# Patient Record
Sex: FEMALE | Race: WHITE | NOT HISPANIC OR LATINO | Employment: OTHER | URBAN - METROPOLITAN AREA
[De-identification: names, ages, dates, MRNs, and addresses within clinical notes are randomized per-mention and may not be internally consistent; named-entity substitution may affect disease eponyms.]

---

## 2017-01-05 ENCOUNTER — TELEPHONE (OUTPATIENT)
Dept: MEDICAL GROUP | Facility: CLINIC | Age: 72
End: 2017-01-05

## 2017-01-05 NOTE — TELEPHONE ENCOUNTER
1. Caller Name: christo (pt friend)                      Call Back Number:  [phone]     2. Message: patients friend christo called and states that pt accidentally took  paroxetine 40 mg pills about 3 hours ago and they would liek to know if they should head to the ER?     3. Patient approves office to leave a detailed voicemail/MyChart message: yes

## 2017-01-05 NOTE — TELEPHONE ENCOUNTER
This will make her shaky and may cause headache and stomach upset and possibly diarrhea as well.  That should be gone by 12 hours.  I do not anticipate any danger.

## 2017-02-22 DIAGNOSIS — J44.89 ASTHMA WITH COPD: ICD-10-CM

## 2017-02-23 RX ORDER — MONTELUKAST SODIUM 10 MG/1
10 TABLET ORAL DAILY
Qty: 90 TAB | Refills: 3 | Status: SHIPPED | OUTPATIENT
Start: 2017-02-23 | End: 2018-02-05 | Stop reason: SDUPTHER

## 2017-03-13 DIAGNOSIS — E87.6 HYPOKALEMIA: ICD-10-CM

## 2017-03-13 RX ORDER — POTASSIUM CHLORIDE 750 MG/1
CAPSULE, EXTENDED RELEASE ORAL
Qty: 180 CAP | Refills: 2 | Status: SHIPPED | OUTPATIENT
Start: 2017-03-13 | End: 2017-12-24 | Stop reason: SDUPTHER

## 2017-04-03 ENCOUNTER — TELEPHONE (OUTPATIENT)
Dept: PULMONOLOGY | Facility: HOSPICE | Age: 72
End: 2017-04-03

## 2017-04-03 DIAGNOSIS — J44.9 CHRONIC OBSTRUCTIVE PULMONARY DISEASE, UNSPECIFIED COPD TYPE (HCC): ICD-10-CM

## 2017-04-07 DIAGNOSIS — J44.89 ASTHMA WITH COPD: ICD-10-CM

## 2017-04-07 RX ORDER — TIOTROPIUM BROMIDE 18 UG/1
18 CAPSULE ORAL; RESPIRATORY (INHALATION) DAILY
Qty: 90 CAP | Refills: 3 | Status: SHIPPED | OUTPATIENT
Start: 2017-04-07 | End: 2018-03-28 | Stop reason: SDUPTHER

## 2017-04-26 NOTE — TELEPHONE ENCOUNTER
Pt is aware that since she is [facility] that she will need to go over to [facility].  Will assist in getting her set up before the official switchout happens

## 2017-06-06 DIAGNOSIS — N39.46 MIXED STRESS AND URGE URINARY INCONTINENCE: ICD-10-CM

## 2017-06-06 RX ORDER — OXYBUTYNIN CHLORIDE 5 MG/1
TABLET, EXTENDED RELEASE ORAL
Qty: 30 TAB | Refills: 0 | Status: SHIPPED | OUTPATIENT
Start: 2017-06-06 | End: 2017-07-02 | Stop reason: SDUPTHER

## 2017-06-12 ENCOUNTER — APPOINTMENT (OUTPATIENT)
Dept: PULMONOLOGY | Facility: HOSPICE | Age: 72
End: 2017-06-12
Payer: MEDICARE

## 2017-06-12 RX ORDER — AZITHROMYCIN 250 MG/1
TABLET, FILM COATED ORAL
Qty: 30 TAB | Refills: 0 | Status: SHIPPED | OUTPATIENT
Start: 2017-06-12 | End: 2017-07-26 | Stop reason: SDUPTHER

## 2017-06-12 NOTE — TELEPHONE ENCOUNTER
Was the patient seen in the last year in this department? Yes 12/2016    Does patient have an active prescription for medications requested? No     Received Request Via: Pharmacy

## 2017-06-30 ENCOUNTER — PATIENT OUTREACH (OUTPATIENT)
Dept: HEALTH INFORMATION MANAGEMENT | Facility: OTHER | Age: 72
End: 2017-06-30

## 2017-06-30 NOTE — PROGRESS NOTES
Attempt #:1    WebIZ Checked & Epic Updated: no  HealthConnect Verified: yes  Verify PCP: yes    Communication Preference Obtained: yes     Review Care Team: yes    Annual Wellness Visit Scheduling  1. Scheduling Status:Scheduled     Care Coordination Enrollment (Attempt to Enroll in Care Coordination)  2. Is patient appropriate: YES  3. Is patient interested: NO - doesn't see value     Care Gap Scheduling (Attempt to Schedule EACH Overdue Care Gap!)     Health Maintenance Due   Topic Date Due   • URINE DRUG SCREEN  Did not address   • Annual Wellness Visit  scheduled   • PFT SCREENING-FEV1 AND FEV/FVC RATIO / SPIROMETRY SHOULD BE PERFORMED ANNUALLY     • MAMMOGRAM  Informed- will call back to schedule          MyChart Activation: already active  ZarthCode Doris: no  Virtual Visits: no  Opt In to Text Messages: no

## 2017-07-02 RX ORDER — OXYBUTYNIN CHLORIDE 5 MG/1
TABLET, EXTENDED RELEASE ORAL
Qty: 30 TAB | Refills: 3 | Status: SHIPPED | OUTPATIENT
Start: 2017-07-02 | End: 2017-10-29 | Stop reason: SDUPTHER

## 2017-07-12 ENCOUNTER — TELEPHONE (OUTPATIENT)
Dept: MEDICAL GROUP | Facility: CLINIC | Age: 72
End: 2017-07-12

## 2017-07-12 NOTE — TELEPHONE ENCOUNTER
ANNUAL WELLNESS VISIT PRE-VISIT PLANNING     1.  Reviewed last PCP office visit assessment and plan notes: Yes    2.  If any orders were placed last visit do we have Results/Consult Notes?        •  Labs? No        •  Imaging? No        •  Referrals? No     3.  Patient Care Coordination Note was updated with diagnosis information:  Yes, In your medical judgement are the following conditions valid-present for 2017, if so can you please assess and document:       J44.9-COPD   J96.11-chronic respiratory failure w/hypoxia (since member is on home O2)    4.  Patient is due for these Health Maintenance Topics:   Health Maintenance Due   Topic Date Due   • URINE DRUG SCREEN  1945   • Annual Wellness Visit  1945   • PFT SCREENING-FEV1 AND FEV/FVC RATIO / SPIROMETRY SHOULD BE PERFORMED ANNUALLY  07/29/1963   • MAMMOGRAM  12/11/2016          •  LAYLA letter was faxed to:  [employer]    for Retinal eye exam  records    5.  Immunizations were updated in [place] using WebIZ?: Yes       •  Web Iz Recommendations:  Hep A, adult Influenza w/preserv.          •  Is patient due for Tdap/Shingles? Yes.  If yes, was patient alerted of copay? Yes    6.  Patient has:       •   COPD          7.  Updated Care Team with [employer] and all specialists?        •   Gait devices, O2, CPAP, etc: yes        •   Eye professional: yes       •   Other specialists (GYN, cardiology, endo, etc): yes    8.  Is patient in need of any refills prior to office visit? No       •    Separate refill encounter created?: N\A    9.  Patient was informed to arrive 15 min prior to their scheduled appointment and bring in their medication bottles? yes    10.  Patient was advised: “This is a free wellness visit. The provider will screen for medical conditions to help you stay healthy. If you have other concerns to address you may be asked to discuss these at a separate visit or there may be an additional fee.”  Yes    7/12/17 N/A L/M 11:40am

## 2017-07-12 NOTE — Clinical Note
Nagi  Dory Oden M.D.  [address]  [place] [address]  Fax: [phone]   Authorization for Release/Disclosure of   Protected Health Information   Name: BERNY RENEE : 1945 SSN: [id number]   Address: [facility] [address]  [place] [address] Phone:    [phone] (home)    I authorize the entity listed below to release/disclose the PHI below to:   Nagi/Dory Oden M.D. and Dory Oden M.D.   Provider or Entity Name:  [facility]        Address   City, [facility], [facility]   Phone:      Fax: [phone]     Reason for request: continuity of care   Information to be released:    [  ] LAST COLONOSCOPY,  including any PATH REPORT and follow-up  [  ] LAST FIT/COLOGUARD RESULT [  ] LAST DEXA  [  ] LAST MAMMOGRAM  [  ] LAST PAP  [  ] LAST LABS [ X ] RETINA EXAM REPORT  [  ] IMMUNIZATION RECORDS  [  ] Release all info      [  ] Check here and initial the line next to each item to release ALL health information INCLUDING  _____ Care and treatment for drug and / or alcohol abuse  _____ HIV testing, infection status, or AIDS  _____ Genetic Testing    DATES OF SERVICE OR TIME PERIOD TO BE DISCLOSED:   Recent eye exam   I understand and acknowledge that:  * This Authorization may be revoked at any time by you in writing, except if your health information has already been used or disclosed.  * Your health information that will be used or disclosed as a result of you signing this authorization could be re-disclosed by the recipient. If this occurs, your re-disclosed health information may no longer be protected by State or Federal laws.  * You may refuse to sign this Authorization. Your refusal will not affect your ability to obtain treatment.  * This Authorization becomes effective upon signing and will  on (date) __________.      If no date is indicated, this Authorization will  one (1) year from the signature date.    Name: Berny Renee    Signature:   Date:     2017       PLEASE FAX REQUESTED RECORDS BACK TO: [phone]

## 2017-07-13 ENCOUNTER — OFFICE VISIT (OUTPATIENT)
Dept: MEDICAL GROUP | Facility: CLINIC | Age: 72
End: 2017-07-13
Payer: MEDICARE

## 2017-07-13 ENCOUNTER — HOSPITAL ENCOUNTER (OUTPATIENT)
Dept: LAB | Facility: MEDICAL CENTER | Age: 72
End: 2017-07-13
Attending: FAMILY MEDICINE
Payer: MEDICARE

## 2017-07-13 VITALS
HEIGHT: 64 IN | BODY MASS INDEX: 38.24 KG/M2 | HEART RATE: 90 BPM | RESPIRATION RATE: 18 BRPM | DIASTOLIC BLOOD PRESSURE: 60 MMHG | TEMPERATURE: 99.3 F | SYSTOLIC BLOOD PRESSURE: 130 MMHG | WEIGHT: 224 LBS | OXYGEN SATURATION: 93 %

## 2017-07-13 DIAGNOSIS — R73.09 ELEVATED GLYCOHEMOGLOBIN: ICD-10-CM

## 2017-07-13 DIAGNOSIS — E55.9 VITAMIN D DEFICIENCY DISEASE: ICD-10-CM

## 2017-07-13 DIAGNOSIS — M54.16 LUMBAR RADICULAR PAIN: ICD-10-CM

## 2017-07-13 DIAGNOSIS — J96.11 CHRONIC RESPIRATORY FAILURE WITH HYPOXIA (HCC): ICD-10-CM

## 2017-07-13 DIAGNOSIS — M47.816 FACET ARTHROPATHY, LUMBAR: ICD-10-CM

## 2017-07-13 DIAGNOSIS — R52 PAIN MANAGEMENT: ICD-10-CM

## 2017-07-13 DIAGNOSIS — K59.09 CHRONIC CONSTIPATION: ICD-10-CM

## 2017-07-13 DIAGNOSIS — E78.5 DYSLIPIDEMIA: ICD-10-CM

## 2017-07-13 DIAGNOSIS — E66.9 OBESITY (BMI 30-39.9): ICD-10-CM

## 2017-07-13 DIAGNOSIS — N39.46 MIXED STRESS AND URGE URINARY INCONTINENCE: ICD-10-CM

## 2017-07-13 DIAGNOSIS — Z91.81 AT RISK FOR FALLS: ICD-10-CM

## 2017-07-13 DIAGNOSIS — R73.01 IMPAIRED FASTING GLUCOSE: ICD-10-CM

## 2017-07-13 DIAGNOSIS — Z12.31 ENCOUNTER FOR SCREENING MAMMOGRAM FOR BREAST CANCER: ICD-10-CM

## 2017-07-13 DIAGNOSIS — G89.4 CHRONIC PAIN SYNDROME: ICD-10-CM

## 2017-07-13 DIAGNOSIS — Z00.00 MEDICARE ANNUAL WELLNESS VISIT, SUBSEQUENT: ICD-10-CM

## 2017-07-13 DIAGNOSIS — J44.89 ASTHMA WITH COPD: ICD-10-CM

## 2017-07-13 DIAGNOSIS — I10 ESSENTIAL HYPERTENSION: ICD-10-CM

## 2017-07-13 DIAGNOSIS — Z99.81 SUPPLEMENTAL OXYGEN DEPENDENT: ICD-10-CM

## 2017-07-13 DIAGNOSIS — R60.9 PERIPHERAL EDEMA: ICD-10-CM

## 2017-07-13 LAB
25(OH)D3 SERPL-MCNC: 28 NG/ML (ref 30–100)
ANION GAP SERPL CALC-SCNC: 5 MMOL/L (ref 0–11.9)
BUN SERPL-MCNC: 8 MG/DL (ref 8–22)
CALCIUM SERPL-MCNC: 9.9 MG/DL (ref 8.5–10.5)
CHLORIDE SERPL-SCNC: 96 MMOL/L (ref 96–112)
CO2 SERPL-SCNC: 35 MMOL/L (ref 20–33)
CREAT SERPL-MCNC: 0.52 MG/DL (ref 0.5–1.4)
ERYTHROCYTE [DISTWIDTH] IN BLOOD BY AUTOMATED COUNT: 40.1 FL (ref 35.9–50)
EST. AVERAGE GLUCOSE BLD GHB EST-MCNC: 128 MG/DL
GFR SERPL CREATININE-BSD FRML MDRD: >60 ML/MIN/1.73 M 2
GLUCOSE SERPL-MCNC: 123 MG/DL (ref 65–99)
HBA1C MFR BLD: 6.1 % (ref 0–5.6)
HCT VFR BLD AUTO: 43.9 % (ref 37–47)
HGB BLD-MCNC: 14.8 G/DL (ref 12–16)
MCH RBC QN AUTO: 29.8 PG (ref 27–33)
MCHC RBC AUTO-ENTMCNC: 33.7 G/DL (ref 33.6–35)
MCV RBC AUTO: 88.3 FL (ref 81.4–97.8)
PLATELET # BLD AUTO: 348 K/UL (ref 164–446)
PMV BLD AUTO: 9.7 FL (ref 9–12.9)
POTASSIUM SERPL-SCNC: 3.7 MMOL/L (ref 3.6–5.5)
RBC # BLD AUTO: 4.97 M/UL (ref 4.2–5.4)
SODIUM SERPL-SCNC: 136 MMOL/L (ref 135–145)
TSH SERPL DL<=0.005 MIU/L-ACNC: 1.36 UIU/ML (ref 0.3–3.7)
WBC # BLD AUTO: 12 K/UL (ref 4.8–10.8)

## 2017-07-13 PROCEDURE — 85027 COMPLETE CBC AUTOMATED: CPT

## 2017-07-13 PROCEDURE — 84443 ASSAY THYROID STIM HORMONE: CPT

## 2017-07-13 PROCEDURE — 36415 COLL VENOUS BLD VENIPUNCTURE: CPT

## 2017-07-13 PROCEDURE — 80048 BASIC METABOLIC PNL TOTAL CA: CPT

## 2017-07-13 PROCEDURE — G0439 PPPS, SUBSEQ VISIT: HCPCS | Performed by: FAMILY MEDICINE

## 2017-07-13 PROCEDURE — 82306 VITAMIN D 25 HYDROXY: CPT

## 2017-07-13 PROCEDURE — 83036 HEMOGLOBIN GLYCOSYLATED A1C: CPT

## 2017-07-13 RX ORDER — OMEPRAZOLE 20 MG/1
CAPSULE, DELAYED RELEASE ORAL
COMMUNITY
Start: 2017-06-11 | End: 2017-12-24 | Stop reason: SDUPTHER

## 2017-07-13 ASSESSMENT — PATIENT HEALTH QUESTIONNAIRE - PHQ9
5. POOR APPETITE OR OVEREATING: 0 - NOT AT ALL
CLINICAL INTERPRETATION OF PHQ2 SCORE: 3
SUM OF ALL RESPONSES TO PHQ QUESTIONS 1-9: 9

## 2017-07-13 ASSESSMENT — PAIN SCALES - GENERAL: PAINLEVEL: 5=MODERATE PAIN

## 2017-07-13 NOTE — MR AVS SNAPSHOT
"Berny Renee   2017 1:20 PM   Office Visit   MRN: [id number]    Department:  [facility]   Dept Phone:  [phone]    Description:  Female : 1945   Provider:  Dory Oden M.D.; [facility]            Reason for Visit     Annual Wellness Visit           Allergies as of 2017     Allergen Noted Reactions    Iodine 2008       convulsion    Tape 2010   Rash, Itching      You were diagnosed with     Medicare annual wellness visit, subsequent   [762037]       Asthma with COPD (CMS-HCC)   [161271]       Supplemental oxygen dependent   [578117]       Chronic respiratory failure with hypoxia (CMS-HCC)   [283496]       Obesity (BMI 30-39.9)   [170470]       Pain management   [875899]       Chronic pain syndrome   [338.4.ICD-9-CM]       At risk for falls   [757736]       Impaired fasting glucose   [790.21.ICD-9-CM]       Facet arthropathy, lumbar ([facility])   [496464]       Lumbar radicular pain   [298777]       Dyslipidemia   [227667]       Essential hypertension   [6195420]       Elevated glycohemoglobin   [414030]       Vitamin D deficiency disease   [801415]       Mixed stress and urge urinary incontinence   [103075]       Peripheral edema   [469418]       Chronic constipation   [113225]       Encounter for screening mammogram for breast cancer   [9871683]         Vital Signs     Blood Pressure Pulse Temperature Respirations Height Weight    130/60 mmHg 90 37.4 °C (99.3 °F) 18 1.626 m (5' 4.02\") 101.606 kg (224 lb)    Body Mass Index Oxygen Saturation Last Menstrual Period Smoking Status          38.43 kg/m2 93% 2001 Former Smoker        Basic Information     Date Of Birth Sex Race Ethnicity Preferred Language    1945 Female White Non- English      Your appointments     2017  3:00 PM   Established Patient Pul with Michelle Alfonso M.D.   [facility] (--)    [address]  [address]  [place] [id number] [phone]           "      Problem List              ICD-10-CM Priority Class Noted - Resolved    Asthma with COPD (CMS-HCC) J44.9, J45.909   Unknown - Present    Essential hypertension I10   Unknown - Present    Mixed stress and urge urinary incontinence N39.46   Unknown - Present    Peripheral edema R60.9   6/4/2010 - Present    Lumbar radicular pain M54.16   8/16/2010 - Present    Vitamin D deficiency disease E55.9   Unknown - Present    Dyslipidemia E78.5   Unknown - Present    Facet arthropathy, lumbar ([facility]) M12.88   2/27/2012 - Present    Chronic constipation K59.09   11/5/2012 - Present    Chronic pain syndrome G89.4   9/12/2014 - Present    Supplemental oxygen dependent Z99.81   8/13/2015 - Present    Impaired fasting glucose R73.01   12/11/2015 - Present    Pain management R52   5/16/2016 - Present    Obesity (BMI 30-39.9) E66.9   10/24/2016 - Present    At risk for falls Z91.81   10/24/2016 - Present    Chronic respiratory failure with hypoxia (CMS-HCC) J96.11   7/13/2017 - Present      Health Maintenance        Date Due Completion Dates    MAMMOGRAM 12/11/2016 12/11/2015 (Declined), 1/10/2012, 11/23/2009, 11/3/2009, 11/3/2009, 9/30/2008, 9/30/2008, 7/17/2007, 6/25/2007, 6/25/2007, 6/19/2006, 6/5/2006, 4/5/2005, 2/23/2004    Override on 12/11/2015: Patient Declined    BONE DENSITY 9/12/2017 (Originally 7/14/2013) 7/14/2008, 12/27/2005    IMM DTaP/Tdap/Td Vaccine (1 - Tdap) 1/1/2018 (Originally 7/29/1964) ---    IMM INFLUENZA (1) 9/1/2017 10/12/2016, 9/9/2015, 9/26/2014, 10/10/2013, 10/5/2012    COLONOSCOPY 1/23/2018 1/23/2013, 1/16/2012 (Prv Comp), 6/9/2005 (Prv Comp)    Override on 1/16/2012: Previously completed (colon polyps)    Override on 6/9/2005: Previously completed (colon polyps)            Current Immunizations     13-VALENT PCV PREVNAR 6/17/2015    Influenza TIV (IM) 9/26/2014, 10/10/2013, 10/5/2012    Influenza Vaccine Adult HD 10/12/2016, 9/9/2015    Pneumococcal Vaccine (UF)Historical Data 7/8/2013    Pneumococcal  polysaccharide vaccine (PPSV-23) 10/24/2016    SHINGLES VACCINE 10/12/2016      Below and/or attached are the medications your provider expects you to take. Review all of your home medications and newly ordered medications with your provider and/or pharmacist. Follow medication instructions as directed by your provider and/or pharmacist. Please keep your medication list with you and share with your provider. Update the information when medications are discontinued, doses are changed, or new medications (including over-the-counter products) are added; and carry medication information at all times in the event of emergency situations     Allergies:  IODINE - (reactions not documented)     TAPE - Rash,Itching               Medications  Valid as of: July 13, 2017 -  2:58 PM    Generic Name Brand Name Tablet Size Instructions for use    Acyclovir (Cap) ZOVIRAX 200 MG Take 2 Caps by mouth 3 times a day.        Albuterol Sulfate (Nebu Soln) PROVENTIL 2.5mg/3ml 2.5 mg by Nebulization route every four hours as needed for Shortness of Breath.        Azithromycin (Tab) ZITHROMAX 250 MG TAKE ONE TABLET BY MOUTH ONE TIME DAILY        Cholecalciferol (Tab) cholecalciferol 1000 UNIT Take 2 Tabs by mouth every day.        Diclofenac Sodium (Gel) Diclofenac Sodium 1 %         Fluticasone-Salmeterol (AEROSOL POWDER, BREATH ACTIVATED) ADVAIR 500-50 MCG/DOSE Inhale 1 Puff by mouth 2 times a day.        Influenza Vac Split High-Dose (Suspension Prefilled Syringe) FLUZONE HIGH-DOSE 0.5 ML         Ipratropium-Albuterol (Solution) DUONEB 0.5-2.5 (3) MG/3ML USE ONE VIAL IN NEBULIZER EVERY 4 HOURS AS NEEDED FOR SHORTNESS OF BREATH OR  WHEEZING        Ketoconazole (Cream) NIZORAL 2 % Apply to affected area daily        Lidocaine (Ointment) XYLOCAINE 5 %         Lisinopril (Tab) PRINIVIL 5 MG Take 1 Tab by mouth every day.        Misc. Devices (Misc) Misc. Devices  This is an order for  2 batteries and an oxygen older for the my pride go-go  keesha.  Patient has COPD and is oxygen dependent. The batteries are not lasting long enough. Her mobility is impaired.        ODETTE 99 months   NPI [id number]        Misc. Devices (Misc) Misc. Devices  This is an order for repairs to her go go scooter           ODETTE 99 months   NPI [id number]        Montelukast Sodium (Tab) SINGULAIR 10 MG Take 1 Tab by mouth every day.        Omeprazole (CAPSULE DELAYED RELEASE) PRILOSEC 20 MG         Omeprazole Magnesium (Tablet Delayed Response) PRILOSEC OTC 20 MG Take 1 Tab by mouth every day.        Oxybutynin Chloride (TABLET SR 24 HR) DITROPAN-XL 5 MG TAKE ONE TABLET BY MOUTH ONE TIME DAILY        OxyCODONE HCl (Tab) ROXICODONE 10 MG         OxyCODONE HCl (Tablet Extended Release 12 hour Abuse-Deterrent) OXYCONTIN 20 MG         Potassium Chloride (Cap CR) MICRO-K 10 MEQ TAKE ONE CAPSULE BY MOUTH TWICE DAILY        Spironolactone (Tab) ALDACTONE 25 MG TAKE ONE TABLET BY MOUTH ONE TIME DAILY        Tiotropium Bromide Monohydrate (Cap) SPIRIVA 18 MCG Inhale 1 Cap by mouth every day.        TiZANidine HCl (Tab) ZANAFLEX 4 MG Take 1 Tab by mouth 3 times a day.        Zoster Vaccine Live (Recon Soln) ZOSTAVAX 70946 UNT/0.65ML         .                 Medicines prescribed today were sent to:     [id number] - [place], [place] - [id number] [facility] [address]    Phone: [phone] Fax: [phone]    Open 24 Hours?: No      Medication refill instructions:       If your prescription bottle indicates you have medication refills left, it is not necessary to call your provider’s office. Please contact your pharmacy and they will refill your medication.    If your prescription bottle indicates you do not have any refills left, you may request refills at any time through one of the following ways: The online [facility] system (except Urgent Care), by calling your provider’s office, or by asking your pharmacy to contact your provider’s office with a refill request.  Medication refills are processed only during regular business hours and may not be available until the next business day. Your provider may request additional information or to have a follow-up visit with you prior to refilling your medication.   *Please Note: Medication refills are assigned a new Rx number when refilled electronically. Your pharmacy may indicate that no refills were authorized even though a new prescription for the same medication is available at the pharmacy. Please request the medicine by name with the pharmacy before contacting your provider for a refill.        Your To Do List     Future Labs/Procedures Complete By Expires    BASIC METABOLIC PANEL  As directed 7/14/2018    CBC WITHOUT DIFFERENTIAL  As directed 1/13/2018    HEMOGLOBIN A1C  As directed 7/14/2018    MA-MAMMO SCREENING BILAT W/MONALISA W/CAD  As directed 7/13/2018    TSH  As directed 7/14/2018    VITAMIN D,25 HYDROXY  As directed 7/14/2018         [employer] [facility] Access Code: Activation code not generated  Current [facility] Social [employer] Status: Active

## 2017-07-13 NOTE — PROGRESS NOTES
Chief Complaint   Patient presents with   • Annual Wellness Visit         HPI:  Berny Renee is a 71 y.o. female here for Medicare Annual Wellness Visit    She lives with her  of many years. He has multiple medical problems as well. He is having some memory problems which are very frustrating and frightening to her.    Patient Active Problem List    Diagnosis Date Noted   • Obesity (BMI 30-39.9) 10/24/2016   • At risk for falls 10/24/2016   • Chronic use of opiate for therapeutic purpose 05/16/2016   • Impaired fasting glucose 12/11/2015   • Supplemental oxygen dependent 08/13/2015   • Chronic pain syndrome 09/12/2014   • Hypokalemia 06/12/2014   • Chronic constipation 11/05/2012   • Facet arthropathy, lumbar (HCC) 02/27/2012   • Dyslipidemia    • Vitamin D deficiency disease    • Lumbar radicular pain 08/16/2010   • Peripheral edema 06/04/2010   • Mixed stress and urge urinary incontinence    • Asthma with COPD (CMS-[facility])    • Essential hypertension        Current Outpatient Prescriptions   Medication Sig Dispense Refill   • oxybutynin SR (DITROPAN-XL) 5 MG TABLET SR 24 HR TAKE ONE TABLET BY MOUTH ONE TIME DAILY 30 Tab 3   • azithromycin (ZITHROMAX) 250 MG Tab TAKE ONE TABLET BY MOUTH ONE TIME DAILY 30 Tab 0   • tiotropium (SPIRIVA HANDIHALER) 18 MCG Cap Inhale 1 Cap by mouth every day. 90 Cap 3   • potassium chloride (MICRO-K) 10 MEQ capsule TAKE ONE CAPSULE BY MOUTH TWICE DAILY 180 Cap 2   • montelukast (SINGULAIR) 10 MG Tab Take 1 Tab by mouth every day. 90 Tab 3   • spironolactone (ALDACTONE) 25 MG Tab TAKE ONE TABLET BY MOUTH ONE TIME DAILY 30 Tab 11   • lisinopril (PRINIVIL) 5 MG Tab Take 1 Tab by mouth every day. 90 Tab 3   • omeprazole (PRILOSEC OTC) 20 MG tablet Take 1 Tab by mouth every day. 30 Tab 11   • oxycodone immediate release (ROXICODONE) 10 MG immediate release tablet      • ketoconazole (NIZORAL) 2 % Cream Apply to affected area daily 30 g 2   • ipratropium-albuterol (DUONEB) 0.5-2.5 (3)  MG/3ML nebulizer solution USE ONE VIAL IN NEBULIZER EVERY 4 HOURS AS NEEDED FOR SHORTNESS OF BREATH OR  WHEEZING 360 Vial 6   • OXYCONTIN 30 MG Tablet Extended Release 12 hour Abuse-Deterrent      • Diclofenac Sodium 1 % Gel      • lidocaine (XYLOCAINE) 5 % Ointment      • vitamin D (CHOLECALCIFEROL) 1000 UNIT Tab Take 2 Tabs by mouth every day. 60 Tab 6   • gabapentin (NEURONTIN) 300 MG Cap TAKE  ONE CAPSULE BY MOUTH EVERY MORNING, ONE CAPSULE AT NOON, AND THREE CAPSULES NIGHTLY AT BEDTIME 150 Cap 3   • fluticasone-salmeterol (ADVAIR DISKUS) 500-50 MCG/DOSE AEROSOL POWDER, BREATH ACTIVATED Inhale 1 Puff by mouth 2 times a day. 1 Inhaler 11   • Misc. Devices Misc This is an order for  2 batteries and an oxygen older for the franko gilman go-go scooter.  Patient has COPD and is oxygen dependent. The batteries are not lasting long enough. Her mobility is impaired.        ODETTE 99 months   NPI [id number] 1 Each 0   • Misc. Devices Misc This is an order for repairs to her go go scooter           ODETTE 99 months   NPI [id number] 1 Each 0   • FLUZONE HIGH-DOSE 0.5 ML Suspension Prefilled Syringe injection      • ZOSTAVAX 63320 UNT/0.65ML injection      • acyclovir (ZOVIRAX) 200 MG Cap Take 2 Caps by mouth 3 times a day. 120 Cap 2   • predniSONE (DELTASONE) 10 MG Tab One po qid prn asthma exacerbation 40 Tab 2   • hydrocodone-acetaminophen (NORCO) 7.5-325 MG per tablet Take 1 Tab by mouth every 6 hours as needed. 180 Tab 0   • albuterol (PROVENTIL) 2.5mg/3ml Nebu Soln solution for nebulization 2.5 mg by Nebulization route every four hours as needed for Shortness of Breath.     • tizanidine (ZANAFLEX) 4 MG Tab Take 1 Tab by mouth 3 times a day. 90 Tab 5     No current facility-administered medications for this visit.        Patient is taking medications as noted in medication list.  Current supplements as per medication list.   Chronic narcotic pain medicines: yes    Allergies: Iodine and Tape    Current social contact/activities:  Berny plays canasta with someone on line, interacts with a friend that visits, also with family     Is patient current with immunizations?  No, due for TDAP. Patient is interested in receiving NONE today.     She  reports that she quit smoking about 18 years ago. Her smoking use included Cigarettes. She has a 60 pack-year smoking history. She has never used smokeless tobacco. She reports that she uses illicit drugs (Marijuana). She reports that she does not drink alcohol.  Counseling given: Not Answered        DPA/Advanced Directive:  Patient has Advanced Directive on file.     ROS:    Gait: Uses a scooter   Ostomy: no   Other tubes: no   Amputations: no   Chronic oxygen use: yes   Last eye exam: 2015   Wears hearing aids: no   : Denies incontinence.   Denies chest pain, chest pressure.  Her exertional SOB is stable.  Denies abdominal pain, pressure or visible blood or mucus in the stool.    Screening:    COPD    1.  Is patient under the care of a pulmonologist? Yes, CareTeam was updated.  2.  Has patient ever completed a PFT or spirometry? Yes, on 12/2/13.  3.  Is patient on oxygen or CPAP? Yes, CareTeam was updated.  4.  Has patient ever had instruction on inhaler technique by health care professional? Yes  5.  Is patient interested in a referral to respiratory therapy for more information on COPD, inhaler technique, and/or information on establishing an action plan?  No, patient is NOT interested.        Depression Screening    Little interest or pleasure in doing things?  3 - nearly every day  Feeling down, depressed , or hopeless? 0 - not at all  Trouble falling or staying asleep, or sleeping too much?  3 - nearly every day  Feeling tired or having little energy?  3 - nearly every day  Poor appetite or overeating?  0 - not at all  Feeling bad about yourself - or that you are a failure or have let yourself or your family down? 0 - not at all  Trouble concentrating on things, such as reading the newspaper or  watching television? 0 - not at all  Moving or speaking so slowly that other people could have noticed.  Or the opposite - being so fidgety or restless that you have been moving around a lot more than usual?  0 - not at all  Thoughts that you would be better off dead, or of hurting yourself?  0 - not at all  Patient Health Questionnaire Score: 9  If depressive symptoms identified deferred to follow up visit unless specifically addressed in assessment and plan.    Interpretation of PHQ-9 Total Score   Score Severity   1-4 Minimal Depression   5-9 Mild Depression   10-14 Moderate Depression   15-19 Moderately Severe Depression   20-27 Severe Depression    Screening for Cognitive Impairment    Three Minute Recall (banana, sunrise, fence)  3/3    Draw clock face with all 12 numbers set to the hand to show 10 minutes past 11 o'clock  1 5/5  If cognitive concerns identified deferred to follow up visit unless specifically addressed in assessment and plan.    Fall Risk Assessment    Has the patient had two or more falls in the last year or any fall with injury in the last year?  No  If Fall Risk identified deferred to follow up visit unless specifically addressed in assessment and plan.    Safety Assessment    Throw rugs on floor.  Yes  Handrails on all stairs.  Yes  Good lighting in all hallways.  Yes  Difficulty hearing.  No  Patient counseled about all safety risks that were identified.    Functional Assessment ADLs    Are there any barriers preventing you from cooking for yourself or meeting nutritional needs?  No.    Are there any barriers preventing you from driving safely or obtaining transportation?  No.    Are there any barriers preventing you from using a telephone or calling for help?  No.    Are there any barriers preventing you from shopping?  Yes.  or shopping assistant does the shopping  Are there any barriers preventing you from taking care of your own finances?  No.    Are there any barriers  preventing you from managing your medications?  No.    Are currently engaging any exercise or physical activity?  No.       Health Maintenance Summary                URINE DRUG SCREEN Overdue 1945     Annual Wellness Visit Overdue 1945     PFT SCREENING-FEV1 AND FEV/FVC RATIO / SPIROMETRY SHOULD BE PERFORMED ANNUALLY Overdue 7/29/1963     MAMMOGRAM Overdue 12/11/2016      Patient Declined 12/11/2015      Patient has more history with this topic...    BONE DENSITY Postponed 9/12/2017 Originally 7/14/2013. Other     Done 7/14/2008 DS-BONE DENSITY STUDY (DEXA)     Patient has more history with this topic...    IMM DTaP/Tdap/Td Vaccine Postponed 1/1/2018 Originally 7/29/1964. Insurance/Financial    IMM INFLUENZA Next Due 9/1/2017      Done 10/12/2016 Imm Admin: Influenza Vaccine Adult HD     Patient has more history with this topic...    COLONOSCOPY Next Due 1/23/2018      Done 1/23/2013 REFERRAL TO GI FOR COLONOSCOPY     Patient has more history with this topic...          Patient Care Team:  Dory Oden M.D. as PCP - General  Michelle Alfonso M.D. as Consulting Physician (Pulmonary Medicine)  Charlie Pierre D.O. as Consulting Physician (Phys Med and Rehab)  [facility]  (Optometry)      Social History   Substance Use Topics   • Smoking status: Former Smoker -- 2.00 packs/day for 30 years     Types: Cigarettes     Quit date: 03/01/1999   • Smokeless tobacco: Never Used      Comment: 3 pks a day for 35 yrs, quit 1999   • Alcohol Use: No     History reviewed. No pertinent family history.  She  has a past medical history of Arthritis; DDD (degenerative disc disease), thoracic; DDD (degenerative disc disease), cervical; OSTEOPOROSIS; Vitamin d deficiency; URINARY INCONTINENCE; Colon polyps; Diverticulitis of colon; COPD (chronic obstructive pulmonary disease) (CMS-HCC) (2002); Hypertension; EMPHYSEMA; Dyslipidemia; Cataract immature; Spinal stenosis, lumbar region, with neurogenic claudication;  "Chronic pain; and REGINE (obstructive sleep apnea). She also has no past medical history of Breast cancer (CMS-HCC).   Past Surgical History   Procedure Laterality Date   • Tonsillectomy     • Other orthopedic surgery  2004     left ankle fx   • Other       leg fracture   • Other       t spine disc surg   • Lumbar laminectomy diskectomy  6/22/2010     Performed by GRACIE CANO at SURGERY [facility]           Exam:     Blood pressure 130/60, pulse 90, temperature 37.4 °C (99.3 °F), resp. rate 18, height 1.626 m (5' 4.02\"), weight 101.606 kg (224 lb), last menstrual period 06/07/2001, SpO2 93 %.on 6 liters NP at rest Body mass index is 38.43 kg/(m^2).    Hearing fair.    Dentition fair  Alert, oriented in no acute distress. She is wearing her oxygen cannula with the oxygen conserving device.  Eye contact is good, speech goal directed, affect calm  EOMI, PERRLA.   Oropharynx is well hydrated with normal soft palate motion. No exudate or ulcerations noted.   Neck exhibits Full range of motion except extension and some limitation to rotation, moderate posterior cervical spasm. No JVD or carotid bruits appreciated. No cervical adenopathy appreciated. No thyromegaly or neck masses appreciated.  No retractions appreciated.  Lungs:    Clear to auscultation A&P with good air movement.   Surprisingly good.  Heart:      Regular rate and rhythm normal S1 and S2 without murmur appreciated.  Strong and symmetric radial and DP pulses.  Abd:        Soft, bowel sounds positive, nontender. No bloating noted. No hepatosplenomegaly or mass appreciated. No pulsatile mass appreciated.  Ext:         Extremities show symmetric and full range of motion with normal strength. No cyanosis or clubbing appreciated. No peripheral edema appreciated.  Neuro:    Gait is normal. Patient is lucid, fluent and appropriate. No significant tremor appreciated.  Skin:       Exhibit no rashes, pigmented lesions or ulcerations.      Assessment and Plan. The " following treatment and monitoring plan is recommended:      1. Medicare annual wellness visit, subsequent  Her multiple medical problems are generally stable    2. Asthma with COPD (CMS-HCC)  She has long-standing COPD with asthma component. She stopped smoking several years ago. She is fully oxygen dependent. She has a nebulizer with solution available for use at home. She typically uses it at least twice daily. She continues azithromycin. She continues the DuoNeb solution. Her Advair continues also to be very helpful for her. She will continue to see pulmonology regularly. She is generally stable.  - CBC WITHOUT DIFFERENTIAL; Future    3. Supplemental oxygen dependent/chronic respiratory failure with hypoxia (CMS-HCC).  She is completely oxygen dependent with chronic respiratory failure with hypoxia both daytime and nighttime.  Stable.    4. Obesity (BMI 30-39.9)  She has achieved significant weight loss and is congratulated. She states she is working hard on this. She is trying to take some of the pressure off her back by losing weight and hopefully get off the narcotics which she feels are not really helping the pain very much and doing nothing but interfere with her breathing. I am in agreement. Stable to improving.    5. Pain management/chronic pain syndrome  She continues to see pain management regularly. Injections have not been helpful. She is weaning down on the narcotics with the goal of getting off. The pain is multifactorial but primarily from degenerative changes of the lumbar spine. Stable.    6. At risk for falls  Her legs are weak and unsteady, she commonly uses a scooter as she has today. She is at high risk for falls. At home she uses a walker.    7. Impaired fasting glucose/elevated glycohemoglobin  She is due for recheck on this.  She has been avoiding starchy and sugar in an effort at weight loss and to avoid progressing to diabetes. She is currently prediabetic. Stable to perhaps  improving.  - COMP METABOLIC PANEL; Future    8. Facet arthropathy, lumbar (HCC)/lumbar radicular pain  As referred to above she has multiple level degenerative change in the spine with prominent facet arthropathy, disc disease and radicular pain. She is following with pain management and has had epidurals which have been not been particularly helpful. She has been tried on medication for radicular pain by the pain specialist with no success. Unfortunately stable.    9. Dyslipidemia  Patient denies chest pain, chest pressure, palpitations or exertional shortness of breath. She is on no medication as what has been seen in the past has only been mild hypertriglyceridemia.. Patient is a former smoker. Patient takes no aspirin daily. Patient has no history of myocardial infarction, stroke or PVD.  The problem is clinically stable.  - COMP METABOLIC PANEL; Future  - LIPID PROFILE; Future    10. Essential hypertension  HTN - Chronic condition stable. Currently taking all meds as directed.   She is not taking baby aspirin daily.   She is not monitoring BP at home.   Denies symptoms low BP: light-headed, tunnel-vision, unusual fatigue.   Denies symptoms high BP:pounding headache, visual changes, palpitations, flushed face.   Denies medicine side effects: unusual fatigue, slow heartbeat, foot/leg swelling, cough.  - COMP METABOLIC PANEL; Future  - TSH; Future    11. Vitamin D deficiency disease  She remains vitamin D deficient. She is due for lab recheck. She is taking 1000 units daily. She has had no pathologic fractures though there is a little bit of vertebral, pressure and which appears to be old. Stable clinically.  - VITAMIN D,25 HYDROXY; Future    12. Mixed stress and urge urinary incontinence  This is a persistent problem for her. The oxybutynin continues to be quite helpful. She is also using disposable undergarments. She does complain of dry mouth from the oxybutynin but otherwise this is controlled. Stable.    13.  Peripheral edema  One of the things that contributes the urinary incontinence is that she is on spironolactone for her persistent peripheral edema. This is a little unclear but may be related primarily to her overweight. She states the spironolactone has been very helpful. Stable.  - COMP METABOLIC PANEL; Future  - TSH; Future    14. Chronic constipation  This is slightly better on the reduced dose of the narcotics. She is hoping this will improve greatly off the narcotics. She is using a fiber supplement as well as increase fluid. Stable.  - TSH; Future    15. Encounter for screening mammogram for breast cancer  She is due for mammography order discussed and placed  - MA-MAMMO SCREENING BILAT W/MONALISA W/CAD; Future        Services suggested: No services needed at this time  Health Care Screening recommendations as per orders if indicated.  Referrals offered: PT/OT/Nutrition counseling/Behavioral Health/Smoking cessation as per orders if indicated.    Discussion today about general wellness and lifestyle habits:  Discussed  · Prevent falls and reduce trip hazards; Cautioned about securing or removing rugs.  · Have a working fire alarm and carbon monoxide detector; yes   · Engage in regular physical activity and social activities       Follow-up: 6 months

## 2017-07-18 DIAGNOSIS — E55.9 VITAMIN D DEFICIENCY DISEASE: ICD-10-CM

## 2017-07-18 RX ORDER — CHOLECALCIFEROL (VITAMIN D3) 125 MCG
2000 CAPSULE ORAL DAILY
Qty: 30 TAB | Refills: 0 | Status: SHIPPED | OUTPATIENT
Start: 2017-07-18 | End: 2018-01-22

## 2017-07-18 NOTE — TELEPHONE ENCOUNTER
Was the patient seen in the last year in this department? Yes     Does patient have an active prescription for medications requested? No     Received Request Via: Pharmacy requesting to change to Vit D3 2000 units daily not the 1000 2 tabs daily.

## 2017-07-26 DIAGNOSIS — J44.89 ASTHMA WITH COPD: ICD-10-CM

## 2017-07-26 RX ORDER — AZITHROMYCIN 250 MG/1
250 TABLET, FILM COATED ORAL DAILY
Qty: 30 TAB | Refills: 11 | Status: SHIPPED | OUTPATIENT
Start: 2017-07-26 | End: 2018-05-24 | Stop reason: SDUPTHER

## 2017-07-26 NOTE — TELEPHONE ENCOUNTER
Fax received from pharmacy [facility] for medication Azithromycin (they wrote 2nd request!). Scanned into Media. Spoke to patient who says that yes, she takes this every day and has for 1+ year now for COPD.     Was the patient seen in the last year in this department? Yes     Does patient have an active prescription for medications requested? No     Received Request Via: Pharmacy

## 2017-08-11 ENCOUNTER — TELEPHONE (OUTPATIENT)
Dept: MEDICAL GROUP | Facility: CLINIC | Age: 72
End: 2017-08-11

## 2017-08-11 DIAGNOSIS — J44.89 ASTHMA WITH COPD: ICD-10-CM

## 2017-08-11 RX ORDER — ALBUTEROL SULFATE 90 UG/1
2 AEROSOL, METERED RESPIRATORY (INHALATION) EVERY 6 HOURS PRN
Qty: 1 INHALER | Refills: 0 | Status: SHIPPED
Start: 2017-08-11 | End: 2017-09-19 | Stop reason: SDUPTHER

## 2017-08-11 NOTE — TELEPHONE ENCOUNTER
1. Caller Name: kati                      Call Back Number: [phone]     2. Message: pharmacy called and states they gave pt an inhaler on 7/22 due the smoke and pt was having sob. Pharmacy states they attempted to contact us many times, but no documentation seen in pts chart. Pharmacy is now requesting a script for Ventolin HFA : sig. Inhale 2 puffs by mouth prn with date 7/22 on for insurance coverage. [facility] is requesting this to be faxed and not sent through e-script. Please advise, thank you.     3. Patient approves office to leave a detailed voicemail/MyChart message: yes

## 2017-08-21 RX ORDER — ACETAMINOPHEN 160 MG
TABLET,DISINTEGRATING ORAL
Qty: 30 CAP | Refills: 4 | Status: SHIPPED | OUTPATIENT
Start: 2017-08-21 | End: 2018-01-21 | Stop reason: SDUPTHER

## 2017-09-11 DIAGNOSIS — J44.9 CHRONIC OBSTRUCTIVE PULMONARY DISEASE, UNSPECIFIED COPD TYPE (HCC): ICD-10-CM

## 2017-09-18 ENCOUNTER — NON-PROVIDER VISIT (OUTPATIENT)
Dept: MEDICAL GROUP | Facility: CLINIC | Age: 72
End: 2017-09-18
Payer: MEDICARE

## 2017-09-18 DIAGNOSIS — Z23 NEED FOR VACCINATION: ICD-10-CM

## 2017-09-18 PROCEDURE — G0008 ADMIN INFLUENZA VIRUS VAC: HCPCS | Performed by: FAMILY MEDICINE

## 2017-09-18 PROCEDURE — 90662 IIV NO PRSV INCREASED AG IM: CPT | Performed by: FAMILY MEDICINE

## 2017-09-18 NOTE — PROGRESS NOTES
"Berny Renee is a 72 y.o. female here for a non-provider visit for:   FLU    Reason for immunization: Annual Flu Vaccine  Immunization records indicate need for vaccine: Yes, confirmed with Epic  Minimum interval has been met for this vaccine: Yes  ABN completed: Yes    Order and dose verified by: an  VIS Dated  8/7/15 was given to patient: Yes  All IAC Questionnaire questions were answered \"No.\"    Patient tolerated injection and no adverse effects were observed or reported: Yes    Pt scheduled for next dose in series: Not Indicated    "

## 2017-09-19 DIAGNOSIS — J44.89 ASTHMA WITH COPD: ICD-10-CM

## 2017-09-19 RX ORDER — ALBUTEROL SULFATE 90 UG/1
2 AEROSOL, METERED RESPIRATORY (INHALATION) EVERY 6 HOURS PRN
Qty: 1 INHALER | Refills: 0 | Status: SHIPPED | OUTPATIENT
Start: 2017-09-19 | End: 2018-05-24 | Stop reason: SDUPTHER

## 2017-10-06 ENCOUNTER — OFFICE VISIT (OUTPATIENT)
Dept: MEDICAL GROUP | Facility: CLINIC | Age: 72
End: 2017-10-06
Payer: MEDICARE

## 2017-10-06 VITALS
DIASTOLIC BLOOD PRESSURE: 80 MMHG | WEIGHT: 217 LBS | BODY MASS INDEX: 37.23 KG/M2 | HEART RATE: 80 BPM | SYSTOLIC BLOOD PRESSURE: 140 MMHG | RESPIRATION RATE: 16 BRPM | TEMPERATURE: 100.4 F | OXYGEN SATURATION: 92 %

## 2017-10-06 DIAGNOSIS — M47.816 FACET ARTHROPATHY, LUMBAR: ICD-10-CM

## 2017-10-06 DIAGNOSIS — J44.89 ASTHMA WITH COPD: ICD-10-CM

## 2017-10-06 DIAGNOSIS — K64.1 GRADE II HEMORRHOIDS: ICD-10-CM

## 2017-10-06 DIAGNOSIS — J96.11 CHRONIC RESPIRATORY FAILURE WITH HYPOXIA (HCC): ICD-10-CM

## 2017-10-06 PROCEDURE — 99213 OFFICE O/P EST LOW 20 MIN: CPT | Performed by: FAMILY MEDICINE

## 2017-10-06 RX ORDER — CEFUROXIME AXETIL 500 MG/1
500 TABLET ORAL 2 TIMES DAILY
Qty: 20 TAB | Refills: 0 | Status: SHIPPED | OUTPATIENT
Start: 2017-10-06 | End: 2017-10-16

## 2017-10-06 RX ORDER — OXYCODONE HYDROCHLORIDE 10 MG/1
TABLET, FILM COATED, EXTENDED RELEASE ORAL
COMMUNITY
Start: 2017-09-14 | End: 2019-07-29

## 2017-10-06 NOTE — PROGRESS NOTES
Chief Complaint   Patient presents with   • Other     paperwork   • GI Problem   • Pharyngitis       Subjective:     HPI:   Berny Renee presents today with the followin. Grade II hemorrhoids  She has one or 2 large hemorrhoids that are no longer able to be tacked back into the anal area. They are bleeding considerably when she passes stool. She has been using over-the-counter treatments such as Preparation H creams and suppositories and Tucks pads as well as sitz baths. These treatments have not been adequate. Discussed referral to gastroenterology for further evaluation and treatment.    2. Asthma with COPD (CMS-HCC)  She has long-standing asthma and COPD and is oxygen dependent. She has been doing fairly well she feels. She is emphasizing weight loss in an effort to control her oxygen better. She has also been weaning down on narcotics with her pain management specialist which she thinks has also been helpful. However, she is getting increased shortness of breath and some sore throat which often means she is getting another exacerbation. She is aware of what she needs to do. She has prednisone at home. She is not sure if she still has the antibiotics at home. Order discussed and sent to pharmacy.     3. Chronic respiratory failure with hypoxia (CMS-HCC)  She is oxygen dependent 6 L 24 7. She is compliant and follows regularly with pulmonology. She stopped smoking several years ago.    4. Facet arthropathy, lumbar  She does have chronic pain from multi level facet arthropathy. There is also associated weakness from neuropathy. She is unable to ambulate and must use a handicap facilities. Needs a renewal on her handicap.    Paperwork for DMV is completed.  She has lung and severe arthritis problems and needs to use a handicap placard.    Patient Active Problem List    Diagnosis Date Noted   • Chronic respiratory failure with hypoxia (CMS-HCC) 2017   • Obesity (BMI 30-39.9) 10/24/2016   • At risk for  falls 10/24/2016   • Pain management 05/16/2016   • Impaired fasting glucose 12/11/2015   • Supplemental oxygen dependent 08/13/2015   • Chronic pain syndrome 09/12/2014   • Chronic constipation 11/05/2012   • Facet arthropathy, lumbar 02/27/2012   • Dyslipidemia    • Vitamin D deficiency disease    • Lumbar radicular pain 08/16/2010   • Peripheral edema 06/04/2010   • Mixed stress and urge urinary incontinence    • Asthma with COPD (CMS-[facility])    • Essential hypertension        Current medicines (including changes today)  Current Outpatient Prescriptions   Medication Sig Dispense Refill   • cefUROXime (CEFTIN) 500 MG Tab Take 1 Tab by mouth 2 times a day for 10 days. 20 Tab 0   • OXYCONTIN 10 MG Tablet Extended Release 12 hour Abuse-Deterrent      • VENTOLIN  (90 Base) MCG/ACT Aero Soln inhalation aerosol Inhale 2 Puffs by mouth every 6 hours as needed for Shortness of Breath. 1 Inhaler 0   • ADVAIR DISKUS 500-50 MCG/DOSE AEROSOL POWDER, BREATH ACTIVATED INHALE 1 PUFF BY MOUTH 2 TIMES A DAY. 1 Inhaler 10   • Cholecalciferol (VITAMIN D3) 2000 UNIT Cap TAKE 1 CAPSULE BY MOUTH ONCE DAILY 30 Cap 4   • azithromycin (ZITHROMAX) 250 MG Tab Take 1 Tab by mouth every day. 30 Tab 11   • Cholecalciferol (VITAMIN D3) 2000 UNITS Tab Take 2,000 Units by mouth every day. 30 Tab 0   • omeprazole (PRILOSEC) 20 MG delayed-release capsule      • oxybutynin SR (DITROPAN-XL) 5 MG TABLET SR 24 HR TAKE ONE TABLET BY MOUTH ONE TIME DAILY 30 Tab 3   • tiotropium (SPIRIVA HANDIHALER) 18 MCG Cap Inhale 1 Cap by mouth every day. 90 Cap 3   • potassium chloride (MICRO-K) 10 MEQ capsule TAKE ONE CAPSULE BY MOUTH TWICE DAILY 180 Cap 2   • montelukast (SINGULAIR) 10 MG Tab Take 1 Tab by mouth every day. 90 Tab 3   • spironolactone (ALDACTONE) 25 MG Tab TAKE ONE TABLET BY MOUTH ONE TIME DAILY 30 Tab 11   • lisinopril (PRINIVIL) 5 MG Tab Take 1 Tab by mouth every day. 90 Tab 3   • omeprazole (PRILOSEC OTC) 20 MG tablet Take 1 Tab by mouth  every day. 30 Tab 11   • oxycodone immediate release (ROXICODONE) 10 MG immediate release tablet      • ketoconazole (NIZORAL) 2 % Cream Apply to affected area daily 30 g 2   • ipratropium-albuterol (DUONEB) 0.5-2.5 (3) MG/3ML nebulizer solution USE ONE VIAL IN NEBULIZER EVERY 4 HOURS AS NEEDED FOR SHORTNESS OF BREATH OR  WHEEZING 360 Vial 6   • Diclofenac Sodium 1 % Gel      • FLUZONE HIGH-DOSE 0.5 ML Suspension Prefilled Syringe injection      • ZOSTAVAX 39281 UNT/0.65ML injection      • lidocaine (XYLOCAINE) 5 % Ointment      • acyclovir (ZOVIRAX) 200 MG Cap Take 2 Caps by mouth 3 times a day. 120 Cap 2   • albuterol (PROVENTIL) 2.5mg/3ml Nebu Soln solution for nebulization 2.5 mg by Nebulization route every four hours as needed for Shortness of Breath.     • tizanidine (ZANAFLEX) 4 MG Tab Take 1 Tab by mouth 3 times a day. 90 Tab 5   • Misc. Devices Misc This is an order for  2 batteries and an oxygen older for the [employer] go-go scooter.  Patient has COPD and is oxygen dependent. The batteries are not lasting long enough. Her mobility is impaired.        ODETTE 99 months   NPI [id number] 1 Each 0   • Misc. Devices Misc This is an order for repairs to her go go scooter           ODETTE 99 months   NPI [id number] 1 Each 0     No current facility-administered medications for this visit.        Allergies   Allergen Reactions   • Iodine      convulsion   • Tape Rash and Itching       ROS: As per HPI       Objective:     Blood pressure 140/80, pulse 80, temperature 38 °C (100.4 °F), resp. rate 16, weight 98.4 kg (217 lb), last menstrual period 06/07/2001, SpO2 92 %. Body mass index is 37.23 kg/m².    Physical Exam:  Constitutional: Well-developed and well-nourished. Not diaphoretic. No distress. Lucid and fluent.  Skin: Skin is warm and dry. No rash noted.  Head: Atraumatic without lesions.  Eyes: Conjunctivae and extraocular motions are normal. Pupils are equal, round, and reactive to light. No scleral icterus.   Nose:  Nares patent. Mucosa without edema or erythema. No discharge. No facial tenderness.  Mouth/Throat: Tongue normal. Oropharynx is clear and moist. Posterior pharynx without erythema or exudates.  Neck: Supple, trachea midline. No thyromegaly present. No cervical or supraclavicular lymphadenopathy. No JVD or carotid bruits appreciated  Cardiovascular: Regular rate and rhythm.  Normal S1, S2 without murmur appreciated.  Chest: Effort normal. Clear to auscultation throughout. No adventitious sounds.   Extremities: No cyanosis, clubbing, erythema, nor edema.   Neurological: Alert and oriented x 3. She is unstable in her gait. No cranial nerve deficit.   Psychiatric:  Behavior, mood, and affect are appropriate.       Assessment and Plan:     72 y.o. female with the following issues:    1. Grade II hemorrhoids  REFERRAL TO GASTROENTEROLOGY   2. Asthma with COPD (CMS-[facility])  cefUROXime (CEFTIN) 500 MG Tab   3. Chronic respiratory failure with hypoxia (CMS-HCC)     4. Facet arthropathy, lumbar           Followup: Return in about 6 months (around 4/6/2018), or if symptoms worsen or fail to improve.

## 2017-10-30 ENCOUNTER — PATIENT OUTREACH (OUTPATIENT)
Dept: HEALTH INFORMATION MANAGEMENT | Facility: OTHER | Age: 72
End: 2017-10-30

## 2017-10-30 RX ORDER — OXYBUTYNIN CHLORIDE 5 MG/1
TABLET, EXTENDED RELEASE ORAL
Qty: 30 TAB | Refills: 4 | Status: SHIPPED | OUTPATIENT
Start: 2017-10-30 | End: 2020-11-12

## 2017-10-30 NOTE — PROGRESS NOTES
Outcome: Left Message    Please transfer to Patient Outreach Team at [phone] when patient returns call.    WebIZ Checked & Epic Updated:  yes    HealthConnect Verified: yes    Attempt # 1

## 2017-11-01 ENCOUNTER — PATIENT MESSAGE (OUTPATIENT)
Dept: MEDICAL GROUP | Facility: CLINIC | Age: 72
End: 2017-11-01

## 2017-11-01 DIAGNOSIS — K64.4 EXTERNAL HEMORRHOID, BLEEDING: ICD-10-CM

## 2017-11-20 ENCOUNTER — OFFICE VISIT (OUTPATIENT)
Dept: PULMONOLOGY | Facility: HOSPICE | Age: 72
End: 2017-11-20
Payer: MEDICARE

## 2017-11-20 VITALS
HEART RATE: 91 BPM | OXYGEN SATURATION: 93 % | TEMPERATURE: 97.2 F | SYSTOLIC BLOOD PRESSURE: 124 MMHG | HEIGHT: 64 IN | DIASTOLIC BLOOD PRESSURE: 82 MMHG | RESPIRATION RATE: 16 BRPM | WEIGHT: 214.6 LBS | BODY MASS INDEX: 36.64 KG/M2

## 2017-11-20 DIAGNOSIS — J44.9 CHRONIC OBSTRUCTIVE PULMONARY DISEASE, UNSPECIFIED COPD TYPE (HCC): ICD-10-CM

## 2017-11-20 DIAGNOSIS — J96.11 CHRONIC RESPIRATORY FAILURE WITH HYPOXIA (HCC): ICD-10-CM

## 2017-11-20 PROCEDURE — 99214 OFFICE O/P EST MOD 30 MIN: CPT | Performed by: INTERNAL MEDICINE

## 2017-11-21 NOTE — PROGRESS NOTES
Chief Complaint   Patient presents with   • COPD     Follow up       HPI: This patient is a 72 y.o. Female returns for routine follow-up of severe COPD. She is on oxygen at 3-6 L/m using an Oxymizer pendant with stable oxygen saturations. She is compliant with Advair 500/50, Spiriva and suppressive azithromycin. She denies exacerbations of her COPD over the past year. Her exertional dyspnea is stable and she denies sputum purulence. Denies wheezing, chest tightness or edema. She is up-to-date on influenza and pneumococcal vaccines. She has had intentional weight loss with improvement in dyspnea.  She requires pulmonary clearance for hemorrhoidectomy to be performed under local anesthesia.    Past Medical History:   Diagnosis Date   • Arthritis     spine   • Cataract immature    • Chronic pain    • Colon polyps    • COPD (chronic obstructive pulmonary disease) (CMS-[facility]) 2002    moderate to severe   • DDD (degenerative disc disease), cervical    • DDD (degenerative disc disease), thoracic    • Diverticulitis of colon    • Dyslipidemia    • EMPHYSEMA    • Hypertension    • REGINE (obstructive sleep apnea)    • OSTEOPOROSIS    • Spinal stenosis, lumbar region, with neurogenic claudication    • URINARY INCONTINENCE    • Vitamin d deficiency        Social History     Social History   • Marital status:      Spouse name: N/A   • Number of children: N/A   • Years of education: N/A     Occupational History   • Not on file.     Social History Main Topics   • Smoking status: Former Smoker     Packs/day: 2.00     Years: 30.00     Types: Cigarettes     Quit date: 3/1/1999   • Smokeless tobacco: Never Used      Comment: 3 pks a day for 35 yrs, quit 1999   • Alcohol use 4.2 oz/week     7 Glasses of wine per week   • Drug use:      Types: Marijuana      Comment: Medical Marijuana    • Sexual activity: No     Other Topics Concern   • Not on file     Social History Narrative   • No narrative on file       History reviewed. No  pertinent family history.    Current Outpatient Prescriptions on File Prior to Visit   Medication Sig Dispense Refill   • oxybutynin SR (DITROPAN-XL) 5 MG TABLET SR 24 HR TAKE ONE TABLET BY MOUTH ONE TIME DAILY 30 Tab 4   • OXYCONTIN 10 MG Tablet Extended Release 12 hour Abuse-Deterrent      • VENTOLIN  (90 Base) MCG/ACT Aero Soln inhalation aerosol Inhale 2 Puffs by mouth every 6 hours as needed for Shortness of Breath. 1 Inhaler 0   • ADVAIR DISKUS 500-50 MCG/DOSE AEROSOL POWDER, BREATH ACTIVATED INHALE 1 PUFF BY MOUTH 2 TIMES A DAY. 1 Inhaler 10   • Cholecalciferol (VITAMIN D3) 2000 UNIT Cap TAKE 1 CAPSULE BY MOUTH ONCE DAILY 30 Cap 4   • azithromycin (ZITHROMAX) 250 MG Tab Take 1 Tab by mouth every day. 30 Tab 11   • omeprazole (PRILOSEC) 20 MG delayed-release capsule      • tiotropium (SPIRIVA HANDIHALER) 18 MCG Cap Inhale 1 Cap by mouth every day. 90 Cap 3   • potassium chloride (MICRO-K) 10 MEQ capsule TAKE ONE CAPSULE BY MOUTH TWICE DAILY 180 Cap 2   • montelukast (SINGULAIR) 10 MG Tab Take 1 Tab by mouth every day. 90 Tab 3   • spironolactone (ALDACTONE) 25 MG Tab TAKE ONE TABLET BY MOUTH ONE TIME DAILY 30 Tab 11   • lisinopril (PRINIVIL) 5 MG Tab Take 1 Tab by mouth every day. 90 Tab 3   • omeprazole (PRILOSEC OTC) 20 MG tablet Take 1 Tab by mouth every day. 30 Tab 11   • oxycodone immediate release (ROXICODONE) 10 MG immediate release tablet      • ketoconazole (NIZORAL) 2 % Cream Apply to affected area daily 30 g 2   • ipratropium-albuterol (DUONEB) 0.5-2.5 (3) MG/3ML nebulizer solution USE ONE VIAL IN NEBULIZER EVERY 4 HOURS AS NEEDED FOR SHORTNESS OF BREATH OR  WHEEZING 360 Vial 6   • Diclofenac Sodium 1 % Gel      • FLUZONE HIGH-DOSE 0.5 ML Suspension Prefilled Syringe injection      • ZOSTAVAX 30532 UNT/0.65ML injection      • lidocaine (XYLOCAINE) 5 % Ointment      • tizanidine (ZANAFLEX) 4 MG Tab Take 1 Tab by mouth 3 times a day. 90 Tab 5   • Cholecalciferol (VITAMIN D3) 2000 UNITS Tab Take  "2,000 Units by mouth every day. 30 Tab 0   • acyclovir (ZOVIRAX) 200 MG Cap Take 2 Caps by mouth 3 times a day. 120 Cap 2   • albuterol (PROVENTIL) 2.5mg/3ml Nebu Soln solution for nebulization 2.5 mg by Nebulization route every four hours as needed for Shortness of Breath.     • Misc. Devices Misc This is an order for  2 batteries and an oxygen older for the [employer] go-go scooter.  Patient has COPD and is oxygen dependent. The batteries are not lasting long enough. Her mobility is impaired.        ODETTE 99 months   NPI [id number] 1 Each 0   • Misc. Devices Misc This is an order for repairs to her go go scooter           ODETTE 99 months   NPI [id number] 1 Each 0     No current facility-administered medications on file prior to visit.        Allergies: Iodine and Tape    ROS:   Constitutional: Denies fevers, chills, night sweats, fatigue or weight loss  Eyes: Denies vision loss, pain, drainage, double vision  Ears, Nose, Throat: Denies earache, difficulty hearing, tinnitus, nasal congestion, hoarseness  Cardiovascular: Denies chest pain, tightness, palpitations, orthopnea or edema  Respiratory:As in history of present illness  Sleep: Denies daytime sleepiness, snoring, apneas, insomnia, morning headaches  GI: Denies heartburn, dysphagia, nausea, abdominal pain, diarrhea or constipation  : Denies frequent urination, hematuria, discharge or painful urination  Musculoskeletal: Denies back pain, +painful joints, sore muscles  Neurological: Denies weakness or headaches  Skin: No rashes    Blood pressure 124/82, pulse 91, temperature 36.2 °C (97.2 °F), resp. rate 16, height 1.626 m (5' 4\"), weight 97.3 kg (214 lb 9.6 oz), last menstrual period 06/07/2001, SpO2 93 %.    Physical Exam:  Appearance: Well-nourished, well-developed, in no acute distress  HEENT: Normocephalic, atraumatic, white sclera, PERRLA, oropharynx clear  Neck: No adenopathy or masses  Respiratory: no intercostal retractions or accessory muscle use  Lungs " auscultation: Clear to auscultation bilaterally, diminished breath sounds  Cardiovascular: Regular rate rhythm. No murmurs, rubs or gallops.  No LE edema  Abdomen: soft, nondistended  Gait: on scooter  Digits: No clubbing, cyanosis  Motor: No focal deficits  Orientation: Oriented to time, person and place    Diagnosis:  1. Chronic obstructive pulmonary disease, unspecified COPD type (CMS-HCC)     2. Chronic respiratory failure with hypoxia (CMS-[facility])         Plan:  The patient has severe COPD, with clinical stability over the past year.   Continue oxygen at 3-6 L/m.   Continue Advair 500/50 Spiriva and suppressive azithromycin.  She is considered stable for local anesthesia.  Return in about 6 months (around 5/20/2018).

## 2017-12-24 DIAGNOSIS — E87.6 HYPOKALEMIA: ICD-10-CM

## 2017-12-25 RX ORDER — POTASSIUM CHLORIDE 750 MG/1
CAPSULE, EXTENDED RELEASE ORAL
Qty: 60 CAP | Refills: 6 | Status: ON HOLD | OUTPATIENT
Start: 2017-12-25 | End: 2024-02-07

## 2017-12-25 RX ORDER — OMEPRAZOLE 20 MG/1
CAPSULE, DELAYED RELEASE ORAL
Qty: 30 CAP | Refills: 10 | Status: SHIPPED | OUTPATIENT
Start: 2017-12-25 | End: 2019-07-29

## 2018-01-03 DIAGNOSIS — I10 ESSENTIAL HYPERTENSION: ICD-10-CM

## 2018-01-04 RX ORDER — LISINOPRIL 5 MG/1
5 TABLET ORAL DAILY
Qty: 90 TAB | Refills: 3 | Status: SHIPPED | OUTPATIENT
Start: 2018-01-04 | End: 2019-07-29

## 2018-01-05 ENCOUNTER — PATIENT MESSAGE (OUTPATIENT)
Dept: MEDICAL GROUP | Facility: CLINIC | Age: 73
End: 2018-01-05

## 2018-01-21 DIAGNOSIS — B02.9 HERPES ZOSTER WITHOUT COMPLICATION: ICD-10-CM

## 2018-01-22 RX ORDER — ACYCLOVIR 200 MG/1
CAPSULE ORAL
Qty: 120 CAP | Refills: 0 | Status: SHIPPED | OUTPATIENT
Start: 2018-01-22 | End: 2020-11-12

## 2018-01-22 RX ORDER — ACETAMINOPHEN 160 MG
TABLET,DISINTEGRATING ORAL
Qty: 30 CAP | Refills: 3 | Status: SHIPPED | OUTPATIENT
Start: 2018-01-22

## 2018-02-05 DIAGNOSIS — J44.89 ASTHMA WITH COPD: ICD-10-CM

## 2018-02-05 RX ORDER — MONTELUKAST SODIUM 10 MG/1
10 TABLET ORAL DAILY
Qty: 90 TAB | Refills: 3 | Status: SHIPPED | OUTPATIENT
Start: 2018-02-05 | End: 2022-02-24 | Stop reason: SDUPTHER

## 2018-02-12 RX ORDER — SPIRONOLACTONE 25 MG/1
TABLET ORAL
Qty: 30 TAB | Refills: 11 | Status: SHIPPED | OUTPATIENT
Start: 2018-02-12

## 2018-02-15 ENCOUNTER — TELEPHONE (OUTPATIENT)
Dept: MEDICAL GROUP | Facility: CLINIC | Age: 73
End: 2018-02-15

## 2018-02-15 DIAGNOSIS — J44.89 ASTHMA WITH COPD: ICD-10-CM

## 2018-02-15 DIAGNOSIS — J96.11 CHRONIC RESPIRATORY FAILURE WITH HYPOXIA (HCC): ICD-10-CM

## 2018-02-15 DIAGNOSIS — J44.9 STAGE 4 VERY SEVERE COPD BY GOLD CLASSIFICATION (HCC): ICD-10-CM

## 2018-02-16 NOTE — TELEPHONE ENCOUNTER
1. Caller Name: Berny Reed ([facility])                                         Call Back Number: [phone]      Patient approves a detailed voicemail message: yes  Kaia is requesting a referral to [facility] hospice care. Referral will have to say please admit to hospice.   She would love to speak with  about the situation.

## 2018-02-16 NOTE — TELEPHONE ENCOUNTER
Please let her know that isreal got in touch with me and has clarified the situation. The referral for [facility] hospice has been placed.

## 2018-03-02 ENCOUNTER — PATIENT MESSAGE (OUTPATIENT)
Dept: MEDICAL GROUP | Facility: CLINIC | Age: 73
End: 2018-03-02

## 2018-03-02 DIAGNOSIS — J44.89 ASTHMA WITH COPD: ICD-10-CM

## 2018-03-02 DIAGNOSIS — Z99.81 SUPPLEMENTAL OXYGEN DEPENDENT: ICD-10-CM

## 2018-03-02 DIAGNOSIS — J96.11 CHRONIC RESPIRATORY FAILURE WITH HYPOXIA (HCC): ICD-10-CM

## 2018-03-05 ENCOUNTER — TELEPHONE (OUTPATIENT)
Dept: MEDICAL GROUP | Facility: CLINIC | Age: 73
End: 2018-03-05

## 2018-03-05 NOTE — TELEPHONE ENCOUNTER
1. Caller Name: huyen chester                                         Call Back Number: [phone]      Patient approves a detailed voicemail message: no    I found the fax number [phone].    [email or web address]

## 2018-03-28 ENCOUNTER — PATIENT MESSAGE (OUTPATIENT)
Dept: MEDICAL GROUP | Facility: CLINIC | Age: 73
End: 2018-03-28

## 2018-03-28 DIAGNOSIS — J44.89 ASTHMA WITH COPD: ICD-10-CM

## 2018-03-28 RX ORDER — TIOTROPIUM BROMIDE 18 UG/1
18 CAPSULE ORAL; RESPIRATORY (INHALATION) DAILY
Qty: 90 CAP | Refills: 3 | Status: SHIPPED | OUTPATIENT
Start: 2018-03-28 | End: 2018-05-24 | Stop reason: SDUPTHER

## 2018-04-03 NOTE — TELEPHONE ENCOUNTER
Was the patient seen in the last year in this department? Yes     Does patient have an active prescription for medications requested? No     Received Request Via: Pharmacy  
Detail Level: Simple

## 2018-04-04 ENCOUNTER — PATIENT MESSAGE (OUTPATIENT)
Dept: MEDICAL GROUP | Facility: CLINIC | Age: 73
End: 2018-04-04

## 2018-04-04 DIAGNOSIS — K59.09 CHRONIC CONSTIPATION: ICD-10-CM

## 2018-04-19 ENCOUNTER — TELEPHONE (OUTPATIENT)
Dept: MEDICAL GROUP | Facility: CLINIC | Age: 73
End: 2018-04-19

## 2018-04-19 ENCOUNTER — PATIENT OUTREACH (OUTPATIENT)
Dept: HEALTH INFORMATION MANAGEMENT | Facility: OTHER | Age: 73
End: 2018-04-19

## 2018-04-19 DIAGNOSIS — Z12.11 SCREENING FOR COLON CANCER: ICD-10-CM

## 2018-04-19 NOTE — PROGRESS NOTES
Attempt #Final    WebIZ Checked & Epic Updated: no  HealthConnect Verified: no  Verify PCP: yes    Communication Preference Obtained: no     Review Care Team: no    Annual Wellness Visit Scheduling  1. Scheduling Status:Not Scheduled. Patient states they are not interested        Care Gap Scheduling (Attempt to Schedule EACH Overdue Care Gap!)  Health Maintenance Due   Topic Date Due   • IMM DTaP/Tdap/Td Vaccine (1 - Tdap) 07[birth year]/1964   • COLON CANCER SCREENING ANNUAL FIT  07/29/1995   • BONE DENSITY  07/14/2013   • PFT SCREENING-FEV1 AND FEV/FVC RATIO / SPIROMETRY SHOULD BE PERFORMED ANNUALLY  07/01/2016   • COLONOSCOPY  01/23/2018       - Patient declines Colonoscopy/FIT and Dexa Scan. Declined colonoscopy, does want FIT test    [email or web address] Activation: already active  [email or web address] Doris: no  Virtual Visits: no  Opt In to Text Messages: no

## 2018-04-30 ENCOUNTER — HOSPITAL ENCOUNTER (OUTPATIENT)
Facility: MEDICAL CENTER | Age: 73
End: 2018-04-30
Attending: FAMILY MEDICINE
Payer: MEDICARE

## 2018-04-30 PROCEDURE — 82274 ASSAY TEST FOR BLOOD FECAL: CPT

## 2018-05-11 DIAGNOSIS — Z12.11 SCREENING FOR COLON CANCER: ICD-10-CM

## 2018-05-11 LAB
AMBIGUOUS DTTM AMBI4: NORMAL
HEMOCCULT STL QL IA: NEGATIVE

## 2018-05-17 ENCOUNTER — TELEPHONE (OUTPATIENT)
Dept: PULMONOLOGY | Facility: HOSPICE | Age: 73
End: 2018-05-17

## 2018-05-17 NOTE — TELEPHONE ENCOUNTER
Patient came off hospice yesterday and will need to be set up again for o2 through [facility].  I have emailed them to see if she needs new testing - patient does have an appt next week if she does

## 2018-05-21 ENCOUNTER — HOSPITAL ENCOUNTER (OUTPATIENT)
Dept: CARDIOLOGY | Facility: MEDICAL CENTER | Age: 73
End: 2018-05-21
Attending: FAMILY MEDICINE
Payer: MEDICARE

## 2018-05-21 ENCOUNTER — HOSPITAL ENCOUNTER (OUTPATIENT)
Dept: LAB | Facility: MEDICAL CENTER | Age: 73
End: 2018-05-21
Attending: FAMILY MEDICINE
Payer: MEDICARE

## 2018-05-21 LAB
ALBUMIN SERPL BCP-MCNC: 4.3 G/DL (ref 3.2–4.9)
ALBUMIN/GLOB SERPL: 1.3 G/DL
ALP SERPL-CCNC: 39 U/L (ref 30–99)
ALT SERPL-CCNC: 13 U/L (ref 2–50)
ANION GAP SERPL CALC-SCNC: 9 MMOL/L (ref 0–11.9)
AST SERPL-CCNC: 18 U/L (ref 12–45)
BASOPHILS # BLD AUTO: 0.7 % (ref 0–1.8)
BASOPHILS # BLD: 0.08 K/UL (ref 0–0.12)
BILIRUB SERPL-MCNC: 0.4 MG/DL (ref 0.1–1.5)
BUN SERPL-MCNC: 13 MG/DL (ref 8–22)
CALCIUM SERPL-MCNC: 9.5 MG/DL (ref 8.5–10.5)
CHLORIDE SERPL-SCNC: 97 MMOL/L (ref 96–112)
CO2 SERPL-SCNC: 30 MMOL/L (ref 20–33)
CREAT SERPL-MCNC: 0.42 MG/DL (ref 0.5–1.4)
EKG IMPRESSION: NORMAL
EOSINOPHIL # BLD AUTO: 0.37 K/UL (ref 0–0.51)
EOSINOPHIL NFR BLD: 3.3 % (ref 0–6.9)
ERYTHROCYTE [DISTWIDTH] IN BLOOD BY AUTOMATED COUNT: 40.8 FL (ref 35.9–50)
GLOBULIN SER CALC-MCNC: 3.3 G/DL (ref 1.9–3.5)
GLUCOSE SERPL-MCNC: 93 MG/DL (ref 65–99)
HCT VFR BLD AUTO: 43.5 % (ref 37–47)
HGB BLD-MCNC: 14.3 G/DL (ref 12–16)
IMM GRANULOCYTES # BLD AUTO: 0.03 K/UL (ref 0–0.11)
IMM GRANULOCYTES NFR BLD AUTO: 0.3 % (ref 0–0.9)
LYMPHOCYTES # BLD AUTO: 1.56 K/UL (ref 1–4.8)
LYMPHOCYTES NFR BLD: 14 % (ref 22–41)
MCH RBC QN AUTO: 29.5 PG (ref 27–33)
MCHC RBC AUTO-ENTMCNC: 32.9 G/DL (ref 33.6–35)
MCV RBC AUTO: 89.9 FL (ref 81.4–97.8)
MONOCYTES # BLD AUTO: 0.94 K/UL (ref 0–0.85)
MONOCYTES NFR BLD AUTO: 8.5 % (ref 0–13.4)
NEUTROPHILS # BLD AUTO: 8.14 K/UL (ref 2–7.15)
NEUTROPHILS NFR BLD: 73.2 % (ref 44–72)
NRBC # BLD AUTO: 0 K/UL
NRBC BLD-RTO: 0 /100 WBC
PLATELET # BLD AUTO: 300 K/UL (ref 164–446)
PMV BLD AUTO: 10.1 FL (ref 9–12.9)
POTASSIUM SERPL-SCNC: 3.9 MMOL/L (ref 3.6–5.5)
PROT SERPL-MCNC: 7.6 G/DL (ref 6–8.2)
RBC # BLD AUTO: 4.84 M/UL (ref 4.2–5.4)
SODIUM SERPL-SCNC: 136 MMOL/L (ref 135–145)
WBC # BLD AUTO: 11.1 K/UL (ref 4.8–10.8)

## 2018-05-21 PROCEDURE — 93005 ELECTROCARDIOGRAM TRACING: CPT | Performed by: NURSE PRACTITIONER

## 2018-05-21 PROCEDURE — 85025 COMPLETE CBC W/AUTO DIFF WBC: CPT

## 2018-05-21 PROCEDURE — 80053 COMPREHEN METABOLIC PANEL: CPT

## 2018-05-21 PROCEDURE — 93010 ELECTROCARDIOGRAM REPORT: CPT | Performed by: INTERNAL MEDICINE

## 2018-05-21 PROCEDURE — 36415 COLL VENOUS BLD VENIPUNCTURE: CPT

## 2018-05-22 NOTE — TELEPHONE ENCOUNTER
I have been unable to get a straight answer regarding this patient and the need for new testing.  I spoke to a rep at [facility] that reached out to me on behalf of the patient and she thought new testing would be beneficial since this is a new start after coming off hospice.  She stated she would explain to the patient that since she already has an appt this week that we would all wait for that.  If hospice gives her a bad time she will have them call me directly.    OV notes for 5/24 updated to request new o2 testing and notes and order to [facility]

## 2018-05-22 NOTE — PROGRESS NOTES
1. Attempt #: 1    2. HealthConnect Verified: yes    3. Verify PCP: yes    5.  Reviewed/Updated the following with patient:       •   Communication Preference Obtained? YES    6. [employer] Activation: already active    7. [employer] Doris: no    8. Annual Wellness Visit Scheduling  Scheduling Status:Not Scheduled. Patient states they are no longer with PCP      9. Care Gap Scheduling (Attempt to Schedule EACH Overdue Care Gap!)     Health Maintenance Due   Topic Date Due   • IMM DTaP/Tdap/Td Vaccine (1 - Tdap) 07/29/1964   • BONE DENSITY  07/14/2013   • PFT SCREENING-FEV1 AND FEV/FVC RATIO / SPIROMETRY SHOULD BE PERFORMED ANNUALLY  07/01/2016   • COLONOSCOPY  01/23/2018        Patient declined [facility] - pt stated she is no longer a pt of [facility]     Patient prefers to discuss Colonoscopy/FIT and Immunizations: PNEUMOVAX ([id number]) and TDAP with PCP.    10. Patient was advised: “This is a free wellness visit. The provider will screen for medical conditions to help you stay healthy. If you have other concerns to address you may be asked to discuss these at a separate visit or there may be an additional fee.”     11. Patient was NOT informed to arrive 15 min prior to their scheduled appointment and bring in their medication bottles.

## 2018-05-24 ENCOUNTER — OFFICE VISIT (OUTPATIENT)
Dept: PULMONOLOGY | Facility: HOSPICE | Age: 73
End: 2018-05-24
Payer: MEDICARE

## 2018-05-24 VITALS
SYSTOLIC BLOOD PRESSURE: 122 MMHG | DIASTOLIC BLOOD PRESSURE: 80 MMHG | HEART RATE: 89 BPM | OXYGEN SATURATION: 95 % | RESPIRATION RATE: 20 BRPM | BODY MASS INDEX: 33.49 KG/M2 | TEMPERATURE: 98.1 F | WEIGHT: 196.2 LBS | HEIGHT: 64 IN

## 2018-05-24 DIAGNOSIS — Z63.4 BEREAVEMENT: ICD-10-CM

## 2018-05-24 DIAGNOSIS — J44.89 ASTHMA WITH COPD: ICD-10-CM

## 2018-05-24 DIAGNOSIS — E66.9 OBESITY (BMI 30-39.9): ICD-10-CM

## 2018-05-24 DIAGNOSIS — J96.11 CHRONIC RESPIRATORY FAILURE WITH HYPOXIA (HCC): ICD-10-CM

## 2018-05-24 DIAGNOSIS — J44.9 CHRONIC OBSTRUCTIVE PULMONARY DISEASE, UNSPECIFIED COPD TYPE (HCC): ICD-10-CM

## 2018-05-24 PROCEDURE — 99214 OFFICE O/P EST MOD 30 MIN: CPT | Mod: GV | Performed by: INTERNAL MEDICINE

## 2018-05-24 RX ORDER — TIOTROPIUM BROMIDE 18 UG/1
18 CAPSULE ORAL; RESPIRATORY (INHALATION) DAILY
Qty: 90 CAP | Refills: 3 | Status: SHIPPED | OUTPATIENT
Start: 2018-05-24 | End: 2019-06-18 | Stop reason: SDUPTHER

## 2018-05-24 RX ORDER — AZITHROMYCIN 250 MG/1
250 TABLET, FILM COATED ORAL DAILY
Qty: 30 TAB | Refills: 11 | Status: SHIPPED | OUTPATIENT
Start: 2018-05-24 | End: 2019-07-30 | Stop reason: SDUPTHER

## 2018-05-24 RX ORDER — ALBUTEROL SULFATE 90 UG/1
2 AEROSOL, METERED RESPIRATORY (INHALATION) EVERY 6 HOURS PRN
Qty: 1 INHALER | Refills: 2 | Status: SHIPPED | OUTPATIENT
Start: 2018-05-24 | End: 2020-01-06 | Stop reason: SDUPTHER

## 2018-05-24 SDOH — SOCIAL STABILITY - SOCIAL INSECURITY: DISSAPEARANCE AND DEATH OF FAMILY MEMBER: Z63.4

## 2018-05-24 NOTE — PROGRESS NOTES
Chief Complaint   Patient presents with   • Follow-Up     6 month follow up. Pt needs new O2 testing.       HPI: This patient is a 72 y.o. Female who returns for follow-up of severe COPD.  Since her last visit her  unexpectedly passed away.  This hss understandably been a huge adjustment for her.  She enrolled in hospice with her  however has since discontinued hospice after he passed away. She uses oxygen at 3-6 L/m using an Oxymizer pendant.  She requires re-qualification for oxygen.  She is compliant with Advair 500/50, Spiriva and suppressive azithromycin. She denies exacerbations of her COPD over the past year. Her exertional dyspnea is stable and she denies sputum purulence, wheezing, chest tightness or edema. She is up-to-date on influenza and pneumococcal vaccines. She has had intentional weight loss.      Past Medical History:   Diagnosis Date   • Arthritis     spine   • Cataract immature    • Chronic pain    • Colon polyps    • COPD (chronic obstructive pulmonary disease) ([facility]) 2002    moderate to severe   • DDD (degenerative disc disease), cervical    • DDD (degenerative disc disease), thoracic    • Diverticulitis of colon    • Dyslipidemia    • EMPHYSEMA    • Hypertension    • REGINE (obstructive sleep apnea)    • OSTEOPOROSIS    • Spinal stenosis, lumbar region, with neurogenic claudication    • URINARY INCONTINENCE    • Vitamin d deficiency        Social History     Social History   • Marital status:      Spouse name: N/A   • Number of children: N/A   • Years of education: N/A     Occupational History   • Not on file.     Social History Main Topics   • Smoking status: Former Smoker     Packs/day: 2.00     Years: 30.00     Types: Cigarettes     Quit date: 3/1/1999   • Smokeless tobacco: Never Used      Comment: 3 pks a day for 35 yrs, quit 1999   • Alcohol use 4.2 oz/week     7 Glasses of wine per week   • Drug use: Yes     Types: Marijuana      Comment: Medical Marijuana    • Sexual  activity: No     Other Topics Concern   • Not on file     Social History Narrative   • No narrative on file       History reviewed. No pertinent family history.    Current Outpatient Prescriptions on File Prior to Visit   Medication Sig Dispense Refill   • Naloxegol Oxalate 25 MG Tab Take 25 mg by mouth every day. 30 Tab 6   • tiotropium (SPIRIVA HANDIHALER) 18 MCG Cap Inhale 1 Cap by mouth every day. 90 Cap 3   • Misc. Devices Misc This is an order for  7 foot high flow oxygen tubing  Dx; asthma with COPD J 44.9, supplemental oxygen-dependent Z 99.81, chronic respiratory failure with hypoxia and J 96        ODETTE 99 months   NPI [id number] 1 Each 0   • spironolactone (ALDACTONE) 25 MG Tab TAKE ONE TABLET BY MOUTH ONE TIME DAILY 30 Tab 11   • montelukast (SINGULAIR) 10 MG Tab Take 1 Tab by mouth every day. 90 Tab 3   • acyclovir (ZOVIRAX) 200 MG Cap TAKE TWO CAPSULES BY MOUTH THREE TIMES DAILY 120 Cap 0   • Cholecalciferol (VITAMIN D3) 2000 UNIT Cap TAKE ONE CAPSULE BY MOUTH ONE TIME DAILY 30 Cap 3   • lisinopril (PRINIVIL) 5 MG Tab Take 1 Tab by mouth every day. 90 Tab 3   • omeprazole (PRILOSEC) 20 MG delayed-release capsule TAKE ONE CAPSULE BY MOUTH ONE TIME DAILY 30 Cap 10   • potassium chloride (MICRO-K) 10 MEQ capsule TAKE ONE CAPSULE BY MOUTH TWICE DAILY 60 Cap 6   • oxybutynin SR (DITROPAN-XL) 5 MG TABLET SR 24 HR TAKE ONE TABLET BY MOUTH ONE TIME DAILY 30 Tab 4   • OXYCONTIN 10 MG Tablet Extended Release 12 hour Abuse-Deterrent      • VENTOLIN  (90 Base) MCG/ACT Aero Soln inhalation aerosol Inhale 2 Puffs by mouth every 6 hours as needed for Shortness of Breath. 1 Inhaler 0   • ADVAIR DISKUS 500-50 MCG/DOSE AEROSOL POWDER, BREATH ACTIVATED INHALE 1 PUFF BY MOUTH 2 TIMES A DAY. 1 Inhaler 10   • azithromycin (ZITHROMAX) 250 MG Tab Take 1 Tab by mouth every day. 30 Tab 11   • omeprazole (PRILOSEC OTC) 20 MG tablet Take 1 Tab by mouth every day. 30 Tab 11   • oxycodone immediate release (ROXICODONE) 10 MG  immediate release tablet      • ketoconazole (NIZORAL) 2 % Cream Apply to affected area daily 30 g 2   • ipratropium-albuterol (DUONEB) 0.5-2.5 (3) MG/3ML nebulizer solution USE ONE VIAL IN NEBULIZER EVERY 4 HOURS AS NEEDED FOR SHORTNESS OF BREATH OR  WHEEZING 360 Vial 6   • Diclofenac Sodium 1 % Gel      • FLUZONE HIGH-DOSE 0.5 ML Suspension Prefilled Syringe injection      • ZOSTAVAX 43731 UNT/0.65ML injection      • lidocaine (XYLOCAINE) 5 % Ointment      • albuterol (PROVENTIL) 2.5mg/3ml Nebu Soln solution for nebulization 2.5 mg by Nebulization route every four hours as needed for Shortness of Breath.     • tizanidine (ZANAFLEX) 4 MG Tab Take 1 Tab by mouth 3 times a day. 90 Tab 5   • Misc. Devices Misc This is an order for  2 batteries and an oxygen older for the [employer] go-go scooter.  Patient has COPD and is oxygen dependent. The batteries are not lasting long enough. Her mobility is impaired.        ODETTE 99 months   NPI [id number] 1 Each 0   • Misc. Devices Misc This is an order for repairs to her go go scooter           ODETTE 99 months   NPI [id number] 1 Each 0     No current facility-administered medications on file prior to visit.        Allergies: Iodine and Tape    ROS:   Constitutional: Denies fevers, chills, night sweats, fatigue,+ weight loss  Eyes: Denies vision loss, pain, drainage, double vision  Ears, Nose, Throat: Denies earache, difficulty hearing, tinnitus, nasal congestion, hoarseness  Cardiovascular: Denies chest pain, tightness, palpitations, orthopnea or edema  Respiratory: As in HPI  Sleep: Denies daytime sleepiness, snoring, apneas, insomnia, morning headaches  GI: Denies heartburn, dysphagia, nausea, abdominal pain, diarrhea or constipation  : Denies frequent urination, hematuria, discharge or painful urination  Musculoskeletal: Denies back pain, painful joints, sore muscles  Neurological: Denies weakness or headaches  Skin: No rashes    Blood pressure 122/80, pulse 89, temperature  "36.7 °C (98.1 °F), resp. rate 20, height 1.626 m (5' 4\"), weight 89 kg (196 lb 3.2 oz), last menstrual period 06/07/2001, SpO2 95 %.    Physical Exam:  Appearance: Well-nourished, well-developed, in no acute distress  HEENT: Normocephalic, atraumatic, white sclera, PERRLA, oropharynx clear  Neck: No adenopathy or masses  Respiratory: no intercostal retractions or accessory muscle use  Lungs auscultation: Clear to auscultation bilaterally, diminished breath sounds  Cardiovascular: Regular rate rhythm. No murmurs, rubs or gallops.  No LE edema  Abdomen: soft, nondistended  Gait: Normal  Digits: No clubbing, cyanosis  Motor: No focal deficits  Orientation: Oriented to time, person and place    Diagnosis:  1. Chronic obstructive pulmonary disease, unspecified COPD type (HCC)     2. Chronic respiratory failure with hypoxia (HCC)     3. Obesity (BMI 30-39.9)     4. Bereavement         Plan:  The patient COPD is clinically stable.  She requires oxygen up to 6 L/min.  Continue Advair 500/50 and Spiriva inhalers.  Prescription refills provided.    Continue albuterol HFA as needed.  Continue daily suppressive azithromycin per  Return in about 6 months (around 11/24/2018).      "

## 2019-01-07 ENCOUNTER — HOSPITAL ENCOUNTER (OUTPATIENT)
Dept: LAB | Facility: MEDICAL CENTER | Age: 74
End: 2019-01-07
Attending: FAMILY MEDICINE
Payer: MEDICARE

## 2019-01-07 LAB
ALBUMIN SERPL BCP-MCNC: 4.2 G/DL (ref 3.2–4.9)
ALBUMIN/GLOB SERPL: 1.4 G/DL
ALP SERPL-CCNC: 43 U/L (ref 30–99)
ALT SERPL-CCNC: 15 U/L (ref 2–50)
ANION GAP SERPL CALC-SCNC: 10 MMOL/L (ref 0–11.9)
AST SERPL-CCNC: 19 U/L (ref 12–45)
BILIRUB SERPL-MCNC: 0.5 MG/DL (ref 0.1–1.5)
BUN SERPL-MCNC: 10 MG/DL (ref 8–22)
CALCIUM SERPL-MCNC: 9.5 MG/DL (ref 8.5–10.5)
CHLORIDE SERPL-SCNC: 97 MMOL/L (ref 96–112)
CO2 SERPL-SCNC: 31 MMOL/L (ref 20–33)
CREAT SERPL-MCNC: 0.51 MG/DL (ref 0.5–1.4)
GLOBULIN SER CALC-MCNC: 3 G/DL (ref 1.9–3.5)
GLUCOSE SERPL-MCNC: 99 MG/DL (ref 65–99)
POTASSIUM SERPL-SCNC: 3.5 MMOL/L (ref 3.6–5.5)
PROT SERPL-MCNC: 7.2 G/DL (ref 6–8.2)
SODIUM SERPL-SCNC: 138 MMOL/L (ref 135–145)

## 2019-01-07 PROCEDURE — 36415 COLL VENOUS BLD VENIPUNCTURE: CPT

## 2019-01-07 PROCEDURE — 80053 COMPREHEN METABOLIC PANEL: CPT

## 2019-02-20 ENCOUNTER — HOSPITAL ENCOUNTER (OUTPATIENT)
Dept: RADIOLOGY | Facility: MEDICAL CENTER | Age: 74
End: 2019-02-20
Attending: NURSE PRACTITIONER
Payer: MEDICARE

## 2019-02-20 VITALS — WEIGHT: 186 LBS | HEIGHT: 64 IN | BODY MASS INDEX: 31.76 KG/M2

## 2019-02-20 DIAGNOSIS — M54.12 CERVICAL RADICULITIS: ICD-10-CM

## 2019-02-20 DIAGNOSIS — M54.16 LUMBAR NEURITIS: ICD-10-CM

## 2019-02-20 DIAGNOSIS — M54.12 BRACHIAL NEURITIS: ICD-10-CM

## 2019-02-20 PROCEDURE — 72110 X-RAY EXAM L-2 SPINE 4/>VWS: CPT

## 2019-02-20 PROCEDURE — A9270 NON-COVERED ITEM OR SERVICE: HCPCS | Performed by: RADIOLOGY

## 2019-02-20 PROCEDURE — 700102 HCHG RX REV CODE 250 W/ 637 OVERRIDE(OP): Performed by: RADIOLOGY

## 2019-02-20 PROCEDURE — 72050 X-RAY EXAM NECK SPINE 4/5VWS: CPT

## 2019-02-20 PROCEDURE — 72141 MRI NECK SPINE W/O DYE: CPT

## 2019-02-20 RX ORDER — DIAZEPAM 5 MG/1
5 TABLET ORAL
Status: COMPLETED | OUTPATIENT
Start: 2019-02-20 | End: 2019-02-20

## 2019-02-20 RX ADMIN — DIAZEPAM 5 MG: 5 TABLET ORAL at 12:03

## 2019-02-20 NOTE — DISCHARGE INSTRUCTIONS
MRI ADULT DISCHARGE INSTRUCTIONS    You have been medicated today for your scan. Please follow the instructions below to ensure your safe recovery. If you have any questions or problems, feel free to call us at [phone] or [phone].     1.   Have someone stay with you to assist you as needed.    2.   Do not drive or operate any mechanical devices.    3.   Do not perform any activity that requires concentration. Make no major decisions over the next 24 hours.     4.   Be careful changing positions from laying down to sitting or standing, as you may become dizzy.     5.   Do not drink alcohol for 48 hours.    6.   There are no restrictions for eating your normal meals. Drink fluids.    7.   You may continue your usual medications for pain, tranquilizers, muscle relaxants or sedatives when awake.     8.   Tomorrow, you may continue your normal daily activities.     9.   Pressure dressing on 10 - 15 minutes. If swelling or bleeding occurs when removed, continue placing direct pressure on injection site for another 5 minutes, or until bleeding stops.   Diazepam (VALIUM) Oral solution  What is this medicine?  You were prescribed DIAZEPAM (dye AZ e reshma) for the procedure you had today. This medication is a benzodiazepine. It is used to treat anxiety and nervousness. It also can help treat alcohol withdrawal, relax muscles, and treat certain types of seizures.  This medicine may be used for other purposes; ask your health care provider or pharmacist if you have questions.  What side effects may I notice from receiving this medicine?  Side effects that you should report to your doctor or health care professional as soon as possible:  • allergic reactions like skin rash, itching or hives, swelling of the face, lips, or tongue  • angry, confused, depressed, other mood changes  • breathing problems  • feeling faint or lightheaded, falls  • muscle cramps  • problems with balance, talking,  walking  • restlessness  • tremors  • trouble passing urine or change in the amount of urine  • unusually weak or tired  Side effects that usually do not require medical attention (report to your doctor or health care professional if they continue or are bothersome):  • difficulty sleeping, nightmares  • dizziness, drowsiness, clumsiness, or unsteadiness, a hangover effect  • headache  • nausea, vomiting  This list may not describe all possible side effects. Call your doctor for medical advice about side effects. You may report side effects to FDA at 7-618-BTZ-4622.    I have been informed of and understand the above discharge instructions.

## 2019-03-15 ENCOUNTER — TELEPHONE (OUTPATIENT)
Dept: PULMONOLOGY | Facility: HOSPICE | Age: 74
End: 2019-03-15

## 2019-03-15 DIAGNOSIS — J44.9 CHRONIC OBSTRUCTIVE PULMONARY DISEASE, UNSPECIFIED COPD TYPE (HCC): ICD-10-CM

## 2019-03-15 NOTE — TELEPHONE ENCOUNTER
[facility] sent a fax over requesting that Dr. Alfonso sign paperwork for sedation.    Left paperwork on Dr. Levy desk.    Scanned form in to media.

## 2019-03-15 NOTE — TELEPHONE ENCOUNTER
1. Caller Name: Berny Renee                 Call Back Number: [phone] (home)       Patient approves a detailed voicemail message: N\A    2. Last OV: 5/24/18 Dr. Alfonso   Plan:  The patient COPD is clinically stable.  She requires oxygen up to 6 L/min.  Continue Advair 500/50 and Spiriva inhalers.  Prescription refills provided.    Continue albuterol HFA as needed.  Continue daily suppressive azithromycin per  Return in about 6 months (around 11/24/2018).  3. Next OV: 6/17/19 Dr. Alfonso     4. Last CXR: 10/8/13    5. Last PFT: 12/2/13    6. Last CT 5/28/13    Please advise. If pft and cxr needed please sign orders

## 2019-03-19 DIAGNOSIS — J44.9 CHRONIC OBSTRUCTIVE PULMONARY DISEASE, UNSPECIFIED COPD TYPE (HCC): ICD-10-CM

## 2019-03-19 NOTE — PROGRESS NOTES
Please sign review and sign order. Pt scheduled to complete colette 3/26/19 Fv with KESHA Bardales PA-C

## 2019-03-26 ENCOUNTER — OFFICE VISIT (OUTPATIENT)
Dept: PULMONOLOGY | Facility: HOSPICE | Age: 74
End: 2019-03-26
Payer: MEDICARE

## 2019-03-26 ENCOUNTER — APPOINTMENT (OUTPATIENT)
Dept: RADIOLOGY | Facility: IMAGING CENTER | Age: 74
End: 2019-03-26
Attending: PHYSICIAN ASSISTANT
Payer: MEDICARE

## 2019-03-26 ENCOUNTER — NON-PROVIDER VISIT (OUTPATIENT)
Dept: PULMONOLOGY | Facility: HOSPICE | Age: 74
End: 2019-03-26
Payer: MEDICARE

## 2019-03-26 VITALS
HEART RATE: 77 BPM | BODY MASS INDEX: 32.44 KG/M2 | RESPIRATION RATE: 16 BRPM | DIASTOLIC BLOOD PRESSURE: 64 MMHG | WEIGHT: 190 LBS | SYSTOLIC BLOOD PRESSURE: 128 MMHG | OXYGEN SATURATION: 95 % | HEIGHT: 64 IN

## 2019-03-26 DIAGNOSIS — J44.9 CHRONIC OBSTRUCTIVE PULMONARY DISEASE, UNSPECIFIED COPD TYPE (HCC): ICD-10-CM

## 2019-03-26 DIAGNOSIS — J44.9 STAGE 4 VERY SEVERE COPD BY GOLD CLASSIFICATION (HCC): ICD-10-CM

## 2019-03-26 DIAGNOSIS — J96.11 CHRONIC RESPIRATORY FAILURE WITH HYPOXIA (HCC): ICD-10-CM

## 2019-03-26 DIAGNOSIS — J44.9 COPD, SEVERE (HCC): ICD-10-CM

## 2019-03-26 PROCEDURE — 94060 EVALUATION OF WHEEZING: CPT | Performed by: INTERNAL MEDICINE

## 2019-03-26 PROCEDURE — 94761 N-INVAS EAR/PLS OXIMETRY MLT: CPT | Performed by: PHYSICIAN ASSISTANT

## 2019-03-26 PROCEDURE — 71046 X-RAY EXAM CHEST 2 VIEWS: CPT | Mod: TC,FY | Performed by: PHYSICIAN ASSISTANT

## 2019-03-26 PROCEDURE — 99214 OFFICE O/P EST MOD 30 MIN: CPT | Performed by: PHYSICIAN ASSISTANT

## 2019-03-26 ASSESSMENT — PULMONARY FUNCTION TESTS
FEV1: 1.03
FEV1: .96
FEV1_PERCENT_CHANGE: 9
FEV1/FVC: 48
FVC_PERCENT_PREDICTED: 70
FEV1/FVC_PERCENT_CHANGE: 78
FEV1/FVC_PREDICTED: 75.17
FEV1/FVC_PERCENT_PREDICTED: 63
FEV1_PREDICTED: 2.15
FEV1_PREDICTED: 44
FEV1/FVC: 47.03
FEV1_PERCENT_CHANGE: 7
FVC: 2
FVC_PREDICTED: 2.86
FVC: 2.19

## 2019-03-26 NOTE — PATIENT INSTRUCTIONS
1-reviewed spirometry results  2-reviewed cxr results  3-ambulated to assess oxygenation  4-request surgical clearance for epidural placement  5-letter given  6-Follow up in 6 months

## 2019-03-26 NOTE — LETTER
PROCEDURE/SURGERY CLEARANCE FORM      Encounter Date: 3/26/2019    Patient: Berny Renee  YOB: 1945      Patient seen in clinic 3/26/2018.  She has a history of severe oxygen dependent COPD.  CXR was obtained in clinic which demonstrated bibasilar scarring and COPD.     Spirometry was performed in clinic, with results comparable to test completed 3 years ago. She demonstrated moderate-severe obstructive lung disease. She ambulates well on level ground with mild to moderate dyspnea. She did drop her oxygen level to 88% on 6L.  However, finds it very difficult due to severe dyspnea to walk up any elevation such as wheel chair ramp.    Patient presents an increased risk for surgery but does not have absolute contraindications for any medically necessary procedure.    Questions or concerns, please let our office know.                     MD Signature   Pat Bardales P.A.-C.

## 2019-03-26 NOTE — PROCEDURES
Good patient effort & cooperation. The results of this test meet the ATS standards for acceptability and repeatability. Test was performed on the [employer]/D spirometry system. Predicted values were N-Zac. A bronchodilator of Ventolin HFA - 2 puffs with a spacer was administered to the patient.    1. Baseline spirometry demonstrates a severe reduction in FEV1 and FVC. FEV1/FVC ratio is reduced at 48%.  FEV1 0.96 L which is 44% of predicted.    2. After administration of an inhaled bronchodilator there is 7% improvement in FEV1.          Impression:    This study demonstrates the presence of severe obstructive lung disease.  Partial reversibility is noted on the study.

## 2019-03-26 NOTE — PROGRESS NOTES
CC: Back pain, surgical clearance    HPI:  Berny Renee is a 73 y.o. year old female here today for surgical clearance. Patient reports her planned procedure is conscious sedation for epidural placement and neck injection. History of severe COPD.  Last seen in clinic in May 24, 2018 by Dr. Alfonso.  Patient is a former smoker with 60+-pack-year history who quit in 1999.  Continued abstinence was advised.    Reviewed in clinic vitals: Blood pressure 128/64, heart rate 77, O2 sat of 95% on 6 L of O2 at rest.  Patient uses 3 L with reservoir pendant, 6 L otherwise. Patient's body mass index is 32.61 kg/m². Exercise and nutrition counseling were performed at this visit.  Patient reports a weight loss of 65 pounds but has regained a few.  She was  early last year and shared some of those details.  Appropriate support given.    Patient home regimen includes suppressive antibiotics which was started at some point for recurrent exacerbations.  Denies exacerbations or hospitalization in the last year.  Reluctant to make any changes to her regimen.  Home respiratory regimen includes Advair, Ventolin, Spiriva, montelukast, PRN DuoNeb.  She is also on reflux prophylaxis.  Patient reports she has not required her Ventolin inhaler or nebulizer for months.    Patient did perform spirometry today.  Compared to previous PFT from 12/2/2013.  Her FVC today was 2.0 L or 70% predicted compared to 1.63 L or 53% previously, FEV1 0.96 L or 44% predicted compared to 0.96 L or 29% predicted previously.  FEV1/FVC ratio was 64% predicted today as compared to 55% previously, FEF 25-75% was 22% predicted as compared to 13% previously.  On previous PFT residual volume was 144% predicted, TLC 96% predicted and DLCO 36% predicted with borderline bronchodilator response.  Pulmonology interpretation from today pending.    Chest x-ray performed in clinic today demonstrated COPD with bibasilar scarring right greater than left, and no acute  concerns.      ROS:   Constitutional: Very active, increased fatigue in the afternoon, denies fevers, chills, night sweats or unintentional weight loss  Skin: No rashes, hair or nail changes, lumps or sores   Eyes: Wears glasses, denies new or sudden onset vision changes  Ears, Nose, Throat: history of allergies and sinus infections, persistent hoarseness without sore throat, no earaches, seasonal nasal congestion, runny nose   GI: Denies heartburn/reflux symptoms on omeprazole, no difficulty swallowing, occasional choking sensation triggering cough   respiratory: AM cough with white to yellow production, denies wheezing, denies shortness of breath at rest, mild shortness of breath with activity, history of pneumonia and bronchitis but not in the last 2 years, no history of TB, reports secondhand smoke occupational exposure  CV: Denies chest pain, tightness, palpitations, heart murmur, orthopnea or edema      Past Medical History:   Diagnosis Date   • Arthritis     spine   • Cataract immature    • Chronic pain    • Colon polyps    • COPD (chronic obstructive pulmonary disease) ([facility]) 2002    moderate to severe   • DDD (degenerative disc disease), cervical    • DDD (degenerative disc disease), thoracic    • Diverticulitis of colon    • Dyslipidemia    • EMPHYSEMA    • Hypertension    • REGINE (obstructive sleep apnea)    • OSTEOPOROSIS    • Spinal stenosis, lumbar region, with neurogenic claudication    • URINARY INCONTINENCE    • Vitamin d deficiency        Past Surgical History:   Procedure Laterality Date   • LUMBAR LAMINECTOMY DISKECTOMY  6/22/2010    Performed by GRACIE [facility]O at [facility]   • OTHER ORTHOPEDIC SURGERY  2004    left ankle fx   • OTHER      leg fracture   • OTHER      t spine disc surg   • TONSILLECTOMY         History reviewed. No pertinent family history.    Social History     Social History   • Marital status:      Spouse name: N/A   • Number of children: N/A   • Years of  "education: N/A     Occupational History   • Not on file.     Social History Main Topics   • Smoking status: Former Smoker     Packs/day: 2.00     Years: 30.00     Types: Cigarettes     Quit date: 3/1/1999   • Smokeless tobacco: Never Used      Comment: 3 pks a day for 35 yrs, quit 1999   • Alcohol use 4.2 oz/week     7 Glasses of wine per week   • Drug use: Yes     Types: Marijuana      Comment: Medical Marijuana    • Sexual activity: No     Other Topics Concern   • Not on file     Social History Narrative   • No narrative on file       Allergies as of 03/26/2019 - Reviewed 03/26/2019   Allergen Reaction Noted   • Iodine  08/09/2008   • Tape Rash and Itching 06/22/2010        @Vital signs for this encounter:  Vitals:    03/26/19 1323 03/26/19 1327   Height: 1.626 m (5' 4\")    Weight: 86.2 kg (190 lb)    Weight % change since last entry.: 0 %    BP: 128/64    Pulse: 77    BMI (Calculated): 32.61    Resp: 16    O2 sat % on O2:  95 %       Current medications as of today   Current Outpatient Prescriptions   Medication Sig Dispense Refill   • fluticasone-salmeterol (ADVAIR DISKUS) 500-50 MCG/DOSE AEROSOL POWDER, BREATH ACTIVATED Inhale 1 Puff by mouth 2 times a day. 3 Inhaler 3   • azithromycin (ZITHROMAX) 250 MG Tab Take 1 Tab by mouth every day. 30 Tab 11   • VENTOLIN  (90 Base) MCG/ACT Aero Soln inhalation aerosol Inhale 2 Puffs by mouth every 6 hours as needed for Shortness of Breath. 1 Inhaler 2   • tiotropium (SPIRIVA HANDIHALER) 18 MCG Cap Inhale 1 Cap by mouth every day. 90 Cap 3   • Naloxegol Oxalate 25 MG Tab Take 25 mg by mouth every day. 30 Tab 6   • spironolactone (ALDACTONE) 25 MG Tab TAKE ONE TABLET BY MOUTH ONE TIME DAILY 30 Tab 11   • montelukast (SINGULAIR) 10 MG Tab Take 1 Tab by mouth every day. 90 Tab 3   • acyclovir (ZOVIRAX) 200 MG Cap TAKE TWO CAPSULES BY MOUTH THREE TIMES DAILY 120 Cap 0   • Cholecalciferol (VITAMIN D3) 2000 UNIT Cap TAKE ONE CAPSULE BY MOUTH ONE TIME DAILY 30 Cap 3   • " lisinopril (PRINIVIL) 5 MG Tab Take 1 Tab by mouth every day. 90 Tab 3   • omeprazole (PRILOSEC) 20 MG delayed-release capsule TAKE ONE CAPSULE BY MOUTH ONE TIME DAILY 30 Cap 10   • potassium chloride (MICRO-K) 10 MEQ capsule TAKE ONE CAPSULE BY MOUTH TWICE DAILY 60 Cap 6   • oxybutynin SR (DITROPAN-XL) 5 MG TABLET SR 24 HR TAKE ONE TABLET BY MOUTH ONE TIME DAILY 30 Tab 4   • OXYCONTIN 10 MG Tablet Extended Release 12 hour Abuse-Deterrent      • omeprazole (PRILOSEC OTC) 20 MG tablet Take 1 Tab by mouth every day. 30 Tab 11   • oxycodone immediate release (ROXICODONE) 10 MG immediate release tablet      • ketoconazole (NIZORAL) 2 % Cream Apply to affected area daily 30 g 2   • ipratropium-albuterol (DUONEB) 0.5-2.5 (3) MG/3ML nebulizer solution USE ONE VIAL IN NEBULIZER EVERY 4 HOURS AS NEEDED FOR SHORTNESS OF BREATH OR  WHEEZING 360 Vial 6   • Diclofenac Sodium 1 % Gel      • FLUZONE HIGH-DOSE 0.5 ML Suspension Prefilled Syringe injection      • ZOSTAVAX [id number] UNT/0.65ML injection      • lidocaine (XYLOCAINE) 5 % Ointment      • albuterol (PROVENTIL) 2.5mg/3ml Nebu Soln solution for nebulization 2.5 mg by Nebulization route every four hours as needed for Shortness of Breath.     • tizanidine (ZANAFLEX) 4 MG Tab Take 1 Tab by mouth 3 times a day. 90 Tab 5   • Misc. Devices Misc This is an order for  7 foot high flow oxygen tubing  Dx; asthma with COPD J 44.9, supplemental oxygen-dependent Z 99.81, chronic respiratory failure with hypoxia and J 96        ODETTE 99 months   NPI [id number] 1 Each 0   • Misc. Devices Misc This is an order for  2 batteries and an oxygen older for the [employer] go-go scooter.  Patient has COPD and is oxygen dependent. The batteries are not lasting long enough. Her mobility is impaired.        ODETTE 99 months   NPI [id number] 1 Each 0   • Misc. Devices Misc This is an order for repairs to her go go scooter           ODETTE 99 months   NPI [id number] 1 Each 0     No current facility-administered  medications for this visit.          Physical Exam:   Gen:           Alert and oriented, No apparent distress. Mood and affect appropriate, normal interaction with provider.  Eyes:          sclere white, conjunctive moist.  Hearing:     Grossly intact.  Dentition:    Good dentition.  Oropharynx:   Tongue normal, posterior pharynx without erythema or exudate.  Neck:        Supple, trachea midline, no masses.  Respiratory Effort: No intercostal retractions or use of accessory muscles.   Lung Auscultation:      Few crackles bases, decreased mid right lung otherwise clear  CV:            Regular rate and rhythm. No murmurs, rubs or gallops.  No edema  Digits, Nails, Ext: No clubbing, cyanosis, petechiae, or nodes.   Skin:        No rashes, lesions or ulcers noted on back or exposed skin surfaces                  Assessment:  1. COPD, severe (HCC)  DX-CHEST, spirometry performed today   2. Chronic respiratory failure with hypoxia (HCC)  Multiple Oximetry, continue 6 L   3. BMI 32.0-32.9,adult  OBESITY COUNSELING (No Charge): Patient identified as having weight management issue.  Appropriate orders and counseling given.       Immunizations:    Flu: 9/18/2018  Pneumovax 23: 10/24/2016  Prevnar 13: 6/17/2015    Plan:  1-reviewed spirometry results  2-reviewed cxr results  3-ambulated to assess oxygenation  4-request surgical clearance for epidural placement  5-letter given, will [facility], Dr. Pierre  6-Follow up in 3 months, has appointment with Dr. Alfonso    This dictation was created using voice recognition software. The accuracy of the dictation is limited to the abilities of the software. I expect there may be some errors of grammar and possibly content.

## 2019-03-26 NOTE — PROCEDURES
Multi-Ox Readings  Multi Ox #1 6 LPM   O2 sat % at rest 96   O2 sat % on exertion 88   O2 sat average on exertion 92   Multi Ox #2     O2 sat % at rest     O2 sat % on exertion     O2 sat average on exertion       Oxygen Use 6   Oxygen Frequency 24/7   Duration of need     Is the patient mobile within the home?     CPAP Use?     BIPAP Use?     Servo Titration

## 2019-05-16 ENCOUNTER — TELEPHONE (OUTPATIENT)
Dept: PULMONOLOGY | Facility: HOSPICE | Age: 74
End: 2019-05-16

## 2019-05-16 DIAGNOSIS — J44.0 CHRONIC OBSTRUCTIVE PULMONARY DISEASE WITH ACUTE LOWER RESPIRATORY INFECTION (HCC): ICD-10-CM

## 2019-05-16 RX ORDER — AZITHROMYCIN 250 MG/1
TABLET, FILM COATED ORAL
Qty: 6 TAB | Refills: 0 | Status: SHIPPED | OUTPATIENT
Start: 2019-05-16 | End: 2019-07-01 | Stop reason: SDUPTHER

## 2019-05-16 NOTE — TELEPHONE ENCOUNTER
Pt was contacted informed Z bharat was sent to pharmacy encouraged to stop Augmentin. Encourage to complete course of antibiotic and prednisone if symptoms persist and or worsen encouraged to go to urgent care or make sooner appt. Pt states Dr. Diego requested pt complete a chest CXR, pt states will not complete and will review at next office visi

## 2019-05-16 NOTE — TELEPHONE ENCOUNTER
"Pt called states was seen by his PCP Dr. Diego. And was given prednisone 20 mg tab 2 tabs for 5 days and 1 tab daily, Augmentin [phone] has been on medication for 3 days. States not helping and usually uses Z-bharat and help. States this antibiotic is \"turning my lungs green.\" would like new antibiotic sent to [facility].   "

## 2019-06-18 ENCOUNTER — OFFICE VISIT (OUTPATIENT)
Dept: PULMONOLOGY | Facility: HOSPICE | Age: 74
End: 2019-06-18
Payer: MEDICARE

## 2019-06-18 VITALS
WEIGHT: 192 LBS | HEART RATE: 71 BPM | OXYGEN SATURATION: 95 % | SYSTOLIC BLOOD PRESSURE: 158 MMHG | HEIGHT: 64 IN | RESPIRATION RATE: 16 BRPM | BODY MASS INDEX: 32.78 KG/M2 | DIASTOLIC BLOOD PRESSURE: 82 MMHG

## 2019-06-18 DIAGNOSIS — J44.9 CHRONIC OBSTRUCTIVE PULMONARY DISEASE, UNSPECIFIED COPD TYPE (HCC): ICD-10-CM

## 2019-06-18 DIAGNOSIS — J96.11 CHRONIC RESPIRATORY FAILURE WITH HYPOXIA (HCC): ICD-10-CM

## 2019-06-18 DIAGNOSIS — J44.89 ASTHMA WITH COPD: ICD-10-CM

## 2019-06-18 PROCEDURE — 99214 OFFICE O/P EST MOD 30 MIN: CPT | Performed by: INTERNAL MEDICINE

## 2019-06-18 RX ORDER — ZOSTER VACCINE RECOMBINANT, ADJUVANTED 50 MCG/0.5
KIT INTRAMUSCULAR
Refills: 0 | COMMUNITY
Start: 2019-05-29 | End: 2020-11-07

## 2019-06-18 RX ORDER — TIOTROPIUM BROMIDE 18 UG/1
18 CAPSULE ORAL; RESPIRATORY (INHALATION) DAILY
Qty: 90 CAP | Refills: 3 | Status: SHIPPED | OUTPATIENT
Start: 2019-06-18 | End: 2020-08-31 | Stop reason: SDUPTHER

## 2019-06-18 NOTE — PROGRESS NOTES
Chief Complaint   Patient presents with   • Follow-Up     Chronic obstructive pulmonary disease, unspecified COPD type ([facility])       HPI: This patient is a 73 y.o. Female who returns for follow-up of severe COPD.  Her FEV1 is 0.96 L or 44% predicted.  She uses oxygen at 3-6 L/m using an Oxymizer pendant.  She is compliant with Advair 500/50, Spiriva and suppressive azithromycin. Her exertional dyspnea is stable and she denies sputum purulence, wheezing, chest tightness or edema. She has 1 AECOPD over the past month treated with antibiotics and steroids.  She feels back to her baseline.  Since her last visit she had an epidural under general anesthesia, without any complications. She is up-to-date on influenza and pneumococcal vaccines.  Her  passed away over the past year however she is coping well.  She has dropped significant weight and she is now having to do everything for her self, and with weight loss has been much more ambulatory.  Her blood pressure has been elevated and she recently had adjustment of her antihypertensives, however continues to run high.    Past Medical History:   Diagnosis Date   • Arthritis     spine   • Cataract immature    • Chronic pain    • Colon polyps    • COPD (chronic obstructive pulmonary disease) ([facility]) 2002    moderate to severe   • DDD (degenerative disc disease), cervical    • DDD (degenerative disc disease), thoracic    • Diverticulitis of colon    • Dyslipidemia    • EMPHYSEMA    • Hypertension    • REGINE (obstructive sleep apnea)    • OSTEOPOROSIS    • Spinal stenosis, lumbar region, with neurogenic claudication    • URINARY INCONTINENCE    • Vitamin d deficiency        Social History     Social History   • Marital status:      Spouse name: N/A   • Number of children: N/A   • Years of education: N/A     Occupational History   • Not on file.     Social History Main Topics   • Smoking status: Former Smoker     Packs/day: 2.00     Years: 30.00     Types: Cigarettes      Quit date: 3/1/1999   • Smokeless tobacco: Never Used      Comment: 3 pks a day for 35 yrs, continued abstinence   • Alcohol use 4.2 oz/week     7 Glasses of wine per week   • Drug use: Yes     Types: Marijuana      Comment: Medical Marijuana    • Sexual activity: No     Other Topics Concern   • Not on file     Social History Narrative   • No narrative on file       History reviewed. No pertinent family history.    Current Outpatient Prescriptions on File Prior to Visit   Medication Sig Dispense Refill   • fluticasone-salmeterol (ADVAIR DISKUS) 500-50 MCG/DOSE AEROSOL POWDER, BREATH ACTIVATED Inhale 1 Puff by mouth 2 times a day. 3 Inhaler 3   • tiotropium (SPIRIVA HANDIHALER) 18 MCG Cap Inhale 1 Cap by mouth every day. 90 Cap 3   • Naloxegol Oxalate 25 MG Tab Take 25 mg by mouth every day. 30 Tab 6   • spironolactone (ALDACTONE) 25 MG Tab TAKE ONE TABLET BY MOUTH ONE TIME DAILY 30 Tab 11   • montelukast (SINGULAIR) 10 MG Tab Take 1 Tab by mouth every day. 90 Tab 3   • acyclovir (ZOVIRAX) 200 MG Cap TAKE TWO CAPSULES BY MOUTH THREE TIMES DAILY 120 Cap 0   • Cholecalciferol (VITAMIN D3) 2000 UNIT Cap TAKE ONE CAPSULE BY MOUTH ONE TIME DAILY 30 Cap 3   • lisinopril (PRINIVIL) 5 MG Tab Take 1 Tab by mouth every day. 90 Tab 3   • potassium chloride (MICRO-K) 10 MEQ capsule TAKE ONE CAPSULE BY MOUTH TWICE DAILY 60 Cap 6   • oxybutynin SR (DITROPAN-XL) 5 MG TABLET SR 24 HR TAKE ONE TABLET BY MOUTH ONE TIME DAILY 30 Tab 4   • omeprazole (PRILOSEC OTC) 20 MG tablet Take 1 Tab by mouth every day. 30 Tab 11   • tizanidine (ZANAFLEX) 4 MG Tab Take 1 Tab by mouth 3 times a day. 90 Tab 5   • azithromycin (ZITHROMAX Z-VU) 250 MG Tab Take 2 tablets on day 1. Then take 1 tablet a day for 4 days. 6 Tab 0   • azithromycin (ZITHROMAX) 250 MG Tab Take 1 Tab by mouth every day. 30 Tab 11   • VENTOLIN  (90 Base) MCG/ACT Aero Soln inhalation aerosol Inhale 2 Puffs by mouth every 6 hours as needed for Shortness of Breath. 1  Inhaler 2   • Misc. Devices Misc This is an order for  7 foot high flow oxygen tubing  Dx; asthma with COPD J 44.9, supplemental oxygen-dependent Z 99.81, chronic respiratory failure with hypoxia and J 96        ODETTE 99 months   NPI [id number] 1 Each 0   • omeprazole (PRILOSEC) 20 MG delayed-release capsule TAKE ONE CAPSULE BY MOUTH ONE TIME DAILY 30 Cap 10   • OXYCONTIN 10 MG Tablet Extended Release 12 hour Abuse-Deterrent      • oxycodone immediate release (ROXICODONE) 10 MG immediate release tablet      • ketoconazole (NIZORAL) 2 % Cream Apply to affected area daily 30 g 2   • ipratropium-albuterol (DUONEB) 0.5-2.5 (3) MG/3ML nebulizer solution USE ONE VIAL IN NEBULIZER EVERY 4 HOURS AS NEEDED FOR SHORTNESS OF BREATH OR  WHEEZING 360 Vial 6   • Diclofenac Sodium 1 % Gel      • FLUZONE HIGH-DOSE 0.5 ML Suspension Prefilled Syringe injection      • ZOSTAVAX 27099 UNT/0.65ML injection      • lidocaine (XYLOCAINE) 5 % Ointment      • albuterol (PROVENTIL) 2.5mg/3ml Nebu Soln solution for nebulization 2.5 mg by Nebulization route every four hours as needed for Shortness of Breath.     • Misc. Devices Misc This is an order for  2 batteries and an oxygen older for the franko gilman go-go scooter.  Patient has COPD and is oxygen dependent. The batteries are not lasting long enough. Her mobility is impaired.        ODETTE 99 months   NPI [id number] 1 Each 0   • Misc. Devices Misc This is an order for repairs to her go go scooter           ODETTE 99 months   NPI [id number] 1 Each 0     No current facility-administered medications on file prior to visit.        Allergies: Iodine and Tape    ROS:   Constitutional: Denies fevers, chills, night sweats, fatigue or weight loss  Eyes: Denies vision loss, pain, drainage, double vision  Ears, Nose, Throat: Denies earache, difficulty hearing, tinnitus, nasal congestion, hoarseness  Cardiovascular: Denies chest pain, tightness, palpitations, orthopnea or edema  Respiratory: as in HPI  Sleep:  "Denies daytime sleepiness, snoring, apneas, insomnia, morning headaches  GI: Denies heartburn, dysphagia, nausea, abdominal pain, diarrhea or constipation  : Denies frequent urination, hematuria, discharge or painful urination  Musculoskeletal: Denies back pain, +painful joints, sore muscles  Neurological: Denies weakness or headaches  Skin: No rashes    /82 (BP Location: Right arm, Patient Position: Sitting, BP Cuff Size: Adult)   Pulse 71   Resp 16   Ht 1.626 m (5' 4\")   Wt 87.1 kg (192 lb)   SpO2 95%     Physical Exam:  Appearance: Well-nourished, well-developed, in no acute distress  HEENT: Normocephalic, atraumatic, white sclera, PERRLA, oropharynx clear  Neck: No adenopathy or masses  Respiratory: no intercostal retractions or accessory muscle use  Lungs auscultation: Clear to auscultation bilaterally, diminished breath sounds  Cardiovascular: Regular rate rhythm. No murmurs, rubs or gallops.  No LE edema  Abdomen: soft, nondistended  Gait: Normal  Digits: No clubbing, cyanosis  Motor: No focal deficits  Orientation: Oriented to time, person and place    Diagnosis:  1. Chronic obstructive pulmonary disease, unspecified COPD type (HCC)     2. Chronic respiratory failure with hypoxia (HCC)     3.      Hypertension      Plan:  Berny's COPD is clinically stable.  She declines hospice at this time however had a very positive experience with [facility] with her  and states she would be amenable in the future.  Continue Advair 500/50, Spiriva and suppressive azithromycin.  Continue oxygen using Oxymizer pendant up to 6 L/min.  Follow-up with cardiology regarding hypertension.  Return in about 6 months (around 12/18/2019).      "

## 2019-07-01 DIAGNOSIS — J44.0 CHRONIC OBSTRUCTIVE PULMONARY DISEASE WITH ACUTE LOWER RESPIRATORY INFECTION (HCC): ICD-10-CM

## 2019-07-01 RX ORDER — AZITHROMYCIN 250 MG/1
TABLET, FILM COATED ORAL
Qty: 30 TAB | Refills: 0 | Status: SHIPPED | OUTPATIENT
Start: 2019-07-01 | End: 2019-07-29

## 2019-07-01 NOTE — TELEPHONE ENCOUNTER
Called and spoke with pt. Asked pt if she had any sxs. Pt said no, she uses this medication as a maintenance and doubles it if she is sick.  Pt is requesting if she is able to get a 3 month supply.

## 2019-07-01 NOTE — TELEPHONE ENCOUNTER
Have we ever prescribed this med? Yes.  If yes, what date? 05/16/19    Last OV: 06/18/19 with Dr Alfonso  Plan:  Berny's COPD is clinically stable.  She declines hospice at this time however had a very positive experience with [facility] with her  and states she would be amenable in the future.  Continue Advair 500/50, Spiriva and suppressive azithromycin.  Continue oxygen using Oxymizer pendant up to 6 L/min.  Follow-up with cardiology regarding hypertension.  Return in about 6 months (around 12/18/2019).    Next OV: 12/31/19 with Dr Alfonso    DX: Chronic obstructive pulmonary disease with acute lower respiratory infection (HCC) (J44.0)    Medications:   Requested Prescriptions     Pending Prescriptions Disp Refills   • azithromycin (ZITHROMAX) 250 MG Tab [Pharmacy Med Name: AZITHROMYCIN 250 MG TAB TEVA]  10     Sig: TAKE ONE TABLET BY MOUTH ONE TIME DAILY

## 2019-07-29 ENCOUNTER — NON-PROVIDER VISIT (OUTPATIENT)
Dept: CARDIOLOGY | Facility: MEDICAL CENTER | Age: 74
End: 2019-07-29
Payer: MEDICARE

## 2019-07-29 ENCOUNTER — OFFICE VISIT (OUTPATIENT)
Dept: CARDIOLOGY | Facility: MEDICAL CENTER | Age: 74
End: 2019-07-29
Payer: MEDICARE

## 2019-07-29 VITALS
HEIGHT: 64 IN | SYSTOLIC BLOOD PRESSURE: 146 MMHG | OXYGEN SATURATION: 95 % | DIASTOLIC BLOOD PRESSURE: 60 MMHG | WEIGHT: 196 LBS | HEART RATE: 84 BPM | BODY MASS INDEX: 33.46 KG/M2

## 2019-07-29 DIAGNOSIS — R00.2 PALPITATIONS: ICD-10-CM

## 2019-07-29 DIAGNOSIS — R00.1 SINUS BRADYCARDIA: ICD-10-CM

## 2019-07-29 DIAGNOSIS — I10 ESSENTIAL HYPERTENSION: ICD-10-CM

## 2019-07-29 LAB — EKG IMPRESSION: NORMAL

## 2019-07-29 PROCEDURE — 99203 OFFICE O/P NEW LOW 30 MIN: CPT | Mod: 25 | Performed by: INTERNAL MEDICINE

## 2019-07-29 PROCEDURE — 93000 ELECTROCARDIOGRAM COMPLETE: CPT | Mod: 59 | Performed by: INTERNAL MEDICINE

## 2019-07-29 PROCEDURE — 93224 XTRNL ECG REC UP TO 48 HRS: CPT | Performed by: INTERNAL MEDICINE

## 2019-07-29 RX ORDER — LISINOPRIL 10 MG/1
10 TABLET ORAL DAILY
COMMUNITY
End: 2019-08-30 | Stop reason: SDUPTHER

## 2019-07-29 RX ORDER — DILTIAZEM HYDROCHLORIDE 180 MG/1
180 CAPSULE, COATED, EXTENDED RELEASE ORAL DAILY
Qty: 30 CAP | Refills: 11 | Status: SHIPPED | OUTPATIENT
Start: 2019-07-29 | End: 2019-08-30 | Stop reason: SINTOL

## 2019-07-29 ASSESSMENT — ENCOUNTER SYMPTOMS
PALPITATIONS: 1
FEVER: 0
CHILLS: 0
PND: 0
NAUSEA: 0
BRUISES/BLEEDS EASILY: 0
SHORTNESS OF BREATH: 1
DEPRESSION: 0
BLURRED VISION: 0
DIZZINESS: 0
HEADACHES: 0
HEARTBURN: 0
NERVOUS/ANXIOUS: 0
MYALGIAS: 0
EYE DISCHARGE: 0
COUGH: 0

## 2019-07-29 NOTE — LETTER
[facility]   [address], [address]  [place] [address]  Phone: [phone]  Fax: [phone]              Berny Renee  1945    Encounter Date: 7/29/2019    Evgeny Martínez M.D.          PROGRESS NOTE:  Chief Complaint   Patient presents with   • HTN (Controlled)       Subjective:   Berny Renee is a 74 y.o. female who presents today self-referred with a chief complaint of nocturnal palpitations for several months and hypertension in spite of current medication.  She is also concerned about retrosternal pressure.    She reports the retrosternal pressure is constant throughout each day.  There is no variation in the feeling.  It does not matter if she is walking or at rest.    She is seen in our group in the past.  The last time was 2007.  There is a remote coronary arteriogram that was near normal.  Most of her visits with our group have been for hypertension.    She reports nighttime palpitations that will be for seconds for up to 10 minutes.  They are working her from sleep.  Palpitations seem fast and occur almost nightly.  They are not associated with chest discomfort or shortness of breath or diaphoresis    Blood pressures have remained greater than 130 systolic with current medications.  She reminds me that she has a tendency for hypokalemia which is prevented with Spironolactone 25 mg/day and potassium chloride 10 mEq twice daily.    COPD, now oxygen requiring appears to be stable.    Office EKG today demonstrates sinus rhythm and mild right axis deviation    Past Medical History:   Diagnosis Date   • Arthritis     spine   • Cataract immature    • Chronic pain    • Colon polyps    • COPD (chronic obstructive pulmonary disease) ([facility]) 2002    moderate to severe   • DDD (degenerative disc disease), cervical    • DDD (degenerative disc disease), thoracic    • Diverticulitis of colon    • Dyslipidemia    • EMPHYSEMA    • Hypertension    • REGINE  (obstructive sleep apnea)    • OSTEOPOROSIS    • Spinal stenosis, lumbar region, with neurogenic claudication    • URINARY INCONTINENCE    • Vitamin d deficiency      Past Surgical History:   Procedure Laterality Date   • LUMBAR LAMINECTOMY DISKECTOMY  6/22/2010    Performed by GRACIE CANO at SUR[facility]ERY [facility]   • OTHER ORTHOPEDIC SURGERY  2004    left ankle fx   • OTHER      leg fracture   • OTHER      t spine disc surg   • TONSILLECTOMY       No family history on file.  Social History     Social History   • Marital status:      Spouse name: N/A   • Number of children: N/A   • Years of education: N/A     Occupational History   • Not on file.     Social History Main Topics   • Smoking status: Former Smoker     Packs/day: 2.00     Years: 30.00     Types: Cigarettes     Quit date: 3/1/1999   • Smokeless tobacco: Never Used      Comment: 3 pks a day for 35 yrs, continued abstinence   • Alcohol use 4.2 oz/week     7 Glasses of wine per week   • Drug use: Yes     Types: Marijuana      Comment: Medical Marijuana    • Sexual activity: No     Other Topics Concern   • Not on file     Social History Narrative   • No narrative on file     Allergies   Allergen Reactions   • Iodine      convulsion   • Tape Rash and Itching     Outpatient Encounter Prescriptions as of 7/29/2019   Medication Sig Dispense Refill   • lisinopril (PRINIVIL) 10 MG Tab Take 10 mg by mouth every day.     • DILTIAZem CD (DILTIAZEM CD) 180 MG CAPSULE SR 24 HR Take 1 Cap by mouth every day. 30 Cap 11   • tiotropium (SPIRIVA HANDIHALER) 18 MCG Cap Inhale 1 Cap by mouth every day. 90 Cap 3   • fluticasone-salmeterol (ADVAIR DISKUS) 500-50 MCG/DOSE AEROSOL POWDER, BREATH ACTIVATED Inhale 1 Puff by mouth 2 times a day. 3 Inhaler 3   • azithromycin (ZITHROMAX) 250 MG Tab Take 1 Tab by mouth every day. 30 Tab 11   • VENTOLIN  (90 Base) MCG/ACT Aero Soln inhalation aerosol Inhale 2 Puffs by mouth every 6 hours as needed for Shortness of  Breath. 1 Inhaler 2   • Misc. Devices Misc This is an order for  7 foot high flow oxygen tubing  Dx; asthma with COPD J 44.9, supplemental oxygen-dependent Z 99.81, chronic respiratory failure with hypoxia and J 96        ODETTE 99 months   NPI [id number] 1 Each 0   • spironolactone (ALDACTONE) 25 MG Tab TAKE ONE TABLET BY MOUTH ONE TIME DAILY 30 Tab 11   • montelukast (SINGULAIR) 10 MG Tab Take 1 Tab by mouth every day. 90 Tab 3   • acyclovir (ZOVIRAX) 200 MG Cap TAKE TWO CAPSULES BY MOUTH THREE TIMES DAILY 120 Cap 0   • Cholecalciferol (VITAMIN D3) 2000 UNIT Cap TAKE ONE CAPSULE BY MOUTH ONE TIME DAILY 30 Cap 3   • potassium chloride (MICRO-K) 10 MEQ capsule TAKE ONE CAPSULE BY MOUTH TWICE DAILY 60 Cap 6   • oxybutynin SR (DITROPAN-XL) 5 MG TABLET SR 24 HR TAKE ONE TABLET BY MOUTH ONE TIME DAILY 30 Tab 4   • omeprazole (PRILOSEC OTC) 20 MG tablet Take 1 Tab by mouth every day. 30 Tab 11   • oxycodone immediate release (ROXICODONE) 10 MG immediate release tablet      • Diclofenac Sodium 1 % Gel      • lidocaine (XYLOCAINE) 5 % Ointment      • tizanidine (ZANAFLEX) 4 MG Tab Take 1 Tab by mouth 3 times a day. 90 Tab 5   • Misc. Devices Misc This is an order for  2 batteries and an oxygen older for the [employer]ilman go-go scooter.  Patient has COPD and is oxygen dependent. The batteries are not lasting long enough. Her mobility is impaired.        ODETTE 99 months   NPI [id number] 1 Each 0   • Misc. Devices Misc This is an order for repairs to her go go scooter           ODETTE 99 months   NPI [id number] 1 Each 0   • [DISCONTINUED] azithromycin (ZITHROMAX) 250 MG Tab TAKE ONE TABLET BY MOUTH ONE TIME DAILY (Patient not taking: Reported on 7/29/2019) 30 Tab 0   • SHINGRIX 50 MCG/0.5ML Recon Susp LOT: [id number]/ EXP: 08/14/21 / 6/28/21 INJECT IM VIS: 02/12/18 ADMINISTERED: 05/29/18  0   • Naloxegol Oxalate 25 MG Tab Take 25 mg by mouth every day. 30 Tab 6   • [DISCONTINUED] lisinopril (PRINIVIL) 5 MG Tab Take 1 Tab by mouth every  "day. (Patient not taking: Reported on 7/29/2019) 90 Tab 3   • [DISCONTINUED] omeprazole (PRILOSEC) 20 MG delayed-release capsule TAKE ONE CAPSULE BY MOUTH ONE TIME DAILY (Patient not taking: Reported on 7/29/2019) 30 Cap 10   • [DISCONTINUED] OXYCONTIN 10 MG Tablet Extended Release 12 hour Abuse-Deterrent      • ketoconazole (NIZORAL) 2 % Cream Apply to affected area daily 30 g 2   • ipratropium-albuterol (DUONEB) 0.5-2.5 (3) MG/3ML nebulizer solution USE ONE VIAL IN NEBULIZER EVERY 4 HOURS AS NEEDED FOR SHORTNESS OF BREATH OR  WHEEZING 360 Vial 6   • FLUZONE HIGH-DOSE 0.5 ML Suspension Prefilled Syringe injection      • ZOSTAVAX 66235 UNT/0.65ML injection      • [DISCONTINUED] albuterol (PROVENTIL) 2.5mg/3ml Nebu Soln solution for nebulization 2.5 mg by Nebulization route every four hours as needed for Shortness of Breath.       No facility-administered encounter medications on file as of 7/29/2019.      Review of Systems   Constitutional: Negative for chills, fever and malaise/fatigue.   Eyes: Negative for blurred vision and discharge.   Respiratory: Positive for shortness of breath. Negative for cough.    Cardiovascular: Positive for palpitations. Negative for chest pain, leg swelling and PND.   Gastrointestinal: Negative for heartburn and nausea.   Genitourinary: Negative for dysuria and urgency.   Musculoskeletal: Negative for myalgias.   Skin: Negative for itching and rash.   Neurological: Negative for dizziness and headaches.   Endo/Heme/Allergies: Negative for environmental allergies. Does not bruise/bleed easily.   Psychiatric/Behavioral: Negative for depression. The patient is not nervous/anxious.         Objective:   /60 (BP Location: Left arm, Patient Position: Sitting, BP Cuff Size: Adult)   Pulse 84   Ht 1.626 m (5' 4\")   Wt 88.9 kg (196 lb)   LMP 06/07/2001   SpO2 95%   BMI 33.64 kg/m²      Physical Exam   Constitutional: She is oriented to person, place, and time.   A pleasant obese lady " wearing oxygen.  Good historian   Neck: No JVD present.   Cardiovascular: Normal rate, regular rhythm and intact distal pulses.  Exam reveals no gallop and no friction rub.    No murmur heard.  No carotid bruits   Pulmonary/Chest: Effort normal and breath sounds normal.   Abdominal: Soft. There is no tenderness.   Musculoskeletal: She exhibits no edema.   Neurological: She is alert and oriented to person, place, and time.   Skin: Skin is warm and dry.       Assessment:     1. Essential hypertension  EKG    Holter Monitor Study   2. Palpitations  Holter Monitor Study       Medical Decision Making:  Today's Assessment / Status / Plan:   With respect to nocturnal palpitations, 48-hour Holter monitor applied.    With respect to hypertension, diltiazem 180 mg prescribed in addition to current medications to be continued unchanged.  Choice of diltiazem was made in case she is having atrial arrhythmias at night.    Strong reassurance that her chest pressure is pulmonary in origin.    She was also worried about enlarged heart based on the absence of findings of peripheral edema, plus unremarkable heart exam and EKG, I do not think echocardiography is indicated.    Follow-up after Holter monitor in 1 month.      Everett Diego M.D.  [address] [place] [address]  VIA Facsimile: [phone]

## 2019-07-29 NOTE — PROGRESS NOTES
Chief Complaint   Patient presents with   • HTN (Controlled)       Subjective:   Berny Renee is a 74 y.o. female who presents today self-referred with a chief complaint of nocturnal palpitations for several months and hypertension in spite of current medication.  She is also concerned about retrosternal pressure.    She reports the retrosternal pressure is constant throughout each day.  There is no variation in the feeling.  It does not matter if she is walking or at rest.    She is seen in our group in the past.  The last time was 2007.  There is a remote coronary arteriogram that was near normal.  Most of her visits with our group have been for hypertension.    She reports nighttime palpitations that will be for seconds for up to 10 minutes.  They are working her from sleep.  Palpitations seem fast and occur almost nightly.  They are not associated with chest discomfort or shortness of breath or diaphoresis    Blood pressures have remained greater than 130 systolic with current medications.  She reminds me that she has a tendency for hypokalemia which is prevented with Spironolactone 25 mg/day and potassium chloride 10 mEq twice daily.    COPD, now oxygen requiring appears to be stable.    Office EKG today demonstrates sinus rhythm and mild right axis deviation    Past Medical History:   Diagnosis Date   • Arthritis     spine   • Cataract immature    • Chronic pain    • Colon polyps    • COPD (chronic obstructive pulmonary disease) ([facility]) 2002    moderate to severe   • DDD (degenerative disc disease), cervical    • DDD (degenerative disc disease), thoracic    • Diverticulitis of colon    • Dyslipidemia    • EMPHYSEMA    • Hypertension    • REGINE (obstructive sleep apnea)    • OSTEOPOROSIS    • Spinal stenosis, lumbar region, with neurogenic claudication    • URINARY INCONTINENCE    • Vitamin d deficiency      Past Surgical History:   Procedure Laterality Date   • LUMBAR LAMINECTOMY DISKECTOMY  6/22/2010     Performed by GRACIE CANO at SURGERY [facility] ORS   • OTHER ORTHOPEDIC SURGERY  2004    left ankle fx   • OTHER      leg fracture   • OTHER      t spine disc surg   • TONSILLECTOMY       No family history on file.  Social History     Social History   • Marital status:      Spouse name: N/A   • Number of children: N/A   • Years of education: N/A     Occupational History   • Not on file.     Social History Main Topics   • Smoking status: Former Smoker     Packs/day: 2.00     Years: 30.00     Types: Cigarettes     Quit date: 3/1/1999   • Smokeless tobacco: Never Used      Comment: 3 pks a day for 35 yrs, continued abstinence   • Alcohol use 4.2 oz/week     7 Glasses of wine per week   • Drug use: Yes     Types: Marijuana      Comment: Medical Marijuana    • Sexual activity: No     Other Topics Concern   • Not on file     Social History Narrative   • No narrative on file     Allergies   Allergen Reactions   • Iodine      convulsion   • Tape Rash and Itching     Outpatient Encounter Prescriptions as of 7/29/2019   Medication Sig Dispense Refill   • lisinopril (PRINIVIL) 10 MG Tab Take 10 mg by mouth every day.     • DILTIAZem CD (DILTIAZEM CD) 180 MG CAPSULE SR 24 HR Take 1 Cap by mouth every day. 30 Cap 11   • tiotropium (SPIRIVA HANDIHALER) 18 MCG Cap Inhale 1 Cap by mouth every day. 90 Cap 3   • fluticasone-salmeterol (ADVAIR DISKUS) 500-50 MCG/DOSE AEROSOL POWDER, BREATH ACTIVATED Inhale 1 Puff by mouth 2 times a day. 3 Inhaler 3   • azithromycin (ZITHROMAX) 250 MG Tab Take 1 Tab by mouth every day. 30 Tab 11   • VENTOLIN  (90 Base) MCG/ACT Aero Soln inhalation aerosol Inhale 2 Puffs by mouth every 6 hours as needed for Shortness of Breath. 1 Inhaler 2   • Misc. Devices Misc This is an order for  7 foot high flow oxygen tubing  Dx; asthma with COPD J 44.9, supplemental oxygen-dependent Z 99.81, chronic respiratory failure with hypoxia and J 96        ODETTE 99 months   NPI [id number] 1 Each 0   •  spironolactone (ALDACTONE) 25 MG Tab TAKE ONE TABLET BY MOUTH ONE TIME DAILY 30 Tab 11   • montelukast (SINGULAIR) 10 MG Tab Take 1 Tab by mouth every day. 90 Tab 3   • acyclovir (ZOVIRAX) 200 MG Cap TAKE TWO CAPSULES BY MOUTH THREE TIMES DAILY 120 Cap 0   • Cholecalciferol (VITAMIN D3) 2000 UNIT Cap TAKE ONE CAPSULE BY MOUTH ONE TIME DAILY 30 Cap 3   • potassium chloride (MICRO-K) 10 MEQ capsule TAKE ONE CAPSULE BY MOUTH TWICE DAILY 60 Cap 6   • oxybutynin SR (DITROPAN-XL) 5 MG TABLET SR 24 HR TAKE ONE TABLET BY MOUTH ONE TIME DAILY 30 Tab 4   • omeprazole (PRILOSEC OTC) 20 MG tablet Take 1 Tab by mouth every day. 30 Tab 11   • oxycodone immediate release (ROXICODONE) 10 MG immediate release tablet      • Diclofenac Sodium 1 % Gel      • lidocaine (XYLOCAINE) 5 % Ointment      • tizanidine (ZANAFLEX) 4 MG Tab Take 1 Tab by mouth 3 times a day. 90 Tab 5   • Misc. Devices Misc This is an order for  2 batteries and an oxygen older for the [employer] scooter.  Patient has COPD and is oxygen dependent. The batteries are not lasting long enough. Her mobility is impaired.        ODETTE 99 months   NPI [id number] 1 Each 0   • Misc. Devices Misc This is an order for repairs to her go go scooter           ODETTE 99 months   NPI [id number] 1 Each 0   • [DISCONTINUED] azithromycin (ZITHROMAX) 250 MG Tab TAKE ONE TABLET BY MOUTH ONE TIME DAILY (Patient not taking: Reported on 7/29/2019) 30 Tab 0   • SHINGRIX 50 MCG/0.5ML Recon Susp LOT: [id number]/ EXP: 08/14/21 / 6/28/21 INJECT IM VIS: 02/12/18 ADMINISTERED: 05/29/18  0   • Naloxegol Oxalate 25 MG Tab Take 25 mg by mouth every day. 30 Tab 6   • [DISCONTINUED] lisinopril (PRINIVIL) 5 MG Tab Take 1 Tab by mouth every day. (Patient not taking: Reported on 7/29/2019) 90 Tab 3   • [DISCONTINUED] omeprazole (PRILOSEC) 20 MG delayed-release capsule TAKE ONE CAPSULE BY MOUTH ONE TIME DAILY (Patient not taking: Reported on 7/29/2019) 30 Cap 10   • [DISCONTINUED] OXYCONTIN 10 MG Tablet  "Extended Release 12 hour Abuse-Deterrent      • ketoconazole (NIZORAL) 2 % Cream Apply to affected area daily 30 g 2   • ipratropium-albuterol (DUONEB) 0.5-2.5 (3) MG/3ML nebulizer solution USE ONE VIAL IN NEBULIZER EVERY 4 HOURS AS NEEDED FOR SHORTNESS OF BREATH OR  WHEEZING 360 Vial 6   • FLUZONE HIGH-DOSE 0.5 ML Suspension Prefilled Syringe injection      • ZOSTAVAX 00105 UNT/0.65ML injection      • [DISCONTINUED] albuterol (PROVENTIL) 2.5mg/3ml Nebu Soln solution for nebulization 2.5 mg by Nebulization route every four hours as needed for Shortness of Breath.       No facility-administered encounter medications on file as of 7/29/2019.      Review of Systems   Constitutional: Negative for chills, fever and malaise/fatigue.   Eyes: Negative for blurred vision and discharge.   Respiratory: Positive for shortness of breath. Negative for cough.    Cardiovascular: Positive for palpitations. Negative for chest pain, leg swelling and PND.   Gastrointestinal: Negative for heartburn and nausea.   Genitourinary: Negative for dysuria and urgency.   Musculoskeletal: Negative for myalgias.   Skin: Negative for itching and rash.   Neurological: Negative for dizziness and headaches.   Endo/Heme/Allergies: Negative for environmental allergies. Does not bruise/bleed easily.   Psychiatric/Behavioral: Negative for depression. The patient is not nervous/anxious.         Objective:   /60 (BP Location: Left arm, Patient Position: Sitting, BP Cuff Size: Adult)   Pulse 84   Ht 1.626 m (5' 4\")   Wt 88.9 kg (196 lb)   LMP 06/07/2001   SpO2 95%   BMI 33.64 kg/m²     Physical Exam   Constitutional: She is oriented to person, place, and time.   A pleasant obese lady wearing oxygen.  Good historian   Neck: No JVD present.   Cardiovascular: Normal rate, regular rhythm and intact distal pulses.  Exam reveals no gallop and no friction rub.    No murmur heard.  No carotid bruits   Pulmonary/Chest: Effort normal and breath sounds " normal.   Abdominal: Soft. There is no tenderness.   Musculoskeletal: She exhibits no edema.   Neurological: She is alert and oriented to person, place, and time.   Skin: Skin is warm and dry.       Assessment:     1. Essential hypertension  EKG    Holter Monitor Study   2. Palpitations  Holter Monitor Study       Medical Decision Making:  Today's Assessment / Status / Plan:   With respect to nocturnal palpitations, 48-hour Holter monitor applied.    With respect to hypertension, diltiazem 180 mg prescribed in addition to current medications to be continued unchanged.  Choice of diltiazem was made in case she is having atrial arrhythmias at night.    Strong reassurance that her chest pressure is pulmonary in origin.    She was also worried about enlarged heart based on the absence of findings of peripheral edema, plus unremarkable heart exam and EKG, I do not think echocardiography is indicated.    Follow-up after Holter monitor in 1 month.

## 2019-07-30 DIAGNOSIS — J44.89 ASTHMA WITH COPD: ICD-10-CM

## 2019-07-30 RX ORDER — AZITHROMYCIN 250 MG/1
250 TABLET, FILM COATED ORAL DAILY
Qty: 30 TAB | Refills: 2 | Status: SHIPPED | OUTPATIENT
Start: 2019-07-30 | End: 2019-11-04 | Stop reason: SDUPTHER

## 2019-07-30 NOTE — TELEPHONE ENCOUNTER
Have we ever prescribed this med? Yes.  If yes, what date? 07/01/19    Last OV: 06/18/19 with Dr Alfonso  Continue Advair 500/50, Spiriva and suppressive azithromycin.  Continue oxygen using Oxymizer pendant up to 6 L/min.  Follow-up with cardiology regarding hypertension.  Return in about 6 months (around 12/18/2019).    Next OV: 12/31/19 with Dr aflonso    DX: : Chronic obstructive pulmonary disease with acute lower respiratory infection (HCC) (J44.0)    Medications:   Requested Prescriptions     Pending Prescriptions Disp Refills   • azithromycin (ZITHROMAX) 250 MG Tab 30 Tab 2     Sig: Take 1 Tab by mouth every day.

## 2019-08-01 LAB — EKG IMPRESSION: NORMAL

## 2019-08-09 ENCOUNTER — DOCUMENTATION (OUTPATIENT)
Dept: MEDICAL GROUP | Facility: MEDICAL CENTER | Age: 74
End: 2019-08-09

## 2019-08-15 DIAGNOSIS — J44.9 CHRONIC OBSTRUCTIVE PULMONARY DISEASE, UNSPECIFIED COPD TYPE (HCC): ICD-10-CM

## 2019-08-15 NOTE — TELEPHONE ENCOUNTER
Have we ever prescribed this med? Yes.  If yes, what date? 05/24/18    Last OV: 06/18/19 with Dr Alfonso    Next OV: 12/31/19 with Dr Alfonso    DX: Chronic obstructive pulmonary disease, unspecified COPD type (HCC) (J44.9)    Medications:   Requested Prescriptions     Pending Prescriptions Disp Refills   • fluticasone-salmeterol (ADVAIR DISKUS) 500-50 MCG/DOSE AEROSOL POWDER, BREATH ACTIVATED 3 Inhaler 3     Sig: Inhale 1 Puff by mouth 2 times a day.   \

## 2019-08-16 ENCOUNTER — TELEPHONE (OUTPATIENT)
Dept: CARDIOLOGY | Facility: MEDICAL CENTER | Age: 74
End: 2019-08-16

## 2019-08-16 NOTE — TELEPHONE ENCOUNTER
RG pt states diltiazem meds don't make him feel good & is always tired. He says he is going to stop taking it today. He can be reached at  [phone]

## 2019-08-30 ENCOUNTER — OFFICE VISIT (OUTPATIENT)
Dept: CARDIOLOGY | Facility: MEDICAL CENTER | Age: 74
End: 2019-08-30
Payer: MEDICARE

## 2019-08-30 VITALS
SYSTOLIC BLOOD PRESSURE: 158 MMHG | HEIGHT: 64 IN | DIASTOLIC BLOOD PRESSURE: 66 MMHG | OXYGEN SATURATION: 91 % | WEIGHT: 208.8 LBS | BODY MASS INDEX: 35.65 KG/M2 | HEART RATE: 90 BPM

## 2019-08-30 DIAGNOSIS — I47.10 SVT (SUPRAVENTRICULAR TACHYCARDIA): ICD-10-CM

## 2019-08-30 DIAGNOSIS — R00.2 PALPITATIONS: ICD-10-CM

## 2019-08-30 DIAGNOSIS — I49.1 ATRIAL PREMATURE DEPOLARIZATION: ICD-10-CM

## 2019-08-30 DIAGNOSIS — I50.812 CHRONIC RIGHT-SIDED HEART FAILURE (HCC): ICD-10-CM

## 2019-08-30 DIAGNOSIS — I49.3 PVC'S (PREMATURE VENTRICULAR CONTRACTIONS): ICD-10-CM

## 2019-08-30 PROCEDURE — 99214 OFFICE O/P EST MOD 30 MIN: CPT | Performed by: NURSE PRACTITIONER

## 2019-08-30 RX ORDER — LISINOPRIL 20 MG/1
20 TABLET ORAL DAILY
Qty: 30 TAB | Refills: 4 | Status: SHIPPED | OUTPATIENT
Start: 2019-08-30 | End: 2019-12-24 | Stop reason: SDUPTHER

## 2019-08-30 ASSESSMENT — ENCOUNTER SYMPTOMS
WEAKNESS: 0
MEMORY LOSS: 0
NERVOUS/ANXIOUS: 0
FOCAL WEAKNESS: 0
COUGH: 0
BACK PAIN: 1
PALPITATIONS: 1
LOSS OF CONSCIOUSNESS: 0
DIZZINESS: 0
HEADACHES: 0
SHORTNESS OF BREATH: 1
BLURRED VISION: 0
ORTHOPNEA: 0
WHEEZING: 0
FALLS: 0
DOUBLE VISION: 0

## 2019-08-30 NOTE — PROGRESS NOTES
Chief Complaint   Patient presents with   • Hypertension       Subjective:   Berny Renee is a 74 y.o. female who presents today follow up palpitations and hyeprtension.    She was seen by Dr. mcfarlane for initial consultation, she was previously seen by Dr. Ross in our clinic.  She was having palpitation and chest discomfort in which she got a Holter monitor placed in which it showed episodes of SVT and PVCs and she was prescribed diltiazem.  She was not able to tolerate diltiazem due to severe fatigue.    She continues to have palpitation and elevated blood pressure at home.  Systolic running in the 150s.    She denies any chest discomfort or dizziness.    She has a baseline of shortness of breath due to her severe COPD    History of hypertension, sleep apnea, COPD on chronic oxygen therapy, and dyslipidemia.  Family history of stroke and heart disease.    Past Medical History:   Diagnosis Date   • Arthritis     spine   • Cataract immature    • Chronic pain    • Colon polyps    • COPD (chronic obstructive pulmonary disease) ([facility]) 2002    moderate to severe   • DDD (degenerative disc disease), cervical    • DDD (degenerative disc disease), thoracic    • Diverticulitis of colon    • Dyslipidemia    • EMPHYSEMA    • Hypertension    • REGINE (obstructive sleep apnea)    • OSTEOPOROSIS    • Spinal stenosis, lumbar region, with neurogenic claudication    • URINARY INCONTINENCE    • Vitamin d deficiency      Past Surgical History:   Procedure Laterality Date   • LUMBAR LAMINECTOMY DISKECTOMY  6/22/2010    Performed by GRACIE CANO at SURGERY [facility]   • OTHER ORTHOPEDIC SURGERY  2004    left ankle fx   • OTHER      leg fracture   • OTHER      t spine disc surg   • TONSILLECTOMY       Family History   Problem Relation Age of Onset   • Other Sister         lung and leukemia cancer     Social History     Socioeconomic History   • Marital status:      Spouse name: Not on file   • Number of children: Not  on file   • Years of education: Not on file   • Highest education level: Not on file   Occupational History   • Not on file   Social Needs   • Financial resource strain: Not on file   • Food insecurity:     Worry: Not on file     Inability: Not on file   • Transportation needs:     Medical: Not on file     Non-medical: Not on file   Tobacco Use   • Smoking status: Former Smoker     Packs/day: 2.00     Years: 30.00     Pack years: 60.00     Types: Cigarettes     Last attempt to quit: 3/1/1999     Years since quittin.5   • Smokeless tobacco: Never Used   • Tobacco comment: 3 pks a day for 35 yrs, continued abstinence   Substance and Sexual Activity   • Alcohol use: Yes     Alcohol/week: 4.2 oz     Types: 7 Glasses of wine per week   • Drug use: Yes     Types: Marijuana     Comment: Medical Marijuana    • Sexual activity: Never   Lifestyle   • Physical activity:     Days per week: Not on file     Minutes per session: Not on file   • Stress: Not on file   Relationships   • Social connections:     Talks on phone: Not on file     Gets together: Not on file     Attends [employer] service: Not on file     Active member of club or organization: Not on file     Attends meetings of clubs or organizations: Not on file     Relationship status: Not on file   • Intimate partner violence:     Fear of current or ex partner: Not on file     Emotionally abused: Not on file     Physically abused: Not on file     Forced sexual activity: Not on file   Other Topics Concern   • Not on file   Social History Narrative   • Not on file     Allergies   Allergen Reactions   • Iodine      convulsion   • Tape Rash and Itching     Outpatient Encounter Medications as of 2019   Medication Sig Dispense Refill   • lisinopril (PRINIVIL) 20 MG Tab Take 1 Tab by mouth every day. 30 Tab 4   • fluticasone-salmeterol (ADVAIR DISKUS) 500-50 MCG/DOSE AEROSOL POWDER, BREATH ACTIVATED Inhale 1 Puff by mouth 2 times a day. 3 Inhaler 3   • azithromycin  (ZITHROMAX) 250 MG Tab Take 1 Tab by mouth every day. 30 Tab 2   • SHINGRIX 50 MCG/0.5ML Recon Susp LOT: [id number]/ EXP: 08/14/21 / 6/28/21 INJECT IM VIS: 02/12/18 ADMINISTERED: 05/29/18  0   • tiotropium (SPIRIVA HANDIHALER) 18 MCG Cap Inhale 1 Cap by mouth every day. 90 Cap 3   • VENTOLIN  (90 Base) MCG/ACT Aero Soln inhalation aerosol Inhale 2 Puffs by mouth every 6 hours as needed for Shortness of Breath. 1 Inhaler 2   • Naloxegol Oxalate 25 MG Tab Take 25 mg by mouth every day. 30 Tab 6   • Misc. Devices Misc This is an order for  7 foot high flow oxygen tubing  Dx; asthma with COPD J 44.9, supplemental oxygen-dependent Z 99.81, chronic respiratory failure with hypoxia and J 96        ODETTE 99 months   NPI [id number] 1 Each 0   • spironolactone (ALDACTONE) 25 MG Tab TAKE ONE TABLET BY MOUTH ONE TIME DAILY 30 Tab 11   • montelukast (SINGULAIR) 10 MG Tab Take 1 Tab by mouth every day. 90 Tab 3   • acyclovir (ZOVIRAX) 200 MG Cap TAKE TWO CAPSULES BY MOUTH THREE TIMES DAILY 120 Cap 0   • Cholecalciferol (VITAMIN D3) 2000 UNIT Cap TAKE ONE CAPSULE BY MOUTH ONE TIME DAILY 30 Cap 3   • potassium chloride (MICRO-K) 10 MEQ capsule TAKE ONE CAPSULE BY MOUTH TWICE DAILY 60 Cap 6   • oxybutynin SR (DITROPAN-XL) 5 MG TABLET SR 24 HR TAKE ONE TABLET BY MOUTH ONE TIME DAILY 30 Tab 4   • omeprazole (PRILOSEC OTC) 20 MG tablet Take 1 Tab by mouth every day. 30 Tab 11   • oxycodone immediate release (ROXICODONE) 10 MG immediate release tablet      • ketoconazole (NIZORAL) 2 % Cream Apply to affected area daily 30 g 2   • ipratropium-albuterol (DUONEB) 0.5-2.5 (3) MG/3ML nebulizer solution USE ONE VIAL IN NEBULIZER EVERY 4 HOURS AS NEEDED FOR SHORTNESS OF BREATH OR  WHEEZING 360 Vial 6   • Diclofenac Sodium 1 % Gel      • FLUZONE HIGH-DOSE 0.5 ML Suspension Prefilled Syringe injection      • ZOSTAVAX 58176 UNT/0.65ML injection      • lidocaine (XYLOCAINE) 5 % Ointment      • tizanidine (ZANAFLEX) 4 MG Tab Take 1 Tab by  "mouth 3 times a day. 90 Tab 5   • Misc. Devices Misc This is an order for  2 batteries and an oxygen older for the [employer] go-go scooter.  Patient has COPD and is oxygen dependent. The batteries are not lasting long enough. Her mobility is impaired.        ODETTE 99 months   NPI [id number] 1 Each 0   • Misc. Devices Misc This is an order for repairs to her go go scooter           ODETTE 99 months   NPI [id number] 1 Each 0   • [DISCONTINUED] lisinopril (PRINIVIL) 10 MG Tab Take 10 mg by mouth every day.     • [DISCONTINUED] DILTIAZem CD (DILTIAZEM CD) 180 MG CAPSULE SR 24 HR Take 1 Cap by mouth every day. 30 Cap 11     No facility-administered encounter medications on file as of 8/30/2019.      Review of Systems   Constitutional: Negative for malaise/fatigue.   Eyes: Negative for blurred vision and double vision.   Respiratory: Positive for shortness of breath. Negative for cough and wheezing.    Cardiovascular: Positive for palpitations. Negative for chest pain, orthopnea and leg swelling.   Musculoskeletal: Positive for back pain and joint pain. Negative for falls.   Neurological: Negative for dizziness, focal weakness, loss of consciousness, weakness and headaches.   Psychiatric/Behavioral: Negative for memory loss. The patient is not nervous/anxious.    All other systems reviewed and are negative.       Objective:   /66 (BP Location: Left arm, Patient Position: Sitting, BP Cuff Size: Adult)   Pulse 90   Ht 1.626 m (5' 4\")   Wt 94.7 kg (208 lb 12.8 oz)   LMP 06/07/2001   SpO2 91%   BMI 35.84 kg/m²     Physical Exam   Constitutional: She is oriented to person, place, and time. She appears well-developed and well-nourished. No distress.   On oxygen    HENT:   Head: Normocephalic and atraumatic.   Eyes: Pupils are equal, round, and reactive to light.   Neck: No JVD present.   Cardiovascular: Normal rate, regular rhythm and normal heart sounds.   No murmur heard.  Pulmonary/Chest: Effort normal. No respiratory " distress. She has decreased breath sounds.   Abdominal: Soft. Bowel sounds are normal. She exhibits no distension.   Musculoskeletal: She exhibits no edema.   Neurological: She is alert and oriented to person, place, and time.   Skin: Skin is warm and dry. She is not diaphoretic.   Psychiatric: She has a normal mood and affect. Her behavior is normal. Judgment and thought content normal.       Holter monitor   7/29/19  *   Monitoring started at 3:00 PM and continued for 48 hours. Overall rhythm   was Sinus. The average heart rate was 72 BPM.   The minimum heart rate was 50 BPM, occurring at 6:25:09 AM D1. The maximum   heart rate was 115 BPM, occurring at 4:32:11   PM D1. The longest R-R interval was 1.4 seconds occurring at 4:50:56 AM D1.   *   Ventricular ectopic activity consisted of one run, 6 couplets, 89 single   multifocal PVCs, 19 single endiastolic beats, 10 in   bigeminy, 6 in trigeminy. The longest and fastest ventricular run occurred   at 4:50:34 AM D1, consisting of 5 beats, with   maximum heart rate of 55 BPM. This appears to be a brief run of an   accelerated idioventricular rhythm.   *   The patient's rhythm included 38 hours 8 minutes 55 seconds of sinus   bradycardia. The slowest single episode of   bradycardia occurred at 5:59:38 AM D1, lasting 39 min 38 sec, with minimum   heart rate of 50 BPM.   *   Supraventricular ectopic activity consisted of 6 runs, 51 atrial   couplets, 84 late beats, 1314 single PACs, 88 in bigeminy. The   longest supraventricular run occurred at 5:59:34 AM D1 consisting of 5   beats, with maximum heart rate of 130 BPM. The fastest   supraventricular run occurred at 8:21:21 PM D2, consisting of 3 beats, with   maximum heart rate of 158 BPM.   *   Diary Entries- No symptoms listed in diary.     ECHO  3/17/2013  Technically difficult study.   Probably normal left ventricular size, thickness and systolic function.   Borderline diastolic dysfunction.  Possible small  pericardial effusion, without evidence of hemodynamic   compromise, and possible epicardial fat pad.  Mild mitral regurgitation.      Cardiac stress test  4/30/12  The calculated left ventricular ejection fraction is 58%.  There are no focal wall motion abnormalities.  SPECT images demonstrate no fixed or reversible defects.  There is probable artifact along the apex inferiorly.    Assessment:     1. Atrial premature depolarization  NM-CARDIAC PET   2. Palpitations  NM-CARDIAC PET   3. SVT (supraventricular tachycardia) (HCC)  NM-CARDIAC PET   4. PVC's (premature ventricular contractions)  NM-CARDIAC PET   5. Chronic right-sided heart failure (HCC)   NM-CARDIAC PET       Medical Decision Making:  Today's Assessment / Status / Plan:     1. SVT:  - unable to tolerate diltiazem.  In which beta-blocker is contradiction due to her severe pulmonary disease.  -At this time we will recommend reducing caffeine intake. Potential verapamil if symptoms get worse with reduction of caffeine intake.   -We will obtain cardiac PET to rule out any ischemia    2. Hypertension:  - elevated today   - 6 increase lisinopril to 20 mg daily, continue Aldactone 25 mg daily    Follow-up after the cardiac PET, sooner as needed.    Collaborating Provider: Dr. Wong

## 2019-08-30 NOTE — LETTER
[facility]   [address] [place] [address]  [place] [address]  Phone: [phone]  Fax: [phone]              Berny Renee  1945    Encounter Date: 8/30/2019    TOREY Martínez          PROGRESS NOTE:  Chief Complaint   Patient presents with   • Hypertension       Subjective:   Berny Renee is a 74 y.o. female who presents today follow up palpitations and hyeprtension.        Past Medical History:   Diagnosis Date   • Arthritis     spine   • Cataract immature    • Chronic pain    • Colon polyps    • COPD (chronic obstructive pulmonary disease) ([facility]) 2002    moderate to severe   • DDD (degenerative disc disease), cervical    • DDD (degenerative disc disease), thoracic    • Diverticulitis of colon    • Dyslipidemia    • EMPHYSEMA    • Hypertension    • REGINE (obstructive sleep apnea)    • OSTEOPOROSIS    • Spinal stenosis, lumbar region, with neurogenic claudication    • URINARY INCONTINENCE    • Vitamin d deficiency      Past Surgical History:   Procedure Laterality Date   • LUMBAR LAMINECTOMY DISKECTOMY  6/22/2010    Performed by GRACIE CANO at SURGERY [facility] ORS   • OTHER ORTHOPEDIC SURGERY  2004    left ankle fx   • OTHER      leg fracture   • OTHER      t spine disc surg   • TONSILLECTOMY       History reviewed. No pertinent family history.  Social History     Socioeconomic History   • Marital status:      Spouse name: Not on file   • Number of children: Not on file   • Years of education: Not on file   • Highest education level: Not on file   Occupational History   • Not on file   Social Needs   • Financial resource strain: Not on file   • Food insecurity:     Worry: Not on file     Inability: Not on file   • Transportation needs:     Medical: Not on file     Non-medical: Not on file   Tobacco Use   • Smoking status: Former Smoker     Packs/day: 2.00     Years: 30.00     Pack years: 60.00     Types: Cigarettes     Last attempt to  quit: 3/1/1999     Years since quittin.5   • Smokeless tobacco: Never Used   • Tobacco comment: 3 pks a day for 35 yrs, continued abstinence   Substance and Sexual Activity   • Alcohol use: Yes     Alcohol/week: 4.2 oz     Types: 7 Glasses of wine per week   • Drug use: Yes     Types: Marijuana     Comment: Medical Marijuana    • Sexual activity: Never   Lifestyle   • Physical activity:     Days per week: Not on file     Minutes per session: Not on file   • Stress: Not on file   Relationships   • Social connections:     Talks on phone: Not on file     Gets together: Not on file     Attends Jewish service: Not on file     Active member of club or organization: Not on file     Attends meetings of clubs or organizations: Not on file     Relationship status: Not on file   • Intimate partner violence:     Fear of current or ex partner: Not on file     Emotionally abused: Not on file     Physically abused: Not on file     Forced sexual activity: Not on file   Other Topics Concern   • Not on file   Social History Narrative   • Not on file     Allergies   Allergen Reactions   • Iodine      convulsion   • Tape Rash and Itching     Outpatient Encounter Medications as of 2019   Medication Sig Dispense Refill   • fluticasone-salmeterol (ADVAIR DISKUS) 500-50 MCG/DOSE AEROSOL POWDER, BREATH ACTIVATED Inhale 1 Puff by mouth 2 times a day. 3 Inhaler 3   • azithromycin (ZITHROMAX) 250 MG Tab Take 1 Tab by mouth every day. 30 Tab 2   • lisinopril (PRINIVIL) 10 MG Tab Take 10 mg by mouth every day.     • DILTIAZem CD (DILTIAZEM CD) 180 MG CAPSULE SR 24 HR Take 1 Cap by mouth every day. 30 Cap 11   • SHINGRIX 50 MCG/0.5ML Recon Susp LOT: [id number]/ EXP: 21 / 21 INJECT IM VIS: 18 ADMINISTERED: 18  0   • tiotropium (SPIRIVA HANDIHALER) 18 MCG Cap Inhale 1 Cap by mouth every day. 90 Cap 3   • VENTOLIN  (90 Base) MCG/ACT Aero Soln inhalation aerosol Inhale 2 Puffs by mouth every 6 hours as  needed for Shortness of Breath. 1 Inhaler 2   • Naloxegol Oxalate 25 MG Tab Take 25 mg by mouth every day. 30 Tab 6   • Misc. Devices Misc This is an order for  7 foot high flow oxygen tubing  Dx; asthma with COPD J 44.9, supplemental oxygen-dependent Z 99.81, chronic respiratory failure with hypoxia and J 96        ODETTE 99 months   NPI [id number] 1 Each 0   • spironolactone (ALDACTONE) 25 MG Tab TAKE ONE TABLET BY MOUTH ONE TIME DAILY 30 Tab 11   • montelukast (SINGULAIR) 10 MG Tab Take 1 Tab by mouth every day. 90 Tab 3   • acyclovir (ZOVIRAX) 200 MG Cap TAKE TWO CAPSULES BY MOUTH THREE TIMES DAILY 120 Cap 0   • Cholecalciferol (VITAMIN D3) 2000 UNIT Cap TAKE ONE CAPSULE BY MOUTH ONE TIME DAILY 30 Cap 3   • potassium chloride (MICRO-K) 10 MEQ capsule TAKE ONE CAPSULE BY MOUTH TWICE DAILY 60 Cap 6   • oxybutynin SR (DITROPAN-XL) 5 MG TABLET SR 24 HR TAKE ONE TABLET BY MOUTH ONE TIME DAILY 30 Tab 4   • omeprazole (PRILOSEC OTC) 20 MG tablet Take 1 Tab by mouth every day. 30 Tab 11   • oxycodone immediate release (ROXICODONE) 10 MG immediate release tablet      • ketoconazole (NIZORAL) 2 % Cream Apply to affected area daily 30 g 2   • ipratropium-albuterol (DUONEB) 0.5-2.5 (3) MG/3ML nebulizer solution USE ONE VIAL IN NEBULIZER EVERY 4 HOURS AS NEEDED FOR SHORTNESS OF BREATH OR  WHEEZING 360 Vial 6   • Diclofenac Sodium 1 % Gel      • FLUZONE HIGH-DOSE 0.5 ML Suspension Prefilled Syringe injection      • ZOSTAVAX 95310 UNT/0.65ML injection      • lidocaine (XYLOCAINE) 5 % Ointment      • tizanidine (ZANAFLEX) 4 MG Tab Take 1 Tab by mouth 3 times a day. 90 Tab 5   • Misc. Devices Misc This is an order for  2 batteries and an oxygen older for the franko gilman go-go scooter.  Patient has COPD and is oxygen dependent. The batteries are not lasting long enough. Her mobility is impaired.        ODETTE 99 months   NPI [id number] 1 Each 0   • Misc. Devices Misc This is an order for repairs to her go go scooter           ODETTE 99  months   NPI [id number] 1 Each 0     No facility-administered encounter medications on file as of 8/30/2019.      ROS     Objective:   BP (!) 0/0 (BP Location: Left arm, Patient Position: Sitting, BP Cuff Size: Adult)   LMP 06/07/2001     Physical Exam    Holter monitor   7/29/19  *   Monitoring started at 3:00 PM and continued for 48 hours. Overall rhythm   was Sinus. The average heart rate was 72 BPM.   The minimum heart rate was 50 BPM, occurring at 6:25:09 AM D1. The maximum   heart rate was 115 BPM, occurring at 4:32:11   PM D1. The longest R-R interval was 1.4 seconds occurring at 4:50:56 AM D1.   *   Ventricular ectopic activity consisted of one run, 6 couplets, 89 single   multifocal PVCs, 19 single endiastolic beats, 10 in   bigeminy, 6 in trigeminy. The longest and fastest ventricular run occurred   at 4:50:34 AM D1, consisting of 5 beats, with   maximum heart rate of 55 BPM. This appears to be a brief run of an   accelerated idioventricular rhythm.   *   The patient's rhythm included 38 hours 8 minutes 55 seconds of sinus   bradycardia. The slowest single episode of   bradycardia occurred at 5:59:38 AM D1, lasting 39 min 38 sec, with minimum   heart rate of 50 BPM.   *   Supraventricular ectopic activity consisted of 6 runs, 51 atrial   couplets, 84 late beats, 1314 single PACs, 88 in bigeminy. The   longest supraventricular run occurred at 5:59:34 AM D1 consisting of 5   beats, with maximum heart rate of 130 BPM. The fastest   supraventricular run occurred at 8:21:21 PM D2, consisting of 3 beats, with   maximum heart rate of 158 BPM.   *   Diary Entries- No symptoms listed in diary.     ECHO  3/17/2013  Technically difficult study.   Probably normal left ventricular size, thickness and systolic function.   Borderline diastolic dysfunction.  Possible small pericardial effusion, without evidence of hemodynamic   compromise, and possible epicardial fat pad.  Mild mitral regurgitation.      Cardiac stress  test  4/30/12  The calculated left ventricular ejection fraction is 58%.  There are no focal wall motion abnormalities.  SPECT images demonstrate no fixed or reversible defects.  There is probable artifact along the apex inferiorly.    Assessment:   No diagnosis found.    Medical Decision Making:  Today's Assessment / Status / Plan:         Everett Diego M.D.  [address]  [place] [address]  VIA Facsimile: [phone]     Evgeny Martínez M.D.  [address]  [address]  [place] [address]  VIA In Basket

## 2019-09-06 ENCOUNTER — HOSPITAL ENCOUNTER (OUTPATIENT)
Dept: RADIOLOGY | Facility: MEDICAL CENTER | Age: 74
End: 2019-09-06
Attending: NURSE PRACTITIONER
Payer: MEDICARE

## 2019-09-06 DIAGNOSIS — I47.10 SVT (SUPRAVENTRICULAR TACHYCARDIA): ICD-10-CM

## 2019-09-06 DIAGNOSIS — R00.2 PALPITATIONS: ICD-10-CM

## 2019-09-06 DIAGNOSIS — I49.3 PVC'S (PREMATURE VENTRICULAR CONTRACTIONS): ICD-10-CM

## 2019-09-06 DIAGNOSIS — I50.812 CHRONIC RIGHT-SIDED HEART FAILURE (HCC): ICD-10-CM

## 2019-09-06 DIAGNOSIS — I49.1 ATRIAL PREMATURE DEPOLARIZATION: ICD-10-CM

## 2019-09-06 PROCEDURE — 700111 HCHG RX REV CODE 636 W/ 250 OVERRIDE (IP)

## 2019-09-06 PROCEDURE — 78492 MYOCRD IMG PET MLT RST&STRS: CPT | Mod: 26 | Performed by: INTERNAL MEDICINE

## 2019-09-06 PROCEDURE — 93018 CV STRESS TEST I&R ONLY: CPT | Performed by: INTERNAL MEDICINE

## 2019-09-06 PROCEDURE — A9555 RB82 RUBIDIUM: HCPCS

## 2019-09-09 ENCOUNTER — TELEPHONE (OUTPATIENT)
Dept: CARDIOLOGY | Facility: MEDICAL CENTER | Age: 74
End: 2019-09-09

## 2019-09-09 NOTE — TELEPHONE ENCOUNTER
----- Message from TOREY Martínez sent at 9/9/2019  2:50 PM PDT -----  PET myocardial scan showed   SCINTOGRAPHIC FINDINGS:    Normal left ventricular perfusion with stress and rest images.  There is no evidence of ischemia or scar.    GATED WALL MOTION FINDINGS:    The left ventricle wall motion is normal with stress and rest  imagings.  Measured resting ejection fraction is 68 %.       Normal, SVT recommend verapamil 120    Thanks,  RT   - - -

## 2019-10-01 ENCOUNTER — TELEPHONE (OUTPATIENT)
Dept: CARDIOLOGY | Facility: MEDICAL CENTER | Age: 74
End: 2019-10-01

## 2019-10-07 ENCOUNTER — OFFICE VISIT (OUTPATIENT)
Dept: CARDIOLOGY | Facility: MEDICAL CENTER | Age: 74
End: 2019-10-07
Payer: MEDICARE

## 2019-10-07 VITALS
WEIGHT: 212 LBS | HEART RATE: 66 BPM | DIASTOLIC BLOOD PRESSURE: 74 MMHG | HEIGHT: 64 IN | OXYGEN SATURATION: 93 % | SYSTOLIC BLOOD PRESSURE: 122 MMHG | BODY MASS INDEX: 36.19 KG/M2

## 2019-10-07 DIAGNOSIS — R00.2 PALPITATIONS: ICD-10-CM

## 2019-10-07 DIAGNOSIS — I47.10 SVT (SUPRAVENTRICULAR TACHYCARDIA): ICD-10-CM

## 2019-10-07 DIAGNOSIS — I49.3 PVCS (PREMATURE VENTRICULAR CONTRACTIONS): ICD-10-CM

## 2019-10-07 DIAGNOSIS — I10 ESSENTIAL HYPERTENSION: ICD-10-CM

## 2019-10-07 PROCEDURE — 99214 OFFICE O/P EST MOD 30 MIN: CPT | Performed by: INTERNAL MEDICINE

## 2019-10-07 ASSESSMENT — ENCOUNTER SYMPTOMS
COUGH: 0
MYALGIAS: 0
PALPITATIONS: 0
DIZZINESS: 0
SHORTNESS OF BREATH: 0
LOSS OF CONSCIOUSNESS: 0

## 2019-10-07 NOTE — PROGRESS NOTES
Chief Complaint   Patient presents with   • HTN (Controlled)       Subjective:   Berny Renee is a 74 y.o. female who presents today for follow-up evaluation of palpitations, SVT, PVCs and hypertension.    Recently seen 2019 by [facility].  Switch from diltiazem to verapamil which she is tolerating.  Cardiac PET scan normal.    Past Medical History:   Diagnosis Date   • Arthritis     spine   • Cataract immature    • Chronic pain    • Colon polyps    • COPD (chronic obstructive pulmonary disease) ([facility])     moderate to severe   • DDD (degenerative disc disease), cervical    • DDD (degenerative disc disease), thoracic    • Diverticulitis of colon    • Dyslipidemia    • EMPHYSEMA    • Hypertension    • REGINE (obstructive sleep apnea)    • OSTEOPOROSIS    • Spinal stenosis, lumbar region, with neurogenic claudication    • URINARY INCONTINENCE    • Vitamin d deficiency      Past Surgical History:   Procedure Laterality Date   • LUMBAR LAMINECTOMY DISKECTOMY  2010    Performed by GRACIE CANO at [facility]ERY [facility]   • OTHER ORTHOPEDIC SURGERY      left ankle fx   • OTHER      leg fracture   • OTHER      t spine disc surg   • TONSILLECTOMY       Family History   Problem Relation Age of Onset   • Other Sister         lung and leukemia cancer     Social History     Socioeconomic History   • Marital status:      Spouse name: Not on file   • Number of children: Not on file   • Years of education: Not on file   • Highest education level: Not on file   Occupational History   • Not on file   Social Needs   • Financial resource strain: Not on file   • Food insecurity:     Worry: Not on file     Inability: Not on file   • Transportation needs:     Medical: Not on file     Non-medical: Not on file   Tobacco Use   • Smoking status: Former Smoker     Packs/day: 2.00     Years: 30.00     Pack years: 60.00     Types: Cigarettes     Last attempt to quit: 3/1/1999     Years since quittin.6   • Smokeless tobacco:  Never Used   • Tobacco comment: 3 pks a day for 35 yrs, continued abstinence   Substance and Sexual Activity   • Alcohol use: Yes     Alcohol/week: 4.2 oz     Types: 7 Glasses of wine per week   • Drug use: Yes     Types: Marijuana     Comment: Medical Marijuana    • Sexual activity: Never   Lifestyle   • Physical activity:     Days per week: Not on file     Minutes per session: Not on file   • Stress: Not on file   Relationships   • Social connections:     Talks on phone: Not on file     Gets together: Not on file     Attends Muslim service: Not on file     Active member of club or organization: Not on file     Attends meetings of clubs or organizations: Not on file     Relationship status: Not on file   • Intimate partner violence:     Fear of current or ex partner: Not on file     Emotionally abused: Not on file     Physically abused: Not on file     Forced sexual activity: Not on file   Other Topics Concern   • Not on file   Social History Narrative   • Not on file     Allergies   Allergen Reactions   • Iodine      convulsion   • Tape Rash and Itching     Outpatient Encounter Medications as of 10/7/2019   Medication Sig Dispense Refill   • verapamil SR (CALAN-SR) 120 MG CR tablet Take 1 Tab by mouth every day. 90 Tab 3   • lisinopril (PRINIVIL) 20 MG Tab Take 1 Tab by mouth every day. 30 Tab 4   • fluticasone-salmeterol (ADVAIR DISKUS) 500-50 MCG/DOSE AEROSOL POWDER, BREATH ACTIVATED Inhale 1 Puff by mouth 2 times a day. 3 Inhaler 3   • azithromycin (ZITHROMAX) 250 MG Tab Take 1 Tab by mouth every day. 30 Tab 2   • tiotropium (SPIRIVA HANDIHALER) 18 MCG Cap Inhale 1 Cap by mouth every day. 90 Cap 3   • VENTOLIN  (90 Base) MCG/ACT Aero Soln inhalation aerosol Inhale 2 Puffs by mouth every 6 hours as needed for Shortness of Breath. 1 Inhaler 2   • Naloxegol Oxalate 25 MG Tab Take 25 mg by mouth every day. 30 Tab 6   • Misc. Devices Misc This is an order for  7 foot high flow oxygen tubing  Dx; asthma  with COPD J 44.9, supplemental oxygen-dependent Z 99.81, chronic respiratory failure with hypoxia and J 96        ODETTE 99 months   NPI [id number] 1 Each 0   • spironolactone (ALDACTONE) 25 MG Tab TAKE ONE TABLET BY MOUTH ONE TIME DAILY 30 Tab 11   • montelukast (SINGULAIR) 10 MG Tab Take 1 Tab by mouth every day. 90 Tab 3   • acyclovir (ZOVIRAX) 200 MG Cap TAKE TWO CAPSULES BY MOUTH THREE TIMES DAILY 120 Cap 0   • Cholecalciferol (VITAMIN D3) 2000 UNIT Cap TAKE ONE CAPSULE BY MOUTH ONE TIME DAILY 30 Cap 3   • potassium chloride (MICRO-K) 10 MEQ capsule TAKE ONE CAPSULE BY MOUTH TWICE DAILY 60 Cap 6   • oxybutynin SR (DITROPAN-XL) 5 MG TABLET SR 24 HR TAKE ONE TABLET BY MOUTH ONE TIME DAILY 30 Tab 4   • omeprazole (PRILOSEC OTC) 20 MG tablet Take 1 Tab by mouth every day. 30 Tab 11   • oxycodone immediate release (ROXICODONE) 10 MG immediate release tablet      • ketoconazole (NIZORAL) 2 % Cream Apply to affected area daily 30 g 2   • ipratropium-albuterol (DUONEB) 0.5-2.5 (3) MG/3ML nebulizer solution USE ONE VIAL IN NEBULIZER EVERY 4 HOURS AS NEEDED FOR SHORTNESS OF BREATH OR  WHEEZING 360 Vial 6   • Diclofenac Sodium 1 % Gel      • lidocaine (XYLOCAINE) 5 % Ointment      • tizanidine (ZANAFLEX) 4 MG Tab Take 1 Tab by mouth 3 times a day. 90 Tab 5   • Misc. Devices Misc This is an order for  2 batteries and an oxygen older for the franko gilman go-go scooter.  Patient has COPD and is oxygen dependent. The batteries are not lasting long enough. Her mobility is impaired.        ODETTE 99 months   NPI [id number] 1 Each 0   • Misc. Devices Misc This is an order for repairs to her go go scooter           ODETTE 99 months   NPI [id number] 1 Each 0   • SHINGRIX 50 MCG/0.5ML Recon Susp LOT: [id number]/ EXP: 08/14/21 / 6/28/21 INJECT IM VIS: 02/12/18 ADMINISTERED: 05/29/18  0   • FLUZONE HIGH-DOSE 0.5 ML Suspension Prefilled Syringe injection      • ZOSTAVAX 13103 UNT/0.65ML injection        No facility-administered encounter  "medications on file as of 10/7/2019.      Review of Systems   Respiratory: Negative for cough and shortness of breath.    Cardiovascular: Negative for chest pain and palpitations.   Musculoskeletal: Negative for myalgias.   Neurological: Negative for dizziness and loss of consciousness.        Objective:   /74 (BP Location: Left arm, Patient Position: Sitting, BP Cuff Size: Adult)   Pulse 66   Ht 1.626 m (5' 4\")   Wt 96.2 kg (212 lb)   LMP 06/07/2001   SpO2 93%   BMI 36.39 kg/m²     Physical Exam   Constitutional: She is oriented to person, place, and time. She appears well-developed and well-nourished.   Continuous oxygen.   Eyes: Pupils are equal, round, and reactive to light. Conjunctivae are normal.   Neck: Normal range of motion. Neck supple. No JVD present.   Cardiovascular: Normal rate, regular rhythm, normal heart sounds and intact distal pulses.   Pulmonary/Chest: Effort normal and breath sounds normal. No accessory muscle usage. No respiratory distress. She has no wheezes. She has no rales.   Increased AP diameter.   Abdominal:   Markedly protuberant.   Musculoskeletal: She exhibits no edema.   Neurological: She is alert and oriented to person, place, and time.   Skin: Skin is warm, dry and intact. No rash noted. No cyanosis. Nails show no clubbing.   Psychiatric: She has a normal mood and affect. Her behavior is normal.     CARDIAC PET SCAN 09/06/2019  Ventricular ejection fraction 68%.  Normal perfusion scan.    ECHOCARDIOGRAM 03/17/2013  Technically difficult study.   Probably normal left ventricular size, thickness and systolic function.   Borderline diastolic dysfunction.  Possible small pericardial effusion, without evidence of hemodynamic   compromise, and possible epicardial fat pad.  Mild mitral regurgitation.    Assessment:     1. SVT (supraventricular tachycardia) (HCC)     2. PVCs (premature ventricular contractions)     3. Palpitations     4. Essential hypertension         Medical " Decision Making:  Today's Assessment / Status / Plan:     Assessment  1.  SVT.  2.  PVCs.  3.  Palpitations.  4.  Hypertension.  5.  COPD, O2 dependent, severe.    Recommendation Discussion  1.  I reviewed all of her medications.  2.  Her current cardiac status is stable.  3.  Continue current cardiac therapy.  4.  Follow-up 6 months.

## 2019-11-04 DIAGNOSIS — J44.89 ASTHMA WITH COPD: ICD-10-CM

## 2019-11-04 RX ORDER — AZITHROMYCIN 250 MG/1
250 TABLET, FILM COATED ORAL DAILY
Qty: 30 TAB | Refills: 2 | Status: SHIPPED | OUTPATIENT
Start: 2019-11-04 | End: 2020-01-06 | Stop reason: SDUPTHER

## 2019-11-04 NOTE — TELEPHONE ENCOUNTER
Have we ever prescribed this med? Yes.  If yes, what date? 07/30/2019    Last OV: 06/18/2019    Next OV: 12/31/2019    DX: Asthma with COPD (HCC) (J44.9)    Medications: azithromycin (ZITHROMAX) 250 MG Tab [[id number]]

## 2019-11-18 ENCOUNTER — HOSPITAL ENCOUNTER (OUTPATIENT)
Dept: RADIOLOGY | Facility: MEDICAL CENTER | Age: 74
End: 2019-11-18
Attending: PHYSICAL MEDICINE & REHABILITATION
Payer: MEDICARE

## 2019-11-18 DIAGNOSIS — M17.0 OSTEOARTHRITIS OF BOTH KNEES, UNSPECIFIED OSTEOARTHRITIS TYPE: ICD-10-CM

## 2019-11-18 DIAGNOSIS — M17.0 PRIMARY OSTEOARTHRITIS OF BOTH KNEES: ICD-10-CM

## 2019-11-18 PROCEDURE — 73565 X-RAY EXAM OF KNEES: CPT

## 2019-12-24 DIAGNOSIS — I10 ESSENTIAL HYPERTENSION: Primary | ICD-10-CM

## 2019-12-26 RX ORDER — LISINOPRIL 20 MG/1
20 TABLET ORAL DAILY
Qty: 100 TAB | Refills: 3 | Status: SHIPPED | OUTPATIENT
Start: 2019-12-26 | End: 2021-03-22 | Stop reason: SDUPTHER

## 2020-01-06 ENCOUNTER — OFFICE VISIT (OUTPATIENT)
Dept: PULMONOLOGY | Facility: HOSPICE | Age: 75
End: 2020-01-06
Payer: MEDICARE

## 2020-01-06 VITALS
RESPIRATION RATE: 16 BRPM | HEIGHT: 64 IN | SYSTOLIC BLOOD PRESSURE: 148 MMHG | BODY MASS INDEX: 34.83 KG/M2 | HEART RATE: 62 BPM | WEIGHT: 204 LBS | DIASTOLIC BLOOD PRESSURE: 90 MMHG | TEMPERATURE: 98.3 F | OXYGEN SATURATION: 96 %

## 2020-01-06 DIAGNOSIS — J96.11 CHRONIC RESPIRATORY FAILURE WITH HYPOXIA (HCC): ICD-10-CM

## 2020-01-06 DIAGNOSIS — J44.89 ASTHMA WITH COPD (HCC): ICD-10-CM

## 2020-01-06 DIAGNOSIS — J44.9 CHRONIC OBSTRUCTIVE PULMONARY DISEASE, UNSPECIFIED COPD TYPE (HCC): ICD-10-CM

## 2020-01-06 PROCEDURE — 99214 OFFICE O/P EST MOD 30 MIN: CPT | Performed by: INTERNAL MEDICINE

## 2020-01-06 RX ORDER — AZITHROMYCIN 250 MG/1
250 TABLET, FILM COATED ORAL DAILY
Qty: 90 TAB | Refills: 2 | Status: ON HOLD
Start: 2020-01-06 | End: 2020-11-09

## 2020-01-06 RX ORDER — ALBUTEROL SULFATE 90 UG/1
2 AEROSOL, METERED RESPIRATORY (INHALATION) EVERY 6 HOURS PRN
Qty: 1 INHALER | Refills: 2 | Status: SHIPPED | OUTPATIENT
Start: 2020-01-06 | End: 2022-02-24 | Stop reason: SDUPTHER

## 2020-01-06 RX ORDER — OMEPRAZOLE 20 MG/1
CAPSULE, DELAYED RELEASE ORAL
COMMUNITY
Start: 2019-12-23 | End: 2020-11-07

## 2020-01-06 RX ORDER — POTASSIUM CHLORIDE 750 MG/1
TABLET, EXTENDED RELEASE ORAL
COMMUNITY
Start: 2019-11-02 | End: 2020-11-07

## 2020-01-06 RX ORDER — DULOXETIN HYDROCHLORIDE 30 MG/1
CAPSULE, DELAYED RELEASE ORAL
COMMUNITY
Start: 2019-12-12 | End: 2020-11-07

## 2020-01-06 ASSESSMENT — PAIN SCALES - GENERAL: PAINLEVEL: 4=SLIGHT-MODERATE PAIN

## 2020-01-06 NOTE — PROGRESS NOTES
Chief Complaint   Patient presents with   • Follow-Up     COPD     HPI: This patient is a 74 y.o. Female who returns for follow-up of severe COPD.  Her FEV1 is 0.96 L or 44% predicted.  She uses oxygen at 3 L/m using an Oxymizer pendant.  She is compliant with Advair 500/50, Spiriva and suppressive azithromycin. Her exertional dyspnea is stable and she denies sputum purulence, wheezing, chest tightness or edema. She is up-to-date on influenza and pneumococcal vaccines.      Past Medical History:   Diagnosis Date   • Arthritis     spine   • Cataract immature    • Chronic pain    • Colon polyps    • COPD (chronic obstructive pulmonary disease) ([facility])     moderate to severe   • DDD (degenerative disc disease), cervical    • DDD (degenerative disc disease), thoracic    • Diverticulitis of colon    • Dyslipidemia    • EMPHYSEMA    • Hypertension    • REGINE (obstructive sleep apnea)    • OSTEOPOROSIS    • Spinal stenosis, lumbar region, with neurogenic claudication    • URINARY INCONTINENCE    • Vitamin d deficiency        Social History     Socioeconomic History   • Marital status:      Spouse name: Not on file   • Number of children: Not on file   • Years of education: Not on file   • Highest education level: Not on file   Occupational History   • Not on file   Social Needs   • Financial resource strain: Not on file   • Food insecurity:     Worry: Not on file     Inability: Not on file   • Transportation needs:     Medical: Not on file     Non-medical: Not on file   Tobacco Use   • Smoking status: Former Smoker     Packs/day: 2.00     Years: 30.00     Pack years: 60.00     Types: Cigarettes     Last attempt to quit: 3/1/1999     Years since quittin.8   • Smokeless tobacco: Never Used   • Tobacco comment: 3 pks a day for 35 yrs, continued abstinence   Substance and Sexual Activity   • Alcohol use: Yes     Alcohol/week: 4.2 oz     Types: 7 Glasses of wine per week   • Drug use: Yes     Types: Marijuana      Comment: Medical Marijuana    • Sexual activity: Never   Lifestyle   • Physical activity:     Days per week: Not on file     Minutes per session: Not on file   • Stress: Not on file   Relationships   • Social connections:     Talks on phone: Not on file     Gets together: Not on file     Attends Judaism service: Not on file     Active member of club or organization: Not on file     Attends meetings of clubs or organizations: Not on file     Relationship status: Not on file   • Intimate partner violence:     Fear of current or ex partner: Not on file     Emotionally abused: Not on file     Physically abused: Not on file     Forced sexual activity: Not on file   Other Topics Concern   • Not on file   Social History Narrative   • Not on file       Family History   Problem Relation Age of Onset   • Other Sister         lung and leukemia cancer       Current Outpatient Medications on File Prior to Visit   Medication Sig Dispense Refill   • DULoxetine (CYMBALTA) 30 MG Cap DR Particles      • omeprazole (PRILOSEC) 20 MG delayed-release capsule      • lisinopril (PRINIVIL) 20 MG Tab Take 1 Tab by mouth every day. 100 Tab 3   • azithromycin (ZITHROMAX) 250 MG Tab Take 1 Tab by mouth every day. 30 Tab 2   • verapamil SR (CALAN-SR) 120 MG CR tablet Take 1 Tab by mouth every day. 90 Tab 3   • fluticasone-salmeterol (ADVAIR DISKUS) 500-50 MCG/DOSE AEROSOL POWDER, BREATH ACTIVATED Inhale 1 Puff by mouth 2 times a day. 3 Inhaler 3   • tiotropium (SPIRIVA HANDIHALER) 18 MCG Cap Inhale 1 Cap by mouth every day. 90 Cap 3   • spironolactone (ALDACTONE) 25 MG Tab TAKE ONE TABLET BY MOUTH ONE TIME DAILY 30 Tab 11   • montelukast (SINGULAIR) 10 MG Tab Take 1 Tab by mouth every day. 90 Tab 3   • acyclovir (ZOVIRAX) 200 MG Cap TAKE TWO CAPSULES BY MOUTH THREE TIMES DAILY 120 Cap 0   • Cholecalciferol (VITAMIN D3) 2000 UNIT Cap TAKE ONE CAPSULE BY MOUTH ONE TIME DAILY 30 Cap 3   • potassium chloride (MICRO-K) 10 MEQ capsule TAKE ONE  CAPSULE BY MOUTH TWICE DAILY 60 Cap 6   • oxybutynin SR (DITROPAN-XL) 5 MG TABLET SR 24 HR TAKE ONE TABLET BY MOUTH ONE TIME DAILY 30 Tab 4   • omeprazole (PRILOSEC OTC) 20 MG tablet Take 1 Tab by mouth every day. 30 Tab 11   • oxycodone immediate release (ROXICODONE) 10 MG immediate release tablet      • ketoconazole (NIZORAL) 2 % Cream Apply to affected area daily 30 g 2   • ipratropium-albuterol (DUONEB) 0.5-2.5 (3) MG/3ML nebulizer solution USE ONE VIAL IN NEBULIZER EVERY 4 HOURS AS NEEDED FOR SHORTNESS OF BREATH OR  WHEEZING 360 Vial 6   • lidocaine (XYLOCAINE) 5 % Ointment      • tizanidine (ZANAFLEX) 4 MG Tab Take 1 Tab by mouth 3 times a day. 90 Tab 5   • potassium chloride SA (K-DUR) 10 MEQ Tab CR      • SHINGRIX 50 MCG/0.5ML Recon Susp LOT: [id number]/ EXP: 08/14/21 / 6/28/21 INJECT IM VIS: 02/12/18 ADMINISTERED: 05/29/18  0   • Naloxegol Oxalate 25 MG Tab Take 25 mg by mouth every day. (Patient not taking: Reported on 1/6/2020) 30 Tab 6   • Misc. Devices Misc This is an order for  7 foot high flow oxygen tubing  Dx; asthma with COPD J 44.9, supplemental oxygen-dependent Z 99.81, chronic respiratory failure with hypoxia and J 96        ODETTE 99 months   NPI [id number] 1 Each 0   • Diclofenac Sodium 1 % Gel      • FLUZONE HIGH-DOSE 0.5 ML Suspension Prefilled Syringe injection      • ZOSTAVAX 17215 UNT/0.65ML injection      • Misc. Devices Misc This is an order for  2 batteries and an oxygen older for the my pride go-go scooter.  Patient has COPD and is oxygen dependent. The batteries are not lasting long enough. Her mobility is impaired.        ODETTE 99 months   NPI [id number] 1 Each 0   • Misc. Devices Misc This is an order for repairs to her go go scooter           ODETTE 99 months   NPI [id number] 1 Each 0     No current facility-administered medications on file prior to visit.        Allergies: Iodine and Tape    ROS:   Constitutional: Denies fevers, chills, night sweats, fatigue or weight loss  Eyes: Denies  "vision loss, pain, drainage, double vision  Ears, Nose, Throat: Denies earache, difficulty hearing, tinnitus, nasal congestion, hoarseness  Cardiovascular: Denies chest pain, tightness, palpitations, orthopnea or edema  Respiratory:As in HPI  Sleep: Denies daytime sleepiness, snoring, apneas, insomnia, morning headaches  GI: Denies heartburn, dysphagia, nausea, abdominal pain, diarrhea or constipation  : Denies frequent urination, hematuria, discharge or painful urination  Musculoskeletal: Denies back pain, painful joints, sore muscles  Neurological: Denies weakness or headaches  Skin: No rashes    /90 (BP Location: Right arm, Patient Position: Sitting, BP Cuff Size: Large adult)   Pulse 62   Temp 36.8 °C (98.3 °F) (Oral)   Resp 16   Ht 1.632 m (5' 4.25\")   Wt 92.5 kg (204 lb)   SpO2 96%     Physical Exam:  Appearance: Well-nourished, well-developed, in no acute distress  HEENT: Normocephalic, atraumatic, white sclera, PERRLA, oropharynx clear  Neck: No adenopathy or masses  Respiratory: no intercostal retractions or accessory muscle use  Lungs auscultation: Clear to auscultation bilaterally, diminished breath sounds  Cardiovascular: Regular rate rhythm. No murmurs, rubs or gallops.  No LE edema  Abdomen: soft, nondistended  Gait: Normal  Digits: No clubbing, cyanosis  Motor: No focal deficits  Orientation: Oriented to time, person and place    Diagnosis:  1. Chronic respiratory failure with hypoxia (HCC)     2. Chronic obstructive pulmonary disease, unspecified COPD type (HCC)  VENTOLIN  (90 Base) MCG/ACT Aero Soln inhalation aerosol   3. Asthma with COPD ([facility])         Plan:  Berny's COPD is stable.    Continue Advair 500/50 and Spiriva.  Continue suppressive azithromycin.  Continue oxygen at 3 L/min.  Pneumococcal vaccine will be due in 6 months.  Return in about 6 months (around 7/6/2020).      "

## 2020-02-06 ENCOUNTER — HOSPITAL ENCOUNTER (OUTPATIENT)
Dept: LAB | Facility: MEDICAL CENTER | Age: 75
End: 2020-02-06
Attending: FAMILY MEDICINE
Payer: MEDICARE

## 2020-02-06 LAB
ALBUMIN SERPL BCP-MCNC: 4.1 G/DL (ref 3.2–4.9)
ALBUMIN/GLOB SERPL: 1.5 G/DL
ALP SERPL-CCNC: 35 U/L (ref 30–99)
ALT SERPL-CCNC: 12 U/L (ref 2–50)
ANION GAP SERPL CALC-SCNC: 5 MMOL/L (ref 0–11.9)
AST SERPL-CCNC: 18 U/L (ref 12–45)
BASOPHILS # BLD AUTO: 0.6 % (ref 0–1.8)
BASOPHILS # BLD: 0.05 K/UL (ref 0–0.12)
BILIRUB SERPL-MCNC: 0.5 MG/DL (ref 0.1–1.5)
BUN SERPL-MCNC: 14 MG/DL (ref 8–22)
CALCIUM SERPL-MCNC: 9.7 MG/DL (ref 8.5–10.5)
CHLORIDE SERPL-SCNC: 97 MMOL/L (ref 96–112)
CHOLEST SERPL-MCNC: 160 MG/DL (ref 100–199)
CO2 SERPL-SCNC: 34 MMOL/L (ref 20–33)
CREAT SERPL-MCNC: 0.68 MG/DL (ref 0.5–1.4)
EOSINOPHIL # BLD AUTO: 0.4 K/UL (ref 0–0.51)
EOSINOPHIL NFR BLD: 4.5 % (ref 0–6.9)
ERYTHROCYTE [DISTWIDTH] IN BLOOD BY AUTOMATED COUNT: 42.3 FL (ref 35.9–50)
GLOBULIN SER CALC-MCNC: 2.8 G/DL (ref 1.9–3.5)
GLUCOSE SERPL-MCNC: 95 MG/DL (ref 65–99)
HCT VFR BLD AUTO: 41 % (ref 37–47)
HDLC SERPL-MCNC: 57 MG/DL
HGB BLD-MCNC: 13.8 G/DL (ref 12–16)
IMM GRANULOCYTES # BLD AUTO: 0.03 K/UL (ref 0–0.11)
IMM GRANULOCYTES NFR BLD AUTO: 0.3 % (ref 0–0.9)
LDLC SERPL CALC-MCNC: 90 MG/DL
LYMPHOCYTES # BLD AUTO: 1.8 K/UL (ref 1–4.8)
LYMPHOCYTES NFR BLD: 20.3 % (ref 22–41)
MCH RBC QN AUTO: 30.9 PG (ref 27–33)
MCHC RBC AUTO-ENTMCNC: 33.7 G/DL (ref 33.6–35)
MCV RBC AUTO: 91.7 FL (ref 81.4–97.8)
MONOCYTES # BLD AUTO: 0.86 K/UL (ref 0–0.85)
MONOCYTES NFR BLD AUTO: 9.7 % (ref 0–13.4)
NEUTROPHILS # BLD AUTO: 5.74 K/UL (ref 2–7.15)
NEUTROPHILS NFR BLD: 64.6 % (ref 44–72)
NRBC # BLD AUTO: 0 K/UL
NRBC BLD-RTO: 0 /100 WBC
PLATELET # BLD AUTO: 328 K/UL (ref 164–446)
PMV BLD AUTO: 10.1 FL (ref 9–12.9)
POTASSIUM SERPL-SCNC: 4 MMOL/L (ref 3.6–5.5)
PROT SERPL-MCNC: 6.9 G/DL (ref 6–8.2)
RBC # BLD AUTO: 4.47 M/UL (ref 4.2–5.4)
SODIUM SERPL-SCNC: 136 MMOL/L (ref 135–145)
TRIGL SERPL-MCNC: 66 MG/DL (ref 0–149)
WBC # BLD AUTO: 8.9 K/UL (ref 4.8–10.8)

## 2020-02-06 PROCEDURE — 80061 LIPID PANEL: CPT

## 2020-02-06 PROCEDURE — 36415 COLL VENOUS BLD VENIPUNCTURE: CPT

## 2020-02-06 PROCEDURE — 85025 COMPLETE CBC W/AUTO DIFF WBC: CPT

## 2020-02-06 PROCEDURE — 80053 COMPREHEN METABOLIC PANEL: CPT

## 2020-03-23 ENCOUNTER — TELEPHONE (OUTPATIENT)
Dept: HEALTH INFORMATION MANAGEMENT | Facility: OTHER | Age: 75
End: 2020-03-23

## 2020-03-23 NOTE — TELEPHONE ENCOUNTER
1. Caller Name: Berny Renee                        Call Back Number: [phone]  [facility] PCP or Specialty Provider: Yes         2.  Does patient have any active symptoms of respiratory illness (fever OR cough OR shortness of breath OR sore throat)? No. Cough due to COPD, no sore throat, no fever.  No SOB other than for the COPD    3.  Does patient have any comoribidities? COPD    4.  Has the patient traveled in the last 14 days OR had any known contact with someone who is suspected or confirmed to have COVID-19?  No.    5. Disposition: Cleared by RN Triage; OK to keep/schedule appointment at this date.     Note routed to [facility] Provider: SABINE only. Cleared today for appointment on April 6th, 2020.

## 2020-03-25 ENCOUNTER — TELEPHONE (OUTPATIENT)
Dept: CARDIOLOGY | Facility: MEDICAL CENTER | Age: 75
End: 2020-03-25

## 2020-03-25 DIAGNOSIS — I47.10 SVT (SUPRAVENTRICULAR TACHYCARDIA) (HCC): ICD-10-CM

## 2020-03-25 DIAGNOSIS — I49.3 PVC'S (PREMATURE VENTRICULAR CONTRACTIONS): ICD-10-CM

## 2020-03-25 DIAGNOSIS — I49.1 ATRIAL PREMATURE DEPOLARIZATION: ICD-10-CM

## 2020-03-25 NOTE — TELEPHONE ENCOUNTER
CAROLANN/rosa    Pt calling to discuss verapamil causing extreme fatigue, pt sleeps more than normal, sometimes in bed for 24 hours.     Please call pt at .

## 2020-03-25 NOTE — TELEPHONE ENCOUNTER
She is ready to stop the Verapamil.  Has had the side effect since she started them in October.  Does not want to go back on Diltiazem because she could not tolerate those for other reasons.  Please advise what she can switch to.  Appt is 4/7 but she wants an answer before then.

## 2020-03-27 RX ORDER — DIGOXIN 250 MCG
250 TABLET ORAL DAILY
Qty: 31 TAB | Refills: 3 | Status: SHIPPED | OUTPATIENT
Start: 2020-03-27 | End: 2020-07-06

## 2020-03-27 NOTE — TELEPHONE ENCOUNTER
Discussed with SW.  Alternative Rx order received for Digoxin 0.25 mg.  Take loading dose of two tabs first day, then one tab thereafter.    Contacted patient.  Discuss SW recommendation.  Patient agreeable.  Education provided.      Rx submitted  MAR updated

## 2020-04-06 ENCOUNTER — OFFICE VISIT (OUTPATIENT)
Dept: CARDIOLOGY | Facility: MEDICAL CENTER | Age: 75
End: 2020-04-06
Payer: MEDICARE

## 2020-04-06 VITALS
OXYGEN SATURATION: 93 % | DIASTOLIC BLOOD PRESSURE: 62 MMHG | SYSTOLIC BLOOD PRESSURE: 128 MMHG | HEIGHT: 64 IN | BODY MASS INDEX: 35.17 KG/M2 | HEART RATE: 80 BPM | WEIGHT: 206 LBS | RESPIRATION RATE: 16 BRPM

## 2020-04-06 DIAGNOSIS — R00.2 PALPITATIONS: ICD-10-CM

## 2020-04-06 DIAGNOSIS — I49.3 PVCS (PREMATURE VENTRICULAR CONTRACTIONS): ICD-10-CM

## 2020-04-06 DIAGNOSIS — I10 ESSENTIAL HYPERTENSION: ICD-10-CM

## 2020-04-06 DIAGNOSIS — I47.10 SVT (SUPRAVENTRICULAR TACHYCARDIA) (HCC): ICD-10-CM

## 2020-04-06 PROCEDURE — 99214 OFFICE O/P EST MOD 30 MIN: CPT | Performed by: INTERNAL MEDICINE

## 2020-04-06 ASSESSMENT — ENCOUNTER SYMPTOMS
SHORTNESS OF BREATH: 0
COUGH: 0
LOSS OF CONSCIOUSNESS: 0
DIZZINESS: 0
PALPITATIONS: 0
MYALGIAS: 0

## 2020-04-06 ASSESSMENT — FIBROSIS 4 INDEX: FIB4 SCORE: 1.17

## 2020-04-06 NOTE — PROGRESS NOTES
Chief Complaint   Patient presents with   • Supraventricular Tachycardia (SVT)       Subjective:   Berny Renee is a 74 y.o. female who presents today for follow-up evaluation of palpitations, SVT, PVCs and hypertension.    Last seen 10/7/2019.    Since 10/7/2019 the patient is feeling better.  She did not tolerate previous antiarrhythmic therapy sleeping too much finally feels much better on digoxin.  No palpitations.  Still has problems with COPD.    Recently seen 9/2019 by APN.  Switch from diltiazem to verapamil which she is tolerating.  Cardiac PET scan normal.    Past Medical History:   Diagnosis Date   • Arthritis     spine   • Cataract immature    • Chronic pain    • Colon polyps    • COPD (chronic obstructive pulmonary disease) ([facility]) 2002    moderate to severe   • DDD (degenerative disc disease), cervical    • DDD (degenerative disc disease), thoracic    • Diverticulitis of colon    • Dyslipidemia    • EMPHYSEMA    • Hypertension    • REGINE (obstructive sleep apnea)    • OSTEOPOROSIS    • Spinal stenosis, lumbar region, with neurogenic claudication    • URINARY INCONTINENCE    • Vitamin d deficiency      Past Surgical History:   Procedure Laterality Date   • LUMBAR LAMINECTOMY DISKECTOMY  6/22/2010    Performed by GRACIE CANO at SURGERY [facility] ORS   • OTHER ORTHOPEDIC SURGERY  2004    left ankle fx   • OTHER      leg fracture   • OTHER      t spine disc surg   • TONSILLECTOMY       Family History   Problem Relation Age of Onset   • Other Sister         lung and leukemia cancer     Social History     Socioeconomic History   • Marital status:      Spouse name: Not on file   • Number of children: Not on file   • Years of education: Not on file   • Highest education level: Not on file   Occupational History   • Not on file   Social Needs   • Financial resource strain: Not on file   • Food insecurity     Worry: Not on file     Inability: Not on file   • Transportation needs     Medical: Not on  file     Non-medical: Not on file   Tobacco Use   • Smoking status: Former Smoker     Packs/day: 2.00     Years: 30.00     Pack years: 60.00     Types: Cigarettes     Last attempt to quit: 3/1/1999     Years since quittin.1   • Smokeless tobacco: Never Used   • Tobacco comment: 3 pks a day for 35 yrs, continued abstinence   Substance and Sexual Activity   • Alcohol use: Yes     Alcohol/week: 4.2 oz     Types: 7 Glasses of wine per week   • Drug use: Yes     Types: Marijuana     Comment: Medical Marijuana    • Sexual activity: Never   Lifestyle   • Physical activity     Days per week: Not on file     Minutes per session: Not on file   • Stress: Not on file   Relationships   • Social connections     Talks on phone: Not on file     Gets together: Not on file     Attends Cheondoism service: Not on file     Active member of club or organization: Not on file     Attends meetings of clubs or organizations: Not on file     Relationship status: Not on file   • Intimate partner violence     Fear of current or ex partner: Not on file     Emotionally abused: Not on file     Physically abused: Not on file     Forced sexual activity: Not on file   Other Topics Concern   • Not on file   Social History Narrative   • Not on file     Allergies   Allergen Reactions   • Iodine      convulsion   • Tape Rash and Itching     Outpatient Encounter Medications as of 2020   Medication Sig Dispense Refill   • digoxin (LANOXIN) 250 MCG Tab Take 1 Tab by mouth every day. For the first day take loading dose of 2 tablets, then take 1 tablet daily thereafter 31 Tab 3   • DULoxetine (CYMBALTA) 30 MG Cap DR Particles      • VENTOLIN  (90 Base) MCG/ACT Aero Soln inhalation aerosol Inhale 2 Puffs by mouth every 6 hours as needed for Shortness of Breath. 1 Inhaler 2   • azithromycin (ZITHROMAX) 250 MG Tab Take 1 Tab by mouth every day. 90 Tab 2   • lisinopril (PRINIVIL) 20 MG Tab Take 1 Tab by mouth every day. 100 Tab 3   •  fluticasone-salmeterol (ADVAIR DISKUS) 500-50 MCG/DOSE AEROSOL POWDER, BREATH ACTIVATED Inhale 1 Puff by mouth 2 times a day. 3 Inhaler 3   • tiotropium (SPIRIVA HANDIHALER) 18 MCG Cap Inhale 1 Cap by mouth every day. 90 Cap 3   • Naloxegol Oxalate 25 MG Tab Take 25 mg by mouth every day. 30 Tab 6   • Misc. Devices Misc This is an order for  7 foot high flow oxygen tubing  Dx; asthma with COPD J 44.9, supplemental oxygen-dependent Z 99.81, chronic respiratory failure with hypoxia and J 96        ODETTE 99 months   NPI [id number] 1 Each 0   • spironolactone (ALDACTONE) 25 MG Tab TAKE ONE TABLET BY MOUTH ONE TIME DAILY 30 Tab 11   • montelukast (SINGULAIR) 10 MG Tab Take 1 Tab by mouth every day. 90 Tab 3   • acyclovir (ZOVIRAX) 200 MG Cap TAKE TWO CAPSULES BY MOUTH THREE TIMES DAILY 120 Cap 0   • Cholecalciferol (VITAMIN D3) 2000 UNIT Cap TAKE ONE CAPSULE BY MOUTH ONE TIME DAILY 30 Cap 3   • potassium chloride (MICRO-K) 10 MEQ capsule TAKE ONE CAPSULE BY MOUTH TWICE DAILY 60 Cap 6   • oxybutynin SR (DITROPAN-XL) 5 MG TABLET SR 24 HR TAKE ONE TABLET BY MOUTH ONE TIME DAILY 30 Tab 4   • omeprazole (PRILOSEC OTC) 20 MG tablet Take 1 Tab by mouth every day. 30 Tab 11   • oxycodone immediate release (ROXICODONE) 10 MG immediate release tablet      • ketoconazole (NIZORAL) 2 % Cream Apply to affected area daily 30 g 2   • ipratropium-albuterol (DUONEB) 0.5-2.5 (3) MG/3ML nebulizer solution USE ONE VIAL IN NEBULIZER EVERY 4 HOURS AS NEEDED FOR SHORTNESS OF BREATH OR  WHEEZING 360 Vial 6   • Diclofenac Sodium 1 % Gel      • ZOSTAVAX 98679 UNT/0.65ML injection      • lidocaine (XYLOCAINE) 5 % Ointment      • tizanidine (ZANAFLEX) 4 MG Tab Take 1 Tab by mouth 3 times a day. 90 Tab 5   • Misc. Devices Misc This is an order for  2 batteries and an oxygen older for the my pride go-go scooter.  Patient has COPD and is oxygen dependent. The batteries are not lasting long enough. Her mobility is impaired.        ODETTE 99 months   NPI  "[id number] 1 Each 0   • Misc. Devices Misc This is an order for repairs to her go go scooter           ODETTE 99 months   NPI [id number] 1 Each 0   • omeprazole (PRILOSEC) 20 MG delayed-release capsule      • potassium chloride SA (K-DUR) 10 MEQ Tab CR      • SHINGRIX 50 MCG/0.5ML Recon Susp LOT: [id number]/ EXP: 08/14/21 / 6/28/21 INJECT IM VIS: 02/12/18 ADMINISTERED: 05/29/18  0   • FLUZONE HIGH-DOSE 0.5 ML Suspension Prefilled Syringe injection        No facility-administered encounter medications on file as of 4/6/2020.      Review of Systems   Respiratory: Negative for cough and shortness of breath.    Cardiovascular: Negative for chest pain and palpitations.   Musculoskeletal: Negative for myalgias.   Neurological: Negative for dizziness and loss of consciousness.        Objective:   /62 (BP Location: Left arm, Patient Position: Sitting, BP Cuff Size: Adult)   Pulse 80   Resp 16   Ht 1.626 m (5' 4\")   Wt 93.4 kg (206 lb)   LMP 06/07/2001   SpO2 93%   BMI 35.36 kg/m²     Physical Exam   Constitutional: She is oriented to person, place, and time. She appears well-developed and well-nourished.   Continuous oxygen.   Eyes: Pupils are equal, round, and reactive to light. Conjunctivae are normal.   Neck: Normal range of motion. Neck supple. No JVD present.   Cardiovascular: Normal rate, regular rhythm, normal heart sounds and intact distal pulses.   Pulmonary/Chest: Effort normal and breath sounds normal. No accessory muscle usage. No respiratory distress. She has no wheezes. She has no rales.   Increased AP diameter.   Abdominal:   Markedly protuberant.   Musculoskeletal:         General: No edema.   Neurological: She is alert and oriented to person, place, and time.   Skin: Skin is warm, dry and intact. No rash noted. No cyanosis. Nails show no clubbing.   Psychiatric: She has a normal mood and affect. Her behavior is normal.     CARDIAC PET SCAN 09/06/2019  Ventricular ejection fraction 68%.  Normal " perfusion scan.    ECHOCARDIOGRAM 03/17/2013  Technically difficult study.   Probably normal left ventricular size, thickness and systolic function.   Borderline diastolic dysfunction.  Possible small pericardial effusion, without evidence of hemodynamic   compromise, and possible epicardial fat pad.  Mild mitral regurgitation.    Assessment:     1. SVT (supraventricular tachycardia) (HCC)     2. PVCs (premature ventricular contractions)     3. Palpitations     4. Essential hypertension         Medical Decision Making:  Today's Assessment / Status / Plan:     Assessment  1.  SVT.  2.  PVCs.  3.  Palpitations.  4.  Hypertension.  5.  COPD, O2 dependent, severe.    Recommendation Discussion  1.  Patient stable from a cardiac standpoint concerning her SVT and palpitations in addition her blood pressure is controlled.  2.  Continue current cardiac therapy.  3.  BMP and digoxin level next appointment.  4.  Reviewed recent blood work and lipid panel on 2/6/2020 is normal.  5.  Follow-up 6 months.

## 2020-06-20 ENCOUNTER — OFFICE VISIT (OUTPATIENT)
Dept: URGENT CARE | Facility: CLINIC | Age: 75
End: 2020-06-20
Payer: MEDICARE

## 2020-06-20 VITALS
RESPIRATION RATE: 14 BRPM | OXYGEN SATURATION: 93 % | WEIGHT: 205.91 LBS | SYSTOLIC BLOOD PRESSURE: 112 MMHG | BODY MASS INDEX: 35.34 KG/M2 | HEART RATE: 76 BPM | DIASTOLIC BLOOD PRESSURE: 92 MMHG | TEMPERATURE: 99.7 F

## 2020-06-20 DIAGNOSIS — R21 RASH: ICD-10-CM

## 2020-06-20 DIAGNOSIS — B02.9 HERPES ZOSTER WITHOUT COMPLICATION: ICD-10-CM

## 2020-06-20 PROCEDURE — 99214 OFFICE O/P EST MOD 30 MIN: CPT | Performed by: NURSE PRACTITIONER

## 2020-06-20 RX ORDER — TRIAMCINOLONE ACETONIDE 1 MG/G
1 OINTMENT TOPICAL 2 TIMES DAILY
Qty: 1 TUBE | Refills: 0 | Status: SHIPPED | OUTPATIENT
Start: 2020-06-20 | End: 2021-09-15

## 2020-06-20 ASSESSMENT — FIBROSIS 4 INDEX: FIB4 SCORE: 1.17

## 2020-06-21 ASSESSMENT — ENCOUNTER SYMPTOMS
DIZZINESS: 0
NAIL CHANGES: 0
CHILLS: 0
EYE PAIN: 0
MYALGIAS: 0
SORE THROAT: 0
FATIGUE: 0
COUGH: 0
VOMITING: 0
SHORTNESS OF BREATH: 0
FEVER: 0
NAUSEA: 0

## 2020-06-21 NOTE — PROGRESS NOTES
Subjective:   Berny Renee  is a 74 y.o. female who presents for Herpes Zoster (shingles)        Rash   This is a new problem. The current episode started yesterday (Patient has a history of recurrent shingles in which she takes acyclovir.  Patient is currently taking acyclovir however followed up today to ensure rash was consistent with shingles.). The problem has been gradually worsening since onset. The rash is diffuse. The rash is characterized by redness, itchiness and blistering. She was exposed to nothing. Pertinent negatives include no congestion, cough, eye pain, fatigue, fever, nail changes, shortness of breath, sore throat or vomiting. Past treatments include nothing. The treatment provided no relief. Her past medical history is significant for varicella. There is no history of allergies or eczema.     Review of Systems   Constitutional: Negative for chills, fatigue and fever.   HENT: Negative for congestion and sore throat.    Eyes: Negative for pain.   Respiratory: Negative for cough and shortness of breath.    Cardiovascular: Negative for chest pain.   Gastrointestinal: Negative for nausea and vomiting.   Genitourinary: Negative for hematuria.   Musculoskeletal: Negative for myalgias.   Skin: Positive for itching and rash. Negative for nail changes.   Neurological: Negative for dizziness.     Allergies   Allergen Reactions   • Iodine      convulsion   • Tape Rash and Itching      Objective:   /92   Pulse 76   Temp 37.6 °C (99.7 °F) (Temporal)   Resp 14   Wt 93.4 kg (205 lb 14.6 oz)   LMP 06/07/2001   SpO2 93%   BMI 35.34 kg/m²   Physical Exam  Vitals signs and nursing note reviewed.   Constitutional:       General: She is not in acute distress.     Appearance: She is well-developed.   HENT:      Head: Normocephalic and atraumatic.      Right Ear: External ear normal.      Left Ear: External ear normal.      Nose: Nose normal.      Mouth/Throat:      Mouth: Mucous membranes are moist.    Eyes:      Conjunctiva/sclera: Conjunctivae normal.   Cardiovascular:      Rate and Rhythm: Normal rate.   Pulmonary:      Effort: Pulmonary effort is normal. No respiratory distress.      Breath sounds: Normal breath sounds.   Abdominal:      General: There is no distension.   Musculoskeletal: Normal range of motion.   Skin:     General: Skin is warm and dry.      Findings: Rash present. Rash is vesicular. Rash is not crusting, macular, nodular, papular, pustular, scaling or urticarial.          Neurological:      General: No focal deficit present.      Mental Status: She is alert and oriented to person, place, and time. Mental status is at baseline.      Gait: Gait (gait at baseline) normal.   Psychiatric:         Judgment: Judgment normal.           Assessment/Plan:     1. Rash  triamcinolone acetonide (KENALOG) 0.1 % Ointment   2. Herpes zoster without complication         Patient is a 74-year-old female present with the stated above, patient with atypical appearing vesicular lesions in linear pattern.  Patient with a history of recurrent herpes zoster encourage patient to continue taking acyclovir as directed.  Will have patient apply topical Kenalog cream due to severe itching.  May take antihistamine as well    Patient and/or guardian given precautionary s/sx that mandate immediate follow up and evaluation in the ED. Advised of risks of not doing so.  Side effects of the above medications discussed.   DDX, Supportive care, and indications for immediate follow-up discussed with patient.    Instructed to return to clinic or nearest emergency department if we are not available for any change in condition, further concerns, or worsening of symptoms.    The patient and/or guardian demonstrated a good understanding and agreed with the treatment plan.    Please note that this dictation was created using voice recognition software. I have made every reasonable attempt to correct obvious errors, but I expect that  there are errors of grammar and possibly content that I did not discover before finalizing the note.

## 2020-07-02 ENCOUNTER — OFFICE VISIT (OUTPATIENT)
Dept: URGENT CARE | Facility: CLINIC | Age: 75
End: 2020-07-02
Payer: MEDICARE

## 2020-07-02 VITALS
DIASTOLIC BLOOD PRESSURE: 62 MMHG | HEART RATE: 79 BPM | WEIGHT: 191 LBS | TEMPERATURE: 97.6 F | BODY MASS INDEX: 32.61 KG/M2 | OXYGEN SATURATION: 96 % | SYSTOLIC BLOOD PRESSURE: 130 MMHG | HEIGHT: 64 IN | RESPIRATION RATE: 14 BRPM

## 2020-07-02 DIAGNOSIS — R21 RASH AND NONSPECIFIC SKIN ERUPTION: ICD-10-CM

## 2020-07-02 PROCEDURE — 99214 OFFICE O/P EST MOD 30 MIN: CPT | Performed by: NURSE PRACTITIONER

## 2020-07-02 RX ORDER — CEPHALEXIN 500 MG/1
500 CAPSULE ORAL 2 TIMES DAILY
Qty: 14 CAP | Refills: 0 | Status: SHIPPED | OUTPATIENT
Start: 2020-07-02 | End: 2020-07-09

## 2020-07-02 RX ORDER — PREDNISONE 20 MG/1
TABLET ORAL
Qty: 14 TAB | Refills: 0 | Status: ON HOLD
Start: 2020-07-02 | End: 2020-11-09

## 2020-07-02 ASSESSMENT — ENCOUNTER SYMPTOMS
NAUSEA: 1
WEIGHT LOSS: 1
MYALGIAS: 0
WEAKNESS: 1
CHILLS: 0
FEVER: 0

## 2020-07-02 ASSESSMENT — FIBROSIS 4 INDEX: FIB4 SCORE: 1.17

## 2020-07-02 NOTE — PROGRESS NOTES
Subjective:      Berny Renee is a 74 y.o. female who presents with Herpes Zoster (taking medication not working anymoe x 1 month gets worse each day )            HPI Recurrent. 74 year old female with rash over body for one month. She thinks this is shingles. The rash is itchy and spreading as she is scratching. Denies fever, chills, myalgia but she is tired and feels weak. She is oxygen dependent with severe COPD. She has been taking antiviral for this with no improvement. She has had shingles vaccine (2016).  Iodine and Tape  Current Outpatient Medications on File Prior to Visit   Medication Sig Dispense Refill   • triamcinolone acetonide (KENALOG) 0.1 % Ointment Apply 1 Application to affected area(s) 2 times a day. 1 Tube 0   • digoxin (LANOXIN) 250 MCG Tab Take 1 Tab by mouth every day. For the first day take loading dose of 2 tablets, then take 1 tablet daily thereafter 31 Tab 3   • DULoxetine (CYMBALTA) 30 MG Cap DR Particles      • omeprazole (PRILOSEC) 20 MG delayed-release capsule      • potassium chloride SA (K-DUR) 10 MEQ Tab CR      • VENTOLIN  (90 Base) MCG/ACT Aero Soln inhalation aerosol Inhale 2 Puffs by mouth every 6 hours as needed for Shortness of Breath. 1 Inhaler 2   • azithromycin (ZITHROMAX) 250 MG Tab Take 1 Tab by mouth every day. 90 Tab 2   • lisinopril (PRINIVIL) 20 MG Tab Take 1 Tab by mouth every day. 100 Tab 3   • fluticasone-salmeterol (ADVAIR DISKUS) 500-50 MCG/DOSE AEROSOL POWDER, BREATH ACTIVATED Inhale 1 Puff by mouth 2 times a day. 3 Inhaler 3   • SHINGRIX 50 MCG/0.5ML Recon Susp LOT: [id number]/ EXP: 08/14/21 / 6/28/21 INJECT IM VIS: 02/12/18 ADMINISTERED: 05/29/18  0   • tiotropium (SPIRIVA HANDIHALER) 18 MCG Cap Inhale 1 Cap by mouth every day. 90 Cap 3   • Naloxegol Oxalate 25 MG Tab Take 25 mg by mouth every day. 30 Tab 6   • Misc. Devices Misc This is an order for  7 foot high flow oxygen tubing  Dx; asthma with COPD J 44.9, supplemental oxygen-dependent Z  99.81, chronic respiratory failure with hypoxia and J 96        ODETTE 99 months   NPI [id number] 1 Each 0   • spironolactone (ALDACTONE) 25 MG Tab TAKE ONE TABLET BY MOUTH ONE TIME DAILY (Patient not taking: Reported on 6/20/2020) 30 Tab 11   • montelukast (SINGULAIR) 10 MG Tab Take 1 Tab by mouth every day. 90 Tab 3   • acyclovir (ZOVIRAX) 200 MG Cap TAKE TWO CAPSULES BY MOUTH THREE TIMES DAILY 120 Cap 0   • Cholecalciferol (VITAMIN D3) 2000 UNIT Cap TAKE ONE CAPSULE BY MOUTH ONE TIME DAILY 30 Cap 3   • potassium chloride (MICRO-K) 10 MEQ capsule TAKE ONE CAPSULE BY MOUTH TWICE DAILY 60 Cap 6   • oxybutynin SR (DITROPAN-XL) 5 MG TABLET SR 24 HR TAKE ONE TABLET BY MOUTH ONE TIME DAILY 30 Tab 4   • omeprazole (PRILOSEC OTC) 20 MG tablet Take 1 Tab by mouth every day. 30 Tab 11   • oxycodone immediate release (ROXICODONE) 10 MG immediate release tablet      • ketoconazole (NIZORAL) 2 % Cream Apply to affected area daily 30 g 2   • ipratropium-albuterol (DUONEB) 0.5-2.5 (3) MG/3ML nebulizer solution USE ONE VIAL IN NEBULIZER EVERY 4 HOURS AS NEEDED FOR SHORTNESS OF BREATH OR  WHEEZING 360 Vial 6   • Diclofenac Sodium 1 % Gel      • FLUZONE HIGH-DOSE 0.5 ML Suspension Prefilled Syringe injection      • ZOSTAVAX 81394 UNT/0.65ML injection      • lidocaine (XYLOCAINE) 5 % Ointment      • tizanidine (ZANAFLEX) 4 MG Tab Take 1 Tab by mouth 3 times a day. 90 Tab 5   • Misc. Devices Misc This is an order for  2 batteries and an oxygen older for the my pride go-go scooter.  Patient has COPD and is oxygen dependent. The batteries are not lasting long enough. Her mobility is impaired.        ODETTE 99 months   NPI [id number] 1 Each 0   • Misc. Devices Misc This is an order for repairs to her go go scooter           ODETTE 99 months   NPI [id number] 1 Each 0     No current facility-administered medications on file prior to visit.      Social History     Socioeconomic History   • Marital status:      Spouse name: Not on file   •  Number of children: Not on file   • Years of education: Not on file   • Highest education level: Not on file   Occupational History   • Not on file   Social Needs   • Financial resource strain: Not on file   • Food insecurity     Worry: Not on file     Inability: Not on file   • Transportation needs     Medical: Not on file     Non-medical: Not on file   Tobacco Use   • Smoking status: Former Smoker     Packs/day: 2.00     Years: 30.00     Pack years: 60.00     Types: Cigarettes     Last attempt to quit: 3/1/1999     Years since quittin.3   • Smokeless tobacco: Never Used   • Tobacco comment: 3 pks a day for 35 yrs, continued abstinence   Substance and Sexual Activity   • Alcohol use: Yes     Alcohol/week: 4.2 oz     Types: 7 Glasses of wine per week   • Drug use: Yes     Types: Marijuana     Comment: Medical Marijuana    • Sexual activity: Never   Lifestyle   • Physical activity     Days per week: Not on file     Minutes per session: Not on file   • Stress: Not on file   Relationships   • Social connections     Talks on phone: Not on file     Gets together: Not on file     Attends [employer] service: Not on file     Active member of club or organization: Not on file     Attends meetings of clubs or organizations: Not on file     Relationship status: Not on file   • Intimate partner violence     Fear of current or ex partner: Not on file     Emotionally abused: Not on file     Physically abused: Not on file     Forced sexual activity: Not on file   Other Topics Concern   • Not on file   Social History Narrative   • Not on file     Breast Cancer-related family history is not on file.      Review of Systems   Constitutional: Positive for malaise/fatigue and weight loss. Negative for chills and fever.   Gastrointestinal: Positive for nausea.   Musculoskeletal: Negative for joint pain and myalgias.   Skin: Positive for itching and rash.   Neurological: Positive for weakness.          Objective:     /62    "Pulse 79   Temp 36.4 °C (97.6 °F) (Temporal)   Resp 14   Ht 1.626 m (5' 4\")   Wt 86.6 kg (191 lb)   LMP 06/07/2001   SpO2 96%   BMI 32.79 kg/m²      Physical Exam  Vitals signs and nursing note reviewed.   Constitutional:       Appearance: Normal appearance. She is obese. She is not ill-appearing.      Comments: VSS in clinic today   Cardiovascular:      Rate and Rhythm: Normal rate and regular rhythm.      Heart sounds: No murmur.   Pulmonary:      Effort: Pulmonary effort is normal.      Breath sounds: Normal breath sounds.   Musculoskeletal: Normal range of motion.   Skin:     General: Skin is warm and dry.      Comments: Diffuse rash over arms with some scabbing/excoriation noted.   Neurological:      General: No focal deficit present.      Mental Status: She is alert and oriented to person, place, and time.   Psychiatric:         Mood and Affect: Mood normal.                 Assessment/Plan:       1. Rash and nonspecific skin eruption  predniSONE (DELTASONE) 20 MG Tab    cephALEXin (KEFLEX) 500 MG Cap     Not suspecting shingles as rash is diffuse and bilateral as well as itchy, no pain.  Trial of prednisone and keflex  Differential diagnosis, natural history, supportive care, and indications for immediate follow-up discussed at length.     "

## 2020-07-04 DIAGNOSIS — I47.10 SVT (SUPRAVENTRICULAR TACHYCARDIA) (HCC): ICD-10-CM

## 2020-07-04 DIAGNOSIS — I49.1 ATRIAL PREMATURE DEPOLARIZATION: ICD-10-CM

## 2020-07-04 DIAGNOSIS — I49.3 PVC'S (PREMATURE VENTRICULAR CONTRACTIONS): ICD-10-CM

## 2020-07-07 RX ORDER — DIGOXIN 250 MCG
250 TABLET ORAL DAILY
Qty: 30 TAB | Refills: 6 | Status: ON HOLD
Start: 2020-07-07 | End: 2020-11-21

## 2020-07-09 ENCOUNTER — OFFICE VISIT (OUTPATIENT)
Dept: PULMONOLOGY | Facility: HOSPICE | Age: 75
End: 2020-07-09
Payer: MEDICARE

## 2020-07-09 VITALS
WEIGHT: 196 LBS | BODY MASS INDEX: 33.46 KG/M2 | HEART RATE: 77 BPM | SYSTOLIC BLOOD PRESSURE: 126 MMHG | OXYGEN SATURATION: 93 % | DIASTOLIC BLOOD PRESSURE: 70 MMHG | HEIGHT: 64 IN

## 2020-07-09 DIAGNOSIS — J44.9 CHRONIC OBSTRUCTIVE PULMONARY DISEASE, UNSPECIFIED COPD TYPE (HCC): ICD-10-CM

## 2020-07-09 DIAGNOSIS — J96.11 CHRONIC RESPIRATORY FAILURE WITH HYPOXIA (HCC): ICD-10-CM

## 2020-07-09 PROCEDURE — 99214 OFFICE O/P EST MOD 30 MIN: CPT | Mod: 25 | Performed by: INTERNAL MEDICINE

## 2020-07-09 PROCEDURE — 96372 THER/PROPH/DIAG INJ SC/IM: CPT | Performed by: INTERNAL MEDICINE

## 2020-07-09 RX ORDER — TRIAMCINOLONE ACETONIDE 40 MG/ML
40 INJECTION, SUSPENSION INTRA-ARTICULAR; INTRAMUSCULAR ONCE
Status: COMPLETED | OUTPATIENT
Start: 2020-07-09 | End: 2020-07-09

## 2020-07-09 RX ORDER — PREDNISONE 10 MG/1
TABLET ORAL
Qty: 18 TAB | Refills: 0 | Status: ON HOLD
Start: 2020-07-09 | End: 2020-11-09

## 2020-07-09 RX ADMIN — TRIAMCINOLONE ACETONIDE 40 MG: 40 INJECTION, SUSPENSION INTRA-ARTICULAR; INTRAMUSCULAR at 14:24

## 2020-07-09 ASSESSMENT — FIBROSIS 4 INDEX: FIB4 SCORE: 1.17

## 2020-07-09 NOTE — PROGRESS NOTES
Chief Complaint   Patient presents with   • Follow-Up     COPD- Last seen 2020       HPI: This patient is a 74 y.o. Female who returns for follow-up of severe COPD.  Her FEV1 is 0.96 L or 44% predicted.  She uses oxygen at 3 L/m using an Oxymizer pendant.  She is compliant with Advair 500/50, Spiriva and suppressive azithromycin. Her exertional dyspnea is stable and she denies sputum purulence, wheezing, chest tightness or edema. She has been more fatigued lately. Denies fever/chills.   She has been struggling with diffuse, itchy rash.  She was placed on prednisone with subjective benefit, although the steroids caused some palpitations.    Past Medical History:   Diagnosis Date   • Arthritis     spine   • Cataract immature    • Chronic pain    • Colon polyps    • COPD (chronic obstructive pulmonary disease) ([facility])     moderate to severe   • DDD (degenerative disc disease), cervical    • DDD (degenerative disc disease), thoracic    • Diverticulitis of colon    • Dyslipidemia    • EMPHYSEMA    • Hypertension    • REGINE (obstructive sleep apnea)    • OSTEOPOROSIS    • Spinal stenosis, lumbar region, with neurogenic claudication    • URINARY INCONTINENCE    • Vitamin d deficiency        Social History     Socioeconomic History   • Marital status:      Spouse name: Not on file   • Number of children: Not on file   • Years of education: Not on file   • Highest education level: Not on file   Occupational History   • Not on file   Social Needs   • Financial resource strain: Not on file   • Food insecurity     Worry: Not on file     Inability: Not on file   • Transportation needs     Medical: Not on file     Non-medical: Not on file   Tobacco Use   • Smoking status: Former Smoker     Packs/day: 2.00     Years: 30.00     Pack years: 60.00     Types: Cigarettes     Last attempt to quit: 3/1/1999     Years since quittin.3   • Smokeless tobacco: Never Used   • Tobacco comment: 3 pks a day for 35 yrs, continued  abstinence   Substance and Sexual Activity   • Alcohol use: Yes     Alcohol/week: 4.2 oz     Types: 7 Glasses of wine per week   • Drug use: Yes     Types: Marijuana     Comment: Medical Marijuana    • Sexual activity: Never   Lifestyle   • Physical activity     Days per week: Not on file     Minutes per session: Not on file   • Stress: Not on file   Relationships   • Social connections     Talks on phone: Not on file     Gets together: Not on file     Attends [employer] service: Not on file     Active member of club or organization: Not on file     Attends meetings of clubs or organizations: Not on file     Relationship status: Not on file   • Intimate partner violence     Fear of current or ex partner: Not on file     Emotionally abused: Not on file     Physically abused: Not on file     Forced sexual activity: Not on file   Other Topics Concern   • Not on file   Social History Narrative   • Not on file       Family History   Problem Relation Age of Onset   • Other Sister         lung and leukemia cancer       Current Outpatient Medications on File Prior to Visit   Medication Sig Dispense Refill   • predniSONE (DELTASONE) 20 MG Tab Take 2 tabs daily for 5 days. 14 Tab 0   • cephALEXin (KEFLEX) 500 MG Cap Take 1 Cap by mouth 2 times a day for 7 days. 14 Cap 0   • triamcinolone acetonide (KENALOG) 0.1 % Ointment Apply 1 Application to affected area(s) 2 times a day. 1 Tube 0   • DULoxetine (CYMBALTA) 30 MG Cap DR Particles      • VENTOLIN  (90 Base) MCG/ACT Aero Soln inhalation aerosol Inhale 2 Puffs by mouth every 6 hours as needed for Shortness of Breath. 1 Inhaler 2   • azithromycin (ZITHROMAX) 250 MG Tab Take 1 Tab by mouth every day. 90 Tab 2   • lisinopril (PRINIVIL) 20 MG Tab Take 1 Tab by mouth every day. 100 Tab 3   • fluticasone-salmeterol (ADVAIR DISKUS) 500-50 MCG/DOSE AEROSOL POWDER, BREATH ACTIVATED Inhale 1 Puff by mouth 2 times a day. 3 Inhaler 3   • tiotropium (SPIRIVA HANDIHALER) 18 MCG Cap  Inhale 1 Cap by mouth every day. 90 Cap 3   • spironolactone (ALDACTONE) 25 MG Tab TAKE ONE TABLET BY MOUTH ONE TIME DAILY 30 Tab 11   • montelukast (SINGULAIR) 10 MG Tab Take 1 Tab by mouth every day. 90 Tab 3   • acyclovir (ZOVIRAX) 200 MG Cap TAKE TWO CAPSULES BY MOUTH THREE TIMES DAILY 120 Cap 0   • Cholecalciferol (VITAMIN D3) 2000 UNIT Cap TAKE ONE CAPSULE BY MOUTH ONE TIME DAILY 30 Cap 3   • potassium chloride (MICRO-K) 10 MEQ capsule TAKE ONE CAPSULE BY MOUTH TWICE DAILY 60 Cap 6   • oxybutynin SR (DITROPAN-XL) 5 MG TABLET SR 24 HR TAKE ONE TABLET BY MOUTH ONE TIME DAILY 30 Tab 4   • omeprazole (PRILOSEC OTC) 20 MG tablet Take 1 Tab by mouth every day. 30 Tab 11   • oxycodone immediate release (ROXICODONE) 10 MG immediate release tablet      • ipratropium-albuterol (DUONEB) 0.5-2.5 (3) MG/3ML nebulizer solution USE ONE VIAL IN NEBULIZER EVERY 4 HOURS AS NEEDED FOR SHORTNESS OF BREATH OR  WHEEZING 360 Vial 6   • Diclofenac Sodium 1 % Gel      • tizanidine (ZANAFLEX) 4 MG Tab Take 1 Tab by mouth 3 times a day. 90 Tab 5   • digoxin (LANOXIN) 250 MCG Tab Take 1 Tab by mouth every day. 30 Tab 6   • omeprazole (PRILOSEC) 20 MG delayed-release capsule      • potassium chloride SA (K-DUR) 10 MEQ Tab CR      • SHINGRIX 50 MCG/0.5ML Recon Susp LOT: [id number]/ EXP: 08/14/21 / 6/28/21 INJECT IM VIS: 02/12/18 ADMINISTERED: 05/29/18  0   • Naloxegol Oxalate 25 MG Tab Take 25 mg by mouth every day. 30 Tab 6   • Misc. Devices Misc This is an order for  7 foot high flow oxygen tubing  Dx; asthma with COPD J 44.9, supplemental oxygen-dependent Z 99.81, chronic respiratory failure with hypoxia and J 96        ODETTE 99 months   NPI [id number] 1 Each 0   • ketoconazole (NIZORAL) 2 % Cream Apply to affected area daily 30 g 2   • FLUZONE HIGH-DOSE 0.5 ML Suspension Prefilled Syringe injection      • ZOSTAVAX 89730 UNT/0.65ML injection      • lidocaine (XYLOCAINE) 5 % Ointment      • Misc. Devices Misc This is an order for  2  "batteries and an oxygen older for the my pride go-go scooter.  Patient has COPD and is oxygen dependent. The batteries are not lasting long enough. Her mobility is impaired.        ODETTE 99 months   NPI [id number] 1 Each 0   • Misc. Devices Misc This is an order for repairs to her go go scooter           ODETTE 99 months   NPI [id number] 1 Each 0     No current facility-administered medications on file prior to visit.        Allergies: Iodine and Tape    ROS:   Constitutional: Denies fevers, chills, night sweats, fatigue or weight loss  Eyes: Denies vision loss, pain, drainage, double vision  Ears, Nose, Throat: Denies earache, difficulty hearing, tinnitus, nasal congestion, hoarseness  Cardiovascular: Denies chest pain, tightness, +palpitations, denies orthopnea or edema  Respiratory:As in HPI  Sleep: Denies daytime sleepiness, snoring, apneas, insomnia, morning headaches  GI: Denies heartburn, dysphagia, nausea, abdominal pain, diarrhea or constipation  : Denies frequent urination, hematuria, discharge or painful urination  Musculoskeletal: Denies back pain, painful joints, sore muscles  Neurological: Denies weakness or headaches  Skin:+ rashes    /70 (BP Location: Left arm, Patient Position: Sitting, BP Cuff Size: Adult)   Pulse 77   Ht 1.626 m (5' 4\")   Wt 88.9 kg (196 lb)   SpO2 93%     Physical Exam:  Appearance: Well-nourished, well-developed, in no acute distress  HEENT: Normocephalic, atraumatic, white sclera, PERRLA, oropharynx clear  Neck: No adenopathy or masses  Respiratory: no intercostal retractions or accessory muscle use  Lungs auscultation: Clear to auscultation bilaterally, diminished BS  Cardiovascular: Regular rate rhythm. No murmurs, rubs or gallops.  No LE edema  Abdomen: soft, nondistended  Gait: Normal  Digits: No clubbing, cyanosis  Motor: No focal deficits  Orientation: Oriented to time, person and place  Skin:scattered erythematous lesions on arms, back    Diagnosis:  1. Chronic " obstructive pulmonary disease, unspecified COPD type (HCC)     2. Chronic respiratory failure with hypoxia (HCC)     3.      Rash      Plan:  Overall, Berny's COPD has been stable.  She has not had exacerbations over the past 6 months.  Continue Advair 500 mcg and Spiriva.  Continue oxygen at 3 L/min.  Kenalog 40mg IM for rash/dermatitis. She is also tapering her oral prednisone.  Return in about 6 months (around 1/9/2021).

## 2020-07-21 ENCOUNTER — HOSPITAL ENCOUNTER (OUTPATIENT)
Dept: LAB | Facility: MEDICAL CENTER | Age: 75
End: 2020-07-21
Attending: FAMILY MEDICINE
Payer: MEDICARE

## 2020-07-21 LAB
ALBUMIN SERPL BCP-MCNC: 4 G/DL (ref 3.2–4.9)
ALBUMIN/GLOB SERPL: 1.3 G/DL
ALP SERPL-CCNC: 49 U/L (ref 30–99)
ALT SERPL-CCNC: 13 U/L (ref 2–50)
ANION GAP SERPL CALC-SCNC: 10 MMOL/L (ref 7–16)
AST SERPL-CCNC: 10 U/L (ref 12–45)
BILIRUB SERPL-MCNC: 0.4 MG/DL (ref 0.1–1.5)
BUN SERPL-MCNC: 8 MG/DL (ref 8–22)
CALCIUM SERPL-MCNC: 9.6 MG/DL (ref 8.5–10.5)
CHLORIDE SERPL-SCNC: 97 MMOL/L (ref 96–112)
CO2 SERPL-SCNC: 31 MMOL/L (ref 20–33)
CREAT SERPL-MCNC: 0.49 MG/DL (ref 0.5–1.4)
FASTING STATUS PATIENT QL REPORTED: NORMAL
GLOBULIN SER CALC-MCNC: 3.2 G/DL (ref 1.9–3.5)
GLUCOSE SERPL-MCNC: 121 MG/DL (ref 65–99)
POTASSIUM SERPL-SCNC: 4.2 MMOL/L (ref 3.6–5.5)
PROT SERPL-MCNC: 7.2 G/DL (ref 6–8.2)
RHEUMATOID FACT SER IA-ACNC: <10 IU/ML (ref 0–14)
SODIUM SERPL-SCNC: 138 MMOL/L (ref 135–145)
TSH SERPL DL<=0.005 MIU/L-ACNC: 2.51 UIU/ML (ref 0.38–5.33)

## 2020-07-21 PROCEDURE — 84443 ASSAY THYROID STIM HORMONE: CPT

## 2020-07-21 PROCEDURE — 86431 RHEUMATOID FACTOR QUANT: CPT

## 2020-07-21 PROCEDURE — 80053 COMPREHEN METABOLIC PANEL: CPT

## 2020-07-21 PROCEDURE — 36415 COLL VENOUS BLD VENIPUNCTURE: CPT

## 2020-08-10 ENCOUNTER — HOSPITAL ENCOUNTER (OUTPATIENT)
Facility: MEDICAL CENTER | Age: 75
End: 2020-08-10
Attending: UROLOGY
Payer: MEDICARE

## 2020-08-10 PROCEDURE — 87086 URINE CULTURE/COLONY COUNT: CPT

## 2020-08-13 LAB
BACTERIA UR CULT: NORMAL
SIGNIFICANT IND 70042: NORMAL
SITE SITE: NORMAL
SOURCE SOURCE: NORMAL

## 2020-08-31 DIAGNOSIS — J44.9 CHRONIC OBSTRUCTIVE PULMONARY DISEASE, UNSPECIFIED COPD TYPE (HCC): ICD-10-CM

## 2020-08-31 RX ORDER — TIOTROPIUM BROMIDE 18 UG/1
18 CAPSULE ORAL; RESPIRATORY (INHALATION) DAILY
Qty: 90 CAP | Refills: 3 | Status: SHIPPED | OUTPATIENT
Start: 2020-08-31 | End: 2021-12-14

## 2020-08-31 NOTE — TELEPHONE ENCOUNTER
Have we ever prescribed this med? Yes.  If yes, what date? 06/18/19    Last OV: 07/09/2020 with Dr Alfonso  Return in about 6 months (around 1/9/2021).    Next OV: No Pending appt.     DX: Chronic obstructive pulmonary disease, unspecified COPD type (HCC) (J44.9)    Medications:   Requested Prescriptions     Pending Prescriptions Disp Refills   • tiotropium (SPIRIVA HANDIHALER) 18 MCG Cap 90 Cap 3     Sig: Inhale 1 Cap by mouth every day.

## 2020-09-08 DIAGNOSIS — J44.9 CHRONIC OBSTRUCTIVE PULMONARY DISEASE, UNSPECIFIED COPD TYPE (HCC): ICD-10-CM

## 2020-09-08 NOTE — TELEPHONE ENCOUNTER
Received fax prescription request from [facility] for fluticasone-salmeterol (ADVAIR DISKUS) 500-50 MCG/DOSE AEROSOL POWDER, BREATH ACTIVATED     Have we ever prescribed this med? Yes.  If yes, what date? 8/15/19    Last OV: 7/9/2020 Dr. Alfonso   Return in about 6 months (around 1/9/2021).  Next OV: none     DX: copd     Medications: advair diskus 500/50

## 2020-11-05 ENCOUNTER — PATIENT MESSAGE (OUTPATIENT)
Dept: SLEEP MEDICINE | Facility: MEDICAL CENTER | Age: 75
End: 2020-11-05

## 2020-11-05 DIAGNOSIS — R05.9 COUGH: ICD-10-CM

## 2020-11-05 RX ORDER — AZITHROMYCIN 250 MG/1
TABLET, FILM COATED ORAL
Qty: 6 TAB | Refills: 0 | Status: ON HOLD
Start: 2020-11-05 | End: 2020-11-09

## 2020-11-05 RX ORDER — CODEINE PHOSPHATE AND GUAIFENESIN 10; 100 MG/5ML; MG/5ML
5 SOLUTION ORAL EVERY 6 HOURS PRN
Qty: 118 ML | Refills: 0 | Status: SHIPPED | OUTPATIENT
Start: 2020-11-05 | End: 2020-11-12

## 2020-11-06 NOTE — TELEPHONE ENCOUNTER
From: Berny Renee  To: Michelle Alfonso M.D.  Sent: 11/5/2020 2:53 PM PST  Subject: Non-Urgent Medical Question    Daniel getting sicker as the day goes go by, my fever is 101, I am cougfing and I hurt all over, I 'mm here by myseft, 2 dofs and a cat. there some that can come test me for the virus? I am too sore to dive. I do not want tgo to hostpital as there is no one to take careof dogs

## 2020-11-07 ENCOUNTER — HOSPITAL ENCOUNTER (INPATIENT)
Facility: MEDICAL CENTER | Age: 75
LOS: 2 days | DRG: 177 | End: 2020-11-09
Attending: EMERGENCY MEDICINE | Admitting: HOSPITALIST
Payer: MEDICARE

## 2020-11-07 ENCOUNTER — APPOINTMENT (OUTPATIENT)
Dept: RADIOLOGY | Facility: MEDICAL CENTER | Age: 75
DRG: 177 | End: 2020-11-07
Attending: EMERGENCY MEDICINE
Payer: MEDICARE

## 2020-11-07 DIAGNOSIS — R09.02 HYPOXEMIA: ICD-10-CM

## 2020-11-07 DIAGNOSIS — U07.1 COVID-19: ICD-10-CM

## 2020-11-07 DIAGNOSIS — J44.9 CHRONIC OBSTRUCTIVE PULMONARY DISEASE, UNSPECIFIED COPD TYPE (HCC): ICD-10-CM

## 2020-11-07 LAB
ALBUMIN SERPL BCP-MCNC: 3.6 G/DL (ref 3.2–4.9)
ALBUMIN/GLOB SERPL: 1.1 G/DL
ALP SERPL-CCNC: 40 U/L (ref 30–99)
ALT SERPL-CCNC: 16 U/L (ref 2–50)
AMORPH CRY #/AREA URNS HPF: PRESENT /HPF
ANION GAP SERPL CALC-SCNC: 10 MMOL/L (ref 7–16)
APPEARANCE UR: ABNORMAL
AST SERPL-CCNC: 23 U/L (ref 12–45)
BASOPHILS # BLD AUTO: 0.2 % (ref 0–1.8)
BASOPHILS # BLD: 0.02 K/UL (ref 0–0.12)
BILIRUB SERPL-MCNC: 0.3 MG/DL (ref 0.1–1.5)
BILIRUB UR QL STRIP.AUTO: NEGATIVE
BUN SERPL-MCNC: 11 MG/DL (ref 8–22)
CALCIUM SERPL-MCNC: 9.1 MG/DL (ref 8.5–10.5)
CHLORIDE SERPL-SCNC: 90 MMOL/L (ref 96–112)
CO2 SERPL-SCNC: 31 MMOL/L (ref 20–33)
COLOR UR: YELLOW
COVID ORDER STATUS COVID19: NORMAL
CREAT SERPL-MCNC: 0.45 MG/DL (ref 0.5–1.4)
DIGOXIN SERPL-MCNC: 1.1 NG/ML (ref 0.8–2)
EKG IMPRESSION: NORMAL
EOSINOPHIL # BLD AUTO: 0.02 K/UL (ref 0–0.51)
EOSINOPHIL NFR BLD: 0.2 % (ref 0–6.9)
EPI CELLS #/AREA URNS HPF: NORMAL /HPF
ERYTHROCYTE [DISTWIDTH] IN BLOOD BY AUTOMATED COUNT: 44.3 FL (ref 35.9–50)
FLUAV RNA SPEC QL NAA+PROBE: NEGATIVE
FLUBV RNA SPEC QL NAA+PROBE: NEGATIVE
GLOBULIN SER CALC-MCNC: 3.4 G/DL (ref 1.9–3.5)
GLUCOSE SERPL-MCNC: 114 MG/DL (ref 65–99)
GLUCOSE UR STRIP.AUTO-MCNC: NEGATIVE MG/DL
HCT VFR BLD AUTO: 41.4 % (ref 37–47)
HGB BLD-MCNC: 13.7 G/DL (ref 12–16)
IMM GRANULOCYTES # BLD AUTO: 0.04 K/UL (ref 0–0.11)
IMM GRANULOCYTES NFR BLD AUTO: 0.4 % (ref 0–0.9)
KETONES UR STRIP.AUTO-MCNC: ABNORMAL MG/DL
LACTATE BLD-SCNC: 0.9 MMOL/L (ref 0.5–2)
LACTATE BLD-SCNC: 0.9 MMOL/L (ref 0.5–2)
LEUKOCYTE ESTERASE UR QL STRIP.AUTO: NEGATIVE
LYMPHOCYTES # BLD AUTO: 0.28 K/UL (ref 1–4.8)
LYMPHOCYTES NFR BLD: 3.1 % (ref 22–41)
MCH RBC QN AUTO: 30.2 PG (ref 27–33)
MCHC RBC AUTO-ENTMCNC: 33.1 G/DL (ref 33.6–35)
MCV RBC AUTO: 91.4 FL (ref 81.4–97.8)
MICRO URNS: ABNORMAL
MONOCYTES # BLD AUTO: 0.93 K/UL (ref 0–0.85)
MONOCYTES NFR BLD AUTO: 10.1 % (ref 0–13.4)
NEUTROPHILS # BLD AUTO: 7.89 K/UL (ref 2–7.15)
NEUTROPHILS NFR BLD: 86 % (ref 44–72)
NITRITE UR QL STRIP.AUTO: NEGATIVE
NRBC # BLD AUTO: 0 K/UL
NRBC BLD-RTO: 0 /100 WBC
PH UR STRIP.AUTO: 5.5 [PH] (ref 5–8)
PLATELET # BLD AUTO: 250 K/UL (ref 164–446)
PMV BLD AUTO: 9.9 FL (ref 9–12.9)
POTASSIUM SERPL-SCNC: 3.7 MMOL/L (ref 3.6–5.5)
PROT SERPL-MCNC: 7 G/DL (ref 6–8.2)
PROT UR QL STRIP: NEGATIVE MG/DL
RBC # BLD AUTO: 4.53 M/UL (ref 4.2–5.4)
RBC # URNS HPF: NORMAL /HPF
RBC UR QL AUTO: ABNORMAL
RSV RNA SPEC QL NAA+PROBE: NEGATIVE
SARS-COV-2 RNA RESP QL NAA+PROBE: DETECTED
SODIUM SERPL-SCNC: 131 MMOL/L (ref 135–145)
SP GR UR REFRACTOMETRY: 1.01
SPECIMEN SOURCE: ABNORMAL
TROPONIN T SERPL-MCNC: 10 NG/L (ref 6–19)
UROBILINOGEN UR STRIP.AUTO-MCNC: 0.2 MG/DL
WBC # BLD AUTO: 9.2 K/UL (ref 4.8–10.8)
WBC #/AREA URNS HPF: NORMAL /HPF

## 2020-11-07 PROCEDURE — 80162 ASSAY OF DIGOXIN TOTAL: CPT

## 2020-11-07 PROCEDURE — A9270 NON-COVERED ITEM OR SERVICE: HCPCS | Performed by: HOSPITALIST

## 2020-11-07 PROCEDURE — 0241U HCHG SARS-COV-2 COVID-19 NFCT DS RESP RNA 4 TRGT MIC: CPT

## 2020-11-07 PROCEDURE — 96367 TX/PROPH/DG ADDL SEQ IV INF: CPT

## 2020-11-07 PROCEDURE — 80053 COMPREHEN METABOLIC PANEL: CPT

## 2020-11-07 PROCEDURE — 93005 ELECTROCARDIOGRAM TRACING: CPT | Performed by: EMERGENCY MEDICINE

## 2020-11-07 PROCEDURE — 84484 ASSAY OF TROPONIN QUANT: CPT

## 2020-11-07 PROCEDURE — 700102 HCHG RX REV CODE 250 W/ 637 OVERRIDE(OP): Performed by: HOSPITALIST

## 2020-11-07 PROCEDURE — 71045 X-RAY EXAM CHEST 1 VIEW: CPT

## 2020-11-07 PROCEDURE — 85025 COMPLETE CBC W/AUTO DIFF WBC: CPT

## 2020-11-07 PROCEDURE — 96365 THER/PROPH/DIAG IV INF INIT: CPT

## 2020-11-07 PROCEDURE — 87040 BLOOD CULTURE FOR BACTERIA: CPT

## 2020-11-07 PROCEDURE — 700111 HCHG RX REV CODE 636 W/ 250 OVERRIDE (IP): Performed by: HOSPITALIST

## 2020-11-07 PROCEDURE — C9803 HOPD COVID-19 SPEC COLLECT: HCPCS | Performed by: EMERGENCY MEDICINE

## 2020-11-07 PROCEDURE — 99285 EMERGENCY DEPT VISIT HI MDM: CPT

## 2020-11-07 PROCEDURE — 83605 ASSAY OF LACTIC ACID: CPT

## 2020-11-07 PROCEDURE — 770021 HCHG ROOM/CARE - ISO PRIVATE

## 2020-11-07 PROCEDURE — 700111 HCHG RX REV CODE 636 W/ 250 OVERRIDE (IP): Performed by: EMERGENCY MEDICINE

## 2020-11-07 PROCEDURE — 81001 URINALYSIS AUTO W/SCOPE: CPT

## 2020-11-07 PROCEDURE — 36415 COLL VENOUS BLD VENIPUNCTURE: CPT

## 2020-11-07 PROCEDURE — 99223 1ST HOSP IP/OBS HIGH 75: CPT | Mod: AI | Performed by: HOSPITALIST

## 2020-11-07 PROCEDURE — 700105 HCHG RX REV CODE 258: Performed by: EMERGENCY MEDICINE

## 2020-11-07 PROCEDURE — 87086 URINE CULTURE/COLONY COUNT: CPT

## 2020-11-07 PROCEDURE — 0240U HCHG SARS-COV-2 COVID-19 NFCT DS RESP RNA 3 TRGT MIC: CPT

## 2020-11-07 RX ORDER — AZITHROMYCIN 500 MG/1
500 INJECTION, POWDER, LYOPHILIZED, FOR SOLUTION INTRAVENOUS ONCE
Status: COMPLETED | OUTPATIENT
Start: 2020-11-07 | End: 2020-11-07

## 2020-11-07 RX ORDER — ACETAMINOPHEN 325 MG/1
650 TABLET ORAL EVERY 6 HOURS PRN
Status: DISCONTINUED | OUTPATIENT
Start: 2020-11-07 | End: 2020-11-09 | Stop reason: HOSPADM

## 2020-11-07 RX ORDER — BUDESONIDE AND FORMOTEROL FUMARATE DIHYDRATE 160; 4.5 UG/1; UG/1
2 AEROSOL RESPIRATORY (INHALATION)
Status: DISCONTINUED | OUTPATIENT
Start: 2020-11-07 | End: 2020-11-09 | Stop reason: HOSPADM

## 2020-11-07 RX ORDER — AMOXICILLIN 250 MG
2 CAPSULE ORAL 2 TIMES DAILY
Status: DISCONTINUED | OUTPATIENT
Start: 2020-11-08 | End: 2020-11-09 | Stop reason: HOSPADM

## 2020-11-07 RX ORDER — OMEPRAZOLE 20 MG/1
20 CAPSULE, DELAYED RELEASE ORAL DAILY
Status: DISCONTINUED | OUTPATIENT
Start: 2020-11-07 | End: 2020-11-09 | Stop reason: HOSPADM

## 2020-11-07 RX ORDER — OXYCODONE HYDROCHLORIDE 10 MG/1
10 TABLET ORAL EVERY 6 HOURS PRN
Status: DISCONTINUED | OUTPATIENT
Start: 2020-11-07 | End: 2020-11-09 | Stop reason: HOSPADM

## 2020-11-07 RX ORDER — DIGOXIN 250 MCG
250 TABLET ORAL DAILY
Status: DISCONTINUED | OUTPATIENT
Start: 2020-11-07 | End: 2020-11-09 | Stop reason: HOSPADM

## 2020-11-07 RX ORDER — POLYETHYLENE GLYCOL 3350 17 G/17G
1 POWDER, FOR SOLUTION ORAL
Status: DISCONTINUED | OUTPATIENT
Start: 2020-11-07 | End: 2020-11-09 | Stop reason: HOSPADM

## 2020-11-07 RX ORDER — AZITHROMYCIN 250 MG/1
500 TABLET, FILM COATED ORAL DAILY
Status: DISCONTINUED | OUTPATIENT
Start: 2020-11-08 | End: 2020-11-08

## 2020-11-07 RX ORDER — LISINOPRIL 20 MG/1
20 TABLET ORAL DAILY
Status: DISCONTINUED | OUTPATIENT
Start: 2020-11-07 | End: 2020-11-09 | Stop reason: HOSPADM

## 2020-11-07 RX ORDER — ALBUTEROL SULFATE 90 UG/1
2 AEROSOL, METERED RESPIRATORY (INHALATION) EVERY 6 HOURS PRN
Status: DISCONTINUED | OUTPATIENT
Start: 2020-11-07 | End: 2020-11-09 | Stop reason: HOSPADM

## 2020-11-07 RX ORDER — MONTELUKAST SODIUM 10 MG/1
10 TABLET ORAL DAILY
Status: DISCONTINUED | OUTPATIENT
Start: 2020-11-07 | End: 2020-11-09 | Stop reason: HOSPADM

## 2020-11-07 RX ORDER — OMEPRAZOLE 20 MG/1
20 TABLET, DELAYED RELEASE ORAL DAILY
Status: DISCONTINUED | OUTPATIENT
Start: 2020-11-07 | End: 2020-11-07

## 2020-11-07 RX ORDER — BISACODYL 10 MG
10 SUPPOSITORY, RECTAL RECTAL
Status: DISCONTINUED | OUTPATIENT
Start: 2020-11-07 | End: 2020-11-09 | Stop reason: HOSPADM

## 2020-11-07 RX ADMIN — OMEPRAZOLE 20 MG: 20 CAPSULE, DELAYED RELEASE ORAL at 15:00

## 2020-11-07 RX ADMIN — MONTELUKAST 10 MG: 10 TABLET, FILM COATED ORAL at 15:01

## 2020-11-07 RX ADMIN — AZITHROMYCIN MONOHYDRATE 500 MG: 500 INJECTION, POWDER, LYOPHILIZED, FOR SOLUTION INTRAVENOUS at 10:43

## 2020-11-07 RX ADMIN — DIGOXIN 250 MCG: 250 TABLET ORAL at 15:01

## 2020-11-07 RX ADMIN — OXYCODONE HYDROCHLORIDE 10 MG: 10 TABLET ORAL at 21:39

## 2020-11-07 RX ADMIN — OXYCODONE HYDROCHLORIDE 10 MG: 10 TABLET ORAL at 15:12

## 2020-11-07 RX ADMIN — SODIUM CHLORIDE 3 G: 900 INJECTION INTRAVENOUS at 08:56

## 2020-11-07 RX ADMIN — LISINOPRIL 20 MG: 20 TABLET ORAL at 15:02

## 2020-11-07 RX ADMIN — ENOXAPARIN SODIUM 40 MG: 40 INJECTION SUBCUTANEOUS at 15:00

## 2020-11-07 ASSESSMENT — COGNITIVE AND FUNCTIONAL STATUS - GENERAL
WALKING IN HOSPITAL ROOM: TOTAL
MOVING TO AND FROM BED TO CHAIR: UNABLE
DRESSING REGULAR UPPER BODY CLOTHING: A LOT
SUGGESTED CMS G CODE MODIFIER DAILY ACTIVITY: CK
HELP NEEDED FOR BATHING: A LOT
PERSONAL GROOMING: A LITTLE
MOBILITY SCORE: 8
TURNING FROM BACK TO SIDE WHILE IN FLAT BAD: A LOT
MOVING FROM LYING ON BACK TO SITTING ON SIDE OF FLAT BED: A LOT
CLIMB 3 TO 5 STEPS WITH RAILING: TOTAL
SUGGESTED CMS G CODE MODIFIER MOBILITY: CM
STANDING UP FROM CHAIR USING ARMS: TOTAL
DRESSING REGULAR LOWER BODY CLOTHING: A LOT
TOILETING: A LOT
DAILY ACTIVITIY SCORE: 15

## 2020-11-07 ASSESSMENT — PATIENT HEALTH QUESTIONNAIRE - PHQ9
1. LITTLE INTEREST OR PLEASURE IN DOING THINGS: NOT AT ALL
SUM OF ALL RESPONSES TO PHQ9 QUESTIONS 1 AND 2: 0
2. FEELING DOWN, DEPRESSED, IRRITABLE, OR HOPELESS: NOT AT ALL

## 2020-11-07 ASSESSMENT — LIFESTYLE VARIABLES
HAVE PEOPLE ANNOYED YOU BY CRITICIZING YOUR DRINKING: NO
HOW MANY TIMES IN THE PAST YEAR HAVE YOU HAD 5 OR MORE DRINKS IN A DAY: 0
ON A TYPICAL DAY WHEN YOU DRINK ALCOHOL HOW MANY DRINKS DO YOU HAVE: 0
AVERAGE NUMBER OF DAYS PER WEEK YOU HAVE A DRINK CONTAINING ALCOHOL: 0
EVER HAD A DRINK FIRST THING IN THE MORNING TO STEADY YOUR NERVES TO GET RID OF A HANGOVER: NO
EVER FELT BAD OR GUILTY ABOUT YOUR DRINKING: NO
HAVE YOU EVER FELT YOU SHOULD CUT DOWN ON YOUR DRINKING: NO
ALCOHOL_USE: NO
TOTAL SCORE: 0
DOES PATIENT WANT TO STOP DRINKING: NO
TOTAL SCORE: 0
CONSUMPTION TOTAL: NEGATIVE
TOTAL SCORE: 0

## 2020-11-07 ASSESSMENT — PAIN SCALES - WONG BAKER: WONGBAKER_NUMERICALRESPONSE: HURTS JUST A LITTLE BIT

## 2020-11-07 ASSESSMENT — FIBROSIS 4 INDEX
FIB4 SCORE: 0.63
FIB4 SCORE: 1.725

## 2020-11-07 NOTE — ASSESSMENT & PLAN NOTE
Patient appears to be at her baseline  She stated to me that she is on 6 liters of O2 at home   dexamethasone at this time  Will check d dimer on 11/9/20  Will dc rocephin and zitromax since procalcitonin is negative

## 2020-11-07 NOTE — ED NOTES
Med rec updated and complete. Allergies reviewed. Unable to reach pt/family VIA phone. Unable to interview pt at bedside.   Placed call to pts home pharmacy to verify current medications .      Home [facility]

## 2020-11-07 NOTE — H&P
Hospital Medicine History & Physical Note    Date of Service  11/7/2020    Primary Care Physician  Everett Diego M.D.    Consultants  None    Code Status  DNAR, I OK    Chief Complaint  Chief Complaint   Patient presents with   • Flu Like Symptoms     Fever, body aches, nausea, SOB       History of Presenting Illness  75 y.o. female who presented 11/7/2020 with dyspnea, this is been associated with fevers, body aches, nausea without emesis.  Symptoms have been present for perhaps a week but significantly worsened over the past 4 days to the point that she basically lay in her bed, able to ambulate to the commode and back, but reports little to no oral food intake and no energy with which to prepare meals.  Presents as she is no longer able to take care of herself.  She was tested for COVID-19 here in our emergency department and unfortunately returned positive.  She has a history of fairly severe COPD, is on home O2 at baseline.  Location is pulmonary, severity is severe, acuity is acute, timing is variable, onset is is gradual, course is worsening, no particular alleviating or aggravating factors.    Review of Systems  All systems reviewed and negative except as noted per above.    Past Medical History   has a past medical history of Arthritis, Cataract immature, Chronic pain, Colon polyps, COPD (chronic obstructive pulmonary disease) (HCC) (2002), DDD (degenerative disc disease), cervical, DDD (degenerative disc disease), thoracic, Diverticulitis of colon, Dyslipidemia, EMPHYSEMA, Hypertension, REGINE (obstructive sleep apnea), OSTEOPOROSIS, Spinal stenosis, lumbar region, with neurogenic claudication, URINARY INCONTINENCE, and Vitamin d deficiency.    Surgical History   has a past surgical history that includes tonsillectomy; other orthopedic surgery (2004); other; other; and lumbar laminectomy diskectomy (6/22/2010).     Family History  family history includes Other in her sister.     Social History   reports  that she quit smoking about 21 years ago. Her smoking use included cigarettes. She has a 60.00 pack-year smoking history. She has never used smokeless tobacco. She reports current alcohol use of about 4.2 oz of alcohol per week. She reports current drug use. Drug: Marijuana.    Allergies  Allergies   Allergen Reactions   • Iodine      convulsion   • Tape Rash and Itching       Medications  Prior to Admission Medications   Prescriptions Last Dose Informant Patient Reported? Taking?   Cholecalciferol (VITAMIN D3) 2000 UNIT Cap unknown at unknown Patient's Home Pharmacy No No   Sig: TAKE ONE CAPSULE BY MOUTH ONE TIME DAILY   Misc. Devices Misc Not Taking at Unknown time Patient's Home Pharmacy No No   Sig: This is an order for  2 batteries and an oxygen older for the [employer] go-go scooter.  Patient has COPD and is oxygen dependent. The batteries are not lasting long enough. Her mobility is impaired.        ODETTE 99 months   NPI [id number]   Patient not taking: Reported on 11/7/2020   Misc. Devices Misc Not Taking at Unknown time Patient's Home Pharmacy No No   Sig: This is an order for repairs to her go go scooter           ODETTE 99 months   NPI [id number]   Patient not taking: Reported on 11/7/2020   Misc. Devices Misc Not Taking at Unknown time Patient's Home Pharmacy No No   Sig: This is an order for  7 foot high flow oxygen tubing  Dx; asthma with COPD J 44.9, supplemental oxygen-dependent Z 99.81, chronic respiratory failure with hypoxia and J 96        ODETTE 99 months   NPI [id number]   Patient not taking: Reported on 11/7/2020   Naloxegol Oxalate 25 MG Tab Not Taking at Unknown time Patient's Home Pharmacy No No   Sig: Take 25 mg by mouth every day.   Patient not taking: Reported on 11/7/2020   VENTOLIN  (90 Base) MCG/ACT Aero Soln inhalation aerosol unknown at unknown Patient's Home Pharmacy No No   Sig: Inhale 2 Puffs by mouth every 6 hours as needed for Shortness of Breath.   acyclovir (ZOVIRAX) 200 MG Cap  Not Taking at Unknown time Patient's Home Pharmacy No No   Sig: TAKE TWO CAPSULES BY MOUTH THREE TIMES DAILY   Patient not taking: Reported on 11/7/2020   azithromycin (ZITHROMAX Z-VU) 250 MG Tab Not Taking at Unknown time Patient's Home Pharmacy No No   Sig: Take 2 tablets on day 1. Then take 1 tablet a day for 4 days.   Patient not taking: Reported on 11/7/2020   azithromycin (ZITHROMAX) 250 MG Tab Not Taking at Unknown time Patient's Home Pharmacy No No   Sig: Take 1 Tab by mouth every day.   Patient not taking: Reported on 11/7/2020   digoxin (LANOXIN) 250 MCG Tab unknown at unknown Patient's Home Pharmacy No No   Sig: Take 1 Tab by mouth every day.   fluticasone-salmeterol (ADVAIR DISKUS) 500-50 MCG/DOSE AEROSOL POWDER, BREATH ACTIVATED unknown at unknown Patient's Home Pharmacy No No   Sig: Inhale 1 Puff by mouth 2 times a day.   guaifenesin-codeine (CHERATUSSIN AC) Solution oral solution Not Taking at Unknown time Patient's Home Pharmacy No No   Sig: Take 5 mL by mouth every 6 hours as needed for Cough for up to 30 days.   Patient not taking: Reported on 11/7/2020   ipratropium-albuterol (DUONEB) 0.5-2.5 (3) MG/3ML nebulizer solution Not Taking at Unknown time Patient's Home Pharmacy No No   Sig: USE ONE VIAL IN NEBULIZER EVERY 4 HOURS AS NEEDED FOR SHORTNESS OF BREATH OR  WHEEZING   Patient not taking: Reported on 11/7/2020   ketoconazole (NIZORAL) 2 % Cream Not Taking at Unknown time Patient's Home Pharmacy No No   Sig: Apply to affected area daily   Patient not taking: Reported on 11/7/2020   lisinopril (PRINIVIL) 20 MG Tab unknown at unknown Patient's Home Pharmacy No No   Sig: Take 1 Tab by mouth every day.   montelukast (SINGULAIR) 10 MG Tab unknown at unknown Patient's Home Pharmacy No No   Sig: Take 1 Tab by mouth every day.   omeprazole (PRILOSEC OTC) 20 MG tablet unknown at unknown Patient's Home Pharmacy No No   Sig: Take 1 Tab by mouth every day.   oxybutynin SR (DITROPAN-XL) 5 MG TABLET SR 24 HR  Not Taking at Unknown time Patient's Home Pharmacy No No   Sig: TAKE ONE TABLET BY MOUTH ONE TIME DAILY   Patient not taking: Reported on 11/7/2020   oxycodone immediate release (ROXICODONE) 10 MG immediate release tablet unknown at unknown Patient's Home Pharmacy Yes No   Sig: Take 10 mg by mouth 4 times a day.   potassium chloride (MICRO-K) 10 MEQ capsule unknown at unknown Patient's Home Pharmacy No No   Sig: TAKE ONE CAPSULE BY MOUTH TWICE DAILY   predniSONE (DELTASONE) 10 MG Tab Not Taking at Unknown time Patient's Home Pharmacy No No   Sig: Take 30mg x 3 days, then take 20mg x 3 days, then take 10mg x 3 days, with food, then discontinue.   Patient not taking: Reported on 11/7/2020   predniSONE (DELTASONE) 20 MG Tab Not Taking at Unknown time Patient's Home Pharmacy No No   Sig: Take 2 tabs daily for 5 days.   Patient not taking: Reported on 11/7/2020   spironolactone (ALDACTONE) 25 MG Tab unknown at unknown Patient's Home Pharmacy No No   Sig: TAKE ONE TABLET BY MOUTH ONE TIME DAILY   tiotropium (SPIRIVA HANDIHALER) 18 MCG Cap unknown at unknown Patient's Home Pharmacy No No   Sig: Inhale 1 Cap by mouth every day.   tizanidine (ZANAFLEX) 4 MG Tab Not Taking at Unknown time Patient's Home Pharmacy No No   Sig: Take 1 Tab by mouth 3 times a day.   Patient not taking: Reported on 11/7/2020   triamcinolone acetonide (KENALOG) 0.1 % Ointment Not Taking at Unknown time Patient's Home Pharmacy No No   Sig: Apply 1 Application to affected area(s) 2 times a day.   Patient not taking: Reported on 11/7/2020      Facility-Administered Medications: None       Physical Exam  Temp:  [36.9 °C (98.5 °F)-38.4 °C (101.1 °F)] 36.9 °C (98.5 °F)  Pulse:  [63-75] 63  Resp:  [20] 20  BP: (129-156)/(59-68) 129/60  SpO2:  [94 %-98 %] 95 %    General: No acute distress  HEENT atraumatic, normocephalic, pupils equal round reactive to light  Neck: No JVD  Chest: Respirations are unlabored on supplemental O2  Cardiac: Physiologic S1 and  S2  Abdomen: Soft, nontender, nondistended, obese  Extremities: Without clubbing, cyanosis or edema  Neuro: Cranial nerves II through XII are grossly intact.  Psych: No anxiety, judgement intact.        Laboratory:  Recent Labs     11/07/20  0825   WBC 9.2   RBC 4.53   HEMOGLOBIN 13.7   HEMATOCRIT 41.4   MCV 91.4   MCH 30.2   MCHC 33.1*   RDW 44.3   PLATELETCT 250   MPV 9.9     Recent Labs     11/07/20  0825   SODIUM 131*   POTASSIUM 3.7   CHLORIDE 90*   CO2 31   GLUCOSE 114*   BUN 11   CREATININE 0.45*   CALCIUM 9.1     Recent Labs     11/07/20  0825   ALTSGPT 16   ASTSGOT 23   ALKPHOSPHAT 40   TBILIRUBIN 0.3   GLUCOSE 114*         No results for input(s): NTPROBNP in the last 72 hours.      Recent Labs     11/07/20  0825   TROPONINT 10       Imaging:  DX-CHEST-PORTABLE (1 VIEW)   Final Result      Increased bibasilar opacities may represent atelectasis or consolidation.      Underlying COPD with bibasilar parenchymal scarring      Mild cardiomegaly.      Atherosclerotic plaque.           Cardiology  1.  Twelve-lead EKG discloses a normal sinus rhythm at 64 ventricular beats per minute.  Axis is normal.  No ST segment elevations or deviations concerning for ischemia are seen.  T waves are diminutive but upright in leads V4 through V6.  QTc 388 ms.  Of note, read as per my interpretation of images pending the benefit of cardiology at the time of this dictation    Assessment/Plan:  I anticipate this patient will require at least two midnights for appropriate medical management, necessitating inpatient admission.    COVID-19  Assessment & Plan  Patient will be admitted for supportive care to include supplemental O2, she is instructed to prone as tolerated, does not appear to meet criteria for remdesivir at this juncture.  Given that she is saturating well on her home dose of supplemental oxygen, I am unsure if she meets criteria for dexamethasone therapy or not.  Given that she appears to be at her baseline, I will  hold at this juncture, however this can be reassessed on an ongoing basis.  If her respiratory status worsen she may require Solu-Medrol for her underlying COPD with emphysema.  I will continue the empiric antibiotics initiated in the emergency department to cover possible bacterial superinfection.    Chronic respiratory failure with hypoxia (HCC)- (present on admission)  Assessment & Plan  Appears stable on her 6 L home O2.    Chronic pain syndrome- (present on admission)  Assessment & Plan  We will continue patient's home dose oxycodone.    Asthma with COPD (HCC)- (present on admission)  Assessment & Plan  Appears to be at her baseline at this juncture, continue to monitor.

## 2020-11-07 NOTE — ED TRIAGE NOTES
.  Chief Complaint   Patient presents with   • Flu Like Symptoms     Fever, body aches, nausea, SOB      Pt BIB EMS from home. Per report Pt called EMS after GLF and needed help to get up, Pt denies LOC or hitting head.    Pt reports fever at home x 2 days, per .4. EMS gave Tylenol 1 g PTA. Pt notes SOB , generalized body aches. Pt presents on 6 l oxygen via nasal canula.

## 2020-11-07 NOTE — ED TRIAGE NOTES
[facility] TRIAGE OFFICER ER REPORT  Consult/Admission requested by: Dr Bazan  Chief complaint: fever and malaise  Pertinent history/ER Course: Sx's x 2 days.  GLF this am.  84% on RA at home.  Hx COPD on 3 litres normally  Code Status: full per ERP, I personally verified with the ERP patient's code status and the ERP has confirmed this with the patient.   Patient meets admission criterion: Yes..  Recommendations given or work up & consultations requested per triage officer:   Consultants involved and pertinent input from consultants:   Admission status: Inpatient.   Admission order placed: Yes.   Floor requested: med  Assigned hospitalist: Seema

## 2020-11-07 NOTE — ED PROVIDER NOTES
ED Provider Note    Scribed for Reinier Mcintosh M.D. by Tremayne Collado. 11/7/2020, 6:53 AM.    Primary care provider: Everett Diego M.D.  Means of arrival: Ambulance   History obtained from: Patient  History limited by: None    CHIEF COMPLAINT  Chief Complaint   Patient presents with   • Flu Like Symptoms     Fever, body aches, nausea, SOB       HPI  Berny Renee is a 75 y.o. female who presents to the Emergency Department for evaluation of chronic waxing and waning weakness and acute fever onset two days ago. She called EMS this morning for a ground level fall. While EMS was assisting her to the restroom, they noticied she was short of breath and reported that her oxygen was 84% on room air. She additionally had a 103 °F fever before ED arrival. She uses 6 L of oxygen via a nasal canula at baseline for COPD. She notes sick contact with a person at [facility] who had tested positive for Covid-19. The patient reports associated body aches, chills, bilateral feet tenderness, and nausea. Negative for vomiting or loss of consciousness. She was given 1 g Tylenol en route with EMS which brought her fever down to 101.1 °F. The patient has a history of COPD, hypertension, emphysema, and dyslipidemia.     REVIEW OF SYSTEMS  As above otherwise all other systems are negative    PAST MEDICAL HISTORY   has a past medical history of Arthritis, Cataract immature, Chronic pain, Colon polyps, COPD (chronic obstructive pulmonary disease) (HCC) (2002), DDD (degenerative disc disease), cervical, DDD (degenerative disc disease), thoracic, Diverticulitis of colon, Dyslipidemia, EMPHYSEMA, Hypertension, REGINE (obstructive sleep apnea), OSTEOPOROSIS, Spinal stenosis, lumbar region, with neurogenic claudication, URINARY INCONTINENCE, and Vitamin d deficiency.    SURGICAL HISTORY   has a past surgical history that includes tonsillectomy; other orthopedic surgery (2004); other; other; and lumbar laminectomy diskectomy  (2010).    SOCIAL HISTORY  Social History     Tobacco Use   • Smoking status: Former Smoker     Packs/day: 2.00     Years: 30.00     Pack years: 60.00     Types: Cigarettes     Quit date: 3/1/1999     Years since quittin.7   • Smokeless tobacco: Never Used   • Tobacco comment: 3 pks a day for 35 yrs, continued abstinence   Substance Use Topics   • Alcohol use: Yes     Alcohol/week: 4.2 oz     Types: 7 Glasses of wine per week   • Drug use: Yes     Types: Marijuana     Comment: Medical Marijuana       Social History     Substance and Sexual Activity   Drug Use Yes   • Types: Marijuana    Comment: Medical Marijuana        FAMILY HISTORY  Family History   Problem Relation Age of Onset   • Other Sister         lung and leukemia cancer       CURRENT MEDICATIONS  Home Medications     Reviewed by Priti Little R.N. (Registered Nurse) on 20 at 0651  Med List Status: Not Addressed   Medication Last Dose Status   acyclovir (ZOVIRAX) 200 MG Cap  Active   azithromycin (ZITHROMAX Z-VU) 250 MG Tab  Active   azithromycin (ZITHROMAX) 250 MG Tab  Active   Cholecalciferol (VITAMIN D3) 2000 UNIT Cap  Active   Diclofenac Sodium 1 % Gel  Active   digoxin (LANOXIN) 250 MCG Tab  Active   DULoxetine (CYMBALTA) 30 MG Cap DR Particles  Active   fluticasone-salmeterol (ADVAIR DISKUS) 500-50 MCG/DOSE AEROSOL POWDER, BREATH ACTIVATED  Active   FLUZONE HIGH-DOSE 0.5 ML Suspension Prefilled Syringe injection  Active   guaifenesin-codeine (CHERATUSSIN AC) Solution oral solution  Active   ipratropium-albuterol (DUONEB) 0.5-2.5 (3) MG/3ML nebulizer solution  Active   ketoconazole (NIZORAL) 2 % Cream  Active   lidocaine (XYLOCAINE) 5 % Ointment  Active   lisinopril (PRINIVIL) 20 MG Tab  Active   Misc. Devices Misc  Active   Misc. Devices Misc  Active   Misc. Devices Misc  Active   montelukast (SINGULAIR) 10 MG Tab  Active   Naloxegol Oxalate 25 MG Tab  Active   omeprazole (PRILOSEC OTC) 20 MG tablet  Active   omeprazole  "(PRILOSEC) 20 MG delayed-release capsule  Active   oxybutynin SR (DITROPAN-XL) 5 MG TABLET SR 24 HR  Active   oxycodone immediate release (ROXICODONE) 10 MG immediate release tablet  Active   potassium chloride (MICRO-K) 10 MEQ capsule  Active   potassium chloride SA (K-DUR) 10 MEQ Tab CR  Active   predniSONE (DELTASONE) 10 MG Tab  Active   predniSONE (DELTASONE) 20 MG Tab  Active   SHINGRIX 50 MCG/0.5ML Recon Susp  Active   spironolactone (ALDACTONE) 25 MG Tab  Active   tiotropium (SPIRIVA HANDIHALER) 18 MCG Cap  Active   tizanidine (ZANAFLEX) 4 MG Tab  Active   triamcinolone acetonide (KENALOG) 0.1 % Ointment  Active   VENTOLIN  (90 Base) MCG/ACT Aero Soln inhalation aerosol  Active   ZOSTAVAX 41004 UNT/0.65ML injection  Active                ALLERGIES  Allergies   Allergen Reactions   • Iodine      convulsion   • Tape Rash and Itching       PHYSICAL EXAM  VITAL SIGNS: /68   Pulse 74   Temp (!) 38.4 °C (101.1 °F) (Oral)   Resp 20   Ht 1.626 m (5' 4\")   Wt 77.1 kg (170 lb)   LMP 06/07/2001   SpO2 97%   BMI 29.18 kg/m²     Constitutional: Elderly and weak appearing. Non-toxic. Well developed, Well nourished, No acute distress, Non-toxic appearance.   HENT: Normocephalic, Atraumatic, Bilateral external ears normal, dry mucous membrane, No oral exudates, Nose normal.   Eyes:conjunctiva is normal, there are no signs of exudate.   Neck: Supple, no meningeal signs.  Lymphatic: No lymphadenopathy noted.   Cardiovascular: Regular rate and rhythm without murmurs gallops or rubs.   Thorax & Lungs: No respiratory distress. Breathing comfortably. Diminished lung sounds but lungs are clear to auscultation bilaterally, there are no wheezes no rales. Chest wall is nontender.  Abdomen: Soft, nontender, nondistended. Bowel sounds are present.   Skin: Warm, Dry, No erythema,   Back: No tenderness, No CVA tenderness.  Musculoskeletal: Tenderness to bilateral feet. Negative Homans sign. Good range of motion in all " major joints. No major deformities noted. Intact distal pulses, no clubbing, no cyanosis, no edema,   Neurologic: Alert & oriented x 3, Moving all extremities. No gross abnormalities.    Psychiatric: Affect normal, Judgment normal, Mood normal.     LABS  Results for orders placed or performed during the hospital encounter of 11/07/20   TROPONIN   Result Value Ref Range    Troponin T 10 6 - 19 ng/L   COVID/SARS CoV-2 PCR    Specimen: Nasopharyngeal; Respirate   Result Value Ref Range    COVID Order Status Received    LACTIC ACID   Result Value Ref Range    Lactic Acid 0.9 0.5 - 2.0 mmol/L   LACTIC ACID   Result Value Ref Range    Lactic Acid 0.9 0.5 - 2.0 mmol/L   CBC WITH DIFFERENTIAL   Result Value Ref Range    WBC 9.2 4.8 - 10.8 K/uL    RBC 4.53 4.20 - 5.40 M/uL    Hemoglobin 13.7 12.0 - 16.0 g/dL    Hematocrit 41.4 37.0 - 47.0 %    MCV 91.4 81.4 - 97.8 fL    MCH 30.2 27.0 - 33.0 pg    MCHC 33.1 (L) 33.6 - 35.0 g/dL    RDW 44.3 35.9 - 50.0 fL    Platelet Count 250 164 - 446 K/uL    MPV 9.9 9.0 - 12.9 fL    Neutrophils-Polys 86.00 (H) 44.00 - 72.00 %    Lymphocytes 3.10 (L) 22.00 - 41.00 %    Monocytes 10.10 0.00 - 13.40 %    Eosinophils 0.20 0.00 - 6.90 %    Basophils 0.20 0.00 - 1.80 %    Immature Granulocytes 0.40 0.00 - 0.90 %    Nucleated RBC 0.00 /100 WBC    Neutrophils (Absolute) 7.89 (H) 2.00 - 7.15 K/uL    Lymphs (Absolute) 0.28 (L) 1.00 - 4.80 K/uL    Monos (Absolute) 0.93 (H) 0.00 - 0.85 K/uL    Eos (Absolute) 0.02 0.00 - 0.51 K/uL    Baso (Absolute) 0.02 0.00 - 0.12 K/uL    Immature Granulocytes (abs) 0.04 0.00 - 0.11 K/uL    NRBC (Absolute) 0.00 K/uL   COMP METABOLIC PANEL   Result Value Ref Range    Sodium 131 (L) 135 - 145 mmol/L    Potassium 3.7 3.6 - 5.5 mmol/L    Chloride 90 (L) 96 - 112 mmol/L    Co2 31 20 - 33 mmol/L    Anion Gap 10.0 7.0 - 16.0    Glucose 114 (H) 65 - 99 mg/dL    Bun 11 8 - 22 mg/dL    Creatinine 0.45 (L) 0.50 - 1.40 mg/dL    Calcium 9.1 8.5 - 10.5 mg/dL    AST(SGOT) 23 12 - 45  U/L    ALT(SGPT) 16 2 - 50 U/L    Alkaline Phosphatase 40 30 - 99 U/L    Total Bilirubin 0.3 0.1 - 1.5 mg/dL    Albumin 3.6 3.2 - 4.9 g/dL    Total Protein 7.0 6.0 - 8.2 g/dL    Globulin 3.4 1.9 - 3.5 g/dL    A-G Ratio 1.1 g/dL   URINALYSIS    Specimen: Urine   Result Value Ref Range    Color Yellow     Character Cloudy (A)     Ph 5.5 5.0 - 8.0    Glucose Negative Negative mg/dL    Ketones Trace (A) Negative mg/dL    Protein Negative Negative mg/dL    Bilirubin Negative Negative    Urobilinogen, Urine 0.2 Negative    Nitrite Negative Negative    Leukocyte Esterase Negative Negative    Occult Blood Trace (A) Negative    Micro Urine Req Microscopic    CoV-2, Flu A/B, And RSV by PCR   Result Value Ref Range    Influenza virus A RNA Negative Negative    Influenza virus B, PCR Negative Negative    RSV, PCR Negative Negative    SARS-CoV-2 by PCR DETECTED (AA)     SARS-CoV-2 Source NP Swab    DIGOXIN   Result Value Ref Range    Digoxin 1.1 0.8 - 2.0 ng/mL   ESTIMATED GFR   Result Value Ref Range    GFR If African American >60 >60 mL/min/1.73 m 2    GFR If Non African American >60 >60 mL/min/1.73 m 2   REFRACTOMETER SG   Result Value Ref Range    Specific Gravity 1.015    URINE MICROSCOPIC (W/UA)   Result Value Ref Range    WBC Rare /hpf    RBC Rare /hpf    Epithelial Cells Few /hpf    Amorphous Crystal Present /hpf   EKG   Result Value Ref Range    Report       [facility] Emergency [facility].    Test Date:  2020  Pt Name:    FAZAL STUBBS                    Department: ER  MRN:        [id number]                      Room:       [facility]  Gender:     Female                       Technician: [id number]  :        [id number]                   Requested By:CECILIA CHAKRABORTY  Order #:    [id number]                    Reading MD: CECILIA CHAKRABORTY MD    Measurements  Intervals                                Axis  Rate:       64                           P:          69  VT:         164                          QRS:         31  QRSD:       98                           T:          43  QT:         376  QTc:        388    Interpretive Statements  SINUS RHYTHM  Compared to ECG 07/29/2019 13:59:30  Right-axis deviation no longer present  T-wave abnormality no longer present  Poor R-wave progression no longer present  Otherwise no acute abnormalities  Electronically Signed On 11-7-2020 12:00:34 PST by CECILIA CHAKRABORTY MD       All labs reviewed by me.    EKG  Interpreted by me as above    RADIOLOGY  DX-CHEST-PORTABLE (1 VIEW)   Final Result      Increased bibasilar opacities may represent atelectasis or consolidation.      Underlying COPD with bibasilar parenchymal scarring      Mild cardiomegaly.      Atherosclerotic plaque.           The radiologist's interpretation of all radiological studies have been reviewed by me.    COURSE & MEDICAL DECISION MAKING  Pertinent Labs & Imaging studies reviewed. (See chart for details)    6:53 AM - Patient seen and examined at bedside. Ordered DX Chest, EKG, Covid/Sars PCR, Troponin Blood Culture, Urine Culture, Urinalysis, CBC with Differential, CMP, Lactic Acid, and CoV-2 Flu A/B, and RSV by PCR to evaluate her symptoms. The differential diagnoses include but are not limited to: sepsis, Covid, pneumonia, UTI, or viral process. I will await lab and radiology results before determining whether interventions are necessary. The patient is agreeable and understanding of my plan of care.     8:17 AM - Review of DX Chest shows increased bibasilar opacities may represent atelectasis or consolidation, mild cardiomegaly, and arthrosclerotic plaque.    8:33 AM - Ordered for Digoxin for further evaluation.     8:44 AM - Treated with Zithromax 500 mg and Unasyn 3 g.     11:55 AM - Paged for hospitalist.       Decision Making:  Patient presents emergency department for evaluation.  The patient was febrile and potentially septic so I started with initial empiric antibiotics of Unasyn Zithromax for the potential of  pulmonary source.  Lungs were diminished bilaterally.  Chest x-ray has the above findings.  Laboratory studies are as above.  Urinalysis otherwise nonconcerning.  Covid test did come back positive and at this point I do feel is most likely Covid pneumonia.  I do feel the patient because of the increasing work of breathing sense of shortness of breath as well as Covid positive status the patient should be admitted to the hospital for further treatment and care.  I have spoken to the hospitalist for this admission.    DISPOSITION:  Patient will be hospitalized     FINAL IMPRESSION  1. COVID-19    2. Chronic obstructive pulmonary disease, unspecified COPD type (HCC)    3. Hypoxemia          ITremayne (Scribe), am scribing for, and in the presence of, Reinier Mcintosh M.D..    Electronically signed by: Tremayne Collado (Jovan), 11/7/2020    Reinier LI M.D. personally performed the services described in this documentation, as scribed by Tremayne Collado in my presence, and it is both accurate and complete.    C.     The note accurately reflects work and decisions made by me.  Reinier Mcintosh M.D.  11/7/2020  1:00 PM

## 2020-11-08 LAB
LACTATE BLD-SCNC: 0.7 MMOL/L (ref 0.5–2)
PROCALCITONIN SERPL-MCNC: <0.05 NG/ML

## 2020-11-08 PROCEDURE — 770021 HCHG ROOM/CARE - ISO PRIVATE

## 2020-11-08 PROCEDURE — 84145 PROCALCITONIN (PCT): CPT

## 2020-11-08 PROCEDURE — 700102 HCHG RX REV CODE 250 W/ 637 OVERRIDE(OP): Performed by: INTERNAL MEDICINE

## 2020-11-08 PROCEDURE — 94640 AIRWAY INHALATION TREATMENT: CPT

## 2020-11-08 PROCEDURE — 700111 HCHG RX REV CODE 636 W/ 250 OVERRIDE (IP): Performed by: HOSPITALIST

## 2020-11-08 PROCEDURE — 36415 COLL VENOUS BLD VENIPUNCTURE: CPT

## 2020-11-08 PROCEDURE — 700102 HCHG RX REV CODE 250 W/ 637 OVERRIDE(OP): Performed by: HOSPITALIST

## 2020-11-08 PROCEDURE — A9270 NON-COVERED ITEM OR SERVICE: HCPCS | Performed by: INTERNAL MEDICINE

## 2020-11-08 PROCEDURE — 83605 ASSAY OF LACTIC ACID: CPT

## 2020-11-08 PROCEDURE — 99232 SBSQ HOSP IP/OBS MODERATE 35: CPT | Performed by: FAMILY MEDICINE

## 2020-11-08 PROCEDURE — 700105 HCHG RX REV CODE 258: Performed by: HOSPITALIST

## 2020-11-08 PROCEDURE — A9270 NON-COVERED ITEM OR SERVICE: HCPCS | Performed by: HOSPITALIST

## 2020-11-08 RX ORDER — DEXAMETHASONE 4 MG/1
6 TABLET ORAL DAILY
Status: DISCONTINUED | OUTPATIENT
Start: 2020-11-08 | End: 2020-11-09 | Stop reason: HOSPADM

## 2020-11-08 RX ADMIN — GLYCOPYRROLATE 1 CAPSULE: 15.6 CAPSULE RESPIRATORY (INHALATION) at 08:56

## 2020-11-08 RX ADMIN — OXYCODONE HYDROCHLORIDE 10 MG: 10 TABLET ORAL at 04:34

## 2020-11-08 RX ADMIN — CEFTRIAXONE SODIUM 1 G: 1 INJECTION, POWDER, FOR SOLUTION INTRAMUSCULAR; INTRAVENOUS at 06:00

## 2020-11-08 RX ADMIN — ENOXAPARIN SODIUM 40 MG: 40 INJECTION SUBCUTANEOUS at 04:31

## 2020-11-08 RX ADMIN — MONTELUKAST 10 MG: 10 TABLET, FILM COATED ORAL at 04:33

## 2020-11-08 RX ADMIN — OMEPRAZOLE 20 MG: 20 CAPSULE, DELAYED RELEASE ORAL at 04:31

## 2020-11-08 RX ADMIN — DOCUSATE SODIUM 50 MG AND SENNOSIDES 8.6 MG 2 TABLET: 8.6; 5 TABLET, FILM COATED ORAL at 04:33

## 2020-11-08 RX ADMIN — BUDESONIDE AND FORMOTEROL FUMARATE DIHYDRATE 2 PUFF: 160; 4.5 AEROSOL RESPIRATORY (INHALATION) at 08:56

## 2020-11-08 RX ADMIN — DEXAMETHASONE 6 MG: 4 TABLET ORAL at 09:53

## 2020-11-08 RX ADMIN — LISINOPRIL 20 MG: 20 TABLET ORAL at 04:32

## 2020-11-08 RX ADMIN — DIGOXIN 250 MCG: 250 TABLET ORAL at 04:32

## 2020-11-08 RX ADMIN — AZITHROMYCIN MONOHYDRATE 500 MG: 250 TABLET ORAL at 04:31

## 2020-11-08 RX ADMIN — OXYCODONE HYDROCHLORIDE 10 MG: 10 TABLET ORAL at 11:14

## 2020-11-08 RX ADMIN — OXYCODONE HYDROCHLORIDE 10 MG: 10 TABLET ORAL at 17:20

## 2020-11-08 ASSESSMENT — ENCOUNTER SYMPTOMS
DOUBLE VISION: 0
SPUTUM PRODUCTION: 0
BACK PAIN: 0
TINGLING: 0
PHOTOPHOBIA: 0
NECK PAIN: 0
DIZZINESS: 0
NAUSEA: 0
MYALGIAS: 0
VOMITING: 0
CHILLS: 0
HEADACHES: 0
ORTHOPNEA: 0
FEVER: 0
BLURRED VISION: 0
HEARTBURN: 0
PALPITATIONS: 0
HEMOPTYSIS: 0

## 2020-11-08 ASSESSMENT — PAIN DESCRIPTION - PAIN TYPE
TYPE: CHRONIC PAIN
TYPE: CHRONIC PAIN

## 2020-11-08 NOTE — PROGRESS NOTES
Hospital Medicine Daily Progress Note    Date of Service  11/8/2020    Chief Complaint  75 y.o. female admitted 11/7/2020 with  dyspnea,    Hospital Course  75 y.o. female who presented 11/7/2020 with dyspnea, this is been associated with fevers, body aches, nausea without emesis.  Symptoms have been present for perhaps a week but significantly worsened over the past 4 days to the point that she basically lay in her bed, able to ambulate to the commode and back, but reports little to no oral food intake and no energy with which to prepare meals.  Presents as she is no longer able to take care of herself.  She was tested for COVID-19 here in our emergency department and unfortunately returned positive.  She has a history of fairly severe COPD, is on home O2 at baseline.  Location is pulmonary, severity is severe, acuity is acute, timing is variable, onset is is gradual, course is worsening, no particular alleviating or aggravating factors.    Interval Problem Update  none    Consultants/Specialty  none    Code Status  DNAR, I OK    Disposition  pending    Review of Systems  Review of Systems   Constitutional: Positive for malaise/fatigue. Negative for chills and fever.   HENT: Negative for ear discharge, ear pain and tinnitus.    Eyes: Negative for blurred vision, double vision and photophobia.   Respiratory: Negative for hemoptysis and sputum production.    Cardiovascular: Negative for chest pain, palpitations and orthopnea.   Gastrointestinal: Negative for heartburn, nausea and vomiting.   Genitourinary: Negative for dysuria, frequency and urgency.   Musculoskeletal: Negative for back pain, myalgias and neck pain.   Skin: Negative for itching and rash.   Neurological: Negative for dizziness, tingling and headaches.        Physical Exam  Temp:  [36.9 °C (98.5 °F)-37.9 °C (100.2 °F)] 37.1 °C (98.7 °F)  Pulse:  [63-72] 66  Resp:  [19-20] 20  BP: (115-153)/(41-60) 124/41  SpO2:  [89 %-98 %] 89 %    Physical  Exam  Constitutional:       Appearance: Normal appearance.   HENT:      Head: Normocephalic and atraumatic.      Nose: Nose normal.      Mouth/Throat:      Mouth: Mucous membranes are dry.   Eyes:      Extraocular Movements: Extraocular movements intact.      Pupils: Pupils are equal, round, and reactive to light.   Neck:      Musculoskeletal: Normal range of motion and neck supple.   Cardiovascular:      Rate and Rhythm: Normal rate and regular rhythm.      Pulses: Normal pulses.      Heart sounds: Normal heart sounds.   Pulmonary:      Breath sounds: No stridor. Rhonchi present.   Abdominal:      Palpations: Abdomen is soft.   Musculoskeletal: Normal range of motion.   Skin:     General: Skin is warm and dry.   Neurological:      General: No focal deficit present.      Mental Status: She is alert and oriented to person, place, and time.         Fluids    Intake/Output Summary (Last 24 hours) at 11/8/2020 1200  Last data filed at 11/8/2020 0900  Gross per 24 hour   Intake 120 ml   Output --   Net 120 ml       Laboratory  Recent Labs     11/07/20  0825   WBC 9.2   RBC 4.53   HEMOGLOBIN 13.7   HEMATOCRIT 41.4   MCV 91.4   MCH 30.2   MCHC 33.1*   RDW 44.3   PLATELETCT 250   MPV 9.9     Recent Labs     11/07/20  0825   SODIUM 131*   POTASSIUM 3.7   CHLORIDE 90*   CO2 31   GLUCOSE 114*   BUN 11   CREATININE 0.45*   CALCIUM 9.1                   Imaging       Assessment/Plan  COVID-19  Assessment & Plan  Patient appears to be at her baseline  She stated to me that she is on 6 liters of O2 at home  No need for dexamethasone at this time  Will check d dimer on 11/9/20  Will dc rocephin and zitromax since procalcitonin is negative    Chronic respiratory failure with hypoxia (HCC)- (present on admission)  Assessment & Plan  Appears stable on her 6 L home O2.  Continue with home meds    Chronic pain syndrome- (present on admission)  Assessment & Plan  We will continue patient's home dose oxycodone.    Asthma with COPD (HCC)-  (present on admission)  Assessment & Plan  Appears to be at her baseline at this juncture, continue to monitor.       VTE prophylaxis: lovenox

## 2020-11-08 NOTE — CARE PLAN
Problem: Pain Management  Goal: Pain level will decrease to patient's comfort goal  Outcome: PROGRESSING AS EXPECTED     Problem: Respiratory:  Goal: Respiratory status will improve  Outcome: PROGRESSING AS EXPECTED  Note: On 6L O2. Sat 94%. Endorses feeling short of breath.

## 2020-11-08 NOTE — PROGRESS NOTES
Received report from ED nurse Chen. Patient just arrived to floor. Non tele patient. Belongings of purse, phone  and clothing/slippers with patient. Oriented to room and routine. Set up on 6L O2 and pure wick set up as patient unable to ambulate at this time. Reg diet, eating pudding and apple juice. A&Ox4. Family aware of dc-pt states no need to call tonight for updates. Will be daughter Fatmata for updates.

## 2020-11-09 ENCOUNTER — HOME HEALTH ADMISSION (OUTPATIENT)
Dept: HOME HEALTH SERVICES | Facility: HOME HEALTHCARE | Age: 75
End: 2020-11-09
Payer: MEDICARE

## 2020-11-09 VITALS
DIASTOLIC BLOOD PRESSURE: 46 MMHG | HEART RATE: 52 BPM | BODY MASS INDEX: 30.9 KG/M2 | SYSTOLIC BLOOD PRESSURE: 121 MMHG | WEIGHT: 181 LBS | OXYGEN SATURATION: 95 % | RESPIRATION RATE: 18 BRPM | HEIGHT: 64 IN | TEMPERATURE: 97.1 F

## 2020-11-09 LAB
ALBUMIN SERPL BCP-MCNC: 3.5 G/DL (ref 3.2–4.9)
ALBUMIN/GLOB SERPL: 1.1 G/DL
ALP SERPL-CCNC: 36 U/L (ref 30–99)
ALT SERPL-CCNC: 14 U/L (ref 2–50)
ANION GAP SERPL CALC-SCNC: 8 MMOL/L (ref 7–16)
AST SERPL-CCNC: 23 U/L (ref 12–45)
BACTERIA UR CULT: NORMAL
BASOPHILS # BLD AUTO: 0 % (ref 0–1.8)
BASOPHILS # BLD: 0 K/UL (ref 0–0.12)
BILIRUB SERPL-MCNC: 0.2 MG/DL (ref 0.1–1.5)
BUN SERPL-MCNC: 15 MG/DL (ref 8–22)
CALCIUM SERPL-MCNC: 9 MG/DL (ref 8.5–10.5)
CHLORIDE SERPL-SCNC: 89 MMOL/L (ref 96–112)
CO2 SERPL-SCNC: 34 MMOL/L (ref 20–33)
CREAT SERPL-MCNC: 0.48 MG/DL (ref 0.5–1.4)
D DIMER PPP IA.FEU-MCNC: 2.38 UG/ML (FEU) (ref 0–0.5)
EOSINOPHIL # BLD AUTO: 0 K/UL (ref 0–0.51)
EOSINOPHIL NFR BLD: 0 % (ref 0–6.9)
ERYTHROCYTE [DISTWIDTH] IN BLOOD BY AUTOMATED COUNT: 40.5 FL (ref 35.9–50)
GLOBULIN SER CALC-MCNC: 3.2 G/DL (ref 1.9–3.5)
GLUCOSE SERPL-MCNC: 119 MG/DL (ref 65–99)
HCT VFR BLD AUTO: 39.5 % (ref 37–47)
HGB BLD-MCNC: 13.5 G/DL (ref 12–16)
IMM GRANULOCYTES # BLD AUTO: 0.01 K/UL (ref 0–0.11)
IMM GRANULOCYTES NFR BLD AUTO: 0.4 % (ref 0–0.9)
LYMPHOCYTES # BLD AUTO: 0.5 K/UL (ref 1–4.8)
LYMPHOCYTES NFR BLD: 22.4 % (ref 22–41)
MCH RBC QN AUTO: 30.4 PG (ref 27–33)
MCHC RBC AUTO-ENTMCNC: 34.2 G/DL (ref 33.6–35)
MCV RBC AUTO: 89 FL (ref 81.4–97.8)
MONOCYTES # BLD AUTO: 0.44 K/UL (ref 0–0.85)
MONOCYTES NFR BLD AUTO: 19.7 % (ref 0–13.4)
NEUTROPHILS # BLD AUTO: 1.28 K/UL (ref 2–7.15)
NEUTROPHILS NFR BLD: 57.5 % (ref 44–72)
NRBC # BLD AUTO: 0 K/UL
NRBC BLD-RTO: 0 /100 WBC
PLATELET # BLD AUTO: 185 K/UL (ref 164–446)
PMV BLD AUTO: 9.7 FL (ref 9–12.9)
POTASSIUM SERPL-SCNC: 3.5 MMOL/L (ref 3.6–5.5)
PROT SERPL-MCNC: 6.7 G/DL (ref 6–8.2)
RBC # BLD AUTO: 4.44 M/UL (ref 4.2–5.4)
SIGNIFICANT IND 70042: NORMAL
SITE SITE: NORMAL
SODIUM SERPL-SCNC: 131 MMOL/L (ref 135–145)
SOURCE SOURCE: NORMAL
WBC # BLD AUTO: 2.3 K/UL (ref 4.8–10.8)

## 2020-11-09 PROCEDURE — 36415 COLL VENOUS BLD VENIPUNCTURE: CPT

## 2020-11-09 PROCEDURE — 94640 AIRWAY INHALATION TREATMENT: CPT

## 2020-11-09 PROCEDURE — 97165 OT EVAL LOW COMPLEX 30 MIN: CPT

## 2020-11-09 PROCEDURE — 85379 FIBRIN DEGRADATION QUANT: CPT

## 2020-11-09 PROCEDURE — 80053 COMPREHEN METABOLIC PANEL: CPT

## 2020-11-09 PROCEDURE — A9270 NON-COVERED ITEM OR SERVICE: HCPCS | Performed by: HOSPITALIST

## 2020-11-09 PROCEDURE — 700102 HCHG RX REV CODE 250 W/ 637 OVERRIDE(OP): Performed by: INTERNAL MEDICINE

## 2020-11-09 PROCEDURE — 99239 HOSP IP/OBS DSCHRG MGMT >30: CPT | Performed by: FAMILY MEDICINE

## 2020-11-09 PROCEDURE — 97161 PT EVAL LOW COMPLEX 20 MIN: CPT

## 2020-11-09 PROCEDURE — A9270 NON-COVERED ITEM OR SERVICE: HCPCS | Performed by: INTERNAL MEDICINE

## 2020-11-09 PROCEDURE — 85025 COMPLETE CBC W/AUTO DIFF WBC: CPT

## 2020-11-09 PROCEDURE — 700111 HCHG RX REV CODE 636 W/ 250 OVERRIDE (IP): Performed by: HOSPITALIST

## 2020-11-09 PROCEDURE — 700102 HCHG RX REV CODE 250 W/ 637 OVERRIDE(OP): Performed by: HOSPITALIST

## 2020-11-09 RX ORDER — DEXAMETHASONE 6 MG/1
6 TABLET ORAL DAILY
Qty: 8 TAB | Refills: 0 | Status: ON HOLD | OUTPATIENT
Start: 2020-11-10 | End: 2020-11-21 | Stop reason: SDUPTHER

## 2020-11-09 RX ADMIN — LISINOPRIL 20 MG: 20 TABLET ORAL at 04:49

## 2020-11-09 RX ADMIN — DOCUSATE SODIUM 50 MG AND SENNOSIDES 8.6 MG 2 TABLET: 8.6; 5 TABLET, FILM COATED ORAL at 04:48

## 2020-11-09 RX ADMIN — OXYCODONE HYDROCHLORIDE 10 MG: 10 TABLET ORAL at 00:10

## 2020-11-09 RX ADMIN — OMEPRAZOLE 20 MG: 20 CAPSULE, DELAYED RELEASE ORAL at 04:49

## 2020-11-09 RX ADMIN — GLYCOPYRROLATE 1 CAPSULE: 15.6 CAPSULE RESPIRATORY (INHALATION) at 08:11

## 2020-11-09 RX ADMIN — DIGOXIN 250 MCG: 250 TABLET ORAL at 04:49

## 2020-11-09 RX ADMIN — OXYCODONE HYDROCHLORIDE 10 MG: 10 TABLET ORAL at 12:42

## 2020-11-09 RX ADMIN — BUDESONIDE AND FORMOTEROL FUMARATE DIHYDRATE 2 PUFF: 160; 4.5 AEROSOL RESPIRATORY (INHALATION) at 08:12

## 2020-11-09 RX ADMIN — ENOXAPARIN SODIUM 40 MG: 40 INJECTION SUBCUTANEOUS at 04:48

## 2020-11-09 RX ADMIN — MONTELUKAST 10 MG: 10 TABLET, FILM COATED ORAL at 04:49

## 2020-11-09 RX ADMIN — DEXAMETHASONE 6 MG: 4 TABLET ORAL at 04:49

## 2020-11-09 ASSESSMENT — GAIT ASSESSMENTS
DISTANCE (FEET): 15
GAIT LEVEL OF ASSIST: SUPERVISED
ASSISTIVE DEVICE: FRONT WHEEL WALKER

## 2020-11-09 ASSESSMENT — COGNITIVE AND FUNCTIONAL STATUS - GENERAL
DAILY ACTIVITIY SCORE: 24
MOVING FROM LYING ON BACK TO SITTING ON SIDE OF FLAT BED: A LITTLE
WALKING IN HOSPITAL ROOM: A LITTLE
CLIMB 3 TO 5 STEPS WITH RAILING: A LOT
MOVING TO AND FROM BED TO CHAIR: A LITTLE
SUGGESTED CMS G CODE MODIFIER MOBILITY: CK
SUGGESTED CMS G CODE MODIFIER DAILY ACTIVITY: CH
STANDING UP FROM CHAIR USING ARMS: A LITTLE
TURNING FROM BACK TO SIDE WHILE IN FLAT BAD: A LITTLE
MOBILITY SCORE: 17

## 2020-11-09 ASSESSMENT — ACTIVITIES OF DAILY LIVING (ADL): TOILETING: INDEPENDENT

## 2020-11-09 NOTE — DISCHARGE SUMMARY
Discharge Summary    CHIEF COMPLAINT ON ADMISSION  Chief Complaint   Patient presents with   • Flu Like Symptoms     Fever, body aches, nausea, SOB       Reason for Admission  EMS     Admission Date  11/7/2020    CODE STATUS  DNAR, I OK    HPI & HOSPITAL COURSE  This is a 75 y.o. female here with  dyspnea, this is been associated with fevers, body aches, nausea without emesis.  Symptoms have been present for perhaps a week but significantly worsened over the past 4 days to the point that she basically lay in her bed, able to ambulate to the commode and back, but reports little to no oral food intake and no energy with which to prepare meals.  Presents as she is no longer able to take care of herself.  She was tested for COVID-19 here in our emergency department and unfortunately returned positive.  She has a history of fairly severe COPD, is on home O2 at baseline.  Location is pulmonary, severity is severe, acuity is acute, timing is variable, onset is is gradual, course is worsening, no particular alleviating or aggravating factors.  She was admitted and her COVID 19 test came back positive.she is usually on 6 liters of O2 AT HOME AT ALL TIMES.she was started on dexamethasoneorally.she feels better and her o2 is at 5 liters now.she also has advised me that she will not agree to go to any other facility no matter what.we will get pt to evaluate and if she is at her baseline then will discharge her home.      Constitutional:       Appearance: Normal appearance.   HENT:      Head: Normocephalic and atraumatic.      Nose: Nose normal.      Mouth/Throat:      Mouth: Mucous membranes are dry.   Eyes:      Extraocular Movements: Extraocular movements intact.      Pupils: Pupils are equal, round, and reactive to light.   Neck:      Musculoskeletal: Normal range of motion and neck supple.   Cardiovascular:      Rate and Rhythm: Normal rate and regular rhythm.      Pulses: Normal pulses.      Heart sounds: Normal heart  sounds.   Pulmonary:      Breath sounds: No stridor. and decreased breath sounds bilaterally  Abdominal:      Palpations: Abdomen is soft.   Musculoskeletal: Normal range of motion.   Skin:     General: Skin is warm and dry.   Neurological:      General: No focal deficit present.      Mental Status: She is alert and oriented to person, place, and time      COVID-19  Assessment & Plan  dexamethason 6 mg po daily for 6 days  Her d dimer is elevated   Will place her on eliquis 2.5mg bid for 28 days   dced  rocephin and zitromax since procalcitonin is negative     Chronic respiratory failure with hypoxia (HCC)- (present on admission)  Assessment & Plan  Appears stable on her 6 L home O2.  Continue with home meds     Chronic pain syndrome- (present on admission)  Assessment & Plan  We will continue patient's home dose oxycodone.     Asthma with COPD (HCC)- (present on admission)  Assessment & Plan  Appears to be at her baseline at this juncture, continue to monitor.             Therefore, she is discharged in fair and stable condition to home with close outpatient follow-up.    The patient met 2-midnight criteria for an inpatient stay at the time of discharge.    Discharge Date  11/9/20    FOLLOW UP ITEMS POST DISCHARGE  pcp next week    DISCHARGE DIAGNOSES  Active Problems:    COVID-19 POA: Unknown    Asthma with COPD (HCC) POA: Yes    Chronic pain syndrome POA: Yes    Chronic respiratory failure with hypoxia (HCC) POA: Yes  Resolved Problems:    * No resolved hospital problems. *      FOLLOW UP  Future Appointments   Date Time Provider Department Center   11/25/2020  3:00 PM Bubba Ross M.D. [facility] None     No follow-up provider specified.    MEDICATIONS ON DISCHARGE     Medication List      START taking these medications      Instructions   apixaban 5mg Tabs  Commonly known as: ELIQUIS   Take 0.5 Tabs by mouth 2 Times a Day.  Dose: 2.5 mg     dexamethasone 6 MG Tabs  Start taking on: November 10, 2020  Commonly  known as: DECADRON   Take 1 Tab by mouth every day.  Dose: 6 mg        CONTINUE taking these medications      Instructions   acyclovir 200 MG Caps  Commonly known as: ZOVIRAX   TAKE TWO CAPSULES BY MOUTH THREE TIMES DAILY     digoxin 250 MCG Tabs  Commonly known as: LANOXIN   Take 1 Tab by mouth every day.  Dose: 250 mcg     fluticasone-salmeterol 500-50 MCG/DOSE Aepb  Commonly known as: Advair Diskus   Inhale 1 Puff by mouth 2 times a day.  Dose: 1 Puff     guaifenesin-codeine Soln oral solution  Commonly known as: Cheratussin AC   Take 5 mL by mouth every 6 hours as needed for Cough for up to 30 days.  Dose: 5 mL     ipratropium-albuterol 0.5-2.5 (3) MG/3ML nebulizer solution  Commonly known as: DUONEB   USE ONE VIAL IN NEBULIZER EVERY 4 HOURS AS NEEDED FOR SHORTNESS OF BREATH OR  WHEEZING     ketoconazole 2 % Crea  Commonly known as: NIZORAL   Apply to affected area daily     lisinopril 20 MG Tabs  Commonly known as: PRINIVIL   Doctor's comments: Patient's plan is requesting a 100 day supply. Thank you  Take 1 Tab by mouth every day.  Dose: 20 mg     * Misc. Devices Misc   This is an order for  2 batteries and an oxygen older for the my pride [employer]-go scooter.  Patient has COPD and is oxygen dependent. The batteries are not lasting long enough. Her mobility is impaired.        ODETTE 99 months   NPI [id number]     * Misc. Devices Misc   This is an order for repairs to her go go scooter           ODETTE 99 months   NPI [id number]     * Misc. Devices Misc   This is an order for  7 foot high flow oxygen tubing  Dx; asthma with COPD J 44.9, supplemental oxygen-dependent Z 99.81, chronic respiratory failure with hypoxia and J 96        ODETTE 99 months   NPI [id number]     montelukast 10 MG Tabs  Commonly known as: SINGULAIR   Take 1 Tab by mouth every day.  Dose: 10 mg     Naloxegol Oxalate 25 MG Tabs   Take 25 mg by mouth every day.  Dose: 25 mg     omeprazole 20 MG tablet  Commonly known as: PRILOSEC OTC   Take 1 Tab by mouth  every day.  Dose: 20 mg     oxybutynin SR 5 MG Tb24  Commonly known as: DITROPAN-XL   TAKE ONE TABLET BY MOUTH ONE TIME DAILY     oxyCODONE immediate release 10 MG immediate release tablet  Commonly known as: ROXICODONE   Take 10 mg by mouth 4 times a day.  Dose: 10 mg     potassium chloride 10 MEQ capsule  Commonly known as: MICRO-K   TAKE ONE CAPSULE BY MOUTH TWICE DAILY     Spiriva HandiHaler 18 MCG Caps  Generic drug: tiotropium   Doctor's comments: Do not fill until patient requests  Inhale 1 Cap by mouth every day.  Dose: 18 mcg     spironolactone 25 MG Tabs  Commonly known as: ALDACTONE   TAKE ONE TABLET BY MOUTH ONE TIME DAILY     tizanidine 4 MG Tabs  Commonly known as: ZANAFLEX   Take 1 Tab by mouth 3 times a day.  Dose: 4 mg     triamcinolone acetonide 0.1 % Oint  Commonly known as: KENALOG   Apply 1 Application to affected area(s) 2 times a day.  Dose: 1 Application     Ventolin  (90 Base) MCG/ACT Aers inhalation aerosol  Generic drug: albuterol   Inhale 2 Puffs by mouth every 6 hours as needed for Shortness of Breath.  Dose: 2 Puff     Vitamin D3 2000 UNIT Caps   TAKE ONE CAPSULE BY MOUTH ONE TIME DAILY         * This list has 3 medication(s) that are the same as other medications prescribed for you. Read the directions carefully, and ask your doctor or other care provider to review them with you.            STOP taking these medications    azithromycin 250 MG Tabs  Commonly known as: Zithromax Z-Darwin     predniSONE 10 MG Tabs  Commonly known as: DELTASONE     predniSONE 20 MG Tabs  Commonly known as: DELTASONE            Allergies  Allergies   Allergen Reactions   • Iodine      convulsion   • Tape Rash and Itching       DIET  Orders Placed This Encounter   Procedures   • Diet Order Diet: Regular     Standing Status:   Standing     Number of Occurrences:   1     Order Specific Question:   Diet:     Answer:   Regular [1]       ACTIVITY  As tolerated.  Weight bearing as  tolerated    CONSULTATIONS  none    PROCEDURES  none    LABORATORY  Lab Results   Component Value Date    SODIUM 131 (L) 11/09/2020    POTASSIUM 3.5 (L) 11/09/2020    CHLORIDE 89 (L) 11/09/2020    CO2 34 (H) 11/09/2020    GLUCOSE 119 (H) 11/09/2020    BUN 15 11/09/2020    CREATININE 0.48 (L) 11/09/2020    CREATININE 0.62 06/09/2011        Lab Results   Component Value Date    WBC 2.3 (LL) 11/09/2020    WBC 10.9 (H) 06/09/2011    HEMOGLOBIN 13.5 11/09/2020    HEMATOCRIT 39.5 11/09/2020    PLATELETCT 185 11/09/2020        Total time of the discharge process exceeds 35 minutes.

## 2020-11-09 NOTE — DISCHARGE PLANNING
Anticipated Discharge Disposition: Home with Home health, home oxygen    Action: Home Health order and DME walker in place. This RN CM spoke to pt. Pt under service with [facility] for home oxygen. Received DME choice: [facility], and home health choice: [facility]. Choice form faxed to [facility].    Barriers to Discharge:   FWW delivery  Home health acceptance    Plan:   Follow up with [facility].   Will continue to follow and assist with discharge planning needs.

## 2020-11-09 NOTE — DISCHARGE PLANNING
Anticipated Discharge Disposition: Home with home oxygen     Action: This RN CM spoke to pt via phone. Pt reported she is under service with [facility] oxygen, 6LPM 24/7. Per pt, she has a concentrator at home that goes up to 10 L. This RN CM called [facility], confirmed pt is under service, no need for new order.  PT/OT ordered, pending.    Barriers to Discharge:   PT/OT eval and recommendations    Plan: Will continue to follow and assist with discharge planning needs.

## 2020-11-09 NOTE — FACE TO FACE
Face to Face Supporting Documentation - Home Health    The encounter with this patient was in whole or in part the primary reason for home health admission.    Date of encounter:   Patient:                    MRN:                       YOB: 2020  Berny Renee  [id number]  1945     Home health to see patient for:  Skilled Nursing care for assessment, interventions & education    Skilled need for:  Exacerbation of Chronic Disease State resp failure with covid 19    Skilled nursing interventions to include:  Comment: resp failure with covid 19    Homebound status evidenced by:  Needs the assistance of another person in order to leave the home. Leaving home requires a considerable and taxing effort. There is a normal inability to leave the home.    Community Physician to provide follow up care: Everett Diego M.D.     Optional Interventions? No      I certify the face to face encounter for this home health care referral meets the CMS requirements and the encounter/clinical assessment with the patient was, in whole, or in part, for the medical condition(s) listed above, which is the primary reason for home health care. Based on my clinical findings: the service(s) are medically necessary, support the need for home health care, and the homebound criteria are met.  I certify that this patient has had a face to face encounter by myself.  KALINA Nogueira M.D. - NPI: [id number]

## 2020-11-09 NOTE — THERAPY
"Physical Therapy   Initial Evaluation     Patient Name: Berny Renee  Age:  75 y.o., Sex:  female  Medical Record #: [id number]  Today's Date: 11/9/2020     Precautions: Fall Risk    Assessment  Patient is 75 y.o. female that presented to acute 11/7 with complaints of dyspnea, fever, body aches, nausea. PMHx significant for COPD on 6L O2 at baseline. She presented to PT at or near baseline functional mobility. She ambulated approximately 15ft x2 with FWW and supervision. She reported she did not need FWW prior to getting sick but requested use during evaluation, FWW appears to assist with patient's tolerance to activity however not reliant for balance. She performed bed mobility and transfers at supervision level and reported friend will be staying with her following DC and able to assist. Patient will not be actively followed for physical therapy services at this time, however may be seen if requested by physician for 1 more visit within 30 days to address any discharge or equipment needs.    Plan    Recommend Physical Therapy for Evaluation only    DC Equipment Recommendations: Front-Wheel Walker  Discharge Recommendations: Recommend home health for continued physical therapy services       Subjective    \"My hands have shaken like that for years, no one knows why.\"     Objective       11/09/20 1156   Total Time Spent   Total Time Spent (Mins) 26   Charge Group   PT Evaluation PT Evaluation Low   Initial Contact Note    Initial Contact Note Order Received and Verified, Evaluation Only - Patient Does Not Require Further Acute Physical Therapy at this Time.  However, May Benefit from Post Acute Therapy for Higher Level Functional Deficits.   Precautions   Precautions Fall Risk   Comments 6L O2 at baseline   Vitals   O2 (LPM) 6   O2 Delivery Device Silicone Nasal Cannula   Vitals Comments SpO2 to 86% following activity but quickly recovered to > 90%   Pain 0 - 10 Group   Location Neck   Therapist Pain Assessment " During Activity;Post Activity Pain Same as Prior to Activity;Nurse Notified   Prior Living Situation   Housing / Facility Mobile Home   Steps Into Home   (ramp)   Steps In Home 0   Equipment Owned Single Point Cane   Lives with - Patient's Self Care Capacity Alone and Able to Care For Self   Comments Patient reported she lives alone but stated she has a girlfriend that will be staying with her following DC   Prior Level of Functional Mobility   Bed Mobility Independent   Transfer Status Independent   Ambulation Independent   Distance Ambulation (Feet)   (community)   Assistive Devices Used None   Stairs Other (Comments)  (avoids)   History of Falls   History of Falls No   Cognition    Cognition / Consciousness WDL   Comments pleasant, cooperative. Reported friend that will be helping her just finished radiation; unclear insight into COVID and risks of transmission   Passive ROM Lower Body   Passive ROM Lower Body WDL   Comments not formally tested, WFL for mobility   Active ROM Lower Body    Active ROM Lower Body  WDL   Comments as above   Strength Lower Body   Lower Body Strength  WDL   Comments as above; patient reported weakness as compared to baseline   Sensation Lower Body   Lower Extremity Sensation   Not Tested   Lower Body Muscle Tone   Lower Body Muscle Tone  WDL   Neurological Concerns   Neurological Concerns Yes   Comments given BUE tremor; patient reported this is chronic and unknown etiology   Coordination Lower Body    Coordination Lower Body  WDL   Vision   Vision Comments NT   Balance Assessment   Sitting Balance (Static) Fair +   Sitting Balance (Dynamic) Fair +   Standing Balance (Static) Fair   Standing Balance (Dynamic) Fair   Weight Shift Sitting Fair   Weight Shift Standing Fair   Comments with FWW, no LOB   Gait Analysis   Gait Level Of Assist Supervised   Assistive Device Front Wheel Walker   Distance (Feet) 15   # of Times Distance was Traveled 2   Deviation   (decreased jamarcus)   # of  Stairs Climbed 0   Weight Bearing Status no restrictions   Vision Deficits Impacting Mobility NT   Comments not reliant on FWW, leaves it behind   Bed Mobility    Supine to Sit Supervised  (with bed features, appears and reported capable at home)   Sit to Supine   (NT, left in chair)   Scooting Supervised  (seated)   Functional Mobility   Sit to Stand Supervised   Bed, Chair, Wheelchair Transfer Supervised   Toilet Transfers Supervised   Transfer Method Stand Step   How much difficulty does the patient currently have...   Turning over in bed (including adjusting bedclothes, sheets and blankets)? 3   Sitting down on and standing up from a chair with arms (e.g., wheelchair, bedside commode, etc.) 3   Moving from lying on back to sitting on the side of the bed? 3   How much help from another person does the patient currently need...   Moving to and from a bed to a chair (including a wheelchair)? 3   Need to walk in a hospital room? 3   Climbing 3-5 steps with a railing? 2   6 clicks Mobility Score 17   Activity Tolerance   Sitting in Chair left in chair   Sitting Edge of Bed 3 min   Standing 15 min   Comments No overt pain, fatigue, SOB, dizziness   Edema / Skin Assessment   Edema / Skin  Not Assessed   Education Group   Education Provided Role of Physical Therapist   Role of Physical Therapist Patient Response Patient;Acceptance;Explanation;Verbal Demonstration   Anticipated Discharge Equipment and Recommendations   DC Equipment Recommendations Front-Wheel Walker   Discharge Recommendations Recommend home health for continued physical therapy services   Interdisciplinary Plan of Care Collaboration   IDT Collaboration with  Nursing;Occupational Therapist   Patient Position at End of Therapy Seated;Call Light within Reach;Tray Table within Reach;Phone within Reach   Collaboration Comments RN aware of visit, response, DC recs   Session Information   Date / Session Number  11/9 - 1x only   Priority 0

## 2020-11-09 NOTE — DISCHARGE PLANNING
Care Transition Team Assessment      This RN CM spoke to pt via phone, verified facesheet and obtained the following information. Pt lives in a mobile home. Pt uses home oxygen, 6LPM 24/7, under service with [facility]. Pt was independent with ADLs and IADLs prior to hospitalization. Pt was able to drive to appointments. Pt has insurance coverage. Pt has a PCP at [facility]. Pt's preferred pharmacy is [facility] on [address].   Pt's plan it go back home. Per pt, her friend will pick her up at discharge.          Information Source  Information Given By: Patient  Informant's Name: Berny Renee    Readmission Evaluation  Is this a readmission?: No    Elopement Risk  Legal Hold: No    Interdisciplinary Discharge Planning  Primary Care Physician: Everett Diego MD  Lives with - Patient's Self Care Capacity: Alone and Able to Care For Self  Patient or legal guardian wants to designate a caregiver: No  Support Systems: Children, Friends / Neighbors  Housing / Facility: Mobile Home  Durable Medical Equipment: Home Oxygen  DME Provider / Phone: [facility](6LPM ,24/7)    Discharge Preparedness  What is your plan after discharge?: Uncertain - pending medical team collaboration  What are your discharge supports?: Child, Other (comment)(friend)         Finances  Financial Barriers to Discharge: No  Prescription Coverage: Yes    Vision / Hearing Impairment  Vision Impairment : Yes  Right Eye Vision: Wears Glasses  Left Eye Vision: Wears Glasses  Hearing Impairment : No         Advance Directive  Advance Directive?: None  Advance Directive offered?: AD Booklet refused    Domestic Abuse  Have you ever been the victim of abuse or violence?: No  Physical Abuse or Sexual Abuse: No  Verbal Abuse or Emotional Abuse: No  Possible Abuse/Neglect Reported to:: Not Applicable    Psychological Assessment  History of Substance Abuse: None  History of Psychiatric Problems: No  Non-compliant with Treatment: No    Discharge Risks or  Barriers  Discharge risks or barriers?: Complex medical needs  Patient risk factors: Complex medical needs    Anticipated Discharge Information  Discharge Disposition: Still a Patient (30)

## 2020-11-09 NOTE — DISCHARGE INSTRUCTIONS
Discharge Instructions    Discharged to home by car with relative. Discharged via wheelchair, hospital escort: Yes.  Special equipment needed: Oxygen, patient states on oxygen at home, only need a tank for transportation.     Be sure to schedule a follow-up appointment with your primary care doctor or any specialists as instructed.     Discharge Plan:   Influenza Vaccine Indication: Not indicated: Previously immunized this influenza season and > 8 years of age    I understand that a diet low in cholesterol, fat, and sodium is recommended for good health. Unless I have been given specific instructions below for another diet, I accept this instruction as my diet prescription.   Other diet: regular    Special Instructions: None    · Is patient discharged on Warfarin / Coumadin?   No     Depression / Suicide Risk    As you are discharged from this [facility] facility, it is important to learn how to keep safe from harming yourself.    Recognize the warning signs:  · Abrupt changes in personality, positive or negative- including increase in energy   · Giving away possessions  · Change in eating patterns- significant weight changes-  positive or negative  · Change in sleeping patterns- unable to sleep or sleeping all the time   · Unwillingness or inability to communicate  · Depression  · Unusual sadness, discouragement and loneliness  · Talk of wanting to die  · Neglect of personal appearance   · Rebelliousness- reckless behavior  · Withdrawal from people/activities they love  · Confusion- inability to concentrate     If you or a loved one observes any of these behaviors or has concerns about self-harm, here's what you can do:  · Talk about it- your feelings and reasons for harming yourself  · Remove any means that you might use to hurt yourself (examples: pills, rope, extension cords, firearm)  · Get professional help from the community (Mental Health, Substance Abuse, psychological counseling)  · Do not be alone:Call  your Safe Contact- someone whom you trust who will be there for you.  · Call your local CRISIS HOTLINE 022-1744 or 200-967-6435  · Call your local Children's Mobile Crisis Response Team [place] (299) 601-8785 or www.reQall  · Call the toll free National Suicide Prevention Hotlines   · National Suicide Prevention Lifeline 987-155-DKMQ (5522)  · National Hope Line Network 800-SUICIDE (970-4871)    COVID-19  COVID-19 is a respiratory infection that is caused by a virus called severe acute respiratory syndrome coronavirus 2 (SARS-CoV-2). The disease is also known as coronavirus disease or novel coronavirus. In some people, the virus may not cause any symptoms. In others, it may cause a serious infection. The infection can get worse quickly and can lead to complications, such as:  · Pneumonia, or infection of the lungs.  · Acute respiratory distress syndrome or ARDS. This is fluid build-up in the lungs.  · Acute respiratory failure. This is a condition in which there is not enough oxygen passing from the lungs to the body.  · Sepsis or septic shock. This is a serious bodily reaction to an infection.  · Blood clotting problems.  · Secondary infections due to bacteria or fungus.  The virus that causes COVID-19 is contagious. This means that it can spread from person to person through droplets from coughs and sneezes (respiratory secretions).  What are the causes?  This illness is caused by a virus. You may catch the virus by:  · Breathing in droplets from an infected person's cough or sneeze.  · Touching something, like a table or a doorknob, that was exposed to the virus (contaminated) and then touching your mouth, nose, or eyes.  What increases the risk?  Risk for infection  You are more likely to be infected with this virus if you:  · Live in or travel to an area with a COVID-19 outbreak.  · Come in contact with a sick person who recently traveled to an area with a COVID-19 outbreak.  · Provide care for  or live with a person who is infected with COVID-19.  Risk for serious illness  You are more likely to become seriously ill from the virus if you:  · Are 65 years of age or older.  · Have a long-term disease that lowers your body's ability to fight infection (immunocompromised).  · Live in a nursing home or long-term care facility.  · Have a long-term (chronic) disease such as:  ? Chronic lung disease, including chronic obstructive pulmonary disease or asthma  ? Heart disease.  ? Diabetes.  ? Chronic kidney disease.  ? Liver disease.  · Are obese.  What are the signs or symptoms?  Symptoms of this condition can range from mild to severe. Symptoms may appear any time from 2 to 14 days after being exposed to the virus. They include:  · A fever.  · A cough.  · Difficulty breathing.  · Chills.  · Muscle pains.  · A sore throat.  · Loss of taste or smell.  Some people may also have stomach problems, such as nausea, vomiting, or diarrhea.  Other people may not have any symptoms of COVID-19.  How is this diagnosed?  This condition may be diagnosed based on:  · Your signs and symptoms, especially if:  ? You live in an area with a COVID-19 outbreak.  ? You recently traveled to or from an area where the virus is common.  ? You provide care for or live with a person who was diagnosed with COVID-19.  · A physical exam.  · Lab tests, which may include:  ? A nasal swab to take a sample of fluid from your nose.  ? A throat swab to take a sample of fluid from your throat.  ? A sample of mucus from your lungs (sputum).  ? Blood tests.  · Imaging tests, which may include, X-rays, CT scan, or ultrasound.  How is this treated?  At present, there is no medicine to treat COVID-19. Medicines that treat other diseases are being used on a trial basis to see if they are effective against COVID-19.  Your health care provider will talk with you about ways to treat your symptoms. For most people, the infection is mild and can be managed at home  with rest, fluids, and over-the-counter medicines.  Treatment for a serious infection usually takes places in a hospital intensive care unit (ICU). It may include one or more of the following treatments. These treatments are given until your symptoms improve.  · Receiving fluids and medicines through an IV.  · Supplemental oxygen. Extra oxygen is given through a tube in the nose, a face mask, or a disla.  · Positioning you to lie on your stomach (prone position). This makes it easier for oxygen to get into the lungs.  · Continuous positive airway pressure (CPAP) or bi-level positive airway pressure (BPAP) machine. This treatment uses mild air pressure to keep the airways open. A tube that is connected to a motor delivers oxygen to the body.  · Ventilator. This treatment moves air into and out of the lungs by using a tube that is placed in your windpipe.  · Tracheostomy. This is a procedure to create a hole in the neck so that a breathing tube can be inserted.  · Extracorporeal membrane oxygenation (ECMO). This procedure gives the lungs a chance to recover by taking over the functions of the heart and lungs. It supplies oxygen to the body and removes carbon dioxide.  Follow these instructions at home:  Lifestyle  · If you are sick, stay home except to get medical care. Your health care provider will tell you how long to stay home. Call your health care provider before you go for medical care.  · Rest at home as told by your health care provider.  · Do not use any products that contain nicotine or tobacco, such as cigarettes, e-cigarettes, and chewing tobacco. If you need help quitting, ask your health care provider.  · Return to your normal activities as told by your health care provider. Ask your health care provider what activities are safe for you.  General instructions  · Take over-the-counter and prescription medicines only as told by your health care provider.  · Drink enough fluid to keep your urine pale  yellow.  · Keep all follow-up visits as told by your health care provider. This is important.  How is this prevented?    There is no vaccine to help prevent COVID-19 infection. However, there are steps you can take to protect yourself and others from this virus.  To protect yourself:   · Do not travel to areas where COVID-19 is a risk. The areas where COVID-19 is reported change often. To identify high-risk areas and travel restrictions, check the CDC travel website: wwwnc.cdc.gov/travel/notices  · If you live in, or must travel to, an area where COVID-19 is a risk, take precautions to avoid infection.  ? Stay away from people who are sick.  ? Wash your hands often with soap and water for 20 seconds. If soap and water are not available, use an alcohol-based hand .  ? Avoid touching your mouth, face, eyes, or nose.  ? Avoid going out in public, follow guidance from your state and local health authorities.  ? If you must go out in public, wear a cloth face covering or face mask.  ? Disinfect objects and surfaces that are frequently touched every day. This may include:  § Counters and tables.  § Doorknobs and light switches.  § Sinks and faucets.  § Electronics, such as phones, remote controls, keyboards, computers, and tablets.  To protect others:  If you have symptoms of COVID-19, take steps to prevent the virus from spreading to others.  · If you think you have a COVID-19 infection, contact your health care provider right away. Tell your health care team that you think you may have a COVID-19 infection.  · Stay home. Leave your house only to seek medical care. Do not use public transport.  · Do not travel while you are sick.  · Wash your hands often with soap and water for 20 seconds. If soap and water are not available, use alcohol-based hand .  · Stay away from other members of your household. Let healthy household members care for children and pets, if possible. If you have to care for children  or pets, wash your hands often and wear a mask. If possible, stay in your own room, separate from others. Use a different bathroom.  · Make sure that all people in your household wash their hands well and often.  · Cough or sneeze into a tissue or your sleeve or elbow. Do not cough or sneeze into your hand or into the air.  · Wear a cloth face covering or face mask.  Where to find more information  · Centers for Disease Control and Prevention: www.cdc.gov/coronavirus/2019-ncov/index.html  · World Health Organization: www.who.int/health-topics/coronavirus  Contact a health care provider if:  · You live in or have traveled to an area where COVID-19 is a risk and you have symptoms of the infection.  · You have had contact with someone who has COVID-19 and you have symptoms of the infection.  Get help right away if:  · You have trouble breathing.  · You have pain or pressure in your chest.  · You have confusion.  · You have bluish lips and fingernails.  · You have difficulty waking from sleep.  · You have symptoms that get worse.  These symptoms may represent a serious problem that is an emergency. Do not wait to see if the symptoms will go away. Get medical help right away. Call your local emergency services (911 in the [place] Do not drive yourself to the hospital. Let the emergency medical personnel know if you think you have COVID-19.  Summary  · COVID-19 is a respiratory infection that is caused by a virus. It is also known as coronavirus disease or novel coronavirus. It can cause serious infections, such as pneumonia, acute respiratory distress syndrome, acute respiratory failure, or sepsis.  · The virus that causes COVID-19 is contagious. This means that it can spread from person to person through droplets from coughs and sneezes.  · You are more likely to develop a serious illness if you are 65 years of age or older, have a weak immunity, live in a nursing home, or have chronic disease.  · There is no medicine to  treat COVID-19. Your health care provider will talk with you about ways to treat your symptoms.  · Take steps to protect yourself and others from infection. Wash your hands often and disinfect objects and surfaces that are frequently touched every day. Stay away from people who are sick and wear a mask if you are sick.  This information is not intended to replace advice given to you by your health care provider. Make sure you discuss any questions you have with your health care provider.  Document Released: 01/23/2020 Document Revised: 05/14/2020 Document Reviewed: 01/23/2020  Elsevier Patient Education © 2020 [employer]    INSTRUCTIONS FOR COVID-19 OR ANY OTHER INFECTIOUS RESPIRATORY ILLNESSES    The Centers for Disease Control and Prevention (CDC) states that early indications for COVID-19 include cough, shortness of breath, difficulty breathing, or at least two of the following symptoms: chills, shaking with chills, muscle pain, headache, sore throat, and loss of taste or smell. Symptoms can range from mild to severe and may appear up to two weeks after exposure to the virus.    The practice of self-isolation and quarantine helps protect the public and your family by  preventing exposure to people who have or may have a contagious disease. Please follow the prevention steps below as based on CDC guidelines:    WHEN TO STOP ISOLATION: Persons with COVID-19 or any other infectious respiratory illness who have symptoms and were advised to care for themselves at home may discontinue home isolation under the following conditions:  · At least 24 hours have passed since recovery defined as resolution of fever without the use of fever-reducing medications; AND,  · Improvement in respiratory symptoms (e.g., cough, shortness of breath); AND,  · At least 10 days have passed since symptoms first appeared and have had no subsequent illness.    MONITOR YOUR SYMPTOMS: If your illness is worsening, seek prompt medical  attention. If you have a medical emergency and need to call 911, notify the dispatch personnel that you have, or are being evaluated for confirmed or suspected COVID-19 or another infectious respiratory illness. Wear a facemask if possible.    ACTIVITY RESTRICTION: restrict activities outside your home, except for getting medical care. Do not go to work, school, or public areas. Avoid using public transportation, ride-sharing, or taxis.    SCHEDULED MEDICAL APPOINTMENTS: Notify your provider that you have, or are being evaluated for, confirmed or suspected COVID-19 or another infectious respiratory. This will help the healthcare provider’s office safely take care of you and keep other people from getting exposed or infected.    FACEMASKS, when to wear: Anytime you are away from your home or around other people or pets. If you are unable to wear one, maintain a minimum of 6 feet distancing from others.    LIVING ENVIRONMENT: Stay in a separate room from other people and pets. If possible, use a separate bathroom, have someone else care for your pets and avoid sharing household items. Any items used should be washed thoroughly with soap and water. Clean all “high-touch” surfaces every day. Use a household cleaning spray or wipe, according to the label instructions. High touch surfaces include (but are not limited to) counters, tabletops, doorknobs, bathroom fixtures, toilets, phones, keyboards, tablets, and bedside tables.     HAND WASHING: Frequently wash hands with soap and water for at least 20 seconds,  especially after blowing your nose, coughing, or sneezing; going to the bathroom; before and after interacting with pets; and before and after eating or preparing food. If hands are visibly dirty use soap and water. If soap and water are not available, use an alcohol-based hand  with at least 60% alcohol. Avoid touching your eyes, nose, and mouth with unwashed hands. Cover your coughs and sneezes with a  tissue. Throw used tissues in a lined trash can. Immediately wash your hands.    ACTIVE/FACILITATED SELF-MONITORING: Follow instructions provided by your local health department or health professionals, as appropriate. When working with your local health department check their available hours.    [facility]   Phone Number   [place] [phone]   Lukasz Longo [place], [place] [phone]   [place] Call 211   [place] [phone]     IF YOU HAVE CONFIRMED POSITIVE COVID-19:    Those who have completely recovered from COVID-19 may have immune-boosting antibodies in their plasma--called “convalescent plasma”--that could be used to treat critically ill COVID19 patients.    [facility] is excited to begin working with [facility] on collecting convalescent plasma from  people who have recovered from COVID-19 as part of a program to treat patients infected with the virus. This FDA-approved “emergency investigational new drug” is a special blood product containing antibodies that may give patients an extra boost to fight the virus.    To be eligible to donate convalescent plasma, you must have a prior COVID-19 diagnosis documented by a laboratory test (or a positive test result for SARS-CoV-2 antibodies) and meet additional eligibility requirements.    If you are interested in donating convalescent plasma or have any additional questions, please contact the [facility] Convalescent Plasma  at [phone] or via e-mail at [email or web address].

## 2020-11-09 NOTE — DISCHARGE PLANNING
ATTN: Case Management  RE: Referral for Home Health    As of 11/9/2020, we have accepted the Home Health referral for the patient listed above.    A [facility] clinician will be out to see the patient within 48 hours. If you have any questions or concerns regarding the patient’s transition to Home Health, please do not hesitate to contact us at [phone].      We look forward to collaborating with you,  [facility] Home Health Team

## 2020-11-09 NOTE — DISCHARGE PLANNING
Received Choice form at 1259  Agency/Facility Name: [facility]  Referral sent per Choice form @ 1316     Received Choice form at 1300  Agency/Facility Name: [facility]  Referral sent per Choice form @ 1329

## 2020-11-09 NOTE — THERAPY
"Occupational Therapy   Initial Evaluation     Patient Name: Berny Renee  Age:  75 y.o., Sex:  female  Medical Record #: [id number]  Today's Date: 11/9/2020     Precautions  Precautions: Fall Risk    Assessment  Patient is 75 y.o. female admitted with dyspnea, fever, body aches, and nausea found to have COVID-19. Pt with PMHx of arthritis, COPD, cervical and thoracic DDD, dyslipidemia, HTN, REGINE, osteoporosis, and lumbar spinal stenosis. Pt able to complete ADLs, ADL transfers, and functional mobility with FWW with spv, demonstrated no LOB. Although pt lives alone, states her friend will be staying with her to help out upon d/c home. Pt has needed DME for safety with showering/ADL transfers. Recommend home health for continued occupational therapy services. Patient will not be actively followed for occupational therapy services at this time, however may be seen if requested by physician for 1 more visit within 30 days to address any discharge or equipment needs.     Plan    Recommend Occupational Therapy for Evaluation only.    DC Equipment Recommendations: Front-Wheel Walker  Discharge Recommendations: Recommend home health for continued occupational therapy services     Subjective    Pt alert, pleasant, and cooperative with OT kana. Pt states, \"I'll starve if I have to stay here any longer.\"     Objective       11/09/20 1155   Prior Living Situation   Prior Services Housekeeping / Homemaker Services   Housing / Facility Mobile Home   Steps Into Home   (ramp at entry)   Steps In Home 0   Bathroom Set up Walk In Shower;Shower Chair;Grab Bars   Equipment Owned Scooter;Single Point Cane;Tub / Shower Seat;Grab Bar(s) In Tub / Shower;Oxygen   Lives with - Patient's Self Care Capacity Alone and Able to Care For Self   Comments Pt lives alone but states her friend is planning to come stay with her upon d/c home.   Prior Level of ADL Function   Self Feeding Independent   Grooming / Hygiene Independent   Bathing Independent "   Dressing Independent   Toileting Independent   Prior Level of IADL Function   Medication Management Independent   Laundry Independent   Kitchen Mobility Independent   Finances Independent   Home Management Requires Assist  ()   Shopping Requires Assist   Prior Level Of Mobility Independent With Device in Community;Independent Without Device in Home  (uses scooter for community)   Driving / Transportation Relatives / Others Provide Transportation  (patient can drive but states friend drives her for groceries)   Occupation (Pre-Hospital Vocational) Retired Due To Age   Leisure Interests Pets  (2 chihuahuas and one cat)   History of Falls   History of Falls No   Date of Last Fall   (reports she lowered herself to ground and called ambulance)   Vitals   Vitals Comments 6L O2 at baseline   Cognition    Comments pleasant, cooperative   Upper Body Muscle Tone   Upper Body Muscle Tone  X   Rt Upper Extremity Muscle Tone Intention Tremor   Lt Upper Extremity Muscle Tone Intention Tremor   Comments Pt demonstrated significant tremoring of bilateral hands during activities. Pt states she has had tremoring for many years with unknown cause.   Coordination Upper Body   Coordination WDL   Comments at times had difficulty replacing caps due to tremoring, still WFL   Balance Assessment   Comments standing with FWW, no overt LOB   Bed Mobility    Supine to Sit Supervised   Comments heavy use of bed rails   ADL Assessment   Grooming Supervision;Standing  (oral care)   Lower Body Dressing Supervision  (socks)   Toileting Supervision   Functional Mobility   Sit to Stand Supervised   Toilet Transfers Supervised   Mobility in room/bathroom with FWW, close spv   Activity Tolerance   Comments no overt signs/symptoms of fatigue   Patient / Family Goals   Patient / Family Goal #1 To go home today

## 2020-11-09 NOTE — RESPIRATORY CARE
"COPD EDUCATION by COPD CLINICAL EDUCATOR  11/9/2020 at 3:37 PM by Clarita Curtis, RRT     COPD Education provided   Patient unable to participate in full program. We reviewed together our comprehensive packet of information about COPD, treatments, and Oxygen safety. He states he quit smoking years ago (1999). He has  Oxygen he utilizes 24/7 via a high flow (up to 10) Oxygen Concentrator -set at \"6\". He has been a patient of [facility] and has PFT's available in EMR.  We reviewed has respiratory plan and medications as noted below. He is here with Covid + sequela.    Action Plan Completed/Updated? Completed and left at bedside    Pulmonary Function Testing (PFT)? 4/2019: FEV1 =44; FEV1/ FVC Ratio - 48    Does patient currently use inhalers/nebulizer at home? Advair 500, Spiriva Respimat, Albuterol MDI, Duoneb    Additional medication/equipment recommendations he declined a spacer stating he has one.    Is all Respiratory DME in place? Oxygen (high flow-10) nebulizer                 If yes, has patient been educated on use of DME?   Oxygen Safety and leaning with daily use of nebulizer    Have all necessary follow up appointments been scheduled?   PCP or D/C clinic [facility] he declined assistance with PCP appt.   Specialty Cardiology in EMR    Has the patient been referred to the [facility] COPD Program? n/a    Home Health referral placed?yes per MD   Recommended? planned          "

## 2020-11-10 ENCOUNTER — HOME CARE VISIT (OUTPATIENT)
Dept: HOME HEALTH SERVICES | Facility: HOME HEALTHCARE | Age: 75
End: 2020-11-10
Payer: MEDICARE

## 2020-11-10 VITALS
HEART RATE: 88 BPM | TEMPERATURE: 97.8 F | HEIGHT: 64 IN | OXYGEN SATURATION: 94 % | WEIGHT: 181 LBS | BODY MASS INDEX: 30.9 KG/M2 | DIASTOLIC BLOOD PRESSURE: 78 MMHG | RESPIRATION RATE: 18 BRPM | SYSTOLIC BLOOD PRESSURE: 140 MMHG

## 2020-11-10 PROCEDURE — G0493 RN CARE EA 15 MIN HH/HOSPICE: HCPCS

## 2020-11-10 PROCEDURE — 665001 SOC-HOME HEALTH

## 2020-11-10 SDOH — ECONOMIC STABILITY: HOUSING INSECURITY: EVIDENCE OF SMOKING MATERIAL: 0

## 2020-11-10 SDOH — ECONOMIC STABILITY: HOUSING INSECURITY
HOME SAFETY: VE A FIRE ESCAPE PLAN DEVELOPED. PATIENT DOES NOT HAVE FLAMMABLE MATERIALS PRESENT IN THE HOME PRESENTING A FIRE HAZARD. NO EVIDENCE FOUND OF SMOKING MATERIALS PRESENT IN THE HOME.

## 2020-11-10 SDOH — ECONOMIC STABILITY: HOUSING INSECURITY
HOME SAFETY: OXYGEN SAFETY RISK ASSESSMENT PERFORMED. PATIENT DOES NOT HAVE A NO SMOKING SIGN POSTED IN THE HOME. PATIENT DOES HAVE A WORKING FIRE EXTINGUISHER PRESENT IN THE HOME. SMOKE ALARMS ARE PRESENT AND FUNCTIONAL ON EACH LEVEL OF THE HOME. PATIENT DOES HA

## 2020-11-10 ASSESSMENT — PATIENT HEALTH QUESTIONNAIRE - PHQ9
2. FEELING DOWN, DEPRESSED, IRRITABLE, OR HOPELESS: 00
1. LITTLE INTEREST OR PLEASURE IN DOING THINGS: 00
CLINICAL INTERPRETATION OF PHQ2 SCORE: 0

## 2020-11-10 ASSESSMENT — ACTIVITIES OF DAILY LIVING (ADL): TRANSPORTATION COMMENTS: O2 DEPENDENT

## 2020-11-10 ASSESSMENT — ENCOUNTER SYMPTOMS
SHORTNESS OF BREATH: T
ADDITIONAL INFORMATION: GENERALIZED PAIN
NAUSEA: DENIES
VOMITING: DENIES
SEVERE DYSPNEA: 1

## 2020-11-10 ASSESSMENT — FIBROSIS 4 INDEX: FIB4 SCORE: 2.49

## 2020-11-10 NOTE — DISCHARGE PLANNING
Anticipated Discharge Disposition: Home with home koby    Action: Per chart review, [facility] accepted pt, aware of pt discharge today. Per [facility], [facility] at [facility] to deliver pt's FWW.   Voalte message sent to bedside RN.   This RN CM spoke to pt's pharmacy, Eliquis and dexamethasone both went through insurance, no copay. Per pharmacy, medications will be delivered to pt today or tomorrow.     Barriers to Discharge:   None.    Plan:   Will continue to follow and assist with discharge planning needs.

## 2020-11-11 NOTE — PROGRESS NOTES
Primary dx/Skilled need:COVID-19 (symptoms started 11/02) tested positive 11/7 Skilled nursing for cardio/pulmonary assessment and response to medication (eliquis, dexamethasone) with disease process management instruction/intervention  SN frequency.2wk1, 3wk1, 1wk6  Zip code.[id number]  Disciplines ordered.SN  Insurance and authorization.[facility]  Certification period.11/10/2020 TO 01/08/2021  Special considerations.Full PPE.

## 2020-11-12 ENCOUNTER — TELEPHONE (OUTPATIENT)
Dept: SLEEP MEDICINE | Facility: MEDICAL CENTER | Age: 75
End: 2020-11-12

## 2020-11-12 ENCOUNTER — DOCUMENTATION (OUTPATIENT)
Dept: VASCULAR LAB | Facility: MEDICAL CENTER | Age: 75
End: 2020-11-12

## 2020-11-12 DIAGNOSIS — J44.9 CHRONIC OBSTRUCTIVE PULMONARY DISEASE, UNSPECIFIED COPD TYPE (HCC): ICD-10-CM

## 2020-11-12 LAB
BACTERIA BLD CULT: NORMAL
BACTERIA BLD CULT: NORMAL
SIGNIFICANT IND 70042: NORMAL
SIGNIFICANT IND 70042: NORMAL
SITE SITE: NORMAL
SITE SITE: NORMAL
SOURCE SOURCE: NORMAL
SOURCE SOURCE: NORMAL

## 2020-11-12 RX ORDER — AZITHROMYCIN 250 MG/1
TABLET, FILM COATED ORAL
Qty: 30 TAB | Refills: 2 | Status: ON HOLD
Start: 2020-11-12 | End: 2020-11-21

## 2020-11-12 NOTE — PROGRESS NOTES
[facility]    Received referral from [facility] to review patient's medication list.    Med list reviewed and reconciled.  No clinically significant DDI identified.  Allergies reviewed.    Aparna Nguyen, JacD

## 2020-11-12 NOTE — TELEPHONE ENCOUNTER
Patient calling she in at home with COVID and the home health nurse is doing routine follow ups.   PATIENT WANTING TO SPEAK TO KEI  Patient is upset because the hospital upon discharge took her off her daily maintenance of Azithromycin and she states he oxygen levels keep dropping into the 60's but they increase her oxygen and get her back up to normal levels.     Message routed to provider.

## 2020-11-13 ENCOUNTER — HOME CARE VISIT (OUTPATIENT)
Dept: HOME HEALTH SERVICES | Facility: HOME HEALTHCARE | Age: 75
End: 2020-11-13
Payer: MEDICARE

## 2020-11-13 PROCEDURE — G0299 HHS/HOSPICE OF RN EA 15 MIN: HCPCS

## 2020-11-14 ENCOUNTER — APPOINTMENT (OUTPATIENT)
Dept: RADIOLOGY | Facility: MEDICAL CENTER | Age: 75
DRG: 177 | End: 2020-11-14
Attending: EMERGENCY MEDICINE
Payer: MEDICARE

## 2020-11-14 ENCOUNTER — HOME CARE VISIT (OUTPATIENT)
Dept: HOME HEALTH SERVICES | Facility: HOME HEALTHCARE | Age: 75
End: 2020-11-14
Payer: MEDICARE

## 2020-11-14 ENCOUNTER — HOSPITAL ENCOUNTER (INPATIENT)
Facility: MEDICAL CENTER | Age: 75
LOS: 7 days | DRG: 177 | End: 2020-11-21
Attending: EMERGENCY MEDICINE | Admitting: HOSPITALIST
Payer: MEDICARE

## 2020-11-14 DIAGNOSIS — G89.4 CHRONIC PAIN SYNDROME: ICD-10-CM

## 2020-11-14 DIAGNOSIS — J96.21 ACUTE ON CHRONIC RESPIRATORY FAILURE WITH HYPOXIA (HCC): ICD-10-CM

## 2020-11-14 DIAGNOSIS — U07.1 COVID-19: ICD-10-CM

## 2020-11-14 DIAGNOSIS — J44.9 STAGE 4 VERY SEVERE COPD BY GOLD CLASSIFICATION (HCC): ICD-10-CM

## 2020-11-14 DIAGNOSIS — M54.16 LUMBAR RADICULAR PAIN: ICD-10-CM

## 2020-11-14 LAB
ALBUMIN SERPL BCP-MCNC: 3.6 G/DL (ref 3.2–4.9)
ALBUMIN/GLOB SERPL: 1.2 G/DL
ALP SERPL-CCNC: 40 U/L (ref 30–99)
ALT SERPL-CCNC: 32 U/L (ref 2–50)
ANION GAP SERPL CALC-SCNC: 10 MMOL/L (ref 7–16)
AST SERPL-CCNC: 39 U/L (ref 12–45)
BASOPHILS # BLD AUTO: 0 % (ref 0–1.8)
BASOPHILS # BLD: 0 K/UL (ref 0–0.12)
BILIRUB SERPL-MCNC: 0.3 MG/DL (ref 0.1–1.5)
BLOOD CULTURE HOLD CXBCH: NORMAL
BUN SERPL-MCNC: 13 MG/DL (ref 8–22)
CALCIUM SERPL-MCNC: 9 MG/DL (ref 8.5–10.5)
CHLORIDE SERPL-SCNC: 89 MMOL/L (ref 96–112)
CO2 SERPL-SCNC: 31 MMOL/L (ref 20–33)
COVID ORDER STATUS COVID19: NORMAL
CREAT SERPL-MCNC: 0.39 MG/DL (ref 0.5–1.4)
CRP SERPL HS-MCNC: 1.04 MG/DL (ref 0–0.75)
D DIMER PPP IA.FEU-MCNC: 0.93 UG/ML (FEU) (ref 0–0.5)
EKG IMPRESSION: NORMAL
EOSINOPHIL # BLD AUTO: 0.06 K/UL (ref 0–0.51)
EOSINOPHIL NFR BLD: 0.8 % (ref 0–6.9)
ERYTHROCYTE [DISTWIDTH] IN BLOOD BY AUTOMATED COUNT: 42.3 FL (ref 35.9–50)
FLUAV RNA SPEC QL NAA+PROBE: NEGATIVE
FLUBV RNA SPEC QL NAA+PROBE: NEGATIVE
GLOBULIN SER CALC-MCNC: 3.1 G/DL (ref 1.9–3.5)
GLUCOSE SERPL-MCNC: 112 MG/DL (ref 65–99)
HCT VFR BLD AUTO: 39.3 % (ref 37–47)
HGB BLD-MCNC: 13.3 G/DL (ref 12–16)
LYMPHOCYTES # BLD AUTO: 0.89 K/UL (ref 1–4.8)
LYMPHOCYTES NFR BLD: 11.8 % (ref 22–41)
MAGNESIUM SERPL-MCNC: 2 MG/DL (ref 1.5–2.5)
MANUAL DIFF BLD: NORMAL
MCH RBC QN AUTO: 30.2 PG (ref 27–33)
MCHC RBC AUTO-ENTMCNC: 33.8 G/DL (ref 33.6–35)
MCV RBC AUTO: 89.1 FL (ref 81.4–97.8)
MONOCYTES # BLD AUTO: 0.89 K/UL (ref 0–0.85)
MONOCYTES NFR BLD AUTO: 11.8 % (ref 0–13.4)
MORPHOLOGY BLD-IMP: NORMAL
NEUTROPHILS # BLD AUTO: 5.67 K/UL (ref 2–7.15)
NEUTROPHILS NFR BLD: 75.6 % (ref 44–72)
NRBC # BLD AUTO: 0 K/UL
NRBC BLD-RTO: 0 /100 WBC
PLATELET # BLD AUTO: 289 K/UL (ref 164–446)
PLATELET BLD QL SMEAR: NORMAL
PMV BLD AUTO: 10 FL (ref 9–12.9)
POTASSIUM SERPL-SCNC: 4.4 MMOL/L (ref 3.6–5.5)
PROCALCITONIN SERPL-MCNC: <0.05 NG/ML
PROT SERPL-MCNC: 6.7 G/DL (ref 6–8.2)
RBC # BLD AUTO: 4.41 M/UL (ref 4.2–5.4)
RBC BLD AUTO: NORMAL
RSV RNA SPEC QL NAA+PROBE: NEGATIVE
SARS-COV-2 RNA RESP QL NAA+PROBE: DETECTED
SODIUM SERPL-SCNC: 130 MMOL/L (ref 135–145)
SPECIMEN SOURCE: ABNORMAL
WBC # BLD AUTO: 7.5 K/UL (ref 4.8–10.8)

## 2020-11-14 PROCEDURE — A9270 NON-COVERED ITEM OR SERVICE: HCPCS | Performed by: HOSPITALIST

## 2020-11-14 PROCEDURE — 700101 HCHG RX REV CODE 250: Performed by: INTERNAL MEDICINE

## 2020-11-14 PROCEDURE — 700111 HCHG RX REV CODE 636 W/ 250 OVERRIDE (IP): Performed by: STUDENT IN AN ORGANIZED HEALTH CARE EDUCATION/TRAINING PROGRAM

## 2020-11-14 PROCEDURE — 0241U HCHG SARS-COV-2 COVID-19 NFCT DS RESP RNA 4 TRGT MIC: CPT

## 2020-11-14 PROCEDURE — 84145 PROCALCITONIN (PCT): CPT

## 2020-11-14 PROCEDURE — 86140 C-REACTIVE PROTEIN: CPT

## 2020-11-14 PROCEDURE — 700105 HCHG RX REV CODE 258: Performed by: INTERNAL MEDICINE

## 2020-11-14 PROCEDURE — XW033E5 INTRODUCTION OF REMDESIVIR ANTI-INFECTIVE INTO PERIPHERAL VEIN, PERCUTANEOUS APPROACH, NEW TECHNOLOGY GROUP 5: ICD-10-PCS | Performed by: INTERNAL MEDICINE

## 2020-11-14 PROCEDURE — 85027 COMPLETE CBC AUTOMATED: CPT

## 2020-11-14 PROCEDURE — 94760 N-INVAS EAR/PLS OXIMETRY 1: CPT

## 2020-11-14 PROCEDURE — 83735 ASSAY OF MAGNESIUM: CPT

## 2020-11-14 PROCEDURE — A9270 NON-COVERED ITEM OR SERVICE: HCPCS | Performed by: STUDENT IN AN ORGANIZED HEALTH CARE EDUCATION/TRAINING PROGRAM

## 2020-11-14 PROCEDURE — 700102 HCHG RX REV CODE 250 W/ 637 OVERRIDE(OP): Performed by: HOSPITALIST

## 2020-11-14 PROCEDURE — 85007 BL SMEAR W/DIFF WBC COUNT: CPT

## 2020-11-14 PROCEDURE — 99223 1ST HOSP IP/OBS HIGH 75: CPT | Performed by: INTERNAL MEDICINE

## 2020-11-14 PROCEDURE — 93005 ELECTROCARDIOGRAM TRACING: CPT | Performed by: EMERGENCY MEDICINE

## 2020-11-14 PROCEDURE — 99223 1ST HOSP IP/OBS HIGH 75: CPT | Mod: AI | Performed by: HOSPITALIST

## 2020-11-14 PROCEDURE — 99285 EMERGENCY DEPT VISIT HI MDM: CPT

## 2020-11-14 PROCEDURE — 71045 X-RAY EXAM CHEST 1 VIEW: CPT

## 2020-11-14 PROCEDURE — 85379 FIBRIN DEGRADATION QUANT: CPT

## 2020-11-14 PROCEDURE — 83520 IMMUNOASSAY QUANT NOS NONAB: CPT

## 2020-11-14 PROCEDURE — 700102 HCHG RX REV CODE 250 W/ 637 OVERRIDE(OP): Performed by: STUDENT IN AN ORGANIZED HEALTH CARE EDUCATION/TRAINING PROGRAM

## 2020-11-14 PROCEDURE — 94640 AIRWAY INHALATION TREATMENT: CPT

## 2020-11-14 PROCEDURE — 80053 COMPREHEN METABOLIC PANEL: CPT

## 2020-11-14 PROCEDURE — 770021 HCHG ROOM/CARE - ISO PRIVATE

## 2020-11-14 RX ORDER — DEXAMETHASONE 2 MG/1
6 TABLET ORAL DAILY
Status: ON HOLD | COMMUNITY
Start: 2020-11-09 | End: 2020-11-21

## 2020-11-14 RX ORDER — OXYCODONE HYDROCHLORIDE 10 MG/1
10 TABLET ORAL EVERY 8 HOURS PRN
Status: DISCONTINUED | OUTPATIENT
Start: 2020-11-14 | End: 2020-11-17

## 2020-11-14 RX ORDER — BISACODYL 10 MG
10 SUPPOSITORY, RECTAL RECTAL
Status: DISCONTINUED | OUTPATIENT
Start: 2020-11-14 | End: 2020-11-21 | Stop reason: HOSPADM

## 2020-11-14 RX ORDER — AZITHROMYCIN 250 MG/1
250 TABLET, FILM COATED ORAL DAILY
Status: DISCONTINUED | OUTPATIENT
Start: 2020-11-14 | End: 2020-11-14

## 2020-11-14 RX ORDER — AMOXICILLIN 250 MG
2 CAPSULE ORAL 2 TIMES DAILY
Status: DISCONTINUED | OUTPATIENT
Start: 2020-11-14 | End: 2020-11-19

## 2020-11-14 RX ORDER — AZITHROMYCIN 250 MG/1
250 TABLET, FILM COATED ORAL DAILY
Status: COMPLETED | OUTPATIENT
Start: 2020-11-14 | End: 2020-11-18

## 2020-11-14 RX ORDER — ACYCLOVIR 200 MG/1
200 CAPSULE ORAL DAILY
COMMUNITY

## 2020-11-14 RX ORDER — FUROSEMIDE 10 MG/ML
40 INJECTION INTRAMUSCULAR; INTRAVENOUS ONCE
Status: COMPLETED | OUTPATIENT
Start: 2020-11-14 | End: 2020-11-14

## 2020-11-14 RX ORDER — POLYETHYLENE GLYCOL 3350 17 G/17G
1 POWDER, FOR SOLUTION ORAL
Status: DISCONTINUED | OUTPATIENT
Start: 2020-11-14 | End: 2020-11-21 | Stop reason: HOSPADM

## 2020-11-14 RX ORDER — SPIRONOLACTONE 25 MG/1
25 TABLET ORAL
Status: DISCONTINUED | OUTPATIENT
Start: 2020-11-14 | End: 2020-11-21 | Stop reason: HOSPADM

## 2020-11-14 RX ORDER — VITAMIN B COMPLEX
2000 TABLET ORAL DAILY
Status: DISCONTINUED | OUTPATIENT
Start: 2020-11-14 | End: 2020-11-21 | Stop reason: HOSPADM

## 2020-11-14 RX ORDER — ALBUTEROL SULFATE 90 UG/1
2 AEROSOL, METERED RESPIRATORY (INHALATION) EVERY 6 HOURS PRN
Status: DISCONTINUED | OUTPATIENT
Start: 2020-11-14 | End: 2020-11-19

## 2020-11-14 RX ORDER — DULOXETIN HYDROCHLORIDE 30 MG/1
30 CAPSULE, DELAYED RELEASE ORAL DAILY
COMMUNITY
Start: 2020-11-11 | End: 2020-11-23

## 2020-11-14 RX ORDER — BUDESONIDE AND FORMOTEROL FUMARATE DIHYDRATE 160; 4.5 UG/1; UG/1
2 AEROSOL RESPIRATORY (INHALATION)
Status: DISCONTINUED | OUTPATIENT
Start: 2020-11-14 | End: 2020-11-21 | Stop reason: HOSPADM

## 2020-11-14 RX ORDER — FUROSEMIDE 10 MG/ML
20 INJECTION INTRAMUSCULAR; INTRAVENOUS
Status: DISCONTINUED | OUTPATIENT
Start: 2020-11-14 | End: 2020-11-16

## 2020-11-14 RX ORDER — OXYCODONE HYDROCHLORIDE 5 MG/1
10 TABLET ORAL 4 TIMES DAILY
Status: DISCONTINUED | OUTPATIENT
Start: 2020-11-14 | End: 2020-11-14

## 2020-11-14 RX ORDER — OXYCODONE HYDROCHLORIDE 10 MG/1
10 TABLET ORAL 3 TIMES DAILY PRN
Status: ON HOLD | COMMUNITY
End: 2020-11-21 | Stop reason: SDUPTHER

## 2020-11-14 RX ORDER — TIZANIDINE 4 MG/1
4 TABLET ORAL
Status: DISCONTINUED | OUTPATIENT
Start: 2020-11-14 | End: 2020-11-14

## 2020-11-14 RX ORDER — ONDANSETRON 2 MG/ML
4 INJECTION INTRAMUSCULAR; INTRAVENOUS EVERY 4 HOURS PRN
Status: DISCONTINUED | OUTPATIENT
Start: 2020-11-14 | End: 2020-11-14

## 2020-11-14 RX ORDER — GUAIFENESIN/DEXTROMETHORPHAN 100-10MG/5
10 SYRUP ORAL EVERY 6 HOURS PRN
Status: DISCONTINUED | OUTPATIENT
Start: 2020-11-14 | End: 2020-11-21 | Stop reason: HOSPADM

## 2020-11-14 RX ORDER — ACETAMINOPHEN 325 MG/1
650 TABLET ORAL EVERY 6 HOURS PRN
Status: DISCONTINUED | OUTPATIENT
Start: 2020-11-14 | End: 2020-11-15

## 2020-11-14 RX ORDER — AZITHROMYCIN 250 MG/1
250 TABLET, FILM COATED ORAL DAILY
Status: ON HOLD | COMMUNITY
Start: 2020-11-12 | End: 2020-11-21

## 2020-11-14 RX ORDER — MONTELUKAST SODIUM 10 MG/1
10 TABLET ORAL DAILY
Status: DISCONTINUED | OUTPATIENT
Start: 2020-11-14 | End: 2020-11-21 | Stop reason: HOSPADM

## 2020-11-14 RX ORDER — ONDANSETRON 4 MG/1
4 TABLET, ORALLY DISINTEGRATING ORAL EVERY 4 HOURS PRN
Status: DISCONTINUED | OUTPATIENT
Start: 2020-11-14 | End: 2020-11-14

## 2020-11-14 RX ORDER — LISINOPRIL 20 MG/1
20 TABLET ORAL DAILY
Status: DISCONTINUED | OUTPATIENT
Start: 2020-11-14 | End: 2020-11-21 | Stop reason: HOSPADM

## 2020-11-14 RX ORDER — DEXAMETHASONE 4 MG/1
6 TABLET ORAL DAILY
Status: COMPLETED | OUTPATIENT
Start: 2020-11-14 | End: 2020-11-17

## 2020-11-14 RX ADMIN — MONTELUKAST 10 MG: 10 TABLET, FILM COATED ORAL at 14:18

## 2020-11-14 RX ADMIN — REMDESIVIR 200 MG: 5 INJECTION INTRAVENOUS at 11:42

## 2020-11-14 RX ADMIN — OXYCODONE HYDROCHLORIDE 10 MG: 10 TABLET ORAL at 09:49

## 2020-11-14 RX ADMIN — Medication 2000 UNITS: at 11:43

## 2020-11-14 RX ADMIN — SPIRONOLACTONE 25 MG: 25 TABLET ORAL at 11:44

## 2020-11-14 RX ADMIN — FUROSEMIDE 40 MG: 10 INJECTION, SOLUTION INTRAMUSCULAR; INTRAVENOUS at 09:48

## 2020-11-14 RX ADMIN — DEXAMETHASONE 6 MG: 4 TABLET ORAL at 14:18

## 2020-11-14 RX ADMIN — ENOXAPARIN SODIUM 40 MG: 40 INJECTION SUBCUTANEOUS at 14:19

## 2020-11-14 RX ADMIN — LISINOPRIL 20 MG: 20 TABLET ORAL at 11:44

## 2020-11-14 RX ADMIN — GLYCOPYRROLATE 1 CAPSULE: 15.6 CAPSULE RESPIRATORY (INHALATION) at 14:20

## 2020-11-14 RX ADMIN — AZITHROMYCIN MONOHYDRATE 250 MG: 250 TABLET ORAL at 14:18

## 2020-11-14 RX ADMIN — GLYCOPYRROLATE 1 CAPSULE: 15.6 CAPSULE RESPIRATORY (INHALATION) at 18:56

## 2020-11-14 RX ADMIN — BUDESONIDE AND FORMOTEROL FUMARATE DIHYDRATE 2 PUFF: 160; 4.5 AEROSOL RESPIRATORY (INHALATION) at 11:58

## 2020-11-14 RX ADMIN — OXYCODONE HYDROCHLORIDE 10 MG: 10 TABLET ORAL at 18:01

## 2020-11-14 RX ADMIN — FUROSEMIDE 20 MG: 20 INJECTION, SOLUTION INTRAMUSCULAR; INTRAVENOUS at 18:01

## 2020-11-14 RX ADMIN — BUDESONIDE AND FORMOTEROL FUMARATE DIHYDRATE 2 PUFF: 160; 4.5 AEROSOL RESPIRATORY (INHALATION) at 18:56

## 2020-11-14 ASSESSMENT — ENCOUNTER SYMPTOMS
SPUTUM PRODUCTION: 0
HEADACHES: 0
PND: 0
BACK PAIN: 0
ORTHOPNEA: 0
DIARRHEA: 0
WEAKNESS: 0
SEVERE DYSPNEA: 1
ABDOMINAL PAIN: 0
STRIDOR: 0
SINUS PAIN: 0
DEBILITATING PAIN: 1
NECK PAIN: 0
EYE PAIN: 0
CONSTIPATION: 0
BLURRED VISION: 0
PHOTOPHOBIA: 0
BRUISES/BLEEDS EASILY: 0
BLOOD IN STOOL: 0
DIAPHORESIS: 1
FEVER: 0
TINGLING: 0
HEMOPTYSIS: 0
HALLUCINATIONS: 0
TREMORS: 0
CLAUDICATION: 0
SHORTNESS OF BREATH: 1
FLANK PAIN: 0
PALPITATIONS: 0
DEPRESSION: 0
WHEEZING: 0
NAUSEA: 0
VOMITING: 0
COUGH: 1
MYALGIAS: 0
SORE THROAT: 0
DIZZINESS: 0
CHILLS: 0
POLYDIPSIA: 0
HEARTBURN: 0
FALLS: 0
DOUBLE VISION: 0

## 2020-11-14 ASSESSMENT — PAIN DESCRIPTION - PAIN TYPE
TYPE: CHRONIC PAIN
TYPE: ACUTE PAIN
TYPE: ACUTE PAIN

## 2020-11-14 ASSESSMENT — FIBROSIS 4 INDEX
FIB4 SCORE: 1.79
FIB4 SCORE: 2.49

## 2020-11-14 ASSESSMENT — LIFESTYLE VARIABLES: SUBSTANCE_ABUSE: 0

## 2020-11-14 NOTE — DISCHARGE PLANNING
Care Transition Team Assessment    Information Source  Informant's Name: (chart review during COVID Pandemic)  Who is responsible for making decisions for patient? : Patient    Readmission Evaluation  Is this a readmission?: Yes - unplanned readmission    Elopement Risk  Legal Hold: No    Interdisciplinary Discharge Planning  Does Admitting Nurse Feel This Could be a Complex Discharge?: No  Primary Care Physician: Everett Diego  Lives with - Patient's Self Care Capacity: Alone and Able to Care For Self  Support Systems: Children, Friends / Neighbors  Housing / Facility: Mobile Home  Able to Return to Previous ADL's: Other(unknown at this time)  Mobility Issues: Yes  Prior Services: Skilled Home Health Services([facility])  Assistance Needed: Unknown at this Time  Durable Medical Equipment: Home Oxygen, Walker, Unknown(nebullizer, O2 supplied by [facility])  DME Provider / Phone: ([facility])    Discharge Preparedness  What are your discharge supports?: Child         Finances  Financial Barriers to Discharge: No  Prescription Coverage: Yes    Vision / Hearing Impairment  Vision Impairment : Yes  Right Eye Vision: Wears Glasses  Left Eye Vision: Wears Glasses  Hearing Impairment : No         Advance Directive  Advance Directive?: None  Advance Directive offered?: (Refused on 11/10/2020)              Discharge Risks or Barriers  Discharge risks or barriers?: Complex medical needs  Patient risk factors: Other (comment)(lives alone)    Anticipated Discharge Information  Discharge Address: [address]  Discharge Contact Phone Number: [phone]

## 2020-11-14 NOTE — ASSESSMENT & PLAN NOTE
COVID 19 pneumonia with associated acute on chronic respiratory failure.  Continue Decadron 6 mg daily for 10 days  Continue Remdesivir 11/18/2020  Isolation precautions

## 2020-11-14 NOTE — PROGRESS NOTES
Report received. Assumed care. Pt arrived to the floor via gurney with transport.  A/O x4. VSS. Responds appropriately.C/O pain, medicated per MAR.  Assessment complete. Discussed POC, monitor VS/labs, wean off oxygen, IS use, mobility, safety, DC planning , pt verbalizes understanding. Explained importance of calling before getting OOB. Call light and belongings within reach. Bed alarm on. Bed in the lowest position. Treaded socks in place. Hourly rounding in progress. Will continue to monitor .

## 2020-11-14 NOTE — ASSESSMENT & PLAN NOTE
Empirically treating for exacerbation from COVID  Dexamethasone 10d course for COVID  Flutter valve, IS  Duonebs q4h  S/p 5d azithromycin.

## 2020-11-14 NOTE — ASSESSMENT & PLAN NOTE
She is on 6 liters oxygen baseline and is now hypoxic in 80% on 6 liters.   Today she is ranging from 6 L while prone to 15 L with ambulation.  She has refused high-flow and an oxymask unless unable to improve with duonebs, flutter valve, steroids.  CT chest with emphysema but no consolidations  Self pronation and incentive spirometry  Treatment for COPD exacerbation as below  Up to chair for all meals

## 2020-11-14 NOTE — CONSULTS
INFECTIOUS DISEASES INPATIENT CONSULT NOTE     Date of Service: 11/14/2020    Consult Requested By: Myles Covarrubias M.D.    Reason for Consultation: COVID-19 pneumonia    History of Present Illness:   Berny Renee is a 75 y.o. woman with a history of dyslipidemia, hypertension, COPD and chronic hypoxic respiratory failure on 6 L nasal cannula at baseline admitted 11/14/2020 for worsening shortness of breath.  Patient was admitted 1 week ago with shortness of breath, fevers and malaise.  At that time she was diagnosed with COVID-19 pneumonia.  She was hospitalized for 3 days and did not require additional oxygen above her baseline of 6 L.  She had been treated with Decadron.  Patient was subsequently discharged home however over the past week she has been having increasing shortness of breath with hypoxemia and worsening fatigue.  She has been requiring more than 6 L nasal cannula at home without any clinical improvement.  Her oxygen saturations were persistently in the low 80s at home.  She denies any nausea, vomiting, abdominal pain or diarrhea.  No cough.  Given her persistent worsening symptoms, she presented to the ED for further evaluation and management.  On presentation, she was afebrile with a normal white count.  Procalcitonin was normal.  She was noted to be hypoxic and is now on 10 L of oxygen.  Chest x-ray shows increasing bilateral interstitial opacities.  She has been started on dexamethasone.  Renal function and liver enzymes are within normal limits.  Infectious disease service consulted for IV remdesivir.      All other review of systems reviewed and negative except those documented above in the HPI.     PMH:   Past Medical History:   Diagnosis Date   • Arthritis     spine   • Cataract immature    • Chronic pain    • Colon polyps    • COPD (chronic obstructive pulmonary disease) ([facility]) 2002    moderate to severe   • DDD (degenerative disc disease), cervical    • DDD (degenerative disc  disease), thoracic    • Diverticulitis of colon    • Dyslipidemia    • EMPHYSEMA    • Hypertension    • REGINE (obstructive sleep apnea)    • OSTEOPOROSIS    • Spinal stenosis, lumbar region, with neurogenic claudication    • URINARY INCONTINENCE    • Vitamin d deficiency        PSH:  Past Surgical History:   Procedure Laterality Date   • LUMBAR LAMINECTOMY DISKECTOMY  2010    Performed by GRACIE CANO at SURGERY [facility]   • OTHER ORTHOPEDIC SURGERY  2004    left ankle fx   • OTHER      leg fracture   • OTHER      t spine disc surg   • TONSILLECTOMY         FAMILY HX:  Family History   Problem Relation Age of Onset   • Other Sister         lung and leukemia cancer       SOCIAL HX:  Social History     Socioeconomic History   • Marital status:      Spouse name: Not on file   • Number of children: Not on file   • Years of education: Not on file   • Highest education level: Not on file   Occupational History   • Not on file   Social Needs   • Financial resource strain: Not on file   • Food insecurity     Worry: Not on file     Inability: Not on file   • Transportation needs     Medical: Not on file     Non-medical: Not on file   Tobacco Use   • Smoking status: Former Smoker     Packs/day: 2.00     Years: 30.00     Pack years: 60.00     Types: Cigarettes     Quit date: 3/1/1999     Years since quittin.7   • Smokeless tobacco: Never Used   • Tobacco comment: 3 pks a day for 35 yrs, continued abstinence   Substance and Sexual Activity   • Alcohol use: Yes     Alcohol/week: 4.2 oz     Types: 7 Glasses of wine per week   • Drug use: Yes     Types: Marijuana     Comment: Medical Marijuana    • Sexual activity: Never   Lifestyle   • Physical activity     Days per week: Not on file     Minutes per session: Not on file   • Stress: Not on file   Relationships   • Social connections     Talks on phone: Not on file     Gets together: Not on file     Attends Taoist service: Not on file     Active member of  club or organization: Not on file     Attends meetings of clubs or organizations: Not on file     Relationship status: Not on file   • Intimate partner violence     Fear of current or ex partner: Not on file     Emotionally abused: Not on file     Physically abused: Not on file     Forced sexual activity: Not on file   Other Topics Concern   • Not on file   Social History Narrative   • Not on file     Social History     Tobacco Use   Smoking Status Former Smoker   • Packs/day: 2.00   • Years: 30.00   • Pack years: 60.00   • Types: Cigarettes   • Quit date: 3/1/1999   • Years since quittin.7   Smokeless Tobacco Never Used   Tobacco Comment    3 pks a day for 35 yrs, continued abstinence     Social History     Substance and Sexual Activity   Alcohol Use Yes   • Alcohol/week: 4.2 oz   • Types: 7 Glasses of wine per week       Allergies/Intolerances:  Allergies   Allergen Reactions   • Iodine      convulsion   • Tape Rash and Itching       History reviewed with the patient     Other Current Medications:    Current Facility-Administered Medications:   •  Maalox Plus - Diphenhydramine - Lidocaine (MBX Oral Suspension), 15 mL, Swish & Swallow, Once, Ren Azevedo M.D., Stopped at 20 0415  •  nitroglycerin (NITRO-BID) 2 % ointment 0.5 Inch, 0.5 Inch, Topical, Q6HRS, Ryan Pino M.D.  •  senna-docusate (PERICOLACE or SENOKOT S) 8.6-50 MG per tablet 2 Tab, 2 Tab, Oral, BID **AND** polyethylene glycol/lytes (MIRALAX) PACKET 1 Packet, 1 Packet, Oral, QDAY PRN **AND** magnesium hydroxide (MILK OF MAGNESIA) suspension 30 mL, 30 mL, Oral, QDAY PRN **AND** bisacodyl (DULCOLAX) suppository 10 mg, 10 mg, Rectal, QDAY PRN, Ryan Pino M.D.  •  Respiratory Therapy Consult, , Nebulization, Continuous RT, Ryan Pino M.D.  •  acetaminophen (Tylenol) tablet 650 mg, 650 mg, Oral, Q6HRS PRN, Ryan Pino M.D.  •  guaiFENesin dextromethorphan (ROBITUSSIN DM) 100-10 MG/5ML syrup 10 mL, 10 mL, Oral, Q6HRS PRN, Hamad  "TARA Pino  •  apixaban (ELIQUIS) tablet 2.5 mg, 2.5 mg, Oral, BID, Ryan Pino M.D.  •  azithromycin (ZITHROMAX) tablet 250 mg, 250 mg, Oral, DAILY, Ryan Pino M.D.  •  Vitamin D3 CAPS 2,000 Units, 1 Cap, Oral, DAILY, Ryan Pino M.D.  •  dexamethasone (DECADRON) tablet 6 mg, 6 mg, Oral, DAILY, Ryan Pino M.D.  •  fluticasone-salmeterol (ADVAIR) 500-50 MCG/DOSE inhaler 1 Puff, 1 Puff, Inhalation, BID, Ryan Pino M.D.  •  lisinopril (PRINIVIL) tablet 20 mg, 20 mg, Oral, DAILY, Ryna Pino M.D.  •  montelukast (SINGULAIR) tablet 10 mg, 10 mg, Oral, DAILY, Ryan Pino M.D.  •  spironolactone (ALDACTONE) tablet 25 mg, 25 mg, Oral, Q DAY, Ryan Pino M.D.  •  glycopyrrolate (Seebri) 15.6 mcg inhalation capsule 1 Cap, 1 Cap, Inhalation, BID (RT), Ryan Pino M.D.  •  albuterol inhaler 2 Puff, 2 Puff, Inhalation, Q6HRS PRN, Ryan Pino M.D.  •  tizanidine (ZANAFLEX) tablet 4 mg, 4 mg, Oral, QDAY PRN, Ryan Pino M.D.  •  oxyCODONE immediate release (ROXICODONE) tablet 10 mg, 10 mg, Oral, Q8HRS PRN, Myles Covarrubias M.D.  •  furosemide (LASIX) injection 40 mg, 40 mg, Intravenous, Once, Myles Covarrubias M.D.  •  remdesivir 200 mg in  mL IVPB, 200 mg, Intravenous, Once, Claudette Ornelas M.D.  •  [START ON 11/15/2020] remdesivir 100 mg in  mL IVPB, 100 mg, Intravenous, DAILY AT 1800, Claudette Ornelas M.D.  [unfilled]    Most Recent Vital Signs:  BP (!) 173/74   Pulse (!) 54   Temp 36.2 °C (97.1 °F) (Temporal)   Resp (!) 22   Ht 1.626 m (5' 4\")   Wt 78.5 kg (173 lb 1 oz)   LMP 2001   SpO2 90%   BMI 29.71 kg/m²   Temp  Av °C (98.6 °F)  Min: 36.2 °C (97.1 °F)  Max: 38.4 °C (101.1 °F)    Physical Exam:  General: Elderly, well nourished, no diaphoresis, ill-appearing, mild respiratory distress   HEENT: sclera anicteric, PERRL, extraocular muscles intact, mucous membranes moist, oropharynx clear and moist, no oral lesions or exudate  Neck: " "supple, no lymphadenopathy  Chest: Decreased breath sounds bilaterally, no wheezes, tachypneic  Cardiac: regular rate and rhythm, normal S1 S2, no murmurs, rubs or gallops  Abdomen: + bowel sounds, soft, non-tender, non-distended, no hepatosplenomegaly  Extremities: WWP, no edema, 2+ pedal pulses  Skin: warm and dry, no rashes or worrisome lesions  Neuro: Alert and oriented times 3, non-focal exam, speech fluent, full range of motion to bilateral upper and lower extremities  Psych: normal mood and behavior, pleasant; memory intact, normal judgement    Pertinent Lab Results:  Recent Labs     11/14/20 0300   WBC 7.5      Recent Labs     11/14/20 0300   HEMOGLOBIN 13.3   HEMATOCRIT 39.3   MCV 89.1   MCH 30.2   PLATELETCT 289         Recent Labs     11/14/20 0300   SODIUM 130*   POTASSIUM 4.4   CHLORIDE 89*   CO2 31   CREATININE 0.39*        Recent Labs     11/14/20 0300   ALBUMIN 3.6        Pertinent Micro:  Results     Procedure Component Value Units Date/Time    CoV-2, Flu A/B, And RSV by PCR [[id number]]  (Abnormal) Collected: 11/14/20 0334    Order Status: Completed Updated: 11/14/20 0451     Influenza virus A RNA Negative     Influenza virus B, PCR Negative     RSV, PCR Negative     SARS-CoV-2 by PCR DETECTED     Comment: PATIENTS: Important information regarding your results and instructions can  be found at [email or web address]   \"After your  Covid-19 Test\"  **The [employer] GeneXpert Xpress SARS-CoV-2 Test has been made available for  use under the Emergency Use Authorization (EUA) only.          SARS-CoV-2 Source NP Swab    Narrative:      Droplet, Contact, and Eye Protection  Rule-out COVID-19 panel, includes droplet/contact/eye  isolation order.  Check influenza to order this test only if  specifically indicated.  Is patient being admitted?->Yes  Does this patient meet criteria for Rush/Cepheid per [facility]  Inpatient Workflow? (See workflow link below)->Yes  Expected turn around " time?->Rush (Cepheid 2-4 hours)  Is this the patients First SARS CoV-2 test?->No  Is this patient employed in healthcare?->No  Is the patient symptomatic as defined by the CDC?->Yes  Date of symptom onset?->11/2/20  Is the patient hospitalized?->Yes  Is the patient in the ICU?->No  Is the patient a resident in a congregate care setting?->No  Is the patient pregnant?->No    COVID/SARS CoV-2 PCR [[id number]] Collected: 11/14/20 0334    Order Status: Completed Specimen: Respirate from Nasopharyngeal Updated: 11/14/20 0353     COVID Order Status Received     Comment: The order for SARS CoV-2 testing has been received by the  Laboratory. This result is neither positive nor negative.  Final results of testing will report in 24-48 hours, separately.         Narrative:      Droplet, Contact, and Eye Protection  Rule-out COVID-19 panel, includes droplet/contact/eye  isolation order.  Check influenza to order this test only if  specifically indicated.  Is patient being admitted?->Yes  Does this patient meet criteria for Rush/Cepheid per [facility]  Inpatient Workflow? (See workflow link below)->Yes  Expected turn around time?->Rush (Cepheid 2-4 hours)  Is this the patients First SARS CoV-2 test?->No  Is this patient employed in healthcare?->No  Is the patient symptomatic as defined by the CDC?->Yes  Date of symptom onset?->11/2/20  Is the patient hospitalized?->Yes  Is the patient in the ICU?->No  Is the patient a resident in a congregate care setting?->No  Is the patient pregnant?->No    Blood Culture,Hold [[id number]] Collected: 11/14/20 0300    Order Status: Completed Updated: 11/14/20 0338     Blood Culture Hold Collected    COVID/SARS CoV-2 PCR [[id number]] Collected: 11/14/20 0000    Order Status: Canceled Specimen: Respirate from Nasopharyngeal         Blood Culture   Date Value Ref Range Status   03/15/2013 No growth after 5 days of incubation.  Final     Blood Culture Hold   Date Value Ref Range Status   11/14/2020  Collected  Final        Studies:  Dx-chest-portable (1 View)    Result Date: 11/14/2020 11/14/2020 3:22 AM HISTORY/REASON FOR EXAM:  Shortness of Breath TECHNIQUE/EXAM DESCRIPTION AND NUMBER OF VIEWS: Single portable view of the chest. COMPARISON: 11/7/2020 FINDINGS: There is borderline cardiac enlargement. The sally and mediastinum are unremarkable. Interstitial opacities are present in the mid and lower lung fields bilaterally, somewhat worse than before. No pleural effusion.     Increasing bilateral interstitial opacities, suggesting worsening pulmonary edema. The appearance is not classic for Covid 19 pneumonia, but this cannot be excluded.    Dx-chest-portable (1 View)    Result Date: 11/7/2020 11/7/2020 7:12 AM HISTORY/REASON FOR EXAM:  Sepsis; sepsis. Shortness of breath TECHNIQUE/EXAM DESCRIPTION AND NUMBER OF VIEWS: Single portable view of the chest. COMPARISON: 3/26/2019 FINDINGS: The cardiomediastinal silhouette is enlarged. Atherosclerotic calcification is seen.  There are increased bibasilar airspace opacities. No pleural effusion or pneumothorax is seen. There are underlying emphysematous changes.     Increased bibasilar opacities may represent atelectasis or consolidation. Underlying COPD with bibasilar parenchymal scarring Mild cardiomegaly. Atherosclerotic plaque.       IMPRESSION:   1.  COVID-19 pneumonia   2.  Acute on chronic hypoxemic respiratory failure  3.  COPD  4.  Dyslipidemia  5.  Hypertension      PLAN:   Berny Renee is a 75 y.o. woman with a history of dyslipidemia, hypertension, COPD, and chronic oxygen dependence on 6 L nasal cannula at baseline recently hospitalized for COVID-19 pneumonia readmitted for worsening shortness of breath, hypoxemia and fatigue.  Patient found to have acute on chronic respiratory failure and is now requiring 10 L nasal cannula.  Her chest x-ray shows worsening interstitial opacities.  Renal function and LFTs within normal limits.  She has been started  on dexamethasone.    -Continue supportive care.  Self proning, oxygen supplementation, judicious use of IV fluids  -Start IV remdesivir 200mg x1, followed by 100 mg daily for a 5-day course  -Monitor CMP daily while on IV remdesivir.  Renal function and LFTs within normal limits today.  -Agree with 10-day course of dexamethasone 6 mg daily       Plan of care discussed with hospitalist, Myles Covarrubias M.D.. Will continue to follow    Claudette Ornelas M.D.      Please note that this dictation was created using voice recognition software. I have worked with technical experts from [facility] to optimize the interface.  I have made every reasonable attempt to correct obvious errors, but there may be errors of grammar and possibly content that I did not discover before finalizing the note.

## 2020-11-14 NOTE — PROGRESS NOTES
75-year-old female with a past medical history of severe COPD was admitted for Covid pneumonia complicated by acute hypoxic respiratory failure.  Patient started on steroids and remdesivir.  Patient received 1 dose of IV Lasix and is pronating frequently.  Currently saturating well on 5 L nasal cannula.

## 2020-11-14 NOTE — DISCHARGE PLANNING
*ATTN Discharge Planning team: This patient is currently on service with [facility]. Please submit a referral order and face-to-face prior to discharging the patient home. If you have any questions, contact our Transitional Care Specialist team at x [phone].

## 2020-11-14 NOTE — ED PROVIDER NOTES
ED Provider Note        Primary care provider: Everett Diego M.D.    I verified that the patient was wearing a mask and I was wearing appropriate PPE every time I entered the room. The patient's mask was on the patient at all times during my encounter except for a brief view of the oropharynx.      CHIEF COMPLAINT  Chief Complaint   Patient presents with   • Shortness of Breath       HPI  Berny Renee is a 75 y.o. female who presents to the Emergency Department with chief complaint of shortness of breath and hypoxia.  Patient was recently discharged from [facility] for COVID-19 pneumonia.  She is still taking Eliquis she is still taking Decadron she has home oxygen however she reports that with any exertion her oxygen saturation is dipping into the mid 60s and takes extremely long time to show any improvement despite turning her home oxygen up as high as it will go.  She is still has moderate persistent cough it has been slightly productive she has had chills without measured fever minor frontal headache minor nausea no emesis no constipation no diarrhea no other acute symptoms or concerns.  Patient was taken off a azithromycin prior to discharge she reports that she felt as though her worsening symptoms were due to that she had contacted her primary care physician who would reinitiated a azithromycin.    REVIEW OF SYSTEMS  10 systems reviewed and otherwise negative, pertinent positives and negatives listed in the history of present illness.    PAST MEDICAL HISTORY   has a past medical history of Arthritis, Cataract immature, Chronic pain, Colon polyps, COPD (chronic obstructive pulmonary disease) (HCC) (2002), DDD (degenerative disc disease), cervical, DDD (degenerative disc disease), thoracic, Diverticulitis of colon, Dyslipidemia, EMPHYSEMA, Hypertension, REGINE (obstructive sleep apnea), OSTEOPOROSIS, Spinal stenosis, lumbar region, with neurogenic claudication, URINARY INCONTINENCE, and Vitamin d  "deficiency.    SURGICAL HISTORY   has a past surgical history that includes tonsillectomy; other orthopedic surgery (); other; other; and lumbar laminectomy diskectomy (2010).    SOCIAL HISTORY  Social History     Tobacco Use   • Smoking status: Former Smoker     Packs/day: 2.00     Years: 30.00     Pack years: 60.00     Types: Cigarettes     Quit date: 3/1/1999     Years since quittin.7   • Smokeless tobacco: Never Used   • Tobacco comment: 3 pks a day for 35 yrs, continued abstinence   Substance Use Topics   • Alcohol use: Yes     Alcohol/week: 4.2 oz     Types: 7 Glasses of wine per week   • Drug use: Yes     Types: Marijuana     Comment: Medical Marijuana       Social History     Substance and Sexual Activity   Drug Use Yes   • Types: Marijuana    Comment: Medical Marijuana        FAMILY HISTORY  Non-Contributory    CURRENT MEDICATIONS  Home Medications    **Home medications have not yet been reviewed for this encounter**         ALLERGIES  Allergies   Allergen Reactions   • Iodine      convulsion   • Tape Rash and Itching       PHYSICAL EXAM  VITAL SIGNS: /78   Pulse 81   Temp 36.2 °C (97.1 °F) (Oral)   Resp 18   Ht 1.626 m (5' 4\")   Wt 82.1 kg (181 lb)   LMP 2001   SpO2 92%   BMI 31.07 kg/m²   Pulse ox interpretation: I interpret this pulse ox as normal.  Constitutional: Alert and oriented x 3, minimal distress  HEENT: Atraumatic normocephalic, pupils are equal round reactive to light extraocular movements are intact. The nares is clear, external ears are normal, mouth shows moist mucous membranes  Neck: Supple, no JVD no tracheal deviation  Cardiovascular: Regular rate and rhythm no murmur rub or gallop 2+ pulses peripherally x4  Thorax & Lungs: Tachypneic, minimal crackles bilaterally.   GI: Soft nontender nondistended positive bowel sounds, no peritoneal signs  Skin: Warm dry no acute rash or lesion  Musculoskeletal: Moving all extremities with full range and 5 of 5 " strength, no acute  deformity  Neurologic: Cranial nerves III through XII are grossly intact, no sensory deficit, no cerebellar dysfunction   Psychiatric: Appropriate affect for situation at this time      DIAGNOSTIC STUDIES / PROCEDURES  LABS      Results for orders placed or performed during the hospital encounter of 11/14/20   CBC WITH DIFFERENTIAL   Result Value Ref Range    WBC 7.5 4.8 - 10.8 K/uL    RBC 4.41 4.20 - 5.40 M/uL    Hemoglobin 13.3 12.0 - 16.0 g/dL    Hematocrit 39.3 37.0 - 47.0 %    MCV 89.1 81.4 - 97.8 fL    MCH 30.2 27.0 - 33.0 pg    MCHC 33.8 33.6 - 35.0 g/dL    RDW 42.3 35.9 - 50.0 fL    Platelet Count 289 164 - 446 K/uL    MPV 10.0 9.0 - 12.9 fL    Neutrophils-Polys 75.60 (H) 44.00 - 72.00 %    Lymphocytes 11.80 (L) 22.00 - 41.00 %    Monocytes 11.80 0.00 - 13.40 %    Eosinophils 0.80 0.00 - 6.90 %    Basophils 0.00 0.00 - 1.80 %    Nucleated RBC 0.00 /100 WBC    Neutrophils (Absolute) 5.67 2.00 - 7.15 K/uL    Lymphs (Absolute) 0.89 (L) 1.00 - 4.80 K/uL    Monos (Absolute) 0.89 (H) 0.00 - 0.85 K/uL    Eos (Absolute) 0.06 0.00 - 0.51 K/uL    Baso (Absolute) 0.00 0.00 - 0.12 K/uL    NRBC (Absolute) 0.00 K/uL   COMP METABOLIC PANEL   Result Value Ref Range    Sodium 130 (L) 135 - 145 mmol/L    Potassium 4.4 3.6 - 5.5 mmol/L    Chloride 89 (L) 96 - 112 mmol/L    Co2 31 20 - 33 mmol/L    Anion Gap 10.0 7.0 - 16.0    Glucose 112 (H) 65 - 99 mg/dL    Bun 13 8 - 22 mg/dL    Creatinine 0.39 (L) 0.50 - 1.40 mg/dL    Calcium 9.0 8.5 - 10.5 mg/dL    AST(SGOT) 39 12 - 45 U/L    ALT(SGPT) 32 2 - 50 U/L    Alkaline Phosphatase 40 30 - 99 U/L    Total Bilirubin 0.3 0.1 - 1.5 mg/dL    Albumin 3.6 3.2 - 4.9 g/dL    Total Protein 6.7 6.0 - 8.2 g/dL    Globulin 3.1 1.9 - 3.5 g/dL    A-G Ratio 1.2 g/dL   COVID/SARS CoV-2 PCR    Specimen: Nasopharyngeal; Respirate   Result Value Ref Range    COVID Order Status Received    CRP Quantitative (Non-Cardiac)   Result Value Ref Range    Stat C-Reactive Protein 1.04 (H)  0.00 - 0.75 mg/dL   Blood Culture,Hold   Result Value Ref Range    Blood Culture Hold Collected    ESTIMATED GFR   Result Value Ref Range    GFR If African American >60 >60 mL/min/1.73 m 2    GFR If Non African American >60 >60 mL/min/1.73 m 2   CoV-2, Flu A/B, And RSV by PCR   Result Value Ref Range    Influenza virus A RNA Negative Negative    Influenza virus B, PCR Negative Negative    RSV, PCR Negative Negative    SARS-CoV-2 by PCR DETECTED (AA)     SARS-CoV-2 Source NP Swab    DIFFERENTIAL MANUAL   Result Value Ref Range    Manual Diff Status PERFORMED    PERIPHERAL SMEAR REVIEW   Result Value Ref Range    Peripheral Smear Review see below    PLATELET ESTIMATE   Result Value Ref Range    Plt Estimation Normal    MORPHOLOGY   Result Value Ref Range    RBC Morphology Normal    Magnesium   Result Value Ref Range    Magnesium 2.0 1.5 - 2.5 mg/dL   EKG   Result Value Ref Range    Report       [facility] Emergency Dept.    Test Date:  2020  Pt Name:    FAZAL STUBBS                    Department: ER  MRN:        [id number]                      Room:       [facility]  Gender:     Female                       Technician: [id number]  :        [id number]                   Requested By:VICKI JONES  Order #:    [id number]                    Reading MD: VICKI JONES MD    Measurements  Intervals                                Axis  Rate:       48                           P:          37  ND:         176                          QRS:        17  QRSD:       94                           T:          22  QT:         412  QTc:        369    Interpretive Statements  SINUS BRADYCARDIA  BORDERLINE LOW VOLTAGE IN FRONTAL LEADS  Compared to ECG 2020 09:32:24  Sinus rhythm no longer present  Electronically Signed On 2020 3:50:08 PST by VICKI JONES MD         All labs reviewed by me.      RADIOLOGY  No orders to display     The radiologist's interpretation of all radiological studies have  "been reviewed by me.    COURSE & MEDICAL DECISION MAKING  Pertinent Labs & Imaging studies reviewed. (See chart for details)    3:03 AM - Patient seen and examined at bedside.         Patient noted to have slightly elevated blood pressure likely circumstantial secondary to presenting complaint. Referred to primary care physician for further evaluation.    Medical Decision Makin-year-old female saturating in the high 80s on 15 L by Venturi mask.  Patient transitioned to high flow oxygen is tolerating this much better with oxygen saturations in the low 90s.  X-ray shows worsening infiltrates labs otherwise as above.  I discussed this with hospitalist Dr. Pino and the patient is admitted to the hospital service for ongoing management.    /78   Pulse 81   Temp 36.2 °C (97.1 °F) (Oral)   Resp 18   Ht 1.626 m (5' 4\")   Wt 82.1 kg (181 lb)   LMP 2001   SpO2 92%   BMI 31.07 kg/m²             FINAL IMPRESSION  1.  Shortness of breath  2.  COVID-19 pneumonia  3.  Hypoxia      This dictation has been created using voice recognition software and/or scribes. The accuracy of the dictation is limited by the abilities of the software and the expertise of the scribes. I expect there may be some errors of grammar and possibly content. I made every attempt to manually correct the errors within my dictation. However, errors related to voice recognition software and/or scribes may still exist and should be interpreted within the appropriate context.            "

## 2020-11-14 NOTE — H&P
Hospital Medicine History & Physical Note    Date of Service  11/14/2020    Primary Care Physician  Everett Diego M.D.    Consultants  None    Code Status  DNAR/DNI    Chief Complaint  Chief Complaint   Patient presents with   • Shortness of Breath       History of Presenting Illness  75 y.o. female who presented 11/14/2020 with past medical history of COPD, chronic hypoxic respiratory failure on 6 L of oxygen, hypertension, history of SVT who comes in emergency room with complaints of shortness of breath that is worsening over the past week.  1 week ago patient was diagnosed with COVID-19.  At the time she had fevers, chills, nausea, malaise, myalgias, vomiting.  She spent 3 days in the hospital and did not require additional oxygen needs above her 6 L.  She was started on Decadron and Eliquis.  Subsequently patient was discharged and now developed worsening symptoms.  She noticed that her O2 saturations were constantly in the low 80s that prompted her to come to the emergency room.  Currently she has no other symptoms other than hypoxia and shortness of breath.  Chest x-ray found bilateral interstitial infiltrates mostly in the lower lobes worsening since previous x-ray on seventh  EKG found sinus bradycardia    Review of Systems  Review of Systems   Constitutional: Positive for diaphoresis and malaise/fatigue. Negative for chills and fever.   HENT: Negative for congestion, ear discharge, ear pain, hearing loss, nosebleeds, sinus pain, sore throat and tinnitus.    Eyes: Negative for blurred vision, double vision, photophobia and pain.   Respiratory: Positive for cough and shortness of breath. Negative for hemoptysis, sputum production, wheezing and stridor.    Cardiovascular: Negative for chest pain, palpitations, orthopnea, claudication, leg swelling and PND.   Gastrointestinal: Negative for abdominal pain, blood in stool, constipation, diarrhea, heartburn, melena, nausea and vomiting.   Genitourinary:  Negative for dysuria, flank pain, frequency, hematuria and urgency.   Musculoskeletal: Negative for back pain, falls, joint pain, myalgias and neck pain.   Skin: Negative for itching and rash.   Neurological: Negative for dizziness, tingling, tremors, weakness and headaches.   Endo/Heme/Allergies: Negative for environmental allergies and polydipsia. Does not bruise/bleed easily.   Psychiatric/Behavioral: Negative for depression, hallucinations, substance abuse and suicidal ideas.       Past Medical History   has a past medical history of Arthritis, Cataract immature, Chronic pain, Colon polyps, COPD (chronic obstructive pulmonary disease) ([facility]) (2002), DDD (degenerative disc disease), cervical, DDD (degenerative disc disease), thoracic, Diverticulitis of colon, Dyslipidemia, EMPHYSEMA, Hypertension, REGINE (obstructive sleep apnea), OSTEOPOROSIS, Spinal stenosis, lumbar region, with neurogenic claudication, URINARY INCONTINENCE, and Vitamin d deficiency.    Surgical History   has a past surgical history that includes tonsillectomy; other orthopedic surgery (2004); other; other; and lumbar laminectomy diskectomy (6/22/2010).     Family History  family history includes Other in her sister.     Social History   reports that she quit smoking about 21 years ago. Her smoking use included cigarettes. She has a 60.00 pack-year smoking history. She has never used smokeless tobacco. She reports current alcohol use of about 4.2 oz of alcohol per week. She reports current drug use. Drug: Marijuana.    Allergies  Allergies   Allergen Reactions   • Iodine      convulsion   • Tape Rash and Itching       Medications  Prior to Admission Medications   Prescriptions Last Dose Informant Patient Reported? Taking?   Cholecalciferol (VITAMIN D3) 2000 UNIT Cap  Patient's Home Pharmacy No No   Sig: TAKE ONE CAPSULE BY MOUTH ONE TIME DAILY   Home Care Oxygen   Yes No   Sig: Inhale 6 L/min by mouth Continuous.   Misc. Devices Misc  Patient's  Home Pharmacy No No   Sig: This is an order for  7 foot high flow oxygen tubing  Dx; asthma with COPD J 44.9, supplemental oxygen-dependent Z 99.81, chronic respiratory failure with hypoxia and J 96        ODETTE 99 months   NPI [id number]   Patient not taking: Reported on 11/7/2020   VENTOLIN  (90 Base) MCG/ACT Aero Soln inhalation aerosol  Patient's Home Pharmacy No No   Sig: Inhale 2 Puffs by mouth every 6 hours as needed for Shortness of Breath.   acetaminophen (TYLENOL) 325 MG Tab   Yes No   Sig: Take 325 mg by mouth every 6 hours as needed for Moderate Pain.   apixaban (ELIQUIS) 5mg Tab   No No   Sig: Take 0.5 Tabs by mouth 2 Times a Day.   azithromycin (ZITHROMAX Z-VU) 250 MG Tab   No No   Sig: Take 1 tablet daily.   dexamethasone (DECADRON) 6 MG Tab   No No   Sig: Take 1 Tab by mouth every day.   digoxin (LANOXIN) 250 MCG Tab  Patient's Home Pharmacy No No   Sig: Take 1 Tab by mouth every day.   fluticasone-salmeterol (ADVAIR DISKUS) 500-50 MCG/DOSE AEROSOL POWDER, BREATH ACTIVATED  Patient's Home Pharmacy No No   Sig: Inhale 1 Puff by mouth 2 times a day.   ipratropium-albuterol (DUONEB) 0.5-2.5 (3) MG/3ML nebulizer solution  Patient's Home Pharmacy No No   Sig: USE ONE VIAL IN NEBULIZER EVERY 4 HOURS AS NEEDED FOR SHORTNESS OF BREATH OR  WHEEZING   lisinopril (PRINIVIL) 20 MG Tab  Patient's Home Pharmacy No No   Sig: Take 1 Tab by mouth every day.   montelukast (SINGULAIR) 10 MG Tab  Patient's Home Pharmacy No No   Sig: Take 1 Tab by mouth every day.   omeprazole (PRILOSEC OTC) 20 MG tablet  Patient's Home Pharmacy No No   Sig: Take 1 Tab by mouth every day.   oxycodone immediate release (ROXICODONE) 10 MG immediate release tablet  Patient's Home Pharmacy Yes No   Sig: Take 10 mg by mouth 4 times a day.   potassium chloride (MICRO-K) 10 MEQ capsule  Patient's Home Pharmacy No No   Sig: TAKE ONE CAPSULE BY MOUTH TWICE DAILY   spironolactone (ALDACTONE) 25 MG Tab  Patient's Home Pharmacy No No   Sig:  TAKE ONE TABLET BY MOUTH ONE TIME DAILY   tiotropium (SPIRIVA HANDIHALER) 18 MCG Cap  Patient's Home Pharmacy No No   Sig: Inhale 1 Cap by mouth every day.   tizanidine (ZANAFLEX) 4 MG Tab  Patient's Home Pharmacy No No   Sig: Take 1 Tab by mouth 3 times a day.   Patient taking differently: Take 1 Tab by mouth 1 time daily as needed (sleep).   triamcinolone acetonide (KENALOG) 0.1 % Ointment  Patient's Home Pharmacy No No   Sig: Apply 1 Application to affected area(s) 2 times a day.   Patient taking differently: Apply 1 Application to affected area(s) 2 times a day as needed (knee pain).      Facility-Administered Medications: None       Physical Exam  Temp:  [36.2 °C (97.1 °F)] 36.2 °C (97.1 °F)  Pulse:  [47-81] 47  Resp:  [18-23] 18  BP: (154-182)/(73-97) 165/97  SpO2:  [90 %-92 %] 91 %    Physical Exam  Vitals signs and nursing note reviewed.   Constitutional:       General: She is not in acute distress.     Appearance: Normal appearance. She is not ill-appearing, toxic-appearing or diaphoretic.   HENT:      Head: Normocephalic and atraumatic.      Nose: No congestion or rhinorrhea.      Mouth/Throat:      Pharynx: No oropharyngeal exudate or posterior oropharyngeal erythema.   Eyes:      General: No scleral icterus.  Neck:      Musculoskeletal: No neck rigidity or muscular tenderness.      Vascular: No carotid bruit.   Cardiovascular:      Rate and Rhythm: Normal rate and regular rhythm.      Pulses: Normal pulses.      Heart sounds: Normal heart sounds. No murmur. No friction rub. No gallop.    Pulmonary:      Effort: Pulmonary effort is normal. No respiratory distress.      Breath sounds: Normal breath sounds. No stridor. No wheezing or rhonchi.   Abdominal:      General: Abdomen is flat. There is no distension.      Palpations: There is no mass.      Tenderness: There is no abdominal tenderness. There is no left CVA tenderness, guarding or rebound.      Hernia: No hernia is present.   Musculoskeletal: Normal  range of motion.         General: No swelling.      Right lower leg: No edema.      Left lower leg: No edema.   Lymphadenopathy:      Cervical: No cervical adenopathy.   Skin:     General: Skin is warm and dry.      Capillary Refill: Capillary refill takes more than 3 seconds.      Coloration: Skin is not jaundiced or pale.      Findings: No bruising or erythema.   Neurological:      Mental Status: She is alert.         Laboratory:  Recent Labs     11/14/20  0300   WBC 7.5   RBC 4.41   HEMOGLOBIN 13.3   HEMATOCRIT 39.3   MCV 89.1   MCH 30.2   MCHC 33.8   RDW 42.3   PLATELETCT 289   MPV 10.0     Recent Labs     11/14/20  0300   SODIUM 130*   POTASSIUM 4.4   CHLORIDE 89*   CO2 31   GLUCOSE 112*   BUN 13   CREATININE 0.39*   CALCIUM 9.0     Recent Labs     11/14/20  0300   ALTSGPT 32   ASTSGOT 39   ALKPHOSPHAT 40   TBILIRUBIN 0.3   GLUCOSE 112*         No results for input(s): NTPROBNP in the last 72 hours.      No results for input(s): TROPONINT in the last 72 hours.    Imaging:  DX-CHEST-PORTABLE (1 VIEW)   Final Result      Increasing bilateral interstitial opacities, suggesting worsening pulmonary edema. The appearance is not classic for Covid 19 pneumonia, but this cannot be excluded.            Assessment/Plan:  I anticipate this patient will require at least two midnights for appropriate medical management, necessitating inpatient admission.    * COVID-19- (present on admission)  Assessment & Plan  Monitor O2 status closely  Prone positioning and frequent change position  ISS and ambulation aggressively  Inflammatory markers every 48 hours  Eliquis  Decadron 6 mg for 4 additional days  Consult ID for Remdisivir       Acute on chronic respiratory failure with hypoxia (HCC)  Assessment & Plan  Patient is now requiring 15 L of oxygen above her baseline  Transition to high flow oxygen  Maintain sats  around 88-92  Frequent prone positioning, ambulation, ISS    SVT (supraventricular tachycardia) (HCC)- (present on  admission)  Assessment & Plan  Takes digoxin as a home which I am holding since she is bradycardic    Stage 4 very severe COPD by GOLD classification (HCC)- (present on admission)  Assessment & Plan  Not in exacerbation  Continue home inhalers  On chronic azithromycin therapy    Lumbar radicular pain- (present on admission)  Assessment & Plan  On chronic opiate therapy    Essential hypertension- (present on admission)  Assessment & Plan  Uncontrolled  Continue current home medications  Patient is bradycardic and I brought her Nitropaste to control blood pressure

## 2020-11-14 NOTE — ED NOTES
Pharmacy Medication Reconciliation      Medication reconciliation updated and complete per pt home pharmacy  [facility] pharmacy[facility]      Per pt home pharmacy pt received a 8 day course of Dexamethasone on 11/09/2020    Per pt home pharmacy pt received a 30 day course Zithromax on 11/12/2020

## 2020-11-14 NOTE — ED NOTES
"Pt refusing to wear mask on arrival, pt instructed she must wear a mask for out safety, she then called nursing staff \"bitches\". Pt instructed vulgar language toward nursing staff will not be tolerated   "

## 2020-11-15 VITALS
RESPIRATION RATE: 16 BRPM | TEMPERATURE: 97.8 F | SYSTOLIC BLOOD PRESSURE: 110 MMHG | OXYGEN SATURATION: 90 % | HEART RATE: 75 BPM | DIASTOLIC BLOOD PRESSURE: 60 MMHG

## 2020-11-15 LAB
ALBUMIN SERPL BCP-MCNC: 3.7 G/DL (ref 3.2–4.9)
ALBUMIN/GLOB SERPL: 1.1 G/DL
ALP SERPL-CCNC: 47 U/L (ref 30–99)
ALT SERPL-CCNC: 52 U/L (ref 2–50)
ANION GAP SERPL CALC-SCNC: 10 MMOL/L (ref 7–16)
AST SERPL-CCNC: 42 U/L (ref 12–45)
BASOPHILS # BLD AUTO: 0.3 % (ref 0–1.8)
BASOPHILS # BLD: 0.02 K/UL (ref 0–0.12)
BILIRUB SERPL-MCNC: 0.5 MG/DL (ref 0.1–1.5)
BUN SERPL-MCNC: 17 MG/DL (ref 8–22)
CALCIUM SERPL-MCNC: 9.5 MG/DL (ref 8.5–10.5)
CHLORIDE SERPL-SCNC: 89 MMOL/L (ref 96–112)
CHOLEST SERPL-MCNC: 154 MG/DL (ref 100–199)
CO2 SERPL-SCNC: 33 MMOL/L (ref 20–33)
CREAT SERPL-MCNC: 0.42 MG/DL (ref 0.5–1.4)
EOSINOPHIL # BLD AUTO: 0 K/UL (ref 0–0.51)
EOSINOPHIL NFR BLD: 0 % (ref 0–6.9)
ERYTHROCYTE [DISTWIDTH] IN BLOOD BY AUTOMATED COUNT: 43.1 FL (ref 35.9–50)
EST. AVERAGE GLUCOSE BLD GHB EST-MCNC: 131 MG/DL
GLOBULIN SER CALC-MCNC: 3.4 G/DL (ref 1.9–3.5)
GLUCOSE SERPL-MCNC: 136 MG/DL (ref 65–99)
HBA1C MFR BLD: 6.2 % (ref 0–5.6)
HCT VFR BLD AUTO: 43.4 % (ref 37–47)
HDLC SERPL-MCNC: 75 MG/DL
HGB BLD-MCNC: 14.6 G/DL (ref 12–16)
IMM GRANULOCYTES # BLD AUTO: 0.15 K/UL (ref 0–0.11)
IMM GRANULOCYTES NFR BLD AUTO: 2.3 % (ref 0–0.9)
LDLC SERPL CALC-MCNC: 61 MG/DL
LYMPHOCYTES # BLD AUTO: 0.59 K/UL (ref 1–4.8)
LYMPHOCYTES NFR BLD: 9 % (ref 22–41)
MAGNESIUM SERPL-MCNC: 1.9 MG/DL (ref 1.5–2.5)
MCH RBC QN AUTO: 30.2 PG (ref 27–33)
MCHC RBC AUTO-ENTMCNC: 33.6 G/DL (ref 33.6–35)
MCV RBC AUTO: 89.7 FL (ref 81.4–97.8)
MONOCYTES # BLD AUTO: 0.76 K/UL (ref 0–0.85)
MONOCYTES NFR BLD AUTO: 11.5 % (ref 0–13.4)
NEUTROPHILS # BLD AUTO: 5.07 K/UL (ref 2–7.15)
NEUTROPHILS NFR BLD: 76.9 % (ref 44–72)
NRBC # BLD AUTO: 0 K/UL
NRBC BLD-RTO: 0 /100 WBC
PLATELET # BLD AUTO: 344 K/UL (ref 164–446)
PMV BLD AUTO: 9.7 FL (ref 9–12.9)
POTASSIUM SERPL-SCNC: 3.9 MMOL/L (ref 3.6–5.5)
PROT SERPL-MCNC: 7.1 G/DL (ref 6–8.2)
RBC # BLD AUTO: 4.84 M/UL (ref 4.2–5.4)
SODIUM SERPL-SCNC: 132 MMOL/L (ref 135–145)
TRIGL SERPL-MCNC: 91 MG/DL (ref 0–149)
WBC # BLD AUTO: 6.6 K/UL (ref 4.8–10.8)

## 2020-11-15 PROCEDURE — 700101 HCHG RX REV CODE 250: Performed by: STUDENT IN AN ORGANIZED HEALTH CARE EDUCATION/TRAINING PROGRAM

## 2020-11-15 PROCEDURE — 99233 SBSQ HOSP IP/OBS HIGH 50: CPT | Performed by: STUDENT IN AN ORGANIZED HEALTH CARE EDUCATION/TRAINING PROGRAM

## 2020-11-15 PROCEDURE — 85025 COMPLETE CBC W/AUTO DIFF WBC: CPT

## 2020-11-15 PROCEDURE — 700111 HCHG RX REV CODE 636 W/ 250 OVERRIDE (IP): Performed by: STUDENT IN AN ORGANIZED HEALTH CARE EDUCATION/TRAINING PROGRAM

## 2020-11-15 PROCEDURE — 83036 HEMOGLOBIN GLYCOSYLATED A1C: CPT

## 2020-11-15 PROCEDURE — A9270 NON-COVERED ITEM OR SERVICE: HCPCS | Performed by: STUDENT IN AN ORGANIZED HEALTH CARE EDUCATION/TRAINING PROGRAM

## 2020-11-15 PROCEDURE — 770021 HCHG ROOM/CARE - ISO PRIVATE

## 2020-11-15 PROCEDURE — 700102 HCHG RX REV CODE 250 W/ 637 OVERRIDE(OP): Performed by: STUDENT IN AN ORGANIZED HEALTH CARE EDUCATION/TRAINING PROGRAM

## 2020-11-15 PROCEDURE — 700102 HCHG RX REV CODE 250 W/ 637 OVERRIDE(OP): Performed by: HOSPITALIST

## 2020-11-15 PROCEDURE — A9270 NON-COVERED ITEM OR SERVICE: HCPCS | Performed by: HOSPITALIST

## 2020-11-15 PROCEDURE — 83735 ASSAY OF MAGNESIUM: CPT

## 2020-11-15 PROCEDURE — 80053 COMPREHEN METABOLIC PANEL: CPT

## 2020-11-15 PROCEDURE — 80061 LIPID PANEL: CPT

## 2020-11-15 PROCEDURE — 94640 AIRWAY INHALATION TREATMENT: CPT

## 2020-11-15 PROCEDURE — 700101 HCHG RX REV CODE 250: Performed by: INTERNAL MEDICINE

## 2020-11-15 PROCEDURE — 700105 HCHG RX REV CODE 258: Performed by: INTERNAL MEDICINE

## 2020-11-15 RX ORDER — LIDOCAINE 50 MG/G
1 PATCH TOPICAL EVERY 24 HOURS
Status: DISCONTINUED | OUTPATIENT
Start: 2020-11-15 | End: 2020-11-21 | Stop reason: HOSPADM

## 2020-11-15 RX ORDER — ACETAMINOPHEN 500 MG
1000 TABLET ORAL 3 TIMES DAILY
Status: DISCONTINUED | OUTPATIENT
Start: 2020-11-15 | End: 2020-11-21

## 2020-11-15 RX ADMIN — Medication 2000 UNITS: at 04:46

## 2020-11-15 RX ADMIN — LIDOCAINE 1 PATCH: 50 PATCH CUTANEOUS at 10:21

## 2020-11-15 RX ADMIN — ACETAMINOPHEN 1000 MG: 500 TABLET ORAL at 12:20

## 2020-11-15 RX ADMIN — OXYCODONE HYDROCHLORIDE 10 MG: 10 TABLET ORAL at 12:20

## 2020-11-15 RX ADMIN — DOCUSATE SODIUM 50 MG AND SENNOSIDES 8.6 MG 2 TABLET: 8.6; 5 TABLET, FILM COATED ORAL at 17:12

## 2020-11-15 RX ADMIN — OXYCODONE HYDROCHLORIDE 10 MG: 10 TABLET ORAL at 04:47

## 2020-11-15 RX ADMIN — LISINOPRIL 20 MG: 20 TABLET ORAL at 05:14

## 2020-11-15 RX ADMIN — BUDESONIDE AND FORMOTEROL FUMARATE DIHYDRATE 2 PUFF: 160; 4.5 AEROSOL RESPIRATORY (INHALATION) at 08:25

## 2020-11-15 RX ADMIN — MONTELUKAST 10 MG: 10 TABLET, FILM COATED ORAL at 04:46

## 2020-11-15 RX ADMIN — ACETAMINOPHEN 1000 MG: 500 TABLET ORAL at 08:25

## 2020-11-15 RX ADMIN — ACETAMINOPHEN 1000 MG: 500 TABLET ORAL at 17:12

## 2020-11-15 RX ADMIN — AZITHROMYCIN MONOHYDRATE 250 MG: 250 TABLET ORAL at 04:47

## 2020-11-15 RX ADMIN — BUDESONIDE AND FORMOTEROL FUMARATE DIHYDRATE 2 PUFF: 160; 4.5 AEROSOL RESPIRATORY (INHALATION) at 19:16

## 2020-11-15 RX ADMIN — SPIRONOLACTONE 25 MG: 25 TABLET ORAL at 04:47

## 2020-11-15 RX ADMIN — FUROSEMIDE 20 MG: 20 INJECTION, SOLUTION INTRAMUSCULAR; INTRAVENOUS at 05:14

## 2020-11-15 RX ADMIN — ENOXAPARIN SODIUM 40 MG: 40 INJECTION SUBCUTANEOUS at 04:46

## 2020-11-15 RX ADMIN — REMDESIVIR 100 MG: 5 INJECTION INTRAVENOUS at 17:12

## 2020-11-15 RX ADMIN — DEXAMETHASONE 6 MG: 4 TABLET ORAL at 04:47

## 2020-11-15 RX ADMIN — OXYCODONE HYDROCHLORIDE 10 MG: 10 TABLET ORAL at 20:07

## 2020-11-15 RX ADMIN — DOCUSATE SODIUM 50 MG AND SENNOSIDES 8.6 MG 2 TABLET: 8.6; 5 TABLET, FILM COATED ORAL at 05:14

## 2020-11-15 RX ADMIN — GLYCOPYRROLATE 1 CAPSULE: 15.6 CAPSULE RESPIRATORY (INHALATION) at 19:16

## 2020-11-15 RX ADMIN — FUROSEMIDE 20 MG: 20 INJECTION, SOLUTION INTRAMUSCULAR; INTRAVENOUS at 15:25

## 2020-11-15 ASSESSMENT — PAIN DESCRIPTION - PAIN TYPE
TYPE: CHRONIC PAIN
TYPE: ACUTE PAIN

## 2020-11-15 ASSESSMENT — ENCOUNTER SYMPTOMS
NECK PAIN: 1
HEARTBURN: 0
MUSCLE WEAKNESS: 1
BACK PAIN: 1
SHORTNESS OF BREATH: 1
PSYCHIATRIC NEGATIVE: 1
CHILLS: 0
NAUSEA: 0
NAUSEA: DENIES
DOUBLE VISION: 0
NEUROLOGICAL NEGATIVE: 1
SHORTNESS OF BREATH: T
FEVER: 0
SPUTUM PRODUCTION: 1
VOMITING: DENIES
COUGH: 1

## 2020-11-15 NOTE — CARE PLAN
Problem: Safety  Goal: Will remain free from injury  Outcome: PROGRESSING AS EXPECTED  Note: Treaded socks in place, bed in the lowest position, bed alarm on, call light and belongings within reach, pt call for assistance appropriately      Problem: Pain Management  Goal: Pain level will decrease to patient's comfort goal  Outcome: PROGRESSING AS EXPECTED  Note: Medicated with oxycodone 10 mg per MAR with adequate pain control, hourly rounding in progress     Problem: Skin Integrity  Goal: Risk for impaired skin integrity will decrease  Outcome: PROGRESSING AS EXPECTED  Note: rosi risk assessment, pt turns self from side to side     Problem: Knowledge Deficit  Goal: Knowledge of disease process/condition, treatment plan, diagnostic tests, and medications will improve  Outcome: PROGRESSING AS EXPECTED  Note: Educated on POC, all questions answered, hourly rounding in progress

## 2020-11-15 NOTE — PROGRESS NOTES
Brief ID note:    Patient admitted with COVID-19 pneumonia and acute on chronic respiratory failure.    Chart reviewed.  Patient afebrile.  White count normal today.  On 10 L oxygen mask, decreased from yesterday.  Creatinine clearance within normal limits today.  ALT mildly elevated at 52.    Plan:   -Continue supportive care.  Self proning, oxygen supplementation, judicious use of IV fluids  -Continue IV remdesivir for a 5-day course.  Stop date 11/19/2020  -Monitor CMP daily while on IV remdesivir.  Renal function within normal limits today.  ALT mildly elevated at 52.  If creatinine clearance less than 30 and or ALT greater than 250, discontinue remdesivir  -Agree with 10-day course of dexamethasone 6 mg daily  -On prophylactic Lovenox    Plan of care discussed with hospitalist, Dr. Graves.  Signing off.  Please reconsult if needed

## 2020-11-15 NOTE — PROGRESS NOTES
[facility] Medicine Daily Progress Note    Date of Service  11/15/2020    Chief Complaint  75 y.o. female admitted 11/14/2020 with dyspnea    Hospital Course  75-year-old female with a past medical history of severe COPD was admitted for Covid pneumonia complicated by acute hypoxic respiratory failure.  Patient started on steroids and remdesivir.  Patient received 1 dose of IV Lasix and is pronating frequently.  Currently saturating well on 5 L nasal cannula.    Interval Problem Update  AAOX4 and afebrile  Down to 10L NC  Cr and LFTs stable   Continue Decadron  Continue Remdesivir  Self pronation and incentive spirometry  Wean oxygen as tolerated  Labs in a.m.    Consultants/Specialty  Infectious disease    Code Status  DNAR/DNI    Disposition  TBD    Review of Systems  Review of Systems   Constitutional: Negative for chills and fever.   HENT: Negative for hearing loss and tinnitus.    Eyes: Negative for double vision.   Respiratory: Positive for cough, sputum production and shortness of breath.    Cardiovascular: Negative for chest pain.   Gastrointestinal: Negative for heartburn and nausea.   Genitourinary: Negative for dysuria and urgency.   Musculoskeletal: Positive for back pain and neck pain.   Skin: Negative for itching and rash.   Neurological: Negative.    Endo/Heme/Allergies: Negative.    Psychiatric/Behavioral: Negative.         Physical Exam  Temp:  [36.1 °C (97 °F)-36.8 °C (98.3 °F)] 36.1 °C (97 °F)  Pulse:  [47-71] 68  Resp:  [18-24] 18  BP: (151-214)/(68-86) 168/71  SpO2:  [83 %-93 %] 84 %    Physical Exam  Constitutional:       General: She is not in acute distress.     Appearance: Normal appearance. She is ill-appearing.   HENT:      Head: Normocephalic and atraumatic.      Mouth/Throat:      Mouth: Mucous membranes are moist.   Eyes:      Extraocular Movements: Extraocular movements intact.      Pupils: Pupils are equal, round, and reactive to light.   Neck:      Musculoskeletal: Normal range of  motion and neck supple.   Cardiovascular:      Rate and Rhythm: Normal rate and regular rhythm.      Pulses: Normal pulses.      Heart sounds: Normal heart sounds.   Pulmonary:      Effort: Respiratory distress present.      Breath sounds: Rales present.   Abdominal:      General: Abdomen is flat. Bowel sounds are normal. There is distension.      Palpations: Abdomen is soft.      Tenderness: There is no abdominal tenderness.   Musculoskeletal: Normal range of motion.         General: No swelling.   Skin:     General: Skin is warm.      Coloration: Skin is not jaundiced.   Neurological:      General: No focal deficit present.      Mental Status: She is alert and oriented to person, place, and time. Mental status is at baseline.      Cranial Nerves: No cranial nerve deficit.   Psychiatric:         Mood and Affect: Mood normal.         Behavior: Behavior normal.         Thought Content: Thought content normal.         Judgment: Judgment normal.         Fluids  No intake or output data in the 24 hours ending 11/15/20 0733    Laboratory  Recent Labs     11/14/20  0300 11/15/20  0451   WBC 7.5 6.6   RBC 4.41 4.84   HEMOGLOBIN 13.3 14.6   HEMATOCRIT 39.3 43.4   MCV 89.1 89.7   MCH 30.2 30.2   MCHC 33.8 33.6   RDW 42.3 43.1   PLATELETCT 289 344   MPV 10.0 9.7     Recent Labs     11/14/20  0300 11/15/20  0451   SODIUM 130* 132*   POTASSIUM 4.4 3.9   CHLORIDE 89* 89*   CO2 31 33   GLUCOSE 112* 136*   BUN 13 17   CREATININE 0.39* 0.42*   CALCIUM 9.0 9.5             Recent Labs     11/15/20  0451   TRIGLYCERIDE 91   HDL 75   LDL 61       Imaging  DX-CHEST-PORTABLE (1 VIEW)   Final Result      Increasing bilateral interstitial opacities, suggesting worsening pulmonary edema. The appearance is not classic for Covid 19 pneumonia, but this cannot be excluded.           Assessment/Plan  * COVID-19- (present on admission)  Assessment & Plan  Continue Decadron  Continue Remdesivir  Labs in a.m.    Acute on chronic respiratory failure  with hypoxia ([facility])  Assessment & Plan  Down to 10L NC  Self pronation and incentive spirometry  Wean oxygen as tolerated  Labs in a.m.    SVT (supraventricular tachycardia) ([facility])- (present on admission)  Assessment & Plan  Takes digoxin as a home which I am holding since she is bradycardic    Stage 4 very severe COPD by GOLD classification ([facility])- (present on admission)  Assessment & Plan  Not in exacerbation  Continue home inhalers  On chronic azithromycin therapy    Lumbar radicular pain- (present on admission)  Assessment & Plan  On chronic opiate therapy    Essential hypertension- (present on admission)  Assessment & Plan  Continue current home medications  As needed antihypertensives       VTE prophylaxis: Lovenox

## 2020-11-15 NOTE — PROGRESS NOTES
"Zenobia, please send CC to Everett Diego MD.    Skilled Nursing Visit Note 11/13/2020  Pt was axox4, resting in bed and doing her nebulizer treatment upon SN arrival. Resting SpO2 90% with 8.5 L of supplement oxygen via cannula. Pt reports that her SpO2 dropped to the 40s one night after she was discharged from the hospital so she called 911. Paramedics found that her oxygen tubing detached from the concentrator and got her SpO2 back to 90% after increasing flow rate from 6L to 8.5L. She has remained on 8.5L since then.   Pt tells this RN that she didn't eat breakfast or take morning medications since her SpO2 dropped to the 80s and started to short of breath when she got up and  the kitchen earlier. Per pt, her s/sx were relieved immediately when she lay in bed. Pt's SpO2 decreased to 79% ambulating 30 ft during this visit and had to sit down for resting. Reviewed use of all current medications. Med reconciled. Updated pt's copy. Assisted pt bring bring her breakfast and water to her, filling her weekly med planner. Pt took her AM meds with assistance from the RN.   Pt's spouse passed away recently and pt lives alone with no caregivers. [facility] delivers her Rx. She says her friend can help her buy and drop off groceries. Also reports that she used to have a lady who helped her with housekeeping once a week but that person is waiting for Covid testing too and doesn't assist with ADLs or IADLs. Discussed with pt regarding her need of higher level of care. However, pt becomes upset and states that, \"My  passed away in a nursing home. I'm not going to one. I'd rather die at home.\" Pt denies any current suicidal thought or plan. She has sufficient food and drinking water at home and is calling her friend Silvia to come help her this weekend. She says that \"I have COPD for 15 years. I know when to call 911 if I'm not feeling well.\"   Pt's resting SpO2 at 91% by the end of this visit. This RN " placed some food, water and her med planner within reach. Report given to SN supervisor over the phone. Called and LVM with Dr. Diego regarding the above concerns. Spoke with Joan, intake secularist with EPS, to reports pt's unsafe situation at home. Added PT, OT, MSW, and HHA visit for pt.

## 2020-11-16 ENCOUNTER — HOME CARE VISIT (OUTPATIENT)
Dept: HOME HEALTH SERVICES | Facility: HOME HEALTHCARE | Age: 75
End: 2020-11-16
Payer: MEDICARE

## 2020-11-16 LAB
ALBUMIN SERPL BCP-MCNC: 3.6 G/DL (ref 3.2–4.9)
ALBUMIN/GLOB SERPL: 1.1 G/DL
ALP SERPL-CCNC: 47 U/L (ref 30–99)
ALT SERPL-CCNC: 61 U/L (ref 2–50)
ANION GAP SERPL CALC-SCNC: 11 MMOL/L (ref 7–16)
AST SERPL-CCNC: 37 U/L (ref 12–45)
BASOPHILS # BLD AUTO: 0.4 % (ref 0–1.8)
BASOPHILS # BLD: 0.03 K/UL (ref 0–0.12)
BILIRUB SERPL-MCNC: 0.4 MG/DL (ref 0.1–1.5)
BUN SERPL-MCNC: 24 MG/DL (ref 8–22)
CALCIUM SERPL-MCNC: 9.3 MG/DL (ref 8.5–10.5)
CHLORIDE SERPL-SCNC: 88 MMOL/L (ref 96–112)
CO2 SERPL-SCNC: 31 MMOL/L (ref 20–33)
CREAT SERPL-MCNC: 0.54 MG/DL (ref 0.5–1.4)
CRP SERPL HS-MCNC: 2.35 MG/DL (ref 0–0.75)
EOSINOPHIL # BLD AUTO: 0.02 K/UL (ref 0–0.51)
EOSINOPHIL NFR BLD: 0.3 % (ref 0–6.9)
ERYTHROCYTE [DISTWIDTH] IN BLOOD BY AUTOMATED COUNT: 42.1 FL (ref 35.9–50)
GLOBULIN SER CALC-MCNC: 3.2 G/DL (ref 1.9–3.5)
GLUCOSE SERPL-MCNC: 210 MG/DL (ref 65–99)
HCT VFR BLD AUTO: 44.1 % (ref 37–47)
HGB BLD-MCNC: 14.8 G/DL (ref 12–16)
IMM GRANULOCYTES # BLD AUTO: 0.34 K/UL (ref 0–0.11)
IMM GRANULOCYTES NFR BLD AUTO: 4.5 % (ref 0–0.9)
LYMPHOCYTES # BLD AUTO: 0.84 K/UL (ref 1–4.8)
LYMPHOCYTES NFR BLD: 11.2 % (ref 22–41)
MAGNESIUM SERPL-MCNC: 1.9 MG/DL (ref 1.5–2.5)
MCH RBC QN AUTO: 29.6 PG (ref 27–33)
MCHC RBC AUTO-ENTMCNC: 33.6 G/DL (ref 33.6–35)
MCV RBC AUTO: 88.2 FL (ref 81.4–97.8)
MONOCYTES # BLD AUTO: 1.49 K/UL (ref 0–0.85)
MONOCYTES NFR BLD AUTO: 19.9 % (ref 0–13.4)
NEUTROPHILS # BLD AUTO: 4.76 K/UL (ref 2–7.15)
NEUTROPHILS NFR BLD: 63.7 % (ref 44–72)
NRBC # BLD AUTO: 0 K/UL
NRBC BLD-RTO: 0 /100 WBC
PLATELET # BLD AUTO: 396 K/UL (ref 164–446)
PMV BLD AUTO: 9.9 FL (ref 9–12.9)
POTASSIUM SERPL-SCNC: 3.6 MMOL/L (ref 3.6–5.5)
PROCALCITONIN SERPL-MCNC: <0.05 NG/ML
PROT SERPL-MCNC: 6.8 G/DL (ref 6–8.2)
RBC # BLD AUTO: 5 M/UL (ref 4.2–5.4)
SODIUM SERPL-SCNC: 130 MMOL/L (ref 135–145)
WBC # BLD AUTO: 7.5 K/UL (ref 4.8–10.8)

## 2020-11-16 PROCEDURE — 84145 PROCALCITONIN (PCT): CPT

## 2020-11-16 PROCEDURE — A9270 NON-COVERED ITEM OR SERVICE: HCPCS | Performed by: HOSPITALIST

## 2020-11-16 PROCEDURE — 700102 HCHG RX REV CODE 250 W/ 637 OVERRIDE(OP): Performed by: STUDENT IN AN ORGANIZED HEALTH CARE EDUCATION/TRAINING PROGRAM

## 2020-11-16 PROCEDURE — 700105 HCHG RX REV CODE 258: Performed by: INTERNAL MEDICINE

## 2020-11-16 PROCEDURE — 770021 HCHG ROOM/CARE - ISO PRIVATE

## 2020-11-16 PROCEDURE — 80053 COMPREHEN METABOLIC PANEL: CPT

## 2020-11-16 PROCEDURE — 700101 HCHG RX REV CODE 250: Performed by: INTERNAL MEDICINE

## 2020-11-16 PROCEDURE — 700102 HCHG RX REV CODE 250 W/ 637 OVERRIDE(OP): Performed by: HOSPITALIST

## 2020-11-16 PROCEDURE — 700101 HCHG RX REV CODE 250: Performed by: STUDENT IN AN ORGANIZED HEALTH CARE EDUCATION/TRAINING PROGRAM

## 2020-11-16 PROCEDURE — 86140 C-REACTIVE PROTEIN: CPT

## 2020-11-16 PROCEDURE — A9270 NON-COVERED ITEM OR SERVICE: HCPCS | Performed by: STUDENT IN AN ORGANIZED HEALTH CARE EDUCATION/TRAINING PROGRAM

## 2020-11-16 PROCEDURE — 99233 SBSQ HOSP IP/OBS HIGH 50: CPT | Performed by: STUDENT IN AN ORGANIZED HEALTH CARE EDUCATION/TRAINING PROGRAM

## 2020-11-16 PROCEDURE — 700111 HCHG RX REV CODE 636 W/ 250 OVERRIDE (IP): Performed by: STUDENT IN AN ORGANIZED HEALTH CARE EDUCATION/TRAINING PROGRAM

## 2020-11-16 PROCEDURE — 83735 ASSAY OF MAGNESIUM: CPT

## 2020-11-16 PROCEDURE — 94640 AIRWAY INHALATION TREATMENT: CPT

## 2020-11-16 PROCEDURE — 85025 COMPLETE CBC W/AUTO DIFF WBC: CPT

## 2020-11-16 RX ORDER — FUROSEMIDE 20 MG/1
20 TABLET ORAL
Status: DISCONTINUED | OUTPATIENT
Start: 2020-11-16 | End: 2020-11-17

## 2020-11-16 RX ORDER — ECHINACEA PURPUREA EXTRACT 125 MG
2 TABLET ORAL
Status: DISCONTINUED | OUTPATIENT
Start: 2020-11-16 | End: 2020-11-21 | Stop reason: HOSPADM

## 2020-11-16 RX ORDER — POTASSIUM CHLORIDE 20 MEQ/1
40 TABLET, EXTENDED RELEASE ORAL ONCE
Status: COMPLETED | OUTPATIENT
Start: 2020-11-16 | End: 2020-11-16

## 2020-11-16 RX ADMIN — ACETAMINOPHEN 1000 MG: 500 TABLET ORAL at 06:00

## 2020-11-16 RX ADMIN — OXYCODONE HYDROCHLORIDE 10 MG: 10 TABLET ORAL at 18:15

## 2020-11-16 RX ADMIN — OXYCODONE HYDROCHLORIDE 10 MG: 10 TABLET ORAL at 04:57

## 2020-11-16 RX ADMIN — SPIRONOLACTONE 25 MG: 25 TABLET ORAL at 04:57

## 2020-11-16 RX ADMIN — GLYCOPYRROLATE 1 CAPSULE: 15.6 CAPSULE RESPIRATORY (INHALATION) at 19:59

## 2020-11-16 RX ADMIN — ACETAMINOPHEN 1000 MG: 500 TABLET ORAL at 17:21

## 2020-11-16 RX ADMIN — ACETAMINOPHEN 1000 MG: 500 TABLET ORAL at 13:15

## 2020-11-16 RX ADMIN — GLYCOPYRROLATE 1 CAPSULE: 15.6 CAPSULE RESPIRATORY (INHALATION) at 07:40

## 2020-11-16 RX ADMIN — REMDESIVIR 100 MG: 5 INJECTION INTRAVENOUS at 17:21

## 2020-11-16 RX ADMIN — FUROSEMIDE 20 MG: 20 INJECTION, SOLUTION INTRAMUSCULAR; INTRAVENOUS at 06:00

## 2020-11-16 RX ADMIN — DEXAMETHASONE 6 MG: 4 TABLET ORAL at 04:58

## 2020-11-16 RX ADMIN — FUROSEMIDE 20 MG: 20 TABLET ORAL at 14:21

## 2020-11-16 RX ADMIN — Medication 2000 UNITS: at 04:57

## 2020-11-16 RX ADMIN — POTASSIUM CHLORIDE 40 MEQ: 1500 TABLET, EXTENDED RELEASE ORAL at 14:21

## 2020-11-16 RX ADMIN — BUDESONIDE AND FORMOTEROL FUMARATE DIHYDRATE 2 PUFF: 160; 4.5 AEROSOL RESPIRATORY (INHALATION) at 07:39

## 2020-11-16 RX ADMIN — DOCUSATE SODIUM 50 MG AND SENNOSIDES 8.6 MG 2 TABLET: 8.6; 5 TABLET, FILM COATED ORAL at 04:58

## 2020-11-16 RX ADMIN — LIDOCAINE 1 PATCH: 50 PATCH CUTANEOUS at 11:04

## 2020-11-16 RX ADMIN — BUDESONIDE AND FORMOTEROL FUMARATE DIHYDRATE 2 PUFF: 160; 4.5 AEROSOL RESPIRATORY (INHALATION) at 19:59

## 2020-11-16 RX ADMIN — ENOXAPARIN SODIUM 40 MG: 40 INJECTION SUBCUTANEOUS at 04:57

## 2020-11-16 RX ADMIN — SALINE NASAL SPRAY 2 SPRAY: 1.5 SOLUTION NASAL at 17:25

## 2020-11-16 RX ADMIN — MONTELUKAST 10 MG: 10 TABLET, FILM COATED ORAL at 04:58

## 2020-11-16 RX ADMIN — AZITHROMYCIN MONOHYDRATE 250 MG: 250 TABLET ORAL at 04:57

## 2020-11-16 RX ADMIN — LISINOPRIL 20 MG: 20 TABLET ORAL at 04:57

## 2020-11-16 ASSESSMENT — PAIN DESCRIPTION - PAIN TYPE: TYPE: CHRONIC PAIN

## 2020-11-16 ASSESSMENT — ENCOUNTER SYMPTOMS
DOUBLE VISION: 0
CHILLS: 0
SPUTUM PRODUCTION: 1
COUGH: 1
PSYCHIATRIC NEGATIVE: 1
BACK PAIN: 1
FEVER: 0
SHORTNESS OF BREATH: 1
HEARTBURN: 0
NEUROLOGICAL NEGATIVE: 1
NECK PAIN: 1
NAUSEA: 0

## 2020-11-16 NOTE — PROGRESS NOTES
Assumed day shift care at start of shift  Patient a+o x 4  Denies pain this am  ls = dim at bases, 84-88% on 6lpm via nc, patient educated that nasal cannula cannot go above 6lpm, educated on oxymask - patient declining oxymask 2/2 it makes her hot, using nonrebreather after visit to bathroom  Denies sob, no s&s of respiratory distress  Up to chair for meals - patient agreeable  No needs at this time, wctm    Fall precautions/hourly rounding maintained, call light within reach and functioning, all items within reach.  Patient encouraged to call for assistance, poc reviewed with patient, ?'s/concerns answered.

## 2020-11-16 NOTE — PROGRESS NOTES
Hospital Medicine Daily Progress Note    Date of Service  11/16/2020    Chief Complaint  75 y.o. female admitted 11/14/2020 with dyspnea    Hospital Course  75-year-old female with a past medical history of severe COPD was admitted for Covid pneumonia complicated by acute hypoxic respiratory failure.  Patient started on steroids and remdesivir.  Patient received 1 dose of IV Lasix and is pronating frequently.  Currently saturating well on 8L nasal cannula.    Interval Problem Update  AAOX4 and afebrile  Down to 7L NC  Cr and LFTs stable   Continue Decadron  Continue Remdesivir 11/18/2020    Self pronation and incentive spirometry  Wean oxygen as tolerated  Labs in AM    Consultants/Specialty  Infectious disease    Code Status  DNAR/DNI    Disposition  TBD    Review of Systems  Review of Systems   Constitutional: Negative for chills and fever.   HENT: Negative for hearing loss and tinnitus.    Eyes: Negative for double vision.   Respiratory: Positive for cough, sputum production and shortness of breath.    Cardiovascular: Negative for chest pain.   Gastrointestinal: Negative for heartburn and nausea.   Genitourinary: Negative for dysuria and urgency.   Musculoskeletal: Positive for back pain and neck pain.   Skin: Negative for itching and rash.   Neurological: Negative.    Endo/Heme/Allergies: Negative.    Psychiatric/Behavioral: Negative.         Physical Exam  Temp:  [36.4 °C (97.5 °F)-37.2 °C (99 °F)] 36.4 °C (97.5 °F)  Pulse:  [52-76] 52  Resp:  [16-20] 16  BP: (151-173)/(62-79) 151/62  SpO2:  [79 %-90 %] 90 %    Physical Exam  Constitutional:       General: She is not in acute distress.     Appearance: Normal appearance. She is ill-appearing.   HENT:      Head: Normocephalic and atraumatic.      Mouth/Throat:      Mouth: Mucous membranes are moist.   Eyes:      Extraocular Movements: Extraocular movements intact.      Pupils: Pupils are equal, round, and reactive to light.   Neck:      Musculoskeletal: Normal  range of motion and neck supple.   Cardiovascular:      Rate and Rhythm: Normal rate and regular rhythm.      Pulses: Normal pulses.      Heart sounds: Normal heart sounds.   Pulmonary:      Effort: Respiratory distress present.      Breath sounds: Rales present.   Abdominal:      General: Abdomen is flat. Bowel sounds are normal. There is distension.      Palpations: Abdomen is soft.      Tenderness: There is no abdominal tenderness.   Musculoskeletal: Normal range of motion.         General: No swelling.   Skin:     General: Skin is warm.      Coloration: Skin is not jaundiced.   Neurological:      General: No focal deficit present.      Mental Status: She is alert and oriented to person, place, and time. Mental status is at baseline.      Cranial Nerves: No cranial nerve deficit.   Psychiatric:         Mood and Affect: Mood normal.         Behavior: Behavior normal.         Thought Content: Thought content normal.         Judgment: Judgment normal.         Fluids    Intake/Output Summary (Last 24 hours) at 11/16/2020 0729  Last data filed at 11/16/2020 0400  Gross per 24 hour   Intake 1760 ml   Output 750 ml   Net 1010 ml       Laboratory  Recent Labs     11/14/20  0300 11/15/20  0451 11/16/20  0045   WBC 7.5 6.6 7.5   RBC 4.41 4.84 5.00   HEMOGLOBIN 13.3 14.6 14.8   HEMATOCRIT 39.3 43.4 44.1   MCV 89.1 89.7 88.2   MCH 30.2 30.2 29.6   MCHC 33.8 33.6 33.6   RDW 42.3 43.1 42.1   PLATELETCT 289 344 396   MPV 10.0 9.7 9.9     Recent Labs     11/14/20  0300 11/15/20  0451 11/16/20  0045   SODIUM 130* 132* 130*   POTASSIUM 4.4 3.9 3.6   CHLORIDE 89* 89* 88*   CO2 31 33 31   GLUCOSE 112* 136* 210*   BUN 13 17 24*   CREATININE 0.39* 0.42* 0.54   CALCIUM 9.0 9.5 9.3             Recent Labs     11/15/20  0451   TRIGLYCERIDE 91   HDL 75   LDL 61       Imaging  DX-CHEST-PORTABLE (1 VIEW)   Final Result      Increasing bilateral interstitial opacities, suggesting worsening pulmonary edema. The appearance is not classic for  Covid 19 pneumonia, but this cannot be excluded.           Assessment/Plan  * COVID-19- (present on admission)  Assessment & Plan  Continue Decadron  Continue Remdesivir 11/18/2020  Labs in a.m.    Acute on chronic respiratory failure with hypoxia ([facility])  Assessment & Plan  Down to 8L NC  BID Lasix  Self pronation and incentive spirometry  Up to chair for all meals  Wean oxygen as tolerated  Labs in AM    SVT (supraventricular tachycardia) ([facility])- (present on admission)  Assessment & Plan  Takes digoxin as a home which I am holding since she is bradycardic    Stage 4 very severe COPD by GOLD classification ([facility])- (present on admission)  Assessment & Plan  Not in exacerbation  Continue home inhalers  On chronic azithromycin therapy    Lumbar radicular pain- (present on admission)  Assessment & Plan  On chronic opiate therapy    Essential hypertension- (present on admission)  Assessment & Plan  Continue current home medications  As needed antihypertensives       VTE prophylaxis: Lovenox

## 2020-11-16 NOTE — PROGRESS NOTES
Pt is admitted to [facility] due to SOB. Please hold all [facility] services.

## 2020-11-16 NOTE — CARE PLAN
Pt ambulates to bathroom with stand by assist. Gait steady. Pt has had no falls or injuries while hospitalized.

## 2020-11-16 NOTE — CARE PLAN
Problem: Communication  Goal: The ability to communicate needs accurately and effectively will improve  Outcome: PROGRESSING AS EXPECTED     Problem: Safety  Goal: Will remain free from injury  Outcome: PROGRESSING AS EXPECTED  Goal: Will remain free from falls  Outcome: PROGRESSING AS EXPECTED     Problem: Pain Management  Goal: Pain level will decrease to patient's comfort goal  Outcome: PROGRESSING AS EXPECTED     Problem: Infection  Goal: Will remain free from infection  Outcome: PROGRESSING AS EXPECTED     Problem: Venous Thromboembolism (VTW)/Deep Vein Thrombosis (DVT) Prevention:  Goal: Patient will participate in Venous Thrombosis (VTE)/Deep Vein Thrombosis (DVT)Prevention Measures  Outcome: PROGRESSING AS EXPECTED     Problem: Psychosocial Needs:  Goal: Level of anxiety will decrease  Outcome: PROGRESSING AS EXPECTED     Problem: Fluid Volume:  Goal: Will maintain balanced intake and output  Outcome: PROGRESSING AS EXPECTED

## 2020-11-16 NOTE — PROGRESS NOTES
Hand held assist to bathroom on baseline oxygen (6lpm via nc), patients pulse ox dropped to 68% when she returned from bathroom. Patient encouraged to use nonreabreather before during and after visit to bathroom - patient agreeable to plan.

## 2020-11-17 ENCOUNTER — HOME CARE VISIT (OUTPATIENT)
Dept: HOME HEALTH SERVICES | Facility: HOME HEALTHCARE | Age: 75
End: 2020-11-17
Payer: MEDICARE

## 2020-11-17 PROBLEM — Z66 DNR (DO NOT RESUSCITATE): Status: ACTIVE | Noted: 2020-11-17

## 2020-11-17 LAB
ALBUMIN SERPL BCP-MCNC: 3.5 G/DL (ref 3.2–4.9)
ALBUMIN/GLOB SERPL: 1 G/DL
ALP SERPL-CCNC: 49 U/L (ref 30–99)
ALT SERPL-CCNC: 53 U/L (ref 2–50)
ANION GAP SERPL CALC-SCNC: 10 MMOL/L (ref 7–16)
AST SERPL-CCNC: 28 U/L (ref 12–45)
BILIRUB SERPL-MCNC: 0.6 MG/DL (ref 0.1–1.5)
BUN SERPL-MCNC: 21 MG/DL (ref 8–22)
CALCIUM SERPL-MCNC: 9.6 MG/DL (ref 8.5–10.5)
CHLORIDE SERPL-SCNC: 90 MMOL/L (ref 96–112)
CO2 SERPL-SCNC: 32 MMOL/L (ref 20–33)
CREAT SERPL-MCNC: 0.57 MG/DL (ref 0.5–1.4)
GLOBULIN SER CALC-MCNC: 3.4 G/DL (ref 1.9–3.5)
GLUCOSE SERPL-MCNC: 127 MG/DL (ref 65–99)
POTASSIUM SERPL-SCNC: 4.1 MMOL/L (ref 3.6–5.5)
PROT SERPL-MCNC: 6.9 G/DL (ref 6–8.2)
SODIUM SERPL-SCNC: 132 MMOL/L (ref 135–145)

## 2020-11-17 PROCEDURE — 770021 HCHG ROOM/CARE - ISO PRIVATE

## 2020-11-17 PROCEDURE — 770006 HCHG ROOM/CARE - MED/SURG/GYN SEMI*

## 2020-11-17 PROCEDURE — 700111 HCHG RX REV CODE 636 W/ 250 OVERRIDE (IP): Performed by: STUDENT IN AN ORGANIZED HEALTH CARE EDUCATION/TRAINING PROGRAM

## 2020-11-17 PROCEDURE — 700102 HCHG RX REV CODE 250 W/ 637 OVERRIDE(OP): Performed by: HOSPITALIST

## 2020-11-17 PROCEDURE — 770020 HCHG ROOM/CARE - TELE (206)

## 2020-11-17 PROCEDURE — 700102 HCHG RX REV CODE 250 W/ 637 OVERRIDE(OP): Performed by: STUDENT IN AN ORGANIZED HEALTH CARE EDUCATION/TRAINING PROGRAM

## 2020-11-17 PROCEDURE — A9270 NON-COVERED ITEM OR SERVICE: HCPCS | Performed by: STUDENT IN AN ORGANIZED HEALTH CARE EDUCATION/TRAINING PROGRAM

## 2020-11-17 PROCEDURE — 94640 AIRWAY INHALATION TREATMENT: CPT

## 2020-11-17 PROCEDURE — 700105 HCHG RX REV CODE 258: Performed by: INTERNAL MEDICINE

## 2020-11-17 PROCEDURE — A9270 NON-COVERED ITEM OR SERVICE: HCPCS | Performed by: HOSPITALIST

## 2020-11-17 PROCEDURE — 80053 COMPREHEN METABOLIC PANEL: CPT

## 2020-11-17 PROCEDURE — 700101 HCHG RX REV CODE 250: Performed by: INTERNAL MEDICINE

## 2020-11-17 PROCEDURE — 700101 HCHG RX REV CODE 250: Performed by: STUDENT IN AN ORGANIZED HEALTH CARE EDUCATION/TRAINING PROGRAM

## 2020-11-17 PROCEDURE — 99232 SBSQ HOSP IP/OBS MODERATE 35: CPT | Performed by: HOSPITALIST

## 2020-11-17 RX ORDER — OXYCODONE HYDROCHLORIDE 10 MG/1
10 TABLET ORAL EVERY 6 HOURS PRN
Status: DISCONTINUED | OUTPATIENT
Start: 2020-11-17 | End: 2020-11-21 | Stop reason: HOSPADM

## 2020-11-17 RX ADMIN — ENOXAPARIN SODIUM 40 MG: 40 INJECTION SUBCUTANEOUS at 05:08

## 2020-11-17 RX ADMIN — REMDESIVIR 100 MG: 5 INJECTION INTRAVENOUS at 17:16

## 2020-11-17 RX ADMIN — OXYCODONE HYDROCHLORIDE 10 MG: 10 TABLET ORAL at 23:38

## 2020-11-17 RX ADMIN — DEXAMETHASONE 6 MG: 4 TABLET ORAL at 06:00

## 2020-11-17 RX ADMIN — LIDOCAINE 1 PATCH: 50 PATCH CUTANEOUS at 08:58

## 2020-11-17 RX ADMIN — OXYCODONE HYDROCHLORIDE 10 MG: 10 TABLET ORAL at 11:53

## 2020-11-17 RX ADMIN — LISINOPRIL 20 MG: 20 TABLET ORAL at 05:04

## 2020-11-17 RX ADMIN — Medication 2000 UNITS: at 05:04

## 2020-11-17 RX ADMIN — OXYCODONE HYDROCHLORIDE 10 MG: 10 TABLET ORAL at 17:09

## 2020-11-17 RX ADMIN — ACETAMINOPHEN 1000 MG: 500 TABLET ORAL at 11:53

## 2020-11-17 RX ADMIN — SPIRONOLACTONE 25 MG: 25 TABLET ORAL at 05:04

## 2020-11-17 RX ADMIN — ACETAMINOPHEN 1000 MG: 500 TABLET ORAL at 17:09

## 2020-11-17 RX ADMIN — FUROSEMIDE 20 MG: 20 TABLET ORAL at 05:05

## 2020-11-17 RX ADMIN — OXYCODONE HYDROCHLORIDE 10 MG: 10 TABLET ORAL at 03:47

## 2020-11-17 RX ADMIN — MONTELUKAST 10 MG: 10 TABLET, FILM COATED ORAL at 05:04

## 2020-11-17 RX ADMIN — GLYCOPYRROLATE 1 CAPSULE: 15.6 CAPSULE RESPIRATORY (INHALATION) at 07:01

## 2020-11-17 RX ADMIN — AZITHROMYCIN MONOHYDRATE 250 MG: 250 TABLET ORAL at 05:04

## 2020-11-17 RX ADMIN — BUDESONIDE AND FORMOTEROL FUMARATE DIHYDRATE 2 PUFF: 160; 4.5 AEROSOL RESPIRATORY (INHALATION) at 07:02

## 2020-11-17 RX ADMIN — BUDESONIDE AND FORMOTEROL FUMARATE DIHYDRATE 2 PUFF: 160; 4.5 AEROSOL RESPIRATORY (INHALATION) at 19:48

## 2020-11-17 RX ADMIN — ACETAMINOPHEN 1000 MG: 500 TABLET ORAL at 05:04

## 2020-11-17 ASSESSMENT — ENCOUNTER SYMPTOMS
VOMITING: 0
DIARRHEA: 0
SHORTNESS OF BREATH: 1
FEVER: 0
NAUSEA: 0
COUGH: 1

## 2020-11-17 ASSESSMENT — FIBROSIS 4 INDEX: FIB4 SCORE: 0.73

## 2020-11-17 ASSESSMENT — PAIN DESCRIPTION - PAIN TYPE: TYPE: CHRONIC PAIN

## 2020-11-17 NOTE — PROGRESS NOTES
Assessment completed.  Pt A&Ox4. Moderate work of breathing, SpO2 83% on 6L NC. Placed non-rebreather mask on pt, SpO2 now 87%. Pt reports 10/10 back pain, patch applied.  POC discussed. Belongings within reach.  Needs met, will continue to monitor.

## 2020-11-17 NOTE — PROGRESS NOTES
Pt currently satting mid to low 80% on 6LNC. Pt refusing OxyMask and NRB. Educated pt on benefits and pt still refused. Pt also refusing to prone and to lay on side. Educated pt on benefits of increased perfusion, breathing better, etc. Pt still refuses. Will reattempt at a later time.

## 2020-11-17 NOTE — PROGRESS NOTES
Received phone call from Ebony with EPS this AM to follow up on referral. Advised Ebony that pt is currently hospitalized at [facility]. Ebony stated she will contact hospital.

## 2020-11-17 NOTE — PROGRESS NOTES
Pt to transfer to S173-2.  Attempted to give telephone report to receiving RN; receiving RN currently unavailable, will attempt at a later time.

## 2020-11-18 LAB
ALBUMIN SERPL BCP-MCNC: 3.3 G/DL (ref 3.2–4.9)
ALBUMIN/GLOB SERPL: 1.1 G/DL
ALP SERPL-CCNC: 45 U/L (ref 30–99)
ALT SERPL-CCNC: 37 U/L (ref 2–50)
ANION GAP SERPL CALC-SCNC: 6 MMOL/L (ref 7–16)
AST SERPL-CCNC: 15 U/L (ref 12–45)
BILIRUB SERPL-MCNC: 0.6 MG/DL (ref 0.1–1.5)
BUN SERPL-MCNC: 18 MG/DL (ref 8–22)
CALCIUM SERPL-MCNC: 9.1 MG/DL (ref 8.5–10.5)
CHLORIDE SERPL-SCNC: 93 MMOL/L (ref 96–112)
CO2 SERPL-SCNC: 35 MMOL/L (ref 20–33)
CREAT SERPL-MCNC: 0.45 MG/DL (ref 0.5–1.4)
GLOBULIN SER CALC-MCNC: 3 G/DL (ref 1.9–3.5)
GLUCOSE SERPL-MCNC: 107 MG/DL (ref 65–99)
IL6 SERPL-MCNC: 2.8 PG/ML
POTASSIUM SERPL-SCNC: 3.6 MMOL/L (ref 3.6–5.5)
PROT SERPL-MCNC: 6.3 G/DL (ref 6–8.2)
SODIUM SERPL-SCNC: 134 MMOL/L (ref 135–145)

## 2020-11-18 PROCEDURE — 94760 N-INVAS EAR/PLS OXIMETRY 1: CPT

## 2020-11-18 PROCEDURE — 700102 HCHG RX REV CODE 250 W/ 637 OVERRIDE(OP): Performed by: HOSPITALIST

## 2020-11-18 PROCEDURE — 700101 HCHG RX REV CODE 250: Performed by: STUDENT IN AN ORGANIZED HEALTH CARE EDUCATION/TRAINING PROGRAM

## 2020-11-18 PROCEDURE — A9270 NON-COVERED ITEM OR SERVICE: HCPCS | Performed by: STUDENT IN AN ORGANIZED HEALTH CARE EDUCATION/TRAINING PROGRAM

## 2020-11-18 PROCEDURE — 36415 COLL VENOUS BLD VENIPUNCTURE: CPT

## 2020-11-18 PROCEDURE — 700111 HCHG RX REV CODE 636 W/ 250 OVERRIDE (IP): Performed by: STUDENT IN AN ORGANIZED HEALTH CARE EDUCATION/TRAINING PROGRAM

## 2020-11-18 PROCEDURE — 770021 HCHG ROOM/CARE - ISO PRIVATE

## 2020-11-18 PROCEDURE — 99233 SBSQ HOSP IP/OBS HIGH 50: CPT | Performed by: STUDENT IN AN ORGANIZED HEALTH CARE EDUCATION/TRAINING PROGRAM

## 2020-11-18 PROCEDURE — A9270 NON-COVERED ITEM OR SERVICE: HCPCS | Performed by: HOSPITALIST

## 2020-11-18 PROCEDURE — 94640 AIRWAY INHALATION TREATMENT: CPT

## 2020-11-18 PROCEDURE — 700102 HCHG RX REV CODE 250 W/ 637 OVERRIDE(OP): Performed by: STUDENT IN AN ORGANIZED HEALTH CARE EDUCATION/TRAINING PROGRAM

## 2020-11-18 PROCEDURE — 700105 HCHG RX REV CODE 258: Performed by: INTERNAL MEDICINE

## 2020-11-18 PROCEDURE — 80053 COMPREHEN METABOLIC PANEL: CPT

## 2020-11-18 PROCEDURE — 700101 HCHG RX REV CODE 250: Performed by: INTERNAL MEDICINE

## 2020-11-18 RX ADMIN — OXYCODONE HYDROCHLORIDE 10 MG: 10 TABLET ORAL at 06:19

## 2020-11-18 RX ADMIN — BUDESONIDE AND FORMOTEROL FUMARATE DIHYDRATE 2 PUFF: 160; 4.5 AEROSOL RESPIRATORY (INHALATION) at 09:03

## 2020-11-18 RX ADMIN — Medication 2000 UNITS: at 06:20

## 2020-11-18 RX ADMIN — SPIRONOLACTONE 25 MG: 25 TABLET ORAL at 06:20

## 2020-11-18 RX ADMIN — BUDESONIDE AND FORMOTEROL FUMARATE DIHYDRATE 2 PUFF: 160; 4.5 AEROSOL RESPIRATORY (INHALATION) at 20:00

## 2020-11-18 RX ADMIN — ENOXAPARIN SODIUM 40 MG: 40 INJECTION SUBCUTANEOUS at 06:19

## 2020-11-18 RX ADMIN — OXYCODONE HYDROCHLORIDE 10 MG: 10 TABLET ORAL at 22:24

## 2020-11-18 RX ADMIN — ACETAMINOPHEN 1000 MG: 500 TABLET ORAL at 17:14

## 2020-11-18 RX ADMIN — ACETAMINOPHEN 1000 MG: 500 TABLET ORAL at 06:20

## 2020-11-18 RX ADMIN — REMDESIVIR 100 MG: 5 INJECTION INTRAVENOUS at 17:14

## 2020-11-18 RX ADMIN — OXYCODONE HYDROCHLORIDE 10 MG: 10 TABLET ORAL at 12:06

## 2020-11-18 RX ADMIN — LISINOPRIL 20 MG: 20 TABLET ORAL at 06:20

## 2020-11-18 RX ADMIN — ACETAMINOPHEN 1000 MG: 500 TABLET ORAL at 12:06

## 2020-11-18 RX ADMIN — MONTELUKAST 10 MG: 10 TABLET, FILM COATED ORAL at 06:20

## 2020-11-18 RX ADMIN — AZITHROMYCIN MONOHYDRATE 250 MG: 250 TABLET ORAL at 06:20

## 2020-11-18 RX ADMIN — LIDOCAINE 1 PATCH: 50 PATCH CUTANEOUS at 09:45

## 2020-11-18 ASSESSMENT — PAIN DESCRIPTION - PAIN TYPE
TYPE: CHRONIC PAIN

## 2020-11-18 ASSESSMENT — ACTIVITIES OF DAILY LIVING (ADL): OASIS_M1830: 05

## 2020-11-18 ASSESSMENT — ENCOUNTER SYMPTOMS
POOR JUDGMENT: 1
DIFFICULTY THINKING: 1

## 2020-11-18 NOTE — PROGRESS NOTES
Patient came from restroom, patient sating 77% placed on non rebreather 15L sats to 86% patient wanted to be placed on nasal cannula despite low oxygen level. Patient states that's what she always is.

## 2020-11-18 NOTE — PROGRESS NOTES
Pt medicated for pain.   Pt off unit via hospital bed with tech. Pt states personal belongings are in possession.  Meds and chart given to tech.

## 2020-11-18 NOTE — PROGRESS NOTES
"Report received. Patient oriented x4. Mild sob with ambulation. On continuous pulse ox. Patient sating 84% on 10L   NC patient refusing to wear any mask and refusing high flow patient states 'I cannot stand that this is ok.\"   "

## 2020-11-19 PROBLEM — E87.1 HYPONATREMIA: Status: ACTIVE | Noted: 2020-11-19

## 2020-11-19 LAB
ALBUMIN SERPL BCP-MCNC: 3.2 G/DL (ref 3.2–4.9)
ALBUMIN/GLOB SERPL: 1.1 G/DL
ALP SERPL-CCNC: 48 U/L (ref 30–99)
ALT SERPL-CCNC: 32 U/L (ref 2–50)
ANION GAP SERPL CALC-SCNC: 8 MMOL/L (ref 7–16)
AST SERPL-CCNC: 17 U/L (ref 12–45)
BILIRUB SERPL-MCNC: 0.7 MG/DL (ref 0.1–1.5)
BUN SERPL-MCNC: 16 MG/DL (ref 8–22)
CALCIUM SERPL-MCNC: 8.5 MG/DL (ref 8.5–10.5)
CHLORIDE SERPL-SCNC: 91 MMOL/L (ref 96–112)
CO2 SERPL-SCNC: 30 MMOL/L (ref 20–33)
CREAT SERPL-MCNC: 0.45 MG/DL (ref 0.5–1.4)
GLOBULIN SER CALC-MCNC: 2.9 G/DL (ref 1.9–3.5)
GLUCOSE SERPL-MCNC: 118 MG/DL (ref 65–99)
POTASSIUM SERPL-SCNC: 3.4 MMOL/L (ref 3.6–5.5)
PROT SERPL-MCNC: 6.1 G/DL (ref 6–8.2)
SODIUM SERPL-SCNC: 129 MMOL/L (ref 135–145)

## 2020-11-19 PROCEDURE — A9270 NON-COVERED ITEM OR SERVICE: HCPCS | Performed by: HOSPITALIST

## 2020-11-19 PROCEDURE — 94640 AIRWAY INHALATION TREATMENT: CPT

## 2020-11-19 PROCEDURE — 700102 HCHG RX REV CODE 250 W/ 637 OVERRIDE(OP): Performed by: HOSPITALIST

## 2020-11-19 PROCEDURE — 94760 N-INVAS EAR/PLS OXIMETRY 1: CPT

## 2020-11-19 PROCEDURE — 700101 HCHG RX REV CODE 250: Performed by: STUDENT IN AN ORGANIZED HEALTH CARE EDUCATION/TRAINING PROGRAM

## 2020-11-19 PROCEDURE — 700105 HCHG RX REV CODE 258: Performed by: STUDENT IN AN ORGANIZED HEALTH CARE EDUCATION/TRAINING PROGRAM

## 2020-11-19 PROCEDURE — A9270 NON-COVERED ITEM OR SERVICE: HCPCS | Performed by: STUDENT IN AN ORGANIZED HEALTH CARE EDUCATION/TRAINING PROGRAM

## 2020-11-19 PROCEDURE — 80053 COMPREHEN METABOLIC PANEL: CPT

## 2020-11-19 PROCEDURE — 770021 HCHG ROOM/CARE - ISO PRIVATE

## 2020-11-19 PROCEDURE — 99233 SBSQ HOSP IP/OBS HIGH 50: CPT | Performed by: STUDENT IN AN ORGANIZED HEALTH CARE EDUCATION/TRAINING PROGRAM

## 2020-11-19 PROCEDURE — 700102 HCHG RX REV CODE 250 W/ 637 OVERRIDE(OP): Performed by: STUDENT IN AN ORGANIZED HEALTH CARE EDUCATION/TRAINING PROGRAM

## 2020-11-19 PROCEDURE — 700111 HCHG RX REV CODE 636 W/ 250 OVERRIDE (IP): Performed by: STUDENT IN AN ORGANIZED HEALTH CARE EDUCATION/TRAINING PROGRAM

## 2020-11-19 PROCEDURE — 36415 COLL VENOUS BLD VENIPUNCTURE: CPT

## 2020-11-19 RX ORDER — SODIUM CHLORIDE 9 MG/ML
1000 INJECTION, SOLUTION INTRAVENOUS ONCE
Status: COMPLETED | OUTPATIENT
Start: 2020-11-19 | End: 2020-11-19

## 2020-11-19 RX ORDER — POTASSIUM CHLORIDE 20 MEQ/1
20 TABLET, EXTENDED RELEASE ORAL DAILY
Status: DISCONTINUED | OUTPATIENT
Start: 2020-11-19 | End: 2020-11-21 | Stop reason: HOSPADM

## 2020-11-19 RX ORDER — DEXAMETHASONE 4 MG/1
6 TABLET ORAL DAILY
Status: DISCONTINUED | OUTPATIENT
Start: 2020-11-19 | End: 2020-11-21 | Stop reason: HOSPADM

## 2020-11-19 RX ORDER — IPRATROPIUM BROMIDE AND ALBUTEROL SULFATE 2.5; .5 MG/3ML; MG/3ML
3 SOLUTION RESPIRATORY (INHALATION)
Status: DISCONTINUED | OUTPATIENT
Start: 2020-11-19 | End: 2020-11-21 | Stop reason: HOSPADM

## 2020-11-19 RX ADMIN — ACETAMINOPHEN 1000 MG: 500 TABLET ORAL at 06:40

## 2020-11-19 RX ADMIN — MONTELUKAST 10 MG: 10 TABLET, FILM COATED ORAL at 06:40

## 2020-11-19 RX ADMIN — DOCUSATE SODIUM 50 MG AND SENNOSIDES 8.6 MG 2 TABLET: 8.6; 5 TABLET, FILM COATED ORAL at 06:38

## 2020-11-19 RX ADMIN — SODIUM CHLORIDE 1000 ML: 9 INJECTION, SOLUTION INTRAVENOUS at 11:30

## 2020-11-19 RX ADMIN — DEXAMETHASONE 6 MG: 4 TABLET ORAL at 11:29

## 2020-11-19 RX ADMIN — SPIRONOLACTONE 25 MG: 25 TABLET ORAL at 06:42

## 2020-11-19 RX ADMIN — ENOXAPARIN SODIUM 40 MG: 40 INJECTION SUBCUTANEOUS at 06:38

## 2020-11-19 RX ADMIN — IPRATROPIUM BROMIDE AND ALBUTEROL SULFATE 3 ML: .5; 3 SOLUTION RESPIRATORY (INHALATION) at 10:36

## 2020-11-19 RX ADMIN — POTASSIUM CHLORIDE 20 MEQ: 1500 TABLET, EXTENDED RELEASE ORAL at 11:29

## 2020-11-19 RX ADMIN — Medication 2000 UNITS: at 06:39

## 2020-11-19 RX ADMIN — BUDESONIDE AND FORMOTEROL FUMARATE DIHYDRATE 2 PUFF: 160; 4.5 AEROSOL RESPIRATORY (INHALATION) at 07:49

## 2020-11-19 RX ADMIN — LISINOPRIL 20 MG: 20 TABLET ORAL at 06:41

## 2020-11-19 RX ADMIN — IPRATROPIUM BROMIDE AND ALBUTEROL SULFATE 3 ML: .5; 3 SOLUTION RESPIRATORY (INHALATION) at 15:49

## 2020-11-19 RX ADMIN — OXYCODONE HYDROCHLORIDE 10 MG: 10 TABLET ORAL at 14:06

## 2020-11-19 RX ADMIN — OXYCODONE HYDROCHLORIDE 10 MG: 10 TABLET ORAL at 07:59

## 2020-11-19 RX ADMIN — IPRATROPIUM BROMIDE AND ALBUTEROL SULFATE 3 ML: .5; 3 SOLUTION RESPIRATORY (INHALATION) at 22:22

## 2020-11-19 RX ADMIN — OXYCODONE HYDROCHLORIDE 10 MG: 10 TABLET ORAL at 23:32

## 2020-11-19 RX ADMIN — IPRATROPIUM BROMIDE AND ALBUTEROL SULFATE 3 ML: .5; 3 SOLUTION RESPIRATORY (INHALATION) at 19:22

## 2020-11-19 ASSESSMENT — ENCOUNTER SYMPTOMS
NAUSEA: 0
NERVOUS/ANXIOUS: 0
SORE THROAT: 0
MYALGIAS: 0
NECK PAIN: 0
SPUTUM PRODUCTION: 1
DIZZINESS: 0
EYE PAIN: 0
ABDOMINAL PAIN: 0
CHILLS: 0
WHEEZING: 0
COUGH: 1
FEVER: 0
DEPRESSION: 0
SHORTNESS OF BREATH: 0
CONSTIPATION: 0
WEAKNESS: 0
FLANK PAIN: 0
DIARRHEA: 0
BACK PAIN: 0
HEADACHES: 0
VOMITING: 0

## 2020-11-19 NOTE — ASSESSMENT & PLAN NOTE
Mild, Na 133 today  Worsening after NS raises concern for SIADH  Sosm 288 not suggestive of SIADH  Discontinuing Ciro and Uosm

## 2020-11-19 NOTE — PROGRESS NOTES
[facility] Medicine Daily Progress Note    Date of Service  11/19/2020    Chief Complaint  75 y.o. female admitted 11/14/2020 with shortness of breath.    Hospital Course  Ms. Renee is a 75-year-old female with a past medical history of severe COPD was admitted for Covid pneumonia complicated by acute hypoxic respiratory failure. She was  started on steroids and remdesivir and  received 1 dose of IV Lasix on admit.    Interval Problem Update  She recognizes that she has COVID and has been through COPD exacerbations before. She would like a flutter valve for her cough and sputum.  She wears 6 L of oxygen at home  She uses oxycodone 10 mg every 6 hours at home, and as long as she is proactive about taking oxy it is fine. Her pain is generalized and currently she is fine.  She does not take blood thinner at home. She is not sure why she was prescribed it.  She denies F/C, dyspnea, abdominal pain, diarrhea, constipation.      Consultants/Specialty  Infectious disease    Code Status  DNAR/DNI    Disposition  COVID unit    Review of Systems  Review of Systems   Constitutional: Negative for chills and fever.   HENT: Negative for congestion, ear pain and sore throat.    Eyes: Negative for pain.   Respiratory: Positive for cough and sputum production. Negative for shortness of breath.    Cardiovascular: Negative for chest pain.   Gastrointestinal: Negative for diarrhea, nausea and vomiting.   Genitourinary: Negative for dysuria, flank pain and hematuria.   Musculoskeletal: Negative for back pain, joint pain, myalgias and neck pain.   Neurological: Negative for dizziness, weakness and headaches.   Psychiatric/Behavioral: Negative for depression. The patient is not nervous/anxious.    All other systems reviewed and are negative.       Physical Exam  Temp:  [35.8 °C (96.4 °F)-36.5 °C (97.7 °F)] 36.1 °C (97 °F)  Pulse:  [58-85] 85  Resp:  [16-20] 18  BP: (120-148)/(59-96) 148/63  SpO2:  [78 %-89 %] 81 %    Physical Exam  Vitals  signs and nursing note reviewed.   Constitutional:       General: She is not in acute distress.     Appearance: Normal appearance. She is not ill-appearing, toxic-appearing or diaphoretic.   HENT:      Head: Normocephalic.      Mouth/Throat:      Mouth: Mucous membranes are moist.      Pharynx: Oropharynx is clear.   Eyes:      General: No scleral icterus.     Conjunctiva/sclera: Conjunctivae normal.   Neck:      Musculoskeletal: Neck supple.   Cardiovascular:      Rate and Rhythm: Normal rate and regular rhythm.      Pulses: Normal pulses.      Heart sounds: Normal heart sounds. No murmur. No friction rub. No gallop.       Comments: Distant heart sounds  Pulmonary:      Effort: No respiratory distress.      Breath sounds: Decreased air movement present. Decreased breath sounds and rhonchi (Expiratory) present. No wheezing or rales.   Abdominal:      General: Abdomen is flat. Bowel sounds are normal. There is no distension.      Palpations: Abdomen is soft.      Tenderness: There is no abdominal tenderness. There is no guarding or rebound.   Genitourinary:     Comments: No lawler  Musculoskeletal:      Right lower leg: No edema.      Left lower leg: No edema.   Skin:     General: Skin is warm and dry.   Neurological:      Mental Status: She is alert.      Comments: Appropriately conversant   Psychiatric:         Mood and Affect: Mood normal.         Behavior: Behavior normal.         Thought Content: Thought content normal.         Judgment: Judgment normal.         Fluids  No intake or output data in the 24 hours ending 11/19/20 0732    Laboratory      Recent Labs     11/17/20  0400 11/18/20  0658 11/19/20  0513   SODIUM 132* 134* 129*   POTASSIUM 4.1 3.6 3.4*   CHLORIDE 90* 93* 91*   CO2 32 35* 30   GLUCOSE 127* 107* 118*   BUN 21 18 16   CREATININE 0.57 0.45* 0.45*   CALCIUM 9.6 9.1 8.5                   Imaging  DX-CHEST-PORTABLE (1 VIEW)   Final Result      Increasing bilateral interstitial opacities, suggesting  worsening pulmonary edema. The appearance is not classic for Covid 19 pneumonia, but this cannot be excluded.           Assessment/Plan  * COVID-19- (present on admission)  Assessment & Plan  COVID 19 pneumonia with associated acute on chronic respiratory failure.  Continue Decadron 6 mg daily for 10 days  Continue Remdesivir 11/18/2020  Isolation precautions      Acute on chronic respiratory failure with hypoxia ([facility])- (present on admission)  Assessment & Plan  She is on 6 liters oxygen baseline and is now hypoxic at 84% on 6 liters.   She has refused high-flow and an oxymask.  CT chest with emphysema but no consolidations  Self pronation and incentive spirometry  Treatment for COPD exacerbation as below  Up to chair for all meals      Hyponatremia- (present on admission)  Assessment & Plan  Mild, Na 132  Trial 1 L NS for hypochloremic hyponatremia    Stage 4 very severe COPD by GOLD classification ([facility])- (present on admission)  Assessment & Plan  Not in exacerbation  Continue home inhalers  On chronic azithromycin therapy    DNR (do not resuscitate)- (present on admission)  Assessment & Plan  Per patient's request    SVT (supraventricular tachycardia) ([facility])- (present on admission)  Assessment & Plan  Takes digoxin as a home which has been held due to bradycardia  Telemetry    Lumbar radicular pain- (present on admission)  Assessment & Plan  Continue PTA regimen of duloxetine and oxycodone 10 mg q6h PRN    Essential hypertension- (present on admission)  Assessment & Plan  Continue lisinopril and spironolactone       VTE prophylaxis: Lovenox

## 2020-11-19 NOTE — PROGRESS NOTES
"Assumed patient care @ 1930. Patient A&O x4, on 6L NC saturating low 80's to 84%. This RN updated during shift change via day shift RN patient refusing to increase oxygen or utilize other masks for oxygenation. Per day shift RN, patient only willing to utilize non-re breather mask during ambulation to restroom.    This RN educated patient on importance of adequate oxygenation and importance for patient safety. Patient refusing further oxygenation methods and states \"I want to be on 6L, I need to be on 6L nothing more, I am fine\".     MD Carmona notified of patient's status.     Continued to monitor.           "

## 2020-11-20 LAB
ANION GAP SERPL CALC-SCNC: 8 MMOL/L (ref 7–16)
BUN SERPL-MCNC: 17 MG/DL (ref 8–22)
CALCIUM SERPL-MCNC: 9.5 MG/DL (ref 8.5–10.5)
CHLORIDE SERPL-SCNC: 96 MMOL/L (ref 96–112)
CO2 SERPL-SCNC: 29 MMOL/L (ref 20–33)
CREAT SERPL-MCNC: 0.44 MG/DL (ref 0.5–1.4)
GLUCOSE SERPL-MCNC: 168 MG/DL (ref 65–99)
OSMOLALITY SERPL: 288 MOSM/KG H2O (ref 278–298)
POTASSIUM SERPL-SCNC: 4 MMOL/L (ref 3.6–5.5)
SODIUM SERPL-SCNC: 133 MMOL/L (ref 135–145)

## 2020-11-20 PROCEDURE — 700102 HCHG RX REV CODE 250 W/ 637 OVERRIDE(OP): Performed by: HOSPITALIST

## 2020-11-20 PROCEDURE — 83930 ASSAY OF BLOOD OSMOLALITY: CPT

## 2020-11-20 PROCEDURE — 94640 AIRWAY INHALATION TREATMENT: CPT

## 2020-11-20 PROCEDURE — 700102 HCHG RX REV CODE 250 W/ 637 OVERRIDE(OP): Performed by: STUDENT IN AN ORGANIZED HEALTH CARE EDUCATION/TRAINING PROGRAM

## 2020-11-20 PROCEDURE — A9270 NON-COVERED ITEM OR SERVICE: HCPCS | Performed by: HOSPITALIST

## 2020-11-20 PROCEDURE — 94760 N-INVAS EAR/PLS OXIMETRY 1: CPT

## 2020-11-20 PROCEDURE — 99233 SBSQ HOSP IP/OBS HIGH 50: CPT | Performed by: STUDENT IN AN ORGANIZED HEALTH CARE EDUCATION/TRAINING PROGRAM

## 2020-11-20 PROCEDURE — 770021 HCHG ROOM/CARE - ISO PRIVATE

## 2020-11-20 PROCEDURE — 700111 HCHG RX REV CODE 636 W/ 250 OVERRIDE (IP): Performed by: STUDENT IN AN ORGANIZED HEALTH CARE EDUCATION/TRAINING PROGRAM

## 2020-11-20 PROCEDURE — A9270 NON-COVERED ITEM OR SERVICE: HCPCS | Performed by: STUDENT IN AN ORGANIZED HEALTH CARE EDUCATION/TRAINING PROGRAM

## 2020-11-20 PROCEDURE — 36415 COLL VENOUS BLD VENIPUNCTURE: CPT

## 2020-11-20 PROCEDURE — 80048 BASIC METABOLIC PNL TOTAL CA: CPT

## 2020-11-20 PROCEDURE — 700101 HCHG RX REV CODE 250: Performed by: STUDENT IN AN ORGANIZED HEALTH CARE EDUCATION/TRAINING PROGRAM

## 2020-11-20 RX ORDER — FLUTICASONE PROPIONATE 50 MCG
2 SPRAY, SUSPENSION (ML) NASAL 2 TIMES DAILY
Status: DISCONTINUED | OUTPATIENT
Start: 2020-11-20 | End: 2020-11-21 | Stop reason: HOSPADM

## 2020-11-20 RX ORDER — FLUTICASONE PROPIONATE 50 MCG
2 SPRAY, SUSPENSION (ML) NASAL 2 TIMES DAILY
Status: DISCONTINUED | OUTPATIENT
Start: 2020-11-20 | End: 2020-11-20

## 2020-11-20 RX ADMIN — FLUTICASONE PROPIONATE 100 MCG: 50 SPRAY, METERED NASAL at 13:46

## 2020-11-20 RX ADMIN — OXYCODONE HYDROCHLORIDE 10 MG: 10 TABLET ORAL at 05:17

## 2020-11-20 RX ADMIN — IPRATROPIUM BROMIDE AND ALBUTEROL SULFATE 3 ML: .5; 3 SOLUTION RESPIRATORY (INHALATION) at 11:24

## 2020-11-20 RX ADMIN — SPIRONOLACTONE 25 MG: 25 TABLET ORAL at 05:18

## 2020-11-20 RX ADMIN — ACETAMINOPHEN 1000 MG: 500 TABLET ORAL at 05:16

## 2020-11-20 RX ADMIN — LISINOPRIL 20 MG: 20 TABLET ORAL at 05:17

## 2020-11-20 RX ADMIN — DEXAMETHASONE 6 MG: 4 TABLET ORAL at 05:17

## 2020-11-20 RX ADMIN — OXYCODONE HYDROCHLORIDE 10 MG: 10 TABLET ORAL at 12:11

## 2020-11-20 RX ADMIN — OXYCODONE HYDROCHLORIDE 10 MG: 10 TABLET ORAL at 18:25

## 2020-11-20 RX ADMIN — Medication 2000 UNITS: at 05:16

## 2020-11-20 RX ADMIN — MONTELUKAST 10 MG: 10 TABLET, FILM COATED ORAL at 05:18

## 2020-11-20 RX ADMIN — IPRATROPIUM BROMIDE AND ALBUTEROL SULFATE 3 ML: .5; 3 SOLUTION RESPIRATORY (INHALATION) at 20:53

## 2020-11-20 RX ADMIN — POTASSIUM CHLORIDE 20 MEQ: 1500 TABLET, EXTENDED RELEASE ORAL at 05:17

## 2020-11-20 RX ADMIN — IPRATROPIUM BROMIDE AND ALBUTEROL SULFATE 3 ML: .5; 3 SOLUTION RESPIRATORY (INHALATION) at 15:20

## 2020-11-20 RX ADMIN — ENOXAPARIN SODIUM 40 MG: 40 INJECTION SUBCUTANEOUS at 05:19

## 2020-11-20 RX ADMIN — IPRATROPIUM BROMIDE AND ALBUTEROL SULFATE 3 ML: .5; 3 SOLUTION RESPIRATORY (INHALATION) at 07:10

## 2020-11-20 ASSESSMENT — ENCOUNTER SYMPTOMS
DIARRHEA: 0
SPUTUM PRODUCTION: 1
VOMITING: 0
FEVER: 0
NAUSEA: 0
ABDOMINAL PAIN: 0
SHORTNESS OF BREATH: 1
FLANK PAIN: 0
PALPITATIONS: 1
NECK PAIN: 0
DEPRESSION: 0
WHEEZING: 0
BACK PAIN: 0
COUGH: 1
WEAKNESS: 0
CONSTIPATION: 0
CHILLS: 0
EYE PAIN: 0
MYALGIAS: 0
SORE THROAT: 0
HEADACHES: 0
NERVOUS/ANXIOUS: 0

## 2020-11-20 ASSESSMENT — FIBROSIS 4 INDEX: FIB4 SCORE: 0.57

## 2020-11-20 NOTE — PROGRESS NOTES
[facility] Medicine Daily Progress Note    Date of Service  11/20/2020    Chief Complaint  75 y.o. female admitted 11/14/2020 with shortness of breath.    Hospital Course  Ms. Renee is a 75-year-old female with a past medical history of severe COPD was admitted for Covid pneumonia complicated by acute hypoxic respiratory failure. She was  started on steroids and remdesivir and  received 1 dose of IV Lasix on admit.    Interval Problem Update  She's having lots of sinus congestion and would appreciate flonase.    She wants to make a deal about only using the NC when ambulating to/from the bathroom to see if she can go home. She required 12-15 liters NC while walking and 6-10 to recover when laying back down.    IS, duonebs, and flutter valve are helping. Her breathing feels better.  She gets palpitations when hypoxic.    She denies pain currently.    Consultants/Specialty  Infectious disease    Code Status  DNAR/DNI    Disposition  COVID unit    Review of Systems  Review of Systems   Constitutional: Negative for chills, fever and malaise/fatigue.   HENT: Negative for congestion, ear pain and sore throat.    Eyes: Negative for pain.   Respiratory: Positive for cough, sputum production and shortness of breath. Negative for wheezing.    Cardiovascular: Positive for palpitations. Negative for chest pain.   Gastrointestinal: Negative for abdominal pain, constipation, diarrhea, nausea and vomiting.   Genitourinary: Negative for dysuria and flank pain.   Musculoskeletal: Negative for back pain, joint pain, myalgias and neck pain.   Neurological: Negative for weakness and headaches.   Psychiatric/Behavioral: Negative for depression. The patient is not nervous/anxious.    All other systems reviewed and are negative.       Physical Exam  Temp:  [36.2 °C (97.2 °F)-36.7 °C (98.1 °F)] 36.2 °C (97.2 °F)  Pulse:  [60-92] 82  Resp:  [16-18] 16  BP: (127-174)/(59-74) 140/64  SpO2:  [88 %-100 %] 95 %    Physical Exam  Vitals signs and  nursing note reviewed.   Constitutional:       General: She is not in acute distress.     Appearance: Normal appearance. She is not ill-appearing, toxic-appearing or diaphoretic.      Interventions: Nasal cannula in place.   HENT:      Head: Normocephalic.      Mouth/Throat:      Mouth: Mucous membranes are moist.      Pharynx: Oropharynx is clear.   Eyes:      General: No scleral icterus.     Conjunctiva/sclera: Conjunctivae normal.   Neck:      Musculoskeletal: Neck supple.   Cardiovascular:      Rate and Rhythm: Normal rate and regular rhythm.      Pulses: Normal pulses.      Heart sounds: Normal heart sounds. No murmur. No friction rub. No gallop.       Comments: Distant heart sounds  Pulmonary:      Effort: No respiratory distress.      Breath sounds: Decreased air movement present. Decreased breath sounds and rales (Bibasilar) present. No wheezing or rhonchi.   Abdominal:      General: Abdomen is flat. Bowel sounds are normal. There is no distension.      Palpations: Abdomen is soft.      Tenderness: There is no abdominal tenderness. There is no guarding or rebound.   Genitourinary:     Comments: No lawler. Urine yellow / clear.  Musculoskeletal:      Right lower leg: No edema.      Left lower leg: No edema.   Skin:     General: Skin is warm and dry.   Neurological:      Mental Status: She is alert.      Comments: Appropriately conversant   Psychiatric:         Mood and Affect: Mood normal.         Behavior: Behavior normal.         Thought Content: Thought content normal.         Judgment: Judgment normal.         Fluids  No intake or output data in the 24 hours ending 11/20/20 1329    Laboratory      Recent Labs     11/18/20  0658 11/19/20  0513 11/20/20  0742   SODIUM 134* 129* 133*   POTASSIUM 3.6 3.4* 4.0   CHLORIDE 93* 91* 96   CO2 35* 30 29   GLUCOSE 107* 118* 168*   BUN 18 16 17   CREATININE 0.45* 0.45* 0.44*   CALCIUM 9.1 8.5 9.5                   Imaging  DX-CHEST-PORTABLE (1 VIEW)   Final Result       Increasing bilateral interstitial opacities, suggesting worsening pulmonary edema. The appearance is not classic for Covid 19 pneumonia, but this cannot be excluded.           Assessment/Plan  * COVID-19- (present on admission)  Assessment & Plan  COVID 19 pneumonia with associated acute on chronic respiratory failure.  Continue Decadron 6 mg daily for 10 days  Continue Remdesivir 11/18/2020  Isolation precautions      Acute on chronic respiratory failure with hypoxia (HCC)- (present on admission)  Assessment & Plan  She is on 6 liters oxygen baseline and is now hypoxic in 80% on 6 liters.   Today she is ranging from 6 L while prone to 15 L with ambulation.  She has refused high-flow and an oxymask unless unable to improve with duonebs, flutter valve, steroids.  CT chest with emphysema but no consolidations  Self pronation and incentive spirometry  Treatment for COPD exacerbation as below  Up to chair for all meals      Hyponatremia- (present on admission)  Assessment & Plan  Mild, Na 133 today  Worsening after NS raises concern for SIADH  Sosm 288 not suggestive of SIADH  Discontinuing Ciro and Uosm    Stage 4 very severe COPD by GOLD classification (HCC)- (present on admission)  Assessment & Plan  Empirically treating for exacerbation from COVID  Dexamethasone 10d course for COVID  Flutter valve, IS  Duonebs q4h  S/p 5d azithromycin.    DNR (do not resuscitate)- (present on admission)  Assessment & Plan  Per patient's request    SVT (supraventricular tachycardia) ([facility])- (present on admission)  Assessment & Plan  Takes digoxin as a home which has been held due to bradycardia  Telemetry    Lumbar radicular pain- (present on admission)  Assessment & Plan  Continue PTA regimen of duloxetine and oxycodone 10 mg q6h PRN    Essential hypertension- (present on admission)  Assessment & Plan  Continue lisinopril and spironolactone       VTE prophylaxis: Lovenox    I saw and examined Berny Renee (11/20) and have updated the  HPI, ROS, Physical Exam, and Assessment & Plan where appropriate.

## 2020-11-20 NOTE — PROGRESS NOTES
[facility] Medicine Daily Progress Note    Date of Service  11/19/2020    Chief Complaint  75 y.o. female admitted 11/14/2020 with shortness of breath.    Hospital Course  Ms. Renee is a 75-year-old female with a past medical history of severe COPD was admitted for Covid pneumonia complicated by acute hypoxic respiratory failure. She was  started on steroids and remdesivir and  received 1 dose of IV Lasix on admit.    Interval Problem Update  She is irritated that she did not receive her oxycodone q6h last night and got behind on pain control. She feels better now.    SpO2 in the 80s% with 6 L. Refused to wear mask overnight.    She would like a flutter valve, duonebs, and steroids for her COPD. We discussed that she did not need a pulmonology consult. She is agreeable to wearing more oxygen if these measures do not improve her oxygenation.    She denies pain currently, dyspnea, wheezing, N/V, bowel/bladder dysfunction.      Consultants/Specialty  Infectious disease    Code Status  DNAR/DNI    Disposition  COVID unit    Review of Systems  Review of Systems   Constitutional: Negative for chills and fever.   HENT: Negative for congestion, ear pain and sore throat.    Eyes: Negative for pain.   Respiratory: Positive for cough and sputum production. Negative for shortness of breath and wheezing.    Cardiovascular: Negative for chest pain.   Gastrointestinal: Negative for abdominal pain, constipation, diarrhea, nausea and vomiting.   Genitourinary: Negative for dysuria, flank pain and hematuria.   Musculoskeletal: Negative for back pain, joint pain, myalgias and neck pain.   Neurological: Negative for dizziness, weakness and headaches.   Psychiatric/Behavioral: Negative for depression. The patient is not nervous/anxious.    All other systems reviewed and are negative.       Physical Exam  Temp:  [36.1 °C (97 °F)-36.7 °C (98 °F)] 36.4 °C (97.6 °F)  Pulse:  [79-98] 92  Resp:  [17-20] 18  BP: (128-148)/(59-96) 128/74  SpO2:   [78 %-95 %] 88 %    Physical Exam  Vitals signs and nursing note reviewed.   Constitutional:       General: She is not in acute distress.     Appearance: Normal appearance. She is not ill-appearing, toxic-appearing or diaphoretic.   HENT:      Head: Normocephalic.      Mouth/Throat:      Mouth: Mucous membranes are moist.      Pharynx: Oropharynx is clear.   Eyes:      General: No scleral icterus.     Conjunctiva/sclera: Conjunctivae normal.   Neck:      Musculoskeletal: Neck supple.   Cardiovascular:      Rate and Rhythm: Normal rate and regular rhythm.      Pulses: Normal pulses.      Heart sounds: Normal heart sounds. No murmur. No friction rub. No gallop.       Comments: Distant heart sounds  Pulmonary:      Effort: No respiratory distress.      Breath sounds: Decreased air movement present. Decreased breath sounds and rhonchi (Expiratory) present. No wheezing or rales.   Abdominal:      General: Abdomen is flat. Bowel sounds are normal. There is no distension.      Palpations: Abdomen is soft.      Tenderness: There is no abdominal tenderness. There is no guarding or rebound.   Genitourinary:     Comments: No lawler  Musculoskeletal:      Right lower leg: No edema.      Left lower leg: No edema.   Skin:     General: Skin is warm and dry.   Neurological:      Mental Status: She is alert.      Comments: Appropriately conversant   Psychiatric:         Mood and Affect: Mood normal.         Behavior: Behavior normal.         Thought Content: Thought content normal.         Judgment: Judgment normal.         Fluids  No intake or output data in the 24 hours ending 11/19/20 1701    Laboratory      Recent Labs     11/17/20  0400 11/18/20  0658 11/19/20  0513   SODIUM 132* 134* 129*   POTASSIUM 4.1 3.6 3.4*   CHLORIDE 90* 93* 91*   CO2 32 35* 30   GLUCOSE 127* 107* 118*   BUN 21 18 16   CREATININE 0.57 0.45* 0.45*   CALCIUM 9.6 9.1 8.5                   Imaging  DX-CHEST-PORTABLE (1 VIEW)   Final Result      Increasing  bilateral interstitial opacities, suggesting worsening pulmonary edema. The appearance is not classic for Covid 19 pneumonia, but this cannot be excluded.           Assessment/Plan  * COVID-19- (present on admission)  Assessment & Plan  COVID 19 pneumonia with associated acute on chronic respiratory failure.  Continue Decadron 6 mg daily for 10 days  Continue Remdesivir 11/18/2020  Isolation precautions      Acute on chronic respiratory failure with hypoxia (HCC)- (present on admission)  Assessment & Plan  She is on 6 liters oxygen baseline and is now hypoxic in 80% on 6 liters.   She has refused high-flow and an oxymask unless unable to improve with duonebs, flutter valve, steroids.  CT chest with emphysema but no consolidations  Self pronation and incentive spirometry  Treatment for COPD exacerbation as below  Up to chair for all meals      Hyponatremia- (present on admission)  Assessment & Plan  Moderate, Na 129  Worsening after NS raises concern for SIADH  Sosm, Uosm, and Ciro for SIADH evaluation    Stage 4 very severe COPD by GOLD classification (HCC)- (present on admission)  Assessment & Plan  Empirically treating for exacerbation from COVID  Dexamethasone 10d course for COVID  Requested flutter valve from RT, unable to personally locate on the RT cart for her  Home inhalers substituted with duonebs, ok per RT  S/p 5d azithromycin.    DNR (do not resuscitate)- (present on admission)  Assessment & Plan  Per patient's request    SVT (supraventricular tachycardia) ([facility])- (present on admission)  Assessment & Plan  Takes digoxin as a home which has been held due to bradycardia  Telemetry    Lumbar radicular pain- (present on admission)  Assessment & Plan  Continue PTA regimen of duloxetine and oxycodone 10 mg q6h PRN    Essential hypertension- (present on admission)  Assessment & Plan  Continue lisinopril and spironolactone       VTE prophylaxis: Lovenox    I saw and examined Berny Renee (11/19) and have updated the  HPI, ROS, Physical Exam, and Assessment & Plan where appropriate.

## 2020-11-20 NOTE — PROGRESS NOTES
Report received from day shift RN, assumed patient care. Patient is A&O x 4, on 5L O2. Pt is medical per order. Patient updated on plan of care and verbalizes no questions. Patient has call light within reach, fall precautions in place. Patient educated to call for assistance as needed. Will continue to monitor.

## 2020-11-20 NOTE — DISCHARGE PLANNING
Anticipated Discharge Disposition: Home w/ [facility] and O2 through [facility]    Action: Patient discussed in IDT rounds.  She is more willing to wear increased amounts of O2 today than she has been.  She I requiring anywhere from 6-10 lpm at rest and 12-15 lpm w/ ambulation.  MD anticipates possible discharge early next week if her COPD exacerbation continues to improve.  PCF for [facility] has been completed and faxed to [facility].  Voalte message sent to Dr. Valdez requesting HH order and face to face for resumption.    Barriers to Discharge: medical clearance; HH resumption orders    Plan: RN CM to follow up with MD for HH resumption orders.

## 2020-11-21 VITALS
SYSTOLIC BLOOD PRESSURE: 150 MMHG | DIASTOLIC BLOOD PRESSURE: 69 MMHG | TEMPERATURE: 97.8 F | OXYGEN SATURATION: 92 % | HEART RATE: 91 BPM | WEIGHT: 169.31 LBS | BODY MASS INDEX: 28.91 KG/M2 | HEIGHT: 64 IN | RESPIRATION RATE: 18 BRPM

## 2020-11-21 LAB
ANION GAP SERPL CALC-SCNC: 8 MMOL/L (ref 7–16)
BUN SERPL-MCNC: 18 MG/DL (ref 8–22)
CALCIUM SERPL-MCNC: 9.5 MG/DL (ref 8.5–10.5)
CHLORIDE SERPL-SCNC: 96 MMOL/L (ref 96–112)
CO2 SERPL-SCNC: 28 MMOL/L (ref 20–33)
CREAT SERPL-MCNC: 0.47 MG/DL (ref 0.5–1.4)
GLUCOSE SERPL-MCNC: 168 MG/DL (ref 65–99)
POTASSIUM SERPL-SCNC: 4.2 MMOL/L (ref 3.6–5.5)
SODIUM SERPL-SCNC: 132 MMOL/L (ref 135–145)

## 2020-11-21 PROCEDURE — 94760 N-INVAS EAR/PLS OXIMETRY 1: CPT

## 2020-11-21 PROCEDURE — A9270 NON-COVERED ITEM OR SERVICE: HCPCS | Performed by: STUDENT IN AN ORGANIZED HEALTH CARE EDUCATION/TRAINING PROGRAM

## 2020-11-21 PROCEDURE — 99239 HOSP IP/OBS DSCHRG MGMT >30: CPT | Performed by: STUDENT IN AN ORGANIZED HEALTH CARE EDUCATION/TRAINING PROGRAM

## 2020-11-21 PROCEDURE — 700102 HCHG RX REV CODE 250 W/ 637 OVERRIDE(OP): Performed by: STUDENT IN AN ORGANIZED HEALTH CARE EDUCATION/TRAINING PROGRAM

## 2020-11-21 PROCEDURE — 700111 HCHG RX REV CODE 636 W/ 250 OVERRIDE (IP): Performed by: STUDENT IN AN ORGANIZED HEALTH CARE EDUCATION/TRAINING PROGRAM

## 2020-11-21 PROCEDURE — 700102 HCHG RX REV CODE 250 W/ 637 OVERRIDE(OP): Performed by: HOSPITALIST

## 2020-11-21 PROCEDURE — 700101 HCHG RX REV CODE 250: Performed by: STUDENT IN AN ORGANIZED HEALTH CARE EDUCATION/TRAINING PROGRAM

## 2020-11-21 PROCEDURE — 80048 BASIC METABOLIC PNL TOTAL CA: CPT

## 2020-11-21 PROCEDURE — 94640 AIRWAY INHALATION TREATMENT: CPT

## 2020-11-21 PROCEDURE — A9270 NON-COVERED ITEM OR SERVICE: HCPCS | Performed by: HOSPITALIST

## 2020-11-21 PROCEDURE — 36415 COLL VENOUS BLD VENIPUNCTURE: CPT

## 2020-11-21 RX ORDER — ACETAMINOPHEN 500 MG
1000 TABLET ORAL 3 TIMES DAILY PRN
Qty: 30 TAB | Refills: 0 | COMMUNITY
Start: 2020-11-21 | End: 2020-11-23

## 2020-11-21 RX ORDER — ACETAMINOPHEN 500 MG
1000 TABLET ORAL 3 TIMES DAILY PRN
Status: DISCONTINUED | OUTPATIENT
Start: 2020-11-21 | End: 2020-11-21 | Stop reason: HOSPADM

## 2020-11-21 RX ORDER — OXYCODONE HYDROCHLORIDE 10 MG/1
10 TABLET ORAL EVERY 6 HOURS PRN
Qty: 28 TAB | Refills: 0
Start: 2020-11-21 | End: 2020-11-28

## 2020-11-21 RX ORDER — ECHINACEA PURPUREA EXTRACT 125 MG
2 TABLET ORAL
Refills: 3 | Status: ON HOLD | COMMUNITY
Start: 2020-11-21 | End: 2024-02-05

## 2020-11-21 RX ORDER — DEXAMETHASONE 6 MG/1
6 TABLET ORAL DAILY
Qty: 3 TAB | Refills: 0 | Status: SHIPPED | OUTPATIENT
Start: 2020-11-21 | End: 2021-09-15

## 2020-11-21 RX ORDER — GUAIFENESIN/DEXTROMETHORPHAN 100-10MG/5
10 SYRUP ORAL EVERY 6 HOURS PRN
Qty: 840 ML | COMMUNITY
Start: 2020-11-21 | End: 2021-07-08

## 2020-11-21 RX ORDER — LIDOCAINE 50 MG/G
1 PATCH TOPICAL
COMMUNITY
Start: 2020-11-21 | End: 2020-11-23

## 2020-11-21 RX ORDER — FLUTICASONE PROPIONATE 50 MCG
2 SPRAY, SUSPENSION (ML) NASAL 2 TIMES DAILY
Qty: 16 G | COMMUNITY
Start: 2020-11-21 | End: 2021-07-08

## 2020-11-21 RX ADMIN — DEXAMETHASONE 6 MG: 4 TABLET ORAL at 05:29

## 2020-11-21 RX ADMIN — FLUTICASONE PROPIONATE 100 MCG: 50 SPRAY, METERED NASAL at 08:19

## 2020-11-21 RX ADMIN — LISINOPRIL 20 MG: 20 TABLET ORAL at 05:28

## 2020-11-21 RX ADMIN — OXYCODONE HYDROCHLORIDE 10 MG: 10 TABLET ORAL at 06:41

## 2020-11-21 RX ADMIN — OXYCODONE HYDROCHLORIDE 10 MG: 10 TABLET ORAL at 13:31

## 2020-11-21 RX ADMIN — IPRATROPIUM BROMIDE AND ALBUTEROL SULFATE 3 ML: .5; 3 SOLUTION RESPIRATORY (INHALATION) at 04:05

## 2020-11-21 RX ADMIN — IPRATROPIUM BROMIDE AND ALBUTEROL SULFATE 3 ML: .5; 3 SOLUTION RESPIRATORY (INHALATION) at 15:10

## 2020-11-21 RX ADMIN — SPIRONOLACTONE 25 MG: 25 TABLET ORAL at 05:28

## 2020-11-21 RX ADMIN — POTASSIUM CHLORIDE 20 MEQ: 1500 TABLET, EXTENDED RELEASE ORAL at 05:28

## 2020-11-21 RX ADMIN — Medication 2000 UNITS: at 05:28

## 2020-11-21 RX ADMIN — MONTELUKAST 10 MG: 10 TABLET, FILM COATED ORAL at 05:27

## 2020-11-21 RX ADMIN — ACETAMINOPHEN 1000 MG: 500 TABLET ORAL at 05:30

## 2020-11-21 RX ADMIN — IPRATROPIUM BROMIDE AND ALBUTEROL SULFATE 3 ML: .5; 3 SOLUTION RESPIRATORY (INHALATION) at 10:50

## 2020-11-21 RX ADMIN — IPRATROPIUM BROMIDE AND ALBUTEROL SULFATE 3 ML: .5; 3 SOLUTION RESPIRATORY (INHALATION) at 07:02

## 2020-11-21 RX ADMIN — ENOXAPARIN SODIUM 40 MG: 40 INJECTION SUBCUTANEOUS at 06:42

## 2020-11-21 RX ADMIN — OXYCODONE HYDROCHLORIDE 10 MG: 10 TABLET ORAL at 00:38

## 2020-11-21 NOTE — PROGRESS NOTES
Pt left in the care of her friend. Pt has home oxygen in the car at discharge. Pt verbalized feeling safe to leave. Pt ambulated in the room independently with no difficulties. Pt left with all belongings.

## 2020-11-21 NOTE — DISCHARGE INSTRUCTIONS
Discharge Instructions    Discharged to home by car with friend. Discharged via wheelchair, hospital escort: Yes.  Special equipment needed:  Pt's friend is bringing home oxygen    Be sure to schedule a follow-up appointment with your primary care doctor or any specialists as instructed.     Discharge Plan:   Influenza Vaccine Indication: Not indicated: Previously immunized this influenza season and > 8 years of age    I understand that a diet low in cholesterol, fat, and sodium is recommended for good health. Unless I have been given specific instructions below for another diet, I accept this instruction as my diet prescription.   Other diet: Cardiac diet    Special Instructions: Chronic Obstructive Pulmonary Disease (COPD) is a long-term, progressive lung disease that makes it harder to breathe. It includes chronic bronchitis, emphysema, and refractory (non-reversible) asthma. With COPD, the airways in your lungs become inflamed and thicken, and the tissue where oxygen is exchanged is destroyed. The flow of air in and out of your lungs decreases. When that happens, less oxygen gets into your body tissues, and it becomes harder to get rid of the waste gas carbon dioxide. As the disease gets worse, shortness of breath makes it harder to remain active. There is no cure for COPD, but it is preventable and treatable.    COPD Patient Discharge Instructions    • Diet  o Follow a low fat, low cholesterol, low salt diet unless instructed otherwise by your Doctor. Read the labels on the back of food products and track your intake of fat, cholesterol and salt.  • No smoking  o Discontinuing smoking will have the biggest impact on preventing progression of disease.  o To participate in [facility]’s Quit Tobacco Program, call [phone] or visit [email or web address]  • Oxygen  o If your doctor has order that you wear oxygen at home, it is important to wear it as ordered.  o Do not smoke, vape, or use e-cigarettes with  oxygen on.  o Store in an appropriate location: upright in its seaman or laid flat down, away from open flames and stoves.   o Do not use oil-based creams and moisturizers (ie: petroleum products, oil-based lip moisturizers) or aerosol sprays (ie: hair sprays or deodorants) when using your oxygen equipment.  o Be careful with tubing placement to prevent yourself and others from tripping.  • Medications  o Refer to your personalized Action Plan to manage your symptoms.  • Warning signs of an exacerbation  o Breathing fast and shallow, worsening shortness of breath (like you just finished exercising)  o Chest tightness  o Increases in sputum production  o Changes in sputum color  o Lower oxygen levels than baseline  • When to call your doctor  o If the warning signs of an exacerbation do not improve  o Refer to your personalized Action Plan   • Pulmonary Rehab  o Your doctor has ordered you a referral to Pulmonary Rehab. Call [phone] to schedule an appointment  • Attend your follow-up appointment with your PCP and/or Pulmonologist  See the educational handout provided by your COPD Navigator for more information. This also explains more about COPD, symptoms of an exacerbation, and some of the tests that you have undergone.    · Is patient discharged on Warfarin / Coumadin?   No     Depression / Suicide Risk    As you are discharged from this [facility] facility, it is important to learn how to keep safe from harming yourself.    Recognize the warning signs:  · Abrupt changes in personality, positive or negative- including increase in energy   · Giving away possessions  · Change in eating patterns- significant weight changes-  positive or negative  · Change in sleeping patterns- unable to sleep or sleeping all the time   · Unwillingness or inability to communicate  · Depression  · Unusual sadness, discouragement and loneliness  · Talk of wanting to die  · Neglect of personal appearance   · Rebelliousness- reckless  "behavior  · Withdrawal from people/activities they love  · Confusion- inability to concentrate     If you or a loved one observes any of these behaviors or has concerns about self-harm, here's what you can do:  · Talk about it- your feelings and reasons for harming yourself  · Remove any means that you might use to hurt yourself (examples: pills, rope, extension cords, firearm)  · Get professional help from the community (Mental Health, Substance Abuse, psychological counseling)  · Do not be alone:Call your Safe Contact- someone whom you trust who will be there for you.  · Call your local CRISIS HOTLINE 828-8238 or 464-819-5892  · Call your local Children's Mobile Crisis Response Team [place] (029) 952-6787 or www.[employer]  · Call the toll free National Suicide Prevention Hotlines   · National Suicide Prevention Lifeline 470-659-ODGR (5182)  · [employer] Line Network 800-SUICIDE (328-1638)    MY COPD ACTION PLAN     It is recommended that patients and physicians /healthcare providers complete this action plan together. This plan should be discussed at each physician visit and updated as needed.    The green, yellow and red zones show groups of symptoms of COPD. This list of symptoms is not comprehensive, and you may experience other symptoms. In the \"Actions\" column, your healthcare provider has recommended actions for you to take based on your symptoms.    Patient Name: Berny Renee   YOB: 1945   Last Updated on:     Green Zone:  I am doing well today Actions   •  Usual activitiy and exercise level •  Take daily medications   •  Usual amounts of cough and phlegm/mucus •  Use oxygen as prescribed   •  Sleep well at night •  Continue regular exercise/diet plan   •  Appetite is good •  At all times avoid cigarette smoke, inhaled irritants     Daily Medications (these medications are taken every day):   Fluticosone/Salmeterol (Advair)  Tiotropium Bromide Monohydrate (Spiriva) 1 " "Puff  2 Puffs Twice daily  Once daily     Additional Information:  Please rinse mouth after using Advair    Yellow Zone:  I am having a bad day or a COPD flare Actions   •  More breathless than usual •  Continue daily medications   •  I have less energy for my daily activities •  Use quick relief inhaler as ordered   •  Increased or thicker phlegm/mucus •  Use oxygen as prescribed   •  Using quick relief inhaler/nebulizer more often •  Get plenty of rest   •  Swelling of ankles more than usual •  Use pursed lip breathing   •  More coughing than usual •  At all times avoid cigarette smoke, inhaled irritants   •  I feel like I have a \"chest cold\"   •  Poor sleep and my symptoms woke me up   •  My appetite is not good   •  My medicine is not helping    •  Call provider immediately if symptoms don’t improve     Continue daily medications, add rescue medications:   Albuterol/Ipratropium (Combivent, Duoneb)  Albuterol 3mL via nebulizer  1 Puff Every 4 hours PRN  Every 4 hours PRN       Medications to be used during a flare up, (as Discussed with Provider):           Additional Information:  Use your nebulizer when short of breath    Red Zone:  I need urgent medical care Actions   •  Severe shortness of breath even at rest •  Call 911 or seek medical care immediately   •  Not able to do any activity because of breathing    •  Fever or shaking chills    •  Feeling confused or very drowsy     •  Chest pains    •  Coughing up blood    COVID-19  COVID-19 is a respiratory infection that is caused by a virus called severe acute respiratory syndrome coronavirus 2 (SARS-CoV-2). The disease is also known as coronavirus disease or novel coronavirus. In some people, the virus may not cause any symptoms. In others, it may cause a serious infection. The infection can get worse quickly and can lead to complications, such as:  · Pneumonia, or infection of the lungs.  · Acute respiratory distress syndrome or ARDS. This is fluid build-up " in the lungs.  · Acute respiratory failure. This is a condition in which there is not enough oxygen passing from the lungs to the body.  · Sepsis or septic shock. This is a serious bodily reaction to an infection.  · Blood clotting problems.  · Secondary infections due to bacteria or fungus.  The virus that causes COVID-19 is contagious. This means that it can spread from person to person through droplets from coughs and sneezes (respiratory secretions).  What are the causes?  This illness is caused by a virus. You may catch the virus by:  · Breathing in droplets from an infected person's cough or sneeze.  · Touching something, like a table or a doorknob, that was exposed to the virus (contaminated) and then touching your mouth, nose, or eyes.  What increases the risk?  Risk for infection  You are more likely to be infected with this virus if you:  · Live in or travel to an area with a COVID-19 outbreak.  · Come in contact with a sick person who recently traveled to an area with a COVID-19 outbreak.  · Provide care for or live with a person who is infected with COVID-19.  Risk for serious illness  You are more likely to become seriously ill from the virus if you:  · Are 65 years of age or older.  · Have a long-term disease that lowers your body's ability to fight infection (immunocompromised).  · Live in a nursing home or long-term care facility.  · Have a long-term (chronic) disease such as:  ? Chronic lung disease, including chronic obstructive pulmonary disease or asthma  ? Heart disease.  ? Diabetes.  ? Chronic kidney disease.  ? Liver disease.  · Are obese.  What are the signs or symptoms?  Symptoms of this condition can range from mild to severe. Symptoms may appear any time from 2 to 14 days after being exposed to the virus. They include:  · A fever.  · A cough.  · Difficulty breathing.  · Chills.  · Muscle pains.  · A sore throat.  · Loss of taste or smell.  Some people may also have stomach problems, such  as nausea, vomiting, or diarrhea.  Other people may not have any symptoms of COVID-19.  How is this diagnosed?  This condition may be diagnosed based on:  · Your signs and symptoms, especially if:  ? You live in an area with a COVID-19 outbreak.  ? You recently traveled to or from an area where the virus is common.  ? You provide care for or live with a person who was diagnosed with COVID-19.  · A physical exam.  · Lab tests, which may include:  ? A nasal swab to take a sample of fluid from your nose.  ? A throat swab to take a sample of fluid from your throat.  ? A sample of mucus from your lungs (sputum).  ? Blood tests.  · Imaging tests, which may include, X-rays, CT scan, or ultrasound.  How is this treated?  At present, there is no medicine to treat COVID-19. Medicines that treat other diseases are being used on a trial basis to see if they are effective against COVID-19.  Your health care provider will talk with you about ways to treat your symptoms. For most people, the infection is mild and can be managed at home with rest, fluids, and over-the-counter medicines.  Treatment for a serious infection usually takes places in a hospital intensive care unit (ICU). It may include one or more of the following treatments. These treatments are given until your symptoms improve.  · Receiving fluids and medicines through an IV.  · Supplemental oxygen. Extra oxygen is given through a tube in the nose, a face mask, or a disla.  · Positioning you to lie on your stomach (prone position). This makes it easier for oxygen to get into the lungs.  · Continuous positive airway pressure (CPAP) or bi-level positive airway pressure (BPAP) machine. This treatment uses mild air pressure to keep the airways open. A tube that is connected to a motor delivers oxygen to the body.  · Ventilator. This treatment moves air into and out of the lungs by using a tube that is placed in your windpipe.  · Tracheostomy. This is a procedure to create  a hole in the neck so that a breathing tube can be inserted.  · Extracorporeal membrane oxygenation (ECMO). This procedure gives the lungs a chance to recover by taking over the functions of the heart and lungs. It supplies oxygen to the body and removes carbon dioxide.  Follow these instructions at home:  Lifestyle  · If you are sick, stay home except to get medical care. Your health care provider will tell you how long to stay home. Call your health care provider before you go for medical care.  · Rest at home as told by your health care provider.  · Do not use any products that contain nicotine or tobacco, such as cigarettes, e-cigarettes, and chewing tobacco. If you need help quitting, ask your health care provider.  · Return to your normal activities as told by your health care provider. Ask your health care provider what activities are safe for you.  General instructions  · Take over-the-counter and prescription medicines only as told by your health care provider.  · Drink enough fluid to keep your urine pale yellow.  · Keep all follow-up visits as told by your health care provider. This is important.  How is this prevented?    There is no vaccine to help prevent COVID-19 infection. However, there are steps you can take to protect yourself and others from this virus.  To protect yourself:   · Do not travel to areas where COVID-19 is a risk. The areas where COVID-19 is reported change often. To identify high-risk areas and travel restrictions, check the CDC travel website: wwwnc.cdc.gov/travel/notices  · If you live in, or must travel to, an area where COVID-19 is a risk, take precautions to avoid infection.  ? Stay away from people who are sick.  ? Wash your hands often with soap and water for 20 seconds. If soap and water are not available, use an alcohol-based hand .  ? Avoid touching your mouth, face, eyes, or nose.  ? Avoid going out in public, follow guidance from your state and local health  authorities.  ? If you must go out in public, wear a cloth face covering or face mask.  ? Disinfect objects and surfaces that are frequently touched every day. This may include:  § Counters and tables.  § Doorknobs and light switches.  § Sinks and faucets.  § Electronics, such as phones, remote controls, keyboards, computers, and tablets.  To protect others:  If you have symptoms of COVID-19, take steps to prevent the virus from spreading to others.  · If you think you have a COVID-19 infection, contact your health care provider right away. Tell your health care team that you think you may have a COVID-19 infection.  · Stay home. Leave your house only to seek medical care. Do not use public transport.  · Do not travel while you are sick.  · Wash your hands often with soap and water for 20 seconds. If soap and water are not available, use alcohol-based hand .  · Stay away from other members of your household. Let healthy household members care for children and pets, if possible. If you have to care for children or pets, wash your hands often and wear a mask. If possible, stay in your own room, separate from others. Use a different bathroom.  · Make sure that all people in your household wash their hands well and often.  · Cough or sneeze into a tissue or your sleeve or elbow. Do not cough or sneeze into your hand or into the air.  · Wear a cloth face covering or face mask.  Where to find more information  · Centers for Disease Control and Prevention: www.cdc.gov/coronavirus/2019-ncov/index.html  · World Health Organization: www.who.int/health-topics/coronavirus  Contact a health care provider if:  · You live in or have traveled to an area where COVID-19 is a risk and you have symptoms of the infection.  · You have had contact with someone who has COVID-19 and you have symptoms of the infection.  Get help right away if:  · You have trouble breathing.  · You have pain or pressure in your chest.  · You have  confusion.  · You have bluish lips and fingernails.  · You have difficulty waking from sleep.  · You have symptoms that get worse.  These symptoms may represent a serious problem that is an emergency. Do not wait to see if the symptoms will go away. Get medical help right away. Call your local emergency services (911 in the [place] Do not drive yourself to the hospital. Let the emergency medical personnel know if you think you have COVID-19.  Summary  · COVID-19 is a respiratory infection that is caused by a virus. It is also known as coronavirus disease or novel coronavirus. It can cause serious infections, such as pneumonia, acute respiratory distress syndrome, acute respiratory failure, or sepsis.  · The virus that causes COVID-19 is contagious. This means that it can spread from person to person through droplets from coughs and sneezes.  · You are more likely to develop a serious illness if you are 65 years of age or older, have a weak immunity, live in a nursing home, or have chronic disease.  · There is no medicine to treat COVID-19. Your health care provider will talk with you about ways to treat your symptoms.  · Take steps to protect yourself and others from infection. Wash your hands often and disinfect objects and surfaces that are frequently touched every day. Stay away from people who are sick and wear a mask if you are sick.  This information is not intended to replace advice given to you by your health care provider. Make sure you discuss any questions you have with your health care provider.  Document Released: 01/23/2020 Document Revised: 05/14/2020 Document Reviewed: 01/23/2020  Elsevier Patient Education © 2020 [employer]    Discharge Instructions per Hayden Valdez M.D.    You were hospitalized for pneumonia and COPD exacerbation caused by COVID19. You required more oxygen than could be administered by home oxygen, but have improved enough to return home to complete your recovery.    Please  wear a mask around others and self-quarantine to reduce the spread of COVID19.    Please  and take your dexamethasone as instructed.    Please note that your medication list has been updated by Dr. Valdez prior to discharge.   He has not provided a new oxycodone prescription. Please contact your prior prescriber for refills.    DIET: Regular    ACTIVITY: Gradually resume regular activity    DIAGNOSIS: Pneumonia and Chronic Obstructive Pulmonary Disease Exacerbation due to COVID19    Return to ER if you are unable to breathe, faint for any reason, or develop chest pain that does not subside with rest.

## 2020-11-21 NOTE — DISCHARGE SUMMARY
Discharge Summary    CHIEF COMPLAINT ON ADMISSION  Chief Complaint   Patient presents with   • Shortness of Breath       Reason for Admission  EMS     Admission Date  11/14/2020    CODE STATUS  DNAR/DNI    HPI & HOSPITAL COURSE  This is a 75 y.o. Woman with Gold stage 4 COPD on 6 L baseline, HTN, h/o SVT on digoxin who was discharged from [facility] 11/9/2020 for COVID PNA and returned with worsening dyspnea. She required 8-15 Liters of oxygen with activity but was adamant against wearing a mask as she would like to be able to manage her oxygenation with a NC and portable tank. She was empirically treated for COPD exacerbation with dexamethasone, scheduled duonebs, and azithromycin. She improved to her baseline 6 L oxygen requirement prior to discharge. She was maintained on PTA oxycodone regimen of 10 mg q6h. Digoxin was discontinued due to bradycardia. She had been prescribed low-dose apixaban but discontinued at her preference in absence of strong clinical indication. Home health orders were renewed on discharge.    Therefore, she is discharged in fair and stable condition to home with organized home healthcare and close outpatient follow-up.    The patient met 2-midnight criteria for an inpatient stay at the time of discharge.    Discharge Date  11/21/20    FOLLOW UP ITEMS POST DISCHARGE  1. COVID19 PNA - completing 10d dexamethasone. Counseled on self-quarantine.  2. COPD - Pulmonology follow up  3. Chronic pain - per pain specialist    DISCHARGE DIAGNOSES  Principal Problem:    COVID-19 POA: Yes  Active Problems:    Acute on chronic respiratory failure with hypoxia (HCC) POA: Yes    Stage 4 very severe COPD by GOLD classification ([facility]) POA: Yes    Hyponatremia POA: Yes    Essential hypertension POA: Yes    Lumbar radicular pain POA: Yes    SVT (supraventricular tachycardia) (HCC) POA: Yes    DNR (do not resuscitate) POA: Yes  Resolved Problems:    * No resolved hospital problems. *      FOLLOW UP  Future  Appointments   Date Time Provider Department Center   11/25/2020  3:00 PM Bubba Ross M.D. CB None     Everett Diego M.D.  [address] [facility] [address]  [facility] [id number] [phone]    On 12/7/2020  This is a telephone appointment. Dr. Diego will call you between 130 and 230 pm. Thank you!       MEDICATIONS ON DISCHARGE     Medication List      START taking these medications      Instructions   acetaminophen 500 MG Tabs  Commonly known as: TYLENOL   Take 2 Tabs by mouth 3 times a day as needed.  Dose: 1,000 mg     fluticasone 50 MCG/ACT nasal spray  Commonly known as: FLONASE   Administer 2 Sprays into affected nostril(S) 2 Times a Day.  Dose: 2 Spray     guaiFENesin dextromethorphan 100-10 MG/5ML Syrp syrup  Commonly known as: ROBITUSSIN DM   Take 10 mL by mouth every 6 hours as needed for Cough.  Dose: 10 mL     lidocaine 5 % Ptch  Commonly known as: LIDODERM   Place 1 Patch on the skin every 24 hours as needed (Muscular pain).  Dose: 1 Patch     sodium chloride 0.65 % Soln  Commonly known as: OCEAN   Administer 2 Sprays into affected nostril(S) every 2 hours as needed.  Dose: 2 Spray        CHANGE how you take these medications      Instructions   dexamethasone 6 MG Tabs  What changed: Another medication with the same name was removed. Continue taking this medication, and follow the directions you see here.  Commonly known as: DECADRON   Take 1 Tab by mouth every day.  Dose: 6 mg     oxyCODONE immediate release 10 MG immediate release tablet  What changed: when to take this  Commonly known as: ROXICODONE   Take 1 Tab by mouth every 6 hours as needed for Moderate Pain for up to 7 days.  Dose: 10 mg     triamcinolone acetonide 0.1 % Oint  What changed:   · when to take this  · reasons to take this  Commonly known as: KENALOG   Apply 1 Application to affected area(s) 2 times a day.  Dose: 1 Application        CONTINUE taking these medications      Instructions   acyclovir 200 MG Caps  Commonly known  as: ZOVIRAX   Take 200 mg by mouth every day.  Dose: 200 mg     DULoxetine 30 MG Cpep  Commonly known as: CYMBALTA   Take 30 mg by mouth every day.  Dose: 30 mg     fluticasone-salmeterol 500-50 MCG/DOSE Aepb  Commonly known as: Advair Diskus   Inhale 1 Puff by mouth 2 times a day.  Dose: 1 Puff     Home Care Oxygen   Inhale 6 L/min Continuous. Oxygen dose range: 6 to 6 L/min  Respiratory route via: Nasal Cannula   Oxygen supplier: Preferred  Dose: 6 L/min     ipratropium-albuterol 0.5-2.5 (3) MG/3ML nebulizer solution  Commonly known as: DUONEB   USE ONE VIAL IN NEBULIZER EVERY 4 HOURS AS NEEDED FOR SHORTNESS OF BREATH OR  WHEEZING     lisinopril 20 MG Tabs  Commonly known as: PRINIVIL   Doctor's comments: Patient's plan is requesting a 100 day supply. Thank you  Take 1 Tab by mouth every day.  Dose: 20 mg     Misc. Devices Misc   This is an order for  7 foot high flow oxygen tubing  Dx; asthma with COPD J 44.9, supplemental oxygen-dependent Z 99.81, chronic respiratory failure with hypoxia and J 96        ODETTE 99 months   NPI [id number]     montelukast 10 MG Tabs  Commonly known as: SINGULAIR   Take 1 Tab by mouth every day.  Dose: 10 mg     omeprazole 20 MG tablet  Commonly known as: PRILOSEC OTC   Take 1 Tab by mouth every day.  Dose: 20 mg     potassium chloride 10 MEQ capsule  Commonly known as: MICRO-K   TAKE ONE CAPSULE BY MOUTH TWICE DAILY     Spiriva HandiHaler 18 MCG Caps  Generic drug: tiotropium   Doctor's comments: Do not fill until patient requests  Inhale 1 Cap by mouth every day.  Dose: 18 mcg     spironolactone 25 MG Tabs  Commonly known as: ALDACTONE   TAKE ONE TABLET BY MOUTH ONE TIME DAILY     Ventolin  (90 Base) MCG/ACT Aers inhalation aerosol  Generic drug: albuterol   Inhale 2 Puffs by mouth every 6 hours as needed for Shortness of Breath.  Dose: 2 Puff     Vitamin D3 2000 UNIT Caps   TAKE ONE CAPSULE BY MOUTH ONE TIME DAILY        STOP taking these medications    apixaban 5mg  Tabs  Commonly known as: ELIQUIS     azithromycin 250 MG Tabs  Commonly known as: ZITHROMAX     digoxin 250 MCG Tabs  Commonly known as: LANOXIN     Eliquis 2.5mg Tabs  Generic drug: apixaban     tizanidine 4 MG Tabs  Commonly known as: ZANAFLEX            Allergies  Allergies   Allergen Reactions   • Iodine      convulsion   • Tape Rash and Itching       DIET  Orders Placed This Encounter   Procedures   • Diet Order Diet: Regular; Fluid modifications: (optional): 1500 ml Fluid Restriction     Standing Status:   Standing     Number of Occurrences:   1     Order Specific Question:   Diet:     Answer:   Regular [1]     Order Specific Question:   Fluid modifications: (optional)     Answer:   1500 ml Fluid Restriction [9]       ACTIVITY  As tolerated.  Weight bearing as tolerated    CONSULTATIONS  None    PROCEDURES  None    LABORATORY  Lab Results   Component Value Date    SODIUM 132 (L) 11/21/2020    POTASSIUM 4.2 11/21/2020    CHLORIDE 96 11/21/2020    CO2 28 11/21/2020    GLUCOSE 168 (H) 11/21/2020    BUN 18 11/21/2020    CREATININE 0.47 (L) 11/21/2020    CREATININE 0.62 06/09/2011        Lab Results   Component Value Date    WBC 7.5 11/16/2020    WBC 10.9 (H) 06/09/2011    HEMOGLOBIN 14.8 11/16/2020    HEMATOCRIT 44.1 11/16/2020    PLATELETCT 396 11/16/2020        Total time of the discharge process exceeds 35 minutes.

## 2020-11-21 NOTE — PROGRESS NOTES
Pt received from nightshift RN. Pt resting in bed, call light within reach. Pt educated to call for assist. Will continue plan of care.

## 2020-11-21 NOTE — FACE TO FACE
Face to Face Supporting Documentation - Home Health    The encounter with this patient was in whole or in part the primary reason for home health admission.    Date of encounter:   Patient:                    MRN:                       YOB: 2020  Berny Renee  [id number]  1945     Home health to see patient for:  Skilled Nursing care for assessment, interventions & education, Physical Therapy evaluation and treatment and Occupational therapy evaluation and treatment    Skilled need for:  Exacerbation of Chronic Disease State COPD and Recent Deterioration of Health Status COVID19 Pneumonia    Skilled nursing interventions to include:  Comment: Vital sign monitoring, respiratory medication monitoring    Homebound status evidenced by:  Need the aid of supportive devices such as crutches, canes, wheelchairs or walkers, Needs the assistance of another person in order to leave the home or Have a condition such that leaving his or her home is medically contraindicated. Leaving home requires a considerable and taxing effort. There is a normal inability to leave the home.    Community Physician to provide follow up care: Everett Diego M.D.     Optional Interventions? No      I certify the face to face encounter for this home health care referral meets the CMS requirements and the encounter/clinical assessment with the patient was, in whole, or in part, for the medical condition(s) listed above, which is the primary reason for home health care. Based on my clinical findings: the service(s) are medically necessary, support the need for home health care, and the homebound criteria are met.  I certify that this patient has had a face to face encounter by myself.  Hayden Valdez M.D. - NPI: [id number]

## 2020-11-23 ENCOUNTER — HOME CARE VISIT (OUTPATIENT)
Dept: HOME HEALTH SERVICES | Facility: HOME HEALTHCARE | Age: 75
End: 2020-11-23
Payer: MEDICARE

## 2020-11-23 ENCOUNTER — ANTICOAGULATION MONITORING (OUTPATIENT)
Dept: MEDICAL GROUP | Facility: PHYSICIAN GROUP | Age: 75
End: 2020-11-23

## 2020-11-23 VITALS
SYSTOLIC BLOOD PRESSURE: 108 MMHG | TEMPERATURE: 98.1 F | DIASTOLIC BLOOD PRESSURE: 54 MMHG | BODY MASS INDEX: 28.85 KG/M2 | OXYGEN SATURATION: 90 % | WEIGHT: 169 LBS | HEIGHT: 64 IN | HEART RATE: 98 BPM | RESPIRATION RATE: 20 BRPM

## 2020-11-23 PROCEDURE — G0493 RN CARE EA 15 MIN HH/HOSPICE: HCPCS

## 2020-11-23 SDOH — ECONOMIC STABILITY: HOUSING INSECURITY: EVIDENCE OF SMOKING MATERIAL: 0

## 2020-11-23 SDOH — ECONOMIC STABILITY: HOUSING INSECURITY
HOME SAFETY: ER TO GET ONE AS SOON AS POSSIBLE. SMOKE ALARMS ARE PRESENT AND FUNCTIONAL ON EACH LEVEL OF THE HOME. PATIENT DOES HAVE A FIRE ESCAPE PLAN DEVELOPED. PATIENT DOES NOT HAVE FLAMMABLE MATERIALS PRESENT IN THE HOME PRESENTING A FIRE HAZARD. NO EVIDENCE

## 2020-11-23 SDOH — ECONOMIC STABILITY: HOUSING INSECURITY: HOME SAFETY: FOUND OF SMOKING MATERIALS PRESENT IN THE HOME.

## 2020-11-23 SDOH — ECONOMIC STABILITY: HOUSING INSECURITY
HOME SAFETY: DISCUSSED IMPORTANCE OF GRAB BARS, PATIENT VERBALIZED UNDERSTANDING.     OXYGEN SAFETY RISK ASSESSMENT PERFORMED. PATIENT PT WAS GIVEN A NO SMOKING SIGN AND PROVIDED EDUCATION ABOUT WHY IT IS IMPORTANT TO PLACE ONE. PATIENT INSTRUCTED PATIENT/CAREGIV

## 2020-11-23 ASSESSMENT — ENCOUNTER SYMPTOMS
NAUSEA: DENIES
SHORTNESS OF BREATH: T
SEVERE DYSPNEA: 1
VOMITING: DENIES
MENTAL STATUS CHANGE: 0

## 2020-11-23 ASSESSMENT — FIBROSIS 4 INDEX: FIB4 SCORE: 0.57

## 2020-11-24 NOTE — TELEPHONE ENCOUNTER
Patient calling again regarding below message. She is wanting to know if she can go back on the Azithromycin daily.

## 2020-11-24 NOTE — PROGRESS NOTES
Received referral from [facility]. Medications reviewed. No clinically significant interactions noted.     He is currently not taking the medications listed below.  These are on his discharge summary.    CHANGE how you take these medications      Instructions   dexamethasone 6 MG Tabs  What changed: Another medication with the same name was removed. Continue taking this medication, and follow the directions you see here.  Commonly known as: DECADRON    Take 1 Tab by mouth every day.  Dose: 6 mg                                    DULoxetine 30 MG Cpep  Commonly known as: CYMBALTA    Take 30 mg by mouth every day.  Dose: 30 mg                                              Ventolin  (90 Base) MCG/ACT Aers inhalation aerosol  Generic drug: albuterol    Inhale 2 Puffs by mouth every 6 hours as needed for Shortness of Breath.  Dose: 2 Puff      Vitamin D3 2000 UNIT Caps    TAKE ONE CAPSULE BY MOUTH ONE TIME DAILY           It was recommended that he stop the antibiotic listed below however, it is still on his med list   STOP taking these medications        azithromycin 250 MG Tabs  Commonly known as: ZITHROMAX        He is taking Zofran, which was not on his discharge summary.    Eitan Burch, PharmD, MS, BCACP, [facility]  Phone [phone] fax [phone]    This note was created using voice recognition software (Dragon). The accuracy of the dictation is limited by the abilities of the software. I have reviewed the note prior to signing, however some errors in grammar and context are still possible. If you have any questions related to this note please do not hesitate to contact our office.

## 2020-11-24 NOTE — TELEPHONE ENCOUNTER
Michlele Alfonso M.D.  You 1 hour ago (12:27 PM)     Yes- script was sent for daily use.    Message text

## 2020-11-25 ENCOUNTER — HOME CARE VISIT (OUTPATIENT)
Dept: HOME HEALTH SERVICES | Facility: HOME HEALTHCARE | Age: 75
End: 2020-11-25
Payer: MEDICARE

## 2020-11-25 ENCOUNTER — TELEMEDICINE (OUTPATIENT)
Dept: CARDIOLOGY | Facility: MEDICAL CENTER | Age: 75
End: 2020-11-25
Payer: MEDICARE

## 2020-11-25 VITALS — BODY MASS INDEX: 29.01 KG/M2 | HEIGHT: 64 IN

## 2020-11-25 DIAGNOSIS — I49.3 PVCS (PREMATURE VENTRICULAR CONTRACTIONS): ICD-10-CM

## 2020-11-25 DIAGNOSIS — I10 ESSENTIAL HYPERTENSION: ICD-10-CM

## 2020-11-25 DIAGNOSIS — R00.2 PALPITATIONS: ICD-10-CM

## 2020-11-25 DIAGNOSIS — I47.10 SVT (SUPRAVENTRICULAR TACHYCARDIA) (HCC): ICD-10-CM

## 2020-11-25 PROCEDURE — G0299 HHS/HOSPICE OF RN EA 15 MIN: HCPCS

## 2020-11-25 PROCEDURE — G0156 HHCP-SVS OF AIDE,EA 15 MIN: HCPCS

## 2020-11-25 PROCEDURE — 99214 OFFICE O/P EST MOD 30 MIN: CPT | Mod: 95,CR | Performed by: INTERNAL MEDICINE

## 2020-11-25 RX ORDER — DIGOXIN 125 MCG
125 TABLET ORAL DAILY
Qty: 90 TAB | Refills: 3 | Status: SHIPPED | OUTPATIENT
Start: 2020-11-25 | End: 2021-12-13

## 2020-11-25 ASSESSMENT — ENCOUNTER SYMPTOMS
LOSS OF CONSCIOUSNESS: 0
COUGH: 0
PALPITATIONS: 0
SHORTNESS OF BREATH: 0
DIZZINESS: 0
MYALGIAS: 0

## 2020-11-25 NOTE — Clinical Note
"She refused OT at time of SOC.  ----- Message -----  From: Reggie Smith R.N.  Sent: 11/27/2020   7:55 AM PST  To: Christal Pollard OT      Zenobia, please send CC to Everett Diego MD.   Skilled Nursing Visit Note 11/25/20  Pt was having breakfast in bed upon SN arrival. SpO2 90 % at rest, 90-88% with min exertion, 87-83% with walking 30 ft also noticed increased labor of breathing. Pt wears 6L of oxygen via NC continuously. Per pt, her friend Silvia comes over daily to assist with light housekeeping and meal preparation. Pt fills her own med box and tells RN that she would like to \"handle my medications by myself.\" Noticed that pt doesn't have all scheduled medication in med planner. Pt reports that she doesn't put all medications in med planner, and she takes some direction from med bottles. Pt insists that she doesn't need help from RN because \"I have my way of doing things and I never missed a pill.\" Assessed oxygen safety, no fire hazard found at this time. Reviewed oxygen safety and equipment care with pt. Pt says,\"I have COPD for over 10 years. I know what to do.\" Due to dyspnea and safety concern, updated HHA care plan from shower to bed bath. Reviewed HHA care plan with pt. Pt was reluctant to have bed bath but agreed after nursing education. Educated pt on fall prevention, s/sx of sepsis, and reviewed  24/7 contact information. Pt is partially receptive to nursing education.   "

## 2020-11-25 NOTE — PROGRESS NOTES
"Chief Complaint   Patient presents with   • Dyslipidemia   • Palpitations   • Supraventricular Tachycardia (SVT)       Subjective:   Berny Renee is a 74 y.o. female who presents today for follow-up evaluation of palpitations, SVT, PVCs and hypertension.     This evaluation was conducted via Zoom using secure and encrypted videoconferencing technology. The patient was in a private location in the [facility].    The patient's identity was confirmed and verbal consent was obtained for this virtual visit.  Session began 3:16 PM, ended 3:31 PM, total time 15 minutes.    Last seen 4/6/2020.    Since 4/6/2020 the patient was recently hospitalized from 11/7-11/23/2020 for severe COVID-19 infection.  Returned home 2 days ago.  Feeling stronger.  Digoxin DC'd due to \"bradycardia\" during her hospitalization.  No palpitations.  BP normal today.    Since 10/7/2019 the patient is feeling better.  She did not tolerate previous antiarrhythmic therapy sleeping too much finally feels much better on digoxin.  No palpitations.  Still has problems with COPD.    Recently seen 9/2019 by APN.  Switch from diltiazem to verapamil which she is tolerating.  Cardiac PET scan normal.    Past Medical History:   Diagnosis Date   • Arthritis     spine   • Cataract immature    • Chronic pain    • Colon polyps    • COPD (chronic obstructive pulmonary disease) (HCC) 2002    moderate to severe   • DDD (degenerative disc disease), cervical    • DDD (degenerative disc disease), thoracic    • Diverticulitis of colon    • Dyslipidemia    • EMPHYSEMA    • Hypertension    • REGINE (obstructive sleep apnea)    • OSTEOPOROSIS    • Spinal stenosis, lumbar region, with neurogenic claudication    • URINARY INCONTINENCE    • Vitamin d deficiency      Past Surgical History:   Procedure Laterality Date   • LUMBAR LAMINECTOMY DISKECTOMY  6/22/2010    Performed by GRACIE CANO at SURGERY [facility] ORS   • OTHER ORTHOPEDIC SURGERY  2004    left ankle fx   • " OTHER      leg fracture   • OTHER      t spine disc surg   • TONSILLECTOMY       Family History   Problem Relation Age of Onset   • Other Sister         lung and leukemia cancer     Social History     Socioeconomic History   • Marital status:      Spouse name: Not on file   • Number of children: Not on file   • Years of education: Not on file   • Highest education level: Not on file   Occupational History   • Not on file   Social Needs   • Financial resource strain: Not on file   • Food insecurity     Worry: Not on file     Inability: Not on file   • Transportation needs     Medical: Not on file     Non-medical: Not on file   Tobacco Use   • Smoking status: Former Smoker     Packs/day: 2.00     Years: 30.00     Pack years: 60.00     Types: Cigarettes     Quit date: 3/1/1999     Years since quittin.7   • Smokeless tobacco: Never Used   • Tobacco comment: 3 pks a day for 35 yrs, continued abstinence   Substance and Sexual Activity   • Alcohol use: Yes     Alcohol/week: 4.2 oz     Types: 7 Glasses of wine per week   • Drug use: Yes     Types: Marijuana     Comment: Medical Marijuana    • Sexual activity: Never   Lifestyle   • Physical activity     Days per week: Not on file     Minutes per session: Not on file   • Stress: Not on file   Relationships   • Social connections     Talks on phone: Not on file     Gets together: Not on file     Attends Orthodoxy service: Not on file     Active member of club or organization: Not on file     Attends meetings of clubs or organizations: Not on file     Relationship status: Not on file   • Intimate partner violence     Fear of current or ex partner: Not on file     Emotionally abused: Not on file     Physically abused: Not on file     Forced sexual activity: Not on file   Other Topics Concern   • Not on file   Social History Narrative   • Not on file     Allergies   Allergen Reactions   • Iodine      convulsion   • Tape Rash and Itching     Outpatient Encounter  Medications as of 11/25/2020   Medication Sig Dispense Refill   • digoxin (LANOXIN) 125 MCG Tab Take 1 Tab by mouth every day. 90 Tab 3   • azithromycin (ZITHROMAX) 250 MG Tab Take 250 mg by mouth every day.     • oxyCODONE immediate release (ROXICODONE) 10 MG immediate release tablet Take 1 Tab by mouth every 6 hours as needed for Moderate Pain for up to 7 days. (Patient taking differently: Take 10 mg by mouth every 6 hours as needed for Moderate Pain. 4 times day) 28 Tab 0   • fluticasone (FLONASE) 50 MCG/ACT nasal spray Administer 2 Sprays into affected nostril(S) 2 Times a Day. 16 g    • sodium chloride (OCEAN) 0.65 % Solution Administer 2 Sprays into affected nostril(S) every 2 hours as needed.  3   • Home Care Oxygen Inhale 6 L/min Continuous. Oxygen dose range: 6 to 6 L/min  Respiratory route via: Nasal Cannula   Oxygen supplier: Preferred         • fluticasone-salmeterol (ADVAIR DISKUS) 500-50 MCG/DOSE AEROSOL POWDER, BREATH ACTIVATED Inhale 1 Puff by mouth 2 times a day. 3 Each 3   • tiotropium (SPIRIVA HANDIHALER) 18 MCG Cap Inhale 1 Cap by mouth every day. 90 Cap 3   • VENTOLIN  (90 Base) MCG/ACT Aero Soln inhalation aerosol Inhale 2 Puffs by mouth every 6 hours as needed for Shortness of Breath. 1 Inhaler 2   • lisinopril (PRINIVIL) 20 MG Tab Take 1 Tab by mouth every day. 100 Tab 3   • Misc. Devices Misc This is an order for  7 foot high flow oxygen tubing  Dx; asthma with COPD J 44.9, supplemental oxygen-dependent Z 99.81, chronic respiratory failure with hypoxia and J 96        ODETTE 99 months   NPI [id number] 1 Each 0   • spironolactone (ALDACTONE) 25 MG Tab TAKE ONE TABLET BY MOUTH ONE TIME DAILY 30 Tab 11   • montelukast (SINGULAIR) 10 MG Tab Take 1 Tab by mouth every day. 90 Tab 3   • Cholecalciferol (VITAMIN D3) 2000 UNIT Cap TAKE ONE CAPSULE BY MOUTH ONE TIME DAILY 30 Cap 3   • potassium chloride (MICRO-K) 10 MEQ capsule TAKE ONE CAPSULE BY MOUTH TWICE DAILY 60 Cap 6   • omeprazole  "(PRILOSEC OTC) 20 MG tablet Take 1 Tab by mouth every day. 30 Tab 11   • ipratropium-albuterol (DUONEB) 0.5-2.5 (3) MG/3ML nebulizer solution USE ONE VIAL IN NEBULIZER EVERY 4 HOURS AS NEEDED FOR SHORTNESS OF BREATH OR  WHEEZING 360 Vial 6   • acetaminophen (TYLENOL) 650 MG CR tablet Take 650 mg by mouth every 6 hours as needed.     • ondansetron (ZOFRAN) 4 MG Tab tablet Take 4 mg by mouth 3 times a day.     • dexamethasone (DECADRON) 6 MG Tab Take 1 Tab by mouth every day. (Patient not taking: Reported on 11/23/2020) 3 Tab 0   • guaiFENesin dextromethorphan (ROBITUSSIN DM) 100-10 MG/5ML Syrup syrup Take 10 mL by mouth every 6 hours as needed for Cough. 840 mL    • acyclovir (ZOVIRAX) 200 MG Cap Take 200 mg by mouth every day.     • triamcinolone acetonide (KENALOG) 0.1 % Ointment Apply 1 Application to affected area(s) 2 times a day. (Patient not taking: Reported on 11/23/2020) 1 Tube 0     No facility-administered encounter medications on file as of 11/25/2020.      Review of Systems   Respiratory: Negative for cough and shortness of breath.    Cardiovascular: Negative for chest pain and palpitations.   Musculoskeletal: Negative for myalgias.   Neurological: Negative for dizziness and loss of consciousness.        Objective:   Ht 1.626 m (5' 4\")   LMP 06/07/2001   BMI 29.01 kg/m²   Vital signs because of telemedicine/virtual visit.    Physical Exam   Constitutional: She is oriented to person, place, and time. No distress.   Pulmonary/Chest: Effort normal.   Neurological: She is alert and oriented to person, place, and time.   Psychiatric: She has a normal mood and affect. Her behavior is normal.     CARDIAC PET SCAN 09/06/2019  Ventricular ejection fraction 68%.  Normal perfusion scan.    ECHOCARDIOGRAM 03/17/2013  Technically difficult study.   Probably normal left ventricular size, thickness and systolic function.   Borderline diastolic dysfunction.  Possible small pericardial effusion, without evidence of " hemodynamic   compromise, and possible epicardial fat pad.  Mild mitral regurgitation.    Assessment:     1. Palpitations     2. SVT (supraventricular tachycardia) (HCC)     3. PVCs (premature ventricular contractions)     4. Essential hypertension         Medical Decision Making:  Today's Assessment / Status / Plan:     Assessment  1.  SVT.  2.  PVCs.  3.  Palpitations.  4.  Hypertension.  5.  COPD, O2 dependent, severe.  6.  COVID-19 infection 11/2020.    Recommendation Discussion  1.  The patient is having no symptoms related to her arrhythmias at this time.  2.  BP normal per home blood pressure log.  3.  Restart digoxin 0.125 mg daily.  4.  RTC 8 months.  .

## 2020-11-27 ENCOUNTER — HOME CARE VISIT (OUTPATIENT)
Dept: HOME HEALTH SERVICES | Facility: HOME HEALTHCARE | Age: 75
End: 2020-11-27
Payer: MEDICARE

## 2020-11-27 VITALS
TEMPERATURE: 98 F | OXYGEN SATURATION: 90 % | HEART RATE: 89 BPM | RESPIRATION RATE: 20 BRPM | DIASTOLIC BLOOD PRESSURE: 68 MMHG | SYSTOLIC BLOOD PRESSURE: 142 MMHG

## 2020-11-27 PROCEDURE — G0299 HHS/HOSPICE OF RN EA 15 MIN: HCPCS

## 2020-11-27 ASSESSMENT — ENCOUNTER SYMPTOMS
NAUSEA: DENIES
LIMITED RANGE OF MOTION: 1
VOMITING: DENIES
MUSCLE WEAKNESS: 1
SEVERE DYSPNEA: 1

## 2020-11-27 NOTE — PROGRESS NOTES
"Zenobia, please send CC to Everett Diego MD.   Skilled Nursing Visit Note 11/25/20  Pt was having breakfast in bed upon SN arrival. SpO2 90 % at rest, 90-88% with min exertion, 87-83% with walking 30 ft also noticed increased labor of breathing. Pt wears 6L of oxygen via NC continuously. Per pt, her friend Silvia comes over daily to assist with light housekeeping and meal preparation. Pt fills her own med box and tells RN that she would like to \"handle my medications by myself.\" Noticed that pt doesn't have all scheduled medication in med planner. Pt reports that she doesn't put all medications in med planner, and she takes some direction from med bottles. Pt insists that she doesn't need help from RN because \"I have my way of doing things and I never missed a pill.\" Assessed oxygen safety, no fire hazard found at this time. Reviewed oxygen safety and equipment care with pt. Pt says,\"I have COPD for over 10 years. I know what to do.\" Due to dyspnea and safety concern, updated HHA care plan from shower to bed bath. Reviewed HHA care plan with pt. Pt was reluctant to have bed bath but agreed after nursing education. Educated pt on fall prevention, s/sx of sepsis, and reviewed  24/7 contact information. Pt is partially receptive to nursing education.   "

## 2020-11-28 VITALS
TEMPERATURE: 98.1 F | HEART RATE: 20 BPM | DIASTOLIC BLOOD PRESSURE: 62 MMHG | SYSTOLIC BLOOD PRESSURE: 124 MMHG | OXYGEN SATURATION: 90 % | RESPIRATION RATE: 20 BRPM

## 2020-11-28 ASSESSMENT — ENCOUNTER SYMPTOMS
VOMITING: DENIES
NAUSEA: DENIES
SEVERE DYSPNEA: 1
MUSCLE WEAKNESS: 1

## 2020-11-28 NOTE — PROGRESS NOTES
Dr. Alfonso,    [facility] RN saw pt on 11/27/20. Pt is currently taking dexamethasone 6 MG Tabs as directed when she was discharged from [facility]. She will finish the first bottle this week and receive her last refill next Monday on 11/30. Pt would like to ask you if she should continue taking dexamethasone or go back to prednisone after she completes the refill bottle. Please advise.   Thank you,  HUNTER Mijares ([phone])

## 2020-11-29 VITALS
HEART RATE: 90 BPM | OXYGEN SATURATION: 98 % | SYSTOLIC BLOOD PRESSURE: 108 MMHG | DIASTOLIC BLOOD PRESSURE: 54 MMHG | RESPIRATION RATE: 20 BRPM | TEMPERATURE: 98.1 F

## 2020-11-30 ENCOUNTER — HOME CARE VISIT (OUTPATIENT)
Dept: HOME HEALTH SERVICES | Facility: HOME HEALTHCARE | Age: 75
End: 2020-11-30
Payer: MEDICARE

## 2020-11-30 VITALS
SYSTOLIC BLOOD PRESSURE: 138 MMHG | RESPIRATION RATE: 20 BRPM | TEMPERATURE: 97.8 F | HEART RATE: 111 BPM | DIASTOLIC BLOOD PRESSURE: 80 MMHG | OXYGEN SATURATION: 92 %

## 2020-11-30 PROCEDURE — G0151 HHCP-SERV OF PT,EA 15 MIN: HCPCS

## 2020-11-30 ASSESSMENT — ENCOUNTER SYMPTOMS: SEVERE DYSPNEA: 1

## 2020-11-30 NOTE — Clinical Note
Please forward to: Everett Diego MD; Fax: [phone]  Pt presents with a resting heart rate of 111bpm. Her seated blood pressure 138/80, RR 20, temp 97.8. No c/o pain.

## 2020-12-01 ENCOUNTER — TELEPHONE (OUTPATIENT)
Dept: CARDIOLOGY | Facility: MEDICAL CENTER | Age: 75
End: 2020-12-01

## 2020-12-01 ENCOUNTER — HOME CARE VISIT (OUTPATIENT)
Dept: HOME HEALTH SERVICES | Facility: HOME HEALTHCARE | Age: 75
End: 2020-12-01
Payer: MEDICARE

## 2020-12-01 PROCEDURE — G0299 HHS/HOSPICE OF RN EA 15 MIN: HCPCS

## 2020-12-01 ASSESSMENT — GAIT ASSESSMENTS
WALKING STANCE: 0 - HEELS APART
TRUNK: 0 - MARKED SWAY OR USES WALKING AID
TRUNK SCORE: 0
STEP SYMMETRY: 1 - RIGHT AND LEFT STEP LENGTH APPEAR EQUAL
PATH SCORE: 1
STEP CONTINUITY: 1 - STEPS APPEAR CONTINUOUS
INITIATION OF GAIT IMMEDIATELY AFTER GO: 1 - NO HESITANCY
BALANCE AND GAIT SCORE: 15
GAIT SCORE: 8
PATH: 1 - MILD/MODERATE DEVIATION OR USES WALKING AID

## 2020-12-01 ASSESSMENT — BALANCE ASSESSMENTS
TURNING 360 DEGREES STEPS: 0 - DISCONTINUOUS STEPS
STANDING BALANCE: 1 - STEADY BUT WIDE STANCE AND USES CANE OR OTHER SUPPORT
SITTING BALANCE: 1 - STEADY, SAFE
NUDGED SCORE: 0
ARISES: 1 - ABLE, USES ARMS TO HELP
BALANCE SCORE: 7
ARISING SCORE: 1
EYES CLOSED AT MAXIMUM POSITION NUDGED: 0 - UNSTEADY
SITTING DOWN: 1 - USES ARMS OR NOT SMOOTH MOTION
IMMEDIATE STANDING BALANCE FIRST 5 SECONDS: 1 - STEADY BUT USES WALKER OR OTHER SUPPORT
ATTEMPTS TO ARISE: 1 - ABLE, REQUIRES MORE THAN ONE ATTEMPT
NUDGED: 0 - BEGINS TO FALL

## 2020-12-01 ASSESSMENT — ACTIVITIES OF DAILY LIVING (ADL)
AMBULATION ASSISTANCE ON FLAT SURFACES: 1
AMBULATION_DISTANCE/DURATION_TOLERATED: 50

## 2020-12-01 ASSESSMENT — ENCOUNTER SYMPTOMS
MUSCLE WEAKNESS: 1
ARTHRALGIAS: 1
MENTAL STATUS CHANGE: 0

## 2020-12-01 NOTE — PROGRESS NOTES
Pt resting in bed upon SN arrival. She is alert and oriented x 4. VS taken: /58, Apical HR 88/min irregular, RR 20, SpO2 96% with 6L of oxygen at pt's baseline, afebrile. Pt reports chest pain earlier today but subsided prior to RN arrival. She took digoxin about an hour ago before visit starts. Pt told RN that she did a lot this afternoon, including some household chores and feeding her cat. RN educated pt not to overdo physical activities and supervision for safety is needed. Pt agreed and said her friend Silvia will be here tomorrow. Educated pt to call 911 immediately for develop increasing SOB and/or unbelievable chest pain. Called and spoke with Nakia with [facility] to report irregular heart rate and chest pain.

## 2020-12-02 ENCOUNTER — HOME CARE VISIT (OUTPATIENT)
Dept: HOME HEALTH SERVICES | Facility: HOME HEALTHCARE | Age: 75
End: 2020-12-02
Payer: MEDICARE

## 2020-12-02 PROCEDURE — G0156 HHCP-SVS OF AIDE,EA 15 MIN: HCPCS

## 2020-12-02 ASSESSMENT — ACTIVITIES OF DAILY LIVING (ADL): OASIS_M1830: 05

## 2020-12-03 ENCOUNTER — HOME CARE VISIT (OUTPATIENT)
Dept: HOME HEALTH SERVICES | Facility: HOME HEALTHCARE | Age: 75
End: 2020-12-03
Payer: MEDICARE

## 2020-12-03 VITALS
HEART RATE: 88 BPM | DIASTOLIC BLOOD PRESSURE: 58 MMHG | TEMPERATURE: 99.4 F | RESPIRATION RATE: 20 BRPM | OXYGEN SATURATION: 96 % | SYSTOLIC BLOOD PRESSURE: 122 MMHG

## 2020-12-03 PROCEDURE — G0299 HHS/HOSPICE OF RN EA 15 MIN: HCPCS

## 2020-12-03 ASSESSMENT — ENCOUNTER SYMPTOMS
MUSCLE WEAKNESS: 1
SEVERE DYSPNEA: 1
NAUSEA: DENIES
VOMITING: DENIES
SHORTNESS OF BREATH: T
DEPRESSED MOOD: 1

## 2020-12-03 NOTE — PROGRESS NOTES
Kenneth Burton,    Is patient a candidate for a remote EKG monitor? Home Health may be able to assist pt with applying the monitor at home.     Thank you,  Maryana RN ([phone])

## 2020-12-04 ENCOUNTER — HOME CARE VISIT (OUTPATIENT)
Dept: HOME HEALTH SERVICES | Facility: HOME HEALTHCARE | Age: 75
End: 2020-12-04
Payer: MEDICARE

## 2020-12-04 VITALS
HEART RATE: 87 BPM | TEMPERATURE: 97.7 F | OXYGEN SATURATION: 97 % | SYSTOLIC BLOOD PRESSURE: 136 MMHG | RESPIRATION RATE: 18 BRPM | DIASTOLIC BLOOD PRESSURE: 66 MMHG

## 2020-12-04 PROCEDURE — G0151 HHCP-SERV OF PT,EA 15 MIN: HCPCS

## 2020-12-04 ASSESSMENT — ENCOUNTER SYMPTOMS: SEVERE DYSPNEA: 1

## 2020-12-04 NOTE — DOCUMENTATION QUERY
[facility]                                                                       Query Response Note      PATIENT:               FAZAL STUBBS  ACCT #:                  [id number]  MRN:                     [id number]  :                      1945  ADMIT DATE:       2020 6:41 AM  DISCH DATE:        2020 4:50 PM  RESPONDING  PROVIDER #:        [id number]           QUERY TEXT:    Potential sepsis is only documented in the ED Report. Please clarify whether sepsis is ruled in or ruled out.     NOTE:  If an appropriate response is not listed below, please respond with a new note.      The patient's Clinical Indicators include:  Patient presents with COVID 19, emphysema, and chronic respiratory failure with hypoxia.    ED Report:  Plan - The patient was febrile and potentially septic so I started with initial empiric antibiotics of Unasyn Zithromax for the potential of pulmonary source.  Fever of 101.1 F  Pulse 74    Resp 20  WBC 9.2     PN  Will dc rocephin and zithromax since procalcitonin is negative  Options provided:   -- Sepsis is ruled in   -- Sepsis is ruled out   -- Unable to determine      Query created by: Malorie Hunt on 2020 8:01 AM    RESPONSE TEXT:    Sepsis is ruled out          Electronically signed by:  MADHU REIS MD 2020 1:32 PM

## 2020-12-06 VITALS
TEMPERATURE: 99.4 F | RESPIRATION RATE: 20 BRPM | OXYGEN SATURATION: 98 % | HEART RATE: 104 BPM | DIASTOLIC BLOOD PRESSURE: 68 MMHG | SYSTOLIC BLOOD PRESSURE: 110 MMHG

## 2020-12-07 ENCOUNTER — HOME CARE VISIT (OUTPATIENT)
Dept: HOME HEALTH SERVICES | Facility: HOME HEALTHCARE | Age: 75
End: 2020-12-07
Payer: MEDICARE

## 2020-12-07 VITALS
DIASTOLIC BLOOD PRESSURE: 60 MMHG | TEMPERATURE: 98.2 F | RESPIRATION RATE: 20 BRPM | HEART RATE: 88 BPM | SYSTOLIC BLOOD PRESSURE: 144 MMHG | OXYGEN SATURATION: 93 %

## 2020-12-07 VITALS
HEART RATE: 82 BPM | SYSTOLIC BLOOD PRESSURE: 140 MMHG | TEMPERATURE: 97.5 F | OXYGEN SATURATION: 92 % | RESPIRATION RATE: 20 BRPM | DIASTOLIC BLOOD PRESSURE: 60 MMHG

## 2020-12-07 PROCEDURE — G0299 HHS/HOSPICE OF RN EA 15 MIN: HCPCS

## 2020-12-07 ASSESSMENT — ENCOUNTER SYMPTOMS
NAUSEA: DENIES
MUSCLE WEAKNESS: 1
LIMITED RANGE OF MOTION: 1
MUSCLE WEAKNESS: 1
VOMITING: DENIES
SHORTNESS OF BREATH: T
SEVERE DYSPNEA: 1
VOMITING: DENIES
NAUSEA: DENIES

## 2020-12-08 ENCOUNTER — TELEPHONE (OUTPATIENT)
Dept: SLEEP MEDICINE | Facility: MEDICAL CENTER | Age: 75
End: 2020-12-08

## 2020-12-08 ENCOUNTER — HOME CARE VISIT (OUTPATIENT)
Dept: HOME HEALTH SERVICES | Facility: HOME HEALTHCARE | Age: 75
End: 2020-12-08
Payer: MEDICARE

## 2020-12-08 VITALS
OXYGEN SATURATION: 90 % | RESPIRATION RATE: 20 BRPM | SYSTOLIC BLOOD PRESSURE: 122 MMHG | HEART RATE: 89 BPM | TEMPERATURE: 97.9 F | DIASTOLIC BLOOD PRESSURE: 62 MMHG

## 2020-12-08 PROCEDURE — G0156 HHCP-SVS OF AIDE,EA 15 MIN: HCPCS

## 2020-12-09 ENCOUNTER — HOME CARE VISIT (OUTPATIENT)
Dept: HOME HEALTH SERVICES | Facility: HOME HEALTHCARE | Age: 75
End: 2020-12-09
Payer: MEDICARE

## 2020-12-09 VITALS
OXYGEN SATURATION: 97 % | HEART RATE: 89 BPM | SYSTOLIC BLOOD PRESSURE: 118 MMHG | RESPIRATION RATE: 20 BRPM | DIASTOLIC BLOOD PRESSURE: 60 MMHG | TEMPERATURE: 98.1 F

## 2020-12-09 PROCEDURE — G0151 HHCP-SERV OF PT,EA 15 MIN: HCPCS

## 2020-12-09 ASSESSMENT — ENCOUNTER SYMPTOMS: SEVERE DYSPNEA: 1

## 2020-12-09 NOTE — TELEPHONE ENCOUNTER
[facility] to speak to Annia regarding this patient.     Patient diagnosis with COVID and the treating physician took patient off her prednisone and started DECADRON for her COPD.     Caller wanted Dr Alfonso to know this.    Message routed.

## 2020-12-10 ENCOUNTER — TELEPHONE (OUTPATIENT)
Dept: CARDIOLOGY | Facility: MEDICAL CENTER | Age: 75
End: 2020-12-10

## 2020-12-10 ENCOUNTER — HOME CARE VISIT (OUTPATIENT)
Dept: HOME HEALTH SERVICES | Facility: HOME HEALTHCARE | Age: 75
End: 2020-12-10
Payer: MEDICARE

## 2020-12-10 DIAGNOSIS — R00.2 PALPITATIONS: ICD-10-CM

## 2020-12-10 DIAGNOSIS — I49.3 PVC'S (PREMATURE VENTRICULAR CONTRACTIONS): ICD-10-CM

## 2020-12-10 DIAGNOSIS — I47.10 SVT (SUPRAVENTRICULAR TACHYCARDIA) (HCC): ICD-10-CM

## 2020-12-10 DIAGNOSIS — I49.3 PVCS (PREMATURE VENTRICULAR CONTRACTIONS): ICD-10-CM

## 2020-12-10 PROCEDURE — 665001 SOC-HOME HEALTH

## 2020-12-10 PROCEDURE — G0299 HHS/HOSPICE OF RN EA 15 MIN: HCPCS

## 2020-12-10 NOTE — PROGRESS NOTES
"Zenobia, please forward CC to Everett Diego MD (PCP)    Pt alert and oriented x 4. /62, apical HR 80/min and some irregularity noted, all other VSS, afebrile. Pt's heart rate was 82/min and regular on 12/7/20 during the previous  RN visit. Pt denies any chest pain or increasing SOB this week. She reports feeling tired and weaker in the evening, and her pulse \"goes over 100 (per minute) when I feel tired at night.\" Her oximeter shows pulse is 81-85/min during this visit, consistent with manual pulse count. Pt tells RN that she can't go to cardiology office in person for EKG check which was mentioned to her by Josephine Tipton R.N. last week per pt. Pt asked if she can have EKG performed at home to ensure \"there's no damage to my heart because of Covid.\" Called and spoke with [facility] department regarding pt's request of Holter monitor.     "

## 2020-12-10 NOTE — TELEPHONE ENCOUNTER
SW     Patient is calling wanting to see if SW can put an order in for a holter and have it sent to her. Please call her back regarding order.

## 2020-12-11 ENCOUNTER — HOME CARE VISIT (OUTPATIENT)
Dept: HOME HEALTH SERVICES | Facility: HOME HEALTHCARE | Age: 75
End: 2020-12-11
Payer: MEDICARE

## 2020-12-11 VITALS
DIASTOLIC BLOOD PRESSURE: 60 MMHG | RESPIRATION RATE: 20 BRPM | SYSTOLIC BLOOD PRESSURE: 122 MMHG | TEMPERATURE: 97.8 F | OXYGEN SATURATION: 97 % | HEART RATE: 79 BPM

## 2020-12-11 PROCEDURE — G0151 HHCP-SERV OF PT,EA 15 MIN: HCPCS

## 2020-12-11 PROCEDURE — 665001 SOC-HOME HEALTH

## 2020-12-11 ASSESSMENT — ENCOUNTER SYMPTOMS: SEVERE DYSPNEA: 1

## 2020-12-11 NOTE — TELEPHONE ENCOUNTER
Discussed with SW, reviewed last pt call on 12/01/20. Per SW, 24-hr Holter however advised this type of monitor can't be mailed out pt. Recommends instead 4-day ZioPatch or BioTel. No medication recommendations for now until results received.    Called pt to discuss. She confirms she cannot come in to clinic for monitor placement. Advised ZioPatch order to be mailed out to her. Pt currently scheduled for soonest available mail-out for Zio which is 01/21/21, pt verbalized understanding.    Order placed.

## 2020-12-11 NOTE — DOCUMENTATION QUERY
[facility]                                                                       Query Response Note      PATIENT:               FAZAL STUBBS  ACCT #:                  [id number]  MRN:                     [id number]  :                      1945  ADMIT DATE:       2020 6:41 AM  DISCH DATE:        2020 4:50 PM  RESPONDING  PROVIDER #:        [id number]           QUERY TEXT:    Likely COVID pneumonia is only documented in the ED report. Please clarify whether pneumonia is ruled in or ruled out.    NOTE:  If an appropriate response is not listed below, please respond with a new note.       The patient's Clinical Indicators include:  ED Report:  Covid test did come back positive and at this point I do feel is most likely Covid pneumonia.     Fever of 101.1 F  Pulse 74    Resp 20  WBC 9.2    Treatments: Oxygen, dexamethasone, eliquis, chest x-ray    Risk Factors: Covid positive, hypoxemia, Emphysema and chronic respiratory failure on home O2, asthma  Options provided:   -- COVID pneumonia is ruled in   -- COVID pneumonia is ruled out   -- Unable to determine      Query created by: Malorie Hunt on 12/10/2020 3:06 PM    RESPONSE TEXT:    COVID pneumonia is ruled in          Electronically signed by:  MADHU REIS MD 2020 11:12 AM

## 2020-12-12 VITALS
DIASTOLIC BLOOD PRESSURE: 58 MMHG | HEART RATE: 80 BPM | RESPIRATION RATE: 20 BRPM | SYSTOLIC BLOOD PRESSURE: 124 MMHG | TEMPERATURE: 98.2 F | OXYGEN SATURATION: 92 %

## 2020-12-12 ASSESSMENT — ENCOUNTER SYMPTOMS
NAUSEA: NO
LIMITED RANGE OF MOTION: 1
VOMITING: NO
SHORTNESS OF BREATH: T
MUSCLE WEAKNESS: 1

## 2020-12-13 ENCOUNTER — TELEPHONE (OUTPATIENT)
Dept: CARDIOLOGY | Facility: MEDICAL CENTER | Age: 75
End: 2020-12-13

## 2020-12-13 NOTE — TELEPHONE ENCOUNTER
"Called patient regarding heart rate.  States her heart is \"doing better and I don't need the monitor\"  Still recovering from the Covid infection but able to do more  Will keep us informed.  W[facility] CARDI[facility] MONITOR  "

## 2020-12-15 ENCOUNTER — HOME CARE VISIT (OUTPATIENT)
Dept: HOME HEALTH SERVICES | Facility: HOME HEALTHCARE | Age: 75
End: 2020-12-15
Payer: MEDICARE

## 2020-12-15 PROCEDURE — G0299 HHS/HOSPICE OF RN EA 15 MIN: HCPCS

## 2020-12-16 ENCOUNTER — HOME CARE VISIT (OUTPATIENT)
Dept: HOME HEALTH SERVICES | Facility: HOME HEALTHCARE | Age: 75
End: 2020-12-16
Payer: MEDICARE

## 2020-12-16 VITALS
TEMPERATURE: 97.7 F | HEART RATE: 85 BPM | RESPIRATION RATE: 20 BRPM | OXYGEN SATURATION: 91 % | DIASTOLIC BLOOD PRESSURE: 58 MMHG | SYSTOLIC BLOOD PRESSURE: 114 MMHG

## 2020-12-16 VITALS
SYSTOLIC BLOOD PRESSURE: 114 MMHG | TEMPERATURE: 97.8 F | RESPIRATION RATE: 20 BRPM | HEART RATE: 89 BPM | OXYGEN SATURATION: 90 % | DIASTOLIC BLOOD PRESSURE: 68 MMHG

## 2020-12-16 PROCEDURE — G0151 HHCP-SERV OF PT,EA 15 MIN: HCPCS

## 2020-12-16 ASSESSMENT — ENCOUNTER SYMPTOMS
VOMITING: DENIES
MUSCLE WEAKNESS: 1
NAUSEA: DENIES
SEVERE DYSPNEA: 1
SHORTNESS OF BREATH: T
LIMITED RANGE OF MOTION: 1

## 2020-12-17 ENCOUNTER — HOME CARE VISIT (OUTPATIENT)
Dept: HOME HEALTH SERVICES | Facility: HOME HEALTHCARE | Age: 75
End: 2020-12-17
Payer: MEDICARE

## 2020-12-17 PROCEDURE — G0156 HHCP-SVS OF AIDE,EA 15 MIN: HCPCS

## 2020-12-18 ENCOUNTER — HOME CARE VISIT (OUTPATIENT)
Dept: HOME HEALTH SERVICES | Facility: HOME HEALTHCARE | Age: 75
End: 2020-12-18
Payer: MEDICARE

## 2020-12-18 VITALS
DIASTOLIC BLOOD PRESSURE: 60 MMHG | RESPIRATION RATE: 20 BRPM | HEART RATE: 88 BPM | SYSTOLIC BLOOD PRESSURE: 124 MMHG | TEMPERATURE: 99 F | OXYGEN SATURATION: 91 %

## 2020-12-18 PROCEDURE — G0151 HHCP-SERV OF PT,EA 15 MIN: HCPCS

## 2020-12-18 SDOH — ECONOMIC STABILITY: HOUSING INSECURITY: EVIDENCE OF SMOKING MATERIAL: 0

## 2020-12-18 ASSESSMENT — GAIT ASSESSMENTS
INITIATION OF GAIT IMMEDIATELY AFTER GO: 1 - NO HESITANCY
PATH SCORE: 1
WALKING STANCE: 0 - HEELS APART
BALANCE AND GAIT SCORE: 22
TRUNK SCORE: 0
STEP SYMMETRY: 1 - RIGHT AND LEFT STEP LENGTH APPEAR EQUAL
GAIT SCORE: 8
STEP CONTINUITY: 1 - STEPS APPEAR CONTINUOUS
TRUNK: 0 - MARKED SWAY OR USES WALKING AID
PATH: 1 - MILD/MODERATE DEVIATION OR USES WALKING AID

## 2020-12-18 ASSESSMENT — BALANCE ASSESSMENTS
BALANCE SCORE: 14
TURNING 360 DEGREES STEPS: 1 - CONTINUOUS STEPS
ATTEMPTS TO ARISE: 2 - ABLE TO RISE, ONE ATTEMPT
ARISES: 2 - ABLE WITHOUT USING ARMS
STANDING BALANCE: 1 - STEADY BUT WIDE STANCE AND USES CANE OR OTHER SUPPORT
NUDGED: 1 - STAGGERS, GRABS, CATCHES SELF
IMMEDIATE STANDING BALANCE FIRST 5 SECONDS: 2 - STEADY WITHOUT WALKER OR OTHER SUPPORT
NUDGED SCORE: 1
EYES CLOSED AT MAXIMUM POSITION NUDGED: 1 - STEADY
SITTING DOWN: 2 - SAFE, SMOOTH MOTION
ARISING SCORE: 2
SITTING BALANCE: 1 - STEADY, SAFE

## 2020-12-18 ASSESSMENT — ENCOUNTER SYMPTOMS: SEVERE DYSPNEA: 1

## 2020-12-22 ENCOUNTER — HOME CARE VISIT (OUTPATIENT)
Dept: HOME HEALTH SERVICES | Facility: HOME HEALTHCARE | Age: 75
End: 2020-12-22
Payer: MEDICARE

## 2020-12-22 VITALS
OXYGEN SATURATION: 93 % | RESPIRATION RATE: 20 BRPM | DIASTOLIC BLOOD PRESSURE: 60 MMHG | TEMPERATURE: 98.2 F | HEART RATE: 88 BPM | SYSTOLIC BLOOD PRESSURE: 128 MMHG

## 2020-12-22 PROCEDURE — G0493 RN CARE EA 15 MIN HH/HOSPICE: HCPCS

## 2020-12-22 ASSESSMENT — PATIENT HEALTH QUESTIONNAIRE - PHQ9: CLINICAL INTERPRETATION OF PHQ2 SCORE: 0

## 2020-12-22 ASSESSMENT — ACTIVITIES OF DAILY LIVING (ADL): OASIS_M1830: 03

## 2020-12-23 ENCOUNTER — DOCUMENTATION (OUTPATIENT)
Dept: HOME HEALTH SERVICES | Facility: HOME HEALTHCARE | Age: 75
End: 2020-12-23

## 2020-12-23 ENCOUNTER — HOME CARE VISIT (OUTPATIENT)
Dept: HOME HEALTH SERVICES | Facility: HOME HEALTHCARE | Age: 75
End: 2020-12-23
Payer: MEDICARE

## 2020-12-23 ASSESSMENT — ACTIVITIES OF DAILY LIVING (ADL): HOME_HEALTH_OASIS: 01

## 2020-12-23 NOTE — PROGRESS NOTES
FYI: Berny has been referred to the [facility] program for continued care and follow up for COPD after discharge from [facility]. Thank you for allowing [facility] to serve your patients health care needs.

## 2021-01-01 ENCOUNTER — OFFICE VISIT (OUTPATIENT)
Dept: URGENT CARE | Facility: CLINIC | Age: 76
End: 2021-01-01
Payer: MEDICARE

## 2021-01-01 VITALS
SYSTOLIC BLOOD PRESSURE: 112 MMHG | HEART RATE: 105 BPM | WEIGHT: 176 LBS | TEMPERATURE: 98.5 F | HEIGHT: 64 IN | RESPIRATION RATE: 18 BRPM | BODY MASS INDEX: 30.05 KG/M2 | OXYGEN SATURATION: 90 % | DIASTOLIC BLOOD PRESSURE: 62 MMHG

## 2021-01-01 DIAGNOSIS — L98.9 SKIN LESION: ICD-10-CM

## 2021-01-01 DIAGNOSIS — T14.8XXD DELAYED WOUND HEALING: ICD-10-CM

## 2021-01-01 PROCEDURE — 99214 OFFICE O/P EST MOD 30 MIN: CPT | Performed by: NURSE PRACTITIONER

## 2021-01-01 ASSESSMENT — FIBROSIS 4 INDEX: FIB4 SCORE: 0.57

## 2021-01-02 ASSESSMENT — ENCOUNTER SYMPTOMS
DIZZINESS: 0
MYALGIAS: 0
SORE THROAT: 0
SHORTNESS OF BREATH: 0
VOMITING: 0
NAUSEA: 0
CHILLS: 0
FEVER: 0
EYE REDNESS: 0

## 2021-01-03 NOTE — PROGRESS NOTES
Subjective:   Berny Renee is a 75 y.o. female who presents for Cat Scratch (cat scratch [id number], not healing, looks worse today, on right arm)      HPI  Patient is a 75-year-old female who presents to the urgent care for evaluation of a wound of her right forearm that has been present for the past month that is not healing.  Patient states that she was initially scratched by her cat.  She is been applying a bandage since having no improvement.  Patient states that it looked worse today which prompted her follow-up.  Patient states that it is continuously bleeding.  She is had no fever or chills, it is nontender.  Patient did recently get discharged from the hospital from [place] [id number].  She has had a home health nurse following up with her who has checked and changed her bandage a couple times.          Review of Systems   Constitutional: Negative for chills and fever.   HENT: Negative for sore throat.    Eyes: Negative for redness.   Respiratory: Negative for shortness of breath.    Cardiovascular: Negative for chest pain.   Gastrointestinal: Negative for nausea and vomiting.   Genitourinary: Negative for dysuria.   Musculoskeletal: Negative for myalgias.   Skin: Negative for rash.        Wound to the right forearm   Neurological: Negative for dizziness.       Medications:    • acetaminophen  • acyclovir Caps  • Albuterol Sulfate Aepb  • azithromycin Tabs  • bisacodyl  • Calcium Carbonate Antacid Chew  • dexamethasone Tabs  • digoxin Tabs  • diphenhydrAMINE  • Docusate Sodium Tabs  • DULoxetine Cpep  • fluticasone  • fluticasone-salmeterol Aepb  • guaiFENesin dextromethorphan Syrp  • guaiFENesin ER Tb12  • Home Care Oxygen  • hydrocortisone Crea  • ipratropium-albuterol  • lisinopril Tabs  • Misc. Devices Misc  • montelukast Tabs  • Movantik Tabs  • omeprazole  • ondansetron Tabs  • potassium chloride  • Simethicone Caps  • sodium chloride Soln  • Spiriva HandiHaler Caps  • spironolactone Tabs  • tizanidine  "Tabs  • triamcinolone acetonide Oint  • Ventolin HFA Aers  • Vitamin D3 Caps    Allergies: Iodine and Tape    Problem List: Berny Renee has Asthma with COPD ([facility]); Essential hypertension; Mixed stress and urge urinary incontinence; Peripheral edema; Lumbar radicular pain; Vitamin D deficiency disease; Dyslipidemia; Facet arthropathy, lumbar; Chronic constipation; Chronic pain syndrome; Supplemental oxygen dependent; Impaired fasting glucose; Pain management; Obesity (BMI 30-39.9); At risk for falls; Chronic respiratory failure with hypoxia ([facility]); External hemorrhoid, bleeding; Stage 4 very severe COPD by GOLD classification ([facility]); SVT (supraventricular tachycardia) ([facility]); PVCs (premature ventricular contractions); Palpitations; COVID-19; Acute on chronic respiratory failure with hypoxia ([facility]); DNR (do not resuscitate); and Hyponatremia on their problem list.    Surgical History:  Past Surgical History:   Procedure Laterality Date   • LUMBAR LAMINECTOMY DISKECTOMY  6/22/2010    Performed by GRACIE CANO at SURGERY [facility] ORS   • OTHER ORTHOPEDIC SURGERY  2004    left ankle fx   • OTHER      leg fracture   • OTHER      t spine disc surg   • TONSILLECTOMY         Past Social Hx: Berny Renee  reports that she quit smoking about 21 years ago. Her smoking use included cigarettes. She has a 60.00 pack-year smoking history. She has never used smokeless tobacco. She reports current alcohol use of about 4.2 oz of alcohol per week. She reports current drug use. Drug: Marijuana.     Past Family Hx:  Berny Renee family history includes Other in her sister.     Problem list, medications, and allergies reviewed by myself today in Epic.     Objective:     /62   Pulse (!) 105   Temp 36.9 °C (98.5 °F) (Temporal)   Resp 18   Ht 1.626 m (5' 4\")   Wt 79.8 kg (176 lb)   LMP 06/07/2001   SpO2 90%   BMI 30.21 kg/m²     Physical Exam  Constitutional:       Appearance: Normal appearance. She is not " ill-appearing or toxic-appearing.   HENT:      Head: Normocephalic.      Right Ear: External ear normal.      Left Ear: External ear normal.      Nose: Nose normal.      Mouth/Throat:      Lips: Pink.      Mouth: Mucous membranes are moist.   Eyes:      General: Lids are normal.         Right eye: No discharge.         Left eye: No discharge.   Neck:      Musculoskeletal: Full passive range of motion without pain.   Pulmonary:      Effort: Pulmonary effort is normal. No accessory muscle usage or respiratory distress.   Musculoskeletal: Normal range of motion.   Skin:     Coloration: Skin is not pale.      Findings: Erythema, lesion and wound present.          Neurological:      Mental Status: She is alert and oriented to person, place, and time.   Psychiatric:         Mood and Affect: Mood normal.         Thought Content: Thought content normal.         Assessment/Plan:     Diagnosis and associated orders:     1. Delayed wound healing  REFERRAL TO WOUND CLINIC    REFERRAL TO DERMATOLOGY   2. Skin lesion  REFERRAL TO WOUND CLINIC    REFERRAL TO DERMATOLOGY      Comments/MDM:     • Patient is a 75-year-old female present with the stated above.  Patient does appear to and open wound to the right forearm however lesions do appear to be irregular in nature, bleeding.  I do not see any signs of an infectious process however will have patient follow-up with dermatology and wound care for further evaluation and management a as differentials include but not limited to neoplasm.  Surrounding tissue is mildly erythematous however patient likely has a sensitivity to the bandage.  Did dress wound with Xeroform, nonadherent gauze and Coban.  Patient will continue with dressing changes until she is seen by wound care dermatology.  • Differential diagnosis, natural history, supportive care, and indications for immediate follow-up discussed.  •          Please note that this dictation was created using voice recognition software. I  have made a reasonable attempt to correct obvious errors, but I expect that there are errors of grammar and possibly content that I did not discover before finalizing the note.    This note was electronically signed by Ahsan DAVIS.

## 2021-01-08 DIAGNOSIS — Z23 NEED FOR VACCINATION: ICD-10-CM

## 2021-01-27 ENCOUNTER — PATIENT MESSAGE (OUTPATIENT)
Dept: SLEEP MEDICINE | Facility: MEDICAL CENTER | Age: 76
End: 2021-01-27

## 2021-01-27 NOTE — TELEPHONE ENCOUNTER
From: Berny Renee  To: Michelle Alfonso M.D.  Sent: 1/27/2021 10:40 AM PST  Subject: Non-Urgent Medical Question    When I tried to make an appointment today, I got a hang up by mistake. I called back right away and this lady told me you were booked up, at least other lady tried to get me in tomarrow but I can not get get out because of the snow,, so she was going to put me on a waiting least. This new girl just told me NO NO. and said good luck. I have been with you for over 18 years and do not want to change lung doctors. I am your client and at least try something, no I do not want to see any one else. I have had the Virus and have been in hospital for three week. I did not think I was going to make it home again. I need you to check out my lungs and see if it did any more damage. I can be put on a waiting list at least. Thank you , Berny

## 2021-01-27 NOTE — PATIENT COMMUNICATION
Dr. Alfonso, would you mind taking a look at the schedule? I see that Mr. Renee was last seen by you on 7/9/20, but a follow up appt was not scheduled at that time. I don't see any openings soon and i've looked out through April. Is there a time that I've overlooked or do you have a recommendation?    Routed to Dr. Alfonso for reivew.

## 2021-02-01 NOTE — PATIENT COMMUNICATION
I spoke with patient and she is is ok to see another provider for an appointment. I have scheduled her with Dr. Bhavana Damon. She will continue to follow with Dr. Alfonso in the future.

## 2021-02-12 ENCOUNTER — OFFICE VISIT (OUTPATIENT)
Dept: CARDIOLOGY | Facility: MEDICAL CENTER | Age: 76
End: 2021-02-12
Payer: MEDICARE

## 2021-02-12 VITALS
HEIGHT: 64 IN | BODY MASS INDEX: 31 KG/M2 | DIASTOLIC BLOOD PRESSURE: 60 MMHG | SYSTOLIC BLOOD PRESSURE: 130 MMHG | RESPIRATION RATE: 20 BRPM | HEART RATE: 95 BPM | OXYGEN SATURATION: 92 % | WEIGHT: 181.6 LBS

## 2021-02-12 DIAGNOSIS — R06.09 DOE (DYSPNEA ON EXERTION): ICD-10-CM

## 2021-02-12 DIAGNOSIS — I20.0 UNSTABLE ANGINA PECTORIS (HCC): ICD-10-CM

## 2021-02-12 DIAGNOSIS — I47.10 SVT (SUPRAVENTRICULAR TACHYCARDIA) (HCC): ICD-10-CM

## 2021-02-12 DIAGNOSIS — I49.3 PVCS (PREMATURE VENTRICULAR CONTRACTIONS): ICD-10-CM

## 2021-02-12 DIAGNOSIS — R00.2 PALPITATIONS: ICD-10-CM

## 2021-02-12 PROCEDURE — 99214 OFFICE O/P EST MOD 30 MIN: CPT | Performed by: INTERNAL MEDICINE

## 2021-02-12 RX ORDER — NYSTATIN 500000 [USP'U]/1
TABLET, COATED ORAL
COMMUNITY
Start: 2020-11-30 | End: 2021-07-08

## 2021-02-12 RX ORDER — OXYCODONE HYDROCHLORIDE 10 MG/1
TABLET ORAL 4 TIMES DAILY
COMMUNITY
Start: 2021-01-14 | End: 2021-07-08

## 2021-02-12 RX ORDER — DEXAMETHASONE 2 MG/1
TABLET ORAL
COMMUNITY
Start: 2020-11-30 | End: 2021-09-15

## 2021-02-12 RX ORDER — OMEPRAZOLE 20 MG/1
CAPSULE, DELAYED RELEASE ORAL
COMMUNITY
Start: 2020-12-30 | End: 2021-07-08

## 2021-02-12 ASSESSMENT — ENCOUNTER SYMPTOMS
MYALGIAS: 0
SHORTNESS OF BREATH: 1
COUGH: 0
DIZZINESS: 0
PALPITATIONS: 0
LOSS OF CONSCIOUSNESS: 0

## 2021-02-12 ASSESSMENT — FIBROSIS 4 INDEX: FIB4 SCORE: 0.57

## 2021-02-12 NOTE — PROGRESS NOTES
"Chief Complaint   Patient presents with   • Premature Ventricular Contractions (PVCs)   • Palpitations   • Supraventricular Tachycardia (SVT)       Subjective:   Berny Renee is a 75 y.o. female who presents today for follow-up evaluation of palpitations, SVT, PVCs and hypertension.     Last seen on 11/25/2020    Since 11/25/2020 the patient is still not doing well.  Is yet to recover from COVID-19 infection.  Has significant S OB/HARRIS with diffuse chest heaviness when she gets up to do any minimal activity, relieved with rest.  Previous coronary evaluation includes a normal coronary angiogram 2004, normal MPI 2012 and normal cardiac PET scan 2019.    Since 4/6/2020 the patient was recently hospitalized from 11/7-11/23/2020 for severe COVID-19 infection.  Returned home 2 days ago.  Feeling stronger.  Digoxin DC'd due to \"bradycardia\" during her hospitalization.  No palpitations.  BP normal today.    Since 10/7/2019 the patient is feeling better.  She did not tolerate previous antiarrhythmic therapy sleeping too much finally feels much better on digoxin.  No palpitations.  Still has problems with COPD.    Recently seen 9/2019 by APN.  Switch from diltiazem to verapamil which she is tolerating.  Cardiac PET scan normal.    Past Medical History:   Diagnosis Date   • Arthritis     spine   • Cataract immature    • Chronic pain    • Colon polyps    • COPD (chronic obstructive pulmonary disease) ([facility]) 2002    moderate to severe   • DDD (degenerative disc disease), cervical    • DDD (degenerative disc disease), thoracic    • Diverticulitis of colon    • Dyslipidemia    • EMPHYSEMA    • Hypertension    • REGINE (obstructive sleep apnea)    • OSTEOPOROSIS    • Spinal stenosis, lumbar region, with neurogenic claudication    • URINARY INCONTINENCE    • Vitamin d deficiency      Past Surgical History:   Procedure Laterality Date   • LUMBAR LAMINECTOMY DISKECTOMY  6/22/2010    Performed by GRACIE CANO at SURGERY [facility] "   • OTHER ORTHOPEDIC SURGERY  2004    left ankle fx   • OTHER      leg fracture   • OTHER      t spine disc surg   • TONSILLECTOMY       Family History   Problem Relation Age of Onset   • Other Sister         lung and leukemia cancer     Social History     Socioeconomic History   • Marital status:      Spouse name: Not on file   • Number of children: Not on file   • Years of education: Not on file   • Highest education level: Not on file   Occupational History   • Not on file   Tobacco Use   • Smoking status: Former Smoker     Packs/day: 2.00     Years: 30.00     Pack years: 60.00     Types: Cigarettes     Quit date: 3/1/1999     Years since quittin.9   • Smokeless tobacco: Never Used   • Tobacco comment: 3 pks a day for 35 yrs, continued abstinence   Substance and Sexual Activity   • Alcohol use: Not Currently     Alcohol/week: 4.2 oz     Types: 7 Glasses of wine per week   • Drug use: Not Currently     Types: Marijuana, Oral     Comment: Medical Marijuana    • Sexual activity: Never   Other Topics Concern   • Not on file   Social History Narrative   • Not on file     Social Determinants of Health     Financial Resource Strain:    • Difficulty of Paying Living Expenses:    Food Insecurity:    • Worried About Running Out of Food in the Last Year:    • Ran Out of Food in the Last Year:    Transportation Needs:    • Lack of Transportation (Medical):    • Lack of Transportation (Non-Medical):    Physical Activity:    • Days of Exercise per Week:    • Minutes of Exercise per Session:    Stress:    • Feeling of Stress :    Social Connections:    • Frequency of Communication with Friends and Family:    • Frequency of Social Gatherings with Friends and Family:    • Attends [employer] Services:    • Active Member of Clubs or Organizations:    • Attends Club or Organization Meetings:    • Marital Status:    Intimate Partner Violence:    • Fear of Current or Ex-Partner:    • Emotionally Abused:    • Physically  Abused:    • Sexually Abused:      Allergies   Allergen Reactions   • Iodine      Convulsion  I.V.  iodine   • Tape Rash and Itching     Outpatient Encounter Medications as of 2/12/2021   Medication Sig Dispense Refill   • oxyCODONE immediate release (ROXICODONE) 10 MG immediate release tablet 4 times a day. Every 6 hours     • Naloxegol Oxalate (MOVANTIK) 25 MG Tab Take 1 Tab by mouth 1 time daily as needed. Indications: Opioid-Induced Constipation     • tizanidine (ZANAFLEX) 4 MG Tab Take 4 mg by mouth every 8 hours as needed. not to exceed 3 doses in 24 hours  Indications: Lower Backache, Muscle Spasticity     • DULoxetine (CYMBALTA) 30 MG Cap DR Particles Take 30 mg by mouth every day. Indications: Generalized Anxiety Disorder, Major Depressive Disorder, Musculoskeletal Pain, Disease of the Peripheral Nerves     • guaiFENesin ER (MUCINEX) 600 MG TABLET SR 12 HR Take 600-1,200 mg by mouth 2 times a day as needed. Indications: Cough     • Albuterol Sulfate 108 (90 Base) MCG/ACT AEROSOL POWDER, BREATH ACTIVATED Inhale 2 Puffs 4 times a day as needed. Indications: SOB     • hydrocortisone 1 % Cream Apply 1 Each topically 3 times a day as needed. Indications: Inflammation, Itching     • digoxin (LANOXIN) 125 MCG Tab Take 1 Tab by mouth every day. 90 Tab 3   • acetaminophen (TYLENOL) 650 MG CR tablet Take 650 mg by mouth every 6 hours as needed.     • ondansetron (ZOFRAN) 4 MG Tab tablet Take 4 mg by mouth every four hours as needed. Indications: Nausea and Vomiting     • azithromycin (ZITHROMAX) 250 MG Tab Take 250 mg by mouth every day.     • dexamethasone (DECADRON) 6 MG Tab Take 1 Tab by mouth every day. 3 Tab 0   • fluticasone (FLONASE) 50 MCG/ACT nasal spray Administer 2 Sprays into affected nostril(S) 2 Times a Day. 16 g    • guaiFENesin dextromethorphan (ROBITUSSIN DM) 100-10 MG/5ML Syrup syrup Take 10 mL by mouth every 6 hours as needed for Cough. 840 mL    • sodium chloride (OCEAN) 0.65 % Solution Administer  2 Sprays into affected nostril(S) every 2 hours as needed.  3   • acyclovir (ZOVIRAX) 200 MG Cap Take 200 mg by mouth every day.     • Home Care Oxygen Inhale 6 L/min Continuous. Oxygen dose range: 6 to 6 L/min  Respiratory route via: Nasal Cannula   Oxygen supplier: Preferred         • fluticasone-salmeterol (ADVAIR DISKUS) 500-50 MCG/DOSE AEROSOL POWDER, BREATH ACTIVATED Inhale 1 Puff by mouth 2 times a day. 3 Each 3   • tiotropium (SPIRIVA HANDIHALER) 18 MCG Cap Inhale 1 Cap by mouth every day. 90 Cap 3   • triamcinolone acetonide (KENALOG) 0.1 % Ointment Apply 1 Application to affected area(s) 2 times a day. 1 Tube 0   • lisinopril (PRINIVIL) 20 MG Tab Take 1 Tab by mouth every day. 100 Tab 3   • Misc. Devices Misc This is an order for  7 foot high flow oxygen tubing  Dx; asthma with COPD J 44.9, supplemental oxygen-dependent Z 99.81, chronic respiratory failure with hypoxia and J 96        ODETTE 99 months   NPI [id number] 1 Each 0   • spironolactone (ALDACTONE) 25 MG Tab TAKE ONE TABLET BY MOUTH ONE TIME DAILY 30 Tab 11   • montelukast (SINGULAIR) 10 MG Tab Take 1 Tab by mouth every day. 90 Tab 3   • Cholecalciferol (VITAMIN D3) 2000 UNIT Cap TAKE ONE CAPSULE BY MOUTH ONE TIME DAILY 30 Cap 3   • potassium chloride (MICRO-K) 10 MEQ capsule TAKE ONE CAPSULE BY MOUTH TWICE DAILY 60 Cap 6   • omeprazole (PRILOSEC OTC) 20 MG tablet Take 1 Tab by mouth every day. 30 Tab 11   • ipratropium-albuterol (DUONEB) 0.5-2.5 (3) MG/3ML nebulizer solution USE ONE VIAL IN NEBULIZER EVERY 4 HOURS AS NEEDED FOR SHORTNESS OF BREATH OR  WHEEZING 360 Vial 6   • omeprazole (PRILOSEC) 20 MG delayed-release capsule      • nystatin (MYCOSTATIN) 502282 UNIT Tab      • dexamethasone (DECADRON) 2 MG tablet      • diphenhydrAMINE (BENADRYL) 25 MG capsule Take 25 mg by mouth every four hours as needed. Indications: Itching, seasonal allergy     • bisacodyl (DULCOLAX) 5 MG EC tablet Take 5 mg by mouth 1 time daily as needed. 1 to 3 tabs in a  "single daily dose  Indications: Constipation     • Simethicone 125 MG Cap Take 1-2 Caps by mouth 2 times a day as needed. Dont exceed 4 caps in 24 hours   Indications: Gas     • Calcium Carbonate Antacid 1000 MG Chew Tab Chew 2-3 Tabs 3 times a day as needed. Do not exceed 7 tabs in 24 hours  Indications: Heartburn     • Docusate Sodium 100 MG Tab Take 1-3 Tabs by mouth 1 time daily as needed. Indications: Constipation     • VENTOLIN  (90 Base) MCG/ACT Aero Soln inhalation aerosol Inhale 2 Puffs by mouth every 6 hours as needed for Shortness of Breath. 1 Inhaler 2     No facility-administered encounter medications on file as of 2/12/2021.     Review of Systems   Respiratory: Positive for shortness of breath. Negative for cough.    Cardiovascular: Positive for chest pain. Negative for palpitations.   Musculoskeletal: Negative for myalgias.   Neurological: Negative for dizziness and loss of consciousness.        Objective:   /60 (BP Location: Left arm, Patient Position: Sitting, BP Cuff Size: Adult)   Pulse 95   Resp 20   Ht 1.626 m (5' 4\")   Wt 82.4 kg (181 lb 9.6 oz)   LMP 06/07/2001   SpO2 92%   BMI 31.17 kg/m²   Vital signs because of telemedicine/virtual visit.    Physical Exam   Constitutional: She is oriented to person, place, and time. She appears well-developed and well-nourished.   On O2 nasal cannula.   Eyes: Pupils are equal, round, and reactive to light. Conjunctivae are normal.   Neck: No JVD present.   Cardiovascular: Normal rate, regular rhythm, normal heart sounds and intact distal pulses.   Pulmonary/Chest: Effort normal. No accessory muscle usage. No respiratory distress. She has decreased breath sounds. She has no wheezes. She has no rales.   Musculoskeletal:         General: No edema.      Cervical back: Normal range of motion and neck supple.   Neurological: She is alert and oriented to person, place, and time.   Skin: Skin is warm, dry and intact. No rash noted. No cyanosis. " Nails show no clubbing.   Psychiatric: She has a normal mood and affect. Her behavior is normal.     CARDIAC PET SCAN 09/06/2019  Ventricular ejection fraction 68%.  Normal perfusion scan.    ECHOCARDIOGRAM 03/17/2013  Technically difficult study.   Probably normal left ventricular size, thickness and systolic function.   Borderline diastolic dysfunction.  Possible small pericardial effusion, without evidence of hemodynamic   compromise, and possible epicardial fat pad.  Mild mitral regurgitation.    Assessment:     1. Unstable angina pectoris (HCC)  EC-ECHOCARDIOGRAM COMPLETE W/O CONT    NM-CARDIAC STRESS TEST   2. HARRIS (dyspnea on exertion)  EC-ECHOCARDIOGRAM COMPLETE W/O CONT    TSH+FREE T4   3. Palpitations     4. PVCs (premature ventricular contractions)     5. SVT (supraventricular tachycardia) ([facility])         Medical Decision Making:  Today's Assessment / Status / Plan:     Assessment  0.  Unstable angina./HARRIS.  1.  SVT.  2.  PVCs.  3.  Palpitations.  4.  Hypertension.  5.  COPD, O2 dependent, severe.  6.  COVID-19 infection 11/2020.    Recommendation Discussion  1.  The patient has significant symptoms of exertional angina pectoris with minimal activity and associated S OB.  2.  Echocardiogram and MPI.  3.  Continue current cardiac therapy.  4.  Has upcoming pulmonary clinic appointment near future.  5.  RTC 6 months.  .

## 2021-02-18 ENCOUNTER — OFFICE VISIT (OUTPATIENT)
Dept: SLEEP MEDICINE | Facility: MEDICAL CENTER | Age: 76
End: 2021-02-18
Payer: MEDICARE

## 2021-02-18 ENCOUNTER — HOSPITAL ENCOUNTER (OUTPATIENT)
Dept: RADIOLOGY | Facility: MEDICAL CENTER | Age: 76
End: 2021-02-18
Attending: INTERNAL MEDICINE
Payer: MEDICARE

## 2021-02-18 ENCOUNTER — HOSPITAL ENCOUNTER (OUTPATIENT)
Dept: LAB | Facility: MEDICAL CENTER | Age: 76
End: 2021-02-18
Attending: INTERNAL MEDICINE
Payer: MEDICARE

## 2021-02-18 VITALS
RESPIRATION RATE: 18 BRPM | HEART RATE: 89 BPM | BODY MASS INDEX: 30.9 KG/M2 | WEIGHT: 181 LBS | TEMPERATURE: 98 F | SYSTOLIC BLOOD PRESSURE: 122 MMHG | OXYGEN SATURATION: 96 % | DIASTOLIC BLOOD PRESSURE: 64 MMHG | HEIGHT: 64 IN

## 2021-02-18 DIAGNOSIS — J96.21 ACUTE ON CHRONIC RESPIRATORY FAILURE WITH HYPOXIA (HCC): ICD-10-CM

## 2021-02-18 DIAGNOSIS — U07.1 COVID-19: ICD-10-CM

## 2021-02-18 DIAGNOSIS — E66.9 OBESITY (BMI 30-39.9): ICD-10-CM

## 2021-02-18 DIAGNOSIS — J44.89 ASTHMA WITH COPD (HCC): ICD-10-CM

## 2021-02-18 DIAGNOSIS — J44.9 STAGE 4 VERY SEVERE COPD BY GOLD CLASSIFICATION (HCC): ICD-10-CM

## 2021-02-18 DIAGNOSIS — Z99.81 DEPENDENCE ON SUPPLEMENTAL OXYGEN: ICD-10-CM

## 2021-02-18 LAB
T4 FREE SERPL-MCNC: 1.23 NG/DL (ref 0.93–1.7)
TSH SERPL DL<=0.005 MIU/L-ACNC: 1.97 UIU/ML (ref 0.38–5.33)

## 2021-02-18 PROCEDURE — 84439 ASSAY OF FREE THYROXINE: CPT

## 2021-02-18 PROCEDURE — 71046 X-RAY EXAM CHEST 2 VIEWS: CPT

## 2021-02-18 PROCEDURE — 99214 OFFICE O/P EST MOD 30 MIN: CPT | Performed by: INTERNAL MEDICINE

## 2021-02-18 PROCEDURE — 84443 ASSAY THYROID STIM HORMONE: CPT

## 2021-02-18 PROCEDURE — 36415 COLL VENOUS BLD VENIPUNCTURE: CPT

## 2021-02-18 ASSESSMENT — PAIN SCALES - GENERAL: PAINLEVEL: NO PAIN

## 2021-02-18 ASSESSMENT — FIBROSIS 4 INDEX: FIB4 SCORE: 0.57

## 2021-02-18 NOTE — PROGRESS NOTES
Berny Renee is a 75 y.o. female here for COPD, Covid pneumonia and hypoxemia. Patient was referred    History of Present Illness: As follows  This lady comes in today for follow-up of her Covid pneumonia, she was seen in November and hospitalized.  She took Decadron, and has required supplemental oxygen and has not recovered her stamina or strength.  She is very concerned that she has lost ground, comes in today in a motorized wheelchair, on oxygen.  She has been hypoxic and on oxygen since March 2020.  Prior lung function testing showed severe COPD.  She does have albuterol.    She saw Dr. Campbell, echocardiogram is pending at this time.    The immediate concern would be whether or not her pneumonia is cleared, I am doing a chest x-ray to make certain that what was seen in November has resolved.    Have her several months to heal up, and then reassess her oxygen needs as well as lung function testing, hopefully COPD is the predominant pattern but it is possible that she had some impact with pneumonitis and may have lost lung volumes, that remains to be determined.  Constitutional ROS: No unexpected change in weight, No unexplained fevers  Eyes: No change in vision or blurring or double vision  Mouth/Throat ROS: No sore throat, No recent change in voice or hoarseness  Pulmonary ROS: See present history for pertinent positives  Cardiovascular ROS: No chest pain to suggest acute coronary syndrome  Gastrointestinal ROS: No abdominal pain to suggest peptic disease  Musculoskeletal/Extremities ROS: no acute artritis or unusual swelling  Hematologic/Lymphatic ROS: No easy bleeding or unusual lymph node swelling  Neurologic ROS: No new or unusual weakness  Psychiatric ROS: No hallucinations  Allergic/Immunologic: No  urticaria or allergic rash      Current Outpatient Medications   Medication Sig Dispense Refill   • nystatin (MYCOSTATIN) 591396 UNIT Tab      • Naloxegol Oxalate (MOVANTIK) 25 MG Tab Take 1 Tab by  mouth 1 time daily as needed. Indications: Opioid-Induced Constipation     • tizanidine (ZANAFLEX) 4 MG Tab Take 4 mg by mouth every 8 hours as needed. not to exceed 3 doses in 24 hours  Indications: Lower Backache, Muscle Spasticity     • DULoxetine (CYMBALTA) 30 MG Cap DR Particles Take 30 mg by mouth every day. Indications: Generalized Anxiety Disorder, Major Depressive Disorder, Musculoskeletal Pain, Disease of the Peripheral Nerves     • diphenhydrAMINE (BENADRYL) 25 MG capsule Take 25 mg by mouth every four hours as needed. Indications: Itching, seasonal allergy     • bisacodyl (DULCOLAX) 5 MG EC tablet Take 5 mg by mouth 1 time daily as needed. 1 to 3 tabs in a single daily dose  Indications: Constipation     • guaiFENesin ER (MUCINEX) 600 MG TABLET SR 12 HR Take 600-1,200 mg by mouth 2 times a day as needed. Indications: Cough     • Calcium Carbonate Antacid 1000 MG Chew Tab Chew 2-3 Tabs 3 times a day as needed. Do not exceed 7 tabs in 24 hours  Indications: Heartburn     • Albuterol Sulfate 108 (90 Base) MCG/ACT AEROSOL POWDER, BREATH ACTIVATED Inhale 2 Puffs 4 times a day as needed. Indications: SOB     • digoxin (LANOXIN) 125 MCG Tab Take 1 Tab by mouth every day. 90 Tab 3   • acetaminophen (TYLENOL) 650 MG CR tablet Take 650 mg by mouth every 6 hours as needed.     • fluticasone (FLONASE) 50 MCG/ACT nasal spray Administer 2 Sprays into affected nostril(S) 2 Times a Day. 16 g    • guaiFENesin dextromethorphan (ROBITUSSIN DM) 100-10 MG/5ML Syrup syrup Take 10 mL by mouth every 6 hours as needed for Cough. 840 mL    • acyclovir (ZOVIRAX) 200 MG Cap Take 200 mg by mouth every day.     • fluticasone-salmeterol (ADVAIR DISKUS) 500-50 MCG/DOSE AEROSOL POWDER, BREATH ACTIVATED Inhale 1 Puff by mouth 2 times a day. 3 Each 3   • tiotropium (SPIRIVA HANDIHALER) 18 MCG Cap Inhale 1 Cap by mouth every day. 90 Cap 3   • VENTOLIN  (90 Base) MCG/ACT Aero Soln inhalation aerosol Inhale 2 Puffs by mouth every 6  hours as needed for Shortness of Breath. 1 Inhaler 2   • lisinopril (PRINIVIL) 20 MG Tab Take 1 Tab by mouth every day. 100 Tab 3   • montelukast (SINGULAIR) 10 MG Tab Take 1 Tab by mouth every day. 90 Tab 3   • Cholecalciferol (VITAMIN D3) 2000 UNIT Cap TAKE ONE CAPSULE BY MOUTH ONE TIME DAILY 30 Cap 3   • omeprazole (PRILOSEC OTC) 20 MG tablet Take 1 Tab by mouth every day. 30 Tab 11   • ipratropium-albuterol (DUONEB) 0.5-2.5 (3) MG/3ML nebulizer solution USE ONE VIAL IN NEBULIZER EVERY 4 HOURS AS NEEDED FOR SHORTNESS OF BREATH OR  WHEEZING 360 Vial 6   • oxyCODONE immediate release (ROXICODONE) 10 MG immediate release tablet 4 times a day. Every 6 hours     • omeprazole (PRILOSEC) 20 MG delayed-release capsule      • dexamethasone (DECADRON) 2 MG tablet      • Simethicone 125 MG Cap Take 1-2 Caps by mouth 2 times a day as needed. Dont exceed 4 caps in 24 hours   Indications: Gas     • Docusate Sodium 100 MG Tab Take 1-3 Tabs by mouth 1 time daily as needed. Indications: Constipation     • hydrocortisone 1 % Cream Apply 1 Each topically 3 times a day as needed. Indications: Inflammation, Itching     • ondansetron (ZOFRAN) 4 MG Tab tablet Take 4 mg by mouth every four hours as needed. Indications: Nausea and Vomiting     • azithromycin (ZITHROMAX) 250 MG Tab Take 250 mg by mouth every day.     • dexamethasone (DECADRON) 6 MG Tab Take 1 Tab by mouth every day. 3 Tab 0   • sodium chloride (OCEAN) 0.65 % Solution Administer 2 Sprays into affected nostril(S) every 2 hours as needed.  3   • Home Care Oxygen Inhale 6 L/min Continuous. Oxygen dose range: 6 to 6 L/min  Respiratory route via: Nasal Cannula   Oxygen supplier: Preferred         • triamcinolone acetonide (KENALOG) 0.1 % Ointment Apply 1 Application to affected area(s) 2 times a day. 1 Tube 0   • Misc. Devices Misc This is an order for  7 foot high flow oxygen tubing  Dx; asthma with COPD J 44.9, supplemental oxygen-dependent Z 99.81, chronic respiratory  "failure with hypoxia and J 96        ODETTE 99 months   NPI [id number] 1 Each 0   • spironolactone (ALDACTONE) 25 MG Tab TAKE ONE TABLET BY MOUTH ONE TIME DAILY 30 Tab 11   • potassium chloride (MICRO-K) 10 MEQ capsule TAKE ONE CAPSULE BY MOUTH TWICE DAILY 60 Cap 6     No current facility-administered medications for this visit.       Social History     Tobacco Use   • Smoking status: Former Smoker     Packs/day: 2.00     Years: 30.00     Pack years: 60.00     Types: Cigarettes     Quit date: 3/1/1999     Years since quittin.9   • Smokeless tobacco: Never Used   • Tobacco comment: 3 pks a day for 35 yrs, continued abstinence   Substance Use Topics   • Alcohol use: Not Currently     Alcohol/week: 4.2 oz     Types: 7 Glasses of wine per week   • Drug use: Not Currently     Types: Marijuana, Oral     Comment: Medical Marijuana         Past Medical History:   Diagnosis Date   • Arthritis     spine   • Cataract immature    • Chronic pain    • Colon polyps    • COPD (chronic obstructive pulmonary disease) ([facility])     moderate to severe   • DDD (degenerative disc disease), cervical    • DDD (degenerative disc disease), thoracic    • Diverticulitis of colon    • Dyslipidemia    • EMPHYSEMA    • Hypertension    • REGINE (obstructive sleep apnea)    • OSTEOPOROSIS    • Spinal stenosis, lumbar region, with neurogenic claudication    • URINARY INCONTINENCE    • Vitamin d deficiency        Past Surgical History:   Procedure Laterality Date   • LUMBAR LAMINECTOMY DISKECTOMY  2010    Performed by GRACIE CANO at SURGERY [facility]   • OTHER ORTHOPEDIC SURGERY      left ankle fx   • OTHER      leg fracture   • OTHER      t spine disc surg   • TONSILLECTOMY         Allergies: Iodine and Tape    Family History   Problem Relation Age of Onset   • Other Sister         lung and leukemia cancer       Physical Examination    Vitals:    21 1435   Height: 1.626 m (5' 4\")   Weight: 82.1 kg (181 lb)   Weight % change " since last entry.: 0 %   BP: 122/64   Pulse: 89   BMI (Calculated): 31.07   Resp: 18   Temp: 36.7 °C (98 °F)   TempSrc: Temporal       General Appearance: alert, no distress  Skin: Skin color, texture, turgor normal. No rashes or lesions.  Eyes: negative  Oropharynx: Lips, mucosa, and tongue normal. Teeth and gums normal. Oropharynx moist and without lesion  Lungs: positive findings: Rhonchi and wheezes with diminished breath sounds but no prolonged phases  Heart: negative. RRR without murmur, gallop, or rubs.  No ectopy.  Abdomen: Abdomen soft, non-tender. . No masses,  No organomegaly  Extremities:  No deformities, edema, or skin discoloration  Joints: No acute arthritis  Peripheral Pulses:perfused  Neurologic: intact grossly  No clubbingII (soft palate, uvula, fauces visible)    II (soft palate, uvula, fauces visible)    Imaging: Previously reviewed, follow-up ordered    PFTS: Ordered and pending      Assessment and Plan  1. Acute on chronic respiratory failure with hypoxia (HCC)  Continue oxygen 24/7  - DX-CHEST-2 VIEWS; Future  - PULMONARY FUNCTION TESTS -Test requested: Complete Pulmonary Function Test; Future  - Exercise Test for Bronchospasm / 6-Minute Walk; Future    2. COVID-19  Slow resolution of symptoms  - DX-CHEST-2 VIEWS; Future  - PULMONARY FUNCTION TESTS -Test requested: Complete Pulmonary Function Test; Future  - Exercise Test for Bronchospasm / 6-Minute Walk; Future    3. Stage 4 very severe COPD by GOLD classification ([facility])  Underlying noted    4. Supplemental oxygen dependent  24/7    5. Obesity (BMI 30-39.9)  Reduced expiratory reserve volume    6. Asthma with COPD (HCC)  Continue inhalers      Followup Return in about 3 months (around 5/18/2021) for follow up visit with Dr. Bhavana Damon.

## 2021-02-22 DIAGNOSIS — J44.9 CHRONIC OBSTRUCTIVE PULMONARY DISEASE, UNSPECIFIED COPD TYPE (HCC): ICD-10-CM

## 2021-02-22 RX ORDER — AZITHROMYCIN 250 MG/1
TABLET, FILM COATED ORAL
Qty: 30 TABLET | Refills: 3 | Status: SHIPPED | OUTPATIENT
Start: 2021-02-22 | End: 2021-09-15

## 2021-02-22 NOTE — TELEPHONE ENCOUNTER
Have we ever prescribed this med? Yes.  If yes, what date?     Last OV: 02/18/2021 - Dr. Damon    Next OV: 5/20/2021    DX: COPD    Medications: Azithromycin

## 2021-03-22 DIAGNOSIS — I10 ESSENTIAL HYPERTENSION: ICD-10-CM

## 2021-03-22 RX ORDER — LISINOPRIL 20 MG/1
20 TABLET ORAL DAILY
Qty: 100 TABLET | Refills: 3 | Status: SHIPPED
Start: 2021-03-22 | End: 2021-08-10 | Stop reason: SINTOL

## 2021-03-23 ENCOUNTER — HOSPITAL ENCOUNTER (OUTPATIENT)
Dept: RADIOLOGY | Facility: MEDICAL CENTER | Age: 76
End: 2021-03-23
Attending: INTERNAL MEDICINE
Payer: MEDICARE

## 2021-03-23 ENCOUNTER — HOSPITAL ENCOUNTER (OUTPATIENT)
Dept: CARDIOLOGY | Facility: MEDICAL CENTER | Age: 76
End: 2021-03-23
Attending: INTERNAL MEDICINE
Payer: MEDICARE

## 2021-03-23 DIAGNOSIS — R06.09 DOE (DYSPNEA ON EXERTION): ICD-10-CM

## 2021-03-23 DIAGNOSIS — I20.0 UNSTABLE ANGINA PECTORIS (HCC): ICD-10-CM

## 2021-03-23 PROCEDURE — 93306 TTE W/DOPPLER COMPLETE: CPT

## 2021-03-24 LAB
LV EJECT FRACT  99904: 65
LV EJECT FRACT MOD 2C 99903: 65.72
LV EJECT FRACT MOD 4C 99902: 67.42
LV EJECT FRACT MOD BP 99901: 65.58

## 2021-03-24 PROCEDURE — 93306 TTE W/DOPPLER COMPLETE: CPT | Mod: 26 | Performed by: INTERNAL MEDICINE

## 2021-03-26 ENCOUNTER — TELEPHONE (OUTPATIENT)
Dept: CARDIOLOGY | Facility: MEDICAL CENTER | Age: 76
End: 2021-03-26

## 2021-03-26 NOTE — TELEPHONE ENCOUNTER
Please notify the patient that his echocardiogram shows very good heart function and no other abnormalities  MPI pending

## 2021-03-29 NOTE — TELEPHONE ENCOUNTER
Called and relayed all results and recommendations and confirmed stress test date as 03/30.  No further questions or concerns and appreciative of call.

## 2021-03-30 ENCOUNTER — HOSPITAL ENCOUNTER (OUTPATIENT)
Dept: RADIOLOGY | Facility: MEDICAL CENTER | Age: 76
End: 2021-03-30
Attending: INTERNAL MEDICINE
Payer: MEDICARE

## 2021-03-30 PROCEDURE — 700111 HCHG RX REV CODE 636 W/ 250 OVERRIDE (IP)

## 2021-03-30 PROCEDURE — 78452 HT MUSCLE IMAGE SPECT MULT: CPT | Mod: 26 | Performed by: INTERNAL MEDICINE

## 2021-03-30 PROCEDURE — A9502 TC99M TETROFOSMIN: HCPCS

## 2021-03-30 PROCEDURE — 93018 CV STRESS TEST I&R ONLY: CPT | Performed by: INTERNAL MEDICINE

## 2021-03-30 RX ORDER — REGADENOSON 0.08 MG/ML
INJECTION, SOLUTION INTRAVENOUS
Status: COMPLETED
Start: 2021-03-30 | End: 2021-03-30

## 2021-03-30 RX ORDER — REGADENOSON 0.08 MG/ML
0.4 INJECTION, SOLUTION INTRAVENOUS ONCE
Status: COMPLETED | OUTPATIENT
Start: 2021-03-30 | End: 2021-03-30

## 2021-03-30 RX ADMIN — REGADENOSON 0.4 MG: 0.08 INJECTION, SOLUTION INTRAVENOUS at 16:20

## 2021-04-02 ENCOUNTER — PATIENT MESSAGE (OUTPATIENT)
Dept: CARDIOLOGY | Facility: MEDICAL CENTER | Age: 76
End: 2021-04-02

## 2021-04-02 ENCOUNTER — TELEPHONE (OUTPATIENT)
Dept: CARDIOLOGY | Facility: MEDICAL CENTER | Age: 76
End: 2021-04-02

## 2021-04-02 NOTE — TELEPHONE ENCOUNTER
These notify the patient that his nuclear stress test is normal  No additional cardiac recommendations at this time do not feel that his shortness of breath is cardiac related

## 2021-04-26 ENCOUNTER — PATIENT MESSAGE (OUTPATIENT)
Dept: HEALTH INFORMATION MANAGEMENT | Facility: OTHER | Age: 76
End: 2021-04-26

## 2021-05-12 ENCOUNTER — HOSPITAL ENCOUNTER (OUTPATIENT)
Dept: LAB | Facility: MEDICAL CENTER | Age: 76
End: 2021-05-12
Attending: NURSE PRACTITIONER
Payer: MEDICARE

## 2021-05-12 LAB
CRP SERPL HS-MCNC: 0.47 MG/DL (ref 0–0.75)
RHEUMATOID FACT SER IA-ACNC: <10 IU/ML (ref 0–14)
URATE SERPL-MCNC: 5.9 MG/DL (ref 1.9–8.2)

## 2021-05-12 PROCEDURE — 85652 RBC SED RATE AUTOMATED: CPT

## 2021-05-12 PROCEDURE — 84550 ASSAY OF BLOOD/URIC ACID: CPT

## 2021-05-12 PROCEDURE — 36415 COLL VENOUS BLD VENIPUNCTURE: CPT

## 2021-05-12 PROCEDURE — 86140 C-REACTIVE PROTEIN: CPT

## 2021-05-12 PROCEDURE — 86431 RHEUMATOID FACTOR QUANT: CPT

## 2021-05-12 PROCEDURE — 86200 CCP ANTIBODY: CPT

## 2021-05-12 PROCEDURE — 86038 ANTINUCLEAR ANTIBODIES: CPT

## 2021-05-13 LAB — ERYTHROCYTE [SEDIMENTATION RATE] IN BLOOD BY WESTERGREN METHOD: 9 MM/HOUR (ref 0–25)

## 2021-05-14 LAB
CCP IGG SERPL-ACNC: 4 UNITS (ref 0–19)
NUCLEAR IGG SER QL IA: NORMAL

## 2021-06-04 ENCOUNTER — PATIENT OUTREACH (OUTPATIENT)
Dept: HEALTH INFORMATION MANAGEMENT | Facility: OTHER | Age: 76
End: 2021-06-04

## 2021-06-04 NOTE — PROGRESS NOTES
Outreach-Comprehensive Health Assessment. Scheduled In-Home Assessment. Scheduled on 6/28/21 1-3 pm.  Scheduled with Riaz at [facility]. Verified HIPAA, no questions or concerns. Member did receive post card and is fully Covid vaccinated.    Attempt #2

## 2021-07-08 ENCOUNTER — OFFICE VISIT (OUTPATIENT)
Dept: SLEEP MEDICINE | Facility: MEDICAL CENTER | Age: 76
End: 2021-07-08
Payer: MEDICARE

## 2021-07-08 ENCOUNTER — NON-PROVIDER VISIT (OUTPATIENT)
Dept: SLEEP MEDICINE | Facility: MEDICAL CENTER | Age: 76
End: 2021-07-08
Attending: INTERNAL MEDICINE
Payer: MEDICARE

## 2021-07-08 VITALS — BODY MASS INDEX: 31.41 KG/M2 | WEIGHT: 184 LBS | HEIGHT: 64 IN

## 2021-07-08 VITALS — BODY MASS INDEX: 31.58 KG/M2 | WEIGHT: 184 LBS

## 2021-07-08 VITALS
OXYGEN SATURATION: 92 % | BODY MASS INDEX: 31.41 KG/M2 | DIASTOLIC BLOOD PRESSURE: 70 MMHG | WEIGHT: 184 LBS | HEART RATE: 76 BPM | HEIGHT: 64 IN | SYSTOLIC BLOOD PRESSURE: 140 MMHG

## 2021-07-08 DIAGNOSIS — U07.1 COVID-19: ICD-10-CM

## 2021-07-08 DIAGNOSIS — J96.21 ACUTE ON CHRONIC RESPIRATORY FAILURE WITH HYPOXIA (HCC): ICD-10-CM

## 2021-07-08 DIAGNOSIS — J96.11 CHRONIC RESPIRATORY FAILURE WITH HYPOXIA (HCC): ICD-10-CM

## 2021-07-08 DIAGNOSIS — J44.9 CHRONIC OBSTRUCTIVE PULMONARY DISEASE, UNSPECIFIED COPD TYPE (HCC): ICD-10-CM

## 2021-07-08 DIAGNOSIS — T78.40XS ALLERGY, SEQUELA: ICD-10-CM

## 2021-07-08 DIAGNOSIS — R05.9 COUGH: ICD-10-CM

## 2021-07-08 DIAGNOSIS — E66.9 CLASS 1 OBESITY WITH BODY MASS INDEX (BMI) OF 31.0 TO 31.9 IN ADULT, UNSPECIFIED OBESITY TYPE, UNSPECIFIED WHETHER SERIOUS COMORBIDITY PRESENT: ICD-10-CM

## 2021-07-08 PROCEDURE — 94618 PULMONARY STRESS TESTING: CPT | Performed by: INTERNAL MEDICINE

## 2021-07-08 PROCEDURE — 94729 DIFFUSING CAPACITY: CPT | Performed by: INTERNAL MEDICINE

## 2021-07-08 PROCEDURE — 94726 PLETHYSMOGRAPHY LUNG VOLUMES: CPT | Performed by: INTERNAL MEDICINE

## 2021-07-08 PROCEDURE — 99214 OFFICE O/P EST MOD 30 MIN: CPT | Mod: 25 | Performed by: INTERNAL MEDICINE

## 2021-07-08 PROCEDURE — 94010 BREATHING CAPACITY TEST: CPT | Performed by: INTERNAL MEDICINE

## 2021-07-08 RX ORDER — MELOXICAM 7.5 MG/1
TABLET ORAL
COMMUNITY
Start: 2021-06-22 | End: 2021-10-01

## 2021-07-08 RX ORDER — TRIAMCINOLONE ACETONIDE 40 MG/ML
40 INJECTION, SUSPENSION INTRA-ARTICULAR; INTRAMUSCULAR ONCE
Qty: 1 ML | Refills: 0 | Status: SHIPPED
Start: 2021-07-08 | End: 2021-07-08

## 2021-07-08 RX ORDER — PREDNISONE 10 MG/1
TABLET ORAL
Qty: 18 TABLET | Refills: 1 | Status: SHIPPED
Start: 2021-07-08 | End: 2022-02-24

## 2021-07-08 RX ORDER — AZELASTINE 1 MG/ML
1-2 SPRAY, METERED NASAL 2 TIMES DAILY
Qty: 30 ML | Refills: 6 | Status: SHIPPED | OUTPATIENT
Start: 2021-07-08 | End: 2024-02-05

## 2021-07-08 RX ORDER — AZITHROMYCIN 250 MG/1
TABLET, FILM COATED ORAL
Qty: 6 TABLET | Refills: 0 | Status: SHIPPED | OUTPATIENT
Start: 2021-07-08 | End: 2021-07-21 | Stop reason: SDUPTHER

## 2021-07-08 ASSESSMENT — 6 MINUTE WALK TEST (6MWT)
PERCEIVED BREATHLESSNESS AT 6 MIN: 3
PERCEIVED FATIGUE AT 1 MIN: 3
COMMENTS: ON 3 LPM
HEART RATE AT 1 MIN: 90
SITTING BLOOD PRESSURE: 130/60
PERCEIVED BREATHLESSNESS AT 2 MIN: 1
HEART RATE AT 5 MIN: 93
HEART RATE AT 2 MIN: 77
PERCEIVED BREATHLESSNESS AT 3 MIN: 2
SAO2 AT 3 MIN: 87
O2 FLOW RATE - LITERS PER MIN: 3
PERCENT OF NORMAL WALKED: 38
SAO2 AT 2 MIN: 83
COMMENTS: ON 3 LPM
SAO2 AT 5 MIN: 90
PERCEIVED FATIGUE AT 5 MIN: 5
HEART RATE AT 4 MIN: 90
PERCEIVED FATIGUE AT 2 MIN: 4
PERCEIVED BREATHLESSNESS AT 5 MIN: 2
HEART RATE AT 6 MIN: 95
PERCEIVED FATIGUE AT 2 MIN: 2
SAO2 AT 1 MIN: 90
PERCEIVED BREATHLESSNESS AT 4 MIN: 2
SAO2 AT 1 MIN: 87
COMMENTS: ON 4LPM
O2 SAT PERCENT ROOM AIR: 81
HEART RATE AT 3 MIN: 90
SAO2 AT 2 MIN: 93
HEART RATE: 72
PERCEIVED FATIGUE AT 1 MIN: 3
NUMBER OF RESTS: 0
O2 SAT PERCENT ON O2: 93
HEART RATE AT 1 MIN: 85
BLOOD PRESSURE: LEFT ARM
PERCEIVED FATIGUE AT 6 MIN: 5
PERCEIVED FATIGUE AT 3 MIN: 5
PERCEIVED BREATHLESSNESS AT 2 MIN: 2
O2 SATURATION: CONTINUOUS
PERCEIVED FATIGUE AT 4 MIN: 5
SAO2 AT 6 MIN: 87
PERCEIVED BREATHLESSNESS AT 1 MIN: 2
COMMENTS: ON 6 LPM
PERCEIVED BREATHLESSNESS AT 1 MIN: 1
COMMENTS: ON 6 LPM
HEART RATE AT 2 MIN: 89
SAO2 AT 4 MIN: 93
BLOOD PRESSURE AT 1 MIN: 140/60

## 2021-07-08 ASSESSMENT — ENCOUNTER SYMPTOMS
FALLS: 0
DIZZINESS: 0
SINUS PAIN: 0
STRIDOR: 0
WHEEZING: 0
PHOTOPHOBIA: 0
SORE THROAT: 0
HEMOPTYSIS: 0
DIARRHEA: 0
FEVER: 0
COUGH: 1
WEAKNESS: 0
WEIGHT LOSS: 0
PND: 0
NECK PAIN: 0
FOCAL WEAKNESS: 0
NAUSEA: 0
CHILLS: 0
SHORTNESS OF BREATH: 0
TREMORS: 0
PALPITATIONS: 0
HEADACHES: 0
SPUTUM PRODUCTION: 0
VOMITING: 0
HEARTBURN: 0
EYE DISCHARGE: 0
SPEECH CHANGE: 0
EYE PAIN: 0
BLURRED VISION: 0
MYALGIAS: 0
EYE REDNESS: 0
DIAPHORESIS: 0
ABDOMINAL PAIN: 0
CLAUDICATION: 0
DOUBLE VISION: 0
ORTHOPNEA: 0
CONSTIPATION: 0
DEPRESSION: 0
BACK PAIN: 0

## 2021-07-08 ASSESSMENT — PULMONARY FUNCTION TESTS
FVC_PERCENT_PREDICTED: 73
FVC_LLN: 2.25
FEV1/FVC: 45
FVC_PREDICTED: 2.69
FVC: 1.97
FEV1_PERCENT_PREDICTED: 43
FEV1/FVC_PERCENT_PREDICTED: 58
FEV1_LLN: 1.73
FEV1/FVC_PERCENT_PREDICTED: 59
FEV1/FVC_PREDICTED: 78
FEV1/FVC_PERCENT_PREDICTED: 77
FEV1/FVC_PERCENT_LLN: 65
FEV1: .89
FEV1_PREDICTED: 2.07
FEV1/FVC: 45

## 2021-07-08 ASSESSMENT — FIBROSIS 4 INDEX
FIB4 SCORE: 0.57

## 2021-07-08 NOTE — PROGRESS NOTES
Chief Complaint   Patient presents with   • Follow-Up     last seen 2/18/21 Dr. Damon    • Results     CXR 2/19/21, echo 3/23/21, stress test 3/23/21, PFT , 6MW 7/8/21   • Cough     x3-6 months         HPI: This patient is a 75 y.o. female whom is followed in our clinic for COPD complicated by chronic hypoxic respiratory failure last seen by Dr. Damon on 2/18/2021.  Patient is a former tobacco smoker with roughly 60-pack-year history but quit in 1999.  She has severe airflow obstruction based on PFTs updated today which demonstrated FEV1 of 0.89 L or 43% predicted and a ratio 45.  Total lung capacity is within normal limits at 5 L or 97% predicted however she does have mild air trapping and severely reduced DLCO at 37% predicted.  FEV1 is grossly unchanged when compared to spirometry from 2019.  Her current COPD regimen includes Advair 500, Spiriva and short acting bronchodilators.  She does use montelukast for environmental allergies.  She is on supplemental oxygen which she uses at 6 L/min at home.  When out she uses 3 L/min although based on 6-minute walk test done today the patient requires up to 6 L/min with activity.  She was diagnosed with coronavirus in November of last year and did require hospitalization.  She was treated with dexamethasone but does not appear she received remdesivir.  She did have an increase in her oxygen requirement but overall this has improved.  A follow-up chest x-ray from February after her visit with Dr. Damon showed persistent bibasilar patchy interstitial infiltrates which had improved.  She is 92% on 3 L in clinic today.  She recently had an echocardiogram on March 23 that showed normal LV and RV function with normal RV filling pressures.  Stress test showed no reversible ischemia.  Patient's main complaint today is itching and cough.  She reports cough for the last 6 months that is productive.  It is worse throughout the day and she does feel that it is associated with  worsening postnasal drip due to allergies.  She tells me that she has used Flonase in the past and this does not work well for her.  She has never tried nasal antihistamine.  She does not take an over-the-counter antihistamine stating these do not work well.  She is requesting a Kenalog injection.  No fevers, chills, night sweats, weight loss.  No chest pain.  No edema.    Past Medical History:   Diagnosis Date   • Arthritis     spine   • Cataract immature    • Chronic pain    • Colon polyps    • COPD (chronic obstructive pulmonary disease) ([facility]) 2002    moderate to severe   • DDD (degenerative disc disease), cervical    • DDD (degenerative disc disease), thoracic    • Diverticulitis of colon    • Dyslipidemia    • EMPHYSEMA    • Hypertension    • REGINE (obstructive sleep apnea)    • OSTEOPOROSIS    • Spinal stenosis, lumbar region, with neurogenic claudication    • URINARY INCONTINENCE    • Vitamin d deficiency        Social History     Socioeconomic History   • Marital status:      Spouse name: Not on file   • Number of children: Not on file   • Years of education: Not on file   • Highest education level: Not on file   Occupational History   • Not on file   Tobacco Use   • Smoking status: Former Smoker     Packs/day: 2.00     Years: 30.00     Pack years: 60.00     Types: Cigarettes     Quit date: 3/1/1999     Years since quittin.3   • Smokeless tobacco: Never Used   • Tobacco comment: 3 pks a day for 35 yrs, continued abstinence   Vaping Use   • Vaping Use: Never used   Substance and Sexual Activity   • Alcohol use: Not Currently     Alcohol/week: 4.2 oz     Types: 7 Glasses of wine per week   • Drug use: Not Currently     Types: Marijuana, Oral     Comment: Medical Marijuana    • Sexual activity: Never   Other Topics Concern   • Not on file   Social History Narrative   • Not on file     Social Determinants of Health     Financial Resource Strain:    • Difficulty of Paying Living Expenses:    Food  Insecurity:    • Worried About Running Out of Food in the Last Year:    • Ran Out of Food in the Last Year:    Transportation Needs:    • Lack of Transportation (Medical):    • Lack of Transportation (Non-Medical):    Physical Activity:    • Days of Exercise per Week:    • Minutes of Exercise per Session:    Stress:    • Feeling of Stress :    Social Connections:    • Frequency of Communication with Friends and Family:    • Frequency of Social Gatherings with Friends and Family:    • Attends Anabaptist Services:    • Active Member of Clubs or Organizations:    • Attends Club or Organization Meetings:    • Marital Status:    Intimate Partner Violence:    • Fear of Current or Ex-Partner:    • Emotionally Abused:    • Physically Abused:    • Sexually Abused:        Family History   Problem Relation Age of Onset   • Other Sister         lung and leukemia cancer       Current Outpatient Medications on File Prior to Visit   Medication Sig Dispense Refill   • meloxicam (MOBIC) 7.5 MG Tab      • lisinopril (PRINIVIL) 20 MG Tab Take 1 tablet by mouth every day. 100 tablet 3   • azithromycin (ZITHROMAX) 250 MG Tab TAKE ONE TABLET BY MOUTH ONCE A DAY  30 tablet 3   • tizanidine (ZANAFLEX) 4 MG Tab Take 4 mg by mouth every 8 hours as needed. not to exceed 3 doses in 24 hours  Indications: Lower Backache, Muscle Spasticity     • DULoxetine (CYMBALTA) 30 MG Cap DR Particles Take 30 mg by mouth every day. Indications: Generalized Anxiety Disorder, Major Depressive Disorder, Musculoskeletal Pain, Disease of the Peripheral Nerves     • diphenhydrAMINE (BENADRYL) 25 MG capsule Take 25 mg by mouth every four hours as needed. Indications: Itching, seasonal allergy     • bisacodyl (DULCOLAX) 5 MG EC tablet Take 5 mg by mouth 1 time daily as needed. 1 to 3 tabs in a single daily dose  Indications: Constipation     • Simethicone 125 MG Cap Take 1-2 Caps by mouth 2 times a day as needed. Dont exceed 4 caps in 24 hours   Indications: Gas      • guaiFENesin ER (MUCINEX) 600 MG TABLET SR 12 HR Take 600-1,200 mg by mouth 2 times a day as needed. Indications: Cough     • Calcium Carbonate Antacid 1000 MG Chew Tab Chew 2-3 Tabs 3 times a day as needed. Do not exceed 7 tabs in 24 hours  Indications: Heartburn     • Albuterol Sulfate 108 (90 Base) MCG/ACT AEROSOL POWDER, BREATH ACTIVATED Inhale 2 Puffs 4 times a day as needed. Indications: SOB     • hydrocortisone 1 % Cream Apply 1 Each topically 3 times a day as needed. Indications: Inflammation, Itching     • digoxin (LANOXIN) 125 MCG Tab Take 1 Tab by mouth every day. 90 Tab 3   • acetaminophen (TYLENOL) 650 MG CR tablet Take 650 mg by mouth every 6 hours as needed.     • ondansetron (ZOFRAN) 4 MG Tab tablet Take 4 mg by mouth every four hours as needed. Indications: Nausea and Vomiting     • sodium chloride (OCEAN) 0.65 % Solution Administer 2 Sprays into affected nostril(S) every 2 hours as needed.  3   • acyclovir (ZOVIRAX) 200 MG Cap Take 200 mg by mouth every day.     • Home Care Oxygen Inhale 6 L/min Continuous. Oxygen dose range: 6 to 6 L/min  Respiratory route via: Nasal Cannula   Oxygen supplier: Preferred         • fluticasone-salmeterol (ADVAIR DISKUS) 500-50 MCG/DOSE AEROSOL POWDER, BREATH ACTIVATED Inhale 1 Puff by mouth 2 times a day. 3 Each 3   • tiotropium (SPIRIVA HANDIHALER) 18 MCG Cap Inhale 1 Cap by mouth every day. 90 Cap 3   • triamcinolone acetonide (KENALOG) 0.1 % Ointment Apply 1 Application to affected area(s) 2 times a day. 1 Tube 0   • VENTOLIN  (90 Base) MCG/ACT Aero Soln inhalation aerosol Inhale 2 Puffs by mouth every 6 hours as needed for Shortness of Breath. 1 Inhaler 2   • Misc. Devices Misc This is an order for  7 foot high flow oxygen tubing  Dx; asthma with COPD J 44.9, supplemental oxygen-dependent Z 99.81, chronic respiratory failure with hypoxia and J 96        ODETTE 99 months   NPI [id number] 1 Each 0   • spironolactone (ALDACTONE) 25 MG Tab TAKE ONE TABLET BY  MOUTH ONE TIME DAILY 30 Tab 11   • montelukast (SINGULAIR) 10 MG Tab Take 1 Tab by mouth every day. 90 Tab 3   • Cholecalciferol (VITAMIN D3) 2000 UNIT Cap TAKE ONE CAPSULE BY MOUTH ONE TIME DAILY 30 Cap 3   • potassium chloride (MICRO-K) 10 MEQ capsule TAKE ONE CAPSULE BY MOUTH TWICE DAILY 60 Cap 6   • omeprazole (PRILOSEC OTC) 20 MG tablet Take 1 Tab by mouth every day. 30 Tab 11   • ipratropium-albuterol (DUONEB) 0.5-2.5 (3) MG/3ML nebulizer solution USE ONE VIAL IN NEBULIZER EVERY 4 HOURS AS NEEDED FOR SHORTNESS OF BREATH OR  WHEEZING 360 Vial 6   • dexamethasone (DECADRON) 2 MG tablet      • dexamethasone (DECADRON) 6 MG Tab Take 1 Tab by mouth every day. 3 Tab 0     No current facility-administered medications on file prior to visit.       Iodine and Tape      ROS:   Review of Systems   Constitutional: Negative for chills, diaphoresis, fever, malaise/fatigue and weight loss.   HENT: Positive for congestion. Negative for ear discharge, ear pain, hearing loss, nosebleeds, sinus pain, sore throat and tinnitus.    Eyes: Negative for blurred vision, double vision, photophobia, pain, discharge and redness.   Respiratory: Positive for cough. Negative for hemoptysis, sputum production, shortness of breath, wheezing and stridor.    Cardiovascular: Negative for chest pain, palpitations, orthopnea, claudication, leg swelling and PND.   Gastrointestinal: Negative for abdominal pain, constipation, diarrhea, heartburn, nausea and vomiting.   Genitourinary: Negative for dysuria and urgency.   Musculoskeletal: Negative for back pain, falls, joint pain, myalgias and neck pain.   Skin: Positive for itching. Negative for rash.   Neurological: Negative for dizziness, tremors, speech change, focal weakness, weakness and headaches.   Endo/Heme/Allergies: Negative for environmental allergies.   Psychiatric/Behavioral: Negative for depression.       /70 (BP Location: Right arm, Patient Position: Sitting, BP Cuff Size: Adult)    "Pulse 76   Ht 1.626 m (5' 4\")   Wt 83.5 kg (184 lb)   SpO2 92%   Physical Exam  Vitals reviewed.   Constitutional:       General: She is not in acute distress.     Appearance: Normal appearance. She is well-developed. She is obese.   HENT:      Head: Normocephalic and atraumatic.      Right Ear: External ear normal.      Left Ear: External ear normal.      Nose: Nose normal. No congestion.      Mouth/Throat:      Mouth: Mucous membranes are moist.      Pharynx: Oropharynx is clear. No oropharyngeal exudate.   Eyes:      General: No scleral icterus.     Extraocular Movements: Extraocular movements intact.      Conjunctiva/sclera: Conjunctivae normal.      Pupils: Pupils are equal, round, and reactive to light.   Neck:      Vascular: No JVD.      Trachea: No tracheal deviation.   Cardiovascular:      Rate and Rhythm: Normal rate and regular rhythm.      Heart sounds: Normal heart sounds. No murmur heard.   No friction rub. No gallop.    Pulmonary:      Effort: Pulmonary effort is normal. No accessory muscle usage or respiratory distress.      Breath sounds: Normal breath sounds. No wheezing or rales.   Abdominal:      General: There is no distension.      Palpations: Abdomen is soft.      Tenderness: There is no abdominal tenderness.   Musculoskeletal:         General: No tenderness or deformity. Normal range of motion.      Cervical back: Normal range of motion and neck supple.      Right lower leg: No edema.      Left lower leg: No edema.   Lymphadenopathy:      Cervical: No cervical adenopathy.   Skin:     General: Skin is warm and dry.      Findings: No rash.      Nails: There is no clubbing.   Neurological:      Mental Status: She is alert and oriented to person, place, and time.      Cranial Nerves: No cranial nerve deficit.      Gait: Gait normal.   Psychiatric:         Mood and Affect: Mood normal.         Behavior: Behavior normal.         PFTs as reviewed by me personally: as per hPI    Imaging as " reviewed by me personally:  As per hPI    Assessment:  1. Cough     2. Chronic obstructive pulmonary disease, unspecified COPD type (HCC)     3. Chronic respiratory failure with hypoxia (HCC)     4. Class 1 obesity with body mass index (BMI) of 31.0 to 31.9 in adult, unspecified obesity type, unspecified whether serious comorbidity present     5. COVID-19     6. Allergy, sequela         Plan:  1.  This is now chronic and could be consistent with allergies although in high risk patient if she does not improve with therapy is recommended for this, I would recommend CT chest.  We do not have Kenalog in clinic and I do not feel this is the best way to manage her symptoms but I did give her a steroid taper as well as recommended starting nasal antihistamine with Astelin.  If cough does not improve she will contact me and I will order CT chest.  2.  This is chronic, severe and appears stable based on stable pulmonary function testing.  Oxygen needs are quite high but also stable.  We will continue Advair 500, Spiriva and short acting bronchodilators.  Continue montelukast.  Patient is tobacco free.  She is up-to-date on vaccines.  3.  Chronic and secondary to COPD.  Appears that oxygen needs are stable.  I did not discuss pulmonary rehab at this clinic visit but we will can discuss at follow-up.  4.  This does put patient increased risk for obesity related pulmonary complications.  Encourage healthy lifestyle habits.  5.  Patient had severe enough infection to require hospitalization and worsening of chronic respiratory failure.  She is back to baseline oxygen needs.  No crackles on exam and PFTs are stable.  See discussion above and that if cough persists despite treatment for allergies and steroid taper, I will order CT chest.  6.  Clearly has atopy.  I recommended a referral to allergy which she declined.  We did not have any Kenalog to give her in clinic today and as per above I do not feel this the best way to manage  her symptoms.  I did give her a steroid taper for her cough.  For itching I recommend she follow-up with PCP.  Return in about 6 months (around 1/8/2022) for copd.

## 2021-07-08 NOTE — PROCEDURES
Technician: KIMBERLY Bledsoe    Technician Comment:  Good patient effort & cooperation.  The results of this test meet the ATS/ERS standards for acceptability & reproducibility.  Test was performed on the [employer] Plethysmograph-Elite DX system.  Predicted values were GLI-2012 for spirometry, GLI-2017 for DLCO, ITS for Lung Volumes.  The DLCO was uncorrected for Hgb.      Interpretation:  Baseline spirometry shows airflow obstruction with FEV1/FVC ratio 45 and an FEV1 of 0.89 L or 43% predicted.  Bronchodilator testing was not performed.  Total lung capacity within normal limits at 95% predicted.  There is mild air trapping.  Diffusion capacity severely reduced at 37% predicted.  Pulmonary function testing shows severe airflow obstruction with mild air trapping and reduced DLCO consistent with advanced COPD.

## 2021-07-08 NOTE — PROCEDURES
Pt was at SaO2 81% on RA so test was done on O2 starting at 3 LPM continuous with an Oxymizer cannula.  O2 was increased to 6LPM by minute 6. Pt walked 480'.    Interpretation;  There is significant desaturation with ambulation.  Patient requires up to 6 L/min to maintain SPO2 at 87% or above.  Distance walked was 480 feet or 38% predicted.  I would recommend a minimum of 6 L/min with activity.  Exercise tolerance is decreased.

## 2021-07-15 PROBLEM — I73.9 PERIPHERAL VASCULAR DISEASE (HCC): Status: ACTIVE | Noted: 2021-07-15

## 2021-07-15 PROBLEM — F11.11 H/O OPIOID ABUSE (HCC): Status: ACTIVE | Noted: 2021-07-15

## 2021-07-15 PROBLEM — F32.0 MILD MAJOR DEPRESSION (HCC): Status: ACTIVE | Noted: 2021-07-15

## 2021-07-21 ENCOUNTER — PATIENT MESSAGE (OUTPATIENT)
Dept: SLEEP MEDICINE | Facility: MEDICAL CENTER | Age: 76
End: 2021-07-21

## 2021-07-21 DIAGNOSIS — J44.9 CHRONIC OBSTRUCTIVE PULMONARY DISEASE, UNSPECIFIED COPD TYPE (HCC): ICD-10-CM

## 2021-07-21 RX ORDER — AZITHROMYCIN 250 MG/1
TABLET, FILM COATED ORAL
Qty: 30 TABLET | Refills: 6 | Status: SHIPPED | OUTPATIENT
Start: 2021-07-21 | End: 2022-02-24 | Stop reason: SDUPTHER

## 2021-07-21 RX ORDER — FLUTICASONE PROPIONATE 50 MCG
1-2 SPRAY, SUSPENSION (ML) NASAL DAILY
Qty: 15.8 ML | Refills: 3 | Status: SHIPPED | OUTPATIENT
Start: 2021-07-21 | End: 2023-02-07

## 2021-07-26 ENCOUNTER — PATIENT MESSAGE (OUTPATIENT)
Dept: SLEEP MEDICINE | Facility: MEDICAL CENTER | Age: 76
End: 2021-07-26

## 2021-08-03 ENCOUNTER — PATIENT MESSAGE (OUTPATIENT)
Dept: SLEEP MEDICINE | Facility: MEDICAL CENTER | Age: 76
End: 2021-08-03

## 2021-08-03 DIAGNOSIS — R05.9 COUGH: ICD-10-CM

## 2021-08-03 RX ORDER — BENZONATATE 100 MG/1
100 CAPSULE ORAL 3 TIMES DAILY PRN
Qty: 60 CAPSULE | Refills: 1 | Status: SHIPPED | OUTPATIENT
Start: 2021-08-03 | End: 2021-09-27 | Stop reason: SDUPTHER

## 2021-08-05 ENCOUNTER — TELEPHONE (OUTPATIENT)
Dept: CARDIOLOGY | Facility: MEDICAL CENTER | Age: 76
End: 2021-08-05

## 2021-08-05 DIAGNOSIS — I10 ESSENTIAL HYPERTENSION: ICD-10-CM

## 2021-08-05 NOTE — TELEPHONE ENCOUNTER
----- Message from Anisha Ray sent at 8/5/2021 12:45 PM PDT -----  Regarding: Pt Question  I was rescheduling this PT and she wanted to give the doctor a message and I told her, well I can pass it along to your nurse if that is okay. She has been taking lisinopril and got a cough that would never go away and her primary doctor said it might be from that, but the PT doesn't know if she believes that. Either way she is interested in a substitute.   Thank you!

## 2021-08-09 ENCOUNTER — HOSPITAL ENCOUNTER (OUTPATIENT)
Dept: RADIOLOGY | Facility: MEDICAL CENTER | Age: 76
End: 2021-08-09
Attending: NURSE PRACTITIONER
Payer: MEDICARE

## 2021-08-09 DIAGNOSIS — R05.9 COUGH: ICD-10-CM

## 2021-08-09 PROCEDURE — 71250 CT THORAX DX C-: CPT | Mod: MG

## 2021-08-10 RX ORDER — LOSARTAN POTASSIUM 50 MG/1
50 TABLET ORAL DAILY
Qty: 90 TABLET | Refills: 3 | Status: ON HOLD | OUTPATIENT
Start: 2021-08-10 | End: 2023-10-04

## 2021-08-10 NOTE — TELEPHONE ENCOUNTER
Called and LM on patient mobile number.   Called home number and spoke to the patient, relayed all recommendations from SW and she agreed.  Confirmed pharmacy with the patient. Advised to monitor her BP/HR and cough and let us know in 2 weeks if BP is increasing or cough is not resolved. Answered all questions and concerns, appreciative of call.     RX ordered.

## 2021-08-10 NOTE — TELEPHONE ENCOUNTER
Lisinopril can cause a persistent cough  She has been on lisinopril long term by her previous cardiologist and the dose was increased to 20 mg on 8/30/2019 Zeinab DAVIS  Recommend switching to losartan 50 mg daily and stop lisinopril  Continue to monitor BP to see if this is enough   If her cough is from lisinopril it should resolve within a week or two

## 2021-08-12 ENCOUNTER — TELEPHONE (OUTPATIENT)
Dept: SLEEP MEDICINE | Facility: MEDICAL CENTER | Age: 76
End: 2021-08-12

## 2021-08-12 DIAGNOSIS — R91.1 LUNG NODULE: ICD-10-CM

## 2021-08-12 NOTE — TELEPHONE ENCOUNTER
Caller: Berny    Phone number: [phone]    Message: Pt called and lm.  He was told to call us.       08/12/21:  Called and spoke with pt. Pt said she was returning Dr Meza's call re: CT scan.  She said she will be home until 10:30am.  Okay to leave a detail message on VM per pt.

## 2021-08-13 ENCOUNTER — PATIENT MESSAGE (OUTPATIENT)
Dept: SLEEP MEDICINE | Facility: MEDICAL CENTER | Age: 76
End: 2021-08-13

## 2021-08-13 NOTE — TELEPHONE ENCOUNTER
Reviewed PET scan and patient with nodular scarring versus oblong nodule in the left upper lobe.  Due to risk for lung cancer I would like to follow-up with PET.  I have left a detailed voice message with patient and have ordered PET scan.

## 2021-08-19 ENCOUNTER — HOSPITAL ENCOUNTER (OUTPATIENT)
Dept: RADIOLOGY | Facility: MEDICAL CENTER | Age: 76
End: 2021-08-19
Attending: INTERNAL MEDICINE
Payer: MEDICARE

## 2021-08-19 ENCOUNTER — HOSPITAL ENCOUNTER (OUTPATIENT)
Dept: LAB | Facility: MEDICAL CENTER | Age: 76
End: 2021-08-19
Attending: FAMILY MEDICINE
Payer: MEDICARE

## 2021-08-19 DIAGNOSIS — R91.1 LUNG NODULE: ICD-10-CM

## 2021-08-19 LAB
ALBUMIN SERPL BCP-MCNC: 4.3 G/DL (ref 3.2–4.9)
ALBUMIN/GLOB SERPL: 1.4 G/DL
ALP SERPL-CCNC: 46 U/L (ref 30–99)
ALT SERPL-CCNC: 14 U/L (ref 2–50)
ANION GAP SERPL CALC-SCNC: 11 MMOL/L (ref 7–16)
AST SERPL-CCNC: 20 U/L (ref 12–45)
BILIRUB SERPL-MCNC: 0.3 MG/DL (ref 0.1–1.5)
BUN SERPL-MCNC: 23 MG/DL (ref 8–22)
CALCIUM SERPL-MCNC: 9.8 MG/DL (ref 8.5–10.5)
CHLORIDE SERPL-SCNC: 99 MMOL/L (ref 96–112)
CO2 SERPL-SCNC: 30 MMOL/L (ref 20–33)
CREAT SERPL-MCNC: 0.38 MG/DL (ref 0.5–1.4)
GLOBULIN SER CALC-MCNC: 3.1 G/DL (ref 1.9–3.5)
GLUCOSE SERPL-MCNC: 105 MG/DL (ref 65–99)
POTASSIUM SERPL-SCNC: 4 MMOL/L (ref 3.6–5.5)
PROT SERPL-MCNC: 7.4 G/DL (ref 6–8.2)
SODIUM SERPL-SCNC: 140 MMOL/L (ref 135–145)

## 2021-08-19 PROCEDURE — 80053 COMPREHEN METABOLIC PANEL: CPT

## 2021-08-19 PROCEDURE — 36415 COLL VENOUS BLD VENIPUNCTURE: CPT

## 2021-08-19 PROCEDURE — A9552 F18 FDG: HCPCS

## 2021-08-20 DIAGNOSIS — R91.1 LUNG NODULE: ICD-10-CM

## 2021-08-20 NOTE — PROGRESS NOTES
Contacted patient regarding results of PET.  She does have low-level FDG uptake in the left upper lobe nodule.  This is concerning for slow-growing malignancy.  We discussed expectant management with repeat imaging versus attempted biopsy with bronchoscopy.  Given location and amount of emphysema in that area of the lung she is not likely a surgical candidate suspect CT-guided biopsy would be too high risk.  Patient is agreeable to proceed with attempted bronchoscopic biopsy.  Order has been placed.

## 2021-08-23 NOTE — PROGRESS NOTES
Clotilde Meza M.D.  You 3 days ago     Bronch ordered; not sure if they will need Varen    Routing comment

## 2021-08-27 ENCOUNTER — TELEPHONE (OUTPATIENT)
Dept: SLEEP MEDICINE | Facility: MEDICAL CENTER | Age: 76
End: 2021-08-27

## 2021-08-27 DIAGNOSIS — R91.1 SOLITARY PULMONARY NODULE ON LUNG CT: ICD-10-CM

## 2021-08-27 NOTE — TELEPHONE ENCOUNTER
Clotilde Meza M.D.  Ihsan Kumar M.D. 2 days ago     Hm, tough. Might help to get onc thoughts at tumor board. My thought is the veracyte/percepta would give us more of a reason to treat vs. Observation     Message text    Ihsan Kumar M.D.  You; Clotilde Meza M.D.; Shagufta Hardy M.D. 2 days ago     Kenneth King and Shagufta,     I took a look at the CT and I do not think this is going to be accessible with Veran.  No clear airway and a lot of emphysema.       We could try, and do the bronchial brushing for veracyte/percepta, that way if no clear airway or nondiagnostic biopsy, if high risk genetic profile proceed with radiation.       Alternatively, could discuss her case at lung tumor board.       Let me know which you guys think.    Message text

## 2021-08-27 NOTE — TELEPHONE ENCOUNTER
Patient called in to schedule bronchoscopy as I had not called her as of yet. Per discussions yesterday and Wednesday I am not sure what we did come up with as far as a plan for Ms. Renee. I did explain to the best of my abilities on why we are holding off at this time and she understands. She would like a call to explain further and what the next step is.      Phone number on file is correct, she states if she doesn't answer the first time then to call back as it takes her a second to get to the phone.

## 2021-08-30 NOTE — TELEPHONE ENCOUNTER
Clotilde Meza M.D.  You; Ihsan Kumar M.D. 3 hours ago (12:12 PM)     I apologize. I thought we were moving forward with shady Montalvo, if you think tumor board presentation first I am fine with that. I just thought the valcyte would give more clinical info that might push us to treat rather than just observe.     Message text

## 2021-09-01 NOTE — TELEPHONE ENCOUNTER
Ihsan Kumar M.D.  You; Shagufta Hardy M.D.; Lyndon Mcdaniel M.D. 2 minutes ago (3:40 PM)     Agree with you Christine. If we're going to go through with the bronch though for the veracyte, I think its worth attempting the navigational as well.     Reshma- lets move forward with bronchoscopy with navigational then    Message text

## 2021-09-08 NOTE — TELEPHONE ENCOUNTER
There was a threat that I did not see that we are not going to go ahead with the Bronch at this time.   Dr. Meza can you call the patient and explain as she is already confused and anxious.

## 2021-09-13 ENCOUNTER — TELEPHONE (OUTPATIENT)
Dept: CARDIOLOGY | Facility: MEDICAL CENTER | Age: 76
End: 2021-09-13

## 2021-09-13 NOTE — TELEPHONE ENCOUNTER
Spoke with pt who confirmed last time seeing cardiology was in 02/12/21 with Dr. Ross. Per pt has not seen any other cardiology outside of [facility]. Confirmed with pt that recent lab work and cardiac testing is in pts chart.   Pt is charles to see Isabela March on 09/15/21 at 1415.   Appointment time and date confirmed with pt.

## 2021-09-15 ENCOUNTER — OFFICE VISIT (OUTPATIENT)
Dept: CARDIOLOGY | Facility: MEDICAL CENTER | Age: 76
End: 2021-09-15
Payer: MEDICARE

## 2021-09-15 ENCOUNTER — HOSPITAL ENCOUNTER (OUTPATIENT)
Dept: LAB | Facility: MEDICAL CENTER | Age: 76
End: 2021-09-15
Attending: FAMILY MEDICINE
Payer: MEDICARE

## 2021-09-15 VITALS
HEART RATE: 60 BPM | DIASTOLIC BLOOD PRESSURE: 58 MMHG | BODY MASS INDEX: 32.44 KG/M2 | HEIGHT: 64 IN | RESPIRATION RATE: 16 BRPM | SYSTOLIC BLOOD PRESSURE: 138 MMHG | WEIGHT: 190 LBS | OXYGEN SATURATION: 96 %

## 2021-09-15 DIAGNOSIS — R06.09 DOE (DYSPNEA ON EXERTION): ICD-10-CM

## 2021-09-15 DIAGNOSIS — I20.0 UNSTABLE ANGINA PECTORIS (HCC): ICD-10-CM

## 2021-09-15 DIAGNOSIS — I10 ESSENTIAL HYPERTENSION: ICD-10-CM

## 2021-09-15 DIAGNOSIS — R00.2 PALPITATIONS: ICD-10-CM

## 2021-09-15 DIAGNOSIS — I49.3 PVCS (PREMATURE VENTRICULAR CONTRACTIONS): ICD-10-CM

## 2021-09-15 DIAGNOSIS — I49.1 ATRIAL PREMATURE DEPOLARIZATION: ICD-10-CM

## 2021-09-15 DIAGNOSIS — I47.10 SVT (SUPRAVENTRICULAR TACHYCARDIA) (HCC): ICD-10-CM

## 2021-09-15 PROBLEM — R41.3 AMNESIA: Status: ACTIVE | Noted: 2021-08-12

## 2021-09-15 LAB
ALBUMIN SERPL BCP-MCNC: 4.1 G/DL (ref 3.2–4.9)
ALBUMIN/GLOB SERPL: 1.5 G/DL
ALP SERPL-CCNC: 48 U/L (ref 30–99)
ALT SERPL-CCNC: 12 U/L (ref 2–50)
ANION GAP SERPL CALC-SCNC: 10 MMOL/L (ref 7–16)
AST SERPL-CCNC: 15 U/L (ref 12–45)
BILIRUB SERPL-MCNC: 0.2 MG/DL (ref 0.1–1.5)
BUN SERPL-MCNC: 10 MG/DL (ref 8–22)
CALCIUM SERPL-MCNC: 9.4 MG/DL (ref 8.5–10.5)
CHLORIDE SERPL-SCNC: 100 MMOL/L (ref 96–112)
CO2 SERPL-SCNC: 30 MMOL/L (ref 20–33)
CREAT SERPL-MCNC: 0.51 MG/DL (ref 0.5–1.4)
GLOBULIN SER CALC-MCNC: 2.8 G/DL (ref 1.9–3.5)
GLUCOSE SERPL-MCNC: 102 MG/DL (ref 65–99)
POTASSIUM SERPL-SCNC: 3.5 MMOL/L (ref 3.6–5.5)
PROT SERPL-MCNC: 6.9 G/DL (ref 6–8.2)
SODIUM SERPL-SCNC: 140 MMOL/L (ref 135–145)
TSH SERPL DL<=0.005 MIU/L-ACNC: 1.67 UIU/ML (ref 0.38–5.33)

## 2021-09-15 PROCEDURE — 84443 ASSAY THYROID STIM HORMONE: CPT

## 2021-09-15 PROCEDURE — 99213 OFFICE O/P EST LOW 20 MIN: CPT | Performed by: NURSE PRACTITIONER

## 2021-09-15 PROCEDURE — 80053 COMPREHEN METABOLIC PANEL: CPT

## 2021-09-15 PROCEDURE — 36415 COLL VENOUS BLD VENIPUNCTURE: CPT

## 2021-09-15 ASSESSMENT — ENCOUNTER SYMPTOMS
CLAUDICATION: 0
SPUTUM PRODUCTION: 0
NAUSEA: 0
COUGH: 0
HEADACHES: 0
WHEEZING: 0
PND: 0
HEMOPTYSIS: 0
FEVER: 0
VOMITING: 0
SHORTNESS OF BREATH: 0
CHILLS: 0
DIZZINESS: 0
PALPITATIONS: 0
ORTHOPNEA: 0

## 2021-09-15 ASSESSMENT — FIBROSIS 4 INDEX: FIB4 SCORE: 1.03

## 2021-09-15 NOTE — PROGRESS NOTES
"Chief Complaint   Patient presents with   • Supraventricular Tachycardia (SVT)   • Palpitations   • Angina (Unstable)     F/V Dx: Unstable angina pectoris (HCC)       Subjective     Berny Renee is a 76 y.o. female who presents today for palpitations, SVT, PVCs and hypertension.  Patient was last seen 2/12/2021 by Dr. Ross.     Since 2/12/2021, from a cardiovascular standpoint the patient has been doing well.  Her most recent echo from March 2021 shows EF 65-70% with no significant valvular abnormalities.  She is recently had a CT scan and then PET scan which shows BRANDON nodular opacity with SUV of 3.1, focal activity of the anus with SUV of 7.3  without discrete mass.  Ultimately the patient has no cardiovascular complaints.  She denies chest pain, palpitations, orthopnea, and lower extremity edema.  She is compliant with losartan and and digoxin.    Since 11/25/2020 the patient is still not doing well.  Is yet to recover from COVID-19 infection.  Has significant S OB/HARRIS with diffuse chest heaviness when she gets up to do any minimal activity, relieved with rest.  Previous coronary evaluation includes a normal coronary angiogram 2004, normal MPI 2012 and normal cardiac PET scan 2019.     Since 4/6/2020 the patient was recently hospitalized from 11/7-11/23/2020 for severe COVID-19 infection.  Returned home 2 days ago.  Feeling stronger.  Digoxin DC'd due to \"bradycardia\" during her hospitalization.  No palpitations.  BP normal today.     Since 10/7/2019 the patient is feeling better.  She did not tolerate previous antiarrhythmic therapy sleeping too much finally feels much better on digoxin.  No palpitations.  Still has problems with COPD.     Recently seen 9/2019 by [facility].  Switch from diltiazem to verapamil which she is tolerating.  Cardiac PET scan normal.    Past Medical History:   Diagnosis Date   • Arthritis     spine   • Cataract immature    • Chronic pain    • Colon polyps    • COPD (chronic " obstructive pulmonary disease) (HCC) 2002    moderate to severe   • DDD (degenerative disc disease), cervical    • DDD (degenerative disc disease), thoracic    • Diverticulitis of colon    • Dyslipidemia    • EMPHYSEMA    • Hypertension    • REGINE (obstructive sleep apnea)    • OSTEOPOROSIS    • Spinal stenosis, lumbar region, with neurogenic claudication    • URINARY INCONTINENCE    • Vitamin d deficiency      Past Surgical History:   Procedure Laterality Date   • LUMBAR LAMINECTOMY DISKECTOMY  2010    Performed by GRACIE CANO at SURGERY [facility]   • OTHER ORTHOPEDIC SURGERY      left ankle fx   • OTHER      leg fracture   • OTHER      t spine disc surg   • TONSILLECTOMY       Family History   Problem Relation Age of Onset   • Other Sister         lung and leukemia cancer     Social History     Socioeconomic History   • Marital status:      Spouse name: Not on file   • Number of children: Not on file   • Years of education: Not on file   • Highest education level: Not on file   Occupational History   • Not on file   Tobacco Use   • Smoking status: Former Smoker     Packs/day: 2.00     Years: 30.00     Pack years: 60.00     Types: Cigarettes     Quit date: 3/1/1999     Years since quittin.5   • Smokeless tobacco: Never Used   • Tobacco comment: 3 pks a day for 35 yrs, continued abstinence   Vaping Use   • Vaping Use: Never used   Substance and Sexual Activity   • Alcohol use: Not Currently     Alcohol/week: 4.2 oz     Types: 7 Glasses of wine per week   • Drug use: Not Currently     Types: Marijuana, Oral     Comment: Medical Marijuana    • Sexual activity: Never   Other Topics Concern   • Not on file   Social History Narrative    ** Merged History Encounter **          Social Determinants of Health     Financial Resource Strain:    • Difficulty of Paying Living Expenses:    Food Insecurity:    • Worried About Running Out of Food in the Last Year:    • Ran Out of Food in the Last Year:     Transportation Needs:    • Lack of Transportation (Medical):    • Lack of Transportation (Non-Medical):    Physical Activity:    • Days of Exercise per Week:    • Minutes of Exercise per Session:    Stress:    • Feeling of Stress :    Social Connections:    • Frequency of Communication with Friends and Family:    • Frequency of Social Gatherings with Friends and Family:    • Attends [employer] Services:    • Active Member of Clubs or Organizations:    • Attends Club or Organization Meetings:    • Marital Status:    Intimate Partner Violence:    • Fear of Current or Ex-Partner:    • Emotionally Abused:    • Physically Abused:    • Sexually Abused:      Allergies   Allergen Reactions   • Iodine      Convulsion  I.V.  iodine   • Tape Rash and Itching   • Meloxicam Rash and Itching     Broke out in itchy rash     Outpatient Encounter Medications as of 9/15/2021   Medication Sig Dispense Refill   • losartan (COZAAR) 50 MG Tab Take 1 tablet by mouth every day. 90 tablet 3   • benzonatate (TESSALON) 100 MG Cap Take 1 capsule by mouth 3 times a day as needed for Cough. Do not chew. Swallow whole. 60 capsule 1   • fluticasone (FLONASE) 50 MCG/ACT nasal spray Administer 1-2 Sprays into affected nostril(S) every day. Each nostril. 15.8 mL 3   • azelastine (ASTELIN) 137 MCG/SPRAY nasal spray Administer 1-2 Sprays into affected nostril(S) 2 times a day. 30 mL 6   • predniSONE (DELTASONE) 10 MG Tab Take 30mg x 3 days, then take 20mg x 3 days, then take 10mg x 3 days, with food, then discontinue. 18 tablet 1   • tizanidine (ZANAFLEX) 4 MG Tab Take 4 mg by mouth every 8 hours as needed. not to exceed 3 doses in 24 hours  Indications: Lower Backache, Muscle Spasticity     • DULoxetine (CYMBALTA) 30 MG Cap DR Particles Take 30 mg by mouth every day. Indications: Generalized Anxiety Disorder, Major Depressive Disorder, Musculoskeletal Pain, Disease of the Peripheral Nerves     • bisacodyl (DULCOLAX) 5 MG EC tablet Take 5 mg by mouth  1 time daily as needed. 1 to 3 tabs in a single daily dose  Indications: Constipation     • guaiFENesin ER (MUCINEX) 600 MG TABLET SR 12 HR Take 600-1,200 mg by mouth 2 times a day as needed. Indications: Cough     • Albuterol Sulfate 108 (90 Base) MCG/ACT AEROSOL POWDER, BREATH ACTIVATED Inhale 2 Puffs 4 times a day as needed. Indications: SOB     • hydrocortisone 1 % Cream Apply 1 Each topically 3 times a day as needed. Indications: Inflammation, Itching     • digoxin (LANOXIN) 125 MCG Tab Take 1 Tab by mouth every day. 90 Tab 3   • acetaminophen (TYLENOL) 650 MG CR tablet Take 650 mg by mouth every 6 hours as needed.     • sodium chloride (OCEAN) 0.65 % Solution Administer 2 Sprays into affected nostril(S) every 2 hours as needed.  3   • Home Care Oxygen Inhale 6 L/min Continuous. Oxygen dose range: 6 to 6 L/min  Respiratory route via: Nasal Cannula   Oxygen supplier: Preferred         • fluticasone-salmeterol (ADVAIR DISKUS) 500-50 MCG/DOSE AEROSOL POWDER, BREATH ACTIVATED Inhale 1 Puff by mouth 2 times a day. 3 Each 3   • tiotropium (SPIRIVA HANDIHALER) 18 MCG Cap Inhale 1 Cap by mouth every day. 90 Cap 3   • Misc. Devices Misc This is an order for  7 foot high flow oxygen tubing  Dx; asthma with COPD J 44.9, supplemental oxygen-dependent Z 99.81, chronic respiratory failure with hypoxia and J 96        ODETTE 99 months   NPI [id number] 1 Each 0   • spironolactone (ALDACTONE) 25 MG Tab TAKE ONE TABLET BY MOUTH ONE TIME DAILY 30 Tab 11   • montelukast (SINGULAIR) 10 MG Tab Take 1 Tab by mouth every day. 90 Tab 3   • Cholecalciferol (VITAMIN D3) 2000 UNIT Cap TAKE ONE CAPSULE BY MOUTH ONE TIME DAILY 30 Cap 3   • potassium chloride (MICRO-K) 10 MEQ capsule TAKE ONE CAPSULE BY MOUTH TWICE DAILY 60 Cap 6   • omeprazole (PRILOSEC OTC) 20 MG tablet Take 1 Tab by mouth every day. 30 Tab 11   • ipratropium-albuterol (DUONEB) 0.5-2.5 (3) MG/3ML nebulizer solution USE ONE VIAL IN NEBULIZER EVERY 4 HOURS AS NEEDED FOR  SHORTNESS OF BREATH OR  WHEEZING 360 Vial 6   • azithromycin (ZITHROMAX) 250 MG Tab One tablet daily for COPD (Patient not taking: Reported on 9/15/2021) 30 tablet 6   • meloxicam (MOBIC) 7.5 MG Tab  (Patient not taking: Reported on 9/15/2021)     • [DISCONTINUED] azithromycin (ZITHROMAX) 250 MG Tab TAKE ONE TABLET BY MOUTH ONCE A DAY  30 tablet 3   • [DISCONTINUED] dexamethasone (DECADRON) 2 MG tablet  (Patient not taking: Reported on 9/15/2021)     • diphenhydrAMINE (BENADRYL) 25 MG capsule Take 25 mg by mouth every four hours as needed. Indications: Itching, seasonal allergy (Patient not taking: Reported on 9/15/2021)     • Simethicone 125 MG Cap Take 1-2 Caps by mouth 2 times a day as needed. Dont exceed 4 caps in 24 hours   Indications: Gas (Patient not taking: Reported on 9/15/2021)     • Calcium Carbonate Antacid 1000 MG Chew Tab Chew 2-3 Tabs 3 times a day as needed. Do not exceed 7 tabs in 24 hours  Indications: Heartburn (Patient not taking: Reported on 9/15/2021)     • [DISCONTINUED] ondansetron (ZOFRAN) 4 MG Tab tablet Take 4 mg by mouth every four hours as needed. Indications: Nausea and Vomiting (Patient not taking: Reported on 9/15/2021)     • [DISCONTINUED] dexamethasone (DECADRON) 6 MG Tab Take 1 Tab by mouth every day. (Patient not taking: Reported on 9/15/2021) 3 Tab 0   • acyclovir (ZOVIRAX) 200 MG Cap Take 200 mg by mouth every day. (Patient not taking: Reported on 9/15/2021)     • [DISCONTINUED] triamcinolone acetonide (KENALOG) 0.1 % Ointment Apply 1 Application to affected area(s) 2 times a day. (Patient not taking: Reported on 9/15/2021) 1 Tube 0   • VENTOLIN  (90 Base) MCG/ACT Aero Soln inhalation aerosol Inhale 2 Puffs by mouth every 6 hours as needed for Shortness of Breath. (Patient not taking: Reported on 9/15/2021) 1 Inhaler 2     No facility-administered encounter medications on file as of 9/15/2021.     Review of Systems   Constitutional: Negative for chills and fever.  "  Respiratory: Negative for cough, hemoptysis, sputum production, shortness of breath and wheezing.    Cardiovascular: Negative for chest pain, palpitations, orthopnea, claudication, leg swelling and PND.   Gastrointestinal: Negative for nausea and vomiting.   Neurological: Negative for dizziness and headaches.   All other systems reviewed and are negative.        Objective     /58 (BP Location: Left arm, Patient Position: Sitting, BP Cuff Size: Adult)   Pulse 60   Resp 16   Ht 1.626 m (5' 4\")   Wt 86.2 kg (190 lb)   LMP 06/07/2001   SpO2 96%   BMI 32.61 kg/m²     Physical Exam  Vitals and nursing note reviewed.   Constitutional:       Appearance: She is well-developed.   Neck:      Vascular: No JVD.   Cardiovascular:      Rate and Rhythm: Normal rate and regular rhythm.      Heart sounds: Normal heart sounds. No murmur heard.     Pulmonary:      Effort: Pulmonary effort is normal.      Breath sounds: Normal breath sounds.   Abdominal:      Palpations: Abdomen is soft.   Musculoskeletal:      Cervical back: Neck supple.   Skin:     General: Skin is warm and dry.   Neurological:      Comments: Cranial nerves II-XII WNL   Psychiatric:         Behavior: Behavior normal.         Thought Content: Thought content normal.         Judgment: Judgment normal.       CARDIAC PET SCAN 09/06/2019  Ventricular ejection fraction 68%.  Normal perfusion scan.     ECHOCARDIOGRAM 03/17/2013  Technically difficult study.   Probably normal left ventricular size, thickness and systolic function.   Borderline diastolic dysfunction.  Possible small pericardial effusion, without evidence of hemodynamic   compromise, and possible epicardial fat pad.  Mild mitral regurgitation.     Echocardiogram 3/23/2021  CONCLUSIONS  Normal left ventricular systolic function.  Left ventricular ejection fraction is visually estimated to be 65-70%.  Normal right ventricular systolic function.  No valve abnormalities.     Nuc med stress test " 3/23/2021   NUCLEAR IMAGING INTERPRETATION    No evidence of significant jeopardized viable myocardium or prior myocardial    infarction.    Normal left ventricular size, ejection fraction, and wall motion.    ECG INTERPRETATION    Non-diagnostic EKG     Assessment & Plan     1. Essential hypertension     2. Palpitations     3. PVCs (premature ventricular contractions)     4. SVT (supraventricular tachycardia) (HCC)     5. Atrial premature depolarization     6. HARRIS (dyspnea on exertion)     7. Unstable angina pectoris (HCC)         Medical Decision Making: Today's Assessment/Status/Plan:   Patient will continue with losartan 50 mg daily and digoxin 0.125 mg daily.  Patient will follow-up in approximately 6 months with Dr. Ross

## 2021-09-16 ENCOUNTER — PATIENT MESSAGE (OUTPATIENT)
Dept: SLEEP MEDICINE | Facility: MEDICAL CENTER | Age: 76
End: 2021-09-16

## 2021-09-16 DIAGNOSIS — R91.8 MASS OF UPPER LOBE OF LEFT LUNG: ICD-10-CM

## 2021-09-16 DIAGNOSIS — R05.9 COUGH: ICD-10-CM

## 2021-09-16 NOTE — PROGRESS NOTES
Case discussed at Lung Cancer Tumor Board    Board consensus was to pursue SBRT given high risk for biopsy and poor procedural candidate given emphysema, oxygen dependence and FEV1 < 1L    Cc: Derrick Pierce Lovely and Reshma Morgan, pulmonary procedure     Orders Placed This Encounter   • REFERRAL TO RADIATION ONCOLOGY     __________  Ihsan Kumar MD  Pulmonary and Critical Care Medicine  [facility]

## 2021-09-16 NOTE — PROGRESS NOTES
Left Voice Mail with Berny at 12:58 pm and mychart regarding multidisciplinary decision to forgo attempt at biopsy and recommend proceeding with radiation therapy.

## 2021-09-16 NOTE — TELEPHONE ENCOUNTER
Clotilde Meza M.D.  You; Ihsan Kumar M.D.; Israel Grimm M.D.; Shagufta Hardy M.D. 35 minutes ago (8:22 AM)     Happy to call her. She already told me that is what she would prefer so thank you all.     Message text     Ihsan Kumar M.D.  You; Israel Grimm M.D.; Clotilde Meza M.D.; Shagufta Hardy M.D. 1 hour ago (7:55 AM)     Case discussed at tumor board and decision was to pursue radiation to the lesion without biopsy, as biopsy too high risk. I have placed the referral to Dr Grimm in Radiation Oncology. Clotilde, would you be able to contact the patient and let her know of the decision? I have not personally spoken to the pt yet. Reshma- we can go ahead and cancel the bronch/EBUS that was tenatively planned     Thank you all   -Selvin    Message text

## 2021-09-24 RX ORDER — CODEINE PHOSPHATE AND GUAIFENESIN 10; 100 MG/5ML; MG/5ML
5 SOLUTION ORAL NIGHTLY PRN
Qty: 118 ML | Refills: 0 | Status: SHIPPED | OUTPATIENT
Start: 2021-09-24 | End: 2021-10-18

## 2021-09-27 ENCOUNTER — PATIENT MESSAGE (OUTPATIENT)
Dept: SLEEP MEDICINE | Facility: MEDICAL CENTER | Age: 76
End: 2021-09-27

## 2021-09-27 DIAGNOSIS — R05.9 COUGH: ICD-10-CM

## 2021-09-27 RX ORDER — BENZONATATE 100 MG/1
100 CAPSULE ORAL 3 TIMES DAILY PRN
Qty: 60 CAPSULE | Refills: 1 | Status: SHIPPED
Start: 2021-09-27 | End: 2022-02-24

## 2021-10-01 ENCOUNTER — HOSPITAL ENCOUNTER (OUTPATIENT)
Dept: RADIATION ONCOLOGY | Facility: MEDICAL CENTER | Age: 76
End: 2021-10-31
Attending: RADIOLOGY
Payer: MEDICARE

## 2021-10-01 VITALS
OXYGEN SATURATION: 91 % | DIASTOLIC BLOOD PRESSURE: 60 MMHG | BODY MASS INDEX: 32.96 KG/M2 | HEART RATE: 84 BPM | WEIGHT: 186 LBS | SYSTOLIC BLOOD PRESSURE: 162 MMHG | HEIGHT: 63 IN | TEMPERATURE: 98.2 F

## 2021-10-01 DIAGNOSIS — C34.12 PRIMARY CANCER OF LEFT UPPER LOBE OF LUNG (HCC): ICD-10-CM

## 2021-10-01 PROCEDURE — 99214 OFFICE O/P EST MOD 30 MIN: CPT | Performed by: RADIOLOGY

## 2021-10-01 PROCEDURE — 99205 OFFICE O/P NEW HI 60 MIN: CPT | Performed by: RADIOLOGY

## 2021-10-01 RX ORDER — TERBINAFINE HYDROCHLORIDE 250 MG/1
TABLET ORAL
COMMUNITY
Start: 2021-08-17 | End: 2022-01-05

## 2021-10-01 ASSESSMENT — PAIN SCALES - GENERAL: PAINLEVEL: NO PAIN

## 2021-10-01 ASSESSMENT — FIBROSIS 4 INDEX: FIB4 SCORE: 0.83

## 2021-10-01 NOTE — CONSULTS
RADIATION ONCOLOGY CONSULT    DATE OF SERVICE: 10/1/2021    IDENTIFICATION: A 76 y.o. female with   Primary cancer of left upper lobe of lung (HCC)  Staging form: Lung, AJCC 8th Edition  - Clinical stage from 10/1/2021: Stage IA2 (cT1b, cN0, cM0) - Signed by Israel Grimm M.D. on 10/1/2021    She is here at the kind request of Dr. Meza    HISTORY OF PRESENT ILLNESS:   76-year-old female with history of COPD with emphysema FEV1 less than 1 L with PET avid left upper lobe lung lesion too high risk for biopsy or pulmonary intervention.  Patient had PET/CT August 19, 2021 which showed nodular left upper lobe opacity FDG avid concerning for malignancy.  Patient has chronic cough and is taking Tessalon Perls as well as Robitussin with codeine.  Patient denies any hemoptysis or weight loss.    PROBLEM LIST:  Patient Active Problem List   Diagnosis   • Asthma with COPD (HCC)   • Benign hypertension   • Mixed stress and urge urinary incontinence   • Peripheral edema   • Lumbar radicular pain   • Vitamin D deficiency disease   • Dyslipidemia   • Facet arthropathy, lumbar   • Chronic constipation   • Chronic pain syndrome   • Dependence on continuous supplemental oxygen   • Impaired fasting glucose   • Pain management   • Obesity (BMI 30-39.9)   • At risk for falls   • Chronic respiratory failure with hypoxia (HCC)   • External hemorrhoid, bleeding   • Stage 4 very severe COPD by GOLD classification (HCC)   • SVT (supraventricular tachycardia) (HCC)   • PVCs (premature ventricular contractions)   • Palpitations   • COVID-19   • Acute on chronic respiratory failure with hypoxia (HCC)   • DNR (do not resuscitate)   • Hyponatremia   • Peripheral vascular disease (HCC)   • Mild major depression (HCC)   • H/O opioid abuse (HCC)   • Amnesia   • Primary cancer of left upper lobe of lung (HCC)        PAST SURGICAL HISTORY:  Past Surgical History:   Procedure Laterality Date   • LUMBAR LAMINECTOMY DISKECTOMY  6/22/2010     Performed by GRACIE CANO at SURGERY [facility] ORS   • OTHER ORTHOPEDIC SURGERY  2004    left ankle fx   • OTHER      leg fracture   • OTHER      t spine disc surg   • TONSILLECTOMY         CURRENT MEDICATIONS:  Current Outpatient Medications   Medication Sig Dispense Refill   • diclofenac DR (VOLTAREN) 50 MG Tablet Delayed Response      • terbinafine (LAMISIL) 250 MG Tab      • benzonatate (TESSALON) 100 MG Cap Take 1 Capsule by mouth 3 times a day as needed for Cough. Do not chew. Swallow whole. 60 Capsule 1   • guaifenesin-codeine (ROBITUSSIN AC) Solution oral solution Take 5 mL by mouth at bedtime as needed for Cough for up to 24 days. 118 mL 0   • losartan (COZAAR) 50 MG Tab Take 1 tablet by mouth every day. 90 tablet 3   • azithromycin (ZITHROMAX) 250 MG Tab One tablet daily for COPD (Patient not taking: Reported on 9/15/2021) 30 tablet 6   • fluticasone (FLONASE) 50 MCG/ACT nasal spray Administer 1-2 Sprays into affected nostril(S) every day. Each nostril. 15.8 mL 3   • azelastine (ASTELIN) 137 MCG/SPRAY nasal spray Administer 1-2 Sprays into affected nostril(S) 2 times a day. 30 mL 6   • predniSONE (DELTASONE) 10 MG Tab Take 30mg x 3 days, then take 20mg x 3 days, then take 10mg x 3 days, with food, then discontinue. 18 tablet 1   • tizanidine (ZANAFLEX) 4 MG Tab Take 4 mg by mouth every 8 hours as needed. not to exceed 3 doses in 24 hours  Indications: Lower Backache, Muscle Spasticity     • DULoxetine (CYMBALTA) 30 MG Cap DR Particles Take 30 mg by mouth every day. Indications: Generalized Anxiety Disorder, Major Depressive Disorder, Musculoskeletal Pain, Disease of the Peripheral Nerves     • diphenhydrAMINE (BENADRYL) 25 MG capsule Take 25 mg by mouth every four hours as needed. Indications: Itching, seasonal allergy (Patient not taking: Reported on 9/15/2021)     • bisacodyl (DULCOLAX) 5 MG EC tablet Take 5 mg by mouth 1 time daily as needed. 1 to 3 tabs in a single daily dose  Indications:  Constipation     • guaiFENesin ER (MUCINEX) 600 MG TABLET SR 12 HR Take 600-1,200 mg by mouth 2 times a day as needed. Indications: Cough     • Albuterol Sulfate 108 (90 Base) MCG/ACT AEROSOL POWDER, BREATH ACTIVATED Inhale 2 Puffs 4 times a day as needed. Indications: SOB     • hydrocortisone 1 % Cream Apply 1 Each topically 3 times a day as needed. Indications: Inflammation, Itching     • digoxin (LANOXIN) 125 MCG Tab Take 1 Tab by mouth every day. 90 Tab 3   • acetaminophen (TYLENOL) 650 MG CR tablet Take 650 mg by mouth every 6 hours as needed.     • sodium chloride (OCEAN) 0.65 % Solution Administer 2 Sprays into affected nostril(S) every 2 hours as needed.  3   • acyclovir (ZOVIRAX) 200 MG Cap Take 200 mg by mouth every day. (Patient not taking: Reported on 9/15/2021)     • Home Care Oxygen Inhale 6 L/min Continuous. Oxygen dose range: 6 to 6 L/min  Respiratory route via: Nasal Cannula   Oxygen supplier: Preferred         • fluticasone-salmeterol (ADVAIR DISKUS) 500-50 MCG/DOSE AEROSOL POWDER, BREATH ACTIVATED Inhale 1 Puff by mouth 2 times a day. 3 Each 3   • tiotropium (SPIRIVA HANDIHALER) 18 MCG Cap Inhale 1 Cap by mouth every day. 90 Cap 3   • VENTOLIN  (90 Base) MCG/ACT Aero Soln inhalation aerosol Inhale 2 Puffs by mouth every 6 hours as needed for Shortness of Breath. (Patient not taking: Reported on 9/15/2021) 1 Inhaler 2   • Misc. Devices Misc This is an order for  7 foot high flow oxygen tubing  Dx; asthma with COPD J 44.9, supplemental oxygen-dependent Z 99.81, chronic respiratory failure with hypoxia and J 96        ODETTE 99 months   NPI [id number] 1 Each 0   • spironolactone (ALDACTONE) 25 MG Tab TAKE ONE TABLET BY MOUTH ONE TIME DAILY 30 Tab 11   • montelukast (SINGULAIR) 10 MG Tab Take 1 Tab by mouth every day. 90 Tab 3   • Cholecalciferol (VITAMIN D3) 2000 UNIT Cap TAKE ONE CAPSULE BY MOUTH ONE TIME DAILY 30 Cap 3   • potassium chloride (MICRO-K) 10 MEQ capsule TAKE ONE CAPSULE BY MOUTH  "TWICE DAILY 60 Cap 6   • omeprazole (PRILOSEC OTC) 20 MG tablet Take 1 Tab by mouth every day. 30 Tab 11   • ipratropium-albuterol (DUONEB) 0.5-2.5 (3) MG/3ML nebulizer solution USE ONE VIAL IN NEBULIZER EVERY 4 HOURS AS NEEDED FOR SHORTNESS OF BREATH OR  WHEEZING 360 Vial 6     No current facility-administered medications for this encounter.       ALLERGIES:    Iodine, Tape, and Meloxicam    FAMILY HISTORY:    family history includes Cancer in her father and sister; Other in her sister.    SOCIAL HISTORY:     reports that she quit smoking about 22 years ago. Her smoking use included cigarettes. She has a 60.00 pack-year smoking history. She has never used smokeless tobacco. She reports previous alcohol use of about 4.2 oz of alcohol per week. She reports previous drug use. Drugs: Marijuana and Oral.   Patient is 76 years old and retired currently resides in [place].    REVIEW OF SYSTEMS:    A complete review of systems taken. Pertinent items in HPI.     PHYSICAL EXAM:    PERFORMANCE STATUS:  ECOG Performance Review 10/1/2021   ECOG Performance Status Restricted in physically strenuous activity but ambulatory and able to carry out work of a light or sedentary nature, e.g., light house work, office work   Some recent data might be hidden     Karnofsky Score 10/1/2021   Karnofsky Score 80   Some recent data might be hidden     BP (!) 162/60 (BP Location: Right arm, Patient Position: Sitting, BP Cuff Size: Adult)   Pulse 84   Temp 36.8 °C (98.2 °F) (Temporal)   Ht 1.6 m (5' 3\")   Wt 84.4 kg (186 lb)   LMP 06/07/2001   SpO2 91% Comment: 6L  BMI 32.95 kg/m²   Physical Exam     LABORATORY DATA:   Lab Results   Component Value Date/Time    WBC 7.5 11/16/2020 12:45 AM    WBC 10.9 (H) 06/09/2011 12:00 AM    RBC 5.00 11/16/2020 12:45 AM    RBC 4.69 06/09/2011 12:00 AM    HEMOGLOBIN 14.8 11/16/2020 12:45 AM    HEMATOCRIT 44.1 11/16/2020 12:45 AM    MCV 88.2 11/16/2020 12:45 AM    MCV 92 06/09/2011 12:00 AM    MCH 29.6 " 11/16/2020 12:45 AM    MCH 30.7 06/09/2011 12:00 AM    MCHC 33.6 11/16/2020 12:45 AM    RDW 42.1 11/16/2020 12:45 AM    RDW 13.3 06/09/2011 12:00 AM    PLATELETCT 396 11/16/2020 12:45 AM    MPV 9.9 11/16/2020 12:45 AM    NEUTSPOLYS 63.70 11/16/2020 12:45 AM    LYMPHOCYTES 11.20 (L) 11/16/2020 12:45 AM    MONOCYTES 19.90 (H) 11/16/2020 12:45 AM    EOSINOPHILS 0.30 11/16/2020 12:45 AM    BASOPHILS 0.40 11/16/2020 12:45 AM      Lab Results   Component Value Date/Time    SODIUM 140 09/15/2021 03:51 PM    POTASSIUM 3.5 (L) 09/15/2021 03:51 PM    CHLORIDE 100 09/15/2021 03:51 PM    CO2 30 09/15/2021 03:51 PM    GLUCOSE 102 (H) 09/15/2021 03:51 PM    BUN 10 09/15/2021 03:51 PM    CREATININE 0.51 09/15/2021 03:51 PM    CREATININE 0.62 06/09/2011 12:00 AM    BUNCREATRAT 20 07/07/2014 02:50 PM    BUNCREATRAT 23 06/09/2011 12:00 AM           RADIOLOGY DATA:  CT-CHEST (THORAX) W/O    Result Date: 8/9/2021  1.  Severe emphysema. 2.  Fairly linear nodularity in the left upper lobe likely representing scarring with nodular component measuring 17 x 11 mm. 3.  3 mm nodule in the lingula. 4.  Cardiomegaly with coronary artery calcifications Fleischner Society pulmonary nodule recommendations: Low Risk: CT at 3-6 months, then consider CT at 18-24 months High Risk: CT at 3-6 months, then at 18-24 months Comments: Use most suspicious nodule as guide to management. Follow-up intervals may vary according to size and risk. Low Risk - Minimal or absent history of smoking and of other known risk factors. High Risk - History of smoking or of other known risk factors. Note: These recommendations do not apply to lung cancer screening, patients with immunosuppression, or patients with known primary cancer. Fleischner Society 2017 Guidelines for Management of Incidentally Detected Pulmonary Nodules in Adults     [id number] BASE TO MID-THIGH    Result Date: 8/19/2021  1.  Nodular left upper lobe opacity is FDG avid and concerning for  malignancy. Consider tissue diagnosis or short-term follow-up. 2.  Nonspecific focal activity at the anus without a discrete mass identified. Please correlate with physical exam. 3.  Cholelithiasis.      IMPRESSION:    A 76 y.o. with  Primary cancer of left upper lobe of lung (HCC)  Staging form: Lung, AJCC 8th Edition  - Clinical stage from 10/1/2021: Stage IA2 (cT1b, cN0, cM0) - Signed by Israel Grimm M.D. on 10/1/2021    RECOMMENDATIONS:   Patient was discussed in our multidisciplinary tumor board and consensus was upfront treatment with radiosurgery given high risk for biopsy as well as PET avidity and smoking history.  We discussed risks and benefits and patient is amenable to treatment and patient will follow up on October 13, 2021 for CT mapping scan.  We will plan for 50 Gray in 5 fractions with integrated boost to 60 Gray to the internal target volume.  Patient understands risk benefits and is amenable to treatment.    Thank you for the opportunity to participate in her care.  If any questions or comments, please do not hesitate in calling.    Orders Placed This Encounter   • REFERRAL TO ONCOLOGY NURSE NAVIGATOR   • diclofenac DR (VOLTAREN) 50 MG Tablet Delayed Response   • terbinafine (LAMISIL) 250 MG Tab

## 2021-10-01 NOTE — NON-PROVIDER
"Patient was seen today in clinic with Dr. Grimm for consultation. Vitals signs and weight were obtained and pain assessment was completed.  Allergies and medications were reviewed with the patient.      Vitals/Pain:  Vitals:    10/01/21 1420   BP: (!) 162/60   BP Location: Right arm   Patient Position: Sitting   BP Cuff Size: Adult   Pulse: 84   Temp: 36.8 °C (98.2 °F)   TempSrc: Temporal   SpO2: 91%   Weight: 84.4 kg (186 lb)   Height: 1.6 m (5' 3\")   Pain Score: No pain    Allergies:   Iodine, Tape, and Meloxicam    Current Medications:  Current Outpatient Medications   Medication Sig Dispense Refill   • diclofenac DR (VOLTAREN) 50 MG Tablet Delayed Response      • terbinafine (LAMISIL) 250 MG Tab      • benzonatate (TESSALON) 100 MG Cap Take 1 Capsule by mouth 3 times a day as needed for Cough. Do not chew. Swallow whole. 60 Capsule 1   • guaifenesin-codeine (ROBITUSSIN AC) Solution oral solution Take 5 mL by mouth at bedtime as needed for Cough for up to 24 days. 118 mL 0   • losartan (COZAAR) 50 MG Tab Take 1 tablet by mouth every day. 90 tablet 3   • azithromycin (ZITHROMAX) 250 MG Tab One tablet daily for COPD (Patient not taking: Reported on 9/15/2021) 30 tablet 6   • fluticasone (FLONASE) 50 MCG/ACT nasal spray Administer 1-2 Sprays into affected nostril(S) every day. Each nostril. 15.8 mL 3   • azelastine (ASTELIN) 137 MCG/SPRAY nasal spray Administer 1-2 Sprays into affected nostril(S) 2 times a day. 30 mL 6   • predniSONE (DELTASONE) 10 MG Tab Take 30mg x 3 days, then take 20mg x 3 days, then take 10mg x 3 days, with food, then discontinue. 18 tablet 1   • tizanidine (ZANAFLEX) 4 MG Tab Take 4 mg by mouth every 8 hours as needed. not to exceed 3 doses in 24 hours  Indications: Lower Backache, Muscle Spasticity     • DULoxetine (CYMBALTA) 30 MG Cap DR Particles Take 30 mg by mouth every day. Indications: Generalized Anxiety Disorder, Major Depressive Disorder, Musculoskeletal Pain, Disease of the " Peripheral Nerves     • diphenhydrAMINE (BENADRYL) 25 MG capsule Take 25 mg by mouth every four hours as needed. Indications: Itching, seasonal allergy (Patient not taking: Reported on 9/15/2021)     • bisacodyl (DULCOLAX) 5 MG EC tablet Take 5 mg by mouth 1 time daily as needed. 1 to 3 tabs in a single daily dose  Indications: Constipation     • guaiFENesin ER (MUCINEX) 600 MG TABLET SR 12 HR Take 600-1,200 mg by mouth 2 times a day as needed. Indications: Cough     • Albuterol Sulfate 108 (90 Base) MCG/ACT AEROSOL POWDER, BREATH ACTIVATED Inhale 2 Puffs 4 times a day as needed. Indications: SOB     • hydrocortisone 1 % Cream Apply 1 Each topically 3 times a day as needed. Indications: Inflammation, Itching     • digoxin (LANOXIN) 125 MCG Tab Take 1 Tab by mouth every day. 90 Tab 3   • acetaminophen (TYLENOL) 650 MG CR tablet Take 650 mg by mouth every 6 hours as needed.     • sodium chloride (OCEAN) 0.65 % Solution Administer 2 Sprays into affected nostril(S) every 2 hours as needed.  3   • acyclovir (ZOVIRAX) 200 MG Cap Take 200 mg by mouth every day. (Patient not taking: Reported on 9/15/2021)     • Home Care Oxygen Inhale 6 L/min Continuous. Oxygen dose range: 6 to 6 L/min  Respiratory route via: Nasal Cannula   Oxygen supplier: Preferred         • fluticasone-salmeterol (ADVAIR DISKUS) 500-50 MCG/DOSE AEROSOL POWDER, BREATH ACTIVATED Inhale 1 Puff by mouth 2 times a day. 3 Each 3   • tiotropium (SPIRIVA HANDIHALER) 18 MCG Cap Inhale 1 Cap by mouth every day. 90 Cap 3   • VENTOLIN  (90 Base) MCG/ACT Aero Soln inhalation aerosol Inhale 2 Puffs by mouth every 6 hours as needed for Shortness of Breath. (Patient not taking: Reported on 9/15/2021) 1 Inhaler 2   • Misc. Devices Misc This is an order for  7 foot high flow oxygen tubing  Dx; asthma with COPD J 44.9, supplemental oxygen-dependent Z 99.81, chronic respiratory failure with hypoxia and J 96        ODETTE 99 months   NPI [id number] 1 Each 0   •  spironolactone (ALDACTONE) 25 MG Tab TAKE ONE TABLET BY MOUTH ONE TIME DAILY 30 Tab 11   • montelukast (SINGULAIR) 10 MG Tab Take 1 Tab by mouth every day. 90 Tab 3   • Cholecalciferol (VITAMIN D3) 2000 UNIT Cap TAKE ONE CAPSULE BY MOUTH ONE TIME DAILY 30 Cap 3   • potassium chloride (MICRO-K) 10 MEQ capsule TAKE ONE CAPSULE BY MOUTH TWICE DAILY 60 Cap 6   • omeprazole (PRILOSEC OTC) 20 MG tablet Take 1 Tab by mouth every day. 30 Tab 11   • ipratropium-albuterol (DUONEB) 0.5-2.5 (3) MG/3ML nebulizer solution USE ONE VIAL IN NEBULIZER EVERY 4 HOURS AS NEEDED FOR SHORTNESS OF BREATH OR  WHEEZING 360 Vial 6     No current facility-administered medications for this encounter.         PCP:  Brandie Cisse R.N.

## 2021-10-07 ENCOUNTER — PATIENT OUTREACH (OUTPATIENT)
Dept: OTHER | Facility: MEDICAL CENTER | Age: 76
End: 2021-10-07

## 2021-10-13 ENCOUNTER — HOSPITAL ENCOUNTER (OUTPATIENT)
Dept: RADIATION ONCOLOGY | Facility: MEDICAL CENTER | Age: 76
End: 2021-10-13
Payer: MEDICARE

## 2021-10-13 DIAGNOSIS — C34.12 PRIMARY CANCER OF LEFT UPPER LOBE OF LUNG (HCC): ICD-10-CM

## 2021-10-13 PROCEDURE — 77470 SPECIAL RADIATION TREATMENT: CPT | Performed by: RADIOLOGY

## 2021-10-13 PROCEDURE — 77334 RADIATION TREATMENT AID(S): CPT | Performed by: RADIOLOGY

## 2021-10-13 PROCEDURE — 77263 THER RADIOLOGY TX PLNG CPLX: CPT | Performed by: RADIOLOGY

## 2021-10-13 PROCEDURE — 77290 THER RAD SIMULAJ FIELD CPLX: CPT | Performed by: RADIOLOGY

## 2021-10-13 PROCEDURE — 77470 SPECIAL RADIATION TREATMENT: CPT | Mod: 26 | Performed by: RADIOLOGY

## 2021-10-13 PROCEDURE — 77334 RADIATION TREATMENT AID(S): CPT | Mod: 26 | Performed by: RADIOLOGY

## 2021-10-13 PROCEDURE — 77290 THER RAD SIMULAJ FIELD CPLX: CPT | Mod: 26 | Performed by: RADIOLOGY

## 2021-10-13 NOTE — CT SIMULATION
PATIENT NAME Berny Renee   PRIMARY PHYSICIAN [place] [id number]   REFERRING PHYSICIAN No ref. provider found 1945     Primary cancer of left upper lobe of lung (HCC)  Staging form: Lung, AJCC 8th Edition  - Clinical stage from 10/1/2021: Stage IA2 (cT1b, cN0, cM0) - Signed by Israel Grimm M.D. on 10/1/2021         Treatment Planning CT Simulation      Order Questions     Question Answer Comment    Is this for a new course of treatment? Yes     Is this an Addendum? No     Implanted Device/Pacemaker No     Simulation Status Initial     Planned Start Date 10/20/2021     Treatment Site Lung     Laterality Left     Treatment Technique SBRT     Treatment Pattern/Frequency Q OD     Concurrent Chemotherapy No     CT Technique 3D      4D     Slice Thickness 2mm     Scan Extent Chest     Treatment Device(s) Other omniboard W comp    Patient Attire Gown     Patient Position Supine     Patient Orientation Head First     Arm Position Up     Treatment Machine TB1 - STx     Image Guidance Match PTV - Soft Tissue      Bone     Fiducial Tracking Fluoro     Treatment Planning Image Fusion CT/CT     Special Physics Consult Stereotactic     Other Orders Special Tx Procedure      Weekly Physics Check

## 2021-10-18 PROCEDURE — 77293 RESPIRATOR MOTION MGMT SIMUL: CPT | Performed by: RADIOLOGY

## 2021-10-18 PROCEDURE — 77300 RADIATION THERAPY DOSE PLAN: CPT | Performed by: RADIOLOGY

## 2021-10-18 PROCEDURE — 77334 RADIATION TREATMENT AID(S): CPT | Performed by: RADIOLOGY

## 2021-10-18 PROCEDURE — 77293 RESPIRATOR MOTION MGMT SIMUL: CPT | Mod: 26 | Performed by: RADIOLOGY

## 2021-10-18 PROCEDURE — 77334 RADIATION TREATMENT AID(S): CPT | Mod: 26 | Performed by: RADIOLOGY

## 2021-10-18 PROCEDURE — 77295 3-D RADIOTHERAPY PLAN: CPT | Mod: 26 | Performed by: RADIOLOGY

## 2021-10-18 PROCEDURE — 77300 RADIATION THERAPY DOSE PLAN: CPT | Mod: 26 | Performed by: RADIOLOGY

## 2021-10-18 PROCEDURE — 77295 3-D RADIOTHERAPY PLAN: CPT | Performed by: RADIOLOGY

## 2021-10-20 ENCOUNTER — HOSPITAL ENCOUNTER (OUTPATIENT)
Dept: RADIATION ONCOLOGY | Facility: MEDICAL CENTER | Age: 76
End: 2021-10-20
Payer: MEDICARE

## 2021-10-20 LAB
CHEMOTHERAPY INFUSION START DATE: NORMAL
CHEMOTHERAPY RECORDS: 12
CHEMOTHERAPY RECORDS: 6000
CHEMOTHERAPY RECORDS: NORMAL
CHEMOTHERAPY RX CANCER: NORMAL
DATE 1ST CHEMO CANCER: NORMAL
RAD ONC ARIA COURSE LAST TREATMENT DATE: NORMAL
RAD ONC ARIA COURSE TREATMENT ELAPSED DAYS: NORMAL
RAD ONC ARIA REFERENCE POINT DOSAGE GIVEN TO DATE: 12
RAD ONC ARIA REFERENCE POINT DOSAGE GIVEN TO DATE: 12.84
RAD ONC ARIA REFERENCE POINT ID: NORMAL
RAD ONC ARIA REFERENCE POINT ID: NORMAL
RAD ONC ARIA REFERENCE POINT SESSION DOSAGE GIVEN: 12
RAD ONC ARIA REFERENCE POINT SESSION DOSAGE GIVEN: 12.84

## 2021-10-20 PROCEDURE — 77280 THER RAD SIMULAJ FIELD SMPL: CPT | Performed by: RADIOLOGY

## 2021-10-20 PROCEDURE — 77373 STRTCTC BDY RAD THER TX DLVR: CPT | Performed by: RADIOLOGY

## 2021-10-20 PROCEDURE — 77435 SBRT MANAGEMENT: CPT | Performed by: RADIOLOGY

## 2021-10-20 PROCEDURE — 77280 THER RAD SIMULAJ FIELD SMPL: CPT | Mod: 26 | Performed by: RADIOLOGY

## 2021-10-22 ENCOUNTER — HOSPITAL ENCOUNTER (OUTPATIENT)
Dept: RADIATION ONCOLOGY | Facility: MEDICAL CENTER | Age: 76
End: 2021-10-22
Payer: MEDICARE

## 2021-10-22 LAB
CHEMOTHERAPY INFUSION START DATE: NORMAL
CHEMOTHERAPY RECORDS: 12
CHEMOTHERAPY RECORDS: 6000
CHEMOTHERAPY RECORDS: NORMAL
CHEMOTHERAPY RX CANCER: NORMAL
DATE 1ST CHEMO CANCER: NORMAL
RAD ONC ARIA COURSE LAST TREATMENT DATE: NORMAL
RAD ONC ARIA COURSE TREATMENT ELAPSED DAYS: NORMAL
RAD ONC ARIA REFERENCE POINT DOSAGE GIVEN TO DATE: 24
RAD ONC ARIA REFERENCE POINT DOSAGE GIVEN TO DATE: 25.68
RAD ONC ARIA REFERENCE POINT ID: NORMAL
RAD ONC ARIA REFERENCE POINT ID: NORMAL
RAD ONC ARIA REFERENCE POINT SESSION DOSAGE GIVEN: 12
RAD ONC ARIA REFERENCE POINT SESSION DOSAGE GIVEN: 12.84

## 2021-10-22 PROCEDURE — 77280 THER RAD SIMULAJ FIELD SMPL: CPT | Performed by: RADIOLOGY

## 2021-10-22 PROCEDURE — 77280 THER RAD SIMULAJ FIELD SMPL: CPT | Mod: 26 | Performed by: RADIOLOGY

## 2021-10-22 PROCEDURE — 77373 STRTCTC BDY RAD THER TX DLVR: CPT | Performed by: RADIOLOGY

## 2021-10-26 ENCOUNTER — HOSPITAL ENCOUNTER (OUTPATIENT)
Dept: RADIATION ONCOLOGY | Facility: MEDICAL CENTER | Age: 76
End: 2021-10-26
Payer: MEDICARE

## 2021-10-26 LAB
CHEMOTHERAPY INFUSION START DATE: NORMAL
CHEMOTHERAPY RECORDS: 12
CHEMOTHERAPY RECORDS: 6000
CHEMOTHERAPY RECORDS: NORMAL
CHEMOTHERAPY RX CANCER: NORMAL
DATE 1ST CHEMO CANCER: NORMAL
RAD ONC ARIA COURSE LAST TREATMENT DATE: NORMAL
RAD ONC ARIA COURSE TREATMENT ELAPSED DAYS: NORMAL
RAD ONC ARIA REFERENCE POINT DOSAGE GIVEN TO DATE: 36
RAD ONC ARIA REFERENCE POINT DOSAGE GIVEN TO DATE: 38.52
RAD ONC ARIA REFERENCE POINT ID: NORMAL
RAD ONC ARIA REFERENCE POINT ID: NORMAL
RAD ONC ARIA REFERENCE POINT SESSION DOSAGE GIVEN: 12
RAD ONC ARIA REFERENCE POINT SESSION DOSAGE GIVEN: 12.84

## 2021-10-26 PROCEDURE — 77336 RADIATION PHYSICS CONSULT: CPT | Mod: XU | Performed by: RADIOLOGY

## 2021-10-26 PROCEDURE — 77280 THER RAD SIMULAJ FIELD SMPL: CPT | Performed by: RADIOLOGY

## 2021-10-26 PROCEDURE — 77373 STRTCTC BDY RAD THER TX DLVR: CPT | Performed by: RADIOLOGY

## 2021-10-27 ENCOUNTER — TELEPHONE (OUTPATIENT)
Dept: HEALTH INFORMATION MANAGEMENT | Facility: OTHER | Age: 76
End: 2021-10-27

## 2021-10-27 NOTE — TELEPHONE ENCOUNTER
Member walkin at [facility] would like to switch plan for 2022. member currently with Medicaid. Medicaid # [id number]. Successfully made change to the extensive dual plan. Form ID [id number] confirmation [id number]. Also discussed pharmacy and plan benefits. No further questions.

## 2021-10-28 ENCOUNTER — HOSPITAL ENCOUNTER (OUTPATIENT)
Dept: RADIATION ONCOLOGY | Facility: MEDICAL CENTER | Age: 76
End: 2021-10-28
Payer: MEDICARE

## 2021-10-28 DIAGNOSIS — C34.12 PRIMARY CANCER OF LEFT UPPER LOBE OF LUNG (HCC): ICD-10-CM

## 2021-10-28 LAB
CHEMOTHERAPY INFUSION START DATE: NORMAL
CHEMOTHERAPY RECORDS: 12
CHEMOTHERAPY RECORDS: 6000
CHEMOTHERAPY RECORDS: NORMAL
CHEMOTHERAPY RX CANCER: NORMAL
DATE 1ST CHEMO CANCER: NORMAL
RAD ONC ARIA COURSE LAST TREATMENT DATE: NORMAL
RAD ONC ARIA COURSE TREATMENT ELAPSED DAYS: NORMAL
RAD ONC ARIA REFERENCE POINT DOSAGE GIVEN TO DATE: 48
RAD ONC ARIA REFERENCE POINT DOSAGE GIVEN TO DATE: 51.36
RAD ONC ARIA REFERENCE POINT ID: NORMAL
RAD ONC ARIA REFERENCE POINT ID: NORMAL
RAD ONC ARIA REFERENCE POINT SESSION DOSAGE GIVEN: 12
RAD ONC ARIA REFERENCE POINT SESSION DOSAGE GIVEN: 12.84

## 2021-10-28 PROCEDURE — 77280 THER RAD SIMULAJ FIELD SMPL: CPT | Performed by: RADIOLOGY

## 2021-10-28 PROCEDURE — 77373 STRTCTC BDY RAD THER TX DLVR: CPT | Performed by: RADIOLOGY

## 2021-10-28 PROCEDURE — 77280 THER RAD SIMULAJ FIELD SMPL: CPT | Mod: 26 | Performed by: RADIOLOGY

## 2021-10-28 RX ORDER — OMEPRAZOLE 20 MG/1
CAPSULE, DELAYED RELEASE ORAL
COMMUNITY
Start: 2021-10-18 | End: 2022-01-05

## 2021-10-28 RX ORDER — CHOLECALCIFEROL (VITAMIN D3) 125 MCG
CAPSULE ORAL
COMMUNITY
Start: 2021-10-18 | End: 2022-01-05

## 2021-11-01 ENCOUNTER — HOSPITAL ENCOUNTER (OUTPATIENT)
Dept: RADIATION ONCOLOGY | Facility: MEDICAL CENTER | Age: 76
End: 2021-11-30
Attending: RADIOLOGY
Payer: MEDICARE

## 2021-11-01 ENCOUNTER — HOSPITAL ENCOUNTER (OUTPATIENT)
Dept: RADIATION ONCOLOGY | Facility: MEDICAL CENTER | Age: 76
End: 2021-11-01

## 2021-11-01 LAB
CHEMOTHERAPY INFUSION START DATE: NORMAL
CHEMOTHERAPY INFUSION START DATE: NORMAL
CHEMOTHERAPY INFUSION STOP DATE: NORMAL
CHEMOTHERAPY RECORDS: 12
CHEMOTHERAPY RECORDS: 12
CHEMOTHERAPY RECORDS: 6000
CHEMOTHERAPY RECORDS: 6000
CHEMOTHERAPY RECORDS: NORMAL
CHEMOTHERAPY RX CANCER: NORMAL
CHEMOTHERAPY RX CANCER: NORMAL
DATE 1ST CHEMO CANCER: NORMAL
DATE 1ST CHEMO CANCER: NORMAL
RAD ONC ARIA COURSE LAST TREATMENT DATE: NORMAL
RAD ONC ARIA COURSE LAST TREATMENT DATE: NORMAL
RAD ONC ARIA COURSE TREATMENT ELAPSED DAYS: NORMAL
RAD ONC ARIA COURSE TREATMENT ELAPSED DAYS: NORMAL
RAD ONC ARIA REFERENCE POINT DOSAGE GIVEN TO DATE: 60
RAD ONC ARIA REFERENCE POINT DOSAGE GIVEN TO DATE: 60
RAD ONC ARIA REFERENCE POINT DOSAGE GIVEN TO DATE: 64.2
RAD ONC ARIA REFERENCE POINT DOSAGE GIVEN TO DATE: 64.2
RAD ONC ARIA REFERENCE POINT ID: NORMAL
RAD ONC ARIA REFERENCE POINT SESSION DOSAGE GIVEN: 12
RAD ONC ARIA REFERENCE POINT SESSION DOSAGE GIVEN: 12.84

## 2021-11-01 PROCEDURE — 77280 THER RAD SIMULAJ FIELD SMPL: CPT | Mod: 26 | Performed by: RADIOLOGY

## 2021-11-01 PROCEDURE — 77373 STRTCTC BDY RAD THER TX DLVR: CPT | Performed by: RADIOLOGY

## 2021-11-01 PROCEDURE — 77280 THER RAD SIMULAJ FIELD SMPL: CPT | Performed by: RADIOLOGY

## 2021-11-19 NOTE — PROCEDURES
DATE OF SERVICE: 10/26/2021    DIAGNOSIS:  Primary cancer of left upper lobe of lung (HCC)  Staging form: Lung, AJCC 8th Edition  - Clinical stage from 10/1/2021: Stage IA2 (cT1b, cN0, cM0) - Signed by Israel Grimm M.D. on 10/1/2021       TREATMENT:  Radiation Therapy Episodes     Active Episodes     Radiation Therapy: SBRT (10/20/2021)               Radiation Treatments       Plan Last Treated On Elapsed Days Fractions Treated Prescribed Fraction Dose (cGy) Prescribed Total Dose (cGy)    BRANDON Lung SBRT 10/26/2021 0 @ [id number] 1 of 5 1,200 6,000                                  STEREOTACTIC PROCEDURE NOTE:    Called by [employer] machine to verify treatment parameters including:  treatment site, treatment dose, and treatment setup prior to stereotactic treatment..    Patient was placed in the treatment position with use of immobilization device and  laser guidance. CBCT images were acquired for target localization.  Images were reviewed in the axial, coronal, and saggital views and shifts were made as necessary to ensure that patient position matched simulation position.      Treatment delivered per  prescription.  The medical physicist was present throughout the set-up, verification and treatment delivery to oversee the procedure and ensure all parameters agreed with the computerized plan.    I have personally reviewed the relevant data, performed the target localization, and determined all relevant factors for this patient’s simulation.

## 2021-11-28 ENCOUNTER — HOSPITAL ENCOUNTER (OUTPATIENT)
Dept: RADIOLOGY | Facility: MEDICAL CENTER | Age: 76
End: 2021-11-28
Attending: NURSE PRACTITIONER
Payer: MEDICARE

## 2021-11-28 DIAGNOSIS — M47.817 SPONDYLOSIS OF LUMBOSACRAL REGION, UNSPECIFIED SPINAL OSTEOARTHRITIS COMPLICATION STATUS: ICD-10-CM

## 2021-11-28 DIAGNOSIS — M47.817 LUMBAR AND SACRAL SPONDYLOARTHRITIS: ICD-10-CM

## 2021-11-28 PROCEDURE — 72148 MRI LUMBAR SPINE W/O DYE: CPT

## 2021-12-07 ENCOUNTER — HOSPITAL ENCOUNTER (OUTPATIENT)
Dept: RADIOLOGY | Facility: MEDICAL CENTER | Age: 76
End: 2021-12-07
Attending: FAMILY MEDICINE
Payer: MEDICARE

## 2021-12-07 DIAGNOSIS — Z13.820 SCREENING FOR OSTEOPOROSIS: ICD-10-CM

## 2021-12-07 PROCEDURE — 77080 DXA BONE DENSITY AXIAL: CPT

## 2021-12-08 ENCOUNTER — HOSPITAL ENCOUNTER (OUTPATIENT)
Dept: RADIOLOGY | Facility: MEDICAL CENTER | Age: 76
End: 2021-12-08
Attending: RADIOLOGY
Payer: MEDICARE

## 2021-12-08 DIAGNOSIS — C34.12 PRIMARY CANCER OF LEFT UPPER LOBE OF LUNG (HCC): ICD-10-CM

## 2021-12-08 PROCEDURE — 71250 CT THORAX DX C-: CPT | Mod: ME

## 2021-12-08 NOTE — TELEPHONE ENCOUNTER
"Pt called back. Pt states she was in the hospital for over a month w/ virus, been home for two weeks now, has seen SW since then. She has a HH RN who says pt's HR is irregular, pt reports HR >100 even when laying down, up to 120 sometimes. Sometimes accompanied by dull chest discomfort only w/ SOB (pt reports she also has end stage COPD) and fatigue. -110 currently while on the phone.    Pt reports drinking about 4 bottles of water a day, 1 cup of coffee and iced tea. Offered pt EKG to ensure she is not experiencing A-fib, but the pt declined stating she \"can't come in\".  Advised pt to continue hydrating, approx 8 -8 ounce glasses of water a day, limit caffeine/alcohol and reviewed ED precautions. Pt is wondering if there is something better to control heart rate, she asks that we call her back at [phone].          To SW, any medication changes?         " Eye Protection Verbiage: Before proceeding with the stage, a plastic scleral shield was inserted. The globe was anesthetized with a few drops of 1% lidocaine with 1:100,000 epinephrine. Then, an appropriate sized scleral shield was chosen and coated with lacrilube ointment. The shield was gently inserted and left in place for the duration of each stage. After the stage was completed, the shield was gently removed.

## 2021-12-13 ENCOUNTER — HOSPITAL ENCOUNTER (OUTPATIENT)
Dept: RADIATION ONCOLOGY | Facility: MEDICAL CENTER | Age: 76
End: 2021-12-31
Attending: RADIOLOGY
Payer: MEDICARE

## 2021-12-13 VITALS
TEMPERATURE: 98.3 F | RESPIRATION RATE: 26 BRPM | BODY MASS INDEX: 31.89 KG/M2 | HEART RATE: 60 BPM | OXYGEN SATURATION: 97 % | WEIGHT: 180 LBS | DIASTOLIC BLOOD PRESSURE: 70 MMHG | SYSTOLIC BLOOD PRESSURE: 138 MMHG

## 2021-12-13 DIAGNOSIS — C34.90 MALIGNANT NEOPLASM OF UNSPECIFIED PART OF UNSPECIFIED BRONCHUS OR LUNG (HCC): ICD-10-CM

## 2021-12-13 DIAGNOSIS — I47.10 SVT (SUPRAVENTRICULAR TACHYCARDIA) (HCC): ICD-10-CM

## 2021-12-13 DIAGNOSIS — J44.9 CHRONIC OBSTRUCTIVE PULMONARY DISEASE, UNSPECIFIED COPD TYPE (HCC): ICD-10-CM

## 2021-12-13 PROCEDURE — 99212 OFFICE O/P EST SF 10 MIN: CPT | Performed by: RADIOLOGY

## 2021-12-13 RX ORDER — DIGOXIN 125 MCG
TABLET ORAL
Qty: 100 TABLET | Refills: 1 | Status: SHIPPED | OUTPATIENT
Start: 2021-12-13 | End: 2022-10-12 | Stop reason: SDUPTHER

## 2021-12-13 RX ORDER — IBUPROFEN 200 MG
600 CAPSULE ORAL EVERY EVENING
COMMUNITY

## 2021-12-13 RX ORDER — HYDROCODONE BITARTRATE AND ACETAMINOPHEN 5; 325 MG/1; MG/1
TABLET ORAL
COMMUNITY
Start: 2021-12-11 | End: 2022-02-04

## 2021-12-13 RX ORDER — TRAMADOL HYDROCHLORIDE 50 MG/1
50 TABLET ORAL EVERY 6 HOURS PRN
COMMUNITY
End: 2022-01-05

## 2021-12-13 ASSESSMENT — FIBROSIS 4 INDEX: FIB4 SCORE: 0.83

## 2021-12-13 ASSESSMENT — PAIN SCALES - GENERAL: PAINLEVEL: NO PAIN

## 2021-12-13 NOTE — NON-PROVIDER
Patient was seen today in clinic with Dr. Grimm for follow up.  Vitals signs and weight were obtained and pain assessment was completed.  Allergies and medications were reviewed with the patient.     Vitals/Pain:  Vitals:    12/13/21 1341   BP: 138/70   Pulse: 60   Resp: (!) 26   Temp: 36.8 °C (98.3 °F)   SpO2: 97%   Weight: 81.6 kg (180 lb)   PF: (!) 6 L/min   Pain Score: No pain        Allergies:   Iodine, Tape, and Meloxicam    Current Medications:  Current Outpatient Medications   Medication Sig Dispense Refill   • traMADol (ULTRAM) 50 MG Tab Take 50 mg by mouth every 6 hours as needed.     • calcium carbonate (OS-MICHELLE 500) 1250 (500 Ca) MG Tab Take 1,000 mg by mouth every day.     • Cholecalciferol (VITAMIN D3) 50 MCG (2000 UT) Tab      • omeprazole (PRILOSEC) 20 MG delayed-release capsule      • diclofenac DR (VOLTAREN) 50 MG Tablet Delayed Response      • benzonatate (TESSALON) 100 MG Cap Take 1 Capsule by mouth 3 times a day as needed for Cough. Do not chew. Swallow whole. 60 Capsule 1   • losartan (COZAAR) 50 MG Tab Take 1 tablet by mouth every day. 90 tablet 3   • azithromycin (ZITHROMAX) 250 MG Tab One tablet daily for COPD 30 tablet 6   • fluticasone (FLONASE) 50 MCG/ACT nasal spray Administer 1-2 Sprays into affected nostril(S) every day. Each nostril. 15.8 mL 3   • tizanidine (ZANAFLEX) 4 MG Tab Take 4 mg by mouth every 8 hours as needed. not to exceed 3 doses in 24 hours  Indications: Lower Backache, Muscle Spasticity     • DULoxetine (CYMBALTA) 30 MG Cap DR Particles Take 30 mg by mouth every day. Indications: Generalized Anxiety Disorder, Major Depressive Disorder, Musculoskeletal Pain, Disease of the Peripheral Nerves     • guaiFENesin ER (MUCINEX) 600 MG TABLET SR 12 HR Take 600-1,200 mg by mouth 2 times a day as needed. Indications: Cough     • Albuterol Sulfate 108 (90 Base) MCG/ACT AEROSOL POWDER, BREATH ACTIVATED Inhale 2 Puffs 4 times a day as needed. Indications: SOB     • hydrocortisone 1 %  Cream Apply 1 Each topically 3 times a day as needed. Indications: Inflammation, Itching     • digoxin (LANOXIN) 125 MCG Tab Take 1 Tab by mouth every day. 90 Tab 3   • acetaminophen (TYLENOL) 650 MG CR tablet Take 650 mg by mouth every 6 hours as needed.     • sodium chloride (OCEAN) 0.65 % Solution Administer 2 Sprays into affected nostril(S) every 2 hours as needed.  3   • acyclovir (ZOVIRAX) 200 MG Cap Take 200 mg by mouth every day.     • Home Care Oxygen Inhale 6 L/min Continuous. Oxygen dose range: 6 to 6 L/min  Respiratory route via: Nasal Cannula   Oxygen supplier: Preferred         • fluticasone-salmeterol (ADVAIR DISKUS) 500-50 MCG/DOSE AEROSOL POWDER, BREATH ACTIVATED Inhale 1 Puff by mouth 2 times a day. 3 Each 3   • tiotropium (SPIRIVA HANDIHALER) 18 MCG Cap Inhale 1 Cap by mouth every day. 90 Cap 3   • VENTOLIN  (90 Base) MCG/ACT Aero Soln inhalation aerosol Inhale 2 Puffs by mouth every 6 hours as needed for Shortness of Breath. 1 Inhaler 2   • spironolactone (ALDACTONE) 25 MG Tab TAKE ONE TABLET BY MOUTH ONE TIME DAILY 30 Tab 11   • montelukast (SINGULAIR) 10 MG Tab Take 1 Tab by mouth every day. 90 Tab 3   • Cholecalciferol (VITAMIN D3) 2000 UNIT Cap TAKE ONE CAPSULE BY MOUTH ONE TIME DAILY 30 Cap 3   • potassium chloride (MICRO-K) 10 MEQ capsule TAKE ONE CAPSULE BY MOUTH TWICE DAILY 60 Cap 6   • omeprazole (PRILOSEC OTC) 20 MG tablet Take 1 Tab by mouth every day. 30 Tab 11   • ipratropium-albuterol (DUONEB) 0.5-2.5 (3) MG/3ML nebulizer solution USE ONE VIAL IN NEBULIZER EVERY 4 HOURS AS NEEDED FOR SHORTNESS OF BREATH OR  WHEEZING 360 Vial 6   • HYDROcodone-acetaminophen (NORCO) 5-325 MG Tab per tablet      • terbinafine (LAMISIL) 250 MG Tab  (Patient not taking: Reported on 12/13/2021)     • azelastine (ASTELIN) 137 MCG/SPRAY nasal spray Administer 1-2 Sprays into affected nostril(S) 2 times a day. (Patient not taking: Reported on 12/13/2021) 30 mL 6   • predniSONE (DELTASONE) 10 MG Tab  Take 30mg x 3 days, then take 20mg x 3 days, then take 10mg x 3 days, with food, then discontinue. 18 tablet 1   • diphenhydrAMINE (BENADRYL) 25 MG capsule Take 25 mg by mouth every four hours as needed. Indications: Itching, seasonal allergy (Patient not taking: Reported on 9/15/2021)     • bisacodyl (DULCOLAX) 5 MG EC tablet Take 5 mg by mouth 1 time daily as needed. 1 to 3 tabs in a single daily dose  Indications: Constipation (Patient not taking: Reported on 12/13/2021)     • Misc. Devices Misc This is an order for  7 foot high flow oxygen tubing  Dx; asthma with COPD J 44.9, supplemental oxygen-dependent Z 99.81, chronic respiratory failure with hypoxia and J 96        ODETTE 99 months   NPI [id number] 1 Each 0     No current facility-administered medications for this encounter.         PCP:  Brandie Mccall R.N.

## 2021-12-13 NOTE — TELEPHONE ENCOUNTER
Have we ever prescribed this med? Yes.  If yes, what date? 08/31/20    Last OV: 07/08/21 with Dr Meza    Next OV: 02/24/22 with Dr Ding     DX: COPD    Medications:   Requested Prescriptions     Pending Prescriptions Disp Refills   • SPIRIVA HANDIHALER 18 MCG Cap [Pharmacy Med Name: SPIRIVA 18 MCG      CAP [facility]] 90 Capsule 3     Sig: INHALE 1 CAPSULE CONTENT BY MOUTH ONE TIME DAILY.

## 2021-12-13 NOTE — PROGRESS NOTES
RADIATION ONCOLOGY FOLLOW-UP    DATE OF SERVICE: 12/13/2021    IDENTIFICATION:   A 76 y.o. female with Stage IA2 BRANDON s/p SBRT completed 11/1/21  Visit Diagnoses     ICD-10-CM   1. Malignant neoplasm of unspecified part of unspecified bronchus or lung (HCC)  C34.90     Primary cancer of left upper lobe of lung (HCC)  Staging form: Lung, AJCC 8th Edition  - Clinical stage from 10/1/2021: Stage IA2 (cT1b, cN0, cM0) - Signed by Israel Grimm M.D. on 10/1/2021      RADIATION SUMMARY:  [facility] Treatment Information        Some values may be hidden. Unless noted otherwise, only the newest values recorded on each date are displayed.         [facility] 11/1/21   Course First Treatment Date 10/20/2021    Course Last Treatment Date 11/01/2021    BRANDON Lung SBRT Plan from Course C1_LUL   Fraction 5 of 5    Elapsed Course Days 12 @ [id number]    Prescribed Fraction Dose 1,200 cGy    Prescribed Total Dose 6,000 cGy    ITVp Reference Point from Course C1_LUL   Elapsed Course Days 12 @ [id number]    Session Dose --    Total Dose 6,000 cGy    BRANDON cp Reference Point from Course C1_LUL   Elapsed Course Days 12 @ [id number]    Session Dose --    Total Dose 6,420 cGy     Some values recorded on this date have been omitted.              HISTORY OF PRESENT ILLNESS:   76-year-old female with history of COPD with emphysema FEV1 less than 1 L with PET avid left upper lobe lung lesion too high risk for biopsy or pulmonary intervention.  Patient had PET/CT August 19, 2021 which showed nodular left upper lobe opacity FDG avid concerning for malignancy.  Patient has chronic cough and is taking Tessalon Perls as well as Robitussin with codeine.  Patient denies any hemoptysis or weight loss.    Interval History:  She completed her radiation therapy November 1, 2021 with 50 Gray in 5 fractions and integrated boost to 60 Gray internal target volume.  Patient did well after radiation denies any worsening shortness of breath or  hemoptysis.  Does still have chronic cough and has tried Tessalon Perles and Robitussin with codeine with minimal resolution.  Patient had repeat CT chest December 8, 2021 which shows excellent shrinkage of tumor.  There is a new parenchymal opacity in the anterior right upper lobe most consistent with an area of pneumonitis and severe emphysematous changes noted.    PROBLEM LIST:  Patient Active Problem List   Diagnosis   • Asthma with COPD (HCC)   • Benign hypertension   • Mixed stress and urge urinary incontinence   • Peripheral edema   • Lumbar radicular pain   • Vitamin D deficiency disease   • Dyslipidemia   • Facet arthropathy, lumbar   • Chronic constipation   • Chronic pain syndrome   • Dependence on continuous supplemental oxygen   • Impaired fasting glucose   • Pain management   • Obesity (BMI 30-39.9)   • At risk for falls   • Chronic respiratory failure with hypoxia (HCC)   • External hemorrhoid, bleeding   • Stage 4 very severe COPD by GOLD classification ([facility])   • SVT (supraventricular tachycardia) ([facility])   • PVCs (premature ventricular contractions)   • Palpitations   • COVID-19   • Acute on chronic respiratory failure with hypoxia ([facility])   • DNR (do not resuscitate)   • Hyponatremia   • Peripheral vascular disease (HCC)   • Mild major depression (HCC)   • H/O opioid abuse (HCC)   • Amnesia   • Primary cancer of left upper lobe of lung (HCC)       CURRENT MEDICATIONS:  Current Outpatient Medications   Medication Sig Dispense Refill   • traMADol (ULTRAM) 50 MG Tab Take 50 mg by mouth every 6 hours as needed.     • calcium carbonate (OS-MICHELLE 500) 1250 (500 Ca) MG Tab Take 1,000 mg by mouth every day.     • Cholecalciferol (VITAMIN D3) 50 MCG (2000 UT) Tab      • omeprazole (PRILOSEC) 20 MG delayed-release capsule      • diclofenac DR (VOLTAREN) 50 MG Tablet Delayed Response      • benzonatate (TESSALON) 100 MG Cap Take 1 Capsule by mouth 3 times a day as needed for Cough. Do not chew. Swallow whole. 60  Capsule 1   • losartan (COZAAR) 50 MG Tab Take 1 tablet by mouth every day. 90 tablet 3   • azithromycin (ZITHROMAX) 250 MG Tab One tablet daily for COPD 30 tablet 6   • fluticasone (FLONASE) 50 MCG/ACT nasal spray Administer 1-2 Sprays into affected nostril(S) every day. Each nostril. 15.8 mL 3   • tizanidine (ZANAFLEX) 4 MG Tab Take 4 mg by mouth every 8 hours as needed. not to exceed 3 doses in 24 hours  Indications: Lower Backache, Muscle Spasticity     • DULoxetine (CYMBALTA) 30 MG Cap DR Particles Take 30 mg by mouth every day. Indications: Generalized Anxiety Disorder, Major Depressive Disorder, Musculoskeletal Pain, Disease of the Peripheral Nerves     • guaiFENesin ER (MUCINEX) 600 MG TABLET SR 12 HR Take 600-1,200 mg by mouth 2 times a day as needed. Indications: Cough     • Albuterol Sulfate 108 (90 Base) MCG/ACT AEROSOL POWDER, BREATH ACTIVATED Inhale 2 Puffs 4 times a day as needed. Indications: SOB     • hydrocortisone 1 % Cream Apply 1 Each topically 3 times a day as needed. Indications: Inflammation, Itching     • digoxin (LANOXIN) 125 MCG Tab Take 1 Tab by mouth every day. 90 Tab 3   • acetaminophen (TYLENOL) 650 MG CR tablet Take 650 mg by mouth every 6 hours as needed.     • sodium chloride (OCEAN) 0.65 % Solution Administer 2 Sprays into affected nostril(S) every 2 hours as needed.  3   • acyclovir (ZOVIRAX) 200 MG Cap Take 200 mg by mouth every day.     • Home Care Oxygen Inhale 6 L/min Continuous. Oxygen dose range: 6 to 6 L/min  Respiratory route via: Nasal Cannula   Oxygen supplier: Preferred         • fluticasone-salmeterol (ADVAIR DISKUS) 500-50 MCG/DOSE AEROSOL POWDER, BREATH ACTIVATED Inhale 1 Puff by mouth 2 times a day. 3 Each 3   • tiotropium (SPIRIVA HANDIHALER) 18 MCG Cap Inhale 1 Cap by mouth every day. 90 Cap 3   • VENTOLIN  (90 Base) MCG/ACT Aero Soln inhalation aerosol Inhale 2 Puffs by mouth every 6 hours as needed for Shortness of Breath. 1 Inhaler 2   • spironolactone  (ALDACTONE) 25 MG Tab TAKE ONE TABLET BY MOUTH ONE TIME DAILY 30 Tab 11   • montelukast (SINGULAIR) 10 MG Tab Take 1 Tab by mouth every day. 90 Tab 3   • Cholecalciferol (VITAMIN D3) 2000 UNIT Cap TAKE ONE CAPSULE BY MOUTH ONE TIME DAILY 30 Cap 3   • potassium chloride (MICRO-K) 10 MEQ capsule TAKE ONE CAPSULE BY MOUTH TWICE DAILY 60 Cap 6   • omeprazole (PRILOSEC OTC) 20 MG tablet Take 1 Tab by mouth every day. 30 Tab 11   • ipratropium-albuterol (DUONEB) 0.5-2.5 (3) MG/3ML nebulizer solution USE ONE VIAL IN NEBULIZER EVERY 4 HOURS AS NEEDED FOR SHORTNESS OF BREATH OR  WHEEZING 360 Vial 6   • HYDROcodone-acetaminophen (NORCO) 5-325 MG Tab per tablet      • terbinafine (LAMISIL) 250 MG Tab  (Patient not taking: Reported on 12/13/2021)     • azelastine (ASTELIN) 137 MCG/SPRAY nasal spray Administer 1-2 Sprays into affected nostril(S) 2 times a day. (Patient not taking: Reported on 12/13/2021) 30 mL 6   • predniSONE (DELTASONE) 10 MG Tab Take 30mg x 3 days, then take 20mg x 3 days, then take 10mg x 3 days, with food, then discontinue. 18 tablet 1   • diphenhydrAMINE (BENADRYL) 25 MG capsule Take 25 mg by mouth every four hours as needed. Indications: Itching, seasonal allergy (Patient not taking: Reported on 9/15/2021)     • bisacodyl (DULCOLAX) 5 MG EC tablet Take 5 mg by mouth 1 time daily as needed. 1 to 3 tabs in a single daily dose  Indications: Constipation (Patient not taking: Reported on 12/13/2021)     • Misc. Devices Misc This is an order for  7 foot high flow oxygen tubing  Dx; asthma with COPD J 44.9, supplemental oxygen-dependent Z 99.81, chronic respiratory failure with hypoxia and J 96        ODETTE 99 months   NPI [id number] 1 Each 0     No current facility-administered medications for this encounter.       ALLERGIES:  Iodine, Tape, and Meloxicam    REVIEW OF SYSTEMS:  A review of systems for today's date of service was reviewed and uploaded into the electronic medical record.    PHYSICAL  EXAM:  PERFORMANCE STATUS:  ECOG Performance Review 10/1/2021   ECOG Performance Status Restricted in physically strenuous activity but ambulatory and able to carry out work of a light or sedentary nature, e.g., light house work, office work   Some recent data might be hidden     Karnofsky Score 10/1/2021   Karnofsky Score 80   Some recent data might be hidden     /70   Pulse 60   Temp 36.8 °C (98.3 °F)   Resp (!) 26   Wt 81.6 kg (180 lb)   LMP 06/07/2001   SpO2 97%   PF (!) 6 L/min   BMI 31.89 kg/m²   Physical Exam  Constitutional:       General: She is not in acute distress.  Pulmonary:      Breath sounds: Normal air entry.   Skin:     Findings: No erythema.   Neurological:      Mental Status: She is alert.         LABORATORY DATA:   Lab Results   Component Value Date/Time    WBC 7.5 11/16/2020 0045    WBC 6.6 11/15/2020 0451    WBC 7.5 11/14/2020 0300    HEMOGLOBIN 14.8 11/16/2020 0045    HEMOGLOBIN 14.6 11/15/2020 0451    HEMOGLOBIN 13.3 11/14/2020 0300    HEMATOCRIT 44.1 11/16/2020 0045    HEMATOCRIT 43.4 11/15/2020 0451    HEMATOCRIT 39.3 11/14/2020 0300    MCV 88.2 11/16/2020 0045    MCV 89.7 11/15/2020 0451    MCV 89.1 11/14/2020 0300    PLATELETCT 396 11/16/2020 0045    PLATELETCT 344 11/15/2020 0451    PLATELETCT 289 11/14/2020 0300    NEUTS 4.76 11/16/2020 0045    NEUTS 5.07 11/15/2020 0451    NEUTS 5.67 11/14/2020 0300      Lab Results   Component Value Date/Time    SODIUM 140 09/15/2021 1551    SODIUM 140 08/19/2021 1217    SODIUM 132 (L) 11/21/2020 0648    POTASSIUM 3.5 (L) 09/15/2021 1551    POTASSIUM 4.0 08/19/2021 1217    POTASSIUM 4.2 11/21/2020 0648    BUN 10 09/15/2021 1551    BUN 23 (H) 08/19/2021 1217    BUN 18 11/21/2020 0648    CREATININE 0.51 09/15/2021 1551    CREATININE 0.38 (L) 08/19/2021 1217    CREATININE 0.47 (L) 11/21/2020 0648    CALCIUM 9.4 09/15/2021 1551    CALCIUM 9.8 08/19/2021 1217    CALCIUM 9.5 11/21/2020 0648    ALBUMIN 4.1 09/15/2021 1551    ALBUMIN 4.3  08/19/2021 1217    ALBUMIN 3.2 11/19/2020 0513    ASTSGOT 15 09/15/2021 1551    ASTSGOT 20 08/19/2021 1217    ASTSGOT 17 11/19/2020 0513    ALKPHOSPHAT 48 09/15/2021 1551    ALKPHOSPHAT 46 08/19/2021 1217    ALKPHOSPHAT 48 11/19/2020 0513    IFNOTAFR >60 09/15/2021 1551    IFNOTAFR >60 08/19/2021 1217    IFNOTAFR >60 11/21/2020 0648       RADIOLOGY DATA:  CT-CHEST (THORAX) W/O    Result Date: 12/8/2021 12/8/2021 12:52 PM HISTORY/REASON FOR EXAM:  NSCLC s/p SBRT. Clinical stage IA 2 left upper lobe lung cancer. TECHNIQUE/EXAM DESCRIPTION: CT scan of the chest without contrast. Noncontrast helical scanning of the chest was obtained from the lung apices through the adrenal glands. Low dose optimization technique was utilized for this CT exam including automated exposure control and adjustment of the mA and/or kV according to patient size. COMPARISON: 8/9/2021 FINDINGS: Lungs: The spiculated masslike opacity in the left upper lobe has decreased in size and is irregular in shape making measurement difficult. Estimated measurement is 12 x 9 mm (previously 17 x 11 mm). At the superior aspect there is a more ovoid nodular component which was not seen previously measuring 9 x 6 mm on image 15. The 3 mm nodule in the lingula is unchanged on image 54. There is a new parenchymal opacity in the anterior right upper lobe which is probably an area of pneumonitis. Again there is severe emphysematous change with bilateral areas of linear and nodular parenchymal scarring as well as subpleural hazy interstitial opacities in the lower lung zones without gross interval change. Mediastinum/Barbara: No significant adenopathy. Pleura: No pleural effusion. Cardiac: Heart upper limits of normal in size without pericardial effusion. Vascular: There is aortic atherosclerosis with coronary artery calcification. Soft tissues: Hypodensity in the right thyroid gland lobe is unchanged. Bones: No acute or destructive process. Upper abdomen: Gallstone  is again seen. There are no adrenal nodules and there is no upper abdominal adenopathy     1.  There has been a partial response to therapy with interval decrease in size of the irregularly shaped masslike opacity in the left upper lobe. 2.  There is a more nodular component at the superior aspect of the treated area which was not seen previously could be posttreatment change versus less likely malignancy. Close attention at follow-up is recommended. 3.  No change in a 3 mm lingular nodule. 4.  There is a new parenchymal opacity in the anterior right upper lobe most consistent with an area of pneumonitis. 5.  Severe emphysematous change with bilateral areas of scarring again seen. 6.  Atherosclerosis with coronary artery calcification. Fleischner Society pulmonary nodule recommendations: Not Applicable     DS-BONE DENSITY STUDY (DEXA)    Result Date: 12/7/2021 12/7/2021 9:11 AM HISTORY/REASON FOR EXAM:  Postmenopausal, osteopenia of the lumbar spine, history of fracture, low back surgery TECHNIQUE/EXAM DESCRIPTION AND NUMBER OF VIEWS: Dual x-ray bone densitometry was performed from the L2 through L4 levels and from the proximal left femur utilizing the GE Prodigy unit. COMPARISON: Report 7/14/2008 FINDINGS: The lumbar spine has a mean bone mineral density of 1.040 g/cm2, with a T score of -1.3 and a Z score of -0.2. Prior report lumbar bone mineral density was 0.881 g/centimeters squared. The proximal left femur has a mean bone mineral density of 0.885 g/cm2, with a T score of -1.0 and a Z score of 0.4. Prior report left femur bone mineral density was 0.841 g/centimeters squared.     According to the World Health Organization classification, bone mineral density of this patient is osteopenia with increased risk of fracture in the lumbar spine and osteopenia with increased risk of fracture in the left femur. 10-year Probability of Fracture: Major Osteoporotic     18.0% Hip     3.8% Population      [place] ()  Based on left femur neck BMD INTERPRETING LOCATION: [address] [place], [place] [address], [address]    MR-LUMBAR SPINE-W/O    Result Date: 11/28/2021 11/28/2021 2:54 PM HISTORY/REASON FOR EXAM:  Lumbar radiculopathy, no red flags, no prior management; none TECHNIQUE/EXAM DESCRIPTION: MRI of the lumbar spine without contrast. Using 1.5 T scanner an MRI examination of the lumbar spine was performed, sequences as follows without contrast: Sagittal T1, Sagittal T2, Sagittal STIR, Axial T2 images performed. CONTRAST:  None. COMPARISON:  MRI lumbar spine dated 4/6/2010 FINDINGS: The lowest intervertebral disc space will be described as L5-S1 for the purposes of this report. CONUS MEDULLARIS: The conus terminates at L1-L2 level and appears unremarkable. VERTEBRAL BODY AND DISCS: Multilevel disc height loss and desiccation with relative preservation of L5-S1. Vertebral body height and alignment are unremarkable. L1-2 body hemangioma. Marrow signal appears otherwise unchanged from 2010 exam. LEVEL BY LEVEL DISC IMAGING: T12-L1: Circumferential bulge without significant stenosis or neural foraminal narrowing. L1-2: Subarticular disc protrusion with mild stenosis of the lateral recess. No significant spinal canal stenosis. Mild left neural foraminal narrowing. No significant right neural foraminal narrowing L2-3: Diffuse disc bulge and ligamentum flavum thickening. Mild central stenosis. Moderate bilateral neural foraminal narrowing. L3-4: Diffuse disc bulge, bilateral facet joint hypertrophy, and ligament flavum thickening. Moderate spinal canal narrowing. Moderate bilateral neural foraminal narrowing. L4-5: Diffuse disc bulge and facet joint hypertrophy. No significant spinal canal stenosis. Moderate bilateral neural foraminal narrowing. L5-S1: No significant central stenosis or neural foraminal narrowing. Simple right renal cyst. Large gallstone partially imaged.     1.  Multilevel degenerative changes as detailed above, progressed from  April 2010. 2.  Moderate central canal narrowing at L3-L4. 3.  moderate bilateral neural foraminal narrowing at L2-L3 to L4-L5. 4.  Partially imaged cholelithiasis.      IMPRESSION:    A 76 y.o. with   Visit Diagnoses     ICD-10-CM   1. Malignant neoplasm of unspecified part of unspecified bronchus or lung (HCC)  C34.90     Primary cancer of left upper lobe of lung (HCC)  Staging form: Lung, AJCC 8th Edition  - Clinical stage from 10/1/2021: Stage IA2 (cT1b, cN0, cM0) - Signed by Israel Grimm M.D. on 10/1/2021      CANCER STATUS:  Disease Partial Response    RECOMMENDATIONS:   Patient has no evidence of disease recurrence and good radiologic response on post radiation CT chest scan.  I have ordered repeat CT chest in 6 months we will follow up with her at that time.    Thank you for the opportunity to participate in her care.  If any questions or comments, please do not hesitate in calling.    Orders Placed This Encounter   • CT-CHEST (THORAX) W/O   • traMADol (ULTRAM) 50 MG Tab   • calcium carbonate (OS-MICHELLE 500) 1250 (500 Ca) MG Tab   • HYDROcodone-acetaminophen (NORCO) 5-325 MG Tab per tablet

## 2021-12-13 NOTE — PROGRESS NOTES
RADIATION ONCOLOGY FOLLOW-UP    DATE OF SERVICE: 12/13/2021    IDENTIFICATION:   76 y.o. female with Stage IA2 BRANDON s/p SBRT completed 11/1/21    Visit Diagnoses     ICD-10-CM   1. Malignant neoplasm of unspecified part of unspecified bronchus or lung (HCC)  C34.90     Primary cancer of left upper lobe of lung (HCC)  Staging form: Lung, AJCC 8th Edition  - Clinical stage from 10/1/2021: Stage IA2 (cT1b, cN0, cM0) - Signed by Israel Grimm M.D. on 10/1/2021      RADIATION SUMMARY:  [facility] Treatment Information        Some values may be hidden. Unless noted otherwise, only the newest values recorded on each date are displayed.         [facility] Treatment Summary 11/1/21   Course First Treatment Date 10/20/2021    Course Last Treatment Date 11/01/2021    BRANDON Lung SBRT Plan from Course C1_LUL   Fraction 5 of 5    Elapsed Course Days 12 @ [id number]    Prescribed Fraction Dose 1,200 cGy    Prescribed Total Dose 6,000 cGy    ITVp Reference Point from Course C1_LUL   Elapsed Course Days 12 @ [id number]    Session Dose --    Total Dose 6,000 cGy    BRANDON cp Reference Point from Course C1_LUL   Elapsed Course Days 12 @ [id number]    Session Dose --    Total Dose 6,420 cGy     Some values recorded on this date have been omitted.              HISTORY OF PRESENT ILLNESS:   76-year-old female with history of COPD with emphysema FEV1 less than 1 L with PET avid left upper lobe lung lesion too high risk for biopsy or pulmonary intervention.  Patient had PET/CT August 19, 2021 which showed nodular left upper lobe opacity FDG avid concerning for malignancy.  Patient has chronic cough and is taking Tessalon Perls as well as Robitussin with codeine.  Patient denies any hemoptysis or weight loss.    Interval History:  She completed her radiation therapy November 1, 2021 with 50 Gray in 5 fractions and integrated boost to 60 Gray internal target volume.  Patient did well after radiation denies any worsening shortness of breath or  hemoptysis.  Does still have chronic cough and has tried Tessalon Perles and Robitussin with codeine with minimal resolution.  Patient had repeat CT chest December 8, 2021 which shows excellent shrinkage of tumor.  There is a new parenchymal opacity in the anterior right upper lobe most consistent with an area of pneumonitis and severe emphysematous changes noted.    PROBLEM LIST:  Patient Active Problem List   Diagnosis   • Asthma with COPD (HCC)   • Benign hypertension   • Mixed stress and urge urinary incontinence   • Peripheral edema   • Lumbar radicular pain   • Vitamin D deficiency disease   • Dyslipidemia   • Facet arthropathy, lumbar   • Chronic constipation   • Chronic pain syndrome   • Dependence on continuous supplemental oxygen   • Impaired fasting glucose   • Pain management   • Obesity (BMI 30-39.9)   • At risk for falls   • Chronic respiratory failure with hypoxia (HCC)   • External hemorrhoid, bleeding   • Stage 4 very severe COPD by GOLD classification ([facility])   • SVT (supraventricular tachycardia) ([facility])   • PVCs (premature ventricular contractions)   • Palpitations   • COVID-19   • Acute on chronic respiratory failure with hypoxia ([facility])   • DNR (do not resuscitate)   • Hyponatremia   • Peripheral vascular disease (HCC)   • Mild major depression (HCC)   • H/O opioid abuse (HCC)   • Amnesia   • Primary cancer of left upper lobe of lung (HCC)       CURRENT MEDICATIONS:  Current Outpatient Medications   Medication Sig Dispense Refill   • traMADol (ULTRAM) 50 MG Tab Take 50 mg by mouth every 6 hours as needed.     • calcium carbonate (OS-MICHELLE 500) 1250 (500 Ca) MG Tab Take 1,000 mg by mouth every day.     • Cholecalciferol (VITAMIN D3) 50 MCG (2000 UT) Tab      • omeprazole (PRILOSEC) 20 MG delayed-release capsule      • diclofenac DR (VOLTAREN) 50 MG Tablet Delayed Response      • benzonatate (TESSALON) 100 MG Cap Take 1 Capsule by mouth 3 times a day as needed for Cough. Do not chew. Swallow whole. 60  Capsule 1   • losartan (COZAAR) 50 MG Tab Take 1 tablet by mouth every day. 90 tablet 3   • azithromycin (ZITHROMAX) 250 MG Tab One tablet daily for COPD 30 tablet 6   • fluticasone (FLONASE) 50 MCG/ACT nasal spray Administer 1-2 Sprays into affected nostril(S) every day. Each nostril. 15.8 mL 3   • tizanidine (ZANAFLEX) 4 MG Tab Take 4 mg by mouth every 8 hours as needed. not to exceed 3 doses in 24 hours  Indications: Lower Backache, Muscle Spasticity     • DULoxetine (CYMBALTA) 30 MG Cap DR Particles Take 30 mg by mouth every day. Indications: Generalized Anxiety Disorder, Major Depressive Disorder, Musculoskeletal Pain, Disease of the Peripheral Nerves     • guaiFENesin ER (MUCINEX) 600 MG TABLET SR 12 HR Take 600-1,200 mg by mouth 2 times a day as needed. Indications: Cough     • Albuterol Sulfate 108 (90 Base) MCG/ACT AEROSOL POWDER, BREATH ACTIVATED Inhale 2 Puffs 4 times a day as needed. Indications: SOB     • hydrocortisone 1 % Cream Apply 1 Each topically 3 times a day as needed. Indications: Inflammation, Itching     • digoxin (LANOXIN) 125 MCG Tab Take 1 Tab by mouth every day. 90 Tab 3   • acetaminophen (TYLENOL) 650 MG CR tablet Take 650 mg by mouth every 6 hours as needed.     • sodium chloride (OCEAN) 0.65 % Solution Administer 2 Sprays into affected nostril(S) every 2 hours as needed.  3   • acyclovir (ZOVIRAX) 200 MG Cap Take 200 mg by mouth every day.     • Home Care Oxygen Inhale 6 L/min Continuous. Oxygen dose range: 6 to 6 L/min  Respiratory route via: Nasal Cannula   Oxygen supplier: Preferred         • fluticasone-salmeterol (ADVAIR DISKUS) 500-50 MCG/DOSE AEROSOL POWDER, BREATH ACTIVATED Inhale 1 Puff by mouth 2 times a day. 3 Each 3   • tiotropium (SPIRIVA HANDIHALER) 18 MCG Cap Inhale 1 Cap by mouth every day. 90 Cap 3   • VENTOLIN  (90 Base) MCG/ACT Aero Soln inhalation aerosol Inhale 2 Puffs by mouth every 6 hours as needed for Shortness of Breath. 1 Inhaler 2   • spironolactone  (ALDACTONE) 25 MG Tab TAKE ONE TABLET BY MOUTH ONE TIME DAILY 30 Tab 11   • montelukast (SINGULAIR) 10 MG Tab Take 1 Tab by mouth every day. 90 Tab 3   • Cholecalciferol (VITAMIN D3) 2000 UNIT Cap TAKE ONE CAPSULE BY MOUTH ONE TIME DAILY 30 Cap 3   • potassium chloride (MICRO-K) 10 MEQ capsule TAKE ONE CAPSULE BY MOUTH TWICE DAILY 60 Cap 6   • omeprazole (PRILOSEC OTC) 20 MG tablet Take 1 Tab by mouth every day. 30 Tab 11   • ipratropium-albuterol (DUONEB) 0.5-2.5 (3) MG/3ML nebulizer solution USE ONE VIAL IN NEBULIZER EVERY 4 HOURS AS NEEDED FOR SHORTNESS OF BREATH OR  WHEEZING 360 Vial 6   • HYDROcodone-acetaminophen (NORCO) 5-325 MG Tab per tablet      • terbinafine (LAMISIL) 250 MG Tab  (Patient not taking: Reported on 12/13/2021)     • azelastine (ASTELIN) 137 MCG/SPRAY nasal spray Administer 1-2 Sprays into affected nostril(S) 2 times a day. (Patient not taking: Reported on 12/13/2021) 30 mL 6   • predniSONE (DELTASONE) 10 MG Tab Take 30mg x 3 days, then take 20mg x 3 days, then take 10mg x 3 days, with food, then discontinue. 18 tablet 1   • diphenhydrAMINE (BENADRYL) 25 MG capsule Take 25 mg by mouth every four hours as needed. Indications: Itching, seasonal allergy (Patient not taking: Reported on 9/15/2021)     • bisacodyl (DULCOLAX) 5 MG EC tablet Take 5 mg by mouth 1 time daily as needed. 1 to 3 tabs in a single daily dose  Indications: Constipation (Patient not taking: Reported on 12/13/2021)     • Misc. Devices Misc This is an order for  7 foot high flow oxygen tubing  Dx; asthma with COPD J 44.9, supplemental oxygen-dependent Z 99.81, chronic respiratory failure with hypoxia and J 96        ODETTE 99 months   NPI [id number] 1 Each 0     No current facility-administered medications for this encounter.       ALLERGIES:  Iodine, Tape, and Meloxicam    REVIEW OF SYSTEMS:  A review of systems for today's date of service was reviewed and uploaded into the electronic medical record.    PHYSICAL  EXAM:  PERFORMANCE STATUS:  ECOG Performance Review 10/1/2021   ECOG Performance Status Restricted in physically strenuous activity but ambulatory and able to carry out work of a light or sedentary nature, e.g., light house work, office work   Some recent data might be hidden     Karnofsky Score 10/1/2021   Karnofsky Score 80   Some recent data might be hidden     /70   Pulse 60   Temp 36.8 °C (98.3 °F)   Resp (!) 26   Wt 81.6 kg (180 lb)   LMP 06/07/2001   SpO2 97%   PF (!) 6 L/min   BMI 31.89 kg/m²   Physical Exam  Constitutional:       General: She is not in acute distress.  Pulmonary:      Breath sounds: Normal air entry.   Skin:     Findings: No erythema.   Neurological:      Mental Status: She is alert.         LABORATORY DATA:   Lab Results   Component Value Date/Time    WBC 7.5 11/16/2020 0045    WBC 6.6 11/15/2020 0451    WBC 7.5 11/14/2020 0300    HEMOGLOBIN 14.8 11/16/2020 0045    HEMOGLOBIN 14.6 11/15/2020 0451    HEMOGLOBIN 13.3 11/14/2020 0300    HEMATOCRIT 44.1 11/16/2020 0045    HEMATOCRIT 43.4 11/15/2020 0451    HEMATOCRIT 39.3 11/14/2020 0300    MCV 88.2 11/16/2020 0045    MCV 89.7 11/15/2020 0451    MCV 89.1 11/14/2020 0300    PLATELETCT 396 11/16/2020 0045    PLATELETCT 344 11/15/2020 0451    PLATELETCT 289 11/14/2020 0300    NEUTS 4.76 11/16/2020 0045    NEUTS 5.07 11/15/2020 0451    NEUTS 5.67 11/14/2020 0300      Lab Results   Component Value Date/Time    SODIUM 140 09/15/2021 1551    SODIUM 140 08/19/2021 1217    SODIUM 132 (L) 11/21/2020 0648    POTASSIUM 3.5 (L) 09/15/2021 1551    POTASSIUM 4.0 08/19/2021 1217    POTASSIUM 4.2 11/21/2020 0648    BUN 10 09/15/2021 1551    BUN 23 (H) 08/19/2021 1217    BUN 18 11/21/2020 0648    CREATININE 0.51 09/15/2021 1551    CREATININE 0.38 (L) 08/19/2021 1217    CREATININE 0.47 (L) 11/21/2020 0648    CALCIUM 9.4 09/15/2021 1551    CALCIUM 9.8 08/19/2021 1217    CALCIUM 9.5 11/21/2020 0648    ALBUMIN 4.1 09/15/2021 1551    ALBUMIN 4.3  08/19/2021 1217    ALBUMIN 3.2 11/19/2020 0513    ASTSGOT 15 09/15/2021 1551    ASTSGOT 20 08/19/2021 1217    ASTSGOT 17 11/19/2020 0513    ALKPHOSPHAT 48 09/15/2021 1551    ALKPHOSPHAT 46 08/19/2021 1217    ALKPHOSPHAT 48 11/19/2020 0513    IFNOTAFR >60 09/15/2021 1551    IFNOTAFR >60 08/19/2021 1217    IFNOTAFR >60 11/21/2020 0648       RADIOLOGY DATA:  CT-CHEST (THORAX) W/O    Result Date: 12/8/2021 12/8/2021 12:52 PM HISTORY/REASON FOR EXAM:  NSCLC s/p SBRT. Clinical stage IA 2 left upper lobe lung cancer. TECHNIQUE/EXAM DESCRIPTION: CT scan of the chest without contrast. Noncontrast helical scanning of the chest was obtained from the lung apices through the adrenal glands. Low dose optimization technique was utilized for this CT exam including automated exposure control and adjustment of the mA and/or kV according to patient size. COMPARISON: 8/9/2021 FINDINGS: Lungs: The spiculated masslike opacity in the left upper lobe has decreased in size and is irregular in shape making measurement difficult. Estimated measurement is 12 x 9 mm (previously 17 x 11 mm). At the superior aspect there is a more ovoid nodular component which was not seen previously measuring 9 x 6 mm on image 15. The 3 mm nodule in the lingula is unchanged on image 54. There is a new parenchymal opacity in the anterior right upper lobe which is probably an area of pneumonitis. Again there is severe emphysematous change with bilateral areas of linear and nodular parenchymal scarring as well as subpleural hazy interstitial opacities in the lower lung zones without gross interval change. Mediastinum/Barbara: No significant adenopathy. Pleura: No pleural effusion. Cardiac: Heart upper limits of normal in size without pericardial effusion. Vascular: There is aortic atherosclerosis with coronary artery calcification. Soft tissues: Hypodensity in the right thyroid gland lobe is unchanged. Bones: No acute or destructive process. Upper abdomen: Gallstone  is again seen. There are no adrenal nodules and there is no upper abdominal adenopathy     1.  There has been a partial response to therapy with interval decrease in size of the irregularly shaped masslike opacity in the left upper lobe. 2.  There is a more nodular component at the superior aspect of the treated area which was not seen previously could be posttreatment change versus less likely malignancy. Close attention at follow-up is recommended. 3.  No change in a 3 mm lingular nodule. 4.  There is a new parenchymal opacity in the anterior right upper lobe most consistent with an area of pneumonitis. 5.  Severe emphysematous change with bilateral areas of scarring again seen. 6.  Atherosclerosis with coronary artery calcification. Fleischner Society pulmonary nodule recommendations: Not Applicable     DS-BONE DENSITY STUDY (DEXA)    Result Date: 12/7/2021 12/7/2021 9:11 AM HISTORY/REASON FOR EXAM:  Postmenopausal, osteopenia of the lumbar spine, history of fracture, low back surgery TECHNIQUE/EXAM DESCRIPTION AND NUMBER OF VIEWS: Dual x-ray bone densitometry was performed from the L2 through L4 levels and from the proximal left femur utilizing the GE Prodigy unit. COMPARISON: Report 7/14/2008 FINDINGS: The lumbar spine has a mean bone mineral density of 1.040 g/cm2, with a T score of -1.3 and a Z score of -0.2. Prior report lumbar bone mineral density was 0.881 g/centimeters squared. The proximal left femur has a mean bone mineral density of 0.885 g/cm2, with a T score of -1.0 and a Z score of 0.4. Prior report left femur bone mineral density was 0.841 g/centimeters squared.     According to the World Health Organization classification, bone mineral density of this patient is osteopenia with increased risk of fracture in the lumbar spine and osteopenia with increased risk of fracture in the left femur. 10-year Probability of Fracture: Major Osteoporotic     18.0% Hip     3.8% Population      [place] ()  Based on left femur neck BMD INTERPRETING LOCATION: [address] [place], [place] [address], [address]    MR-LUMBAR SPINE-W/O    Result Date: 11/28/2021 11/28/2021 2:54 PM HISTORY/REASON FOR EXAM:  Lumbar radiculopathy, no red flags, no prior management; none TECHNIQUE/EXAM DESCRIPTION: MRI of the lumbar spine without contrast. Using 1.5 T scanner an MRI examination of the lumbar spine was performed, sequences as follows without contrast: Sagittal T1, Sagittal T2, Sagittal STIR, Axial T2 images performed. CONTRAST:  None. COMPARISON:  MRI lumbar spine dated 4/6/2010 FINDINGS: The lowest intervertebral disc space will be described as L5-S1 for the purposes of this report. CONUS MEDULLARIS: The conus terminates at L1-L2 level and appears unremarkable. VERTEBRAL BODY AND DISCS: Multilevel disc height loss and desiccation with relative preservation of L5-S1. Vertebral body height and alignment are unremarkable. L1-2 body hemangioma. Marrow signal appears otherwise unchanged from 2010 exam. LEVEL BY LEVEL DISC IMAGING: T12-L1: Circumferential bulge without significant stenosis or neural foraminal narrowing. L1-2: Subarticular disc protrusion with mild stenosis of the lateral recess. No significant spinal canal stenosis. Mild left neural foraminal narrowing. No significant right neural foraminal narrowing L2-3: Diffuse disc bulge and ligamentum flavum thickening. Mild central stenosis. Moderate bilateral neural foraminal narrowing. L3-4: Diffuse disc bulge, bilateral facet joint hypertrophy, and ligament flavum thickening. Moderate spinal canal narrowing. Moderate bilateral neural foraminal narrowing. L4-5: Diffuse disc bulge and facet joint hypertrophy. No significant spinal canal stenosis. Moderate bilateral neural foraminal narrowing. L5-S1: No significant central stenosis or neural foraminal narrowing. Simple right renal cyst. Large gallstone partially imaged.     1.  Multilevel degenerative changes as detailed above, progressed from  April 2010. 2.  Moderate central canal narrowing at L3-L4. 3.  moderate bilateral neural foraminal narrowing at L2-L3 to L4-L5. 4.  Partially imaged cholelithiasis.      IMPRESSION:    A 76 y.o. with   Visit Diagnoses     ICD-10-CM   1. Malignant neoplasm of unspecified part of unspecified bronchus or lung (HCC)  C34.90     Primary cancer of left upper lobe of lung (HCC)  Staging form: Lung, AJCC 8th Edition  - Clinical stage from 10/1/2021: Stage IA2 (cT1b, cN0, cM0) - Signed by Israel Grimm M.D. on 10/1/2021      CANCER STATUS:  Disease Partial Response     RECOMMENDATIONS:   Patient has no evidence of disease recurrence and good radiologic response on post radiation CT chest scan.  I have ordered repeat CT chest in 6 months we will follow up with her at that time.     Thank you for the opportunity to participate in her care.  If any questions or comments, please do not hesitate in calling.      Orders Placed This Encounter   • CT-CHEST (THORAX) W/O   • traMADol (ULTRAM) 50 MG Tab   • calcium carbonate (OS-MICHELLE 500) 1250 (500 Ca) MG Tab   • HYDROcodone-acetaminophen (NORCO) 5-325 MG Tab per tablet

## 2021-12-14 RX ORDER — TIOTROPIUM BROMIDE 18 UG/1
CAPSULE ORAL; RESPIRATORY (INHALATION)
Qty: 90 CAPSULE | Refills: 3 | Status: SHIPPED | OUTPATIENT
Start: 2021-12-14 | End: 2023-08-24 | Stop reason: SDUPTHER

## 2021-12-15 DIAGNOSIS — J44.9 CHRONIC OBSTRUCTIVE PULMONARY DISEASE, UNSPECIFIED COPD TYPE (HCC): ICD-10-CM

## 2021-12-15 NOTE — TELEPHONE ENCOUNTER
Caller Name: Berny Renee                 Call Back Number: [phone] (home)         Patient approves a detailed voicemail message: N\A    Have we ever prescribed this med? Yes.  If yes, what date? 9/8/2020     Last OV: 7/8/21 Dr. Meza     Next OV: 2/24/22 Dr. Kumar     DX: Chronic obstructive pulmonary disease, unspecified COPD type (HCC) (J44.9)    Medications:  Current Outpatient Medications   Medication Sig Dispense Refill   • SPIRIVA HANDIHALER 18 MCG Cap INHALE 1 CAPSULE CONTENT BY MOUTH ONE TIME DAILY. 90 Capsule 3   • digoxin (LANOXIN) 125 MCG Tab TAKE ONE TABLET BY MOUTH ONCE A  Tablet 1   • traMADol (ULTRAM) 50 MG Tab Take 50 mg by mouth every 6 hours as needed.     • calcium carbonate (OS-MICHELLE 500) 1250 (500 Ca) MG Tab Take 1,000 mg by mouth every day.     • HYDROcodone-acetaminophen (NORCO) 5-325 MG Tab per tablet      • Cholecalciferol (VITAMIN D3) 50 MCG (2000 UT) Tab      • omeprazole (PRILOSEC) 20 MG delayed-release capsule      • diclofenac DR (VOLTAREN) 50 MG Tablet Delayed Response      • terbinafine (LAMISIL) 250 MG Tab  (Patient not taking: Reported on 12/13/2021)     • benzonatate (TESSALON) 100 MG Cap Take 1 Capsule by mouth 3 times a day as needed for Cough. Do not chew. Swallow whole. 60 Capsule 1   • losartan (COZAAR) 50 MG Tab Take 1 tablet by mouth every day. 90 tablet 3   • azithromycin (ZITHROMAX) 250 MG Tab One tablet daily for COPD 30 tablet 6   • fluticasone (FLONASE) 50 MCG/ACT nasal spray Administer 1-2 Sprays into affected nostril(S) every day. Each nostril. 15.8 mL 3   • azelastine (ASTELIN) 137 MCG/SPRAY nasal spray Administer 1-2 Sprays into affected nostril(S) 2 times a day. (Patient not taking: Reported on 12/13/2021) 30 mL 6   • predniSONE (DELTASONE) 10 MG Tab Take 30mg x 3 days, then take 20mg x 3 days, then take 10mg x 3 days, with food, then discontinue. 18 tablet 1   • tizanidine (ZANAFLEX) 4 MG Tab Take 4 mg by mouth every 8 hours as needed. not to  exceed 3 doses in 24 hours  Indications: Lower Backache, Muscle Spasticity     • DULoxetine (CYMBALTA) 30 MG Cap DR Particles Take 30 mg by mouth every day. Indications: Generalized Anxiety Disorder, Major Depressive Disorder, Musculoskeletal Pain, Disease of the Peripheral Nerves     • diphenhydrAMINE (BENADRYL) 25 MG capsule Take 25 mg by mouth every four hours as needed. Indications: Itching, seasonal allergy (Patient not taking: Reported on 9/15/2021)     • bisacodyl (DULCOLAX) 5 MG EC tablet Take 5 mg by mouth 1 time daily as needed. 1 to 3 tabs in a single daily dose  Indications: Constipation (Patient not taking: Reported on 12/13/2021)     • guaiFENesin ER (MUCINEX) 600 MG TABLET SR 12 HR Take 600-1,200 mg by mouth 2 times a day as needed. Indications: Cough     • Albuterol Sulfate 108 (90 Base) MCG/ACT AEROSOL POWDER, BREATH ACTIVATED Inhale 2 Puffs 4 times a day as needed. Indications: SOB     • hydrocortisone 1 % Cream Apply 1 Each topically 3 times a day as needed. Indications: Inflammation, Itching     • acetaminophen (TYLENOL) 650 MG CR tablet Take 650 mg by mouth every 6 hours as needed.     • sodium chloride (OCEAN) 0.65 % Solution Administer 2 Sprays into affected nostril(S) every 2 hours as needed.  3   • acyclovir (ZOVIRAX) 200 MG Cap Take 200 mg by mouth every day.     • Home Care Oxygen Inhale 6 L/min Continuous. Oxygen dose range: 6 to 6 L/min  Respiratory route via: Nasal Cannula   Oxygen supplier: Preferred         • fluticasone-salmeterol (ADVAIR DISKUS) 500-50 MCG/DOSE AEROSOL POWDER, BREATH ACTIVATED Inhale 1 Puff by mouth 2 times a day. 3 Each 3   • VENTOLIN  (90 Base) MCG/ACT Aero Soln inhalation aerosol Inhale 2 Puffs by mouth every 6 hours as needed for Shortness of Breath. 1 Inhaler 2   • Misc. Devices Misc This is an order for  7 foot high flow oxygen tubing  Dx; asthma with COPD J 44.9, supplemental oxygen-dependent Z 99.81, chronic respiratory failure with hypoxia and J 96         ODETTE 99 months   NPI [id number] 1 Each 0   • spironolactone (ALDACTONE) 25 MG Tab TAKE ONE TABLET BY MOUTH ONE TIME DAILY 30 Tab 11   • montelukast (SINGULAIR) 10 MG Tab Take 1 Tab by mouth every day. 90 Tab 3   • Cholecalciferol (VITAMIN D3) 2000 UNIT Cap TAKE ONE CAPSULE BY MOUTH ONE TIME DAILY 30 Cap 3   • potassium chloride (MICRO-K) 10 MEQ capsule TAKE ONE CAPSULE BY MOUTH TWICE DAILY 60 Cap 6   • omeprazole (PRILOSEC OTC) 20 MG tablet Take 1 Tab by mouth every day. 30 Tab 11   • ipratropium-albuterol (DUONEB) 0.5-2.5 (3) MG/3ML nebulizer solution USE ONE VIAL IN NEBULIZER EVERY 4 HOURS AS NEEDED FOR SHORTNESS OF BREATH OR  WHEEZING 360 Vial 6     No current facility-administered medications for this visit.

## 2022-01-05 ENCOUNTER — OFFICE VISIT (OUTPATIENT)
Dept: CARDIOLOGY | Facility: MEDICAL CENTER | Age: 77
End: 2022-01-05
Payer: MEDICARE

## 2022-01-05 VITALS
WEIGHT: 185 LBS | HEART RATE: 81 BPM | BODY MASS INDEX: 31.58 KG/M2 | DIASTOLIC BLOOD PRESSURE: 70 MMHG | HEIGHT: 64 IN | OXYGEN SATURATION: 98 % | RESPIRATION RATE: 16 BRPM | SYSTOLIC BLOOD PRESSURE: 130 MMHG

## 2022-01-05 DIAGNOSIS — R00.2 PALPITATIONS: ICD-10-CM

## 2022-01-05 DIAGNOSIS — I49.3 PVCS (PREMATURE VENTRICULAR CONTRACTIONS): ICD-10-CM

## 2022-01-05 DIAGNOSIS — I47.10 SVT (SUPRAVENTRICULAR TACHYCARDIA) (HCC): ICD-10-CM

## 2022-01-05 PROCEDURE — 99214 OFFICE O/P EST MOD 30 MIN: CPT | Performed by: INTERNAL MEDICINE

## 2022-01-05 ASSESSMENT — ENCOUNTER SYMPTOMS
PALPITATIONS: 0
SHORTNESS OF BREATH: 0
LOSS OF CONSCIOUSNESS: 0
MYALGIAS: 0
DIZZINESS: 0
COUGH: 1

## 2022-01-05 ASSESSMENT — FIBROSIS 4 INDEX: FIB4 SCORE: 0.83

## 2022-01-05 NOTE — PROGRESS NOTES
Chief Complaint   Patient presents with   • Supraventricular Tachycardia (SVT)   • HTN (Controlled)   • Premature Ventricular Contractions (PVCs)       Subjective     isreal Renee is a 76 y.o. female who presents today for follow-up evaluation of palpitations, SVT, PVCs and hypertension.      Since 2/12/2021 appointment with me the patient was diagnosed with BRANDON lung cancer based on chest CT and PET scan and has completed radiation therapy.  Had a normal echocardiogram and MPI on 3/30/2021 for evaluation of CP and SOB.  Has occasional heart pounding but no heart racing.  Has a persistent nonproductive cough.  Followed by Ihsan Kumar MD pulmonary clinic and had [facility] for chronic pain syndrome.      Previous coronary evaluation includes a normal coronary angiogram 2004, normal MPI 2012 and normal cardiac PET scan 2019.      Past Medical History:   Diagnosis Date   • Arthritis     spine   • Cataract immature    • Chronic pain    • Colon polyps    • COPD (chronic obstructive pulmonary disease) ([facility]) 2002    moderate to severe   • DDD (degenerative disc disease), cervical    • DDD (degenerative disc disease), thoracic    • Diverticulitis of colon    • Dyslipidemia    • EMPHYSEMA    • Hypertension    • REGINE (obstructive sleep apnea)    • OSTEOPOROSIS    • Spinal stenosis, lumbar region, with neurogenic claudication    • URINARY INCONTINENCE    • Vitamin d deficiency      Past Surgical History:   Procedure Laterality Date   • LUMBAR LAMINECTOMY DISKECTOMY  6/22/2010    Performed by GRACIE CANO at SURGERY [facility] ORS   • OTHER ORTHOPEDIC SURGERY  2004    left ankle fx   • OTHER      leg fracture   • OTHER      t spine disc surg   • TONSILLECTOMY       Family History   Problem Relation Age of Onset   • Cancer Father         Lung   • Other Sister         lung and leukemia cancer   • Cancer Sister         Leukemia, Lung     Social History     Socioeconomic History   • Marital status:       Spouse name: Not on file   • Number of children: Not on file   • Years of education: Not on file   • Highest education level: Not on file   Occupational History   • Not on file   Tobacco Use   • Smoking status: Former Smoker     Packs/day: 2.00     Years: 30.00     Pack years: 60.00     Types: Cigarettes     Quit date: 3/1/1999     Years since quittin.8   • Smokeless tobacco: Never Used   • Tobacco comment: 3 pks a day for 35 yrs, continued abstinence   Vaping Use   • Vaping Use: Never used   Substance and Sexual Activity   • Alcohol use: Not Currently     Alcohol/week: 4.2 oz     Types: 7 Glasses of wine per week   • Drug use: Not Currently     Types: Marijuana, Oral     Comment: Medical Marijuana    • Sexual activity: Never   Other Topics Concern   • Not on file   Social History Narrative    ** Merged History Encounter **          Social Determinants of Health     Financial Resource Strain:    • Difficulty of Paying Living Expenses: Not on file   Food Insecurity:    • Worried About Running Out of Food in the Last Year: Not on file   • Ran Out of Food in the Last Year: Not on file   Transportation Needs:    • Lack of Transportation (Medical): Not on file   • Lack of Transportation (Non-Medical): Not on file   Physical Activity:    • Days of Exercise per Week: Not on file   • Minutes of Exercise per Session: Not on file   Stress:    • Feeling of Stress : Not on file   Social Connections:    • Frequency of Communication with Friends and Family: Not on file   • Frequency of Social Gatherings with Friends and Family: Not on file   • Attends Orthodox Services: Not on file   • Active Member of Clubs or Organizations: Not on file   • Attends Club or Organization Meetings: Not on file   • Marital Status: Not on file   Intimate Partner Violence:    • Fear of Current or Ex-Partner: Not on file   • Emotionally Abused: Not on file   • Physically Abused: Not on file   • Sexually Abused: Not on file   Housing Stability:     • Unable to Pay for Housing in the Last Year: Not on file   • Number of Places Lived in the Last Year: Not on file   • Unstable Housing in the Last Year: Not on file     Allergies   Allergen Reactions   • Iodine      Convulsion  I.V.  iodine   • Tape Rash and Itching   • Meloxicam Rash and Itching     Broke out in itchy rash     Outpatient Encounter Medications as of 1/5/2022   Medication Sig Dispense Refill   • ADVAIR DISKUS 500-50 MCG/DOSE AEROSOL POWDER, BREATH ACTIVATED INHALE ONE PUFF BY MOUTH TWICE DAILY 3 Each 3   • SPIRIVA HANDIHALER 18 MCG Cap INHALE 1 CAPSULE CONTENT BY MOUTH ONE TIME DAILY. 90 Capsule 3   • digoxin (LANOXIN) 125 MCG Tab TAKE ONE TABLET BY MOUTH ONCE A  Tablet 1   • calcium carbonate (OS-MICHELLE 500) 1250 (500 Ca) MG Tab Take 1,000 mg by mouth every day.     • HYDROcodone-acetaminophen (NORCO) 5-325 MG Tab per tablet      • benzonatate (TESSALON) 100 MG Cap Take 1 Capsule by mouth 3 times a day as needed for Cough. Do not chew. Swallow whole. 60 Capsule 1   • losartan (COZAAR) 50 MG Tab Take 1 tablet by mouth every day. 90 tablet 3   • azithromycin (ZITHROMAX) 250 MG Tab One tablet daily for COPD 30 tablet 6   • predniSONE (DELTASONE) 10 MG Tab Take 30mg x 3 days, then take 20mg x 3 days, then take 10mg x 3 days, with food, then discontinue. 18 tablet 1   • tizanidine (ZANAFLEX) 4 MG Tab Take 4 mg by mouth every 8 hours as needed. not to exceed 3 doses in 24 hours  Indications: Lower Backache, Muscle Spasticity     • DULoxetine (CYMBALTA) 30 MG Cap DR Particles Take 30 mg by mouth every day. Indications: Generalized Anxiety Disorder, Major Depressive Disorder, Musculoskeletal Pain, Disease of the Peripheral Nerves     • diphenhydrAMINE (BENADRYL) 25 MG capsule Take 25 mg by mouth every four hours as needed. Indications: Itching, seasonal allergy     • bisacodyl (DULCOLAX) 5 MG EC tablet Take 5 mg by mouth 1 time a day as needed. 1 to 3 tabs in a single daily dose  Indications:  Constipation     • guaiFENesin ER (MUCINEX) 600 MG TABLET SR 12 HR Take 600-1,200 mg by mouth 2 times a day as needed. Indications: Cough     • Albuterol Sulfate 108 (90 Base) MCG/ACT AEROSOL POWDER, BREATH ACTIVATED Inhale 2 Puffs 4 times a day as needed. Indications: SOB     • hydrocortisone 1 % Cream Apply 1 Each topically 3 times a day as needed. Indications: Inflammation, Itching     • acyclovir (ZOVIRAX) 200 MG Cap Take 200 mg by mouth every day.     • Home Care Oxygen Inhale 6 L/min Continuous. Oxygen dose range: 6 to 6 L/min  Respiratory route via: Nasal Cannula   Oxygen supplier: Preferred         • VENTOLIN  (90 Base) MCG/ACT Aero Soln inhalation aerosol Inhale 2 Puffs by mouth every 6 hours as needed for Shortness of Breath. 1 Inhaler 2   • Misc. Devices Misc This is an order for  7 foot high flow oxygen tubing  Dx; asthma with COPD J 44.9, supplemental oxygen-dependent Z 99.81, chronic respiratory failure with hypoxia and J 96        ODETTE 99 months   NPI [id number] 1 Each 0   • spironolactone (ALDACTONE) 25 MG Tab TAKE ONE TABLET BY MOUTH ONE TIME DAILY 30 Tab 11   • montelukast (SINGULAIR) 10 MG Tab Take 1 Tab by mouth every day. 90 Tab 3   • Cholecalciferol (VITAMIN D3) 2000 UNIT Cap TAKE ONE CAPSULE BY MOUTH ONE TIME DAILY 30 Cap 3   • potassium chloride (MICRO-K) 10 MEQ capsule TAKE ONE CAPSULE BY MOUTH TWICE DAILY 60 Cap 6   • omeprazole (PRILOSEC OTC) 20 MG tablet Take 1 Tab by mouth every day. 30 Tab 11   • ipratropium-albuterol (DUONEB) 0.5-2.5 (3) MG/3ML nebulizer solution USE ONE VIAL IN NEBULIZER EVERY 4 HOURS AS NEEDED FOR SHORTNESS OF BREATH OR  WHEEZING 360 Vial 6   • [DISCONTINUED] Calcium Carbonate (SUPER CALCIUM) 1500 (600 Ca) MG Tab 1 tablet with meals (Patient not taking: Reported on 1/5/2022)     • traMADol (ULTRAM) 50 MG Tab Take 50 mg by mouth every 6 hours as needed. (Patient not taking: Reported on 1/5/2022)     • [DISCONTINUED] Cholecalciferol (VITAMIN D3) 50 MCG (2000 UT)  "Tab  (Patient not taking: Reported on 1/5/2022)     • [DISCONTINUED] omeprazole (PRILOSEC) 20 MG delayed-release capsule  (Patient not taking: Reported on 1/5/2022)     • terbinafine (LAMISIL) 250 MG Tab  (Patient not taking: Reported on 1/5/2022)     • [DISCONTINUED] diclofenac DR (VOLTAREN) 50 MG Tablet Delayed Response  (Patient not taking: Reported on 1/5/2022)     • fluticasone (FLONASE) 50 MCG/ACT nasal spray Administer 1-2 Sprays into affected nostril(S) every day. Each nostril. (Patient not taking: Reported on 1/5/2022) 15.8 mL 3   • azelastine (ASTELIN) 137 MCG/SPRAY nasal spray Administer 1-2 Sprays into affected nostril(S) 2 times a day. (Patient not taking: Reported on 1/5/2022) 30 mL 6   • acetaminophen (TYLENOL) 650 MG CR tablet Take 650 mg by mouth every 6 hours as needed. (Patient not taking: Reported on 1/5/2022)     • sodium chloride (OCEAN) 0.65 % Solution Administer 2 Sprays into affected nostril(S) every 2 hours as needed. (Patient not taking: Reported on 1/5/2022)  3     No facility-administered encounter medications on file as of 1/5/2022.     Review of Systems   Respiratory: Positive for cough. Negative for shortness of breath.    Cardiovascular: Negative for chest pain and palpitations.   Musculoskeletal: Negative for myalgias.   Neurological: Negative for dizziness and loss of consciousness.       CARDIAC PET SCAN 09/06/2019  Ventricular ejection fraction 68%.  Normal perfusion scan.     ECHOCARDIOGRAM 03/17/2013  Technically difficult study.   Probably normal left ventricular size, thickness and systolic function.   Borderline diastolic dysfunction.  Possible small pericardial effusion, without evidence of hemodynamic   compromise, and possible epicardial fat pad.  Mild mitral regurgitation.        Objective     /70 (BP Location: Left arm, Patient Position: Sitting, BP Cuff Size: Adult)   Pulse 81   Resp 16   Ht 1.626 m (5' 4\")   Wt 83.9 kg (185 lb)   LMP 06/07/2001   SpO2 98%  "  BMI 31.76 kg/m²     Physical Exam  Constitutional:       Appearance: She is well-developed.      Comments: On O2 nasal cannula.   Eyes:      Conjunctiva/sclera: Conjunctivae normal.      Pupils: Pupils are equal, round, and reactive to light.   Neck:      Vascular: No JVD.   Cardiovascular:      Rate and Rhythm: Normal rate and regular rhythm.      Heart sounds: Normal heart sounds.   Pulmonary:      Effort: Pulmonary effort is normal. No accessory muscle usage or respiratory distress.      Breath sounds: Decreased breath sounds present. No wheezing or rales.   Musculoskeletal:      Cervical back: Normal range of motion and neck supple.   Skin:     General: Skin is warm and dry.      Findings: No rash.      Nails: There is no clubbing.   Neurological:      Mental Status: She is alert and oriented to person, place, and time.   Psychiatric:         Behavior: Behavior normal.               Assessment & Plan     1. SVT (supraventricular tachycardia) (HCC)  Basic Metabolic Panel    DIGOXIN   2. Palpitations     3. PVCs (premature ventricular contractions)         Medical Decision Making: Today's Assessment/Status/Plan:   Assessment  1.  SVT.  2.  PVCs.  3.  Palpitations.  4.  Hypertension.  5.  Chronic respiratory failure.  6.  COPD, O2 dependent, severe.  7. COVID-19 infection 11/2020.  8.  Lung cancer 8/2021 S/P radiation therapy.  9.  Persistent cough.  10.  Chronic pain syndrome.    Recommendation Discussion  1.  The patient is stable from a cardiac standpoint concerning her SVT, PVCs and hypertension.  2.  Check digoxin level and BMP.  3.  RTC 6 months.

## 2022-01-22 NOTE — PATIENT INSTRUCTIONS
This lady comes in today for follow-up of her Covid pneumonia, she was seen in November and hospitalized.  She took Decadron, and has required supplemental oxygen and has not recovered her stamina or strength.  She is very concerned that she has lost ground, comes in today in a motorized wheelchair, on oxygen.  She has been hypoxic and on oxygen since March 2020.  Prior lung function testing showed severe COPD.  She does have albuterol.    She saw Dr. Campbell, echocardiogram is pending at this time.    The immediate concern would be whether or not her pneumonia is cleared, I am doing a chest x-ray to make certain that what was seen in November has resolved.    Have her several months to heal up, and then reassess her oxygen needs as well as lung function testing, hopefully COPD is the predominant pattern but it is possible that she had some impact with pneumonitis and may have lost lung volumes, that remains to be determined.  
Resident

## 2022-02-04 PROBLEM — J43.1 PANLOBULAR EMPHYSEMA (HCC): Status: ACTIVE | Noted: 2022-02-04

## 2022-02-04 PROBLEM — F33.41 RECURRENT MAJOR DEPRESSIVE DISORDER, IN PARTIAL REMISSION (HCC): Status: ACTIVE | Noted: 2021-07-15

## 2022-02-04 PROBLEM — I50.812 CHRONIC RIGHT-SIDED HEART FAILURE (HCC): Status: ACTIVE | Noted: 2022-02-04

## 2022-02-04 PROBLEM — J41.8 MIXED SIMPLE AND MUCOPURULENT CHRONIC BRONCHITIS (HCC): Status: ACTIVE | Noted: 2022-02-04

## 2022-02-04 PROBLEM — M85.88 OSTEOPENIA OF LUMBAR SPINE: Status: ACTIVE | Noted: 2022-02-04

## 2022-02-04 PROBLEM — F11.20 OPIOID TYPE DEPENDENCE, CONTINUOUS (HCC): Status: ACTIVE | Noted: 2022-02-04

## 2022-02-04 PROBLEM — J96.11 CHRONIC RESPIRATORY FAILURE WITH HYPOXIA (HCC): Status: RESOLVED | Noted: 2017-07-13 | Resolved: 2022-02-04

## 2022-02-04 PROBLEM — R73.03 PREDIABETES: Status: ACTIVE | Noted: 2022-02-04

## 2022-02-04 PROBLEM — F11.11 H/O OPIOID ABUSE (HCC): Status: RESOLVED | Noted: 2021-07-15 | Resolved: 2022-02-04

## 2022-02-24 ENCOUNTER — OFFICE VISIT (OUTPATIENT)
Dept: SLEEP MEDICINE | Facility: MEDICAL CENTER | Age: 77
End: 2022-02-24
Payer: MEDICARE

## 2022-02-24 VITALS
HEIGHT: 63 IN | SYSTOLIC BLOOD PRESSURE: 134 MMHG | OXYGEN SATURATION: 93 % | BODY MASS INDEX: 32.17 KG/M2 | DIASTOLIC BLOOD PRESSURE: 60 MMHG | HEART RATE: 71 BPM | WEIGHT: 181.56 LBS

## 2022-02-24 DIAGNOSIS — C34.12 PRIMARY CANCER OF LEFT UPPER LOBE OF LUNG (HCC): ICD-10-CM

## 2022-02-24 DIAGNOSIS — J44.9 CHRONIC OBSTRUCTIVE PULMONARY DISEASE, UNSPECIFIED COPD TYPE (HCC): ICD-10-CM

## 2022-02-24 DIAGNOSIS — J96.11 CHRONIC RESPIRATORY FAILURE WITH HYPOXIA (HCC): ICD-10-CM

## 2022-02-24 DIAGNOSIS — J44.89 ASTHMA WITH COPD (HCC): ICD-10-CM

## 2022-02-24 DIAGNOSIS — C34.90 MALIGNANT NEOPLASM OF UNSPECIFIED PART OF UNSPECIFIED BRONCHUS OR LUNG (HCC): ICD-10-CM

## 2022-02-24 DIAGNOSIS — J44.9 STAGE 4 VERY SEVERE COPD BY GOLD CLASSIFICATION (HCC): ICD-10-CM

## 2022-02-24 PROCEDURE — 99214 OFFICE O/P EST MOD 30 MIN: CPT | Performed by: INTERNAL MEDICINE

## 2022-02-24 RX ORDER — MONTELUKAST SODIUM 10 MG/1
10 TABLET ORAL DAILY
Qty: 90 TABLET | Refills: 3 | Status: SHIPPED | OUTPATIENT
Start: 2022-02-24

## 2022-02-24 RX ORDER — ALBUTEROL SULFATE 2.5 MG/3ML
2.5 SOLUTION RESPIRATORY (INHALATION) EVERY 4 HOURS PRN
Qty: 75 ML | Refills: 3 | Status: SHIPPED | OUTPATIENT
Start: 2022-02-24 | End: 2022-10-12

## 2022-02-24 RX ORDER — AZITHROMYCIN 250 MG/1
TABLET, FILM COATED ORAL
Qty: 90 TABLET | Refills: 6 | Status: SHIPPED | OUTPATIENT
Start: 2022-02-24

## 2022-02-24 RX ORDER — IPRATROPIUM BROMIDE AND ALBUTEROL SULFATE 2.5; .5 MG/3ML; MG/3ML
SOLUTION RESPIRATORY (INHALATION)
Qty: 360 EACH | Refills: 6 | Status: SHIPPED | OUTPATIENT
Start: 2022-02-24 | End: 2024-02-05

## 2022-02-24 ASSESSMENT — ENCOUNTER SYMPTOMS
SORE THROAT: 0
PSYCHIATRIC NEGATIVE: 1
EYE PAIN: 0
ORTHOPNEA: 0
FOCAL WEAKNESS: 0
COUGH: 1
WHEEZING: 0
SHORTNESS OF BREATH: 1
HEADACHES: 0
FEVER: 0
WEIGHT LOSS: 0
ABDOMINAL PAIN: 0
VOMITING: 0
EYE DISCHARGE: 0
NAUSEA: 0
SENSORY CHANGE: 0
STRIDOR: 0
CHILLS: 0
DIZZINESS: 0
DIARRHEA: 0
SINUS PAIN: 0
SPUTUM PRODUCTION: 1
LOSS OF CONSCIOUSNESS: 0
MYALGIAS: 0

## 2022-02-24 ASSESSMENT — FIBROSIS 4 INDEX: FIB4 SCORE: 0.83

## 2022-02-24 NOTE — ASSESSMENT & PLAN NOTE
FEV1 0.89L (45%)  Advair 500/Spiriva/Singulair/daily azithromycin  Inquiring about BLVR  -- referral to pulm rehab  -- Repeat CT chest 6/2022 changed to valve protocol, should not affect surveillance for lung cancer

## 2022-02-24 NOTE — PROGRESS NOTES
Pulmonary Clinic Note    Chief Complaint:  Chief Complaint   Patient presents with   • COPD     Last seen 07/08/21   • Other     Ct-Chest 12/08/21     HPI:   Berny Renee is a very pleasant 76 y.o. female former tobacco smoker 60 pack years, quit 1999 who is followed in our clinic for COPD  FEV1 0.89L (45%), chronic hypoxemic respiratory failure on 3-6LPM last seen by Dr Meza in 7/2021. She is managed on Advair 500/Spiriva/Singulair. UTD with vaccinations. Contracted COVID in late 2020.    At her last visit with Dr Meza, a CT was ordered due to cough. This revealed a linear nodule in the left upper lobe, new since prior imaging in 2013. A subsequent PET scan showed nodule to be FDG avid. Case presented to Lung tumor board in 9/2021, consensus was to pursue SBRT given high risk for biopsy and poor procedural candidate given emphysema, oxygen dependence and FEV1 < 1L    She completed radiation in 11/2021 with subsequent CT chest in 12/2021 showing excellent response. There is a small area of suspected pneumonitis in the right upper lobe. Planned for repeat CT in 6 months    Presents today for routine followup  Symptomatically, she states she is recovering from a long bout of bronchitis.  It is finally improving.  She uses her rescue inhaler about 1 time per month.  She is inquiring about bronchoscopic lung volume reduction.    MMRC Grade: 2: Walks slower than friends due to breathlessness, has to stop at own pace    Past Medical History:   Diagnosis Date   • Arthritis     spine   • Asthma with COPD ([facility])    • Cataract immature    • Chronic pain    • Chronic respiratory failure with hypoxia ([facility]) 7/13/2017   • Colon polyps    • COPD (chronic obstructive pulmonary disease) ([facility]) 2002    moderate to severe   • DDD (degenerative disc disease), cervical    • DDD (degenerative disc disease), thoracic    • Diverticulitis of colon    • Dyslipidemia    • EMPHYSEMA    • H/O opioid abuse ([facility]) 7/15/2021   • Hypertension    •  REGINE (obstructive sleep apnea)    • OSTEOPOROSIS    • Spinal stenosis, lumbar region, with neurogenic claudication    • URINARY INCONTINENCE    • Vitamin d deficiency        Past Surgical History:   Procedure Laterality Date   • LUMBAR LAMINECTOMY DISKECTOMY  2010    Performed by GRACIE CANO at SURGERY [facility]   • OTHER ORTHOPEDIC SURGERY      left ankle fx   • OTHER      leg fracture   • OTHER      t spine disc surg   • TONSILLECTOMY         Social History     Socioeconomic History   • Marital status:      Spouse name: Not on file   • Number of children: Not on file   • Years of education: Not on file   • Highest education level: Not on file   Occupational History   • Not on file   Tobacco Use   • Smoking status: Former Smoker     Packs/day: 2.00     Years: 30.00     Pack years: 60.00     Types: Cigarettes     Quit date: 3/1/1999     Years since quittin.0   • Smokeless tobacco: Never Used   • Tobacco comment: 3 pks a day for 35 yrs, continued abstinence   Vaping Use   • Vaping Use: Never used   Substance and Sexual Activity   • Alcohol use: Not Currently     Alcohol/week: 4.2 oz     Types: 7 Glasses of wine per week   • Drug use: Not Currently     Types: Marijuana, Oral     Comment: Medical Marijuana    • Sexual activity: Never   Other Topics Concern   • Not on file   Social History Narrative    ** Merged History Encounter **          Social Determinants of Health     Financial Resource Strain: Not on file   Food Insecurity: Not on file   Transportation Needs: Not on file   Physical Activity: Not on file   Stress: Not on file   Social Connections: Not on file   Intimate Partner Violence: Not on file   Housing Stability: Not on file          Family History   Problem Relation Age of Onset   • Cancer Father         Lung   • Other Sister         lung and leukemia cancer   • Cancer Sister         Leukemia, Lung       Current Outpatient Medications on File Prior to Visit   Medication Sig  Dispense Refill   • SPIRIVA HANDIHALER 18 MCG Cap INHALE 1 CAPSULE CONTENT BY MOUTH ONE TIME DAILY. 90 Capsule 3   • digoxin (LANOXIN) 125 MCG Tab TAKE ONE TABLET BY MOUTH ONCE A  Tablet 1   • calcium carbonate (OS-MICHELLE 500) 1250 (500 Ca) MG Tab Take 1,000 mg by mouth every day.     • losartan (COZAAR) 50 MG Tab Take 1 tablet by mouth every day. 90 tablet 3   • fluticasone (FLONASE) 50 MCG/ACT nasal spray Administer 1-2 Sprays into affected nostril(S) every day. Each nostril. 15.8 mL 3   • tizanidine (ZANAFLEX) 4 MG Tab Take 4 mg by mouth every 8 hours as needed. not to exceed 3 doses in 24 hours  Indications: Lower Backache, Muscle Spasticity     • DULoxetine (CYMBALTA) 30 MG Cap DR Particles Take 30 mg by mouth every day. Indications: Generalized Anxiety Disorder, Major Depressive Disorder, Musculoskeletal Pain, Disease of the Peripheral Nerves     • diphenhydrAMINE (BENADRYL) 25 MG capsule Take 25 mg by mouth every four hours as needed. Indications: Itching, seasonal allergy     • Albuterol Sulfate 108 (90 Base) MCG/ACT AEROSOL POWDER, BREATH ACTIVATED Inhale 2 Puffs 4 times a day as needed. Indications: SOB     • hydrocortisone 1 % Cream Apply 1 Each topically 3 times a day as needed. Indications: Inflammation, Itching     • acetaminophen (TYLENOL) 650 MG CR tablet Take 650 mg by mouth every 6 hours as needed.     • sodium chloride (OCEAN) 0.65 % Solution Administer 2 Sprays into affected nostril(S) every 2 hours as needed.  3   • acyclovir (ZOVIRAX) 200 MG Cap Take 200 mg by mouth every day.     • Home Care Oxygen Inhale 6 L/min Continuous. Oxygen dose range: 6 to 6 L/min  Respiratory route via: Nasal Cannula   Oxygen supplier: Preferred         • Misc. Devices Misc This is an order for  7 foot high flow oxygen tubing  Dx; asthma with COPD J 44.9, supplemental oxygen-dependent Z 99.81, chronic respiratory failure with hypoxia and J 96        ODETTE 99 months   NPI [id number] 1 Each 0   • spironolactone  (ALDACTONE) 25 MG Tab TAKE ONE TABLET BY MOUTH ONE TIME DAILY 30 Tab 11   • Cholecalciferol (VITAMIN D3) 2000 UNIT Cap TAKE ONE CAPSULE BY MOUTH ONE TIME DAILY 30 Cap 3   • potassium chloride (MICRO-K) 10 MEQ capsule TAKE ONE CAPSULE BY MOUTH TWICE DAILY 60 Cap 6   • omeprazole (PRILOSEC OTC) 20 MG tablet Take 1 Tab by mouth every day. 30 Tab 11   • HYDROcodone-acetaminophen (NORCO) 5-325 MG Tab per tablet Take 1 Tablet by mouth every four hours as needed. (Patient not taking: Reported on 2/24/2022)     • azelastine (ASTELIN) 137 MCG/SPRAY nasal spray Administer 1-2 Sprays into affected nostril(S) 2 times a day. (Patient not taking: Reported on 2/24/2022) 30 mL 6   • bisacodyl (DULCOLAX) 5 MG EC tablet Take 5 mg by mouth 1 time a day as needed. 1 to 3 tabs in a single daily dose  Indications: Constipation (Patient not taking: Reported on 2/24/2022)     • guaiFENesin ER (MUCINEX) 600 MG TABLET SR 12 HR Take 600-1,200 mg by mouth 2 times a day as needed. Indications: Cough (Patient not taking: Reported on 2/24/2022)       No current facility-administered medications on file prior to visit.     Allergies: Iodine, Tape, and Meloxicam    ROS:   Review of Systems   Constitutional: Negative for chills, fever and weight loss.   HENT: Negative for congestion, sinus pain and sore throat.    Eyes: Negative for pain and discharge.   Respiratory: Positive for cough, sputum production and shortness of breath. Negative for wheezing and stridor.    Cardiovascular: Negative for chest pain, orthopnea and leg swelling.   Gastrointestinal: Negative for abdominal pain, diarrhea, nausea and vomiting.   Genitourinary: Negative for dysuria, frequency and urgency.   Musculoskeletal: Negative for myalgias.   Skin: Negative for rash.   Neurological: Negative for dizziness, sensory change, focal weakness, loss of consciousness and headaches.   Psychiatric/Behavioral: Negative.    All other systems reviewed and are negative.    Vitals:  BP  "134/60 (BP Location: Right arm, Patient Position: Sitting, BP Cuff Size: Adult)   Pulse 71   Ht 1.6 m (5' 3\")   Wt 82.4 kg (181 lb 9 oz)   SpO2 93%     Physical Exam:  Physical Exam  Vitals and nursing note reviewed.   Constitutional:       General: She is not in acute distress.     Appearance: Normal appearance. She is well-developed. She is not diaphoretic.   HENT:      Nose:      Comments: oxymizer pendant  Eyes:      General: No scleral icterus.        Right eye: No discharge.         Left eye: No discharge.      Conjunctiva/sclera: Conjunctivae normal.      Pupils: Pupils are equal, round, and reactive to light.   Neck:      Thyroid: No thyromegaly.      Vascular: No JVD.   Cardiovascular:      Rate and Rhythm: Normal rate and regular rhythm.      Heart sounds: Normal heart sounds. No murmur heard.    No gallop.   Pulmonary:      Effort: Pulmonary effort is normal. No respiratory distress.      Breath sounds: Normal breath sounds. No wheezing or rales.   Abdominal:      General: There is no distension.      Palpations: Abdomen is soft.      Tenderness: There is no abdominal tenderness. There is no guarding.   Musculoskeletal:         General: No tenderness.   Lymphadenopathy:      Cervical: No cervical adenopathy.   Skin:     General: Skin is warm.      Capillary Refill: Capillary refill takes less than 2 seconds.      Findings: No erythema or rash.   Neurological:      Mental Status: She is alert and oriented to person, place, and time.      Cranial Nerves: No cranial nerve deficit.      Sensory: No sensory deficit.      Motor: No abnormal muscle tone.   Psychiatric:         Behavior: Behavior normal.       Laboratory Data:    PFTs as reviewed by me personally show:  FEV1 0.89L (45%),    Imaging as reviewed by me personally show:    CT 12/2021 personally reviewed, showing RUL ground glass opacity and shrinkage of BRANDON opacity    Assessment/Plan:    Problem List Items Addressed This Visit     Chronic " respiratory failure with hypoxia (HCC)     Due to COPD  Uses oxymizer pendant 3-6LPM  --instructed to continue to titrate supplemental oxygen to maintain saturations 88 to 92%         Stage 4 very severe COPD by GOLD classification (HCC)     FEV1 0.89L (45%)  Advair 500/Spiriva/Singulair/daily azithromycin  Inquiring about BLVR  -- referral to pulm rehab  -- Repeat CT chest 6/2022 changed to valve protocol, should not affect surveillance for lung cancer         Relevant Medications    fluticasone-salmeterol (ADVAIR DISKUS) 500-50 MCG/DOSE AEROSOL POWDER, BREATH ACTIVATED    ipratropium-albuterol (DUONEB) 0.5-2.5 (3) MG/3ML nebulizer solution    montelukast (SINGULAIR) 10 MG Tab    albuterol (PROVENTIL) 2.5mg/3ml Nebu Soln solution for nebulization    Primary cancer of left upper lobe of lung (HCC)     S/p SBRT completed 11/2021  -- repeat CT chest scheduled for 6/2022  -- followed by Dr Grimm           Other Visit Diagnoses     Chronic obstructive pulmonary disease, unspecified COPD type (HCC)        Relevant Medications    fluticasone-salmeterol (ADVAIR DISKUS) 500-50 MCG/DOSE AEROSOL POWDER, BREATH ACTIVATED    ipratropium-albuterol (DUONEB) 0.5-2.5 (3) MG/3ML nebulizer solution    azithromycin (ZITHROMAX) 250 MG Tab    montelukast (SINGULAIR) 10 MG Tab    albuterol (PROVENTIL) 2.5mg/3ml Nebu Soln solution for nebulization    Other Relevant Orders    Referral to Pulmonary Rehab    CT-CHEST (THORAX) W/O    Asthma with COPD (HCC)        Relevant Medications    fluticasone-salmeterol (ADVAIR DISKUS) 500-50 MCG/DOSE AEROSOL POWDER, BREATH ACTIVATED    ipratropium-albuterol (DUONEB) 0.5-2.5 (3) MG/3ML nebulizer solution    montelukast (SINGULAIR) 10 MG Tab    albuterol (PROVENTIL) 2.5mg/3ml Nebu Soln solution for nebulization    Malignant neoplasm of unspecified part of unspecified bronchus or lung (HCC)        Relevant Orders    CT-CHEST (THORAX) W/O        Return in about 4 months (around 6/24/2022).     This note  was generated using voice recognition software which has a chance of producing errors of grammar and possibly content.  I have made every reasonable attempt to find and correct any obvious errors, but it should be expected that some may not be found prior to finalization of this note.    Time spent in record review prior to patient arrival, reviewing results, and in face-to-face encounter totaled 39 min, excluding any procedures if performed.  __________  Ihsan Kumar MD  Pulmonary and Critical Care Medicine  Formerly [facility]

## 2022-02-24 NOTE — ASSESSMENT & PLAN NOTE
Due to COPD  Uses oxymizer pendant 3-6LPM  --instructed to continue to titrate supplemental oxygen to maintain saturations 88 to 92%

## 2022-04-15 ENCOUNTER — HOSPITAL ENCOUNTER (OUTPATIENT)
Dept: RADIOLOGY | Facility: MEDICAL CENTER | Age: 77
End: 2022-04-15
Attending: FAMILY MEDICINE
Payer: MEDICARE

## 2022-04-15 DIAGNOSIS — R05.9 COUGH: ICD-10-CM

## 2022-04-15 PROCEDURE — 70486 CT MAXILLOFACIAL W/O DYE: CPT

## 2022-04-18 ENCOUNTER — HOSPITAL ENCOUNTER (OUTPATIENT)
Dept: PULMONOLOGY | Facility: MEDICAL CENTER | Age: 77
End: 2022-04-18
Attending: INTERNAL MEDICINE
Payer: MEDICARE

## 2022-04-18 PROCEDURE — 94625 PHY/QHP OP PULM RHB W/O MNTR: CPT

## 2022-04-25 ENCOUNTER — APPOINTMENT (OUTPATIENT)
Dept: PULMONOLOGY | Facility: MEDICAL CENTER | Age: 77
End: 2022-04-25
Payer: MEDICARE

## 2022-04-27 ENCOUNTER — APPOINTMENT (OUTPATIENT)
Dept: PULMONOLOGY | Facility: MEDICAL CENTER | Age: 77
End: 2022-04-27
Payer: MEDICARE

## 2022-05-02 ENCOUNTER — APPOINTMENT (OUTPATIENT)
Dept: PULMONOLOGY | Facility: MEDICAL CENTER | Age: 77
End: 2022-05-02
Payer: MEDICARE

## 2022-05-04 ENCOUNTER — APPOINTMENT (OUTPATIENT)
Dept: PULMONOLOGY | Facility: MEDICAL CENTER | Age: 77
End: 2022-05-04
Payer: MEDICARE

## 2022-05-09 ENCOUNTER — HOSPITAL ENCOUNTER (OUTPATIENT)
Dept: PULMONOLOGY | Facility: MEDICAL CENTER | Age: 77
End: 2022-05-09
Attending: INTERNAL MEDICINE
Payer: MEDICARE

## 2022-05-09 PROCEDURE — 94626 PHY/QHP OP PULM RHB W/MNTR: CPT

## 2022-05-09 PROCEDURE — 94625 PHY/QHP OP PULM RHB W/O MNTR: CPT | Mod: XU

## 2022-05-11 ENCOUNTER — HOSPITAL ENCOUNTER (OUTPATIENT)
Dept: PULMONOLOGY | Facility: MEDICAL CENTER | Age: 77
End: 2022-05-11
Attending: INTERNAL MEDICINE
Payer: MEDICARE

## 2022-05-11 PROCEDURE — 94626 PHY/QHP OP PULM RHB W/MNTR: CPT

## 2022-05-11 PROCEDURE — 94625 PHY/QHP OP PULM RHB W/O MNTR: CPT | Mod: XU

## 2022-05-16 ENCOUNTER — HOSPITAL ENCOUNTER (OUTPATIENT)
Dept: PULMONOLOGY | Facility: MEDICAL CENTER | Age: 77
End: 2022-05-16
Attending: INTERNAL MEDICINE
Payer: MEDICARE

## 2022-05-16 ENCOUNTER — HOSPITAL ENCOUNTER (OUTPATIENT)
Dept: LAB | Facility: MEDICAL CENTER | Age: 77
End: 2022-05-16
Attending: INTERNAL MEDICINE
Payer: MEDICARE

## 2022-05-16 DIAGNOSIS — I47.10 SVT (SUPRAVENTRICULAR TACHYCARDIA) (HCC): ICD-10-CM

## 2022-05-16 LAB
ANION GAP SERPL CALC-SCNC: 10 MMOL/L (ref 7–16)
BUN SERPL-MCNC: 17 MG/DL (ref 8–22)
CALCIUM SERPL-MCNC: 9.2 MG/DL (ref 8.4–10.2)
CHLORIDE SERPL-SCNC: 96 MMOL/L (ref 96–112)
CO2 SERPL-SCNC: 29 MMOL/L (ref 20–33)
CREAT SERPL-MCNC: 0.42 MG/DL (ref 0.5–1.4)
DIGOXIN SERPL-MCNC: 0.4 NG/ML (ref 0.8–2)
GFR SERPLBLD CREATININE-BSD FMLA CKD-EPI: 101 ML/MIN/1.73 M 2
GLUCOSE SERPL-MCNC: 98 MG/DL (ref 65–99)
POTASSIUM SERPL-SCNC: 3.8 MMOL/L (ref 3.6–5.5)
SODIUM SERPL-SCNC: 135 MMOL/L (ref 135–145)

## 2022-05-16 PROCEDURE — 94626 PHY/QHP OP PULM RHB W/MNTR: CPT

## 2022-05-16 PROCEDURE — 94625 PHY/QHP OP PULM RHB W/O MNTR: CPT | Mod: XU

## 2022-05-16 PROCEDURE — 80162 ASSAY OF DIGOXIN TOTAL: CPT

## 2022-05-16 PROCEDURE — 36415 COLL VENOUS BLD VENIPUNCTURE: CPT

## 2022-05-16 PROCEDURE — 80048 BASIC METABOLIC PNL TOTAL CA: CPT

## 2022-05-18 ENCOUNTER — HOSPITAL ENCOUNTER (OUTPATIENT)
Dept: PULMONOLOGY | Facility: MEDICAL CENTER | Age: 77
End: 2022-05-18
Attending: INTERNAL MEDICINE
Payer: MEDICARE

## 2022-05-18 PROCEDURE — 94625 PHY/QHP OP PULM RHB W/O MNTR: CPT | Mod: XU

## 2022-05-18 PROCEDURE — 94626 PHY/QHP OP PULM RHB W/MNTR: CPT

## 2022-05-23 ENCOUNTER — HOSPITAL ENCOUNTER (OUTPATIENT)
Dept: PULMONOLOGY | Facility: MEDICAL CENTER | Age: 77
End: 2022-05-23
Attending: INTERNAL MEDICINE
Payer: MEDICARE

## 2022-05-23 PROCEDURE — 94625 PHY/QHP OP PULM RHB W/O MNTR: CPT

## 2022-05-23 PROCEDURE — 94626 PHY/QHP OP PULM RHB W/MNTR: CPT

## 2022-05-25 ENCOUNTER — HOSPITAL ENCOUNTER (OUTPATIENT)
Dept: PULMONOLOGY | Facility: MEDICAL CENTER | Age: 77
End: 2022-05-25
Attending: INTERNAL MEDICINE
Payer: MEDICARE

## 2022-05-25 PROCEDURE — 94625 PHY/QHP OP PULM RHB W/O MNTR: CPT | Mod: XU

## 2022-05-25 PROCEDURE — 94626 PHY/QHP OP PULM RHB W/MNTR: CPT

## 2022-06-01 ENCOUNTER — HOSPITAL ENCOUNTER (OUTPATIENT)
Dept: PULMONOLOGY | Facility: MEDICAL CENTER | Age: 77
End: 2022-06-01
Attending: INTERNAL MEDICINE
Payer: MEDICARE

## 2022-06-01 PROCEDURE — 94625 PHY/QHP OP PULM RHB W/O MNTR: CPT | Mod: XU

## 2022-06-01 PROCEDURE — 94626 PHY/QHP OP PULM RHB W/MNTR: CPT

## 2022-06-06 ENCOUNTER — HOSPITAL ENCOUNTER (OUTPATIENT)
Dept: PULMONOLOGY | Facility: MEDICAL CENTER | Age: 77
End: 2022-06-06
Attending: INTERNAL MEDICINE
Payer: MEDICARE

## 2022-06-06 PROCEDURE — 94625 PHY/QHP OP PULM RHB W/O MNTR: CPT | Mod: XU

## 2022-06-06 PROCEDURE — 94626 PHY/QHP OP PULM RHB W/MNTR: CPT

## 2022-06-08 ENCOUNTER — HOSPITAL ENCOUNTER (OUTPATIENT)
Dept: PULMONOLOGY | Facility: MEDICAL CENTER | Age: 77
End: 2022-06-08
Attending: INTERNAL MEDICINE
Payer: MEDICARE

## 2022-06-08 PROCEDURE — 94626 PHY/QHP OP PULM RHB W/MNTR: CPT

## 2022-06-08 PROCEDURE — 94625 PHY/QHP OP PULM RHB W/O MNTR: CPT | Mod: XU

## 2022-06-13 ENCOUNTER — HOSPITAL ENCOUNTER (OUTPATIENT)
Dept: PULMONOLOGY | Facility: MEDICAL CENTER | Age: 77
End: 2022-06-13
Attending: INTERNAL MEDICINE
Payer: MEDICARE

## 2022-06-13 PROCEDURE — 94625 PHY/QHP OP PULM RHB W/O MNTR: CPT | Mod: XU

## 2022-06-13 PROCEDURE — 94626 PHY/QHP OP PULM RHB W/MNTR: CPT

## 2022-06-15 ENCOUNTER — HOSPITAL ENCOUNTER (OUTPATIENT)
Dept: PULMONOLOGY | Facility: MEDICAL CENTER | Age: 77
End: 2022-06-15
Attending: INTERNAL MEDICINE
Payer: MEDICARE

## 2022-06-15 PROCEDURE — 94625 PHY/QHP OP PULM RHB W/O MNTR: CPT | Mod: XU

## 2022-06-15 PROCEDURE — 94626 PHY/QHP OP PULM RHB W/MNTR: CPT

## 2022-06-17 ENCOUNTER — HOSPITAL ENCOUNTER (OUTPATIENT)
Dept: RADIOLOGY | Facility: MEDICAL CENTER | Age: 77
End: 2022-06-17
Attending: INTERNAL MEDICINE
Payer: MEDICARE

## 2022-06-17 DIAGNOSIS — C34.90 MALIGNANT NEOPLASM OF UNSPECIFIED PART OF UNSPECIFIED BRONCHUS OR LUNG (HCC): ICD-10-CM

## 2022-06-17 DIAGNOSIS — J44.9 CHRONIC OBSTRUCTIVE PULMONARY DISEASE, UNSPECIFIED COPD TYPE (HCC): ICD-10-CM

## 2022-06-17 PROCEDURE — 71250 CT THORAX DX C-: CPT | Mod: ME

## 2022-06-20 ENCOUNTER — HOSPITAL ENCOUNTER (OUTPATIENT)
Dept: PULMONOLOGY | Facility: MEDICAL CENTER | Age: 77
End: 2022-06-20
Attending: INTERNAL MEDICINE
Payer: MEDICARE

## 2022-06-20 ENCOUNTER — HOSPITAL ENCOUNTER (OUTPATIENT)
Dept: RADIATION ONCOLOGY | Facility: MEDICAL CENTER | Age: 77
End: 2022-06-30
Attending: RADIOLOGY
Payer: MEDICARE

## 2022-06-20 VITALS
HEART RATE: 55 BPM | OXYGEN SATURATION: 93 % | WEIGHT: 182.32 LBS | SYSTOLIC BLOOD PRESSURE: 139 MMHG | DIASTOLIC BLOOD PRESSURE: 55 MMHG | BODY MASS INDEX: 32.3 KG/M2

## 2022-06-20 DIAGNOSIS — C34.12 PRIMARY CANCER OF LEFT UPPER LOBE OF LUNG (HCC): ICD-10-CM

## 2022-06-20 PROCEDURE — 94626 PHY/QHP OP PULM RHB W/MNTR: CPT

## 2022-06-20 PROCEDURE — 99214 OFFICE O/P EST MOD 30 MIN: CPT | Performed by: RADIOLOGY

## 2022-06-20 PROCEDURE — 94625 PHY/QHP OP PULM RHB W/O MNTR: CPT | Mod: XU

## 2022-06-20 PROCEDURE — 99212 OFFICE O/P EST SF 10 MIN: CPT | Performed by: RADIOLOGY

## 2022-06-20 RX ORDER — MELOXICAM 7.5 MG/1
7.5 TABLET ORAL 2 TIMES DAILY
COMMUNITY
Start: 2022-05-13

## 2022-06-20 ASSESSMENT — PAIN SCALES - GENERAL: PAINLEVEL: NO PAIN

## 2022-06-20 ASSESSMENT — FIBROSIS 4 INDEX: FIB4 SCORE: 0.83

## 2022-06-20 NOTE — PROGRESS NOTES
Patient was seen today in clinic with Dr. Grimm for Follow Up.  Vitals signs and weight were obtained and pain assessment was completed.  Allergies and medications were reviewed with the patient.  Toxicities of treatment assessed.     Vitals/Pain:  Vitals:    06/20/22 1306   BP: 139/55   Pulse: (!) 55   SpO2: 93%   Weight: 82.7 kg (182 lb 5.1 oz)   Pain Score: No pain        Allergies:   Iodine and Tape    Current Medications:  Current Outpatient Medications   Medication Sig Dispense Refill   • meloxicam (MOBIC) 7.5 MG Tab Take 7.5 mg by mouth 2 times a day.     • fluticasone-salmeterol (ADVAIR DISKUS) 500-50 MCG/DOSE AEROSOL POWDER, BREATH ACTIVATED Inhale 1 Puff 2 times a day. 3 Each 3   • ipratropium-albuterol (DUONEB) 0.5-2.5 (3) MG/3ML nebulizer solution USE ONE VIAL IN NEBULIZER EVERY 4 HOURS AS NEEDED FOR SHORTNESS OF BREATH OR  WHEEZING 360 Each 6   • azithromycin (ZITHROMAX) 250 MG Tab One tablet daily for COPD 90 Tablet 6   • montelukast (SINGULAIR) 10 MG Tab Take 1 Tablet by mouth every day. 90 Tablet 3   • albuterol (PROVENTIL) 2.5mg/3ml Nebu Soln solution for nebulization Inhale 3 mL by nebulization every four hours as needed. 75 mL 3   • SPIRIVA HANDIHALER 18 MCG Cap INHALE 1 CAPSULE CONTENT BY MOUTH ONE TIME DAILY. 90 Capsule 3   • digoxin (LANOXIN) 125 MCG Tab TAKE ONE TABLET BY MOUTH ONCE A  Tablet 1   • calcium carbonate (OS-MICHELLE 500) 1250 (500 Ca) MG Tab Take 1,000 mg by mouth every day.     • losartan (COZAAR) 50 MG Tab Take 1 tablet by mouth every day. 90 tablet 3   • fluticasone (FLONASE) 50 MCG/ACT nasal spray Administer 1-2 Sprays into affected nostril(S) every day. Each nostril. 15.8 mL 3   • azelastine (ASTELIN) 137 MCG/SPRAY nasal spray Administer 1-2 Sprays into affected nostril(S) 2 times a day. 30 mL 6   • tizanidine (ZANAFLEX) 4 MG Tab Take 4 mg by mouth every 8 hours as needed. not to exceed 3 doses in 24 hours  Indications: Lower Backache, Muscle Spasticity     • DULoxetine  (CYMBALTA) 30 MG Cap DR Particles Take 30 mg by mouth every day. Indications: Generalized Anxiety Disorder, Major Depressive Disorder, Musculoskeletal Pain, Disease of the Peripheral Nerves     • bisacodyl (DULCOLAX) 5 MG EC tablet Take 5 mg by mouth 1 time a day as needed. 1 to 3 tabs in a single daily dose  Indications: Constipation     • guaiFENesin ER (MUCINEX) 600 MG TABLET SR 12 HR Take 600-1,200 mg by mouth 2 times a day as needed. Indications: Cough     • Albuterol Sulfate 108 (90 Base) MCG/ACT AEROSOL POWDER, BREATH ACTIVATED Inhale 2 Puffs 4 times a day as needed. Indications: SOB     • sodium chloride (OCEAN) 0.65 % Solution Administer 2 Sprays into affected nostril(S) every 2 hours as needed.  3   • acyclovir (ZOVIRAX) 200 MG Cap Take 200 mg by mouth every day.     • Home Care Oxygen Inhale 6 L/min Continuous. Oxygen dose range: 6 to 6 L/min  Respiratory route via: Nasal Cannula   Oxygen supplier: Preferred         • Misc. Devices Misc This is an order for  7 foot high flow oxygen tubing  Dx; asthma with COPD J 44.9, supplemental oxygen-dependent Z 99.81, chronic respiratory failure with hypoxia and J 96        ODETTE 99 months   NPI [id number] 1 Each 0   • spironolactone (ALDACTONE) 25 MG Tab TAKE ONE TABLET BY MOUTH ONE TIME DAILY 30 Tab 11   • Cholecalciferol (VITAMIN D3) 2000 UNIT Cap TAKE ONE CAPSULE BY MOUTH ONE TIME DAILY 30 Cap 3   • potassium chloride (MICRO-K) 10 MEQ capsule TAKE ONE CAPSULE BY MOUTH TWICE DAILY 60 Cap 6   • omeprazole (PRILOSEC OTC) 20 MG tablet Take 1 Tab by mouth every day. 30 Tab 11   • HYDROcodone-acetaminophen (NORCO) 5-325 MG Tab per tablet Take 1 Tablet by mouth every four hours as needed. (Patient not taking: No sig reported)     • diphenhydrAMINE (BENADRYL) 25 MG capsule Take 25 mg by mouth every four hours as needed. Indications: Itching, seasonal allergy (Patient not taking: Reported on 6/20/2022)     • hydrocortisone 1 % Cream Apply 1 Each topically 3 times a day as  needed. Indications: Inflammation, Itching (Patient not taking: Reported on 6/20/2022)     • acetaminophen (TYLENOL) 650 MG CR tablet Take 650 mg by mouth every 6 hours as needed. (Patient not taking: Reported on 6/20/2022)       No current facility-administered medications for this encounter.         PCP:  Brandie Faria, Med Ass't

## 2022-06-20 NOTE — PROGRESS NOTES
RADIATION ONCOLOGY FOLLOW-UP    DATE OF SERVICE: 6/20/2022    IDENTIFICATION:   A 76 y.o. female with Stage IA2 BRANDON s/p SBRT completed 11/1/21  Visit Diagnoses     ICD-10-CM   1. Primary cancer of left upper lobe of lung (HCC)  C34.12     Primary cancer of left upper lobe of lung (HCC)  Staging form: Lung, AJCC 8th Edition  - Clinical stage from 10/1/2021: Stage IA2 (cT1b, cN0, cM0) - Signed by Israel Grimm M.D. on 10/1/2021      RADIATION SUMMARY:  [facility] Treatment Information        Some values may be hidden. Unless noted otherwise, only the newest values recorded on each date are displayed.         [facility] Treatment Summary 11/1/21   Course First Treatment Date 10/20/2021    Course Last Treatment Date 11/01/2021    BRANDON Lung SBRT Plan from Course C1_LUL   Fraction 5 of 5    Elapsed Course Days 12 @ [id number]    Prescribed Fraction Dose 1,200 cGy    Prescribed Total Dose 6,000 cGy    ITVp Reference Point from Course C1_LUL   Elapsed Course Days 12 @ [id number]    Session Dose --    Total Dose 6,000 cGy    BRANDON cp Reference Point from Course C1_LUL   Elapsed Course Days 12 @ [id number]    Session Dose --    Total Dose 6,420 cGy     Some values recorded on this date have been omitted.                HISTORY OF PRESENT ILLNESS:   76-year-old female with history of COPD with emphysema FEV1 less than 1 L with PET avid left upper lobe lung lesion too high risk for biopsy or pulmonary intervention.  Patient had PET/CT August 19, 2021 which showed nodular left upper lobe opacity FDG avid concerning for malignancy.  Patient has chronic cough and is taking Tessalon Perls as well as Robitussin with codeine.  Patient denies any hemoptysis or weight loss.     12/13/21  She completed her radiation therapy November 1, 2021 with 50 Gray in 5 fractions and integrated boost to 60 Gray internal target volume.  Patient did well after radiation denies any worsening shortness of breath or hemoptysis.  Does still have chronic cough  and has tried Tessalon Perles and Robitussin with codeine with minimal resolution.  Patient had repeat CT chest December 8, 2021 which shows excellent shrinkage of tumor.  There is a new parenchymal opacity in the anterior right upper lobe most consistent with an area of pneumonitis and severe emphysematous changes noted.    Interval History:  Patient doing well after SBRT and had interval CT chest June 17, 2022 which showed very response to therapy with left upper lobe nodule now measuring 11 x 6 mm previously 14 x 9 mm.  No new pulmonary nodules and multifocal pulmonary parenchymal scarring and emphysema seen.  She is doing pulmonary rehab monitored by Dr. Kumar.  Denies any weight loss or hemoptysis.      PROBLEM LIST:  Patient Active Problem List   Diagnosis   • Benign hypertension   • Mixed stress and urge urinary incontinence   • Peripheral edema   • Lumbar radicular pain   • Vitamin D deficiency disease   • Dyslipidemia   • Facet arthropathy, lumbar   • Chronic constipation   • Chronic pain syndrome   • Dependence on continuous supplemental oxygen   • Pre-diabetes   • Pain management   • Obesity (BMI 30-39.9)   • At risk for falls   • Chronic respiratory failure with hypoxia ([facility])   • External hemorrhoid, bleeding   • Stage 4 very severe COPD by GOLD classification ([facility])   • SVT (supraventricular tachycardia) ([facility])   • PVCs (premature ventricular contractions)   • Palpitations   • COVID-19   • Acute on chronic respiratory failure with hypoxia ([facility])   • DNR (do not resuscitate)   • Hyponatremia   • Peripheral vascular disease ([facility])   • Recurrent major depressive disorder, in partial remission ([facility])   • Amnesia   • Primary cancer of left upper lobe of lung ([facility])   • Prediabetes   • BMI 31.0-31.9,adult   • Opioid type dependence, continuous ([facility])   • Mixed simple and mucopurulent chronic bronchitis ([facility])   • Panlobular emphysema ([facility])   • Chronic right-sided heart failure ([facility])   • Osteopenia of lumbar spine        CURRENT MEDICATIONS:  Current Outpatient Medications   Medication Sig Dispense Refill   • meloxicam (MOBIC) 7.5 MG Tab Take 7.5 mg by mouth 2 times a day.     • fluticasone-salmeterol (ADVAIR DISKUS) 500-50 MCG/DOSE AEROSOL POWDER, BREATH ACTIVATED Inhale 1 Puff 2 times a day. 3 Each 3   • ipratropium-albuterol (DUONEB) 0.5-2.5 (3) MG/3ML nebulizer solution USE ONE VIAL IN NEBULIZER EVERY 4 HOURS AS NEEDED FOR SHORTNESS OF BREATH OR  WHEEZING 360 Each 6   • azithromycin (ZITHROMAX) 250 MG Tab One tablet daily for COPD 90 Tablet 6   • montelukast (SINGULAIR) 10 MG Tab Take 1 Tablet by mouth every day. 90 Tablet 3   • albuterol (PROVENTIL) 2.5mg/3ml Nebu Soln solution for nebulization Inhale 3 mL by nebulization every four hours as needed. 75 mL 3   • SPIRIVA HANDIHALER 18 MCG Cap INHALE 1 CAPSULE CONTENT BY MOUTH ONE TIME DAILY. 90 Capsule 3   • digoxin (LANOXIN) 125 MCG Tab TAKE ONE TABLET BY MOUTH ONCE A  Tablet 1   • calcium carbonate (OS-MICHELLE 500) 1250 (500 Ca) MG Tab Take 1,000 mg by mouth every day.     • losartan (COZAAR) 50 MG Tab Take 1 tablet by mouth every day. 90 tablet 3   • fluticasone (FLONASE) 50 MCG/ACT nasal spray Administer 1-2 Sprays into affected nostril(S) every day. Each nostril. 15.8 mL 3   • azelastine (ASTELIN) 137 MCG/SPRAY nasal spray Administer 1-2 Sprays into affected nostril(S) 2 times a day. 30 mL 6   • tizanidine (ZANAFLEX) 4 MG Tab Take 4 mg by mouth every 8 hours as needed. not to exceed 3 doses in 24 hours  Indications: Lower Backache, Muscle Spasticity     • DULoxetine (CYMBALTA) 30 MG Cap DR Particles Take 30 mg by mouth every day. Indications: Generalized Anxiety Disorder, Major Depressive Disorder, Musculoskeletal Pain, Disease of the Peripheral Nerves     • bisacodyl (DULCOLAX) 5 MG EC tablet Take 5 mg by mouth 1 time a day as needed. 1 to 3 tabs in a single daily dose  Indications: Constipation     • guaiFENesin ER (MUCINEX) 600 MG TABLET SR 12 HR Take 600-1,200  mg by mouth 2 times a day as needed. Indications: Cough     • Albuterol Sulfate 108 (90 Base) MCG/ACT AEROSOL POWDER, BREATH ACTIVATED Inhale 2 Puffs 4 times a day as needed. Indications: SOB     • sodium chloride (OCEAN) 0.65 % Solution Administer 2 Sprays into affected nostril(S) every 2 hours as needed.  3   • acyclovir (ZOVIRAX) 200 MG Cap Take 200 mg by mouth every day.     • Home Care Oxygen Inhale 6 L/min Continuous. Oxygen dose range: 6 to 6 L/min  Respiratory route via: Nasal Cannula   Oxygen supplier: Preferred         • Misc. Devices Misc This is an order for  7 foot high flow oxygen tubing  Dx; asthma with COPD J 44.9, supplemental oxygen-dependent Z 99.81, chronic respiratory failure with hypoxia and J 96        ODETTE 99 months   NPI [id number] 1 Each 0   • spironolactone (ALDACTONE) 25 MG Tab TAKE ONE TABLET BY MOUTH ONE TIME DAILY 30 Tab 11   • Cholecalciferol (VITAMIN D3) 2000 UNIT Cap TAKE ONE CAPSULE BY MOUTH ONE TIME DAILY 30 Cap 3   • potassium chloride (MICRO-K) 10 MEQ capsule TAKE ONE CAPSULE BY MOUTH TWICE DAILY 60 Cap 6   • omeprazole (PRILOSEC OTC) 20 MG tablet Take 1 Tab by mouth every day. 30 Tab 11   • HYDROcodone-acetaminophen (NORCO) 5-325 MG Tab per tablet Take 1 Tablet by mouth every four hours as needed. (Patient not taking: No sig reported)     • diphenhydrAMINE (BENADRYL) 25 MG capsule Take 25 mg by mouth every four hours as needed. Indications: Itching, seasonal allergy (Patient not taking: Reported on 6/20/2022)     • hydrocortisone 1 % Cream Apply 1 Each topically 3 times a day as needed. Indications: Inflammation, Itching (Patient not taking: Reported on 6/20/2022)     • acetaminophen (TYLENOL) 650 MG CR tablet Take 650 mg by mouth every 6 hours as needed. (Patient not taking: Reported on 6/20/2022)       No current facility-administered medications for this encounter.       ALLERGIES:  Iodine and Tape        PHYSICAL EXAM:  PERFORMANCE STATUS: 1  /55   Pulse (!) 55   Wt  82.7 kg (182 lb 5.1 oz)   LMP 06/07/2001   SpO2 93%   BMI 32.30 kg/m²   Physical Exam  Vitals reviewed.   HENT:      Head: Normocephalic.      Mouth/Throat:      Mouth: Mucous membranes are moist.   Eyes:      Pupils: Pupils are equal, round, and reactive to light.   Cardiovascular:      Rate and Rhythm: Normal rate.   Pulmonary:      Effort: Pulmonary effort is normal.   Abdominal:      General: Abdomen is flat.   Musculoskeletal:         General: Normal range of motion.      Cervical back: Normal range of motion.   Neurological:      General: No focal deficit present.      Mental Status: She is alert.   Psychiatric:         Mood and Affect: Mood normal.         LABORATORY DATA:   Lab Results   Component Value Date/Time    WBC 7.5 11/16/2020 0045    WBC 6.6 11/15/2020 0451    WBC 7.5 11/14/2020 0300    HEMOGLOBIN 14.8 11/16/2020 0045    HEMOGLOBIN 14.6 11/15/2020 0451    HEMOGLOBIN 13.3 11/14/2020 0300    HEMATOCRIT 44.1 11/16/2020 0045    HEMATOCRIT 43.4 11/15/2020 0451    HEMATOCRIT 39.3 11/14/2020 0300    MCV 88.2 11/16/2020 0045    MCV 89.7 11/15/2020 0451    MCV 89.1 11/14/2020 0300    PLATELETCT 396 11/16/2020 0045    PLATELETCT 344 11/15/2020 0451    PLATELETCT 289 11/14/2020 0300    NEUTS 4.76 11/16/2020 0045    NEUTS 5.07 11/15/2020 0451    NEUTS 5.67 11/14/2020 0300      Lab Results   Component Value Date/Time    SODIUM 135 05/16/2022 1237    SODIUM 140 09/15/2021 1551    SODIUM 140 08/19/2021 1217    POTASSIUM 3.8 05/16/2022 1237    POTASSIUM 3.5 (L) 09/15/2021 1551    POTASSIUM 4.0 08/19/2021 1217    BUN 17 05/16/2022 1237    BUN 10 09/15/2021 1551    BUN 23 (H) 08/19/2021 1217    CREATININE 0.42 (L) 05/16/2022 1237    CREATININE 0.51 09/15/2021 1551    CREATININE 0.38 (L) 08/19/2021 1217    CALCIUM 9.2 05/16/2022 1237    CALCIUM 9.4 09/15/2021 1551    CALCIUM 9.8 08/19/2021 1217    ALBUMIN 4.1 09/15/2021 1551    ALBUMIN 4.3 08/19/2021 1217    ALBUMIN 3.2 11/19/2020 0513    ASTSGOT 15 09/15/2021 1551     ASTSGOT 20 08/19/2021 1217    ASTSGOT 17 11/19/2020 0513    ALKPHOSPHAT 48 09/15/2021 1551    ALKPHOSPHAT 46 08/19/2021 1217    ALKPHOSPHAT 48 11/19/2020 0513    IFNOTAFR >60 09/15/2021 1551    IFNOTAFR >60 08/19/2021 1217    IFNOTAFR >60 11/21/2020 0648       RADIOLOGY DATA:  CT-CHEST (THORAX) W/O    Result Date: 6/20/2022 6/17/2022 5:01 PM HISTORY/REASON FOR EXAM:  Non-small cell lung cancer, staging; v. TECHNIQUE/EXAM DESCRIPTION: CT scan of the chest without contrast. Noncontrast helical scanning of the chest was obtained from the lung apices through the adrenal glands. Low dose optimization technique was utilized for this CT exam including automated exposure control and adjustment of the mA and/or kV according to patient size. COMPARISON: CT chest 12/8/2021 and 8/9/2021. PET/CT scan 8/19/2021 FINDINGS: Lungs: Left upper lobe irregular nodule that was positive on PET scan measures 11 x 6 mm and previously measured 14 x 9 mm. Therefore, there has been favorable response to therapy. There is architectural distortion within both upper lobe consistent with parenchymal scarring. There is a stable left lower lobe granuloma. Also there is bilateral lower lobe architecture distortion consistent with scarring. Right middle lobe airspace process has resolved. This likely represented pneumonia. In the right lower lobe there are peripheral airspace process which could be atelectasis or chronic pneumonia. There is a right lower lobe oval stable 7 x 4 mm subpleural pulmonary nodule. Slightly more caudal there is a focus of consolidation measuring 9 x 6 mm Mediastinum/Barbara: No significant adenopathy. Pleura: No pleural effusion. Cardiac: Heart normal in size without pericardial effusion. Vascular: Coronary artery mild aortic atherosclerotic plaque. Soft tissues: Unremarkable. Bones: No acute or destructive process.     1.  Favorable response to therapy with left upper lobe nodule now measuring 11 x 6 mm previously 14 x 9  mm 2.  No new pulmonary nodule 3.   Emphysema 4.  Multifocal pulmonary parenchymal scarring 5.  Aortic and branch vessel atherosclerotic plaque Fleischner Society pulmonary nodule recommendations: Not Applicable       IMPRESSION:    A 76 y.o. with   Visit Diagnoses     ICD-10-CM   1. Primary cancer of left upper lobe of lung (HCC)  C34.12     Primary cancer of left upper lobe of lung (HCC)  Staging form: Lung, AJCC 8th Edition  - Clinical stage from 10/1/2021: Stage IA2 (cT1b, cN0, cM0) - Signed by Israel Grimm M.D. on 10/1/2021      CANCER STATUS:  No Evidence of Disease    RECOMMENDATIONS:   I recommended 6-month surveillance CT chest without contrast which has been ordered and I will follow-up with her afterwards.    Thank you for the opportunity to participate in her care.  If any questions or comments, please do not hesitate in calling.    Orders Placed This Encounter   • CT-CHEST (THORAX) W/O   • meloxicam (MOBIC) 7.5 MG Tab

## 2022-06-22 ENCOUNTER — HOSPITAL ENCOUNTER (OUTPATIENT)
Dept: PULMONOLOGY | Facility: MEDICAL CENTER | Age: 77
End: 2022-06-22
Attending: INTERNAL MEDICINE
Payer: MEDICARE

## 2022-06-22 PROCEDURE — 94625 PHY/QHP OP PULM RHB W/O MNTR: CPT | Mod: XU

## 2022-06-22 PROCEDURE — 94626 PHY/QHP OP PULM RHB W/MNTR: CPT

## 2022-06-27 ENCOUNTER — HOSPITAL ENCOUNTER (OUTPATIENT)
Dept: PULMONOLOGY | Facility: MEDICAL CENTER | Age: 77
End: 2022-06-27
Attending: INTERNAL MEDICINE
Payer: MEDICARE

## 2022-06-27 PROCEDURE — 94626 PHY/QHP OP PULM RHB W/MNTR: CPT

## 2022-06-27 PROCEDURE — 94625 PHY/QHP OP PULM RHB W/O MNTR: CPT | Mod: XU

## 2022-06-29 ENCOUNTER — OFFICE VISIT (OUTPATIENT)
Dept: SLEEP MEDICINE | Facility: MEDICAL CENTER | Age: 77
End: 2022-06-29
Payer: MEDICARE

## 2022-06-29 ENCOUNTER — HOSPITAL ENCOUNTER (OUTPATIENT)
Dept: PULMONOLOGY | Facility: MEDICAL CENTER | Age: 77
End: 2022-06-29
Attending: INTERNAL MEDICINE
Payer: MEDICARE

## 2022-06-29 VITALS
SYSTOLIC BLOOD PRESSURE: 124 MMHG | HEIGHT: 63 IN | DIASTOLIC BLOOD PRESSURE: 82 MMHG | WEIGHT: 184.6 LBS | OXYGEN SATURATION: 93 % | HEART RATE: 83 BPM | RESPIRATION RATE: 16 BRPM | BODY MASS INDEX: 32.71 KG/M2

## 2022-06-29 DIAGNOSIS — C34.12 PRIMARY CANCER OF LEFT UPPER LOBE OF LUNG (HCC): ICD-10-CM

## 2022-06-29 DIAGNOSIS — J96.11 CHRONIC RESPIRATORY FAILURE WITH HYPOXIA (HCC): ICD-10-CM

## 2022-06-29 DIAGNOSIS — J44.9 STAGE 4 VERY SEVERE COPD BY GOLD CLASSIFICATION (HCC): ICD-10-CM

## 2022-06-29 PROCEDURE — 99214 OFFICE O/P EST MOD 30 MIN: CPT | Performed by: INTERNAL MEDICINE

## 2022-06-29 PROCEDURE — 94625 PHY/QHP OP PULM RHB W/O MNTR: CPT | Mod: XU

## 2022-06-29 PROCEDURE — 94626 PHY/QHP OP PULM RHB W/MNTR: CPT

## 2022-06-29 ASSESSMENT — ENCOUNTER SYMPTOMS
EYE PAIN: 0
COUGH: 1
SINUS PAIN: 0
HEADACHES: 0
FEVER: 0
CHILLS: 0
SORE THROAT: 0
WEIGHT LOSS: 0
DIARRHEA: 0
FOCAL WEAKNESS: 0
SPUTUM PRODUCTION: 1
STRIDOR: 0
WHEEZING: 0
DIZZINESS: 0
ABDOMINAL PAIN: 0
EYE DISCHARGE: 0
ORTHOPNEA: 0
SENSORY CHANGE: 0
MYALGIAS: 0
NAUSEA: 0
VOMITING: 0
SHORTNESS OF BREATH: 1
LOSS OF CONSCIOUSNESS: 0
PSYCHIATRIC NEGATIVE: 1

## 2022-06-29 ASSESSMENT — FIBROSIS 4 INDEX: FIB4 SCORE: 0.83

## 2022-06-29 NOTE — PROGRESS NOTES
Pulmonary Clinic Note    Chief Complaint:  Chief Complaint   Patient presents with   • Follow-Up     4 MONTH FV  COPD    LAST SEEN ON 2/24/2022 - DR. ASHTON    • Results     CT CHEST 6/20/2022     HPI:   Berny Renee is a very pleasant 76 y.o. female former tobacco smoker 60 pack years, quit 1999 who is followed in our clinic for COPD  FEV1 0.89L (45%), chronic hypoxemic respiratory failure on 3-6LPM. She is managed on Advair 500/Spiriva/Singulair. UTD with vaccinations. Contracted COVID in late 2020.    At her last visit with Dr Meza, a CT was ordered due to cough. This revealed a linear nodule in the left upper lobe, new since prior imaging in 2013. A subsequent PET scan showed nodule to be FDG avid. Case presented to Lung tumor board in 9/2021, consensus was to pursue SBRT given high risk for biopsy and poor procedural candidate given emphysema, oxygen dependence and FEV1 < 1L    She completed radiation in 11/2021 with subsequent imaging showing radiographic stability.    MMRC Grade: 2: Walks slower than friends due to breathlessness, has to stop at own pace    Interval events since last visit in 2/2022:  Presents today for routine followup   She uses her rescue inhaler about 1 time per month.  She has reconsidered and is no longer interested in bronchoscopic lung volume reduction right now.  She is fearful of undergoing anesthesia. Has nearly completed pulmonary rehab and it is going well.  Plans to join a gym afterwards.    Recently followed up with radiation oncology, no evidence of disease on recent imaging.  Plan for repeat CT chest in 6 months.    Past Medical History:   Diagnosis Date   • Arthritis     spine   • Asthma with COPD ([facility])    • Cataract immature    • Chronic pain    • Chronic respiratory failure with hypoxia ([facility]) 7/13/2017   • Colon polyps    • COPD (chronic obstructive pulmonary disease) ([facility]) 2002    moderate to severe   • DDD (degenerative disc disease), cervical    • DDD (degenerative  disc disease), thoracic    • Diverticulitis of colon    • Dyslipidemia    • EMPHYSEMA    • H/O opioid abuse (HCC) 7/15/2021   • Hypertension    • REGINE (obstructive sleep apnea)    • OSTEOPOROSIS    • Spinal stenosis, lumbar region, with neurogenic claudication    • URINARY INCONTINENCE    • Vitamin d deficiency        Past Surgical History:   Procedure Laterality Date   • LUMBAR LAMINECTOMY DISKECTOMY  2010    Performed by GRACIE CANO at SURGERY [facility]   • OTHER ORTHOPEDIC SURGERY  2004    left ankle fx   • OTHER      leg fracture   • OTHER      t spine disc surg   • TONSILLECTOMY         Social History     Socioeconomic History   • Marital status:      Spouse name: Not on file   • Number of children: Not on file   • Years of education: Not on file   • Highest education level: Not on file   Occupational History   • Not on file   Tobacco Use   • Smoking status: Former Smoker     Packs/day: 2.00     Years: 30.00     Pack years: 60.00     Types: Cigarettes     Quit date: 3/1/1999     Years since quittin.3   • Smokeless tobacco: Never Used   • Tobacco comment: 3 pks a day for 35 yrs, continued abstinence   Vaping Use   • Vaping Use: Never used   Substance and Sexual Activity   • Alcohol use: Not Currently     Alcohol/week: 4.2 oz     Types: 7 Glasses of wine per week   • Drug use: Not Currently     Types: Marijuana, Oral     Comment: Medical Marijuana    • Sexual activity: Never   Other Topics Concern   • Not on file   Social History Narrative    ** Merged History Encounter **          Social Determinants of Health     Financial Resource Strain: Low Risk    • Difficulty of Paying Living Expenses: Not hard at all   Food Insecurity: No Food Insecurity   • Worried About Running Out of Food in the Last Year: Never true   • Ran Out of Food in the Last Year: Never true   Transportation Needs: No Transportation Needs   • Lack of Transportation (Medical): No   • Lack of Transportation (Non-Medical):  No   Physical Activity: Inactive   • Days of Exercise per Week: 0 days   • Minutes of Exercise per Session: 0 min   Stress: No Stress Concern Present   • Feeling of Stress : Not at all   Social Connections: Moderately Isolated   • Frequency of Communication with Friends and Family: Once a week   • Frequency of Social Gatherings with Friends and Family: Once a week   • Attends Buddhist Services: More than 4 times per year   • Active Member of Clubs or Organizations: Yes   • Attends Club or Organization Meetings: More than 4 times per year   • Marital Status:    Intimate Partner Violence: Not on file   Housing Stability: Low Risk    • Unable to Pay for Housing in the Last Year: No   • Number of Places Lived in the Last Year: 1   • Unstable Housing in the Last Year: No          Family History   Problem Relation Age of Onset   • Cancer Father         Lung   • Other Sister         lung and leukemia cancer   • Cancer Sister         Leukemia, Lung       Current Outpatient Medications on File Prior to Visit   Medication Sig Dispense Refill   • meloxicam (MOBIC) 7.5 MG Tab Take 7.5 mg by mouth 2 times a day.     • fluticasone-salmeterol (ADVAIR DISKUS) 500-50 MCG/DOSE AEROSOL POWDER, BREATH ACTIVATED Inhale 1 Puff 2 times a day. 3 Each 3   • ipratropium-albuterol (DUONEB) 0.5-2.5 (3) MG/3ML nebulizer solution USE ONE VIAL IN NEBULIZER EVERY 4 HOURS AS NEEDED FOR SHORTNESS OF BREATH OR  WHEEZING 360 Each 6   • azithromycin (ZITHROMAX) 250 MG Tab One tablet daily for COPD 90 Tablet 6   • montelukast (SINGULAIR) 10 MG Tab Take 1 Tablet by mouth every day. 90 Tablet 3   • albuterol (PROVENTIL) 2.5mg/3ml Nebu Soln solution for nebulization Inhale 3 mL by nebulization every four hours as needed. 75 mL 3   • SPIRIVA HANDIHALER 18 MCG Cap INHALE 1 CAPSULE CONTENT BY MOUTH ONE TIME DAILY. 90 Capsule 3   • digoxin (LANOXIN) 125 MCG Tab TAKE ONE TABLET BY MOUTH ONCE A  Tablet 1   • calcium carbonate (OS-MICHELLE 500) 1250  (500 Ca) MG Tab Take 1,000 mg by mouth every day.     • losartan (COZAAR) 50 MG Tab Take 1 tablet by mouth every day. 90 tablet 3   • fluticasone (FLONASE) 50 MCG/ACT nasal spray Administer 1-2 Sprays into affected nostril(S) every day. Each nostril. 15.8 mL 3   • azelastine (ASTELIN) 137 MCG/SPRAY nasal spray Administer 1-2 Sprays into affected nostril(S) 2 times a day. 30 mL 6   • tizanidine (ZANAFLEX) 4 MG Tab Take 4 mg by mouth every 8 hours as needed. not to exceed 3 doses in 24 hours  Indications: Lower Backache, Muscle Spasticity     • DULoxetine (CYMBALTA) 30 MG Cap DR Particles Take 30 mg by mouth every day. Indications: Generalized Anxiety Disorder, Major Depressive Disorder, Musculoskeletal Pain, Disease of the Peripheral Nerves     • guaiFENesin ER (MUCINEX) 600 MG TABLET SR 12 HR Take 600-1,200 mg by mouth 2 times a day as needed. Indications: Cough     • Albuterol Sulfate 108 (90 Base) MCG/ACT AEROSOL POWDER, BREATH ACTIVATED Inhale 2 Puffs 4 times a day as needed. Indications: SOB     • sodium chloride (OCEAN) 0.65 % Solution Administer 2 Sprays into affected nostril(S) every 2 hours as needed.  3   • acyclovir (ZOVIRAX) 200 MG Cap Take 200 mg by mouth every day.     • spironolactone (ALDACTONE) 25 MG Tab TAKE ONE TABLET BY MOUTH ONE TIME DAILY 30 Tab 11   • Cholecalciferol (VITAMIN D3) 2000 UNIT Cap TAKE ONE CAPSULE BY MOUTH ONE TIME DAILY 30 Cap 3   • potassium chloride (MICRO-K) 10 MEQ capsule TAKE ONE CAPSULE BY MOUTH TWICE DAILY 60 Cap 6   • omeprazole (PRILOSEC OTC) 20 MG tablet Take 1 Tab by mouth every day. 30 Tab 11   • HYDROcodone-acetaminophen (NORCO) 5-325 MG Tab per tablet Take 1 Tablet by mouth every four hours as needed. (Patient not taking: No sig reported)     • diphenhydrAMINE (BENADRYL) 25 MG capsule Take 25 mg by mouth every four hours as needed. Indications: Itching, seasonal allergy (Patient not taking: No sig reported)     • bisacodyl (DULCOLAX) 5 MG EC tablet Take 5 mg by mouth  "1 time a day as needed. 1 to 3 tabs in a single daily dose  Indications: Constipation     • hydrocortisone 1 % Cream Apply 1 Each topically 3 times a day as needed. Indications: Inflammation, Itching (Patient not taking: Reported on 6/20/2022)     • acetaminophen (TYLENOL) 650 MG CR tablet Take 650 mg by mouth every 6 hours as needed. (Patient not taking: No sig reported)     • Home Care Oxygen Inhale 6 L/min Continuous. Oxygen dose range: 6 to 6 L/min  Respiratory route via: Nasal Cannula   Oxygen supplier: Preferred         • Misc. Devices Misc This is an order for  7 foot high flow oxygen tubing  Dx; asthma with COPD J 44.9, supplemental oxygen-dependent Z 99.81, chronic respiratory failure with hypoxia and J 96        ODETTE 99 months   NPI [id number] 1 Each 0     No current facility-administered medications on file prior to visit.     Allergies: Iodine, Tape, and Meloxicam    ROS:   Review of Systems   Constitutional: Negative for chills, fever and weight loss.   HENT: Negative for congestion, sinus pain and sore throat.    Eyes: Negative for pain and discharge.   Respiratory: Positive for cough, sputum production and shortness of breath. Negative for wheezing and stridor.    Cardiovascular: Negative for chest pain, orthopnea and leg swelling.   Gastrointestinal: Negative for abdominal pain, diarrhea, nausea and vomiting.   Genitourinary: Negative for dysuria, frequency and urgency.   Musculoskeletal: Negative for myalgias.   Skin: Negative for rash.   Neurological: Negative for dizziness, sensory change, focal weakness, loss of consciousness and headaches.   Psychiatric/Behavioral: Negative.    All other systems reviewed and are negative.    Vitals:  /82 (BP Location: Left arm, Patient Position: Sitting, BP Cuff Size: Adult)   Pulse 83   Resp 16   Ht 1.6 m (5' 3\")   Wt 83.7 kg (184 lb 9.6 oz)   SpO2 93%     Physical Exam:  Physical Exam  Vitals and nursing note reviewed.   Constitutional:       " General: She is not in acute distress.     Appearance: Normal appearance. She is well-developed. She is not diaphoretic.   HENT:      Nose:      Comments: oxymizer pendant  Eyes:      General: No scleral icterus.        Right eye: No discharge.         Left eye: No discharge.      Conjunctiva/sclera: Conjunctivae normal.      Pupils: Pupils are equal, round, and reactive to light.   Neck:      Thyroid: No thyromegaly.      Vascular: No JVD.   Cardiovascular:      Rate and Rhythm: Normal rate and regular rhythm.      Heart sounds: Normal heart sounds. No murmur heard.    No gallop.   Pulmonary:      Effort: Pulmonary effort is normal. No respiratory distress.      Breath sounds: Normal breath sounds. No wheezing or rales.   Abdominal:      General: There is no distension.      Palpations: Abdomen is soft.      Tenderness: There is no abdominal tenderness. There is no guarding.   Musculoskeletal:         General: No tenderness.   Lymphadenopathy:      Cervical: No cervical adenopathy.   Skin:     General: Skin is warm.      Capillary Refill: Capillary refill takes less than 2 seconds.      Findings: No erythema or rash.   Neurological:      Mental Status: She is alert and oriented to person, place, and time.      Cranial Nerves: No cranial nerve deficit.      Sensory: No sensory deficit.      Motor: No abnormal muscle tone.   Psychiatric:         Behavior: Behavior normal.       Laboratory Data:    PFTs as reviewed by me personally show:  FEV1 0.89L (45%),    Imaging as reviewed by me personally show:    CT 12/2021 personally reviewed, showing RUL ground glass opacity and shrinkage of BRANDON opacity    Assessment/Plan:    Problem List Items Addressed This Visit     Chronic respiratory failure with hypoxia (HCC)     Due to COPD  Uses oxymizer pendant 3-6LPM  --continue to titrate supplemental oxygen to maintain saturations 88 to 92%           Stage 4 very severe COPD by GOLD classification (HCC)     FEV1 0.89L  (45%)  Advair 500/Spiriva/Singulair/daily azithromycin  No recent exacarbations  -- wants to wait on [facility] at this time  -- cont pulm rehab           Primary cancer of left upper lobe of lung (HCC)     S/p SBRT completed 11/2021  -- repeat CT chest scheduled for 12/2022  -- followed by Dr Alfa Vasques in about 6 months (around 12/29/2022).     This note was generated using voice recognition software which has a chance of producing errors of grammar and possibly content.  I have made every reasonable attempt to find and correct any obvious errors, but it should be expected that some may not be found prior to finalization of this note.    Time spent in record review prior to patient arrival, reviewing results, and in face-to-face encounter totaled 31 min, excluding any procedures if performed.  __________  Ihsan Kumar MD  Pulmonary and Critical Care Medicine  [facility]

## 2022-06-30 NOTE — ASSESSMENT & PLAN NOTE
FEV1 0.89L (45%)  Advair 500/Spiriva/Singulair/daily azithromycin  No recent exacarbations  -- wants to wait on [facility] at this time  -- cont pulm rehab

## 2022-06-30 NOTE — ASSESSMENT & PLAN NOTE
Due to COPD  Uses oxymizer pendant 3-6LPM  --continue to titrate supplemental oxygen to maintain saturations 88 to 92%

## 2022-07-06 ENCOUNTER — HOSPITAL ENCOUNTER (OUTPATIENT)
Dept: PULMONOLOGY | Facility: MEDICAL CENTER | Age: 77
End: 2022-07-06
Attending: INTERNAL MEDICINE
Payer: MEDICARE

## 2022-07-06 PROCEDURE — 94625 PHY/QHP OP PULM RHB W/O MNTR: CPT | Mod: XU

## 2022-07-06 PROCEDURE — 94626 PHY/QHP OP PULM RHB W/MNTR: CPT

## 2022-07-11 ENCOUNTER — HOSPITAL ENCOUNTER (OUTPATIENT)
Dept: PULMONOLOGY | Facility: MEDICAL CENTER | Age: 77
End: 2022-07-11
Attending: INTERNAL MEDICINE
Payer: MEDICARE

## 2022-07-11 PROCEDURE — 94625 PHY/QHP OP PULM RHB W/O MNTR: CPT | Mod: XU

## 2022-07-11 PROCEDURE — 94626 PHY/QHP OP PULM RHB W/MNTR: CPT

## 2022-09-17 ENCOUNTER — DOCUMENTATION (OUTPATIENT)
Dept: HEALTH INFORMATION MANAGEMENT | Facility: OTHER | Age: 77
End: 2022-09-17
Payer: MEDICARE

## 2022-10-12 ENCOUNTER — OFFICE VISIT (OUTPATIENT)
Dept: CARDIOLOGY | Facility: MEDICAL CENTER | Age: 77
End: 2022-10-12
Payer: MEDICARE

## 2022-10-12 VITALS
BODY MASS INDEX: 31.89 KG/M2 | SYSTOLIC BLOOD PRESSURE: 150 MMHG | DIASTOLIC BLOOD PRESSURE: 52 MMHG | RESPIRATION RATE: 18 BRPM | OXYGEN SATURATION: 98 % | HEIGHT: 63 IN | HEART RATE: 61 BPM | WEIGHT: 180 LBS

## 2022-10-12 DIAGNOSIS — I47.10 SVT (SUPRAVENTRICULAR TACHYCARDIA) (HCC): ICD-10-CM

## 2022-10-12 DIAGNOSIS — I49.3 PVCS (PREMATURE VENTRICULAR CONTRACTIONS): ICD-10-CM

## 2022-10-12 DIAGNOSIS — R00.2 PALPITATIONS: ICD-10-CM

## 2022-10-12 PROCEDURE — 99214 OFFICE O/P EST MOD 30 MIN: CPT | Performed by: INTERNAL MEDICINE

## 2022-10-12 RX ORDER — CHOLECALCIFEROL (VITAMIN D3) 125 MCG
1 CAPSULE ORAL
COMMUNITY
Start: 2022-09-07 | End: 2022-10-12

## 2022-10-12 RX ORDER — DIGOXIN 125 MCG
125 TABLET ORAL DAILY
Qty: 90 TABLET | Refills: 3 | Status: SHIPPED | OUTPATIENT
Start: 2022-10-12

## 2022-10-12 ASSESSMENT — ENCOUNTER SYMPTOMS
SHORTNESS OF BREATH: 1
LOSS OF CONSCIOUSNESS: 0
COUGH: 0
DIZZINESS: 0
PALPITATIONS: 0
MYALGIAS: 0

## 2022-10-12 ASSESSMENT — FIBROSIS 4 INDEX: FIB4 SCORE: 0.84

## 2022-10-12 NOTE — PROGRESS NOTES
Chief Complaint   Patient presents with    Premature Ventricular Contractions (PVCs)       Subjective     isreal Renee is a 77 y.o. female who presents today for follow-up evaluation of palpitations, SVT, PVCs and hypertension.     Since 1/5/2022 appointment the patient has had no cardiac symptoms including chest pain or palpitations.  No worsening SOB.  BP elevated the past 2 days.     Since 2/12/2021 appointment with me the patient was diagnosed with BRANDON lung cancer based on chest CT and PET scan and has completed radiation therapy.  Had a normal echocardiogram and MPI on 3/30/2021 for evaluation of CP and SOB.  Has occasional heart pounding but no heart racing.  Has a persistent nonproductive cough.  Followed by Ihsan Kumar MD pulmonary clinic and [facility] for chronic pain      Previous coronary evaluation includes a normal coronary angiogram 2004, normal MPI 2012 and normal cardiac PET scan 2019.      Past Medical History:   Diagnosis Date    Arthritis     spine    Asthma with COPD ([facility])     Cataract immature     Chronic pain     Chronic respiratory failure with hypoxia ([facility]) 7/13/2017    Colon polyps     COPD (chronic obstructive pulmonary disease) ([facility]) 2002    moderate to severe    DDD (degenerative disc disease), cervical     DDD (degenerative disc disease), thoracic     Diverticulitis of colon     Dyslipidemia     EMPHYSEMA     H/O opioid abuse ([facility]) 7/15/2021    Hypertension     REGINE (obstructive sleep apnea)     OSTEOPOROSIS     Spinal stenosis, lumbar region, with neurogenic claudication     URINARY INCONTINENCE     Vitamin d deficiency      Past Surgical History:   Procedure Laterality Date    LUMBAR LAMINECTOMY DISKECTOMY  6/22/2010    Performed by GRACIE [facility]O [facility]ERY [facility]    OTHER ORTHOPEDIC SURGERY  2004    left ankle fx    OTHER      leg fracture    OTHER      t spine disc surg    TONSILLECTOMY       Family History   Problem Relation Age of Onset    Cancer Father          Lung    Other Sister         lung and leukemia cancer    Cancer Sister         Leukemia, Lung     Social History     Socioeconomic History    Marital status:      Spouse name: Not on file    Number of children: Not on file    Years of education: Not on file    Highest education level: Not on file   Occupational History    Not on file   Tobacco Use    Smoking status: Former     Packs/day: 2.00     Years: 30.00     Pack years: 60.00     Types: Cigarettes     Quit date: 3/1/1999     Years since quittin.6    Smokeless tobacco: Never    Tobacco comments:     3 pks a day for 35 yrs, continued abstinence   Vaping Use    Vaping Use: Never used   Substance and Sexual Activity    Alcohol use: Not Currently     Alcohol/week: 4.2 oz     Types: 7 Glasses of wine per week    Drug use: Not Currently     Types: Marijuana, Oral     Comment: Medical Marijuana     Sexual activity: Never   Other Topics Concern    Not on file   Social History Narrative    ** Merged History Encounter **          Social Determinants of Health     Financial Resource Strain: Low Risk     Difficulty of Paying Living Expenses: Not hard at all   Food Insecurity: No Food Insecurity    Worried About Running Out of Food in the Last Year: Never true    Ran Out of Food in the Last Year: Never true   Transportation Needs: No Transportation Needs    Lack of Transportation (Medical): No    Lack of Transportation (Non-Medical): No   Physical Activity: Inactive    Days of Exercise per Week: 0 days    Minutes of Exercise per Session: 0 min   Stress: No Stress Concern Present    Feeling of Stress : Not at all   Social Connections: Moderately Isolated    Frequency of Communication with Friends and Family: Once a week    Frequency of Social Gatherings with Friends and Family: Once a week    Attends [employer] Services: More than 4 times per year    Active Member of Clubs or Organizations: Yes    Attends Club or Organization Meetings: More than 4 times per  year    Marital Status:    Intimate Partner Violence: Not on file   Housing Stability: Low Risk     Unable to Pay for Housing in the Last Year: No    Number of Places Lived in the Last Year: 1    Unstable Housing in the Last Year: No     Allergies   Allergen Reactions    Iodine      Convulsion  I.V.  iodine    Tape Rash and Itching     Outpatient Encounter Medications as of 10/12/2022   Medication Sig Dispense Refill    digoxin (LANOXIN) 125 MCG Tab Take 1 Tablet by mouth every day. 90 Tablet 3    meloxicam (MOBIC) 7.5 MG Tab Take 7.5 mg by mouth 2 times a day.      fluticasone-salmeterol (ADVAIR DISKUS) 500-50 MCG/DOSE AEROSOL POWDER, BREATH ACTIVATED Inhale 1 Puff 2 times a day. 3 Each 3    ipratropium-albuterol (DUONEB) 0.5-2.5 (3) MG/3ML nebulizer solution USE ONE VIAL IN NEBULIZER EVERY 4 HOURS AS NEEDED FOR SHORTNESS OF BREATH OR  WHEEZING 360 Each 6    azithromycin (ZITHROMAX) 250 MG Tab One tablet daily for COPD 90 Tablet 6    montelukast (SINGULAIR) 10 MG Tab Take 1 Tablet by mouth every day. 90 Tablet 3    SPIRIVA HANDIHALER 18 MCG Cap INHALE 1 CAPSULE CONTENT BY MOUTH ONE TIME DAILY. 90 Capsule 3    calcium carbonate (OS-MICHELLE 500) 1250 (500 Ca) MG Tab Take 1,000 mg by mouth every day.      losartan (COZAAR) 50 MG Tab Take 1 tablet by mouth every day. 90 tablet 3    fluticasone (FLONASE) 50 MCG/ACT nasal spray Administer 1-2 Sprays into affected nostril(S) every day. Each nostril. (Patient taking differently: Administer 1-2 Sprays into affected nostril(S) as needed. Each nostril.) 15.8 mL 3    azelastine (ASTELIN) 137 MCG/SPRAY nasal spray Administer 1-2 Sprays into affected nostril(S) 2 times a day. (Patient taking differently: Administer 1-2 Sprays into affected nostril(S) as needed.) 30 mL 6    tizanidine (ZANAFLEX) 4 MG Tab Take 4 mg by mouth every 8 hours as needed. not to exceed 3 doses in 24 hours  Indications: Lower Backache, Muscle Spasticity      DULoxetine (CYMBALTA) 30 MG Cap DR Particles  Take 30 mg by mouth every day. Indications: Generalized Anxiety Disorder, Major Depressive Disorder, Musculoskeletal Pain, Disease of the Peripheral Nerves      guaiFENesin ER (MUCINEX) 600 MG TABLET SR 12 HR Take 600-1,200 mg by mouth 2 times a day as needed. Indications: Cough      Albuterol Sulfate 108 (90 Base) MCG/ACT AEROSOL POWDER, BREATH ACTIVATED Inhale 2 Puffs 4 times a day as needed. Indications: SOB      hydrocortisone 1 % Cream Apply 1 Each topically 3 times a day as needed. Indications: Inflammation, Itching      acetaminophen (TYLENOL) 650 MG CR tablet Take 650 mg by mouth every 6 hours as needed.      sodium chloride (OCEAN) 0.65 % Solution Administer 2 Sprays into affected nostril(S) every 2 hours as needed.  3    acyclovir (ZOVIRAX) 200 MG Cap Take 200 mg by mouth every day.      Home Care Oxygen Inhale 6 L/min Continuous. Oxygen dose range: 6 to 6 L/min  Respiratory route via: Nasal Cannula   Oxygen supplier: Preferred          Misc. Devices Misc This is an order for  7 foot high flow oxygen tubing  Dx; asthma with COPD J 44.9, supplemental oxygen-dependent Z 99.81, chronic respiratory failure with hypoxia and J 96        ODETTE 99 months   NPI [id number] 1 Each 0    spironolactone (ALDACTONE) 25 MG Tab TAKE ONE TABLET BY MOUTH ONE TIME DAILY 30 Tab 11    Cholecalciferol (VITAMIN D3) 2000 UNIT Cap TAKE ONE CAPSULE BY MOUTH ONE TIME DAILY 30 Cap 3    potassium chloride (MICRO-K) 10 MEQ capsule TAKE ONE CAPSULE BY MOUTH TWICE DAILY 60 Cap 6    omeprazole (PRILOSEC OTC) 20 MG tablet Take 1 Tab by mouth every day. 30 Tab 11    [DISCONTINUED] Cholecalciferol (VITAMIN D3) 50 MCG (2000 UT) Tab Take 1 Tablet by mouth every day.      [DISCONTINUED] albuterol (PROVENTIL) 2.5mg/3ml Nebu Soln solution for nebulization Inhale 3 mL by nebulization every four hours as needed. (Patient not taking: Reported on 10/12/2022) 75 mL 3    [DISCONTINUED] HYDROcodone-acetaminophen (NORCO) 5-325 MG Tab per tablet Take 1  "Tablet by mouth every four hours as needed. (Patient not taking: No sig reported)      [DISCONTINUED] digoxin (LANOXIN) 125 MCG Tab TAKE ONE TABLET BY MOUTH ONCE A  Tablet 1    [DISCONTINUED] diphenhydrAMINE (BENADRYL) 25 MG capsule Take 25 mg by mouth every four hours as needed. Indications: Itching, seasonal allergy (Patient not taking: No sig reported)      [DISCONTINUED] bisacodyl (DULCOLAX) 5 MG EC tablet Take 5 mg by mouth 1 time a day as needed. 1 to 3 tabs in a single daily dose  Indications: Constipation (Patient not taking: Reported on 10/12/2022)       No facility-administered encounter medications on file as of 10/12/2022.     Review of Systems   Respiratory:  Positive for shortness of breath. Negative for cough.    Cardiovascular:  Negative for chest pain and palpitations.   Musculoskeletal:  Negative for myalgias.   Neurological:  Negative for dizziness and loss of consciousness.     CARDIAC PET SCAN 09/06/2019  Ventricular ejection fraction 68%.  Normal perfusion scan.     ECHOCARDIOGRAM 03/17/2013  Technically difficult study.   Probably normal left ventricular size, thickness and systolic function.   Borderline diastolic dysfunction.  Possible small pericardial effusion, without evidence of hemodynamic   compromise, and possible epicardial fat pad.  Mild mitral regurgitation.        Objective     BP (!) 150/52 (BP Location: Left arm, Patient Position: Sitting, BP Cuff Size: Adult)   Pulse 61   Resp 18   Ht 1.6 m (5' 3\")   Wt 81.6 kg (180 lb)   LMP 06/07/2001   SpO2 98%   BMI 31.89 kg/m²   BP Readings from Last 5 Encounters:   10/12/22 (!) 150/52   06/20/22 139/55   06/29/22 124/82   02/24/22 134/60   02/04/22 124/80      Pulse Readings from Last 5 Encounters:   10/12/22 61   06/20/22 (!) 55   06/29/22 83   02/24/22 71   02/04/22 74      Wt Readings from Last 5 Encounters:   10/12/22 81.6 kg (180 lb)   06/20/22 82.7 kg (182 lb 5.1 oz)   06/29/22 83.7 kg (184 lb 9.6 oz)   02/24/22 82.4 " kg (181 lb 9 oz)   02/04/22 81.9 kg (180 lb 8 oz)       Physical Exam  Constitutional:       Appearance: She is well-developed.      Comments: On O2 nasal cannula.   Eyes:      Conjunctiva/sclera: Conjunctivae normal.      Pupils: Pupils are equal, round, and reactive to light.   Neck:      Vascular: No JVD.   Cardiovascular:      Rate and Rhythm: Normal rate and regular rhythm.      Heart sounds: Normal heart sounds.   Pulmonary:      Effort: Pulmonary effort is normal. No accessory muscle usage or respiratory distress.      Breath sounds: Decreased breath sounds present. No wheezing or rales.   Musculoskeletal:      Cervical back: Normal range of motion and neck supple.      Right lower leg: No edema.      Left lower leg: No edema.   Skin:     General: Skin is warm and dry.      Findings: No rash.      Nails: There is no clubbing.   Neurological:      Mental Status: She is alert and oriented to person, place, and time.   Psychiatric:         Behavior: Behavior normal.       CARDIAC PET SCAN 9/6/2019  SCINTOGRAPHIC FINDINGS:    Normal left ventricular perfusion with stress and rest images.  There is no evidence of ischemia or scar.   GATED WALL MOTION FINDINGS:    The left ventricle wall motion is normal with stress and rest  imagings.  Measured resting ejection fraction is 68 %.     ECHOCARDIOGRAM 3/23/2021  Normal left ventricular systolic function.  Left ventricular ejection fraction is visually estimated to be 65-70%.  Normal right ventricular systolic function.  No valve abnormalities.       Assessment & Plan     1. SVT (supraventricular tachycardia) (HCC)  digoxin (LANOXIN) 125 MCG Tab      2. PVCs (premature ventricular contractions)        3. Palpitations            Medical Decision Making: Today's Assessment/Status/Plan:   Assessment  1.  SVT.  2.  PVCs.  3.  Palpitations.  4.  Hypertension.  5.  Chronic respiratory failure.  6.  COPD, O2 dependent, severe.  7.  COVID-19 infection 11/2020.  8.  Lung cancer  8/2021 S/P radiation therapy.  9.  Chronic pain syndrome with back pain.    Recommendation Discussion  1.  SVT stable, no substantial clinical recurrence, continue digoxin previous digoxin level 0.4.  2.  PVCs, asymptomatic, monitor for worsening symptoms  3.  Hypertension, BP elevated today, continue spironolactone, losartan, notify clinic if BP greater than 129/79 for additional management.  4.  RTC 6 months.

## 2022-11-09 NOTE — PROGRESS NOTES
[facility] Medicine Daily Progress Note    Date of Service  11/17/2020    Chief Complaint  75 y.o. female admitted 11/14/2020 with shortness of breath.    Hospital Course  Ms. Renee is a 75-year-old female with a past medical history of severe COPD was admitted for Covid pneumonia complicated by acute hypoxic respiratory failure. She was  started on steroids and remdesivir and  received 1 dose of IV Lasix on admit.    Interval Problem Update  11/17: patient seen and evaluated on the COVID unit. She is 84% on 6 liters but has refused high flow and an oxymask. Ms. Renee has been getting up to the chair with assistance though develops increased SOB with any exertion.  She requests restarting her home dose of oxycodone which she has been on for the past 15 years.    Consultants/Specialty  Infectious disease    Code Status  DNAR/DNI    Disposition  COVID unit    Review of Systems  Review of Systems   Constitutional: Negative for fever.   Respiratory: Positive for cough and shortness of breath.    Cardiovascular: Negative for chest pain.   Gastrointestinal: Negative for diarrhea, nausea and vomiting.   All other systems reviewed and are negative.       Physical Exam  Temp:  [36.6 °C (97.8 °F)-37.1 °C (98.8 °F)] 36.6 °C (97.8 °F)  Pulse:  [59-79] 62  Resp:  [18-29] 23  BP: (147-168)/(66-72) 148/66  SpO2:  [81 %-94 %] 84 %    Physical Exam  Vitals signs and nursing note reviewed.   Constitutional:       Appearance: Normal appearance. She is not toxic-appearing.   HENT:      Head: Normocephalic and atraumatic.      Mouth/Throat:      Mouth: Mucous membranes are dry.      Pharynx: Oropharynx is clear.   Eyes:      General: No scleral icterus.     Conjunctiva/sclera: Conjunctivae normal.   Neck:      Musculoskeletal: Normal range of motion and neck supple.   Cardiovascular:      Rate and Rhythm: Normal rate and regular rhythm.      Comments: Distant heart sounds  Pulmonary:      Comments: Decreased air movement  No wheezing  Few  Occupational Therapy  Evaluation    Katarzyna Warner   MRN: [id number]   Admitting Diagnosis: Debility    OT Date of Treatment: 22   OT Start Time: 1630  OT Stop Time: 1730  OT Total Time (min): 60 min    Billable Minutes:  Evaluation 60  Total Minutes: 60    Diagnosis: Acute cystitis without hematuria      Past Medical History:   Diagnosis Date    Acute cystitis without hematuria 2021    Altered mental status 2021    Anemia 2021    Anxiety     Arthritis     Arthritis, rheumatoid     Bell's palsy     Bursitis of left shoulder     Carpal tunnel syndrome, bilateral     Debility 2021    Elevated troponin 2021    Erosive osteoarthritis of both hands     Esophagus disorder     needs to have her esophagus stretched again    Fall 2021    Function kidney decreased     GERD (gastroesophageal reflux disease)     High cholesterol     Hypertension     Hypokalemia 2021    Hypothyroidism     Kyphosis of cervical region, unspecified kyphosis type     Lumbar pain     Migraine headache     Postmenopausal disorder     Shingles     Thrombocytopenia     Thyroid disease     Unspecified cataract     Weakness 2021      Past Surgical History:   Procedure Laterality Date    BACK SURGERY      BLADDER SURGERY  2006    Dr Robert in [place] stem cell implant to bladder    CATARACT EXTRACTION Left     Dr Barroso 2016     SECTION      COLONOSCOPY  10/2012    Dr Corbett    ESOPHAGOGASTRODUODENOSCOPY  2021    schatzkis ring - dilated, gastroduodenitis, bile reflux - pickard    HYSTERECTOMY      NOSE SURGERY  2021    Dr De Souza in [place] removal of skin cancer    OOPHORECTOMY      TONSILLECTOMY         Referring physician: Dr. Juliet Barclay  Date referred to OT: 22    General Precautions: Standard, fall  Orthopedic Precautions: N/A  Braces: N/A    Spiritual, Cultural Beliefs, Yazidi Practices, Values that Affect Care: no     Patient History:  Living Environment  Lives  With: alone  Living Arrangements: house  Home Accessibility: stairs to enter home (2 steps to enter / exit)  Home Layout: Able to live on 1st floor  Stair Railings at Home: outside, present at both sides  Transportation Anticipated: car, drives self, family or friend will provide  Equipment Currently Used at Home: cane, straight, walker, rolling, rollator, shower chair    Prior level of function:    Bed Mobility/Transfers: independent  Grooming: independent  Bathing: independent  Upper Body Dressing: independent  Lower Body Dressing: independent  Toileting: independent  Homemaking Responsibilities: Yes  Meal Prep Responsibility: Primary  Laundry Responsibility: Primary  Cleaning Responsibility: Primary  Bill Paying/Finance Responsibility: Primary  Shopping Responsibility: Primary   Responsibility: No  Driving License: Yes  Mode of Transportation: Car  Education: 12th grade  Occupation: Retired  Type of Occupation: [place] at [employer]  Leisure and Hobbies: Sewing     Dominant hand: right    Subjective:  Communicated with nurse prior to session.    Chief Complaint: weakness and atrophy  Patient/Family stated goals: Pt would like to regain independence and increase strength in order to return home alone while improving overall quality of life.     Pain/Comfort  Pain Rating 1: 5/10  Location - Orientation 1: lower  Location 1: back  Pain Addressed 1: Reposition, Cessation of Activity, Nurse notified  Pain Rating Post-Intervention 1: 5/10    Objective:  Patient found with: peripheral IV    Cognitive Exam:  Oriented to: Person, Place, Time, and Situation  Follows Commands/attention: Follows multistep  commands  Communication: clear/fluent  Memory:  No Deficits noted  Safety awareness/insight to disability: impaired  Coping skills/emotional control: Appropriate to situation    Visual/perceptual:  Impaired  acuity    Physical Exam:  Postural examination/scapula alignment: -       Rounded shoulders  -       Forward  crackles  Normal work of breathing  Abdominal:      General: There is no distension.      Tenderness: There is no abdominal tenderness.   Musculoskeletal:      Right lower leg: No edema.      Left lower leg: No edema.   Skin:     General: Skin is warm and dry.   Neurological:      General: No focal deficit present.      Mental Status: She is alert and oriented to person, place, and time.   Psychiatric:         Mood and Affect: Mood normal.         Behavior: Behavior normal.         Fluids    Intake/Output Summary (Last 24 hours) at 11/17/2020 0827  Last data filed at 11/16/2020 1800  Gross per 24 hour   Intake 720 ml   Output --   Net 720 ml       Laboratory  Recent Labs     11/15/20  0451 11/16/20  0045   WBC 6.6 7.5   RBC 4.84 5.00   HEMOGLOBIN 14.6 14.8   HEMATOCRIT 43.4 44.1   MCV 89.7 88.2   MCH 30.2 29.6   MCHC 33.6 33.6   RDW 43.1 42.1   PLATELETCT 344 396   MPV 9.7 9.9     Recent Labs     11/15/20  0451 11/16/20  0045 11/17/20  0400   SODIUM 132* 130* 132*   POTASSIUM 3.9 3.6 4.1   CHLORIDE 89* 88* 90*   CO2 33 31 32   GLUCOSE 136* 210* 127*   BUN 17 24* 21   CREATININE 0.42* 0.54 0.57   CALCIUM 9.5 9.3 9.6             Recent Labs     11/15/20  0451   TRIGLYCERIDE 91   HDL 75   LDL 61       Imaging  DX-CHEST-PORTABLE (1 VIEW)   Final Result      Increasing bilateral interstitial opacities, suggesting worsening pulmonary edema. The appearance is not classic for Covid 19 pneumonia, but this cannot be excluded.           Assessment/Plan  * COVID-19- (present on admission)  Assessment & Plan  COVID 19 pneumonia with associated acute on chronic respiratory failure.  Continue Decadron 6 mg daily for 10 days  Continue Remdesivir 11/18/2020  Isolation precautions      DNR (do not resuscitate)- (present on admission)  Assessment & Plan  Per patient's request    Acute on chronic respiratory failure with hypoxia (HCC)  Assessment & Plan  She is on 6 liters oxygen baseline and is now hypoxic at 84% on 6 liters. She has  head  -       Kyphosis  Skin integrity: Visible skin intact  Edema: None noted      Sensation:   -       Intact  light/touch   and proprioception bilateral UE's    Upper Extremity Range of Motion:  Right Upper Extremity: WFL except 105 degrees shoulder flexion in plane of scaption  Left Upper Extremity: WFL except 100 degrees shoulder flexion in plane of scaption    Upper Extremity Strength:  Right Upper Extremity: Deficits: 3- to 3+/5  Left Upper Extremity: Deficits: 3- to 3+/5   Strength: (R) 25 lbs; (L) 23 lbs    Fine motor coordination:   -       Intact  Left hand, manipulation of objects and Right hand, manipulation of objects    Gross motor coordination:  Mild impairment bilateral UE hand-eye coordination    Functional Mobility:  Bed Mobility:   Supine to sit: Minimal Assistance   Sit to supine: Minimal Assistance   Rolling: Standby Assistance   Scooting: Standby Assistance    Transfers:   Sit to stand:Minimal Assistance with Rolling Walker .  Bed <> Chair:  Step Transfer with Minimal Assistance with Rolling Walker .  Toilet Transfer:  Pt Step Transfer with Minimal Assistance with Grab bars .  Patient performed shower transfer Step Transfer with Minimal Assistance with Grab bars and shower chair.    Feeding:  Patient performed feeding with Setup Assistance.    Grooming:  Patient peformed hand washing with Setup Assistance at sitting at sink.  Patient performed face washing with Setup Assistance at sitting at sink.  Patient performed oral hygeine with Setup Assistance at sitting at sink.  Patient performe hair grooming with Setup Assistance at sitting at sink.    Bathing:  Patient performed bathing with Minimal Assistance with grab bar, Handheld shower head, and shower chair at Shower.    UE Dressing:  Patient performed UE Dressing with Minimal Assistance with extra time at Edge of bed.    LE Dressing:  Patient don/doffed socks with Minimal Assistance, Patient don/doffed shoes with Moderate Assistance,  refused high-flow and an oxymask.  Self pronation and incentive spirometry  Up to chair for all meals      SVT (supraventricular tachycardia) ([facility])- (present on admission)  Assessment & Plan  Takes digoxin as a home which has been held due to bradycardia    Stage 4 very severe COPD by GOLD classification ([facility])- (present on admission)  Assessment & Plan  Not in exacerbation  Continue home inhalers  On chronic azithromycin therapy    Lumbar radicular pain- (present on admission)  Assessment & Plan  On chronic opiate therapy  She takes oxycodone 10 mg 4 times a day for the past 15 years and this will be continued     Essential hypertension- (present on admission)  Assessment & Plan  Continue current home medications  As needed antihypertensives       VTE prophylaxis: Lovenox       Patient performed don/doffed adult brief with Minimal Assistance, and Patient performed don/doffed pants with Minimal Assistance    Toileting:  Pt performed toileting with Minimal Assistance with Grab  bar at Commode.    Balance:   Static Sit: GOOD-: Takes MODERATE challenges from all directions but inconsistently  Dynamic Sit:  FAIR+: Maintains balance through MINIMAL excursions of active trunk motion  Static Stand: FAIR: Maintains without assist but unable to take challenges  Dynamic stand: FAIR: Needs CONTACT GUARD during gait    Patient left HOB elevated with all lines intact, call button in reach, and nurse notified    Assessment:  Katarzyna Warner is a 85 y.o. female with a medical diagnosis of Debility and presents with weakness, impaired endurance, impaired self care skills, impaired functional mobility, impaired balance, visual deficits, decreased coordination, pain, and decreased ROM.    Rehab potential is good    Activity tolerance: Fair    Discharge recommendations: other (see comments) (To be further determined based on progress prior to discharge.)     Equipment recommendations: none     GOALS:   Short Term Goals to be met by: 11/16/22     Patient will increase functional independence with ADLs by performing:    UE Dressing with Set-up Assistance.  LE Dressing with Supervision.  Toileting from toilet with Supervision for hygiene and clothing management.   Bathing from  shower chair/bench with Supervision.  Step transfer with Supervision  Toilet transfer to toilet with Supervision.  Increased functional strength to 4- to 4/5 for bilateral UE's.      Long Term Goals to be met by: 11/23/22     Patient will increase functional independence with ADLs by performing:    Feeding with [place].  UE Dressing with Modified [place].  LE Dressing with Modified [place].  Grooming while standing at sink with Modified [place].  Toileting from toilet with Modified [place] for hygiene and  clothing management.   Bathing from  shower chair/bench with Modified [place].  Step transfer with Modified [place]  Toilet transfer to toilet with Modified [place].  Increased functional strength to 4+/5 for bilateral UE's.      PLAN: Patient to be seen 5 x/week (90 min per day, for 14 days) to address the above listed problems via self-care/home management, community/work re-entry, therapeutic activities, therapeutic exercises  Plan of Care expires: 11/23/22  Plan of Care reviewed with: patient         11/09/2022

## 2022-11-29 ENCOUNTER — HOSPITAL ENCOUNTER (OUTPATIENT)
Dept: RADIOLOGY | Facility: MEDICAL CENTER | Age: 77
End: 2022-11-29
Attending: NURSE PRACTITIONER
Payer: MEDICARE

## 2022-11-29 DIAGNOSIS — M54.12 RADICULOPATHY OF CERVICAL SPINE: ICD-10-CM

## 2022-11-29 PROCEDURE — 72141 MRI NECK SPINE W/O DYE: CPT

## 2022-12-05 ENCOUNTER — HOSPITAL ENCOUNTER (OUTPATIENT)
Dept: RADIOLOGY | Facility: MEDICAL CENTER | Age: 77
End: 2022-12-05
Attending: NURSE PRACTITIONER
Payer: MEDICARE

## 2022-12-05 DIAGNOSIS — M54.12 RADICULOPATHY, CERVICAL REGION: ICD-10-CM

## 2022-12-05 PROCEDURE — 72050 X-RAY EXAM NECK SPINE 4/5VWS: CPT

## 2022-12-20 ENCOUNTER — HOSPITAL ENCOUNTER (OUTPATIENT)
Dept: RADIOLOGY | Facility: MEDICAL CENTER | Age: 77
End: 2022-12-20
Attending: RADIOLOGY
Payer: MEDICARE

## 2022-12-20 DIAGNOSIS — C34.12 PRIMARY CANCER OF LEFT UPPER LOBE OF LUNG (HCC): ICD-10-CM

## 2022-12-20 PROCEDURE — 71250 CT THORAX DX C-: CPT

## 2023-01-05 ENCOUNTER — HOSPITAL ENCOUNTER (OUTPATIENT)
Dept: RADIATION ONCOLOGY | Facility: MEDICAL CENTER | Age: 78
End: 2023-01-31
Attending: RADIOLOGY
Payer: MEDICARE

## 2023-01-05 VITALS
DIASTOLIC BLOOD PRESSURE: 59 MMHG | SYSTOLIC BLOOD PRESSURE: 169 MMHG | BODY MASS INDEX: 32.57 KG/M2 | HEART RATE: 69 BPM | OXYGEN SATURATION: 92 % | WEIGHT: 183.86 LBS | TEMPERATURE: 97.6 F

## 2023-01-05 DIAGNOSIS — C34.90 MALIGNANT NEOPLASM OF UNSPECIFIED PART OF UNSPECIFIED BRONCHUS OR LUNG (HCC): ICD-10-CM

## 2023-01-05 PROCEDURE — 99212 OFFICE O/P EST SF 10 MIN: CPT | Performed by: RADIOLOGY

## 2023-01-05 PROCEDURE — 99214 OFFICE O/P EST MOD 30 MIN: CPT | Performed by: RADIOLOGY

## 2023-01-05 ASSESSMENT — PAIN SCALES - GENERAL: PAINLEVEL: NO PAIN

## 2023-01-05 NOTE — PROGRESS NOTES
Patient was seen today in clinic with Dr. Berrios for follow up.  Vitals signs and weight were obtained and pain assessment was completed.  Allergies and medications were reviewed with the patient.  Toxicities of treatment assessed.     Vitals/Pain:  Vitals:    01/05/23 1555   BP: (!) 169/59   BP Location: Right arm   Patient Position: Sitting   BP Cuff Size: Adult   Pulse: 69   Temp: 36.4 °C (97.6 °F)   TempSrc: Temporal   SpO2: 92%   Weight: 83.4 kg (183 lb 13.8 oz)   Pain Score: No pain        Allergies:   Iodine and Tape    Current Medications:  Current Outpatient Medications   Medication Sig Dispense Refill    digoxin (LANOXIN) 125 MCG Tab Take 1 Tablet by mouth every day. 90 Tablet 3    meloxicam (MOBIC) 7.5 MG Tab Take 7.5 mg by mouth 2 times a day.      fluticasone-salmeterol (ADVAIR DISKUS) 500-50 MCG/DOSE AEROSOL POWDER, BREATH ACTIVATED Inhale 1 Puff 2 times a day. 3 Each 3    ipratropium-albuterol (DUONEB) 0.5-2.5 (3) MG/3ML nebulizer solution USE ONE VIAL IN NEBULIZER EVERY 4 HOURS AS NEEDED FOR SHORTNESS OF BREATH OR  WHEEZING 360 Each 6    azithromycin (ZITHROMAX) 250 MG Tab One tablet daily for COPD 90 Tablet 6    montelukast (SINGULAIR) 10 MG Tab Take 1 Tablet by mouth every day. 90 Tablet 3    SPIRIVA HANDIHALER 18 MCG Cap INHALE 1 CAPSULE CONTENT BY MOUTH ONE TIME DAILY. 90 Capsule 3    calcium carbonate (OS-MICHELLE 500) 1250 (500 Ca) MG Tab Take 1,000 mg by mouth every day.      losartan (COZAAR) 50 MG Tab Take 1 tablet by mouth every day. 90 tablet 3    fluticasone (FLONASE) 50 MCG/ACT nasal spray Administer 1-2 Sprays into affected nostril(S) every day. Each nostril. (Patient taking differently: Administer 1-2 Sprays into affected nostril(S) as needed. Each nostril.) 15.8 mL 3    azelastine (ASTELIN) 137 MCG/SPRAY nasal spray Administer 1-2 Sprays into affected nostril(S) 2 times a day. (Patient taking differently: Administer 1-2 Sprays into affected nostril(S) as needed.) 30 mL 6    tizanidine  (ZANAFLEX) 4 MG Tab Take 4 mg by mouth every 8 hours as needed. not to exceed 3 doses in 24 hours  Indications: Lower Backache, Muscle Spasticity      DULoxetine (CYMBALTA) 30 MG Cap DR Particles Take 30 mg by mouth every day. Indications: Generalized Anxiety Disorder, Major Depressive Disorder, Musculoskeletal Pain, Disease of the Peripheral Nerves      guaiFENesin ER (MUCINEX) 600 MG TABLET SR 12 HR Take 600-1,200 mg by mouth 2 times a day as needed. Indications: Cough      Albuterol Sulfate 108 (90 Base) MCG/ACT AEROSOL POWDER, BREATH ACTIVATED Inhale 2 Puffs 4 times a day as needed. Indications: SOB      hydrocortisone 1 % Cream Apply 1 Each topically 3 times a day as needed. Indications: Inflammation, Itching      acetaminophen (TYLENOL) 650 MG CR tablet Take 650 mg by mouth every 6 hours as needed.      sodium chloride (OCEAN) 0.65 % Solution Administer 2 Sprays into affected nostril(S) every 2 hours as needed.  3    acyclovir (ZOVIRAX) 200 MG Cap Take 200 mg by mouth every day.      Home Care Oxygen Inhale 6 L/min Continuous. Oxygen dose range: 6 to 6 L/min  Respiratory route via: Nasal Cannula   Oxygen supplier: Preferred          Misc. 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 30 Tab 11     No current facility-administered medications for this encounter.           Makayla Hathaway, Med Ass't

## 2023-01-05 NOTE — PROGRESS NOTES
RADIATION ONCOLOGY FOLLOW-UP    Patient name:  Berny Renee    Primary Physician:  Everett Diego M.D. MRN: [id number]  CSN: [id number]   Referring physician:  No ref. provider found  : 1945, 77 y.o.     DATE OF SERVICE: 2023    IDENTIFICATION:   A 77 y.o. female with    Primary cancer of left upper lobe of lung (HCC)  Staging form: Lung, AJCC 8th Edition  - Clinical stage from 10/1/2021: Stage IA2 (cT1b, cN0, cM0) - Signed by Israel Grimm M.D. on 10/1/2021      RADIATION SUMMARY:  Radiation Oncology          10/28/2021 2021   [facility] Course Treatment Dates   Course First Treatment Date 10/20/2021    10/20/2021     Course Last Treatment Date 10/28/2021    2021     [facility] Treatment Summary   [facility] Lung SBRT  Plan from Course C1_LUL   Fraction 4 of 5 5 of 5    5 of 5   Elapsed Course Days  @  12 @     12 @ [id number]   Prescribed Fraction Dose 1,200 cGy 1,200 cGy    1,200 cGy   Prescribed Total Dose 6,000 cGy 6,000 cGy    6,000 cGy   ITVp  Reference Point from Course C1_LUL   Elapsed Course Days 8 @ [id number] 12 @ [id number]    12 @ [id number]   Session Dose 1,200 cGy 1,200 cGy    --   Total Dose 4,800 cGy 6,000 cGy    6,000 cGy   [facility]ON cp  Reference Point from Course C1_LUL   Elapsed Course Days  @ [id number] 12 @ [id number]    12 @ [id number]   Session Dose 1,284 cGy 1,284 cGy    --   Total Dose 5,136 cGy 6,420 cGy    6,420 cGy       More values are hidden. Newest values shown. Go to activity for more data.        HISTORY OF PRESENT ILLNESS:  77-year-old female with history of COPD with emphysema FEV1 less than 1 L with PET avid left upper lobe lung lesion too high risk for biopsy or pulmonary intervention.  Patient had PET/CT 2021 which showed nodular left upper lobe opacity FDG avid concerning for malignancy.  Patient has chronic cough and is taking Tessalon Perls as well as Robitussin with codeine.  Patient denies any hemoptysis or weight  loss.     12/13/21  She completed her radiation therapy November 1, 2021 with 50 Gray in 5 fractions and integrated boost to 60 Gray internal target volume.  Patient did well after radiation denies any worsening shortness of breath or hemoptysis.  Does still have chronic cough and has tried Tessalon Perles and Robitussin with codeine with minimal resolution.  Patient had repeat CT chest December 8, 2021 which shows excellent shrinkage of tumor.  There is a new parenchymal opacity in the anterior right upper lobe most consistent with an area of pneumonitis and severe emphysematous changes noted.    6/20/22 Patient doing well after SBRT and had interval CT chest June 17, 2022 which showed very response to therapy with left upper lobe nodule now measuring 11 x 6 mm previously 14 x 9 mm.  No new pulmonary nodules and multifocal pulmonary parenchymal scarring and emphysema seen.  She is doing pulmonary rehab monitored by Dr. Kumar.  Denies any weight loss or hemoptysis.    INTERVAL HISTORY:  Patient has been doing well had interval CT scan 1/5/2022 which shows no evidence of disease recurrence.  Overall feeling well sees Dr. Meyers for allergy and Dr. Kumar for COPD mgmt.     PROBLEM LIST:  Patient Active Problem List   Diagnosis    Benign hypertension    Mixed stress and urge urinary incontinence    Peripheral edema    Lumbar radicular pain    Vitamin D deficiency disease    Dyslipidemia    Facet arthropathy, lumbar    Chronic constipation    Chronic pain syndrome    Dependence on continuous supplemental oxygen    Pre-diabetes    Pain management    Obesity (BMI 30-39.9)    At risk for falls    Chronic respiratory failure with hypoxia (HCC)    External hemorrhoid, bleeding    Stage 4 very severe COPD by GOLD classification (HCC)    SVT (supraventricular tachycardia) ([facility])    PVCs (premature ventricular contractions)    Palpitations    COVID-19    Acute on chronic respiratory failure with hypoxia (HCC)    DNR (do not  resuscitate)    Hyponatremia    Peripheral vascular disease (HCC)    Recurrent major depressive disorder, in partial remission (HCC)    Amnesia    Primary cancer of left upper lobe of lung (HCC)    Prediabetes    BMI 31.0-31.9,adult    Opioid type dependence, continuous (HCC)    Mixed simple and mucopurulent chronic bronchitis (HCC)    Panlobular emphysema (HCC)    Osteopenia of lumbar spine       CURRENT MEDICATIONS:  Current Outpatient Medications   Medication Sig Dispense Refill    digoxin (LANOXIN) 125 MCG Tab Take 1 Tablet by mouth every day. 90 Tablet 3    meloxicam (MOBIC) 7.5 MG Tab Take 7.5 mg by mouth 2 times a day.      fluticasone-salmeterol (ADVAIR DISKUS) 500-50 MCG/DOSE AEROSOL POWDER, BREATH ACTIVATED Inhale 1 Puff 2 times a day. 3 Each 3    ipratropium-albuterol (DUONEB) 0.5-2.5 (3) MG/3ML nebulizer solution USE ONE VIAL IN NEBULIZER EVERY 4 HOURS AS NEEDED FOR SHORTNESS OF BREATH OR  WHEEZING 360 Each 6    azithromycin (ZITHROMAX) 250 MG Tab One tablet daily for COPD 90 Tablet 6    montelukast (SINGULAIR) 10 MG Tab Take 1 Tablet by mouth every day. 90 Tablet 3    SPIRIVA HANDIHALER 18 MCG Cap INHALE 1 CAPSULE CONTENT BY MOUTH ONE TIME DAILY. 90 Capsule 3    calcium carbonate (OS-MICHELLE 500) 1250 (500 Ca) MG Tab Take 1,000 mg by mouth every day.      losartan (COZAAR) 50 MG Tab Take 1 tablet by mouth every day. 90 tablet 3    fluticasone (FLONASE) 50 MCG/ACT nasal spray Administer 1-2 Sprays into affected nostril(S) every day. Each nostril. (Patient taking differently: Administer 1-2 Sprays into affected nostril(S) as needed. Each nostril.) 15.8 mL 3    azelastine (ASTELIN) 137 MCG/SPRAY nasal spray Administer 1-2 Sprays into affected nostril(S) 2 times a day. (Patient taking differently: Administer 1-2 Sprays into affected nostril(S) as needed.) 30 mL 6    tizanidine (ZANAFLEX) 4 MG Tab Take 4 mg by mouth every 8 hours as needed. not to exceed 3 doses in 24 hours  Indications: Lower Backache, Muscle  Spasticity      DULoxetine (CYMBALTA) 30 MG Cap DR Particles Take 30 mg by mouth every day. Indications: Generalized Anxiety Disorder, Major Depressive Disorder, Musculoskeletal Pain, Disease of the Peripheral Nerves      guaiFENesin ER (MUCINEX) 600 MG TABLET SR 12 HR Take 600-1,200 mg by mouth 2 times a day as needed. Indications: Cough      Albuterol Sulfate 108 (90 Base) MCG/ACT AEROSOL POWDER, BREATH ACTIVATED Inhale 2 Puffs 4 times a day as needed. Indications: SOB      hydrocortisone 1 % Cream Apply 1 Each topically 3 times a day as needed. Indications: Inflammation, Itching      acetaminophen (TYLENOL) 650 MG CR tablet Take 650 mg by mouth every 6 hours as needed.      sodium chloride (OCEAN) 0.65 % Solution Administer 2 Sprays into affected nostril(S) every 2 hours as needed.  3    acyclovir (ZOVIRAX) 200 MG Cap Take 200 mg by mouth every day.      Home Care Oxygen Inhale 6 L/min Continuous. Oxygen dose range: 6 to 6 L/min  Respiratory route via: Nasal Cannula   Oxygen supplier: Preferred          Misc. Devices Misc This is an order for  7 foot high flow oxygen tubing  Dx; asthma with COPD J 44.9, supplemental oxygen-dependent Z 99.81, chronic respiratory failure with hypoxia and J 96        ODETTE 99 months   NPI [id number] 1 Each 0    spironolactone (ALDACTONE) 25 MG Tab TAKE ONE TABLET BY MOUTH ONE TIME DAILY 30 Tab 11    Cholecalciferol (VITAMIN D3) 2000 UNIT Cap TAKE ONE CAPSULE BY MOUTH ONE TIME DAILY 30 Cap 3    potassium chloride (MICRO-K) 10 MEQ capsule TAKE ONE CAPSULE BY MOUTH TWICE DAILY 60 Cap 6    omeprazole (PRILOSEC OTC) 20 MG tablet Take 1 Tab by mouth every day. 30 Tab 11     No current facility-administered medications for this encounter.       ALLERGIES:  Iodine and Tape    REVIEW OF SYSTEMS:    A complete review of system taken. Pertinent items in HPI.  All others negative.    PHYSICAL EXAM:  PERFORMANCE STATUS:       View : No data to display.                   View : No data to display.               BP (!) 169/59 (BP Location: Right arm, Patient Position: Sitting, BP Cuff Size: Adult)   Pulse 69   Temp 36.4 °C (97.6 °F) (Temporal)   Wt 83.4 kg (183 lb 13.8 oz)   LMP 06/07/2001   SpO2 92%   BMI 32.57 kg/m²   Physical Exam  Vitals reviewed.   HENT:      Mouth/Throat:      Mouth: Mucous membranes are moist.   Eyes:      Pupils: Pupils are equal, round, and reactive to light.   Cardiovascular:      Rate and Rhythm: Normal rate.   Abdominal:      General: Abdomen is flat.   Musculoskeletal:         General: Normal range of motion.   Neurological:      Mental Status: She is alert.   Psychiatric:         Mood and Affect: Mood normal.       LABORATORY DATA:   Lab Results   Component Value Date/Time    WBC 7.5 11/16/2020 12:45 AM    WBC 10.9 (H) 06/09/2011 12:00 AM    RBC 5.00 11/16/2020 12:45 AM    RBC 4.69 06/09/2011 12:00 AM    HEMOGLOBIN 14.8 11/16/2020 12:45 AM    HEMATOCRIT 44.1 11/16/2020 12:45 AM    MCV 88.2 11/16/2020 12:45 AM    MCV 92 06/09/2011 12:00 AM    MCH 29.6 11/16/2020 12:45 AM    MCH 30.7 06/09/2011 12:00 AM    MCHC 33.6 11/16/2020 12:45 AM    RDW 42.1 11/16/2020 12:45 AM    RDW 13.3 06/09/2011 12:00 AM    PLATELETCT 396 11/16/2020 12:45 AM    MPV 9.9 11/16/2020 12:45 AM    NEUTSPOLYS 63.70 11/16/2020 12:45 AM    LYMPHOCYTES 11.20 (L) 11/16/2020 12:45 AM    MONOCYTES 19.90 (H) 11/16/2020 12:45 AM    EOSINOPHILS 0.30 11/16/2020 12:45 AM    BASOPHILS 0.40 11/16/2020 12:45 AM      Lab Results   Component Value Date/Time    SODIUM 135 05/16/2022 12:37 PM    POTASSIUM 3.8 05/16/2022 12:37 PM    CHLORIDE 96 05/16/2022 12:37 PM    CO2 29 05/16/2022 12:37 PM    GLUCOSE 98 05/16/2022 12:37 PM    BUN 17 05/16/2022 12:37 PM    CREATININE 0.42 (L) 05/16/2022 12:37 PM    CREATININE 0.62 06/09/2011 12:00 AM    BUNCREATRAT 20 07/07/2014 02:50 PM    BUNCREATRAT 23 06/09/2011 12:00 AM         RADIOLOGY DATA:  CT-CHEST (THORAX) W/O    Result Date: 12/20/2022  1.  Redemonstration of posttreatment  scarring in the left upper lobe with associated small pulmonary nodules. The dominant nodule is no longer measurable. The smaller nodule is stable since prior. 2.  No new suspicious pulmonary nodules or masses. Several additional pulmonary nodules are stable or less conspicuous since prior study. 3.  Emphysema. 4.  Cholelithiasis. 5.  Colonic diverticulosis.    DX-CERVICAL SPINE-4+ VIEWS    Result Date: 12/5/2022  1.  No compression deformity or acute fracture. 2.  No significant change in degenerative disc disease and facet arthropathy. 3.  No focal instability on flexion extension views.    MR-CERVICAL SPINE-W/O    Result Date: 11/29/2022  1.  Multifocal degenerative disease in the cervical spine as described above. 2.  Severe central canal stenosis at C5-6. 3.  Moderate central canal stenosis at C6-7. 4.  There has been no significant interval change in the extent of degenerative changes.. 5.  There is 12 mm sized right thyroid nodule. There is increased in size when compared with the previous study.      IMPRESSION:    A 77 y.o. with   Primary cancer of left upper lobe of lung (HCC)  Staging form: Lung, AJCC 8th Edition  - Clinical stage from 10/1/2021: Stage IA2 (cT1b, cN0, cM0) - Signed by Israel Grimm M.D. on 10/1/2021      CANCER STATUS:  No Evidence of Disease    RECOMMENDATIONS:   Patient doing well with no evidence of disease recurrence have recommended repeat CT scan in 6 months.    Thank you for the opportunity to participate in her care.  If any questions or comments, please do not hesitate in calling.    Orders Placed This Encounter    CT-CHEST (THORAX) W/O

## 2023-01-26 ENCOUNTER — OFFICE VISIT (OUTPATIENT)
Dept: SLEEP MEDICINE | Facility: MEDICAL CENTER | Age: 78
End: 2023-01-26
Payer: MEDICARE

## 2023-01-26 VITALS
DIASTOLIC BLOOD PRESSURE: 60 MMHG | BODY MASS INDEX: 31.89 KG/M2 | OXYGEN SATURATION: 92 % | HEART RATE: 75 BPM | SYSTOLIC BLOOD PRESSURE: 116 MMHG | HEIGHT: 63 IN | WEIGHT: 180 LBS

## 2023-01-26 DIAGNOSIS — E66.9 OBESITY (BMI 30-39.9): ICD-10-CM

## 2023-01-26 DIAGNOSIS — J43.1 PANLOBULAR EMPHYSEMA (HCC): ICD-10-CM

## 2023-01-26 DIAGNOSIS — J44.9 STAGE 4 VERY SEVERE COPD BY GOLD CLASSIFICATION (HCC): ICD-10-CM

## 2023-01-26 DIAGNOSIS — C34.12 PRIMARY CANCER OF LEFT UPPER LOBE OF LUNG (HCC): ICD-10-CM

## 2023-01-26 DIAGNOSIS — J96.11 CHRONIC RESPIRATORY FAILURE WITH HYPOXIA (HCC): ICD-10-CM

## 2023-01-26 PROCEDURE — 99215 OFFICE O/P EST HI 40 MIN: CPT | Performed by: INTERNAL MEDICINE

## 2023-01-26 ASSESSMENT — ENCOUNTER SYMPTOMS
DIARRHEA: 0
HEADACHES: 0
PSYCHIATRIC NEGATIVE: 1
NAUSEA: 0
VOMITING: 0
STRIDOR: 0
SINUS PAIN: 0
SPUTUM PRODUCTION: 1
WEIGHT LOSS: 0
SENSORY CHANGE: 0
CHILLS: 0
EYE PAIN: 0
DIZZINESS: 0
MYALGIAS: 0
EYE DISCHARGE: 0
FOCAL WEAKNESS: 0
LOSS OF CONSCIOUSNESS: 0
SHORTNESS OF BREATH: 1
ABDOMINAL PAIN: 0
FEVER: 0
ORTHOPNEA: 0
COUGH: 1
WHEEZING: 0
SORE THROAT: 0

## 2023-01-26 NOTE — ASSESSMENT & PLAN NOTE
FEV1 0.89L (45%)  Advair 500/Spiriva/Singulair/daily azithromycin  No recent exacarbations  -- s/p pulm rehab  -- UTD with vaccinations  -- declined [facility] referral previously  -- Chief concern today is if insufficient oxygen is causing her fatigue.  SPO2 adequate currently in clinic.  Have a moderate suspicion for sleep apnea.  We will check overnight oximetry.  Advised her to follow-up with PCP regarding thyroid function testing, CBC.

## 2023-01-26 NOTE — ASSESSMENT & PLAN NOTE
S/p SBRT completed 11/2021  -- repeat CT chest showing no disease recurrence  -- followed by Dr Grimm

## 2023-01-26 NOTE — ASSESSMENT & PLAN NOTE
Due to COPD  Uses oxymizer pendant 3-6LPM  --continue to titrate supplemental oxygen to maintain saturations 88 to 92%  -- check OPO on 6LPM

## 2023-01-26 NOTE — PROGRESS NOTES
Pulmonary Clinic Note    Chief Complaint:  Chief Complaint   Patient presents with    COPD     Last seen 06/29/22     HPI:   Berny Renee is a very pleasant 77 y.o. female former tobacco smoker 60 pack years, quit 1999 who is followed in our clinic for COPD  FEV1 0.89L (45%), chronic hypoxemic respiratory failure on 3-6LPM. She is managed on Advair 500/Spiriva/Singulair. UTD with vaccinations. Contracted COVID in late 2020.    At her last visit with Dr Meza, a CT was ordered due to cough. This revealed a linear nodule in the left upper lobe, new since prior imaging in 2013. A subsequent PET scan showed nodule to be FDG avid. Case presented to Lung tumor board in 9/2021, consensus was to pursue SBRT given high risk for biopsy and poor procedural candidate given emphysema, oxygen dependence and FEV1 < 1L    She completed radiation in 11/2021 with subsequent imaging showing radiographic stability.    MMRC Grade: 2: Walks slower than friends due to breathlessness, has to stop at own pace    Interval events since last visit in 6/2022:  Presents today for routine followup   Seen by Dr. Grimm earlier this month, CT scan in 12/2022 reassuring, no evidence of disease recurrence    Chief complaint today is persistent fatigue, she is concerned this is due to low oxygen levels  Stable on 4 L with exertion, 6 L nightly  Last CBC over 2 years ago, no evidence of occult bleeding    Compliant with Advair 500/Spiriva/Singulair/daily azithromycin. She uses her rescue inhaler about 1 time per month.  Completed pulm rehab  Last exacerbation 2020 2/2 covid    Past Medical History:   Diagnosis Date    Arthritis     spine    Asthma with COPD ([facility])     Cataract immature     Chronic pain     Chronic respiratory failure with hypoxia ([facility]) 7/13/2017    Colon polyps     COPD (chronic obstructive pulmonary disease) ([facility]) 2002    moderate to severe    DDD (degenerative disc disease), cervical     DDD (degenerative disc disease), thoracic      Diverticulitis of colon     Dyslipidemia     EMPHYSEMA     H/O opioid abuse (HCC) 7/15/2021    Hypertension     REGINE (obstructive sleep apnea)     OSTEOPOROSIS     Spinal stenosis, lumbar region, with neurogenic claudication     URINARY INCONTINENCE     Vitamin d deficiency        Past Surgical History:   Procedure Laterality Date    LUMBAR LAMINECTOMY DISKECTOMY  2010    Performed by GRACIE CANO at [facility]    OTHER ORTHOPEDIC SURGERY  2004    left ankle fx    OTHER      leg fracture    OTHER      t spine disc surg    TONSILLECTOMY         Social History     Socioeconomic History    Marital status:      Spouse name: Not on file    Number of children: Not on file    Years of education: Not on file    Highest education level: Not on file   Occupational History    Not on file   Tobacco Use    Smoking status: Former     Packs/day: 2.00     Years: 30.00     Pack years: 60.00     Types: Cigarettes     Quit date: 3/1/1999     Years since quittin.9    Smokeless tobacco: Never    Tobacco comments:     3 pks a day for 35 yrs, continued abstinence   Vaping Use    Vaping Use: Never used   Substance and Sexual Activity    Alcohol use: Not Currently     Alcohol/week: 4.2 oz     Types: 7 Glasses of wine per week    Drug use: Not Currently     Types: Marijuana, Oral     Comment: Medical Marijuana     Sexual activity: Never   Other Topics Concern    Not on file   Social History Narrative    ** Merged History Encounter **          Social Determinants of Health     Financial Resource Strain: Low Risk     Difficulty of Paying Living Expenses: Not hard at all   Food Insecurity: No Food Insecurity    Worried About Running Out of Food in the Last Year: Never true    Ran Out of Food in the Last Year: Never true   Transportation Needs: No Transportation Needs    Lack of Transportation (Medical): No    Lack of Transportation (Non-Medical): No   Physical Activity: Inactive    Days of Exercise per Week: 0 days     Minutes of Exercise per Session: 0 min   Stress: No Stress Concern Present    Feeling of Stress : Not at all   Social Connections: Moderately Isolated    Frequency of Communication with Friends and Family: Once a week    Frequency of Social Gatherings with Friends and Family: Once a week    Attends Orthodoxy Services: More than 4 times per year    Active Member of Clubs or Organizations: Yes    Attends Club or Organization Meetings: More than 4 times per year    Marital Status:    Intimate Partner Violence: Not on file   Housing Stability: Low Risk     Unable to Pay for Housing in the Last Year: No    Number of Places Lived in the Last Year: 1    Unstable Housing in the Last Year: No          Family History   Problem Relation Age of Onset    Cancer Father         Lung    Other Sister         lung and leukemia cancer    Cancer Sister         Leukemia, Lung       Current Outpatient Medications on File Prior to Visit   Medication Sig Dispense Refill    digoxin (LANOXIN) 125 MCG Tab Take 1 Tablet by mouth every day. 90 Tablet 3    meloxicam (MOBIC) 7.5 MG Tab Take 7.5 mg by mouth 2 times a day.      fluticasone-salmeterol (ADVAIR DISKUS) 500-50 MCG/DOSE AEROSOL POWDER, BREATH ACTIVATED Inhale 1 Puff 2 times a day. 3 Each 3    ipratropium-albuterol (DUONEB) 0.5-2.5 (3) MG/3ML nebulizer solution USE ONE VIAL IN NEBULIZER EVERY 4 HOURS AS NEEDED FOR SHORTNESS OF BREATH OR  WHEEZING 360 Each 6    azithromycin (ZITHROMAX) 250 MG Tab One tablet daily for COPD 90 Tablet 6    montelukast (SINGULAIR) 10 MG Tab Take 1 Tablet by mouth every day. 90 Tablet 3    SPIRIVA HANDIHALER 18 MCG Cap INHALE 1 CAPSULE CONTENT BY MOUTH ONE TIME DAILY. 90 Capsule 3    calcium carbonate (OS-MICHELLE 500) 1250 (500 Ca) MG Tab Take 1,000 mg by mouth every day.      losartan (COZAAR) 50 MG Tab Take 1 tablet by mouth every day. 90 tablet 3    fluticasone (FLONASE) 50 MCG/ACT nasal spray Administer 1-2 Sprays into affected nostril(S) every day.  Each nostril. (Patient taking differently: Administer 1-2 Sprays into affected nostril(S) as needed. Each nostril.) 15.8 mL 3    azelastine (ASTELIN) 137 MCG/SPRAY nasal spray Administer 1-2 Sprays into affected nostril(S) 2 times a day. (Patient taking differently: Administer 1-2 Sprays into affected nostril(S) as needed.) 30 mL 6    tizanidine (ZANAFLEX) 4 MG Tab Take 4 mg by mouth every 8 hours as needed. not to exceed 3 doses in 24 hours  Indications: Lower Backache, Muscle Spasticity      DULoxetine (CYMBALTA) 30 MG Cap DR Particles Take 30 mg by mouth every day. Indications: Generalized Anxiety Disorder, Major Depressive Disorder, Musculoskeletal Pain, Disease of the Peripheral Nerves      guaiFENesin ER (MUCINEX) 600 MG TABLET SR 12 HR Take 600-1,200 mg by mouth 2 times a day as needed. Indications: Cough      Albuterol Sulfate 108 (90 Base) MCG/ACT AEROSOL POWDER, BREATH ACTIVATED Inhale 2 Puffs 4 times a day as needed. Indications: SOB      hydrocortisone 1 % Cream Apply 1 Each topically 3 times a day as needed. Indications: Inflammation, Itching      acetaminophen (TYLENOL) 650 MG CR tablet Take 650 mg by mouth every 6 hours as needed.      sodium chloride (OCEAN) 0.65 % Solution Administer 2 Sprays into affected nostril(S) every 2 hours as needed.  3    acyclovir (ZOVIRAX) 200 MG Cap Take 200 mg by mouth every day.      Home Care Oxygen Inhale 6 L/min Continuous. Oxygen dose range: 6 to 6 L/min  Respiratory route via: Nasal Cannula   Oxygen supplier: Preferred          Misc. Devices Misc This is an order for  7 foot high flow oxygen tubing  Dx; asthma with COPD J 44.9, supplemental oxygen-dependent Z 99.81, chronic respiratory failure with hypoxia and J 96        ODETTE 99 months   NPI [id number] 1 Each 0    spironolactone (ALDACTONE) 25 MG Tab TAKE ONE TABLET BY MOUTH ONE TIME DAILY 30 Tab 11    Cholecalciferol (VITAMIN D3) 2000 UNIT Cap TAKE ONE CAPSULE BY MOUTH ONE TIME DAILY 30 Cap 3    potassium  "chloride (MICRO-K) 10 MEQ capsule TAKE ONE CAPSULE BY MOUTH TWICE DAILY 60 Cap 6    omeprazole (PRILOSEC OTC) 20 MG tablet Take 1 Tab by mouth every day. 30 Tab 11     No current facility-administered medications on file prior to visit.     Allergies: Iodine, Tape, and Meloxicam    ROS:   Review of Systems   Constitutional:  Negative for chills, fever and weight loss.   HENT:  Negative for congestion, sinus pain and sore throat.    Eyes:  Negative for pain and discharge.   Respiratory:  Positive for cough, sputum production and shortness of breath. Negative for wheezing and stridor.    Cardiovascular:  Negative for chest pain, orthopnea and leg swelling.   Gastrointestinal:  Negative for abdominal pain, diarrhea, nausea and vomiting.   Genitourinary:  Negative for dysuria, frequency and urgency.   Musculoskeletal:  Negative for myalgias.   Skin:  Negative for rash.   Neurological:  Negative for dizziness, sensory change, focal weakness, loss of consciousness and headaches.   Psychiatric/Behavioral: Negative.     All other systems reviewed and are negative.  Vitals:  /60 (BP Location: Right arm, Patient Position: Sitting, BP Cuff Size: Adult)   Pulse 75   Ht 1.6 m (5' 3\")   Wt 81.6 kg (180 lb)   SpO2 92%     Physical Exam:  Physical Exam  Vitals and nursing note reviewed.   Constitutional:       General: She is not in acute distress.     Appearance: Normal appearance. She is well-developed. She is not diaphoretic.   HENT:      Nose:      Comments: oxymizer pendant  Eyes:      General: No scleral icterus.        Right eye: No discharge.         Left eye: No discharge.      Conjunctiva/sclera: Conjunctivae normal.      Pupils: Pupils are equal, round, and reactive to light.   Neck:      Thyroid: No thyromegaly.      Vascular: No JVD.   Cardiovascular:      Rate and Rhythm: Normal rate and regular rhythm.      Heart sounds: Normal heart sounds. No murmur heard.    No gallop.   Pulmonary:      Effort: Pulmonary " effort is normal. No respiratory distress.      Breath sounds: Normal breath sounds. No wheezing or rales.   Abdominal:      General: There is no distension.      Palpations: Abdomen is soft.      Tenderness: There is no abdominal tenderness. There is no guarding.   Musculoskeletal:         General: No tenderness.   Lymphadenopathy:      Cervical: No cervical adenopathy.   Skin:     General: Skin is warm.      Capillary Refill: Capillary refill takes less than 2 seconds.      Findings: No erythema or rash.   Neurological:      Mental Status: She is alert and oriented to person, place, and time.      Cranial Nerves: No cranial nerve deficit.      Sensory: No sensory deficit.      Motor: No abnormal muscle tone.   Psychiatric:         Behavior: Behavior normal.     Laboratory Data:    PFTs as reviewed by me personally show:  FEV1 0.89L (45%),    Imaging as reviewed by me personally show:    CT 12/2021 personally reviewed, showing RUL ground glass opacity and shrinkage of BRANDON opacity    Assessment/Plan:    Problem List Items Addressed This Visit       Stage 4 very severe COPD by GOLD classification ([facility])     FEV1 0.89L (45%)  Advair 500/Spiriva/Singulair/daily azithromycin  No recent exacarbations  -- s/p pulm rehab  -- UTD with vaccinations  -- declined [facility] referral previously  -- Chief concern today is if insufficient oxygen is causing her fatigue.  SPO2 adequate currently in clinic.  Have a moderate suspicion for sleep apnea.  We will check overnight oximetry.  Advised her to follow-up with PCP regarding thyroid function testing, CBC.         Panlobular emphysema (HCC)    Chronic respiratory failure with hypoxia (HCC)     Due to COPD  Uses oxymizer pendant 3-6LPM  --continue to titrate supplemental oxygen to maintain saturations 88 to 92%  -- check OPO on 6LPM         Relevant Orders    Overnight Oximetry    Primary cancer of left upper lobe of lung (HCC)     S/p SBRT completed 11/2021  -- repeat CT chest showing  no disease recurrence  -- followed by Dr Grimm         Obesity (BMI 30-39.9)     Patient counseled about the importance of weight loss for long-term health risk reduction. Will dose-adjust medications appropriately based on larger volume of drug distribution.          No follow-ups on file.     This note was generated using voice recognition software which has a chance of producing errors of grammar and possibly content.  I have made every reasonable attempt to find and correct any obvious errors, but it should be expected that some may not be found prior to finalization of this note.    Time spent in record review prior to patient arrival, reviewing results, and in face-to-face encounter totaled 45 min, excluding any procedures if performed.  __________  Ihsan Kumar MD  Pulmonary and Critical Care Medicine  [facility]

## 2023-01-26 NOTE — ASSESSMENT & PLAN NOTE
Patient counseled about the importance of weight loss for long-term health risk reduction. Will dose-adjust medications appropriately based on larger volume of drug distribution.

## 2023-02-07 PROBLEM — F13.20 SEDATIVE, HYPNOTIC, OR ANXIOLYTIC DEPENDENCE (HCC): Status: ACTIVE | Noted: 2023-02-07

## 2023-02-07 PROBLEM — E66.9 OBESITY (BMI 30.0-34.9): Status: ACTIVE | Noted: 2023-02-07

## 2023-02-07 PROBLEM — K80.20 CALCULUS OF GALLBLADDER: Status: ACTIVE | Noted: 2023-02-07

## 2023-02-07 PROBLEM — E66.811 OBESITY (BMI 30.0-34.9): Status: ACTIVE | Noted: 2023-02-07

## 2023-02-07 PROBLEM — J96.21 ACUTE ON CHRONIC RESPIRATORY FAILURE WITH HYPOXIA (HCC): Status: RESOLVED | Noted: 2020-11-14 | Resolved: 2023-02-07

## 2023-02-07 PROBLEM — E04.1 THYROID NODULE: Status: ACTIVE | Noted: 2023-02-07

## 2023-02-07 PROBLEM — R73.03 PREDIABETES: Status: RESOLVED | Noted: 2022-02-04 | Resolved: 2023-02-07

## 2023-02-07 PROBLEM — F11.20 OPIOID TYPE DEPENDENCE, CONTINUOUS (HCC): Status: RESOLVED | Noted: 2022-02-04 | Resolved: 2023-02-07

## 2023-02-07 PROBLEM — K57.90 DIVERTICULOSIS: Status: ACTIVE | Noted: 2023-02-07

## 2023-03-01 ENCOUNTER — OFFICE VISIT (OUTPATIENT)
Dept: CARDIOLOGY | Facility: MEDICAL CENTER | Age: 78
End: 2023-03-01
Payer: MEDICARE

## 2023-03-01 VITALS
HEIGHT: 63 IN | RESPIRATION RATE: 16 BRPM | HEART RATE: 71 BPM | DIASTOLIC BLOOD PRESSURE: 60 MMHG | SYSTOLIC BLOOD PRESSURE: 114 MMHG | WEIGHT: 180 LBS | OXYGEN SATURATION: 97 % | BODY MASS INDEX: 31.89 KG/M2

## 2023-03-01 DIAGNOSIS — I49.1 ATRIAL PREMATURE DEPOLARIZATION: ICD-10-CM

## 2023-03-01 DIAGNOSIS — I47.10 SVT (SUPRAVENTRICULAR TACHYCARDIA) (HCC): ICD-10-CM

## 2023-03-01 DIAGNOSIS — I49.3 PVCS (PREMATURE VENTRICULAR CONTRACTIONS): ICD-10-CM

## 2023-03-01 DIAGNOSIS — R00.2 PALPITATIONS: ICD-10-CM

## 2023-03-01 DIAGNOSIS — I10 ESSENTIAL HYPERTENSION: ICD-10-CM

## 2023-03-01 PROCEDURE — 99214 OFFICE O/P EST MOD 30 MIN: CPT | Performed by: PHYSICIAN ASSISTANT

## 2023-03-01 RX ORDER — FEXOFENADINE HCL 180 MG/1
TABLET ORAL
COMMUNITY
Start: 2023-02-14 | End: 2024-02-05

## 2023-03-01 ASSESSMENT — ENCOUNTER SYMPTOMS
CHILLS: 0
HEADACHES: 0
SHORTNESS OF BREATH: 0
DOUBLE VISION: 0
EYES NEGATIVE: 1
CLAUDICATION: 0
FEVER: 0
MYALGIAS: 0
BRUISES/BLEEDS EASILY: 0
MUSCULOSKELETAL NEGATIVE: 1
PND: 0
CONSTITUTIONAL NEGATIVE: 1
NAUSEA: 0
DIZZINESS: 0
PALPITATIONS: 0
LOSS OF CONSCIOUSNESS: 0
ABDOMINAL PAIN: 0
VOMITING: 0
BLURRED VISION: 0
WEAKNESS: 0
NEUROLOGICAL NEGATIVE: 1
ORTHOPNEA: 0
COUGH: 0
GASTROINTESTINAL NEGATIVE: 1
NERVOUS/ANXIOUS: 1

## 2023-03-01 NOTE — PROGRESS NOTES
Chief Complaint   Patient presents with    Premature Ventricular Contractions (PVCs)    Supraventricular Tachycardia (SVT)    Palpitations       Subjective     Berny Renee is a 77 y.o. female palpitations, SVT, PVCs and hypertension who presents today for follow-up.    Her primary cardiologist is Dr. Ross.  Last seen 10/12/2022.  At that exam she was doing well and had no cardiac concerns.  Her blood pressure was elevated for the past 2 days.  Her medications were continued and she was to follow-up in 6 months.    Today, she reports she is feeling well.  She does however have frequent anxiety attacks. No chest pain or palpitations. No shortness of breath, dyspnea on exertion, orthopnea or PND. No lower extremity edema. No dizziness or lightheadedness. No syncope or presyncope.      She is very frustrated with her living situation and would like to move, but needs to find a place with a ramp for easy access.     Past Medical History:   Diagnosis Date    Acute on chronic respiratory failure with hypoxia ([facility]) 11/14/2020    Arthritis     spine    Asthma with COPD ([facility])     BMI 31.0-31.9,adult 2/4/2022    Cataract immature     Chronic pain     Chronic respiratory failure with hypoxia ([facility]) 7/13/2017    Colon polyps     COPD (chronic obstructive pulmonary disease) ([facility]ly [facility]) 2002    moderate to severe    DDD (degenerative disc disease), cervical     DDD (degenerative disc disease), thoracic     Dependence on continuous supplemental oxygen 8/13/2015    IMO load March 2020    Diverticulitis of colon     Dyslipidemia     EMPHYSEMA     H/O opioid abuse ([facility]ly [facility]) 7/15/2021    Hypertension     Obesity (BMI 30-39.9) 10/24/2016    Opioid type dependence, continuous ([facility]ly [facility]) 2/4/2022    REGINE (obstructive sleep apnea)     OSTEOPOROSIS     Spinal stenosis, lumbar region, with neurogenic claudication     URINARY INCONTINENCE     Vitamin d deficiency      Past Surgical History:   Procedure Laterality Date    LUMBAR LAMINECTOMY  DISKECTOMY  2010    Performed by GRACIE CANO [facility]    OTHER ORTHOPEDIC SURGERY  2004    left ankle fx    OTHER      leg fracture    OTHER      t spine disc surg    TONSILLECTOMY       Family History   Problem Relation Age of Onset    Cancer Father         Lung    Other Sister         lung and leukemia cancer    Cancer Sister         Leukemia, Lung     Social History     Socioeconomic History    Marital status:      Spouse name: Not on file    Number of children: Not on file    Years of education: Not on file    Highest education level: Not on file   Occupational History    Not on file   Tobacco Use    Smoking status: Former     Packs/day: 2.00     Years: 30.00     Pack years: 60.00     Types: Cigarettes     Quit date: 3/1/1999     Years since quittin.0    Smokeless tobacco: Never    Tobacco comments:     3 pks a day for 35 yrs, continued abstinence   Vaping Use    Vaping Use: Never used   Substance and Sexual Activity    Alcohol use: Not Currently     Alcohol/week: 4.2 oz     Types: 7 Glasses of wine per week    Drug use: Not Currently     Types: Marijuana, Oral     Comment: Medical Marijuana     Sexual activity: Never   Other Topics Concern    Not on file   Social History Narrative    ** Merged History Encounter **          Social Determinants of Health     Financial Resource Strain: Not on file   Food Insecurity: Not on file   Transportation Needs: Not on file   Physical Activity: Not on file   Stress: Not on file   Social Connections: Not on file   Intimate Partner Violence: Not on file   Housing Stability: Not on file     Allergies   Allergen Reactions    Iodine      Convulsion  I.V.  iodine    Tape Rash and Itching     Outpatient Encounter Medications as of 3/1/2023   Medication Sig Dispense Refill    fexofenadine (ALLEGRA) 180 MG tablet TAKE ONE TABLET BY MOUTH AT BEDTIME SWALLOW WHOLE WITH WATER DO NOT TAKE WITH FRUIT JUICE PER ALPINE      triamcinolone (NASACORT) 55  MCG/ACT nasal inhaler Administer 2 Sprays into affected nostril(S) every day.      digoxin (LANOXIN) 125 MCG Tab Take 1 Tablet by mouth every day. 90 Tablet 3    meloxicam (MOBIC) 7.5 MG Tab Take 7.5 mg by mouth 2 times a day.      fluticasone-salmeterol (ADVAIR DISKUS) 500-50 MCG/DOSE AEROSOL POWDER, BREATH ACTIVATED Inhale 1 Puff 2 times a day. 3 Each 3    ipratropium-albuterol (DUONEB) 0.5-2.5 (3) MG/3ML nebulizer solution USE ONE VIAL IN NEBULIZER EVERY 4 HOURS AS NEEDED FOR SHORTNESS OF BREATH OR  WHEEZING 360 Each 6    azithromycin (ZITHROMAX) 250 MG Tab One tablet daily for COPD 90 Tablet 6    montelukast (SINGULAIR) 10 MG Tab Take 1 Tablet by mouth every day. 90 Tablet 3    SPIRIVA HANDIHALER 18 MCG Cap INHALE 1 CAPSULE CONTENT BY MOUTH ONE TIME DAILY. 90 Capsule 3    calcium carbonate (OS-MICHELLE 500) 1250 (500 Ca) MG Tab Take 1,000 mg by mouth every day.      losartan (COZAAR) 50 MG Tab Take 1 tablet by mouth every day. 90 tablet 3    azelastine (ASTELIN) 137 MCG/SPRAY nasal spray Administer 1-2 Sprays into affected nostril(S) 2 times a day. (Patient taking differently: Administer 1-2 Sprays into affected nostril(S) as needed.) 30 mL 6    tizanidine (ZANAFLEX) 4 MG Tab Take 4 mg by mouth every 8 hours as needed. not to exceed 3 doses in 24 hours  Indications: Lower Backache, Muscle Spasticity      DULoxetine (CYMBALTA) 30 MG Cap DR Particles Take 60 mg by mouth every day. Indications: Disease of the Peripheral Nerves, Generalized Anxiety Disorder, Major Depressive Disorder, Musculoskeletal Pain      guaiFENesin ER (MUCINEX) 600 MG TABLET SR 12 HR Take 600-1,200 mg by mouth 2 times a day as needed. Indications: Cough      Albuterol Sulfate 108 (90 Base) MCG/ACT AEROSOL POWDER, BREATH ACTIVATED Inhale 2 Puffs 4 times a day as needed. Indications: SOB      hydrocortisone 1 % Cream Apply 1 Each topically 3 times a day as needed. Indications: Inflammation, Itching      acetaminophen (TYLENOL) 650 MG CR tablet Take  650 mg by mouth every 6 hours as needed.      sodium chloride (OCEAN) 0.65 % Solution Administer 2 Sprays into affected nostril(S) every 2 hours as needed.  3    acyclovir (ZOVIRAX) 200 MG Cap Take 200 mg by mouth every day.      Home Care Oxygen Inhale 6 L/min Continuous. Oxygen dose range: 6 to 6 L/min  Respiratory route via: Nasal Cannula   Oxygen supplier: Preferred          Misc. Devices Misc This is an order for  7 foot high flow oxygen tubing  Dx; asthma with COPD J 44.9, supplemental oxygen-dependent Z 99.81, chronic respiratory failure with hypoxia and J 96        ODETTE 99 months   NPI [id number] 1 Each 0    spironolactone (ALDACTONE) 25 MG Tab TAKE ONE TABLET BY MOUTH ONE TIME DAILY 30 Tab 11    Cholecalciferol (VITAMIN D3) 2000 UNIT Cap TAKE ONE CAPSULE BY MOUTH ONE TIME DAILY 30 Cap 3    potassium chloride (MICRO-K) 10 MEQ capsule TAKE ONE CAPSULE BY MOUTH TWICE DAILY 60 Cap 6    omeprazole (PRILOSEC OTC) 20 MG tablet Take 1 Tab by mouth every day. 30 Tab 11     No facility-administered encounter medications on file as of 3/1/2023.     Review of Systems   Constitutional: Negative.  Negative for chills, fever and malaise/fatigue.   HENT: Negative.     Eyes: Negative.  Negative for blurred vision and double vision.   Respiratory:  Negative for cough and shortness of breath.    Cardiovascular:  Negative for chest pain, palpitations, orthopnea, claudication, leg swelling and PND.   Gastrointestinal: Negative.  Negative for abdominal pain, nausea and vomiting.   Genitourinary: Negative.    Musculoskeletal: Negative.  Negative for myalgias.   Skin: Negative.  Negative for rash.   Neurological: Negative.  Negative for dizziness, loss of consciousness, weakness and headaches.   Endo/Heme/Allergies: Negative.  Does not bruise/bleed easily.   Psychiatric/Behavioral:  The patient is nervous/anxious.             Objective     /60 (BP Location: Left arm, Patient Position: Sitting, BP Cuff Size: Adult)   Pulse 71  "  Resp 16   Ht 1.6 m (5' 3\")   Wt 81.6 kg (180 lb)   LMP 06/07/2001   SpO2 97%   BMI 31.89 kg/m²     Physical Exam  Vitals reviewed.   Constitutional:       General: She is not in acute distress.     Appearance: Normal appearance.   HENT:      Head: Normocephalic and atraumatic.      Right Ear: External ear normal.      Left Ear: External ear normal.   Eyes:      General: No scleral icterus.     Extraocular Movements: Extraocular movements intact.      Conjunctiva/sclera: Conjunctivae normal.      Pupils: Pupils are equal, round, and reactive to light.   Cardiovascular:      Rate and Rhythm: Normal rate and regular rhythm.      Pulses: Normal pulses.      Heart sounds: Normal heart sounds. No murmur heard.    No friction rub. No gallop.   Pulmonary:      Effort: Pulmonary effort is normal.      Breath sounds: Normal breath sounds.   Abdominal:      General: Bowel sounds are normal.      Palpations: Abdomen is soft.      Tenderness: There is no abdominal tenderness.   Musculoskeletal:         General: Normal range of motion.      Cervical back: Normal range of motion and neck supple.      Right lower leg: No edema.      Left lower leg: No edema.   Skin:     General: Skin is warm and dry.      Capillary Refill: Capillary refill takes less than 2 seconds.   Neurological:      General: No focal deficit present.      Mental Status: She is alert and oriented to person, place, and time.   Psychiatric:         Mood and Affect: Mood normal.         Behavior: Behavior normal.         Judgment: Judgment normal.     Lab Results   Component Value Date/Time    CHOLSTRLTOT 154 11/15/2020 04:51 AM    LDL 61 11/15/2020 04:51 AM    HDL 75 11/15/2020 04:51 AM    TRIGLYCERIDE 91 11/15/2020 04:51 AM       Lab Results   Component Value Date/Time    SODIUM 135 05/16/2022 12:37 PM    POTASSIUM 3.8 05/16/2022 12:37 PM    CHLORIDE 96 05/16/2022 12:37 PM    CO2 29 05/16/2022 12:37 PM    GLUCOSE 98 05/16/2022 12:37 PM    BUN 17 " 05/16/2022 12:37 PM    CREATININE 0.42 (L) 05/16/2022 12:37 PM    CREATININE 0.62 06/09/2011 12:00 AM    BUNCREATRAT 20 07/07/2014 02:50 PM    BUNCREATRAT 23 06/09/2011 12:00 AM     Lab Results   Component Value Date/Time    ALKPHOSPHAT 48 09/15/2021 03:51 PM    ASTSGOT 15 09/15/2021 03:51 PM    ALTSGPT 12 09/15/2021 03:51 PM    TBILIRUBIN 0.2 09/15/2021 03:51 PM       Cardiovascular imaging and procedures:    Echocardiogram 3/23/2021  CONCLUSIONS  Normal left ventricular systolic function.  Left ventricular ejection fraction is visually estimated to be 65-70%.  Normal right ventricular systolic function.  No valve abnormalities.     Cardiac PET 9/6/2019  CLINICAL RESPONSE:  Resting blood pressure was 117/57 with a heart rate of 67.   Immediately post-dipyridamole infusion the blood pressure was 89/53 with a     heart rate of 81.  At the end of recovery the blood pressure was 110/44 with   a heart rate of 78.      The patient experienced shortness of breath during testing.     Aminophylline 100 mg was administered following the scan.     ELECTROCARDIOGRAPHIC FINDINGS:    Normal sinus rhythm.  Normal ECG response to dipyridamole infusion.  No ischemic changes noted     SCINTOGRAPHIC FINDINGS:    Normal left ventricular perfusion with stress and rest images.  There is no evidence of ischemia or scar.     GATED WALL MOTION FINDINGS:    The left ventricle wall motion is normal with stress and rest  imagings.  Measured resting ejection fraction is 68 %.       NM stress test 3/30/2021  Myocardial Perfusion    Report    NUCLEAR IMAGING INTERPRETATION    No evidence of significant jeopardized viable myocardium or prior myocardial    infarction.    Normal left ventricular size, ejection fraction, and wall motion.    ECG INTERPRETATION    Non-diagnostic EKG           Assessment & Plan     1. SVT (supraventricular tachycardia) (HCC)        2. PVCs (premature ventricular contractions)        3. Palpitations        4. Atrial  premature depolarization        5. Essential hypertension            Medical Decision Making: Today's Assessment/Status/Plan:        Palpitations  SVT  PVCs   - Asymptomatic  - She does however have lots of anxiety surrounding her living situation.   - Continue digoxin 125mcg daily    Hypertension  - Well controlled.  - Continue losartan 50mg and spironolactone 25mg daily.     She is stable and may follow up in 6 months, but she would like to keep her existing appointment with Dr. Ross in 1 month.    Thank you for allowing me to participate in the care of Berny Renee .    Funmilayo Rutherford PA-C, Cardiology  [facility], [facility].  [address] [address]  Phone: [phone]  Fax: [phone]    PLEASE NOTE: This Note was created using voice recognition Software. I have made every reasonable attempt to correct obvious errors, but I expect that there are errors of grammar and possibly content that I did not discover before finalizing the note.     I personally spent a total of 34 minutes which includes face-to-face time and non-face-to-face time spent on preparing to see the patient, reviewing hospital notes and tests, obtaining history from the patient, performing a medically appropriate exam, counseling and educating the patient, ordering medications/tests/procedures/referrals as clinically indicated, and documenting information in the electronic medical record.

## 2023-03-14 ENCOUNTER — PATIENT MESSAGE (OUTPATIENT)
Dept: SLEEP MEDICINE | Facility: MEDICAL CENTER | Age: 78
End: 2023-03-14
Payer: MEDICARE

## 2023-03-14 ENCOUNTER — TELEPHONE (OUTPATIENT)
Dept: SLEEP MEDICINE | Facility: MEDICAL CENTER | Age: 78
End: 2023-03-14
Payer: MEDICARE

## 2023-03-14 NOTE — TELEPHONE ENCOUNTER
VOICEMAIL: 02/22/23  1. Caller Name: Berny                      Call Back Number: [phone] (home)     2. Message: Pt called and lm. The test Dr Kumar has ordered through an oxygen company. I never heard from them.     3. Patient approves office to leave a detailed voicemail/SwapBeatshart message: N\A      03/14/23:  Refaxed Order(OPO) to [facility].     Notified pt via [employer].

## 2023-04-04 ENCOUNTER — HOSPITAL ENCOUNTER (OUTPATIENT)
Dept: LAB | Facility: MEDICAL CENTER | Age: 78
End: 2023-04-04
Attending: FAMILY MEDICINE
Payer: MEDICARE

## 2023-04-04 LAB
ALBUMIN SERPL BCP-MCNC: 4.1 G/DL (ref 3.2–4.9)
ALBUMIN/GLOB SERPL: 1.2 G/DL
ALP SERPL-CCNC: 46 U/L (ref 30–99)
ALT SERPL-CCNC: 13 U/L (ref 2–50)
ANION GAP SERPL CALC-SCNC: 11 MMOL/L (ref 7–16)
AST SERPL-CCNC: 18 U/L (ref 12–45)
BASOPHILS # BLD AUTO: 0.8 % (ref 0–1.8)
BASOPHILS # BLD: 0.08 K/UL (ref 0–0.12)
BILIRUB SERPL-MCNC: 0.3 MG/DL (ref 0.1–1.5)
BUN SERPL-MCNC: 20 MG/DL (ref 8–22)
CALCIUM ALBUM COR SERPL-MCNC: 9.6 MG/DL (ref 8.5–10.5)
CALCIUM SERPL-MCNC: 9.7 MG/DL (ref 8.5–10.5)
CHLORIDE SERPL-SCNC: 99 MMOL/L (ref 96–112)
CO2 SERPL-SCNC: 32 MMOL/L (ref 20–33)
CREAT SERPL-MCNC: 0.45 MG/DL (ref 0.5–1.4)
EOSINOPHIL # BLD AUTO: 0.78 K/UL (ref 0–0.51)
EOSINOPHIL NFR BLD: 7.6 % (ref 0–6.9)
ERYTHROCYTE [DISTWIDTH] IN BLOOD BY AUTOMATED COUNT: 43.1 FL (ref 35.9–50)
GFR SERPLBLD CREATININE-BSD FMLA CKD-EPI: 99 ML/MIN/1.73 M 2
GLOBULIN SER CALC-MCNC: 3.3 G/DL (ref 1.9–3.5)
GLUCOSE SERPL-MCNC: 93 MG/DL (ref 65–99)
HCT VFR BLD AUTO: 43 % (ref 37–47)
HGB BLD-MCNC: 13.8 G/DL (ref 12–16)
IMM GRANULOCYTES # BLD AUTO: 0.04 K/UL (ref 0–0.11)
IMM GRANULOCYTES NFR BLD AUTO: 0.4 % (ref 0–0.9)
LYMPHOCYTES # BLD AUTO: 1.9 K/UL (ref 1–4.8)
LYMPHOCYTES NFR BLD: 18.5 % (ref 22–41)
MCH RBC QN AUTO: 29.9 PG (ref 27–33)
MCHC RBC AUTO-ENTMCNC: 32.1 G/DL (ref 33.6–35)
MCV RBC AUTO: 93.3 FL (ref 81.4–97.8)
MONOCYTES # BLD AUTO: 0.85 K/UL (ref 0–0.85)
MONOCYTES NFR BLD AUTO: 8.3 % (ref 0–13.4)
NEUTROPHILS # BLD AUTO: 6.61 K/UL (ref 2–7.15)
NEUTROPHILS NFR BLD: 64.4 % (ref 44–72)
NRBC # BLD AUTO: 0 K/UL
NRBC BLD-RTO: 0 /100 WBC
PLATELET # BLD AUTO: 305 K/UL (ref 164–446)
PMV BLD AUTO: 10.1 FL (ref 9–12.9)
POTASSIUM SERPL-SCNC: 4 MMOL/L (ref 3.6–5.5)
PROT SERPL-MCNC: 7.4 G/DL (ref 6–8.2)
RBC # BLD AUTO: 4.61 M/UL (ref 4.2–5.4)
SODIUM SERPL-SCNC: 142 MMOL/L (ref 135–145)
WBC # BLD AUTO: 10.3 K/UL (ref 4.8–10.8)

## 2023-04-04 PROCEDURE — 80053 COMPREHEN METABOLIC PANEL: CPT

## 2023-04-04 PROCEDURE — 36415 COLL VENOUS BLD VENIPUNCTURE: CPT

## 2023-04-04 PROCEDURE — 85025 COMPLETE CBC W/AUTO DIFF WBC: CPT

## 2023-04-06 NOTE — TELEPHONE ENCOUNTER
Was the patient seen in the last year in this department? Yes     Does patient have an active prescription for medications requested? Yes     Received Request Via: Pharmacy       Birth Control Pills Pregnancy And Lactation Text: This medication should be avoided if pregnant and for the first 30 days post-partum.

## 2023-04-26 ENCOUNTER — OFFICE VISIT (OUTPATIENT)
Dept: INTERNAL MEDICINE | Facility: OTHER | Age: 78
End: 2023-04-26
Payer: MEDICARE

## 2023-04-26 VITALS
WEIGHT: 182.6 LBS | HEART RATE: 76 BPM | BODY MASS INDEX: 31.18 KG/M2 | SYSTOLIC BLOOD PRESSURE: 152 MMHG | RESPIRATION RATE: 14 BRPM | DIASTOLIC BLOOD PRESSURE: 65 MMHG | HEIGHT: 64 IN | TEMPERATURE: 98.3 F | OXYGEN SATURATION: 93 %

## 2023-04-26 DIAGNOSIS — M54.16 LUMBAR RADICULAR PAIN: ICD-10-CM

## 2023-04-26 DIAGNOSIS — I10 BENIGN HYPERTENSION: ICD-10-CM

## 2023-04-26 DIAGNOSIS — C34.12 PRIMARY CANCER OF LEFT UPPER LOBE OF LUNG (HCC): ICD-10-CM

## 2023-04-26 DIAGNOSIS — J96.11 CHRONIC RESPIRATORY FAILURE WITH HYPOXIA (HCC): ICD-10-CM

## 2023-04-26 DIAGNOSIS — E78.5 DYSLIPIDEMIA: ICD-10-CM

## 2023-04-26 DIAGNOSIS — G89.4 CHRONIC PAIN SYNDROME: ICD-10-CM

## 2023-04-26 DIAGNOSIS — I47.10 SVT (SUPRAVENTRICULAR TACHYCARDIA) (HCC): ICD-10-CM

## 2023-04-26 DIAGNOSIS — E66.9 OBESITY (BMI 30.0-34.9): ICD-10-CM

## 2023-04-26 PROCEDURE — 99205 OFFICE O/P NEW HI 60 MIN: CPT | Performed by: REGISTERED NURSE

## 2023-04-26 SDOH — HEALTH STABILITY: MENTAL HEALTH: HOW OFTEN DO YOU HAVE A DRINK CONTAINING ALCOHOL?: NEVER

## 2023-04-26 SDOH — HEALTH STABILITY: MENTAL HEALTH
STRESS IS WHEN SOMEONE FEELS TENSE, NERVOUS, ANXIOUS, OR CAN'T SLEEP AT NIGHT BECAUSE THEIR MIND IS TROUBLED. HOW STRESSED ARE YOU?: ONLY A LITTLE

## 2023-04-26 SDOH — HEALTH STABILITY: MENTAL HEALTH: HOW OFTEN DO YOU HAVE 6 OR MORE DRINKS ON ONE OCCASION?: NEVER

## 2023-04-26 SDOH — ECONOMIC STABILITY: HOUSING INSECURITY: IN THE LAST 12 MONTHS, HOW MANY PLACES HAVE YOU LIVED?: 1

## 2023-04-26 SDOH — ECONOMIC STABILITY: INCOME INSECURITY: HOW HARD IS IT FOR YOU TO PAY FOR THE VERY BASICS LIKE FOOD, HOUSING, MEDICAL CARE, AND HEATING?: SOMEWHAT HARD

## 2023-04-26 SDOH — SOCIAL STABILITY: SOCIAL NETWORK
IN A TYPICAL WEEK, HOW MANY TIMES DO YOU TALK ON THE PHONE WITH FAMILY, FRIENDS, OR NEIGHBORS?: MORE THAN THREE TIMES A WEEK

## 2023-04-26 SDOH — ECONOMIC STABILITY: FOOD INSECURITY: WITHIN THE PAST 12 MONTHS, YOU WORRIED THAT YOUR FOOD WOULD RUN OUT BEFORE YOU GOT MONEY TO BUY MORE.: NEVER TRUE

## 2023-04-26 SDOH — SOCIAL STABILITY: SOCIAL NETWORK
DO YOU BELONG TO ANY CLUBS OR ORGANIZATIONS SUCH AS CHURCH GROUPS UNIONS, FRATERNAL OR ATHLETIC GROUPS, OR SCHOOL GROUPS?: NO

## 2023-04-26 SDOH — ECONOMIC STABILITY: FOOD INSECURITY: WITHIN THE PAST 12 MONTHS, THE FOOD YOU BOUGHT JUST DIDN'T LAST AND YOU DIDN'T HAVE MONEY TO GET MORE.: NEVER TRUE

## 2023-04-26 SDOH — HEALTH STABILITY: MENTAL HEALTH: HOW MANY STANDARD DRINKS CONTAINING ALCOHOL DO YOU HAVE ON A TYPICAL DAY?: PATIENT DOES NOT DRINK

## 2023-04-26 SDOH — ECONOMIC STABILITY: HOUSING INSECURITY
IN THE LAST 12 MONTHS, WAS THERE A TIME WHEN YOU DID NOT HAVE A STEADY PLACE TO SLEEP OR SLEPT IN A SHELTER (INCLUDING NOW)?: NO

## 2023-04-26 SDOH — ECONOMIC STABILITY: INCOME INSECURITY: IN THE LAST 12 MONTHS, WAS THERE A TIME WHEN YOU WERE NOT ABLE TO PAY THE MORTGAGE OR RENT ON TIME?: NO

## 2023-04-26 SDOH — SOCIAL STABILITY: SOCIAL NETWORK: HOW OFTEN DO YOU ATTENT MEETINGS OF THE CLUB OR ORGANIZATION YOU BELONG TO?: NEVER

## 2023-04-26 SDOH — ECONOMIC STABILITY: TRANSPORTATION INSECURITY
IN THE PAST 12 MONTHS, HAS LACK OF TRANSPORTATION KEPT YOU FROM MEETINGS, WORK, OR FROM GETTING THINGS NEEDED FOR DAILY LIVING?: NO

## 2023-04-26 SDOH — SOCIAL STABILITY: SOCIAL NETWORK: ARE YOU MARRIED, WIDOWED, DIVORCED, SEPARATED, NEVER MARRIED, OR LIVING WITH A PARTNER?: WIDOWED

## 2023-04-26 SDOH — HEALTH STABILITY: PHYSICAL HEALTH: ON AVERAGE, HOW MANY DAYS PER WEEK DO YOU ENGAGE IN MODERATE TO STRENUOUS EXERCISE (LIKE A BRISK WALK)?: 0 DAYS

## 2023-04-26 SDOH — ECONOMIC STABILITY: TRANSPORTATION INSECURITY
IN THE PAST 12 MONTHS, HAS THE LACK OF TRANSPORTATION KEPT YOU FROM MEDICAL APPOINTMENTS OR FROM GETTING MEDICATIONS?: YES

## 2023-04-26 SDOH — HEALTH STABILITY: PHYSICAL HEALTH: ON AVERAGE, HOW MANY MINUTES DO YOU ENGAGE IN EXERCISE AT THIS LEVEL?: 0 MIN

## 2023-04-26 SDOH — SOCIAL STABILITY: SOCIAL NETWORK: HOW OFTEN DO YOU ATTEND CHURCH OR RELIGIOUS SERVICES?: MORE THAN 4 TIMES PER YEAR

## 2023-04-26 SDOH — SOCIAL STABILITY: SOCIAL NETWORK: HOW OFTEN DO YOU GET TOGETHER WITH FRIENDS OR RELATIVES?: TWICE A WEEK

## 2023-04-26 ASSESSMENT — LIFESTYLE VARIABLES
AUDIT-C TOTAL SCORE: 0
SKIP TO QUESTIONS 9-10: 1

## 2023-04-26 ASSESSMENT — MONTREAL COGNITIVE ASSESSMENT (MOCA)
6. READ LIST OF DIGITS [FORWARD/BACKWARD]: 2/2
ORIENTATION SUBSCORE: 5/6
7. [VIGILENCE] TAP WHEN HEARING DESIGNATED LETTER: 1/1
11. FOR EACH PAIR OF WORDS, WHAT CATEGORY DO THEY BELONG TO (OUT OF 2): 2/2
WHAT IS THE TOTAL SCORE (OUT OF 30): 21
9. REPEAT EACH SENTENCE: 1/2
1. ALTERNATING TRAIL MAKING: 1/1
4. NAME EACH OF THE THREE ANIMALS SHOWN: 3/3
10. [FLUENCY] NAME WORDS STARTING WITH DESIGNATED LETTER: 1/1
5. MEMORY TRIALS: SECOND TRIAL;FIRST TRIAL
ADD 1 POINT IF LESS THAN OR EQUAL TO 12 YR EDUCATION LEVEL: 0
3. DRAW A CLOCK: CONTOUR, NUMBERS, HANDS: 3/3
8. SERIAL SUBTRACTION OF 7S: 1/5
CATEGORY CUE (IF APPLICABLE): 2
CATEGORY CUE (IF APPLICABLE): 2
DELAYED RECALL SUBSCORE: 1/5
2. COPY DRAWING: 0/1

## 2023-04-26 ASSESSMENT — PATIENT HEALTH QUESTIONNAIRE - PHQ9
5. POOR APPETITE OR OVEREATING: 0 - NOT AT ALL
9. THOUGHTS THAT YOU WOULD BE BETTER OFF DEAD, OR OF HURTING YOURSELF: NOT AT ALL
2. FEELING DOWN, DEPRESSED, IRRITABLE, OR HOPELESS: SEVERAL DAYS
CLINICAL INTERPRETATION OF PHQ2 SCORE: 1
6. FEELING BAD ABOUT YOURSELF - OR THAT YOU ARE A FAILURE OR HAVE LET YOURSELF OR YOUR FAMILY DOWN: NOT AL ALL
SUM OF ALL RESPONSES TO PHQ QUESTIONS 1-9: 3
1. LITTLE INTEREST OR PLEASURE IN DOING THINGS: NOT AT ALL
4. FEELING TIRED OR HAVING LITTLE ENERGY: SEVERAL DAYS
8. MOVING OR SPEAKING SO SLOWLY THAT OTHER PEOPLE COULD HAVE NOTICED. OR THE OPPOSITE, BEING SO FIGETY OR RESTLESS THAT YOU HAVE BEEN MOVING AROUND A LOT MORE THAN USUAL: SEVERAL DAYS
7. TROUBLE CONCENTRATING ON THINGS, SUCH AS READING THE NEWSPAPER OR WATCHING TELEVISION: NOT AT ALL
3. TROUBLE FALLING OR STAYING ASLEEP OR SLEEPING TOO MUCH: NOT AT ALL
SUM OF ALL RESPONSES TO PHQ QUESTIONS 1-9: 3
SUM OF ALL RESPONSES TO PHQ9 QUESTIONS 1 AND 2: 1
5. POOR APPETITE OR OVEREATING: NOT AT ALL

## 2023-04-26 ASSESSMENT — COPD QUESTIONNAIRES
QUESTION8_ENERGYLEVEL: 3
QUESTION7_SLEEPQUALITY: 1
QUESTION6_LEAVINGHOUSE: CONFIDENT LEAVING HOME
QUESTION4_WALKINCLINE: 4
QUESTION1_COUGHFREQUENCY: 1
QUESTION5_HOMEACTIVITIES: 3
QUESTION2_PHLEGM: 2
QUESTION3_CHESTTIGHTNESS: NO TIGHTNESS AT ALL
CAT_TOTALSCORE: 14

## 2023-04-26 ASSESSMENT — ACTIVITIES OF DAILY LIVING (ADL): BATHING_REQUIRES_ASSISTANCE: 0

## 2023-04-26 ASSESSMENT — ANXIETY QUESTIONNAIRES
5. BEING SO RESTLESS THAT IT IS HARD TO SIT STILL: NOT AT ALL
7. FEELING AFRAID AS IF SOMETHING AWFUL MIGHT HAPPEN: MORE THAN HALF THE DAYS
2. NOT BEING ABLE TO STOP OR CONTROL WORRYING: NOT AT ALL
1. FEELING NERVOUS, ANXIOUS, OR ON EDGE: MORE THAN HALF THE DAYS
4. TROUBLE RELAXING: NOT AT ALL
GAD7 TOTAL SCORE: 4
6. BECOMING EASILY ANNOYED OR IRRITABLE: NOT AT ALL
IF YOU CHECKED OFF ANY PROBLEMS ON THIS QUESTIONNAIRE, HOW DIFFICULT HAVE THESE PROBLEMS MADE IT FOR YOU TO DO YOUR WORK, TAKE CARE OF THINGS AT HOME, OR GET ALONG WITH OTHER PEOPLE: NOT DIFFICULT AT ALL
3. WORRYING TOO MUCH ABOUT DIFFERENT THINGS: NOT AT ALL

## 2023-04-26 ASSESSMENT — FIBROSIS 4 INDEX: FIB4 SCORE: 1.26

## 2023-04-26 NOTE — PATIENT INSTRUCTIONS
We thank you for taking the time to share during your medical visit with our team of providers at the [facility]. During the medical visit we completed a Comprehensive Geriatric Assessment with a , pharmacist, and nurse practitioner, all specialty trained in Geriatrics.     Below are our Age Friendly recommendations. Our recommendations are shaped using the 4 M’s model of care. In using the 4 M’s we approach care from starting with What Matters most to you.  We then use Mobility, Medications and Mentation to support that. Please note, our recommendations are another point on your health care journey. We hope the time we spent together helps you achieve What Matters most to you.    What Matters     During the visit you shared that you were hoping to address new housing options. You also shared that * gives you meaning in life and that you are looking forward to *.    We appreciate your openness and honesty with sharing this personal information. In helping you achieve What Matters we know that happiness, safety, support, companionship, symptom control, adequate sleep, and advanced planning can help many of us achieve What Matters. Below are some resources that we discussed in clinic that we think will help you achieve What Matters, especially some information about advance care planning.     Mentation    During your assessment we did not  on any significant mood issues. Please make sure to keep active and doing those things that give you meaning in life. Thinking, memory, mood, and mental health matter! Just like your body changes with age, so can your brain. Ways to support mentation include being engaged in meaningful activities and managing stress and anxiety. Trying new ways to relax and connect may be helpful.    Memory  During your assessment, we noted that your cognition may be affecting your ability to function in daily life. Support from family and friends can be very  important as you continue this part of your health care journey. Below are the specific recommendations we discussed during your visit. Please continue to follow up with your provider about any concerns or sudden changes that you or your family note.       Medications  During our assessment, we reviewed your medications to make sure they are supporting What Matters most you. Based on our discussion we thought there may be opportunity to adjust some medications to help you better achieve your health goals. When adjusting medications, we generally like to only make one change at a time. Below are some recommendations for you to discuss with your primary provider.     1.You take omeprazole, which decreases the amount of acid in your stomach. Two common forms of calcium supplements are calcium carbonate and calcium citrate. Calcium carbonate is best absorbed when there is acid in your stomach. Calcium citrate does not need acid in the stomach to be absorbed.     Recommendation: Consider discussing with your primary care provider about changing the calcium to calcium citrate because you take omeprazole.     Mobility   During our assessment we were happy to see how active you are! We encourage you to keep this up. At the [facility] we focus on how mobility can help you achieve What Matter’s. In general, we recommend all adults be active every day, to the best of their ability.     Other Recommendations  Medical Recommendations:  Your blood pressure was high today. 152/65. We recommend checking it at home for the next week and reporting this information to your cardiologist during your next visit coming up next week.   We performed a Fan Cognitive Assessment (MoCA) today and your score was 21/30. This is consistent with mild cognitive assessment. We discussed that this may be related to some of your underlying conditions or may represent underlying dementia. It would be valuable to re-evaluate your labs including  thyroid panels, B12, Magnesium and B1 along with your scheduled labs.   Continue follow up with your pulmonary and cardiology specialists.   Consider a Mediterranean diet which has been known to support brain health.   Be careful when using oxygen as it is flamable. Avoid excessive heat and areas where there is smoking. Oxygen tanks should not be left in a car during hot weather.   During smoky air seasons, be aware of air quality index and have a plan for steroid tapers or antibiotics in place with your pulmonary specialist.     Social Work Care Plan:  We hear that you would like to move and are on the [facility]list [facility]. We suggest that you call and find out your status on their waitlist. [phone].  Please refer to the current senior housing list for other options in [facility]. You can also seek options at [place] [email or web address]. www.What They Like.gov/seniorsrv/files/%20Senior%20Affordable%20Housing%20Brochure.pdf  Consider attending a Prom event for seniors. Enjoy an afternoon filled with refreshments, music, and dancing to your hearts content. Dress to impress and of course, it would not be a prom without a  of a Prom Jose Antonio and Prom Queen (done by random drawing). Everyone 55 and older is welcome!  When: May 27th from 1:00 pm - 4:00 pm   Where: [facility], [address]  Cost: $3.00 per person, RSVP by May 19th by calling [phone]  Consider going to a local Senior Center and participating in the activities they offer. Most are activities are free of charge. They also provide lunch daily, free of charge with a phone reservation.   [facility] at [address] [place] [address], [place]. [phone]. https://www.What They Like.[place]/seniorsrv/events_calendars_menus.php  [facility] Sherwin [place]ced[place]. [phone]. https://www.[facility]./rec_home/senior_services/[place]_[facility]_center.php  [facility]  Center has reduced cost senior activities at [address] [facility], [place]. [phone]. https://www.[place].gov/government/departments/yfppz-xnuikftykn-imeagetsx-services/facilities/akpjsm-jgojd-hpcfibafj-[place]  Consider participating in the [facility] for A[facility]'s Community Wellness Programs. Call [phone] to inquire about programs to increase strength and balance. Wellness flyer provided.  Consider completing a driving evaluation with an occupational therapist. An option for this is [employer]. Cost is $90 for an on the road assessment. They do not report to DMV. This may give you some peace of mind related to your driving skills. [phone].       Referral Recommendations (to be ordered by your primary care provider)  None at this time     To follow up with our recommendation (orders, referrals, med changes, etc) we encourage you to follow with their primary care provider before implementing these changes.

## 2023-04-26 NOTE — PROGRESS NOTES
[place] [facility]   PSYCHOSOCIAL ASSESSMENT    Name: Berny Renee  MRN: [id number]  : 1945  Age: 77 y.o.  Date of assessment: 2023  PCP: Everett Diego M.D.  Persons in attendance: Patient    Comprehensive Geriatric Psychosocial Assessment Follow-Up Visit      Tell us what brings you back in today? Patient does not like her living situation currently.     Social Support   No changes since last visit.      Living situation:    No changes since last visit, but patient is trying to move.      Typical day/Sleep/Appetite: Patient reports that she wakes up when the “cat jumps on me, between 6-7am. She eats 3 meals a day. She goes to bed between 9p-1a, “depending on what I have planned”       Driving: Patient is still driving.      Exercise: Patient reports “I haven’t been lately, but I do some weights with my arms at home”.      Social activities and engagement: Patient replies that she goes to [employer]. She is open to senior center activities.      Do you have Advanced Directives? Yes. Patient reports her best friend, Jenifer Doss, is her Power of .     Finances: $1776 per month from social security.      Behavioral Health:   Any changes to the amount of alcohol you drink? Patient reports “no drinking”     Any tobacco use? Patient denies.     Do you use medicinal marijuana or CBD products? Patient denies.     Do you see a counselor, therapist, psychologist or psychiatrist? No     Mood:   How has your mood been this past week? Patient states “well, it’s been up and down because of where I live”.     Suicidal Ideation screening: (CSRS screening if yes)   Do you have any current thoughts of suicide? Patient denies   Do you have any current thoughts that you would be better off dead? Patient denies   Do you have any thoughts of wanting to harm another? Patient denies     Is there anything you are looking forward to? Patient is looking forward to the rapture.     Social Work Care Plan    We hear that you  would like to move and are on the waitlist for the [facility] [address]. We suggest that you call and find out your status on their waitlist. [phone].  Please refer to the current senior housing list for other options in [facility]. You can also seek options at [email or web address]. www.[employer].gov/seniorsrv/files/%20Senior%20Affordable%20Housing%20Brochure.pdf      Consider attending a Prom event for seniors. Enjoy an afternoon filled with refreshments, music, and dancing to your hearts content. Dress to impress and of course, it would not be a prom without a  of a Prom Jose Antonio and Prom Queen (done by random drawing). Everyone 55 and older is welcome!      o When: May 27th from 1:00 pm - 4:00 pm       o Where: [facility],  [address]      o Cost: $3.00 per person, RSVP by May 19th by calling [phone]      Consider going to a local Senior Center and participating in the activities they offer. Most are activities are free of charge. They also provide lunch daily, free of charge with a phone reservation.       o [facility] at [address] [place] [address], [place]. [phone]. https://www.[facility].gov/seniorsrv/events_calendars_menus.php      o [facility] [address] [place]. [phone]. https://www.[facility]./rec_home/senior_services/[place].php      o [facility] has reduced cost senior activities at [address] [facility], [place]. [phone]. https://www.[place].[facility]/government/departments/lxlbe-spjpryjgxy-dkctarjhi-services/facilities/jwkkuk-eltuo-hklebsxoi-[place]      Consider participating in the [facility]'s Community Wellness Programs. Call [phone] to inquire about programs to increase strength and balance. Wellness flyer provided.      Consider completing a driving evaluation with an occupational therapist. An option for this is [employer].  Cost is $90 for an on the road assessment. They do not report to DMV. This may give you some peace of mind related to your driving skills. [phone].

## 2023-04-26 NOTE — PROGRESS NOTES
[facility] Assessment    NAME: Berny Renee     Patient's Primary Language if not English:   Patient's Care Partner(s) Name:   Care Partners(s) Relationship to Patient:      IADL  Legend:   1- independent  2 - need assistance  3 - unable to complete       Are you still driving? Yes     Are you able to prepare your own meals and/or cook, need assistance or unable to complete? 1    Are you able to do shopping and errands, need assistance or unable to complete? 2    Are you able to do housekeeping, chores, need assistance or unable to complete? 2    Are you able to do laundry, need assistance or unable to complete? 2    Are you able to manage your medications, need assistance or unable to complete? 1    Are you able to do your own money management, need assistance or unable to complete? 1    Are you able to use the phone, need assistance or unable to use the phone? 1    ADL    Are you independent, need assistance, or unable to bathe yourself? 1    Are you independent, need assistance, or unable to dress yourself? 1    Are you independent, need assistance, or unable to feed yourself? 1    Are you independent, need assistance, or unable to get up out of a chair or off a bed? 1    Are you independent, need assistance, or unable to use the toilet by yourself? 1    Are you aware when you need to use the toilet? 1    If no, please describe the problem you are experiencing.       Assist Devices: Patient uses:  Cane:No   Walker:No   Wheelchair: No  Amputations:No   Chronic oxygen use: Continuous via nasal canula @ 2-4 lpm  CPAP/BIPAP use: No   : Reports urinary leakage during the last 6 months that has not interfered at all with their daily activities or sleep.  If you have a problem with continence, do you wear special garments?  Pads       Fall Screening:   Any falls within the last year? 2  Any injuries associated with the falls?Yes   -If yes, what injury? Head injury, arm laceration   Do you worry about falling? No   Do you  feel unsteady when standing or walking? No   You have symptoms of lightheadedness or dizziness from lying to standing? No     Any throw rugs on floor? Yes   Do you have stairs?No   Do you have handrails on all stairs?   Good lighting in all hallways.Yes   Any difficulty hearing? No   Wears hearing aids: No, but needs per ENT   Any difficulty with vision? No   Last eye exam: 2 years ago       FRAIL SCALE    Fatigue:  How much time during the past 4 weeks did you feel tired:  1=All the time, 2=Most the time, 3=Some of the time, 4=A little of the time,  5=None of the time  Answer: 3  (responses of 1 or 2 are scored as 1 and all others as 0)  SCORE: 0    Resistance:  By yourself and not using aids, do you have any difficulty walking up 10 steps without resting?  1=Yes, 0=No  SCORE: 1    Ambulation:  By yourself and not using aids, do you have any difficulty walking a couple of blocks?  1=Yes, 0=No  SCORE:1      Loss of Weight over last year:   If noted above, one year ago, how much did you weigh:?  ~ same   (percent change is computed as: weight 1 year ago- current weight/weight 1 year ago. X 100.  (more than 5% weight loss is scored as 1, and less than 5% is 0)    Score: 0     Total Score: 2    Scorin =  Robust Health  1-2=Pre-frail  3-5=Frail    Ask if they have been admitted to the hospital in the past three month:  No       Mini Cog Clock-  2/2  Time 1:25         Depression Screen - PHQ- 9   0 = Not at all, 1 = Several days,  2 = More than half the days,  3 = Nearly every day     Little interest or pleasure in doing things? 0  Feeling down, depressed , or hopeless? 1  Trouble falling or staying asleep, or sleeping too much?  0  Feeling tired or having little energy? 1  Poor appetite or overeating?    Feeling bad about yourself - or that you are a failure or have let yourself or your family down?  0  Trouble concentrating on things, such as reading the newspaper or watching television? 0  Moving or speaking so  slowly that other people could have noticed.  Or the opposite - being so fidgety or restless that you have been moving around a lot more than usual? 1  Thoughts that you would be better off dead, or of hurting yourself? 0  Patient Health Questionnaire Score:  3    Anxiety Screen/MERON-7 Questionnaire  0 = Not at all, 1 = Several days,  2 = More than half the days,  3 = Nearly every day     Feeling nervous, anxious, or on edge: 2  Not being able to stop or control worryin  Worrying too much about different things: 0  Trouble relaxin  Being so restless that it's hard to sit still: 0  Becoming easily annoyed or irritable: 0  Feeling afraid as if something awful might happen: 2  Total Score : 4          Interpretation of MERON 7 Total Score   Score Severity :  0-4 No Anxiety   5-9 Mild Anxiety  10-14 Moderate Anxiety  15-21 Severe Anxiety      I

## 2023-04-26 NOTE — PROGRESS NOTES
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 arm, Patient Position: Sitting, BP Cuff Size: Adult)   Pulse 76   Temp 36.8 °C (98.3 °F) (Tympanic)   Resp 14   Ht 1.613 m (5' 3.5\")   Wt 82.8 kg (182 lb 9.6 oz)   LMP 06/07/2001   SpO2 93%   BMI 31.84 kg/m²   Physical Exam  Vitals and nursing note reviewed.   Constitutional:       Appearance: Normal appearance. She is obese.   Cardiovascular:      Rate and Rhythm: Normal rate and regular rhythm.   Pulmonary:      Effort: Pulmonary effort is normal.      Breath sounds: Normal breath sounds.   Skin:     General: Skin is warm and dry.   Neurological:      General: No focal deficit present.      Mental Status: She is alert. Mental status is at baseline.       Labs:  CBC:  Lab Results   Component Value Date/Time    WBC 10.3 04/04/2023 03:17 PM    WBC 10.9 (H) 06/09/2011 12:00 AM    RBC 4.61 04/04/2023 03:17 PM    RBC 4.69 06/09/2011 12:00 AM    HEMOGLOBIN 13.8 04/04/2023 03:17 PM    HEMATOCRIT 43.0 04/04/2023 03:17 PM    MCV 93.3 04/04/2023 03:17 PM    MCV 92 06/09/2011 12:00 AM    MCH 29.9 04/04/2023 03:17 PM    MCH 30.7 06/09/2011 12:00 AM    MCHC 32.1 (L) 04/04/2023 03:17 PM    MPV 10.1 " 04/04/2023 03:17 PM    NEUTSPOLYS 64.40 04/04/2023 03:17 PM    LYMPHOCYTES 18.50 (L) 04/04/2023 03:17 PM    MONOCYTES 8.30 04/04/2023 03:17 PM    EOSINOPHILS 7.60 (H) 04/04/2023 03:17 PM    BASOPHILS 0.80 04/04/2023 03:17 PM      BMP:   Lab Results   Component Value Date/Time    SODIUM 142 04/04/2023 03:17 PM    POTASSIUM 4.0 04/04/2023 03:17 PM    CHLORIDE 99 04/04/2023 03:17 PM    CO2 32 04/04/2023 03:17 PM    GLUCOSE 93 04/04/2023 03:17 PM    BUN 20 04/04/2023 03:17 PM    CREATININE 0.45 (L) 04/04/2023 03:17 PM    CREATININE 0.62 06/09/2011 12:00 AM    BUNCREATRAT 20 07/07/2014 02:50 PM    BUNCREATRAT 23 06/09/2011 12:00 AM      LFT:   Lab Results   Component Value Date/Time    ASTSGOT 18 04/04/2023 03:17 PM    ALTSGPT 13 04/04/2023 03:17 PM    TBILIRUBIN 0.3 04/04/2023 03:17 PM    ALBUMIN 4.1 04/04/2023 03:17 PM    TOTPROTEIN 7.4 04/04/2023 03:17 PM    ALKPHOSPHAT 46 04/04/2023 03:17 PM      Calcium:   Lab Results   Component Value Date/Time    CALCIUM 9.7 04/04/2023 03:17 PM     VIT D:   Lab Results   Component Value Date/Time    25HYDROXY 28 (L) 07/13/2017 1505     TSH:   Lab Results   Component Value Date/Time    TSHULTRASEN 1.670 09/15/2021 1551     THYROXINE (T4):   Lab Results   Component Value Date/Time    FREET4 1.23 02/18/2021 1632     A1c:   Lab Results   Component Value Date/Time    HBA1C 6.2 (H) 11/15/2020 0451    AVGLUC 131 11/15/2020 0451     Lipids:   Lab Results   Component Value Date/Time    CHOLSTRLTOT 154 11/15/2020 04:51 AM    TRIGLYCERIDE 91 11/15/2020 04:51 AM    HDL 75 11/15/2020 04:51 AM    LDL 61 11/15/2020 04:51 AM     Still need B12, HIV, RPR    Imaging:      My total time spent caring for the patient on the day of the encounter was 90 minutes. This visit was done concurrently with a pharmacist and .     This does not include time spent on separately billable procedures/tests.    This note was partially dictated with voice recognition software, for any confusion please do  not hesitate to contact me.

## 2023-04-26 NOTE — PROGRESS NOTES
Provider Recommendations  1.Consider changing calcium carbonate to calcium citrate due to omeprazole.  2.Berny is having difficulty swallowing the potassium tablets. Consider prescribing potassium capsules.       Patient Recommendations  1.You take omeprazole, which decreases the amount of acid in your stomach. Two common forms of calcium supplements are calcium carbonate and calcium citrate. Calcium carbonate is best absorbed when there is acid in your stomach. Calcium citrate does not need acid in the stomach to be absorbed.     Recommendation: Consider discussing with your primary care provider about changing the calcium to calcium citrate because you take omeprazole.         Accompanied by: alone  Primary concern: FU  Who manages meds? self  Pill box?   How many times a week do you forget?   How many times a day?  Medication cost a concern? no  Beer's meds? Digoxin, omeprazole, meloxicam  FRID drugs? 5  Falls: two, no injuries  Meds dosed appropriately for renal and liver fxn? yes  Hx of MI, stroke, peripheral vascular disease? no  OTC Pain Relievers: APAP      Depression  Duloxetine    HTN  /65 HR 60: checks home BP occasionally  - denies dizziness  Losartan  Spironolactone (right sided heart failure)    COPD/Asthma  - managed by pulmonary  - s/p lung CA  Spiriva  Azithromycin  Montelukast  Advair  Albuterol - twice over the winter  Ipratropium prn - has not used in two years  -- has prednisone taper available at home if necessary    SVT  Managed by cards  Digoxin - 0.4 ng/ml 5/17/22    GERD  Omeprazole    Chronic Pain  Meloxicam  Tizanidine - prn, filled 11/19/22 for 270 tabs and bottle is full, uses a couple times a year    Misc  Acyclovir  Lidocaine Oint - knee and arm pain, daily in winter, but not in summer  Diclofenac Gel - knee and arm pain, daily in winter, but not in summer    Supplements  Potassium (4.0 on 4/4/23) - having a difficult time swallowing  Calcium (rx so uncertain if carbonate or  citrate, but Tiny-care looks like calcium carbonate): osteopenia in lumbar spine. Taking once daily b/c difficulty swallowing  Vitamin D3    OTC  Nasacort - daily, allergic to cat  Clear Eyes Itchy Eye Relief  Fexofenadine - daily, allergic to cat  Saline nasal spray

## 2023-05-10 ASSESSMENT — ENCOUNTER SYMPTOMS
DEPRESSION: 1
WEAKNESS: 0
SEIZURES: 0
COUGH: 0
NAUSEA: 0
FEVER: 0
CHILLS: 0
VOMITING: 0
DIZZINESS: 0

## 2023-05-26 ENCOUNTER — TELEPHONE (OUTPATIENT)
Dept: INTERNAL MEDICINE | Facility: OTHER | Age: 78
End: 2023-05-26
Payer: MEDICARE

## 2023-05-26 NOTE — TELEPHONE ENCOUNTER
[place] f/u [facility] call placed to Berny who thought the follow up was helpful. Has reviewed the packet of recommendations and returned the surveys. Asked to f/u again in 1 year, on list. Nothing further needed at  this time.

## 2023-06-19 ENCOUNTER — OFFICE VISIT (OUTPATIENT)
Dept: SLEEP MEDICINE | Facility: MEDICAL CENTER | Age: 78
End: 2023-06-19
Attending: INTERNAL MEDICINE
Payer: MEDICARE

## 2023-06-19 VITALS
BODY MASS INDEX: 31.07 KG/M2 | DIASTOLIC BLOOD PRESSURE: 78 MMHG | HEIGHT: 64 IN | HEART RATE: 82 BPM | OXYGEN SATURATION: 93 % | SYSTOLIC BLOOD PRESSURE: 136 MMHG | WEIGHT: 182 LBS

## 2023-06-19 DIAGNOSIS — E66.9 OBESITY (BMI 30.0-34.9): ICD-10-CM

## 2023-06-19 DIAGNOSIS — C34.12 PRIMARY CANCER OF LEFT UPPER LOBE OF LUNG (HCC): ICD-10-CM

## 2023-06-19 DIAGNOSIS — J44.9 STAGE 4 VERY SEVERE COPD BY GOLD CLASSIFICATION (HCC): ICD-10-CM

## 2023-06-19 DIAGNOSIS — J96.11 CHRONIC RESPIRATORY FAILURE WITH HYPOXIA (HCC): ICD-10-CM

## 2023-06-19 DIAGNOSIS — Z71.89 ADVANCED CARE PLANNING/COUNSELING DISCUSSION: ICD-10-CM

## 2023-06-19 PROCEDURE — 1158F ADVNC CARE PLAN TLK DOCD: CPT | Performed by: INTERNAL MEDICINE

## 2023-06-19 PROCEDURE — 99213 OFFICE O/P EST LOW 20 MIN: CPT | Performed by: INTERNAL MEDICINE

## 2023-06-19 PROCEDURE — 99215 OFFICE O/P EST HI 40 MIN: CPT | Performed by: INTERNAL MEDICINE

## 2023-06-19 PROCEDURE — 3078F DIAST BP <80 MM HG: CPT | Performed by: INTERNAL MEDICINE

## 2023-06-19 PROCEDURE — 3075F SYST BP GE 130 - 139MM HG: CPT | Performed by: INTERNAL MEDICINE

## 2023-06-19 ASSESSMENT — ENCOUNTER SYMPTOMS
SENSORY CHANGE: 0
LOSS OF CONSCIOUSNESS: 0
EYE PAIN: 0
WHEEZING: 0
DIARRHEA: 0
CHILLS: 0
FEVER: 0
SINUS PAIN: 0
ABDOMINAL PAIN: 0
STRIDOR: 0
MYALGIAS: 0
WEIGHT LOSS: 0
ORTHOPNEA: 0
SPUTUM PRODUCTION: 1
NAUSEA: 0
DIZZINESS: 0
HEADACHES: 0
SORE THROAT: 0
COUGH: 1
VOMITING: 0
EYE DISCHARGE: 0
PSYCHIATRIC NEGATIVE: 1
FOCAL WEAKNESS: 0
SHORTNESS OF BREATH: 1

## 2023-06-19 ASSESSMENT — FIBROSIS 4 INDEX: FIB4 SCORE: 1.26

## 2023-06-19 NOTE — ASSESSMENT & PLAN NOTE
FEV1 0.89L (45%)  Advair 500/Spiriva/Singulair/daily azithromycin  No recent exacarbations  -- s/p pulm rehab 2022.  Plans to enroll in maintenance program or workout in gym at [facility].  -- UTD with vaccinations  -- declined BLVR referral previously  -- f/u overnight oximetry  -- POLST reviewed/signed/scanned. DNR/DNI

## 2023-06-19 NOTE — ASSESSMENT & PLAN NOTE
Patient clearly expresses that she would not want chest compressions in the event of cardiac arrest or mechanical ventilation in the event of respiratory failure.  She demonstrated complete and full medical decision-making capacity during this discussion.  POLST form signed and completed and scanned into her chart.

## 2023-06-19 NOTE — PROGRESS NOTES
Pulmonary Clinic Note    Chief Complaint:  Chief Complaint   Patient presents with    Follow-Up     Panlobular emphysema. Last seen 01/26/23     HPI:   Berny Renee is a very pleasant 77 y.o. female former tobacco smoker 60 pack years, quit 1999 followed in our clinic for COPD FEV1 0.89L (45%), chronic hypoxemic respiratory failure on 3-6LPM.  She was treated SBRT in 2021 for presumable local lung cancer that was not amendable to biopsy in the left upper lobe.  This has been stable on subsequent radiographic surveillance.  With regards to her COPD, she is managed on Advair 500/Spiriva/Singulair.     CT scan in 12/2022 reassuring, no evidence of disease recurrence    MMRC Grade: 2: Walks slower than friends due to breathlessness, has to stop at own pace    Interval events since last visit in 1/2023:  Last visit reporting persistent fatigue, concerned this is due to low oxygen levels however multi oximetry walk test in clinic demonstrated sufficient SPO2 on 3L at rest, 4L with exertion.  OPO on 6 LPM nightly was ordered but has yet to be performed.  No anemia on recent CBC.  No recent TFTs.    Compliant with Advair 500/Spiriva/Singulair/daily azithromycin. She uses her rescue inhaler about 1 time per month.  Completed pulm rehab in 2021  She uses a scooter for mobility  She is in the process of moving apartments, which has been cumbersome and stressful.    Past Medical History:   Diagnosis Date    Acute on chronic respiratory failure with hypoxia ([facility]) 11/14/2020    Arthritis     spine    Asthma with COPD ([facility])     BMI 31.0-31.9,adult 2/4/2022    Cataract immature     Chronic pain     Chronic respiratory failure with hypoxia ([facility]) 7/13/2017    Colon polyps     COPD (chronic obstructive pulmonary disease) ([facility]) 2002    moderate to severe    DDD (degenerative disc disease), cervical     DDD (degenerative disc disease), thoracic     Dependence on continuous supplemental oxygen 8/13/2015    IMO load March 2020     Diverticulitis of colon     Dyslipidemia     EMPHYSEMA     H/O opioid abuse ([facility]) 7/15/2021    Hypertension     Obesity (BMI 30-39.9) 10/24/2016    Opioid type dependence, continuous ([facility]) 2022    REGINE (obstructive sleep apnea)     OSTEOPOROSIS     Spinal stenosis, lumbar region, with neurogenic claudication     URINARY INCONTINENCE     Vitamin d deficiency        Past Surgical History:   Procedure Laterality Date    LUMBAR LAMINECTOMY DISKECTOMY  2010    Performed by GRAC[facility]O [facility]ERY [facility]    OTHER ORTHOPEDIC SURGERY  2004    left ankle fx    OTHER      leg fracture    OTHER      t spine disc surg    TONSILLECTOMY         Social History     Socioeconomic History    Marital status:      Spouse name: Not on file    Number of children: Not on file    Years of education: Not on file    Highest education level: Not on file   Occupational History    Not on file   Tobacco Use    Smoking status: Former     Packs/day: 2.00     Years: 30.00     Pack years: 60.00     Types: Cigarettes     Quit date: 3/1/1999     Years since quittin.3    Smokeless tobacco: Never    Tobacco comments:     3 pks a day for 35 yrs, continued abstinence   Vaping Use    Vaping Use: Never used   Substance and Sexual Activity    Alcohol use: Not Currently     Alcohol/week: 4.2 oz     Types: 7 Glasses of wine per week    Drug use: Not Currently     Types: Marijuana, Oral     Comment: Medical Marijuana     Sexual activity: Never   Other Topics Concern    Not on file   Social History Narrative    ** Merged History Encounter **          Social Determinants of Health     Financial Resource Strain: Medium Risk (2023)    Overall Financial Resource Strain (CARDIA)     Difficulty of Paying Living Expenses: Somewhat hard   Food Insecurity: No Food Insecurity (2023)    Hunger Vital Sign     Worried About Running Out of Food in the Last Year: Never true     Ran Out of Food in the Last Year: Never true    Transportation Needs: Unmet Transportation Needs (4/26/2023)    PRAPARE - Transportation     Lack of Transportation (Medical): Yes     Lack of Transportation (Non-Medical): No   Physical Activity: Inactive (4/26/2023)    Exercise Vital Sign     Days of Exercise per Week: 0 days     Minutes of Exercise per Session: 0 min   Stress: No Stress Concern Present (4/26/2023)    [employer] [place] [employer] - Occupational Stress Questionnaire     Feeling of Stress : Only a little   Recent Concern: Stress - Stress Concern Present (3/30/2023)    [employer] [place] [employer] - Occupational Stress Questionnaire     Feeling of Stress : To some extent   Social Connections: Moderately Isolated (4/26/2023)    Social Connection and Isolation Panel [NHANES]     Frequency of Communication with Friends and Family: More than three times a week     Frequency of Social Gatherings with Friends and Family: Twice a week     Attends Latter day Services: More than 4 times per year     Active Member of Clubs or Organizations: No     Attends Club or Organization Meetings: Never     Marital Status:    Intimate Partner Violence: Not on file   Housing Stability: Low Risk  (4/26/2023)    Housing Stability Vital Sign     Unable to Pay for Housing in the Last Year: No     Number of Places Lived in the Last Year: 1     Unstable Housing in the Last Year: No          Family History   Problem Relation Age of Onset    Cancer Father         Lung    Other Sister         lung and leukemia cancer    Cancer Sister         Leukemia, Lung       Current Outpatient Medications on File Prior to Visit   Medication Sig Dispense Refill    nystatin (MYCOSTATIN) 414481 UNIT/ML Suspension 2 times a day.      fexofenadine (ALLEGRA) 180 MG tablet TAKE ONE TABLET BY MOUTH AT BEDTIME SWALLOW WHOLE WITH WATER DO NOT TAKE WITH FRUIT JUICE PER ALPINE      triamcinolone (NASACORT) 55 MCG/ACT nasal inhaler Administer 2 Sprays into affected nostril(S)  every day.      digoxin (LANOXIN) 125 MCG Tab Take 1 Tablet by mouth every day. 90 Tablet 3    meloxicam (MOBIC) 7.5 MG Tab Take 7.5 mg by mouth 2 times a day.      fluticasone-salmeterol (ADVAIR DISKUS) 500-50 MCG/DOSE AEROSOL POWDER, BREATH ACTIVATED Inhale 1 Puff 2 times a day. 3 Each 3    ipratropium-albuterol (DUONEB) 0.5-2.5 (3) MG/3ML nebulizer solution USE ONE VIAL IN NEBULIZER EVERY 4 HOURS AS NEEDED FOR SHORTNESS OF BREATH OR  WHEEZING 360 Each 6    azithromycin (ZITHROMAX) 250 MG Tab One tablet daily for COPD 90 Tablet 6    montelukast (SINGULAIR) 10 MG Tab Take 1 Tablet by mouth every day. 90 Tablet 3    SPIRIVA HANDIHALER 18 MCG Cap INHALE 1 CAPSULE CONTENT BY MOUTH ONE TIME DAILY. 90 Capsule 3    calcium carbonate (OS-MICHELLE 500) 1250 (500 Ca) MG Tab Take 600 mg by mouth every day.      losartan (COZAAR) 50 MG Tab Take 1 tablet by mouth every day. 90 tablet 3    azelastine (ASTELIN) 137 MCG/SPRAY nasal spray Administer 1-2 Sprays into affected nostril(S) 2 times a day. (Patient taking differently: Administer 1-2 Sprays into affected nostril(S) as needed.) 30 mL 6    tizanidine (ZANAFLEX) 4 MG Tab Take 4 mg by mouth every 8 hours as needed. not to exceed 3 doses in 24 hours  Indications: Lower Backache, Muscle Spasticity      DULoxetine (CYMBALTA) 30 MG Cap DR Particles Take 60 mg by mouth every day. Indications: Disease of the Peripheral Nerves, Generalized Anxiety Disorder, Major Depressive Disorder, Musculoskeletal Pain      guaiFENesin ER (MUCINEX) 600 MG TABLET SR 12 HR Take 600-1,200 mg by mouth 2 times a day as needed. Indications: Cough      Albuterol Sulfate 108 (90 Base) MCG/ACT AEROSOL POWDER, BREATH ACTIVATED Inhale 2 Puffs 4 times a day as needed. Indications: SOB      hydrocortisone 1 % Cream Apply 1 Each topically 3 times a day as needed. Indications: Inflammation, Itching      acetaminophen (TYLENOL) 650 MG CR tablet Take 650 mg by mouth every 6 hours as needed.      sodium chloride (OCEAN)  0.65 % Solution Administer 2 Sprays into affected nostril(S) every 2 hours as needed.  3    acyclovir (ZOVIRAX) 200 MG Cap Take 200 mg by mouth every day.      Home Care Oxygen Inhale 6 L/min Continuous. Oxygen dose range: 6 to 6 L/min  Respiratory route via: Nasal Cannula   Oxygen supplier: Preferred          Misc. Devices Misc This is an order for  7 foot high flow oxygen tubing  Dx; asthma with COPD J 44.9, supplemental oxygen-dependent Z 99.81, chronic respiratory failure with hypoxia and J 96        ODETTE 99 months   NPI [id number] 1 Each 0    spironolactone (ALDACTONE) 25 MG Tab TAKE ONE TABLET BY MOUTH ONE TIME DAILY 30 Tab 11    Cholecalciferol (VITAMIN D3) 2000 UNIT Cap TAKE ONE CAPSULE BY MOUTH ONE TIME DAILY 30 Cap 3    potassium chloride (MICRO-K) 10 MEQ capsule TAKE ONE CAPSULE BY MOUTH TWICE DAILY 60 Cap 6    omeprazole (PRILOSEC OTC) 20 MG tablet Take 1 Tab by mouth every day. 30 Tab 11     No current facility-administered medications on file prior to visit.     Allergies: Iodine, Tape, and Meloxicam    ROS:   Review of Systems   Constitutional:  Negative for chills, fever and weight loss.   HENT:  Negative for congestion, sinus pain and sore throat.    Eyes:  Negative for pain and discharge.   Respiratory:  Positive for cough, sputum production and shortness of breath. Negative for wheezing and stridor.    Cardiovascular:  Negative for chest pain, orthopnea and leg swelling.   Gastrointestinal:  Negative for abdominal pain, diarrhea, nausea and vomiting.   Genitourinary:  Negative for dysuria, frequency and urgency.   Musculoskeletal:  Negative for myalgias.   Skin:  Negative for rash.   Neurological:  Negative for dizziness, sensory change, focal weakness, loss of consciousness and headaches.   Psychiatric/Behavioral: Negative.     All other systems reviewed and are negative.    Vitals:  /78 (BP Location: Left arm, Patient Position: Sitting, BP Cuff Size: Adult)   Pulse 82   Ht 1.613 m (5'  "3.5\")   Wt 82.6 kg (182 lb)   SpO2 93%     Physical Exam:  Physical Exam  Vitals and nursing note reviewed.   Constitutional:       General: She is not in acute distress.     Appearance: Normal appearance. She is well-developed. She is not diaphoretic.   HENT:      Nose:      Comments: oxymizer pendant  Eyes:      General: No scleral icterus.        Right eye: No discharge.         Left eye: No discharge.      Conjunctiva/sclera: Conjunctivae normal.      Pupils: Pupils are equal, round, and reactive to light.   Neck:      Thyroid: No thyromegaly.      Vascular: No JVD.   Cardiovascular:      Rate and Rhythm: Normal rate and regular rhythm.      Heart sounds: Normal heart sounds. No murmur heard.     No gallop.   Pulmonary:      Effort: Pulmonary effort is normal. No respiratory distress.      Breath sounds: Normal breath sounds. No wheezing or rales.   Abdominal:      General: There is no distension.      Palpations: Abdomen is soft.      Tenderness: There is no abdominal tenderness. There is no guarding.   Musculoskeletal:         General: No tenderness.   Lymphadenopathy:      Cervical: No cervical adenopathy.   Skin:     General: Skin is warm.      Capillary Refill: Capillary refill takes less than 2 seconds.      Findings: No erythema or rash.   Neurological:      Mental Status: She is alert and oriented to person, place, and time.      Cranial Nerves: No cranial nerve deficit.      Sensory: No sensory deficit.      Motor: No abnormal muscle tone.   Psychiatric:         Behavior: Behavior normal.       Laboratory Data:    PFTs as reviewed by me personally show:  FEV1 0.89L (45%),    Imaging as reviewed by me personally show:    CT 12/2021 personally reviewed, showing RUL ground glass opacity and shrinkage of BRANDON opacity    Assessment/Plan:    Problem List Items Addressed This Visit       Stage 4 very severe COPD by GOLD classification ([facility])     FEV1 0.89L (45%)  Advair 500/Spiriva/Singulair/daily " azithromycin  No recent exacarbations  -- s/p pulm rehab 2022.  Plans to enroll in maintenance program or workout in gym at new [facility].  -- UTD with vaccinations  -- declined [facility] referral previously  -- f/u overnight oximetry  -- POLST reviewed/signed/scanned. DNR/DNI         Relevant Orders    DME Portable Oxygen Concentrator    Chronic respiratory failure with hypoxia (HCC)     Due to COPD  Uses oxymizer pendant 3-6LPM  --continue to titrate supplemental oxygen to maintain saturations 88 to 92%  -- check OPO on 6LPM  -- Rx portable oxygen concentrator         Relevant Orders    DME Portable Oxygen Concentrator    Primary cancer of left upper lobe of lung (HCC)     S/p SBRT completed 11/2021  -- repeat CT chest showing no disease recurrence  -- followed by Dr Grimm         Obesity (BMI 30.0-34.9)     Patient counseled about the importance of weight loss for long-term health risk reduction. Will dose-adjust medications appropriately based on larger volume of drug distribution.         Advanced care planning/counseling discussion     Patient clearly expresses that she would not want chest compressions in the event of cardiac arrest or mechanical ventilation in the event of respiratory failure.  She demonstrated complete and full medical decision-making capacity during this discussion.  POLST form signed and completed and scanned into her chart.          Return in about 6 months (around 12/19/2023).     This note was generated using voice recognition software which has a chance of producing errors of grammar and possibly content.  I have made every reasonable attempt to find and correct any obvious errors, but it should be expected that some may not be found prior to finalization of this note.    Time spent in record review prior to patient arrival, reviewing results, and in face-to-face encounter totaled 48 min, excluding any procedures if performed.  __________  Ihsan Kumar MD  Pulmonary and Critical Care  [facility]

## 2023-06-19 NOTE — ASSESSMENT & PLAN NOTE
Due to COPD  Uses oxymizer pendant 3-6LPM  --continue to titrate supplemental oxygen to maintain saturations 88 to 92%  -- check OPO on 6LPM  -- Rx portable oxygen concentrator

## 2023-06-28 ENCOUNTER — OFFICE VISIT (OUTPATIENT)
Dept: CARDIOLOGY | Facility: MEDICAL CENTER | Age: 78
End: 2023-06-28
Attending: INTERNAL MEDICINE
Payer: MEDICARE

## 2023-06-28 VITALS
OXYGEN SATURATION: 93 % | DIASTOLIC BLOOD PRESSURE: 66 MMHG | WEIGHT: 183 LBS | SYSTOLIC BLOOD PRESSURE: 138 MMHG | HEIGHT: 64 IN | RESPIRATION RATE: 18 BRPM | HEART RATE: 73 BPM | BODY MASS INDEX: 31.24 KG/M2

## 2023-06-28 DIAGNOSIS — I47.10 SVT (SUPRAVENTRICULAR TACHYCARDIA) (HCC): ICD-10-CM

## 2023-06-28 DIAGNOSIS — I49.3 PVCS (PREMATURE VENTRICULAR CONTRACTIONS): ICD-10-CM

## 2023-06-28 DIAGNOSIS — R00.2 PALPITATIONS: ICD-10-CM

## 2023-06-28 DIAGNOSIS — I10 BENIGN HYPERTENSION: ICD-10-CM

## 2023-06-28 PROCEDURE — 3075F SYST BP GE 130 - 139MM HG: CPT | Performed by: INTERNAL MEDICINE

## 2023-06-28 PROCEDURE — 99213 OFFICE O/P EST LOW 20 MIN: CPT | Performed by: INTERNAL MEDICINE

## 2023-06-28 PROCEDURE — 1158F ADVNC CARE PLAN TLK DOCD: CPT | Performed by: INTERNAL MEDICINE

## 2023-06-28 PROCEDURE — 99214 OFFICE O/P EST MOD 30 MIN: CPT | Performed by: INTERNAL MEDICINE

## 2023-06-28 PROCEDURE — 3078F DIAST BP <80 MM HG: CPT | Performed by: INTERNAL MEDICINE

## 2023-06-28 PROCEDURE — 99212 OFFICE O/P EST SF 10 MIN: CPT | Performed by: INTERNAL MEDICINE

## 2023-06-28 ASSESSMENT — ENCOUNTER SYMPTOMS
COUGH: 0
DIZZINESS: 0
PALPITATIONS: 0
LOSS OF CONSCIOUSNESS: 0
SHORTNESS OF BREATH: 1
MYALGIAS: 0

## 2023-06-28 ASSESSMENT — FIBROSIS 4 INDEX: FIB4 SCORE: 1.26

## 2023-06-28 NOTE — PROGRESS NOTES
Chief Complaint   Patient presents with    Supraventricular Tachycardia (SVT)     F/V DX: SVT (supraventricular tachycardia) ([facility])    Premature Ventricular Contractions (PVCs)    Palpitations       Subjective     Berny Renee is a 77 y.o. female who presents today for follow-up care.    The patient has palpitations, SVT, PVCs, hypertension, lung cancer ([facility], diagnosed by PET and chest CT scans (with radiation therapy 2021, chronic hypoxic respiratory therapy    Since 3/1/2023 appointment the patient has had no cardiac symptoms including chest pain, palpitations, shortness of breath.  Chronically short of breath but able to perform ADLs.  Plans to move into a new apartment.      Previous coronary evaluation includes a normal coronary angiogram 2004, normal MPI 2012 and normal cardiac PET scan 2019.     Past Medical History:   Diagnosis Date    Acute on chronic respiratory failure with hypoxia ([facility]) 11/14/2020    Arthritis     spine    Asthma with COPD ([facility])     BMI 31.0-31.9,adult 2/4/2022    Cataract immature     Chronic pain     Chronic respiratory failure with hypoxia ([facility]) 7/13/2017    Colon polyps     COPD (chronic obstructive pulmonary disease) ([facility]) 2002    moderate to severe    DDD (degenerative disc disease), cervical     DDD (degenerative disc disease), thoracic     Dependence on continuous supplemental oxygen 8/13/2015    IMO load March 2020    Diverticulitis of colon     Dyslipidemia     EMPHYSEMA     H/O opioid abuse ([facility]) 7/15/2021    Hypertension     Obesity (BMI 30-39.9) 10/24/2016    Opioid type dependence, continuous ([facility]) 2/4/2022    REGINE (obstructive sleep apnea)     OSTEOPOROSIS     Spinal stenosis, lumbar region, with neurogenic claudication     URINARY INCONTINENCE     Vitamin d deficiency      Past Surgical History:   Procedure Laterality Date    LUMBAR LAMINECTOMY DISKECTOMY  6/22/2010    Performed by GRACIE CANO at SURGERY [facility] ORS    OTHER ORTHOPEDIC SURGERY  2004    left ankle  fx    OTHER      leg fracture    OTHER      t spine disc surg    TONSILLECTOMY       Family History   Problem Relation Age of Onset    Cancer Father         Lung    Other Sister         lung and leukemia cancer    Cancer Sister         Leukemia, Lung     Social History     Socioeconomic History    Marital status:      Spouse name: Not on file    Number of children: Not on file    Years of education: Not on file    Highest education level: Not on file   Occupational History    Not on file   Tobacco Use    Smoking status: Former     Packs/day: 2.00     Years: 30.00     Pack years: 60.00     Types: Cigarettes     Quit date: 3/1/1999     Years since quittin.3    Smokeless tobacco: Never    Tobacco comments:     3 pks a day for 35 yrs, continued abstinence   Vaping Use    Vaping Use: Never used   Substance and Sexual Activity    Alcohol use: Not Currently     Alcohol/week: 4.2 oz     Types: 7 Glasses of wine per week    Drug use: Not Currently     Types: Marijuana, Oral     Comment: Medical Marijuana     Sexual activity: Never   Other Topics Concern    Not on file   Social History Narrative    ** Merged History Encounter **          Social Determinants of Health     Financial Resource Strain: Medium Risk (2023)    Overall Financial Resource Strain (CARDIA)     Difficulty of Paying Living Expenses: Somewhat hard   Food Insecurity: No Food Insecurity (2023)    Hunger Vital Sign     Worried About Running Out of Food in the Last Year: Never true     Ran Out of Food in the Last Year: Never true   Transportation Needs: Unmet Transportation Needs (2023)    PRAPARE - Transportation     Lack of Transportation (Medical): Yes     Lack of Transportation (Non-Medical): No   Physical Activity: Inactive (2023)    Exercise Vital Sign     Days of Exercise per Week: 0 days     Minutes of Exercise per Session: 0 min   Stress: No Stress Concern Present (2023)    [place] [employer] -  Occupational Stress Questionnaire     Feeling of Stress : Only a little   Recent Concern: Stress - Stress Concern Present (3/30/2023)    [place] [employer] - Occupational Stress Questionnaire     Feeling of Stress : To some extent   Social Connections: Moderately Isolated (4/26/2023)    Social Connection and Isolation Panel [NHANES]     Frequency of Communication with Friends and Family: More than three times a week     Frequency of Social Gatherings with Friends and Family: Twice a week     Attends [employer] Services: More than 4 times per year     Active Member of Clubs or Organizations: No     Attends Club or Organization Meetings: Never     Marital Status:    Intimate Partner Violence: Not on file   Housing Stability: Low Risk  (4/26/2023)    Housing Stability Vital Sign     Unable to Pay for Housing in the Last Year: No     Number of Places Lived in the Last Year: 1     Unstable Housing in the Last Year: No     Allergies   Allergen Reactions    Iodine      Convulsion  I.V.  iodine    Tape Rash and Itching     Outpatient Encounter Medications as of 6/28/2023   Medication Sig Dispense Refill    nystatin (MYCOSTATIN) 132209 UNIT/ML Suspension 2 times a day.      fexofenadine (ALLEGRA) 180 MG tablet TAKE ONE TABLET BY MOUTH AT BEDTIME SWALLOW WHOLE WITH WATER DO NOT TAKE WITH FRUIT JUICE PER ALPINE      triamcinolone (NASACORT) 55 MCG/ACT nasal inhaler Administer 2 Sprays into affected nostril(S) every day.      digoxin (LANOXIN) 125 MCG Tab Take 1 Tablet by mouth every day. 90 Tablet 3    meloxicam (MOBIC) 7.5 MG Tab Take 7.5 mg by mouth 2 times a day.      fluticasone-salmeterol (ADVAIR DISKUS) 500-50 MCG/DOSE AEROSOL POWDER, BREATH ACTIVATED Inhale 1 Puff 2 times a day. 3 Each 3    ipratropium-albuterol (DUONEB) 0.5-2.5 (3) MG/3ML nebulizer solution USE ONE VIAL IN NEBULIZER EVERY 4 HOURS AS NEEDED FOR SHORTNESS OF BREATH OR  WHEEZING 360 Each 6    azithromycin (ZITHROMAX) 250 MG Tab  One tablet daily for COPD 90 Tablet 6    montelukast (SINGULAIR) 10 MG Tab Take 1 Tablet by mouth every day. 90 Tablet 3    SPIRIVA HANDIHALER 18 MCG Cap INHALE 1 CAPSULE CONTENT BY MOUTH ONE TIME DAILY. 90 Capsule 3    calcium carbonate (OS-MICHELLE 500) 1250 (500 Ca) MG Tab Take 600 mg by mouth every day.      losartan (COZAAR) 50 MG Tab Take 1 tablet by mouth every day. 90 tablet 3    azelastine (ASTELIN) 137 MCG/SPRAY nasal spray Administer 1-2 Sprays into affected nostril(S) 2 times a day. (Patient taking differently: Administer 1-2 Sprays into affected nostril(S) as needed.) 30 mL 6    tizanidine (ZANAFLEX) 4 MG Tab Take 4 mg by mouth every 8 hours as needed. not to exceed 3 doses in 24 hours  Indications: Lower Backache, Muscle Spasticity      DULoxetine (CYMBALTA) 30 MG Cap DR Particles Take 60 mg by mouth every day. Indications: Disease of the Peripheral Nerves, Generalized Anxiety Disorder, Major Depressive Disorder, Musculoskeletal Pain      guaiFENesin ER (MUCINEX) 600 MG TABLET SR 12 HR Take 600-1,200 mg by mouth 2 times a day as needed. Indications: Cough      Albuterol Sulfate 108 (90 Base) MCG/ACT AEROSOL POWDER, BREATH ACTIVATED Inhale 2 Puffs 4 times a day as needed. Indications: SOB      hydrocortisone 1 % Cream Apply 1 Each topically 3 times a day as needed. Indications: Inflammation, Itching      acetaminophen (TYLENOL) 650 MG CR tablet Take 650 mg by mouth every 6 hours as needed.      sodium chloride (OCEAN) 0.65 % Solution Administer 2 Sprays into affected nostril(S) every 2 hours as needed.  3    acyclovir (ZOVIRAX) 200 MG Cap Take 200 mg by mouth every day.      Home Care Oxygen Inhale 6 L/min Continuous. Oxygen dose range: 6 to 6 L/min  Respiratory route via: Nasal Cannula   Oxygen supplier: Preferred          Misc. Devices Misc This is an order for  7 foot high flow oxygen tubing  Dx; asthma with COPD J 44.9, supplemental oxygen-dependent Z 99.81, chronic respiratory failure with hypoxia and J  "96        ODETTE 99 months   NPI [id number] 1 Each 0    spironolactone (ALDACTONE) 25 MG Tab TAKE ONE TABLET BY MOUTH ONE TIME DAILY 30 Tab 11    Cholecalciferol (VITAMIN D3) 2000 UNIT Cap TAKE ONE CAPSULE BY MOUTH ONE TIME DAILY 30 Cap 3    potassium chloride (MICRO-K) 10 MEQ capsule TAKE ONE CAPSULE BY MOUTH TWICE DAILY 60 Cap 6    omeprazole (PRILOSEC OTC) 20 MG tablet Take 1 Tab by mouth every day. 30 Tab 11     No facility-administered encounter medications on file as of 6/28/2023.     Review of Systems   Respiratory:  Positive for shortness of breath. Negative for cough.    Cardiovascular:  Negative for chest pain and palpitations.   Musculoskeletal:  Negative for myalgias.   Neurological:  Negative for dizziness and loss of consciousness.              Objective     /66 (BP Location: Left arm, Patient Position: Sitting, BP Cuff Size: Adult)   Pulse 73   Resp 18   Ht 1.613 m (5' 3.5\")   Wt 83 kg (183 lb)   LMP 06/07/2001   SpO2 93%   BMI 31.91 kg/m²     Physical Exam  Eyes:      Conjunctiva/sclera: Conjunctivae normal.      Pupils: Pupils are equal, round, and reactive to light.   Neck:      Vascular: No JVD.   Cardiovascular:      Rate and Rhythm: Normal rate and regular rhythm.      Pulses: Normal pulses.      Heart sounds: Normal heart sounds.   Pulmonary:      Effort: Pulmonary effort is normal. No accessory muscle usage or respiratory distress.      Breath sounds: Normal breath sounds. No wheezing or rales.   Musculoskeletal:      Right lower leg: No edema.      Left lower leg: No edema.   Skin:     General: Skin is warm and dry.      Findings: No rash.      Nails: There is no clubbing.   Neurological:      Mental Status: She is alert and oriented to person, place, and time.   Psychiatric:         Behavior: Behavior normal.                 CARDIAC PET SCAN 9/6/2019  SCINTOGRAPHIC FINDINGS:    Normal left ventricular perfusion with stress and rest images.  There is no evidence of ischemia or scar. "   GATED WALL MOTION FINDINGS:    The left ventricle wall motion is normal with stress and rest  imagings.  Measured resting ejection fraction is 68 %.     ECHOCARDIOGRAM 3/23/2021  Normal left ventricular systolic function.  Left ventricular ejection fraction is visually estimated to be 65-70%.  Normal right ventricular systolic function.  No valve abnormalities.   Assessment & Plan     1. SVT (supraventricular tachycardia) (HCC)  DIGOXIN    Comp Metabolic Panel      2. PVCs (premature ventricular contractions)        3. Benign hypertension        4. Palpitations            Medical Decision Making: Today's Assessment/Status/Plan:   Assessment  SVT.  PVCs.  Palpitations.  Hypertension.  Chronic respiratory failure.  COPD, O2 dependent, severe.  COVID-19 infection 11/2020.  Lung cancer 8/2021 S/P radiation therapy.  Chronic pain syndrome with back pain.     Recommendation Discussion  SVT stable, no clinical recurrence, check digoxin level  PVCs, asymptomatic, monitor for worsening symptoms  Hypertension, BP elevated today, continue spironolactone, losartan, notify clinic if BP greater than 129/79 for additional management.  RTC12 months.

## 2023-06-30 ENCOUNTER — HOSPITAL ENCOUNTER (OUTPATIENT)
Dept: RADIOLOGY | Facility: MEDICAL CENTER | Age: 78
End: 2023-06-30
Attending: RADIOLOGY
Payer: MEDICARE

## 2023-06-30 ENCOUNTER — HOSPITAL ENCOUNTER (OUTPATIENT)
Dept: LAB | Facility: MEDICAL CENTER | Age: 78
End: 2023-06-30
Attending: INTERNAL MEDICINE
Payer: MEDICARE

## 2023-06-30 DIAGNOSIS — C34.90 MALIGNANT NEOPLASM OF UNSPECIFIED PART OF UNSPECIFIED BRONCHUS OR LUNG (HCC): ICD-10-CM

## 2023-06-30 DIAGNOSIS — I47.10 SVT (SUPRAVENTRICULAR TACHYCARDIA) (HCC): ICD-10-CM

## 2023-06-30 LAB
ALBUMIN SERPL BCP-MCNC: 4.2 G/DL (ref 3.2–4.9)
ALBUMIN/GLOB SERPL: 1.4 G/DL
ALP SERPL-CCNC: 50 U/L (ref 30–99)
ALT SERPL-CCNC: 15 U/L (ref 2–50)
ANION GAP SERPL CALC-SCNC: 7 MMOL/L (ref 7–16)
AST SERPL-CCNC: 15 U/L (ref 12–45)
BILIRUB SERPL-MCNC: 0.4 MG/DL (ref 0.1–1.5)
BUN SERPL-MCNC: 16 MG/DL (ref 8–22)
CALCIUM ALBUM COR SERPL-MCNC: 9.2 MG/DL (ref 8.5–10.5)
CALCIUM SERPL-MCNC: 9.4 MG/DL (ref 8.5–10.5)
CHLORIDE SERPL-SCNC: 99 MMOL/L (ref 96–112)
CO2 SERPL-SCNC: 32 MMOL/L (ref 20–33)
CREAT SERPL-MCNC: 0.43 MG/DL (ref 0.5–1.4)
DIGOXIN SERPL-MCNC: 0.3 NG/ML (ref 0.8–2)
GFR SERPLBLD CREATININE-BSD FMLA CKD-EPI: 100 ML/MIN/1.73 M 2
GLOBULIN SER CALC-MCNC: 2.9 G/DL (ref 1.9–3.5)
GLUCOSE SERPL-MCNC: 114 MG/DL (ref 65–99)
POTASSIUM SERPL-SCNC: 3.6 MMOL/L (ref 3.6–5.5)
PROT SERPL-MCNC: 7.1 G/DL (ref 6–8.2)
SODIUM SERPL-SCNC: 138 MMOL/L (ref 135–145)

## 2023-06-30 PROCEDURE — 71250 CT THORAX DX C-: CPT

## 2023-06-30 PROCEDURE — 80162 ASSAY OF DIGOXIN TOTAL: CPT

## 2023-06-30 PROCEDURE — 36415 COLL VENOUS BLD VENIPUNCTURE: CPT

## 2023-06-30 PROCEDURE — 80053 COMPREHEN METABOLIC PANEL: CPT

## 2023-07-06 ENCOUNTER — HOSPITAL ENCOUNTER (OUTPATIENT)
Dept: RADIATION ONCOLOGY | Facility: MEDICAL CENTER | Age: 78
End: 2023-07-31
Attending: RADIOLOGY
Payer: MEDICARE

## 2023-07-06 VITALS
OXYGEN SATURATION: 93 % | SYSTOLIC BLOOD PRESSURE: 147 MMHG | BODY MASS INDEX: 31.83 KG/M2 | HEART RATE: 83 BPM | WEIGHT: 182.54 LBS | DIASTOLIC BLOOD PRESSURE: 54 MMHG

## 2023-07-06 DIAGNOSIS — C34.12 PRIMARY CANCER OF LEFT UPPER LOBE OF LUNG (HCC): ICD-10-CM

## 2023-07-06 PROCEDURE — 99212 OFFICE O/P EST SF 10 MIN: CPT | Performed by: RADIOLOGY

## 2023-07-06 PROCEDURE — 99214 OFFICE O/P EST MOD 30 MIN: CPT | Performed by: RADIOLOGY

## 2023-07-06 ASSESSMENT — PAIN SCALES - GENERAL: PAINLEVEL: 3=SLIGHT PAIN

## 2023-07-06 ASSESSMENT — FIBROSIS 4 INDEX: FIB4 SCORE: 0.98

## 2023-07-06 NOTE — PROGRESS NOTES
RADIATION ONCOLOGY FOLLOW-UP    Patient name:  Berny Renee    Primary Physician:  Everett Diego M.D. MRN: [id number]  [facility]: [id number]   Referring physician:  Ihsan Kumar M.D.  : 1945, 77 y.o.     DATE OF SERVICE: 2023    IDENTIFICATION:   A 77 y.o. female with    Primary cancer of left upper lobe of lung (HCC)  Staging form: Lung, AJCC 8th Edition  - Clinical stage from 10/1/2021: Stage IA2 (cT1b, cN0, cM0) - Signed by Israel Grimm M.D. on 10/1/2021      RADIATION SUMMARY:  Radiation Oncology          10/28/2021 2021   [facility]ria Course Treatment Dates   Course First Treatment Date 10/20/2021   10/20/2021    Course Last Treatment Date 10/28/2021   2021    [facility]ria Treatment Summary   [facility] Lung SBRT  Plan from Course C1_LUL   Fraction 4 of 5 5 of 5    5 of 5   Elapsed Course Days  @  12 @     12 @ [id number]   Prescribed Fraction Dose 1,200 cGy 1,200 cGy    1,200 cGy   Prescribed Total Dose 6,000 cGy 6,000 cGy    6,000 cGy   ITVp  Reference Point from Course C1_LUL   Elapsed Course Days  @ [id number] 12 @ [id number]    12 @ [id number]   Session Dose 1,200 cGy 1,200 cGy    --   Total Dose 4,800 cGy 6,000 cGy    6,000 cGy   [facility] cp  Reference Point from Course C1_LUL   Elapsed Course Days  @ [id number] 12 @ [id number]    12 @ [id number]   Session Dose 1,284 cGy 1,284 cGy    --   Total Dose 5,136 cGy 6,420 cGy    6,420 cGy      Details          More values are hidden. Newest values shown. Go to activity for more data.                HISTORY OF PRESENT ILLNESS:    77-year-old female with history of COPD with emphysema FEV1 less than 1 L with PET avid left upper lobe lung lesion too high risk for biopsy or pulmonary intervention.  Patient had PET/CT 2021 which showed nodular left upper lobe opacity FDG avid concerning for malignancy.  Patient has chronic cough and is taking Tessalon Perls as well as Robitussin with codeine.  Patient denies any  hemoptysis or weight loss.     12/13/21  She completed her radiation therapy November 1, 2021 with 50 Gray in 5 fractions and integrated boost to 60 Gray internal target volume.  Patient did well after radiation denies any worsening shortness of breath or hemoptysis.  Does still have chronic cough and has tried Tessalon Perles and Robitussin with codeine with minimal resolution.  Patient had repeat CT chest December 8, 2021 which shows excellent shrinkage of tumor.  There is a new parenchymal opacity in the anterior right upper lobe most consistent with an area of pneumonitis and severe emphysematous changes noted.     6/20/22   Patient doing well after SBRT and had interval CT chest June 17, 2022 which showed very response to therapy with left upper lobe nodule now measuring 11 x 6 mm previously 14 x 9 mm.  No new pulmonary nodules and multifocal pulmonary parenchymal scarring and emphysema seen.  She is doing pulmonary rehab monitored by Dr. Kumar.  Denies any weight loss or hemoptysis.     1/5/23  Patient has been doing well had interval CT scan 1/5/2022 which shows no evidence of disease recurrence.  Overall feeling well sees Dr. Meyers for allergy and Dr. Kumar for COPD mgmt.           INTERVAL HISTORY:  7/6/23  Patient doing well had interval CT scan which shows no evidence of disease recurrence.  Has chronic cough and mucus and sees Dr. Kumar regularly for COPD management.  No hemoptysis or weight loss.    PROBLEM LIST:  Patient Active Problem List   Diagnosis    Benign hypertension    Mixed stress and urge urinary incontinence    Peripheral edema    Lumbar radicular pain    Vitamin D deficiency disease    Dyslipidemia    Facet arthropathy, lumbar    Chronic constipation    Chronic pain syndrome    Pre-diabetes    Pain management    At risk for falls    Chronic respiratory failure with hypoxia (HCC)    External hemorrhoid, bleeding    Stage 4 very severe COPD by GOLD classification (HCC)    SVT  (supraventricular tachycardia) (HCC)    PVCs (premature ventricular contractions)    Palpitations    COVID-19    DNR (do not resuscitate)    Hyponatremia    Peripheral vascular disease (HCC)    Recurrent major depressive disorder, in partial remission (HCC)    Amnesia    Primary cancer of left upper lobe of lung (HCC)    Panlobular emphysema (HCC)    Osteopenia of lumbar spine    Sedative, hypnotic, 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HCC)    BMI 32.0-32.9,adult    Obesity (BMI 30.0-34.9)    Calculus of gallbladder    Thyroid nodule    Diverticulosis    Advanced care planning/counseling discussion       CURRENT MEDICATIONS:  Current Outpatient Medications   Medication Sig Dispense Refill    nystatin (MYCOSTATIN) 619034 UNIT/ML Suspension 2 times a day.      fexofenadine (ALLEGRA) 180 MG tablet TAKE ONE TABLET BY MOUTH AT BEDTIME SWALLOW WHOLE WITH WATER DO NOT TAKE WITH FRUIT JUICE PER ALPINE      triamcinolone (NASACORT) 55 MCG/ACT nasal inhaler Administer 2 Sprays into affected nostril(S) every day.      digoxin (LANOXIN) 125 MCG Tab Take 1 Tablet by mouth every day. 90 Tablet 3    meloxicam (MOBIC) 7.5 MG Tab Take 7.5 mg by mouth 2 times a day.      fluticasone-salmeterol (ADVAIR DISKUS) 500-50 MCG/DOSE AEROSOL POWDER, BREATH ACTIVATED Inhale 1 Puff 2 times a day. 3 Each 3    ipratropium-albuterol (DUONEB) 0.5-2.5 (3) MG/3ML nebulizer solution USE ONE VIAL IN NEBULIZER EVERY 4 HOURS AS NEEDED FOR SHORTNESS OF BREATH OR  WHEEZING 360 Each 6    azithromycin (ZITHROMAX) 250 MG Tab One tablet daily for COPD 90 Tablet 6    montelukast (SINGULAIR) 10 MG Tab Take 1 Tablet by mouth every day. 90 Tablet 3    SPIRIVA HANDIHALER 18 MCG Cap INHALE 1 CAPSULE CONTENT BY MOUTH ONE TIME DAILY. 90 Capsule 3    calcium carbonate (OS-MICHELLE 500) 1250 (500 Ca) MG Tab Take 600 mg by mouth every day.      losartan (COZAAR) 50 MG Tab Take 1 tablet by mouth every day. 90 tablet 3    azelastine (ASTELIN) 137 MCG/SPRAY nasal spray Administer 1-2  Sprays into affected nostril(S) 2 times a day. (Patient taking differently: Administer 1-2 Sprays into affected nostril(S) as needed.) 30 mL 6    tizanidine (ZANAFLEX) 4 MG Tab Take 4 mg by mouth every 8 hours as needed. not to exceed 3 doses in 24 hours  Indications: Lower Backache, Muscle Spasticity      DULoxetine (CYMBALTA) 30 MG Cap DR Particles Take 60 mg by mouth every day. Indications: Disease of the Peripheral Nerves, Generalized Anxiety Disorder, Major Depressive Disorder, Musculoskeletal Pain      guaiFENesin ER (MUCINEX) 600 MG TABLET SR 12 HR Take 600-1,200 mg by mouth 2 times a day as needed. Indications: Cough      Albuterol Sulfate 108 (90 Base) MCG/ACT AEROSOL POWDER, BREATH ACTIVATED Inhale 2 Puffs 4 times a day as needed. Indications: SOB      hydrocortisone 1 % Cream Apply 1 Each topically 3 times a day as needed. Indications: Inflammation, Itching      acetaminophen (TYLENOL) 650 MG CR tablet Take 650 mg by mouth every 6 hours as needed.      sodium chloride (OCEAN) 0.65 % Solution Administer 2 Sprays into affected nostril(S) every 2 hours as needed.  3    acyclovir (ZOVIRAX) 200 MG Cap Take 200 mg by mouth every day.      Home Care Oxygen Inhale 6 L/min Continuous. Oxygen dose range: 6 to 6 L/min  Respiratory route via: Nasal Cannula   Oxygen supplier: Preferred          Misc. Devices Misc This is an order for  7 foot high flow oxygen tubing  Dx; asthma with COPD J 44.9, supplemental oxygen-dependent Z 99.81, chronic respiratory failure with hypoxia and J 96        ODETTE 99 months   NPI [id number] 1 Each 0    spironolactone (ALDACTONE) 25 MG Tab TAKE ONE TABLET BY MOUTH ONE TIME DAILY 30 Tab 11    Cholecalciferol (VITAMIN D3) 2000 UNIT Cap TAKE ONE CAPSULE BY MOUTH ONE TIME DAILY 30 Cap 3    potassium chloride (MICRO-K) 10 MEQ capsule TAKE ONE CAPSULE BY MOUTH TWICE DAILY 60 Cap 6    omeprazole (PRILOSEC OTC) 20 MG tablet Take 1 Tab by mouth every day. 30 Tab 11     No current  facility-administered medications for this encounter.       ALLERGIES:  Iodine and Tape    REVIEW OF SYSTEMS:    A complete review of system taken. Pertinent items in HPI.  All others negative.    PHYSICAL EXAM:  PERFORMANCE STATUS: ECOG 1    BP (!) 147/54 (BP Location: Right arm, Patient Position: Sitting, BP Cuff Size: Large adult)   Pulse 83   Wt 82.8 kg (182 lb 8.7 oz)   LMP 06/07/2001   SpO2 93% Comment: oxygen  BMI 31.83 kg/m²   Physical Exam  Vitals reviewed.   Eyes:      Pupils: Pupils are equal, round, and reactive to light.   Cardiovascular:      Rate and Rhythm: Normal rate.   Pulmonary:      Effort: Pulmonary effort is normal.   Abdominal:      General: Abdomen is flat.   Musculoskeletal:         General: Normal range of motion.   Neurological:      General: No focal deficit present.      Mental Status: She is alert.   Psychiatric:         Mood and Affect: Mood normal.         LABORATORY DATA:   Lab Results   Component Value Date/Time    WBC 10.3 04/04/2023 03:17 PM    WBC 10.9 (H) 06/09/2011 12:00 AM    RBC 4.61 04/04/2023 03:17 PM    RBC 4.69 06/09/2011 12:00 AM    HEMOGLOBIN 13.8 04/04/2023 03:17 PM    HEMATOCRIT 43.0 04/04/2023 03:17 PM    MCV 93.3 04/04/2023 03:17 PM    MCV 92 06/09/2011 12:00 AM    MCH 29.9 04/04/2023 03:17 PM    MCH 30.7 06/09/2011 12:00 AM    MCHC 32.1 (L) 04/04/2023 03:17 PM    RDW 43.1 04/04/2023 03:17 PM    RDW 13.3 06/09/2011 12:00 AM    PLATELETCT 305 04/04/2023 03:17 PM    MPV 10.1 04/04/2023 03:17 PM    NEUTSPOLYS 64.40 04/04/2023 03:17 PM    LYMPHOCYTES 18.50 (L) 04/04/2023 03:17 PM    MONOCYTES 8.30 04/04/2023 03:17 PM    EOSINOPHILS 7.60 (H) 04/04/2023 03:17 PM    BASOPHILS 0.80 04/04/2023 03:17 PM      Lab Results   Component Value Date/Time    SODIUM 138 06/30/2023 09:01 AM    POTASSIUM 3.6 06/30/2023 09:01 AM    CHLORIDE 99 06/30/2023 09:01 AM    CO2 32 06/30/2023 09:01 AM    GLUCOSE 114 (H) 06/30/2023 09:01 AM    BUN 16 06/30/2023 09:01 AM    CREATININE 0.43  (L) 06/30/2023 09:01 AM    CREATININE 0.62 06/09/2011 12:00 AM    BUNCREATRAT 20 07/07/2014 02:50 PM    BUNCREATRAT 23 06/09/2011 12:00 AM         RADIOLOGY DATA:  CT-CHEST ([facility]OR[facility]) W/O    Result Date: 6/30/2023  1.  Redemonstration of posttreatment scarring in the left upper lobe with associated small pulmonary nodules. This is grossly stable. 2.  No new suspicious pulmonary nodules or masses. Several additional pulmonary nodules are stable or less conspicuous since prior study. 3.  Severe Emphysema. Fleischner Society pulmonary nodule recommendations: Not Applicable       IMPRESSION:    A 77 y.o. with   Primary cancer of left upper lobe of lung (HCC)  Staging form: Lung, AJCC 8th Edition  - Clinical stage from 10/1/2021: Stage IA2 (cT1b, cN0, cM0) - Signed by Israel Grimm M.D. on 10/1/2021      CANCER STATUS:  No Evidence of Disease    RECOMMENDATIONS:   Patient doing well with no evidence of disease recurrence recommend repeat CT scan annually now that she has been years posttreatment.    Thank you for the opportunity to participate in her care.  If any questions or comments, please do not hesitate in calling.    Orders Placed This Encounter    CT-CHEST (THORAX) W/O

## 2023-07-06 NOTE — PROGRESS NOTES
Patient was seen today in clinic with Dr. Grimm for a follow up.  Vitals signs and weight were obtained and pain assessment was completed.  Allergies and medications were reviewed with the patient.  Toxicities of treatment assessed.     Vitals/Pain:  Vitals:    07/06/23 1527   BP: (!) 147/54   BP Location: Right arm   Patient Position: Sitting   BP Cuff Size: Large adult   Pulse: 83   SpO2: 93%   Weight: 82.8 kg (182 lb 8.7 oz)   Pain Score: 3=Slight Pain        Allergies:   Iodine and Tape    Current Medications:  Current Outpatient Medications   Medication Sig Dispense Refill    nystatin (MYCOSTATIN) 321688 UNIT/ML Suspension 2 times a day.      fexofenadine (ALLEGRA) 180 MG tablet TAKE ONE TABLET BY MOUTH AT BEDTIME SWALLOW WHOLE WITH WATER DO NOT TAKE WITH FRUIT JUICE PER ALPINE      triamcinolone (NASACORT) 55 MCG/ACT nasal inhaler Administer 2 Sprays into affected nostril(S) every day.      digoxin (LANOXIN) 125 MCG Tab Take 1 Tablet by mouth every day. 90 Tablet 3    meloxicam (MOBIC) 7.5 MG Tab Take 7.5 mg by mouth 2 times a day.      fluticasone-salmeterol (ADVAIR DISKUS) 500-50 MCG/DOSE AEROSOL POWDER, BREATH ACTIVATED Inhale 1 Puff 2 times a day. 3 Each 3    ipratropium-albuterol (DUONEB) 0.5-2.5 (3) MG/3ML nebulizer solution USE ONE VIAL IN NEBULIZER EVERY 4 HOURS AS NEEDED FOR SHORTNESS OF BREATH OR  WHEEZING 360 Each 6    azithromycin (ZITHROMAX) 250 MG Tab One tablet daily for COPD 90 Tablet 6    montelukast (SINGULAIR) 10 MG Tab Take 1 Tablet by mouth every day. 90 Tablet 3    SPIRIVA HANDIHALER 18 MCG Cap INHALE 1 CAPSULE CONTENT BY MOUTH ONE TIME DAILY. 90 Capsule 3    calcium carbonate (OS-MICHELLE 500) 1250 (500 Ca) MG Tab Take 600 mg by mouth every day.      losartan (COZAAR) 50 MG Tab Take 1 tablet by mouth every day. 90 tablet 3    azelastine (ASTELIN) 137 MCG/SPRAY nasal spray Administer 1-2 Sprays into affected nostril(S) 2 times a day. (Patient taking differently: Administer 1-2 Sprays into  affected nostril(S) as needed.) 30 mL 6    tizanidine (ZANAFLEX) 4 MG Tab Take 4 mg by mouth every 8 hours as needed. not to exceed 3 doses in 24 hours  Indications: Lower Backache, Muscle Spasticity      DULoxetine (CYMBALTA) 30 MG Cap DR Particles Take 60 mg by mouth every day. Indications: Disease of the Peripheral Nerves, Generalized Anxiety Disorder, Major Depressive Disorder, Musculoskeletal Pain      guaiFENesin ER (MUCINEX) 600 MG TABLET SR 12 HR Take 600-1,200 mg by mouth 2 times a day as needed. Indications: Cough      Albuterol Sulfate 108 (90 Base) MCG/ACT AEROSOL POWDER, BREATH ACTIVATED Inhale 2 Puffs 4 times a day as needed. Indications: SOB      hydrocortisone 1 % Cream Apply 1 Each topically 3 times a day as needed. Indications: Inflammation, Itching      acetaminophen (TYLENOL) 650 MG CR tablet Take 650 mg by mouth every 6 hours as needed.      sodium chloride (OCEAN) 0.65 % Solution Administer 2 Sprays into affected nostril(S) every 2 hours as needed.  3    acyclovir (ZOVIRAX) 200 MG Cap Take 200 mg by mouth every day.      Home Care Oxygen Inhale 6 L/min Continuous. Oxygen dose range: 6 to 6 L/min  Respiratory route via: Nasal Cannula   Oxygen supplier: Preferred          Misc. Devices Misc This is an order for  7 foot high flow oxygen tubing  Dx; asthma with COPD J 44.9, supplemental oxygen-dependent Z 99.81, chronic respiratory failure with hypoxia and J 96        ODETTE 99 months   NPI [id number] 1 Each 0    spironolactone (ALDACTONE) 25 MG Tab TAKE ONE TABLET BY MOUTH ONE TIME DAILY 30 Tab 11    Cholecalciferol (VITAMIN D3) 2000 UNIT Cap TAKE ONE CAPSULE BY MOUTH ONE TIME DAILY 30 Cap 3    potassium chloride (MICRO-K) 10 MEQ capsule TAKE ONE CAPSULE BY MOUTH TWICE DAILY 60 Cap 6    omeprazole (PRILOSEC OTC) 20 MG tablet Take 1 Tab by mouth every day. 30 Tab 11     No current facility-administered medications for this encounter.         PCP:  Brandie Fraire, Ravi Ass't

## 2023-07-07 DIAGNOSIS — C34.12 PRIMARY CANCER OF LEFT UPPER LOBE OF LUNG (HCC): ICD-10-CM

## 2023-07-09 NOTE — RESULT ENCOUNTER NOTE
Please notify Berny that I have reviewed lab results and they look good  No change in medications

## 2023-08-24 DIAGNOSIS — J44.9 CHRONIC OBSTRUCTIVE PULMONARY DISEASE, UNSPECIFIED COPD TYPE (HCC): ICD-10-CM

## 2023-08-24 RX ORDER — FLUTICASONE PROPIONATE AND SALMETEROL 500; 50 UG/1; UG/1
1 POWDER RESPIRATORY (INHALATION)
COMMUNITY
End: 2023-08-24 | Stop reason: SDUPTHER

## 2023-08-24 NOTE — TELEPHONE ENCOUNTER
VOICEMAIL: 08/23/23  1. Caller Name: Berny                      Call Back Number: [phone] (home)     2. Message: Pt called and lm. Requesting a refill on his inhalers. Also re: Overnight machine.    3. Patient approves office to leave a detailed voicemail/MyChart message: N\A    08/24/23:  Called pt and lm, Notifying pt I received his message and I will forward this to a provider in the office.

## 2023-08-25 RX ORDER — TIOTROPIUM BROMIDE 18 UG/1
CAPSULE ORAL; RESPIRATORY (INHALATION)
Qty: 90 CAPSULE | Refills: 3 | Status: SHIPPED | OUTPATIENT
Start: 2023-08-25

## 2023-08-25 RX ORDER — FLUTICASONE PROPIONATE AND SALMETEROL 500; 50 UG/1; UG/1
1 POWDER RESPIRATORY (INHALATION) 2 TIMES DAILY
Qty: 180 EACH | Refills: 3 | Status: SHIPPED | OUTPATIENT
Start: 2023-08-25

## 2023-10-03 ENCOUNTER — APPOINTMENT (OUTPATIENT)
Dept: RADIOLOGY | Facility: MEDICAL CENTER | Age: 78
End: 2023-10-03
Attending: EMERGENCY MEDICINE
Payer: MEDICARE

## 2023-10-03 ENCOUNTER — APPOINTMENT (OUTPATIENT)
Dept: RADIOLOGY | Facility: MEDICAL CENTER | Age: 78
End: 2023-10-03
Payer: MEDICARE

## 2023-10-03 ENCOUNTER — HOSPITAL ENCOUNTER (OUTPATIENT)
Facility: MEDICAL CENTER | Age: 78
End: 2023-10-04
Attending: EMERGENCY MEDICINE | Admitting: STUDENT IN AN ORGANIZED HEALTH CARE EDUCATION/TRAINING PROGRAM
Payer: MEDICARE

## 2023-10-03 DIAGNOSIS — I10 PRIMARY HYPERTENSION: ICD-10-CM

## 2023-10-03 DIAGNOSIS — R07.9 CHEST PAIN, UNSPECIFIED TYPE: ICD-10-CM

## 2023-10-03 LAB
ALBUMIN SERPL BCP-MCNC: 4.5 G/DL (ref 3.2–4.9)
ALBUMIN/GLOB SERPL: 1.4 G/DL
ALP SERPL-CCNC: 57 U/L (ref 30–99)
ALT SERPL-CCNC: 13 U/L (ref 2–50)
ANION GAP SERPL CALC-SCNC: 12 MMOL/L (ref 7–16)
AST SERPL-CCNC: 13 U/L (ref 12–45)
BASOPHILS # BLD AUTO: 0.5 % (ref 0–1.8)
BASOPHILS # BLD: 0.08 K/UL (ref 0–0.12)
BILIRUB SERPL-MCNC: 0.5 MG/DL (ref 0.1–1.5)
BUN SERPL-MCNC: 15 MG/DL (ref 8–22)
CALCIUM ALBUM COR SERPL-MCNC: 9.5 MG/DL (ref 8.5–10.5)
CALCIUM SERPL-MCNC: 9.9 MG/DL (ref 8.5–10.5)
CHLORIDE SERPL-SCNC: 96 MMOL/L (ref 96–112)
CO2 SERPL-SCNC: 31 MMOL/L (ref 20–33)
CREAT SERPL-MCNC: 0.45 MG/DL (ref 0.5–1.4)
DIGOXIN SERPL-MCNC: 0.5 NG/ML (ref 0.8–2)
EKG IMPRESSION: NORMAL
EOSINOPHIL # BLD AUTO: 0.44 K/UL (ref 0–0.51)
EOSINOPHIL NFR BLD: 2.7 % (ref 0–6.9)
ERYTHROCYTE [DISTWIDTH] IN BLOOD BY AUTOMATED COUNT: 41.9 FL (ref 35.9–50)
FLUAV RNA SPEC QL NAA+PROBE: NEGATIVE
FLUBV RNA SPEC QL NAA+PROBE: NEGATIVE
GFR SERPLBLD CREATININE-BSD FMLA CKD-EPI: 98 ML/MIN/1.73 M 2
GLOBULIN SER CALC-MCNC: 3.3 G/DL (ref 1.9–3.5)
GLUCOSE SERPL-MCNC: 118 MG/DL (ref 65–99)
HCT VFR BLD AUTO: 46.8 % (ref 37–47)
HGB BLD-MCNC: 15.6 G/DL (ref 12–16)
IMM GRANULOCYTES # BLD AUTO: 0.11 K/UL (ref 0–0.11)
IMM GRANULOCYTES NFR BLD AUTO: 0.7 % (ref 0–0.9)
LACTATE SERPL-SCNC: 1.5 MMOL/L (ref 0.5–2)
LIPASE SERPL-CCNC: 18 U/L (ref 11–82)
LYMPHOCYTES # BLD AUTO: 1.14 K/UL (ref 1–4.8)
LYMPHOCYTES NFR BLD: 7 % (ref 22–41)
MCH RBC QN AUTO: 29.9 PG (ref 27–33)
MCHC RBC AUTO-ENTMCNC: 33.3 G/DL (ref 32.2–35.5)
MCV RBC AUTO: 89.7 FL (ref 81.4–97.8)
MONOCYTES # BLD AUTO: 1.09 K/UL (ref 0–0.85)
MONOCYTES NFR BLD AUTO: 6.7 % (ref 0–13.4)
NEUTROPHILS # BLD AUTO: 13.4 K/UL (ref 1.82–7.42)
NEUTROPHILS NFR BLD: 82.4 % (ref 44–72)
NRBC # BLD AUTO: 0 K/UL
NRBC BLD-RTO: 0 /100 WBC (ref 0–0.2)
NT-PROBNP SERPL IA-MCNC: <36 PG/ML (ref 0–125)
PLATELET # BLD AUTO: 322 K/UL (ref 164–446)
PMV BLD AUTO: 9.7 FL (ref 9–12.9)
POTASSIUM SERPL-SCNC: 3.6 MMOL/L (ref 3.6–5.5)
PROT SERPL-MCNC: 7.8 G/DL (ref 6–8.2)
RBC # BLD AUTO: 5.22 M/UL (ref 4.2–5.4)
RSV RNA SPEC QL NAA+PROBE: NEGATIVE
SARS-COV-2 RNA RESP QL NAA+PROBE: NOTDETECTED
SODIUM SERPL-SCNC: 139 MMOL/L (ref 135–145)
SPECIMEN SOURCE: NORMAL
TROPONIN T SERPL-MCNC: 7 NG/L (ref 6–19)
TROPONIN T SERPL-MCNC: 7 NG/L (ref 6–19)
WBC # BLD AUTO: 16.3 K/UL (ref 4.8–10.8)

## 2023-10-03 PROCEDURE — A9270 NON-COVERED ITEM OR SERVICE: HCPCS | Performed by: EMERGENCY MEDICINE

## 2023-10-03 PROCEDURE — A9270 NON-COVERED ITEM OR SERVICE: HCPCS | Performed by: STUDENT IN AN ORGANIZED HEALTH CARE EDUCATION/TRAINING PROGRAM

## 2023-10-03 PROCEDURE — 700102 HCHG RX REV CODE 250 W/ 637 OVERRIDE(OP): Performed by: STUDENT IN AN ORGANIZED HEALTH CARE EDUCATION/TRAINING PROGRAM

## 2023-10-03 PROCEDURE — C9803 HOPD COVID-19 SPEC COLLECT: HCPCS | Performed by: EMERGENCY MEDICINE

## 2023-10-03 PROCEDURE — 700102 HCHG RX REV CODE 250 W/ 637 OVERRIDE(OP): Performed by: EMERGENCY MEDICINE

## 2023-10-03 PROCEDURE — 83880 ASSAY OF NATRIURETIC PEPTIDE: CPT

## 2023-10-03 PROCEDURE — 0241U HCHG SARS-COV-2 COVID-19 NFCT DS RESP RNA 4 TRGT MIC: CPT

## 2023-10-03 PROCEDURE — 83605 ASSAY OF LACTIC ACID: CPT

## 2023-10-03 PROCEDURE — G0378 HOSPITAL OBSERVATION PER HR: HCPCS

## 2023-10-03 PROCEDURE — 80162 ASSAY OF DIGOXIN TOTAL: CPT

## 2023-10-03 PROCEDURE — 99223 1ST HOSP IP/OBS HIGH 75: CPT | Mod: 25 | Performed by: STUDENT IN AN ORGANIZED HEALTH CARE EDUCATION/TRAINING PROGRAM

## 2023-10-03 PROCEDURE — 99285 EMERGENCY DEPT VISIT HI MDM: CPT

## 2023-10-03 PROCEDURE — 71045 X-RAY EXAM CHEST 1 VIEW: CPT

## 2023-10-03 PROCEDURE — 99497 ADVNCD CARE PLAN 30 MIN: CPT | Performed by: STUDENT IN AN ORGANIZED HEALTH CARE EDUCATION/TRAINING PROGRAM

## 2023-10-03 PROCEDURE — 80053 COMPREHEN METABOLIC PANEL: CPT

## 2023-10-03 PROCEDURE — 83690 ASSAY OF LIPASE: CPT

## 2023-10-03 PROCEDURE — 85025 COMPLETE CBC W/AUTO DIFF WBC: CPT

## 2023-10-03 PROCEDURE — 84484 ASSAY OF TROPONIN QUANT: CPT

## 2023-10-03 PROCEDURE — 36415 COLL VENOUS BLD VENIPUNCTURE: CPT

## 2023-10-03 PROCEDURE — 93005 ELECTROCARDIOGRAM TRACING: CPT

## 2023-10-03 PROCEDURE — 93005 ELECTROCARDIOGRAM TRACING: CPT | Performed by: EMERGENCY MEDICINE

## 2023-10-03 RX ORDER — SPIRONOLACTONE 25 MG/1
25 TABLET ORAL
Status: DISCONTINUED | OUTPATIENT
Start: 2023-10-04 | End: 2023-10-04 | Stop reason: HOSPADM

## 2023-10-03 RX ORDER — DULOXETIN HYDROCHLORIDE 60 MG/1
60 CAPSULE, DELAYED RELEASE ORAL DAILY
Status: DISCONTINUED | OUTPATIENT
Start: 2023-10-04 | End: 2023-10-04 | Stop reason: HOSPADM

## 2023-10-03 RX ORDER — MONTELUKAST SODIUM 10 MG/1
10 TABLET ORAL DAILY
Status: DISCONTINUED | OUTPATIENT
Start: 2023-10-04 | End: 2023-10-04 | Stop reason: HOSPADM

## 2023-10-03 RX ORDER — ACETAMINOPHEN 325 MG/1
650 TABLET ORAL EVERY 6 HOURS PRN
Status: DISCONTINUED | OUTPATIENT
Start: 2023-10-03 | End: 2023-10-04 | Stop reason: HOSPADM

## 2023-10-03 RX ORDER — NITROGLYCERIN 0.4 MG/1
0.4 TABLET SUBLINGUAL
Status: DISCONTINUED | OUTPATIENT
Start: 2023-10-03 | End: 2023-10-04 | Stop reason: HOSPADM

## 2023-10-03 RX ORDER — AZELASTINE 1 MG/ML
1-2 SPRAY, METERED NASAL PRN
Status: DISCONTINUED | OUTPATIENT
Start: 2023-10-03 | End: 2023-10-03

## 2023-10-03 RX ORDER — ASPIRIN 325 MG
325 TABLET ORAL ONCE
Status: COMPLETED | OUTPATIENT
Start: 2023-10-03 | End: 2023-10-03

## 2023-10-03 RX ORDER — TIOTROPIUM BROMIDE 18 UG/1
1 CAPSULE ORAL; RESPIRATORY (INHALATION) DAILY
Status: DISCONTINUED | OUTPATIENT
Start: 2023-10-03 | End: 2023-10-03

## 2023-10-03 RX ORDER — CHOLECALCIFEROL (VITAMIN D3) 125 MCG
5 CAPSULE ORAL ONCE
Status: COMPLETED | OUTPATIENT
Start: 2023-10-04 | End: 2023-10-03

## 2023-10-03 RX ORDER — IPRATROPIUM BROMIDE AND ALBUTEROL SULFATE 2.5; .5 MG/3ML; MG/3ML
3 SOLUTION RESPIRATORY (INHALATION)
Status: DISCONTINUED | OUTPATIENT
Start: 2023-10-03 | End: 2023-10-04 | Stop reason: HOSPADM

## 2023-10-03 RX ORDER — DIGOXIN 125 MCG
125 TABLET ORAL DAILY
Status: DISCONTINUED | OUTPATIENT
Start: 2023-10-04 | End: 2023-10-04 | Stop reason: HOSPADM

## 2023-10-03 RX ORDER — ACETAMINOPHEN 325 MG/1
650 TABLET ORAL ONCE
Status: ACTIVE | OUTPATIENT
Start: 2023-10-03 | End: 2023-10-04

## 2023-10-03 RX ORDER — LOSARTAN POTASSIUM 50 MG/1
50 TABLET ORAL DAILY
Status: DISCONTINUED | OUTPATIENT
Start: 2023-10-04 | End: 2023-10-04 | Stop reason: HOSPADM

## 2023-10-03 RX ORDER — FLUTICASONE PROPIONATE AND SALMETEROL 500; 50 UG/1; UG/1
1 POWDER RESPIRATORY (INHALATION) 2 TIMES DAILY
Status: DISCONTINUED | OUTPATIENT
Start: 2023-10-03 | End: 2023-10-03

## 2023-10-03 RX ADMIN — TIOTROPIUM BROMIDE INHALATION SPRAY 5 MCG: 3.12 SPRAY, METERED RESPIRATORY (INHALATION) at 19:00

## 2023-10-03 RX ADMIN — ASPIRIN 325 MG: 325 TABLET ORAL at 16:26

## 2023-10-03 RX ADMIN — Medication 5 MG: at 23:40

## 2023-10-03 RX ADMIN — MOMETASONE FUROATE AND FORMOTEROL FUMARATE DIHYDRATE 2 PUFF: 200; 5 AEROSOL RESPIRATORY (INHALATION) at 19:48

## 2023-10-03 ASSESSMENT — HEART SCORE
RISK FACTORS: >2 RISK FACTORS OR HX OF ATHEROSCLEROTIC DISEASE
ECG: NON-SPECIFIC REPOLARIZATION DISTURBANCE
HEART SCORE: 7
HISTORY: HIGHLY SUSPICIOUS
AGE: 65+
TROPONIN: LESS THAN OR EQUAL TO NORMAL LIMIT

## 2023-10-03 ASSESSMENT — LIFESTYLE VARIABLES
HAVE PEOPLE ANNOYED YOU BY CRITICIZING YOUR DRINKING: NO
ON A TYPICAL DAY WHEN YOU DRINK ALCOHOL HOW MANY DRINKS DO YOU HAVE: 0
CONSUMPTION TOTAL: NEGATIVE
DOES PATIENT WANT TO STOP DRINKING: NO
HOW MANY TIMES IN THE PAST YEAR HAVE YOU HAD 5 OR MORE DRINKS IN A DAY: 0
EVER HAD A DRINK FIRST THING IN THE MORNING TO STEADY YOUR NERVES TO GET RID OF A HANGOVER: NO
EVER FELT BAD OR GUILTY ABOUT YOUR DRINKING: NO
AVERAGE NUMBER OF DAYS PER WEEK YOU HAVE A DRINK CONTAINING ALCOHOL: 0
HAVE YOU EVER FELT YOU SHOULD CUT DOWN ON YOUR DRINKING: NO
TOTAL SCORE: 0
ALCOHOL_USE: NO
TOTAL SCORE: 0
TOTAL SCORE: 0

## 2023-10-03 ASSESSMENT — PATIENT HEALTH QUESTIONNAIRE - PHQ9
SUM OF ALL RESPONSES TO PHQ9 QUESTIONS 1 AND 2: 0
1. LITTLE INTEREST OR PLEASURE IN DOING THINGS: NOT AT ALL
2. FEELING DOWN, DEPRESSED, IRRITABLE, OR HOPELESS: NOT AT ALL

## 2023-10-03 ASSESSMENT — ENCOUNTER SYMPTOMS
SHORTNESS OF BREATH: 1
PSYCHIATRIC NEGATIVE: 1
MUSCULOSKELETAL NEGATIVE: 1
EYES NEGATIVE: 1
NEUROLOGICAL NEGATIVE: 1
GASTROINTESTINAL NEGATIVE: 1

## 2023-10-03 ASSESSMENT — FIBROSIS 4 INDEX
FIB4 SCORE: 0.87
FIB4 SCORE: 0.99

## 2023-10-03 ASSESSMENT — PAIN DESCRIPTION - PAIN TYPE
TYPE: ACUTE PAIN
TYPE: ACUTE PAIN

## 2023-10-03 NOTE — ED NOTES
"PATIENT WHEELED INTO ROOM. PATIENT STATES THAT THIS AM SHE HAD A TIGHT FEELING IN HER CHEST \"THAT FELT LIKE A RUBBER BAND AROUND HER CHEST\" PT FRIEND THINKS IT WAS ANXIETY. PT STATES THE FEELING HAS NOW PASSED.     PT IS RESTING IN BED. BREATHING IS EVEN AND UNLABORED, SKIN IS WARM AND DRY, VSS, NAD, WILL CONTINUE TO MONITOR. PT ON HOME O2 NC, PT PLACED ON O2 2L ON THE WALL.  PENDING MD EVALUATION.     "

## 2023-10-03 NOTE — ED NOTES
Break RN:  PIV placed, blood drawn and sent to lab. COVID swab also collected and sent to lab. Pt tolerated well.

## 2023-10-03 NOTE — H&P
Hospital Medicine History & Physical Note    Date of Service  10/3/2023    Primary Care Physician  Everett Diego M.D.    Consultants  None    Code Status  DNAR/DNI    Chief Complaint  Chief Complaint   Patient presents with    Chest Pain     Pt states she has been having squeezing like pressure for the last couple days, which has worsened today.     Shortness of Breath     Pt states she has been feeling more sob than usual. Normally takes abx everyday for recurrent aspiration pneumonia, which was paused due to a mistake at a pharmacy.        History of Presenting Illness  FAZAL STUBBS is a 78 y.o. female who presented 10/3/2023 with chest pain.    Patient has history of chronic hypoxemic respiratory failure on home oxygen 24/7, end-stage COPD, lung cancer status post radiation currently in remission.    She states that this morning, she began to have a squeezing pressure-like sensation at the inferior aspect of her chest bilaterally.  She states that it has made it difficult for her to walk and is worse upon exertion.  Endorses associated mild shortness of breath and generalized weakness, prompting her to come to ER for evaluation.    In the ED, patient found to have normal vital signs. Remarkable labs include neutrophilic leukocytosis. Initial troponin negative. BNP wnl.  Chest x-ray showing emphysema, however no acute cardiopulmonary abnormality.    I discussed the plan of care with patient.    Review of Systems  Review of Systems   Constitutional:  Positive for malaise/fatigue.   HENT: Negative.     Eyes: Negative.    Respiratory:  Positive for shortness of breath.    Cardiovascular:  Positive for chest pain.   Gastrointestinal: Negative.    Genitourinary: Negative.    Musculoskeletal: Negative.    Skin: Negative.    Neurological: Negative.    Endo/Heme/Allergies: Negative.    Psychiatric/Behavioral: Negative.         Past Medical History   has a past medical history of Acute on chronic respiratory failure  with hypoxia ([facility]) (11/14/2020), Arthritis, Asthma with COPD, BMI 31.0-31.9,adult (2/4/2022), Cataract immature, Chronic pain, Chronic respiratory failure with hypoxia ([facility]) (7/13/2017), Colon polyps, COPD (chronic obstructive pulmonary disease) ([facility]) (2002), DDD (degenerative disc disease), cervical, DDD (degenerative disc disease), thoracic, Dependence on continuous supplemental oxygen (8/13/2015), Diverticulitis of colon, Dyslipidemia, EMPHYSEMA, H/O opioid abuse ([facility]) (7/15/2021), Hypertension, Obesity (BMI 30-39.9) (10/24/2016), Opioid type dependence, continuous ([facility]) (2/4/2022), REGINE (obstructive sleep apnea), OSTEOPOROSIS, Spinal stenosis, lumbar region, with neurogenic claudication, URINARY INCONTINENCE, and Vitamin d deficiency.    Surgical History   has a past surgical history that includes tonsillectomy; other orthopedic surgery (2004); other; other; and lumbar laminectomy diskectomy (6/22/2010).     Family History  family history includes Cancer in her father and sister; Other in her sister.   Family history reviewed with patient. There is no family history that is pertinent to the chief complaint.     Social History   reports that she quit smoking about 24 years ago. Her smoking use included cigarettes. She started smoking about 54 years ago. She has a 60.0 pack-year smoking history. She has never used smokeless tobacco. She reports that she does not currently use alcohol after a past usage of about 4.2 oz of alcohol per week. She reports that she does not currently use drugs after having used the following drugs: Marijuana and Oral.    Allergies  Allergies   Allergen Reactions    Iodine      Convulsion  I.V.  iodine    Tape Rash and Itching       Medications  Prior to Admission Medications   Prescriptions Last Dose Informant Patient Reported? Taking?   Albuterol Sulfate 108 (90 Base) MCG/ACT AEROSOL POWDER, BREATH ACTIVATED   No No   Sig: Inhale 2 Puffs 4 times a day as needed (shortness of breath).  Indications: SOB   Cholecalciferol (VITAMIN D3) 2000 UNIT Cap  Patient's Home Pharmacy No No   Sig: TAKE ONE CAPSULE BY MOUTH ONE TIME DAILY   DULoxetine (CYMBALTA) 30 MG Cap DR Particles   Yes No   Sig: Take 60 mg by mouth every day. Indications: Disease of the Peripheral Nerves, Generalized Anxiety Disorder, Major Depressive Disorder, Musculoskeletal Pain   Home Care Oxygen  Patient's Home Pharmacy Yes No   Sig: Inhale 6 L/min Continuous. Oxygen dose range: 6 to 6 L/min  Respiratory route via: Nasal Cannula   Oxygen supplier: Preferred       [facility]. Devices [facility]  Patient's Home Pharmacy No No   Sig: This is an order for  7 foot high flow oxygen tubing  Dx; asthma with COPD J 44.9, supplemental oxygen-dependent Z 99.81, chronic respiratory failure with hypoxia and J 96        ODETTE 99 months   NPI [id number]   acetaminophen (TYLENOL) 650 MG CR tablet   Yes No   Sig: Take 650 mg by mouth every 6 hours as needed.   acyclovir (ZOVIRAX) 200 MG Cap  Patient's Home Pharmacy Yes No   Sig: Take 200 mg by mouth every day.   azelastine (ASTELIN) 137 MCG/SPRAY nasal spray   No No   Sig: Administer 1-2 Sprays into affected nostril(S) 2 times a day.   Patient taking differently: Administer 1-2 Sprays into affected nostril(S) as needed.   azithromycin (ZITHROMAX) 250 MG Tab   No No   Sig: One tablet daily for COPD   calcium carbonate (OS-MICHELLE 500) 1250 (500 Ca) MG Tab   Yes No   Sig: Take 600 mg by mouth every day.   digoxin (LANOXIN) 125 MCG Tab   No No   Sig: Take 1 Tablet by mouth every day.   fexofenadine (ALLEGRA) 180 MG tablet   Yes No   Sig: TAKE ONE TABLET BY MOUTH AT BEDTIME SWALLOW WHOLE WITH WATER DO NOT TAKE WITH FRUIT JUICE PER ALPINE   fluticasone-salmeterol (ADVAIR) 500-50 MCG/ACT AEROSOL POWDER, BREATH ACTIVATED   No No   Sig: Inhale 1 Puff 2 times a day.   guaiFENesin ER (MUCINEX) 600 MG TABLET SR 12 HR   Yes No   Sig: Take 600-1,200 mg by mouth 2 times a day as needed. Indications: Cough   hydrocortisone 1 % Cream    Yes No   Sig: Apply 1 Each topically 3 times a day as needed. Indications: Inflammation, Itching   ipratropium-albuterol (DUONEB) 0.5-2.5 (3) MG/3ML nebulizer solution   No No   Sig: USE ONE VIAL IN NEBULIZER EVERY 4 HOURS AS NEEDED FOR SHORTNESS OF BREATH OR  WHEEZING   losartan (COZAAR) 50 MG Tab   No No   Sig: Take 1 tablet by mouth every day.   meloxicam (MOBIC) 7.5 MG Tab   Yes No   Sig: Take 7.5 mg by mouth 2 times a day.   montelukast (SINGULAIR) 10 MG Tab   No No   Sig: Take 1 Tablet by mouth every day.   nystatin (MYCOSTATIN) 233667 UNIT/ML Suspension   Yes No   Si times a day.   omeprazole (PRILOSEC OTC) 20 MG tablet  Patient's Home Pharmacy No No   Sig: Take 1 Tab by mouth every day.   potassium chloride (MICRO-K) 10 MEQ capsule  Patient's Home Pharmacy No No   Sig: TAKE ONE CAPSULE BY MOUTH TWICE DAILY   sodium chloride (OCEAN) 0.65 % Solution   Yes No   Sig: Administer 2 Sprays into affected nostril(S) every 2 hours as needed.   spironolactone (ALDACTONE) 25 MG Tab  Patient's Home Pharmacy No No   Sig: TAKE ONE TABLET BY MOUTH ONE TIME DAILY   tiotropium (SPIRIVA HANDIHALER) 18 MCG Cap   No No   Sig: INHALE 1 CAPSULE CONTENT BY MOUTH ONE TIME DAILY.   tizanidine (ZANAFLEX) 4 MG Tab   Yes No   Sig: Take 4 mg by mouth every 8 hours as needed. not to exceed 3 doses in 24 hours  Indications: Lower Backache, Muscle Spasticity   triamcinolone (NASACORT) 55 MCG/ACT nasal inhaler   Yes No   Sig: Administer 2 Sprays into affected nostril(S) every day.      Facility-Administered Medications: None       Physical Exam  Temp:  [36.4 °C (97.6 °F)] 36.4 °C (97.6 °F)  Pulse:  [80] 80  Resp:  [18] 18  BP: (111)/(77) 111/77  SpO2:  [95 %] 95 %  Blood Pressure : 111/77   Temperature: 36.4 °C (97.6 °F)   Pulse: 80   Respiration: 18   Pulse Oximetry: 95 %       Physical Exam  Constitutional:       Appearance: Normal appearance. She is obese.   HENT:      Head: Normocephalic.      Nose: Nose normal.      Mouth/Throat:       Mouth: Mucous membranes are moist.   Eyes:      Pupils: Pupils are equal, round, and reactive to light.   Cardiovascular:      Rate and Rhythm: Normal rate and regular rhythm.      Comments: Nonreproducible chest pain  Pulmonary:      Effort: Pulmonary effort is normal.      Comments: Nasal cannula in place  Diminished breath sounds diffusely, no wheezing heard  Abdominal:      General: Abdomen is flat. Bowel sounds are normal.      Palpations: Abdomen is soft.   Musculoskeletal:         General: Normal range of motion.      Cervical back: Neck supple.   Skin:     General: Skin is warm.   Neurological:      General: No focal deficit present.      Mental Status: She is alert and oriented to person, place, and time. Mental status is at baseline.   Psychiatric:         Mood and Affect: Mood normal.         Behavior: Behavior normal.         Thought Content: Thought content normal.         Judgment: Judgment normal.         Laboratory:  Recent Labs     10/03/23  1418   WBC 16.3*   RBC 5.22   HEMOGLOBIN 15.6   HEMATOCRIT 46.8   MCV 89.7   MCH 29.9   MCHC 33.3   RDW 41.9   PLATELETCT 322   MPV 9.7     Recent Labs     10/03/23  1418   SODIUM 139   POTASSIUM 3.6   CHLORIDE 96   CO2 31   GLUCOSE 118*   BUN 15   CREATININE 0.45*   CALCIUM 9.9     Recent Labs     10/03/23  1418   ALTSGPT 13   ASTSGOT 13   ALKPHOSPHAT 57   TBILIRUBIN 0.5   LIPASE 18   GLUCOSE 118*         Recent Labs     10/03/23  1418   NTPROBNP <36         Recent Labs     10/03/23  1418   TROPONINT 7       Imaging:  DX-CHEST-PORTABLE (1 VIEW)   Final Result      Emphysematous changes.      Bibasilar atelectasis/scarring.          EKG:  I have personally reviewed the images and compared with prior images.    Assessment/Plan:  Justification for Admission Status  I anticipate this patient is appropriate for observation status at this time because patient admitted for chest pain r/o ACS    Patient will need a Telemetry bed on MEDICAL service .  The need is  secondary to chest pain.    * Chest pain, rule out acute myocardial infarction- (present on admission)  Assessment & Plan  Patient presents for chest pain and shortness of breath.  Describes chest pain as a squeezing sensation at the inferior aspect of her chest.  Initial troponin negative without any evidence of ischemic changes on EKG. patient loaded with aspirin, monitor for bleeding from aspirin.  Will trend 1 further troponin.  stress test in a.m.      ACP (advance care planning)  Assessment & Plan  16 minutes spent discussing goals of care with patient.  She states that she would not want any aggressive measures to be taken if she clinically deteriorates.  DNR/DNI.      Primary cancer of left upper lobe of lung (HCC)- (present on admission)  Assessment & Plan  Patient has history of lung cancer status post radiation.  Not amenable to biopsy.  Currently following in outpatient setting, at this time no signs of recurrence    COPD (chronic obstructive pulmonary disease) (HCC)  Assessment & Plan  Patient has end-stage COPD, no longer smoking since 2001.  On oxygen 24/7    Hypertension  Assessment & Plan  Resume home medications        VTE prophylaxis: pharmacologic prophylaxis contraindicated due to stress test in a.m.

## 2023-10-03 NOTE — ED NOTES
Break RN:  ERP at bedside updating pt regarding test results and plan of care, including pending admission. Pt demonstrated understanding.

## 2023-10-03 NOTE — ASSESSMENT & PLAN NOTE
16 minutes spent discussing goals of care with patient.  She states that she would not want any aggressive measures to be taken if she clinically deteriorates.  DNR/DNI.

## 2023-10-03 NOTE — ED PROVIDER NOTES
"  ER Provider Note    Scribed for Adelaida Jolley M.D. by Lisette Cardoza. 10/3/2023  3:34 PM    Primary Care Provider: Everett Diego M.D.    CHIEF COMPLAINT  Chief Complaint   Patient presents with    Chest Pain     Pt states she has been having squeezing like pressure for the last couple days, which has worsened today.     Shortness of Breath     Pt states she has been feeling more sob than usual. Normally takes abx everyday for recurrent aspiration pneumonia, which was paused due to a mistake at a pharmacy.      EXTERNAL RECORDS REVIEWED  Outpatient Notes Patient has a history of COPD and upper lobe cancer with radiation. She last saw her radiation oncologist in July 2023. She has a resolution of lung mass . She also follows with cardiology for SVT and PVC. Her last stress test was in 2012 and her last cardiac PET scan was in 2019. Both were normal.     HPI/ROS  LIMITATION TO HISTORY   Select: : None  OUTSIDE HISTORIAN(S):  Friend is at bedside.    FAZAL STUBBS is a 78 y.o. female who presents to the ED complaining of chest tightness onset a couple days ago. She describes her chest pain as a \"rubber band\" feeling around her chest that is exacerbated when she moves around or exerts herself. She notes when she sits or rests that the discomfort will go away. She denies every having this pain in the past. She reports that she is having mild chest tightness rated as a 2 out of 10 while sitting in the ER. She states associated symptoms of heart palpitations last night, mild increase shortness of breath from her baseline, chills,  and clamminess, but denies dizziness, lightheadedness, fevers, nausea, abdominal pain, or vomiting.  No pain or swelling in the legs.  No hemoptysis.  She notes she uses 6 L of oxygen at home. She states she has a chronic cough with phlegm. She adds she takes daily erythromycin and prednisone 10 mg daily which she is trying to cut down to 5 mg. She notes she been having increased stress and " anxiety lately.  She also thinks that she might be fighting a mild lung infection over the last few weeks.  She denies being on blood thinners. She adds she had Covid in November 2020, and was hospitalized at that time.  Told she needed to be intubated but refused.     PAST MEDICAL HISTORY  Past Medical History:   Diagnosis Date    Acute on chronic respiratory failure with hypoxia (Formerly [facility]) 11/14/2020    Arthritis     spine    Asthma with COPD     BMI 31.0-31.9,adult 2/4/2022    Cataract immature     Chronic pain     Chronic respiratory failure with hypoxia (Formerly [facility]) 7/13/2017    Colon polyps     COPD (chronic obstructive pulmonary disease) (Formerly [facility]) 2002    moderate to severe    DDD (degenerative disc disease), cervical     DDD (degenerative disc disease), thoracic     Dependence on continuous supplemental oxygen 8/13/2015    IMO load March 2020    Diverticulitis of colon     Dyslipidemia     EMPHYSEMA     H/O opioid abuse (Formerly [facility]) 7/15/2021    Hypertension     Obesity (BMI 30-39.9) 10/24/2016    Opioid type dependence, continuous (Formerly [facility]) 2/4/2022    REGINE (obstructive sleep apnea)     OSTEOPOROSIS     Spinal stenosis, lumbar region, with neurogenic claudication     URINARY INCONTINENCE     Vitamin d deficiency        SURGICAL HISTORY  Past Surgical History:   Procedure Laterality Date    LUMBAR LAMINECTOMY DISKECTOMY  6/22/2010    Performed by GRACIE CANO at SURGERY [facility]    OTHER ORTHOPEDIC SURGERY  2004    left ankle fx    OTHER      leg fracture    OTHER      t spine disc surg    TONSILLECTOMY         FAMILY HISTORY  Family History   Problem Relation Age of Onset    Cancer Father         Lung    Other Sister         lung and leukemia cancer    Cancer Sister         Leukemia, Lung       SOCIAL HISTORY   reports that she quit smoking about 24 years ago. Her smoking use included cigarettes. She started smoking about 54 years ago. She has a 60.0 pack-year smoking history. She has never used smokeless tobacco. She reports  that she does not currently use alcohol after a past usage of about 4.2 oz of alcohol per week. She reports that she does not currently use drugs after having used the following drugs: Marijuana and Oral.    CURRENT MEDICATIONS  Previous Medications    ACETAMINOPHEN (TYLENOL) 650 MG CR TABLET    Take 650 mg by mouth every 6 hours as needed.    ACYCLOVIR (ZOVIRAX) 200 MG CAP    Take 200 mg by mouth every day.    ALBUTEROL SULFATE 108 (90 BASE) MCG/ACT AEROSOL POWDER, BREATH ACTIVATED    Inhale 2 Puffs 4 times a day as needed (shortness of breath). Indications: SOB    AZELASTINE (ASTELIN) 137 MCG/SPRAY NASAL SPRAY    Administer 1-2 Sprays into affected nostril(S) 2 times a day.    AZITHROMYCIN (ZITHROMAX) 250 MG TAB    One tablet daily for COPD    CALCIUM CARBONATE (OS-MICHELLE 500) 1250 (500 CA) MG TAB    Take 600 mg by mouth every day.    CHOLECALCIFEROL (VITAMIN D3) 2000 UNIT CAP    TAKE ONE CAPSULE BY MOUTH ONE TIME DAILY    DIGOXIN (LANOXIN) 125 MCG TAB    Take 1 Tablet by mouth every day.    DULOXETINE (CYMBALTA) 30 MG CAP DR PARTICLES    Take 60 mg by mouth every day. Indications: Disease of the Peripheral Nerves, Generalized Anxiety Disorder, Major Depressive Disorder, Musculoskeletal Pain    FEXOFENADINE (ALLEGRA) 180 MG TABLET    TAKE ONE TABLET BY MOUTH AT BEDTIME SWALLOW WHOLE WITH WATER DO NOT TAKE WITH FRUIT JUICE PER ALPINE    FLUTICASONE-SALMETEROL (ADVAIR) 500-50 MCG/ACT AEROSOL POWDER, BREATH ACTIVATED    Inhale 1 Puff 2 times a day.    GUAIFENESIN ER (MUCINEX) 600 MG TABLET SR 12 HR    Take 600-1,200 mg by mouth 2 times a day as needed. Indications: Cough    HOME CARE OXYGEN    Inhale 6 L/min Continuous. Oxygen dose range: 6 to 6 L/min  Respiratory route via: Nasal Cannula   Oxygen supplier: [facility]        HYDROCORTISONE 1 % CREAM    Apply 1 Each topically 3 times a day as needed. Indications: Inflammation, Itching    IPRATROPIUM-ALBUTEROL (DUONEB) 0.5-2.5 (3) MG/3ML NEBULIZER SOLUTION    USE ONE VIAL  "IN NEBULIZER EVERY 4 HOURS AS NEEDED FOR SHORTNESS OF BREATH OR  WHEEZING    LOSARTAN (COZAAR) 50 MG TAB    Take 1 tablet by mouth every day.    MELOXICAM (MOBIC) 7.5 MG TAB    Take 7.5 mg by mouth 2 times a day.    MISC. DEVICES MISC    This is an order for  7 foot high flow oxygen tubing  Dx; asthma with COPD J 44.9, supplemental oxygen-dependent Z 99.81, chronic respiratory failure with hypoxia and J 96        ODETTE 99 months   NPI [id number]    MONTELUKAST (SINGULAIR) 10 MG TAB    Take 1 Tablet by mouth every day.    NYSTATIN (MYCOSTATIN) 513244 UNIT/ML SUSPENSION    2 times a day.    OMEPRAZOLE (PRILOSEC OTC) 20 MG TABLET    Take 1 Tab by mouth every day.    POTASSIUM CHLORIDE (MICRO-K) 10 MEQ CAPSULE    TAKE ONE CAPSULE BY MOUTH TWICE DAILY    SODIUM CHLORIDE (OCEAN) 0.65 % SOLUTION    Administer 2 Sprays into affected nostril(S) every 2 hours as needed.    SPIRONOLACTONE (ALDACTONE) 25 MG TAB    TAKE ONE TABLET BY MOUTH ONE TIME DAILY    TIOTROPIUM (SPIRIVA HANDIHALER) 18 MCG CAP    INHALE 1 CAPSULE CONTENT BY MOUTH ONE TIME DAILY.    TIZANIDINE (ZANAFLEX) 4 MG TAB    Take 4 mg by mouth every 8 hours as needed. not to exceed 3 doses in 24 hours  Indications: Lower Backache, Muscle Spasticity    TRIAMCINOLONE (NASACORT) 55 MCG/ACT NASAL INHALER    Administer 2 Sprays into affected nostril(S) every day.       ALLERGIES  Iodine and Tape    PHYSICAL EXAM  /77   Pulse 80   Temp 36.4 °C (97.6 °F) (Temporal)   Resp 18   Ht 1.6 m (5' 3\")   Wt 81.6 kg (180 lb)   LMP 06/07/2001   SpO2 95%   BMI 31.89 kg/m²   Constitutional: Well developed, well nourished; No acute distress; Non-toxic appearance.   HENT: Normocephalic, atraumatic; Bilateral external ears normal; Oropharynx with moist mucous membranes;   Eyes: PERRL, EOMI, Conjunctiva normal. No discharge.   Neck:  Supple, nontender midline; No stridor; No nuchal rigidity.   Lymphatic: No cervical lymphadenopathy noted.   Cardiovascular: Regular rate and " rhythm without murmurs, rubs, or gallop. No JVD   Thorax & Lungs: Non tender chest wall. No crepitus or subcutaneous air. On oxygen through nasal cannula, Decreased breath sounds throughout.  No wheezes rales or rhonchi.  Abdomen: Soft, nontender, bowel sounds normal. No obvious masses; No pulsatile masses; no rebound, guarding, or peritoneal signs.   Skin: Good color; warm and dry without rash or petechia.  Back: Nontender, No CVA tenderness.    Extremities: Distal radial, dorsalis pedis, posterior tibial pulses are equal bilaterally; No edema; Nontender calves or saphenous, No cyanosis, No clubbing.   Musculoskeletal: Good range of motion in all major joints. No tenderness to palpation or major deformities noted.   Neurologic: Alert & oriented x 4, clear speech        DIAGNOSTIC STUDIES    Labs:   Results for orders placed or performed during the hospital encounter of 10/03/23   CBC with Differential   Result Value Ref Range    WBC 16.3 (H) 4.8 - 10.8 K/uL    RBC 5.22 4.20 - 5.40 M/uL    Hemoglobin 15.6 12.0 - 16.0 g/dL    Hematocrit 46.8 37.0 - 47.0 %    MCV 89.7 81.4 - 97.8 fL    MCH 29.9 27.0 - 33.0 pg    MCHC 33.3 32.2 - 35.5 g/dL    RDW 41.9 35.9 - 50.0 fL    Platelet Count 322 164 - 446 K/uL    MPV 9.7 9.0 - 12.9 fL    Neutrophils-Polys 82.40 (H) 44.00 - 72.00 %    Lymphocytes 7.00 (L) 22.00 - 41.00 %    Monocytes 6.70 0.00 - 13.40 %    Eosinophils 2.70 0.00 - 6.90 %    Basophils 0.50 0.00 - 1.80 %    Immature Granulocytes 0.70 0.00 - 0.90 %    Nucleated RBC 0.00 0.00 - 0.20 /100 WBC    Neutrophils (Absolute) 13.40 (H) 1.82 - 7.42 K/uL    Lymphs (Absolute) 1.14 1.00 - 4.80 K/uL    Monos (Absolute) 1.09 (H) 0.00 - 0.85 K/uL    Eos (Absolute) 0.44 0.00 - 0.51 K/uL    Baso (Absolute) 0.08 0.00 - 0.12 K/uL    Immature Granulocytes (abs) 0.11 0.00 - 0.11 K/uL    NRBC (Absolute) 0.00 K/uL   Complete Metabolic Panel (CMP)   Result Value Ref Range    Sodium 139 135 - 145 mmol/L    Potassium 3.6 3.6 - 5.5 mmol/L     Chloride 96 96 - 112 mmol/L    Co2 31 20 - 33 mmol/L    Anion Gap 12.0 7.0 - 16.0    Glucose 118 (H) 65 - 99 mg/dL    Bun 15 8 - 22 mg/dL    Creatinine 0.45 (L) 0.50 - 1.40 mg/dL    Calcium 9.9 8.5 - 10.5 mg/dL    Correct Calcium 9.5 8.5 - 10.5 mg/dL    AST(SGOT) 13 12 - 45 U/L    ALT(SGPT) 13 2 - 50 U/L    Alkaline Phosphatase 57 30 - 99 U/L    Total Bilirubin 0.5 0.1 - 1.5 mg/dL    Albumin 4.5 3.2 - 4.9 g/dL    Total Protein 7.8 6.0 - 8.2 g/dL    Globulin 3.3 1.9 - 3.5 g/dL    A-G Ratio 1.4 g/dL   Troponins NOW   Result Value Ref Range    Troponin T 7 6 - 19 ng/L   ESTIMATED GFR   Result Value Ref Range    GFR (CKD-EPI) 98 >60 mL/min/1.73 m 2   LIPASE   Result Value Ref Range    Lipase 18 11 - 82 U/L   proBrain Natriuretic Peptide, NT   Result Value Ref Range    NT-proBNP <36 0 - 125 pg/mL   EKG   Result Value Ref Range    Report       [facility] Emergency Dept.    Test Date:  2023-10-03  Pt Name:    FAZAL STUBBS                    Department: ER  MRN:        [id number]                      Room:  Gender:     Female                       Technician: [id number]  :        1945                   Requested By:ER TRIAGE PROTOCOL  Order #:    [id number]                    Reading MD: Adelaida Jolley    Measurements  Intervals                                Axis  Rate:       58                           P:          31  GA:         163                          QRS:        38  QRSD:       127                          T:          33  QT:         411  QTc:        404    Interpretive Statements  Pacemaker spikes or artifacts rate 58  Normal axis  Normal intervals  Mild ST depression V3-V5 (new from )  No ST elevation  Slow sinus arrhythmia  Nonspecific intraventricular conduction delay  Minimal ST depression, anterolateral leads  Compared to ECG 2020 03:31:53  Intraventricular conduction delay now present   ST (T wave) deviation now present  Sinus bradycardia no longer present  Electronically Signed On  10- 15:23:40 PDT by Adelaida Jolley          EKG:   I have independently interpreted this EKG  Please see my EKG interpretation below    Radiology:   The attending emergency physician has independently interpreted the diagnostic imaging associated with this visit and am waiting the final reading from the radiologist.     Preliminary interpretation is a follows: ER MD is reviewed the patient's chest x-ray.  There appears to be some scarring or chronic changes at the bases.    Radiologist interpretation:   DX-CHEST-PORTABLE (1 VIEW)   Final Result      Emphysematous changes.      Bibasilar atelectasis/scarring.           COURSE & MEDICAL DECISION MAKING     ED Observation Status? No; Patient does not meet criteria for ED Observation.     INITIAL ASSESSMENT, COURSE AND PLAN  Care Narrative: Patient presents to the ER complaining of a chest tightness which occurs with exertion.  This has been intermittently occurring over the last 3 days.  No history of similar pain in the past.  She does feel little dizzy and clammy when the chest tightness occurs.  If she rests the discomfort goes away.  She does not have any diaphoresis or nausea with the discomfort.  She thinks she might feel little bit more short of breath than normal with the discomfort.  EKG shows some minimal ST depression in some of the lateral precordial leads which appear to be new from 2020.  Initial troponin is negative.  She reports a very mild sensation of tightness in her chest here in the ER.  Nitroglycerin was ordered.  She was given an aspirin here in the ER.  Chest x-ray reveals some bibasilar scarring otherwise no obvious infiltrates.  Patient's heart score is 7.  Given her exertional chest pain that resolves with rest with some associated symptoms, I think she warrants cardiac rule out.  I discussed the case with Dr. Carmona, hospitalist on-call, and he will kindly evaluate the patient for hospitalization and further cardiac evaluation.    3:34 PM  - Patient seen and examined at bedside. Discussed plan of care, including labs and imaging. Patient agrees to the plan of care. The patient will be medicated with aspirin tablet 325 mg. Ordered for Troponins x2, CMP, CBC w/ Diff, proBrain Natriuretic Peptide, Lipase, CoV-2, Flu A/B and RSV by PCR, Lactic Acid, Digoxin, and DX-Chest to evaluate her symptoms.      4:11 PM I discussed the patient's case and the above findings with Dr. Carmona (Hospitalist) who agrees to evaluate the patient for hospitalization.    4:22 PM - I reevaluated the patient at bedside. Patient states her chest discomfort is a 2/10. Patient will be medicated with nitroglycerin. I discussed the patient's diagnostic study results. I informed the patient of my plan to admit today given the patient's current presentation and diagnostic study results. Patient verbalizes understanding and support with my plan for admission.      ADDITIONAL PROBLEM LIST  Problem #1: Chest tightness with exertion x3 days    DISPOSITION AND DISCUSSIONS  I have discussed management of the patient with the following physicians and LINDA's:  Dr. Carmona (Hospitalist)    Discussion of management with other [facility] or appropriate source(s): None     Escalation of care considered, and ultimately not performed: diagnostic imaging.  Patient describes a chest tightness which occurs with exertion and resolves with rest.  Pain is not pleuritic.  It is not ripping or tearing.  Its not constant.  She does not feel any increase in her shortness of breath.  She is not hypoxic on her normal oxygen requirement and is not tachycardic.  At this time I do not think she has pulmonary embolism and I do not think she needs a CT scan of the chest at this time.    Barriers to care at this time, including but not limited to:  None .     Decision tools and prescription drugs considered including, but not limited to: HEART Score 7 .    DISPOSITION:  Patient will be hospitalized by Dr. Carmona in [facility]  condition.     FINAL DIAGNOSIS  1. Chest pain, unspecified type       This dictation has been created using voice recognition software. The accuracy of the dictation is limited by the abilities of the software. I expect there may be some errors of grammar and possibly content. I made every attempt to manually correct the errors within my dictation. However, errors related to voice recognition software may still exist and should be interpreted within the appropriate context.     Lisette LI (Jovan), am scribing for, and in the presence of, Adelaida Jolley M.D..    Electronically signed by: Lisette Cardoza (Jovan), 10/3/2023    Adelaida LI M.D. personally performed the services described in this documentation, as scribed by Lisette Cardoza in my presence, and it is both accurate and complete.    The note accurately reflects work and decisions made by me.  Adelaida Jolley M.D.  10/3/2023  5:03 PM

## 2023-10-03 NOTE — ED TRIAGE NOTES
FAZAL STUBBS  78 y.o. female  Chief Complaint   Patient presents with    Chest Pain     Pt states she has been having squeezing like pressure for the last couple days, which has worsened today.     Shortness of Breath     Pt states she has been feeling more sob than usual. Normally takes abx everyday for recurrent aspiration pneumonia, which was paused due to a mistake at a pharmacy.        Vitals:    10/03/23 1333   BP: 111/77   Pulse: 80   Resp: 18   Temp: 36.4 °C (97.6 °F)   SpO2: 95%       Patient educated on triage process and encouraged to alert staff of any changes in condition.    Pt to triage with the above complaint. Pt uses 6L 02 at home.     Chest pain protocol ordered

## 2023-10-04 ENCOUNTER — APPOINTMENT (OUTPATIENT)
Dept: RADIOLOGY | Facility: MEDICAL CENTER | Age: 78
End: 2023-10-04
Attending: STUDENT IN AN ORGANIZED HEALTH CARE EDUCATION/TRAINING PROGRAM
Payer: MEDICARE

## 2023-10-04 VITALS
DIASTOLIC BLOOD PRESSURE: 76 MMHG | OXYGEN SATURATION: 97 % | WEIGHT: 180 LBS | BODY MASS INDEX: 29.99 KG/M2 | TEMPERATURE: 97.4 F | HEIGHT: 65 IN | RESPIRATION RATE: 16 BRPM | HEART RATE: 57 BPM | SYSTOLIC BLOOD PRESSURE: 180 MMHG

## 2023-10-04 PROBLEM — R07.9 CHEST PAIN, RULE OUT ACUTE MYOCARDIAL INFARCTION: Status: RESOLVED | Noted: 2023-10-03 | Resolved: 2023-10-04

## 2023-10-04 PROBLEM — Z71.89 ACP (ADVANCE CARE PLANNING): Status: RESOLVED | Noted: 2023-06-19 | Resolved: 2023-10-04

## 2023-10-04 PROCEDURE — 94664 DEMO&/EVAL PT USE INHALER: CPT

## 2023-10-04 PROCEDURE — 700111 HCHG RX REV CODE 636 W/ 250 OVERRIDE (IP)

## 2023-10-04 PROCEDURE — 700102 HCHG RX REV CODE 250 W/ 637 OVERRIDE(OP): Performed by: STUDENT IN AN ORGANIZED HEALTH CARE EDUCATION/TRAINING PROGRAM

## 2023-10-04 PROCEDURE — 99239 HOSP IP/OBS DSCHRG MGMT >30: CPT | Performed by: HOSPITALIST

## 2023-10-04 PROCEDURE — 78452 HT MUSCLE IMAGE SPECT MULT: CPT

## 2023-10-04 PROCEDURE — G0378 HOSPITAL OBSERVATION PER HR: HCPCS

## 2023-10-04 PROCEDURE — A9270 NON-COVERED ITEM OR SERVICE: HCPCS | Performed by: STUDENT IN AN ORGANIZED HEALTH CARE EDUCATION/TRAINING PROGRAM

## 2023-10-04 RX ORDER — REGADENOSON 0.08 MG/ML
INJECTION, SOLUTION INTRAVENOUS
Status: COMPLETED
Start: 2023-10-04 | End: 2023-10-04

## 2023-10-04 RX ORDER — LOSARTAN POTASSIUM 50 MG/1
50 TABLET ORAL 2 TIMES DAILY
Qty: 60 TABLET | Refills: 1 | Status: SHIPPED | OUTPATIENT
Start: 2023-10-04

## 2023-10-04 RX ORDER — AMINOPHYLLINE 25 MG/ML
100 INJECTION, SOLUTION INTRAVENOUS
Status: DISCONTINUED | OUTPATIENT
Start: 2023-10-04 | End: 2023-10-04 | Stop reason: HOSPADM

## 2023-10-04 RX ORDER — REGADENOSON 0.08 MG/ML
0.4 INJECTION, SOLUTION INTRAVENOUS ONCE
Status: COMPLETED | OUTPATIENT
Start: 2023-10-04 | End: 2023-10-04

## 2023-10-04 RX ORDER — MELOXICAM 7.5 MG/1
7.5 TABLET ORAL 2 TIMES DAILY
Status: DISCONTINUED | OUTPATIENT
Start: 2023-10-04 | End: 2023-10-04 | Stop reason: HOSPADM

## 2023-10-04 RX ADMIN — DULOXETINE HYDROCHLORIDE 60 MG: 60 CAPSULE, DELAYED RELEASE ORAL at 05:14

## 2023-10-04 RX ADMIN — REGADENOSON 0.4 MG: 0.08 INJECTION, SOLUTION INTRAVENOUS at 15:13

## 2023-10-04 RX ADMIN — TIOTROPIUM BROMIDE INHALATION SPRAY 5 MCG: 3.12 SPRAY, METERED RESPIRATORY (INHALATION) at 05:17

## 2023-10-04 RX ADMIN — MONTELUKAST 10 MG: 10 TABLET, FILM COATED ORAL at 05:18

## 2023-10-04 RX ADMIN — MOMETASONE FUROATE AND FORMOTEROL FUMARATE DIHYDRATE 2 PUFF: 200; 5 AEROSOL RESPIRATORY (INHALATION) at 05:16

## 2023-10-04 RX ADMIN — LOSARTAN POTASSIUM 50 MG: 50 TABLET, FILM COATED ORAL at 08:27

## 2023-10-04 RX ADMIN — SPIRONOLACTONE 25 MG: 25 TABLET ORAL at 05:13

## 2023-10-04 RX ADMIN — DIGOXIN 125 MCG: 0.12 TABLET ORAL at 05:13

## 2023-10-04 RX ADMIN — MELOXICAM 7.5 MG: 7.5 TABLET ORAL at 16:05

## 2023-10-04 RX ADMIN — ACETAMINOPHEN 650 MG: 325 TABLET, FILM COATED ORAL at 11:22

## 2023-10-04 RX ADMIN — MELOXICAM 7.5 MG: 7.5 TABLET ORAL at 00:56

## 2023-10-04 ASSESSMENT — PAIN DESCRIPTION - PAIN TYPE: TYPE: CHRONIC PAIN

## 2023-10-04 NOTE — PROGRESS NOTES
Tele Monitor Report  Rhythm: sinus andrei/reg  HR: 55 - 66  Ectopy: rare PVCs  0.18 / 0.06 / 0.37

## 2023-10-04 NOTE — ASSESSMENT & PLAN NOTE
Patient presents for chest pain and shortness of breath.  Describes chest pain as a squeezing sensation at the inferior aspect of her chest.  Initial troponin negative without any evidence of ischemic changes on EKG. patient loaded with aspirin, monitor for bleeding from aspirin.  Will trend 1 further troponin.  stress test in a.m.

## 2023-10-04 NOTE — DISCHARGE PLANNING
Care Transition Team Assessment    Information Source  Who is responsible for making decisions for patient? : Patient    Readmission Evaluation  Is this a readmission?: No    Elopement Risk  Legal Hold: No  Ambulatory or Self Mobile in Wheelchair: Yes  Disoriented: No  Psychiatric Symptoms: None  History of Wandering: No  Elopement this Admit: No  Vocalizing Wanting to Leave: No  Displays Behaviors, Body Language Wanting to Leave: No-Not at Risk for Elopement  Elopement Risk: Not at Risk for Elopement    Interdisciplinary Discharge Planning  Lives with - Patient's Self Care Capacity: Alone and Able to Care For Self  Patient or legal guardian wants to designate a caregiver: No  Support Systems: Friends / Neighbors  Housing / Facility: [address] [place]  Durable Medical Equipment: Home Oxygen (6L)    Discharge Preparedness  What is your plan after discharge?: Home with help  What are your discharge supports?: Child, Other (comment) (Friends and Neighbors)  Prior Functional Level: Ambulatory, Independent with Activities of Daily Living, Independent with Medication Management, Other (Comments) (Uses 6L of oxygen at baseline)  Difficulity with ADLs: Walking (Using oxygen at 6LPM)    Functional Assesment  Prior Functional Level: Ambulatory, Independent with Activities of Daily Living, Independent with Medication Management, Other (Comments) (Uses 6L of oxygen at baseline)    Finances  Financial Barriers to Discharge: No  Prescription Coverage: Yes    Vision / Hearing Impairment  Vision Impairment : Yes  Right Eye Vision: Wears Glasses  Left Eye Vision: Wears Glasses  Hearing Impairment : No  Hearing Impairment: Right Ear, Hearing Device Not Available         Advance Directive  Advance Directive?: POLST    Domestic Abuse  Have you ever been the victim of abuse or violence?: No  Physical Abuse or Sexual Abuse: No  Verbal Abuse or Emotional Abuse: No  Possible Abuse/Neglect Reported to:: Not Applicable    Psychological  Assessment  History of Substance Abuse: Alcohol, Marijuana, Methamphetamine, Other (comment) (Hx of cigarette smoking. Sedative/hypnotic/or anxiolytic dependence.)  Date Last Used - Alcohol: not currently using  Date Last Used - Marijuana: not currently using  Date Last Used - Methamphetamine: not currently using  Substance Abuse Comments: Patient reports that she quit smoking about 24 years ago. Her smoking use included cigarettes. She started smoking about 54 years ago. She has a 60.0 pack-year smoking history. She has never used smokeless tobacco. She reports that she does not currently use alcohol after a past usage of about 4.2 oz of alcohol per week. She reports that she does not currently use drugs after having used the following drugs: Marijuana and Oral.  History of Psychiatric Problems: Yes (Major Depressive Disorder)  Non-compliant with Treatment: No  Newly Diagnosed Illness: Yes    Discharge Risks or Barriers  Discharge risks or barriers?: Substance abuse, Mental health, Lives alone, no community support, Complex medical needs  Patient risk factors: Bariatric, Complex medical needs, Lives alone and no community support, Polypharmacy (>7 prescriptions), Substance abuse, Vulnerable adult    Anticipated Discharge Information  Discharge Disposition: Discharged to home/self care (01)  Discharge Address: [address] [place] [address] [address] [place] [address]  Discharge Contact Phone Number: [phone]    Case Management Discharge Planning    Admission Date: 10/3/2023  GMLOS:    ALOS: 0    6-Clicks ADL Score:    6-Clicks Mobility Score:    PT and/or OT Eval ordered: Yes  Post-acute Referrals Ordered: Yes  Post-acute Choice Obtained: Yes  Has referral(s) been sent to post-acute provider:  Yes      Anticipated Discharge Dispo: Discharge Disposition: Discharged to home/self care (01)  Discharge Address: [address] [place] [address] [address] [place] [address]  Discharge Contact Phone Number: [phone]    DME Needed: Yes    DME Ordered: Yes,  patient has home O2 at 6LPM from [facility].     Action(s) Taken: Updated Provider/Nurse on Discharge Plan, Choice obtained, Referral(s) sent, and OTHER: Choice form obtained from [facility], and faxed to [facility] to upload to the patient's chart, send referrals, and follow-up on any acceptances.    Escalations Completed: Provider and Bedside RN    Medically Clear: No    Next Steps: Follow-up with the Attending and Bedside RN for any issues/changes and update the discharge plan accordingly. Follow-up with the [facility] for any referral acceptances.    Barriers to Discharge: Medical clearance and Pending Procedures    Is the patient up for discharge tomorrow: Yes    Is transport arranged for discharge disposition: No

## 2023-10-04 NOTE — PROGRESS NOTES
Assessment completed. Pt A&Ox4. Respirations are even and unlabored on 6L n/c-baseline. Pt denies chest pain or SOB at this time. Monitors applied, VS stable, call light and belongings within reach. POC updated (stress test). Pt educated on room and call light, pt verbalized understanding. Communication board updated. Needs met. NPO at midnight.

## 2023-10-04 NOTE — CARE PLAN
The patient is Stable - Low risk of patient condition declining or worsening    Shift Goals  Clinical Goals: stress test  Patient Goals: discharge early  Family Goals: LORA    Progress made toward(s) clinical / shift goals:        Problem: Knowledge Deficit - Standard  Goal: Patient and family/care givers will demonstrate understanding of plan of care, disease process/condition, diagnostic tests and medications  Outcome: Progressing     Problem: Fall Risk  Goal: Patient will remain free from falls  Outcome: Progressing       Patient is not progressing towards the following goals:

## 2023-10-04 NOTE — RESPIRATORY CARE
"COPD EDUCATION by COPD CLINICAL EDUCATOR  10/4/2023 at 1:26 PM by Reshma Guardado RRT     Patient interviewed by COPD education team. Patient refused COPD program at this time. An Action Plan was completed in the EMR to reflect current Respiratory Medication use.                      COPD Screen  COPD Risk Screening  Do you have a history of COPD?: Yes  Do you have a Pulmonologist?: Yes    COPD Assessment  COPD Clinical Specialists ONLY  COPD Education Initiated: Yes--Short Intervention (Very knowledged about COPD, followed by [facility], declined education and literature, has apt in December)  Is this a COPD exacerbation patient?: No  DME Company: [employer]  DME Equipment Type: O2 @ 6 L, nebulizer  Physician Follow Up Appointment:  (declined apt assistance)  Physician Name: Everett Diego M.D.  Pulmonary Follow Up Appointment: 12/05/23  Appt Time: 1520  Pulmonologist Name: Ihsan Kumar M.D.  Referrals Initiated:  (none indicated)  $ Demo/Eval of SVN's, MDI's and Aerosols: Yes (reviewed meds)  (OP) Pulmonary Function Testing: Yes (7/8/2021: FEV1 0.89 L(43%), ratio 45%)  Interdisciplinary Rounds: Attendance at Rounds (30 Min)    PFT Results    7/8/2021: FEV1 0.89 L(43%), ratio 45%    Meds to Beds  Would the patient like to opt in for Bedside Medication Delivery at Discharge?: Yes, interested     MY COPD ACTION PLAN     It is recommended that patients and physicians /healthcare providers complete this action plan together. This plan should be discussed at each physician visit and updated as needed.    The green, yellow and red zones show groups of symptoms of COPD. This list of symptoms is not comprehensive, and you may experience other symptoms. In the \"Actions\" column, your healthcare provider has recommended actions for you to take based on your symptoms.    Patient Name: FAZAL STUBBS   YOB: 1945   Last Updated on:     Green Zone:  I am doing well today Actions     Usual activitiy and " "exercise level   Take daily medications     Usual amounts of cough and phlegm/mucus   Use oxygen as prescribed     Sleep well at night   Continue regular exercise/diet plan     Appetite is good   At all times avoid cigarette smoke, inhaled irritants     Daily Medications (these medications are taken every day):   Fluticosone/Salmeterol (Advair)  Tiotropium Bromide Monohydrate (Spiriva) 1 Puff  1 capsule Twice daily  Once daily     Additional Information:  Please rinse mouth after using Advair    Yellow Zone:  I am having a bad day or a COPD flare Actions     More breathless than usual   Continue daily medications     I have less energy for my daily activities   Use quick relief inhaler as ordered     Increased or thicker phlegm/mucus   Use oxygen as prescribed     Using quick relief inhaler/nebulizer more often   Get plenty of rest     Swelling of ankles more than usual   Use pursed lip breathing     More coughing than usual   At all times avoid cigarette smoke, inhaled irritants     I feel like I have a \"chest cold\"     Poor sleep and my symptoms woke me up     My appetite is not good     My medicine is not helping      Call provider immediately if symptoms don’t improve     Continue daily medications, add rescue medications:   Albuterol/Ipratropium (Combivent, Duoneb)  Albuterol 3mL via nebulizer  2 Puffs Every 4 hours PRN  Every 4 hours PRN       Medications to be used during a flare up, (as Discussed with Provider):           Additional Information:  Use your nebulizer when short of breath.    Use a spacer with your rescue inhaler when nebulizer is not available.       Red Zone:  I need urgent medical care Actions     Severe shortness of breath even at rest   Call 911 or seek medical care immediately     Not able to do any activity because of breathing      Fever or shaking chills      Feeling confused or very drowsy       Chest pains      Coughing up blood                  "

## 2023-10-04 NOTE — DISCHARGE PLANNING
HTH/SCP TCN chart review completed. Collaborated with MARIPOSA Yao prior to meeting with the pt. The most current review of medical record, knowledge of pt's PLOF and social support, LACE+ score of 64, 6 clicks scores of ( Not entered)  mobility were considered.      Pt seen at bedside.  Introduced TCN program. Provided brief  education regarding post acute levels of care. Discussed HTH/SCP plan benefits (.Pt verbalizes understanding.   Pt endorses living alone in a ground level apartment unit in [facility]. Pt endorses being modified independent with mobility using a cane and independent with ADLs, IADLs and driving.  Pt reports she uses O2 supplement at home 24/7 and confirms she utilizes [facility] for her O2 supplies.  Offered [facility]. Pt declined [facility] transitional care services  Choice forms proactively obtained for O2DME and HH and given to MARIPOSA Yao to scan to [employer]. TCN will continue to follow and collaborate with discharge planning team as additional post acute needs arise. Thank you.     Completed today:  PT eval /rec pending  Choice obtained: O2[facility]ME (#1 [facility]); HH --10/4/2023  SCP with  Non-[facility] PCP.[facility] referral not sent, pt refused.

## 2023-10-04 NOTE — ASSESSMENT & PLAN NOTE
Patient has history of lung cancer status post radiation.  Not amenable to biopsy.  Currently following in outpatient setting, at this time no signs of recurrence

## 2023-10-05 NOTE — HOSPITAL COURSE
FAZAL STUBBS is a 78 y.o. female who presented 10/3/2023 with chest pain.     Patient has history of chronic hypoxemic respiratory failure on home oxygen 24/7, end-stage COPD, lung cancer status post radiation currently in remission.     She states that this morning, she began to have a squeezing pressure-like sensation at the inferior aspect of her chest bilaterally.  She states that it has made it difficult for her to walk and is worse upon exertion.  Endorses associated mild shortness of breath and generalized weakness, prompting her to come to ER for evaluation.     In the ED, patient found to have normal vital signs. Remarkable labs include neutrophilic leukocytosis. Initial troponin negative. BNP wnl.  Chest x-ray showing emphysema, however no acute cardiopulmonary abnormality. She was admitted for observation and further cardiac workup.    Respiratory viral panel negative. Serial troponins remained within normal limits. proBNP not elevated. Nuclear MPI was performed, revealed no evidence of significant jeopardized viable myocardium or prior myocardial infarction. Also indicated normal LV size, ejection fraction, and wall motion. She was found to be hypertensive with -187. She also has been under high stress lately.    At this point no indication of active cardiac ischemia. She will be discharged home to follow up with her PCP and primary care doctors. Her losartan was increased to 50mg twice daily, prescription sent to her chosen pharmacy - [facility].

## 2023-10-05 NOTE — DISCHARGE INSTRUCTIONS
Your troponins (cardiac enzymes) were not elevated and stress test did not have any indication of cardiac ischemia (blockage in your heart).     Your blood pressure was elevated, this could definitely be contributing to your symptoms.  We have increased your losartan to 50mg twice a day. Prescription was sent to [facility].     Please follow up on this visit with both your primary care physician and your cardiologist.

## 2023-10-05 NOTE — DISCHARGE SUMMARY
Discharge Summary    CHIEF COMPLAINT ON ADMISSION  Chief Complaint   Patient presents with    Chest Pain     Pt states she has been having squeezing like pressure for the last couple days, which has worsened today.     Shortness of Breath     Pt states she has been feeling more sob than usual. Normally takes abx everyday for recurrent aspiration pneumonia, which was paused due to a mistake at a pharmacy.        Reason for Admission  Chest pain      Admission Date  10/3/2023    CODE STATUS  Prior    HPI & HOSPITAL COURSE    FAZAL STUBBS is a 78 y.o. female who presented 10/3/2023 with chest pain.     Patient has history of chronic hypoxemic respiratory failure on home oxygen 24/7, end-stage COPD, lung cancer status post radiation currently in remission.     She states that this morning, she began to have a squeezing pressure-like sensation at the inferior aspect of her chest bilaterally.  She states that it has made it difficult for her to walk and is worse upon exertion.  Endorses associated mild shortness of breath and generalized weakness, prompting her to come to ER for evaluation.     In the ED, patient found to have normal vital signs. Remarkable labs include neutrophilic leukocytosis. Initial troponin negative. BNP wnl.  Chest x-ray showing emphysema, however no acute cardiopulmonary abnormality. She was admitted for observation and further cardiac workup.    Respiratory viral panel negative. Serial troponins remained within normal limits. proBNP not elevated. Nuclear MPI was performed, revealed no evidence of significant jeopardized viable myocardium or prior myocardial infarction. Also indicated normal LV size, ejection fraction, and wall motion. She was found to be hypertensive with -187. She also has been under high stress lately.    At this point no indication of active cardiac ischemia. She will be discharged home to follow up with her PCP and primary care doctors. Her losartan was increased  to 50mg twice daily, prescription sent to her chosen pharmacy - [facility].      Therefore, she is discharged in good and stable condition to home with close outpatient follow-up.    The patient recovered much more quickly than anticipated on admission.    Discharge Date  10/4/2023    FOLLOW UP ITEMS POST DISCHARGE      DISCHARGE DIAGNOSES  Principal Problem (Resolved):    Chest pain, rule out acute myocardial infarction (POA: Yes)  Active Problems:    Hypertension (POA: Unknown)    COPD (chronic obstructive pulmonary disease) (HCC) (POA: Unknown)    Primary cancer of left upper lobe of lung (HCC) (POA: Yes)  Resolved Problems:    ACP (advance care planning) (POA: Unknown)      FOLLOW UP  Future Appointments   Date Time Provider Department Center   12/5/2023  3:20 PM Ihsan Kumra M.D. [facility] None   7/1/2024  9:00 AM [address]   7/5/2024 11:00 AM Israel Grimm M.D. [facility] None     Everett Diego M.D.  [address]  [place] [address] [phone]    Call in 1 day(s)  Please call your primary care provider to arrange post-hospital follow-up appointment.    Bubba Ross M.D.  [address]  [address]  [place] [id number] [phone]    Call in 3 day(s)  Please call Dr. Ross's office to notify them of your recent change to Losartan.      MEDICATIONS ON DISCHARGE     Medication List        CHANGE how you take these medications        Instructions   losartan 50 MG Tabs  What changed: when to take this  Commonly known as: Cozaar   Take 1 Tablet by mouth 2 times a day.  Dose: 50 mg            CONTINUE taking these medications        Instructions   acetaminophen 650 MG CR tablet  Commonly known as: Tylenol   Take 650 mg by mouth every 6 hours as needed.  Dose: 650 mg     acyclovir 200 MG Caps  Commonly known as: Zovirax   Take 200 mg by mouth every day.  Dose: 200 mg     Albuterol Sulfate 108 (90 Base) MCG/ACT Aepb   Inhale 2 Puffs 4 times a day as needed (shortness  of breath). Indications: SOB  Dose: 2 Puff     azelastine 137 MCG/SPRAY nasal spray  Commonly known as: Astelin   Administer 1-2 Sprays into affected nostril(S) 2 times a day.  Dose: 1-2 Spray     azithromycin 250 MG Tabs  Commonly known as: Zithromax   One tablet daily for COPD     calcium carbonate 1250 (500 Ca) MG Tabs  Commonly known as: Os-Azam 500   Take 600 mg by mouth every day.  Dose: 600 mg     digoxin 125 MCG Tabs  Commonly known as: Lanoxin   Take 1 Tablet by mouth every day.  Dose: 125 mcg     DULoxetine 30 MG Cpep  Commonly known as: Cymbalta   Take 60 mg by mouth every day. Indications: Disease of the Peripheral Nerves, Generalized Anxiety Disorder, Major Depressive Disorder, Musculoskeletal Pain  Dose: 60 mg     fexofenadine 180 MG tablet  Commonly known as: Allegra   TAKE ONE TABLET BY MOUTH AT BEDTIME SWALLOW WHOLE WITH WATER DO NOT TAKE WITH FRUIT JUICE PER ALPINE     fluticasone-salmeterol 500-50 MCG/ACT Aepb  Commonly known as: Advair   Doctor's comments: 3 month supply x 1 year  Inhale 1 Puff 2 times a day.  Dose: 1 Puff     guaiFENesin  MG Tb12  Commonly known as: Mucinex   Take 600-1,200 mg by mouth 2 times a day as needed. Indications: Cough  Dose: 600-1,200 mg     Home Care Oxygen   Inhale 6 L/min Continuous. Oxygen dose range: 6 to 6 L/min  Respiratory route via: Nasal Cannula   Oxygen supplier: [employer]  Dose: 6 L/min     hydrocortisone 1 % Crea   Apply 1 Each topically 3 times a day as needed. Indications: Inflammation, Itching  Dose: 1 Each     ipratropium-albuterol 0.5-2.5 (3) MG/3ML nebulizer solution  Commonly known as: Duoneb   USE ONE VIAL IN NEBULIZER EVERY 4 HOURS AS NEEDED FOR SHORTNESS OF BREATH OR  WHEEZING     meloxicam 7.5 MG Tabs  Commonly known as: Mobic   Take 7.5 mg by mouth 2 times a day.  Dose: 7.5 mg     Misc. Devices Misc   This is an order for  7 foot high flow oxygen tubing  Dx; asthma with COPD J 44.9, supplemental oxygen-dependent Z 99.81, chronic  respiratory failure with hypoxia and J 96        ODETTE 99 months   NPI [id number]     montelukast 10 MG Tabs  Commonly known as: Singulair   Take 1 Tablet by mouth every day.  Dose: 10 mg     nystatin 132743 UNIT/ML Susp  Commonly known as: Mycostatin   2 times a day.     omeprazole 20 MG tablet  Commonly known as: PriLOSEC OTC   Take 1 Tab by mouth every day.  Dose: 20 mg     potassium chloride 10 MEQ capsule  Commonly known as: Micro-K   TAKE ONE CAPSULE BY MOUTH TWICE DAILY     sodium chloride 0.65 % Soln  Commonly known as: Ocean   Administer 2 Sprays into affected nostril(S) every 2 hours as needed.  Dose: 2 Spray     Spiriva HandiHaler 18 MCG Caps  Generic drug: tiotropium   Doctor's comments: 3 month supply x 1 year  INHALE 1 CAPSULE CONTENT BY MOUTH ONE TIME DAILY.     spironolactone 25 MG Tabs  Commonly known as: Aldactone   TAKE ONE TABLET BY MOUTH ONE TIME DAILY     tizanidine 4 MG Tabs  Commonly known as: Zanaflex   Take 4 mg by mouth every 8 hours as needed. not to exceed 3 doses in 24 hours  Indications: Lower Backache, Muscle Spasticity  Dose: 4 mg     triamcinolone 55 MCG/ACT nasal inhaler  Commonly known as: Nasacort   Administer 2 Sprays into affected nostril(S) every day.  Dose: 2 Spray     Vitamin D3 2000 UNIT Caps   TAKE ONE CAPSULE BY MOUTH ONE TIME DAILY              Allergies  Allergies   Allergen Reactions    Iodine      Convulsion  I.V.  iodine    Tape Rash and Itching       DIET  No orders of the defined types were placed in this encounter.      ACTIVITY  As tolerated.  Weight bearing as tolerated    CONSULTATIONS      PROCEDURES  Stress test    echo    LABORATORY  Lab Results   Component Value Date    SODIUM 139 10/03/2023    POTASSIUM 3.6 10/03/2023    CHLORIDE 96 10/03/2023    CO2 31 10/03/2023    GLUCOSE 118 (H) 10/03/2023    BUN 15 10/03/2023    CREATININE 0.45 (L) 10/03/2023    CREATININE 0.62 06/09/2011        Lab Results   Component Value Date    WBC 16.3 (H) 10/03/2023    WBC 10.9  (H) 06/09/2011    HEMOGLOBIN 15.6 10/03/2023    HEMATOCRIT 46.8 10/03/2023    PLATELETCT 322 10/03/2023        Total time of the discharge process exceeds 38  minutes.

## 2023-11-03 ENCOUNTER — HOSPITAL ENCOUNTER (OUTPATIENT)
Dept: LAB | Facility: MEDICAL CENTER | Age: 78
End: 2023-11-03
Attending: FAMILY MEDICINE
Payer: MEDICARE

## 2023-11-03 LAB
ALBUMIN SERPL BCP-MCNC: 4 G/DL (ref 3.2–4.9)
ALBUMIN/GLOB SERPL: 1.3 G/DL
ALP SERPL-CCNC: 46 U/L (ref 30–99)
ALT SERPL-CCNC: 13 U/L (ref 2–50)
ANION GAP SERPL CALC-SCNC: 9 MMOL/L (ref 7–16)
AST SERPL-CCNC: 19 U/L (ref 12–45)
BASOPHILS # BLD AUTO: 0.7 % (ref 0–1.8)
BASOPHILS # BLD: 0.07 K/UL (ref 0–0.12)
BILIRUB SERPL-MCNC: 0.3 MG/DL (ref 0.1–1.5)
BUN SERPL-MCNC: 24 MG/DL (ref 8–22)
CALCIUM ALBUM COR SERPL-MCNC: 9.5 MG/DL (ref 8.5–10.5)
CALCIUM SERPL-MCNC: 9.5 MG/DL (ref 8.5–10.5)
CHLORIDE SERPL-SCNC: 98 MMOL/L (ref 96–112)
CO2 SERPL-SCNC: 31 MMOL/L (ref 20–33)
CREAT SERPL-MCNC: 0.48 MG/DL (ref 0.5–1.4)
EOSINOPHIL # BLD AUTO: 0.6 K/UL (ref 0–0.51)
EOSINOPHIL NFR BLD: 6 % (ref 0–6.9)
ERYTHROCYTE [DISTWIDTH] IN BLOOD BY AUTOMATED COUNT: 43.7 FL (ref 35.9–50)
GFR SERPLBLD CREATININE-BSD FMLA CKD-EPI: 97 ML/MIN/1.73 M 2
GLOBULIN SER CALC-MCNC: 3.1 G/DL (ref 1.9–3.5)
GLUCOSE SERPL-MCNC: 102 MG/DL (ref 65–99)
HCT VFR BLD AUTO: 41.9 % (ref 37–47)
HGB BLD-MCNC: 13.9 G/DL (ref 12–16)
IMM GRANULOCYTES # BLD AUTO: 0.05 K/UL (ref 0–0.11)
IMM GRANULOCYTES NFR BLD AUTO: 0.5 % (ref 0–0.9)
LYMPHOCYTES # BLD AUTO: 1.44 K/UL (ref 1–4.8)
LYMPHOCYTES NFR BLD: 14.5 % (ref 22–41)
MCH RBC QN AUTO: 30.3 PG (ref 27–33)
MCHC RBC AUTO-ENTMCNC: 33.2 G/DL (ref 32.2–35.5)
MCV RBC AUTO: 91.3 FL (ref 81.4–97.8)
MONOCYTES # BLD AUTO: 0.84 K/UL (ref 0–0.85)
MONOCYTES NFR BLD AUTO: 8.4 % (ref 0–13.4)
NEUTROPHILS # BLD AUTO: 6.96 K/UL (ref 1.82–7.42)
NEUTROPHILS NFR BLD: 69.9 % (ref 44–72)
NRBC # BLD AUTO: 0 K/UL
NRBC BLD-RTO: 0 /100 WBC (ref 0–0.2)
PLATELET # BLD AUTO: 303 K/UL (ref 164–446)
PMV BLD AUTO: 10.2 FL (ref 9–12.9)
POTASSIUM SERPL-SCNC: 4.1 MMOL/L (ref 3.6–5.5)
PROT SERPL-MCNC: 7.1 G/DL (ref 6–8.2)
RBC # BLD AUTO: 4.59 M/UL (ref 4.2–5.4)
SODIUM SERPL-SCNC: 138 MMOL/L (ref 135–145)
WBC # BLD AUTO: 10 K/UL (ref 4.8–10.8)

## 2023-11-03 PROCEDURE — 36415 COLL VENOUS BLD VENIPUNCTURE: CPT

## 2023-11-03 PROCEDURE — 85025 COMPLETE CBC W/AUTO DIFF WBC: CPT

## 2023-11-03 PROCEDURE — 80053 COMPREHEN METABOLIC PANEL: CPT

## 2023-11-28 ENCOUNTER — HOSPITAL ENCOUNTER (OUTPATIENT)
Facility: MEDICAL CENTER | Age: 78
End: 2023-11-28
Attending: FAMILY MEDICINE
Payer: MEDICARE

## 2023-11-28 LAB
BASOPHILS # BLD AUTO: 0.6 % (ref 0–1.8)
BASOPHILS # BLD: 0.08 K/UL (ref 0–0.12)
CHOLEST SERPL-MCNC: 181 MG/DL (ref 100–199)
EOSINOPHIL # BLD AUTO: 0.67 K/UL (ref 0–0.51)
EOSINOPHIL NFR BLD: 5.2 % (ref 0–6.9)
ERYTHROCYTE [DISTWIDTH] IN BLOOD BY AUTOMATED COUNT: 43.4 FL (ref 35.9–50)
HCT VFR BLD AUTO: 46.1 % (ref 37–47)
HDLC SERPL-MCNC: 63 MG/DL
HGB BLD-MCNC: 15.2 G/DL (ref 12–16)
IMM GRANULOCYTES # BLD AUTO: 0.05 K/UL (ref 0–0.11)
IMM GRANULOCYTES NFR BLD AUTO: 0.4 % (ref 0–0.9)
LDLC SERPL CALC-MCNC: 97 MG/DL
LYMPHOCYTES # BLD AUTO: 1.47 K/UL (ref 1–4.8)
LYMPHOCYTES NFR BLD: 11.4 % (ref 22–41)
MCH RBC QN AUTO: 30.5 PG (ref 27–33)
MCHC RBC AUTO-ENTMCNC: 33 G/DL (ref 32.2–35.5)
MCV RBC AUTO: 92.4 FL (ref 81.4–97.8)
MONOCYTES # BLD AUTO: 1.12 K/UL (ref 0–0.85)
MONOCYTES NFR BLD AUTO: 8.7 % (ref 0–13.4)
NEUTROPHILS # BLD AUTO: 9.47 K/UL (ref 1.82–7.42)
NEUTROPHILS NFR BLD: 73.7 % (ref 44–72)
NRBC # BLD AUTO: 0 K/UL
NRBC BLD-RTO: 0 /100 WBC (ref 0–0.2)
PLATELET # BLD AUTO: 342 K/UL (ref 164–446)
PMV BLD AUTO: 9.9 FL (ref 9–12.9)
RBC # BLD AUTO: 4.99 M/UL (ref 4.2–5.4)
TRIGL SERPL-MCNC: 104 MG/DL (ref 0–149)
WBC # BLD AUTO: 12.9 K/UL (ref 4.8–10.8)

## 2023-11-28 PROCEDURE — 80061 LIPID PANEL: CPT

## 2023-11-28 PROCEDURE — 85025 COMPLETE CBC W/AUTO DIFF WBC: CPT

## 2023-12-04 ASSESSMENT — ENCOUNTER SYMPTOMS
STRIDOR: 0
DIZZINESS: 0
CHILLS: 0
FOCAL WEAKNESS: 0
LOSS OF CONSCIOUSNESS: 0
DIARRHEA: 0
HEADACHES: 0
FEVER: 0
COUGH: 1
SINUS PAIN: 0
WEIGHT LOSS: 0
ABDOMINAL PAIN: 0
EYE DISCHARGE: 0
PSYCHIATRIC NEGATIVE: 1
NAUSEA: 0
ORTHOPNEA: 0
SENSORY CHANGE: 0
SORE THROAT: 0
VOMITING: 0
SHORTNESS OF BREATH: 1
SPUTUM PRODUCTION: 1
MYALGIAS: 0
WHEEZING: 0
EYE PAIN: 0

## 2023-12-05 ENCOUNTER — OFFICE VISIT (OUTPATIENT)
Dept: SLEEP MEDICINE | Facility: MEDICAL CENTER | Age: 78
End: 2023-12-05
Attending: INTERNAL MEDICINE
Payer: MEDICARE

## 2023-12-05 ENCOUNTER — OFFICE VISIT (OUTPATIENT)
Dept: CARDIOLOGY | Facility: MEDICAL CENTER | Age: 78
End: 2023-12-05
Attending: INTERNAL MEDICINE
Payer: MEDICARE

## 2023-12-05 VITALS
BODY MASS INDEX: 31.89 KG/M2 | DIASTOLIC BLOOD PRESSURE: 64 MMHG | HEIGHT: 63 IN | SYSTOLIC BLOOD PRESSURE: 140 MMHG | WEIGHT: 180 LBS | HEART RATE: 67 BPM | OXYGEN SATURATION: 94 %

## 2023-12-05 VITALS
SYSTOLIC BLOOD PRESSURE: 138 MMHG | WEIGHT: 188 LBS | RESPIRATION RATE: 16 BRPM | HEART RATE: 66 BPM | HEIGHT: 65 IN | BODY MASS INDEX: 31.32 KG/M2 | OXYGEN SATURATION: 90 % | DIASTOLIC BLOOD PRESSURE: 70 MMHG

## 2023-12-05 DIAGNOSIS — J96.11 CHRONIC RESPIRATORY FAILURE WITH HYPOXIA (HCC): ICD-10-CM

## 2023-12-05 DIAGNOSIS — J44.9 STAGE 4 VERY SEVERE COPD BY GOLD CLASSIFICATION (HCC): ICD-10-CM

## 2023-12-05 DIAGNOSIS — J43.1 PANLOBULAR EMPHYSEMA (HCC): ICD-10-CM

## 2023-12-05 DIAGNOSIS — E66.9 OBESITY (BMI 30.0-34.9): ICD-10-CM

## 2023-12-05 DIAGNOSIS — I47.10 SVT (SUPRAVENTRICULAR TACHYCARDIA) (HCC): ICD-10-CM

## 2023-12-05 DIAGNOSIS — I49.3 PVCS (PREMATURE VENTRICULAR CONTRACTIONS): ICD-10-CM

## 2023-12-05 DIAGNOSIS — I10 ESSENTIAL HYPERTENSION: ICD-10-CM

## 2023-12-05 DIAGNOSIS — C34.12 PRIMARY CANCER OF LEFT UPPER LOBE OF LUNG (HCC): ICD-10-CM

## 2023-12-05 DIAGNOSIS — R13.12 OROPHARYNGEAL DYSPHAGIA: ICD-10-CM

## 2023-12-05 PROCEDURE — 99214 OFFICE O/P EST MOD 30 MIN: CPT | Performed by: INTERNAL MEDICINE

## 2023-12-05 PROCEDURE — 99213 OFFICE O/P EST LOW 20 MIN: CPT | Performed by: INTERNAL MEDICINE

## 2023-12-05 PROCEDURE — 1158F ADVNC CARE PLAN TLK DOCD: CPT | Performed by: INTERNAL MEDICINE

## 2023-12-05 PROCEDURE — 3078F DIAST BP <80 MM HG: CPT | Performed by: INTERNAL MEDICINE

## 2023-12-05 PROCEDURE — 94761 N-INVAS EAR/PLS OXIMETRY MLT: CPT | Performed by: INTERNAL MEDICINE

## 2023-12-05 PROCEDURE — 99215 OFFICE O/P EST HI 40 MIN: CPT | Mod: 25 | Performed by: INTERNAL MEDICINE

## 2023-12-05 PROCEDURE — 3075F SYST BP GE 130 - 139MM HG: CPT | Performed by: INTERNAL MEDICINE

## 2023-12-05 PROCEDURE — 3077F SYST BP >= 140 MM HG: CPT | Performed by: INTERNAL MEDICINE

## 2023-12-05 ASSESSMENT — ENCOUNTER SYMPTOMS
DIZZINESS: 0
LOSS OF CONSCIOUSNESS: 0
SHORTNESS OF BREATH: 1
COUGH: 0
PALPITATIONS: 0
MYALGIAS: 0

## 2023-12-05 ASSESSMENT — FIBROSIS 4 INDEX
FIB4 SCORE: 1.201850425154663098
FIB4 SCORE: 1.201850425154663098

## 2023-12-05 NOTE — ASSESSMENT & PLAN NOTE
S/p SBRT completed 11/2021  -- repeat CT chest showing no disease recurrence, plan to repeat annually next in 6/2024 (ordered by Dr Grimm)

## 2023-12-05 NOTE — PROGRESS NOTES
Pulmonary Clinic Note    Chief Complaint:  Chief Complaint   Patient presents with    Follow-Up     Last seen 6/19/23: Stage 4 very severe COPD     HPI:   Berny Renee is a very pleasant 78 y.o. female former tobacco smoker 60 pack years, quit 1999 followed in our clinic for COPD FEV1 0.89L (45%), chronic hypoxemic respiratory failure on 3-6LPM.  She was treated SBRT in 2021 for presumable local lung cancer that was not amendable to biopsy in the left upper lobe.  This has been stable on subsequent radiographic surveillance.  With regards to her COPD, she is managed on Advair 500/Spiriva/Singulair.     CT scan 6/2023 reassuring, no evidence of disease recurrence.    MMRC Grade: 2: Walks slower than friends due to breathlessness, has to stop at own pace    Interval events since last visit in 6/2023:  Compliant with Advair 500/Spiriva/Singulair/daily azithromycin. She uses her rescue inhaler about 1 time per month. O2 requirement stable on 3-6 LPM based on activity.  OPO on 6 LPM ordered last visit, not yet performed.    Completed pulm rehab in 2021. She uses a scooter for mobility.     She is in the process of moving apartments, which has been cumbersome and stressful.    Briefly hospitalized in 10/2023 for chest pain, nuclear medicine stress test reassuring.    Past Medical History:   Diagnosis Date    Acute on chronic respiratory failure with hypoxia ([facility]) 11/14/2020    Arthritis     spine    Asthma with COPD     BMI 31.0-31.9,adult 02/04/2022    Cataract immature     Chronic pain     Chronic respiratory failure with hypoxia ([facility]) 07/13/2017    Colon polyps     COPD (chronic obstructive pulmonary disease) ([facility]) 2002    moderate to severe    Cough     DDD (degenerative disc disease), cervical     DDD (degenerative disc disease), thoracic     Dependence on continuous supplemental oxygen 08/13/2015    IMO load March 2020    Diverticulitis of colon     Dyslipidemia     EMPHYSEMA     H/O opioid abuse ([facility]) 07/15/2021     Hypertension     Obesity (BMI 30-39.9) 10/24/2016    Opioid type dependence, continuous ([facility]) 2022    REGINE (obstructive sleep apnea)     OSTEOPOROSIS     Painful breathing     Shortness of breath     Spinal stenosis, lumbar region, with neurogenic claudication     Sputum production     URINARY INCONTINENCE     Vitamin d deficiency     Wheezing        Past Surgical History:   Procedure Laterality Date    LUMBAR LAMINECTOMY DISKECTOMY  2010    Performed by GRACIE CANO at SURGERY [facility]    OTHER ORTHOPEDIC SURGERY  2004    left ankle fx    OTHER      leg fracture    OTHER      t spine disc surg    TONSILLECTOMY         Social History     Socioeconomic History    Marital status:      Spouse name: Not on file    Number of children: Not on file    Years of education: Not on file    Highest education level: Not on file   Occupational History    Not on file   Tobacco Use    Smoking status: Former     Current packs/day: 0.00     Average packs/day: 2.0 packs/day for 30.0 years (60.0 ttl pk-yrs)     Types: Cigarettes     Start date: 3/1/1969     Quit date: 3/1/1999     Years since quittin.7    Smokeless tobacco: Never    Tobacco comments:     3 pks a day for 35 yrs, continued abstinence   Vaping Use    Vaping Use: Never used   Substance and Sexual Activity    Alcohol use: Not Currently     Alcohol/week: 4.2 oz     Types: 7 Glasses of wine per week    Drug use: Not Currently     Types: Marijuana, Oral     Comment: Medical Marijuana     Sexual activity: Never   Other Topics Concern    Not on file   Social History Narrative    ** Merged History Encounter **          Social Determinants of Health     Financial Resource Strain: Medium Risk (2023)    Overall Financial Resource Strain (CARDIA)     Difficulty of Paying Living Expenses: Somewhat hard   Food Insecurity: No Food Insecurity (2023)    Hunger Vital Sign     Worried About Running Out of Food in the Last Year: Never true     Ran Out  of Food in the Last Year: Never true   Transportation Needs: Unmet Transportation Needs (4/26/2023)    PRAPARE - Transportation     Lack of Transportation (Medical): Yes     Lack of Transportation (Non-Medical): No   Physical Activity: Inactive (4/26/2023)    Exercise Vital Sign     Days of Exercise per Week: 0 days     Minutes of Exercise per Session: 0 min   Stress: No Stress Concern Present (4/26/2023)    [facility] - Occupational Stress Questionnaire     Feeling of Stress : Only a little   Recent Concern: Stress - Stress Concern Present (3/30/2023)    [facility] - Occupational Stress Questionnaire     Feeling of Stress : To some extent   Social Connections: Moderately Isolated (4/26/2023)    Social Connection and Isolation Panel [NHANES]     Frequency of Communication with Friends and Family: More than three times a week     Frequency of Social Gatherings with Friends and Family: Twice a week     Attends [employer] Services: More than 4 times per year     Active Member of Clubs or Organizations: No     Attends Club or Organization Meetings: Never     Marital Status:    Intimate Partner Violence: Not on file   Housing Stability: Low Risk  (4/26/2023)    Housing Stability Vital Sign     Unable to Pay for Housing in the Last Year: No     Number of Places Lived in the Last Year: 1     Unstable Housing in the Last Year: No          Family History   Problem Relation Age of Onset    Cancer Father         Lung    Other Sister         lung and leukemia cancer    Cancer Sister         Leukemia, Lung       Current Outpatient Medications on File Prior to Visit   Medication Sig Dispense Refill    losartan (COZAAR) 50 MG Tab Take 1 Tablet by mouth 2 times a day. 60 Tablet 1    Albuterol Sulfate 108 (90 Base) MCG/ACT AEROSOL POWDER, BREATH ACTIVATED Inhale 2 Puffs 4 times a day as needed (shortness of breath). Indications: SOB 3 Each 1    fluticasone-salmeterol  (ADVAIR) 500-50 MCG/ACT AEROSOL POWDER, BREATH ACTIVATED Inhale 1 Puff 2 times a day. 180 Each 3    tiotropium (SPIRIVA HANDIHALER) 18 MCG Cap INHALE 1 CAPSULE CONTENT BY MOUTH ONE TIME DAILY. 90 Capsule 3    fexofenadine (ALLEGRA) 180 MG tablet TAKE ONE TABLET BY MOUTH AT BEDTIME SWALLOW WHOLE WITH WATER DO NOT TAKE WITH FRUIT JUICE PER ALPINE      triamcinolone (NASACORT) 55 MCG/ACT nasal inhaler Administer 2 Sprays into affected nostril(S) every day.      digoxin (LANOXIN) 125 MCG Tab Take 1 Tablet by mouth every day. 90 Tablet 3    meloxicam (MOBIC) 7.5 MG Tab Take 7.5 mg by mouth 2 times a day.      ipratropium-albuterol (DUONEB) 0.5-2.5 (3) MG/3ML nebulizer solution USE ONE VIAL IN NEBULIZER EVERY 4 HOURS AS NEEDED FOR SHORTNESS OF BREATH OR  WHEEZING 360 Each 6    azithromycin (ZITHROMAX) 250 MG Tab One tablet daily for COPD 90 Tablet 6    montelukast (SINGULAIR) 10 MG Tab Take 1 Tablet by mouth every day. 90 Tablet 3    calcium carbonate (OS-MICHELLE 500) 1250 (500 Ca) MG Tab Take 600 mg by mouth every day.      azelastine (ASTELIN) 137 MCG/SPRAY nasal spray Administer 1-2 Sprays into affected nostril(S) 2 times a day. (Patient taking differently: Administer 1-2 Sprays into affected nostril(S) as needed.) 30 mL 6    tizanidine (ZANAFLEX) 4 MG Tab Take 4 mg by mouth every 8 hours as needed. not to exceed 3 doses in 24 hours  Indications: Lower Backache, Muscle Spasticity      DULoxetine (CYMBALTA) 30 MG Cap DR Particles Take 60 mg by mouth every day. Indications: Disease of the Peripheral Nerves, Generalized Anxiety Disorder, Major Depressive Disorder, Musculoskeletal Pain      guaiFENesin ER (MUCINEX) 600 MG TABLET SR 12 HR Take 600-1,200 mg by mouth 2 times a day as needed. Indications: Cough      hydrocortisone 1 % Cream Apply 1 Each topically 3 times a day as needed. Indications: Inflammation, Itching      acetaminophen (TYLENOL) 650 MG CR tablet Take 650 mg by mouth every 6 hours as needed.      sodium  chloride (OCEAN) 0.65 % Solution Administer 2 Sprays into affected nostril(S) every 2 hours as needed.  3    acyclovir (ZOVIRAX) 200 MG Cap Take 200 mg by mouth every day.      Home Care Oxygen Inhale 6 L/min Continuous. Oxygen dose range: 6 to 6 L/min  Respiratory route via: Nasal Cannula   Oxygen supplier: Preferred          Misc. Devices Misc This is an order for  7 foot high flow oxygen tubing  Dx; asthma with COPD J 44.9, supplemental oxygen-dependent Z 99.81, chronic respiratory failure with hypoxia and J 96        ODETTE 99 months   NPI [id number] 1 Each 0    spironolactone (ALDACTONE) 25 MG Tab TAKE ONE TABLET BY MOUTH ONE TIME DAILY 30 Tab 11    Cholecalciferol (VITAMIN D3) 2000 UNIT Cap TAKE ONE CAPSULE BY MOUTH ONE TIME DAILY 30 Cap 3    potassium chloride (MICRO-K) 10 MEQ capsule TAKE ONE CAPSULE BY MOUTH TWICE DAILY 60 Cap 6    omeprazole (PRILOSEC OTC) 20 MG tablet Take 1 Tab by mouth every day. 30 Tab 11    nystatin (MYCOSTATIN) 583559 UNIT/ML Suspension 2 times a day.       No current facility-administered medications on file prior to visit.     Allergies: Iodine, Tape, and Meloxicam    ROS:   Review of Systems   Constitutional:  Negative for chills, fever and weight loss.   HENT:  Negative for congestion, sinus pain and sore throat.    Eyes:  Negative for pain and discharge.   Respiratory:  Positive for cough, sputum production and shortness of breath. Negative for wheezing and stridor.    Cardiovascular:  Negative for chest pain, orthopnea and leg swelling.   Gastrointestinal:  Negative for abdominal pain, diarrhea, nausea and vomiting.   Genitourinary:  Negative for dysuria, frequency and urgency.   Musculoskeletal:  Negative for myalgias.   Skin:  Negative for rash.   Neurological:  Negative for dizziness, sensory change, focal weakness, loss of consciousness and headaches.   Psychiatric/Behavioral: Negative.     All other systems reviewed and are negative.    Vitals:  BP (!) 140/64 (BP Location:  "Right arm, Patient Position: Sitting, BP Cuff Size: Adult)   Pulse 67   Ht 1.6 m (5' 3\")   Wt 81.6 kg (180 lb)   SpO2 94%     Physical Exam:  Physical Exam  Vitals and nursing note reviewed.   Constitutional:       General: She is not in acute distress.     Appearance: Normal appearance. She is well-developed. She is not diaphoretic.   HENT:      Nose:      Comments: oxymizer pendant  Eyes:      General: No scleral icterus.        Right eye: No discharge.         Left eye: No discharge.      Conjunctiva/sclera: Conjunctivae normal.      Pupils: Pupils are equal, round, and reactive to light.   Neck:      Thyroid: No thyromegaly.      Vascular: No JVD.   Cardiovascular:      Rate and Rhythm: Normal rate and regular rhythm.      Heart sounds: Normal heart sounds. No murmur heard.     No gallop.   Pulmonary:      Effort: Pulmonary effort is normal. No respiratory distress.      Breath sounds: Normal breath sounds. No wheezing or rales.   Abdominal:      General: There is no distension.      Palpations: Abdomen is soft.      Tenderness: There is no abdominal tenderness. There is no guarding.   Musculoskeletal:         General: No tenderness.   Lymphadenopathy:      Cervical: No cervical adenopathy.   Skin:     General: Skin is warm.      Capillary Refill: Capillary refill takes less than 2 seconds.      Findings: No erythema or rash.   Neurological:      Mental Status: She is alert and oriented to person, place, and time.      Cranial Nerves: No cranial nerve deficit.      Sensory: No sensory deficit.      Motor: No abnormal muscle tone.   Psychiatric:         Behavior: Behavior normal.       Laboratory Data:    PFTs as reviewed by me personally show:  FEV1 0.89L (45%),    Imaging as reviewed by me personally show:    CT 12/2021 personally reviewed, showing RUL ground glass opacity and shrinkage of BRANDON opacity    Assessment/Plan:    Problem List Items Addressed This Visit       Stage 4 very severe COPD by GOLD " classification (HCC)     FEV1 0.89L (45%)  On maximal medical therapy with Advair 500/Spiriva/Singulair/daily azithromycin  No recent exacerbations  -- s/p pulm rehab 2021.  Reenroll referral placed  -- UTD with vaccinations  -- declined [facility] referral previously  -- f/u overnight oximetry  -- POLST reviewed/signed/scanned last visit, DNR/DNI         Panlobular emphysema (HCC)    Relevant Orders    Referral to Pulmonary Rehab    Chronic respiratory failure with hypoxia (HCC)     Due to COPD  Uses oxymizer pendant 3-6LPM  --continue to titrate supplemental oxygen to maintain saturations 88 to 92%  -- check OPO on 6LPM  -- rx DME new O2 set up for switch to [place]         Relevant Orders    Multiple Oximetry    DME O2 New Set Up    Primary cancer of left upper lobe of lung (HCC)     S/p SBRT completed 11/2021  -- repeat CT chest showing no disease recurrence, plan to repeat annually next in 6/2024 (ordered by Dr Grimm)         Obesity (BMI 30.0-34.9)     Patient counseled about the importance of weight loss for long-term health risk reduction. Will dose-adjust medications appropriately based on larger volume of drug distribution.         Oropharyngeal dysphagia     Reporting choking episodes when drinking milk/eating chocolate         Relevant Orders    Clinical Swallow Evaluation and Treat     Return in about 6 months (around 6/5/2024).     This note was generated using voice recognition software which has a chance of producing errors of grammar and possibly content.  I have made every reasonable attempt to find and correct any obvious errors, but it should be expected that some may not be found prior to finalization of this note.    Time spent in record review prior to patient arrival, reviewing results, and in face-to-face encounter totaled 51 min, excluding any procedures if performed.  __________  Ihsan Kumar MD  Pulmonary and Critical Care Medicine  [facility]

## 2023-12-05 NOTE — PROCEDURES
Multi-Ox Readings  Multi Ox #1 Room air   O2 sat % at rest 82   O2 sat % on exertion     O2 sat average on exertion     Multi Ox #2 6 LPM   O2 sat % at rest 94   O2 sat % on exertion 91   O2 sat average on exertion       Oxygen Use 6   Oxygen Frequency 24/7   Duration of need     Is the patient mobile within the home?     CPAP Use?     BIPAP Use?     Servo Titration        Multi oximetry walk test today in clinic demonstrated hypoxia on room air, requiring 6 L/min at rest and with ambulation to maintain adequate saturations.    I, Ihsan Kumar M.D. am the author of this note (walk test interpretation).  __________  Ihsan Kumar MD  Pulmonary and Critical Care Medicine  [facility]

## 2023-12-05 NOTE — PROGRESS NOTES
"Chief Complaint   Patient presents with    Supraventricular Tachycardia (SVT)    Hypertension       Subjective     Berny Renee is a 77 y.o. female who presents today for follow-up care.    The patient has palpitations, SVT, PVCs, hypertension, lung cancer ([facility], diagnosed by PET and chest CT scans (with radiation therapy) 2021, COPD chronic hypoxic respiratory failure on continuous O2    Since 6/28/2023 appointment the patient was recently seen at [facility] 10/3/2023-10/4/2023 for chest tightness and uncontrolled hypertension.  She had been under tremendous amount of stress with her running back and forth to the hospital to see her girlfriend who is in for what sounds like ischemic PVD.  MI was ruled out.  EKG showed normal sinus rhythm and no ischemic changes.  CXR showed emphysematous changes.  MPI was normal.  She was told to double the dose of her losartan but she declined and resumed her normal dosage.  She has monitor blood pressure and states its been \"normal\".    Since 3/1/2023 appointment the patient has had no cardiac symptoms including chest pain, palpitations, shortness of breath.  Chronically short of breath but able to perform ADLs.  Plans to move into a new apartment.      Previous coronary evaluation includes a normal coronary angiogram 2004, normal MPI 2012 and normal cardiac PET scan 2019.     Past Medical History:   Diagnosis Date    Acute on chronic respiratory failure with hypoxia ([facility]) 11/14/2020    Arthritis     spine    Asthma with COPD     BMI 31.0-31.9,adult 2/4/2022    Cataract immature     Chronic pain     Chronic respiratory failure with hypoxia ([facility]) 7/13/2017    Colon polyps     COPD (chronic obstructive pulmonary disease) ([facility]) 2002    moderate to severe    DDD (degenerative disc disease), cervical     DDD (degenerative disc disease), thoracic     Dependence on continuous supplemental oxygen 8/13/2015    IMO load March 2020    Diverticulitis of colon     Dyslipidemia     EMPHYSEMA "     H/O opioid abuse ([facility]) 7/15/2021    Hypertension     Obesity (BMI 30-39.9) 10/24/2016    Opioid type dependence, continuous ([facility]) 2022    REGINE (obstructive sleep apnea)     OSTEOPOROSIS     Spinal stenosis, lumbar region, with neurogenic claudication     URINARY INCONTINENCE     Vitamin d deficiency      Past Surgical History:   Procedure Laterality Date    LUMBAR LAMINECTOMY DISKECTOMY  2010    Performed by GRACIE CANO at SURGERY [facility]    OTHER ORTHOPEDIC SURGERY  2004    left ankle fx    OTHER      leg fracture    OTHER      t spine disc surg    TONSILLECTOMY       Family History   Problem Relation Age of Onset    Cancer Father         Lung    Other Sister         lung and leukemia cancer    Cancer Sister         Leukemia, Lung     Social History     Socioeconomic History    Marital status:      Spouse name: Not on file    Number of children: Not on file    Years of education: Not on file    Highest education level: Not on file   Occupational History    Not on file   Tobacco Use    Smoking status: Former     Current packs/day: 0.00     Average packs/day: 2.0 packs/day for 30.0 years (60.0 ttl pk-yrs)     Types: Cigarettes     Start date: 3/1/1969     Quit date: 3/1/1999     Years since quittin.7    Smokeless tobacco: Never    Tobacco comments:     3 pks a day for 35 yrs, continued abstinence   Vaping Use    Vaping Use: Never used   Substance and Sexual Activity    Alcohol use: Not Currently     Alcohol/week: 4.2 oz     Types: 7 Glasses of wine per week    Drug use: Not Currently     Types: Marijuana, Oral     Comment: Medical Marijuana     Sexual activity: Never   Other Topics Concern    Not on file   Social History Narrative    ** Merged History Encounter **          Social Determinants of Health     Financial Resource Strain: Medium Risk (2023)    Overall Financial Resource Strain (CARDIA)     Difficulty of Paying Living Expenses: Somewhat hard   Food Insecurity: No Food  Insecurity (4/26/2023)    Hunger Vital Sign     Worried About Running Out of Food in the Last Year: Never true     Ran Out of Food in the Last Year: Never true   Transportation Needs: Unmet Transportation Needs (4/26/2023)    PRAPARE - Transportation     Lack of Transportation (Medical): Yes     Lack of Transportation (Non-Medical): No   Physical Activity: Inactive (4/26/2023)    Exercise Vital Sign     Days of Exercise per Week: 0 days     Minutes of Exercise per Session: 0 min   Stress: No Stress Concern Present (4/26/2023)    [facility] of Occupational Health - Occupational Stress Questionnaire     Feeling of Stress : Only a little   Recent Concern: Stress - Stress Concern Present (3/30/2023)    [facility] of Occupational Health - Occupational Stress Questionnaire     Feeling of Stress : To some extent   Social Connections: Moderately Isolated (4/26/2023)    Social Connection and Isolation Panel [NHANES]     Frequency of Communication with Friends and Family: More than three times a week     Frequency of Social Gatherings with Friends and Family: Twice a week     Attends Mandaeism Services: More than 4 times per year     Active Member of Clubs or Organizations: No     Attends Club or Organization Meetings: Never     Marital Status:    Intimate Partner Violence: Not on file   Housing Stability: Low Risk  (4/26/2023)    Housing Stability Vital Sign     Unable to Pay for Housing in the Last Year: No     Number of Places Lived in the Last Year: 1     Unstable Housing in the Last Year: No     Allergies   Allergen Reactions    Iodine      Convulsion  I.V.  iodine    Tape Rash and Itching     Outpatient Encounter Medications as of 12/5/2023   Medication Sig Dispense Refill    losartan (COZAAR) 50 MG Tab Take 1 Tablet by mouth 2 times a day. 60 Tablet 1    Albuterol Sulfate 108 (90 Base) MCG/ACT AEROSOL POWDER, BREATH ACTIVATED Inhale 2 Puffs 4 times a day as needed (shortness of breath).  Indications: SOB 3 Each 1    fluticasone-salmeterol (ADVAIR) 500-50 MCG/ACT AEROSOL POWDER, BREATH ACTIVATED Inhale 1 Puff 2 times a day. 180 Each 3    tiotropium (SPIRIVA HANDIHALER) 18 MCG Cap INHALE 1 CAPSULE CONTENT BY MOUTH ONE TIME DAILY. 90 Capsule 3    nystatin (MYCOSTATIN) 033308 UNIT/ML Suspension 2 times a day.      fexofenadine (ALLEGRA) 180 MG tablet TAKE ONE TABLET BY MOUTH AT BEDTIME SWALLOW WHOLE WITH WATER DO NOT TAKE WITH FRUIT JUICE PER ALPINE      triamcinolone (NASACORT) 55 MCG/ACT nasal inhaler Administer 2 Sprays into affected nostril(S) every day.      digoxin (LANOXIN) 125 MCG Tab Take 1 Tablet by mouth every day. 90 Tablet 3    meloxicam (MOBIC) 7.5 MG Tab Take 7.5 mg by mouth 2 times a day.      ipratropium-albuterol (DUONEB) 0.5-2.5 (3) MG/3ML nebulizer solution USE ONE VIAL IN NEBULIZER EVERY 4 HOURS AS NEEDED FOR SHORTNESS OF BREATH OR  WHEEZING 360 Each 6    azithromycin (ZITHROMAX) 250 MG Tab One tablet daily for COPD 90 Tablet 6    montelukast (SINGULAIR) 10 MG Tab Take 1 Tablet by mouth every day. 90 Tablet 3    calcium carbonate (OS-MICHELLE 500) 1250 (500 Ca) MG Tab Take 600 mg by mouth every day.      azelastine (ASTELIN) 137 MCG/SPRAY nasal spray Administer 1-2 Sprays into affected nostril(S) 2 times a day. (Patient taking differently: Administer 1-2 Sprays into affected nostril(S) as needed.) 30 mL 6    tizanidine (ZANAFLEX) 4 MG Tab Take 4 mg by mouth every 8 hours as needed. not to exceed 3 doses in 24 hours  Indications: Lower Backache, Muscle Spasticity      DULoxetine (CYMBALTA) 30 MG Cap DR Particles Take 60 mg by mouth every day. Indications: Disease of the Peripheral Nerves, Generalized Anxiety Disorder, Major Depressive Disorder, Musculoskeletal Pain      guaiFENesin ER (MUCINEX) 600 MG TABLET SR 12 HR Take 600-1,200 mg by mouth 2 times a day as needed. Indications: Cough      hydrocortisone 1 % Cream Apply 1 Each topically 3 times a day as needed. Indications:  "Inflammation, Itching      acetaminophen (TYLENOL) 650 MG CR tablet Take 650 mg by mouth every 6 hours as needed.      sodium chloride (OCEAN) 0.65 % Solution Administer 2 Sprays into affected nostril(S) every 2 hours as needed.  3    acyclovir (ZOVIRAX) 200 MG Cap Take 200 mg by mouth every day.      Home Care Oxygen Inhale 6 L/min Continuous. Oxygen dose range: 6 to 6 L/min  Respiratory route via: Nasal Cannula   Oxygen supplier: Preferred          Misc. Devices Misc This is an order for  7 foot high flow oxygen tubing  Dx; asthma with COPD J 44.9, supplemental oxygen-dependent Z 99.81, chronic respiratory failure with hypoxia and J 96        ODETTE 99 months   NPI [id number] 1 Each 0    spironolactone (ALDACTONE) 25 MG Tab TAKE ONE TABLET BY MOUTH ONE TIME DAILY 30 Tab 11    Cholecalciferol (VITAMIN D3) 2000 UNIT Cap TAKE ONE CAPSULE BY MOUTH ONE TIME DAILY 30 Cap 3    potassium chloride (MICRO-K) 10 MEQ capsule TAKE ONE CAPSULE BY MOUTH TWICE DAILY 60 Cap 6    omeprazole (PRILOSEC OTC) 20 MG tablet Take 1 Tab by mouth every day. 30 Tab 11     No facility-administered encounter medications on file as of 12/5/2023.     Review of Systems   Respiratory:  Positive for shortness of breath. Negative for cough.    Cardiovascular:  Negative for chest pain and palpitations.   Musculoskeletal:  Negative for myalgias.   Neurological:  Negative for dizziness and loss of consciousness.              Objective     /70 (BP Location: Left arm, Patient Position: Sitting, BP Cuff Size: Adult)   Pulse 66   Resp 16   Ht 1.651 m (5' 5\")   Wt 85.3 kg (188 lb)   LMP 06/07/2001   SpO2 90%   BMI 31.28 kg/m²   BP Readings from Last 5 Encounters:   12/05/23 138/70   10/04/23 (!) 180/76   07/06/23 (!) 147/54   06/28/23 138/66   06/19/23 136/78      Pulse Readings from Last 5 Encounters:   12/05/23 66   10/04/23 (!) 57   07/06/23 83   06/28/23 73   06/19/23 82      Wt Readings from Last 5 Encounters:   12/05/23 85.3 kg (188 lb) "   10/03/23 81.6 kg (180 lb)   07/06/23 82.8 kg (182 lb 8.7 oz)   06/28/23 83 kg (183 lb)   06/19/23 82.6 kg (182 lb)       Physical Exam  Constitutional:       Comments: On continuous O2   Eyes:      Conjunctiva/sclera: Conjunctivae normal.      Pupils: Pupils are equal, round, and reactive to light.   Neck:      Vascular: No JVD.   Cardiovascular:      Rate and Rhythm: Normal rate and regular rhythm.      Pulses: Normal pulses.      Heart sounds: Normal heart sounds.   Pulmonary:      Effort: Pulmonary effort is normal. No accessory muscle usage or respiratory distress.      Breath sounds: Decreased breath sounds present. No wheezing or rales.   Musculoskeletal:      Right lower leg: No edema.      Left lower leg: No edema.   Skin:     General: Skin is warm and dry.      Findings: No rash.      Nails: There is no clubbing.   Neurological:      Mental Status: She is alert and oriented to person, place, and time.   Psychiatric:         Behavior: Behavior normal.                 CARDIAC PET SCAN 9/6/2019  SCINTOGRAPHIC FINDINGS:    Normal left ventricular perfusion with stress and rest images.  There is no evidence of ischemia or scar.   GATED WALL MOTION FINDINGS:    The left ventricle wall motion is normal with stress and rest  imagings.  Measured resting ejection fraction is 68 %.    MPI 3/30/2021   No evidence of significant jeopardized viable myocardium or prior myocardial    infarction.    Normal left ventricular size, ejection fraction, and wall motion.    ECG INTERPRETATION    Non-diagnostic EKG    MPI 10/4/2023   No evidence of significant jeopardized viable myocardium or prior myocardial    infarction.   Normal left ventricular size, ejection fraction, and wall motion.   ECG INTERPRETATION   Negative stress ECG for ischemia.    ECHOCARDIOGRAM 3/23/2021  Normal left ventricular systolic function.  Left ventricular ejection fraction is visually estimated to be 65-70%.  Normal right ventricular systolic  function.  No valve abnormalities.    Medical records, imaging and EKG all reviewed.    Assessment & Plan     1. SVT (supraventricular tachycardia)        2. PVCs (premature ventricular contractions)        3. Essential hypertension            Medical Decision Making: Today's Assessment/Status/Plan:   Assessment  SVT.  PVCs.  Palpitations.  Hypertension.  Chronic respiratory failure.  COPD, O2 dependent, severe.  COVID-19 infection 11/2020.  Lung cancer 8/2021 S/P radiation therapy.  Chronic pain syndrome with back pain.     Recommendation Discussion  Clinically the patient appears stable from a cardiac standpoint.  SVT stable, no clinical recurrence, digoxin level was recently normal  PVCs, asymptomatic, monitor for worsening symptoms  Hypertension blood pressure today is acceptable, continue spironolactone, losartan, instructed the patient to continue monitoring home BP and if remains greater than 129/79 she is to contact me for additional management recommendations  RTC 6 months                      51

## 2023-12-05 NOTE — ASSESSMENT & PLAN NOTE
FEV1 0.89L (45%)  mMRC 4, functionally disabled due to COPD, uses mobility scooter for ADLs, letter written to her apartment management to request a ramp  On maximal medical therapy with Advair 500/Spiriva/Singulair/daily azithromycin  No recent exacerbations  -- s/p pulm rehab 2021.  Reenroll referral placed  -- UTD with vaccinations  -- declined [facility] referral previously  -- f/u overnight oximetry  -- POLST reviewed/signed/scanned last visit, DNR/DNI

## 2023-12-13 ENCOUNTER — HOSPITAL ENCOUNTER (OUTPATIENT)
Facility: MEDICAL CENTER | Age: 78
End: 2023-12-13
Attending: FAMILY MEDICINE
Payer: MEDICARE

## 2023-12-13 LAB
ERYTHROCYTE [DISTWIDTH] IN BLOOD BY AUTOMATED COUNT: 42.8 FL (ref 35.9–50)
HCT VFR BLD AUTO: 43.1 % (ref 37–47)
HGB BLD-MCNC: 14.3 G/DL (ref 12–16)
MCH RBC QN AUTO: 30.3 PG (ref 27–33)
MCHC RBC AUTO-ENTMCNC: 33.2 G/DL (ref 32.2–35.5)
MCV RBC AUTO: 91.3 FL (ref 81.4–97.8)
PLATELET # BLD AUTO: 358 K/UL (ref 164–446)
PMV BLD AUTO: 9.9 FL (ref 9–12.9)
RBC # BLD AUTO: 4.72 M/UL (ref 4.2–5.4)
TSH SERPL DL<=0.005 MIU/L-ACNC: 2.37 UIU/ML (ref 0.38–5.33)
WBC # BLD AUTO: 13.8 K/UL (ref 4.8–10.8)

## 2023-12-13 PROCEDURE — 85027 COMPLETE CBC AUTOMATED: CPT

## 2023-12-13 PROCEDURE — 84443 ASSAY THYROID STIM HORMONE: CPT

## 2024-01-10 ENCOUNTER — HOSPITAL ENCOUNTER (OUTPATIENT)
Facility: MEDICAL CENTER | Age: 79
End: 2024-01-10
Attending: FAMILY MEDICINE
Payer: MEDICARE

## 2024-01-10 PROCEDURE — 85025 COMPLETE CBC W/AUTO DIFF WBC: CPT

## 2024-01-12 LAB
ANISOCYTOSIS BLD QL SMEAR: ABNORMAL
BASOPHILS # BLD AUTO: 0.7 % (ref 0–1.8)
BASOPHILS # BLD: 0.1 K/UL (ref 0–0.12)
BURR CELLS BLD QL SMEAR: NORMAL
COMMENT 1642: NORMAL
EOSINOPHIL # BLD AUTO: 0.89 K/UL (ref 0–0.51)
EOSINOPHIL NFR BLD: 6.6 % (ref 0–6.9)
ERYTHROCYTE [DISTWIDTH] IN BLOOD BY AUTOMATED COUNT: 52.1 FL (ref 35.9–50)
HCT VFR BLD AUTO: 50.5 % (ref 37–47)
HGB BLD-MCNC: 14.8 G/DL (ref 12–16)
IMM GRANULOCYTES # BLD AUTO: 0.05 K/UL (ref 0–0.11)
IMM GRANULOCYTES NFR BLD AUTO: 0.4 % (ref 0–0.9)
LYMPHOCYTES # BLD AUTO: 2.05 K/UL (ref 1–4.8)
LYMPHOCYTES NFR BLD: 15.2 % (ref 22–41)
MCH RBC QN AUTO: 30.5 PG (ref 27–33)
MCHC RBC AUTO-ENTMCNC: 29.3 G/DL (ref 32.2–35.5)
MCV RBC AUTO: 103.9 FL (ref 81.4–97.8)
MICROCYTES BLD QL SMEAR: ABNORMAL
MONOCYTES # BLD AUTO: 0.79 K/UL (ref 0–0.85)
MONOCYTES NFR BLD AUTO: 5.9 % (ref 0–13.4)
MORPHOLOGY BLD-IMP: NORMAL
NEUTROPHILS # BLD AUTO: 9.58 K/UL (ref 1.82–7.42)
NEUTROPHILS NFR BLD: 71.2 % (ref 44–72)
NRBC # BLD AUTO: 0 K/UL
NRBC BLD-RTO: 0 /100 WBC (ref 0–0.2)
PLATELET # BLD AUTO: 353 K/UL (ref 164–446)
PLATELET BLD QL SMEAR: NORMAL
PMV BLD AUTO: 10.6 FL (ref 9–12.9)
POIKILOCYTOSIS BLD QL SMEAR: NORMAL
RBC # BLD AUTO: 4.86 M/UL (ref 4.2–5.4)
RBC BLD AUTO: PRESENT
WBC # BLD AUTO: 13.5 K/UL (ref 4.8–10.8)

## 2024-01-15 ENCOUNTER — TELEPHONE (OUTPATIENT)
Dept: SLEEP MEDICINE | Facility: MEDICAL CENTER | Age: 79
End: 2024-01-15
Payer: MEDICARE

## 2024-01-15 DIAGNOSIS — R13.12 OROPHARYNGEAL DYSPHAGIA: ICD-10-CM

## 2024-01-15 DIAGNOSIS — J43.1 PANLOBULAR EMPHYSEMA (HCC): ICD-10-CM

## 2024-01-15 NOTE — TELEPHONE ENCOUNTER
Voicemail: 01/11/24  Pt called and lm, requesting a rx Prednisone 5mg. To help her out until she feels better. Pt would like a 3 month supply.    01/15/24:  Called and spoke with pt. Asked pt what symptoms is she having. Pt stated she is sob, cough with white phlegm, not feeling well. Per pt, PCP told her to ask her pulmonogist to see if she can be on Prednisone 5mg daily continuously until she feels better.       Have we ever prescribed this med? yes.  If yes, what date? 07/08/21    Last OV: 12/05/23 with Dr Kumar    Next OV: 06/05/24 with Dr Kumar     DX:   Stage 4 very severe COPD by GOLD classification (HCC) J44.9    Medications: Prednisone 5mg daily

## 2024-01-18 ENCOUNTER — TELEPHONE (OUTPATIENT)
Dept: HEALTH INFORMATION MANAGEMENT | Facility: OTHER | Age: 79
End: 2024-01-18
Payer: MEDICARE

## 2024-01-19 RX ORDER — PREDNISONE 5 MG/1
TABLET ORAL
Qty: 30 TABLET | Refills: 0 | Status: ON HOLD | OUTPATIENT
Start: 2024-01-19 | End: 2024-02-05

## 2024-01-23 NOTE — TELEPHONE ENCOUNTER
Ihsan Kumar M.D.  You4 days ago       I wrote the rx for 1 month but the request for a 3 month supply is too long, needs to be seen first ideally in the next 1-2 months

## 2024-01-23 NOTE — TELEPHONE ENCOUNTER
Called pt and lm, I was calling in regards to the medication we spoke about last week. I will send her a "[employer] message with more detail information.     [employer] message sent.

## 2024-01-31 ENCOUNTER — OFFICE VISIT (OUTPATIENT)
Dept: URGENT CARE | Facility: CLINIC | Age: 79
End: 2024-01-31
Payer: MEDICARE

## 2024-01-31 ENCOUNTER — APPOINTMENT (OUTPATIENT)
Dept: RADIOLOGY | Facility: IMAGING CENTER | Age: 79
End: 2024-01-31
Attending: PHYSICIAN ASSISTANT
Payer: MEDICARE

## 2024-01-31 VITALS
DIASTOLIC BLOOD PRESSURE: 86 MMHG | TEMPERATURE: 99 F | SYSTOLIC BLOOD PRESSURE: 152 MMHG | HEART RATE: 89 BPM | RESPIRATION RATE: 22 BRPM | OXYGEN SATURATION: 91 %

## 2024-01-31 DIAGNOSIS — Z99.81 DEPENDENCE ON CONTINUOUS SUPPLEMENTAL OXYGEN: ICD-10-CM

## 2024-01-31 DIAGNOSIS — J44.9 STAGE 4 VERY SEVERE COPD BY GOLD CLASSIFICATION (HCC): ICD-10-CM

## 2024-01-31 DIAGNOSIS — R05.1 ACUTE COUGH: ICD-10-CM

## 2024-01-31 PROCEDURE — 71046 X-RAY EXAM CHEST 2 VIEWS: CPT | Mod: TC | Performed by: PHYSICIAN ASSISTANT

## 2024-01-31 PROCEDURE — 99213 OFFICE O/P EST LOW 20 MIN: CPT | Performed by: PHYSICIAN ASSISTANT

## 2024-01-31 PROCEDURE — 3079F DIAST BP 80-89 MM HG: CPT | Performed by: PHYSICIAN ASSISTANT

## 2024-01-31 PROCEDURE — 3077F SYST BP >= 140 MM HG: CPT | Performed by: PHYSICIAN ASSISTANT

## 2024-01-31 PROCEDURE — 1158F ADVNC CARE PLAN TLK DOCD: CPT | Performed by: PHYSICIAN ASSISTANT

## 2024-01-31 RX ORDER — PREDNISONE 5 MG/1
TABLET ORAL
Qty: 52 TABLET | Refills: 0 | Status: ON HOLD | OUTPATIENT
Start: 2024-01-31 | End: 2024-02-05

## 2024-01-31 RX ORDER — DEXTROMETHORPHAN HYDROBROMIDE AND PROMETHAZINE HYDROCHLORIDE 15; 6.25 MG/5ML; MG/5ML
5 SYRUP ORAL EVERY 4 HOURS PRN
Qty: 120 ML | Refills: 0 | Status: ON HOLD | OUTPATIENT
Start: 2024-01-31 | End: 2024-02-05

## 2024-01-31 ASSESSMENT — ENCOUNTER SYMPTOMS: COUGH: 1

## 2024-01-31 NOTE — PROGRESS NOTES
Chief Complaint   Patient presents with    Cough     Hx of COPD, cough not improving        HISTORY OF PRESENT ILLNESS: Patient is a 78 y.o. female who presents today because worsening cough.  She presents with female .  She states that she has end-stage COPD and is send currently on 6 L of oxygen continuously.  She was prescribed prednisone on the 18th and started it on the 19th and has only been taking 5 mg once daily.  According to prescription directions, she was to take 40 mg for 3 days and then taper.  She has been taking azithromycin 250 mg once daily.  She is concerned about worsening cough and shortness of breath since about last week.  She denies fever or chills.      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    BMI 32.0-32.9,adult 02/07/2023    Obesity (BMI 30.0-34.9) 02/07/2023    Calculus of gallbladder 02/07/2023    Thyroid nodule 02/07/2023    Diverticulosis 02/07/2023    Panlobular emphysema (HCC) 02/04/2022    Osteopenia of lumbar spine 02/04/2022    Primary cancer of left upper lobe of lung ([facility]) 10/01/2021    Hypertension 08/12/2021    Amnesia 08/12/2021    Peripheral vascular disease ([facility]) 07/15/2021    Recurrent major depressive disorder, in partial remission ([facility]) 07/15/2021    Hyponatremia 11/19/2020    DNR (do not resuscitate) 11/17/2020    COVID-19 11/07/2020    SVT (supraventricular tachycardia) 10/07/2019    PVCs (premature ventricular contractions) 10/07/2019    Palpitations 10/07/2019    Stage 4 very severe COPD by GOLD classification ([facility]) 02/15/2018    External hemorrhoid, bleeding 11/01/2017    Chronic respiratory failure with hypoxia ([facility]) 07/13/2017    At risk for falls 10/24/2016    Pain management 05/16/2016    Pre-diabetes 12/11/2015    Chronic pain syndrome 09/12/2014    Chronic constipation 11/05/2012    Facet  arthropathy, lumbar 02/27/2012    Dyslipidemia     Vitamin D deficiency disease     Lumbar radicular pain 08/16/2010    Peripheral edema 06/04/2010    Mixed stress and urge urinary incontinence        Allergies:Iodine and Tape    Current Outpatient Medications Ordered in Epic   Medication Sig Dispense Refill    predniSONE (DELTASONE) 5 MG Tab Take 40mg x 3 days.  Then taper down by 5 mg every day. 30 Tablet 0    losartan (COZAAR) 50 MG Tab Take 1 Tablet by mouth 2 times a day. 60 Tablet 1    Albuterol Sulfate 108 (90 Base) MCG/ACT AEROSOL POWDER, BREATH ACTIVATED Inhale 2 Puffs 4 times a day as needed (shortness of breath). Indications: SOB 3 Each 1    fluticasone-salmeterol (ADVAIR) 500-50 MCG/ACT AEROSOL POWDER, BREATH ACTIVATED Inhale 1 Puff 2 times a day. 180 Each 3    tiotropium (SPIRIVA HANDIHALER) 18 MCG Cap INHALE 1 CAPSULE CONTENT BY MOUTH ONE TIME DAILY. 90 Capsule 3    nystatin (MYCOSTATIN) 141210 UNIT/ML Suspension 2 times a day.      fexofenadine (ALLEGRA) 180 MG tablet TAKE ONE TABLET BY MOUTH AT BEDTIME SWALLOW WHOLE WITH WATER DO NOT TAKE WITH FRUIT JUICE PER ALPINE      triamcinolone (NASACORT) 55 MCG/ACT nasal inhaler Administer 2 Sprays into affected nostril(S) every day.      digoxin (LANOXIN) 125 MCG Tab Take 1 Tablet by mouth every day. 90 Tablet 3    meloxicam (MOBIC) 7.5 MG Tab Take 7.5 mg by mouth 2 times a day.      ipratropium-albuterol (DUONEB) 0.5-2.5 (3) MG/3ML nebulizer solution USE ONE VIAL IN NEBULIZER EVERY 4 HOURS AS NEEDED FOR SHORTNESS OF BREATH OR  WHEEZING 360 Each 6    azithromycin (ZITHROMAX) 250 MG Tab One tablet daily for COPD 90 Tablet 6    montelukast (SINGULAIR) 10 MG Tab Take 1 Tablet by mouth every day. 90 Tablet 3    calcium carbonate (OS-MICHELLE 500) 1250 (500 Ca) MG Tab Take 600 mg by mouth every day.      azelastine (ASTELIN) 137 MCG/SPRAY nasal spray Administer 1-2 Sprays into affected nostril(S) 2 times a day. (Patient taking differently: Administer 1-2 Sprays into  affected nostril(S) as needed.) 30 mL 6    tizanidine (ZANAFLEX) 4 MG Tab Take 4 mg by mouth every 8 hours as needed. not to exceed 3 doses in 24 hours  Indications: Lower Backache, Muscle Spasticity      DULoxetine (CYMBALTA) 30 MG Cap DR Particles Take 60 mg by mouth every day. Indications: Disease of the Peripheral Nerves, Generalized Anxiety Disorder, Major Depressive Disorder, Musculoskeletal Pain      guaiFENesin ER (MUCINEX) 600 MG TABLET SR 12 HR Take 600-1,200 mg by mouth 2 times a day as needed. Indications: Cough      hydrocortisone 1 % Cream Apply 1 Each topically 3 times a day as needed. Indications: Inflammation, Itching      sodium chloride (OCEAN) 0.65 % Solution Administer 2 Sprays into affected nostril(S) every 2 hours as needed.  3    Home Care Oxygen Inhale 6 L/min Continuous. Oxygen dose range: 6 to 6 L/min  Respiratory route via: Nasal Cannula   Oxygen supplier: Preferred          Misc. Devices Misc This is an order for  7 foot high flow oxygen tubing  Dx; asthma with COPD J 44.9, supplemental oxygen-dependent Z 99.81, chronic respiratory failure with hypoxia and J 96        ODETTE 99 months   NPI [id number] 1 Each 0    spironolactone (ALDACTONE) 25 MG Tab TAKE ONE TABLET BY MOUTH ONE TIME DAILY 30 Tab 11    Cholecalciferol (VITAMIN D3) 2000 UNIT Cap TAKE ONE CAPSULE BY MOUTH ONE TIME DAILY 30 Cap 3    potassium chloride (MICRO-K) 10 MEQ capsule TAKE ONE CAPSULE BY MOUTH TWICE DAILY 60 Cap 6    omeprazole (PRILOSEC OTC) 20 MG tablet Take 1 Tab by mouth every day. 30 Tab 11    acetaminophen (TYLENOL) 650 MG CR tablet Take 650 mg by mouth every 6 hours as needed. (Patient not taking: Reported on 1/31/2024)      acyclovir (ZOVIRAX) 200 MG Cap Take 200 mg by mouth every day. (Patient not taking: Reported on 1/31/2024)       No current [facility]-ordered facility-administered medications on file.       Past Medical History:   Diagnosis Date    Acute on chronic respiratory failure with hypoxia (HCC)  2020    Arthritis     spine    Asthma with COPD     BMI 31.0-31.9,adult 2022    Cataract immature     Chronic pain     Chronic respiratory failure with hypoxia ([facility]) 2017    Colon polyps     COPD (chronic obstructive pulmonary disease) ([facility])     moderate to severe    Cough     DDD (degenerative disc disease), cervical     DDD (degenerative disc disease), thoracic     Dependence on continuous supplemental oxygen 2015    IMO load 2020    Diverticulitis of colon     Dyslipidemia     EMPHYSEMA     H/O opioid abuse ([facility]) 07/15/2021    Hypertension     Obesity (BMI 30-39.9) 10/24/2016    Opioid type dependence, continuous ([facility]) 2022    REGINE (obstructive sleep apnea)     OSTEOPOROSIS     Painful breathing     Shortness of breath     Spinal stenosis, lumbar region, with neurogenic claudication     Sputum production     URINARY INCONTINENCE     Vitamin d deficiency     Wheezing        Social History     Tobacco Use    Smoking status: Former     Current packs/day: 0.00     Average packs/day: 2.0 packs/day for 30.0 years (60.0 ttl pk-yrs)     Types: Cigarettes     Start date: 3/1/1969     Quit date: 3/1/1999     Years since quittin.9    Smokeless tobacco: Never    Tobacco comments:     3 pks a day for 35 yrs, continued abstinence   Vaping Use    Vaping Use: Never used   Substance Use Topics    Alcohol use: Not Currently     Alcohol/week: 4.2 oz     Types: 7 Glasses of wine per week    Drug use: Not Currently     Types: Marijuana, Oral     Comment: Medical Marijuana        Family Status   Relation Name Status    Mo hardening of the arterie     Fa old age     Sis      Sis  Alive     Family History   Problem Relation Age of Onset    Cancer Father         Lung    Other Sister         lung and leukemia cancer    Cancer Sister         Leukemia, Lung       Review of Systems   Constitutional:  Positive for malaise/fatigue.   Respiratory:  Positive for cough.    All  other systems reviewed and are negative.      Exam:  BP (!) 152/86   Pulse 89   Temp 37.2 °C (99 °F)   Resp (!) 22   SpO2 91%     Nursing notes and vitals reviewed.    Constitutional:   Appropriately groomed, pleasant affect, well nourished, in NAD.    Head:   Normocephalic, atraumatic.    Eyes:   PERRLA, EOM's full, sclera white, conjunctiva not erythematous, and medial canthus without exudate bilaterally.    Ears:  Auricle and tragus non-tender to manipulation.  No pre-auricular lymphadenopathy or mastoid ttp.  EACs with mild cerumen bilaterally, not erythematous.  TM’s pearly gray with cone of light present and umbo and malleolus visible bilaterally.  No bulging or fluid bubbles present in middle ear.  Hearing grossly intact to voice.    Nose:  Nares patent bilaterally.  Nasal mucosa edematous with rhinorrhea bilaterally.     Throat:  Dentition wnl, mucosa moist without lesions.  Oropharynx mildly erythematous, with no enlargement of the palatine pillars bilaterally with no exudates.    Post nasal drainage present.  Soft palate rises symmetrically bilaterally and uvula midline.      Neck: Neck supple, with mild cervical lymphadenopathy that is soft and mobile to palpation. Thyroid non-palpable without tenderness or nodules. No supraclavicular lymphadenopathy.    Lungs:  Respiratory effort not labored but without accessory muscle use.  Lungs with distant sounding lung sounds to auscultation.  No crackles or rhonchi noted.  Prolonged expiratory phase.    Heart:  RRR, without murmurs rubs or gallops.  Radial and dorsalis pedis pulse 2+ bilaterally.  No LE edema.    Musculoskeletal:  Gait non-antalgic with a narrow base.    Derm:  Skin without rashes or lesions with good turgor pressure.      Psychiatric:  Mood, affect, and judgement appropriate.    1/31/2024 3:34 PM     HISTORY/REASON FOR EXAM:  Cough.        TECHNIQUE/EXAM DESCRIPTION AND NUMBER OF VIEWS:  Two views of the chest.     COMPARISON:  10/3/2023      FINDINGS:     Cardiomediastinal silhouette is stable. Aortic calcified atherosclerotic plaque.     There are emphysematous changes. There is bibasilar atelectasis/scarring. No significant consolidation, pleural effusion or pneumothorax.     No acute osseous abnormality.     IMPRESSION:     Emphysematous changes with basilar atelectasis/scarring.    Please note that this dictation was created using voice recognition software. I have made every reasonable attempt to correct obvious errors, but I expect that there are errors of grammar and possibly content that I did not discover before finalizing the note.    Assessment/Plan:  1. Acute cough  DX-CHEST-2 VIEWS      2. Stage 4 very severe COPD by GOLD classification (HCC)        3. Dependence on continuous supplemental oxygen          Patient presents with COPD exacerbation likely to viral etiology.  I reviewed with Dariel the importance of continuing with azithromycin, advise increasing to 500 mg for 2 days.  I also discussed prednisone taper.  I would like her to start at 40 mg for the next 3 days and then taper.  She will contact her pulmonary clinic thereafter.  We also proceeded with a chest x-ray which did not demonstrate any evidence of focal consolidation.    Instructed to return to Urgent Care or nearest Emergency Department if symptoms fail to improve, for any change in condition, further concerns, or new concerning symptoms. Patient states understanding of the plan of care and discharge instructions.    Mookie Manriquez PA-C

## 2024-02-05 ENCOUNTER — HOSPITAL ENCOUNTER (INPATIENT)
Facility: MEDICAL CENTER | Age: 79
LOS: 2 days | DRG: 191 | End: 2024-02-07
Attending: EMERGENCY MEDICINE | Admitting: STUDENT IN AN ORGANIZED HEALTH CARE EDUCATION/TRAINING PROGRAM
Payer: MEDICARE

## 2024-02-05 ENCOUNTER — APPOINTMENT (OUTPATIENT)
Dept: RADIOLOGY | Facility: MEDICAL CENTER | Age: 79
DRG: 191 | End: 2024-02-05
Attending: EMERGENCY MEDICINE
Payer: MEDICARE

## 2024-02-05 DIAGNOSIS — B33.8 RSV INFECTION: ICD-10-CM

## 2024-02-05 DIAGNOSIS — R06.2 WHEEZING: ICD-10-CM

## 2024-02-05 DIAGNOSIS — R05.3 PERSISTENT COUGH: ICD-10-CM

## 2024-02-05 DIAGNOSIS — R09.02 HYPOXIA: ICD-10-CM

## 2024-02-05 DIAGNOSIS — M54.16 LUMBAR RADICULAR PAIN: ICD-10-CM

## 2024-02-05 DIAGNOSIS — K57.90 DIVERTICULOSIS: ICD-10-CM

## 2024-02-05 DIAGNOSIS — R52 PAIN MANAGEMENT: ICD-10-CM

## 2024-02-05 DIAGNOSIS — J44.9 CHRONIC OBSTRUCTIVE PULMONARY DISEASE, UNSPECIFIED COPD TYPE (HCC): ICD-10-CM

## 2024-02-05 DIAGNOSIS — J44.1 ACUTE EXACERBATION OF CHRONIC OBSTRUCTIVE PULMONARY DISEASE (COPD) (HCC): ICD-10-CM

## 2024-02-05 LAB
ALBUMIN SERPL BCP-MCNC: 4.1 G/DL (ref 3.2–4.9)
ALBUMIN/GLOB SERPL: 1.4 G/DL
ALP SERPL-CCNC: 43 U/L (ref 30–99)
ALT SERPL-CCNC: 18 U/L (ref 2–50)
ANION GAP SERPL CALC-SCNC: 13 MMOL/L (ref 7–16)
AST SERPL-CCNC: 17 U/L (ref 12–45)
BASOPHILS # BLD AUTO: 0.3 % (ref 0–1.8)
BASOPHILS # BLD: 0.06 K/UL (ref 0–0.12)
BILIRUB SERPL-MCNC: 0.3 MG/DL (ref 0.1–1.5)
BUN SERPL-MCNC: 13 MG/DL (ref 8–22)
CALCIUM ALBUM COR SERPL-MCNC: 9.4 MG/DL (ref 8.5–10.5)
CALCIUM SERPL-MCNC: 9.5 MG/DL (ref 8.5–10.5)
CHLORIDE SERPL-SCNC: 97 MMOL/L (ref 96–112)
CO2 SERPL-SCNC: 29 MMOL/L (ref 20–33)
CREAT SERPL-MCNC: 0.41 MG/DL (ref 0.5–1.4)
EKG IMPRESSION: NORMAL
EOSINOPHIL # BLD AUTO: 0.08 K/UL (ref 0–0.51)
EOSINOPHIL NFR BLD: 0.4 % (ref 0–6.9)
ERYTHROCYTE [DISTWIDTH] IN BLOOD BY AUTOMATED COUNT: 41 FL (ref 35.9–50)
FLUAV RNA SPEC QL NAA+PROBE: NEGATIVE
FLUBV RNA SPEC QL NAA+PROBE: NEGATIVE
GFR SERPLBLD CREATININE-BSD FMLA CKD-EPI: 100 ML/MIN/1.73 M 2
GLOBULIN SER CALC-MCNC: 2.9 G/DL (ref 1.9–3.5)
GLUCOSE SERPL-MCNC: 150 MG/DL (ref 65–99)
HCT VFR BLD AUTO: 41.6 % (ref 37–47)
HGB BLD-MCNC: 14.1 G/DL (ref 12–16)
IMM GRANULOCYTES # BLD AUTO: 0.13 K/UL (ref 0–0.11)
IMM GRANULOCYTES NFR BLD AUTO: 0.7 % (ref 0–0.9)
LYMPHOCYTES # BLD AUTO: 1.15 K/UL (ref 1–4.8)
LYMPHOCYTES NFR BLD: 6 % (ref 22–41)
MCH RBC QN AUTO: 30.1 PG (ref 27–33)
MCHC RBC AUTO-ENTMCNC: 33.9 G/DL (ref 32.2–35.5)
MCV RBC AUTO: 88.7 FL (ref 81.4–97.8)
MONOCYTES # BLD AUTO: 1.04 K/UL (ref 0–0.85)
MONOCYTES NFR BLD AUTO: 5.4 % (ref 0–13.4)
NEUTROPHILS # BLD AUTO: 16.67 K/UL (ref 1.82–7.42)
NEUTROPHILS NFR BLD: 87.2 % (ref 44–72)
NRBC # BLD AUTO: 0 K/UL
NRBC BLD-RTO: 0 /100 WBC (ref 0–0.2)
PLATELET # BLD AUTO: 366 K/UL (ref 164–446)
PMV BLD AUTO: 9.7 FL (ref 9–12.9)
POTASSIUM SERPL-SCNC: 3.7 MMOL/L (ref 3.6–5.5)
PROT SERPL-MCNC: 7 G/DL (ref 6–8.2)
RBC # BLD AUTO: 4.69 M/UL (ref 4.2–5.4)
RSV RNA SPEC QL NAA+PROBE: NEGATIVE
SARS-COV-2 RNA RESP QL NAA+PROBE: NOTDETECTED
SODIUM SERPL-SCNC: 139 MMOL/L (ref 135–145)
WBC # BLD AUTO: 19.1 K/UL (ref 4.8–10.8)

## 2024-02-05 PROCEDURE — 71045 X-RAY EXAM CHEST 1 VIEW: CPT

## 2024-02-05 PROCEDURE — 36415 COLL VENOUS BLD VENIPUNCTURE: CPT

## 2024-02-05 PROCEDURE — 0241U HCHG SARS-COV-2 COVID-19 NFCT DS RESP RNA 4 TRGT ED POC: CPT

## 2024-02-05 PROCEDURE — 94644 CONT INHLJ TX 1ST HOUR: CPT

## 2024-02-05 PROCEDURE — 770006 HCHG ROOM/CARE - MED/SURG/GYN SEMI*

## 2024-02-05 PROCEDURE — 80053 COMPREHEN METABOLIC PANEL: CPT

## 2024-02-05 PROCEDURE — 99285 EMERGENCY DEPT VISIT HI MDM: CPT

## 2024-02-05 PROCEDURE — 85025 COMPLETE CBC W/AUTO DIFF WBC: CPT

## 2024-02-05 PROCEDURE — 93005 ELECTROCARDIOGRAM TRACING: CPT | Performed by: EMERGENCY MEDICINE

## 2024-02-05 PROCEDURE — 99223 1ST HOSP IP/OBS HIGH 75: CPT | Mod: AI,GC | Performed by: STUDENT IN AN ORGANIZED HEALTH CARE EDUCATION/TRAINING PROGRAM

## 2024-02-05 PROCEDURE — 93005 ELECTROCARDIOGRAM TRACING: CPT

## 2024-02-05 PROCEDURE — 700101 HCHG RX REV CODE 250: Mod: UD | Performed by: EMERGENCY MEDICINE

## 2024-02-05 RX ORDER — MONTELUKAST SODIUM 10 MG/1
10 TABLET ORAL DAILY
Status: DISCONTINUED | OUTPATIENT
Start: 2024-02-06 | End: 2024-02-07 | Stop reason: HOSPADM

## 2024-02-05 RX ORDER — IPRATROPIUM BROMIDE AND ALBUTEROL SULFATE 2.5; .5 MG/3ML; MG/3ML
3 SOLUTION RESPIRATORY (INHALATION)
Status: DISCONTINUED | OUTPATIENT
Start: 2024-02-06 | End: 2024-02-07 | Stop reason: HOSPADM

## 2024-02-05 RX ORDER — ENOXAPARIN SODIUM 100 MG/ML
40 INJECTION SUBCUTANEOUS DAILY
Status: DISCONTINUED | OUTPATIENT
Start: 2024-02-06 | End: 2024-02-07 | Stop reason: HOSPADM

## 2024-02-05 RX ORDER — AZITHROMYCIN 250 MG/1
250 TABLET, FILM COATED ORAL DAILY
Status: DISCONTINUED | OUTPATIENT
Start: 2024-02-06 | End: 2024-02-07 | Stop reason: HOSPADM

## 2024-02-05 RX ORDER — DIGOXIN 125 MCG
125 TABLET ORAL DAILY
Status: DISCONTINUED | OUTPATIENT
Start: 2024-02-06 | End: 2024-02-07 | Stop reason: HOSPADM

## 2024-02-05 RX ORDER — FLUTICASONE PROPIONATE AND SALMETEROL 500; 50 UG/1; UG/1
1 POWDER RESPIRATORY (INHALATION) 2 TIMES DAILY
Status: DISCONTINUED | OUTPATIENT
Start: 2024-02-06 | End: 2024-02-06

## 2024-02-05 RX ORDER — IBUPROFEN 200 MG
600 CAPSULE ORAL EVERY EVENING
Status: DISCONTINUED | OUTPATIENT
Start: 2024-02-06 | End: 2024-02-07 | Stop reason: HOSPADM

## 2024-02-05 RX ORDER — ACYCLOVIR 200 MG/1
200 CAPSULE ORAL DAILY
Status: DISCONTINUED | OUTPATIENT
Start: 2024-02-06 | End: 2024-02-05

## 2024-02-05 RX ORDER — LOSARTAN POTASSIUM 50 MG/1
50 TABLET ORAL 2 TIMES DAILY
Status: DISCONTINUED | OUTPATIENT
Start: 2024-02-06 | End: 2024-02-07 | Stop reason: HOSPADM

## 2024-02-05 RX ORDER — PREDNISONE 20 MG/1
40 TABLET ORAL DAILY
Status: DISCONTINUED | OUTPATIENT
Start: 2024-02-06 | End: 2024-02-07 | Stop reason: HOSPADM

## 2024-02-05 RX ORDER — SPIRONOLACTONE 25 MG/1
25 TABLET ORAL
Status: DISCONTINUED | OUTPATIENT
Start: 2024-02-06 | End: 2024-02-07 | Stop reason: HOSPADM

## 2024-02-05 RX ORDER — POLYETHYLENE GLYCOL 3350 17 G/17G
1 POWDER, FOR SOLUTION ORAL
Status: DISCONTINUED | OUTPATIENT
Start: 2024-02-05 | End: 2024-02-07 | Stop reason: HOSPADM

## 2024-02-05 RX ORDER — DULOXETIN HYDROCHLORIDE 30 MG/1
60 CAPSULE, DELAYED RELEASE ORAL EVERY EVENING
Status: DISCONTINUED | OUTPATIENT
Start: 2024-02-06 | End: 2024-02-07 | Stop reason: HOSPADM

## 2024-02-05 RX ORDER — OMEPRAZOLE 20 MG/1
20 TABLET, DELAYED RELEASE ORAL DAILY
Status: DISCONTINUED | OUTPATIENT
Start: 2024-02-06 | End: 2024-02-06

## 2024-02-05 RX ORDER — IPRATROPIUM BROMIDE AND ALBUTEROL SULFATE 2.5; .5 MG/3ML; MG/3ML
3 SOLUTION RESPIRATORY (INHALATION)
Status: DISCONTINUED | OUTPATIENT
Start: 2024-02-05 | End: 2024-02-07 | Stop reason: HOSPADM

## 2024-02-05 RX ORDER — ACETAMINOPHEN 325 MG/1
650 TABLET ORAL EVERY 6 HOURS PRN
Status: DISCONTINUED | OUTPATIENT
Start: 2024-02-05 | End: 2024-02-07 | Stop reason: HOSPADM

## 2024-02-05 RX ORDER — AMOXICILLIN 250 MG
2 CAPSULE ORAL EVERY EVENING
Status: DISCONTINUED | OUTPATIENT
Start: 2024-02-06 | End: 2024-02-07 | Stop reason: HOSPADM

## 2024-02-05 RX ADMIN — ALBUTEROL SULFATE 10 MG: 2.5 SOLUTION RESPIRATORY (INHALATION) at 20:31

## 2024-02-05 ASSESSMENT — LIFESTYLE VARIABLES
HOW MANY TIMES IN THE PAST YEAR HAVE YOU HAD 5 OR MORE DRINKS IN A DAY: 0
CONSUMPTION TOTAL: NEGATIVE
DOES PATIENT WANT TO STOP DRINKING: NO
TOTAL SCORE: 0
TOTAL SCORE: 0
HAVE YOU EVER FELT YOU SHOULD CUT DOWN ON YOUR DRINKING: NO
TOTAL SCORE: 0
AVERAGE NUMBER OF DAYS PER WEEK YOU HAVE A DRINK CONTAINING ALCOHOL: 0
ON A TYPICAL DAY WHEN YOU DRINK ALCOHOL HOW MANY DRINKS DO YOU HAVE: 0
EVER FELT BAD OR GUILTY ABOUT YOUR DRINKING: NO
HAVE PEOPLE ANNOYED YOU BY CRITICIZING YOUR DRINKING: NO
ALCOHOL_USE: NO
EVER HAD A DRINK FIRST THING IN THE MORNING TO STEADY YOUR NERVES TO GET RID OF A HANGOVER: NO

## 2024-02-05 ASSESSMENT — PATIENT HEALTH QUESTIONNAIRE - PHQ9
1. LITTLE INTEREST OR PLEASURE IN DOING THINGS: NOT AT ALL
2. FEELING DOWN, DEPRESSED, IRRITABLE, OR HOPELESS: NOT AT ALL
SUM OF ALL RESPONSES TO PHQ9 QUESTIONS 1 AND 2: 0

## 2024-02-05 ASSESSMENT — COGNITIVE AND FUNCTIONAL STATUS - GENERAL
SUGGESTED CMS G CODE MODIFIER MOBILITY: CJ
DAILY ACTIVITIY SCORE: 21
DRESSING REGULAR LOWER BODY CLOTHING: A LITTLE
MOVING FROM LYING ON BACK TO SITTING ON SIDE OF FLAT BED: A LITTLE
MOBILITY SCORE: 21
MOVING TO AND FROM BED TO CHAIR: A LITTLE
DRESSING REGULAR UPPER BODY CLOTHING: A LITTLE
TURNING FROM BACK TO SIDE WHILE IN FLAT BAD: A LITTLE
SUGGESTED CMS G CODE MODIFIER DAILY ACTIVITY: CJ
HELP NEEDED FOR BATHING: A LITTLE

## 2024-02-05 ASSESSMENT — PAIN DESCRIPTION - PAIN TYPE: TYPE: ACUTE PAIN

## 2024-02-05 ASSESSMENT — FIBROSIS 4 INDEX
FIB4 SCORE: 0.85
FIB4 SCORE: 0.85
FIB4 SCORE: 1.16

## 2024-02-06 PROBLEM — D72.823 LEUKEMOID REACTION: Status: ACTIVE | Noted: 2024-02-06

## 2024-02-06 LAB
ALBUMIN SERPL BCP-MCNC: 3.9 G/DL (ref 3.2–4.9)
ALBUMIN/GLOB SERPL: 1.3 G/DL
ALP SERPL-CCNC: 43 U/L (ref 30–99)
ALT SERPL-CCNC: 16 U/L (ref 2–50)
ANION GAP SERPL CALC-SCNC: 11 MMOL/L (ref 7–16)
AST SERPL-CCNC: 16 U/L (ref 12–45)
BASOPHILS # BLD AUTO: 0.1 % (ref 0–1.8)
BASOPHILS # BLD: 0.01 K/UL (ref 0–0.12)
BILIRUB SERPL-MCNC: 0.2 MG/DL (ref 0.1–1.5)
BUN SERPL-MCNC: 14 MG/DL (ref 8–22)
CALCIUM ALBUM COR SERPL-MCNC: 9.7 MG/DL (ref 8.5–10.5)
CALCIUM SERPL-MCNC: 9.6 MG/DL (ref 8.5–10.5)
CHLORIDE SERPL-SCNC: 95 MMOL/L (ref 96–112)
CO2 SERPL-SCNC: 30 MMOL/L (ref 20–33)
CREAT SERPL-MCNC: 0.39 MG/DL (ref 0.5–1.4)
EOSINOPHIL # BLD AUTO: 0 K/UL (ref 0–0.51)
EOSINOPHIL NFR BLD: 0 % (ref 0–6.9)
ERYTHROCYTE [DISTWIDTH] IN BLOOD BY AUTOMATED COUNT: 41.8 FL (ref 35.9–50)
GFR SERPLBLD CREATININE-BSD FMLA CKD-EPI: 102 ML/MIN/1.73 M 2
GLOBULIN SER CALC-MCNC: 3.1 G/DL (ref 1.9–3.5)
GLUCOSE SERPL-MCNC: 217 MG/DL (ref 65–99)
HCT VFR BLD AUTO: 41.1 % (ref 37–47)
HGB BLD-MCNC: 13.8 G/DL (ref 12–16)
IMM GRANULOCYTES # BLD AUTO: 0.11 K/UL (ref 0–0.11)
IMM GRANULOCYTES NFR BLD AUTO: 0.6 % (ref 0–0.9)
LACTATE SERPL-SCNC: 1.8 MMOL/L (ref 0.5–2)
LYMPHOCYTES # BLD AUTO: 0.49 K/UL (ref 1–4.8)
LYMPHOCYTES NFR BLD: 2.9 % (ref 22–41)
MAGNESIUM SERPL-MCNC: 1.9 MG/DL (ref 1.5–2.5)
MCH RBC QN AUTO: 30.1 PG (ref 27–33)
MCHC RBC AUTO-ENTMCNC: 33.6 G/DL (ref 32.2–35.5)
MCV RBC AUTO: 89.7 FL (ref 81.4–97.8)
MONOCYTES # BLD AUTO: 0.18 K/UL (ref 0–0.85)
MONOCYTES NFR BLD AUTO: 1.1 % (ref 0–13.4)
NEUTROPHILS # BLD AUTO: 16.15 K/UL (ref 1.82–7.42)
NEUTROPHILS NFR BLD: 95.3 % (ref 44–72)
NRBC # BLD AUTO: 0 K/UL
NRBC BLD-RTO: 0 /100 WBC (ref 0–0.2)
PLATELET # BLD AUTO: 323 K/UL (ref 164–446)
PMV BLD AUTO: 9.4 FL (ref 9–12.9)
POTASSIUM SERPL-SCNC: 3.9 MMOL/L (ref 3.6–5.5)
PROT SERPL-MCNC: 7 G/DL (ref 6–8.2)
RBC # BLD AUTO: 4.58 M/UL (ref 4.2–5.4)
SODIUM SERPL-SCNC: 136 MMOL/L (ref 135–145)
WBC # BLD AUTO: 16.9 K/UL (ref 4.8–10.8)

## 2024-02-06 PROCEDURE — 94760 N-INVAS EAR/PLS OXIMETRY 1: CPT

## 2024-02-06 PROCEDURE — 700111 HCHG RX REV CODE 636 W/ 250 OVERRIDE (IP): Mod: JZ

## 2024-02-06 PROCEDURE — 94664 DEMO&/EVAL PT USE INHALER: CPT

## 2024-02-06 PROCEDURE — 83605 ASSAY OF LACTIC ACID: CPT

## 2024-02-06 PROCEDURE — 700102 HCHG RX REV CODE 250 W/ 637 OVERRIDE(OP)

## 2024-02-06 PROCEDURE — 99222 1ST HOSP IP/OBS MODERATE 55: CPT | Performed by: INTERNAL MEDICINE

## 2024-02-06 PROCEDURE — 700101 HCHG RX REV CODE 250

## 2024-02-06 PROCEDURE — A9270 NON-COVERED ITEM OR SERVICE: HCPCS

## 2024-02-06 PROCEDURE — 80053 COMPREHEN METABOLIC PANEL: CPT

## 2024-02-06 PROCEDURE — 94640 AIRWAY INHALATION TREATMENT: CPT

## 2024-02-06 PROCEDURE — 83735 ASSAY OF MAGNESIUM: CPT

## 2024-02-06 PROCEDURE — 770006 HCHG ROOM/CARE - MED/SURG/GYN SEMI*

## 2024-02-06 PROCEDURE — 94669 MECHANICAL CHEST WALL OSCILL: CPT

## 2024-02-06 PROCEDURE — 36415 COLL VENOUS BLD VENIPUNCTURE: CPT

## 2024-02-06 PROCEDURE — 85025 COMPLETE CBC W/AUTO DIFF WBC: CPT

## 2024-02-06 PROCEDURE — 99233 SBSQ HOSP IP/OBS HIGH 50: CPT | Performed by: HOSPITALIST

## 2024-02-06 RX ORDER — CHOLECALCIFEROL (VITAMIN D3) 125 MCG
5 CAPSULE ORAL NIGHTLY PRN
Status: DISCONTINUED | OUTPATIENT
Start: 2024-02-06 | End: 2024-02-07 | Stop reason: HOSPADM

## 2024-02-06 RX ORDER — OMEPRAZOLE 20 MG/1
20 CAPSULE, DELAYED RELEASE ORAL DAILY
Status: DISCONTINUED | OUTPATIENT
Start: 2024-02-06 | End: 2024-02-07 | Stop reason: HOSPADM

## 2024-02-06 RX ADMIN — LOSARTAN POTASSIUM 50 MG: 50 TABLET, FILM COATED ORAL at 17:21

## 2024-02-06 RX ADMIN — AZITHROMYCIN DIHYDRATE 250 MG: 250 TABLET, FILM COATED ORAL at 05:07

## 2024-02-06 RX ADMIN — ENOXAPARIN SODIUM 40 MG: 100 INJECTION SUBCUTANEOUS at 18:12

## 2024-02-06 RX ADMIN — MONTELUKAST 10 MG: 10 TABLET, FILM COATED ORAL at 18:12

## 2024-02-06 RX ADMIN — DIGOXIN 125 MCG: 0.12 TABLET ORAL at 05:06

## 2024-02-06 RX ADMIN — BENZOCAINE AND MENTHOL, UNSPECIFIED FORM 1 LOZENGE: 15; 3.6 LOZENGE ORAL at 22:05

## 2024-02-06 RX ADMIN — IPRATROPIUM BROMIDE AND ALBUTEROL SULFATE 3 ML: 2.5; .5 SOLUTION RESPIRATORY (INHALATION) at 14:33

## 2024-02-06 RX ADMIN — ACETAMINOPHEN 650 MG: 325 TABLET, FILM COATED ORAL at 14:01

## 2024-02-06 RX ADMIN — Medication 5 MG: at 20:08

## 2024-02-06 RX ADMIN — IPRATROPIUM BROMIDE AND ALBUTEROL SULFATE 3 ML: 2.5; .5 SOLUTION RESPIRATORY (INHALATION) at 02:40

## 2024-02-06 RX ADMIN — ACETAMINOPHEN 650 MG: 325 TABLET, FILM COATED ORAL at 23:18

## 2024-02-06 RX ADMIN — SPIRONOLACTONE 25 MG: 25 TABLET ORAL at 05:07

## 2024-02-06 RX ADMIN — CALCIUM 500 MG: 500 TABLET ORAL at 17:20

## 2024-02-06 RX ADMIN — DOCUSATE SODIUM 50 MG AND SENNOSIDES 8.6 MG 2 TABLET: 8.6; 5 TABLET, FILM COATED ORAL at 17:20

## 2024-02-06 RX ADMIN — IPRATROPIUM BROMIDE AND ALBUTEROL SULFATE 3 ML: 2.5; .5 SOLUTION RESPIRATORY (INHALATION) at 21:33

## 2024-02-06 RX ADMIN — MOMETASONE FUROATE AND FORMOTEROL FUMARATE DIHYDRATE 2 PUFF: 200; 5 AEROSOL RESPIRATORY (INHALATION) at 21:35

## 2024-02-06 RX ADMIN — ENOXAPARIN SODIUM 40 MG: 100 INJECTION SUBCUTANEOUS at 00:08

## 2024-02-06 RX ADMIN — IPRATROPIUM BROMIDE AND ALBUTEROL SULFATE 3 ML: 2.5; .5 SOLUTION RESPIRATORY (INHALATION) at 11:02

## 2024-02-06 RX ADMIN — TIOTROPIUM BROMIDE INHALATION SPRAY 5 MCG: 3.12 SPRAY, METERED RESPIRATORY (INHALATION) at 07:00

## 2024-02-06 RX ADMIN — PREDNISONE 40 MG: 20 TABLET ORAL at 05:06

## 2024-02-06 RX ADMIN — ACETAMINOPHEN 650 MG: 325 TABLET, FILM COATED ORAL at 04:38

## 2024-02-06 RX ADMIN — IPRATROPIUM BROMIDE AND ALBUTEROL SULFATE 3 ML: 2.5; .5 SOLUTION RESPIRATORY (INHALATION) at 07:00

## 2024-02-06 RX ADMIN — OMEPRAZOLE 20 MG: 20 CAPSULE, DELAYED RELEASE ORAL at 05:06

## 2024-02-06 RX ADMIN — DULOXETINE HYDROCHLORIDE 60 MG: 30 CAPSULE, DELAYED RELEASE ORAL at 17:20

## 2024-02-06 RX ADMIN — MOMETASONE FUROATE AND FORMOTEROL FUMARATE DIHYDRATE 2 PUFF: 200; 5 AEROSOL RESPIRATORY (INHALATION) at 07:00

## 2024-02-06 RX ADMIN — LOSARTAN POTASSIUM 50 MG: 50 TABLET, FILM COATED ORAL at 05:06

## 2024-02-06 ASSESSMENT — COPD QUESTIONNAIRES
DURING THE PAST 4 WEEKS HOW MUCH DID YOU FEEL SHORT OF BREATH: SOME OF THE TIME
DO YOU EVER COUGH UP ANY MUCUS OR PHLEGM?: YES, A FEW DAYS A WEEK OR MONTH
COPD SCREENING SCORE: 7
HAVE YOU SMOKED AT LEAST 100 CIGARETTES IN YOUR ENTIRE LIFE: YES

## 2024-02-06 ASSESSMENT — ENCOUNTER SYMPTOMS
MYALGIAS: 0
HEMOPTYSIS: 0
BACK PAIN: 0
DIZZINESS: 0
WHEEZING: 1
ABDOMINAL PAIN: 0
CLAUDICATION: 0
CONSTIPATION: 0
WHEEZING: 0
FEVER: 0
SORE THROAT: 0
BLURRED VISION: 0
COUGH: 1
BRUISES/BLEEDS EASILY: 0
SHORTNESS OF BREATH: 1
COUGH: 0
CHILLS: 0
NAUSEA: 0
SPUTUM PRODUCTION: 0
HEADACHES: 0
HEARTBURN: 0
VOMITING: 0
DOUBLE VISION: 0
DEPRESSION: 0
PND: 0
PALPITATIONS: 0
DIARRHEA: 0

## 2024-02-06 ASSESSMENT — PATIENT HEALTH QUESTIONNAIRE - PHQ9
2. FEELING DOWN, DEPRESSED, IRRITABLE, OR HOPELESS: NOT AT ALL
1. LITTLE INTEREST OR PLEASURE IN DOING THINGS: NOT AT ALL
SUM OF ALL RESPONSES TO PHQ9 QUESTIONS 1 AND 2: 0

## 2024-02-06 ASSESSMENT — PAIN DESCRIPTION - PAIN TYPE
TYPE: ACUTE PAIN

## 2024-02-06 ASSESSMENT — LIFESTYLE VARIABLES: SUBSTANCE_ABUSE: 0

## 2024-02-06 NOTE — ED NOTES
Med rec  and complete. Allergies reviewed.  Confirmed name and date of birth. Pt takes a daily dose of azithromycin 250 mg   And Acyclovir 200 mg.  No anticoagulant or antiplatelet medications.      Home pharmacy  [facility] = [phone]

## 2024-02-06 NOTE — ED NOTES
Continues to complain of respiratory distress. Tachypnea. Pt now able to speak full sentences while sitting in bed.

## 2024-02-06 NOTE — CARE PLAN
Problem: Hyperinflation  Goal: Prevent or improve atelectasis  Description: Target End Date:  3 to 4 days    1. Instruct incentive spirometry usage  2.  Perform hyperinflation therapy as indicated  Outcome: Progressing   QID Flutter tolerated well

## 2024-02-06 NOTE — PROGRESS NOTES
Report received from NOC RN and assumed patient care at 0700. Patient is A&Ox 4, on RA, and sitting up comfortably in the bed.  Patient reporting a pain level of 0. Call light within reach and bed in lowest position. Reinforced the need to call for assistance. Plan of care discussed and patient does not have any further needs at this time.

## 2024-02-06 NOTE — ED NOTES
Report called to Darek. Pt is alert and oriented at the time of transport   Pt on 6L nasal cannula at the time of transport and pt has unlabored breathing at this time

## 2024-02-06 NOTE — PROGRESS NOTES
Patient requested to have her Montelukast to be administrated at evening as usual.  Also, she wanted to have Melatonin in case she stays another night tonight. Otherwise, she wants to go home.

## 2024-02-06 NOTE — ED PROVIDER NOTES
ER Provider Note    Scribed for Dr. Jenny Adams D.O. by Pratik Sherwood. 2/5/2024  7:26 PM    Primary Care Provider: Everett Diego M.D.    CHIEF COMPLAINT  Chief Complaint   Patient presents with    Cough     EXTERNAL RECORDS REVIEWED  Outpatient Notes Seen at urgent care 1/31, diagnosed with RSV    HPI/ROS    LIMITATION TO HISTORY   Select: : None    Berny Renee is a 78 y.o. female who presents to the ED for cough and shortness of breath onset prior to arrival. She has a history of end stage COPD. She was seen at urgent care last Wednesday for similar symptoms and was diagnosed with RSV. Tonight she got up to get something to eat, and then desatted down into the low 70s while on her baseline 6 L supplemental oxygen. She got very worried because of that and called [facility]. They gave her 2 Duoneb's and 125 mg solumedrol en route.       PAST MEDICAL HISTORY  Past Medical History:   Diagnosis Date    Acute on chronic respiratory failure with hypoxia ([facility]) 11/14/2020    Arthritis     spine    Asthma with COPD     BMI 31.0-31.9,adult 02/04/2022    Cataract immature     Chronic pain     Chronic respiratory failure with hypoxia ([facility]) 07/13/2017    Colon polyps     COPD (chronic obstructive pulmonary disease) ([facility]) 2002    moderate to severe    Cough     DDD (degenerative disc disease), cervical     DDD (degenerative disc disease), thoracic     Dependence on continuous supplemental oxygen 08/13/2015    IMO load March 2020    Diverticulitis of colon     Dyslipidemia     EMPHYSEMA     H/O opioid abuse ([facility]) 07/15/2021    Hypertension     Obesity (BMI 30-39.9) 10/24/2016    Opioid type dependence, continuous ([facility]) 02/04/2022    REGINE (obstructive sleep apnea)     OSTEOPOROSIS     Painful breathing     Shortness of breath     Spinal stenosis, lumbar region, with neurogenic claudication     Sputum production     URINARY INCONTINENCE     Vitamin d deficiency     Wheezing        SURGICAL HISTORY  Past Surgical History:    Procedure Laterality Date    LUMBAR LAMINECTOMY DISKECTOMY  6/22/2010    Performed by GRACIE CANO at SURGERY [facility]    OTHER ORTHOPEDIC SURGERY  2004    left ankle fx    OTHER      leg fracture    OTHER      t spine disc surg    TONSILLECTOMY         FAMILY HISTORY  Family History   Problem Relation Age of Onset    Cancer Father         Lung    Other Sister         lung and leukemia cancer    Cancer Sister         Leukemia, Lung       SOCIAL HISTORY   reports that she quit smoking about 24 years ago. Her smoking use included cigarettes. She started smoking about 54 years ago. She has a 60.0 pack-year smoking history. She has never used smokeless tobacco. She reports that she does not currently use alcohol after a past usage of about 4.2 oz of alcohol per week. She reports that she does not currently use drugs after having used the following drugs: Marijuana and Oral.    CURRENT MEDICATIONS  Previous Medications    ACETAMINOPHEN (TYLENOL) 650 MG CR TABLET    Take 650 mg by mouth every 6 hours as needed.    ACYCLOVIR (ZOVIRAX) 200 MG CAP    Take 200 mg by mouth every day.    ALBUTEROL SULFATE 108 (90 BASE) MCG/ACT AEROSOL POWDER, BREATH ACTIVATED    Inhale 2 Puffs 4 times a day as needed (shortness of breath). Indications: SOB    AZELASTINE (ASTELIN) 137 MCG/SPRAY NASAL SPRAY    Administer 1-2 Sprays into affected nostril(S) 2 times a day.    AZITHROMYCIN (ZITHROMAX) 250 MG TAB    One tablet daily for COPD    CALCIUM CARBONATE (OS-MICHELLE 500) 1250 (500 CA) MG TAB    Take 600 mg by mouth every day.    CHOLECALCIFEROL (VITAMIN D3) 2000 UNIT CAP    TAKE ONE CAPSULE BY MOUTH ONE TIME DAILY    DIGOXIN (LANOXIN) 125 MCG TAB    Take 1 Tablet by mouth every day.    DULOXETINE (CYMBALTA) 30 MG CAP DR PARTICLES    Take 60 mg by mouth every day. Indications: Disease of the Peripheral Nerves, Generalized Anxiety Disorder, Major Depressive Disorder, Musculoskeletal Pain    FEXOFENADINE (ALLEGRA) 180 MG TABLET    TAKE ONE  TABLET BY MOUTH AT BEDTIME SWALLOW WHOLE WITH WATER DO NOT TAKE WITH FRUIT JUICE PER ALPINE    FLUTICASONE-SALMETEROL (ADVAIR) 500-50 MCG/ACT AEROSOL POWDER, BREATH ACTIVATED    Inhale 1 Puff 2 times a day.    GUAIFENESIN ER (MUCINEX) 600 MG TABLET SR 12 HR    Take 600-1,200 mg by mouth 2 times a day as needed. Indications: Cough    HOME CARE OXYGEN    Inhale 6 L/min Continuous. Oxygen dose range: 6 to 6 L/min  Respiratory route via: Nasal Cannula   Oxygen supplier: Preferred        HYDROCORTISONE 1 % CREAM    Apply 1 Each topically 3 times a day as needed. Indications: Inflammation, Itching    IPRATROPIUM-ALBUTEROL (DUONEB) 0.5-2.5 (3) MG/3ML NEBULIZER SOLUTION    USE ONE VIAL IN NEBULIZER EVERY 4 HOURS AS NEEDED FOR SHORTNESS OF BREATH OR  WHEEZING    LOSARTAN (COZAAR) 50 MG TAB    Take 1 Tablet by mouth 2 times a day.    MELOXICAM (MOBIC) 7.5 MG TAB    Take 7.5 mg by mouth 2 times a day.    MISC. DEVICES MISC    This is an order for  7 foot high flow oxygen tubing  Dx; asthma with COPD J 44.9, supplemental oxygen-dependent Z 99.81, chronic respiratory failure with hypoxia and J 96        ODETTE 99 months   NPI [id number]    MONTELUKAST (SINGULAIR) 10 MG TAB    Take 1 Tablet by mouth every day.    NYSTATIN (MYCOSTATIN) 465281 UNIT/ML SUSPENSION    2 times a day.    OMEPRAZOLE (PRILOSEC OTC) 20 MG TABLET    Take 1 Tab by mouth every day.    POTASSIUM CHLORIDE (MICRO-K) 10 MEQ CAPSULE    TAKE ONE CAPSULE BY MOUTH TWICE DAILY    PREDNISONE (DELTASONE) 5 MG TAB    Take 40mg x 3 days.  Then taper down by 5 mg every day.    PREDNISONE (DELTASONE) 5 MG TAB    8 pills PO for 3 days, then 1 pill less each day thereafter until done    PROMETHAZINE-DEXTROMETHORPHAN (PROMETHAZINE-DM) 6.25-15 MG/5ML SYRUP    Take 5 mL by mouth every four hours as needed for Cough.    SODIUM CHLORIDE (OCEAN) 0.65 % SOLUTION    Administer 2 Sprays into affected nostril(S) every 2 hours as needed.    SPIRONOLACTONE (ALDACTONE) 25 MG TAB    TAKE ONE  "TABLET BY MOUTH ONE TIME DAILY    TIOTROPIUM (SPIRIVA HANDIHALER) 18 MCG CAP    INHALE 1 CAPSULE CONTENT BY MOUTH ONE TIME DAILY.    TIZANIDINE (ZANAFLEX) 4 MG TAB    Take 4 mg by mouth every 8 hours as needed. not to exceed 3 doses in 24 hours  Indications: Lower Backache, Muscle Spasticity    TRIAMCINOLONE (NASACORT) 55 MCG/ACT NASAL INHALER    Administer 2 Sprays into affected nostril(S) every day.       ALLERGIES  Iodine and Tape    PHYSICAL EXAM  BP (!) 165/70   Pulse 80   Resp (!) 22   Ht 1.6 m (5' 3\")   Wt 81.2 kg (179 lb)   LMP 06/07/2001   SpO2 90%   BMI 31.71 kg/m²   Constitutional: Patient is an obese elderly female in moderate respiratory distress.  She has moist oral mucosa, nares are patent and clear, posterior pharynx reveals no erythema or exudates.  Cardiovascular: Normal heart rate and Regular rhythm. No murmur,  Thorax & Lungs: Diminished air exchange with tight expiratory wheezing. Moderate respiratory distress, no rhonchi, or rales.  Abdomen: Bowel sounds normal in all four quadrants. Soft,nontender, no rebound , guarding, palpable masses. Obese.    Skin: Warm, Dry, No erythema, No rashes.    Extremities: Peripheral pulses 4/4 No edema, No tenderness,   Neurologic: Alert & oriented x 3, Normal motor function, Normal sensory function,  Psychiatric: Affect normal, Judgment normal, Mood normal.     DIAGNOSTIC STUDIES & PROCEDURES    Labs:   Results for orders placed or performed during the hospital encounter of 02/05/24   CBC WITH DIFFERENTIAL   Result Value Ref Range    WBC 19.1 (H) 4.8 - 10.8 K/uL    RBC 4.69 4.20 - 5.40 M/uL    Hemoglobin 14.1 12.0 - 16.0 g/dL    Hematocrit 41.6 37.0 - 47.0 %    MCV 88.7 81.4 - 97.8 fL    MCH 30.1 27.0 - 33.0 pg    MCHC 33.9 32.2 - 35.5 g/dL    RDW 41.0 35.9 - 50.0 fL    Platelet Count 366 164 - 446 K/uL    MPV 9.7 9.0 - 12.9 fL    Neutrophils-Polys 87.20 (H) 44.00 - 72.00 %    Lymphocytes 6.00 (L) 22.00 - 41.00 %    Monocytes 5.40 0.00 - 13.40 %    " Eosinophils 0.40 0.00 - 6.90 %    Basophils 0.30 0.00 - 1.80 %    Immature Granulocytes 0.70 0.00 - 0.90 %    Nucleated RBC 0.00 0.00 - 0.20 /100 WBC    Neutrophils (Absolute) 16.67 (H) 1.82 - 7.42 K/uL    Lymphs (Absolute) 1.15 1.00 - 4.80 K/uL    Monos (Absolute) 1.04 (H) 0.00 - 0.85 K/uL    Eos (Absolute) 0.08 0.00 - 0.51 K/uL    Baso (Absolute) 0.06 0.00 - 0.12 K/uL    Immature Granulocytes (abs) 0.13 (H) 0.00 - 0.11 K/uL    NRBC (Absolute) 0.00 K/uL   COMP METABOLIC PANEL   Result Value Ref Range    Sodium 139 135 - 145 mmol/L    Potassium 3.7 3.6 - 5.5 mmol/L    Chloride 97 96 - 112 mmol/L    Co2 29 20 - 33 mmol/L    Anion Gap 13.0 7.0 - 16.0    Glucose 150 (H) 65 - 99 mg/dL    Bun 13 8 - 22 mg/dL    Creatinine 0.41 (L) 0.50 - 1.40 mg/dL    Calcium 9.5 8.5 - 10.5 mg/dL    Correct Calcium 9.4 8.5 - 10.5 mg/dL    AST(SGOT) 17 12 - 45 U/L    ALT(SGPT) 18 2 - 50 U/L    Alkaline Phosphatase 43 30 - 99 U/L    Total Bilirubin 0.3 0.1 - 1.5 mg/dL    Albumin 4.1 3.2 - 4.9 g/dL    Total Protein 7.0 6.0 - 8.2 g/dL    Globulin 2.9 1.9 - 3.5 g/dL    A-G Ratio 1.4 g/dL   ESTIMATED GFR   Result Value Ref Range    GFR (CKD-EPI) 100 >60 mL/min/1.73 m 2   EKG   Result Value Ref Range    Report       [facility]    Test Date:  2024  Pt Name:    FAZAL STUBBS                    Department: ER  MRN:        [id number]                      Room:       ORTHO  Gender:     Female                       Technician: [id number]  :        1945                   Requested By:ER TRIAGE PROTOCOL  Order #:    [id number]                    Reading MD:    Measurements  Intervals                                Axis  Rate:       69                           P:          55  VA:         152                          QRS:        47  QRSD:       106                          T:          54  QT:         382  QTc:        410    Interpretive Statements  Sinus arrhythmia  Low voltage, precordial leads  Consider anterior  infarct  Compared to ECG 10/03/2023 13:46:01  Low QRS voltage now present  Myocardial infarct finding now present  ST (T wave) deviation no longer present  Intraventricular conduction delay no longer present     POC CoV-2, FLU A/B, RSV by PCR   Result Value Ref Range    POC Influenza A RNA, PCR Negative Negative    POC Influenza B RNA, PCR Negative Negative    POC RSV, by PCR Negative Negative    POC SARS-CoV-2, PCR NotDetected       All labs reviewed by me.    EKG:   I have independently interpreted this EKG     Radiology:   The attending Emergency Physician has independently interpreted the diagnostic imaging associated with this visit and is awaiting the final reading from the radiologist, which will be displayed below.  Preliminary interpretation is a follows: Atelectasis with no infiltrates  Radiologist interpretation:    DX-CHEST-PORTABLE (1 VIEW)   Final Result      1.  Bibasilar underinflation atelectasis which could obscure an additional process. This is unchanged.   2.  Emphysema         COURSE & MEDICAL DECISION MAKING    ED Observation Status? No; Patient does not meet criteria for ED Observation.     INITIAL ASSESSMENT AND PLAN  Care Narrative:       7:26 PM - Patient seen and evaluated at bedside. Discussed plan of care, including labs, imaging and medications. Patient agrees to plan of care. Patient will be treated with albuterol breathing treatment for her symptoms. Ordered DX-chest, CBC w/ diff, CMP, CoV-2 Flu A/B RSV and EKG to evaluate.     Patient responded well to the continuous albuterol treatment, chest x-ray was really unremarkable.  She does have an elevated white blood cell count but no focal source of infection other than just RSV she previously described.  Her viral swab tonight came back negative.  Because she continues to have higher oxygen demands and is completely unable to even walk across the room without being extremely dyspneic I feel she needs to be admitted into the hospital for  aggressive beta agonist treatment, oxygen therapy and steroids.  I spoke with Dr. Reaves and she will be admitted in guarded condition.    ADDITIONAL PROBLEM LIST AND DISPOSITION  COPD               DISPOSITION AND DISCUSSIONS  I have discussed management of the patient with the following physicians and LINDA's: Dr. Reaves    Discussion of management with other [facility] or appropriate source(s): None     Barriers to care at this time, including but not limited to: None    Decision tools and prescription drugs considered including, but not limited to: Beta agonist, steroids and oxygen therapy along with hospital admission..      FINAL IMPRESSION   1. Persistent cough    2. RSV infection    3. Hypoxia    4. Wheezing    5. Acute exacerbation of chronic obstructive pulmonary disease (COPD) ([facility])       Pratik LI (Scribandrzej), am scribing for, and in the presence of, Jenny Adams D.O..    Electronically signed by: Pratik Sherwood (Jovan), 2/5/2024    Jenny LI D.O. personally performed the services described in this documentation, as scribed by Pratik Sherwood in my presence, and it is both accurate and complete.    The note accurately reflects work and decisions made by me.  Jenny Adams D.O.  2/6/2024  4:06 AM

## 2024-02-06 NOTE — PROGRESS NOTES
[facility] Medicine Daily Progress Note    Date of Service  2/6/2024    Chief Complaint  Berny Renee is a 78 y.o. female admitted 2/5/2024 with cough shortness of breath    Hospital Course  Patient admitted on 2/5/2024  Patient is a 78-year-old female with past medical history of end-stage COPD, chronic hypoxic respiratory failure on 6 L nasal cannula, previous stage Ia lung cancer status post radiation, NSVT on digoxin who presents for dyspnea on exertion.  Patient states that the dyspnea started about 1 week ago after she was diagnosed with RSV, although no records on file to show positive test.  Patient has increased sputum production and progressively worsening dyspnea to the point where she cannot walk to her bathroom without getting severely short of breath with desaturations.  Patient has been taking her medication as prescribed and has had no other changes to her medications.  Patient quit tobacco use in the 1900s.  Denies any fever or chills, and has been producing green to white sputum production.     Interval Problem Update  2/6 patient is new to me today, patient admitted yesterday, patient still complaining of cough and wheezing, she is on 6 L of oxygen which is her baseline but she gets short of breath with minimal exertion, I have discussed case and plan of care with patient will continue steroids, respiratory treatments, incentive spirometry, hopefully ambulation today, she has expressed understanding of plan of care and agree with it, discussed with  charge nurse bedside nurse all question answered.    I have discussed this patient's plan of care and discharge plan at IDT rounds today with Case Management, Nursing, Nursing leadership, and other members of the IDT team.    Consultants/Specialty  None    Code Status  DNAR/DNI    Disposition  The patient is not medically cleared for discharge to home or a post-acute facility.      I have placed the appropriate orders for post-discharge  needs.    Review of Systems  Review of Systems   Constitutional:  Negative for chills and fever.   Eyes:  Negative for blurred vision and double vision.   Respiratory:  Positive for cough, shortness of breath and wheezing. Negative for hemoptysis.    Cardiovascular:  Negative for chest pain, palpitations, claudication, leg swelling and PND.   Gastrointestinal:  Negative for heartburn, nausea and vomiting.   Genitourinary:  Negative for hematuria and urgency.   Musculoskeletal:  Negative for back pain and myalgias.   Skin:  Negative for rash.   Neurological:  Negative for dizziness and headaches.   Endo/Heme/Allergies:  Does not bruise/bleed easily.   Psychiatric/Behavioral:  Negative for depression.         Physical Exam  Temp:  [35.8 °C (96.5 °F)-36.8 °C (98.2 °F)] 35.8 °C (96.5 °F)  Pulse:  [] 98  Resp:  [16-35] 22  BP: (134-171)/(59-92) 156/71  SpO2:  [90 %-96 %] 90 %    Physical Exam  Vitals and nursing note reviewed.   Constitutional:       General: She is not in acute distress.     Appearance: Normal appearance.   HENT:      Mouth/Throat:      Mouth: Mucous membranes are dry.      Pharynx: No oropharyngeal exudate or posterior oropharyngeal erythema.   Eyes:      General: No scleral icterus.        Right eye: No discharge.         Left eye: No discharge.      Conjunctiva/sclera: Conjunctivae normal.   Cardiovascular:      Rate and Rhythm: Normal rate and regular rhythm.      Pulses: Normal pulses.      Heart sounds: Normal heart sounds.   Pulmonary:      Effort: Pulmonary effort is normal.      Breath sounds: Wheezing and rales present.   Abdominal:      General: Bowel sounds are normal. There is no distension.      Tenderness: There is no abdominal tenderness.   Musculoskeletal:         General: Normal range of motion.      Cervical back: Normal range of motion and neck supple.      Right lower leg: No edema.      Left lower leg: No edema.   Skin:     General: Skin is warm.      Capillary Refill:  Capillary refill takes less than 2 seconds.      Coloration: Skin is not jaundiced.   Neurological:      General: No focal deficit present.      Mental Status: She is alert and oriented to person, place, and time.      Cranial Nerves: No cranial nerve deficit.   Psychiatric:         Mood and Affect: Mood normal.         Behavior: Behavior normal.         Fluids  No intake or output data in the 24 hours ending 02/06/24 1252    Laboratory  Recent Labs     02/05/24 1915 02/06/24  0330   WBC 19.1* 16.9*   RBC 4.69 4.58   HEMOGLOBIN 14.1 13.8   HEMATOCRIT 41.6 41.1   MCV 88.7 89.7   MCH 30.1 30.1   MCHC 33.9 33.6   RDW 41.0 41.8   PLATELETCT 366 323   MPV 9.7 9.4     Recent Labs     02/05/24 1915 02/06/24  0330   SODIUM 139 136   POTASSIUM 3.7 3.9   CHLORIDE 97 95*   CO2 29 30   GLUCOSE 150* 217*   BUN 13 14   CREATININE 0.41* 0.39*   CALCIUM 9.5 9.6                   Imaging  DX-CHEST-PORTABLE (1 VIEW)   Final Result      1.  Bibasilar underinflation atelectasis which could obscure an additional process. This is unchanged.   2.  Emphysema           Assessment/Plan  * Acute exacerbation of chronic obstructive pulmonary disease (COPD) (HCC)- (present on admission)  Assessment & Plan  Last PFT 7/8/2021: FEV1 0.89 L(43%), ratio 45%, chronically on 6 L nasal cannula followed by pulm as an outpatient  - Patient compliant with all other medications at home  - Continue home Advair, Singulair, Spiriva, azithromycin  - Respiratory therapy protocol, DuoNebs as needed  - Does not appear to be infection related  - Patient was currently on a prednisone taper and only received 3 days worth previously  - Restart prednisone 40 mg for 5 days      Still having wheezing  Continue respiratory treatment.   Po steroids  IS  O2 per protocol.     Leukemoid reaction  Assessment & Plan  Elevated WBCs likely secondary to leukemoid reaction given patient was on prednisone as an outpatient without any active signs of infection    Essential  hypertension- (present on admission)  Assessment & Plan  Continuing home losartan, Aldactone    Panlobular emphysema (HCC)- (present on admission)  Assessment & Plan  Noted based on imaging also diagnosed with end-stage COPD  Needs f/u as outpatient.     Primary cancer of left upper lobe of lung (HCC)- (present on admission)  Assessment & Plan  History of stage I A2 primary lung cancer status post radiation therapy 2021  Monitoring.     SVT (supraventricular tachycardia)- (present on admission)  Assessment & Plan  History of, continue home digoxin followed by cardiology as an outpatient    Stage 4 very severe COPD by GOLD classification (HCC)- (present on admission)  Assessment & Plan  Very severe symptoms, see COPD exacerbation    Chronic respiratory failure with hypoxia (HCC)- (present on admission)  Assessment & Plan  Chronically on 6 L nasal cannula associated with severe COPD    Continue o2 per protocol.          VTE prophylaxis: Lovenox    I have performed a physical exam and reviewed and updated ROS and Plan today (2/6/2024). In review of yesterday's note (2/5/2024), there are no changes except as documented above.      Total time of 52 minutes spent prepping to see patient (e.g. reviewing  tests/imaging results, notes from consultants, bedside nurse, night shift ) obtaining and/or reviewing separately obtained history. Performing a medically appropriate examination and evaluation.  Counseling and educating the patient.  Ordering medications, tests, or procedures.  Referring and communicating with other health care professionals.  Documenting clinical information in EPIC.  Independently interpreting results and communicating results to patient.  Care coordination.

## 2024-02-06 NOTE — HOSPITAL COURSE
Patient admitted on 2/5/2024  Patient is a 78-year-old female with past medical history of end-stage COPD, chronic hypoxic respiratory failure on 6 L nasal cannula, previous stage Ia lung cancer status post radiation, NSVT on digoxin who presents for dyspnea on exertion.  Patient states that the dyspnea started about 1 week ago after she was diagnosed with RSV, although no records on file to show positive test.  Patient has increased sputum production and progressively worsening dyspnea to the point where she cannot walk to her bathroom without getting severely short of breath with desaturations.  Patient has been taking her medication as prescribed and has had no other changes to her medications.  Patient quit tobacco use in the 1900s.  Denies any fever or chills, and has been producing green to white sputum production.

## 2024-02-06 NOTE — ED TRIAGE NOTES
Bib remsa for sob - pt has rsv and dx within last 2 weeks.   Given 2 duo neb and 125 mg solumedrol en route  On 6LNC at home 24/7

## 2024-02-06 NOTE — PROGRESS NOTES
"Patient arrived via ER stretcher; with O2 at 6 L/min with shallow breathing, tachypneic of 28 x min.She walked from the stretcher to her bed with steady gait. She appears anxious, fidgeting. Said that she just had a nebulization tx and made her be a bit \"hyper and shaky\". Bed in lowest and locked position. Hourly rounding in place. Call bell within reach.  "

## 2024-02-06 NOTE — DISCHARGE PLANNING
Case Management Discharge Planning    Admission Date: 2/5/2024  GMLOS: 2.7  ALOS: 1    6-Clicks ADL Score: 21  6-Clicks Mobility Score: 21      Anticipated Discharge Dispo:      DME Needed: No    Action(s) Taken: OTHER    Discussed this case during Afternoon Huddle. Per MD, patient is not medically cleared. Per MD, the anticipated discharge disposition is Home with HHC and Home Oxygen, orders pending.    Escalations Completed: None    Medically Clear: No    Next Steps: HHC and DME Referrals, pending MD orders.    Barriers to Discharge: Medical clearance    Is the patient up for discharge tomorrow:

## 2024-02-06 NOTE — PROGRESS NOTES
4 Eyes Skin Assessment Completed by HUNTER Oswald and HUNTER Fountain.    Head WDL  Ears WDL  Nose WDL  Mouth WDL  Neck WDL  Breast/Chest WDL  Shoulder Blades WDL  Spine WDL  (R) Arm/Elbow/Hand WDL  (L) Arm/Elbow/Hand WDL  Abdomen WDLobese  Groin WDL  Scrotum/Coccyx/Buttocks WDL  (R) Leg WDL  (L) Leg WDL  (R) Heel/Foot/Toe WDL  (L) Heel/Foot/Toe WDL          Devices In Places Pulse Ox      Interventions In Place Gray Ear Foams    Possible Skin Injury No    Pictures Uploaded Into Epic N/A  Wound Consult Placed N/A  RN Wound Prevention Protocol Ordered No

## 2024-02-06 NOTE — ASSESSMENT & PLAN NOTE
Last PFT 7/8/2021: FEV1 0.89 L(43%), ratio 45%, chronically on 6 L nasal cannula followed by pulm as an outpatient  - Patient compliant with all other medications at home  - Continue home Advair, Singulair, Spiriva, azithromycin  - Respiratory therapy protocol, DuoNebs as needed  - Does not appear to be infection related  - Patient was currently on a prednisone taper and only received 3 days worth previously  - Restart prednisone 40 mg for 5 days      Still having wheezing  Continue respiratory treatment.   Po steroids  IS  O2 per protocol.

## 2024-02-06 NOTE — CARE PLAN
The patient is Stable - Low risk of patient condition declining or worsening    Shift Goals  Clinical Goals: Maintain stable O2  Patient Goals: go home4  Family Goals: tess    Progress made toward(s) clinical / shift goals:        Problem: Pain - Standard  Goal: Alleviation of pain or a reduction in pain to the patient’s comfort goal  Outcome: Progressing  Note: Patient maintains 0 pain level with interventions in place.     Problem: Knowledge Deficit - COPD  Goal: Patient/significant other demonstrates understanding of disease process, utilization of the Action Plan, medications and discharge instruction  Outcome: Progressing  Note: Patient updated on plan of care by team members.Pt expresses understanding of care plan and interventions.     Problem: Self Care  Goal: Patient will have the ability to perform ADLs independently or with assistance (bathe, groom, dress, toilet and feed)  Outcome: Progressing  Note: Pt toilets independently        Patient is not progressing towards the following goals:

## 2024-02-06 NOTE — CARE PLAN
The patient is Stable - Low risk of patient condition declining or worsening    Shift Goals  Clinical Goals: Maintain stable O2  Patient Goals: go home4  Family Goals: tess    Progress made toward(s) clinical / shift goals:          Patient is not progressing towards the following goals:

## 2024-02-06 NOTE — ASSESSMENT & PLAN NOTE
Elevated WBCs likely secondary to leukemoid reaction given patient was on prednisone as an outpatient without any active signs of infection

## 2024-02-06 NOTE — ED NOTES
Pt ambulating from restroom to bed with portable oxygen. Pt able to speak 1-2 word sentences. Reports increased SOB on exertion.

## 2024-02-06 NOTE — ED NOTES
Assumed pt. Care at this time, waiting recheck, pt. On 6L, .Pt. Resting,  3 p's addressed, call light at bedside, poc updated

## 2024-02-06 NOTE — H&P
[facility] History & Physical Note    Date of Service  2/6/2024    [facility] Team: [facility]   Attending: Mishel Reaves M.d.  Senior Resident: Dr. Coe  Contact Number: [phone]    Primary Care Physician  Everett Diego M.D.    Consultants  None    Code Status  DNAR/DNI    Chief Complaint  Chief Complaint   Patient presents with    Cough       History of Presenting Illness (HPI):     Patient is a 78-year-old female with past medical history of end-stage COPD, chronic hypoxic respiratory failure on 6 L nasal cannula, previous stage Ia lung cancer status post radiation, NSVT on digoxin who presents for dyspnea on exertion.  Patient states that the dyspnea started about 1 week ago after she was diagnosed with RSV, although no records on file to show positive test.  Patient has increased sputum production and progressively worsening dyspnea to the point where she cannot walk to her bathroom without getting severely short of breath with desaturations.  Patient has been taking her medication as prescribed and has had no other changes to her medications.  Patient quit tobacco use in the 1900s.  Denies any fever or chills, and has been producing green to white sputum production.      I discussed the plan of care with patient.    Review of Systems  Review of Systems   Constitutional:  Negative for chills and fever.   HENT:  Negative for congestion and sore throat.    Eyes:  Negative for double vision.   Respiratory:  Positive for shortness of breath. Negative for cough and wheezing.    Cardiovascular:  Negative for chest pain, palpitations and leg swelling.   Gastrointestinal:  Negative for abdominal pain, constipation, diarrhea, nausea and vomiting.   Genitourinary:  Negative for dysuria.   Musculoskeletal:  Negative for myalgias.   Skin:  Negative for rash.   Neurological:  Negative for dizziness and headaches.   Psychiatric/Behavioral:  Negative for substance abuse.        Past Medical History   has a past  medical history of Acute on chronic respiratory failure with hypoxia ([facility]) (11/14/2020), Arthritis, Asthma with COPD, BMI 31.0-31.9,adult (02/04/2022), Cataract immature, Chronic pain, Chronic respiratory failure with hypoxia ([facility]) (07/13/2017), Colon polyps, COPD (chronic obstructive pulmonary disease) ([facility]) (2002), Cough, DDD (degenerative disc disease), cervical, DDD (degenerative disc disease), thoracic, Dependence on continuous supplemental oxygen (08/13/2015), Diverticulitis of colon, Dyslipidemia, EMPHYSEMA, H/O opioid abuse ([facility]) (07/15/2021), Hypertension, Obesity (BMI 30-39.9) (10/24/2016), Opioid type dependence, continuous ([facility]) (02/04/2022), REGINE (obstructive sleep apnea), OSTEOPOROSIS, Painful breathing, Shortness of breath, Spinal stenosis, lumbar region, with neurogenic claudication, Sputum production, URINARY INCONTINENCE, Vitamin d deficiency, and Wheezing.    Surgical History   has a past surgical history that includes tonsillectomy; other orthopedic surgery (2004); other; other; and lumbar laminectomy diskectomy (6/22/2010).     Family History  family history includes Cancer in her father and sister; Other in her sister.   Family history reviewed with patient.     Social History  Tobacco: States that they quit in the 1900s  Alcohol: Denies  Recreational drugs (illegal or prescription): Denies  Living Situation: Lives alone with a cat in [place]  Primary Care Provider: Not Reviewed    Allergies  Allergies   Allergen Reactions    Iodine      Convulsion  I.V.  iodine    Tape Rash and Itching       Medications  Prior to Admission Medications   Prescriptions Last Dose Informant Patient Reported? Taking?   Albuterol Sulfate 108 (90 Base) MCG/ACT AEROSOL POWDER, BREATH ACTIVATED 2/5/2024 at 1230 Patient No No   Sig: Inhale 2 Puffs 4 times a day as needed (shortness of breath). Indications: SOB   Cholecalciferol (VITAMIN D3) 2000 UNIT Cap 2/4/2024 at 1930 Patient No No   Sig: TAKE ONE CAPSULE BY MOUTH ONE  TIME DAILY   DULoxetine (CYMBALTA) 30 MG Cap DR Particles 2/4/2024 at 1930 Patient Yes No   Sig: Take 60 mg by mouth every evening. Indications: Disease of the Peripheral Nerves, Generalized Anxiety Disorder, Major Depressive Disorder, Musculoskeletal Pain   acyclovir (ZOVIRAX) 200 MG Cap 2/5/2024 at 0800 Patient Yes No   Sig: Take 200 mg by mouth every day.   azithromycin (ZITHROMAX) 250 MG Tab 2/5/2024 at 0800 Patient No No   Sig: One tablet daily for COPD   calcium carbonate (OS-MICHELLE 500) 1250 (500 Ca) MG Tab 2/4/2024 at 1930 Patient Yes No   Sig: Take 600 mg by mouth every evening.   digoxin (LANOXIN) 125 MCG Tab 2/4/2024 at 1930 Patient No No   Sig: Take 1 Tablet by mouth every day.   fluticasone-salmeterol (ADVAIR) 500-50 MCG/ACT AEROSOL POWDER, BREATH ACTIVATED 2/5/2024 at 1000 Patient No No   Sig: Inhale 1 Puff 2 times a day.   losartan (COZAAR) 50 MG Tab 2/5/2024 at 0800 Patient No No   Sig: Take 1 Tablet by mouth 2 times a day.   meloxicam (MOBIC) 7.5 MG Tab 2/5/2024 at 0800 Patient Yes No   Sig: Take 7.5 mg by mouth 2 times a day.   montelukast (SINGULAIR) 10 MG Tab 2/4/2024 at 1930 Patient No No   Sig: Take 1 Tablet by mouth every day.   omeprazole (PRILOSEC OTC) 20 MG tablet 2/5/2024 at 0800 Patient No No   Sig: Take 1 Tab by mouth every day.   potassium chloride (MICRO-K) 10 MEQ capsule 2/5/2024 at 0800 Patient No No   Sig: TAKE ONE CAPSULE BY MOUTH TWICE DAILY   spironolactone (ALDACTONE) 25 MG Tab 2/5/2024 at 0800 Patient No No   Sig: TAKE ONE TABLET BY MOUTH ONE TIME DAILY   tiotropium (SPIRIVA HANDIHALER) 18 MCG Cap 2/4/2024 at 1930 Patient No No   Sig: INHALE 1 CAPSULE CONTENT BY MOUTH ONE TIME DAILY.      Facility-Administered Medications: None       Physical Exam  Temp:  [36.1 °C (96.9 °F)-36.8 °C (98.2 °F)] 36.1 °C (96.9 °F)  Pulse:  [] 82  Resp:  [18-35] 22  BP: (134-171)/(59-92) 164/62  SpO2:  [90 %-94 %] 91 %  Blood Pressure : (!) 164/62 (RN NOTIFIED)   Temperature: 36.1 °C (96.9 °F)  "  Pulse: 82   Respiration: (!) 22   Pulse Oximetry: 91 %       Physical Exam  Vitals and nursing note reviewed.   Constitutional:       General: She is not in acute distress.  HENT:      Head: Normocephalic and atraumatic.      Nose: Nose normal.      Mouth/Throat:      Mouth: Mucous membranes are moist.   Eyes:      General: No scleral icterus.     Extraocular Movements: Extraocular movements intact.   Cardiovascular:      Rate and Rhythm: Normal rate and regular rhythm.      Heart sounds: No murmur heard.     No friction rub. No gallop.   Pulmonary:      Breath sounds: Decreased air movement present. Examination of the right-lower field reveals rales. Examination of the left-lower field reveals rales. Rales present. No wheezing or rhonchi.   Abdominal:      General: Abdomen is flat. Bowel sounds are normal. There is no distension.      Palpations: Abdomen is soft.      Tenderness: There is no abdominal tenderness.   Musculoskeletal:         General: No swelling or tenderness.   Skin:     General: Skin is warm.      Capillary Refill: Capillary refill takes less than 2 seconds.   Neurological:      General: No focal deficit present.      Mental Status: She is alert.   Psychiatric:         Mood and Affect: Mood normal.         Laboratory:  Recent Labs     02/05/24 1915   WBC 19.1*   RBC 4.69   HEMOGLOBIN 14.1   HEMATOCRIT 41.6   MCV 88.7   MCH 30.1   MCHC 33.9   RDW 41.0   PLATELETCT 366   MPV 9.7     Recent Labs     02/05/24 1915   SODIUM 139   POTASSIUM 3.7   CHLORIDE 97   CO2 29   GLUCOSE 150*   BUN 13   CREATININE 0.41*   CALCIUM 9.5     Recent Labs     02/05/24 1915   ALTSGPT 18   ASTSGOT 17   ALKPHOSPHAT 43   TBILIRUBIN 0.3   GLUCOSE 150*         No results for input(s): \"NTPROBNP\" in the last 72 hours.      No results for input(s): \"TROPONINT\" in the last 72 hours.    Imaging:  DX-CHEST-PORTABLE (1 VIEW)   Final Result      1.  Bibasilar underinflation atelectasis which could obscure an additional process. " This is unchanged.   2.  Emphysema          X-Ray:  My impression is: Flattened diaphragms with some interstitial markings appears unchanged from prior    Assessment/Plan:  Problem Representation:   Patient is a 78-year-old female with past medical history of end-stage COPD, chronic hypoxic respiratory failure on 6 L nasal cannula, previous stage Ia lung cancer status post radiation, NSVT on digoxin who presents for dyspnea on exertion found to be in acute COPD exacerbation.    I anticipate this patient will require at least two midnights for appropriate medical management, necessitating inpatient admission because acute COPD exacerbation requiring DuoNebs as well as steroids      Patient will need a Med/Surg bed on MEDICAL service .  The need is secondary to acute COPD exacerbation requiring 2 midnight stays.    * Acute exacerbation of chronic obstructive pulmonary disease (COPD) (HCC)- (present on admission)  Assessment & Plan  Last PFT 7/8/2021: FEV1 0.89 L(43%), ratio 45%, chronically on 6 L nasal cannula followed by pulm as an outpatient  - Patient compliant with all other medications at home  - Continue home Advair, Singulair, Spiriva, azithromycin  - Respiratory therapy protocol, DuoNebs as needed  - Does not appear to be infection related  - Patient was currently on a prednisone taper and only received 3 days worth previously  - Restart prednisone 40 mg for 5 days    Leukemoid reaction  Assessment & Plan  Elevated WBCs likely secondary to leukemoid reaction given patient was on prednisone as an outpatient without any active signs of infection    Essential hypertension- (present on admission)  Assessment & Plan  Continuing home losartan, Aldactone    Panlobular emphysema (HCC)- (present on admission)  Assessment & Plan  Noted based on imaging also diagnosed with end-stage COPD    Primary cancer of left upper lobe of lung (HCC)- (present on admission)  Assessment & Plan  History of stage I A2 primary lung  cancer status post radiation therapy 2021    SVT (supraventricular tachycardia)- (present on admission)  Assessment & Plan  History of, continue home digoxin followed by cardiology as an outpatient    Stage 4 very severe COPD by GOLD classification ([facility])- (present on admission)  Assessment & Plan  Very severe symptoms, see COPD exacerbation    Chronic respiratory failure with hypoxia ([facility])- (present on admission)  Assessment & Plan  Chronically on 6 L nasal cannula associated with severe COPD        VTE prophylaxis: enoxaparin ppx

## 2024-02-06 NOTE — CARE PLAN
The patient is Stable - Low risk of patient condition declining or worsening    Shift Goals  Clinical Goals: Maintain O2>91, safety  Patient Goals: breathing better,  Family Goals: NA    Progress made toward(s) clinical / shift goals:  Patient received her nebulization and has maintained her O2 sat > 91-92% at 6 L/min. Patient alert and oriented x 4, requested to have the bed alarm off to not wake up her roommate with the alarm goes on

## 2024-02-06 NOTE — PROGRESS NOTES
"Patient requested not to put the bed alarm and she promised to call us prior to get up to use the restroom.  Up for second time about 0125 hr, \"I did not get shortness of breath, this time; so, I can go home tomorrow\" she said smiling prior going back to bed.  "

## 2024-02-07 ENCOUNTER — PHARMACY VISIT (OUTPATIENT)
Dept: PHARMACY | Facility: MEDICAL CENTER | Age: 79
End: 2024-02-07
Payer: COMMERCIAL

## 2024-02-07 ENCOUNTER — HOME HEALTH ADMISSION (OUTPATIENT)
Dept: HOME HEALTH SERVICES | Facility: HOME HEALTHCARE | Age: 79
End: 2024-02-07
Payer: MEDICARE

## 2024-02-07 VITALS
SYSTOLIC BLOOD PRESSURE: 142 MMHG | OXYGEN SATURATION: 91 % | DIASTOLIC BLOOD PRESSURE: 56 MMHG | HEIGHT: 63 IN | RESPIRATION RATE: 16 BRPM | BODY MASS INDEX: 33.98 KG/M2 | TEMPERATURE: 96.8 F | WEIGHT: 191.8 LBS | HEART RATE: 72 BPM

## 2024-02-07 PROBLEM — J44.1 ACUTE EXACERBATION OF CHRONIC OBSTRUCTIVE PULMONARY DISEASE (COPD) (HCC): Status: RESOLVED | Noted: 2024-02-05 | Resolved: 2024-02-07

## 2024-02-07 PROBLEM — D72.823 LEUKEMOID REACTION: Status: RESOLVED | Noted: 2024-02-06 | Resolved: 2024-02-07

## 2024-02-07 PROCEDURE — 94640 AIRWAY INHALATION TREATMENT: CPT

## 2024-02-07 PROCEDURE — 99239 HOSP IP/OBS DSCHRG MGMT >30: CPT | Performed by: HOSPITALIST

## 2024-02-07 PROCEDURE — 94669 MECHANICAL CHEST WALL OSCILL: CPT

## 2024-02-07 PROCEDURE — A9270 NON-COVERED ITEM OR SERVICE: HCPCS | Performed by: HOSPITALIST

## 2024-02-07 PROCEDURE — 700102 HCHG RX REV CODE 250 W/ 637 OVERRIDE(OP): Performed by: HOSPITALIST

## 2024-02-07 PROCEDURE — 700102 HCHG RX REV CODE 250 W/ 637 OVERRIDE(OP)

## 2024-02-07 PROCEDURE — A9270 NON-COVERED ITEM OR SERVICE: HCPCS

## 2024-02-07 PROCEDURE — 700111 HCHG RX REV CODE 636 W/ 250 OVERRIDE (IP)

## 2024-02-07 PROCEDURE — 700101 HCHG RX REV CODE 250

## 2024-02-07 PROCEDURE — RXMED WILLOW AMBULATORY MEDICATION CHARGE: Performed by: HOSPITALIST

## 2024-02-07 RX ORDER — BENZONATATE 100 MG/1
100 CAPSULE ORAL 3 TIMES DAILY PRN
Qty: 30 CAPSULE | Refills: 0 | Status: SHIPPED | OUTPATIENT
Start: 2024-02-07

## 2024-02-07 RX ORDER — PREDNISONE 20 MG/1
TABLET ORAL
Qty: 9 TABLET | Refills: 0 | Status: SHIPPED | OUTPATIENT
Start: 2024-02-08

## 2024-02-07 RX ORDER — BENZONATATE 100 MG/1
100 CAPSULE ORAL 3 TIMES DAILY PRN
Status: DISCONTINUED | OUTPATIENT
Start: 2024-02-07 | End: 2024-02-07 | Stop reason: HOSPADM

## 2024-02-07 RX ORDER — TRAMADOL HYDROCHLORIDE 50 MG/1
50 TABLET ORAL EVERY 6 HOURS PRN
Status: DISCONTINUED | OUTPATIENT
Start: 2024-02-07 | End: 2024-02-07 | Stop reason: HOSPADM

## 2024-02-07 RX ORDER — TRAMADOL HYDROCHLORIDE 50 MG/1
50 TABLET ORAL EVERY 8 HOURS PRN
Qty: 9 TABLET | Refills: 0 | Status: SHIPPED | OUTPATIENT
Start: 2024-02-07 | End: 2024-02-10

## 2024-02-07 RX ORDER — GUAIFENESIN 600 MG/1
600 TABLET, EXTENDED RELEASE ORAL EVERY 12 HOURS
Status: DISCONTINUED | OUTPATIENT
Start: 2024-02-07 | End: 2024-02-07 | Stop reason: HOSPADM

## 2024-02-07 RX ORDER — MELOXICAM 7.5 MG/1
7.5 TABLET ORAL 2 TIMES DAILY
Status: DISCONTINUED | OUTPATIENT
Start: 2024-02-07 | End: 2024-02-07 | Stop reason: HOSPADM

## 2024-02-07 RX ORDER — TRAMADOL HYDROCHLORIDE 50 MG/1
50 TABLET ORAL EVERY 8 HOURS PRN
Qty: 9 TABLET | Refills: 0 | Status: SHIPPED | OUTPATIENT
Start: 2024-02-07 | End: 2024-02-07

## 2024-02-07 RX ORDER — GUAIFENESIN 600 MG/1
600 TABLET, EXTENDED RELEASE ORAL EVERY 12 HOURS
Qty: 10 TABLET | Refills: 0 | Status: SHIPPED | OUTPATIENT
Start: 2024-02-07 | End: 2024-02-12

## 2024-02-07 RX ADMIN — IPRATROPIUM BROMIDE AND ALBUTEROL SULFATE 3 ML: 2.5; .5 SOLUTION RESPIRATORY (INHALATION) at 07:51

## 2024-02-07 RX ADMIN — MOMETASONE FUROATE AND FORMOTEROL FUMARATE DIHYDRATE 2 PUFF: 200; 5 AEROSOL RESPIRATORY (INHALATION) at 08:00

## 2024-02-07 RX ADMIN — GUAIFENESIN 600 MG: 600 TABLET, EXTENDED RELEASE ORAL at 12:08

## 2024-02-07 RX ADMIN — PREDNISONE 40 MG: 20 TABLET ORAL at 05:23

## 2024-02-07 RX ADMIN — TRAMADOL HYDROCHLORIDE 50 MG: 50 TABLET ORAL at 03:02

## 2024-02-07 RX ADMIN — TIOTROPIUM BROMIDE INHALATION SPRAY 5 MCG: 3.12 SPRAY, METERED RESPIRATORY (INHALATION) at 07:51

## 2024-02-07 RX ADMIN — OMEPRAZOLE 20 MG: 20 CAPSULE, DELAYED RELEASE ORAL at 05:22

## 2024-02-07 RX ADMIN — MELOXICAM 7.5 MG: 7.5 TABLET ORAL at 05:22

## 2024-02-07 RX ADMIN — DIGOXIN 125 MCG: 0.12 TABLET ORAL at 05:22

## 2024-02-07 RX ADMIN — IPRATROPIUM BROMIDE AND ALBUTEROL SULFATE 3 ML: 2.5; .5 SOLUTION RESPIRATORY (INHALATION) at 11:56

## 2024-02-07 RX ADMIN — AZITHROMYCIN DIHYDRATE 250 MG: 250 TABLET, FILM COATED ORAL at 05:22

## 2024-02-07 RX ADMIN — ACETAMINOPHEN 650 MG: 325 TABLET, FILM COATED ORAL at 07:38

## 2024-02-07 RX ADMIN — LOSARTAN POTASSIUM 50 MG: 50 TABLET, FILM COATED ORAL at 05:22

## 2024-02-07 RX ADMIN — SPIRONOLACTONE 25 MG: 25 TABLET ORAL at 05:22

## 2024-02-07 RX ADMIN — MONTELUKAST 10 MG: 10 TABLET, FILM COATED ORAL at 05:22

## 2024-02-07 ASSESSMENT — PAIN DESCRIPTION - PAIN TYPE
TYPE: CHRONIC PAIN
TYPE: CHRONIC PAIN
TYPE: ACUTE PAIN

## 2024-02-07 NOTE — PROGRESS NOTES
1543 D/C'd.  Discharge instructions provided to pt.  Pt states understanding.  Pt states all questions have been answered.  Copy of discharge provided to pt. Pt has meds to beds. Signed copy in chart.  Prescriptions provided to pt, copy in chart. Pt states that all personal belongings are in possession.  Pt went home with oxygen, on 6L.  Pt escorted off unit with assistance from Rosario COX in wheelchair, without incident.

## 2024-02-07 NOTE — CARE PLAN
The patient is Stable - Low risk of patient condition declining or worsening    Shift Goals  Clinical Goals: Maintain 6L of oxygen  Patient Goals: Discharge  Family Goals: LORA    Progress made toward(s) clinical / shift goals:      Problem: Knowledge Deficit - COPD  Goal: Patient/significant other demonstrates understanding of disease process, utilization of the Action Plan, medications and discharge instruction  Outcome: Progressing     Problem: Self Care  Goal: Patient will have the ability to perform ADLs independently or with assistance (bathe, groom, dress, toilet and feed)  Outcome: Progressing       Patient is not progressing towards the following goals:

## 2024-02-07 NOTE — DISCHARGE PLANNING
HTH/SCP TCN chart review completed. Collaborated with CAROLANN Crenshaw prior to meeting with the pt. The most current review of medical record, knowledge of pt's PLOF and social support, LACE+ score of 72, 6 clicks scores of 21 ADL's and 21 mobility were considered.  Per Kardex, patient ambulating 6 feet X 1 no AD.      Pt seen at bedside. Introduced TCN program. Provided education regarding post acute levels of care. Education provided regarding case management policy for blanket SNF referrals. Discussed HTH/SCP plan benefits. Pt verbalizes understanding.     Patient states she lives alone in a ground floor apartment with a 7 inch lip and cannot get her electric scooter out but is moving into a handicapped apartment in a month.  She states she is Modified Independent with ADL's and mobility with use of a 4WW.  She has a  once a week.  She has a 4WW and an electric scooter.  She is on 6 L/min O2 with [facility] and is currently at 6 L/min.  She has a portable O2 tank from home at her bedside for her discharge home.  (She states that she had [facility] and started with [facility] but was unable to fill the portable so now states she is back with [facility])/  She states that she will begin her Pulmonary outpatient therapy at the end of this month She states she is not at her baseline level of function but is close and is agreeable to HH if indicated at discharge.      Choice proactively obtained for HH ([facility]) if indicated at discharge, faxed to [facility] and given to CM.  TCN will continue to follow and collaborate with discharge planning team as additional post acute needs arise. Thank you.     Completed today:  Choice obtained: HH ([facility]) if indicated at discharge.    SCP with [facility] P[facility].   Patient will schedule her own Follow up appointment.

## 2024-02-07 NOTE — DISCHARGE PLANNING
We are currently verifying MD acceptance of your patient. This will be resolved ASAP. Thank you for your patience.     Respectfully,   [facility]

## 2024-02-07 NOTE — DISCHARGE INSTRUCTIONS
Chronic Obstructive Pulmonary Disease (COPD) is a long-term, progressive lung disease that makes it harder to breathe. It includes chronic bronchitis, emphysema, and refractory (non-reversible) asthma. With COPD, the airways in your lungs become inflamed and thicken, and the tissue where oxygen is exchanged is destroyed. The flow of air in and out of your lungs decreases. When that happens, less oxygen gets into your body tissues, and it becomes harder to get rid of the waste gas carbon dioxide. As the disease gets worse, shortness of breath makes it harder to remain active. There is no cure for COPD, but it is preventable and treatable.    COPD Patient Discharge Instructions    Diet  Follow a low fat, low cholesterol, low salt diet unless instructed otherwise by your Doctor. Read the labels on the back of food products and track your intake of fat, cholesterol and salt.  No smoking  Discontinuing smoking will have the biggest impact on preventing progression of disease.  To participate in [employer]’s Quit Tobacco Program, call [phone] or visit [email or web address]  Oxygen  If your doctor has order that you wear oxygen at home, it is important to wear it as ordered.  Do not smoke, vape, or use e-cigarettes with oxygen on.  Store in an appropriate location: upright in its seaman or laid flat down, away from open flames and stoves.   Do not use oil-based creams and moisturizers (ie: petroleum products, oil-based lip moisturizers) or aerosol sprays (ie: hair sprays or deodorants) when using your oxygen equipment.  Be careful with tubing placement to prevent yourself and others from tripping.  Medications  Refer to your personalized Action Plan to manage your symptoms.  Warning signs of an exacerbation  Breathing fast and shallow, worsening shortness of breath (like you just finished exercising)  Chest tightness  Increases in sputum production  Changes in sputum color  Lower oxygen levels than baseline  When  to call your doctor  If the warning signs of an exacerbation do not improve  Refer to your personalized Action Plan   Pulmonary Rehab  Your doctor has ordered you a referral to Pulmonary Rehab. Call [phone] to schedule an appointment  Attend your follow-up appointment with your PCP and/or Pulmonologist  Remote Monitoring: At the direction of the remote monitoring on-call provider, you may increase your oxygen by 2 liters above your baseline.     See the educational handout provided by your COPD Navigator for more information. This also explains more about COPD, symptoms of an exacerbation, and some of the tests that you have undergone.

## 2024-02-07 NOTE — RESPIRATORY CARE
COPD EDUCATION by COPD CLINICAL EDUCATOR  2/6/2024  at  1550 PM by Malina Martinez RRT     Patient interviewed by education team.  Patient is unable to participate in the full program.  Therefore, a short intervention has been conducted.  A comprehensive discussion including information about COPD, types of treatments to help manage her disease and safe home Oxygen usage was provided and reviewed with patient at the bedside.      COPD Screen  COPD Risk Screening  Do you have a history of COPD?: Yes  Do you have a Pulmonologist?: Yes  COPD Population Screener  During the past 4 weeks, how much did you feel short of breath?: Some of the time  Do you ever cough up any mucus or phlegm?: Yes, a few days a week or month  In the past 12 months, you do less than you used to because of your breathing problems: Agree  Have you smoked at least 100 cigarettes in your entire life?: Yes  How old are you?: 60+  COPD Screening Score: 7  COPD Coordinator Recommended: Yes    COPD Assessment  COPD Clinical Specialists ONLY  COPD Education Initiated: Yes--Short Intervention (PFT 7/21 : airflow obstruction with FEV1/FVC ratio 45 and an FEV1 of 0.89 L or 43% predicted.)  Is this a COPD exacerbation patient?: Yes  DME Company: [employer]  DME Equipment Type: Home 02 @ 6LNC  Physician Name: Everett Diego M.D.  Pulmonary Follow Up Appointment: 04/25/24 (will call for earlier appointment)  Pulmonologist Name: [facility] - Dr Kumar  Referrals Initiated: Yes  Pulmonary Rehab: Yes (Pt scheduled 2/27@ 1400)  Smoking Cessation:  (Pt quit + 24 yrs ago)  Hospice: N/A  Home Health Care:  (TBD - Highly recommended as Pt lives alone- CM / MD note pending)  Mobile Urgent Care Services: Yes (Pt very interested in Dr Taylor services as she lives alone/ has overall difficulty mainatining health status changes / relies on 911- Bridge care referral)  Geriatric Specialty Group: N/A  Private In-Home Care Agency:  (TBD)  $ Demo/Eval of SVN's,  "MDI's and Aerosols: Yes (Reviewed respiratory medications and use. Pt has good knowledge of her condition and resp medications regiment)  (OP) Pulmonary Function Testing: Yes (PFT 7-8-21  Baseline spirometry shows airflow obstruction with FEV1/FVC ratio 45 and an FEV1 of 0.89 L or 43% predicted.)  Interdisciplinary Rounds: Attendance at Rounds (30 Min)    PFT Results    PFT 7-8-21  Baseline spirometry shows airflow obstruction with FEV1/FVC ratio 45 and an FEV1 of 0.89 L or 43% predicted.    Meds to [facility] provides bedside medication delivery for all eligible patients at discharge.  Would you like to opt out of this program for any reason?: No - Stay Opted In     MY COPD ACTION PLAN     It is recommended that patients and physicians /healthcare providers complete this action plan together. This plan should be discussed at each physician visit and updated as needed.    The green, yellow and red zones show groups of symptoms of COPD. This list of symptoms is not comprehensive, and you may experience other symptoms. In the \"Actions\" column, your healthcare provider has recommended actions for you to take based on your symptoms.    Patient Name: Berny Renee   YOB: 1945   Last Updated on: 2/6/2024  3:50 PM   Green Zone:  I am doing well today Actions     Usual activitiy and exercise level   Take daily medications     Usual amounts of cough and phlegm/mucus   Use oxygen as prescribed     Sleep well at night   Continue regular exercise/diet plan     Appetite is good   At all times avoid cigarette smoke, inhaled irritants     Daily Medications (these medications are taken every day):   Fluticosone/Salmeterol (Advair)  Tiotropium Bromide Monohydrate (Spiriva) 1 Puff  1 capsule Twice daily  Once daily     Additional Information:  Use as directed.  Please rinse mouth after using Advair.    Yellow Zone:  I am having a bad day or a COPD flare Actions     More breathless than usual   Continue daily " "medications     I have less energy for my daily activities   Use quick relief inhaler as ordered     Increased or thicker phlegm/mucus   Use oxygen as prescribed     Using quick relief inhaler/nebulizer more often   Get plenty of rest     Swelling of ankles more than usual   Use pursed lip breathing     More coughing than usual   At all times avoid cigarette smoke, inhaled irritants     I feel like I have a \"chest cold\"     Poor sleep and my symptoms woke me up     My appetite is not good     My medicine is not helping      Call provider immediately if symptoms don’t improve     Continue daily medications, add rescue medications:   Albuterol/Ipratropium (Combivent, Duoneb)  Albuterol 3mL via nebulizer  2 Puffs Every 4 hours PRN  Every 4 hours PRN       Medications to be used during a flare up, (as Discussed with Provider):           Additional Information:  Use your nebulizer first when short of breath.    Use a spacer with your rescue inhaler when nebulizer is not available.       Red Zone:  I need urgent medical care Actions     Severe shortness of breath even at rest   Call 911 or seek medical care immediately     Not able to do any activity because of breathing      Fever or shaking chills      Feeling confused or very drowsy       Chest pains      Coughing up blood                  "

## 2024-02-07 NOTE — DISCHARGE PLANNING
Case Management Discharge Planning    Admission Date: 2/5/2024  GMLOS: 2.7  ALOS: 2    6-Clicks ADL Score: 21  6-Clicks Mobility Score: 21      Anticipated Discharge Dispo:  Home with HHC.    DME Needed: No    Action(s) Taken: OTHER    Per MD, patient is medically clear for discharge home with C. Per DPA, [facility] ACCEPTED.    Escalations Completed: None    Medically Clear: Yes    Next Steps: Home    Barriers to Discharge: None    Is the patient up for discharge tomorrow:

## 2024-02-07 NOTE — DISCHARGE PLANNING
ATTN: Case Management  RE: Referral for Home Health    As of 02.07.2024, we have accepted the Home Health referral for the patient listed above.    A [facility] clinician will be out to see the patient within 48 hours. If you have any questions or concerns regarding the patient’s transition to Home Health, please do not hesitate to contact us at [phone].      We look forward to collaborating with you,  [facility] Team

## 2024-02-07 NOTE — DISCHARGE PLANNING
Patient's PCP Dr. Diego has retired. We need the patient set up with an appointment to establish with a new PCP in order to receive HH services.

## 2024-02-07 NOTE — PROGRESS NOTES
Assumed care at 0700 from night shift nurse. Pt alert and oriented, reporting arthritic pain in the knees 3/10. Gave tylenol at 0738, reassessed pain at 0854, patient still has pain 3/10. Comfort goal level 0/10. Preformed full assessment. Updated communication board. O2 92% on 6L o2, baseline for this pt. Pt states feeling bloated. Personal belongings within reach, call light in reach, bed in lowest position, fall script reviewed. Will continue to monitor.

## 2024-02-07 NOTE — DISCHARGE PLANNING
"HTH/SCP TCN chart review completed. Current discharge considerations are anticipated for dc to home with HH. Note that HH referral* has been sent per review and pt has dc order in place as well. As prior, would note \"she is on 6 L/min O2 with [employer]...She has a portable O2 tank from home at her bedside for her discharge home\". TCN will continue to follow and collaborate with discharge planning team as additional post acute needs arise. Thank you.    Completed:  Choice obtained:  ([facility])   SCP with [facility]*    *pt's PCP has retired; [facility] requesting pt to have PCP appt established to follow for [facility]; TCN sent to schedulers for establishing new PCP appt; awaiting to hear back at time of writing note; will update  once PCP appt established for [facility] acceptance if able**    ** - received update from CAROLANN later in PM that pt's PCP did not retire though had changed clinics; [facility] has now accepted and pt to dc today with [facility] services  "

## 2024-02-07 NOTE — CONSULTS
Pulmonary Consultation    Date of consult: 2/6/2024    Referring Physician  ANGEL Zhang.*    Reason for Consultation  COPD    History of Presenting Illness  78 y.o. female who presented 2/5/2024 with a history of COPD, HLD, HTN, REGINE, on chronic supplemental oxygen at 6 L by nasal cannula.  She follows with Dr. Kumar in the pulmonary clinic.  She is managed on Advair, Spiriva and Singulair.  She has a 60-pack-year smoking history, quit in 1999.  She has a history of SBRT in 2021 for an unbiopsied presumed primary lung cancer.  This has been under surveillance for years and has been stable.    She states for the past week she has been having increased shortness of breath with nasal and sinus congestion.  She finally went to urgent care where she states she was tested for RSV and was positive.  She went home and continued to worsen at which time she came to the emergency room at the encouragement of her friends.    Code Status  DNAR/DNI    Review of Systems  Review of Systems   Constitutional:  Positive for malaise/fatigue. Negative for chills and fever.   HENT:  Positive for congestion.    Respiratory:  Positive for cough and shortness of breath. Negative for hemoptysis and sputum production.    Cardiovascular:  Negative for chest pain, palpitations and leg swelling.   Gastrointestinal:  Negative for heartburn.   Neurological:  Negative for dizziness and headaches.       Past Medical History   has a past medical history of Acute on chronic respiratory failure with hypoxia ([facility]) (11/14/2020), Arthritis, Asthma with COPD, BMI 31.0-31.9,adult (02/04/2022), Cataract immature, Chronic pain, Chronic respiratory failure with hypoxia ([facility]) (07/13/2017), Colon polyps, COPD (chronic obstructive pulmonary disease) ([facility]) (2002), Cough, DDD (degenerative disc disease), cervical, DDD (degenerative disc disease), thoracic, Dependence on continuous supplemental oxygen (08/13/2015), Diverticulitis of colon, Dyslipidemia,  EMPHYSEMA, H/O opioid abuse ([facility]) (07/15/2021), Hypertension, Obesity (BMI 30-39.9) (10/24/2016), Opioid type dependence, continuous ([facility]) (02/04/2022), REGINE (obstructive sleep apnea), OSTEOPOROSIS, Painful breathing, Shortness of breath, Spinal stenosis, lumbar region, with neurogenic claudication, Sputum production, URINARY INCONTINENCE, Vitamin d deficiency, and Wheezing.    She has no past medical history of Breast cancer ([facility]).    Surgical History   has a past surgical history that includes tonsillectomy; other orthopedic surgery (2004); other; other; and lumbar laminectomy diskectomy (6/22/2010).    Family History  family history includes Cancer in her father and sister; Other in her sister.    Social History   reports that she quit smoking about 24 years ago. Her smoking use included cigarettes. She started smoking about 54 years ago. She has a 60.0 pack-year smoking history. She has never used smokeless tobacco. She reports that she does not currently use alcohol after a past usage of about 4.2 oz of alcohol per week. She reports that she does not currently use drugs after having used the following drugs: Marijuana and Oral.    Medications  Home Medications       Reviewed by Magno Martinez (Pharmacy Tech) on 02/05/24 at 2339  Med List Status: Complete     Medication Last Dose Status   acyclovir (ZOVIRAX) 200 MG Cap 2/5/2024 Active   Albuterol Sulfate 108 (90 Base) MCG/ACT AEROSOL POWDER, BREATH ACTIVATED 2/5/2024 Active   azithromycin (ZITHROMAX) 250 MG Tab 2/5/2024 Active   calcium carbonate (OS-MICHELLE 500) 1250 (500 Ca) MG Tab 2/4/2024 Active   Cholecalciferol (VITAMIN D3) 2000 UNIT Cap 2/4/2024 Active   digoxin (LANOXIN) 125 MCG Tab 2/4/2024 Active   DULoxetine (CYMBALTA) 30 MG Cap DR Particles 2/4/2024 Active   fluticasone-salmeterol (ADVAIR) 500-50 MCG/ACT AEROSOL POWDER, BREATH ACTIVATED 2/5/2024 Active   losartan (COZAAR) 50 MG Tab 2/5/2024 Active   meloxicam (MOBIC) 7.5 MG Tab 2/5/2024 Active    montelukast (SINGULAIR) 10 MG Tab 2/4/2024 Active   omeprazole (PRILOSEC OTC) 20 MG tablet 2/5/2024 Active   potassium chloride (MICRO-K) 10 MEQ capsule 2/5/2024 Active   spironolactone (ALDACTONE) 25 MG Tab 2/5/2024 Active   tiotropium (SPIRIVA HANDIHALER) 18 MCG Cap 2/4/2024 Active                  Current Facility-Administered Medications   Medication Dose Route Frequency Provider Last Rate Last Admin    omeprazole (PriLOSEC) capsule 20 mg  20 mg Oral DAILY ADONIS Edwards.[email or web address]   20 mg at 02/06/24 0506    mometasone-formoterol (Dulera) 200-5 MCG/ACT inhaler 2 Puff  2 Puff Inhalation BID (RT) CLEVELAND EdwardsOJasiel   2 Puff at 02/06/24 0700    albuterol (Proventil) 2.5mg/0.5ml nebulizer solution 10 mg  10 mg Nebulization Continuous Jenny CLEVELAND MenchacaOJasiel   10 mg at 02/05/24 2031    enoxaparin (Lovenox) inj 40 mg  40 mg Subcutaneous DAILY AT 1800 ADONIS Edwards.O.   40 mg at 02/06/24 0008    acetaminophen (Tylenol) tablet 650 mg  650 mg Oral Q6HRS PRN C[place]AND EdwardsOJasiel   650 mg at 02/06/24 1401    senna-docusate (Pericolace Or Senokot S) 8.6-50 MG per tablet 2 Tablet  2 Tablet Oral Q EVENING ADONIS Edwards.O.   2 Tablet at 02/06/24 1720    And    polyethylene glycol/lytes (Miralax) Packet 1 Packet  1 Packet Oral QDAY PRN C[place]AND EdwardsOJasiel        azithromycin (Zithromax) tablet 250 mg  250 mg Oral DAILY ADONIS Edwards.O.   250 mg at 02/06/24 0507    calcium carbonate (Os-Azam 500) tablet 500 mg  500 mg Oral Q EVENING CLEVELAND EdwardsO.   500 mg at 02/06/24 1720    digoxin (Lanoxin) tablet 125 mcg  125 mcg Oral DAILY CLEVELAND EdwardsO.   125 mcg at 02/06/24 0506    DULoxetine (Cymbalta) capsule 60 mg  60 mg Oral Q EVENING CLEVELAND EdwardsOJasiel   60 mg at 02/06/24 1720    losartan (Cozaar) tablet 50 mg  50 mg Oral BID CLEVELAND EdwardsOJasiel   50 mg at 02/06/24 1721    montelukast (Singulair) tablet 10 mg  10 mg Oral DAILY CLEVELAND EdwardsO.        spironolactone  (Aldactone) tablet 25 mg  25 mg Oral Q DAY Yousuf Coe D.O.   25 mg at 02/06/24 0507    tiotropium (Spiriva Respimat) 2.5 mcg/Act inhalation spray 5 mcg  5 mcg Inhalation QDAILY (RT) Yousuf Coe D.O.   5 mcg at 02/06/24 0700    Respiratory Therapy Consult   Nebulization Continuous RT Yousuf Coe D.O.        ipratropium-albuterol (DUONEB) nebulizer solution  3 mL Nebulization Q4HRS (RT) Yousuf Coe D.O.   3 mL at 02/06/24 1433    ipratropium-albuterol (DUONEB) nebulizer solution  3 mL Nebulization Q2HRS PRN (RT) Yousuf Coe D.O.        predniSONE (Deltasone) tablet 40 mg  40 mg Oral DAILY Yousuf Coe D.O.   40 mg at 02/06/24 0506       Allergies  Allergies   Allergen Reactions    Iodine      Convulsion  I.V.  iodine    Tape Rash and Itching       Vital Signs last 24 hours  Temp:  [35.8 °C (96.5 °F)-36.8 °C (98.2 °F)] 36.3 °C (97.4 °F)  Pulse:  [] 84  Resp:  [16-35] 20  BP: (134-171)/(59-92) 152/62  SpO2:  [90 %-96 %] 92 %    Physical Exam  Physical Exam  Constitutional:       Appearance: Normal appearance.   HENT:      Head: Atraumatic.      Mouth/Throat:      Mouth: Mucous membranes are dry.   Cardiovascular:      Rate and Rhythm: Normal rate and regular rhythm.   Pulmonary:      Effort: Pulmonary effort is normal. No respiratory distress.      Breath sounds: Normal breath sounds. No wheezing or rales.   Abdominal:      General: Abdomen is flat.      Palpations: Abdomen is soft.   Musculoskeletal:         General: No swelling.   Skin:     General: Skin is warm and dry.   Neurological:      General: No focal deficit present.      Mental Status: She is alert and oriented to person, place, and time.         Fluids  No intake or output data in the 24 hours ending 02/06/24 8495    Laboratory  Recent Results (from the past 48 hour(s))   CBC WITH DIFFERENTIAL    Collection Time: 02/05/24  7:15 PM   Result Value Ref Range    WBC 19.1 (H) 4.8 - 10.8 K/uL    RBC 4.69 4.20 - 5.40  M/uL    Hemoglobin 14.1 12.0 - 16.0 g/dL    Hematocrit 41.6 37.0 - 47.0 %    MCV 88.7 81.4 - 97.8 fL    MCH 30.1 27.0 - 33.0 pg    MCHC 33.9 32.2 - 35.5 g/dL    RDW 41.0 35.9 - 50.0 fL    Platelet Count 366 164 - 446 K/uL    MPV 9.7 9.0 - 12.9 fL    Neutrophils-Polys 87.20 (H) 44.00 - 72.00 %    Lymphocytes 6.00 (L) 22.00 - 41.00 %    Monocytes 5.40 0.00 - 13.40 %    Eosinophils 0.40 0.00 - 6.90 %    Basophils 0.30 0.00 - 1.80 %    Immature Granulocytes 0.70 0.00 - 0.90 %    Nucleated RBC 0.00 0.00 - 0.20 /100 WBC    Neutrophils (Absolute) 16.67 (H) 1.82 - 7.42 K/uL    Lymphs (Absolute) 1.15 1.00 - 4.80 K/uL    Monos (Absolute) 1.04 (H) 0.00 - 0.85 K/uL    Eos (Absolute) 0.08 0.00 - 0.51 K/uL    Baso (Absolute) 0.06 0.00 - 0.12 K/uL    Immature Granulocytes (abs) 0.13 (H) 0.00 - 0.11 K/uL    NRBC (Absolute) 0.00 K/uL   COMP METABOLIC PANEL    Collection Time: 24  7:15 PM   Result Value Ref Range    Sodium 139 135 - 145 mmol/L    Potassium 3.7 3.6 - 5.5 mmol/L    Chloride 97 96 - 112 mmol/L    Co2 29 20 - 33 mmol/L    Anion Gap 13.0 7.0 - 16.0    Glucose 150 (H) 65 - 99 mg/dL    Bun 13 8 - 22 mg/dL    Creatinine 0.41 (L) 0.50 - 1.40 mg/dL    Calcium 9.5 8.5 - 10.5 mg/dL    Correct Calcium 9.4 8.5 - 10.5 mg/dL    AST(SGOT) 17 12 - 45 U/L    ALT(SGPT) 18 2 - 50 U/L    Alkaline Phosphatase 43 30 - 99 U/L    Total Bilirubin 0.3 0.1 - 1.5 mg/dL    Albumin 4.1 3.2 - 4.9 g/dL    Total Protein 7.0 6.0 - 8.2 g/dL    Globulin 2.9 1.9 - 3.5 g/dL    A-G Ratio 1.4 g/dL   ESTIMATED GFR    Collection Time: 24  7:15 PM   Result Value Ref Range    GFR (CKD-EPI) 100 >60 mL/min/1.73 m 2   EKG    Collection Time: 24  7:17 PM   Result Value Ref Range    Report       [facility] Emergency Dept.    Test Date:  2024  Pt Name:    FAZAL STUBBS                    Department: ER  MRN:        [id number]                      Room:       ORTHO  Gender:     Female                       Technician: [id number]  :         [id number]                   Requested By:ER TRIAGE PROTOCOL  Order #:    [id number]                    Reading MD:    Measurements  Intervals                                Axis  Rate:       69                           P:          55  FL:         152                          QRS:        47  QRSD:       106                          T:          54  QT:         382  QTc:        410    Interpretive Statements  Sinus arrhythmia  Low voltage, precordial leads  Consider anterior infarct  Compared to ECG 10/03/2023 13:46:01  Low QRS voltage now present  Myocardial infarct finding now present  ST (T wave) deviation no longer present  Intraventricular conduction delay no longer present     POC CoV-2, FLU A/B, RSV by PCR    Collection Time: 02/05/24  7:42 PM   Result Value Ref Range    POC Influenza A RNA, PCR Negative Negative    POC Influenza B RNA, PCR Negative Negative    POC RSV, by PCR Negative Negative    POC SARS-CoV-2, PCR NotDetected    CBC with Differential    Collection Time: 02/06/24  3:30 AM   Result Value Ref Range    WBC 16.9 (H) 4.8 - 10.8 K/uL    RBC 4.58 4.20 - 5.40 M/uL    Hemoglobin 13.8 12.0 - 16.0 g/dL    Hematocrit 41.1 37.0 - 47.0 %    MCV 89.7 81.4 - 97.8 fL    MCH 30.1 27.0 - 33.0 pg    MCHC 33.6 32.2 - 35.5 g/dL    RDW 41.8 35.9 - 50.0 fL    Platelet Count 323 164 - 446 K/uL    MPV 9.4 9.0 - 12.9 fL    Neutrophils-Polys 95.30 (H) 44.00 - 72.00 %    Lymphocytes 2.90 (L) 22.00 - 41.00 %    Monocytes 1.10 0.00 - 13.40 %    Eosinophils 0.00 0.00 - 6.90 %    Basophils 0.10 0.00 - 1.80 %    Immature Granulocytes 0.60 0.00 - 0.90 %    Nucleated RBC 0.00 0.00 - 0.20 /100 WBC    Neutrophils (Absolute) 16.15 (H) 1.82 - 7.42 K/uL    Lymphs (Absolute) 0.49 (L) 1.00 - 4.80 K/uL    Monos (Absolute) 0.18 0.00 - 0.85 K/uL    Eos (Absolute) 0.00 0.00 - 0.51 K/uL    Baso (Absolute) 0.01 0.00 - 0.12 K/uL    Immature Granulocytes (abs) 0.11 0.00 - 0.11 K/uL    NRBC (Absolute) 0.00 K/uL   Comp Metabolic Panel (CMP)     Collection Time: 02/06/24  3:30 AM   Result Value Ref Range    Sodium 136 135 - 145 mmol/L    Potassium 3.9 3.6 - 5.5 mmol/L    Chloride 95 (L) 96 - 112 mmol/L    Co2 30 20 - 33 mmol/L    Anion Gap 11.0 7.0 - 16.0    Glucose 217 (H) 65 - 99 mg/dL    Bun 14 8 - 22 mg/dL    Creatinine 0.39 (L) 0.50 - 1.40 mg/dL    Calcium 9.6 8.5 - 10.5 mg/dL    Correct Calcium 9.7 8.5 - 10.5 mg/dL    AST(SGOT) 16 12 - 45 U/L    ALT(SGPT) 16 2 - 50 U/L    Alkaline Phosphatase 43 30 - 99 U/L    Total Bilirubin 0.2 0.1 - 1.5 mg/dL    Albumin 3.9 3.2 - 4.9 g/dL    Total Protein 7.0 6.0 - 8.2 g/dL    Globulin 3.1 1.9 - 3.5 g/dL    A-G Ratio 1.3 g/dL   Magnesium    Collection Time: 02/06/24  3:30 AM   Result Value Ref Range    Magnesium 1.9 1.5 - 2.5 mg/dL   ESTIMATED GFR    Collection Time: 02/06/24  3:30 AM   Result Value Ref Range    GFR (CKD-EPI) 102 >60 mL/min/1.73 m 2       Imaging  DX-CHEST-PORTABLE (1 VIEW)   Final Result      1.  Bibasilar underinflation atelectasis which could obscure an additional process. This is unchanged.   2.  Emphysema          Assessment/Plan  #COPD exacerbation potentially triggered by RSV although positive tests are not available (Gold class IV - Very severe)  Recommend:  - LAMA/LABA/ICS - continue current therapy  - Ok to monitor off antibiotics for now, low threshold for CAP coverage if she does not improve  - Prednisone 40mg x 5 days - Consider a slightly longer taper  - Duonebs as needed  - Will attempt to arrange pulmonary follow up  - Pulmonary to sign off, Please do not hesitate to reconsult if further questions arise.    Susana Florentino MD RD  Pulmonary and Critical Care    Available on Voalte

## 2024-02-07 NOTE — PROGRESS NOTES
Assumed care of pt  at 1900. Report received from Day RN. Pt is A&O x 4. Patient stated that their pain is 0/10. Pt's pain comfort goal is 0/10. Pt educated on how to use the call light, pt verbalized understanding. Bed in lowest locked position, call light within reach, hourly rounding in place. Labs reviewed, orders reviewed, communication board updated. Pt declines any further needs at this time.

## 2024-02-07 NOTE — PROGRESS NOTES
0738: Pt complaining of 3/10 lower back and left leg pain. As per pt, this is chronic pain for her due to her degenerative disc and arthritis. Tylenol administered as per MAR> PT repositioned for comfort. Pt offered heat pack but pt refused. Pt also coughing and asked for a breathing treatment, RT paged at 0769 for a breathing treatment. Pt sitting up in bed for better oxygenation. Pt also in 6L NC,  baseline.

## 2024-02-08 NOTE — DISCHARGE SUMMARY
Discharge Summary    CHIEF COMPLAINT ON ADMISSION  Chief Complaint   Patient presents with    Cough       Reason for Admission  ems     Admission Date  2/5/2024    CODE STATUS  DNAR/DNI    HPI & HOSPITAL COURSE  Please see original H&P and consult note for specific information.   Patient admitted on 2/5/2024  Patient is a 78-year-old female with past medical history of end-stage COPD, chronic hypoxic respiratory failure on 6 L nasal cannula, previous stage Ia lung cancer status post radiation, NSVT on digoxin who presents for dyspnea on exertion.  Patient states that the dyspnea started about 1 week ago after she was diagnosed with RSV, although no records on file to show positive test.  Patient has increased sputum production and progressively worsening dyspnea to the point where she cannot walk to her bathroom without getting severely short of breath with desaturations.  Patient has been taking her medication as prescribed and has had no other changes to her medications.  Patient quit tobacco use in the 1900s.  Denies any fever or chills, and has been producing green to white sputum production.     Patient was continue on COPD protocol, pulmonology evaluated patient recommended to continue plan of care and follow-up as outpatient, patient today is feeling much better, she is alert oriented follows commands, she is stated that her shortness of breath is back to her baseline, she is on 6 L of oxygen at baseline, patient is eager to go home, I have sent medications to [facility] pharmacy, patient will go home on tapered dose of prednisone, with follow-up with primary care physician pulmonology as outpatient, again patient hemodynamically stable, she is being discharged in stable condition, she has expressed understanding of her plan of care and agree with it request have been answered.      Therefore, she is discharged in good and stable condition to home with organized home healthcare and close outpatient  follow-up.    The patient met 2-midnight criteria for an inpatient stay at the time of discharge.    Discharge Date  2/7/2024    FOLLOW UP ITEMS POST DISCHARGE  PCP  pulmonologist    DISCHARGE DIAGNOSES  Principal Problem (Resolved):    Acute exacerbation of chronic obstructive pulmonary disease (COPD) (HCC) (POA: Yes)  Active Problems:    Chronic respiratory failure with hypoxia (HCC) (POA: Yes)    Stage 4 very severe COPD by GOLD classification (HCC) (POA: Yes)    SVT (supraventricular tachycardia) (POA: Yes)    Primary cancer of left upper lobe of lung (HCC) (POA: Yes)    Panlobular emphysema (HCC) (POA: Yes)    Essential hypertension (POA: Yes)  Resolved Problems:    Leukemoid reaction (POA: Unknown)      FOLLOW UP  Future Appointments   Date Time Provider Department Center   2/16/2024  4:40 PM [facility] None   2/27/2024  2:00 PM PULMONARY REHAB [facility] None   4/25/2024  1:50 PM Ihsan Kumar M.D. [facility] None   5/31/2024  3:20 PM Bubba Ross M.D. [facility] None   6/5/2024  3:20 PM Ihsan Kumar M.D. [facility] None   7/1/2024  9:00 AM [address]   7/5/2024 11:00 AM Israel Grimm M.D. [facility] None     Everett Diego M.D.  [address]  [address]  [facility] [id number] [phone]    Call  call to make a hospital follow up appointment    [facility]  [address] [address]  [facility] [id number]  [phone]          MEDICATIONS ON DISCHARGE     Medication List        START taking these medications        Instructions   benzonatate 100 MG Caps  Commonly known as: Tessalon   Take 1 Capsule by mouth 3 times a day as needed for Cough.  Dose: 100 mg     Mucus Relief  MG Tb12  Generic drug: guaiFENesin ER   Take 1 Tablet by mouth every 12 hours for 5 days.  Dose: 600 mg     predniSONE 20 MG Tabs  Start taking on: February 8, 2024  Commonly known as: Deltasone   Take 2 tablets (40mg) by mouth daily until 2/10/2024, THEN 1 tablet daily x 2  days, THEN 0.5 tablets daily x 2 days.     traMADol 50 MG Tabs  Commonly known as: Ultram   Take 1 Tablet by mouth every 8 hours as needed for Moderate Pain or Severe Pain for up to 3 days.  Dose: 50 mg            CONTINUE taking these medications        Instructions   acyclovir 200 MG Caps  Commonly known as: Zovirax   Take 200 mg by mouth every day.  Dose: 200 mg     azithromycin 250 MG Tabs  Commonly known as: Zithromax   One tablet daily for COPD     calcium carbonate 1250 (500 Ca) MG Tabs  Commonly known as: Os-Azam 500   Take 600 mg by mouth every evening.  Dose: 600 mg     digoxin 125 MCG Tabs  Commonly known as: Lanoxin   Take 1 Tablet by mouth every day.  Dose: 125 mcg     DULoxetine 30 MG Cpep  Commonly known as: Cymbalta   Take 60 mg by mouth every evening. Indications: Disease of the Peripheral Nerves, Generalized Anxiety Disorder, Major Depressive Disorder, Musculoskeletal Pain  Dose: 60 mg     fluticasone-salmeterol 500-50 MCG/ACT Aepb  Commonly known as: Advair   Doctor's comments: 3 month supply x 1 year  Inhale 1 Puff 2 times a day.  Dose: 1 Puff     losartan 50 MG Tabs  Commonly known as: Cozaar   Take 1 Tablet by mouth 2 times a day.  Dose: 50 mg     meloxicam 7.5 MG Tabs  Commonly known as: Mobic   Take 7.5 mg by mouth 2 times a day.  Dose: 7.5 mg     montelukast 10 MG Tabs  Commonly known as: Singulair   Take 1 Tablet by mouth every day.  Dose: 10 mg     omeprazole 20 MG tablet  Commonly known as: PriLOSEC OTC   Take 1 Tab by mouth every day.  Dose: 20 mg     ProAir RespiClick 108 (90 Base) MCG/ACT Aepb  Generic drug: Albuterol Sulfate   Inhale 2 Puffs 4 times a day as needed (shortness of breath). Indications: SOB  Dose: 2 Puff     Spiriva HandiHaler 18 MCG Caps  Generic drug: tiotropium   Doctor's comments: 3 month supply x 1 year  INHALE 1 CAPSULE CONTENT BY MOUTH ONE TIME DAILY.     spironolactone 25 MG Tabs  Commonly known as: Aldactone   TAKE ONE TABLET BY MOUTH ONE TIME DAILY     Vitamin  D3 2000 UNIT Caps   TAKE ONE CAPSULE BY MOUTH ONE TIME DAILY            STOP taking these medications      potassium chloride 10 MEQ capsule  Commonly known as: Micro-K              Allergies  Allergies   Allergen Reactions    Iodine      Convulsion  I.V.  iodine    Tape Rash and Itching       DIET  Orders Placed This Encounter   Procedures    Diet Order Diet: Regular     Standing Status:   Standing     Number of Occurrences:   1     Order Specific Question:   Diet:     Answer:   Regular [1]    Discontinue Diet Tray     Standing Status:   Standing     Number of Occurrences:   1       ACTIVITY  As tolerated.  Weight bearing as tolerated    CONSULTATIONS  pulm    PROCEDURES  none    LABORATORY  Lab Results   Component Value Date    SODIUM 136 02/06/2024    POTASSIUM 3.9 02/06/2024    CHLORIDE 95 (L) 02/06/2024    CO2 30 02/06/2024    GLUCOSE 217 (H) 02/06/2024    BUN 14 02/06/2024    CREATININE 0.39 (L) 02/06/2024    CREATININE 0.62 06/09/2011        Lab Results   Component Value Date    WBC 16.9 (H) 02/06/2024    WBC 10.9 (H) 06/09/2011    HEMOGLOBIN 13.8 02/06/2024    HEMATOCRIT 41.1 02/06/2024    PLATELETCT 323 02/06/2024   Leukocytosis likely due to steroids.    Total time of the discharge process exceeds 33 minutes.

## 2024-02-12 ENCOUNTER — HOME CARE VISIT (OUTPATIENT)
Dept: HOME HEALTH SERVICES | Facility: HOME HEALTHCARE | Age: 79
End: 2024-02-12
Payer: MEDICARE

## 2024-02-12 PROCEDURE — 665998 HH PPS REVENUE CREDIT

## 2024-02-12 PROCEDURE — 665999 HH PPS REVENUE DEBIT

## 2024-02-12 PROCEDURE — 665001 SOC-HOME HEALTH

## 2024-02-12 PROCEDURE — G0299 HHS/HOSPICE OF RN EA 15 MIN: HCPCS

## 2024-02-12 PROCEDURE — 665005 NO-PAY RAP - HOME HEALTH

## 2024-02-12 ASSESSMENT — FIBROSIS 4 INDEX: FIB4 SCORE: 0.97

## 2024-02-13 ENCOUNTER — DOCUMENTATION (OUTPATIENT)
Dept: CARDIOLOGY | Facility: MEDICAL CENTER | Age: 79
End: 2024-02-13
Payer: MEDICARE

## 2024-02-13 VITALS
RESPIRATION RATE: 28 BRPM | WEIGHT: 191 LBS | TEMPERATURE: 98.2 F | BODY MASS INDEX: 33.84 KG/M2 | SYSTOLIC BLOOD PRESSURE: 128 MMHG | OXYGEN SATURATION: 94 % | HEART RATE: 83 BPM | DIASTOLIC BLOOD PRESSURE: 64 MMHG | HEIGHT: 63 IN

## 2024-02-13 PROCEDURE — 665998 HH PPS REVENUE CREDIT

## 2024-02-13 PROCEDURE — 665999 HH PPS REVENUE DEBIT

## 2024-02-13 SDOH — ECONOMIC STABILITY: HOUSING INSECURITY: HOME SAFETY: DOORS WITH GOOD LOCKS

## 2024-02-13 SDOH — ECONOMIC STABILITY: HOUSING INSECURITY: EVIDENCE OF SMOKING MATERIAL: 0

## 2024-02-13 ASSESSMENT — ACTIVITIES OF DAILY LIVING (ADL)
AMBULATION ASSISTANCE: 1
AMBULATION ASSISTANCE: INDEPENDENT

## 2024-02-13 ASSESSMENT — ENCOUNTER SYMPTOMS
STOOL FREQUENCY: LESS THAN DAILY
DYSPNEA ACTIVITY LEVEL: WHILE SPEAKING
DENIES PAIN: 1
SHORTNESS OF BREATH: 1
BOWEL PATTERN NORMAL: 1
LAST BOWEL MOVEMENT: 66881
HYPERTENSION: 1
PERSON REPORTING PAIN: PATIENT
DYSPNEA ACTIVITY LEVEL: AT REST

## 2024-02-13 ASSESSMENT — PAIN SCALES - PAIN ASSESSMENT IN ADVANCED DEMENTIA (PAINAD)
TOTALSCORE: 0
FACIALEXPRESSION: 0 - SMILING OR INEXPRESSIVE.
BODYLANGUAGE: 0 - RELAXED.
NEGVOCALIZATION: 0 - NONE.
CONSOLABILITY: 0 - NO NEED TO CONSOLE.

## 2024-02-13 NOTE — PROGRESS NOTES
Medication chart review for [facility] services    Received referral from [facility].   Medications reviewed  compared with discharge summary if available.  Discharge summary date, if applicable:   2/7/24    Current medication list per [facility]     Medication list one, patient is currently taking    Current Outpatient Medications:     OXYGEN-HELIUM INH, 6 L, Inhalation, Continuous    predniSONE, Take 2 tablets (40mg) by mouth daily until 2/10/2024, THEN 1 tablet daily x 2 days, THEN 0.5 tablets daily x 2 days.    Albuterol Sulfate, 2 Puff, Inhalation, 4X/DAY PRN    benzonatate, 100 mg, Oral, TID PRN    losartan, 50 mg, Oral, BID    fluticasone-salmeterol, 1 Puff, Inhalation, BID    Spiriva HandiHaler, INHALE 1 CAPSULE CONTENT BY MOUTH ONE TIME DAILY.    digoxin, 125 mcg, Oral, DAILY    meloxicam, 7.5 mg, Oral, BID    azithromycin, One tablet daily for COPD    montelukast, 10 mg, Oral, DAILY    calcium carbonate, 600 mg, Oral, Q EVENING    DULoxetine, 60 mg, Oral, Q EVENING    acyclovir, 200 mg, Oral, DAILY    spironolactone, TAKE ONE TABLET BY MOUTH ONE TIME DAILY    Vitamin D3, TAKE ONE CAPSULE BY MOUTH ONE TIME DAILY    omeprazole, 20 mg, Oral, DAILY      Medication list two, drugs that the patient has been prescribed or recommended to take by their healthcare provider on discharge summary    MEDICATIONS ON DISCHARGE      Medication List          START taking these medications         Instructions   benzonatate 100 MG Caps  Commonly known as: Tessalon    Take 1 Capsule by mouth 3 times a day as needed for Cough.  Dose: 100 mg      Mucus Relief  MG Tb12  Generic drug: guaiFENesin ER    Take 1 Tablet by mouth every 12 hours for 5 days.  Dose: 600 mg      predniSONE 20 MG Tabs  Start taking on: February 8, 2024  Commonly known as: Deltasone    Take 2 tablets (40mg) by mouth daily until 2/10/2024, THEN 1 tablet daily x 2 days, THEN 0.5 tablets daily x 2 days.      traMADol 50 MG Tabs  Commonly known  as: Ultram    Take 1 Tablet by mouth every 8 hours as needed for Moderate Pain or Severe Pain for up to 3 days.  Dose: 50 mg                CONTINUE taking these medications         Instructions   acyclovir 200 MG Caps  Commonly known as: Zovirax    Take 200 mg by mouth every day.  Dose: 200 mg      azithromycin 250 MG Tabs  Commonly known as: Zithromax    One tablet daily for COPD      calcium carbonate 1250 (500 Ca) MG Tabs  Commonly known as: Os-Azam 500    Take 600 mg by mouth every evening.  Dose: 600 mg      digoxin 125 MCG Tabs  Commonly known as: Lanoxin    Take 1 Tablet by mouth every day.  Dose: 125 mcg      DULoxetine 30 MG Cpep  Commonly known as: Cymbalta    Take 60 mg by mouth every evening. Indications: Disease of the Peripheral Nerves, Generalized Anxiety Disorder, Major Depressive Disorder, Musculoskeletal Pain  Dose: 60 mg      fluticasone-salmeterol 500-50 MCG/ACT Aepb  Commonly known as: Advair    Doctor's comments: 3 month supply x 1 year  Inhale 1 Puff 2 times a day.  Dose: 1 Puff      losartan 50 MG Tabs  Commonly known as: Cozaar    Take 1 Tablet by mouth 2 times a day.  Dose: 50 mg      meloxicam 7.5 MG Tabs  Commonly known as: Mobic    Take 7.5 mg by mouth 2 times a day.  Dose: 7.5 mg      montelukast 10 MG Tabs  Commonly known as: Singulair    Take 1 Tablet by mouth every day.  Dose: 10 mg      omeprazole 20 MG tablet  Commonly known as: PriLOSEC OTC    Take 1 Tab by mouth every day.  Dose: 20 mg      ProAir RespiClick 108 (90 Base) MCG/ACT Aepb  Generic drug: Albuterol Sulfate    Inhale 2 Puffs 4 times a day as needed (shortness of breath). Indications: SOB  Dose: 2 Puff      Spiriva HandiHaler 18 MCG Caps  Generic drug: tiotropium    Doctor's comments: 3 month supply x 1 year  INHALE 1 CAPSULE CONTENT BY MOUTH ONE TIME DAILY.      spironolactone 25 MG Tabs  Commonly known as: Aldactone    TAKE ONE TABLET BY MOUTH ONE TIME DAILY      Vitamin D3 2000 UNIT Caps    TAKE ONE CAPSULE BY MOUTH  ONE TIME DAILY                STOP taking these medications       potassium chloride 10 MEQ capsule  Commonly known as: Micro-K          Allergies   Allergen Reactions    Iodine      Convulsion  I.V.  iodine    Tape Rash and Itching       Labs     Lab Results   Component Value Date/Time    SODIUM 136 02/06/2024 03:30 AM    POTASSIUM 3.9 02/06/2024 03:30 AM    CHLORIDE 95 (L) 02/06/2024 03:30 AM    CO2 30 02/06/2024 03:30 AM    GLUCOSE 217 (H) 02/06/2024 03:30 AM    BUN 14 02/06/2024 03:30 AM    CREATININE 0.39 (L) 02/06/2024 03:30 AM    CREATININE 0.62 06/09/2011 12:00 AM    BUNCREATRAT 20 07/07/2014 02:50 PM    BUNCREATRAT 23 06/09/2011 12:00 AM     Lab Results   Component Value Date/Time    ALKPHOSPHAT 43 02/06/2024 03:30 AM    ASTSGOT 16 02/06/2024 03:30 AM    ALTSGPT 16 02/06/2024 03:30 AM    TBILIRUBIN 0.2 02/06/2024 03:30 AM    INR 0.95 03/15/2013 11:30 PM    ALBUMIN 3.9 02/06/2024 03:30 AM        Assessment for clinically significant drug interactions, drug omissions/additions, duplicative therapies.            CC   Everett Diego M.D.  [address]  [facility] [id number]  Fax: [phone]    [facility]  Phone [phone] fax [phone]    This note was created using voice recognition software (Dragon). The accuracy of the dictation is limited by the abilities of the software. I have reviewed the note prior to signing, however some errors in grammar and context are still possible. If you have any questions related to this note please do not hesitate to contact our office.

## 2024-02-13 NOTE — CASE COMMUNICATION
soc 2/13/24 [id number] [facility]  pt/ot/added HHA/sn 2 wk1, 1 wk 4  6 Ll/o2 for 20 years.

## 2024-02-14 ENCOUNTER — HOME CARE VISIT (OUTPATIENT)
Dept: HOME HEALTH SERVICES | Facility: HOME HEALTHCARE | Age: 79
End: 2024-02-14
Payer: MEDICARE

## 2024-02-14 PROCEDURE — G0151 HHCP-SERV OF PT,EA 15 MIN: HCPCS

## 2024-02-14 PROCEDURE — 665998 HH PPS REVENUE CREDIT

## 2024-02-14 PROCEDURE — 665999 HH PPS REVENUE DEBIT

## 2024-02-15 ENCOUNTER — HOME CARE VISIT (OUTPATIENT)
Dept: HOME HEALTH SERVICES | Facility: HOME HEALTHCARE | Age: 79
End: 2024-02-15
Payer: MEDICARE

## 2024-02-15 VITALS
DIASTOLIC BLOOD PRESSURE: 60 MMHG | OXYGEN SATURATION: 95 % | SYSTOLIC BLOOD PRESSURE: 114 MMHG | HEART RATE: 82 BPM | RESPIRATION RATE: 22 BRPM | TEMPERATURE: 97.8 F

## 2024-02-15 PROCEDURE — 665999 HH PPS REVENUE DEBIT

## 2024-02-15 PROCEDURE — G0495 RN CARE TRAIN/EDU IN HH: HCPCS

## 2024-02-15 PROCEDURE — 665998 HH PPS REVENUE CREDIT

## 2024-02-15 SDOH — ECONOMIC STABILITY: HOUSING INSECURITY: EVIDENCE OF SMOKING MATERIAL: 0

## 2024-02-15 ASSESSMENT — ACTIVITIES OF DAILY LIVING (ADL)
AMBULATION_DISTANCE/DURATION_TOLERATED: 100 FT
AMBULATION ASSISTANCE ON FLAT SURFACES: 1
TRANSPORTATION COMMENTS: YES

## 2024-02-15 ASSESSMENT — ENCOUNTER SYMPTOMS
VOMITING: DENIED
FATIGUES EASILY: 1
DENIES PAIN: 1
PERSON REPORTING PAIN: PATIENT
DENIES PAIN: 1
NAUSEA: DENIED
SEVERE DYSPNEA: 1

## 2024-02-16 ENCOUNTER — HOME CARE VISIT (OUTPATIENT)
Dept: HOME HEALTH SERVICES | Facility: HOME HEALTHCARE | Age: 79
End: 2024-02-16
Payer: MEDICARE

## 2024-02-16 ENCOUNTER — OFFICE VISIT (OUTPATIENT)
Dept: SLEEP MEDICINE | Facility: MEDICAL CENTER | Age: 79
End: 2024-02-16
Payer: MEDICARE

## 2024-02-16 VITALS
SYSTOLIC BLOOD PRESSURE: 124 MMHG | BODY MASS INDEX: 32.07 KG/M2 | HEIGHT: 63 IN | WEIGHT: 181 LBS | HEART RATE: 95 BPM | OXYGEN SATURATION: 94 % | DIASTOLIC BLOOD PRESSURE: 76 MMHG

## 2024-02-16 DIAGNOSIS — C34.12 PRIMARY CANCER OF LEFT UPPER LOBE OF LUNG (HCC): ICD-10-CM

## 2024-02-16 DIAGNOSIS — J43.1 PANLOBULAR EMPHYSEMA (HCC): ICD-10-CM

## 2024-02-16 DIAGNOSIS — J96.11 CHRONIC RESPIRATORY FAILURE WITH HYPOXIA (HCC): ICD-10-CM

## 2024-02-16 PROCEDURE — 665998 HH PPS REVENUE CREDIT

## 2024-02-16 PROCEDURE — 99213 OFFICE O/P EST LOW 20 MIN: CPT

## 2024-02-16 PROCEDURE — 3078F DIAST BP <80 MM HG: CPT

## 2024-02-16 PROCEDURE — 665999 HH PPS REVENUE DEBIT

## 2024-02-16 PROCEDURE — G0152 HHCP-SERV OF OT,EA 15 MIN: HCPCS

## 2024-02-16 PROCEDURE — 3074F SYST BP LT 130 MM HG: CPT

## 2024-02-16 PROCEDURE — 1158F ADVNC CARE PLAN TLK DOCD: CPT

## 2024-02-16 RX ORDER — IPRATROPIUM BROMIDE AND ALBUTEROL SULFATE 2.5; .5 MG/3ML; MG/3ML
3 SOLUTION RESPIRATORY (INHALATION) EVERY 4 HOURS PRN
Qty: 360 ML | Refills: 3 | Status: SHIPPED | OUTPATIENT
Start: 2024-02-16

## 2024-02-16 SDOH — ECONOMIC STABILITY: HOUSING INSECURITY: EVIDENCE OF SMOKING MATERIAL: 0

## 2024-02-16 ASSESSMENT — ENCOUNTER SYMPTOMS
FALLS: 0
DIZZINESS: 0
DIARRHEA: 0
SHORTNESS OF BREATH: 1
HEADACHES: 0
SPUTUM PRODUCTION: 1
COUGH: 1
HEARTBURN: 0
PALPITATIONS: 0
CHILLS: 0
NAUSEA: 0
SINUS PAIN: 0
MYALGIAS: 0
WHEEZING: 0
DIAPHORESIS: 0
FEVER: 0
WEAKNESS: 0
VOMITING: 0
HEMOPTYSIS: 0

## 2024-02-16 ASSESSMENT — ACTIVITIES OF DAILY LIVING (ADL)
TRANSPORTATION: DEPENDENT
FEEDING: INDEPENDENT
ORAL_CARE_CURRENT_FUNCTION: INDEPENDENT
TOILETING: 1
GROOMING_CURRENT_FUNCTION: INDEPENDENT
DRESSING_UB_CURRENT_FUNCTION: INDEPENDENT
BATHING ASSESSED: 1
DRESSING_LB_CURRENT_FUNCTION: INDEPENDENT
BATHING_CURRENT_FUNCTION: MINIMUM ASSIST
FEEDING ASSESSED: 1
TRANSPORTATION ASSESSED: 1
GROOMING ASSESSED: 1
TOILETING: INDEPENDENT
ORAL_CARE_ASSESSED: 1
PREPARING MEALS: INDEPENDENT

## 2024-02-16 ASSESSMENT — FIBROSIS 4 INDEX: FIB4 SCORE: 0.97

## 2024-02-16 NOTE — PROGRESS NOTES
Pulmonary Clinic Note    Date of Visit: 2/15/2024     Chief Complaint:  Chief Complaint   Patient presents with    Hospital Follow-up     HFV/CONSULT [facility]/DX:COPD     HPI:   Berny Renee is a very pleasant 78 y.o. year old female former smoker (60 pack-years, quit in 1999), with a PMHx of COPD-emphysema, chronic respiratory failure, elevated BMI, history of SVT, HTN, who presented to the Pulmonary Clinic for a regular follow up. Last seen in the office on 12/5/2023 with Dr. Kumar.     Patient is followed by pulmonary office for COPD-emphysema.  PFT in 2021 shows a FEV1 0.89 L or 43%, FEV1/FVC 45%, %, DLCO 37% predicted.  CT chest in 2023 shows severe emphysema and stable pulmonary nodules measuring up to 6 mm.  She was treated SBRT in 2021 for presumable local lung cancer that was not amendable to biopsy in the left upper lobe. This has been stable on subsequent radiographic surveillance.  Patient is on Advair 500, Spiriva, Singulair, azithromycin to 50 mg daily, and  DuoNeb, albuterol as needed.  Patient is currently on 3-6 LPM of oxygen.  Patient was previously hospitalized in the beginning of February 2024 for COPD exacerbation was triggered by RSV.  Patient is a DNR/DNI with a POLST form on file.      Interval events:  2/16/2024-  Patient states that her shortness of breath, cough, sputum production are improving.  She is finished with her prednisone taper.  She continues to use and benefit from Advair 500, Spiriva, Singulair, and DuoNebs 3-4 times a day.  She has not needed albuterol.  She is at her baseline oxygen, which is 6 LPM.  She has been referred to pulmonary rehab and has a intake appointment later this month.    Exacerbations this year:  1    Current medication regimen:  Advair 500, Spiriva, Singulair, azithromycin 250 mg daily, and  DuoNeb, albuterol as needed    Oxygen use: 6 LPM 24/7    MMRC Grade: 4- Breathless with ambulating around house or ADLs      Past Medical History:    Diagnosis Date    Acute on chronic respiratory failure with hypoxia ([facility]) 2020    Arthritis     spine    Asthma with COPD     BMI 31.0-31.9,adult 2022    Cataract immature     Chronic pain     Chronic respiratory failure with hypoxia ([facility]) 2017    Colon polyps     COPD (chronic obstructive pulmonary disease) ([facility])     moderate to severe    Cough     DDD (degenerative disc disease), cervical     DDD (degenerative disc disease), thoracic     Dependence on continuous supplemental oxygen 2015    IMO load 2020    Diverticulitis of colon     Dyslipidemia     EMPHYSEMA     H/O opioid abuse ([facility]) 07/15/2021    Hypertension     Obesity (BMI 30-39.9) 10/24/2016    Opioid type dependence, continuous ([facility]) 2022    REGINE (obstructive sleep apnea)     OSTEOPOROSIS     Painful breathing     Shortness of breath     Spinal stenosis, lumbar region, with neurogenic claudication     Sputum production     URINARY INCONTINENCE     Vitamin d deficiency     Wheezing      Past Surgical History:   Procedure Laterality Date    LUMBAR LAMINECTOMY DISKECTOMY  2010    Performed by GRACIE CANO at SURGERY [facility]    OTHER ORTHOPEDIC SURGERY      left ankle fx    OTHER      leg fracture    OTHER      t spine disc surg    TONSILLECTOMY       Social History     Socioeconomic History    Marital status:      Spouse name: Not on file    Number of children: Not on file    Years of education: Not on file    Highest education level: Not on file   Occupational History    Not on file   Tobacco Use    Smoking status: Former     Current packs/day: 0.00     Average packs/day: 2.0 packs/day for 30.0 years (60.0 ttl pk-yrs)     Types: Cigarettes     Start date: 3/1/1969     Quit date: 3/1/1999     Years since quittin.9    Smokeless tobacco: Never    Tobacco comments:     3 pks a day for 35 yrs, continued abstinence   Vaping Use    Vaping Use: Never used   Substance and Sexual Activity     Alcohol use: Not Currently     Alcohol/week: 4.2 oz     Types: 7 Glasses of wine per week    Drug use: Not Currently     Types: Marijuana, Oral     Comment: Medical Marijuana     Sexual activity: Never   Other Topics Concern    Not on file   Social History Narrative    ** Merged History Encounter **          Social Determinants of Health     Financial Resource Strain: Medium Risk (2/14/2024)    Overall Financial Resource Strain (CARDIA)     Difficulty of Paying Living Expenses: Somewhat hard   Food Insecurity: Food Insecurity Present (2/14/2024)    Hunger Vital Sign     Worried About Running Out of Food in the Last Year: Sometimes true     Ran Out of Food in the Last Year: Sometimes true   Transportation Needs: Unmet Transportation Needs (2/14/2024)    PRAPARE - Transportation     Lack of Transportation (Medical): Yes     Lack of Transportation (Non-Medical): No   Physical Activity: Insufficiently Active (2/14/2024)    Exercise Vital Sign     Days of Exercise per Week: 3 days     Minutes of Exercise per Session: 10 min   Stress: No Stress Concern Present (2/14/2024)    [employer] [place] [employer] - Occupational Stress Questionnaire     Feeling of Stress : Only a little   Social Connections: Socially Isolated (2/14/2024)    Social Connection and Isolation Panel [NHANES]     Frequency of Communication with Friends and Family: Three times a week     Frequency of Social Gatherings with Friends and Family: Once a week     Attends Religion Services: Never     Active Member of Clubs or Organizations: No     Attends Club or Organization Meetings: Never     Marital Status:    Intimate Partner Violence: Not At Risk (2/14/2024)    Humiliation, Afraid, Rape, and Kick questionnaire     Fear of Current or Ex-Partner: No     Emotionally Abused: No     Physically Abused: No     Sexually Abused: No   Housing Stability: Low Risk  (2/14/2024)    Housing Stability Vital Sign     Unable to Pay for Housing in the  Last Year: No     Number of Places Lived in the Last Year: 1     Unstable Housing in the Last Year: No        Family History   Problem Relation Age of Onset    Cancer Father         Lung    Other Sister         lung and leukemia cancer    Cancer Sister         Leukemia, Lung     Current Outpatient Medications on File Prior to Visit   Medication Sig Dispense Refill    predniSONE (DELTASONE) 20 MG Tab Take 2 tablets (40mg) by mouth daily until 2/10/2024, THEN 1 tablet daily x 2 days, THEN 0.5 tablets daily x 2 days. 9 Tablet 0    Albuterol Sulfate 108 (90 Base) MCG/ACT AEROSOL POWDER, BREATH ACTIVATED Inhale 2 Puffs 4 times a day as needed (shortness of breath). Indications: SOB 3 Each 0    benzonatate (TESSALON) 100 MG Cap Take 1 Capsule by mouth 3 times a day as needed for Cough. 30 Capsule 0    losartan (COZAAR) 50 MG Tab Take 1 Tablet by mouth 2 times a day. 60 Tablet 1    fluticasone-salmeterol (ADVAIR) 500-50 MCG/ACT AEROSOL POWDER, BREATH ACTIVATED Inhale 1 Puff 2 times a day. 180 Each 3    tiotropium (SPIRIVA HANDIHALER) 18 MCG Cap INHALE 1 CAPSULE CONTENT BY MOUTH ONE TIME DAILY. 90 Capsule 3    digoxin (LANOXIN) 125 MCG Tab Take 1 Tablet by mouth every day. 90 Tablet 3    meloxicam (MOBIC) 7.5 MG Tab Take 7.5 mg by mouth 2 times a day. Indications: Rheumatoid Arthritis      azithromycin (ZITHROMAX) 250 MG Tab One tablet daily for COPD 90 Tablet 6    montelukast (SINGULAIR) 10 MG Tab Take 1 Tablet by mouth every day. 90 Tablet 3    calcium carbonate (OS-MICHELLE 500) 1250 (500 Ca) MG Tab Take 600 mg by mouth every evening. Indications: Low Amount of Calcium in the Blood      DULoxetine (CYMBALTA) 30 MG Cap DR Particles Take 60 mg by mouth every evening. Indications: Musculoskeletal Pain, denies depression      acyclovir (ZOVIRAX) 200 MG Cap Take 200 mg by mouth every day. Indications: Herpes Simplex Infection      spironolactone (ALDACTONE) 25 MG Tab TAKE ONE TABLET BY MOUTH ONE TIME DAILY 30 Tab 11     "Cholecalciferol (VITAMIN D3) 2000 UNIT Cap TAKE ONE CAPSULE BY MOUTH ONE TIME DAILY 30 Cap 3    omeprazole (PRILOSEC OTC) 20 MG tablet Take 1 Tab by mouth every day. 30 Tab 11    OXYGEN-HELIUM INH Inhale 6 L continuous. Indications: copd       No current facility-administered medications on file prior to visit.     Allergies: Iodine and Tape    ROS:   Review of Systems   Constitutional:  Negative for chills, diaphoresis, fever and malaise/fatigue.   HENT:  Negative for congestion and sinus pain.    Respiratory:  Positive for cough, sputum production (clear) and shortness of breath. Negative for hemoptysis and wheezing.    Cardiovascular:  Negative for chest pain, palpitations and leg swelling.   Gastrointestinal:  Negative for diarrhea, heartburn, nausea and vomiting.   Musculoskeletal:  Negative for falls and myalgias.   Neurological:  Negative for dizziness, weakness and headaches.     Vitals:  /76 (BP Location: Right arm, Patient Position: Sitting, BP Cuff Size: Adult)   Pulse 95   Ht 1.6 m (5' 3\")   Wt 82.1 kg (181 lb)   SpO2 94%     Physical Exam  Constitutional:       General: She is not in acute distress.     Appearance: Normal appearance. She is not ill-appearing, toxic-appearing or diaphoretic.   Cardiovascular:      Rate and Rhythm: Normal rate and regular rhythm.      Heart sounds: No murmur heard.     No friction rub. No gallop.   Pulmonary:      Effort: No respiratory distress.      Breath sounds: Normal breath sounds. No stridor. No wheezing, rhonchi or rales.   Musculoskeletal:         General: No swelling.      Right lower leg: No edema.      Left lower leg: No edema.   Skin:     General: Skin is warm.   Neurological:      General: No focal deficit present.      Mental Status: She is alert and oriented to person, place, and time.   Psychiatric:         Mood and Affect: Mood normal.         Behavior: Behavior normal.         Thought Content: Thought content normal.         Judgment: Judgment " normal.         Laboratory Data:  PFTs: (Date: 7/8/2021)-      Impression:  Baseline spirometry shows airflow obstruction with FEV1/FVC ratio 45 and an FEV1 of 0.89 L or 43% predicted.  Bronchodilator testing was not performed.  Total lung capacity within normal limits at 95% predicted.  There is mild air trapping.  Diffusion capacity severely reduced at 37% predicted.  Pulmonary function testing shows severe airflow obstruction with mild air trapping and reduced DLCO consistent with advanced COPD.    CXR: (Date: 1/31/2024)-  Impression:  Emphysematous changes with basilar atelectasis/scarring.     CT Chest: (Date: 6/30/2023)-  Impression:  1.  Redemonstration of posttreatment scarring in the left upper lobe with associated small pulmonary nodules. This is grossly stable.  2.  No new suspicious pulmonary nodules or masses. Several additional pulmonary nodules are stable or less conspicuous since prior study.  3.  Severe Emphysema.      Assessment and Plan:    Problem List Items Addressed This Visit          Pulmonary/Sleep Medicine Problems    Panlobular emphysema (HCC)     PFT in 2021 shows a FEV1 0.89 L or 43%, FEV1/FVC 45%, %, DLCO 37% predicted.  CT chest in 2023 shows severe emphysema.  Patient is currently on Advair 500, Spiriva, Singulair, and DuoNebs 3-4 times a day.  She has not needed albuterol.   This is her first exacerbation this year.  --Continue Advair 500, Spiriva, Singulair  --Continue azithromycin 250 mg daily  --Continue albuterol and DuoNebs as needed  --Patient has intake appointment with pulmonary rehab  --Patient has declined BL VR previously  --Patient does not meet exacerbation requirement for [facility] study  --Patient is up-to-date on influenza and pneumococcal         Relevant Medications    ipratropium-albuterol (DUONEB) 0.5-2.5 (3) MG/3ML nebulizer solution       Other    Primary cancer of left upper lobe of lung (HCC)      She was treated SBRT in 2021.  She has a repeat CT chest in  July 2024, which is ordered by Dr. Grimm.         Chronic respiratory failure with hypoxia (HCC)     Patient continues to use benefit from 6 LPM of oxygen 24/7.          Diagnostic studies have been reviewed with the patient.    Return in about 4 months (around 6/16/2024), or if symptoms worsen or fail to improve, for COPD, with [facility].     This note was generated using voice recognition software which has a chance of producing errors of grammar and possibly content.  I have made every reasonable attempt to find and correct any obvious errors, but it should be expected that some may not be found prior to finalization of this note.    Time spent in record review prior to patient arrival, reviewing results, and in face-to-face encounter totaled 25 min.  __________  SUSAN Moss  Pulmonary Medicine  [facility]

## 2024-02-17 PROCEDURE — 665999 HH PPS REVENUE DEBIT

## 2024-02-17 PROCEDURE — 665998 HH PPS REVENUE CREDIT

## 2024-02-17 NOTE — ASSESSMENT & PLAN NOTE
PFT in 2021 shows a FEV1 0.89 L or 43%, FEV1/FVC 45%, %, DLCO 37% predicted.  CT chest in 2023 shows severe emphysema.  Patient is currently on Advair 500, Spiriva, Singulair, and DuoNebs 3-4 times a day.  She has not needed albuterol.   This is her first exacerbation this year.  --Continue Advair 500, Spiriva, Singulair  --Continue azithromycin 250 mg daily  --Continue albuterol and DuoNebs as needed  --Patient has intake appointment with pulmonary rehab  --Patient has declined BL VR previously  --Patient does not meet exacerbation requirement for [facility] study  --Patient is up-to-date on influenza and pneumococcal

## 2024-02-17 NOTE — ASSESSMENT & PLAN NOTE
She was treated SBRT in 2021.  She has a repeat CT chest in July 2024, which is ordered by Dr. Grimm.

## 2024-02-18 PROCEDURE — 665999 HH PPS REVENUE DEBIT

## 2024-02-18 PROCEDURE — 665998 HH PPS REVENUE CREDIT

## 2024-02-19 PROCEDURE — 665998 HH PPS REVENUE CREDIT

## 2024-02-19 PROCEDURE — 665999 HH PPS REVENUE DEBIT

## 2024-02-20 ENCOUNTER — HOME CARE VISIT (OUTPATIENT)
Dept: HOME HEALTH SERVICES | Facility: HOME HEALTHCARE | Age: 79
End: 2024-02-20
Payer: MEDICARE

## 2024-02-20 VITALS
RESPIRATION RATE: 26 BRPM | SYSTOLIC BLOOD PRESSURE: 138 MMHG | OXYGEN SATURATION: 95 % | DIASTOLIC BLOOD PRESSURE: 60 MMHG | TEMPERATURE: 98.2 F | HEART RATE: 70 BPM

## 2024-02-20 VITALS
TEMPERATURE: 98.2 F | SYSTOLIC BLOOD PRESSURE: 138 MMHG | HEART RATE: 70 BPM | OXYGEN SATURATION: 95 % | RESPIRATION RATE: 26 BRPM | DIASTOLIC BLOOD PRESSURE: 60 MMHG

## 2024-02-20 PROCEDURE — G0495 RN CARE TRAIN/EDU IN HH: HCPCS

## 2024-02-20 PROCEDURE — G0151 HHCP-SERV OF PT,EA 15 MIN: HCPCS

## 2024-02-20 PROCEDURE — G0152 HHCP-SERV OF OT,EA 15 MIN: HCPCS

## 2024-02-20 PROCEDURE — 665999 HH PPS REVENUE DEBIT

## 2024-02-20 PROCEDURE — 665998 HH PPS REVENUE CREDIT

## 2024-02-20 ASSESSMENT — ACTIVITIES OF DAILY LIVING (ADL)
WASHING_UPB_CURRENT_FUNCTION: INDEPENDENT
WASHING_LB_CURRENT_FUNCTION: INDEPENDENT

## 2024-02-21 ENCOUNTER — HOME CARE VISIT (OUTPATIENT)
Dept: HOME HEALTH SERVICES | Facility: HOME HEALTHCARE | Age: 79
End: 2024-02-21
Payer: MEDICARE

## 2024-02-21 VITALS
DIASTOLIC BLOOD PRESSURE: 60 MMHG | SYSTOLIC BLOOD PRESSURE: 148 MMHG | TEMPERATURE: 98.3 F | RESPIRATION RATE: 26 BRPM | HEART RATE: 74 BPM | OXYGEN SATURATION: 94 %

## 2024-02-21 PROCEDURE — 665998 HH PPS REVENUE CREDIT

## 2024-02-21 PROCEDURE — 665999 HH PPS REVENUE DEBIT

## 2024-02-21 SDOH — ECONOMIC STABILITY: HOUSING INSECURITY: EVIDENCE OF SMOKING MATERIAL: 0

## 2024-02-21 ASSESSMENT — ENCOUNTER SYMPTOMS
NAUSEA: DENIES
HYPERTENSION: 1
PAIN LOCATION - PAIN FREQUENCY: INTERMITTENT
PERSON REPORTING PAIN: PATIENT
VOMITING: DENIES
MUSCLE WEAKNESS: 1
PAIN LOCATION - PAIN SEVERITY: 3/10
SUBJECTIVE PAIN PROGRESSION: UNCHANGED
LOWEST PAIN SEVERITY IN PAST 24 HOURS: 2/10
DRY SKIN: 1
SEVERE DYSPNEA: 1
FATIGUES EASILY: 1
LAST BOWEL MOVEMENT: 66889
DEBILITATING PAIN: 1
PAIN LOCATION - EXACERBATING FACTORS: WEATHER, ACTIVITY
HIGHEST PAIN SEVERITY IN PAST 24 HOURS: 5/10
PAIN LOCATION - PAIN DURATION: DAILY
PAIN LOCATION - PAIN QUALITY: ACHE
BOWEL PATTERN NORMAL: 1
PAIN SEVERITY GOAL: 0/10
PAIN LOCATION: LEGS/BACK
PAIN: 1
STOOL FREQUENCY: DAILY
ARTHRALGIAS: 1
PAIN LOCATION - RELIEVING FACTORS: ORAL MEDICATION

## 2024-02-21 ASSESSMENT — ACTIVITIES OF DAILY LIVING (ADL)
OASIS_M1830: 03
CURRENT_FUNCTION: STAND BY ASSIST
AMBULATION ASSISTANCE: STAND BY ASSIST

## 2024-02-21 NOTE — HOME HEALTH
"History pertinent to todays SNV:  Patient is a 78-year-old female with past medical history of end-stage COPD, chronic hypoxic respiratory failure on 6 L nasal cannula, previous stage Ia lung cancer status post radiation, NSVT on digoxin.  Patient is alert and oriented x 4, VSS, Lungs sl diminished thoughout but no wheezes or crackles noted.  She continues with a productive cough and nasal congestion.  Patient has pain but not \"bad\" today.  Patient denies recent falls, changes in medication regime other than the tapering of her prednisone.  Patient is awaiting appointment with outpatient pulmonary rehab at this time.  Patient pain and COPD medications reviewed.  Patient verb compliance and understanding of purpose of each of her medications and when to take them.  Next SNV Thursday 2/24/24.  Patient is aware of how to reach out to A with questions or problems."

## 2024-02-21 NOTE — CASE COMMUNICATION
Quality Review for SOC OASIS by CLEVELAND Haq RN on  February 21, 2024     Edits completed by CLEVELAND Haq RN:  1.  and  diagnosis coding updated per chart review.  2. Changed Oxygen-helium on MAR to just oxygen per telephone conversation with clinician.   3. Changed  to 2/12/24 epr LSOC order  4.  checked continuous oxygen  5. Per PT note dated 2/14/24, stating that patient needs CGA and a walker for safe ambul ation, plus extreme shortness of breath, the following changes were made: , , ,  changed to 2;  changed to 1;  and  changed to 3; [id number] C, F, G, H changed to 4; [id number] D. E, F, P changed to 4  6.  changed to 2/14/24 per the collaboration convention  7.  is 3 per ambulation status  8. Safety precautions checked adequate emergency plan and proper medication use  9. Completed F2F information  1 0.  is 1 per care plan therapy sets.

## 2024-02-22 ENCOUNTER — HOME CARE VISIT (OUTPATIENT)
Dept: HOME HEALTH SERVICES | Facility: HOME HEALTHCARE | Age: 79
End: 2024-02-22
Payer: MEDICARE

## 2024-02-22 PROCEDURE — 665998 HH PPS REVENUE CREDIT

## 2024-02-22 PROCEDURE — G0493 RN CARE EA 15 MIN HH/HOSPICE: HCPCS

## 2024-02-22 PROCEDURE — 665999 HH PPS REVENUE DEBIT

## 2024-02-23 VITALS
OXYGEN SATURATION: 97 % | HEART RATE: 64 BPM | RESPIRATION RATE: 18 BRPM | TEMPERATURE: 97.7 F | DIASTOLIC BLOOD PRESSURE: 64 MMHG | SYSTOLIC BLOOD PRESSURE: 110 MMHG

## 2024-02-23 PROCEDURE — 665999 HH PPS REVENUE DEBIT

## 2024-02-23 PROCEDURE — 665998 HH PPS REVENUE CREDIT

## 2024-02-23 SDOH — ECONOMIC STABILITY: HOUSING INSECURITY: EVIDENCE OF SMOKING MATERIAL: 0

## 2024-02-23 ASSESSMENT — ENCOUNTER SYMPTOMS
ARTHRALGIAS: 1
PAIN LOCATION - PAIN FREQUENCY: INTERMITTENT
VOMITING: DENIES
MUSCLE WEAKNESS: 1
NAUSEA: DENIES
PAIN LOCATION - PAIN SEVERITY: 0/10
PERSON REPORTING PAIN: PATIENT
PAIN: 1
PAIN SEVERITY GOAL: 0/10
STOOL FREQUENCY: DAILY
FATIGUES EASILY: 1
LOWEST PAIN SEVERITY IN PAST 24 HOURS: 0/10
DEPRESSED MOOD: 1
HIGHEST PAIN SEVERITY IN PAST 24 HOURS: 5/10
LAST BOWEL MOVEMENT: 66891
PAIN LOCATION - PAIN DURATION: DAILY
SUBJECTIVE PAIN PROGRESSION: UNCHANGED
PAIN LOCATION: KNEES
PAIN LOCATION - PAIN QUALITY: ACHE

## 2024-02-23 ASSESSMENT — ACTIVITIES OF DAILY LIVING (ADL)
AMBULATION ASSISTANCE: STAND BY ASSIST
CURRENT_FUNCTION: STAND BY ASSIST

## 2024-02-23 NOTE — CASE COMMUNICATION
agree to changes made  ----- Message -----  From: Antoinette Haq R.N.  Sent: 2/21/2024   8:49 AM PST  To: Shavon Tang R.N.      Quality Review for SOC OASIS by CLEVELAND Haq RN on  February 21, 2024     Edits completed by CLEVELAND Haq RN:  1.  and  diagnosis coding updated per chart review.  2. Changed Oxygen-helium on MAR to just oxygen per telephone conversation with clinician.   3. Changed  to 2/12/24  epr LSOC order  4.  checked continuous oxygen  5. Per PT note dated 2/14/24, stating that patient needs CGA and a walker for safe ambulation, plus extreme shortness of breath, the following changes were made: , , ,  changed to 2;  changed to 1;  and  changed to 3; [id number] C, F, G, H changed to 4; [id number] D. E, F, P changed to 4  6.  changed to 2/14/24 per the collaboration convention  7.   is 3 per ambulation status  8. Safety precautions checked adequate emergency plan and proper medication use  9. Completed F2F information  10.  is 1 per care plan therapy sets.

## 2024-02-24 PROCEDURE — 665999 HH PPS REVENUE DEBIT

## 2024-02-24 PROCEDURE — 665998 HH PPS REVENUE CREDIT

## 2024-02-24 NOTE — HOME HEALTH
"History pertinent to todays SNV: Patient is a 78-year-old female with past medical history of end-stage COPD, chronic hypoxic respiratory failure on 6 L nasal cannula, previous stage Ia lung cancer status post radiation, NSVT on digoxin. Patient is alert and oriented x 4, VSS, Lungs sl diminished thoughout but no wheezes or crackles noted. She continues with a productive cough and nasal congestion. Patient has pain but not \"bad\" today. Patient denies recent falls, changes in medication regime other than the tapering of her prednisone. Patient has and appointment for evaluation only for acceptance into outpatient pulmonary rehab.  Patient states she is to move soon and would like to start outpatient after she moves.  She currently would like to continue with home care assessments.   Patient HTN and COPD medications reviewed. Patient verb compliance and understanding of purpose of each of her medications and when to take them. Next SNV 2/26/24. Patient is aware of how to reach out to [facility] with questions or problem"

## 2024-02-25 PROCEDURE — 665998 HH PPS REVENUE CREDIT

## 2024-02-25 PROCEDURE — 665999 HH PPS REVENUE DEBIT

## 2024-02-26 ENCOUNTER — HOME CARE VISIT (OUTPATIENT)
Dept: HOME HEALTH SERVICES | Facility: HOME HEALTHCARE | Age: 79
End: 2024-02-26
Payer: MEDICARE

## 2024-02-26 VITALS
SYSTOLIC BLOOD PRESSURE: 128 MMHG | DIASTOLIC BLOOD PRESSURE: 56 MMHG | TEMPERATURE: 97.8 F | HEART RATE: 75 BPM | RESPIRATION RATE: 20 BRPM | OXYGEN SATURATION: 96 %

## 2024-02-26 PROCEDURE — 665999 HH PPS REVENUE DEBIT

## 2024-02-26 PROCEDURE — 665998 HH PPS REVENUE CREDIT

## 2024-02-26 PROCEDURE — G0493 RN CARE EA 15 MIN HH/HOSPICE: HCPCS

## 2024-02-26 SDOH — ECONOMIC STABILITY: HOUSING INSECURITY: EVIDENCE OF SMOKING MATERIAL: 0

## 2024-02-26 ASSESSMENT — ENCOUNTER SYMPTOMS
FATIGUES EASILY: 1
FORGETFULNESS: 1
FATIGUE: 1
CONSTIPATION: 1
COUGH CHARACTERISTICS: CONGESTED
SHORTNESS OF BREATH: 1
DYSPNEA ON EXERTION: 1
COUGH: 1
HOARSE VOICE: 1
SEVERE DYSPNEA: 1
DYSPNEA ACTIVITY LEVEL: AFTER AMBULATING LESS THAN 10 FT
PERSON REPORTING PAIN: PATIENT
DENIES PAIN: 1

## 2024-02-27 ENCOUNTER — APPOINTMENT (OUTPATIENT)
Dept: PULMONOLOGY | Facility: MEDICAL CENTER | Age: 79
End: 2024-02-27
Payer: MEDICARE

## 2024-02-27 PROCEDURE — 665999 HH PPS REVENUE DEBIT

## 2024-02-27 PROCEDURE — 665998 HH PPS REVENUE CREDIT

## 2024-02-28 ENCOUNTER — HOME CARE VISIT (OUTPATIENT)
Dept: HOME HEALTH SERVICES | Facility: HOME HEALTHCARE | Age: 79
End: 2024-02-28
Payer: MEDICARE

## 2024-02-28 PROCEDURE — 665999 HH PPS REVENUE DEBIT

## 2024-02-28 PROCEDURE — 665998 HH PPS REVENUE CREDIT

## 2024-02-28 PROCEDURE — G0495 RN CARE TRAIN/EDU IN HH: HCPCS

## 2024-02-29 VITALS
DIASTOLIC BLOOD PRESSURE: 52 MMHG | RESPIRATION RATE: 18 BRPM | OXYGEN SATURATION: 95 % | HEART RATE: 75 BPM | TEMPERATURE: 98.1 F | SYSTOLIC BLOOD PRESSURE: 122 MMHG

## 2024-02-29 PROCEDURE — 665999 HH PPS REVENUE DEBIT

## 2024-02-29 PROCEDURE — 665998 HH PPS REVENUE CREDIT

## 2024-02-29 SDOH — ECONOMIC STABILITY: HOUSING INSECURITY: EVIDENCE OF SMOKING MATERIAL: 0

## 2024-02-29 ASSESSMENT — ACTIVITIES OF DAILY LIVING (ADL)
AMBULATION ASSISTANCE: STAND BY ASSIST
CURRENT_FUNCTION: STAND BY ASSIST

## 2024-02-29 ASSESSMENT — ENCOUNTER SYMPTOMS
MUSCLE WEAKNESS: 1
PERSON REPORTING PAIN: PATIENT
SEVERE DYSPNEA: 1
FATIGUES EASILY: 1
VOMITING: DENIES
NAUSEA: DENIES
LAST BOWEL MOVEMENT: 66898
BOWEL PATTERN NORMAL: 1
ARTHRALGIAS: 1
DENIES PAIN: 1
STOOL FREQUENCY: DAILY
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
## 2024-03-01 ENCOUNTER — HOME CARE VISIT (OUTPATIENT)
Dept: HOME HEALTH SERVICES | Facility: HOME HEALTHCARE | Age: 79
End: 2024-03-01
Payer: MEDICARE

## 2024-03-01 PROCEDURE — G0155 HHCP-SVS OF CSW,EA 15 MIN: HCPCS

## 2024-03-01 PROCEDURE — 665998 HH PPS REVENUE CREDIT

## 2024-03-01 PROCEDURE — 665999 HH PPS REVENUE DEBIT

## 2024-03-01 ASSESSMENT — ACTIVITIES OF DAILY LIVING (ADL): AMBULATION_REQUIRES_ASSISTANCE: 1
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
## 2024-03-02 PROCEDURE — 665998 HH PPS REVENUE CREDIT

## 2024-03-02 PROCEDURE — 665999 HH PPS REVENUE DEBIT

## 2024-03-03 PROCEDURE — 665998 HH PPS REVENUE CREDIT

## 2024-03-03 PROCEDURE — 665999 HH PPS REVENUE DEBIT

## 2024-03-04 PROCEDURE — 665999 HH PPS REVENUE DEBIT

## 2024-03-04 PROCEDURE — 665998 HH PPS REVENUE CREDIT

## 2024-03-05 PROCEDURE — 665998 HH PPS REVENUE CREDIT

## 2024-03-05 PROCEDURE — 665999 HH PPS REVENUE DEBIT

## 2024-03-06 ENCOUNTER — HOME CARE VISIT (OUTPATIENT)
Dept: HOME HEALTH SERVICES | Facility: HOME HEALTHCARE | Age: 79
End: 2024-03-06
Payer: MEDICARE

## 2024-03-06 PROCEDURE — G0493 RN CARE EA 15 MIN HH/HOSPICE: HCPCS

## 2024-03-06 PROCEDURE — 665998 HH PPS REVENUE CREDIT

## 2024-03-06 PROCEDURE — 665999 HH PPS REVENUE DEBIT

## 2024-03-07 VITALS
RESPIRATION RATE: 16 BRPM | TEMPERATURE: 97.9 F | OXYGEN SATURATION: 96 % | HEART RATE: 65 BPM | SYSTOLIC BLOOD PRESSURE: 138 MMHG | DIASTOLIC BLOOD PRESSURE: 66 MMHG

## 2024-03-07 PROCEDURE — 665999 HH PPS REVENUE DEBIT

## 2024-03-07 PROCEDURE — 665998 HH PPS REVENUE CREDIT

## 2024-03-07 SDOH — ECONOMIC STABILITY: HOUSING INSECURITY: EVIDENCE OF SMOKING MATERIAL: 0

## 2024-03-07 ASSESSMENT — ENCOUNTER SYMPTOMS
SKIN LESIONS: 1
PAIN LOCATION: BACK PAIN
PAIN LOCATION - PAIN QUALITY: ACHE
PERSON REPORTING PAIN: PATIENT
SUBJECTIVE PAIN PROGRESSION: UNCHANGED
DRY SKIN: 1
SEVERE DYSPNEA: 1
LOWEST PAIN SEVERITY IN PAST 24 HOURS: 0/10
BOWEL PATTERN NORMAL: 1
PAIN LOCATION - EXACERBATING FACTORS: AMBULATION
PAIN LOCATION - PAIN DURATION: DAILY
PAIN LOCATION - PAIN DURATION: DAILY
PAIN LOCATION - PAIN SEVERITY: 2/10
SHORTNESS OF BREATH: 1
PAIN LOCATION - PAIN SEVERITY: 2/10
PAIN LOCATION - PAIN FREQUENCY: INTERMITTENT
PAIN SEVERITY GOAL: 0/10
PAIN LOCATION - PAIN QUALITY: ACHE
MUSCLE WEAKNESS: 1
PAIN: 1
PAIN LOCATION - RELIEVING FACTORS: REST
HIGHEST PAIN SEVERITY IN PAST 24 HOURS: 2/10
FATIGUES EASILY: 1
LAST BOWEL MOVEMENT: 66905
STOOL FREQUENCY: DAILY
PAIN LOCATION: RIGHT KNEE
PAIN LOCATION - PAIN FREQUENCY: INTERMITTENT
DYSPNEA ACTIVITY LEVEL: AT REST
ARTHRALGIAS: 1

## 2024-03-07 ASSESSMENT — ACTIVITIES OF DAILY LIVING (ADL)
AMBULATION ASSISTANCE: STAND BY ASSIST
CURRENT_FUNCTION: STAND BY ASSIST

## 2024-03-08 PROCEDURE — 665998 HH PPS REVENUE CREDIT

## 2024-03-08 PROCEDURE — 665999 HH PPS REVENUE DEBIT

## 2024-03-09 PROCEDURE — 665999 HH PPS REVENUE DEBIT

## 2024-03-09 PROCEDURE — 665998 HH PPS REVENUE CREDIT

## 2024-03-10 PROCEDURE — 665998 HH PPS REVENUE CREDIT

## 2024-03-10 PROCEDURE — 665999 HH PPS REVENUE DEBIT

## 2024-03-11 PROCEDURE — 665999 HH PPS REVENUE DEBIT

## 2024-03-11 PROCEDURE — 665998 HH PPS REVENUE CREDIT

## 2024-03-12 PROCEDURE — 665998 HH PPS REVENUE CREDIT

## 2024-03-12 PROCEDURE — 665999 HH PPS REVENUE DEBIT

## 2024-03-13 ENCOUNTER — HOME CARE VISIT (OUTPATIENT)
Dept: HOME HEALTH SERVICES | Facility: HOME HEALTHCARE | Age: 79
End: 2024-03-13
Payer: MEDICARE

## 2024-03-13 ENCOUNTER — TELEPHONE (OUTPATIENT)
Dept: SLEEP MEDICINE | Facility: MEDICAL CENTER | Age: 79
End: 2024-03-13
Payer: MEDICARE

## 2024-03-13 VITALS
RESPIRATION RATE: 16 BRPM | OXYGEN SATURATION: 97 % | DIASTOLIC BLOOD PRESSURE: 62 MMHG | SYSTOLIC BLOOD PRESSURE: 118 MMHG | HEART RATE: 66 BPM | TEMPERATURE: 97 F

## 2024-03-13 PROCEDURE — 665001 SOC-HOME HEALTH

## 2024-03-13 PROCEDURE — 665998 HH PPS REVENUE CREDIT

## 2024-03-13 PROCEDURE — 665997 HH PPS REVENUE ADJ

## 2024-03-13 PROCEDURE — 665999 HH PPS REVENUE DEBIT

## 2024-03-13 PROCEDURE — G0493 RN CARE EA 15 MIN HH/HOSPICE: HCPCS

## 2024-03-13 SDOH — ECONOMIC STABILITY: HOUSING INSECURITY

## 2024-03-13 SDOH — ECONOMIC STABILITY: FOOD INSECURITY: MEALS PER DAY: 3

## 2024-03-13 SDOH — ECONOMIC STABILITY: HOUSING INSECURITY: EVIDENCE OF SMOKING MATERIAL: 0

## 2024-03-13 ASSESSMENT — ENCOUNTER SYMPTOMS
BOWEL PATTERN NORMAL: 1
LAST BOWEL MOVEMENT: 66912
FATIGUES EASILY: 1
APPETITE LEVEL: FAIR
HYPERTENSION: 1
NAUSEA: DENIES
DENIES PAIN: 1
PERSON REPORTING PAIN: PATIENT
FATIGUE: 1
STOOL FREQUENCY: DAILY
CHANGE IN APPETITE: UNCHANGED
VOMITING: DENIES

## 2024-03-13 ASSESSMENT — ACTIVITIES OF DAILY LIVING (ADL)
HOME_HEALTH_OASIS: 00
OASIS_M1830: 01

## 2024-03-13 NOTE — TELEPHONE ENCOUNTER
VOICEMAIL  1. Caller Name: TO Geet                      Call Back Number: no number left    2. Message: TO flores galarza and lm, re: rx Advair Diskus. They have a couple of clinical questions .     3. Patient approves office to leave a detailed voicemail/MyChart message: N\A    3/13/24:  Called and spoke with [employer] PA dept. Notified them I received a message re: PA for Advair but I didn't see a PA was started from our office. He said the pt might of started it. But they faxed a form to us in regards to this. Requested for them to refax form to us. He said he will . Provided direct fax to us for form.

## 2024-03-13 NOTE — DISCHARGE SUMMARY
DISCHARGE NARRATIVE    CURRENT LEVEL OF FUNCTION:   Ambulation and Mobility: Independent  Patient Equipment: walker, wheelchair and motorized chair for ambulation:   Transferring: Independent  Bathing: Independent  Dressing: Independent  Special equipment used: none    MEDICATION MANAGEMENT:  Able to take independently  Medication issues:   In the last 24 hours did the patient wear oxygen: yes.  In the last 24 hours, when is the patient dyspneic or noticeably   Short of Breath : with moderate exertion: while dressing, using commode or bedpan, after ambulating 10-20 feet .    DISCHARGE/TRANSITION PLAN  The discharge plan for home health is to discharge back to the community when individualized goals have been met.  Individualized goals during this episode of Home Health care were related to: Assess Medication Response and Effectiveness, Deconditioning and Rehabilitation Assessment and Education, Education and Management of Chronic Disease, Exacerbation of Chronic Condition, Home Exercise Program For Function and Safety and Respiratory Assessment and Education.   .    Patient has met goals related to: Assess Medication Response and Effectiveness, Deconditioning and Rehabilitation Assessment and Education, Education and Management of Chronic Disease, Exacerbation of Chronic Condition, Home Exercise Program For Function and Safety and Respiratory Assessment and Education.      Goals not met and why All Goals Met    Patient is ready for discharge on 3/13/24.

## 2024-03-14 ENCOUNTER — HOME CARE VISIT (OUTPATIENT)
Dept: HOME HEALTH SERVICES | Facility: HOME HEALTHCARE | Age: 79
End: 2024-03-14
Payer: MEDICARE

## 2024-03-14 PROCEDURE — 665999 HH PPS REVENUE DEBIT

## 2024-03-14 PROCEDURE — 665998 HH PPS REVENUE CREDIT

## 2024-03-14 NOTE — CASE COMMUNICATION
Quality Review for 3.13.24 [place] OASIS performed on by KENYETTA Helton RN on 3.14.2024:    Edits completed by KENYETTA Helton RN:  1. Changed  C to yes and D to no per POC  2. Changed  to 1 per [id number] E response fo 6  3. Added F to  for the azithromycin on the MAR

## 2024-03-14 NOTE — Clinical Note
I agree with the following edits.  Mariam Mckinney RN 3/15/24 @ 1149  ----- Message -----  From: Shena Helton R.N.  Sent: 3/14/2024   7:05 AM PDT  To: Mariam Mckinney R.N.      Quality Review for 3.13.24 [place] OASIS performed on by KENYETTA Helton RN on 3.14.2024:    Edits completed by KENYETTA Helton RN:  1. Changed  C to yes and D to no per POC  2. Changed  to 1 per [id number] E response fo 6  3. Added F to  for the azithromycin on the MAR

## 2024-03-15 PROCEDURE — 665999 HH PPS REVENUE DEBIT

## 2024-03-15 PROCEDURE — 665998 HH PPS REVENUE CREDIT

## 2024-03-16 PROCEDURE — 665999 HH PPS REVENUE DEBIT

## 2024-03-16 PROCEDURE — 665998 HH PPS REVENUE CREDIT

## 2024-03-17 PROCEDURE — 665999 HH PPS REVENUE DEBIT

## 2024-03-17 PROCEDURE — 665998 HH PPS REVENUE CREDIT

## 2024-03-18 PROCEDURE — 665999 HH PPS REVENUE DEBIT

## 2024-03-18 PROCEDURE — 665998 HH PPS REVENUE CREDIT

## 2024-03-19 PROCEDURE — 665999 HH PPS REVENUE DEBIT

## 2024-03-19 PROCEDURE — 665998 HH PPS REVENUE CREDIT

## 2024-03-20 ENCOUNTER — TELEPHONE (OUTPATIENT)
Dept: SLEEP MEDICINE | Facility: MEDICAL CENTER | Age: 79
End: 2024-03-20

## 2024-03-20 NOTE — TELEPHONE ENCOUNTER
VOICEMAIL:03/18/24  1. Caller Name: Berny                      Call Back Number: [phone] (home)      2. Message: Pt called and lm, re: advair and an over-ride the dr needs to do.     3. Patient approves office to leave a detailed voicemail/MyChart message: N\A      3/20/24:  Looked online for [facility] PDL: advair is a non-formulary but it says the generic is on formulary with a quantity restriction.     Called pt and lm, asking for a return call re: her VM.

## 2024-03-25 DIAGNOSIS — J44.9 CHRONIC OBSTRUCTIVE PULMONARY DISEASE, UNSPECIFIED COPD TYPE (HCC): ICD-10-CM

## 2024-03-25 NOTE — TELEPHONE ENCOUNTER
Caller Name: Berny Renee                 Call Back Number: [phone] (home)         Patient approves a detailed voicemail message:     Have we ever prescribed this med? .  If yes, what date? 2/8/24    Last OV: 2/16/24 W/ Anais العراقي     Next OV: 4/25/24 w/ Dr. Kumar     DX: COPD     Medications:predniSONE (DELTASONE) 20 MG Tab

## 2024-03-27 RX ORDER — PREDNISONE 20 MG/1
TABLET ORAL
Qty: 9 TABLET | Refills: 1 | OUTPATIENT
Start: 2024-03-27

## 2024-03-28 ENCOUNTER — OFFICE VISIT (OUTPATIENT)
Dept: SLEEP MEDICINE | Facility: MEDICAL CENTER | Age: 79
End: 2024-03-28
Payer: MEDICARE

## 2024-03-28 VITALS
DIASTOLIC BLOOD PRESSURE: 72 MMHG | BODY MASS INDEX: 31.89 KG/M2 | HEIGHT: 63 IN | HEART RATE: 91 BPM | SYSTOLIC BLOOD PRESSURE: 124 MMHG | WEIGHT: 180 LBS | OXYGEN SATURATION: 91 %

## 2024-03-28 DIAGNOSIS — J96.11 CHRONIC RESPIRATORY FAILURE WITH HYPOXIA (HCC): ICD-10-CM

## 2024-03-28 DIAGNOSIS — J43.1 PANLOBULAR EMPHYSEMA (HCC): ICD-10-CM

## 2024-03-28 DIAGNOSIS — C34.12 PRIMARY CANCER OF LEFT UPPER LOBE OF LUNG (HCC): ICD-10-CM

## 2024-03-28 PROCEDURE — 3078F DIAST BP <80 MM HG: CPT

## 2024-03-28 PROCEDURE — 1158F ADVNC CARE PLAN TLK DOCD: CPT

## 2024-03-28 PROCEDURE — 94761 N-INVAS EAR/PLS OXIMETRY MLT: CPT

## 2024-03-28 PROCEDURE — 99214 OFFICE O/P EST MOD 30 MIN: CPT

## 2024-03-28 PROCEDURE — 99213 OFFICE O/P EST LOW 20 MIN: CPT

## 2024-03-28 PROCEDURE — 3074F SYST BP LT 130 MM HG: CPT

## 2024-03-28 RX ORDER — FLUTICASONE FUROATE, UMECLIDINIUM BROMIDE AND VILANTEROL TRIFENATATE 200; 62.5; 25 UG/1; UG/1; UG/1
1 POWDER RESPIRATORY (INHALATION) DAILY
Qty: 90 EACH | Refills: 3 | Status: SHIPPED | OUTPATIENT
Start: 2024-03-28

## 2024-03-28 RX ORDER — FLUTICASONE FUROATE, UMECLIDINIUM BROMIDE AND VILANTEROL TRIFENATATE 200; 62.5; 25 UG/1; UG/1; UG/1
1 POWDER RESPIRATORY (INHALATION) DAILY
Qty: 4 EACH | Refills: 0 | COMMUNITY
Start: 2024-03-28

## 2024-03-28 ASSESSMENT — ENCOUNTER SYMPTOMS
SHORTNESS OF BREATH: 1
SINUS PAIN: 0
HEADACHES: 0
HEMOPTYSIS: 0
DIZZINESS: 0
MYALGIAS: 0
HEARTBURN: 0
VOMITING: 0
DIARRHEA: 0
SPUTUM PRODUCTION: 1
CHILLS: 0
WEAKNESS: 0
FEVER: 0
COUGH: 1
PALPITATIONS: 0
NAUSEA: 0
FALLS: 0
WHEEZING: 0
DIAPHORESIS: 0

## 2024-03-28 ASSESSMENT — FIBROSIS 4 INDEX: FIB4 SCORE: 0.97

## 2024-03-28 NOTE — PROCEDURES
Multi-Ox Readings  Multi Ox #1 4 LPM   O2 sat % at rest 84   O2 sat % on exertion     O2 sat average on exertion     Multi Ox #2 6 LPM   O2 sat % at rest 88   O2 sat % on exertion     O2 sat average on exertion       Oxygen Use 6   Oxygen Frequency 24/7   Duration of need     Is the patient mobile within the home?     CPAP Use?     BIPAP Use?     Servo Titration

## 2024-03-28 NOTE — PROGRESS NOTES
Pulmonary Clinic Note    Date of Visit: 3/28/2024     Chief Complaint:  Chief Complaint   Patient presents with    Follow-Up     Last seen 2/16/24 SUSAN Butt       HPI:   Berny Renee is a very pleasant 78 y.o. year old female former smoker (60 pack-years, quit in 1999), with a PMHx of COPD-emphysema, chronic respiratory failure, elevated BMI, history of SVT, HTN, who presented to the Pulmonary Clinic for a regular follow up. Last seen in the office on 2/15/2024 with myself.     Patient is followed by pulmonary office for COPD-emphysema.  PFT in 2021 shows a FEV1 0.89 L or 43%, FEV1/FVC 45%, %, DLCO 37% predicted.  CT chest in 2023 shows severe emphysema and stable pulmonary nodules measuring up to 6 mm.  She was treated SBRT in 2021 for presumable local lung cancer that was not amendable to biopsy in the left upper lobe. This has been stable on subsequent radiographic surveillance.  Patient is on Advair 500, Spiriva, Singulair, azithromycin to 50 mg daily, and  DuoNeb, albuterol as needed.  Patient is currently on 3-6 LPM of oxygen.  Patient was previously hospitalized in the beginning of February 2024 for COPD exacerbation was triggered by RSV.  Patient is a DNR/DNI with a POLST form on file.      Interval events:  2/16/2024-  Patient states that her shortness of breath, cough, sputum production are improving.  She is finished with her prednisone taper.  She continues to use and benefit from Advair 500, Spiriva, Singulair, and DuoNebs 3-4 times a day.  She has not needed albuterol.  She is at her baseline oxygen, which is 6 LPM.  She has been referred to pulmonary rehab and has a intake appointment later this month.    3/28/2024- Patient presents today to request long-term prednisone use.  Patient states that she does better on 5 mg of prednisone daily.  She currently is on a prednisone taper by her primary care for increased shortness of breath.  She states she is shortness of breath with mMRC of  4, has an occasional cough with white sputum but denies any wheezing.  She continues to use Advair 500, Spiriva, Singulair, and daily azithromycin to 50 mg.  She is not using DuoNebs and only uses albuterol a couple times a month.  She is scheduled to go to pulmonary rehab later this month.  She is also frustrated, as her insurance will no longer cover Advair 500 and wants the patient to be switched to [place].  We attempted to do a multi ox today in the office, which showed the patient needs more than 6 L of oxygen upon exertion but patient refused to continue testing.    Exacerbations this year:  1    Current medication regimen:  Advair 500, Spiriva, Singulair, azithromycin 250 mg daily, and  DuoNeb, albuterol as needed    Oxygen use: 6 LPM 24/7    MMRC Grade: 4- Breathless with ambulating around house or ADLs      Past Medical History:   Diagnosis Date    Acute on chronic respiratory failure with hypoxia (Formerly [facility]) 11/14/2020    Arthritis     spine    Asthma with COPD     BMI 31.0-31.9,adult 02/04/2022    Cataract immature     Chronic pain     Chronic respiratory failure with hypoxia (Formerly [facility]) 07/13/2017    Colon polyps     COPD (chronic obstructive pulmonary disease) (Formerly [facility]) 2002    moderate to severe    Cough     DDD (degenerative disc disease), cervical     DDD (degenerative disc disease), thoracic     Dependence on continuous supplemental oxygen 08/13/2015    IMO load March 2020    Diverticulitis of colon     Dyslipidemia     EMPHYSEMA     H/O opioid abuse (Formerly [facility]) 07/15/2021    Hypertension     Obesity (BMI 30-39.9) 10/24/2016    Opioid type dependence, continuous (Formerly [facility]) 02/04/2022    REGINE (obstructive sleep apnea)     OSTEOPOROSIS     Painful breathing     Shortness of breath     Spinal stenosis, lumbar region, with neurogenic claudication     Sputum production     URINARY INCONTINENCE     Vitamin d deficiency     Wheezing      Past Surgical History:   Procedure Laterality Date    LUMBAR LAMINECTOMY DISKECTOMY  6/22/2010     Performed by GRACIE CANO at SURGERY [facility] ORS    OTHER ORTHOPEDIC SURGERY  2004    left ankle fx    OTHER      leg fracture    OTHER      t spine disc surg    TONSILLECTOMY       Social History     Socioeconomic History    Marital status:      Spouse name: Not on file    Number of children: Not on file    Years of education: Not on file    Highest education level: Not on file   Occupational History    Not on file   Tobacco Use    Smoking status: Former     Current packs/day: 0.00     Average packs/day: 2.0 packs/day for 30.0 years (60.0 ttl pk-yrs)     Types: Cigarettes     Start date: 3/1/1969     Quit date: 3/1/1999     Years since quittin.0    Smokeless tobacco: Never    Tobacco comments:     3 pks a day for 35 yrs, continued abstinence   Vaping Use    Vaping Use: Never used   Substance and Sexual Activity    Alcohol use: Not Currently     Alcohol/week: 4.2 oz     Types: 7 Glasses of wine per week    Drug use: Not Currently     Types: Marijuana, Oral     Comment: Medical Marijuana     Sexual activity: Never   Other Topics Concern    Not on file   Social History Narrative    ** Merged History Encounter **          Social Determinants of Health     Financial Resource Strain: Medium Risk (2024)    Overall Financial Resource Strain (CARDIA)     Difficulty of Paying Living Expenses: Somewhat hard   Food Insecurity: Food Insecurity Present (2024)    Hunger Vital Sign     Worried About Running Out of Food in the Last Year: Sometimes true     Ran Out of Food in the Last Year: Sometimes true   Transportation Needs: Unmet Transportation Needs (2024)    PRAPARE - Transportation     Lack of Transportation (Medical): Yes     Lack of Transportation (Non-Medical): No   Physical Activity: Insufficiently Active (2024)    Exercise Vital Sign     Days of Exercise per Week: 3 days     Minutes of Exercise per Session: 10 min   Stress: No Stress Concern Present (2024)    [place]  [employer] - Occupational Stress Questionnaire     Feeling of Stress : Only a little   Social Connections: Feeling Socially Integrated (3/13/2024)    OASIS : Social Isolation     Frequency of experiencing loneliness or isolation: Never   Recent Concern: Social Connections - Socially Isolated (2/14/2024)    Social Connection and Isolation Panel [NHANES]     Frequency of Communication with Friends and Family: Three times a week     Frequency of Social Gatherings with Friends and Family: Once a week     Attends Tenriism Services: Never     Active Member of Clubs or Organizations: No     Attends Club or Organization Meetings: Never     Marital Status:    Intimate Partner Violence: Not At Risk (2/14/2024)    Humiliation, Afraid, Rape, and Kick questionnaire     Fear of Current or Ex-Partner: No     Emotionally Abused: No     Physically Abused: No     Sexually Abused: No   Housing Stability: Low Risk  (2/14/2024)    Housing Stability Vital Sign     Unable to Pay for Housing in the Last Year: No     Number of Places Lived in the Last Year: 1     Unstable Housing in the Last Year: No        Family History   Problem Relation Age of Onset    Cancer Father         Lung    Other Sister         lung and leukemia cancer    Cancer Sister         Leukemia, Lung     Current Outpatient Medications on File Prior to Visit   Medication Sig Dispense Refill    Home Care Oxygen Inhale 6 L/min continuous. Oxygen dose 6 L/min  Respiratory route via: Nasal Cannula   Oxygen supplier:Preferred      Indications: Shortness of breath      ipratropium-albuterol (DUONEB) 0.5-2.5 (3) MG/3ML nebulizer solution Take 3 mL by nebulization every four hours as needed for Shortness of Breath (Wheezing). 360 mL 3    predniSONE (DELTASONE) 20 MG Tab Take 2 tablets (40mg) by mouth daily until 2/10/2024, THEN 1 tablet daily x 2 days, THEN 0.5 tablets daily x 2 days. 9 Tablet 0    Albuterol Sulfate 108 (90 Base) MCG/ACT AEROSOL POWDER,  BREATH ACTIVATED Inhale 2 Puffs 4 times a day as needed (shortness of breath). Indications: SOB 3 Each 0    benzonatate (TESSALON) 100 MG Cap Take 1 Capsule by mouth 3 times a day as needed for Cough. 30 Capsule 0    losartan (COZAAR) 50 MG Tab Take 1 Tablet by mouth 2 times a day. 60 Tablet 1    digoxin (LANOXIN) 125 MCG Tab Take 1 Tablet by mouth every day. 90 Tablet 3    meloxicam (MOBIC) 7.5 MG Tab Take 7.5 mg by mouth 2 times a day. Indications: Rheumatoid Arthritis      azithromycin (ZITHROMAX) 250 MG Tab One tablet daily for COPD 90 Tablet 6    montelukast (SINGULAIR) 10 MG Tab Take 1 Tablet by mouth every day. 90 Tablet 3    calcium carbonate (OS-MICHELLE 500) 1250 (500 Ca) MG Tab Take 600 mg by mouth every evening. Indications: Low Amount of Calcium in the Blood      DULoxetine (CYMBALTA) 30 MG Cap DR Particles Take 60 mg by mouth every evening. Indications: Musculoskeletal Pain, denies depression      acyclovir (ZOVIRAX) 200 MG Cap Take 200 mg by mouth every day. Indications: Herpes Simplex Infection      spironolactone (ALDACTONE) 25 MG Tab TAKE ONE TABLET BY MOUTH ONE TIME DAILY 30 Tab 11    Cholecalciferol (VITAMIN D3) 2000 UNIT Cap TAKE ONE CAPSULE BY MOUTH ONE TIME DAILY 30 Cap 3    omeprazole (PRILOSEC OTC) 20 MG tablet Take 1 Tab by mouth every day. 30 Tab 11     No current facility-administered medications on file prior to visit.     Allergies: Iodine and Tape    ROS:   Review of Systems   Constitutional:  Negative for chills, diaphoresis, fever and malaise/fatigue.   HENT:  Negative for congestion and sinus pain.    Respiratory:  Positive for cough (Occasional), sputum production (Occasional; white) and shortness of breath. Negative for hemoptysis and wheezing.    Cardiovascular:  Negative for chest pain, palpitations and leg swelling.   Gastrointestinal:  Negative for diarrhea, heartburn, nausea and vomiting.   Musculoskeletal:  Negative for falls and myalgias.   Neurological:  Negative for dizziness,  "weakness and headaches.     Vitals:  /72 (BP Location: Right arm, Patient Position: Sitting, BP Cuff Size: Adult)   Pulse 91   Ht 1.6 m (5' 3\")   Wt 81.6 kg (180 lb)   SpO2 91%     Physical Exam  Constitutional:       General: She is not in acute distress.     Appearance: Normal appearance. She is not ill-appearing, toxic-appearing or diaphoretic.   Cardiovascular:      Rate and Rhythm: Normal rate and regular rhythm.      Heart sounds: No murmur heard.     No friction rub. No gallop.   Pulmonary:      Effort: No respiratory distress.      Breath sounds: Normal breath sounds. No stridor. No wheezing, rhonchi or rales.   Musculoskeletal:         General: No swelling.      Right lower leg: No edema.      Left lower leg: No edema.   Skin:     General: Skin is warm.   Neurological:      General: No focal deficit present.      Mental Status: She is alert and oriented to person, place, and time.   Psychiatric:         Mood and Affect: Mood normal.         Behavior: Behavior normal.         Thought Content: Thought content normal.         Judgment: Judgment normal.         Laboratory Data:  PFTs: (Date: 7/8/2021)-      Impression:  Baseline spirometry shows airflow obstruction with FEV1/FVC ratio 45 and an FEV1 of 0.89 L or 43% predicted.  Bronchodilator testing was not performed.  Total lung capacity within normal limits at 95% predicted.  There is mild air trapping.  Diffusion capacity severely reduced at 37% predicted.  Pulmonary function testing shows severe airflow obstruction with mild air trapping and reduced DLCO consistent with advanced COPD.    CXR: (Date: 1/31/2024)-  Impression:  Emphysematous changes with basilar atelectasis/scarring.     CT Chest: (Date: 6/30/2023)-  Impression:  1.  Redemonstration of posttreatment scarring in the left upper lobe with associated small pulmonary nodules. This is grossly stable.  2.  No new suspicious pulmonary nodules or masses. Several additional pulmonary nodules " are stable or less conspicuous since prior study.  3.  Severe Emphysema.      Assessment and Plan:    Problem List Items Addressed This Visit          Pulmonary/Sleep Medicine Problems    Panlobular emphysema (HCC)     PFT in 2021 shows a FEV1 0.89 L or 43%, FEV1/FVC 45%, %, DLCO 37% predicted.  CT chest in 2023 shows severe emphysema.  Patient is currently on Advair 500, Spiriva, Singulair, and DuoNebs with albuterol as needed.  Symptomatically, she has had an increase in shortness of breath.  She has had 2 exacerbations this year.  Gold group D.  I do think her shortness of breath is related to her hypoxia.  -- Switch patient to Trelegy 200.  Sample x 4 given to the patient today in the office.  -- Continue Singulair  --Continue azithromycin 250 mg daily  --Continue albuterol and DuoNebs as needed  --Patient has intake appointment with pulmonary rehab  --Patient has declined BLVR previously  --Patient does not meet exacerbation requirement for ARNASA study  --Patient is up-to-date on influenza and pneumococcal  -- Patient is a DNR/DNI with a POLST form on file.         Relevant Medications    fluticasone-umeclidinium-vilanterol (TRELEGY ELLIPTA) 200-62.5-25 mcg/act inhaler    fluticasone-umeclidinium-vilanterol (TRELEGY ELLIPTA) 200-62.5-25 mcg/act inhaler       Other    Chronic respiratory failure with hypoxia (HCC)     Patient continues to use 6 LPM of oxygen 24/7.  Multiaxis in the office shows the patient needs more than 6 L, but patient refused to continue testing.   -- I do believe that her shortness of breath is attributed to her needing more oxygen/oxygen to use due to her not refusing to complete testing.         Relevant Orders    Multiple Oximetry    Primary cancer of left upper lobe of lung (HCC)     She was treated SBRT in 2021.  She has a repeat CT chest in July 2024, which is ordered by Dr. Grimm.           Diagnostic studies have been reviewed with the patient.    Return in about 2 months  (around 6/5/2024), or if symptoms worsen or fail to improve, for COPD, with José.     This note was generated using voice recognition software which has a chance of producing errors of grammar and possibly content.  I have made every reasonable attempt to find and correct any obvious errors, but it should be expected that some may not be found prior to finalization of this note.    Time spent in record review prior to patient arrival, reviewing results, and in face-to-face encounter totaled 35 min.  __________  SUSAN Moss  Pulmonary Medicine  [facility]

## 2024-03-29 ENCOUNTER — TELEPHONE (OUTPATIENT)
Dept: SLEEP MEDICINE | Facility: MEDICAL CENTER | Age: 79
End: 2024-03-29

## 2024-03-29 NOTE — ASSESSMENT & PLAN NOTE
Patient continues to use 6 LPM of oxygen 24/7.  Multiaxis in the office shows the patient needs more than 6 L, but patient refused to continue testing.   -- I do believe that her shortness of breath is attributed to her needing more oxygen/oxygen to use due to her not refusing to complete testing.

## 2024-03-29 NOTE — ASSESSMENT & PLAN NOTE
PFT in 2021 shows a FEV1 0.89 L or 43%, FEV1/FVC 45%, %, DLCO 37% predicted.  CT chest in 2023 shows severe emphysema.  Patient is currently on Advair 500, Spiriva, Singulair, and DuoNebs with albuterol as needed.  Symptomatically, she has had an increase in shortness of breath.  She has had 2 exacerbations this year.  Gold group D.  I do think her shortness of breath is related to her hypoxia.  -- Switch patient to Trelegy 200.  Sample x 4 given to the patient today in the office.  -- Continue Singulair  --Continue azithromycin 250 mg daily  --Continue albuterol and DuoNebs as needed  --Patient has intake appointment with pulmonary rehab  --Patient has declined BLVR previously  --Patient does not meet exacerbation requirement for ARNASA study  --Patient is up-to-date on influenza and pneumococcal  -- Patient is a DNR/DNI with a POLST form on file.

## 2024-03-29 NOTE — TELEPHONE ENCOUNTER
Attempted to contact patient at [phone] to discuss [facility] and services/benefits offered. No answer, left voicemail.    Anna Ward  Rx Coordinator   [phone]

## 2024-04-22 ENCOUNTER — APPOINTMENT (OUTPATIENT)
Dept: RADIOLOGY | Facility: MEDICAL CENTER | Age: 79
End: 2024-04-22
Attending: EMERGENCY MEDICINE
Payer: MEDICARE

## 2024-04-22 ENCOUNTER — HOSPITAL ENCOUNTER (OUTPATIENT)
Dept: PULMONOLOGY | Facility: MEDICAL CENTER | Age: 79
End: 2024-04-22
Attending: FAMILY MEDICINE
Payer: MEDICARE

## 2024-04-22 ENCOUNTER — HOSPITAL ENCOUNTER (EMERGENCY)
Facility: MEDICAL CENTER | Age: 79
End: 2024-04-23
Attending: EMERGENCY MEDICINE
Payer: MEDICARE

## 2024-04-22 VITALS
RESPIRATION RATE: 16 BRPM | BODY MASS INDEX: 35.44 KG/M2 | WEIGHT: 200 LBS | HEIGHT: 63 IN | DIASTOLIC BLOOD PRESSURE: 63 MMHG | OXYGEN SATURATION: 99 % | HEART RATE: 64 BPM | SYSTOLIC BLOOD PRESSURE: 140 MMHG

## 2024-04-22 DIAGNOSIS — S81.812A LACERATION OF LEFT LOWER EXTREMITY, INITIAL ENCOUNTER: ICD-10-CM

## 2024-04-22 PROCEDURE — 700101 HCHG RX REV CODE 250: Mod: UD | Performed by: EMERGENCY MEDICINE

## 2024-04-22 PROCEDURE — 99284 EMERGENCY DEPT VISIT MOD MDM: CPT

## 2024-04-22 PROCEDURE — 700111 HCHG RX REV CODE 636 W/ 250 OVERRIDE (IP): Performed by: EMERGENCY MEDICINE

## 2024-04-22 PROCEDURE — 303747 HCHG EXTRA SUTURE

## 2024-04-22 PROCEDURE — 90715 TDAP VACCINE 7 YRS/> IM: CPT | Performed by: EMERGENCY MEDICINE

## 2024-04-22 PROCEDURE — 94625 PHY/QHP OP PULM RHB W/O MNTR: CPT

## 2024-04-22 PROCEDURE — 304999 HCHG REPAIR-SIMPLE/INTERMED LEVEL 1

## 2024-04-22 PROCEDURE — 73560 X-RAY EXAM OF KNEE 1 OR 2: CPT | Mod: LT

## 2024-04-22 PROCEDURE — 90471 IMMUNIZATION ADMIN: CPT

## 2024-04-22 PROCEDURE — 304217 HCHG IRRIGATION SYSTEM

## 2024-04-22 RX ADMIN — CLOSTRIDIUM TETANI TOXOID ANTIGEN (FORMALDEHYDE INACTIVATED), CORYNEBACTERIUM DIPHTHERIAE TOXOID ANTIGEN (FORMALDEHYDE INACTIVATED), BORDETELLA PERTUSSIS TOXOID ANTIGEN (GLUTARALDEHYDE INACTIVATED), BORDETELLA PERTUSSIS FILAMENTOUS HEMAGGLUTININ ANTIGEN (FORMALDEHYDE INACTIVATED), BORDETELLA PERTUSSIS PERTACTIN ANTIGEN, AND BORDETELLA PERTUSSIS FIMBRIAE 2/3 ANTIGEN 0.5 ML: 5; 2; 2.5; 5; 3; 5 INJECTION, SUSPENSION INTRAMUSCULAR at 23:21

## 2024-04-22 RX ADMIN — LIDOCAINE HYDROCHLORIDE 10 ML: 10; .005 INJECTION, SOLUTION EPIDURAL; INFILTRATION; INTRACAUDAL; PERINEURAL at 23:15

## 2024-04-22 ASSESSMENT — 6 MINUTE WALK TEST (6MWT)
SAO2 AT 4 MIN: 92
HEART RATE: 64
SAO2 AT 3 MIN: 92
SAO2 AT 1 MIN: 87
HEART RATE AT 2 MIN: 83
HEART RATE AT 1 MIN: 80
O2 FLOW RATE - LITERS PER MIN: 3
HEART RATE AT 4 MIN: 76
DISTANCE WALKED (FT): 0
DISTANCE WALKED (FT): 150
TOTAL DISTANCE WALKED: 350
HEART RATE AT 1 MIN: 65
SITTING BLOOD PRESSURE: 140/63
PERCEIVED BREATHLESSNESS AT 1 MIN: 2
DISTANCE WALKED (FT): 100
O2 SAT PERCENT ON O2: 99
SAO2 AT 6 MIN: 87
HEART RATE AT 5 MIN: 75
DISTANCE WALKED (FT): 0
O2 FLOW RATE - LITERS PER MIN: 3
NUMBER OF RESTS: 2
SAO2 AT 1 MIN: 90
TOTAL REST TIME: 2
SAO2 AT 5 MIN: 92
SAO2 AT 2 MIN: 89
DISTANCE WALKED (FT): 100
BLOOD PRESSURE AT 1 MIN: 150/80
AMBULATES WITH O2: WITH O2
PRED MAX HR: 142
PERCEIVED FATIGUE AT 1 MIN: 4
HEART RATE AT 6 MIN: 80
O2 SATURATION: CONTINUOUS
PERCEIVED BREATHLESSNESS AT 6 MIN: 2
PERCEIVED FATIGUE AT 6 MIN: 4
DISTANCE WALKED (FT): 0
HEART RATE AT 3 MIN: 73

## 2024-04-22 ASSESSMENT — FIBROSIS 4 INDEX
FIB4 SCORE: 0.97
FIB4 SCORE: 0.97

## 2024-04-22 ASSESSMENT — PAIN SCALES - GENERAL: PAINLEVEL: NO PAIN

## 2024-04-22 NOTE — PROGRESS NOTES
"PULMONARY REHABILITATION INITIAL EVALUATION FORM      How did you hear about our program? Previous patient-Pulmonary  Referring Physician:  José  Other Physicians: Fatoumata Sherman  Emergency Contact: Jenifer Daily  Phone: [phone]  What is your preferred method of communication? phone  Advanced Directives: Yes    Packet given: No   POLST: No     Health Perception/Health Management patterns  How is your general health? \"Worst it has ever been\"  Do you have a history of any of the following?     Vascular:  Hypertension and PVCS/A-fib  Pulmonary:  Emphysema, Bronchitis, COPD, and Lung Cancer    Environmental/Occupational/Exposure: none per patient   How long ago did you first develop any problems with your lungs or breathing?     Diabetes: No  Gastrointestinal Problems/Reflux/Hiatal Hernia: none  Orthopedic Problems/Arthritis: arthritis, chronic pain for back/hands  Sinus/Post Nasal Drip: \"many allergies per pt\" constant runny nose   Previous Surgeries:  Past Surgical History:   Procedure Laterality Date    LUMBAR LAMINECTOMY DISKECTOMY  2010    Performed by GRACIE CANO at SURGERY [facility]    OTHER ORTHOPEDIC SURGERY      left ankle fx    OTHER      leg fracture    OTHER      t spine disc surg    TONSILLECTOMY       Respiratory Infections in the past year: 1  Hospitalized (inpatient) for lung problems in the past year: 1   Hospitalization for other problems: 1  Emergency Room/Urgent Care/911 calls for lung problems in the past year: 1 Emergency Room/Urgent Care/911 calls for other problems: 1    Allergies   Allergen Reactions    Iodine      Convulsion  I.V.  iodine    Tape Rash and Itching       Social History     Tobacco Use   Smoking Status Former    Current packs/day: 0.00    Average packs/day: 2.0 packs/day for 30.0 years (60.0 ttl pk-yrs)    Types: Cigarettes    Start date: 3/1/1969    Quit date: 3/1/1999    Years since quittin.1   Smokeless Tobacco Never   Tobacco " "Comments    3 pks a day for 35 yrs, continued abstinence       Social History     Substance and Sexual Activity   Alcohol Use Not Currently    Alcohol/week: 4.2 oz    Types: 7 Glasses of wine per week             Respiratory  Breath Sounds: clear  Accessory Muscles: none  Speech: normal  Dyspnea: slight  Clubbing: none   Capillary Refill: less than 3 sec  Positioning: upright  Color: pink     Activity Assessment  What do you do for fun? Play dominos, hang out with girlfriends, [place]    Are there any activities that cause you to have:  Shortness of breath: anything exerting   Fatigue: chronic fatigue per patient     Chest pain: activities \"without walker\"   How far can you walk without stopping? 1/2 block   Has your doctor told you of any particular activities you should not do? no  Have you ever had a panic attack? Yes  Frequency: twice a year   How do you handle it? Call 911       Dyspnea  Grade 1: Severe impairment (stops activity secondary to SOB)    Cough Assessment  Wheeze with Cough: not often   Phlegm Production:  yes   Frequency: when laying down   Time of Day When Worse: morning   Quality: strong   Color: white  Blood: none  Other Symptoms: none    Sleep/Rest pattern  Do you feel rested and ready for the day after sleeping? No   How many pillows do you use for sleeping?  Adjustable base or recliner   What kind of breathing equipment do you use at night? 6-7lpm on stationary concentrator   Home Respiratory Equipment: yes   Company: [employer]   Delivery method and liter flow: 6-7lpm at NOC, rest or walking around house on stationary concentrator. Patient does not titrate between activities,rest and/or sleep. 3-4lpm on oxymizer on E-tank when leaves the house.    Hours on oxygen daily: 24  Other respiratory equipment: nebulizer   Respiratory medications: Trelegy once daily am, Singulair 10mg daily, albuterol as needed, Duoneb 3x weekly on nebulizer     Cognitive-perceptual pattern  Do you have any " hearing problems? Yes   Hearing device?  No  Do you have any vision problems? Yes    Wear glasses: Yes    What was the last grade in school that you completed? Some College, No Degree  How do you learn best: Doing      Coping Stress Pattern  Who is most helpful in talking things over? Girlfriends and a few other close friends   Are they available to you now?  Yes   What worries you most about your condition (diagnosis)? Nothing- not worried      Role Relationship Pattern  Do you live alone? No   With your family? no  Does anyone living in your home know CPR?  NA  Number of children:  None   Pets? Yes   How do your family and friends feel about your lung condition? Supportive   Do things usually go well for you at work? Retired    Is your income sufficient for your needs?  Yes       Metabolic Pattern  What is your desired weight? 150 lbs  Are you Stable?     Patient Goals  List the reasons you are coming to Pulmonary Rehab. Recommended by MD  What do you want to accomplish during your Pulmonary Rehab program? Be more independent    Are there any activities or events you would like to participate in, which you are currently unable to?   Walking, daily activities easier     Summary of Patient Assessment and Motivation: Motivated to become stronger and build confidence. Previously attended rehab and felt better afterwards.  Decline in health since RSV.    Time spent in interview 1 hour

## 2024-04-22 NOTE — PROGRESS NOTES
"PULMONARY REHAB PHYSICAL EVALUATION AND EXERCISE PRESCRIPTION    Assessment:  Medical History pertinent to performance of exercise regime: COPD, HTN, Lung Ca, A-fib, PVD, depression, chronic pain. Surgical history: Lumbar laminectomy    Current exercise program/lifestyle: currently sedentary   Exercise Equipment: 3 lb weights   02: System and liter flow:  Patient states 6-7lpm at NOC, rest or walking around house on stationary concentrator. Patient does not titrate between activities: i.e rest and/or sleep. Uses 3-4lpm on oxymizer on E-tank when leaves the house.   Specific activities of daily living affected by condition: \"anything exerting\" per patient   Patient is independent Yes     Exercise tolerance/ Balance/ Breathing Coordination:   6 minute walking distance: 350 feet using walker on 4lpm on E-tank with Oxymizer. Patient rested twice for total of 2 minutes. PB:2/ PE:4   Balance:   R 4 sec   L 4 sec   Eyes open   primarily upper    Assessment for upper and lower extremity strength:    Lateral Deltoid Raises 1 lbs  10 Reps   Presses 1 lbs 10 Reps  Bicep Curls 2 lbs 12 Reps  Bodyweight squats 5 Reps    Reduced exercise tolerance and deconditioning, Decreased strength / endurance of upper extremities and lower extremities, Improper breathing mechanics at rest and during exercise, Lack of knowledge and utilization of proper body mechanics and energy saving techniques , Dyspnea with exertion, and Decreased ability to perform Activities of Daily Living    Goals:  Independent with and performing home exercise program  Improved functional aerobic capacity / functional endurance  Increase 6 minute walk from 350 ft to 450 ft.  Increase unilateral stance from    Right: 4 sec to 15 sec Eyes open    Left: 4 sec to 15 sec Eyes open     Plan:  Pulmonary Rehab Program 2 times per week for 18 visits with evaluation of progress and functional level every 30 days to assess need for continued intervention.  Patient to " receive therapeutic exercise to establish and upgrade functional endurance, to strengthen weakened musculature, improve balance, develop and enhance neurological engrams to facilitate proper breathing and exercise patterns, and to train to safely exercise with regards to oxygen saturation issues. Mode:NS Intensity: 80 % max - and/or SHABBIR Scale target 2 Duration: 30 minutes. Mode, Intensity and Duration to be progressed gradually within limits of patient's ability and modified as patient's tolerance and condition dictate.  Patient to receive this therapeutic intervention for 8 weeks concurrently with multidisciplinary involvement within the Pulmonary Rehab Program to support achievement of interdisciplinary goals.  Pulmonary Rehab Team will implement modalities to increase functional  capacity through aerobic exercise, strength training and stretching to promote flexibility.  Pulmonary Rehab Team will develop and teach home exercise program.  Pulmonary Rehab Team will implement a balance program.  Train patient in proper breathing techniques.  Train patient in use of Shabbir scale and heart rate and oxygen saturation monitoring during activity and exercise.

## 2024-04-23 VITALS
HEIGHT: 64 IN | HEART RATE: 64 BPM | TEMPERATURE: 97.5 F | RESPIRATION RATE: 16 BRPM | SYSTOLIC BLOOD PRESSURE: 160 MMHG | WEIGHT: 200 LBS | DIASTOLIC BLOOD PRESSURE: 66 MMHG | BODY MASS INDEX: 34.15 KG/M2 | OXYGEN SATURATION: 93 %

## 2024-04-23 NOTE — DISCHARGE INSTRUCTIONS
You were seen in the Emergency Department for leg laceration.    Xrays were completed without significant acute abnormalities.    Please use 1,000mg of tylenol or 600mg of ibuprofen every 6 hours as needed for pain.    Keep wound dry for 24 to 48 hours.  After that you can wash with soap and water.  Avoid alcohol or hydrogen peroxide.  You may apply antibiotic ointment and keep it covered.  Avoid squatting or quick bending of your knee.    Please follow up with your primary care physician for suture removal in 7 to 10 days.    Return to the Emergency Department with uncontrolled pain, surrounding redness or drainage, fevers, or other concerns.

## 2024-04-23 NOTE — ED PROVIDER NOTES
ED Provider Note    CHIEF COMPLAINT  Chief Complaint   Patient presents with    GLF     GLF few  mins ago; laceration on left knee; bleeding controlled;      EXTERNAL RECORDS REVIEWED  Patient last seen by pulmonary 3/28/24 history of COPD, HTN    HPI/ROS  LIMITATION TO HISTORY   Select: : None  OUTSIDE HISTORIAN(S):  Friend Patient's friend was present at bedside to provide additional context to history.    Berny Renee is a 78 y.o. female who presents to the Emergency Department with injuries secondary to a GLF onset earlier today. The patient explains that she went to sit in chair which swiveled and resulted in the patient falling into her walker. She adds that she cut the lateral portion of her left leg prompting visitation to the ED. The patient notes that she can put weight and move her leg normally. No alleviating or exacerbating factors noted. The patient reports that her tetanus was more than 5 years ago.     PAST MEDICAL HISTORY  Past Medical History:   Diagnosis Date    Acute on chronic respiratory failure with hypoxia ([facility]) 11/14/2020    Arthritis     spine    Asthma with COPD ([facility])     BMI 31.0-31.9,adult 02/04/2022    Cataract immature     Chronic pain     Chronic respiratory failure with hypoxia ([facility]) 07/13/2017    Colon polyps     COPD (chronic obstructive pulmonary disease) ([facility]) 2002    moderate to severe    Cough     DDD (degenerative disc disease), cervical     DDD (degenerative disc disease), thoracic     Dependence on continuous supplemental oxygen 08/13/2015    IMO load March 2020    Diverticulitis of colon     Dyslipidemia     EMPHYSEMA     H/O opioid abuse ([facility]) 07/15/2021    Hypertension     Obesity (BMI 30-39.9) 10/24/2016    Opioid type dependence, continuous ([facility]) 02/04/2022    REGINE (obstructive sleep apnea)     OSTEOPOROSIS     Painful breathing     Shortness of breath     Spinal stenosis, lumbar region, with neurogenic claudication     Sputum production     URINARY INCONTINENCE      Vitamin d deficiency     Wheezing       SURGICAL HISTORY  Past Surgical History:   Procedure Laterality Date    LUMBAR LAMINECTOMY DISKECTOMY  6/22/2010    Performed by GRACIE CANO at SURGERY [facility] ORS    OTHER ORTHOPEDIC SURGERY  2004    left ankle fx    OTHER      leg fracture    OTHER      t spine disc surg    TONSILLECTOMY        FAMILY HISTORY  Family History   Problem Relation Age of Onset    Cancer Father         Lung    Other Sister         lung and leukemia cancer    Cancer Sister         Leukemia, Lung     SOCIAL HISTORY   reports that she quit smoking about 25 years ago. Her smoking use included cigarettes. She started smoking about 55 years ago. She has a 60 pack-year smoking history. She has never used smokeless tobacco. She reports that she does not currently use alcohol after a past usage of about 4.2 oz of alcohol per week. She reports that she does not currently use drugs after having used the following drugs: Marijuana and Oral.    CURRENT MEDICATIONS  Discharge Medication List as of 4/23/2024 12:09 AM        CONTINUE these medications which have NOT CHANGED    Details   !! fluticasone-umeclidinium-vilanterol (TRELEGY ELLIPTA) 200-62.5-25 mcg/act inhaler Inhale 1 Puff every day.Sample x 4Disp-4 Each, R-0, Sample      !! fluticasone-umeclidinium-vilanterol (TRELEGY ELLIPTA) 200-62.5-25 mcg/act inhaler Inhale 1 Puff every day.3 month supply x 1 yearDisp-90 Each, R-3, Normal      Home Care Oxygen Inhale 6 L/min continuous. Oxygen dose 6 L/min  Respiratory route via: Nasal Cannula   Oxygen supplier:Preferred      Indications: Shortness of breath, Historical Med      ipratropium-albuterol (DUONEB) 0.5-2.5 (3) MG/3ML nebulizer solution Take 3 mL by nebulization every four hours as needed for Shortness of Breath (Wheezing).3 month supply x 1 yearDisp-360 mL, R-3, Normal      predniSONE (DELTASONE) 20 MG Tab Take 2 tablets (40mg) by mouth daily until 2/10/2024, THEN 1 tablet daily x 2 days, THEN  0.5 tablets daily x 2 days., Disp-9 Tablet, R-0, Normal      Albuterol Sulfate 108 (90 Base) MCG/ACT AEROSOL POWDER, BREATH ACTIVATED Inhale 2 Puffs 4 times a day as needed (shortness of breath). Indications: SOB, Disp-3 Each, R-0, Normal      benzonatate (TESSALON) 100 MG Cap Take 1 Capsule by mouth 3 times a day as needed for Cough., Disp-30 Capsule, R-0, Normal      !! losartan (COZAAR) 50 MG Tab Take 1 Tablet by mouth 2 times a day., Disp-60 Tablet, R-1, Normal      !! digoxin (LANOXIN) 125 MCG Tab Take 1 Tablet by mouth every day., Disp-90 Tablet, R-3, Normal      !! meloxicam (MOBIC) 7.5 MG Tab Take 7.5 mg by mouth 2 times a day. Indications: Rheumatoid Arthritis, Historical Med      azithromycin (ZITHROMAX) 250 MG Tab One tablet daily for COPD, Disp-90 Tablet, R-6, Normal      !! montelukast (SINGULAIR) 10 MG Tab Take 1 Tablet by mouth every day., Disp-90 Tablet, R-3, Normal      calcium carbonate (OS-MICHELLE 500) 1250 (500 Ca) MG Tab Take 600 mg by mouth every evening. Indications: Low Amount of Calcium in the Blood, Historical Med      !! DULoxetine (CYMBALTA) 30 MG Cap DR Particles Take 60 mg by mouth every evening. Indications: Musculoskeletal Pain, denies depression, Historical Med      !! acyclovir (ZOVIRAX) 200 MG Cap Take 200 mg by mouth every day. Indications: Herpes Simplex Infection, Historical Med      !! spironolactone (ALDACTONE) 25 MG Tab TAKE ONE TABLET BY MOUTH ONE TIME DAILY, Disp-30 Tab, R-11, Normal      Cholecalciferol (VITAMIN D3) 2000 UNIT Cap TAKE ONE CAPSULE BY MOUTH ONE TIME DAILY, Disp-30 Cap, R-3, Normal      omeprazole (PRILOSEC OTC) 20 MG tablet Take 1 Tab by mouth every day., Disp-30 Tab, R-11, Normal      !! digoxin (LANOXIN) 125 MCG Tab Take 1 tablet by mouth every day, Disp-60 Tablet, R-5, Normal      !! losartan (COZAAR) 50 MG Tab Take 1 tablet by mouth every day, Disp-80 Tablet, R-5, Normal      !! montelukast (SINGULAIR) 10 MG Tab Take 1 tablet by mouth once daily, Disp-90  "Tablet, R-0, Normal      omeprazole (PRILOSEC) 20 MG delayed-release capsule Take 1 capsule by mouth every day, Disp-90 Capsule, R-0, Normal      !! meloxicam (MOBIC) 7.5 MG Tab Take 1 tablet by mouth once or twice a day, Disp-180 Tablet, R-0, Normal      !! DULoxetine (CYMBALTA) 60 MG Cap DR Particles delayed-release capsule Take 1 Capsule by mouth every day., Disp-90 Capsule, R-1, Normal      fluticasone-salmeterol (ADVAIR) 500-50 MCG/ACT AEROSOL POWDER, BREATH ACTIVATED Inhale 1 puff by mouth twice daily., Disp-180 Each, R-3, Normal      !! spironolactone (ALDACTONE) 25 MG Tab Take 1 Tablet by mouth every day., Disp-90 Tablet, R-2, Normal      !! acyclovir (ZOVIRAX) 200 MG Cap Take 1 Capsule by mouth every day., Disp-90 Capsule, R-5, Normal       !! - Potential duplicate medications found. Please discuss with provider.        ALLERGIES  Iodine and Tape    PHYSICAL EXAM  BP (!) 147/71   Pulse 68   Temp 36.5 °C (97.7 °F)   Ht 1.626 m (5' 4\")   Wt 90.7 kg (200 lb)   SpO2 94%      Constitutional: Nontoxic appearing. Alert in no apparent distress.  HENT: Normocephalic, Atraumatic.  Moist mucous membranes.    Neck: Supple, full range of motion  Musculoskeletal: 5 cm v shaped laceration to the distal lateral thigh. Tenderness around the lateral aspect of the knee however full range of motion without significant  pain.  Skin: Warm, Dry.  No erythema, No rash.   Neurologic: Alert and oriented x3. Moving all extremities spontaneously without focal deficits.  Psychiatric: Affect normal, Mood normal, Appears appropriate and not intoxicated.    DIAGNOSTIC STUDIES / PROCEDURES    RADIOLOGY  I have independently interpreted the diagnostic imaging associated with this visit and am waiting the final reading from the radiologist.   My preliminary interpretation is as follows: no fracture  Radiologist interpretation:  DX-KNEE 2- LEFT   Final Result         1.  No acute traumatic bony injury.   2.  Hazy calcific density in the " knee joint space, consider CPPD.   3.  Soft tissue gas in the lateral distal thigh        Laceration Repair Procedure Note    Indication: Laceration    Procedure: The patient was placed in the appropriate position and anesthesia around the laceration was obtained by infiltration using 1% Lidocaine with epinephrine. The wound was minimally contaminated .The area was then irrigated with normal saline. The laceration was closed with 4-0 Prolene using interrupted sutures. There were no additional lacerations requiring repair. The wound area was then dressed with bacitracin and a bandage.      Total repaired wound length: 5 cm.     Other Items: Suture count: 9    The patient tolerated the procedure well.    Complications: None      COURSE & MEDICAL DECISION MAKING    10:27 PM - Patient seen and examined at bedside. Discussed plan of care, including a laceration repair. The patient denies any pain medication. Patient agrees to the plan of care. The patient will be medicated with Lidocaine-epinephrine 1% 10 mL injection and Adacel 0.5 mL injection. Ordered for DX-Knee 2-Left to evaluate her symptoms.       ASSESSMENT, COURSE AND PLAN  Care Narrative: Patient presents with laceration to the left leg after fall.  No other injuries identified.  Patient is hypertensive with normal vitals.   Xrays without underlying fracture.  Laceration was repaired without difficulty.  TDAP updated.    Upon reassessment, patient is resting comfortably with stable vital signs.  No new complaints at this time.  Discussed results with patient and her friend as well as the importance of primary care follow up for suture removal.  Patient understands plan of care and strict return precautions for new or changing symptoms.     ADDITIONAL PROBLEM LIST  Problem #1: Leg laceration - given wound care instructions, follow up in 7-10 days for suture removal      DISPOSITION AND DISCUSSIONS    Decision tools and prescription drugs considered including,  but not limited to: Pain Medications OTC medications should be sufficient .        DISPOSITION:  Patient will be discharged home in stable condition.    FOLLOW UP:  Everett Diego M.D.  [address]  [place] [id number] [phone]      suture removal in 7-10 days    [facility], Emergency Dept  [address]  [place] [id number] [phone]    If symptoms worsen      OUTPATIENT MEDICATIONS:  Discharge Medication List as of 4/23/2024 12:09 AM        FINAL DIAGNOSIS  1. Laceration of left lower extremity, initial encounter      Laceration repair performed by me as seen above.     The note accurately reflects work and decisions made by me.  Diana Cabrera M.D.  4/23/2024  11:52 AM     Gregg LI (Scribe), am scribing for, and in the presence of, Diana Cabrera M.D..    Electronically signed by: Gregg Matthews (Scribe), 4/22/2024    Diana LI M.D. personally performed the services described in this documentation, as scribed by Gregg Matthews in my presence, and it is both accurate and complete.

## 2024-04-23 NOTE — ED NOTES
Wheeled patient to patient car; friend is driving her home; all belongings with patient; AOX4 GCS15 on 2L on patient's home oxygen

## 2024-04-23 NOTE — ED NOTES
Pt discharged to home. Discharge paperwork provided. Education provided by ERP. Reinforced discharge instructions.  Pt was given follow up instructions.  Pt verbalized understanding of all instructions for discharge.

## 2024-04-23 NOTE — ED TRIAGE NOTES
"Chief Complaint   Patient presents with    GLF     GLF few  mins ago; laceration on left knee; bleeding controlled;      BIB ES from home; patient was trying to transfer self to a chair and swiveled; fell and hit her left knee on her walker's break; lac on left knee; bleeding controlled by EMS. No LOC, not on thinners; AOXR GCS15 on 6L O2 via nasal cannula- baseline    BP (!) 147/71   Pulse 68   Temp 36.5 °C (97.7 °F)   Ht 1.626 m (5' 4\")   Wt 90.7 kg (200 lb)   LMP 06/07/2001   SpO2 94%   BMI 34.33 kg/m²     "

## 2024-05-01 PROCEDURE — RXMED WILLOW AMBULATORY MEDICATION CHARGE

## 2024-05-01 PROCEDURE — RXMED WILLOW AMBULATORY MEDICATION CHARGE: Performed by: FAMILY MEDICINE

## 2024-05-06 ENCOUNTER — APPOINTMENT (OUTPATIENT)
Dept: PULMONOLOGY | Facility: MEDICAL CENTER | Age: 79
End: 2024-05-06
Payer: MEDICARE

## 2024-05-06 ENCOUNTER — PHARMACY VISIT (OUTPATIENT)
Dept: PHARMACY | Facility: MEDICAL CENTER | Age: 79
End: 2024-05-06
Payer: COMMERCIAL

## 2024-05-08 ENCOUNTER — APPOINTMENT (OUTPATIENT)
Dept: PULMONOLOGY | Facility: MEDICAL CENTER | Age: 79
End: 2024-05-08
Payer: MEDICARE

## 2024-05-10 PROCEDURE — RXMED WILLOW AMBULATORY MEDICATION CHARGE: Performed by: FAMILY MEDICINE

## 2024-05-13 ENCOUNTER — HOSPITAL ENCOUNTER (OUTPATIENT)
Dept: PULMONOLOGY | Facility: MEDICAL CENTER | Age: 79
End: 2024-05-13
Attending: INTERNAL MEDICINE
Payer: MEDICARE

## 2024-05-15 ENCOUNTER — HOSPITAL ENCOUNTER (OUTPATIENT)
Dept: PULMONOLOGY | Facility: MEDICAL CENTER | Age: 79
End: 2024-05-15
Attending: INTERNAL MEDICINE
Payer: MEDICARE

## 2024-05-16 ENCOUNTER — PHARMACY VISIT (OUTPATIENT)
Dept: PHARMACY | Facility: MEDICAL CENTER | Age: 79
End: 2024-05-16
Payer: COMMERCIAL

## 2024-05-20 ENCOUNTER — HOSPITAL ENCOUNTER (OUTPATIENT)
Dept: PULMONOLOGY | Facility: MEDICAL CENTER | Age: 79
End: 2024-05-20
Attending: INTERNAL MEDICINE
Payer: MEDICARE

## 2024-05-22 ENCOUNTER — HOSPITAL ENCOUNTER (OUTPATIENT)
Dept: PULMONOLOGY | Facility: MEDICAL CENTER | Age: 79
End: 2024-05-22
Attending: INTERNAL MEDICINE
Payer: MEDICARE

## 2024-05-29 ENCOUNTER — HOSPITAL ENCOUNTER (OUTPATIENT)
Dept: PULMONOLOGY | Facility: MEDICAL CENTER | Age: 79
End: 2024-05-29
Attending: INTERNAL MEDICINE
Payer: MEDICARE

## 2024-05-31 PROBLEM — Z79.899 ENCOUNTER FOR LONG-TERM CURRENT USE OF HIGH RISK MEDICATION: Status: ACTIVE | Noted: 2024-05-31

## 2024-06-03 ENCOUNTER — HOSPITAL ENCOUNTER (OUTPATIENT)
Dept: PULMONOLOGY | Facility: MEDICAL CENTER | Age: 79
End: 2024-06-03
Attending: INTERNAL MEDICINE
Payer: MEDICARE

## 2024-06-03 PROCEDURE — 94625 PHY/QHP OP PULM RHB W/O MNTR: CPT

## 2024-06-03 PROCEDURE — 94626 PHY/QHP OP PULM RHB W/MNTR: CPT

## 2024-06-05 ENCOUNTER — APPOINTMENT (OUTPATIENT)
Dept: PULMONOLOGY | Facility: MEDICAL CENTER | Age: 79
End: 2024-06-05
Attending: INTERNAL MEDICINE
Payer: MEDICARE

## 2024-06-07 ENCOUNTER — PHARMACY VISIT (OUTPATIENT)
Dept: PHARMACY | Facility: MEDICAL CENTER | Age: 79
End: 2024-06-07

## 2024-06-07 PROCEDURE — RXOTC WILLOW AMBULATORY OTC CHARGE

## 2024-06-10 ENCOUNTER — HOSPITAL ENCOUNTER (OUTPATIENT)
Dept: PULMONOLOGY | Facility: MEDICAL CENTER | Age: 79
End: 2024-06-10
Attending: INTERNAL MEDICINE
Payer: MEDICARE

## 2024-06-10 PROCEDURE — 94625 PHY/QHP OP PULM RHB W/O MNTR: CPT

## 2024-06-10 PROCEDURE — 94626 PHY/QHP OP PULM RHB W/MNTR: CPT

## 2024-06-12 ENCOUNTER — HOSPITAL ENCOUNTER (OUTPATIENT)
Dept: PULMONOLOGY | Facility: MEDICAL CENTER | Age: 79
End: 2024-06-12
Attending: INTERNAL MEDICINE
Payer: MEDICARE

## 2024-06-12 PROCEDURE — 94626 PHY/QHP OP PULM RHB W/MNTR: CPT

## 2024-06-12 PROCEDURE — 94625 PHY/QHP OP PULM RHB W/O MNTR: CPT

## 2024-06-14 ENCOUNTER — TELEPHONE (OUTPATIENT)
Dept: HEALTH INFORMATION MANAGEMENT | Facility: OTHER | Age: 79
End: 2024-06-14
Payer: MEDICARE

## 2024-06-17 ENCOUNTER — HOSPITAL ENCOUNTER (OUTPATIENT)
Dept: PULMONOLOGY | Facility: MEDICAL CENTER | Age: 79
End: 2024-06-17
Attending: INTERNAL MEDICINE
Payer: MEDICARE

## 2024-06-17 PROCEDURE — 94626 PHY/QHP OP PULM RHB W/MNTR: CPT

## 2024-06-17 PROCEDURE — 94625 PHY/QHP OP PULM RHB W/O MNTR: CPT

## 2024-06-19 ENCOUNTER — HOSPITAL ENCOUNTER (OUTPATIENT)
Dept: PULMONOLOGY | Facility: MEDICAL CENTER | Age: 79
End: 2024-06-19
Attending: INTERNAL MEDICINE
Payer: MEDICARE

## 2024-06-19 PROCEDURE — 94625 PHY/QHP OP PULM RHB W/O MNTR: CPT

## 2024-06-19 PROCEDURE — 94626 PHY/QHP OP PULM RHB W/MNTR: CPT

## 2024-06-24 ENCOUNTER — HOSPITAL ENCOUNTER (OUTPATIENT)
Dept: PULMONOLOGY | Facility: MEDICAL CENTER | Age: 79
End: 2024-06-24
Attending: INTERNAL MEDICINE
Payer: MEDICARE

## 2024-06-24 PROCEDURE — 94625 PHY/QHP OP PULM RHB W/O MNTR: CPT

## 2024-06-24 PROCEDURE — 94626 PHY/QHP OP PULM RHB W/MNTR: CPT

## 2024-06-26 ENCOUNTER — HOSPITAL ENCOUNTER (OUTPATIENT)
Dept: PULMONOLOGY | Facility: MEDICAL CENTER | Age: 79
End: 2024-06-26
Attending: INTERNAL MEDICINE
Payer: MEDICARE

## 2024-06-26 PROCEDURE — 94626 PHY/QHP OP PULM RHB W/MNTR: CPT

## 2024-06-26 PROCEDURE — 94625 PHY/QHP OP PULM RHB W/O MNTR: CPT

## 2024-07-01 ENCOUNTER — APPOINTMENT (OUTPATIENT)
Dept: PULMONOLOGY | Facility: MEDICAL CENTER | Age: 79
End: 2024-07-01
Attending: INTERNAL MEDICINE
Payer: MEDICARE

## 2024-07-01 ENCOUNTER — HOSPITAL ENCOUNTER (OUTPATIENT)
Dept: RADIOLOGY | Facility: MEDICAL CENTER | Age: 79
End: 2024-07-01
Attending: RADIOLOGY
Payer: MEDICARE

## 2024-07-01 ENCOUNTER — HOSPITAL ENCOUNTER (OUTPATIENT)
Dept: LAB | Facility: MEDICAL CENTER | Age: 79
End: 2024-07-01
Attending: RADIOLOGY
Payer: MEDICARE

## 2024-07-01 DIAGNOSIS — C34.12 PRIMARY CANCER OF LEFT UPPER LOBE OF LUNG (HCC): ICD-10-CM

## 2024-07-01 LAB
ALBUMIN SERPL BCP-MCNC: 4.1 G/DL (ref 3.2–4.9)
ALBUMIN/GLOB SERPL: 1.2 G/DL
ALP SERPL-CCNC: 53 U/L (ref 30–99)
ALT SERPL-CCNC: 17 U/L (ref 2–50)
ANION GAP SERPL CALC-SCNC: 11 MMOL/L (ref 7–16)
AST SERPL-CCNC: 20 U/L (ref 12–45)
BILIRUB SERPL-MCNC: 0.3 MG/DL (ref 0.1–1.5)
BUN SERPL-MCNC: 14 MG/DL (ref 8–22)
CALCIUM ALBUM COR SERPL-MCNC: 9.9 MG/DL (ref 8.5–10.5)
CALCIUM SERPL-MCNC: 10 MG/DL (ref 8.5–10.5)
CHLORIDE SERPL-SCNC: 95 MMOL/L (ref 96–112)
CO2 SERPL-SCNC: 32 MMOL/L (ref 20–33)
CREAT SERPL-MCNC: 0.42 MG/DL (ref 0.5–1.4)
GFR SERPLBLD CREATININE-BSD FMLA CKD-EPI: 99 ML/MIN/1.73 M 2
GLOBULIN SER CALC-MCNC: 3.5 G/DL (ref 1.9–3.5)
GLUCOSE SERPL-MCNC: 111 MG/DL (ref 65–99)
POTASSIUM SERPL-SCNC: 4.1 MMOL/L (ref 3.6–5.5)
PROT SERPL-MCNC: 7.6 G/DL (ref 6–8.2)
SODIUM SERPL-SCNC: 138 MMOL/L (ref 135–145)

## 2024-07-01 PROCEDURE — 71250 CT THORAX DX C-: CPT

## 2024-07-01 PROCEDURE — 80053 COMPREHEN METABOLIC PANEL: CPT

## 2024-07-01 PROCEDURE — 36415 COLL VENOUS BLD VENIPUNCTURE: CPT

## 2024-07-03 ENCOUNTER — HOSPITAL ENCOUNTER (OUTPATIENT)
Dept: PULMONOLOGY | Facility: MEDICAL CENTER | Age: 79
End: 2024-07-03
Attending: FAMILY MEDICINE
Payer: MEDICARE

## 2024-07-08 ENCOUNTER — HOSPITAL ENCOUNTER (OUTPATIENT)
Dept: PULMONOLOGY | Facility: MEDICAL CENTER | Age: 79
End: 2024-07-08
Attending: INTERNAL MEDICINE
Payer: MEDICARE

## 2024-07-08 PROCEDURE — 94625 PHY/QHP OP PULM RHB W/O MNTR: CPT

## 2024-07-08 PROCEDURE — 94626 PHY/QHP OP PULM RHB W/MNTR: CPT

## 2024-07-09 DIAGNOSIS — J44.9 CHRONIC OBSTRUCTIVE PULMONARY DISEASE, UNSPECIFIED COPD TYPE (HCC): ICD-10-CM

## 2024-07-09 PROCEDURE — RXMED WILLOW AMBULATORY MEDICATION CHARGE: Performed by: FAMILY MEDICINE

## 2024-07-10 ENCOUNTER — HOSPITAL ENCOUNTER (OUTPATIENT)
Dept: PULMONOLOGY | Facility: MEDICAL CENTER | Age: 79
End: 2024-07-10
Attending: INTERNAL MEDICINE
Payer: MEDICARE

## 2024-07-10 PROCEDURE — 94625 PHY/QHP OP PULM RHB W/O MNTR: CPT

## 2024-07-10 PROCEDURE — RXMED WILLOW AMBULATORY MEDICATION CHARGE: Performed by: INTERNAL MEDICINE

## 2024-07-10 PROCEDURE — 94626 PHY/QHP OP PULM RHB W/MNTR: CPT

## 2024-07-10 RX ORDER — AZITHROMYCIN 250 MG/1
TABLET, FILM COATED ORAL
Qty: 90 TABLET | Refills: 6 | Status: SHIPPED | OUTPATIENT
Start: 2024-07-10

## 2024-07-12 ENCOUNTER — HOSPITAL ENCOUNTER (OUTPATIENT)
Dept: RADIATION ONCOLOGY | Facility: MEDICAL CENTER | Age: 79
End: 2024-07-12
Attending: RADIOLOGY
Payer: MEDICARE

## 2024-07-12 ENCOUNTER — PHARMACY VISIT (OUTPATIENT)
Dept: PHARMACY | Facility: MEDICAL CENTER | Age: 79
End: 2024-07-12
Payer: COMMERCIAL

## 2024-07-12 DIAGNOSIS — C34.12 PRIMARY CANCER OF LEFT UPPER LOBE OF LUNG (HCC): ICD-10-CM

## 2024-07-15 ENCOUNTER — HOSPITAL ENCOUNTER (OUTPATIENT)
Dept: PULMONOLOGY | Facility: MEDICAL CENTER | Age: 79
End: 2024-07-15
Attending: INTERNAL MEDICINE
Payer: MEDICARE

## 2024-07-15 PROCEDURE — 94625 PHY/QHP OP PULM RHB W/O MNTR: CPT

## 2024-07-15 PROCEDURE — RXMED WILLOW AMBULATORY MEDICATION CHARGE: Performed by: FAMILY MEDICINE

## 2024-07-15 PROCEDURE — 94626 PHY/QHP OP PULM RHB W/MNTR: CPT

## 2024-07-16 ENCOUNTER — PHARMACY VISIT (OUTPATIENT)
Dept: PHARMACY | Facility: MEDICAL CENTER | Age: 79
End: 2024-07-16
Payer: COMMERCIAL

## 2024-07-17 ENCOUNTER — HOSPITAL ENCOUNTER (OUTPATIENT)
Dept: PULMONOLOGY | Facility: MEDICAL CENTER | Age: 79
End: 2024-07-17
Attending: INTERNAL MEDICINE
Payer: MEDICARE

## 2024-07-17 PROCEDURE — 94625 PHY/QHP OP PULM RHB W/O MNTR: CPT

## 2024-07-17 PROCEDURE — 94626 PHY/QHP OP PULM RHB W/MNTR: CPT

## 2024-07-22 ENCOUNTER — PHARMACY VISIT (OUTPATIENT)
Dept: PHARMACY | Facility: MEDICAL CENTER | Age: 79
End: 2024-07-22
Payer: COMMERCIAL

## 2024-07-22 PROCEDURE — RXMED WILLOW AMBULATORY MEDICATION CHARGE

## 2024-08-05 ENCOUNTER — TELEPHONE (OUTPATIENT)
Dept: SLEEP MEDICINE | Facility: MEDICAL CENTER | Age: 79
End: 2024-08-05
Payer: MEDICARE

## 2024-08-05 NOTE — TELEPHONE ENCOUNTER
Patient called wanting to schedule an appt with Dr. DENNIS Kumar she was due in June 2024 and never received a call- advised we don't have his schedule open, per patient she needs to be seen soon and wanted to see an MD only, I scheduled our next available MD appt in Oct. Added her to our cancellation. If patient needs to be seen sooner, please contact her. thanks

## 2024-08-06 NOTE — TELEPHONE ENCOUNTER
Called and spoke with pt. Notified pt I received her message, unfortunately there is no sooner availability. The front office did place her on the cancellation list. Pt understood.

## 2024-08-08 ENCOUNTER — NON-PROVIDER VISIT (OUTPATIENT)
Dept: CARDIOLOGY | Facility: MEDICAL CENTER | Age: 79
End: 2024-08-08
Attending: INTERNAL MEDICINE
Payer: MEDICARE

## 2024-08-08 VITALS
BODY MASS INDEX: 30.05 KG/M2 | DIASTOLIC BLOOD PRESSURE: 50 MMHG | SYSTOLIC BLOOD PRESSURE: 116 MMHG | HEART RATE: 80 BPM | HEIGHT: 64 IN | OXYGEN SATURATION: 95 % | WEIGHT: 176 LBS

## 2024-08-08 DIAGNOSIS — I10 ESSENTIAL HYPERTENSION: ICD-10-CM

## 2024-08-08 DIAGNOSIS — I47.10 SVT (SUPRAVENTRICULAR TACHYCARDIA) (HCC): ICD-10-CM

## 2024-08-08 DIAGNOSIS — R00.2 PALPITATIONS: ICD-10-CM

## 2024-08-08 DIAGNOSIS — I49.3 PVCS (PREMATURE VENTRICULAR CONTRACTIONS): ICD-10-CM

## 2024-08-08 PROCEDURE — 3074F SYST BP LT 130 MM HG: CPT | Performed by: INTERNAL MEDICINE

## 2024-08-08 PROCEDURE — 93242 EXT ECG>48HR<7D RECORDING: CPT

## 2024-08-08 PROCEDURE — 99213 OFFICE O/P EST LOW 20 MIN: CPT | Performed by: INTERNAL MEDICINE

## 2024-08-08 PROCEDURE — 1158F ADVNC CARE PLAN TLK DOCD: CPT | Performed by: INTERNAL MEDICINE

## 2024-08-08 PROCEDURE — 99214 OFFICE O/P EST MOD 30 MIN: CPT | Performed by: INTERNAL MEDICINE

## 2024-08-08 PROCEDURE — 3078F DIAST BP <80 MM HG: CPT | Performed by: INTERNAL MEDICINE

## 2024-08-08 ASSESSMENT — ENCOUNTER SYMPTOMS
PALPITATIONS: 1
COUGH: 0
LOSS OF CONSCIOUSNESS: 0
MYALGIAS: 0
SHORTNESS OF BREATH: 1
DIZZINESS: 0

## 2024-08-08 ASSESSMENT — FIBROSIS 4 INDEX: FIB4 SCORE: 1.19

## 2024-08-08 NOTE — PROGRESS NOTES
"Chief Complaint   Patient presents with    Supraventricular Tachycardia (SVT)    Hypertension    Dyslipidemia       Subjective     Berny Renee is a 79 y.o. female who presents today for follow-up care.    Comes in with a friend today    The patient has palpitations, SVT, PVCs, hypertension, lung cancer ([facility], diagnosed by PET and chest CT scans (with radiation therapy) 2021, COPD chronic hypoxic respiratory failure on continuous O2    Since 12/5/2023 appointment the patient the patient is lately been having episodic palpitations at rest with a pounding heartbeat that hurts associate with heart rates going from 60 to 90 bpm which brought her into the office today.  She has not been tolerating the summer heat.  Seen at [facility] 4/22/2024 after GLF and left knee laceration requiring sutures    Since 6/28/2023 appointment the patient was recently seen at [facility] 10/3/2023-10/4/2023 for chest tightness and uncontrolled hypertension.  She had been under tremendous amount of stress with her running back and forth to the hospital to see her girlfriend who is in for what sounds like ischemic PVD.  MI was ruled out.  EKG showed normal sinus rhythm and no ischemic changes.  CXR showed emphysematous changes.  MPI was normal.  She was told to double the dose of her losartan but she declined and resumed her normal dosage.  She has monitor blood pressure and states its been \"normal\".    Since 3/1/2023 appointment the patient has had no cardiac symptoms including chest pain, palpitations, shortness of breath.  Chronically short of breath but able to perform ADLs.  Plans to move into a new apartment.      Previous coronary evaluation includes a normal coronary angiogram 2004, normal MPI 2012 and normal cardiac PET scan 2019.     Past Medical History:   Diagnosis Date    Acute on chronic respiratory failure with hypoxia (HCC) 11/14/2020    Arthritis     spine    Asthma with COPD ([facility])     BMI 31.0-31.9,adult 02/04/2022    " Cataract immature     Chronic pain     Chronic respiratory failure with hypoxia ([facility]) 2017    Colon polyps     COPD (chronic obstructive pulmonary disease) ([facility])     moderate to severe    Cough     DDD (degenerative disc disease), cervical     DDD (degenerative disc disease), thoracic     Dependence on continuous supplemental oxygen 2015    IMO load 2020    Diverticulitis of colon     Dyslipidemia     EMPHYSEMA     H/O opioid abuse ([facility]) 07/15/2021    Hypertension     Obesity (BMI 30-39.9) 10/24/2016    Opioid type dependence, continuous ([facility]) 2022    REGINE (obstructive sleep apnea)     OSTEOPOROSIS     Painful breathing     Shortness of breath     Spinal stenosis, lumbar region, with neurogenic claudication     Sputum production     URINARY INCONTINENCE     Vitamin d deficiency     Wheezing      Past Surgical History:   Procedure Laterality Date    LUMBAR LAMINECTOMY DISKECTOMY  2010    Performed by GRACIE CANO at SURGERY [facility]    OTHER ORTHOPEDIC SURGERY  2004    left ankle fx    OTHER      leg fracture    OTHER      t spine disc surg    TONSILLECTOMY       Family History   Problem Relation Age of Onset    Cancer Father         Lung    Other Sister         lung and leukemia cancer    Cancer Sister         Leukemia, Lung     Social History     Socioeconomic History    Marital status:      Spouse name: Not on file    Number of children: Not on file    Years of education: Not on file    Highest education level: Not on file   Occupational History    Not on file   Tobacco Use    Smoking status: Former     Current packs/day: 0.00     Average packs/day: 2.0 packs/day for 30.0 years (60.0 ttl pk-yrs)     Types: Cigarettes     Start date: 3/1/1969     Quit date: 3/1/1999     Years since quittin.4    Smokeless tobacco: Never    Tobacco comments:     3 pks a day for 35 yrs, continued abstinence   Vaping Use    Vaping status: Never Used   Substance and Sexual Activity     Alcohol use: Not Currently     Alcohol/week: 4.2 oz     Types: 7 Glasses of wine per week    Drug use: Not Currently     Types: Marijuana, Oral     Comment: Medical Marijuana     Sexual activity: Never   Other Topics Concern    Not on file   Social History Narrative    ** Merged History Encounter **          Social Determinants of Health     Financial Resource Strain: Medium Risk (2/14/2024)    Overall Financial Resource Strain (CARDIA)     Difficulty of Paying Living Expenses: Somewhat hard   Food Insecurity: Food Insecurity Present (2/14/2024)    Hunger Vital Sign     Worried About Running Out of Food in the Last Year: Sometimes true     Ran Out of Food in the Last Year: Sometimes true   Transportation Needs: Unmet Transportation Needs (2/14/2024)    PRAPARE - Transportation     Lack of Transportation (Medical): Yes     Lack of Transportation (Non-Medical): No   Physical Activity: Insufficiently Active (2/14/2024)    Exercise Vital Sign     Days of Exercise per Week: 3 days     Minutes of Exercise per Session: 10 min   Stress: No Stress Concern Present (2/14/2024)    [employer] - Occupational Stress Questionnaire     Feeling of Stress : Only a little   Social Connections: Feeling Socially Integrated (3/13/2024)    OASIS : Social Isolation     Frequency of experiencing loneliness or isolation: Never   Recent Concern: Social Connections - Socially Isolated (2/14/2024)    Social Connection and Isolation Panel [NHANES]     Frequency of Communication with Friends and Family: Three times a week     Frequency of Social Gatherings with Friends and Family: Once a week     Attends Temple Services: Never     Active Member of Clubs or Organizations: No     Attends Club or Organization Meetings: Never     Marital Status:    Intimate Partner Violence: Not At Risk (2/14/2024)    Humiliation, Afraid, Rape, and Kick questionnaire     Fear of Current or Ex-Partner: No     Emotionally  Abused: No     Physically Abused: No     Sexually Abused: No   Housing Stability: Unknown (2/14/2024)    Housing Stability Vital Sign     Unable to Pay for Housing in the Last Year: No     Number of Places Lived in the Last Year: Not on file     Unstable Housing in the Last Year: No     Allergies   Allergen Reactions    Iodine      Convulsion  I.V.  iodine    Tape Rash and Itching     Outpatient Encounter Medications as of 8/8/2024   Medication Sig Dispense Refill    azithromycin (ZITHROMAX) 250 MG Tab Take 2 tablets by mouth on day one and 1 tablet on days two through five. 6 Tablet 0    acyclovir (ZOVIRAX) 200 MG Cap Take 1 capsule(at first sign of outbreak) by mouth three times a day 10 days 30 Capsule 5    spironolactone (ALDACTONE) 25 MG Tab Take 1 tablet by mouth daily 90 Tablet 1    azithromycin (ZITHROMAX) 250 MG Tab Take One tablet by mouth daily for COPD 90 Tablet 6    fexofenadine (ALLEGRA) 180 MG tablet Take 1 tablet by mouth Swallow whole with water once a day; do not take with fruit juices. 30 Tablet 0    fexofenadine (ALLEGRA) 180 MG tablet Take 1 tablet by mouth whole with water; do not take with fruit juices. Orally Once a day 30 day(s) 30 Tablet 0    furosemide (LASIX) 20 MG Tab Take 1 tablet as needed for water retention orally once a day  30 days 30 Tablet 1    Calcium Carbonate (CALCIUM 600) 1500 (600 Ca) MG Tab Take 1 Tablet by mouth 2 times a day. 180 Tablet 1    potassium chloride (MICRO-K) 10 MEQ capsule Take 1 Capsule by mouth 2 times a day with soft food. 180 Capsule 1    Vitamin D, Cholecalciferol, 25 MCG (1000 UT) Cap Take 1 Capsule by mouth every day for 90 days. 90 Capsule 1    digoxin (LANOXIN) 125 MCG Tab Take 1 tablet by mouth every day 60 Tablet 5    losartan (COZAAR) 50 MG Tab Take 1 tablet by mouth every day 80 Tablet 5    montelukast (SINGULAIR) 10 MG Tab Take 1 tablet by mouth once daily 90 Tablet 0    omeprazole (PRILOSEC) 20 MG delayed-release capsule Take 1 capsule by mouth  every day 90 Capsule 0    meloxicam (MOBIC) 7.5 MG Tab Take 1 tablet by mouth once or twice a day 180 Tablet 0    fluticasone-salmeterol (ADVAIR) 500-50 MCG/ACT AEROSOL POWDER, BREATH ACTIVATED Inhale 1 puff by mouth twice daily. 180 Each 3    fluticasone-umeclidinium-vilanterol (TRELEGY ELLIPTA) 200-62.5-25 mcg/act inhaler Inhale 1 Puff every day. 4 Each 0    fluticasone-umeclidinium-vilanterol (TRELEGY ELLIPTA) 200-62.5-25 mcg/act inhaler Inhale 1 Puff every day. 90 Each 3    Home Care Oxygen Inhale 6 L/min continuous. Oxygen dose 6 L/min  Respiratory route via: Nasal Cannula   Oxygen supplier:[facility]      Indications: Shortness of breath      ipratropium-albuterol (DUONEB) 0.5-2.5 (3) MG/3ML nebulizer solution Take 3 mL by nebulization every four hours as needed for Shortness of Breath (Wheezing). 360 mL 3    Albuterol Sulfate 108 (90 Base) MCG/ACT AEROSOL POWDER, BREATH ACTIVATED Inhale 2 Puffs 4 times a day as needed (shortness of breath). Indications: SOB 3 Each 0    losartan (COZAAR) 50 MG Tab Take 1 Tablet by mouth 2 times a day. 60 Tablet 1    digoxin (LANOXIN) 125 MCG Tab Take 1 Tablet by mouth every day. 90 Tablet 3    meloxicam (MOBIC) 7.5 MG Tab Take 7.5 mg by mouth 2 times a day. Indications: Rheumatoid Arthritis      montelukast (SINGULAIR) 10 MG Tab Take 1 Tablet by mouth every day. 90 Tablet 3    calcium carbonate (OS-MICHELLE 500) 1250 (500 Ca) MG Tab Take 600 mg by mouth every evening. Indications: Low Amount of Calcium in the Blood      acyclovir (ZOVIRAX) 200 MG Cap Take 200 mg by mouth every day. Indications: Herpes Simplex Infection      spironolactone (ALDACTONE) 25 MG Tab TAKE ONE TABLET BY MOUTH ONE TIME DAILY 30 Tab 11    Cholecalciferol (VITAMIN D3) 2000 UNIT Cap TAKE ONE CAPSULE BY MOUTH ONE TIME DAILY 30 Cap 3    omeprazole (PRILOSEC OTC) 20 MG tablet Take 1 Tab by mouth every day. 30 Tab 11    DULoxetine (CYMBALTA) 60 MG Cap DR Particles delayed-release capsule Take 1 capsule by mouth  "once daily (Patient not taking: Reported on 8/8/2024) 90 Capsule 1    predniSONE (DELTASONE) 20 MG Tab Take 2 tablets (40mg) by mouth daily until 2/10/2024, THEN 1 tablet daily x 2 days, THEN 0.5 tablets daily x 2 days. (Patient not taking: Reported on 8/8/2024) 9 Tablet 0    benzonatate (TESSALON) 100 MG Cap Take 1 Capsule by mouth 3 times a day as needed for Cough. (Patient not taking: Reported on 8/8/2024) 30 Capsule 0    DULoxetine (CYMBALTA) 30 MG Cap DR Particles Take 60 mg by mouth every evening. Indications: Musculoskeletal Pain, denies depression (Patient not taking: Reported on 8/8/2024)       No facility-administered encounter medications on file as of 8/8/2024.     Review of Systems   Respiratory:  Positive for shortness of breath. Negative for cough.    Cardiovascular:  Positive for palpitations. Negative for chest pain.   Musculoskeletal:  Negative for myalgias.   Neurological:  Negative for dizziness and loss of consciousness.              Objective     /50 (BP Location: Left arm, Patient Position: Sitting, BP Cuff Size: Adult)   Pulse 80   Ht 1.626 m (5' 4\")   Wt 79.8 kg (176 lb)   LMP 06/07/2001   SpO2 95%   BMI 30.21 kg/m²   BP Readings from Last 5 Encounters:   08/08/24 116/50   04/23/24 (!) 160/66   04/22/24 (!) 140/63   03/28/24 124/72   03/13/24 118/62      Pulse Readings from Last 5 Encounters:   08/08/24 80   04/23/24 64   04/22/24 64   03/28/24 91   03/13/24 66      Wt Readings from Last 5 Encounters:   08/08/24 79.8 kg (176 lb)   04/22/24 90.7 kg (200 lb)   04/22/24 90.7 kg (200 lb)   03/28/24 81.6 kg (180 lb)   02/16/24 82.1 kg (181 lb)       Physical Exam  Constitutional:       Comments: On continuous O2   Eyes:      Conjunctiva/sclera: Conjunctivae normal.      Pupils: Pupils are equal, round, and reactive to light.   Neck:      Vascular: No JVD.   Cardiovascular:      Rate and Rhythm: Normal rate and regular rhythm.      Pulses: Normal pulses.      Heart sounds: Normal heart " sounds.   Pulmonary:      Effort: Pulmonary effort is normal. No accessory muscle usage or respiratory distress.      Breath sounds: Decreased breath sounds present. No wheezing or rales.      Comments: Some rales left side  Musculoskeletal:      Right lower leg: No edema.      Left lower leg: No edema.   Skin:     General: Skin is warm and dry.      Findings: No rash.      Nails: There is no clubbing.   Neurological:      Mental Status: She is alert and oriented to person, place, and time.   Psychiatric:         Behavior: Behavior normal.                 CARDIAC PET SCAN 9/6/2019  SCINTOGRAPHIC FINDINGS:    Normal left ventricular perfusion with stress and rest images.  There is no evidence of ischemia or scar.   GATED WALL MOTION FINDINGS:    The left ventricle wall motion is normal with stress and rest  imagings.  Measured resting ejection fraction is 68 %.    MPI 3/30/2021   No evidence of significant jeopardized viable myocardium or prior myocardial    infarction.    Normal left ventricular size, ejection fraction, and wall motion.    ECG INTERPRETATION    Non-diagnostic EKG    MPI 10/4/2023   No evidence of significant jeopardized viable myocardium or prior myocardial    infarction.   Normal left ventricular size, ejection fraction, and wall motion.   ECG INTERPRETATION   Negative stress ECG for ischemia.    ECHOCARDIOGRAM 3/23/2021  Normal left ventricular systolic function.  Left ventricular ejection fraction is visually estimated to be 65-70%.  Normal right ventricular systolic function.  No valve abnormalities.    Medical records, imaging and EKG all reviewed.    Assessment & Plan     1. SVT (supraventricular tachycardia) (HCC)  DIGOXIN      2. Palpitations  Cardiac Event Monitor      3. PVCs (premature ventricular contractions)        4. Essential hypertension              Medical Decision Making: Today's  Assessment/Status/Plan:   Assessment  Palpitations  SVT.  PVCs.  Palpitations.  Hypertension.  Chronic respiratory failure.  COPD, O2 dependent, severe.  COVID-19 infection 11/2020.  Lung cancer 8/2021 S/P radiation therapy.  Chronic pain syndrome with back pain.  Advanced frailty with functional impairment     Recommendation Discussion  To evaluate her palpitations we will get a 72-hour cardiac monitor.  Check digoxin level.  SVT, cardiac monitor  PVCs, cardiac monitor  Hypertension, normal on furosemide, losartan, potassium, spironolactone  Recent chemistry panel shows normal electrolytes, normal renal function  RTC 6 months

## 2024-08-08 NOTE — PROGRESS NOTES
Patient enrolled in the 3 day Zio Holter monitor per Bubba Ross MD.  In clinic hook up, monitor s/n [id number]. Pending EOS.

## 2024-08-16 ENCOUNTER — TELEPHONE (OUTPATIENT)
Dept: CARDIOLOGY | Facility: MEDICAL CENTER | Age: 79
End: 2024-08-16
Payer: MEDICARE

## 2024-08-16 DIAGNOSIS — R00.2 PALPITATIONS: ICD-10-CM

## 2024-08-16 NOTE — TELEPHONE ENCOUNTER
NIRMALA EOS to 's nurse, Remberto, on 8/16/2024    Preliminary findings:    2 episodes of SVT  with an avg rate of 110 bpm    Sinus rhythm  with an avg rate of 66 bpm    2.8% isolated SVE(s), 1.8% SVE couplets, rare SVE triplets    Rare isolated VE(s)    1 patient event:  SR 75

## 2024-08-20 ENCOUNTER — TELEPHONE (OUTPATIENT)
Dept: SLEEP MEDICINE | Facility: MEDICAL CENTER | Age: 79
End: 2024-08-20
Payer: MEDICARE

## 2024-08-20 NOTE — TELEPHONE ENCOUNTER
Called patient and left voicemail advising pt that she will need to come in for recent testing to send new order to [facility]. Advised pt to call back with any questions

## 2024-08-20 NOTE — TELEPHONE ENCOUNTER
Patient called needing a new oxygen order to be sent to [facility], please contact patient once it has been sent or if any issues. Thank you

## 2024-08-21 ASSESSMENT — ENCOUNTER SYMPTOMS
FALLS: 0
MYALGIAS: 0
FEVER: 0
VOMITING: 0
NAUSEA: 0
HEADACHES: 0
DIARRHEA: 0
WHEEZING: 0
CHILLS: 0
SINUS PAIN: 0
HEARTBURN: 0
DIAPHORESIS: 0
HEMOPTYSIS: 0
SPUTUM PRODUCTION: 1
COUGH: 1
PALPITATIONS: 0
WEAKNESS: 0
DIZZINESS: 0
SHORTNESS OF BREATH: 1

## 2024-08-21 NOTE — RESULT ENCOUNTER NOTE
Please notify Berny that I have reviewed her heart monitor and it looks good. Her symptoms of heart pounding are not related to any irregular heart rhythms all of which is good news.  No additional recommendations.  Any questions let me know.

## 2024-08-21 NOTE — PROGRESS NOTES
Pulmonary Clinic Note    Date of Visit: 8/22/2024     Chief Complaint:  No chief complaint on file.    HPI:   Berny Renee is a very pleasant 78 y.o. year old female former smoker (60 pack-years, quit in 1999), with a PMHx of COPD-emphysema, chronic respiratory failure, elevated BMI, history of SVT, HTN, who presented to the Pulmonary Clinic for a regular follow up. Last seen in the office on 3/28/2024 with myself.     Patient is followed by pulmonary office for COPD-emphysema.  PFT in 2021 shows a FEV1 0.89 L or 43%, FEV1/FVC 45%, %, DLCO 37% predicted.  CT chest in 2023 shows severe emphysema and stable pulmonary nodules measuring up to 6 mm.  She was treated SBRT in 2021 for presumable local lung cancer that was not amendable to biopsy in the left upper lobe. This has been stable on subsequent radiographic surveillance.  Patient is on Advair 500, Spiriva, Singulair, azithromycin to 50 mg daily, and  DuoNeb, albuterol as needed.  Patient is currently on 3-6 LPM of oxygen.  Patient was previously hospitalized in the beginning of February 2024 for COPD exacerbation was triggered by RSV.  Patient is a DNR/DNI with a POLST form on file.      Interval events:  2/16/2024-  Patient states that her shortness of breath, cough, sputum production are improving.  She is finished with her prednisone taper.  She continues to use and benefit from Advair 500, Spiriva, Singulair, and DuoNebs 3-4 times a day.  She has not needed albuterol.  She is at her baseline oxygen, which is 6 LPM.  She has been referred to pulmonary rehab and has a intake appointment later this month.    3/28/2024- Patient presents today to request long-term prednisone use.  Patient states that she does better on 5 mg of prednisone daily.  She currently is on a prednisone taper by her primary care for increased shortness of breath.  She states she is shortness of breath with mMRC of 4, has an occasional cough with white sputum but denies any  wheezing.  She continues to use Advair 500, Spiriva, Singulair, and daily azithromycin to 50 mg.  She is not using DuoNebs and only uses albuterol a couple times a month.  She is scheduled to go to pulmonary rehab later this month.  She is also frustrated, as her insurance will no longer cover Advair 500 and wants the patient to be switched to [place].  We attempted to do a multi ox today in the office, which showed the patient needs more than 6 L of oxygen upon exertion but patient refused to continue testing.    8/22/2024-  ***    Exacerbations this year:  1    Current medication regimen:  Trelegy 200, Singulair, azithromycin 250 mg daily, and  DuoNeb, albuterol as needed    Oxygen use: 6 LPM 24/7    MMRC Grade:   0- Breathless only during strenuous exercise  1- Short of breath when hurrying or going up a small hill  2- Walks slower than friends due to breathlessness, has to stop at own pace  3- Stops to catch breath on level ground after 100m  4- Breathless with ambulating around house or ADLs          Past Medical History:   Diagnosis Date    Acute on chronic respiratory failure with hypoxia ([facility]) 11/14/2020    Arthritis     spine    Asthma with COPD ([facility])     BMI 31.0-31.9,adult 02/04/2022    Cataract immature     Chronic pain     Chronic respiratory failure with hypoxia ([facility]) 07/13/2017    Colon polyps     COPD (chronic obstructive pulmonary disease) ([facility]) 2002    moderate to severe    Cough     DDD (degenerative disc disease), cervical     DDD (degenerative disc disease), thoracic     Dependence on continuous supplemental oxygen 08/13/2015    IMO load March 2020    Diverticulitis of colon     Dyslipidemia     EMPHYSEMA     H/O opioid abuse ([facility]) 07/15/2021    Hypertension     Obesity (BMI 30-39.9) 10/24/2016    Opioid type dependence, continuous ([facility]) 02/04/2022    REGINE (obstructive sleep apnea)     OSTEOPOROSIS     Painful breathing     Shortness of breath     Spinal stenosis, lumbar region, with neurogenic  claudication     Sputum production     URINARY INCONTINENCE     Vitamin d deficiency     Wheezing      Past Surgical History:   Procedure Laterality Date    LUMBAR LAMINECTOMY DISKECTOMY  2010    Performed by GRACIE CANO at [facility]    OTHER ORTHOPEDIC SURGERY  2004    left ankle fx    OTHER      leg fracture    OTHER      t spine disc surg    TONSILLECTOMY       Social History     Socioeconomic History    Marital status:      Spouse name: Not on file    Number of children: Not on file    Years of education: Not on file    Highest education level: Not on file   Occupational History    Not on file   Tobacco Use    Smoking status: Former     Current packs/day: 0.00     Average packs/day: 2.0 packs/day for 30.0 years (60.0 ttl pk-yrs)     Types: Cigarettes     Start date: 3/1/1969     Quit date: 3/1/1999     Years since quittin.4    Smokeless tobacco: Never    Tobacco comments:     3 pks a day for 35 yrs, continued abstinence   Vaping Use    Vaping status: Never Used   Substance and Sexual Activity    Alcohol use: Not Currently     Alcohol/week: 4.2 oz     Types: 7 Glasses of wine per week    Drug use: Not Currently     Types: Marijuana, Oral     Comment: Medical Marijuana     Sexual activity: Never   Other Topics Concern    Not on file   Social History Narrative    ** Merged History Encounter **          Social Determinants of Health     Financial Resource Strain: Medium Risk (2024)    Overall Financial Resource Strain (CARDIA)     Difficulty of Paying Living Expenses: Somewhat hard   Food Insecurity: Food Insecurity Present (2024)    Hunger Vital Sign     Worried About Running Out of Food in the Last Year: Sometimes true     Ran Out of Food in the Last Year: Sometimes true   Transportation Needs: Unmet Transportation Needs (2024)    PRAPARE - Transportation     Lack of Transportation (Medical): Yes     Lack of Transportation (Non-Medical): No   Physical Activity:  Insufficiently Active (2/14/2024)    Exercise Vital Sign     Days of Exercise per Week: 3 days     Minutes of Exercise per Session: 10 min   Stress: No Stress Concern Present (2/14/2024)    [employer] [place] [employer] - Occupational Stress Questionnaire     Feeling of Stress : Only a little   Social Connections: Feeling Socially Integrated (3/13/2024)    OASIS : Social Isolation     Frequency of experiencing loneliness or isolation: Never   Recent Concern: Social Connections - Socially Isolated (2/14/2024)    Social Connection and Isolation Panel [NHANES]     Frequency of Communication with Friends and Family: Three times a week     Frequency of Social Gatherings with Friends and Family: Once a week     Attends [employer] Services: Never     Active Member of Clubs or Organizations: No     Attends Club or Organization Meetings: Never     Marital Status:    Intimate Partner Violence: Not At Risk (2/14/2024)    Humiliation, Afraid, Rape, and Kick questionnaire     Fear of Current or Ex-Partner: No     Emotionally Abused: No     Physically Abused: No     Sexually Abused: No   Housing Stability: Unknown (2/14/2024)    Housing Stability Vital Sign     Unable to Pay for Housing in the Last Year: No     Number of Places Lived in the Last Year: Not on file     Unstable Housing in the Last Year: No        Family History   Problem Relation Age of Onset    Cancer Father         Lung    Other Sister         lung and leukemia cancer    Cancer Sister         Leukemia, Lung     Current Outpatient Medications on File Prior to Visit   Medication Sig Dispense Refill    azithromycin (ZITHROMAX) 250 MG Tab Take 2 tablets by mouth on day one and 1 tablet on days two through five. 6 Tablet 0    acyclovir (ZOVIRAX) 200 MG Cap Take 1 capsule(at first sign of outbreak) by mouth three times a day 10 days 30 Capsule 5    DULoxetine (CYMBALTA) 60 MG Cap DR Particles delayed-release capsule Take 1 capsule by mouth once  daily (Patient not taking: Reported on 8/8/2024) 90 Capsule 1    spironolactone (ALDACTONE) 25 MG Tab Take 1 tablet by mouth daily 90 Tablet 1    azithromycin (ZITHROMAX) 250 MG Tab Take One tablet by mouth daily for COPD 90 Tablet 6    fexofenadine (ALLEGRA) 180 MG tablet Take 1 tablet by mouth Swallow whole with water once a day; do not take with fruit juices. 30 Tablet 0    fexofenadine (ALLEGRA) 180 MG tablet Take 1 tablet by mouth whole with water; do not take with fruit juices. Orally Once a day 30 day(s) 30 Tablet 0    furosemide (LASIX) 20 MG Tab Take 1 tablet as needed for water retention orally once a day  30 days 30 Tablet 1    Calcium Carbonate (CALCIUM 600) 1500 (600 Ca) MG Tab Take 1 Tablet by mouth 2 times a day. 180 Tablet 1    potassium chloride (MICRO-K) 10 MEQ capsule Take 1 Capsule by mouth 2 times a day with soft food. 180 Capsule 1    digoxin (LANOXIN) 125 MCG Tab Take 1 tablet by mouth every day 60 Tablet 5    losartan (COZAAR) 50 MG Tab Take 1 tablet by mouth every day 80 Tablet 5    montelukast (SINGULAIR) 10 MG Tab Take 1 tablet by mouth once daily 90 Tablet 0    omeprazole (PRILOSEC) 20 MG delayed-release capsule Take 1 capsule by mouth every day 90 Capsule 0    meloxicam (MOBIC) 7.5 MG Tab Take 1 tablet by mouth once or twice a day 180 Tablet 0    fluticasone-salmeterol (ADVAIR) 500-50 MCG/ACT AEROSOL POWDER, BREATH ACTIVATED Inhale 1 puff by mouth twice daily. 180 Each 3    fluticasone-umeclidinium-vilanterol (TRELEGY ELLIPTA) 200-62.5-25 mcg/act inhaler Inhale 1 Puff every day. 4 Each 0    fluticasone-umeclidinium-vilanterol (TRELEGY ELLIPTA) 200-62.5-25 mcg/act inhaler Inhale 1 Puff every day. 90 Each 3    Home Care Oxygen Inhale 6 L/min continuous. Oxygen dose 6 L/min  Respiratory route via: Nasal Cannula   Oxygen supplier:Preferred      Indications: Shortness of breath      ipratropium-albuterol (DUONEB) 0.5-2.5 (3) MG/3ML nebulizer solution Take 3 mL by nebulization every four hours  as needed for Shortness of Breath (Wheezing). 360 mL 3    predniSONE (DELTASONE) 20 MG Tab Take 2 tablets (40mg) by mouth daily until 2/10/2024, THEN 1 tablet daily x 2 days, THEN 0.5 tablets daily x 2 days. (Patient not taking: Reported on 8/8/2024) 9 Tablet 0    Albuterol Sulfate 108 (90 Base) MCG/ACT AEROSOL POWDER, BREATH ACTIVATED Inhale 2 Puffs 4 times a day as needed (shortness of breath). Indications: SOB 3 Each 0    benzonatate (TESSALON) 100 MG Cap Take 1 Capsule by mouth 3 times a day as needed for Cough. (Patient not taking: Reported on 8/8/2024) 30 Capsule 0    losartan (COZAAR) 50 MG Tab Take 1 Tablet by mouth 2 times a day. 60 Tablet 1    digoxin (LANOXIN) 125 MCG Tab Take 1 Tablet by mouth every day. 90 Tablet 3    meloxicam (MOBIC) 7.5 MG Tab Take 7.5 mg by mouth 2 times a day. Indications: Rheumatoid Arthritis      montelukast (SINGULAIR) 10 MG Tab Take 1 Tablet by mouth every day. 90 Tablet 3    calcium carbonate (OS-MICHELLE 500) 1250 (500 Ca) MG Tab Take 600 mg by mouth every evening. Indications: Low Amount of Calcium in the Blood      DULoxetine (CYMBALTA) 30 MG Cap DR Particles Take 60 mg by mouth every evening. Indications: Musculoskeletal Pain, denies depression (Patient not taking: Reported on 8/8/2024)      acyclovir (ZOVIRAX) 200 MG Cap Take 200 mg by mouth every day. Indications: Herpes Simplex Infection      spironolactone (ALDACTONE) 25 MG Tab TAKE ONE TABLET BY MOUTH ONE TIME DAILY 30 Tab 11    Cholecalciferol (VITAMIN D3) 2000 UNIT Cap TAKE ONE CAPSULE BY MOUTH ONE TIME DAILY 30 Cap 3    omeprazole (PRILOSEC OTC) 20 MG tablet Take 1 Tab by mouth every day. 30 Tab 11     No current facility-administered medications on file prior to visit.     Allergies: Iodine and Tape    ROS:   Review of Systems   Constitutional:  Negative for chills, diaphoresis, fever and malaise/fatigue.   HENT:  Negative for congestion and sinus pain.    Respiratory:  Positive for cough (Occasional), sputum  production (Occasional; white) and shortness of breath. Negative for hemoptysis and wheezing.    Cardiovascular:  Negative for chest pain, palpitations and leg swelling.   Gastrointestinal:  Negative for diarrhea, heartburn, nausea and vomiting.   Musculoskeletal:  Negative for falls and myalgias.   Neurological:  Negative for dizziness, weakness and headaches.     Vitals:  There were no vitals taken for this visit.    Physical Exam  Constitutional:       General: She is not in acute distress.     Appearance: Normal appearance. She is not ill-appearing, toxic-appearing or diaphoretic.   Cardiovascular:      Rate and Rhythm: Normal rate and regular rhythm.      Heart sounds: No murmur heard.     No friction rub. No gallop.   Pulmonary:      Effort: No respiratory distress.      Breath sounds: Normal breath sounds. No stridor. No wheezing, rhonchi or rales.   Musculoskeletal:         General: No swelling.      Right lower leg: No edema.      Left lower leg: No edema.   Skin:     General: Skin is warm.   Neurological:      General: No focal deficit present.      Mental Status: She is alert and oriented to person, place, and time.   Psychiatric:         Mood and Affect: Mood normal.         Behavior: Behavior normal.         Thought Content: Thought content normal.         Judgment: Judgment normal.         Laboratory Data:  PFTs: (Date: 7/8/2021)-      Impression:  Baseline spirometry shows airflow obstruction with FEV1/FVC ratio 45 and an FEV1 of 0.89 L or 43% predicted.  Bronchodilator testing was not performed.  Total lung capacity within normal limits at 95% predicted.  There is mild air trapping.  Diffusion capacity severely reduced at 37% predicted.  Pulmonary function testing shows severe airflow obstruction with mild air trapping and reduced DLCO consistent with advanced COPD.    CXR: (Date: 1/31/2024)-  Impression:  Emphysematous changes with basilar atelectasis/scarring.     CT Chest: (Date:  6/30/2023)-  Impression:  1.  Redemonstration of posttreatment scarring in the left upper lobe with associated small pulmonary nodules. This is grossly stable.  2.  No new suspicious pulmonary nodules or masses. Several additional pulmonary nodules are stable or less conspicuous since prior study.  3.  Severe Emphysema.      Assessment and Plan:    Problem List Items Addressed This Visit    None      Diagnostic studies have been reviewed with the patient.    No follow-ups on file.     This note was generated using voice recognition software which has a chance of producing errors of grammar and possibly content.  I have made every reasonable attempt to find and correct any obvious errors, but it should be expected that some may not be found prior to finalization of this note.    Time spent in record review prior to patient arrival, reviewing results, and in face-to-face encounter totaled 35 min.  __________  SUSAN Moss  Pulmonary Medicine  [facility]

## 2024-08-22 ENCOUNTER — APPOINTMENT (OUTPATIENT)
Dept: SLEEP MEDICINE | Facility: MEDICAL CENTER | Age: 79
End: 2024-08-22
Payer: MEDICARE

## 2024-09-12 ENCOUNTER — OFFICE VISIT (OUTPATIENT)
Dept: FAMILY PLANNING/WOMEN'S HEALTH CLINIC | Facility: PHYSICIAN GROUP | Age: 79
End: 2024-09-12
Payer: MEDICARE

## 2024-09-12 VITALS
HEIGHT: 64 IN | DIASTOLIC BLOOD PRESSURE: 56 MMHG | BODY MASS INDEX: 30.73 KG/M2 | WEIGHT: 180 LBS | SYSTOLIC BLOOD PRESSURE: 138 MMHG

## 2024-09-12 DIAGNOSIS — I10 ESSENTIAL HYPERTENSION: ICD-10-CM

## 2024-09-12 DIAGNOSIS — E78.5 DYSLIPIDEMIA: ICD-10-CM

## 2024-09-12 DIAGNOSIS — J43.1 PANLOBULAR EMPHYSEMA (HCC): ICD-10-CM

## 2024-09-12 DIAGNOSIS — F33.41 RECURRENT MAJOR DEPRESSIVE DISORDER, IN PARTIAL REMISSION (HCC): ICD-10-CM

## 2024-09-12 DIAGNOSIS — I70.0 ATHEROSCLEROSIS OF AORTA (HCC): ICD-10-CM

## 2024-09-12 DIAGNOSIS — Z91.81 AT RISK FOR FALLS: ICD-10-CM

## 2024-09-12 DIAGNOSIS — I47.10 SVT (SUPRAVENTRICULAR TACHYCARDIA) (HCC): ICD-10-CM

## 2024-09-12 DIAGNOSIS — J96.11 CHRONIC RESPIRATORY FAILURE WITH HYPOXIA (HCC): ICD-10-CM

## 2024-09-12 DIAGNOSIS — J44.9 STAGE 4 VERY SEVERE COPD BY GOLD CLASSIFICATION (HCC): ICD-10-CM

## 2024-09-12 DIAGNOSIS — Z59.9 FINANCIAL DIFFICULTY: ICD-10-CM

## 2024-09-12 PROCEDURE — 1158F ADVNC CARE PLAN TLK DOCD: CPT

## 2024-09-12 PROCEDURE — 3078F DIAST BP <80 MM HG: CPT

## 2024-09-12 PROCEDURE — 1126F AMNT PAIN NOTED NONE PRSNT: CPT

## 2024-09-12 PROCEDURE — 3075F SYST BP GE 130 - 139MM HG: CPT

## 2024-09-12 PROCEDURE — G0439 PPPS, SUBSEQ VISIT: HCPCS

## 2024-09-12 SDOH — ECONOMIC STABILITY: TRANSPORTATION INSECURITY
IN THE PAST 12 MONTHS, HAS THE LACK OF TRANSPORTATION KEPT YOU FROM MEDICAL APPOINTMENTS OR FROM GETTING MEDICATIONS?: NO

## 2024-09-12 SDOH — ECONOMIC STABILITY: HOUSING INSECURITY: IN THE PAST 12 MONTHS, HOW MANY TIMES HAVE YOU MOVED WHERE YOU WERE LIVING?: 1

## 2024-09-12 SDOH — ECONOMIC STABILITY: FOOD INSECURITY: WITHIN THE PAST 12 MONTHS, THE FOOD YOU BOUGHT JUST DIDN'T LAST AND YOU DIDN'T HAVE MONEY TO GET MORE.: NEVER TRUE

## 2024-09-12 SDOH — ECONOMIC STABILITY: HOUSING INSECURITY: AT ANY TIME IN THE PAST 12 MONTHS, WERE YOU HOMELESS OR LIVING IN A SHELTER (INCLUDING NOW)?: NO

## 2024-09-12 SDOH — ECONOMIC STABILITY - INCOME SECURITY: PROBLEM RELATED TO HOUSING AND ECONOMIC CIRCUMSTANCES, UNSPECIFIED: Z59.9

## 2024-09-12 SDOH — ECONOMIC STABILITY: FOOD INSECURITY: WITHIN THE PAST 12 MONTHS, YOU WORRIED THAT YOUR FOOD WOULD RUN OUT BEFORE YOU GOT MONEY TO BUY MORE.: NEVER TRUE

## 2024-09-12 SDOH — ECONOMIC STABILITY: INCOME INSECURITY: IN THE LAST 12 MONTHS, WAS THERE A TIME WHEN YOU WERE NOT ABLE TO PAY THE MORTGAGE OR RENT ON TIME?: NO

## 2024-09-12 SDOH — ECONOMIC STABILITY: INCOME INSECURITY: HOW HARD IS IT FOR YOU TO PAY FOR THE VERY BASICS LIKE FOOD, HOUSING, MEDICAL CARE, AND HEATING?: NOT VERY HARD

## 2024-09-12 ASSESSMENT — PATIENT HEALTH QUESTIONNAIRE - PHQ9
CLINICAL INTERPRETATION OF PHQ2 SCORE: 1
SUM OF ALL RESPONSES TO PHQ QUESTIONS 1-9: 8
5. POOR APPETITE OR OVEREATING: 0 - NOT AT ALL

## 2024-09-12 ASSESSMENT — PAIN SCALES - GENERAL: PAINLEVEL: NO PAIN

## 2024-09-12 ASSESSMENT — FIBROSIS 4 INDEX: FIB4 SCORE: 1.19

## 2024-09-12 ASSESSMENT — ACTIVITIES OF DAILY LIVING (ADL): BATHING_REQUIRES_ASSISTANCE: 0

## 2024-09-12 ASSESSMENT — ENCOUNTER SYMPTOMS: GENERAL WELL-BEING: FAIR

## 2024-09-12 NOTE — ASSESSMENT & PLAN NOTE
Chronic, stable. No current treatment. Last lipid panel on 11/28/23 was within normal limits.  Follow-up at least annually.

## 2024-09-12 NOTE — ASSESSMENT & PLAN NOTE
Chronic, stable. The patient is being followed by pulmonary. She recently completed pulmonary rehab. The patient is on 8 L/min oxygen. Continue with current defined treatment plan: Albuterol Sulfate 108 (90 Base) MCG/ACT AEROSOL POWDER, BREATH ACTIVATED, fluticasone-umeclidinium-vilanterol (TRELEGY ELLIPTA) 200-62.5-25 mcg/act inhaler . Follow-up at least annually.

## 2024-09-12 NOTE — ASSESSMENT & PLAN NOTE
Chronic, stable. Noted on CT Chest ([facility]) on 12/20/22.  No current treatment. The patient is being followed by cardiology.   Continue with blood pressure control.  Follow-up at least annually.

## 2024-09-12 NOTE — ASSESSMENT & PLAN NOTE
Chronic, stable. The patient is being followed by pulmonary. She recently completed pulmonary rehab. The patient is on 8 L/min oxygen. Continue with current defined treatment plan: home oxygen, fluticasone-umeclidinium-vilanterol (TRELEGY ELLIPTA) 200-62.5-25 mcg/act inhaler, Albuterol Sulfate 108 (90 Base) MCG/ACT AEROSOL POWDER, BREATH ACTIVATED. Follow-up at least annually.

## 2024-09-12 NOTE — PROGRESS NOTES
Comprehensive Health Assessment Program     Berny Renee is a 79 y.o. here for her comprehensive health assessment.    Patient Active Problem List    Diagnosis Date Noted    Atherosclerosis of aorta (HCC) 09/12/2024    Financial difficulty 09/12/2024    Encounter for long-term current use of high risk medication 05/31/2024    Essential hypertension 12/05/2023    Oropharyngeal dysphagia 12/05/2023    Sedative, hypnotic, 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HCC) 02/07/2023    BMI 32.0-32.9,adult 02/07/2023    Obesity (BMI 30.0-34.9) 02/07/2023    Calculus of gallbladder 02/07/2023    Thyroid nodule 02/07/2023    Diverticulosis 02/07/2023    Panlobular emphysema ([facility]) 02/04/2022    Osteopenia of lumbar spine 02/04/2022    Primary cancer of left upper lobe of lung ([facility]) 10/01/2021    Hypertension 08/12/2021    Amnesia 08/12/2021    Peripheral vascular disease ([facility]) 07/15/2021    Recurrent major depressive disorder, in partial remission ([facility]) 07/15/2021    Hyponatremia 11/19/2020    DNR (do not resuscitate) 11/17/2020    COVID-19 11/07/2020    SVT (supraventricular tachycardia) ([facility]) 10/07/2019    PVCs (premature ventricular contractions) 10/07/2019    Palpitations 10/07/2019    Stage 4 very severe COPD by GOLD classification ([facility]) 02/15/2018    External hemorrhoid, bleeding 11/01/2017    Chronic respiratory failure with hypoxia ([facility]) 07/13/2017    At risk for falls 10/24/2016    Pain management 05/16/2016    Pre-diabetes 12/11/2015    Chronic pain syndrome 09/12/2014    Chronic constipation 11/05/2012    Facet arthropathy, lumbar 02/27/2012    Dyslipidemia     Vitamin D deficiency disease     Lumbar radicular pain 08/16/2010    Peripheral edema 06/04/2010    Mixed stress and urge urinary incontinence        Current Outpatient Medications   Medication Sig Dispense Refill    azithromycin (ZITHROMAX) 250 MG Tab Take 2 tablets by mouth on day one and 1 tablet on days two through five. 6 Tablet 0    acyclovir (ZOVIRAX) 200 MG  Cap Take 1 capsule(at first sign of outbreak) by mouth three times a day 10 days 30 Capsule 5    DULoxetine (CYMBALTA) 60 MG Cap DR Particles delayed-release capsule Take 1 capsule by mouth once daily (Patient not taking: Reported on 8/8/2024) 90 Capsule 1    spironolactone (ALDACTONE) 25 MG Tab Take 1 tablet by mouth daily 90 Tablet 1    azithromycin (ZITHROMAX) 250 MG Tab Take One tablet by mouth daily for COPD 90 Tablet 6    fexofenadine (ALLEGRA) 180 MG tablet Take 1 tablet by mouth Swallow whole with water once a day; do not take with fruit juices. 30 Tablet 0    fexofenadine (ALLEGRA) 180 MG tablet Take 1 tablet by mouth whole with water; do not take with fruit juices. Orally Once a day 30 day(s) 30 Tablet 0    furosemide (LASIX) 20 MG Tab Take 1 tablet as needed for water retention orally once a day  30 days 30 Tablet 1    Calcium Carbonate (CALCIUM 600) 1500 (600 Ca) MG Tab Take 1 Tablet by mouth 2 times a day. 180 Tablet 1    potassium chloride (MICRO-K) 10 MEQ capsule Take 1 Capsule by mouth 2 times a day with soft food. 180 Capsule 1    digoxin (LANOXIN) 125 MCG Tab Take 1 tablet by mouth every day 60 Tablet 5    losartan (COZAAR) 50 MG Tab Take 1 tablet by mouth every day 80 Tablet 5    montelukast (SINGULAIR) 10 MG Tab Take 1 tablet by mouth once daily 90 Tablet 0    omeprazole (PRILOSEC) 20 MG delayed-release capsule Take 1 capsule by mouth every day 90 Capsule 0    meloxicam (MOBIC) 7.5 MG Tab Take 1 tablet by mouth once or twice a day 180 Tablet 0    fluticasone-salmeterol (ADVAIR) 500-50 MCG/ACT AEROSOL POWDER, BREATH ACTIVATED Inhale 1 puff by mouth twice daily. (Patient not taking: Reported on 9/12/2024) 180 Each 3    fluticasone-umeclidinium-vilanterol (TRELEGY ELLIPTA) 200-62.5-25 mcg/act inhaler Inhale 1 Puff every day. 4 Each 0    fluticasone-umeclidinium-vilanterol (TRELEGY ELLIPTA) 200-62.5-25 mcg/act inhaler Inhale 1 Puff every day. 90 Each 3    Home Care Oxygen Inhale 6 L/min continuous.  Oxygen dose 6 L/min  Respiratory route via: Nasal Cannula   Oxygen supplier:Preferred      Indications: Shortness of breath      ipratropium-albuterol (DUONEB) 0.5-2.5 (3) MG/3ML nebulizer solution Take 3 mL by nebulization every four hours as needed for Shortness of Breath (Wheezing). 360 mL 3    predniSONE (DELTASONE) 20 MG Tab Take 2 tablets (40mg) by mouth daily until 2/10/2024, THEN 1 tablet daily x 2 days, THEN 0.5 tablets daily x 2 days. (Patient not taking: Reported on 8/8/2024) 9 Tablet 0    Albuterol Sulfate 108 (90 Base) MCG/ACT AEROSOL POWDER, BREATH ACTIVATED Inhale 2 Puffs 4 times a day as needed (shortness of breath). Indications: SOB 3 Each 0    benzonatate (TESSALON) 100 MG Cap Take 1 Capsule by mouth 3 times a day as needed for Cough. (Patient not taking: Reported on 8/8/2024) 30 Capsule 0    losartan (COZAAR) 50 MG Tab Take 1 Tablet by mouth 2 times a day. 60 Tablet 1    digoxin (LANOXIN) 125 MCG Tab Take 1 Tablet by mouth every day. 90 Tablet 3    meloxicam (MOBIC) 7.5 MG Tab Take 7.5 mg by mouth 2 times a day. Indications: Rheumatoid Arthritis      montelukast (SINGULAIR) 10 MG Tab Take 1 Tablet by mouth every day. 90 Tablet 3    calcium carbonate (OS-MICHELLE 500) 1250 (500 Ca) MG Tab Take 600 mg by mouth every evening. Indications: Low Amount of Calcium in the Blood      DULoxetine (CYMBALTA) 30 MG Cap DR Particles Take 60 mg by mouth every evening. Indications: Musculoskeletal Pain, denies depression (Patient not taking: Reported on 8/8/2024)      acyclovir (ZOVIRAX) 200 MG Cap Take 200 mg by mouth every day. Indications: Herpes Simplex Infection      spironolactone (ALDACTONE) 25 MG Tab TAKE ONE TABLET BY MOUTH ONE TIME DAILY 30 Tab 11    Cholecalciferol (VITAMIN D3) 2000 UNIT Cap TAKE ONE CAPSULE BY MOUTH ONE TIME DAILY 30 Cap 3    omeprazole (PRILOSEC OTC) 20 MG tablet Take 1 Tab by mouth every day. 30 Tab 11     No current facility-administered medications for this visit.          Current  supplements as per medication list.     Allergies:   Iodine and Tape  Social History     Tobacco Use    Smoking status: Former     Current packs/day: 0.00     Average packs/day: 2.0 packs/day for 30.0 years (60.0 ttl pk-yrs)     Types: Cigarettes     Start date: 3/1/1969     Quit date: 3/1/1999     Years since quittin.5    Smokeless tobacco: Never    Tobacco comments:     3 pks a day for 35 yrs, continued abstinence   Vaping Use    Vaping status: Never Used   Substance Use Topics    Alcohol use: Not Currently     Alcohol/week: 4.2 oz     Types: 7 Glasses of wine per week    Drug use: Not Currently     Types: Marijuana, Oral     Comment: Medical Marijuana      Family History   Problem Relation Age of Onset    Cancer Father         Lung    Other Sister         lung and leukemia cancer    Cancer Sister         Leukemia, Lung     Berny  has a past medical history of Acute on chronic respiratory failure with hypoxia ([facility]) (2020), Arthritis, Asthma with COPD ([facility]), BMI 31.0-31.9,adult (2022), Cataract immature, Chronic pain, Chronic respiratory failure with hypoxia ([facility]) (2017), Colon polyps, COPD (chronic obstructive pulmonary disease) ([facility]) (), Cough, DDD (degenerative disc disease), cervical, DDD (degenerative disc disease), thoracic, Dependence on continuous supplemental oxygen (2015), Diverticulitis of colon, Dyslipidemia, EMPHYSEMA, H/O opioid abuse ([facility]) (07/15/2021), Hypertension, Obesity (BMI 30-39.9) (10/24/2016), Opioid type dependence, continuous ([facility]) (2022), REGINE (obstructive sleep apnea), OSTEOPOROSIS, Painful breathing, Shortness of breath, Spinal stenosis, lumbar region, with neurogenic claudication, Sputum production, URINARY INCONTINENCE, Vitamin d deficiency, and Wheezing.    She has no past medical history of Breast cancer ([facility]).   Past Surgical History:   Procedure Laterality Date    LUMBAR LAMINECTOMY DISKECTOMY  2010    Performed by GRACIE CANO at [facility]    OTHER ORTHOPEDIC SURGERY  2004    left ankle fx    OTHER      leg fracture    OTHER      t spine disc surg    TONSILLECTOMY         Screening:  In the last six months have you experienced any leakage of urine? Yes    Depression Screening  Little interest or pleasure in doing things?  0 - not at all  Feeling down, depressed , or hopeless? 1 - several days  Trouble falling or staying asleep, or sleeping too much?  3 - nearly every day  Feeling tired or having little energy?  3 - nearly every day  Poor appetite or overeating?  0 - not at all  Feeling bad about yourself - or that you are a failure or have let yourself or your family down? 0 - not at all  Trouble concentrating on things, such as reading the newspaper or watching television? 1 - several days  Moving or speaking so slowly that other people could have noticed.  Or the opposite - being so fidgety or restless that you have been moving around a lot more than usual?  0 - not at all  Thoughts that you would be better off dead, or of hurting yourself?  0 - not at all  Patient Health Questionnaire Score: 8    If depressive symptoms identified deferred to follow up visit unless specifically addressed in assessment and plan.    Interpretation of PHQ-9 Total Score   Score Severity   1-4 No Depression   5-9 Mild Depression   10-14 Moderate Depression   15-19 Moderately Severe Depression   20-27 Severe Depression    Screening for Cognitive Impairment  Do you or any of your friends or family members have any concern about your memory? No   Three Minute Recall (Leader, Season, Table) 2/3    Jovani clock face with all 12 numbers and set the hands to show 10 minutes after 11.  Yes 5/5   Cognitive concerns identified deferred for follow up unless specifically addressed in assessment and plan.    Fall Risk Assessment  Has the patient had two or more falls in the last year or any fall with injury in the last year?  Yes    Safety Assessment  Do you always wear  your seatbelt?  Yes  Any changes to home needed to function safely? No  Difficulty hearing.  Yes  Patient counseled about all safety risks that were identified.    Functional Assessment ADLs  Are there any barriers preventing you from cooking for yourself or meeting nutritional needs?  No. Has meal on wheels  Are there any barriers preventing you from driving safely or obtaining transportation?  No.    Are there any barriers preventing you from using a telephone or calling for help?  No    Are there any barriers preventing you from shopping?  Yes. Orders food or family helps  Are there any barriers preventing you from taking care of your own finances?  No    Are there any barriers preventing you from managing your medications?  No    Are there any barriers preventing you from showering, bathing or dressing yourself? No    Are there any barriers preventing you from doing housework or laundry? Yes Medicaid furnishes pt with house keeper  Are there any barriers preventing you from using the toilet?No    Are you currently engaging in any exercise or physical activity?  No.      Self-Assessment of Health  What is your perception of your health? Fair    Do you sleep more than six hours a night? No    In the past 7 days, how much did pain keep you from doing your normal work? A lot Everyday   Do you spend quality time with family or friends (virtually or in person)? Yes    Do you usually eat a heart healthy diet that constists of a variety of fruits, vegetables, whole grains and fiber? No    Do you eat foods high in fat and/or Fast Food more than three times per week? No    How concerned are you that your medical conditions are not being well managed? Not at all    Are you worried that in the next 2 months, you may not have stable housing that you own, rent, or stay in as part of a household? No        Advance Care Planning  Do you have an Advance Directive, Living Will, Durable Power of , or POLST? Yes          is  not on file - instructed patient to bring in a copy to scan into their chart      Health Maintenance Summary            Overdue - Hepatitis C Screening (Once) Never done      No completion history exists for this topic.              Overdue - Zoster (Shingles) Vaccines (2 of 2) Overdue since 7/24/2019 05/29/2019  Imm Admin: Zoster Vaccine Recombinant (RZV) (SHINGRIX)    05/28/2019  Imm Admin: Zoster Vaccine Recombinant (RZV) (SHINGRIX)    10/12/2016  Imm Admin: Zoster Vaccine Live (ZVL) (Zostavax) - HISTORICAL DATA    10/11/2016  Imm Admin: Zoster Vaccine Live (ZVL) (Zostavax) - HISTORICAL DATA              Overdue - Annual Pulmonary Function Test / Spirometry (Yearly) Overdue since 7/8/2022 07/08/2021  PULMONARY FUNCTION TESTS -Test requested: Complete Pulmonary Function Test    03/26/2019  Spirometry    07/01/2015  Done              Overdue - Influenza Vaccine (1) Overdue since 9/1/2024 11/22/2023  Imm Admin: Influenza Vaccine, Quadrivalent, Adjuvanted (Pf)    10/16/2022  Imm Admin: Influenza Vaccine Adult HD    10/15/2022  Imm Admin: Influenza Vaccine Adult HD    09/22/2021  Imm Admin: Influenza Vaccine Adult HD    09/21/2021  Imm Admin: Influenza Vaccine Adult HD    Only the first 5 history entries have been loaded, but more history exists.              Overdue - COVID-19 Vaccine (5 - 2023-24 season) Overdue since 9/1/2024      05/10/2022  Imm Admin: MODERNA SARS-COV-2 VACCINE (12+)    11/23/2021  Imm Admin: MODERNA SARS-COV-2 VACCINE (12+)    02/20/2021  Imm Admin: MODERNA SARS-COV-2 VACCINE (12+)    01/23/2021  Imm Admin: MODERNA SARS-COV-2 VACCINE (12+)              Annual Wellness Visit (Yearly) Next due on 9/12/2025 09/12/2024  Level of Service: [place] ANNUAL WELLNESS VISIT-INCLUDES PPPS SUBSEQUE*    02/07/2023  Level of Service: [place] ANNUAL WELLNESS VISIT-INCLUDES PPPS SUBSEQUE*    02/06/2023  Outside Procedure: [place] ANNUAL WELLNESS VISIT-INCLUDES PPPS SUBSEQUE*    08/11/2022  Outside  Procedure: [place] PREVENTIVE VISIT,EST,65 & OVER    02/04/2022  Level of Service: [place] ANNUAL WELLNESS VISIT-INCLUDES PPPS SUBSEQUE*    Only the first 5 history entries have been loaded, but more history exists.              Bone Density Scan (Every 5 Years) Tentatively due on 12/7/2026 12/07/2021  DS-BONE DENSITY STUDY (DEXA)    07/14/2008  DS-BONE DENSITY STUDY (DEXA)    12/27/2005  DS-BONE DENSITY STUDY (DEXA)              IMM DTaP/Tdap/Td Vaccine (2 - Td or Tdap) Next due on 4/22/2034 04/22/2024  Imm Admin: Tdap Vaccine              Pneumococcal Vaccine: 65+ Years (Series Information) Completed      10/24/2016  Imm Admin: Pneumococcal polysaccharide vaccine (PPSV-23)    10/23/2016  Imm Admin: Pneumococcal polysaccharide vaccine (PPSV-23)    06/17/2015  Imm Admin: Pneumococcal Conjugate Vaccine (Prevnar/PCV-13)    06/16/2015  Imm Admin: Pneumococcal Conjugate Vaccine (Prevnar/PCV-13)    07/09/2013  Imm Admin: Pneumococcal polysaccharide vaccine (PPSV-23)    Only the first 5 history entries have been loaded, but more history exists.              Hepatitis A Vaccine (Hep A) (Series Information) Aged Out      No completion history exists for this topic.              Hepatitis B Vaccine (Hep B) (Series Information) Aged Out      No completion history exists for this topic.              HPV Vaccines (Series Information) Aged Out      No completion history exists for this topic.              Polio Vaccine (Inactivated Polio) (Series Information) Aged Out      No completion history exists for this topic.              Meningococcal Immunization (Series Information) Aged Out      No completion history exists for this topic.              Discontinued - Mammogram  Discontinued        Frequency changed to Never automatically (Topic No Longer Applies)    12/11/2015  Patient Declined    01/10/2012  MA-SCREEING MAMMOGRAM W/ CAD    11/23/2009  MA-DIAGNOSTIC DIGITAL MAMMO-UNILAT    11/03/2009  MA-SCREENING DIGITAL MAMMO     "Only the first 5 history entries have been loaded, but more history exists.              Discontinued - Cervical Cancer Screening  Discontinued        Frequency changed to Never automatically (Topic No Longer Applies)    06/09/2011  PAP IG (IMAGE GUIDED)              Discontinued - Colorectal Cancer Screening  Discontinued        Frequency changed to Never automatically (Topic No Longer Applies)    04/30/2018  OCCULT BLOOD FECES IMMUNOASSAY    01/23/2013  REFERRAL TO GI FOR COLONOSCOPY    01/16/2012  Colonoscopy (Previously completed - colon polyps)    06/09/2005  Colonoscopy (Previously completed - colon polyps)    Only the first 5 history entries have been loaded, but more history exists.                    Patient Care Team:  Everett Diego M.D. as PCP - General (Family Medicine)  Michelle Alfonso M.D. as Consulting Physician (Pulmonary Medicine)  Charlie Pierre D.O. as Consulting Physician (Phys Med and Rehab)  [facility]  (Optometry)  Bubab Ross M.D. (Cardiovascular Disease (Cardiology))  [facility] as Home Health Provider  Katie Nichols C.N.A. (Inactive)  Everett Diego M.D. (Family Medicine)  [facility] as Respiratory Therapist (DME Supplier)    Financial Resource Strain: Low Risk  (9/12/2024)    Overall Financial Resource Strain (CARDIA)     Difficulty of Paying Living Expenses: Not very hard      Transportation Needs: No Transportation Needs (9/12/2024)    PRAPARE - Transportation     Lack of Transportation (Medical): No     Lack of Transportation (Non-Medical): No      Food Insecurity: No Food Insecurity (9/12/2024)    Hunger Vital Sign     Worried About Running Out of Food in the Last Year: Never true     Ran Out of Food in the Last Year: Never true        Encounter Vitals  Blood Pressure : 138/56  O2 Delivery Device: Nasal Cannula  Weight: 81.6 kg (180 lb)  Height: 162.6 cm (5' 4\")  BMI (Calculated): 30.9  Pain Score: No pain  Pulmonary Vitals  O2 Flow " Rate (L/min): 8  DME  O2 Delivery Device: Nasal Cannula     Alert, oriented in no acute distress.  Eye contact is good, speech goal directed, affect calm.    Assessment and Plan. The following treatment and monitoring plan is recommended:  Chronic respiratory failure with hypoxia (HCC)  Chronic, stable. The patient is being followed by pulmonary. She recently completed pulmonary rehab. The patient is on 8 L/min oxygen. Continue with current defined treatment plan: home oxygen, fluticasone-umeclidinium-vilanterol (TRELEGY ELLIPTA) 200-62.5-25 mcg/act inhaler, Albuterol Sulfate 108 (90 Base) MCG/ACT AEROSOL POWDER, BREATH ACTIVATED. Follow-up at least annually.      Recurrent major depressive disorder, in partial remission (HCC)  Chronic, stable.  PHQ-9 score today is 8.   She denies SI/HI. Not currently in counseling.   No current treatment.  Follow-up at least annually.        Essential hypertension  Chronic, stable. The patient's blood pressure is well controlled with current medication. Continue with current defined treatment plan: losartan (COZAAR) 50 MG Tab . Follow-up at least annually.      SVT (supraventricular tachycardia)  Chronic, stable.  The patient is being followed by cardiology Dr. Ross.  Continue with current defined treatment plan: digoxin (LANOXIN) 125 MCG Tab. Follow-up at least annually.      At risk for falls  Chronic, stable. The patient reports that she had a fall several months ago. The patient is using a walker for ambulation.  Follow-up at least annually.      Atherosclerosis of aorta (HCC)  Chronic, stable. Noted on CT Chest ([place]) on 12/20/22.  No current treatment. The patient is being followed by cardiology.   Continue with blood pressure control.  Follow-up at least annually.       Panlobular emphysema (HCC)  Chronic, stable. The patient is being followed by pulmonary. She recently completed pulmonary rehab. The patient is on 8 L/min oxygen. Continue with current defined  treatment plan: Albuterol Sulfate 108 (90 Base) MCG/ACT AEROSOL POWDER, BREATH ACTIVATED, fluticasone-umeclidinium-vilanterol (TRELEGY ELLIPTA) 200-62.5-25 mcg/act inhaler . Follow-up at least annually.     Stage 4 very severe COPD by GOLD classification (HCC)  Chronic, stable. The patient is being followed by pulmonary. She recently completed pulmonary rehab. The patient is on 8 L/min oxygen. Continue with current defined treatment plan: fluticasone-umeclidinium-vilanterol (TRELEGY ELLIPTA) 200-62.5-25 mcg/act inhaler, Albuterol Sulfate 108 (90 Base) MCG/ACT AEROSOL POWDER, BREATH ACTIVATED. Follow-up at least annually.     Dyslipidemia  Chronic, stable. No current treatment. Last lipid panel on 11/28/23 was within normal limits.  Follow-up at least annually.      Financial difficulty  The patient reported that she has difficulty affording her scooter repair and housing. Referral to Care Management placed this visit.         Services suggested: No services needed at this time  Health Care Screening: Age-appropriate preventive services recommended by USPTF and ACIP covered by Medicare were discussed today. Services ordered if indicated and agreed upon by the patient.  Referrals offered: Community-based lifestyle interventions to reduce health risks and promote self-management and wellness, fall prevention, nutrition, physical activity, tobacco-use cessation, weight loss, and mental health services as per orders if indicated.    Discussion today about general wellness and lifestyle habits:    Prevent falls and reduce trip hazards; Cautioned about securing or removing rugs.  Have a working fire alarm and carbon monoxide detector.  Engage in regular physical activity and social activities.    Follow-up: Return for appointment with Primary Care Provider as needed.

## 2024-09-12 NOTE — ASSESSMENT & PLAN NOTE
Chronic, stable.  The patient is being followed by cardiology Dr. Ross.  Continue with current defined treatment plan: digoxin (LANOXIN) 125 MCG Tab. Follow-up at least annually.

## 2024-09-12 NOTE — ASSESSMENT & PLAN NOTE
Chronic, stable. The patient is being followed by pulmonary. She recently completed pulmonary rehab. The patient is on 8 L/min oxygen. Continue with current defined treatment plan: fluticasone-umeclidinium-vilanterol (TRELEGY ELLIPTA) 200-62.5-25 mcg/act inhaler, Albuterol Sulfate 108 (90 Base) MCG/ACT AEROSOL POWDER, BREATH ACTIVATED. Follow-up at least annually.

## 2024-09-12 NOTE — ASSESSMENT & PLAN NOTE
The patient reported that she has difficulty affording her scooter repair and housing. Referral to Care Management placed this visit.

## 2024-09-12 NOTE — ASSESSMENT & PLAN NOTE
Chronic, stable. The patient reports that she had a fall several months ago. The patient is using a walker for ambulation.  Follow-up at least annually.

## 2024-09-12 NOTE — ASSESSMENT & PLAN NOTE
Chronic, stable.  PHQ-9 score today is 8.   She denies SI/HI. Not currently in counseling.   No current treatment.  Follow-up at least annually.

## 2024-09-12 NOTE — ASSESSMENT & PLAN NOTE
Chronic, stable. The patient's blood pressure is well controlled with current medication. Continue with current defined treatment plan: losartan (COZAAR) 50 MG Tab . Follow-up at least annually.

## 2024-09-13 PROCEDURE — RXMED WILLOW AMBULATORY MEDICATION CHARGE: Performed by: FAMILY MEDICINE

## 2024-09-16 ENCOUNTER — PHARMACY VISIT (OUTPATIENT)
Dept: PHARMACY | Facility: MEDICAL CENTER | Age: 79
End: 2024-09-16
Payer: COMMERCIAL

## 2024-09-16 PROCEDURE — RXMED WILLOW AMBULATORY MEDICATION CHARGE: Performed by: FAMILY MEDICINE

## 2024-09-19 ENCOUNTER — HOSPITAL ENCOUNTER (OUTPATIENT)
Dept: LAB | Facility: MEDICAL CENTER | Age: 79
End: 2024-09-19
Attending: INTERNAL MEDICINE
Payer: MEDICARE

## 2024-09-19 LAB — DIGOXIN SERPL-MCNC: 0.7 NG/ML (ref 0.8–2)

## 2024-09-19 PROCEDURE — 80162 ASSAY OF DIGOXIN TOTAL: CPT

## 2024-09-19 PROCEDURE — 36415 COLL VENOUS BLD VENIPUNCTURE: CPT

## 2024-09-30 ENCOUNTER — PHARMACY VISIT (OUTPATIENT)
Dept: PHARMACY | Facility: MEDICAL CENTER | Age: 79
End: 2024-09-30
Payer: COMMERCIAL

## 2024-09-30 PROCEDURE — RXMED WILLOW AMBULATORY MEDICATION CHARGE: Performed by: FAMILY MEDICINE

## 2024-10-07 ENCOUNTER — APPOINTMENT (OUTPATIENT)
Dept: FAMILY PLANNING/WOMEN'S HEALTH CLINIC | Facility: PHYSICIAN GROUP | Age: 79
End: 2024-10-07
Payer: MEDICARE

## 2024-10-11 ENCOUNTER — OFFICE VISIT (OUTPATIENT)
Dept: SLEEP MEDICINE | Facility: MEDICAL CENTER | Age: 79
End: 2024-10-11
Attending: INTERNAL MEDICINE
Payer: MEDICARE

## 2024-10-11 VITALS
WEIGHT: 174 LBS | OXYGEN SATURATION: 91 % | BODY MASS INDEX: 29.71 KG/M2 | DIASTOLIC BLOOD PRESSURE: 62 MMHG | HEIGHT: 64 IN | HEART RATE: 64 BPM | SYSTOLIC BLOOD PRESSURE: 132 MMHG

## 2024-10-11 DIAGNOSIS — J96.11 CHRONIC RESPIRATORY FAILURE WITH HYPOXIA (HCC): ICD-10-CM

## 2024-10-11 DIAGNOSIS — I83.892 VARICOSE VEINS OF LEFT LEG WITH EDEMA: ICD-10-CM

## 2024-10-11 DIAGNOSIS — Z23 ENCOUNTER FOR IMMUNIZATION: ICD-10-CM

## 2024-10-11 DIAGNOSIS — J43.1 PANLOBULAR EMPHYSEMA (HCC): ICD-10-CM

## 2024-10-11 DIAGNOSIS — R60.0 LEG EDEMA, LEFT: ICD-10-CM

## 2024-10-11 PROCEDURE — 90471 IMMUNIZATION ADMIN: CPT | Performed by: INTERNAL MEDICINE

## 2024-10-11 PROCEDURE — 1158F ADVNC CARE PLAN TLK DOCD: CPT | Performed by: INTERNAL MEDICINE

## 2024-10-11 PROCEDURE — 3078F DIAST BP <80 MM HG: CPT | Performed by: INTERNAL MEDICINE

## 2024-10-11 PROCEDURE — 99213 OFFICE O/P EST LOW 20 MIN: CPT | Mod: 25 | Performed by: INTERNAL MEDICINE

## 2024-10-11 PROCEDURE — 99215 OFFICE O/P EST HI 40 MIN: CPT | Performed by: INTERNAL MEDICINE

## 2024-10-11 PROCEDURE — 94761 N-INVAS EAR/PLS OXIMETRY MLT: CPT | Performed by: INTERNAL MEDICINE

## 2024-10-11 PROCEDURE — 3075F SYST BP GE 130 - 139MM HG: CPT | Performed by: INTERNAL MEDICINE

## 2024-10-11 ASSESSMENT — FIBROSIS 4 INDEX: FIB4 SCORE: 1.19

## 2024-10-15 DIAGNOSIS — I10 ESSENTIAL HYPERTENSION: ICD-10-CM

## 2024-10-15 PROCEDURE — RXMED WILLOW AMBULATORY MEDICATION CHARGE: Performed by: FAMILY MEDICINE

## 2024-10-15 RX ORDER — LOSARTAN POTASSIUM 50 MG/1
50 TABLET ORAL
Qty: 80 TABLET | Refills: 5 | Status: CANCELLED | OUTPATIENT
Start: 2024-10-15

## 2024-10-16 ENCOUNTER — TELEPHONE (OUTPATIENT)
Dept: CARDIOLOGY | Facility: MEDICAL CENTER | Age: 79
End: 2024-10-16
Payer: MEDICARE

## 2024-10-16 ENCOUNTER — PHARMACY VISIT (OUTPATIENT)
Dept: PHARMACY | Facility: MEDICAL CENTER | Age: 79
End: 2024-10-16
Payer: COMMERCIAL

## 2024-10-21 DIAGNOSIS — J43.1 PANLOBULAR EMPHYSEMA (HCC): ICD-10-CM

## 2024-10-21 RX ORDER — FLUTICASONE FUROATE, UMECLIDINIUM BROMIDE AND VILANTEROL TRIFENATATE 200; 62.5; 25 UG/1; UG/1; UG/1
1 POWDER RESPIRATORY (INHALATION) DAILY
Qty: 90 EACH | Refills: 3 | Status: SHIPPED | OUTPATIENT
Start: 2024-10-21

## 2024-10-22 DIAGNOSIS — I10 PRIMARY HYPERTENSION: ICD-10-CM

## 2024-10-22 DIAGNOSIS — I47.10 SVT (SUPRAVENTRICULAR TACHYCARDIA) (HCC): ICD-10-CM

## 2024-10-22 PROCEDURE — RXMED WILLOW AMBULATORY MEDICATION CHARGE: Performed by: FAMILY MEDICINE

## 2024-10-22 PROCEDURE — RXMED WILLOW AMBULATORY MEDICATION CHARGE: Performed by: INTERNAL MEDICINE

## 2024-10-22 RX ORDER — LOSARTAN POTASSIUM 50 MG/1
50 TABLET ORAL 2 TIMES DAILY
Qty: 200 TABLET | Refills: 2 | Status: SHIPPED | OUTPATIENT
Start: 2024-10-22

## 2024-10-23 ENCOUNTER — PHARMACY VISIT (OUTPATIENT)
Dept: PHARMACY | Facility: MEDICAL CENTER | Age: 79
End: 2024-10-23
Payer: COMMERCIAL

## 2024-10-24 DIAGNOSIS — I49.1 APC (ATRIAL PREMATURE CONTRACTIONS): ICD-10-CM

## 2024-10-24 DIAGNOSIS — I47.10 SVT (SUPRAVENTRICULAR TACHYCARDIA) (HCC): ICD-10-CM

## 2024-10-24 DIAGNOSIS — I49.3 PVC'S (PREMATURE VENTRICULAR CONTRACTIONS): ICD-10-CM

## 2024-10-24 DIAGNOSIS — I49.3 PVCS (PREMATURE VENTRICULAR CONTRACTIONS): ICD-10-CM

## 2024-10-24 DIAGNOSIS — R00.2 PALPITATIONS: ICD-10-CM

## 2024-10-24 RX ORDER — DIGOXIN 125 MCG
125 TABLET ORAL DAILY
Qty: 100 TABLET | Refills: 3 | Status: SHIPPED | OUTPATIENT
Start: 2024-10-24

## 2024-10-28 PROCEDURE — RXMED WILLOW AMBULATORY MEDICATION CHARGE: Performed by: INTERNAL MEDICINE

## 2024-10-28 PROCEDURE — RXMED WILLOW AMBULATORY MEDICATION CHARGE: Performed by: FAMILY MEDICINE

## 2024-10-29 ENCOUNTER — PHARMACY VISIT (OUTPATIENT)
Dept: PHARMACY | Facility: MEDICAL CENTER | Age: 79
End: 2024-10-29
Payer: COMMERCIAL

## 2024-10-30 ENCOUNTER — PHARMACY VISIT (OUTPATIENT)
Dept: PHARMACY | Facility: MEDICAL CENTER | Age: 79
End: 2024-10-30

## 2024-11-10 ENCOUNTER — PHARMACY VISIT (OUTPATIENT)
Dept: PHARMACY | Facility: MEDICAL CENTER | Age: 79
End: 2024-11-10
Payer: COMMERCIAL

## 2024-11-10 PROCEDURE — RXMED WILLOW AMBULATORY MEDICATION CHARGE: Performed by: NURSE PRACTITIONER

## 2024-11-10 RX ORDER — PREDNISONE 20 MG/1
TABLET ORAL
Qty: 10 TABLET | Refills: 0 | Status: SHIPPED | OUTPATIENT
Start: 2024-11-10 | End: 2024-11-10

## 2024-11-10 RX ORDER — PREDNISONE 20 MG/1
TABLET ORAL
Qty: 40 TABLET | Refills: 0 | Status: ON HOLD | OUTPATIENT
Start: 2024-11-10 | End: 2024-11-24

## 2024-11-10 RX ORDER — BENZONATATE 200 MG/1
CAPSULE ORAL
Qty: 10 CAPSULE | Refills: 0 | Status: ON HOLD | OUTPATIENT
Start: 2024-11-10 | End: 2024-11-24

## 2024-11-12 ENCOUNTER — APPOINTMENT (OUTPATIENT)
Dept: RADIOLOGY | Facility: MEDICAL CENTER | Age: 79
End: 2024-11-12
Attending: INTERNAL MEDICINE
Payer: MEDICARE

## 2024-11-13 ENCOUNTER — APPOINTMENT (OUTPATIENT)
Dept: RADIOLOGY | Facility: MEDICAL CENTER | Age: 79
DRG: 189 | End: 2024-11-13
Attending: EMERGENCY MEDICINE
Payer: MEDICARE

## 2024-11-13 ENCOUNTER — APPOINTMENT (OUTPATIENT)
Dept: CARDIOLOGY | Facility: MEDICAL CENTER | Age: 79
DRG: 189 | End: 2024-11-13
Attending: INTERNAL MEDICINE
Payer: MEDICARE

## 2024-11-13 ENCOUNTER — HOSPITAL ENCOUNTER (INPATIENT)
Facility: MEDICAL CENTER | Age: 79
LOS: 3 days | DRG: 189 | End: 2024-11-16
Attending: EMERGENCY MEDICINE | Admitting: INTERNAL MEDICINE
Payer: MEDICARE

## 2024-11-13 DIAGNOSIS — J43.1 PANLOBULAR EMPHYSEMA (HCC): ICD-10-CM

## 2024-11-13 DIAGNOSIS — K64.9 HEMORRHOIDS, UNSPECIFIED HEMORRHOID TYPE: ICD-10-CM

## 2024-11-13 DIAGNOSIS — R60.9 EDEMA, UNSPECIFIED TYPE: ICD-10-CM

## 2024-11-13 DIAGNOSIS — J44.9 STAGE 4 VERY SEVERE COPD BY GOLD CLASSIFICATION (HCC): ICD-10-CM

## 2024-11-13 DIAGNOSIS — K92.2 GASTROINTESTINAL HEMORRHAGE, UNSPECIFIED GASTROINTESTINAL HEMORRHAGE TYPE: ICD-10-CM

## 2024-11-13 DIAGNOSIS — G89.4 CHRONIC PAIN SYNDROME: ICD-10-CM

## 2024-11-13 DIAGNOSIS — J96.21 ACUTE ON CHRONIC RESPIRATORY FAILURE WITH HYPOXIA (HCC): ICD-10-CM

## 2024-11-13 DIAGNOSIS — J96.01 ACUTE RESPIRATORY FAILURE WITH HYPOXIA (HCC): ICD-10-CM

## 2024-11-13 DIAGNOSIS — J44.1 ACUTE EXACERBATION OF CHRONIC OBSTRUCTIVE PULMONARY DISEASE (COPD) (HCC): ICD-10-CM

## 2024-11-13 DIAGNOSIS — C34.12 PRIMARY CANCER OF LEFT UPPER LOBE OF LUNG (HCC): ICD-10-CM

## 2024-11-13 DIAGNOSIS — R13.12 OROPHARYNGEAL DYSPHAGIA: ICD-10-CM

## 2024-11-13 DIAGNOSIS — A41.9 SEPSIS, DUE TO UNSPECIFIED ORGANISM, UNSPECIFIED WHETHER ACUTE ORGAN DYSFUNCTION PRESENT (HCC): ICD-10-CM

## 2024-11-13 DIAGNOSIS — K92.2 LOWER GI BLEED: ICD-10-CM

## 2024-11-13 PROBLEM — D64.9 ANEMIA: Status: ACTIVE | Noted: 2024-11-13

## 2024-11-13 PROBLEM — R60.0 LOWER EXTREMITY EDEMA: Status: ACTIVE | Noted: 2024-11-13

## 2024-11-13 PROBLEM — J96.91 RESPIRATORY FAILURE WITH HYPOXIA (HCC): Status: ACTIVE | Noted: 2024-11-13

## 2024-11-13 LAB
ABO + RH BLD: NORMAL
ABO GROUP BLD: NORMAL
ALBUMIN SERPL BCP-MCNC: 3 G/DL (ref 3.2–4.9)
ALBUMIN/GLOB SERPL: 1.3 G/DL
ALP SERPL-CCNC: 34 U/L (ref 30–99)
ALT SERPL-CCNC: 14 U/L (ref 2–50)
ANION GAP SERPL CALC-SCNC: 11 MMOL/L (ref 7–16)
APPEARANCE UR: CLEAR
APTT PPP: 21.3 SEC (ref 24.7–36)
AST SERPL-CCNC: 22 U/L (ref 12–45)
B PARAP IS1001 DNA NPH QL NAA+NON-PROBE: NOT DETECTED
B PERT.PT PRMT NPH QL NAA+NON-PROBE: NOT DETECTED
BARCODED ABORH UBTYP: 5100
BARCODED ABORH UBTYP: 9500
BARCODED PRD CODE UBPRD: NORMAL
BARCODED UNIT NUM UBUNT: NORMAL
BASOPHILS # BLD AUTO: 0.1 % (ref 0–1.8)
BASOPHILS # BLD: 0.04 K/UL (ref 0–0.12)
BILIRUB SERPL-MCNC: 0.2 MG/DL (ref 0.1–1.5)
BILIRUB UR QL STRIP.AUTO: NEGATIVE
BLD GP AB SCN SERPL QL: NORMAL
BUN SERPL-MCNC: 26 MG/DL (ref 8–22)
C PNEUM DNA NPH QL NAA+NON-PROBE: NOT DETECTED
CALCIUM ALBUM COR SERPL-MCNC: 9.4 MG/DL (ref 8.5–10.5)
CALCIUM SERPL-MCNC: 8.6 MG/DL (ref 8.5–10.5)
CHLORIDE SERPL-SCNC: 98 MMOL/L (ref 96–112)
CO2 SERPL-SCNC: 27 MMOL/L (ref 20–33)
COLOR UR: YELLOW
COMPONENT R 8504R: NORMAL
CREAT SERPL-MCNC: 0.47 MG/DL (ref 0.5–1.4)
DIGOXIN SERPL-MCNC: 0.4 NG/ML (ref 0.8–2)
EKG IMPRESSION: NORMAL
EOSINOPHIL # BLD AUTO: 0.03 K/UL (ref 0–0.51)
EOSINOPHIL NFR BLD: 0.1 % (ref 0–6.9)
ERYTHROCYTE [DISTWIDTH] IN BLOOD BY AUTOMATED COUNT: 43.2 FL (ref 35.9–50)
FLUAV RNA NPH QL NAA+NON-PROBE: NOT DETECTED
FLUBV RNA NPH QL NAA+NON-PROBE: NOT DETECTED
GFR SERPLBLD CREATININE-BSD FMLA CKD-EPI: 97 ML/MIN/1.73 M 2
GLOBULIN SER CALC-MCNC: 2.3 G/DL (ref 1.9–3.5)
GLUCOSE SERPL-MCNC: 191 MG/DL (ref 65–99)
GLUCOSE UR STRIP.AUTO-MCNC: NEGATIVE MG/DL
GRAM STN SPEC: NORMAL
HADV DNA NPH QL NAA+NON-PROBE: NOT DETECTED
HCOV 229E RNA NPH QL NAA+NON-PROBE: NOT DETECTED
HCOV HKU1 RNA NPH QL NAA+NON-PROBE: NOT DETECTED
HCOV NL63 RNA NPH QL NAA+NON-PROBE: NOT DETECTED
HCOV OC43 RNA NPH QL NAA+NON-PROBE: NOT DETECTED
HCT VFR BLD AUTO: 17.5 % (ref 37–47)
HGB BLD-MCNC: 5.7 G/DL (ref 12–16)
HGB BLD-MCNC: 7.4 G/DL (ref 12–16)
HGB BLD-MCNC: 8.8 G/DL (ref 12–16)
HMPV RNA NPH QL NAA+NON-PROBE: NOT DETECTED
HOLDING TUBE BB 8507: NORMAL
HPIV1 RNA NPH QL NAA+NON-PROBE: NOT DETECTED
HPIV2 RNA NPH QL NAA+NON-PROBE: NOT DETECTED
HPIV3 RNA NPH QL NAA+NON-PROBE: NOT DETECTED
HPIV4 RNA NPH QL NAA+NON-PROBE: NOT DETECTED
IMM GRANULOCYTES # BLD AUTO: 0.44 K/UL (ref 0–0.11)
IMM GRANULOCYTES NFR BLD AUTO: 1.6 % (ref 0–0.9)
INR PPP: 1.08 (ref 0.87–1.13)
KETONES UR STRIP.AUTO-MCNC: NEGATIVE MG/DL
LACTATE SERPL-SCNC: 2.9 MMOL/L (ref 0.5–2)
LEUKOCYTE ESTERASE UR QL STRIP.AUTO: NEGATIVE
LYMPHOCYTES # BLD AUTO: 4.61 K/UL (ref 1–4.8)
LYMPHOCYTES NFR BLD: 16.4 % (ref 22–41)
M PNEUMO DNA NPH QL NAA+NON-PROBE: NOT DETECTED
MCH RBC QN AUTO: 30.3 PG (ref 27–33)
MCHC RBC AUTO-ENTMCNC: 32.6 G/DL (ref 32.2–35.5)
MCV RBC AUTO: 93.1 FL (ref 81.4–97.8)
MICRO URNS: NORMAL
MONOCYTES # BLD AUTO: 2.26 K/UL (ref 0–0.85)
MONOCYTES NFR BLD AUTO: 8 % (ref 0–13.4)
NEUTROPHILS # BLD AUTO: 20.78 K/UL (ref 1.82–7.42)
NEUTROPHILS NFR BLD: 73.8 % (ref 44–72)
NITRITE UR QL STRIP.AUTO: NEGATIVE
NRBC # BLD AUTO: 0.05 K/UL
NRBC BLD-RTO: 0.2 /100 WBC (ref 0–0.2)
NT-PROBNP SERPL IA-MCNC: 203 PG/ML (ref 0–125)
PH UR STRIP.AUTO: 5 [PH] (ref 5–8)
PLATELET # BLD AUTO: 407 K/UL (ref 164–446)
PMV BLD AUTO: 9.6 FL (ref 9–12.9)
POTASSIUM SERPL-SCNC: 3.5 MMOL/L (ref 3.6–5.5)
PROCALCITONIN SERPL-MCNC: 0.05 NG/ML
PRODUCT TYPE UPROD: NORMAL
PROT SERPL-MCNC: 5.3 G/DL (ref 6–8.2)
PROT UR QL STRIP: NEGATIVE MG/DL
PROTHROMBIN TIME: 14 SEC (ref 12–14.6)
RBC # BLD AUTO: 1.88 M/UL (ref 4.2–5.4)
RBC UR QL AUTO: NEGATIVE
RH BLD: NORMAL
RSV RNA NPH QL NAA+NON-PROBE: NOT DETECTED
RV+EV RNA NPH QL NAA+NON-PROBE: NOT DETECTED
SARS-COV-2 RNA NPH QL NAA+NON-PROBE: NOTDETECTED
SIGNIFICANT IND 70042: NORMAL
SITE SITE: NORMAL
SODIUM SERPL-SCNC: 136 MMOL/L (ref 135–145)
SOURCE SOURCE: NORMAL
SP GR UR STRIP.AUTO: 1.02
TROPONIN T SERPL-MCNC: 7 NG/L (ref 6–19)
UNIT STATUS USTAT: NORMAL
UROBILINOGEN UR STRIP.AUTO-MCNC: 1 EU/DL
WBC # BLD AUTO: 28.2 K/UL (ref 4.8–10.8)

## 2024-11-13 PROCEDURE — 80162 ASSAY OF DIGOXIN TOTAL: CPT

## 2024-11-13 PROCEDURE — 83605 ASSAY OF LACTIC ACID: CPT

## 2024-11-13 PROCEDURE — 94669 MECHANICAL CHEST WALL OSCILL: CPT

## 2024-11-13 PROCEDURE — 86850 RBC ANTIBODY SCREEN: CPT

## 2024-11-13 PROCEDURE — 700101 HCHG RX REV CODE 250: Mod: UD

## 2024-11-13 PROCEDURE — 94640 AIRWAY INHALATION TREATMENT: CPT

## 2024-11-13 PROCEDURE — 700105 HCHG RX REV CODE 258: Performed by: INTERNAL MEDICINE

## 2024-11-13 PROCEDURE — 87181 SC STD AGAR DILUTION PER AGT: CPT

## 2024-11-13 PROCEDURE — 770022 HCHG ROOM/CARE - ICU (200)

## 2024-11-13 PROCEDURE — 85025 COMPLETE CBC W/AUTO DIFF WBC: CPT

## 2024-11-13 PROCEDURE — 84145 PROCALCITONIN (PCT): CPT

## 2024-11-13 PROCEDURE — 86900 BLOOD TYPING SEROLOGIC ABO: CPT

## 2024-11-13 PROCEDURE — 99291 CRITICAL CARE FIRST HOUR: CPT

## 2024-11-13 PROCEDURE — 85730 THROMBOPLASTIN TIME PARTIAL: CPT

## 2024-11-13 PROCEDURE — 87205 SMEAR GRAM STAIN: CPT

## 2024-11-13 PROCEDURE — 700101 HCHG RX REV CODE 250: Performed by: INTERNAL MEDICINE

## 2024-11-13 PROCEDURE — 30233N1 TRANSFUSION OF NONAUTOLOGOUS RED BLOOD CELLS INTO PERIPHERAL VEIN, PERCUTANEOUS APPROACH: ICD-10-PCS | Performed by: EMERGENCY MEDICINE

## 2024-11-13 PROCEDURE — 86923 COMPATIBILITY TEST ELECTRIC: CPT | Mod: 91

## 2024-11-13 PROCEDURE — 81003 URINALYSIS AUTO W/O SCOPE: CPT

## 2024-11-13 PROCEDURE — 36430 TRANSFUSION BLD/BLD COMPNT: CPT

## 2024-11-13 PROCEDURE — 700102 HCHG RX REV CODE 250 W/ 637 OVERRIDE(OP): Mod: UD | Performed by: EMERGENCY MEDICINE

## 2024-11-13 PROCEDURE — A9270 NON-COVERED ITEM OR SERVICE: HCPCS | Mod: UD | Performed by: EMERGENCY MEDICINE

## 2024-11-13 PROCEDURE — 700105 HCHG RX REV CODE 258: Mod: UD | Performed by: EMERGENCY MEDICINE

## 2024-11-13 PROCEDURE — 700111 HCHG RX REV CODE 636 W/ 250 OVERRIDE (IP): Performed by: INTERNAL MEDICINE

## 2024-11-13 PROCEDURE — 83880 ASSAY OF NATRIURETIC PEPTIDE: CPT

## 2024-11-13 PROCEDURE — 85610 PROTHROMBIN TIME: CPT

## 2024-11-13 PROCEDURE — 85018 HEMOGLOBIN: CPT

## 2024-11-13 PROCEDURE — 87086 URINE CULTURE/COLONY COUNT: CPT

## 2024-11-13 PROCEDURE — 93306 TTE W/DOPPLER COMPLETE: CPT

## 2024-11-13 PROCEDURE — 99291 CRITICAL CARE FIRST HOUR: CPT | Performed by: INTERNAL MEDICINE

## 2024-11-13 PROCEDURE — 87070 CULTURE OTHR SPECIMN AEROBIC: CPT

## 2024-11-13 PROCEDURE — 84484 ASSAY OF TROPONIN QUANT: CPT

## 2024-11-13 PROCEDURE — 87040 BLOOD CULTURE FOR BACTERIA: CPT | Mod: 91

## 2024-11-13 PROCEDURE — A9270 NON-COVERED ITEM OR SERVICE: HCPCS | Performed by: INTERNAL MEDICINE

## 2024-11-13 PROCEDURE — 96365 THER/PROPH/DIAG IV INF INIT: CPT

## 2024-11-13 PROCEDURE — 94660 CPAP INITIATION&MGMT: CPT

## 2024-11-13 PROCEDURE — 86901 BLOOD TYPING SEROLOGIC RH(D): CPT | Mod: 91

## 2024-11-13 PROCEDURE — 93005 ELECTROCARDIOGRAM TRACING: CPT | Performed by: EMERGENCY MEDICINE

## 2024-11-13 PROCEDURE — 700111 HCHG RX REV CODE 636 W/ 250 OVERRIDE (IP): Mod: JZ,UD | Performed by: EMERGENCY MEDICINE

## 2024-11-13 PROCEDURE — 80053 COMPREHEN METABOLIC PANEL: CPT

## 2024-11-13 PROCEDURE — 71045 X-RAY EXAM CHEST 1 VIEW: CPT

## 2024-11-13 PROCEDURE — 700102 HCHG RX REV CODE 250 W/ 637 OVERRIDE(OP): Performed by: INTERNAL MEDICINE

## 2024-11-13 PROCEDURE — 74176 CT ABD & PELVIS W/O CONTRAST: CPT

## 2024-11-13 PROCEDURE — 0202U NFCT DS 22 TRGT SARS-COV-2: CPT

## 2024-11-13 PROCEDURE — 36415 COLL VENOUS BLD VENIPUNCTURE: CPT

## 2024-11-13 PROCEDURE — P9016 RBC LEUKOCYTES REDUCED: HCPCS | Mod: 91

## 2024-11-13 RX ORDER — POTASSIUM CHLORIDE 1500 MG/1
40 TABLET, EXTENDED RELEASE ORAL ONCE
Status: COMPLETED | OUTPATIENT
Start: 2024-11-13 | End: 2024-11-13

## 2024-11-13 RX ORDER — ALBUTEROL SULFATE 5 MG/ML
10 SOLUTION RESPIRATORY (INHALATION)
Status: COMPLETED | OUTPATIENT
Start: 2024-11-13 | End: 2024-11-13

## 2024-11-13 RX ORDER — SPIRONOLACTONE 25 MG/1
25 TABLET ORAL
Status: DISCONTINUED | OUTPATIENT
Start: 2024-11-13 | End: 2024-11-16 | Stop reason: HOSPADM

## 2024-11-13 RX ORDER — PANTOPRAZOLE SODIUM 40 MG/10ML
40 INJECTION, POWDER, LYOPHILIZED, FOR SOLUTION INTRAVENOUS 2 TIMES DAILY
Status: DISCONTINUED | OUTPATIENT
Start: 2024-11-13 | End: 2024-11-15

## 2024-11-13 RX ORDER — METHYLPREDNISOLONE SODIUM SUCCINATE 40 MG/ML
40 INJECTION, POWDER, LYOPHILIZED, FOR SOLUTION INTRAMUSCULAR; INTRAVENOUS DAILY
Status: DISCONTINUED | OUTPATIENT
Start: 2024-11-13 | End: 2024-11-13

## 2024-11-13 RX ORDER — IPRATROPIUM BROMIDE AND ALBUTEROL SULFATE 2.5; .5 MG/3ML; MG/3ML
3 SOLUTION RESPIRATORY (INHALATION)
Status: DISCONTINUED | OUTPATIENT
Start: 2024-11-13 | End: 2024-11-14

## 2024-11-13 RX ORDER — METRONIDAZOLE 500 MG/100ML
500 INJECTION, SOLUTION INTRAVENOUS ONCE
Status: DISCONTINUED | OUTPATIENT
Start: 2024-11-13 | End: 2024-11-13

## 2024-11-13 RX ORDER — CEFTRIAXONE 2 G/1
2000 INJECTION, POWDER, FOR SOLUTION INTRAMUSCULAR; INTRAVENOUS ONCE
Status: DISCONTINUED | OUTPATIENT
Start: 2024-11-13 | End: 2024-11-13

## 2024-11-13 RX ORDER — METHYLPREDNISOLONE SODIUM SUCCINATE 125 MG/2ML
62.5 INJECTION, POWDER, LYOPHILIZED, FOR SOLUTION INTRAMUSCULAR; INTRAVENOUS EVERY 8 HOURS
Status: DISCONTINUED | OUTPATIENT
Start: 2024-11-13 | End: 2024-11-14

## 2024-11-13 RX ORDER — AZITHROMYCIN 250 MG/1
500 TABLET, FILM COATED ORAL ONCE
Status: COMPLETED | OUTPATIENT
Start: 2024-11-13 | End: 2024-11-13

## 2024-11-13 RX ORDER — ENALAPRILAT 1.25 MG/ML
1.25 INJECTION INTRAVENOUS EVERY 8 HOURS PRN
Status: DISCONTINUED | OUTPATIENT
Start: 2024-11-13 | End: 2024-11-16 | Stop reason: HOSPADM

## 2024-11-13 RX ORDER — DULOXETIN HYDROCHLORIDE 60 MG/1
60 CAPSULE, DELAYED RELEASE ORAL DAILY
Status: DISCONTINUED | OUTPATIENT
Start: 2024-11-14 | End: 2024-11-16 | Stop reason: HOSPADM

## 2024-11-13 RX ORDER — AZITHROMYCIN 250 MG/1
500 TABLET, FILM COATED ORAL
Status: DISCONTINUED | OUTPATIENT
Start: 2024-11-14 | End: 2024-11-16 | Stop reason: HOSPADM

## 2024-11-13 RX ORDER — ALBUTEROL SULFATE 5 MG/ML
2.5 SOLUTION RESPIRATORY (INHALATION)
Status: DISCONTINUED | OUTPATIENT
Start: 2024-11-13 | End: 2024-11-14

## 2024-11-13 RX ORDER — LOSARTAN POTASSIUM 50 MG/1
50 TABLET ORAL 2 TIMES DAILY
Status: DISCONTINUED | OUTPATIENT
Start: 2024-11-13 | End: 2024-11-16 | Stop reason: HOSPADM

## 2024-11-13 RX ORDER — ALBUTEROL SULFATE 5 MG/ML
SOLUTION RESPIRATORY (INHALATION)
Status: COMPLETED
Start: 2024-11-13 | End: 2024-11-13

## 2024-11-13 RX ORDER — MONTELUKAST SODIUM 10 MG/1
10 TABLET ORAL EVERY EVENING
Status: DISCONTINUED | OUTPATIENT
Start: 2024-11-13 | End: 2024-11-16 | Stop reason: HOSPADM

## 2024-11-13 RX ADMIN — IPRATROPIUM BROMIDE AND ALBUTEROL SULFATE 3 ML: 2.5; .5 SOLUTION RESPIRATORY (INHALATION) at 18:48

## 2024-11-13 RX ADMIN — PIPERACILLIN AND TAZOBACTAM 3.38 G: 3; .375 INJECTION, POWDER, FOR SOLUTION INTRAVENOUS at 12:32

## 2024-11-13 RX ADMIN — ALBUTEROL SULFATE 10 MG: 2.5 SOLUTION RESPIRATORY (INHALATION) at 10:53

## 2024-11-13 RX ADMIN — IPRATROPIUM BROMIDE AND ALBUTEROL SULFATE 3 ML: 2.5; .5 SOLUTION RESPIRATORY (INHALATION) at 15:15

## 2024-11-13 RX ADMIN — IPRATROPIUM BROMIDE AND ALBUTEROL SULFATE 3 ML: 2.5; .5 SOLUTION RESPIRATORY (INHALATION) at 22:54

## 2024-11-13 RX ADMIN — ALBUTEROL SULFATE 10 MG: 5 SOLUTION RESPIRATORY (INHALATION) at 10:53

## 2024-11-13 RX ADMIN — LOSARTAN POTASSIUM 50 MG: 50 TABLET, FILM COATED ORAL at 17:46

## 2024-11-13 RX ADMIN — ENALAPRILAT 1.25 MG: 1.25 INJECTION INTRAVENOUS at 15:22

## 2024-11-13 RX ADMIN — CEFTRIAXONE SODIUM 1000 MG: 10 INJECTION, POWDER, FOR SOLUTION INTRAVENOUS at 14:55

## 2024-11-13 RX ADMIN — METHYLPREDNISOLONE SODIUM SUCCINATE 62.5 MG: 125 INJECTION, POWDER, FOR SOLUTION INTRAMUSCULAR; INTRAVENOUS at 14:24

## 2024-11-13 RX ADMIN — FLUTICASONE FUROATE, UMECLIDINIUM BROMIDE AND VILANTEROL TRIFENATATE 1 PUFF: 200; 62.5; 25 POWDER RESPIRATORY (INHALATION) at 14:55

## 2024-11-13 RX ADMIN — MONTELUKAST 10 MG: 10 TABLET, FILM COATED ORAL at 17:46

## 2024-11-13 RX ADMIN — SPIRONOLACTONE 25 MG: 25 TABLET ORAL at 14:35

## 2024-11-13 RX ADMIN — METHYLPREDNISOLONE SODIUM SUCCINATE 62.5 MG: 125 INJECTION, POWDER, FOR SOLUTION INTRAMUSCULAR; INTRAVENOUS at 22:57

## 2024-11-13 RX ADMIN — PANTOPRAZOLE SODIUM 40 MG: 40 INJECTION, POWDER, FOR SOLUTION INTRAVENOUS at 14:24

## 2024-11-13 RX ADMIN — POTASSIUM CHLORIDE 40 MEQ: 1500 TABLET, EXTENDED RELEASE ORAL at 14:24

## 2024-11-13 RX ADMIN — AZITHROMYCIN DIHYDRATE 500 MG: 250 TABLET ORAL at 12:47

## 2024-11-13 ASSESSMENT — ENCOUNTER SYMPTOMS
TREMORS: 0
SEIZURES: 0
FEVER: 1
VOMITING: 0
COUGH: 1
BLOOD IN STOOL: 1
MYALGIAS: 0
CONSTIPATION: 1
NAUSEA: 0
DIZZINESS: 0
HALLUCINATIONS: 0
CHILLS: 1
BLURRED VISION: 0
DEPRESSION: 0
SPEECH CHANGE: 0
EYE DISCHARGE: 0
SORE THROAT: 0
BACK PAIN: 0
SPUTUM PRODUCTION: 1
NERVOUS/ANXIOUS: 1
FOCAL WEAKNESS: 0
SHORTNESS OF BREATH: 1
WEIGHT LOSS: 0
ORTHOPNEA: 0
ABDOMINAL PAIN: 0
STRIDOR: 0
PHOTOPHOBIA: 0
WHEEZING: 1
WEAKNESS: 0
CLAUDICATION: 0

## 2024-11-13 ASSESSMENT — PULMONARY FUNCTION TESTS
EPAP_CMH2O: 5
EPAP_CMH2O: 6
EPAP_CMH2O: 5

## 2024-11-13 ASSESSMENT — PAIN DESCRIPTION - PAIN TYPE
TYPE: ACUTE PAIN

## 2024-11-13 ASSESSMENT — FIBROSIS 4 INDEX
FIB4 SCORE: 1.14
FIB4 SCORE: 1.19

## 2024-11-13 ASSESSMENT — PATIENT HEALTH QUESTIONNAIRE - PHQ9
SUM OF ALL RESPONSES TO PHQ9 QUESTIONS 1 AND 2: 0
2. FEELING DOWN, DEPRESSED, IRRITABLE, OR HOPELESS: NOT AT ALL
1. LITTLE INTEREST OR PLEASURE IN DOING THINGS: NOT AT ALL

## 2024-11-13 NOTE — ASSESSMENT & PLAN NOTE
FEV1 0.89 L (43%), FEV1/FVC 45%, DLCO 37% on 7/8/2021  Chronic respiratory failure on domiciliary oxygen  Continue Trelegy, 1 puff daily  RT protocols

## 2024-11-13 NOTE — ASSESSMENT & PLAN NOTE
History of chronic constipation, hemorrhoids and diverticulosis  Trend hemoglobin and transfuse PRBCs for hemoglobin greater than 7  Endoscopy today per GI

## 2024-11-13 NOTE — ED NOTES
Pharmacy Medication Reconciliation    ~Med rec updated and complete per patient home pharmacy   ~Pt home pharmacy: [facility]      ~Patient home pharmacy reports that patient received a 5 day course of Augmentin & Prednisone on 11/10/2024      ~Anticoagulants (rivaroxaban, apixaban, edoxaban, dabigatran, warfarin, enoxaparin) taken in the last 14 days? No

## 2024-11-13 NOTE — ASSESSMENT & PLAN NOTE
Hx of   Picato Counseling:  I discussed with the patient the risks of Picato including but not limited to erythema, scaling, itching, weeping, crusting, and pain.

## 2024-11-13 NOTE — ED PROVIDER NOTES
ED Provider Note    CHIEF COMPLAINT  Chief Complaint   Patient presents with    Shortness of Breath           HPI/ROS  LIMITATION TO HISTORY   Respiratory distress  OUTSIDE HISTORIAN(S):  None.    Berny Renee is a 79 y.o. female who presents to the emergency department for evaluation of shortness of breath.  Patient has a history of COPD and states he has been short of breath the last several days.  It is becoming progressive and severe.  Associate with subjective fever and sputum production.  Denies any chest pain.  Denies any history of intubation.  Denies chest pain.  Also is having weakness and bloody stools.  Some mild diffuse abdominal pain associate with.  No other associated complaints.  Did receive Solu-Medrol and steroids and route.      PAST MEDICAL HISTORY   has a past medical history of Acute on chronic respiratory failure with hypoxia ([facility]) (11/14/2020), Arthritis, Asthma with COPD ([facility]), BMI 31.0-31.9,adult (02/04/2022), Cataract immature, Chronic pain, Chronic respiratory failure with hypoxia ([facility]) (07/13/2017), Colon polyps, COPD (chronic obstructive pulmonary disease) ([facility]) (2002), Cough, DDD (degenerative disc disease), cervical, DDD (degenerative disc disease), thoracic, Dependence on continuous supplemental oxygen (08/13/2015), Diverticulitis of colon, Dyslipidemia, EMPHYSEMA, H/O opioid abuse ([facility]) (07/15/2021), Hypertension, Obesity (BMI 30-39.9) (10/24/2016), Opioid type dependence, continuous ([facility]) (02/04/2022), REGINE (obstructive sleep apnea), OSTEOPOROSIS, Painful breathing, Shortness of breath, Spinal stenosis, lumbar region, with neurogenic claudication, Sputum production, URINARY INCONTINENCE, Varicose veins of left leg with edema (10/11/2024), Vitamin d deficiency, and Wheezing.    SURGICAL HISTORY   has a past surgical history that includes tonsillectomy; other orthopedic surgery (2004); other; other; and lumbar laminectomy diskectomy (6/22/2010).    FAMILY HISTORY  Family History    Problem Relation Age of Onset    Cancer Father         Lung    Other Sister         lung and leukemia cancer    Cancer Sister         Leukemia, Lung       SOCIAL HISTORY  Social History     Tobacco Use    Smoking status: Former     Current packs/day: 0.00     Average packs/day: 2.0 packs/day for 30.0 years (60.0 ttl pk-yrs)     Types: Cigarettes     Start date: 3/1/1969     Quit date: 3/1/1999     Years since quittin.7    Smokeless tobacco: Never    Tobacco comments:     3 pks a day for 35 yrs, continued abstinence   Vaping Use    Vaping status: Never Used   Substance and Sexual Activity    Alcohol use: Not Currently     Alcohol/week: 4.2 oz     Types: 7 Glasses of wine per week    Drug use: Not Currently     Types: Marijuana, Oral     Comment: Medical Marijuana     Sexual activity: Never       CURRENT MEDICATIONS  Home Medications       Reviewed by Priti Little R.N. (Registered Nurse) on 24 at 1019  Med List Status: Partial     Medication Last Dose Status   acyclovir (ZOVIRAX) 200 MG Cap  Active   acyclovir (ZOVIRAX) 200 MG Cap  Active   Albuterol Sulfate 108 (90 Base) MCG/ACT AEROSOL POWDER, BREATH ACTIVATED  Active   amoxicillin-clavulanate (AUGMENTIN) 875-125 MG Tab  Active   azithromycin (ZITHROMAX) 250 MG Tab  Active   azithromycin (ZITHROMAX) 250 MG Tab  Active   benzonatate (TESSALON) 100 MG Cap  Active   benzonatate (TESSALON) 200 MG capsule  Active   Calcium Carbonate (CALCIUM 600) 1500 (600 Ca) MG Tab  Active   calcium carbonate (OS-MICHELLE 500) 1250 (500 Ca) MG Tab  Active   Cholecalciferol (VITAMIN D3) 2000 UNIT Cap  Active   digoxin (LANOXIN) 125 MCG Tab  Active   DULoxetine (CYMBALTA) 30 MG Cap DR Particles  Active   DULoxetine (CYMBALTA) 60 MG Cap DR Particles delayed-release capsule  Active   fexofenadine (ALLEGRA) 180 MG tablet  Active   fexofenadine (ALLEGRA) 180 MG tablet  Active   fluticasone-umeclidinium-vilanterol (TRELEGY ELLIPTA) 200-62.5-25 mcg/act inhaler  Active  "  furosemide (LASIX) 20 MG Tab  Active   Home Care Oxygen  Active   ipratropium-albuterol (DUONEB) 0.5-2.5 (3) MG/3ML nebulizer solution  Active   losartan (COZAAR) 50 MG Tab  Active   losartan (COZAAR) 50 MG Tab  Active   meloxicam (MOBIC) 7.5 MG Tab  Active   meloxicam (MOBIC) 7.5 MG Tab  Active   montelukast (SINGULAIR) 10 MG Tab  Active   montelukast (SINGULAIR) 10 MG Tab  Active   omeprazole (PRILOSEC OTC) 20 MG tablet  Active   omeprazole (PRILOSEC) 20 MG delayed-release capsule  Active   potassium chloride (MICRO-K) 10 MEQ capsule  Active   predniSONE (DELTASONE) 20 MG Tab  Active   spironolactone (ALDACTONE) 25 MG Tab  Active   spironolactone (ALDACTONE) 25 MG Tab  Active   traMADol (ULTRAM) 50 MG Tab  Active   Vitamin D, Cholecalciferol, 25 MCG (1000 UT) Cap  Active                  Audit from Redirected Encounters    **Home medications have not yet been reviewed for this encounter**         ALLERGIES  Allergies   Allergen Reactions    Iodine      Convulsion  I.V.  iodine    Tape Rash and Itching       PHYSICAL EXAM  VITAL SIGNS: BP (!) 145/62   Pulse 100   Temp 36.5 °C (97.7 °F) (Temporal)   Resp (!) 24   Ht 1.626 m (5' 4\")   Wt 77.6 kg (171 lb)   LMP 06/07/2001   SpO2 89%   BMI 29.35 kg/m²    Constitutional: Awake alert anxious in severe distress.  Significantly tachypneic.  HENT: Normocephalic, Atraumatic, Bilateral external ears normal, Oropharynx moist mucous membranes  Eyes: PERRL, EOMI, Conjunctiva normal, No discharge.   Neck: Normal range of motion, No tenderness, Supple, No stridor.   Cardiovascular: Tachycardic no murmurs rubs or gallops  Thorax & Lungs: Wheezes and poor air movement.  Abdomen: Soft, No tenderness  Skin: Warm, Dry, No erythema, No rash.   Rectal: Grossly bloody..   Musculoskeletal: Good range of motion in all major joints.  Mild symmetric edema  Neurologic: Alert, , No focal deficits noted.   Psychiatric: Affect normal      EKG/LABS  Results for orders placed or performed " during the hospital encounter of 11/13/24   HOLD BLOOD BANK SPECIMEN (NOT TESTED)    Collection Time: 11/13/24 10:17 AM   Result Value Ref Range    Holding Tube - Bb DONE    ABO Rh Confirm    Collection Time: 11/13/24 10:17 AM   Result Value Ref Range    ABO Rh Confirm O POS    CBC with Differential    Collection Time: 11/13/24 10:22 AM   Result Value Ref Range    WBC 28.2 (H) 4.8 - 10.8 K/uL    RBC 1.88 (L) 4.20 - 5.40 M/uL    Hemoglobin 5.7 (LL) 12.0 - 16.0 g/dL    Hematocrit 17.5 (LL) 37.0 - 47.0 %    MCV 93.1 81.4 - 97.8 fL    MCH 30.3 27.0 - 33.0 pg    MCHC 32.6 32.2 - 35.5 g/dL    RDW 43.2 35.9 - 50.0 fL    Platelet Count 407 164 - 446 K/uL    MPV 9.6 9.0 - 12.9 fL    Neutrophils-Polys 73.80 (H) 44.00 - 72.00 %    Lymphocytes 16.40 (L) 22.00 - 41.00 %    Monocytes 8.00 0.00 - 13.40 %    Eosinophils 0.10 0.00 - 6.90 %    Basophils 0.10 0.00 - 1.80 %    Immature Granulocytes 1.60 (H) 0.00 - 0.90 %    Nucleated RBC 0.20 0.00 - 0.20 /100 WBC    Neutrophils (Absolute) 20.78 (H) 1.82 - 7.42 K/uL    Lymphs (Absolute) 4.61 1.00 - 4.80 K/uL    Monos (Absolute) 2.26 (H) 0.00 - 0.85 K/uL    Eos (Absolute) 0.03 0.00 - 0.51 K/uL    Baso (Absolute) 0.04 0.00 - 0.12 K/uL    Immature Granulocytes (abs) 0.44 (H) 0.00 - 0.11 K/uL    NRBC (Absolute) 0.05 K/uL   Comp Metabolic Panel    Collection Time: 11/13/24 10:22 AM   Result Value Ref Range    Sodium 136 135 - 145 mmol/L    Potassium 3.5 (L) 3.6 - 5.5 mmol/L    Chloride 98 96 - 112 mmol/L    Co2 27 20 - 33 mmol/L    Anion Gap 11.0 7.0 - 16.0    Glucose 191 (H) 65 - 99 mg/dL    Bun 26 (H) 8 - 22 mg/dL    Creatinine 0.47 (L) 0.50 - 1.40 mg/dL    Calcium 8.6 8.5 - 10.5 mg/dL    Correct Calcium 9.4 8.5 - 10.5 mg/dL    AST(SGOT) 22 12 - 45 U/L    ALT(SGPT) 14 2 - 50 U/L    Alkaline Phosphatase 34 30 - 99 U/L    Total Bilirubin 0.2 0.1 - 1.5 mg/dL    Albumin 3.0 (L) 3.2 - 4.9 g/dL    Total Protein 5.3 (L) 6.0 - 8.2 g/dL    Globulin 2.3 1.9 - 3.5 g/dL    A-G Ratio 1.3 g/dL    proBrain Natriuretic Peptide, NT    Collection Time: 24 10:22 AM   Result Value Ref Range    NT-proBNP 203 (H) 0 - 125 pg/mL   Troponin    Collection Time: 24 10:22 AM   Result Value Ref Range    Troponin T 7 6 - 19 ng/L   LACTIC ACID    Collection Time: 24 10:22 AM   Result Value Ref Range    Lactic Acid 2.9 (H) 0.5 - 2.0 mmol/L   ESTIMATED GFR    Collection Time: 24 10:22 AM   Result Value Ref Range    GFR (CKD-EPI) 97 >60 mL/min/1.73 m 2   APTT    Collection Time: 24 10:22 AM   Result Value Ref Range    APTT 21.3 (L) 24.7 - 36.0 sec   PROTHROMBIN TIME (INR)    Collection Time: 24 10:22 AM   Result Value Ref Range    PT 14.0 12.0 - 14.6 sec    INR 1.08 0.87 - 1.13   EKG    Collection Time: 24 10:24 AM   Result Value Ref Range    Report       [facility] Emergency Dept.    Test Date:  2024  Pt Name:    FAZAL STUBBS                    Department: ER  MRN:        [id number]                      Room:       [facility]  Gender:     Female                       Technician: [id number]  :        1945                   Requested By:SAMUEL LÓPEZ  Order #:    [id number]                    Reading MD: SAMUEL LÓPEZ. ÁNGEL    Measurements  Intervals                                Axis  Rate:       83                           P:          129  MS:         149                          QRS:        46  QRSD:       100                          T:          58  QT:         353  QTc:        415    Interpretive Statements  Sinus rhythm  Low voltage, precordial leads  Compared to ECG 2024 19:17:09  Sinus arrhythmia no longer present  Myocardial infarct finding no longer present  Electronically Signed On 2024 15:00:28 PST by SAMUEL LÓPEZ. AMD     Blood Culture - Draw one from central line and one from peripheral site    Collection Time: 24 10:49 AM    Specimen: Peripheral; Blood   Result Value Ref Range    Significant Indicator NEG     Source BLD     Site  Peripheral     Culture Result       No Growth  Note: Blood cultures are incubated for 5 days and  are monitored continuously.Positive blood cultures  are called to the RN and reported as soon as  they are identified.     Blood Culture - Draw one from central line and one from peripheral site    Collection Time: 11/13/24 10:49 AM    Specimen: Line; Blood   Result Value Ref Range    Significant Indicator NEG     Source BLD     Site Peripheral     Culture Result       No Growth  Note: Blood cultures are incubated for 5 days and  are monitored continuously.Positive blood cultures  are called to the RN and reported as soon as  they are identified.     COD (ADULT)    Collection Time: 11/13/24 11:56 AM   Result Value Ref Range    ABO Grouping Only O     Rh Grouping Only POS     Antibody Screen-Cod NEG     Component R       Red Blood Cells     [id number]   issued       11/13/24 12:59    Product Type Red Blood Cells LR Pheresis     Dispense Status issued     Unit Number (Barcoded) [id number]     Product Code (Barcoded) J4421O53     Blood Type (Barcoded) 5100     Component R       Red Cells, LR       [id number]   issued       11/13/24 14:18    Product Type R99     Dispense Status issued     Unit Number (Barcoded) [id number]     Product Code (Barcoded) A2094E29     Blood Type (Barcoded) 9500    DIGOXIN    Collection Time: 11/13/24 12:55 PM   Result Value Ref Range    Digoxin 0.4 (L) 0.8 - 2.0 ng/mL     *Note: Due to a large number of results and/or encounters for the requested time period, some results have not been displayed. A complete set of results can be found in Results Review.      I have independently interpreted this EKG    RADIOLOGY/PROCEDURES   I have independently interpreted the diagnostic imaging associated with this visit and am waiting the final reading from the radiologist.   My preliminary interpretation is as follows: Reviewed x-ray at bedside with a [facility] radiology.    Radiologist  interpretation:  CT-ABDOMEN-PELVIS W/O   Final Result      1. No acute inflammatory process in the abdomen and pelvis.   2. Bibasal parenchymal scarring and atelectasis.   3. Atherosclerotic calcifications in the aorta and iliac arteries.   4. Cholelithiasis.      DX-CHEST-PORTABLE (1 VIEW)   Final Result         1. Low lung volume with hypoventilatory change and bibasilar atelectasis.      US-EXTREMITY VENOUS LOWER BILAT    (Results Pending)   EC-ECHOCARDIOGRAM COMPLETE W/O CONT    (Results Pending)       COURSE & MEDICAL DECISION MAKING    ASSESSMENT, COURSE AND PLAN  Care Narrative:     79-year-old female presents with wheezing, and acute respiratory distress.  She is tachypneic and has poor air movement.  The patient is evaluated bedside.  RT is called for possible BiPAP therapy he is placed on oxygen requiring 8 L to maintain a saturation above 90% and brought differential diagnosis was considered including not limited to COPD, asthma, viral illness, pneumonia, pulmonary embolism, and heart failure.    The patient's worked up with labs imaging her chart is reviewed.  Patient reported be on digoxin order digoxin level.    The patient has x-ray that does not show pneumonia.  She is already received steroids and keep her on respiratory protocol.  She is found to be anemic and she is consented for blood transfusion and have ordered a blood transfusion.    I have explained to the patient the risks and benefits of transfusion of blood products.  This includes, as appropriate, the risk of mild allergic reaction, hemolytic reaction, transfusion-associated lung injury, febrile reactions, circulatory or iron overload, and infection.    We discussed possible alternatives and their risks, including directed donation, autologous transfusion, and no transfusion, including IV or oral iron supplementation, as appropriate.  I believe the patient understands the risks and benefits and was able to express  understanding.        Patient's labs are otherwise reviewed.  She does have an elevated BUN.  Creatinine concerning for an acute upper GI bleed.  She is requiring blood transfusions.    Because of her abdominal discomfort I did a CT which is unremarkable.    Her blood was was a darker red not bright red acute blood.  This could be a brisk upper GI bleed.  She denies melena.  Reports never having an endoscopy.    The patient will have blood transfusion be admitted to the ICU for further workup and treatment.  Discussed case with the intensivist Dr. Baron and care transferred at that time.  Rocks are given for respiratory infection to cover for GI infections including possible C. difficile.        CRITICAL CARE  The very real possibilty of a deterioration of this patient's condition required the highest level of my preparedness for sudden, emergent intervention.  I provided critical care services, which included medication orders, frequent reevaluations of the patient's condition and response to treatment, ordering and reviewing test results, and discussing the case with various consultants.  The critical care time associated with the care of the patient was 45 minutes. Review chart for interventions. This time is exclusive of any other billable procedures.        Patient has received antibiotics.  I elected to hold off on fluids and give her a blood transfusion.      DISPOSITION AND DISCUSSIONS  I have discussed management of the patient with the following physicians and LINDA's: ICU, Dr. Baron.    Discussion of management with other hospitals or appropriate source(s): Pharmacy for antibiotic selection.      Decision tools and prescription drugs considered including, but not limited to: Sepsis protocol..    FINAL DIAGNOSIS  1. Gastrointestinal hemorrhage, unspecified gastrointestinal hemorrhage type    2. Acute exacerbation of chronic obstructive pulmonary disease (COPD) (HCC)    3. Acute respiratory failure with hypoxia  (HCC)    4. Sepsis, due to unspecified organism, unspecified whether acute organ dysfunction present (HCC)    5.  Critical care time 45 minutes no procedures.     Electronically signed by: Raudel Malloy M.D., 11/13/2024 10:23 AM

## 2024-11-13 NOTE — CONSULTS
Critical Care Consultation    Date of consult: 11/13/2024    Referring Physician  HARRISON Malloy.    Reason for Consultation  Respiratory distress with hypoxia requiring bipap and acute gi bleed.     History of Presenting Illness  79 y.o. female who presented 11/13/2024 with Hx of COPD gold stage D on chronic 3-7L of oxygen with hx of 60 pack years of tobacco and has since quit, HTN, lung cancer, chol, chronic constipation, chronic pain, depression, extremal hemorrhoids, diverticulosis. That presents feeling shortness of breath worse on exertion, increase sputum production and subjective fever. She has had no sick contact or sore throat or running nose. She was placed on antibiotics but continues to feel poorly. She also described left leg swelling and was suppose to get lower ext doppler but felt unwell to get this done. She presented today by EMS with significant SOB, received steroids and nebulizer in route. No hx of CHF or prior DVT or PE. Also with rectal bleeding bright red and hx of constipation with some bloating but no abdominal pain or vomiting. She was place on Bipap with improved symptoms, found to have hg of 5.7 not on aspirin or anticoagulation. She has been order 2 units PRBC fortunately she has had no shock with SBP in the 170's. She wishes to be DNR with brief intubation if necessary.     Code Status  DNAR, I OK    Review of Systems  Review of Systems   Constitutional:  Positive for chills, fever and malaise/fatigue. Negative for weight loss.   HENT:  Negative for congestion and sore throat.    Eyes:  Negative for blurred vision, photophobia and discharge.   Respiratory:  Positive for cough, sputum production, shortness of breath and wheezing. Negative for stridor.    Cardiovascular:  Positive for leg swelling. Negative for chest pain, orthopnea and claudication.   Gastrointestinal:  Positive for blood in stool and constipation. Negative for abdominal pain, nausea and vomiting.   Genitourinary:   Negative for dysuria and hematuria.   Musculoskeletal:  Negative for back pain, joint pain and myalgias.   Neurological:  Negative for dizziness, tremors, speech change, focal weakness, seizures and weakness.   Psychiatric/Behavioral:  Negative for depression and hallucinations. The patient is nervous/anxious.        Past Medical History   has a past medical history of Acute on chronic respiratory failure with hypoxia ([facility]) (11/14/2020), Arthritis, Asthma with COPD ([facility]), BMI 31.0-31.9,adult (02/04/2022), Cataract immature, Chronic pain, Chronic respiratory failure with hypoxia ([facility]) (07/13/2017), Colon polyps, COPD (chronic obstructive pulmonary disease) ([facility]) (2002), Cough, DDD (degenerative disc disease), cervical, DDD (degenerative disc disease), thoracic, Dependence on continuous supplemental oxygen (08/13/2015), Diverticulitis of colon, Dyslipidemia, EMPHYSEMA, H/O opioid abuse ([facility]) (07/15/2021), Hypertension, Obesity (BMI 30-39.9) (10/24/2016), Opioid type dependence, continuous ([facility]) (02/04/2022), REGINE (obstructive sleep apnea), OSTEOPOROSIS, Painful breathing, Shortness of breath, Spinal stenosis, lumbar region, with neurogenic claudication, Sputum production, URINARY INCONTINENCE, Varicose veins of left leg with edema (10/11/2024), Vitamin d deficiency, and Wheezing.    She has no past medical history of Breast cancer ([facility]).    Surgical History   has a past surgical history that includes tonsillectomy; other orthopedic surgery (2004); other; other; and lumbar laminectomy diskectomy (6/22/2010).    Family History  family history includes Cancer in her father and sister; Other in her sister.    Social History   reports that she quit smoking about 25 years ago. Her smoking use included cigarettes. She started smoking about 55 years ago. She has a 60 pack-year smoking history. She has never used smokeless tobacco. She reports that she does not currently use alcohol after a past usage of about 4.2 oz of alcohol  per week. She reports that she does not currently use drugs after having used the following drugs: Marijuana and Oral.    Medications  Home Medications       Reviewed by Magno Redding (Pharmacy Tech) on 11/13/24 at 1157  Med List Status: Complete     Medication Last Dose Status   acyclovir (ZOVIRAX) 200 MG Cap Unknown Active   Albuterol Sulfate 108 (90 Base) MCG/ACT AEROSOL POWDER, BREATH ACTIVATED Unknown Active   amoxicillin-clavulanate (AUGMENTIN) 875-125 MG Tab Unknown Active   benzonatate (TESSALON) 200 MG capsule Unknown Active   digoxin (LANOXIN) 125 MCG Tab Unknown Active   DULoxetine (CYMBALTA) 60 MG Cap DR Particles delayed-release capsule Unknown Active   fluticasone-umeclidinium-vilanterol (TRELEGY ELLIPTA) 200-62.5-25 mcg/act inhaler Unknown Active   furosemide (LASIX) 20 MG Tab Unknown Active   Home Care Oxygen Unknown Active   ipratropium-albuterol (DUONEB) 0.5-2.5 (3) MG/3ML nebulizer solution Unknown Active   losartan (COZAAR) 50 MG Tab Unknown Active   meloxicam (MOBIC) 7.5 MG Tab Unknown Active   montelukast (SINGULAIR) 10 MG Tab Unknown Active   omeprazole (PRILOSEC) 20 MG delayed-release capsule Unknown Active   predniSONE (DELTASONE) 20 MG Tab Unknown Active   spironolactone (ALDACTONE) 25 MG Tab Unknown Active   traMADol (ULTRAM) 50 MG Tab Unknown Active   Vitamin D, Cholecalciferol, 25 MCG (1000 UT) Cap Unknown Active                  Audit from Redirected Encounters    **Home medications have not yet been reviewed for this encounter**       No current facility-administered medications for this encounter.     Current Outpatient Medications   Medication Sig Dispense Refill    amoxicillin-clavulanate (AUGMENTIN) 875-125 MG Tab Take 1 tab every 12 hours for 5 days 10 Tablet 0    benzonatate (TESSALON) 200 MG capsule take 1 tab every 8 hrs as needed for cough 10 Capsule 0    predniSONE (DELTASONE) 20 MG Tab take 2 tablets orally four times a day for 5 days. 40 Tablet 0    digoxin (LANOXIN)  125 MCG Tab Take 1 Tablet by mouth every day. Indications: Atrial Fibrillation 100 Tablet 3    losartan (COZAAR) 50 MG Tab Take 1 Tablet by mouth 2 times a day. Indications: High Blood Pressure 200 Tablet 2    fluticasone-umeclidinium-vilanterol (TRELEGY ELLIPTA) 200-62.5-25 mcg/act inhaler Inhale 1 Puff every day. 90 Each 3    meloxicam (MOBIC) 7.5 MG Tab Take 1 tablet by mouth once or twice a day 180 Tablet 1    traMADol (ULTRAM) 50 MG Tab Take 1 tablet as needed for M15.9/M50.30/M51.36 start date 09/23/2024 Orally twice each day 30 days 60 Tablet 1    montelukast (SINGULAIR) 10 MG Tab Take 1 tablet by mouth once daily 90 Tablet 1    acyclovir (ZOVIRAX) 200 MG Cap Take 1 capsule(at first sign of outbreak) by mouth three times a day 10 days 30 Capsule 5    DULoxetine (CYMBALTA) 60 MG Cap DR Particles delayed-release capsule Take 1 capsule by mouth once daily 90 Capsule 1    spironolactone (ALDACTONE) 25 MG Tab Take 1 tablet by mouth daily 90 Tablet 1    furosemide (LASIX) 20 MG Tab Take 1 tablet as needed for water retention orally once a day  30 days 30 Tablet 1    Vitamin D, Cholecalciferol, 25 MCG (1000 UT) Cap Take 1 Capsule by mouth every day for 90 days. 90 Capsule 1    omeprazole (PRILOSEC) 20 MG delayed-release capsule Take 1 capsule by mouth every day 90 Capsule 0    Home Care Oxygen Inhale 6 L/min continuous. Oxygen dose 6 L/min  Respiratory route via: Nasal Cannula   Oxygen supplier:Preferred      Indications: Shortness of breath      ipratropium-albuterol (DUONEB) 0.5-2.5 (3) MG/3ML nebulizer solution Take 3 mL by nebulization every four hours as needed for Shortness of Breath (Wheezing). 360 mL 3    Albuterol Sulfate 108 (90 Base) MCG/ACT AEROSOL POWDER, BREATH ACTIVATED Inhale 2 Puffs 4 times a day as needed (shortness of breath). Indications: SOB 3 Each 0       Allergies  Allergies   Allergen Reactions    Iodine      Convulsion  I.V.  iodine    Tape Rash and Itching       Vital Signs last 24  hours  Temp:  [36.5 °C (97.7 °F)-36.8 °C (98.2 °F)] 36.8 °C (98.2 °F)  Pulse:  [] 103  Resp:  [20-53] 30  BP: (144-164)/(60-70) 151/67  SpO2:  [87 %-98 %] 91 %    Physical Exam  Physical Exam  Vitals and nursing note reviewed.   Constitutional:       General: She is in acute distress.      Appearance: She is obese. She is not ill-appearing.      Comments: Patient was seen in red 1 on bipap elderly pale female on mild/moderate respiratory distress   HENT:      Head: Normocephalic.      Mouth/Throat:      Mouth: Mucous membranes are moist.   Eyes:      Pupils: Pupils are equal, round, and reactive to light.      Comments: Pale conjunctiva   Cardiovascular:      Rate and Rhythm: Tachycardia present.      Heart sounds: No murmur heard.  Pulmonary:      Effort: Respiratory distress present.      Breath sounds: No stridor. No wheezing or rhonchi.   Abdominal:      General: There is distension.      Palpations: There is no mass.      Tenderness: There is no abdominal tenderness. There is no guarding or rebound.      Hernia: No hernia is present.      Comments: Mild distention non tender   Musculoskeletal:         General: Swelling present.      Comments: Mild left lower leg swelling when compared to right   Skin:     Coloration: Skin is pale.   Neurological:      General: No focal deficit present.      Mental Status: She is oriented to person, place, and time.      Cranial Nerves: No cranial nerve deficit.      Sensory: No sensory deficit.      Motor: No weakness.      Coordination: Coordination normal.   Psychiatric:         Mood and Affect: Mood normal.      Comments: Mild anxious         Fluids  No intake or output data in the 24 hours ending 11/13/24 1336    Laboratory  Recent Results (from the past 48 hours)   HOLD BLOOD BANK SPECIMEN (NOT TESTED)    Collection Time: 11/13/24 10:17 AM   Result Value Ref Range    Holding Tube - Bb DONE    ABO Rh Confirm    Collection Time: 11/13/24 10:17 AM   Result Value Ref  Range    ABO Rh Confirm O POS    CBC with Differential    Collection Time: 11/13/24 10:22 AM   Result Value Ref Range    WBC 28.2 (H) 4.8 - 10.8 K/uL    RBC 1.88 (L) 4.20 - 5.40 M/uL    Hemoglobin 5.7 (LL) 12.0 - 16.0 g/dL    Hematocrit 17.5 (LL) 37.0 - 47.0 %    MCV 93.1 81.4 - 97.8 fL    MCH 30.3 27.0 - 33.0 pg    MCHC 32.6 32.2 - 35.5 g/dL    RDW 43.2 35.9 - 50.0 fL    Platelet Count 407 164 - 446 K/uL    MPV 9.6 9.0 - 12.9 fL    Neutrophils-Polys 73.80 (H) 44.00 - 72.00 %    Lymphocytes 16.40 (L) 22.00 - 41.00 %    Monocytes 8.00 0.00 - 13.40 %    Eosinophils 0.10 0.00 - 6.90 %    Basophils 0.10 0.00 - 1.80 %    Immature Granulocytes 1.60 (H) 0.00 - 0.90 %    Nucleated RBC 0.20 0.00 - 0.20 /100 WBC    Neutrophils (Absolute) 20.78 (H) 1.82 - 7.42 K/uL    Lymphs (Absolute) 4.61 1.00 - 4.80 K/uL    Monos (Absolute) 2.26 (H) 0.00 - 0.85 K/uL    Eos (Absolute) 0.03 0.00 - 0.51 K/uL    Baso (Absolute) 0.04 0.00 - 0.12 K/uL    Immature Granulocytes (abs) 0.44 (H) 0.00 - 0.11 K/uL    NRBC (Absolute) 0.05 K/uL   Comp Metabolic Panel    Collection Time: 11/13/24 10:22 AM   Result Value Ref Range    Sodium 136 135 - 145 mmol/L    Potassium 3.5 (L) 3.6 - 5.5 mmol/L    Chloride 98 96 - 112 mmol/L    Co2 27 20 - 33 mmol/L    Anion Gap 11.0 7.0 - 16.0    Glucose 191 (H) 65 - 99 mg/dL    Bun 26 (H) 8 - 22 mg/dL    Creatinine 0.47 (L) 0.50 - 1.40 mg/dL    Calcium 8.6 8.5 - 10.5 mg/dL    Correct Calcium 9.4 8.5 - 10.5 mg/dL    AST(SGOT) 22 12 - 45 U/L    ALT(SGPT) 14 2 - 50 U/L    Alkaline Phosphatase 34 30 - 99 U/L    Total Bilirubin 0.2 0.1 - 1.5 mg/dL    Albumin 3.0 (L) 3.2 - 4.9 g/dL    Total Protein 5.3 (L) 6.0 - 8.2 g/dL    Globulin 2.3 1.9 - 3.5 g/dL    A-G Ratio 1.3 g/dL   proBrain Natriuretic Peptide, NT    Collection Time: 11/13/24 10:22 AM   Result Value Ref Range    NT-proBNP 203 (H) 0 - 125 pg/mL   Troponin    Collection Time: 11/13/24 10:22 AM   Result Value Ref Range    Troponin T 7 6 - 19 ng/L   LACTIC ACID     Collection Time: 24 10:22 AM   Result Value Ref Range    Lactic Acid 2.9 (H) 0.5 - 2.0 mmol/L   ESTIMATED GFR    Collection Time: 24 10:22 AM   Result Value Ref Range    GFR (CKD-EPI) 97 >60 mL/min/1.73 m 2   APTT    Collection Time: 24 10:22 AM   Result Value Ref Range    APTT 21.3 (L) 24.7 - 36.0 sec   PROTHROMBIN TIME (INR)    Collection Time: 24 10:22 AM   Result Value Ref Range    PT 14.0 12.0 - 14.6 sec    INR 1.08 0.87 - 1.13   EKG    Collection Time: 24 10:24 AM   Result Value Ref Range    Report       [facility] Emergency Dept.    Test Date:  2024  Pt Name:    FAZAL STUBBS                    Department: ER  MRN:        [id number]                      Room:       [facility]  Gender:     Female                       Technician: [id number]  :        1945                   Requested By:SAMUEL LÓPEZ  Order #:    [id number]                    Reading MD:    Measurements  Intervals                                Axis  Rate:       83                           P:          129  IL:         149                          QRS:        46  QRSD:       100                          T:          58  QT:         353  QTc:        415    Interpretive Statements  Sinus rhythm  Low voltage, precordial leads  Compared to ECG 2024 19:17:09  Sinus arrhythmia no longer present  Myocardial infarct finding no longer present     COD (ADULT)    Collection Time: 24 11:56 AM   Result Value Ref Range    ABO Grouping Only O     Rh Grouping Only POS     Antibody Screen-Cod NEG     Component R       Red Blood Cells     [id number]   issued       24 12:59    Product Type Red Blood Cells LR Pheresis     Dispense Status issued     Unit Number (Barcoded) [id number]     Product Code (Barcoded) B4191X69     Blood Type (Barcoded) 5100        Imaging  CT-ABDOMEN-PELVIS W/O   Final Result      1. No acute inflammatory process in the abdomen and pelvis.   2. Bibasal parenchymal  scarring and atelectasis.   3. Atherosclerotic calcifications in the aorta and iliac arteries.   4. Cholelithiasis.      DX-CHEST-PORTABLE (1 VIEW)   Final Result         1. Low lung volume with hypoventilatory change and bibasilar atelectasis.      US-EXTREMITY VENOUS LOWER BILAT    (Results Pending)       Assessment/Plan  * Respiratory failure with hypoxia (HCC)- (present on admission)  Assessment & Plan  Query symptomatic anemia and pna/uri causing acute worsening symptoms on advance lung disease  Cap coverage, Viral swab and respiratory PCR  Steroids, Bipap  Wean oxygen to baseline 3-7L goal sat 88%  Serial monitor need for intubation  Also some anxiety component on exam    Lower extremity edema  Assessment & Plan  Check lower ext doppler and echo  Hx of varicose vein on left likely etiology    Lower GI bleed  Assessment & Plan  Hx of chronic constipation, hemorrhoids and diverticulosis  Serial monitor stool, follow up CT scan  Could consider brisk upper GI bleed but seems less likely with BUN   Gi consultation pending response    Anemia  Assessment & Plan  Acute anemia hg 5.7 no shock was hypertensive on presentation  Serial monitor shock index, serial CBC  Transfuse 2 units PRBC now  GI consult      Essential hypertension- (present on admission)  Assessment & Plan  Hx of restart oral medication    Diverticulosis- (present on admission)  Assessment & Plan  Follow up CT scan to rule out acute diverticular disease as reason for GI bleed      Stage 4 very severe COPD by GOLD classification ([facility])- (present on admission)  Assessment & Plan  Hx of with significant past history of tobacco abuse on chronic oxygen therapy of 3-7L  Unclear if this is true exacerbation vs anxiety and anemia but RT she was tight on presentation and improved post nebulizers  Check respiratory PCR and viral swab, sputum cx  Nebulizer, steroids  Monitor ability to come off bipap  Re-check echo cardiogram      External hemorrhoid, bleeding-  (present on admission)  Assessment & Plan  Hx of stool softeners limit narcotics stiz baths    Chronic pain syndrome- (present on admission)  Assessment & Plan  Hx of    Chronic constipation- (present on admission)  Assessment & Plan  Hx of        Discussed patient condition and risk of morbidity and/or mortality with RN, RT, Code status disscussed, Charge nurse / hot rounds, and Patient.      The patient remains critically ill titration of bipap and blood products.  Critical care time = 82 minutes in directly providing and coordinating critical care and extensive data review.  No time overlap and excludes procedures.

## 2024-11-13 NOTE — ED TRIAGE NOTES
.  Chief Complaint   Patient presents with    Shortness of Breath      Pt BIB EMS from home for above complaint. Per report Pt was diagnosed with pneumonia 1 week ago, taking steroids and antibiotics as prescribed. Pt c/o worsening SOB especially with exertion. Per EMS Pt was on home oxygen at 8l via nasal canula with O2 sat of 84%. Pt given Albuterol, Duo neb x 2, Solu Medrol 125mg and  mL PTA.

## 2024-11-13 NOTE — CARE PLAN
The patient is Stable - Low risk of patient condition declining or worsening    Shift Goals  Clinical Goals: Hemodynamic stability, BiPAP/RT management, Rest  Patient Goals: Diet, Rest  Family Goals: Updates    Progress made toward(s) clinical / shift goals:    Problem: Knowledge Deficit - Standard  Goal: Patient and family/care givers will demonstrate understanding of plan of care, disease process/condition, diagnostic tests and medications  Outcome: Progressing     Problem: Knowledge Deficit - COPD  Goal: Patient/significant other demonstrates understanding of disease process, utilization of the Action Plan, medications and discharge instruction  Outcome: Progressing     Problem: Ineffective Airway Clearance  Goal: Patient will maintain patent airway with clear/clearing breath sounds  Outcome: Progressing     Problem: Impaired Gas Exchange  Goal: Patient will demonstrate improved ventilation and adequate oxygenation and participate in treatment regimen within the level of ability/situation.  Outcome: Progressing     Problem: Risk for Aspiration  Goal: Patient's risk for aspiration will be absent or decrease  Outcome: Progressing     Problem: Self Care  Goal: Patient will have the ability to perform ADLs independently or with assistance (bathe, groom, dress, toilet and feed)  Outcome: Progressing     Problem: Skin Integrity  Goal: Skin integrity is maintained or improved  Outcome: Progressing     Problem: Pain - Standard  Goal: Alleviation of pain or a reduction in pain to the patient’s comfort goal  Outcome: Progressing       Patient is not progressing towards the following goals:

## 2024-11-13 NOTE — ASSESSMENT & PLAN NOTE
Gastrointestinal source of blood loss  Trend hemoglobin and transfuse PRBCs for hemoglobin greater than 7

## 2024-11-13 NOTE — PROGRESS NOTES
4 Eyes Skin Assessment Completed by HUNTER Fernando and HUNTER Mello.    Head Redness, Blanching on left cheek  Ears WDL  Nose Redness and Blanching  Mouth WDL  Neck WDL  Breast/Chest WDL  Shoulder Blades WDL  Spine WDL  (R) Arm/Elbow/Hand Redness, Blanching, Bruising, and Abrasion  (L) Arm/Elbow/Hand Redness, Blanching  Abdomen WDL  Groin WDL  Scrotum/Coccyx/Buttocks Redness, Blanching, and Non-Blanching    (R) Leg WDL  (L) Leg Scar on knee  (R) Heel/Foot/Toe WDL  (L) Heel/Foot/Toe WDL          Devices In Places ECG, Blood Pressure Cuff, Pulse Ox, Oxy Mask, and Bipap      Interventions In Place Bipap Protecta-Gel, Heel Mepilex, Sacral Mepilex, TAP System, Pillows, Elbow Mepilex, Q2 Turns, and Heels Loaded W/Pillows    Possible Skin Injury Yes    Pictures Uploaded Into Epic Yes  Wound Consult Placed Yes  RN Wound Prevention Protocol Ordered Yes

## 2024-11-14 ENCOUNTER — APPOINTMENT (OUTPATIENT)
Dept: RADIOLOGY | Facility: MEDICAL CENTER | Age: 79
DRG: 189 | End: 2024-11-14
Attending: INTERNAL MEDICINE
Payer: MEDICARE

## 2024-11-14 PROBLEM — R60.9 EDEMA: Status: ACTIVE | Noted: 2024-11-14

## 2024-11-14 PROBLEM — D62 ACUTE BLOOD LOSS ANEMIA: Status: ACTIVE | Noted: 2024-11-13

## 2024-11-14 PROBLEM — D64.9 ANEMIA: Status: ACTIVE | Noted: 2024-11-14

## 2024-11-14 PROBLEM — K64.9 HEMORRHOIDS: Status: ACTIVE | Noted: 2024-11-14

## 2024-11-14 LAB
ANION GAP SERPL CALC-SCNC: 8 MMOL/L (ref 7–16)
BASOPHILS # BLD AUTO: 0.2 % (ref 0–1.8)
BASOPHILS # BLD: 0.04 K/UL (ref 0–0.12)
BUN SERPL-MCNC: 16 MG/DL (ref 8–22)
CALCIUM SERPL-MCNC: 8.4 MG/DL (ref 8.5–10.5)
CFT BLD TEG: 3.7 MIN (ref 4.6–9.1)
CFT P HPASE BLD TEG: 3.7 MIN (ref 4.3–8.3)
CHLORIDE SERPL-SCNC: 99 MMOL/L (ref 96–112)
CLOT ANGLE BLD TEG: 78.2 DEGREES (ref 63–78)
CLOT LYSIS 30M P MA LENFR BLD TEG: 1.3 % (ref 0–2.6)
CO2 SERPL-SCNC: 28 MMOL/L (ref 20–33)
CREAT SERPL-MCNC: 0.31 MG/DL (ref 0.5–1.4)
CT.EXTRINSIC BLD ROTEM: 0.8 MIN (ref 0.8–2.1)
EOSINOPHIL # BLD AUTO: 0 K/UL (ref 0–0.51)
EOSINOPHIL NFR BLD: 0 % (ref 0–6.9)
ERYTHROCYTE [DISTWIDTH] IN BLOOD BY AUTOMATED COUNT: 49.7 FL (ref 35.9–50)
GFR SERPLBLD CREATININE-BSD FMLA CKD-EPI: 107 ML/MIN/1.73 M 2
GLUCOSE SERPL-MCNC: 166 MG/DL (ref 65–99)
HCT VFR BLD AUTO: 31.6 % (ref 37–47)
HGB BLD-MCNC: 10.8 G/DL (ref 12–16)
HGB BLD-MCNC: 11.3 G/DL (ref 12–16)
HGB BLD-MCNC: 11.6 G/DL (ref 12–16)
HGB BLD-MCNC: 5.6 G/DL (ref 12–16)
IMM GRANULOCYTES # BLD AUTO: 0.45 K/UL (ref 0–0.11)
IMM GRANULOCYTES NFR BLD AUTO: 2.4 % (ref 0–0.9)
LV EJECT FRACT  99904: 60
LV EJECT FRACT MOD 2C 99903: 60.28
LV EJECT FRACT MOD 4C 99902: 63.86
LV EJECT FRACT MOD BP 99901: 61.39
LYMPHOCYTES # BLD AUTO: 0.85 K/UL (ref 1–4.8)
LYMPHOCYTES NFR BLD: 4.6 % (ref 22–41)
MAGNESIUM SERPL-MCNC: 2 MG/DL (ref 1.5–2.5)
MCF BLD TEG: 63.9 MM (ref 52–69)
MCF.PLATELET INHIB BLD ROTEM: 26 MM (ref 15–32)
MCH RBC QN AUTO: 28.6 PG (ref 27–33)
MCHC RBC AUTO-ENTMCNC: 34.2 G/DL (ref 32.2–35.5)
MCV RBC AUTO: 83.6 FL (ref 81.4–97.8)
MONOCYTES # BLD AUTO: 0.67 K/UL (ref 0–0.85)
MONOCYTES NFR BLD AUTO: 3.6 % (ref 0–13.4)
NEUTROPHILS # BLD AUTO: 16.66 K/UL (ref 1.82–7.42)
NEUTROPHILS NFR BLD: 89.2 % (ref 44–72)
NRBC # BLD AUTO: 0.03 K/UL
NRBC BLD-RTO: 0.2 /100 WBC (ref 0–0.2)
PA AA BLD-ACNC: 1.5 % (ref 0–11)
PA ADP BLD-ACNC: 21.9 % (ref 0–17)
PHOSPHATE SERPL-MCNC: 2.8 MG/DL (ref 2.5–4.5)
PLATELET # BLD AUTO: 246 K/UL (ref 164–446)
PMV BLD AUTO: 9.4 FL (ref 9–12.9)
POTASSIUM SERPL-SCNC: 3.5 MMOL/L (ref 3.6–5.5)
RBC # BLD AUTO: 3.78 M/UL (ref 4.2–5.4)
SODIUM SERPL-SCNC: 135 MMOL/L (ref 135–145)
TEG ALGORITHM TGALG: ABNORMAL
WBC # BLD AUTO: 18.7 K/UL (ref 4.8–10.8)

## 2024-11-14 PROCEDURE — 85384 FIBRINOGEN ACTIVITY: CPT

## 2024-11-14 PROCEDURE — 94640 AIRWAY INHALATION TREATMENT: CPT

## 2024-11-14 PROCEDURE — 700111 HCHG RX REV CODE 636 W/ 250 OVERRIDE (IP): Mod: JZ | Performed by: INTERNAL MEDICINE

## 2024-11-14 PROCEDURE — 700102 HCHG RX REV CODE 250 W/ 637 OVERRIDE(OP): Performed by: INTERNAL MEDICINE

## 2024-11-14 PROCEDURE — 700101 HCHG RX REV CODE 250: Performed by: INTERNAL MEDICINE

## 2024-11-14 PROCEDURE — A9270 NON-COVERED ITEM OR SERVICE: HCPCS

## 2024-11-14 PROCEDURE — 83735 ASSAY OF MAGNESIUM: CPT

## 2024-11-14 PROCEDURE — 71045 X-RAY EXAM CHEST 1 VIEW: CPT

## 2024-11-14 PROCEDURE — 700102 HCHG RX REV CODE 250 W/ 637 OVERRIDE(OP)

## 2024-11-14 PROCEDURE — 80048 BASIC METABOLIC PNL TOTAL CA: CPT

## 2024-11-14 PROCEDURE — 85576 BLOOD PLATELET AGGREGATION: CPT | Mod: 91

## 2024-11-14 PROCEDURE — RXMED WILLOW AMBULATORY MEDICATION CHARGE: Performed by: FAMILY MEDICINE

## 2024-11-14 PROCEDURE — 700105 HCHG RX REV CODE 258: Performed by: INTERNAL MEDICINE

## 2024-11-14 PROCEDURE — A9270 NON-COVERED ITEM OR SERVICE: HCPCS | Performed by: INTERNAL MEDICINE

## 2024-11-14 PROCEDURE — 97602 WOUND(S) CARE NON-SELECTIVE: CPT

## 2024-11-14 PROCEDURE — 86923 COMPATIBILITY TEST ELECTRIC: CPT

## 2024-11-14 PROCEDURE — 85018 HEMOGLOBIN: CPT

## 2024-11-14 PROCEDURE — 700101 HCHG RX REV CODE 250: Performed by: SPECIALIST

## 2024-11-14 PROCEDURE — 770022 HCHG ROOM/CARE - ICU (200)

## 2024-11-14 PROCEDURE — 85025 COMPLETE CBC W/AUTO DIFF WBC: CPT

## 2024-11-14 PROCEDURE — 93306 TTE W/DOPPLER COMPLETE: CPT | Mod: 26 | Performed by: INTERNAL MEDICINE

## 2024-11-14 PROCEDURE — 36430 TRANSFUSION BLD/BLD COMPNT: CPT

## 2024-11-14 PROCEDURE — 93970 EXTREMITY STUDY: CPT

## 2024-11-14 PROCEDURE — P9016 RBC LEUKOCYTES REDUCED: HCPCS | Mod: 91

## 2024-11-14 PROCEDURE — 85347 COAGULATION TIME ACTIVATED: CPT

## 2024-11-14 PROCEDURE — 84100 ASSAY OF PHOSPHORUS: CPT

## 2024-11-14 PROCEDURE — 700111 HCHG RX REV CODE 636 W/ 250 OVERRIDE (IP): Performed by: INTERNAL MEDICINE

## 2024-11-14 PROCEDURE — 700111 HCHG RX REV CODE 636 W/ 250 OVERRIDE (IP): Mod: JG

## 2024-11-14 PROCEDURE — 94669 MECHANICAL CHEST WALL OSCILL: CPT

## 2024-11-14 PROCEDURE — 99233 SBSQ HOSP IP/OBS HIGH 50: CPT | Performed by: INTERNAL MEDICINE

## 2024-11-14 RX ORDER — IPRATROPIUM BROMIDE AND ALBUTEROL SULFATE 2.5; .5 MG/3ML; MG/3ML
3 SOLUTION RESPIRATORY (INHALATION)
Status: DISCONTINUED | OUTPATIENT
Start: 2024-11-14 | End: 2024-11-16

## 2024-11-14 RX ORDER — PREDNISONE 20 MG/1
40 TABLET ORAL DAILY
Status: DISCONTINUED | OUTPATIENT
Start: 2024-11-15 | End: 2024-11-16 | Stop reason: HOSPADM

## 2024-11-14 RX ORDER — ALBUTEROL SULFATE 5 MG/ML
2.5 SOLUTION RESPIRATORY (INHALATION)
Status: DISCONTINUED | OUTPATIENT
Start: 2024-11-14 | End: 2024-11-16

## 2024-11-14 RX ORDER — POTASSIUM CHLORIDE 1500 MG/1
60 TABLET, EXTENDED RELEASE ORAL ONCE
Status: COMPLETED | OUTPATIENT
Start: 2024-11-14 | End: 2024-11-14

## 2024-11-14 RX ORDER — LABETALOL HYDROCHLORIDE 5 MG/ML
10 INJECTION, SOLUTION INTRAVENOUS EVERY 4 HOURS PRN
Status: DISCONTINUED | OUTPATIENT
Start: 2024-11-14 | End: 2024-11-16 | Stop reason: HOSPADM

## 2024-11-14 RX ORDER — TRAMADOL HYDROCHLORIDE 50 MG/1
100 TABLET ORAL EVERY 6 HOURS PRN
Status: DISCONTINUED | OUTPATIENT
Start: 2024-11-14 | End: 2024-11-16 | Stop reason: HOSPADM

## 2024-11-14 RX ADMIN — AZITHROMYCIN DIHYDRATE 500 MG: 250 TABLET ORAL at 14:55

## 2024-11-14 RX ADMIN — MONTELUKAST 10 MG: 10 TABLET, FILM COATED ORAL at 18:02

## 2024-11-14 RX ADMIN — POLYETHYLENE GLYCOL-3350 AND ELECTROLYTES 4 L: 236; 6.74; 5.86; 2.97; 22.74 POWDER, FOR SOLUTION ORAL at 18:02

## 2024-11-14 RX ADMIN — LABETALOL HYDROCHLORIDE 10 MG: 5 INJECTION INTRAVENOUS at 23:20

## 2024-11-14 RX ADMIN — LOSARTAN POTASSIUM 50 MG: 50 TABLET, FILM COATED ORAL at 18:02

## 2024-11-14 RX ADMIN — IPRATROPIUM BROMIDE AND ALBUTEROL SULFATE 3 ML: 2.5; .5 SOLUTION RESPIRATORY (INHALATION) at 06:42

## 2024-11-14 RX ADMIN — POTASSIUM CHLORIDE 60 MEQ: 1500 TABLET, EXTENDED RELEASE ORAL at 08:30

## 2024-11-14 RX ADMIN — LOSARTAN POTASSIUM 50 MG: 50 TABLET, FILM COATED ORAL at 05:22

## 2024-11-14 RX ADMIN — PANTOPRAZOLE SODIUM 40 MG: 40 INJECTION, POWDER, FOR SOLUTION INTRAVENOUS at 05:22

## 2024-11-14 RX ADMIN — SPIRONOLACTONE 25 MG: 25 TABLET ORAL at 05:23

## 2024-11-14 RX ADMIN — AMPICILLIN AND SULBACTAM 3 G: 1; 2 INJECTION, POWDER, FOR SOLUTION INTRAMUSCULAR; INTRAVENOUS at 18:11

## 2024-11-14 RX ADMIN — TRAMADOL HYDROCHLORIDE 100 MG: 50 TABLET ORAL at 00:41

## 2024-11-14 RX ADMIN — ENALAPRILAT 1.25 MG: 1.25 INJECTION INTRAVENOUS at 22:11

## 2024-11-14 RX ADMIN — IPRATROPIUM BROMIDE AND ALBUTEROL SULFATE 3 ML: 2.5; .5 SOLUTION RESPIRATORY (INHALATION) at 10:45

## 2024-11-14 RX ADMIN — IPRATROPIUM BROMIDE AND ALBUTEROL SULFATE 3 ML: 2.5; .5 SOLUTION RESPIRATORY (INHALATION) at 02:40

## 2024-11-14 RX ADMIN — DULOXETINE HYDROCHLORIDE 60 MG: 60 CAPSULE, DELAYED RELEASE ORAL at 05:23

## 2024-11-14 RX ADMIN — AMPICILLIN AND SULBACTAM 3 G: 1; 2 INJECTION, POWDER, FOR SOLUTION INTRAMUSCULAR; INTRAVENOUS at 15:24

## 2024-11-14 RX ADMIN — ENALAPRILAT 1.25 MG: 1.25 INJECTION INTRAVENOUS at 03:47

## 2024-11-14 RX ADMIN — IPRATROPIUM BROMIDE AND ALBUTEROL SULFATE 3 ML: .5; 2.5 SOLUTION RESPIRATORY (INHALATION) at 19:15

## 2024-11-14 RX ADMIN — AMPICILLIN AND SULBACTAM 3 G: 1; 2 INJECTION, POWDER, FOR SOLUTION INTRAMUSCULAR; INTRAVENOUS at 23:34

## 2024-11-14 RX ADMIN — IPRATROPIUM BROMIDE AND ALBUTEROL SULFATE 3 ML: 2.5; .5 SOLUTION RESPIRATORY (INHALATION) at 14:50

## 2024-11-14 RX ADMIN — FLUTICASONE FUROATE, UMECLIDINIUM BROMIDE AND VILANTEROL TRIFENATATE 1 PUFF: 200; 62.5; 25 POWDER RESPIRATORY (INHALATION) at 05:22

## 2024-11-14 RX ADMIN — PANTOPRAZOLE SODIUM 40 MG: 40 INJECTION, POWDER, FOR SOLUTION INTRAVENOUS at 18:02

## 2024-11-14 RX ADMIN — BENZOCAINE AND MENTHOL 1 LOZENGE: 15; 3.6 LOZENGE ORAL at 17:23

## 2024-11-14 RX ADMIN — METHYLPREDNISOLONE SODIUM SUCCINATE 62.5 MG: 125 INJECTION, POWDER, FOR SOLUTION INTRAMUSCULAR; INTRAVENOUS at 05:22

## 2024-11-14 ASSESSMENT — PAIN DESCRIPTION - PAIN TYPE
TYPE: CHRONIC PAIN

## 2024-11-14 ASSESSMENT — LIFESTYLE VARIABLES: SUBSTANCE_ABUSE: 0

## 2024-11-14 ASSESSMENT — ENCOUNTER SYMPTOMS
BRUISES/BLEEDS EASILY: 0
PALPITATIONS: 0
LOSS OF CONSCIOUSNESS: 0
MYALGIAS: 0
SPUTUM PRODUCTION: 1
EYE PAIN: 0
STRIDOR: 0
NEUROLOGICAL NEGATIVE: 1
HEMOPTYSIS: 0
SORE THROAT: 0
EYES NEGATIVE: 1
NECK PAIN: 0
NAUSEA: 0
HEADACHES: 0
PSYCHIATRIC NEGATIVE: 1
HALLUCINATIONS: 0
SPEECH CHANGE: 0
MUSCULOSKELETAL NEGATIVE: 1
NERVOUS/ANXIOUS: 0
SEIZURES: 0
CHILLS: 0
ABDOMINAL PAIN: 0
PND: 0
BLOOD IN STOOL: 1
EYE DISCHARGE: 0
EYE REDNESS: 0
VOMITING: 0
FOCAL WEAKNESS: 0
COUGH: 1
SINUS PAIN: 0
FLANK PAIN: 0
FEVER: 0

## 2024-11-14 ASSESSMENT — PATIENT HEALTH QUESTIONNAIRE - PHQ9
2. FEELING DOWN, DEPRESSED, IRRITABLE, OR HOPELESS: NOT AT ALL
SUM OF ALL RESPONSES TO PHQ9 QUESTIONS 1 AND 2: 0
1. LITTLE INTEREST OR PLEASURE IN DOING THINGS: NOT AT ALL

## 2024-11-14 NOTE — ASSESSMENT & PLAN NOTE
Start prednisone, 40 mg daily, received methylprednisolone. Continue azithromycin qtc 415  Continue bronchodilators

## 2024-11-14 NOTE — WOUND TEAM
Assisted Wound RN Phuc with skin assessment and wound consult evaluation for pressure injury.  Additional staff necessary for turning/standing from chair, repositioning patient, assisting with dressing application and supplies and determining progress, assessment and staging of pressure injuries and/or complicated wound care.

## 2024-11-14 NOTE — PROGRESS NOTES
Received critical hemoglobin of 5.6 from hemotology, last Hb 8.8. Notified resident, requested 2u pRBCs. Per resident, orders in to transfuse 2u pRBCs.

## 2024-11-14 NOTE — PROGRESS NOTES
Critical Care Progress Note    Date of admission  11/13/2024    Chief Complaint  79 y.o. female admitted 11/13/2024 with acute on chronic respiratory failure, acute exacerbation of COPD and significant anemia.  She has a history of chronic hypoxemic respiratory failure, very severe stage IV COPD, primary hypertension, lung cancer, dyslipidemia, chronic pain, depression, diverticulosis and hemorrhoids.    Hospital Course      11/14 -    improved pulmonary status.  Stop methylprednisolone and begin prednisone, 40 mg daily.  She is off BiPAP.  Gastroenterology consultation.      Interval Problem Update  Reviewed last 24 hour events:      SR  98.8      Berny is feeling a lot better today.  She tells me that for the last week her sputum has changed from its usual white color to a yellow color and her sputum is thicker.  Her dyspnea has improved.  She denies any angina, palpitations or syncope.  She denies any abdominal pain, nausea or vomiting.  She is having bright red blood per rectum.  This began a few days ago when her breathing got worse.  She denies hemoptysis.      Review of Systems  Review of Systems   Constitutional:  Positive for malaise/fatigue. Negative for chills and fever.   HENT:  Negative for congestion, nosebleeds, sinus pain and sore throat.    Eyes:  Negative for pain, discharge and redness.   Respiratory:  Positive for cough and sputum production. Negative for hemoptysis and stridor.    Cardiovascular:  Negative for chest pain, palpitations and PND.   Gastrointestinal:  Positive for blood in stool. Negative for abdominal pain, nausea and vomiting.   Genitourinary:  Negative for dysuria, flank pain, hematuria and urgency.   Musculoskeletal:  Negative for myalgias and neck pain.   Skin:  Negative for rash.   Neurological:  Negative for speech change, focal weakness, seizures, loss of consciousness and headaches.   Endo/Heme/Allergies:  Does not bruise/bleed easily.   Psychiatric/Behavioral:  Negative for  hallucinations and substance abuse. The patient is not nervous/anxious.         Vital Signs for last 24 hours   Temp:  [36.5 °C (97.7 °F)-37.1 °C (98.8 °F)] 37.1 °C (98.8 °F)  Pulse:  [] 73  Resp:  [16-57] 18  BP: (144-193)/(60-88) 155/74  SpO2:  [87 %-100 %] 97 %    Hemodynamic parameters for last 24 hours       Respiratory Information for the last 24 hours       Physical Exam   Physical Exam  Constitutional:       Appearance: She is not diaphoretic.   HENT:      Head: Normocephalic and atraumatic.      Right Ear: External ear normal.      Left Ear: External ear normal.      Nose: Nose normal.      Mouth/Throat:      Mouth: Mucous membranes are moist.   Eyes:      General: No scleral icterus.     Pupils: Pupils are equal, round, and reactive to light.   Cardiovascular:      Comments: Sinus rhythm  Pulmonary:      Effort: No respiratory distress.      Breath sounds: No stridor. Rales (Few scattered coarse crackles) present. No wheezing.   Abdominal:      General: There is no distension.      Tenderness: There is no abdominal tenderness. There is no guarding or rebound.   Musculoskeletal:         General: Normal range of motion.      Cervical back: Normal range of motion and neck supple. No rigidity.   Skin:     General: Skin is warm.      Capillary Refill: Capillary refill takes less than 2 seconds.   Neurological:      General: No focal deficit present.      Mental Status: She is alert and oriented to person, place, and time.      Cranial Nerves: No cranial nerve deficit.   Psychiatric:         Mood and Affect: Mood normal.         Behavior: Behavior normal.         Thought Content: Thought content normal.         Judgment: Judgment normal.         Medications  Current Facility-Administered Medications   Medication Dose Route Frequency Provider Last Rate Last Admin    traMADol (Ultram) 50 MG tablet 100 mg  100 mg Oral Q6HRS PRN Rozina Mares M.D.   100 mg at 11/14/24 0041    predniSONE (Deltasone) tablet 40  mg  40 mg Oral DAILY Edin Fish M.D.        pantoprazole (Protonix) injection 40 mg  40 mg Intravenous BID Gregory Baron M.D.   40 mg at 11/14/24 0522    Respiratory Therapy Consult   Nebulization Continuous RT Gregory Baron M.D.        ipratropium-albuterol (DUONEB) nebulizer solution  3 mL Nebulization Q4HRS (RT) Gregory Baron M.D.   3 mL at 11/14/24 0642    albuterol (Proventil) 2.5mg/0.5ml nebulizer solution 2.5 mg  2.5 mg Nebulization Q2HRS PRN (RT) Gregory Baron M.D.        enalaprilat injection 1.25 mg 1 mL  1.25 mg Intravenous Q8HRS PRN Gregory Baron M.D.   1.25 mg at 11/14/24 0347    DULoxetine (Cymbalta) capsule 60 mg  60 mg Oral DAILY Gregory Baron M.D.   60 mg at 11/14/24 0523    fluticasone-umeclidinium-vilanterol (Trelegy Ellipta) 200-62.5-25 mcg/act inhaler 1 Puff  1 Puff Inhalation DAILY Gregory Baron M.D.   1 Puff at 11/14/24 0522    losartan (Cozaar) tablet 50 mg  50 mg Oral BID Gregory Baron M.D.   50 mg at 11/14/24 0522    montelukast (Singulair) tablet 10 mg  10 mg Oral Q EVENING Gregory Baron M.D.   10 mg at 11/13/24 1746    spironolactone (Aldactone) tablet 25 mg  25 mg Oral Q DAY Gregory Baron M.D.   25 mg at 11/14/24 0523    azithromycin (Zithromax) tablet 500 mg  500 mg Oral Daily-1400 Gregory Baron M.D.        cefTRIAXone (Rocephin) syringe 1,000 mg  1,000 mg Intravenous Daily-1500 Gregory Baron M.D.   1,000 mg at 11/13/24 1455    MD Alert...ICU Electrolyte Replacement per Pharmacy   Other PHARMACY TO DOSE Edin Fish M.D.           Fluids    Intake/Output Summary (Last 24 hours) at 11/14/2024 0748  Last data filed at 11/14/2024 0400  Gross per 24 hour   Intake 1278.83 ml   Output 850 ml   Net 428.83 ml       Laboratory          Recent Labs     11/13/24  1022 11/14/24  0530   SODIUM 136 135   POTASSIUM 3.5* 3.5*   CHLORIDE 98 99   CO2 27 28   BUN 26* 16   CREATININE 0.47* 0.31*   MAGNESIUM  --  2.0   PHOSPHORUS  --  2.8    CALCIUM 8.6 8.4*     Recent Labs     11/13/24  1022 11/14/24  0530   ALTSGPT 14  --    ASTSGOT 22  --    ALKPHOSPHAT 34  --    TBILIRUBIN 0.2  --    GLUCOSE 191* 166*     Recent Labs     11/13/24  1022 11/14/24  0530   WBC 28.2* 18.7*   NEUTSPOLYS 73.80* 89.20*   LYMPHOCYTES 16.40* 4.60*   MONOCYTES 8.00 3.60   EOSINOPHILS 0.10 0.00   BASOPHILS 0.10 0.20   ASTSGOT 22  --    ALTSGPT 14  --    ALKPHOSPHAT 34  --    TBILIRUBIN 0.2  --      Recent Labs     11/13/24  1022 11/13/24  1430 11/13/24  1750 11/14/24  0030 11/14/24  0530   RBC 1.88*  --   --   --  3.78*   HEMOGLOBIN 5.7*   < > 8.8* 5.6* 10.8*   HEMATOCRIT 17.5*  --   --   --  31.6*   PLATELETCT 407  --   --   --  246   PROTHROMBTM 14.0  --   --   --   --    APTT 21.3*  --   --   --   --    INR 1.08  --   --   --   --     < > = values in this interval not displayed.       Imaging  X-Ray:  I have personally reviewed the images and compared with prior images. and My impression is: Focally increased opacification in the left lower lobe which is largely chronic    Assessment/Plan  * Acute on chronic respiratory failure with hypoxia (HCC)- (present on admission)  Assessment & Plan  On 3 to 7 L of domiciliary oxygen  Improved - she no longer requires BiPAP  Continue oxygen    Acute exacerbation of chronic obstructive pulmonary disease (COPD) (HCC)- (present on admission)  Assessment & Plan  Stop methylprednisolone  Begin prednisone, 40 mg daily  Continue bronchodilators    Lower GI bleed  Assessment & Plan  History of chronic constipation, hemorrhoids and diverticulosis  Trend hemoglobin and transfuse PRBCs for hemoglobin greater than 7  Check thromboelastogram with platelet mapping  Gastroenterology consultation    Acute blood loss anemia  Assessment & Plan  Gastrointestinal source of blood loss  Trend hemoglobin and transfuse PRBCs for hemoglobin greater than 7    Primary hypertension- (present on admission)  Assessment & Plan  Goal SBP less than 160  Continue  losartan, 50 mg twice daily    Primary cancer of left upper lobe of lung (HCC)- (present on admission)  Assessment & Plan  History of nodule in the left upper lobe, originally 14 x 9 mm - FDG avid consistent with malignancy - too high risk for biopsy  Stage 1A2 (cT1b, cN0, cMo)  Completed stereotactic body radiation therapy (SBRT) on or about November 1/2021 with shrinkage of tumor    Stage 4 very severe COPD by GOLD classification (HCC)- (present on admission)  Assessment & Plan  FEV1 0.89 L (43%), FEV1/FVC 45%, DLCO 37% on 7/8/2021  Chronic respiratory failure on domiciliary oxygen  Continue Trelegy, 1 puff daily  RT protocols    Diverticulosis- (present on admission)  Assessment & Plan       DNR (do not resuscitate)- (present on admission)  Assessment & Plan  DNR status in the event of cardiac arrest  She is okay with intubation    Chronic pain syndrome- (present on admission)  Assessment & Plan  Continue duloxetine         VTE:  Contraindicated  Ulcer: PPI  Lines: None    I have performed a physical exam and reviewed and updated ROS and Plan today (11/14/2024). In review of yesterday's note (11/13/2024), there are no changes except as documented above.     Discussed patient condition and risk of morbidity and/or mortality with RN, RT, and Pharmacy    Edin Fish MD  Pulmonary and Critical Care Medicine

## 2024-11-14 NOTE — PROGRESS NOTES
Critical Care Progress Note    Date of admission  11/13/2024    Chief Complaint  79 y.o. female admitted 11/13/2024 with acute on chronic respiratory failure, acute exacerbation of COPD and significant anemia.  She has a history of chronic hypoxemic respiratory failure, very severe stage IV COPD, primary hypertension, lung cancer, dyslipidemia, chronic pain, depression, diverticulosis and hemorrhoids.    Hospital Course      11/14 -    improved pulmonary status.  Stop methylprednisolone and begin prednisone, 40 mg daily.  She is off BiPAP.  Gastroenterology consultation.  11/15 -    She continues to improve.  Endoscopy today.  OK to transfer out of ICU.      Interval Problem Update  Reviewed last 24 hour events:      SR  98.6  -524 mL in the last 24      Berny is feeling better today.  Her energy is improved.  She continues to have a cough with yellow sputum production, but her cough is improved.  She has no worsening dyspnea and feels like her breathing is returning to baseline.  She denies abdominal pain, nausea or vomiting.  She is still having bright red blood per rectum and is due to have endoscopy today.  She has no angina, palpitations or syncope.      Review of Systems  Review of Systems   Constitutional:  Negative for diaphoresis, fever and weight loss.   HENT:  Negative for ear discharge and ear pain.    Eyes:  Negative for blurred vision, double vision, photophobia, pain, discharge and redness.   Respiratory:  Positive for cough and sputum production. Negative for hemoptysis and stridor.    Cardiovascular:  Negative for chest pain, palpitations, orthopnea and PND.   Gastrointestinal:  Positive for blood in stool. Negative for abdominal pain, nausea and vomiting.   Genitourinary:  Negative for dysuria, hematuria and urgency.   Musculoskeletal:  Negative for falls, myalgias and neck pain.   Skin:  Negative for itching.   Neurological:  Negative for speech change, focal weakness, seizures, loss of  consciousness and headaches.   Endo/Heme/Allergies:  Does not bruise/bleed easily.   Psychiatric/Behavioral:  Negative for hallucinations and substance abuse. The patient is not nervous/anxious.         Vital Signs for last 24 hours   Temp:  [36.2 °C (97.2 °F)-37 °C (98.6 °F)] 36.2 °C (97.2 °F)  Pulse:  [53-96] 57  Resp:  [18-64] 18  BP: (110-188)/() 152/68  SpO2:  [91 %-99 %] 99 %    Hemodynamic parameters for last 24 hours       Respiratory Information for the last 24 hours       Physical Exam   Physical Exam  Constitutional:       General: She is not in acute distress.     Appearance: She is not toxic-appearing or diaphoretic.   HENT:      Head: Normocephalic and atraumatic.      Right Ear: External ear normal.      Left Ear: External ear normal.      Nose: Nose normal.      Mouth/Throat:      Mouth: Mucous membranes are moist.   Eyes:      General: No scleral icterus.     Pupils: Pupils are equal, round, and reactive to light.   Cardiovascular:      Comments: Sinus rhythm  Pulmonary:      Effort: Pulmonary effort is normal. No respiratory distress.      Breath sounds: No stridor. Rales (Scattered crackles are about the same) present. No wheezing.   Abdominal:      General: There is no distension.      Tenderness: There is no abdominal tenderness. There is no guarding or rebound.   Musculoskeletal:         General: Normal range of motion.      Cervical back: Normal range of motion and neck supple. No rigidity.   Skin:     General: Skin is warm.      Capillary Refill: Capillary refill takes less than 2 seconds.   Neurological:      General: No focal deficit present.      Mental Status: She is alert and oriented to person, place, and time.      Cranial Nerves: No cranial nerve deficit.   Psychiatric:         Mood and Affect: Mood normal.         Behavior: Behavior normal.         Thought Content: Thought content normal.         Judgment: Judgment normal.         Medications  Current Facility-Administered  Medications   Medication Dose Route Frequency Provider Last Rate Last Admin    potassium chloride (KCL) ivpb 10 mEq  10 mEq Intravenous Q HOUR Edin Fish M.D.        magnesium sulfate IVPB premix 2 g  2 g Intravenous Once Edin Fish M.D.        traMADol (Ultram) 50 MG tablet 100 mg  100 mg Oral Q6HRS PRN Rozina Mares M.D.   100 mg at 11/14/24 0041    predniSONE (Deltasone) tablet 40 mg  40 mg Oral DAILY Edin Fish M.D.   40 mg at 11/15/24 0519    ampicillin/sulbactam (Unasyn) 3 g in  mL IVPB  3 g Intravenous Q6HRS Edin Fish M.D.   Stopped at 11/15/24 0554    ipratropium-albuterol (DUONEB) nebulizer solution  3 mL Nebulization 4X/DAY (RT) Edin Fish M.D.   3 mL at 11/15/24 0653    albuterol (Proventil) 2.5mg/0.5ml nebulizer solution 2.5 mg  2.5 mg Nebulization Q2HRS PRN (RT) Edin Fish M.D.        benzocaine-menthol (Cepacol) lozenge 1 Lozenge  1 Lozenge Mouth/Throat Q4HRS PRN Anais Gutierrez M.D.   1 Lozenge at 11/14/24 1723    labetalol (Normodyne/Trandate) injection 10 mg  10 mg Intravenous Q4HRS PRN Rozina Mares M.D.   10 mg at 11/14/24 2320    pantoprazole (Protonix) injection 40 mg  40 mg Intravenous BID Gregory Baron M.D.   40 mg at 11/15/24 0524    Respiratory Therapy Consult   Nebulization Continuous RT Gregory Baron M.D.        enalaprilat injection 1.25 mg 1 mL  1.25 mg Intravenous Q8HRS PRN Gregory Baron M.D.   1.25 mg at 11/14/24 2211    DULoxetine (Cymbalta) capsule 60 mg  60 mg Oral DAILY Gregory Baron M.D.   60 mg at 11/15/24 0519    fluticasone-umeclidinium-vilanterol (Trelegy Ellipta) 200-62.5-25 mcg/act inhaler 1 Puff  1 Puff Inhalation DAILY Gregory Baron M.D.   1 Puff at 11/15/24 0520    losartan (Cozaar) tablet 50 mg  50 mg Oral BID Gregory Baron M.D.   50 mg at 11/15/24 0519    montelukast (Singulair) tablet 10 mg  10 mg Oral Q EVENING Gregroy Baron M.D.   10 mg at 11/14/24 1809     spironolactone (Aldactone) tablet 25 mg  25 mg Oral Q DAY Gregory Baron M.D.   25 mg at 11/15/24 0520    azithromycin (Zithromax) tablet 500 mg  500 mg Oral Daily-1400 Gregory Baron M.D.   500 mg at 11/14/24 1455    MD Alert...ICU Electrolyte Replacement per Pharmacy   Other PHARMACY TO DOSE Edin Fish M.D.           Fluids    Intake/Output Summary (Last 24 hours) at 11/15/2024 0747  Last data filed at 11/14/2024 2000  Gross per 24 hour   Intake 175.9 ml   Output 700 ml   Net -524.1 ml       Laboratory          Recent Labs     11/13/24  1022 11/14/24  0530 11/15/24  0440   SODIUM 136 135 139   POTASSIUM 3.5* 3.5* 3.2*   CHLORIDE 98 99 101   CO2 27 28 32   BUN 26* 16 13   CREATININE 0.47* 0.31* 0.37*   MAGNESIUM  --  2.0 1.9   PHOSPHORUS  --  2.8 1.6*   CALCIUM 8.6 8.4* 7.7*     Recent Labs     11/13/24  1022 11/14/24  0530 11/15/24  0440   ALTSGPT 14  --   --    ASTSGOT 22  --   --    ALKPHOSPHAT 34  --   --    TBILIRUBIN 0.2  --   --    GLUCOSE 191* 166* 105*     Recent Labs     11/13/24  1022 11/14/24  0530 11/15/24  0440   WBC 28.2* 18.7* 17.5*   NEUTSPOLYS 73.80* 89.20* 81.20*   LYMPHOCYTES 16.40* 4.60* 8.20*   MONOCYTES 8.00 3.60 8.20   EOSINOPHILS 0.10 0.00 0.10   BASOPHILS 0.10 0.20 0.10   ASTSGOT 22  --   --    ALTSGPT 14  --   --    ALKPHOSPHAT 34  --   --    TBILIRUBIN 0.2  --   --      Recent Labs     11/13/24  1022 11/13/24  1430 11/14/24  0530 11/14/24  1410 11/14/24  2100 11/14/24  2345 11/15/24  0440   RBC 1.88*  --  3.78*  --   --   --  3.45*   HEMOGLOBIN 5.7*   < > 10.8*   < > 11.3* 10.8* 10.0*   HEMATOCRIT 17.5*  --  31.6*  --   --   --  29.5*   PLATELETCT 407  --  246  --   --   --  199   PROTHROMBTM 14.0  --   --   --   --   --   --    APTT 21.3*  --   --   --   --   --   --    INR 1.08  --   --   --   --   --   --     < > = values in this interval not displayed.       Imaging  None    Assessment/Plan  * Acute on chronic respiratory failure with hypoxia (HCC)- (present on  admission)  Assessment & Plan  On 3 to 7 L of domiciliary oxygen  Continue oxygen  Improved    Acute exacerbation of chronic obstructive pulmonary disease (COPD) (HCC)- (present on admission)  Assessment & Plan  Continue prednisone, 40 mg daily  Continue bronchodilators  Improved    Lower GI bleed  Assessment & Plan  History of chronic constipation, hemorrhoids and diverticulosis  Trend hemoglobin and transfuse PRBCs for hemoglobin greater than 7  Endoscopy today per GI    Acute blood loss anemia  Assessment & Plan  Gastrointestinal source of blood loss  Trend hemoglobin and transfuse PRBCs for hemoglobin greater than 7    Primary hypertension- (present on admission)  Assessment & Plan  Goal SBP less than 160  Continue losartan, 50 mg twice daily    Primary cancer of left upper lobe of lung (HCC)- (present on admission)  Assessment & Plan  History of nodule in the left upper lobe, originally 14 x 9 mm - FDG avid consistent with malignancy - too high risk for biopsy  Stage 1A2 (cT1b, cN0, cMo)  Completed stereotactic body radiation therapy (SBRT) on or about November 1/2021 with shrinkage of tumor    Stage 4 very severe COPD by GOLD classification (HCC)- (present on admission)  Assessment & Plan  FEV1 0.89 L (43%), FEV1/FVC 45%, DLCO 37% on 7/8/2021  Chronic respiratory failure on domiciliary oxygen  Continue Trelegy, 1 puff daily  RT protocols    DNR (do not resuscitate)- (present on admission)  Assessment & Plan  DNR status in the event of cardiac arrest  She is okay with intubation    Chronic pain syndrome- (present on admission)  Assessment & Plan  Continue duloxetine         VTE:  Contraindicated  Ulcer: PPI  Lines: None    I have performed a physical exam and reviewed and updated ROS and Plan today (11/15/2024). In review of yesterday's note (11/14/2024), there are no changes except as documented above.     OK to transfer out of ICU.  [facility] will sign off.  Please call if you have any  questions.    Discussed patient condition and risk of morbidity and/or mortality with RN, RT, and Pharmacy    Edin Fish MD  Pulmonary and Critical Care Medicine

## 2024-11-14 NOTE — CARE PLAN
Problem: Bronchoconstriction  Goal: Improve in air movement and diminished wheezing  Description: Target End Date:  2 to 3 days    1.  Implement inhaled treatments  2.  Evaluate and manage medication effects  Outcome: Not Met   Duoneb with flutter Q4    Problem: Ventilation  Goal: Ability to achieve and maintain unassisted ventilation or tolerate decreased levels of ventilator support  Description: Target End Date:  4 days     Document on Vent flowsheet    1.  Support and monitor invasive and noninvasive mechanical ventilation  2.  Monitor ventilator weaning response  3.  Perform ventilator associated pneumonia prevention interventions  4.  Manage ventilation therapy by monitoring diagnostic test results  Outcome: Not Met   10/5 40%

## 2024-11-14 NOTE — ASSESSMENT & PLAN NOTE
Acute anemia hg 5.7 no shock was hypertensive on presentation  H&H monitoring  Total of 4 units packed RBC transfused  GI consulted  S/p EGD and Colonoscopy 11/15 with no clear nor active bleed but likely diverticular bleed  No ASA nor NSAIDs

## 2024-11-14 NOTE — WOUND TEAM
[facility]  Inpatient Services  Initial Wound and Skin Care Evaluation    Admission Date: 2024     Last order of IP CONSULT TO WOUND CARE was found on 2024 from Hospital Encounter on 2024     HPI, PMH, SH: Reviewed    Past Surgical History:   Procedure Laterality Date    LUMBAR LAMINECTOMY DISKECTOMY  2010    Performed by GRACIE CANO at SURGERY [facility]    OTHER ORTHOPEDIC SURGERY      left ankle fx    OTHER      leg fracture    OTHER      t spine disc surg    TONSILLECTOMY       Social History     Tobacco Use    Smoking status: Former     Current packs/day: 0.00     Average packs/day: 2.0 packs/day for 30.0 years (60.0 ttl pk-yrs)     Types: Cigarettes     Start date: 3/1/1969     Quit date: 3/1/1999     Years since quittin.7    Smokeless tobacco: Never    Tobacco comments:     3 pks a day for 35 yrs, continued abstinence   Substance Use Topics    Alcohol use: Not Currently     Alcohol/week: 4.2 oz     Types: 7 Glasses of wine per week     Chief Complaint   Patient presents with    Shortness of Breath     Diagnosis: Respiratory failure with hypoxia (HCC) [J96.91]    Unit where seen by Wound Team: [id number]/     WOUND CONSULT RELATED TO:  Sacrococcygeal area    WOUND TEAM PLAN OF CARE - Frequency of Follow-up:   Nursing to follow dressing orders written for wound care. Contact wound team if area fails to progress, deteriorates or with any questions/concerns if something comes up before next scheduled follow up (See below as to whether wound is following and frequency of wound follow up)   Not following, consult as needed  - any area    WOUND HISTORY:   Pt is a 79yr old female with history of respiratory failure. Pt has COPD O2 dependent with 60 pack year of smoking tobacco, Lung CA, HTN, Chronic constipation, depression and external hemorrhoids. Wound team was consulted regarding pts sacrococcygeal area.        WOUND ASSESSMENT/LDA                                     Vascular:    CARO:   No results found.    Lab Values:    Lab Results   Component Value Date/Time    WBC 18.7 (H) 11/14/2024 05:30 AM    WBC 10.9 (H) 06/09/2011 12:00 AM    RBC 3.78 (L) 11/14/2024 05:30 AM    RBC 4.69 06/09/2011 12:00 AM    HEMOGLOBIN 10.8 (L) 11/14/2024 05:30 AM    HEMATOCRIT 31.6 (L) 11/14/2024 05:30 AM    CREACTPROT 0.47 05/12/2021 12:16 PM    SEDRATEWES 9 05/12/2021 12:16 PM    HBA1C 6.2 (H) 11/15/2020 04:51 AM         Culture Results show:  No results found for this or any previous visit (from the past 720 hours).    Pain Level/Medicated:  None, Tolerated without pain medication       INTERVENTIONS BY WOUND TEAM:  Chart and images reviewed. Discussed with bedside RN. All areas of concern (based on picture review, LDA review and discussion with bedside RN) have been thoroughly assessed. Documentation of areas based on significant findings. This RN in to assess patient. Performed standard wound care which includes appropriate positioning, dressing removal and non-selective debridement. Pictures and measurements obtained weekly if/when required.  Area Assessed:  Posterior Head  Area manually palpated, no wounds appreciated, no pain noted     Area Assessed:  Bilateral Ears  Area intact, gray foam in use    Area Assessed:  Bridge of nose  Area intact, redness noted under glasses     Area Assessed:  Bilateral Elbows & Arms  Area intact, Small skin tear to RUE from cat scratch per pt    Area Assessed: Abdomen/Pannus  Area intact,     Area Assessed: Back  Area intact,     Area Assessed:  Sacrococcygeal area  Area intact, Offloading dressing changed    Area Assessed:  Bilateral I.T.s  Area intact,     Area Assessed:  BLE & Knees  Area intact, small dry crusted wound to the posterior right thigh.    Area Assessed:  Bilateral ankles  Area intact,     Area Assessed:  Bilateral heels  Area intact, Heel offloading dressings applied      Advanced Wound Care Discharge Planning  Number of Clinicians  necessary to complete wound care: 1  Is patient requiring IV pain medications for dressing changes:  No   Length of time for dressing change 30 min. (This does not include chart review, pre-medication time, set up, clean up or time spent charting.)    Interdisciplinary consultation: Patient, Bedside RN, Tracy CELIS (Wound RN).      EVALUATION / RATIONALE FOR TREATMENT:     Date:  11/14/24  Wound Status:  Initial evaluation    Pt with largely intact skin. Offloading measures implemented.          Goals: Steady decrease in wound area and depth weekly.    NURSING PLAN OF CARE ORDERS:  Dressing changes: See Dressing Care orders  RN Prevention Protocol    NUTRITION RECOMMENDATIONS   Wound Team Recommendations:  N/A    DIET ORDERS (From admission to next 24h)       Start     Ordered    11/14/24 1124  Diet Order Diet: Regular  ALL MEALS        Question:  Diet:  Answer:  Regular    11/14/24 1123                    PREVENTATIVE INTERVENTIONS:    Q shift Taj - performed per nursing policy  Q shift pressure point assessments - performed per nursing policy    Surface/Positioning  ICU Low Airloss - Currently in Place  Reposition q 2 hours - Currently in Place    Offloading/Redistribution  Sacral offloading dressing (Silicone dressing) - Changed  Heel offloading dressing (Silicone dressing) - Applied this Visit      Respiratory  Silicone O2 tubing - Currently in Place  Gray Foam Ear protectors - Currently in Place    Containment/Moisture Prevention    Dri-tori pad - Currently in Place    Anticipated discharge plans:  TBD        Vac Discharge Needs:  Vac Discharge plan is purely a recommendation from wound team and not a requirement for discharge unless otherwise stated by physician.  Not Applicable Pt not on a wound vac

## 2024-11-14 NOTE — ASSESSMENT & PLAN NOTE
Patient with hx of chronic constipation, hemorrhoids and diverticulosis  Anusol cream suppository  Abdominal CT scan unremarkable  GI consultation for colonoscopy with diverticuli as suspected etiology  Restarted cardiac diet 11/15/24 if no further drop in Hgb possible discharge.  No NSAIDs nor ASA

## 2024-11-14 NOTE — RESPIRATORY CARE
"COPD EDUCATION by COPD CLINICAL EDUCATOR  11/14/2024 at 8:40 AM by Niyah Zazueta, RRT     Patient interviewed by COPD education team. Patient refused COPD program at this time. An Action Plan was updated in the EMR to reflect current Respiratory Medication use.                     Ms. Renee endorses breathing well today. She declined COPD education this visit but seems very knowledge about recognizing her symptoms and how and when to use her respiratory medications. She is requesting a new home nebulizer and this will be requested of the Team.     COPD Screen  COPD Risk Screening  Do you have a history of COPD?: Yes  Do you have a Pulmonologist?: Yes    COPD Assessment  COPD Clinical Specialists ONLY  COPD Education Initiated: Yes--Short Intervention (declined education, requested new home nebulizer)  Is this a COPD exacerbation patient?: Yes  DME Company: [employer] Equipment Type: home O2 at 6 lpm, requesting new nebulizer  Physician Follow Up Appointment:  (tbd)  Physician Name: Everett Diego M.D.  Pulmonary Follow Up Appointment:  (tbd)  Pulmonologist Name: [facility]  Referrals Initiated: Yes  Pulmonary Rehab: No  Smoking Cessation: No  Hospice: No  Home Health Care: No  Mobile Urgent Care Services: No  Geriatric Specialty Group: No  Private In-Home Care Agency: No  (OP) Pulmonary Function Testing: Yes (7/21 FEV1 0.89 43%, FEV1/FVC 45, DLCO 37%)  Interdisciplinary Rounds: Attendance at Rounds (30 Min)    PFT Results  7/21 FEV1 0.89 43%, FEV1/FVC 45, DLCO 37%    Meds to Beds        MY COPD ACTION PLAN     It is recommended that patients and physicians /healthcare providers complete this action plan together. This plan should be discussed at each physician visit and updated as needed.    The green, yellow and red zones show groups of symptoms of COPD. This list of symptoms is not comprehensive, and you may experience other symptoms. In the \"Actions\" column, your healthcare provider has recommended " "actions for you to take based on your symptoms.    Patient Name: Berny Renee   YOB: 1945   Last Updated on: 11/14/2024  8:40 AM   Green Zone:  I am doing well today Actions     Usual activitiy and exercise level   Take daily medications     Usual amounts of cough and phlegm/mucus   Use oxygen as prescribed     Sleep well at night   Continue regular exercise/diet plan     Appetite is good   At all times avoid cigarette smoke, inhaled irritants     Daily Medications (these medications are taken every day):   Fluticasone and Umeclidinium and Vilanterol (Trelogy)         Additional Information:  Use as directed.  Please rinse mouth after using     Yellow Zone:  I am having a bad day or a COPD flare Actions     More breathless than usual   Continue daily medications     I have less energy for my daily activities   Use quick relief inhaler as ordered     Increased or thicker phlegm/mucus   Use oxygen as prescribed     Using quick relief inhaler/nebulizer more often   Get plenty of rest     Swelling of ankles more than usual   Use pursed lip breathing     More coughing than usual   At all times avoid cigarette smoke, inhaled irritants     I feel like I have a \"chest cold\"     Poor sleep and my symptoms woke me up     My appetite is not good     My medicine is not helping      Call provider immediately if symptoms don’t improve     Continue daily medications, add rescue medications:   Albuterol/Ipratropium (Combivent, Duoneb)  Albuterol 3mL via nebulizer  2 Puffs Every 4 hours PRN  Every 4 hours PRN       Medications to be used during a flare up, (as Discussed with Provider):           Additional Information:  Use your nebulizer first when short of breath.    Use a spacer with your rescue inhaler when nebulizer is not available.       Red Zone:  I need urgent medical care Actions     Severe shortness of breath even at rest   Call 911 or seek medical care immediately     Not able to do any activity because " of breathing      Fever or shaking chills      Feeling confused or very drowsy       Chest pains      Coughing up blood

## 2024-11-14 NOTE — CARE PLAN
Problem: Bronchoconstriction  Goal: Improve in air movement and diminished wheezing  Description: Target End Date:  2 to 3 days    1.  Implement inhaled treatments  2.  Evaluate and manage medication effects  Outcome: Progressing   Duoneb with flutter QID

## 2024-11-14 NOTE — PROGRESS NOTES
12 hour chart check complete.     Monitoring Summary:  Rhythm: NSR 70-90s bpm  Ectopy: PVC (o)

## 2024-11-14 NOTE — PROGRESS NOTES
[facility] ICU Progress Note      Admit Date: 11/13/2024    Resident(s): Anais Gutierrez M.D.  Attending: REESE RUIZ/ Dr. Harish Freitas    Date & Time:   11/14/2024  0700    Patient ID:    Name:             Berny Renee   YOB: 1945  Age:                 79 y.o.  female   MRN:               [id number]    HPI:   79 y.o. female who presented 11/13/2024 with Hx of COPD gold stage D on chronic 3-7L of oxygen with hx of 60 pack years of tobacco and has since quit, HTN, lung cancer, chol, chronic constipation, chronic pain, depression, extremal hemorrhoids, diverticulosis. That presents feeling shortness of breath worse on exertion, increase sputum production and subjective fever. She has had no sick contact or sore throat or running nose. She was placed on antibiotics but continues to feel poorly. She also described left leg swelling and was suppose to get lower ext doppler but felt unwell to get this done. She presented today by EMS with significant SOB, received steroids and nebulizer in route. No hx of CHF or prior DVT or PE. Also with rectal bleeding bright red and hx of constipation with some bloating but no abdominal pain or vomiting. She was place on Bipap with improved symptoms, found to have hg of 5.7 not on aspirin or anticoagulation. She has been order 2 units PRBC fortunately she has had no shock with SBP in the 170's. She wishes to be DNR with brief intubation if necessary. -  HPI       Interval Events:  11/13: Admitted to ICU, Bipap, 2 units pRBC         11/14:   NPO  Received total of 2 units pRBC overnight  Patient reports decreased shortness of breath  Patient with reported bloody movement overnight  Afebrile  Heart rate 60 to 70s  -170  Off BiPAP, on 6 L nasal cannula, at baseline  Repeat hemoglobin 10.8 after 2 units packed RBC, from 5.6  Platelet 246  Sodium 135 potassium 3.5  Creatinine 0.31  BUN 16  Phosphorus 2.8  Magnesium 2.0      Review of Systems  "  Constitutional:  Negative for fever.   HENT: Negative.     Eyes: Negative.    Respiratory:  Positive for cough.    Gastrointestinal:  Positive for blood in stool.   Genitourinary: Negative.    Musculoskeletal: Negative.    Neurological: Negative.    Endo/Heme/Allergies: Negative.    Psychiatric/Behavioral: Negative.         PHYSICAL EXAM  Vitals:    11/14/24 0600 11/14/24 0643 11/14/24 0700 11/14/24 0800   BP: (!) 155/74 (!) 155/74 (!) 179/74 (!) 158/115   Pulse: 68 73 76 82   Resp: (!) 26 18 (!) 32 (!) 64   Temp:    37 °C (98.6 °F)   TempSrc:    Temporal   SpO2: 96% 97% 95% 93%   Weight:       Height:         Body mass index is 29.33 kg/m².  BP (!) 158/115   Pulse 82   Temp 37 °C (98.6 °F) (Temporal)   Resp (!) 64   Ht 1.626 m (5' 4\")   Wt 77.5 kg (170 lb 13.7 oz)   LMP 06/07/2001   SpO2 93%   BMI 29.33 kg/m²   O2 therapy: Pulse Oximetry: 93 %, O2 (LPM): 6, O2 Delivery Device: Silicone Nasal Cannula    Physical Exam  Constitutional:       General: She is not in acute distress      Appearance: She is obese. She is not ill-appearing.      Comments:HENT:      Head: Normocephalic.      Mouth/Throat:      Mouth: Mucous membranes are moist.   Eyes:      Pupils: Pupils are equal, round, and reactive to light.      Comments: Pale conjunctiva   Cardiovascular:      Heart sounds: No murmur heard.  Pulmonary:      Effort: Not in respiratory distress     Breath sounds: No stridor. No wheezing or rhonchi.   Abdominal:      Palpations: There is no mass.      Tenderness: There is no abdominal tenderness. There is no guarding or rebound.      Hernia: No hernia is present.      Comments: Mild distention   Musculoskeletal:         General: Swelling present.      Comments: Mild left lower leg swelling when compared to right   Skin:     Coloration: Skin is pale.   Neurological:      General: No focal deficit present.      Mental Status: She is oriented to person, place, and time.      Cranial Nerves: No cranial nerve " deficit.      Sensory: No sensory deficit.      Motor: No weakness.      Coordination: Coordination normal.   Psychiatric:         Mood and Affect: Mood normal.     Respiratory:     Respiration: (!) 64, Pulse Oximetry: 93 %    Chest Tube Drains:          HemoDynamics:  Pulse: 82 Blood Pressure : (!) 158/115      Fluids:       Intake/Output Summary (Last 24 hours) at 2024 0721  Last data filed at 2024 0400  Gross per 24 hour   Intake 1278.83 ml   Output 850 ml   Net 428.83 ml       Weight: 77.5 kg (170 lb 13.7 oz)  Body mass index is 29.33 kg/m².    Recent Labs     24  1022 24  0530   SODIUM 136 135   POTASSIUM 3.5* 3.5*   CHLORIDE 98 99   CO2 27 28   BUN 26* 16   CREATININE 0.47* 0.31*   MAGNESIUM  --  2.0   PHOSPHORUS  --  2.8   CALCIUM 8.6 8.4*       GI/Nutrition:  Recent Labs     24  1022 24  0530   ALTSGPT 14  --    ASTSGOT 22  --    ALKPHOSPHAT 34  --    TBILIRUBIN 0.2  --    GLUCOSE 191* 166*       Heme:  Recent Labs     24  1022 24  1430 24  1750 24  0030 24  0530   RBC 1.88*  --   --   --  3.78*   HEMOGLOBIN 5.7*   < > 8.8* 5.6* 10.8*   HEMATOCRIT 17.5*  --   --   --  31.6*   PLATELETCT 407  --   --   --  246   PROTHROMBTM 14.0  --   --   --   --    APTT 21.3*  --   --   --   --    INR 1.08  --   --   --   --     < > = values in this interval not displayed.       Infectious Disease:  Temp  Av.8 °C (98.2 °F)  Min: 36.6 °C (97.9 °F)  Max: 37.1 °C (98.8 °F)  Recent Labs     24  1022 24  0530   WBC 28.2* 18.7*   NEUTSPOLYS 73.80* 89.20*   LYMPHOCYTES 16.40* 4.60*   MONOCYTES 8.00 3.60   EOSINOPHILS 0.10 0.00   BASOPHILS 0.10 0.20   ASTSGOT 22  --    ALTSGPT 14  --    ALKPHOSPHAT 34  --    TBILIRUBIN 0.2  --        Meds:   traMADol  100 mg      [START ON 11/15/2024] predniSONE  40 mg      ampicillin-sulbactam (UNASYN) IV  3 g      pantoprazole  40 mg      Respiratory Therapy Consult        ipratropium-albuterol  3 mL      albuterol   2.5 mg      enalaprilat  1.25 mg      DULoxetine  60 mg      fluticasone-umeclidinium-vilanterol  1 Puff      losartan  50 mg      montelukast  10 mg      spironolactone  25 mg      azithromycin  500 mg      MD Alert...Adult ICU Electrolyte Replacement per Pharmacy                Imaging:  US-EXTREMITY VENOUS LOWER BILAT   Final Result      DX-CHEST-PORTABLE (1 VIEW)   Final Result         1.  Left pulmonary infiltrates, similar compared to prior study.   2.  Atherosclerosis      EC-ECHOCARDIOGRAM COMPLETE W/O CONT   Final Result      CT-ABDOMEN-PELVIS W/O   Final Result      1. No acute inflammatory process in the abdomen and pelvis.   2. Bibasal parenchymal scarring and atelectasis.   3. Atherosclerotic calcifications in the aorta and iliac arteries.   4. Cholelithiasis.      DX-CHEST-PORTABLE (1 VIEW)   Final Result         1. Low lung volume with hypoventilatory change and bibasilar atelectasis.          Assessment and Plan:      * Acute on chronic respiratory failure with hypoxia (HCC)- (present on admission)  Assessment & Plan        Successfully transitioned off BiPAP  On baseline oxygen use  Continue treatment for CAP, Unasyn instead of Ceftriaxone     Anemia  Assessment & Plan  Acute anemia hg 5.7 no shock was hypertensive on presentation  H&H monitoring  Total of 4 units packed RBC transfused  GI consulted for possible scope    Hemorrhoids  Assessment & Plan  Has been on stool softeners   GI consulted    Lower GI bleed  Assessment & Plan  Patient with hx of chronic constipation, hemorrhoids and diverticulosis  Abdominal CT scan unremarkable  GI consultation for colonoscopy       Acute blood loss anemia  Assessment & Plan  Secondary to likely lower GI bleeding  Currently hemodynamically stable  Trend hemoglobin and transfuse PRBCs for hemoglobin greater than 7  Follow TEG mapping    Acute exacerbation of chronic obstructive pulmonary disease (COPD) (HCC)- (present on admission)  Assessment & Plan         Start prednisone, 40 mg daily, received methylprednisolone. Continue azithromycin qtc 415  Continue bronchodilators    Primary hypertension- (present on admission)  Assessment & Plan  Continue losartan, 50 mg twice daily    Stage 4 very severe COPD by GOLD classification (HCC)- (present on admission)  Assessment & Plan  Patient with past history of tobacco abuse on chronic use of oxygen up to 6-7 L NC  Extended respiratory viral panel testing negative  Nebulizer, steroids  Transitioned off bipap, on baseline home oxygen  Pending echo cardiogram    Chronic pain syndrome- (present on admission)  Assessment & Plan  On duloxetine, continue    Edema  Assessment & Plan  Mild, left greater than righ  With known varicosities  Venous ultrasound of bilateral lower extremity negative for clot        CODE STATUS: DNAR I OK

## 2024-11-14 NOTE — ASSESSMENT & PLAN NOTE
History of nodule in the left upper lobe, originally 14 x 9 mm - FDG avid consistent with malignancy - too high risk for biopsy  Stage 1A2 (cT1b, cN0, cMo)  Completed stereotactic body radiation therapy (SBRT) on or about November 1/2021 with shrinkage of tumor

## 2024-11-14 NOTE — CARE PLAN
The patient is Watcher - Medium risk of patient condition declining or worsening    Shift Goals  Clinical Goals: Maintain hemodynamic stability, comfort, pain mgmt prn  Patient Goals: Eat, sleep, comfort  Family Goals: LORA    Progress made toward(s) clinical / shift goals:    Problem: Knowledge Deficit - Standard  Goal: Patient and family/care givers will demonstrate understanding of plan of care, disease process/condition, diagnostic tests and medications  Outcome: Progressing     Problem: Self Care  Goal: Patient will have the ability to perform ADLs independently or with assistance (bathe, groom, dress, toilet and feed)  Outcome: Progressing     Problem: Skin Integrity  Goal: Skin integrity is maintained or improved  Outcome: Progressing     Problem: Pain - Standard  Goal: Alleviation of pain or a reduction in pain to the patient’s comfort goal  Outcome: Progressing       Patient is not progressing towards the following goals:

## 2024-11-14 NOTE — ASSESSMENT & PLAN NOTE
Successfully transitioned off BiPAP  On baseline oxygen use  Continue treatment for CAP, Unasyn instead of Ceftriaxone

## 2024-11-14 NOTE — ASSESSMENT & PLAN NOTE
Mild, left greater than righ  With known varicosities  Venous ultrasound of bilateral lower extremity negative for clot

## 2024-11-14 NOTE — CARE PLAN
Problem: Bronchoconstriction  Goal: Improve in air movement and diminished wheezing  Description: Target End Date:  2 to 3 days    1.  Implement inhaled treatments  2.  Evaluate and manage medication effects  Outcome: Progressing   Duo q4    Problem: Ventilation  Goal: Ability to achieve and maintain unassisted ventilation or tolerate decreased levels of ventilator support  Description: Target End Date:  4 days     Document on Vent flowsheet    1.  Support and monitor invasive and noninvasive mechanical ventilation  2.  Monitor ventilator weaning response  3.  Perform ventilator associated pneumonia prevention interventions  4.  Manage ventilation therapy by monitoring diagnostic test results  Outcome: Progressing   BiPAP 10/5 40%

## 2024-11-14 NOTE — CARE PLAN
The patient is Watcher - Medium risk of patient condition declining or worsening    Shift Goals  Clinical Goals: EGD, oxygenation stability  Patient Goals: Eat, sleep  Family Goals: LORA    Progress made toward(s) clinical / shift goals:    Problem: Knowledge Deficit - Standard  Goal: Patient and family/care givers will demonstrate understanding of plan of care, disease process/condition, diagnostic tests and medications  Outcome: Progressing     Problem: Knowledge Deficit - COPD  Goal: Patient/significant other demonstrates understanding of disease process, utilization of the Action Plan, medications and discharge instruction  Outcome: Progressing     Problem: Nutrition - Advanced  Goal: Patient will display progressive weight gain toward goal have adequate food and fluid intake  Outcome: Progressing     Problem: Self Care  Goal: Patient will have the ability to perform ADLs independently or with assistance (bathe, groom, dress, toilet and feed)  Outcome: Progressing     Problem: Skin Integrity  Goal: Skin integrity is maintained or improved  Outcome: Progressing     Problem: Pain - Standard  Goal: Alleviation of pain or a reduction in pain to the patient’s comfort goal  Outcome: Progressing

## 2024-11-14 NOTE — ASSESSMENT & PLAN NOTE
Secondary to likely lower GI bleeding  Currently hemodynamically stable  Trend hemoglobin and transfuse PRBCs for hemoglobin greater than 7  Follow TEG mapping

## 2024-11-15 ENCOUNTER — ANESTHESIA (OUTPATIENT)
Dept: SURGERY | Facility: MEDICAL CENTER | Age: 79
DRG: 189 | End: 2024-11-15
Payer: MEDICARE

## 2024-11-15 ENCOUNTER — ANESTHESIA EVENT (OUTPATIENT)
Dept: SURGERY | Facility: MEDICAL CENTER | Age: 79
DRG: 189 | End: 2024-11-15
Payer: MEDICARE

## 2024-11-15 PROBLEM — E87.6 HYPOKALEMIA: Status: ACTIVE | Noted: 2024-11-15

## 2024-11-15 LAB
ANION GAP SERPL CALC-SCNC: 6 MMOL/L (ref 7–16)
BACTERIA UR CULT: NORMAL
BASOPHILS # BLD AUTO: 0.1 % (ref 0–1.8)
BASOPHILS # BLD: 0.02 K/UL (ref 0–0.12)
BUN SERPL-MCNC: 13 MG/DL (ref 8–22)
CALCIUM SERPL-MCNC: 7.7 MG/DL (ref 8.5–10.5)
CHLORIDE SERPL-SCNC: 101 MMOL/L (ref 96–112)
CO2 SERPL-SCNC: 32 MMOL/L (ref 20–33)
CREAT SERPL-MCNC: 0.37 MG/DL (ref 0.5–1.4)
EOSINOPHIL # BLD AUTO: 0.01 K/UL (ref 0–0.51)
EOSINOPHIL NFR BLD: 0.1 % (ref 0–6.9)
ERYTHROCYTE [DISTWIDTH] IN BLOOD BY AUTOMATED COUNT: 52.2 FL (ref 35.9–50)
GFR SERPLBLD CREATININE-BSD FMLA CKD-EPI: 102 ML/MIN/1.73 M 2
GLUCOSE SERPL-MCNC: 105 MG/DL (ref 65–99)
HCT VFR BLD AUTO: 29.5 % (ref 37–47)
HGB BLD-MCNC: 10 G/DL (ref 12–16)
HGB BLD-MCNC: 10.7 G/DL (ref 12–16)
HGB BLD-MCNC: 10.8 G/DL (ref 12–16)
IMM GRANULOCYTES # BLD AUTO: 0.39 K/UL (ref 0–0.11)
IMM GRANULOCYTES NFR BLD AUTO: 2.2 % (ref 0–0.9)
LYMPHOCYTES # BLD AUTO: 1.43 K/UL (ref 1–4.8)
LYMPHOCYTES NFR BLD: 8.2 % (ref 22–41)
MAGNESIUM SERPL-MCNC: 1.9 MG/DL (ref 1.5–2.5)
MCH RBC QN AUTO: 29 PG (ref 27–33)
MCHC RBC AUTO-ENTMCNC: 33.9 G/DL (ref 32.2–35.5)
MCV RBC AUTO: 85.5 FL (ref 81.4–97.8)
MONOCYTES # BLD AUTO: 1.44 K/UL (ref 0–0.85)
MONOCYTES NFR BLD AUTO: 8.2 % (ref 0–13.4)
NEUTROPHILS # BLD AUTO: 14.25 K/UL (ref 1.82–7.42)
NEUTROPHILS NFR BLD: 81.2 % (ref 44–72)
NRBC # BLD AUTO: 0.03 K/UL
NRBC BLD-RTO: 0.2 /100 WBC (ref 0–0.2)
PATHOLOGY CONSULT NOTE: NORMAL
PHOSPHATE SERPL-MCNC: 1.6 MG/DL (ref 2.5–4.5)
PLATELET # BLD AUTO: 199 K/UL (ref 164–446)
PMV BLD AUTO: 9.3 FL (ref 9–12.9)
POTASSIUM SERPL-SCNC: 3.2 MMOL/L (ref 3.6–5.5)
RBC # BLD AUTO: 3.45 M/UL (ref 4.2–5.4)
SIGNIFICANT IND 70042: NORMAL
SITE SITE: NORMAL
SODIUM SERPL-SCNC: 139 MMOL/L (ref 135–145)
SOURCE SOURCE: NORMAL
WBC # BLD AUTO: 17.5 K/UL (ref 4.8–10.8)

## 2024-11-15 PROCEDURE — A9270 NON-COVERED ITEM OR SERVICE: HCPCS | Performed by: HOSPITALIST

## 2024-11-15 PROCEDURE — 0DB58ZX EXCISION OF ESOPHAGUS, VIA NATURAL OR ARTIFICIAL OPENING ENDOSCOPIC, DIAGNOSTIC: ICD-10-PCS | Performed by: SPECIALIST

## 2024-11-15 PROCEDURE — 700102 HCHG RX REV CODE 250 W/ 637 OVERRIDE(OP): Performed by: INTERNAL MEDICINE

## 2024-11-15 PROCEDURE — 160035 HCHG PACU - 1ST 60 MINS PHASE I: Performed by: SPECIALIST

## 2024-11-15 PROCEDURE — 43239 EGD BIOPSY SINGLE/MULTIPLE: CPT | Performed by: SPECIALIST

## 2024-11-15 PROCEDURE — 110371 HCHG SHELL REV 272: Performed by: SPECIALIST

## 2024-11-15 PROCEDURE — 160048 HCHG OR STATISTICAL LEVEL 1-5: Performed by: SPECIALIST

## 2024-11-15 PROCEDURE — 700102 HCHG RX REV CODE 250 W/ 637 OVERRIDE(OP): Performed by: HOSPITALIST

## 2024-11-15 PROCEDURE — 80048 BASIC METABOLIC PNL TOTAL CA: CPT

## 2024-11-15 PROCEDURE — 700102 HCHG RX REV CODE 250 W/ 637 OVERRIDE(OP)

## 2024-11-15 PROCEDURE — 88305 TISSUE EXAM BY PATHOLOGIST: CPT | Mod: 59

## 2024-11-15 PROCEDURE — 160002 HCHG RECOVERY MINUTES (STAT): Performed by: SPECIALIST

## 2024-11-15 PROCEDURE — 700111 HCHG RX REV CODE 636 W/ 250 OVERRIDE (IP): Performed by: INTERNAL MEDICINE

## 2024-11-15 PROCEDURE — 99233 SBSQ HOSP IP/OBS HIGH 50: CPT | Performed by: INTERNAL MEDICINE

## 2024-11-15 PROCEDURE — 94669 MECHANICAL CHEST WALL OSCILL: CPT

## 2024-11-15 PROCEDURE — 45378 DIAGNOSTIC COLONOSCOPY: CPT | Performed by: SPECIALIST

## 2024-11-15 PROCEDURE — A9270 NON-COVERED ITEM OR SERVICE: HCPCS | Performed by: INTERNAL MEDICINE

## 2024-11-15 PROCEDURE — 770001 HCHG ROOM/CARE - MED/SURG/GYN PRIV*

## 2024-11-15 PROCEDURE — A9270 NON-COVERED ITEM OR SERVICE: HCPCS

## 2024-11-15 PROCEDURE — 700101 HCHG RX REV CODE 250: Performed by: STUDENT IN AN ORGANIZED HEALTH CARE EDUCATION/TRAINING PROGRAM

## 2024-11-15 PROCEDURE — 85025 COMPLETE CBC W/AUTO DIFF WBC: CPT

## 2024-11-15 PROCEDURE — 700105 HCHG RX REV CODE 258: Performed by: STUDENT IN AN ORGANIZED HEALTH CARE EDUCATION/TRAINING PROGRAM

## 2024-11-15 PROCEDURE — 160009 HCHG ANES TIME/MIN: Performed by: SPECIALIST

## 2024-11-15 PROCEDURE — 99222 1ST HOSP IP/OBS MODERATE 55: CPT | Mod: AI | Performed by: HOSPITALIST

## 2024-11-15 PROCEDURE — 700105 HCHG RX REV CODE 258: Performed by: INTERNAL MEDICINE

## 2024-11-15 PROCEDURE — A9270 NON-COVERED ITEM OR SERVICE: HCPCS | Mod: JZ | Performed by: INTERNAL MEDICINE

## 2024-11-15 PROCEDURE — 85018 HEMOGLOBIN: CPT

## 2024-11-15 PROCEDURE — 700101 HCHG RX REV CODE 250: Performed by: INTERNAL MEDICINE

## 2024-11-15 PROCEDURE — 700102 HCHG RX REV CODE 250 W/ 637 OVERRIDE(OP): Mod: JZ | Performed by: INTERNAL MEDICINE

## 2024-11-15 PROCEDURE — 83735 ASSAY OF MAGNESIUM: CPT

## 2024-11-15 PROCEDURE — 160208 HCHG ENDO MINUTES - EA ADDL 1 MIN LEVEL 4: Performed by: SPECIALIST

## 2024-11-15 PROCEDURE — 84100 ASSAY OF PHOSPHORUS: CPT

## 2024-11-15 PROCEDURE — 160203 HCHG ENDO MINUTES - 1ST 30 MINS LEVEL 4: Performed by: SPECIALIST

## 2024-11-15 PROCEDURE — 94640 AIRWAY INHALATION TREATMENT: CPT

## 2024-11-15 PROCEDURE — 99222 1ST HOSP IP/OBS MODERATE 55: CPT | Mod: AI,25 | Performed by: SPECIALIST

## 2024-11-15 PROCEDURE — 0DBN8ZZ EXCISION OF SIGMOID COLON, VIA NATURAL OR ARTIFICIAL OPENING ENDOSCOPIC: ICD-10-PCS | Performed by: SPECIALIST

## 2024-11-15 PROCEDURE — 700111 HCHG RX REV CODE 636 W/ 250 OVERRIDE (IP): Performed by: STUDENT IN AN ORGANIZED HEALTH CARE EDUCATION/TRAINING PROGRAM

## 2024-11-15 RX ORDER — POTASSIUM CHLORIDE 1500 MG/1
40 TABLET, EXTENDED RELEASE ORAL
Status: DISPENSED | OUTPATIENT
Start: 2024-11-15 | End: 2024-11-15

## 2024-11-15 RX ORDER — LIDOCAINE HYDROCHLORIDE 20 MG/ML
INJECTION, SOLUTION EPIDURAL; INFILTRATION; INTRACAUDAL; PERINEURAL PRN
Status: DISCONTINUED | OUTPATIENT
Start: 2024-11-15 | End: 2024-11-15 | Stop reason: SURG

## 2024-11-15 RX ORDER — MAGNESIUM SULFATE HEPTAHYDRATE 40 MG/ML
2 INJECTION, SOLUTION INTRAVENOUS ONCE
Status: COMPLETED | OUTPATIENT
Start: 2024-11-15 | End: 2024-11-15

## 2024-11-15 RX ORDER — DIPHENHYDRAMINE HYDROCHLORIDE 50 MG/ML
12.5 INJECTION INTRAMUSCULAR; INTRAVENOUS
Status: DISCONTINUED | OUTPATIENT
Start: 2024-11-15 | End: 2024-11-15 | Stop reason: HOSPADM

## 2024-11-15 RX ORDER — ALBUTEROL SULFATE 5 MG/ML
2.5 SOLUTION RESPIRATORY (INHALATION)
Status: DISCONTINUED | OUTPATIENT
Start: 2024-11-15 | End: 2024-11-15 | Stop reason: HOSPADM

## 2024-11-15 RX ORDER — SODIUM CHLORIDE 9 MG/ML
INJECTION, SOLUTION INTRAVENOUS CONTINUOUS
Status: DISCONTINUED | OUTPATIENT
Start: 2024-11-15 | End: 2024-11-15 | Stop reason: HOSPADM

## 2024-11-15 RX ORDER — HALOPERIDOL 5 MG/ML
1 INJECTION INTRAMUSCULAR
Status: DISCONTINUED | OUTPATIENT
Start: 2024-11-15 | End: 2024-11-15 | Stop reason: HOSPADM

## 2024-11-15 RX ORDER — SODIUM CHLORIDE 9 MG/ML
INJECTION, SOLUTION INTRAVENOUS
Status: DISCONTINUED | OUTPATIENT
Start: 2024-11-15 | End: 2024-11-15 | Stop reason: SURG

## 2024-11-15 RX ORDER — ONDANSETRON 2 MG/ML
4 INJECTION INTRAMUSCULAR; INTRAVENOUS
Status: DISCONTINUED | OUTPATIENT
Start: 2024-11-15 | End: 2024-11-15 | Stop reason: HOSPADM

## 2024-11-15 RX ORDER — POTASSIUM CHLORIDE 7.45 MG/ML
10 INJECTION INTRAVENOUS
Status: DISCONTINUED | OUTPATIENT
Start: 2024-11-15 | End: 2024-11-15

## 2024-11-15 RX ORDER — EPHEDRINE SULFATE 50 MG/ML
5 INJECTION, SOLUTION INTRAVENOUS
Status: DISCONTINUED | OUTPATIENT
Start: 2024-11-15 | End: 2024-11-15 | Stop reason: HOSPADM

## 2024-11-15 RX ORDER — HYDRALAZINE HYDROCHLORIDE 20 MG/ML
5 INJECTION INTRAMUSCULAR; INTRAVENOUS
Status: DISCONTINUED | OUTPATIENT
Start: 2024-11-15 | End: 2024-11-15 | Stop reason: HOSPADM

## 2024-11-15 RX ADMIN — IPRATROPIUM BROMIDE AND ALBUTEROL SULFATE 3 ML: .5; 2.5 SOLUTION RESPIRATORY (INHALATION) at 19:35

## 2024-11-15 RX ADMIN — MONTELUKAST 10 MG: 10 TABLET, FILM COATED ORAL at 18:18

## 2024-11-15 RX ADMIN — PROPOFOL 150 MCG/KG/MIN: 10 INJECTION, EMULSION INTRAVENOUS at 11:45

## 2024-11-15 RX ADMIN — AMPICILLIN AND SULBACTAM 3 G: 1; 2 INJECTION, POWDER, FOR SOLUTION INTRAMUSCULAR; INTRAVENOUS at 05:24

## 2024-11-15 RX ADMIN — LIDOCAINE HYDROCHLORIDE 30 MG: 20 INJECTION, SOLUTION EPIDURAL; INFILTRATION; INTRACAUDAL; PERINEURAL at 11:46

## 2024-11-15 RX ADMIN — MAGNESIUM SULFATE HEPTAHYDRATE 2 G: 2 INJECTION, SOLUTION INTRAVENOUS at 08:31

## 2024-11-15 RX ADMIN — PANTOPRAZOLE SODIUM 40 MG: 40 INJECTION, POWDER, FOR SOLUTION INTRAVENOUS at 05:24

## 2024-11-15 RX ADMIN — SPIRONOLACTONE 25 MG: 25 TABLET ORAL at 05:20

## 2024-11-15 RX ADMIN — IPRATROPIUM BROMIDE AND ALBUTEROL SULFATE 3 ML: .5; 2.5 SOLUTION RESPIRATORY (INHALATION) at 06:53

## 2024-11-15 RX ADMIN — DULOXETINE HYDROCHLORIDE 60 MG: 60 CAPSULE, DELAYED RELEASE ORAL at 05:19

## 2024-11-15 RX ADMIN — DIBASIC SODIUM PHOSPHATE, MONOBASIC POTASSIUM PHOSPHATE AND MONOBASIC SODIUM PHOSPHATE 500 MG: 852; 155; 130 TABLET ORAL at 18:18

## 2024-11-15 RX ADMIN — TRAMADOL HYDROCHLORIDE 100 MG: 50 TABLET ORAL at 23:33

## 2024-11-15 RX ADMIN — DIBASIC SODIUM PHOSPHATE, MONOBASIC POTASSIUM PHOSPHATE AND MONOBASIC SODIUM PHOSPHATE 500 MG: 852; 155; 130 TABLET ORAL at 20:37

## 2024-11-15 RX ADMIN — PROPOFOL 50 MG: 10 INJECTION, EMULSION INTRAVENOUS at 11:46

## 2024-11-15 RX ADMIN — AMOXICILLIN AND CLAVULANATE POTASSIUM 1 TABLET: 875; 125 TABLET, FILM COATED ORAL at 18:17

## 2024-11-15 RX ADMIN — POTASSIUM CHLORIDE 40 MEQ: 1500 TABLET, EXTENDED RELEASE ORAL at 09:02

## 2024-11-15 RX ADMIN — LOSARTAN POTASSIUM 50 MG: 50 TABLET, FILM COATED ORAL at 18:17

## 2024-11-15 RX ADMIN — SODIUM CHLORIDE: 9 INJECTION, SOLUTION INTRAVENOUS at 11:41

## 2024-11-15 RX ADMIN — LOSARTAN POTASSIUM 50 MG: 50 TABLET, FILM COATED ORAL at 05:19

## 2024-11-15 RX ADMIN — FLUTICASONE FUROATE, UMECLIDINIUM BROMIDE AND VILANTEROL TRIFENATATE 1 PUFF: 200; 62.5; 25 POWDER RESPIRATORY (INHALATION) at 05:20

## 2024-11-15 RX ADMIN — POTASSIUM CHLORIDE 10 MEQ: 7.46 INJECTION, SOLUTION INTRAVENOUS at 08:30

## 2024-11-15 RX ADMIN — PREDNISONE 40 MG: 20 TABLET ORAL at 05:19

## 2024-11-15 RX ADMIN — ENALAPRILAT 1.25 MG: 1.25 INJECTION INTRAVENOUS at 18:23

## 2024-11-15 SDOH — ECONOMIC STABILITY: TRANSPORTATION INSECURITY
IN THE PAST 12 MONTHS, HAS LACK OF RELIABLE TRANSPORTATION KEPT YOU FROM MEDICAL APPOINTMENTS, MEETINGS, WORK OR FROM GETTING THINGS NEEDED FOR DAILY LIVING?: NO

## 2024-11-15 ASSESSMENT — PAIN DESCRIPTION - PAIN TYPE
TYPE: SURGICAL PAIN
TYPE: ACUTE PAIN
TYPE: SURGICAL PAIN
TYPE: ACUTE PAIN
TYPE: CHRONIC PAIN
TYPE: SURGICAL PAIN
TYPE: ACUTE PAIN
TYPE: SURGICAL PAIN
TYPE: ACUTE PAIN

## 2024-11-15 ASSESSMENT — SOCIAL DETERMINANTS OF HEALTH (SDOH)
WITHIN THE PAST 12 MONTHS, THE FOOD YOU BOUGHT JUST DIDN'T LAST AND YOU DIDN'T HAVE MONEY TO GET MORE: NEVER TRUE
WITHIN THE LAST YEAR, HAVE TO BEEN RAPED OR FORCED TO HAVE ANY KIND OF SEXUAL ACTIVITY BY YOUR PARTNER OR EX-PARTNER?: NO
IN THE PAST 12 MONTHS, HAS THE ELECTRIC, GAS, OIL, OR WATER COMPANY THREATENED TO SHUT OFF SERVICE IN YOUR HOME?: NO
WITHIN THE LAST YEAR, HAVE YOU BEEN KICKED, HIT, SLAPPED, OR OTHERWISE PHYSICALLY HURT BY YOUR PARTNER OR EX-PARTNER?: NO
WITHIN THE LAST YEAR, HAVE YOU BEEN AFRAID OF YOUR PARTNER OR EX-PARTNER?: NO
WITHIN THE PAST 12 MONTHS, YOU WORRIED THAT YOUR FOOD WOULD RUN OUT BEFORE YOU GOT THE MONEY TO BUY MORE: NEVER TRUE
WITHIN THE LAST YEAR, HAVE YOU BEEN HUMILIATED OR EMOTIONALLY ABUSED IN OTHER WAYS BY YOUR PARTNER OR EX-PARTNER?: NO

## 2024-11-15 ASSESSMENT — ENCOUNTER SYMPTOMS
BACK PAIN: 1
DOUBLE VISION: 0
FALLS: 0
BLURRED VISION: 0
DIAPHORESIS: 0
BLOOD IN STOOL: 0
WEIGHT LOSS: 0
SHORTNESS OF BREATH: 1
STRIDOR: 0
PHOTOPHOBIA: 0
NERVOUS/ANXIOUS: 0
SPEECH CHANGE: 0
SPUTUM PRODUCTION: 0
ORTHOPNEA: 0
ABDOMINAL PAIN: 0
PALPITATIONS: 0
COUGH: 1
TINGLING: 0

## 2024-11-15 ASSESSMENT — LIFESTYLE VARIABLES
ALCOHOL_USE: NO
CONSUMPTION TOTAL: NEGATIVE
ON A TYPICAL DAY WHEN YOU DRINK ALCOHOL HOW MANY DRINKS DO YOU HAVE: 0
DOES PATIENT WANT TO STOP DRINKING: NO
AVERAGE NUMBER OF DAYS PER WEEK YOU HAVE A DRINK CONTAINING ALCOHOL: 0
EVER FELT BAD OR GUILTY ABOUT YOUR DRINKING: NO
EVER HAD A DRINK FIRST THING IN THE MORNING TO STEADY YOUR NERVES TO GET RID OF A HANGOVER: NO
HAVE PEOPLE ANNOYED YOU BY CRITICIZING YOUR DRINKING: NO
TOTAL SCORE: 0
HAVE YOU EVER FELT YOU SHOULD CUT DOWN ON YOUR DRINKING: NO
HOW MANY TIMES IN THE PAST YEAR HAVE YOU HAD 5 OR MORE DRINKS IN A DAY: 0

## 2024-11-15 ASSESSMENT — PAIN SCALES - GENERAL: PAIN_LEVEL: 2

## 2024-11-15 NOTE — ANESTHESIA TIME REPORT
Anesthesia Start and Stop Event Times       Date Time Event    11/15/2024 1100 Ready for Procedure     1141 Anesthesia Start     1215 Anesthesia Stop          Responsible Staff  11/15/24      Name Role Begin End    Gregory Aceves D.O. Anesth 1141 1215          Overtime Reason:  no overtime (within assigned shift)    Comments:

## 2024-11-15 NOTE — ANESTHESIA PREPROCEDURE EVALUATION
Case: [id number] Date/Time: 11/15/24 1051    Procedures:       COLONOSCOPY      GASTROSCOPY    Location: [facility] ROOM 26 / SURGERY SAME DAY [facility]    Surgeons: Mela Vargas M.D.            Relevant Problems   PULMONARY   (positive) Acute exacerbation of chronic obstructive pulmonary disease (COPD) ([facility])   (positive) Panlobular emphysema (HCC)   (positive) Stage 4 very severe COPD by GOLD classification (HCC)      CARDIAC   (positive) Atherosclerosis of aorta (HCC)   (positive) Hemorrhoids   (positive) PVCs (premature ventricular contractions)   (positive) Primary hypertension   (positive) SVT (supraventricular tachycardia) ([facility])   (positive) Varicose veins of left leg with edema       Physical Exam    Airway   Mallampati: II  TM distance: >3 FB  Neck ROM: full       Cardiovascular - normal exam  Rhythm: regular  Rate: normal  (-) murmur     Dental - normal exam           Pulmonary   (+) decreased breath sounds     Abdominal    Neurological - normal exam                   Anesthesia Plan    ASA 3   ASA physical status 3 criteria: COPD - poorly controlled    Plan - general       Airway plan will be natural airway          Induction: intravenous    Postoperative Plan: Postoperative administration of opioids is intended.    Pertinent diagnostic labs and testing reviewed    Informed Consent:    Anesthetic plan and risks discussed with patient.    Use of blood products discussed with: patient whom consented to blood products.

## 2024-11-15 NOTE — CARE PLAN
"The patient is Watcher - Medium risk of patient condition declining or worsening    Shift Goals  Clinical Goals: Stable Hb, Hemodynamic stability, comfort, pain mgmt prn, finish bowel prep  Patient Goals: \"Steak and lobster\", rest, go home  Family Goals: LORA    Progress made toward(s) clinical / shift goals:    Problem: Knowledge Deficit - Standard  Goal: Patient and family/care givers will demonstrate understanding of plan of care, disease process/condition, diagnostic tests and medications  Outcome: Progressing     Problem: Knowledge Deficit - COPD  Goal: Patient/significant other demonstrates understanding of disease process, utilization of the Action Plan, medications and discharge instruction  Outcome: Progressing     Problem: Skin Integrity  Goal: Skin integrity is maintained or improved  Outcome: Progressing     Problem: Pain - Standard  Goal: Alleviation of pain or a reduction in pain to the patient’s comfort goal  Outcome: Progressing       Patient is not progressing towards the following goals:      "

## 2024-11-15 NOTE — OR NURSING
1223 assumed care, patient tolerating PO fluids    1245 Report given to Candie on CIC, awaiting medical bed, ok to eat and drink per Dr. Vargas    1307 handoff to kelsi HARRISON

## 2024-11-15 NOTE — PROGRESS NOTES
Pt continues to be hypertensive, outside of goal range despite PRN IV enalapril. Resident notified. Orders for PRN Labetalol.

## 2024-11-15 NOTE — OR NURSING
1310 report received from Marialuisa Guerra and care of patient assumed at this time.     1445 Purewick applied. Patient resting comfortably. Awaiting meal tray and room assignment.     1451 Patient will be transferring to [facility]; T6 RN Candie notified, will give report to T3 RN.     1522 Spoke with T3 charge RN Leeanne Donald will be receiving patient and is getting report from T6 HUNTER Schreiber.     Handoff report given to Alana GUERRA.

## 2024-11-15 NOTE — ANESTHESIA POSTPROCEDURE EVALUATION
Patient: Berny Renee    Procedure Summary       Date: 11/15/24 Room / Location: [facility] ROOM 26 / SURGERY SAME DAY [facility]    Anesthesia Start: 1141 Anesthesia Stop: 1215    Procedures:       COLONOSCOPY, WITH POLYPECTOMY (Anus)      GASTROSCOPY, WITH BIOPSY (Esophagus) Diagnosis: (Hiatal Hernia, Barretts Esophagus, Diverticulosis, Polyp, Hemorrhoids)    Surgeons: Mela Vargas M.D. Responsible Provider: Gregory Aceves D.O.    Anesthesia Type: general ASA Status: 3            Final Anesthesia Type: general  Last vitals  BP   Blood Pressure : (!) 165/70    Temp   37.3 °C (99.1 °F)    Pulse   72   Resp   18    SpO2   96 %      Anesthesia Post Evaluation    Patient location during evaluation: PACU  Patient participation: complete - patient participated  Level of consciousness: awake and alert  Pain score: 2    Airway patency: patent  Anesthetic complications: no  Cardiovascular status: hemodynamically stable  Respiratory status: acceptable  Hydration status: euvolemic    PONV: none          No notable events documented.     Nurse Pain Score: 0 (NPRS)

## 2024-11-15 NOTE — OR NURSING
1524 Report received form Treasure RN.    1606 RN confirmed floor RN received report. Pt placed on transport.    1614 Patient escorted out to room [facility] via rney by transport. Belongings with patient.

## 2024-11-15 NOTE — PROCEDURES
OPERATIVE REPORT        PATIENT: Berny Renee  1945      PREOPERATIVE DIAGNOSIS/INDICATION: hematochezia    POSTOPERATIVE DIAGNOSES: diverticular bleed likely, internal hemorrhoids, colon polyp    PROCEDURE: COLONOSCOPY    PHYSICIAN: Mela Vargas MD    CONSENT:  OBTAINED. The risks, benefits and alternatives of the procedure were discussed in details. The risks include and are not limited to bleeding, infection, perforation, missed lesions, and sedations risks (cardiopulmonary compromise and allergic reaction to medications).    ANESTHESIA:  Per anesthesiologist.    LOCATION: [facility]    DESCRIPTION:  The patient presented to the procedure room.  After routine checkup was performed, patient was brought into endoscopy suite.  Patient was placed on his left lateral decubitus position.  Patient was sedated by anesthesia. Vital signs were monitored throughout procedure.  Oxygenation support was provided throughout procedure. Digital rectal examination was performed.  Then, a colonoscope was inserted into patient's anus, advanced to the cecum under direct visualization. Both the ileocecal valve and appendiceal orifice were seen.  Once this site was reached and examined, the colonoscope was withdrawn slowly to rectum where retroflexion was performed. The scope was then fully removed from the rectum/anal canal and the  procedure was terminated. Withdrawal time was at least 6 minutes to ensure adequate examination.     The patient tolerated the procedure well.  There were no immediate complications.         Digital rectal examination small external hemorrhoids, Grade a  Endoscope advanced to the cecum  [place] prep score:   Right colon: 1; Transverse colon: 1; Left Colon: 2. BPS score: 4    FINDINGS:  There were large and medium sized diverticula on left side of colon. Old blood. Thre ws no old blood in transverse colon or right sided, but prep was very poor.  3 mm colon polyp, removed with cold snare.  Grade B  internal hemorrhoids, but large hypertrophied papillae on retroflexion    RECOMMENDATIONS:  Likely diverticular bleed  Anusol HC pr BID for 7 days  GI to sign off  No active bleeding at this time    This note has been transcribed with digital voice recognition software and although it has been reviewed may contain grammatical or word errors

## 2024-11-15 NOTE — PROCEDURES
OPERATIVE REPORT    PATIENT:   Berny Renee   1945       PREOPERATIVE DIAGNOSES/INDICATIONS: Iron def anemia    POSTOPERATIVE DIAGNOSIS: Nicolas's, hiatal hernia    PROCEDURE:  ESOPHAGOGASTRODUODENOSCOPY biopsy    PHYSICIAN:  Mela Vargas MD    ANESTHESIA:  Per anesthesiologist.    ESTIMATED BLOOD LOSS:nil    LOCATION: [facility]    CONSENT:  OBTAINED. The risks, benefits and alternatives of the procedure were discussed in details. The risks include and are not limited to bleeding, infection, perforation, missed lesions, and sedations risks (cardiopulmonary compromise and allergic reaction to medications).    DESCRIPTION: The patient presented to the procedure room.  After routine checkup was performed, patient was brought into the endoscopy suite.  Patient was placed on his left lateral decubitus position. A bite block was placed in patient's mouth. Patient was sedated by anesthesia.  Vital signs were monitored throughout the procedure.  Oxygenation support was provided throughout the procedure. Upper endoscope was inserted into patient's mouth and advanced to the second portion of the duodenum under direct visualization.      Once the site was reached and examined, the upper endoscope was withdrawn.  Retroflexion was made within the stomach.  The stomach was decompressed, scope was withdrawn and the procedure was terminated.     The patient tolerated the procedure well.  There were no immediate complications.    OPERATIVE FINDINGS:    1. Esophagus: 2 tongues of 1.5 cm salmon colored mucosa . Biopsied  2. Stomach: hiatal hernia, otherwise normal   3. 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

## 2024-11-15 NOTE — OR NURSING
1214: Pt arrived from OR to PACU 10. Connected to monitor. Report received from anesthesia & RN. VSS. Oxygen at 8L via mask with oral airway in place, dc'd upon arrival. Breaths calm, even, and unlabored.  No signs of pain.     1223 report to brock kimbrough

## 2024-11-15 NOTE — CONSULTS
GASTROENTEROLOGY CONSULTATION    PATIENT NAME: Berny Renee  : 1945  CSN: [id number]  MRN:  [id number]     CONSULTATION DATE:  11/15/2024    PRIMARY CARE PROVIDER:  Everett Diego M.D.      REASON FOR CONSULT:  Hematochezia  Consult requested by Dr. Edin Villanueva    HISTORY OF PRESENT ILLNESS:  Berny Renee is a 79 y.o. female 79 y.o. female who presented 2024 with Hx of COPD gold stage D on chronic 3-7L of oxygen with hx of 60 pack years of tobacco and has since quit, HTN, lung cancer, chol, chronic constipation, chronic pain, depression, extremal hemorrhoids, diverticulosis. That presents feeling shortness of breath worse on exertion, increase sputum production and subjective fever. She has had no sick contact or sore throat or running nose. She was placed on antibiotics but continues to feel poorly. She also described left leg swelling and was suppose to get lower ext doppler but felt unwell to get this done. She presented today by EMS with significant SOB, received steroids and nebulizer in route. No hx of CHF or prior DVT or PE. Also with rectal bleeding bright red and hx of constipation with some bloating but no abdominal pain or vomiting. She was place on Bipap with improved symptoms, found to have hg of 5.7 not on aspirin or anticoagulation. She has been order 2 units PRBC. Her first day of admission she was too ill for GI procedures, but she is at baseline at this time.. Her SOB was likely partially secondary to anemia. She ha bled from hemorrhoids i the pat but this was very different. This was large volume and occurred with every bowel movement. She does not have any UGI symptoms. She says she has not had an EGD or colonoscopy. She is able to talk without SOB or effort. CT scan shows diverticulosis. She is off BIPAP    PAST MEDICAL HISTORY:  Past Medical History:   Diagnosis Date    Acute on chronic respiratory failure with hypoxia (HCC) 2020    Arthritis     spine    Asthma  with COPD ([facility])     BMI 31.0-31.9,adult 02/04/2022    Cataract immature     Chronic pain     Chronic respiratory failure with hypoxia ([facility]) 07/13/2017    Colon polyps     COPD (chronic obstructive pulmonary disease) ([facility]) 2002    moderate to severe    Cough     DDD (degenerative disc disease), cervical     DDD (degenerative disc disease), thoracic     Dependence on continuous supplemental oxygen 08/13/2015    IMO load March 2020    Diverticulitis of colon     Dyslipidemia     EMPHYSEMA     H/O opioid abuse ([facility]) 07/15/2021    Hypertension     Obesity (BMI 30-39.9) 10/24/2016    Opioid type dependence, continuous ([facility]) 02/04/2022    REGINE (obstructive sleep apnea)     OSTEOPOROSIS     Painful breathing     Shortness of breath     Spinal stenosis, lumbar region, with neurogenic claudication     Sputum production     URINARY INCONTINENCE     Varicose veins of left leg with edema 10/11/2024    Vitamin d deficiency     Wheezing        PAST SURGICAL HISTORY:  Past Surgical History:   Procedure Laterality Date    LUMBAR LAMINECTOMY DISKECTOMY  6/22/2010    Performed by GRACIE CANO at SURGERY [facility] [place]    OTHER ORTHOPEDIC SURGERY  2004    left ankle fx    OTHER      leg fracture    OTHER      t spine disc surg    TONSILLECTOMY          CURRENT MEDS:  Current Facility-Administered Medications   Medication Dose Route Frequency Provider Last Rate Last Admin    traMADol (Ultram) 50 MG tablet 100 mg  100 mg Oral Q6HRS PRN Rozina Mares M.D.   100 mg at 11/14/24 0041    predniSONE (Deltasone) tablet 40 mg  40 mg Oral DAILY Edin Fish M.D.   40 mg at 11/15/24 0519    ampicillin/sulbactam (Unasyn) 3 g in  mL IVPB  3 g Intravenous Q6HRS Edin Fish M.D. 200 mL/hr at 11/15/24 0524 3 g at 11/15/24 0524    ipratropium-albuterol (DUONEB) nebulizer solution  3 mL Nebulization 4X/DAY (RT) Edin Fish M.D.   3 mL at 11/14/24 1915    albuterol (Proventil) 2.5mg/0.5ml nebulizer solution 2.5 mg   2.5 mg Nebulization Q2HRS PRN (RT) Edin Fish M.D.        benzocaine-menthol (Cepacol) lozenge 1 Lozenge  1 Lozenge Mouth/Throat Q4HRS PRN Anais Gutierrez M.D.   1 Lozenge at 11/14/24 1723    labetalol (Normodyne/Trandate) injection 10 mg  10 mg Intravenous Q4HRS PRN Rozina Mares M.D.   10 mg at 11/14/24 2320    pantoprazole (Protonix) injection 40 mg  40 mg Intravenous BID Gregory Baron M.D.   40 mg at 11/15/24 0524    Respiratory Therapy Consult   Nebulization Continuous RT Gregory Baron M.D.        enalaprilat injection 1.25 mg 1 mL  1.25 mg Intravenous Q8HRS PRN Gregory Baron M.D.   1.25 mg at 11/14/24 2211    DULoxetine (Cymbalta) capsule 60 mg  60 mg Oral DAILY Gregory Baron M.D.   60 mg at 11/15/24 0519    fluticasone-umeclidinium-vilanterol (Trelegy Ellipta) 200-62.5-25 mcg/act inhaler 1 Puff  1 Puff Inhalation DAILY Gregory Baron M.D.   1 Puff at 11/15/24 0520    losartan (Cozaar) tablet 50 mg  50 mg Oral BID Gregory Baron M.D.   50 mg at 11/15/24 0519    montelukast (Singulair) tablet 10 mg  10 mg Oral Q EVENING Gregory Baron M.D.   10 mg at 11/14/24 1802    spironolactone (Aldactone) tablet 25 mg  25 mg Oral Q DAY Gregory Baron M.D.   25 mg at 11/15/24 0520    azithromycin (Zithromax) tablet 500 mg  500 mg Oral Daily-1400 Gregory Baron M.D.   500 mg at 11/14/24 1455    MD Alert...ICU Electrolyte Replacement per Pharmacy   Other PHARMACY TO DOSE Edin P Fish, M.D.            ALLERGIES:  Allergies   Allergen Reactions    Iodine      Convulsion  I.V.  iodine    Tape Rash and Itching       SOCIAL HISTORY:  Social History     Socioeconomic History    Marital status:      Spouse name: Not on file    Number of children: Not on file    Years of education: Not on file    Highest education level: Not on file   Occupational History    Not on file   Tobacco Use    Smoking status: Former     Current packs/day: 0.00     Average packs/day: 2.0  packs/day for 30.0 years (60.0 ttl pk-yrs)     Types: Cigarettes     Start date: 3/1/1969     Quit date: 3/1/1999     Years since quittin.7    Smokeless tobacco: Never    Tobacco comments:     3 pks a day for 35 yrs, continued abstinence   Vaping Use    Vaping status: Never Used   Substance and Sexual Activity    Alcohol use: Not Currently     Alcohol/week: 4.2 oz     Types: 7 Glasses of wine per week    Drug use: Not Currently     Types: Marijuana, Oral     Comment: Medical Marijuana     Sexual activity: Never   Other Topics Concern    Not on file   Social History Narrative    ** Merged History Encounter **          Social Drivers of Health     Financial Resource Strain: Low Risk  (10/18/2024)    Overall Financial Resource Strain (CARDIA)     Difficulty of Paying Living Expenses: Not very hard   Food Insecurity: No Food Insecurity (10/18/2024)    Hunger Vital Sign     Worried About Running Out of Food in the Last Year: Never true     Ran Out of Food in the Last Year: Never true   Transportation Needs: No Transportation Needs (10/18/2024)    PRAPARE - Transportation     Lack of Transportation (Medical): No     Lack of Transportation (Non-Medical): No   Physical Activity: Inactive (10/18/2024)    Exercise Vital Sign     Days of Exercise per Week: 0 days     Minutes of Exercise per Session: 0 min   Stress: No Stress Concern Present (10/18/2024)    [place] [employer] - Occupational Stress Questionnaire     Feeling of Stress : Not at all   Social Connections: Moderately Isolated (10/18/2024)    Social Connection and Isolation Panel [NHANES]     Frequency of Communication with Friends and Family: Three times a week     Frequency of Social Gatherings with Friends and Family: Twice a week     Attends [employer] Services: More than 4 times per year     Active Member of Clubs or Organizations: No     Attends Club or Organization Meetings: Never     Marital Status:    Intimate Partner  "Violence: Not At Risk (10/18/2024)    Humiliation, Afraid, Rape, and Kick questionnaire     Fear of Current or Ex-Partner: No     Emotionally Abused: No     Physically Abused: No     Sexually Abused: No   Housing Stability: Low Risk  (10/18/2024)    Housing Stability Vital Sign     Unable to Pay for Housing in the Last Year: No     Number of Times Moved in the Last Year: 1     Homeless in the Last Year: No       FAMILY HISTORY:  Family History   Problem Relation Age of Onset    Cancer Father         Lung    Other Sister         lung and leukemia cancer    Cancer Sister         Leukemia, Lung        REVIEW OF SYSTEMS:  General ROS: Negative for - chills, fever, night sweats or weight loss.  HEENT ROS: Negative  Respiratory ROS: Negative for - cough or shortness of breath.  Cardiovascular ROS:  Negative for - chest pain or palpitations.  Gastrointestinal ROS: As per the history of present illness.  Genito-Urinary ROS: Negative  Musculoskeletal ROS: Negative.  Neurological ROS: Negative  Skin ROS: negative  Hematology ROS: negative  Endocrinology ROS: Negative        PHYSICAL EXAM:  VITALS: BP (!) 148/66   Pulse (!) 59   Temp 36.2 °C (97.2 °F) (Temporal)   Resp (!) 39   Ht 1.626 m (5' 4\")   Wt 77.5 kg (170 lb 13.7 oz)   LMP 06/07/2001   SpO2 98%   BMI 29.33 kg/m²   GEN:  Berny Renee is a 79 y.o. female in no acute distress.speaking without SOB  HEENT: Mucous membranes pink and moist.  Sclera anicteric.    NECK:    Neck supple without lymphadenopathy or thyromegaly.  LUNGS: Bilateral rales to auscultation posteriorly.  HEART: Regular rate and rhythm. S1 and S2 normal. No murmurs, gallops  ABD:  + BS nt/nd  RECTAL: Not done at this time.  EXT:  Without cyanosis, deformity or pitting edema.  SKIN:  Pink, warm, dry.  NEURO: Grossly intact, A/OR.    LABS:  Recent Labs     11/13/24  1022 11/14/24  0530 11/15/24  0440   WBC 28.2* 18.7* 17.5*   MCV 93.1 83.6 85.5     Recent Labs     11/13/24  1022 11/14/24  0530 " "11/15/24  0440   GLUCOSE 191* 166* 105*   BUN 26* 16 13   CO2 27 28 32     Lab Results   Component Value Date    INR 1.08 11/13/2024    INR 0.95 03/15/2013    INR 0.92 09/30/2011     No components found for: \"ALT\", \"AST\", \"GGT\", \"ALKPHOS\"  No results found for: \"BILINEO\"      @[facility]([id number])@     @[facility]([id number])@       IMPRESSION/PLAN:  Hematochezia  Anemia, iron def secondary to GI bleed  Acute on chronic respiratory failure - improvement with transfusion, steroids and bronchodilators  COPD - severe   Lung cancer - Left upper lobe  Constipation - patient says improved with decrease of opoids  Diverticulosis    PPI  Trend h/h   Bidirectional endoscopy due to severity of bleeding and iron def anemia with exacerbation of hypoxia etiology needs to be found  NPO after midnight     Mela Vargas M.D.  Gastroenterology      "

## 2024-11-15 NOTE — PROGRESS NOTES
Patient report given to [facility] pre op RN. Transport here to take patient to [facility] surgery for colonscopy. Patient medical, therefore off of telemetry. Patient sent on 6L oxygen.

## 2024-11-16 ENCOUNTER — HOME HEALTH ADMISSION (OUTPATIENT)
Dept: HOME HEALTH SERVICES | Facility: HOME HEALTHCARE | Age: 79
End: 2024-11-16
Payer: MEDICARE

## 2024-11-16 ENCOUNTER — PHARMACY VISIT (OUTPATIENT)
Dept: PHARMACY | Facility: MEDICAL CENTER | Age: 79
End: 2024-11-16
Payer: COMMERCIAL

## 2024-11-16 VITALS
RESPIRATION RATE: 17 BRPM | DIASTOLIC BLOOD PRESSURE: 73 MMHG | BODY MASS INDEX: 29.17 KG/M2 | HEIGHT: 64 IN | OXYGEN SATURATION: 94 % | TEMPERATURE: 98.1 F | WEIGHT: 170.86 LBS | HEART RATE: 74 BPM | SYSTOLIC BLOOD PRESSURE: 159 MMHG

## 2024-11-16 PROBLEM — J96.21 ACUTE ON CHRONIC RESPIRATORY FAILURE WITH HYPOXIA (HCC): Status: RESOLVED | Noted: 2024-11-13 | Resolved: 2024-11-16

## 2024-11-16 LAB
ANION GAP SERPL CALC-SCNC: 11 MMOL/L (ref 7–16)
BASOPHILS # BLD AUTO: 0.3 % (ref 0–1.8)
BASOPHILS # BLD: 0.06 K/UL (ref 0–0.12)
BUN SERPL-MCNC: 10 MG/DL (ref 8–22)
CALCIUM SERPL-MCNC: 8.4 MG/DL (ref 8.5–10.5)
CHLORIDE SERPL-SCNC: 92 MMOL/L (ref 96–112)
CO2 SERPL-SCNC: 32 MMOL/L (ref 20–33)
CREAT SERPL-MCNC: 0.49 MG/DL (ref 0.5–1.4)
EOSINOPHIL # BLD AUTO: 0.15 K/UL (ref 0–0.51)
EOSINOPHIL NFR BLD: 0.8 % (ref 0–6.9)
ERYTHROCYTE [DISTWIDTH] IN BLOOD BY AUTOMATED COUNT: 51.7 FL (ref 35.9–50)
GFR SERPLBLD CREATININE-BSD FMLA CKD-EPI: 96 ML/MIN/1.73 M 2
GLUCOSE SERPL-MCNC: 80 MG/DL (ref 65–99)
HCT VFR BLD AUTO: 35 % (ref 37–47)
HGB BLD-MCNC: 11.6 G/DL (ref 12–16)
IMM GRANULOCYTES # BLD AUTO: 0.34 K/UL (ref 0–0.11)
IMM GRANULOCYTES NFR BLD AUTO: 1.7 % (ref 0–0.9)
LYMPHOCYTES # BLD AUTO: 1.74 K/UL (ref 1–4.8)
LYMPHOCYTES NFR BLD: 8.8 % (ref 22–41)
MAGNESIUM SERPL-MCNC: 2 MG/DL (ref 1.5–2.5)
MCH RBC QN AUTO: 29.1 PG (ref 27–33)
MCHC RBC AUTO-ENTMCNC: 33.1 G/DL (ref 32.2–35.5)
MCV RBC AUTO: 87.7 FL (ref 81.4–97.8)
MONOCYTES # BLD AUTO: 1.72 K/UL (ref 0–0.85)
MONOCYTES NFR BLD AUTO: 8.7 % (ref 0–13.4)
NEUTROPHILS # BLD AUTO: 15.78 K/UL (ref 1.82–7.42)
NEUTROPHILS NFR BLD: 79.7 % (ref 44–72)
NRBC # BLD AUTO: 0.03 K/UL
NRBC BLD-RTO: 0.2 /100 WBC (ref 0–0.2)
PHOSPHATE SERPL-MCNC: 3.5 MG/DL (ref 2.5–4.5)
PLATELET # BLD AUTO: 312 K/UL (ref 164–446)
PMV BLD AUTO: 9.3 FL (ref 9–12.9)
POTASSIUM SERPL-SCNC: 3.3 MMOL/L (ref 3.6–5.5)
RBC # BLD AUTO: 3.99 M/UL (ref 4.2–5.4)
SODIUM SERPL-SCNC: 135 MMOL/L (ref 135–145)
WBC # BLD AUTO: 19.8 K/UL (ref 4.8–10.8)

## 2024-11-16 PROCEDURE — 700111 HCHG RX REV CODE 636 W/ 250 OVERRIDE (IP): Performed by: HOSPITALIST

## 2024-11-16 PROCEDURE — 83735 ASSAY OF MAGNESIUM: CPT

## 2024-11-16 PROCEDURE — 700102 HCHG RX REV CODE 250 W/ 637 OVERRIDE(OP): Performed by: INTERNAL MEDICINE

## 2024-11-16 PROCEDURE — 84100 ASSAY OF PHOSPHORUS: CPT

## 2024-11-16 PROCEDURE — 99239 HOSP IP/OBS DSCHRG MGMT >30: CPT | Performed by: HOSPITALIST

## 2024-11-16 PROCEDURE — A9270 NON-COVERED ITEM OR SERVICE: HCPCS | Performed by: INTERNAL MEDICINE

## 2024-11-16 PROCEDURE — A9270 NON-COVERED ITEM OR SERVICE: HCPCS | Performed by: HOSPITALIST

## 2024-11-16 PROCEDURE — RXMED WILLOW AMBULATORY MEDICATION CHARGE: Performed by: HOSPITALIST

## 2024-11-16 PROCEDURE — 85025 COMPLETE CBC W/AUTO DIFF WBC: CPT

## 2024-11-16 PROCEDURE — 80048 BASIC METABOLIC PNL TOTAL CA: CPT

## 2024-11-16 PROCEDURE — 36415 COLL VENOUS BLD VENIPUNCTURE: CPT

## 2024-11-16 PROCEDURE — 700102 HCHG RX REV CODE 250 W/ 637 OVERRIDE(OP): Performed by: HOSPITALIST

## 2024-11-16 PROCEDURE — 700111 HCHG RX REV CODE 636 W/ 250 OVERRIDE (IP): Performed by: INTERNAL MEDICINE

## 2024-11-16 RX ORDER — ALBUTEROL SULFATE 5 MG/ML
2.5 SOLUTION RESPIRATORY (INHALATION)
Status: DISCONTINUED | OUTPATIENT
Start: 2024-11-16 | End: 2024-11-16 | Stop reason: HOSPADM

## 2024-11-16 RX ORDER — METHYLPREDNISOLONE 4 MG/1
TABLET ORAL
Qty: 21 TABLET | Refills: 0 | Status: ON HOLD | OUTPATIENT
Start: 2024-11-16 | End: 2024-11-24

## 2024-11-16 RX ORDER — MAGNESIUM SULFATE HEPTAHYDRATE 40 MG/ML
2 INJECTION, SOLUTION INTRAVENOUS ONCE
Status: COMPLETED | OUTPATIENT
Start: 2024-11-16 | End: 2024-11-16

## 2024-11-16 RX ORDER — HYDROCORTISONE ACETATE 25 MG/1
25 SUPPOSITORY RECTAL EVERY 12 HOURS
Qty: 14 SUPPOSITORY | Refills: 1 | Status: ON HOLD | OUTPATIENT
Start: 2024-11-16 | End: 2024-11-24

## 2024-11-16 RX ADMIN — DIBASIC SODIUM PHOSPHATE, MONOBASIC POTASSIUM PHOSPHATE AND MONOBASIC SODIUM PHOSPHATE 500 MG: 852; 155; 130 TABLET ORAL at 07:41

## 2024-11-16 RX ADMIN — PREDNISONE 40 MG: 20 TABLET ORAL at 05:12

## 2024-11-16 RX ADMIN — OMEPRAZOLE 20 MG: 20 CAPSULE, DELAYED RELEASE ORAL at 05:12

## 2024-11-16 RX ADMIN — SPIRONOLACTONE 25 MG: 25 TABLET ORAL at 05:12

## 2024-11-16 RX ADMIN — LOSARTAN POTASSIUM 50 MG: 50 TABLET, FILM COATED ORAL at 05:12

## 2024-11-16 RX ADMIN — DULOXETINE HYDROCHLORIDE 60 MG: 60 CAPSULE, DELAYED RELEASE ORAL at 05:12

## 2024-11-16 RX ADMIN — FLUTICASONE FUROATE, UMECLIDINIUM BROMIDE AND VILANTEROL TRIFENATATE 1 PUFF: 200; 62.5; 25 POWDER RESPIRATORY (INHALATION) at 05:15

## 2024-11-16 RX ADMIN — ENALAPRILAT 1.25 MG: 1.25 INJECTION INTRAVENOUS at 03:45

## 2024-11-16 RX ADMIN — AMOXICILLIN AND CLAVULANATE POTASSIUM 1 TABLET: 875; 125 TABLET, FILM COATED ORAL at 05:12

## 2024-11-16 RX ADMIN — DIBASIC SODIUM PHOSPHATE, MONOBASIC POTASSIUM PHOSPHATE AND MONOBASIC SODIUM PHOSPHATE 500 MG: 852; 155; 130 TABLET ORAL at 11:58

## 2024-11-16 RX ADMIN — MAGNESIUM SULFATE HEPTAHYDRATE 2 G: 2 INJECTION, SOLUTION INTRAVENOUS at 07:42

## 2024-11-16 ASSESSMENT — PAIN DESCRIPTION - PAIN TYPE: TYPE: ACUTE PAIN

## 2024-11-16 NOTE — DISCHARGE PLANNING
"HTH/SCP TCN chart review completed. Collaborated with MARIPOSA Juárez prior to meeting with the pt. The most current review of medical record, knowledge of pt's PLOF and social support, LACE+ score of 74, 6 clicks scores unavailable, though per review pt ambulatory with no AD.    Note per review that pt is to dc today with HH and suction machine. Note that CM has already obtained HH choice and at time of writing this note [facility] has accepted. Note CM initially obtained choice for suction machine from [facility] though they are unable to provide. Note at time of writing this note per CM \"RNCM placed additional call to [facility] for follow up. Rep stated they do have suction machines and order will likely be processed on Monday\".    TCN met with pt at bedside to discuss and note pt is anticipating to dc to home today with plan for HH and is aware that suction machine may not be available until next week.     No additional provider requests at this time. Note that choice was obtained for HH and DME (suction maching) by MARIPOSA as above.  In collaboration with MARIPOSA, current discharge considerations are noted to be for dc to home with [facility] (accepted) and suction machine. TCN will continue to follow and collaborate with discharge planning team as additional post acute needs arise. Thank you.     Completed today:  Choice obtained: HH; DME ([facility])  Pt aware of [facility] blanket referral policy  SCP with non [facility] PCP  "

## 2024-11-16 NOTE — DISCHARGE INSTRUCTIONS
Discharge Instructions per Edin Ventura D.O.      DIET: healthy balanced diet    ACTIVITY: as tolerates    DIAGNOSIS: Acute GI bleed likely due diverticuli bleed.      Return to ER if need          Anemia    Anemia is a condition in which there are not enough red blood cells or hemoglobin in the blood. Hemoglobin is a substance in red blood cells that carries oxygen.  When you do not have enough red blood cells or hemoglobin (are anemic), your body cannot get enough oxygen, and your organs may not work properly. As a result, you may feel very tired or have other problems.  What are the causes?  Common causes of anemia include:  Excessive bleeding. Anemia can be caused by excessive bleeding inside or outside the body, including bleeding from the intestines or from heavy menstrual periods in females.  Poor nutrition.  Long-lasting (chronic) kidney, thyroid, and liver disease.  Bone marrow disorders, spleen problems, and blood disorders.  Cancer and treatments for cancer.  Human immunodeficiency virus (HIV) and acquired immunodeficiency syndrome (AIDS).  Infections, medicines, and autoimmune disorders that destroy red blood cells.  What are the signs or symptoms?  Symptoms of this condition include:  Minor weakness.  Dizziness.  Headache, or difficulties concentrating and sleeping.  Heartbeats that feel irregular or faster than normal (palpitations).  Shortness of breath, especially with exercise.  Pale skin, lips, and nails, or cold hands and feet.  Upset stomach (indigestion) and nausea.  Symptoms may occur suddenly or develop slowly. If your anemia is mild, you may not have symptoms.  How is this diagnosed?  This condition is diagnosed based on blood tests, your medical history, and a physical exam. In some cases, a test may be needed in which cells are removed from the soft tissue inside of a bone and looked at under a microscope (bone marrow biopsy). Your health care provider may also check your stool (feces)  for blood and may do more testing to look for the cause of your bleeding.  Other tests may include:  Imaging tests, such as a CT scan or MRI.  A procedure to see inside your esophagus and stomach (endoscopy). The esophagus is the part of the body that moves food from your mouth to your stomach.  A procedure to see inside your colon and rectum (colonoscopy).  How is this treated?  Treatment for this condition depends on the cause. If you continue to lose a lot of blood, you may need to be treated at a hospital. Treatment may include:  Taking supplements of iron, vitamin B12, or folic acid.  Taking a hormone medicine (erythropoietin) that can help to stimulate red blood cell growth.  Receiving donated blood through an IV (blood transfusion). This may be needed if you lose a lot of blood.  Making changes to your diet.  Having surgery to remove your spleen.  Follow these instructions at home:  Take over-the-counter and prescription medicines only as told by your health care provider.  Take supplements only as told by your health care provider.  Follow any diet instructions that you were given by your health care provider.  Keep all follow-up visits. Your health care provider will want to recheck your blood tests.  Contact a health care provider if:  You develop new bleeding anywhere in the body.  You are very weak.  Get help right away if:  You are short of breath.  You have pain in your abdomen or chest.  You are dizzy or feel faint.  You have trouble concentrating.  You have bloody stools, black stools, or tarry stools.  You vomit repeatedly or you vomit up blood.  These symptoms may be an emergency. Get help right away. Call 911.  Do not wait to see if the symptoms will go away.  Do not drive yourself to the hospital.  Summary  Anemia is a condition in which you do not have enough red blood cells or enough of a substance in your red blood cells that carries oxygen.  Symptoms may occur suddenly or develop slowly.  If  your anemia is mild, you may not have symptoms.  This condition is diagnosed with blood tests, a medical history, and a physical exam. Other tests may be needed.  Treatment for this condition depends on the cause of the anemia.  This information is not intended to replace advice given to you by your health care provider. Make sure you discuss any questions you have with your health care provider.  Document Revised: 03/13/2023 Document Reviewed: 03/13/2023  [employer] Patient Education © 2023 [employer]    Chronic Obstructive Pulmonary Disease (COPD) is a long-term, progressive lung disease that makes it harder to breathe. It includes chronic bronchitis, emphysema, and refractory (non-reversible) asthma. With COPD, the airways in your lungs become inflamed and thicken, and the tissue where oxygen is exchanged is destroyed. The flow of air in and out of your lungs decreases. When that happens, less oxygen gets into your body tissues, and it becomes harder to get rid of the waste gas carbon dioxide. As the disease gets worse, shortness of breath makes it harder to remain active. There is no cure for COPD, but it is preventable and treatable.    COPD Patient Discharge Instructions    Diet  Follow a low fat, low cholesterol, low salt diet unless instructed otherwise by your Doctor. Read the labels on the back of food products and track your intake of fat, cholesterol and salt.  No smoking  Discontinuing smoking will have the biggest impact on preventing progression of disease.  To participate in [employer]’s Quit Tobacco Program, call 217-268-9480 or visit [email or web address]/QuitTobacco  Oxygen  If your doctor has order that you wear oxygen at home, it is important to wear it as ordered.  Do not smoke, vape, or use e-cigarettes with oxygen on.  Store in an appropriate location: upright in its seaman or laid flat down, away from open flames and stoves.   Do not use oil-based creams and moisturizers (ie: petroleum products,  oil-based lip moisturizers) or aerosol sprays (ie: hair sprays or deodorants) when using your oxygen equipment.  Be careful with tubing placement to prevent yourself and others from tripping.  Medications  Refer to your personalized Action Plan to manage your symptoms.  Warning signs of an exacerbation  Breathing fast and shallow, worsening shortness of breath (like you just finished exercising)  Chest tightness  Increases in sputum production  Changes in sputum color  Lower oxygen levels than baseline  When to call your doctor  If the warning signs of an exacerbation do not improve  Refer to your personalized Action Plan   Pulmonary Rehab  Your doctor has ordered you a referral to Pulmonary Rehab. Call [phone] to schedule an appointment  Attend your follow-up appointment with your PCP and/or Pulmonologist  Remote Monitoring: At the direction of the remote monitoring on-call provider, you may increase your oxygen by 2 liters above your baseline.     See the educational handout provided by your COPD Navigator for more information. This also explains more about COPD, symptoms of an exacerbation, and some of the tests that you have undergone.

## 2024-11-16 NOTE — PROGRESS NOTES
4 Eyes Skin Assessment Completed by Shabana RN and Mathew RN.    Head WDL  Ears WDL  Nose WDL  Mouth WDL  Neck WDL  Breast/Chest WDL  Shoulder Blades WDL  Spine WDL  (R) Arm/Elbow/Hand WDL  (L) Arm/Elbow/Hand WDL  Abdomen WDL  Groin Bruising  Scrotum/Coccyx/Buttocks Redness and Blanching  (R) Leg WDL  (L) Leg WDL  (R) Heel/Foot/Toe WDL  (L) Heel/Foot/Toe WDL          Devices In Places Pulse Ox, SCD's, and Nasal Cannula      Interventions In Place Gray Ear Foams, Sacral Mepilex, Heel Float Boots, Pillows, Dri-Oracio Pads, and Pressure Redistribution Mattress    Possible Skin Injury No    Pictures Uploaded Into Epic N/A  Wound Consult Placed N/A  RN Wound Prevention Protocol Ordered No

## 2024-11-16 NOTE — FACE TO FACE
Face to Face Note  -  Durable Medical Equipment    Edin Ventura D.O. - NPI: [id number]  I certify that this patient is under my care and that they had a durable medical equipment(DME)face to face encounter by myself that meets the physician DME face-to-face encounter requirements with this patient on:    Date of encounter:   Patient:                    MRN:                       YOB: 2024  Berny Renee  [id number]  1945     The encounter with the patient was in whole, or in part, for the following medical condition, which is the primary reason for durable medical equipment:  COPD    I certify that, based on my findings, the following durable medical equipment is medically necessary:    Nebulizer and Suction Machine.    My Clinical findings support the need for the above equipment due to:  Hypoxia, Bedbound/non-weight bearing, Dysphagia

## 2024-11-16 NOTE — PROGRESS NOTES
Pt a&o 4, VSS, assessment completed. Resting comfortably in bed with call light, bedside table in reach. No c/o at this time. Side rails up 2. Instructed to use call light when needing to get OOB/assistance verbalized understanding. Bed alarm on, bed in low position. Will continue to monitor.

## 2024-11-16 NOTE — DISCHARGE PLANNING
Case Management Discharge Planning    Admission Date: 11/13/2024  GMLOS: 3.5  ALOS: 3    6-Clicks ADL Score:    6-Clicks Mobility Score:        Anticipated Discharge Dispo: Discharge Disposition: Discharged to home/self care (01)    DME Needed: Yes    DME Ordered: Yes    Action(s) Taken: Choice obtained and Referral(s) sent    Escalations Completed: None    Medically Clear: Yes    Next Steps: RNCM was notified that patient will need HH and suction machine. RNCM sent HH referral to [facility] per insurance and DME order to [facility]. RNCM will follow up with [facility] on ETA of delivery.     Barriers to Discharge: HH acceptance and DME delivery.    Is the patient up for discharge tomorrow: No    1006 RNCM placed call to [facility] to follow up on status of order. RNCM left message with the oncall service who will reach out and follow up.     1218 RNCM was notified that [facility] does not have suction machines. RNCM sent referral to [facility]. Will follow up with [facility] regarding ETA of delivery.     1253 RNCM placed call to [facility] to follow up on referral. RNCM left VM.     1425 RNCM placed additional call to [facility] for follow up. Rep stated they do have suction machines and order will likely be processed on Monday. KELLI Kent updated as well.

## 2024-11-16 NOTE — CONSULTS
Hospital Medicine Consultation    Date of Service  11/15/2024    Referring Physician  Edin Fish M.D.    Consulting Physician  Edin Ventura D.O.    Reason for Consultation  Transfer of care out of ICU w/ acute respiratory faliure and GI bleed    History of Presenting Illness  Berny Renee is a 79 y.o. female with a hx of COPD on home O2, lung cancer, HTN, DLD, chronic pain, depression who presented 11/13/2024 with worsening SOB, and bright red blood per rectum.   Her initial hgb:5.7, wbc:28.2, INR:1.08.  GI consulted and on 11/15 an EGD showed:   1. Esophagus: two tongues of 1.5 cm salmon colored mucosa . 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 Stomach: hiatal hernia, otherwise normal  3. Duodenum: normal bulb and second portion.  A colonoscopy showed likely diverticular bleed and hemorrhoids for which anusol has been recommended.      11/15: Very pleasant lady s/p procedures today.  No further bleeding.  We reviewed and I updated her on her exam findings.  Plan on diet and if Hgb stable possible discharge 11/16 am.      Review of Systems  Review of Systems   Constitutional:  Negative for malaise/fatigue.   Respiratory:  Positive for cough and shortness of breath (chronic). Negative for sputum production.    Cardiovascular:  Negative for chest pain, palpitations and leg swelling.   Gastrointestinal:  Negative for abdominal pain, blood in stool and melena.   Musculoskeletal:  Positive for back pain (chronic).   Neurological:  Negative for tingling and speech change.   Psychiatric/Behavioral:  The patient is not nervous/anxious.        Past Medical History   has a past medical history of Acute on chronic respiratory failure with hypoxia ([facility]) (11/14/2020), Arthritis, Asthma with COPD ([facility]), BMI 31.0-31.9,adult (02/04/2022), Cataract immature, Chronic pain, Chronic respiratory failure with hypoxia ([facility]) (07/13/2017), Colon polyps, COPD (chronic obstructive pulmonary disease) ([facility]) (2002), Cough, DDD (degenerative  disc disease), cervical, DDD (degenerative disc disease), thoracic, Dependence on continuous supplemental oxygen (08/13/2015), Diverticulitis of colon, Dyslipidemia, EMPHYSEMA, H/O opioid abuse (HCC) (07/15/2021), Hypertension, Obesity (BMI 30-39.9) (10/24/2016), Opioid type dependence, continuous (HCC) (02/04/2022), REGINE (obstructive sleep apnea), OSTEOPOROSIS, Painful breathing, Shortness of breath, Spinal stenosis, lumbar region, with neurogenic claudication, Sputum production, URINARY INCONTINENCE, Varicose veins of left leg with edema (10/11/2024), Vitamin d deficiency, and Wheezing.    She has no past medical history of Breast cancer ([facility]).    Surgical History   has a past surgical history that includes tonsillectomy; other orthopedic surgery (2004); other; other; lumbar laminectomy diskectomy (6/22/2010); pr upper gi endoscopy,biopsy (N/A, 11/15/2024); and colonoscopy with polyp (N/A, 11/15/2024).    Family History  family history includes Cancer in her father and sister; Other in her sister.    Social History   reports that she quit smoking about 25 years ago. Her smoking use included cigarettes. She started smoking about 55 years ago. She has a 60 pack-year smoking history. She has never used smokeless tobacco. She reports that she does not currently use alcohol after a past usage of about 4.2 oz of alcohol per week. She reports that she does not currently use drugs after having used the following drugs: Marijuana and Oral.    Medications  Current Facility-Administered Medications   Medication Dose Route Frequency Provider Last Rate Last Admin    phosphorus (K-Phos-Neutral) per tablet 500 mg  500 mg Oral 4X/DAY Edgar Diaz D.O.        [START ON 11/16/2024] omeprazole (PriLOSEC) capsule 20 mg  20 mg Oral DAILY Edin Ventura D.O.        traMADol (Ultram) 50 MG tablet 100 mg  100 mg Oral Q6HRS PRN Rozina Mares M.D.   100 mg at 11/14/24 0041    predniSONE (Deltasone) tablet 40 mg  40 mg Oral  DAILY Edin Fish M.D.   40 mg at 11/15/24 0519    ampicillin/sulbactam (Unasyn) 3 g in  mL IVPB  3 g Intravenous Q6HRS Edin Fsih M.D.   Stopped at 11/15/24 0554    ipratropium-albuterol (DUONEB) nebulizer solution  3 mL Nebulization 4X/DAY (RT) Edin Fish M.D.   3 mL at 11/15/24 0653    albuterol (Proventil) 2.5mg/0.5ml nebulizer solution 2.5 mg  2.5 mg Nebulization Q2HRS PRN (RT) Edin Fish M.D.        benzocaine-menthol (Cepacol) lozenge 1 Lozenge  1 Lozenge Mouth/Throat Q4HRS PRN Anais Gutierrez M.D.   1 Lozenge at 11/14/24 1723    labetalol (Normodyne/Trandate) injection 10 mg  10 mg Intravenous Q4HRS PRN Rozina Mares M.D.   10 mg at 11/14/24 2320    Respiratory Therapy Consult   Nebulization Continuous RT Gregory Baron M.D.        enalaprilat injection 1.25 mg 1 mL  1.25 mg Intravenous Q8HRS PRN Gregory Baron M.D.   1.25 mg at 11/14/24 2211    DULoxetine (Cymbalta) capsule 60 mg  60 mg Oral DAILY Gregory Baron M.D.   60 mg at 11/15/24 0519    fluticasone-umeclidinium-vilanterol (Trelegy Ellipta) 200-62.5-25 mcg/act inhaler 1 Puff  1 Puff Inhalation DAILY Gregory Baron M.D.   1 Puff at 11/15/24 0520    losartan (Cozaar) tablet 50 mg  50 mg Oral BID Gregory Baron M.D.   50 mg at 11/15/24 0519    montelukast (Singulair) tablet 10 mg  10 mg Oral Q EVENING Gregory Baron M.D.   10 mg at 11/14/24 1802    spironolactone (Aldactone) tablet 25 mg  25 mg Oral Q DAY Gregory Baron M.D.   25 mg at 11/15/24 0520    azithromycin (Zithromax) tablet 500 mg  500 mg Oral Daily-1400 Gregory Baron M.D.   500 mg at 11/14/24 1455    MD Alert...ICU Electrolyte Replacement per Pharmacy   Other PHARMACY TO DOSE Edin Fish M.D.           Allergies  Allergies   Allergen Reactions    Iodine      Convulsion  I.V.  iodine    Tape Rash and Itching       Physical Exam  Temp:  [36.4 °C (97.6 °F)-37.3 °C (99.1 °F)] 36.6 °C (97.9 °F)  Pulse:   [53-95] 70  Resp:  [18-62] 20  BP: (124-188)/() 168/72  SpO2:  [91 %-99 %] 97 %    Physical Exam  Vitals reviewed.   Constitutional:       Appearance: She is obese.   HENT:      Head: Normocephalic.      Nose: Nose normal.      Mouth/Throat:      Mouth: Mucous membranes are moist.      Pharynx: No oropharyngeal exudate.   Eyes:      Extraocular Movements: Extraocular movements intact.      Conjunctiva/sclera: Conjunctivae normal.   Cardiovascular:      Rate and Rhythm: Normal rate and regular rhythm.      Pulses: Normal pulses.      Heart sounds: No murmur heard.  Pulmonary:      Effort: Pulmonary effort is normal.      Breath sounds: Decreased breath sounds present.   Abdominal:      General: Bowel sounds are normal. There is no distension.      Tenderness: There is no abdominal tenderness.   Musculoskeletal:      Cervical back: Neck supple. No tenderness.      Right lower leg: No edema.      Left lower leg: No edema.   Lymphadenopathy:      Cervical: No cervical adenopathy.   Skin:     General: Skin is warm.      Coloration: Skin is pale.   Neurological:      General: No focal deficit present.      Mental Status: She is alert.   Psychiatric:         Mood and Affect: Mood normal.         Behavior: Behavior normal.         Thought Content: Thought content normal.         Fluids  Date 11/15/24 0700 - 11/16/24 0659   Shift 0199-5767 5475-3611 5681-3163 24 Hour Total   INTAKE   I.V. 209.2   209.2   IV Piggyback 146.3   146.3   Shift Total 355.5   355.5   OUTPUT   Shift Total       Weight (kg) 77.5 77.5 77.5 77.5       Laboratory  Recent Labs     11/13/24  1022 11/13/24  1430 11/14/24  0530 11/14/24  1410 11/14/24  2345 11/15/24  0440 11/15/24  0805   WBC 28.2*  --  18.7*  --   --  17.5*  --    RBC 1.88*  --  3.78*  --   --  3.45*  --    HEMOGLOBIN 5.7*   < > 10.8*   < > 10.8* 10.0* 10.7*   HEMATOCRIT 17.5*  --  31.6*  --   --  29.5*  --    MCV 93.1  --  83.6  --   --  85.5  --    MCH 30.3  --  28.6  --   --  29.0   --    MCHC 32.6  --  34.2  --   --  33.9  --    RDW 43.2  --  49.7  --   --  52.2*  --    PLATELETCT 407  --  246  --   --  199  --    MPV 9.6  --  9.4  --   --  9.3  --     < > = values in this interval not displayed.     Recent Labs     11/13/24  1022 11/14/24  0530 11/15/24  0440   SODIUM 136 135 139   POTASSIUM 3.5* 3.5* 3.2*   CHLORIDE 98 99 101   CO2 27 28 32   GLUCOSE 191* 166* 105*   BUN 26* 16 13   CREATININE 0.47* 0.31* 0.37*   CALCIUM 8.6 8.4* 7.7*     Recent Labs     11/13/24  1022   APTT 21.3*   INR 1.08                 Imaging  US-EXTREMITY VENOUS LOWER BILAT   Final Result      DX-CHEST-PORTABLE (1 VIEW)   Final Result         1.  Left pulmonary infiltrates, similar compared to prior study.   2.  Atherosclerosis      EC-ECHOCARDIOGRAM COMPLETE W/O CONT   Final Result      CT-ABDOMEN-PELVIS W/O   Final Result      1. No acute inflammatory process in the abdomen and pelvis.   2. Bibasal parenchymal scarring and atelectasis.   3. Atherosclerotic calcifications in the aorta and iliac arteries.   4. Cholelithiasis.      DX-CHEST-PORTABLE (1 VIEW)   Final Result         1. Low lung volume with hypoventilatory change and bibasilar atelectasis.          Assessment/Plan  * Acute on chronic respiratory failure with hypoxia (HCC)- (present on admission)  Assessment & Plan        Successfully transitioned off BiPAP  On baseline oxygen use  Continue treatment for CAP, Unasyn instead of Ceftriaxone     Hypokalemia  Assessment & Plan  Replace and recheck 11/16am labs    Edema  Assessment & Plan  Mild, left greater than righ  With known varicosities  Venous ultrasound of bilateral lower extremity negative for clot    Anemia  Assessment & Plan  Acute anemia hg 5.7 no shock was hypertensive on presentation  H&H monitoring  Total of 4 units packed RBC transfused  GI consulted  S/p EGD and Colonoscopy 11/15 with no clear nor active bleed but likely diverticular bleed  No ASA nor NSAIDs    Hemorrhoids  Assessment &  Plan  Has been on stool softeners   Anusol  GI consulted    Lower GI bleed  Assessment & Plan  Patient with hx of chronic constipation, hemorrhoids and diverticulosis  Anusol cream suppository  Abdominal CT scan unremarkable  GI consultation for colonoscopy with diverticuli as suspected etiology  Restarted cardiac diet 11/15/24 if no further drop in Hgb possible discharge.  No NSAIDs nor ASA        Acute blood loss anemia  Assessment & Plan  Secondary to likely lower GI bleeding  Currently hemodynamically stable  Trend hemoglobin and transfuse PRBCs for hemoglobin greater than 7  Follow TEG mapping    Acute exacerbation of chronic obstructive pulmonary disease (COPD) ([facility])- (present on admission)  Assessment & Plan        Start prednisone, 40 mg daily, received methylprednisolone. Continue azithromycin qtc 415  Continue bronchodilators    Primary hypertension- (present on admission)  Assessment & Plan  Continue losartan, 50 mg twice daily and Aldactone 25mg qd    Primary cancer of left upper lobe of lung (HCC)- (present on admission)  Assessment & Plan  Hx of XRT by Dr Grimm in past two years  Follow up with Dr Deshpande, pulmonary    Stage 4 very severe COPD by GOLD classification ([facility])- (present on admission)  Assessment & Plan  Patient with past history of tobacco abuse on chronic use of oxygen up to 6-7 L NC  Quit smoking years ago.  Request and ordered for home suction device 11/15  Extended respiratory viral panel testing negative  Nebulizer, steroids  Transitioned off bipap, on baseline home oxygen  echo cardiogram:  CONCLUSIONS  Normal biventricular systolic functions, normal estimated LVEF 60%  Mild mitral annular calcifications  Mild aortic valve sclerosis without stenosis  Normal IVC size  No pericardial effusion  Unable to estimate RVSP    Chronic pain syndrome- (present on admission)  Assessment & Plan  On duloxetine, continue

## 2024-11-16 NOTE — CARE PLAN
The patient is Stable - Low risk of patient condition declining or worsening    Shift Goals  Clinical Goals: Hemodynamic stability,pulmonary hygiene  Patient Goals: DC home  Family Goals: LORA    Progress made toward(s) clinical / shift goals:      Problem: Knowledge Deficit - Standard  Goal: Patient and family/care givers will demonstrate understanding of plan of care, disease process/condition, diagnostic tests and medications  Outcome: Progressing  Note: Pt aware of POC: Hemodynamic stability,pulmonary hygiene. Goal is for pt to be updated in the moment.        Problem: Skin Integrity  Goal: Skin integrity is maintained or improved  Outcome: Progressing  Note: Pt educated on the importance of offloading and shifting her weight when she goes home and sits in her chair to prevent open bedsores. Pt confirmed understanding.        Patient is not progressing towards the following goals:

## 2024-11-16 NOTE — PROGRESS NOTES
Received report, assumed pt care. Pt awake without any signs of distress. Resting comfortably in bed with call light, bedside table in reach. No c/o at this time. Side rails up 2. Instructed to use call light when needing to get OOB/assistance verbalized understanding. Bed alarm on, bed in low position. Will continue to monitor.

## 2024-11-16 NOTE — DISCHARGE PLANNING
ATTN: Case Management  RE: Referral for Home Health    As of 11/16/2024, we have accepted the Home Health referral for the patient listed above.    [facility]  will contact the patient within 48 hours. If you have any questions or concerns regarding the patient's transition to Home Health, please do not hesitate to contact us at [phone].      We look forward to collaborating with you,  [facility] Team

## 2024-11-16 NOTE — PROGRESS NOTES
"Virtual Nurse portion of admission profile and rounding complete.  Rounding Needs: Patient resting comfortably with no needs at this time. MD at bedside at this time, providing education to patient.     Patient states that she is having a hard time living where she is as \"the house is too hot all the time\" because she is right next to the boiler room, her apartment although ADA, has a step-in shower versus a walk-in shower and patient is fearful that she will fall or harm herself, especially with the new changes in her health condition. Patient states that she is looking for a new place however, she is unable to go out and look at the places herself. Patient can only afford so much per month for rent and utilities as well. CM not on floor at this time, however, will be notified of patients situation and want for resources if possible.     "

## 2024-11-16 NOTE — PROGRESS NOTES
Pt D/C'd. PIV removed. Discharge instructions provided to pt.  Pt states understanding.  Pt states all questions have been answered.  Copy of discharge provided to pt.  Signed copy in chart.  Prescriptions provided to pt via M2B. Pt states that all personal belongings are in possession.  Pt escorted off unit with assistance from [facility] via WC with all belongings without incident. Pt friend here at the bedside to take the pt home and has provided the pt with her portable home O2.       Spoke to CM who stated it was ok per Dr. Ventura for pt to DC prior to suction DME being confirmed. HH has accepted. CM will continue to follow up with the suction DME company.

## 2024-11-16 NOTE — DISCHARGE SUMMARY
Discharge Summary    CHIEF COMPLAINT ON ADMISSION  Chief Complaint   Patient presents with    Shortness of Breath       Reason for Admission  Bright red blood per rectum.     Admission Date  11/13/2024    CODE STATUS  DNAR, I OK    HPI & HOSPITAL COURSE  Berny Renee is a 79 y.o. female with a hx of COPD on home O2, lung cancer, HTN, DLD, chronic pain, depression who presented 11/13/2024 with worsening SOB, and bright red blood per rectum.   Her initial hgb:5.7, wbc:28.2, INR:1.08.  GI consulted and on 11/15 an EGD showed:   1. Esophagus: two tongues of 1.5 cm salmon colored mucosa . 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 Stomach: hiatal hernia, otherwise normal  3. Duodenum: normal bulb and second portion.  A colonoscopy showed likely diverticular bleed and hemorrhoids for which anusol has been recommended.  She was given instruction to avoid asa, NSAIDs for the next few weeks.  She will follow up with Dr Menon at [facility] in next few weeks.         Therefore, she is discharged in good and stable condition to home with close outpatient follow-up.    The patient met 2-midnight criteria for an inpatient stay at the time of discharge.    Discharge Date  11/16/2024    FOLLOW UP ITEMS POST DISCHARGE  None    DISCHARGE DIAGNOSES  Principal Problem (Resolved):    Acute on chronic respiratory failure with hypoxia (HCC) (POA: Yes)  Active Problems:    Chronic pain syndrome (POA: Yes)    Stage 4 very severe COPD by GOLD classification (HCC) (POA: Yes)    DNR (do not resuscitate) (POA: Yes)    Primary cancer of left upper lobe of lung (HCC) (POA: Yes)    Diverticulosis (POA: Yes)    Primary hypertension (POA: Yes)    Acute exacerbation of chronic obstructive pulmonary disease (COPD) (HCC) (POA: Yes)    Acute blood loss anemia (POA: Unknown)    Lower GI bleed (POA: Unknown)    Hemorrhoids (POA: Unknown)      Overview:           Anemia (POA: Unknown)    Edema (POA: Unknown)    Hypokalemia (POA: Unknown)  Resolved Problems:    Chronic  constipation (POA: Yes)      FOLLOW UP  Future Appointments   Date Time Provider Department Center   7/1/2025 10:00 AM [address]   7/4/2025 10:00 AM Israel Grimm M.D. RADT None     Everett Diego M.D.  [address] [facility] [address]  [phone]    Follow up in 2 week(s)        MEDICATIONS ON DISCHARGE     Medication List        CHANGE how you take these medications        Instructions   * omeprazole 20 MG delayed-release capsule  What changed: Another medication with the same name was added. Make sure you understand how and when to take each.  Commonly known as: PriLOSEC   Take 1 capsule by mouth every day     * omeprazole 20 MG delayed-release capsule  What changed: You were already taking a medication with the same name, and this prescription was added. Make sure you understand how and when to take each.  Commonly known as: PriLOSEC   Take 1 capsule by mouth every day  Dose: 20 mg           * This list has 2 medication(s) that are the same as other medications prescribed for you. Read the directions carefully, and ask your doctor or other care provider to review them with you.                CONTINUE taking these medications        Instructions   acyclovir 200 MG Caps  Commonly known as: Zovirax   Take 1 capsule(at first sign of outbreak) by mouth three times a day 10 days     amoxicillin-clavulanate 875-125 MG Tabs  Commonly known as: Augmentin   Doctor's comments: additional antibiotics being sent to extended current therapy length  Take 1 tab every 12 hours for 5 days     benzonatate 200 MG capsule  Commonly known as: Tessalon   take 1 tab every 8 hrs as needed for cough     D 1000 25 MCG (1000 UT) Caps  Generic drug: Cholecalciferol   Take 1 Capsule by mouth every day for 90 days.  Dose: 1 Capsule     digoxin 125 MCG Tabs  Commonly known as: Lanoxin   Take 1 Tablet by mouth every day. Indications: Atrial Fibrillation  Dose: 125 mcg     DULoxetine 60 MG Cpep delayed-release  capsule  Commonly known as: Cymbalta   Take 1 capsule by mouth once daily     furosemide 20 MG Tabs  Commonly known as: Lasix   Take 1 tablet as needed for water retention orally once a day  30 days     Home Care Oxygen   Inhale 6 L/min continuous. Oxygen dose 6 L/min  Respiratory route via: Nasal Cannula   Oxygen supplier:Preferred      Indications: Shortness of breath  Dose: 6 L/min     ipratropium-albuterol 0.5-2.5 (3) MG/3ML nebulizer solution  Commonly known as: Duoneb   Doctor's comments: 3 month supply x 1 year  Take 3 mL by nebulization every four hours as needed for Shortness of Breath (Wheezing).  Dose: 3 mL     losartan 50 MG Tabs  Commonly known as: Cozaar   Take 1 Tablet by mouth 2 times a day. Indications: High Blood Pressure  Dose: 50 mg     montelukast 10 MG Tabs  Commonly known as: Singulair   Take 1 tablet by mouth once daily  Dose: 10 mg     predniSONE 20 MG Tabs  Commonly known as: Deltasone   take 2 tablets orally four times a day for 5 days.  (take 2 tablets orally four times a day for 5 days.)     ProAir RespiClick 108 (90 Base) MCG/ACT Aepb  Generic drug: Albuterol Sulfate   Inhale 2 Puffs 4 times a day as needed (shortness of breath). Indications: SOB  Dose: 2 Puff     spironolactone 25 MG Tabs  Commonly known as: Aldactone   Take 1 tablet by mouth daily     traMADol 50 MG Tabs  Commonly known as: Ultram   Take 1 tablet as needed for M15.9/M50.30/M51.36 start date 09/23/2024 Orally twice each day 30 days     Trelegy Ellipta 200-62.5-25 MCG/ACT inhaler  Generic drug: fluticasone-umeclidinium-vilanterol   Doctor's comments: 3 month supply x 1 year  Inhale 1 Puff every day.  Dose: 1 Puff            STOP taking these medications      meloxicam 7.5 MG Tabs  Commonly known as: Mobic              Allergies  Allergies   Allergen Reactions    Iodine      Convulsion  I.V.  iodine    Tape Rash and Itching       DIET  Orders Placed This Encounter   Procedures    Diet Order Diet: Cardiac     Standing  Status:   Standing     Number of Occurrences:   1     Order Specific Question:   Diet:     Answer:   Cardiac [6]       ACTIVITY  As tolerated.  Weight bearing as tolerated    CONSULTATIONS  Gastroenterology Dr Mela Vargas    PROCEDURES  EGD  Colonoscopy    LABORATORY  Lab Results   Component Value Date    SODIUM 139 11/15/2024    POTASSIUM 3.2 (L) 11/15/2024    CHLORIDE 101 11/15/2024    CO2 32 11/15/2024    GLUCOSE 105 (H) 11/15/2024    BUN 13 11/15/2024    CREATININE 0.37 (L) 11/15/2024    CREATININE 0.62 06/09/2011        Lab Results   Component Value Date    WBC 19.8 (H) 11/16/2024    WBC 10.9 (H) 06/09/2011    HEMOGLOBIN 11.6 (L) 11/16/2024    HEMATOCRIT 35.0 (L) 11/16/2024    PLATELETCT 312 11/16/2024        Total time of the discharge process exceeds 33 minutes.

## 2024-11-16 NOTE — PROGRESS NOTES
Patient arrived to unit via gurney. Patient ambulated to bed. She is alert and oriented. POC discuseed. Patient denies pain. This RN agrees with previous charting and assessment. Patient resting in bed with all belongings within reach

## 2024-11-17 LAB
BACTERIA SPEC RESP CULT: ABNORMAL
BACTERIA SPEC RESP CULT: ABNORMAL
ETEST SENSITIVITY ETEST: NORMAL
GRAM STN SPEC: ABNORMAL
SIGNIFICANT IND 70042: ABNORMAL
SITE SITE: ABNORMAL
SOURCE SOURCE: ABNORMAL

## 2024-11-20 ENCOUNTER — HOME CARE VISIT (OUTPATIENT)
Dept: HOME HEALTH SERVICES | Facility: HOME HEALTHCARE | Age: 79
End: 2024-11-20
Payer: MEDICARE

## 2024-11-20 PROCEDURE — 665005 NO-PAY RAP - HOME HEALTH

## 2024-11-20 PROCEDURE — G0299 HHS/HOSPICE OF RN EA 15 MIN: HCPCS

## 2024-11-20 SDOH — ECONOMIC STABILITY: HOUSING INSECURITY: EVIDENCE OF SMOKING MATERIAL: 0

## 2024-11-20 ASSESSMENT — ENCOUNTER SYMPTOMS
SHORTNESS OF BREATH: 1
LOWER EXTREMITY EDEMA: 1
DYSPNEA ACTIVITY LEVEL: AFTER AMBULATING 10 - 20 FT

## 2024-11-21 ENCOUNTER — APPOINTMENT (OUTPATIENT)
Dept: RADIOLOGY | Facility: MEDICAL CENTER | Age: 79
End: 2024-11-21
Payer: MEDICARE

## 2024-11-21 ENCOUNTER — DOCUMENTATION (OUTPATIENT)
Dept: MEDICAL GROUP | Facility: PHYSICIAN GROUP | Age: 79
End: 2024-11-21
Payer: MEDICARE

## 2024-11-21 ENCOUNTER — HOSPITAL ENCOUNTER (INPATIENT)
Facility: MEDICAL CENTER | Age: 79
LOS: 3 days | End: 2024-11-24
Attending: EMERGENCY MEDICINE | Admitting: STUDENT IN AN ORGANIZED HEALTH CARE EDUCATION/TRAINING PROGRAM
Payer: MEDICARE

## 2024-11-21 DIAGNOSIS — R06.02 SHORTNESS OF BREATH: ICD-10-CM

## 2024-11-21 DIAGNOSIS — J18.9 COMMUNITY ACQUIRED PNEUMONIA, UNSPECIFIED LATERALITY: Primary | ICD-10-CM

## 2024-11-21 DIAGNOSIS — Z99.81 SUPPLEMENTAL OXYGEN DEPENDENT: ICD-10-CM

## 2024-11-21 DIAGNOSIS — B02.9 HERPES ZOSTER WITHOUT COMPLICATION: ICD-10-CM

## 2024-11-21 DIAGNOSIS — I47.10 SVT (SUPRAVENTRICULAR TACHYCARDIA) (HCC): ICD-10-CM

## 2024-11-21 DIAGNOSIS — I10 PRIMARY HYPERTENSION: ICD-10-CM

## 2024-11-21 DIAGNOSIS — J18.9 PNEUMONIA DUE TO INFECTIOUS ORGANISM, UNSPECIFIED LATERALITY, UNSPECIFIED PART OF LUNG: ICD-10-CM

## 2024-11-21 DIAGNOSIS — R06.09 DYSPNEA ON EXERTION: ICD-10-CM

## 2024-11-21 DIAGNOSIS — R06.82 TACHYPNEA: ICD-10-CM

## 2024-11-21 DIAGNOSIS — D72.829 LEUKOCYTOSIS, UNSPECIFIED TYPE: ICD-10-CM

## 2024-11-21 DIAGNOSIS — Z87.09 HISTORY OF COPD: ICD-10-CM

## 2024-11-21 LAB
ALBUMIN SERPL BCP-MCNC: 3.4 G/DL (ref 3.2–4.9)
ALBUMIN/GLOB SERPL: 0.9 G/DL
ALP SERPL-CCNC: 52 U/L (ref 30–99)
ALT SERPL-CCNC: 13 U/L (ref 2–50)
ANION GAP SERPL CALC-SCNC: 9 MMOL/L (ref 7–16)
ANISOCYTOSIS BLD QL SMEAR: ABNORMAL
AST SERPL-CCNC: 19 U/L (ref 12–45)
BASOPHILS # BLD AUTO: 0 % (ref 0–1.8)
BASOPHILS # BLD: 0 K/UL (ref 0–0.12)
BILIRUB SERPL-MCNC: 0.2 MG/DL (ref 0.1–1.5)
BUN SERPL-MCNC: 17 MG/DL (ref 8–22)
CALCIUM ALBUM COR SERPL-MCNC: 10.3 MG/DL (ref 8.5–10.5)
CALCIUM SERPL-MCNC: 9.8 MG/DL (ref 8.5–10.5)
CHLORIDE SERPL-SCNC: 90 MMOL/L (ref 96–112)
CO2 SERPL-SCNC: 35 MMOL/L (ref 20–33)
CREAT SERPL-MCNC: 0.42 MG/DL (ref 0.5–1.4)
EKG IMPRESSION: NORMAL
EOSINOPHIL # BLD AUTO: 0 K/UL (ref 0–0.51)
EOSINOPHIL NFR BLD: 0 % (ref 0–6.9)
ERYTHROCYTE [DISTWIDTH] IN BLOOD BY AUTOMATED COUNT: 52.3 FL (ref 35.9–50)
FLUAV RNA SPEC QL NAA+PROBE: NEGATIVE
FLUBV RNA SPEC QL NAA+PROBE: NEGATIVE
GFR SERPLBLD CREATININE-BSD FMLA CKD-EPI: 99 ML/MIN/1.73 M 2
GLOBULIN SER CALC-MCNC: 3.8 G/DL (ref 1.9–3.5)
GLUCOSE SERPL-MCNC: 134 MG/DL (ref 65–99)
HCT VFR BLD AUTO: 39.8 % (ref 37–47)
HGB BLD-MCNC: 12.4 G/DL (ref 12–16)
LG PLATELETS BLD QL SMEAR: NORMAL
LYMPHOCYTES # BLD AUTO: 0.25 K/UL (ref 1–4.8)
LYMPHOCYTES NFR BLD: 0.9 % (ref 22–41)
MAGNESIUM SERPL-MCNC: 1.8 MG/DL (ref 1.5–2.5)
MANUAL DIFF BLD: NORMAL
MCH RBC QN AUTO: 28.6 PG (ref 27–33)
MCHC RBC AUTO-ENTMCNC: 31.2 G/DL (ref 32.2–35.5)
MCV RBC AUTO: 91.9 FL (ref 81.4–97.8)
MICROCYTES BLD QL SMEAR: ABNORMAL
MONOCYTES # BLD AUTO: 2.19 K/UL (ref 0–0.85)
MONOCYTES NFR BLD AUTO: 7.8 % (ref 0–13.4)
MORPHOLOGY BLD-IMP: NORMAL
NEUTROPHILS # BLD AUTO: 25.66 K/UL (ref 1.82–7.42)
NEUTROPHILS NFR BLD: 91.3 % (ref 44–72)
NRBC # BLD AUTO: 0 K/UL
NRBC BLD-RTO: 0 /100 WBC (ref 0–0.2)
NT-PROBNP SERPL IA-MCNC: 76 PG/ML (ref 0–125)
OVALOCYTES BLD QL SMEAR: NORMAL
PHOSPHATE SERPL-MCNC: 3.4 MG/DL (ref 2.5–4.5)
PLATELET # BLD AUTO: 478 K/UL (ref 164–446)
PLATELET BLD QL SMEAR: NORMAL
PMV BLD AUTO: 9.1 FL (ref 9–12.9)
POIKILOCYTOSIS BLD QL SMEAR: NORMAL
POTASSIUM SERPL-SCNC: 4.1 MMOL/L (ref 3.6–5.5)
PROCALCITONIN SERPL-MCNC: <0.05 NG/ML
PROT SERPL-MCNC: 7.2 G/DL (ref 6–8.2)
RBC # BLD AUTO: 4.33 M/UL (ref 4.2–5.4)
RBC BLD AUTO: PRESENT
RSV RNA SPEC QL NAA+PROBE: NEGATIVE
SARS-COV-2 RNA RESP QL NAA+PROBE: NOTDETECTED
SODIUM SERPL-SCNC: 134 MMOL/L (ref 135–145)
TROPONIN T SERPL-MCNC: 10 NG/L (ref 6–19)
WBC # BLD AUTO: 28.1 K/UL (ref 4.8–10.8)
WBC TOXIC VACUOLES BLD QL SMEAR: NORMAL

## 2024-11-21 PROCEDURE — 770006 HCHG ROOM/CARE - MED/SURG/GYN SEMI*

## 2024-11-21 PROCEDURE — 36415 COLL VENOUS BLD VENIPUNCTURE: CPT

## 2024-11-21 PROCEDURE — 71045 X-RAY EXAM CHEST 1 VIEW: CPT

## 2024-11-21 PROCEDURE — 85027 COMPLETE CBC AUTOMATED: CPT

## 2024-11-21 PROCEDURE — 0241U HCHG SARS-COV-2 COVID-19 NFCT DS RESP RNA 4 TRGT ED POC: CPT

## 2024-11-21 PROCEDURE — A9270 NON-COVERED ITEM OR SERVICE: HCPCS | Performed by: STUDENT IN AN ORGANIZED HEALTH CARE EDUCATION/TRAINING PROGRAM

## 2024-11-21 PROCEDURE — 84145 PROCALCITONIN (PCT): CPT

## 2024-11-21 PROCEDURE — 84484 ASSAY OF TROPONIN QUANT: CPT

## 2024-11-21 PROCEDURE — 700102 HCHG RX REV CODE 250 W/ 637 OVERRIDE(OP): Performed by: STUDENT IN AN ORGANIZED HEALTH CARE EDUCATION/TRAINING PROGRAM

## 2024-11-21 PROCEDURE — 700105 HCHG RX REV CODE 258: Mod: UD | Performed by: EMERGENCY MEDICINE

## 2024-11-21 PROCEDURE — 96365 THER/PROPH/DIAG IV INF INIT: CPT

## 2024-11-21 PROCEDURE — 700102 HCHG RX REV CODE 250 W/ 637 OVERRIDE(OP): Mod: UD | Performed by: EMERGENCY MEDICINE

## 2024-11-21 PROCEDURE — 93005 ELECTROCARDIOGRAM TRACING: CPT | Performed by: EMERGENCY MEDICINE

## 2024-11-21 PROCEDURE — 80053 COMPREHEN METABOLIC PANEL: CPT

## 2024-11-21 PROCEDURE — 83735 ASSAY OF MAGNESIUM: CPT

## 2024-11-21 PROCEDURE — 99285 EMERGENCY DEPT VISIT HI MDM: CPT

## 2024-11-21 PROCEDURE — 700111 HCHG RX REV CODE 636 W/ 250 OVERRIDE (IP): Mod: JZ,UD | Performed by: EMERGENCY MEDICINE

## 2024-11-21 PROCEDURE — A9270 NON-COVERED ITEM OR SERVICE: HCPCS | Mod: UD | Performed by: EMERGENCY MEDICINE

## 2024-11-21 PROCEDURE — 93005 ELECTROCARDIOGRAM TRACING: CPT

## 2024-11-21 PROCEDURE — 99223 1ST HOSP IP/OBS HIGH 75: CPT | Mod: 25,AI | Performed by: STUDENT IN AN ORGANIZED HEALTH CARE EDUCATION/TRAINING PROGRAM

## 2024-11-21 PROCEDURE — 85007 BL SMEAR W/DIFF WBC COUNT: CPT

## 2024-11-21 PROCEDURE — 83880 ASSAY OF NATRIURETIC PEPTIDE: CPT

## 2024-11-21 PROCEDURE — 99497 ADVNCD CARE PLAN 30 MIN: CPT | Mod: 25 | Performed by: STUDENT IN AN ORGANIZED HEALTH CARE EDUCATION/TRAINING PROGRAM

## 2024-11-21 PROCEDURE — 700105 HCHG RX REV CODE 258: Performed by: STUDENT IN AN ORGANIZED HEALTH CARE EDUCATION/TRAINING PROGRAM

## 2024-11-21 PROCEDURE — 700111 HCHG RX REV CODE 636 W/ 250 OVERRIDE (IP): Performed by: STUDENT IN AN ORGANIZED HEALTH CARE EDUCATION/TRAINING PROGRAM

## 2024-11-21 PROCEDURE — 84100 ASSAY OF PHOSPHORUS: CPT

## 2024-11-21 RX ORDER — AMOXICILLIN 250 MG
2 CAPSULE ORAL EVERY EVENING
Status: DISCONTINUED | OUTPATIENT
Start: 2024-11-21 | End: 2024-11-24 | Stop reason: HOSPADM

## 2024-11-21 RX ORDER — DULOXETIN HYDROCHLORIDE 30 MG/1
60 CAPSULE, DELAYED RELEASE ORAL EVERY EVENING
Status: DISCONTINUED | OUTPATIENT
Start: 2024-11-21 | End: 2024-11-24 | Stop reason: HOSPADM

## 2024-11-21 RX ORDER — HYDROCODONE BITARTRATE AND ACETAMINOPHEN 5; 325 MG/1; MG/1
1 TABLET ORAL EVERY 6 HOURS PRN
Status: DISCONTINUED | OUTPATIENT
Start: 2024-11-21 | End: 2024-11-22

## 2024-11-21 RX ORDER — HYDROCODONE BITARTRATE AND ACETAMINOPHEN 10; 325 MG/1; MG/1
1 TABLET ORAL EVERY 6 HOURS PRN
Status: DISCONTINUED | OUTPATIENT
Start: 2024-11-21 | End: 2024-11-22

## 2024-11-21 RX ORDER — DOXYCYCLINE 100 MG/1
100 TABLET ORAL EVERY 12 HOURS
Status: DISCONTINUED | OUTPATIENT
Start: 2024-11-22 | End: 2024-11-24 | Stop reason: HOSPADM

## 2024-11-21 RX ORDER — IPRATROPIUM BROMIDE AND ALBUTEROL SULFATE 2.5; .5 MG/3ML; MG/3ML
3 SOLUTION RESPIRATORY (INHALATION)
Status: DISCONTINUED | OUTPATIENT
Start: 2024-11-21 | End: 2024-11-22

## 2024-11-21 RX ORDER — AZITHROMYCIN 250 MG/1
500 TABLET, FILM COATED ORAL ONCE
Status: COMPLETED | OUTPATIENT
Start: 2024-11-21 | End: 2024-11-21

## 2024-11-21 RX ORDER — AZITHROMYCIN 500 MG/5ML
500 INJECTION, POWDER, LYOPHILIZED, FOR SOLUTION INTRAVENOUS ONCE
Status: DISCONTINUED | OUTPATIENT
Start: 2024-11-21 | End: 2024-11-21

## 2024-11-21 RX ORDER — ONDANSETRON 4 MG/1
4 TABLET, ORALLY DISINTEGRATING ORAL EVERY 4 HOURS PRN
Status: DISCONTINUED | OUTPATIENT
Start: 2024-11-21 | End: 2024-11-24 | Stop reason: HOSPADM

## 2024-11-21 RX ORDER — LABETALOL HYDROCHLORIDE 5 MG/ML
10 INJECTION, SOLUTION INTRAVENOUS EVERY 4 HOURS PRN
Status: DISCONTINUED | OUTPATIENT
Start: 2024-11-21 | End: 2024-11-24 | Stop reason: HOSPADM

## 2024-11-21 RX ORDER — MONTELUKAST SODIUM 10 MG/1
10 TABLET ORAL EVERY EVENING
Status: DISCONTINUED | OUTPATIENT
Start: 2024-11-21 | End: 2024-11-24 | Stop reason: HOSPADM

## 2024-11-21 RX ORDER — LOSARTAN POTASSIUM 50 MG/1
50 TABLET ORAL 2 TIMES DAILY
Status: DISCONTINUED | OUTPATIENT
Start: 2024-11-21 | End: 2024-11-24 | Stop reason: HOSPADM

## 2024-11-21 RX ORDER — POLYETHYLENE GLYCOL 3350 17 G/17G
1 POWDER, FOR SOLUTION ORAL
Status: DISCONTINUED | OUTPATIENT
Start: 2024-11-21 | End: 2024-11-24 | Stop reason: HOSPADM

## 2024-11-21 RX ORDER — DIGOXIN 125 MCG
125 TABLET ORAL EVERY EVENING
Status: DISCONTINUED | OUTPATIENT
Start: 2024-11-21 | End: 2024-11-24 | Stop reason: HOSPADM

## 2024-11-21 RX ORDER — ONDANSETRON 2 MG/ML
4 INJECTION INTRAMUSCULAR; INTRAVENOUS EVERY 4 HOURS PRN
Status: DISCONTINUED | OUTPATIENT
Start: 2024-11-21 | End: 2024-11-24 | Stop reason: HOSPADM

## 2024-11-21 RX ORDER — ACETAMINOPHEN 325 MG/1
650 TABLET ORAL EVERY 6 HOURS PRN
Status: DISCONTINUED | OUTPATIENT
Start: 2024-11-21 | End: 2024-11-24 | Stop reason: HOSPADM

## 2024-11-21 RX ORDER — PREDNISONE 20 MG/1
40 TABLET ORAL DAILY
Status: DISCONTINUED | OUTPATIENT
Start: 2024-11-21 | End: 2024-11-24 | Stop reason: HOSPADM

## 2024-11-21 RX ADMIN — AZITHROMYCIN DIHYDRATE 500 MG: 250 TABLET ORAL at 16:29

## 2024-11-21 RX ADMIN — OMEPRAZOLE 20 MG: 20 CAPSULE, DELAYED RELEASE ORAL at 18:41

## 2024-11-21 RX ADMIN — DULOXETINE HYDROCHLORIDE 60 MG: 30 CAPSULE, DELAYED RELEASE ORAL at 18:40

## 2024-11-21 RX ADMIN — AMPICILLIN AND SULBACTAM 3 G: 1; 2 INJECTION, POWDER, FOR SOLUTION INTRAMUSCULAR; INTRAVENOUS at 22:39

## 2024-11-21 RX ADMIN — AMPICILLIN AND SULBACTAM 3 G: 1; 2 INJECTION, POWDER, FOR SOLUTION INTRAMUSCULAR; INTRAVENOUS at 16:30

## 2024-11-21 RX ADMIN — MONTELUKAST SODIUM 10 MG: 10 TABLET, FILM COATED ORAL at 18:38

## 2024-11-21 RX ADMIN — LOSARTAN POTASSIUM 50 MG: 50 TABLET, FILM COATED ORAL at 18:40

## 2024-11-21 RX ADMIN — SENNOSIDES AND DOCUSATE SODIUM 2 TABLET: 50; 8.6 TABLET ORAL at 18:41

## 2024-11-21 RX ADMIN — DIGOXIN 125 MCG: 0.12 TABLET ORAL at 18:42

## 2024-11-21 RX ADMIN — PREDNISONE 40 MG: 20 TABLET ORAL at 18:38

## 2024-11-21 ASSESSMENT — ENCOUNTER SYMPTOMS
PAIN SEVERITY GOAL: 0/10
PAIN LOCATION - PAIN FREQUENCY: INTERMITTENT
PAIN LOCATION - PAIN SEVERITY: 0/10
LAST BOWEL MOVEMENT: 67163
SEVERE DYSPNEA: 1
PAIN LOCATION - RELIEVING FACTORS: MEDICATION, REST
PAIN LOCATION - PAIN FREQUENCY: INTERMITTENT
PAIN LOCATION - PAIN QUALITY: ACHING
PAIN LOCATION: BACK PAIN
PAIN LOCATION - PAIN DURATION: VARIES
PAIN LOCATION - PAIN QUALITY: ACHING
PAIN LOCATION - RELIEVING FACTORS: MEDICATION, REST
PAIN LOCATION - PAIN SEVERITY: 0/10
SUBJECTIVE PAIN PROGRESSION: WAXING AND WANING
PAIN: 1
HIGHEST PAIN SEVERITY IN PAST 24 HOURS: 2/10
PAIN LOCATION: GENERALIZED JOINT PAIN
LOWEST PAIN SEVERITY IN PAST 24 HOURS: 0/10
PAIN LOCATION - PAIN DURATION: VARIES

## 2024-11-21 ASSESSMENT — FIBROSIS 4 INDEX
FIB4 SCORE: 1.49
FIB4 SCORE: 0.87

## 2024-11-21 ASSESSMENT — PAIN DESCRIPTION - PAIN TYPE: TYPE: ACUTE PAIN

## 2024-11-21 ASSESSMENT — ACTIVITIES OF DAILY LIVING (ADL): OASIS_M1830: 03

## 2024-11-21 NOTE — ED TRIAGE NOTES
"BIB [facility] to rm 10 from home, her home health nurse called today  Chief Complaint   Patient presents with    Shortness of Breath     Since this morning    Weakness     Can't even walk very short distances without getting out of breath    Cough     Productive, on abx and steroids for PNA     Pt wears home O2 at 6L, was 91%, placed on a NRB at 10L and is 98%. /79   Pulse 85   Temp 36.5 °C (97.7 °F) (Temporal)   Resp (!) 35   Ht 1.626 m (5' 4\")   Wt 76.7 kg (169 lb)   LMP 06/07/2001   SpO2 97%   BMI 29.01 kg/m²   Denies any pain, just feels weak and very SOB. Chart up for ERP.  "

## 2024-11-21 NOTE — ED PROVIDER NOTES
"ED Provider Note    Scribed for Audi Rodriguez by Rogelio Taveras. 11/21/2024  3:39 PM    Primary care provider: Everett Diego M.D.  Means of arrival: EMS  History obtained from: Patient  History limited by: None    CHIEF COMPLAINT  Chief Complaint   Patient presents with    Shortness of Breath     Since this morning    Weakness     Can't even walk very short distances without getting out of breath    Cough     Productive, on abx and steroids for PNA       EXTERNAL RECORDS REVIEWED  Inpatient Notes The patient was admitted on 11/13/24 for 3 days and was discharged on 11/16/24. He was admitted for a GI bleed with a hemoglobin of 5.7, and this was found to be a diverticular bleed. Patient has a history of lung cancer and COPD on home O2.    HPI/ROS  LIMITATION TO HISTORY   Select: : None  OUTSIDE HISTORIAN(S):  None    HPI  Berny Renee is a 79 y.o. female who presents to the Emergency Department via EMS for evaluation of shortness of breath and weakness onset prior to arrival. The patient reports she is \"super tired\" and can't walk very far without feeling short of breath. She reports associated productive cough. She denies any vomiting, abdominal pain, or chest pain. The patient reports she was in the hospital 1 week ago for pneumonia and a GI bleed. She adds she has a history of COPD and is on 8 L O2 at baseline.     REVIEW OF SYSTEMS  As above, all other systems reviewed and are negative.   See HPI for further details.     PAST MEDICAL HISTORY   has a past medical history of Acute on chronic respiratory failure with hypoxia ([facility]) (11/14/2020), Arthritis, Asthma with COPD ([facility]), BMI 31.0-31.9,adult (02/04/2022), Cataract immature, Chronic pain, Chronic respiratory failure with hypoxia ([facility]) (07/13/2017), Colon polyps, COPD (chronic obstructive pulmonary disease) ([facility]) (2002), Cough, DDD (degenerative disc disease), cervical, DDD (degenerative disc disease), thoracic, Dependence on continuous supplemental " oxygen (2015), Diverticulitis of colon, Dyslipidemia, EMPHYSEMA, H/O opioid abuse ([facility]) (07/15/2021), Hypertension, Obesity (BMI 30-39.9) (10/24/2016), Opioid type dependence, continuous ([facility]) (2022), REGINE (obstructive sleep apnea), OSTEOPOROSIS, Painful breathing, Shortness of breath, Spinal stenosis, lumbar region, with neurogenic claudication, Sputum production, URINARY INCONTINENCE, Varicose veins of left leg with edema (10/11/2024), Vitamin d deficiency, and Wheezing.  SURGICAL HISTORY   has a past surgical history that includes tonsillectomy; other orthopedic surgery (); other; other; lumbar laminectomy diskectomy (2010); upper gi endoscopy,biopsy (N/A, 11/15/2024); and colonoscopy with polyp (N/A, 11/15/2024).  SOCIAL HISTORY  Social History     Tobacco Use    Smoking status: Former     Current packs/day: 0.00     Average packs/day: 2.0 packs/day for 30.0 years (60.0 ttl pk-yrs)     Types: Cigarettes     Start date: 3/1/1969     Quit date: 3/1/1999     Years since quittin.7    Smokeless tobacco: Never    Tobacco comments:     3 pks a day for 35 yrs, continued abstinence   Vaping Use    Vaping status: Never Used   Substance Use Topics    Alcohol use: Not Currently     Alcohol/week: 4.2 oz     Types: 7 Glasses of wine per week    Drug use: Not Currently     Types: Marijuana, Oral     Comment: Medical Marijuana       Social History     Substance and Sexual Activity   Drug Use Not Currently    Types: Marijuana, Oral    Comment: Medical Marijuana      FAMILY HISTORY  Family History   Problem Relation Age of Onset    Cancer Father         Lung    Other Sister         lung and leukemia cancer    Cancer Sister         Leukemia, Lung     CURRENT MEDICATIONS  Home Medications       Reviewed by Hiral Tellez R.N. (Registered Nurse) on 24 at 1456  Med List Status: Partial     Medication Last Dose Status   acetaminophen (TYLENOL) 500 MG Tab  Active   acyclovir (ZOVIRAX) 200 MG Cap   Active   Albuterol Sulfate 108 (90 Base) MCG/ACT AEROSOL POWDER, BREATH ACTIVATED 11/21/2024 Active   amoxicillin-clavulanate (AUGMENTIN) 875-125 MG Tab 11/21/2024 Active   benzonatate (TESSALON) 200 MG capsule  Active   Calcium Carbonate (CALCIUM 500 PO)  Active   Dextromethorphan-guaiFENesin (MUCINEX DM)  MG TABLET SR 12 HR  Active   Diclofenac Sodium 1 % Cream  Active   digoxin (LANOXIN) 125 MCG Tab  Active   diphenhydrAMINE-APAP, sleep, (TYLENOL PM EXTRA STRENGTH)  MG Tab  Active   DULoxetine (CYMBALTA) 60 MG Cap DR Particles delayed-release capsule  Active   fexofenadine (HM FEXOFENADINE HCL) 180 MG tablet  Active   fluticasone-umeclidinium-vilanterol (TRELEGY ELLIPTA) 200-62.5-25 mcg/act inhaler  Active   furosemide (LASIX) 20 MG Tab  Active   Home Care Oxygen  Active   hydrocortisone (ANUSOL-HC) 25 MG Suppos  Active   ipratropium-albuterol (DUONEB) 0.5-2.5 (3) MG/3ML nebulizer solution  Active   losartan (COZAAR) 50 MG Tab  Active   Magnesium Hydroxide (MILK OF MAGNESIA PO)  Active   methylPREDNISolone (MEDROL DOSEPAK) 4 MG Tablet Therapy Pack  Active   montelukast (SINGULAIR) 10 MG Tab  Active   omeprazole (PRILOSEC) 20 MG delayed-release capsule  Active   omeprazole (PRILOSEC) 20 MG delayed-release capsule  Active   potassium chloride (MICRO-K) 10 MEQ capsule  Active   predniSONE (DELTASONE) 20 MG Tab 11/21/2024 Active   Sennosides-Docusate Sodium (SENNA PLUS) 8.6-50 MG Cap  Active   simethicone (MYLICON) 125 MG chewable tablet  Active   spironolactone (ALDACTONE) 25 MG Tab  Active   tizanidine (ZANAFLEX) 4 MG Tab  Active   traMADol (ULTRAM) 50 MG Tab  Active   triamcinolone (NASACORT) 55 MCG/ACT nasal inhaler  Active   Vitamin D, Cholecalciferol, 25 MCG (1000 UT) Cap  Active                  Audit from Redirected Encounters    **Home medications have not yet been reviewed for this encounter**       ALLERGIES  Allergies   Allergen Reactions    Iodine      Convulsion  I.V.  iodine    Tape Rash  "and Itching       PHYSICAL EXAM    VITAL SIGNS:   Vitals:    24 1447 24 1450 24 1503 24 1506   BP: 136/79  (!) 143/65    Pulse: 85  100 84   Resp: (!) 35  (!) 37    Temp: 36.5 °C (97.7 °F)      TempSrc: Temporal      SpO2: 97%  94% 94%   Weight:  76.7 kg (169 lb)     Height:  1.626 m (5' 4\")       Vitals: My interpretation: hypertensive, not tachycardic, tachypneic, afebrile, not hypoxic    Reinterpretation of vitals: Improving    Cardiac Monitor Interpretation: The cardiac monitor revealed normal Sinus Rhythm as interpreted by me. The cardiac monitor was ordered secondary to the patient's history of shortness of breath and to monitor for dysrhythmia and/or tachycardia.    PE:   Gen: Appears chronically ill and uncomfortable, tachypneic   ENT: Mucous membranes moist, posterior pharynx clear, uvula midline, nares patent bilaterally   Neck: Supple, FROM  Pulmonary: Lungs are clear to auscultation bilaterally. No tachypnea  CV:  RRR, no murmur appreciated, pulses 2+ in both upper and lower extremities  Abdomen: soft, NT/ND; no rebound/guarding  : no CVA or suprapubic tenderness   Neuro: A&Ox4 (person, place, time, situation), speech fluent, gait steady, no focal deficits appreciated  Skin: No rash or lesions.  No pallor or jaundice.  No cyanosis.  Warm and dry.     DIAGNOSTIC STUDIES / PROCEDURES    LABS  Results for orders placed or performed during the hospital encounter of 24   EKG    Collection Time: 24  2:56 PM   Result Value Ref Range    Report       [facility] Emergency Dept.    Test Date:  2024  Pt Name:    FAZAL STUBBS                    Department: ER  MRN:        [id number]                      Room:       RD 10  Gender:     Female                       Technician:  :        [id number]                   Requested By:ER TRIAGE PROTOCOL  Order #:    [id number]                    Reading MD: Audi Rodriguez    Measurements  Intervals                  "               Axis  Rate:       81                           P:          67  NH:         147                          QRS:        21  QRSD:       95                           T:          71  QT:         343  QTc:        398    Interpretive Statements  Sinus rhythm  Atrial premature complexes  Low voltage, precordial leads  Compared to ECG 11/13/2024 10:24:47  Atrial premature complex(es) now present  Electronically Signed On 11- 15:30:01 PST by Audi Rodriguez     CBC with Differential    Collection Time: 11/21/24  3:08 PM   Result Value Ref Range    WBC 28.1 (H) 4.8 - 10.8 K/uL    RBC 4.33 4.20 - 5.40 M/uL    Hemoglobin 12.4 12.0 - 16.0 g/dL    Hematocrit 39.8 37.0 - 47.0 %    MCV 91.9 81.4 - 97.8 fL    MCH 28.6 27.0 - 33.0 pg    MCHC 31.2 (L) 32.2 - 35.5 g/dL    RDW 52.3 (H) 35.9 - 50.0 fL    Platelet Count 478 (H) 164 - 446 K/uL    MPV 9.1 9.0 - 12.9 fL    Neutrophils-Polys 91.30 (H) 44.00 - 72.00 %    Lymphocytes 0.90 (L) 22.00 - 41.00 %    Monocytes 7.80 0.00 - 13.40 %    Eosinophils 0.00 0.00 - 6.90 %    Basophils 0.00 0.00 - 1.80 %    Nucleated RBC 0.00 0.00 - 0.20 /100 WBC    Neutrophils (Absolute) 25.66 (H) 1.82 - 7.42 K/uL    Lymphs (Absolute) 0.25 (L) 1.00 - 4.80 K/uL    Monos (Absolute) 2.19 (H) 0.00 - 0.85 K/uL    Eos (Absolute) 0.00 0.00 - 0.51 K/uL    Baso (Absolute) 0.00 0.00 - 0.12 K/uL    NRBC (Absolute) 0.00 K/uL    Anisocytosis 1+     Microcytosis 1+    Comp Metabolic Panel    Collection Time: 11/21/24  3:08 PM   Result Value Ref Range    Sodium 134 (L) 135 - 145 mmol/L    Potassium 4.1 3.6 - 5.5 mmol/L    Chloride 90 (L) 96 - 112 mmol/L    Co2 35 (H) 20 - 33 mmol/L    Anion Gap 9.0 7.0 - 16.0    Glucose 134 (H) 65 - 99 mg/dL    Bun 17 8 - 22 mg/dL    Creatinine 0.42 (L) 0.50 - 1.40 mg/dL    Calcium 9.8 8.5 - 10.5 mg/dL    Correct Calcium 10.3 8.5 - 10.5 mg/dL    AST(SGOT) 19 12 - 45 U/L    ALT(SGPT) 13 2 - 50 U/L    Alkaline Phosphatase 52 30 - 99 U/L    Total Bilirubin 0.2 0.1 - 1.5  mg/dL    Albumin 3.4 3.2 - 4.9 g/dL    Total Protein 7.2 6.0 - 8.2 g/dL    Globulin 3.8 (H) 1.9 - 3.5 g/dL    A-G Ratio 0.9 g/dL   proBrain Natriuretic Peptide, NT    Collection Time: 11/21/24  3:08 PM   Result Value Ref Range    NT-proBNP 76 0 - 125 pg/mL   Troponin    Collection Time: 11/21/24  3:08 PM   Result Value Ref Range    Troponin T 10 6 - 19 ng/L   DIFFERENTIAL MANUAL    Collection Time: 11/21/24  3:08 PM   Result Value Ref Range    Manual Diff Status PERFORMED    PERIPHERAL SMEAR REVIEW    Collection Time: 11/21/24  3:08 PM   Result Value Ref Range    Peripheral Smear Review see below    PLATELET ESTIMATE    Collection Time: 11/21/24  3:08 PM   Result Value Ref Range    Plt Estimation Increased    MORPHOLOGY    Collection Time: 11/21/24  3:08 PM   Result Value Ref Range    RBC Morphology Present     Large Platelets 1+     Poikilocytosis 1+     Ovalocytes 1+     Toxic Vacuoles Few    ESTIMATED GFR    Collection Time: 11/21/24  3:08 PM   Result Value Ref Range    GFR (CKD-EPI) 99 >60 mL/min/1.73 m 2     *Note: Due to a large number of results and/or encounters for the requested time period, some results have not been displayed. A complete set of results can be found in Results Review.      All labs reviewed by me. Labs were compared to prior labs if they were available. Significant for white count of 28, no anemia, electrolytes normal, normal glucose, normal renal function, normal liver enzymes, normal bilirubin, BNP normal, troponin negative.    RADIOLOGY  I have independently interpreted the diagnostic imaging associated with this visit and am waiting the final reading from the radiologist.   My preliminary interpretation is a follows: Bilateral lower lobe infiltrate versus edema  Radiologist interpretation is as follows:  DX-CHEST-PORTABLE (1 VIEW)   Final Result      1.  Increased moderate bilateral interstitial edema. Underlying infection is possible.   2.  Stable enlargement of the cardiomediastinal  syd.        COURSE & MEDICAL DECISION MAKING  Nursing notes, VS, PMSFHx, labs, imaging, EKG reviewed in chart.    Heart Score: Moderate    ED Observation Status? No; Patient does not meet criteria for ED Observation.     Ddx: PE, MI, pneumonia    MDM: 3:39 PM Berny Renee is a 79 y.o. female who presented with acute on chronic respiratory failure.  Patient was just admitted on the 13th for 3-day stay no to have a GI bleed with hemoglobin 5.7 as well as chronic respiratory failure with history of COPD and history of lung cancer, chronically on 7 to 8 L at home supplemental oxygen.  She states for the last few days she has been unable to ambulate due to shortness of breath and generalized weakness.  Denies any black or tarry stools.  No fevers.  On arrival here she is borderline hypoxic on her baseline supplemental oxygen and borderline hypertensive but afebrile.  She has tachypnea but her lungs are fairly clear to auscultation, no signs of acute COPD exacerbation.  Will undergo workup here.  Chest x-ray shows obvious lower lobe bilateral interstitial edema versus infiltrate, and cardiomegaly which is unchanged on my independent interpretation, radiologist agrees.  Her EKG shows sinus rhythm without ischemic changes or arrhythmia. All labs reviewed by me. Labs were compared to prior labs if they were available. Significant for white count of 28, no anemia, electrolytes normal, normal glucose, normal renal function, normal liver enzymes, normal bilirubin, BNP normal, troponin negative.  Overall due to patient's chronic respiratory illness, worsening chest x-ray and leukocytosis, tachypnea, she is a candidate for inpatient IV antibiotics, ampicillin and azithromycin were started per pharmacy recommendations and sepsis protocol.  Patient updated on plan to admit is amenable.  Spoke with hospitalist they are amenable to admission.    ADDITIONAL PROBLEM LIST AND DISPOSITION    I have discussed management of the  patient with the following physicians and LINDA's: Hospitalist    Discussion of management with other QHP or appropriate source(s): Pharmacy regarding antibiotic selection      Barriers to care at this time, including but not limited to: Patient lacks transportation .     Decision tools and prescription drugs considered including, but not limited to: Antibiotics ampicillin and azithromycin .    FINAL IMPRESSION  1. Community acquired pneumonia, unspecified laterality Acute   2. Shortness of breath Acute   3. Dyspnea on exertion Acute   4. Leukocytosis, unspecified type Acute   5. Supplemental oxygen dependent Acute   6. Tachypnea Acute   7. History of COPD Acute      Rogelio LI (Scribe), am scribing for, and in the presence of, Audi Rodriguez.    Electronically signed by: Rogelio Taveras (Robbieibe), 11/21/2024    IAudi personally performed the services described in this documentation, as scribed by Rogelio Taveras in my presence, and it is both accurate and complete.    The note accurately reflects work and decisions made by me.  Audi Rodriguez  11/21/2024  4:19 PM

## 2024-11-21 NOTE — PROGRESS NOTES
Medication chart review for [facility] services    Received referral from [facility].   Medications reviewed  compared with discharge summary if available.  Discharge summary date, if applicable:   11/16/24    Current medication list per [facility]     Medication list one, patient is currently taking    Current Outpatient Medications:     Magnesium Hydroxide (MILK OF MAGNESIA PO), 15 mL, Oral, QDAY PRN    Diclofenac Sodium, 1 Application, Topical, BID PRN    acetaminophen, 1,000 mg, Oral, Q8HRS PRN    Mucinex DM, 1 Tablet, Oral, Q12HRS PRN    tizanidine, 4 mg, Oral, TID PRN    Tylenol PM Extra Strength, 2 Tablet, Oral, QHS PRN    Senna Plus, 2 Tablet, Oral, BID PRN    fexofenadine, 180 mg, Oral, DAILY    potassium chloride, 10 mEq, Oral, BID    Calcium Carbonate (CALCIUM 500 PO), 1 Tablet, Oral, DAILY    triamcinolone, 1 Spray, Nasal, DAILY    simethicone, 125 mg, Oral, Q6HRS PRN    methylPREDNISolone, Follow schedule on package instructions.    hydrocortisone, 25 mg, Rectal, Q12HRS    omeprazole, 20 mg, Oral, QDAY    amoxicillin-clavulanate, Take 1 tab every 12 hours for 5 days (Patient not taking: Reported on 11/20/2024)    benzonatate, take 1 tab every 8 hrs as needed for cough    predniSONE, take 2 tablets orally four times a day for 5 days. (Patient not taking: Reported on 11/21/2024)    digoxin, 125 mcg, Oral, DAILY    losartan, 50 mg, Oral, BID    Trelegy Ellipta, 1 Puff, Inhalation, DAILY    traMADol, Take 1 tablet as needed for M15.9/M50.30/M51.36 start date 09/23/2024 Orally twice each day 30 days (Patient not taking: Reported on 11/21/2024)    montelukast, 10 mg, Oral, DAILY    acyclovir, Take 1 capsule(at first sign of outbreak) by mouth three times a day 10 days    DULoxetine, Take 1 capsule by mouth once daily    spironolactone, Take 1 tablet by mouth daily    furosemide, Take 1 tablet as needed for water retention orally once a day  30 days    Vitamin D (Cholecalciferol), 1 Capsule, Oral,  DAILY    omeprazole, Take 1 capsule by mouth every day (Patient not taking: Reported on 11/20/2024)    Home Care Oxygen, 6 L/min, Inhalation, Continuous    ipratropium-albuterol, 3 mL, Nebulization, Q4HRS PRN    Albuterol Sulfate, 2 Puff, Inhalation, 4X/DAY PRN      Medication list two, drugs that the patient has been prescribed or recommended to take by their healthcare provider on discharge summary          MEDICATIONS ON DISCHARGE      Medication List          CHANGE how you take these medications         Instructions   * omeprazole 20 MG delayed-release capsule  What changed: Another medication with the same name was added. Make sure you understand how and when to take each.  Commonly known as: PriLOSEC    Take 1 capsule by mouth every day      * omeprazole 20 MG delayed-release capsule  What changed: You were already taking a medication with the same name, and this prescription was added. Make sure you understand how and when to take each.  Commonly known as: PriLOSEC    Take 1 capsule by mouth every day  Dose: 20 mg              * This list has 2 medication(s) that are the same as other medications prescribed for you. Read the directions carefully, and ask your doctor or other care provider to review them with you.                    CONTINUE taking these medications         Instructions   acyclovir 200 MG Caps  Commonly known as: Zovirax    Take 1 capsule(at first sign of outbreak) by mouth three times a day 10 days      amoxicillin-clavulanate 875-125 MG Tabs  Commonly known as: Augmentin    Doctor's comments: additional antibiotics being sent to extended current therapy length  Take 1 tab every 12 hours for 5 days      benzonatate 200 MG capsule  Commonly known as: Tessalon    take 1 tab every 8 hrs as needed for cough      D 1000 25 MCG (1000 UT) Caps  Generic drug: Cholecalciferol    Take 1 Capsule by mouth every day for 90 days.  Dose: 1 Capsule      digoxin 125 MCG Tabs  Commonly known as: Lanoxin     Take 1 Tablet by mouth every day. Indications: Atrial Fibrillation  Dose: 125 mcg      DULoxetine 60 MG Cpep delayed-release capsule  Commonly known as: Cymbalta    Take 1 capsule by mouth once daily      furosemide 20 MG Tabs  Commonly known as: Lasix    Take 1 tablet as needed for water retention orally once a day  30 days      Home Care Oxygen    Inhale 6 L/min continuous. Oxygen dose 6 L/min  Respiratory route via: Nasal Cannula   Oxygen supplier:Preferred       Indications: Shortness of breath  Dose: 6 L/min      ipratropium-albuterol 0.5-2.5 (3) MG/3ML nebulizer solution  Commonly known as: Duoneb    Doctor's comments: 3 month supply x 1 year  Take 3 mL by nebulization every four hours as needed for Shortness of Breath (Wheezing).  Dose: 3 mL      losartan 50 MG Tabs  Commonly known as: Cozaar    Take 1 Tablet by mouth 2 times a day. Indications: High Blood Pressure  Dose: 50 mg      montelukast 10 MG Tabs  Commonly known as: Singulair    Take 1 tablet by mouth once daily  Dose: 10 mg      predniSONE 20 MG Tabs  Commonly known as: Deltasone    take 2 tablets orally four times a day for 5 days.  (take 2 tablets orally four times a day for 5 days.)      ProAir RespiClick 108 (90 Base) MCG/ACT Aepb  Generic drug: Albuterol Sulfate    Inhale 2 Puffs 4 times a day as needed (shortness of breath). Indications: SOB  Dose: 2 Puff      spironolactone 25 MG Tabs  Commonly known as: Aldactone    Take 1 tablet by mouth daily      traMADol 50 MG Tabs  Commonly known as: Ultram    Take 1 tablet as needed for M15.9/M50.30/M51.36 start date 09/23/2024 Orally twice each day 30 days      Trelegy Ellipta 200-62.5-25 MCG/ACT inhaler  Generic drug: fluticasone-umeclidinium-vilanterol    Doctor's comments: 3 month supply x 1 year  Inhale 1 Puff every day.  Dose: 1 Puff                STOP taking these medications       meloxicam 7.5 MG Tabs  Commonly known as: Mobic          Allergies   Allergen Reactions    Iodine       Convulsion  I.V.  iodine    Tape Rash and Itching       Labs     Lab Results   Component Value Date/Time    SODIUM 135 11/16/2024 05:32 AM    POTASSIUM 3.3 (L) 11/16/2024 05:32 AM    CHLORIDE 92 (L) 11/16/2024 05:32 AM    CO2 32 11/16/2024 05:32 AM    GLUCOSE 80 11/16/2024 05:32 AM    BUN 10 11/16/2024 05:32 AM    CREATININE 0.49 (L) 11/16/2024 05:32 AM    CREATININE 0.62 06/09/2011 12:00 AM    BUNCREATRAT 20 07/07/2014 02:50 PM    BUNCREATRAT 23 06/09/2011 12:00 AM     Lab Results   Component Value Date/Time    ALKPHOSPHAT 34 11/13/2024 10:22 AM    ASTSGOT 22 11/13/2024 10:22 AM    ALTSGPT 14 11/13/2024 10:22 AM    TBILIRUBIN 0.2 11/13/2024 10:22 AM    INR 1.08 11/13/2024 10:22 AM    ALBUMIN 3.0 (L) 11/13/2024 10:22 AM        Assessment for clinically significant drug interactions, drug omissions/additions, duplicative therapies.            CC   Everett Diego M.D.  [address] [place] [phone]  Fax: [phone]    [facility]  Phone [phone] fax [phone]    This note was created using voice recognition software (Dragon). The accuracy of the dictation is limited by the abilities of the software. I have reviewed the note prior to signing, however some errors in grammar and context are still possible. If you have any questions related to this note please do not hesitate to contact our office.

## 2024-11-22 ENCOUNTER — HOME CARE VISIT (OUTPATIENT)
Dept: HOME HEALTH SERVICES | Facility: HOME HEALTHCARE | Age: 79
End: 2024-11-22
Payer: MEDICARE

## 2024-11-22 PROBLEM — Z71.89 ADVANCE CARE PLANNING: Status: ACTIVE | Noted: 2024-11-22

## 2024-11-22 LAB
ALBUMIN SERPL BCP-MCNC: 3.4 G/DL (ref 3.2–4.9)
ALBUMIN/GLOB SERPL: 1 G/DL
ALP SERPL-CCNC: 51 U/L (ref 30–99)
ALT SERPL-CCNC: 15 U/L (ref 2–50)
ANION GAP SERPL CALC-SCNC: 10 MMOL/L (ref 7–16)
AST SERPL-CCNC: 14 U/L (ref 12–45)
BASOPHILS # BLD AUTO: 0.2 % (ref 0–1.8)
BASOPHILS # BLD: 0.04 K/UL (ref 0–0.12)
BILIRUB SERPL-MCNC: 0.4 MG/DL (ref 0.1–1.5)
BUN SERPL-MCNC: 11 MG/DL (ref 8–22)
CALCIUM ALBUM COR SERPL-MCNC: 9.7 MG/DL (ref 8.5–10.5)
CALCIUM SERPL-MCNC: 9.2 MG/DL (ref 8.5–10.5)
CHLORIDE SERPL-SCNC: 91 MMOL/L (ref 96–112)
CO2 SERPL-SCNC: 33 MMOL/L (ref 20–33)
CREAT SERPL-MCNC: 0.37 MG/DL (ref 0.5–1.4)
EOSINOPHIL # BLD AUTO: 0.01 K/UL (ref 0–0.51)
EOSINOPHIL NFR BLD: 0 % (ref 0–6.9)
ERYTHROCYTE [DISTWIDTH] IN BLOOD BY AUTOMATED COUNT: 51.3 FL (ref 35.9–50)
GFR SERPLBLD CREATININE-BSD FMLA CKD-EPI: 102 ML/MIN/1.73 M 2
GLOBULIN SER CALC-MCNC: 3.5 G/DL (ref 1.9–3.5)
GLUCOSE SERPL-MCNC: 164 MG/DL (ref 65–99)
HCT VFR BLD AUTO: 37.6 % (ref 37–47)
HGB BLD-MCNC: 12 G/DL (ref 12–16)
IMM GRANULOCYTES # BLD AUTO: 0.16 K/UL (ref 0–0.11)
IMM GRANULOCYTES NFR BLD AUTO: 0.7 % (ref 0–0.9)
LACTATE SERPL-SCNC: 0.8 MMOL/L (ref 0.5–2)
LACTATE SERPL-SCNC: 1.4 MMOL/L (ref 0.5–2)
LACTATE SERPL-SCNC: 1.6 MMOL/L (ref 0.5–2)
LYMPHOCYTES # BLD AUTO: 0.37 K/UL (ref 1–4.8)
LYMPHOCYTES NFR BLD: 1.5 % (ref 22–41)
MCH RBC QN AUTO: 28.8 PG (ref 27–33)
MCHC RBC AUTO-ENTMCNC: 31.9 G/DL (ref 32.2–35.5)
MCV RBC AUTO: 90.2 FL (ref 81.4–97.8)
MONOCYTES # BLD AUTO: 0.57 K/UL (ref 0–0.85)
MONOCYTES NFR BLD AUTO: 2.4 % (ref 0–13.4)
NEUTROPHILS # BLD AUTO: 22.91 K/UL (ref 1.82–7.42)
NEUTROPHILS NFR BLD: 95.2 % (ref 44–72)
NRBC # BLD AUTO: 0 K/UL
NRBC BLD-RTO: 0 /100 WBC (ref 0–0.2)
PLATELET # BLD AUTO: 437 K/UL (ref 164–446)
PMV BLD AUTO: 8.8 FL (ref 9–12.9)
POTASSIUM SERPL-SCNC: 4.2 MMOL/L (ref 3.6–5.5)
PROT SERPL-MCNC: 6.9 G/DL (ref 6–8.2)
RBC # BLD AUTO: 4.17 M/UL (ref 4.2–5.4)
SODIUM SERPL-SCNC: 134 MMOL/L (ref 135–145)
WBC # BLD AUTO: 24.1 K/UL (ref 4.8–10.8)

## 2024-11-22 PROCEDURE — 770006 HCHG ROOM/CARE - MED/SURG/GYN SEMI*

## 2024-11-22 PROCEDURE — 700105 HCHG RX REV CODE 258: Performed by: STUDENT IN AN ORGANIZED HEALTH CARE EDUCATION/TRAINING PROGRAM

## 2024-11-22 PROCEDURE — 700101 HCHG RX REV CODE 250: Performed by: STUDENT IN AN ORGANIZED HEALTH CARE EDUCATION/TRAINING PROGRAM

## 2024-11-22 PROCEDURE — 36415 COLL VENOUS BLD VENIPUNCTURE: CPT

## 2024-11-22 PROCEDURE — 80053 COMPREHEN METABOLIC PANEL: CPT

## 2024-11-22 PROCEDURE — 87040 BLOOD CULTURE FOR BACTERIA: CPT

## 2024-11-22 PROCEDURE — 83605 ASSAY OF LACTIC ACID: CPT

## 2024-11-22 PROCEDURE — 85025 COMPLETE CBC W/AUTO DIFF WBC: CPT

## 2024-11-22 PROCEDURE — 94760 N-INVAS EAR/PLS OXIMETRY 1: CPT

## 2024-11-22 PROCEDURE — 97166 OT EVAL MOD COMPLEX 45 MIN: CPT

## 2024-11-22 PROCEDURE — A9270 NON-COVERED ITEM OR SERVICE: HCPCS | Performed by: STUDENT IN AN ORGANIZED HEALTH CARE EDUCATION/TRAINING PROGRAM

## 2024-11-22 PROCEDURE — 94640 AIRWAY INHALATION TREATMENT: CPT

## 2024-11-22 PROCEDURE — 99232 SBSQ HOSP IP/OBS MODERATE 35: CPT | Mod: 25 | Performed by: STUDENT IN AN ORGANIZED HEALTH CARE EDUCATION/TRAINING PROGRAM

## 2024-11-22 PROCEDURE — 97535 SELF CARE MNGMENT TRAINING: CPT

## 2024-11-22 PROCEDURE — 99497 ADVNCD CARE PLAN 30 MIN: CPT | Performed by: STUDENT IN AN ORGANIZED HEALTH CARE EDUCATION/TRAINING PROGRAM

## 2024-11-22 PROCEDURE — 700102 HCHG RX REV CODE 250 W/ 637 OVERRIDE(OP): Performed by: STUDENT IN AN ORGANIZED HEALTH CARE EDUCATION/TRAINING PROGRAM

## 2024-11-22 PROCEDURE — 94669 MECHANICAL CHEST WALL OSCILL: CPT

## 2024-11-22 PROCEDURE — 700111 HCHG RX REV CODE 636 W/ 250 OVERRIDE (IP): Performed by: STUDENT IN AN ORGANIZED HEALTH CARE EDUCATION/TRAINING PROGRAM

## 2024-11-22 PROCEDURE — 97162 PT EVAL MOD COMPLEX 30 MIN: CPT

## 2024-11-22 RX ORDER — GUAIFENESIN/DEXTROMETHORPHAN 100-10MG/5
5 SYRUP ORAL EVERY 6 HOURS
Status: COMPLETED | OUTPATIENT
Start: 2024-11-22 | End: 2024-11-24

## 2024-11-22 RX ORDER — IPRATROPIUM BROMIDE AND ALBUTEROL SULFATE 2.5; .5 MG/3ML; MG/3ML
3 SOLUTION RESPIRATORY (INHALATION)
Status: DISCONTINUED | OUTPATIENT
Start: 2024-11-22 | End: 2024-11-24

## 2024-11-22 RX ORDER — IPRATROPIUM BROMIDE AND ALBUTEROL SULFATE 2.5; .5 MG/3ML; MG/3ML
3 SOLUTION RESPIRATORY (INHALATION) EVERY 4 HOURS
Status: DISCONTINUED | OUTPATIENT
Start: 2024-11-22 | End: 2024-11-22

## 2024-11-22 RX ORDER — NYSTATIN 100000 [USP'U]/ML
5 SUSPENSION ORAL 4 TIMES DAILY
Status: DISCONTINUED | OUTPATIENT
Start: 2024-11-22 | End: 2024-11-24 | Stop reason: HOSPADM

## 2024-11-22 RX ORDER — TRAMADOL HYDROCHLORIDE 50 MG/1
50 TABLET ORAL EVERY 6 HOURS PRN
Status: DISCONTINUED | OUTPATIENT
Start: 2024-11-22 | End: 2024-11-24 | Stop reason: HOSPADM

## 2024-11-22 RX ADMIN — AMPICILLIN AND SULBACTAM 3 G: 1; 2 INJECTION, POWDER, FOR SOLUTION INTRAMUSCULAR; INTRAVENOUS at 05:12

## 2024-11-22 RX ADMIN — IPRATROPIUM BROMIDE AND ALBUTEROL SULFATE 3 ML: .5; 2.5 SOLUTION RESPIRATORY (INHALATION) at 13:03

## 2024-11-22 RX ADMIN — LOSARTAN POTASSIUM 50 MG: 50 TABLET, FILM COATED ORAL at 05:07

## 2024-11-22 RX ADMIN — FLUTICASONE FUROATE, UMECLIDINIUM BROMIDE AND VILANTEROL TRIFENATATE 1 PUFF: 200; 62.5; 25 POWDER RESPIRATORY (INHALATION) at 10:22

## 2024-11-22 RX ADMIN — AMPICILLIN AND SULBACTAM 3 G: 1; 2 INJECTION, POWDER, FOR SOLUTION INTRAMUSCULAR; INTRAVENOUS at 17:25

## 2024-11-22 RX ADMIN — IPRATROPIUM BROMIDE AND ALBUTEROL SULFATE 3 ML: .5; 2.5 SOLUTION RESPIRATORY (INHALATION) at 03:41

## 2024-11-22 RX ADMIN — DOXYCYCLINE 100 MG: 100 TABLET, FILM COATED ORAL at 17:20

## 2024-11-22 RX ADMIN — GUAIFENESIN SYRUP AND DEXTROMETHORPHAN 5 ML: 100; 10 SYRUP ORAL at 17:20

## 2024-11-22 RX ADMIN — Medication 500000 UNITS: at 13:34

## 2024-11-22 RX ADMIN — GUAIFENESIN SYRUP AND DEXTROMETHORPHAN 5 ML: 100; 10 SYRUP ORAL at 13:35

## 2024-11-22 RX ADMIN — TRAMADOL HYDROCHLORIDE 50 MG: 50 TABLET ORAL at 17:20

## 2024-11-22 RX ADMIN — MONTELUKAST SODIUM 10 MG: 10 TABLET, FILM COATED ORAL at 17:20

## 2024-11-22 RX ADMIN — Medication 500000 UNITS: at 17:20

## 2024-11-22 RX ADMIN — IPRATROPIUM BROMIDE AND ALBUTEROL SULFATE 3 ML: 2.5; .5 SOLUTION RESPIRATORY (INHALATION) at 19:30

## 2024-11-22 RX ADMIN — LOSARTAN POTASSIUM 50 MG: 50 TABLET, FILM COATED ORAL at 17:20

## 2024-11-22 RX ADMIN — DOXYCYCLINE 100 MG: 100 TABLET, FILM COATED ORAL at 05:07

## 2024-11-22 RX ADMIN — DULOXETINE HYDROCHLORIDE 60 MG: 30 CAPSULE, DELAYED RELEASE ORAL at 17:20

## 2024-11-22 RX ADMIN — PREDNISONE 40 MG: 20 TABLET ORAL at 17:20

## 2024-11-22 RX ADMIN — OMEPRAZOLE 20 MG: 20 CAPSULE, DELAYED RELEASE ORAL at 17:20

## 2024-11-22 RX ADMIN — AMPICILLIN AND SULBACTAM 3 G: 1; 2 INJECTION, POWDER, FOR SOLUTION INTRAMUSCULAR; INTRAVENOUS at 13:34

## 2024-11-22 RX ADMIN — DIGOXIN 125 MCG: 0.12 TABLET ORAL at 17:20

## 2024-11-22 ASSESSMENT — COGNITIVE AND FUNCTIONAL STATUS - GENERAL
PERSONAL GROOMING: A LITTLE
HELP NEEDED FOR BATHING: A LOT
CLIMB 3 TO 5 STEPS WITH RAILING: A LITTLE
WALKING IN HOSPITAL ROOM: A LITTLE
TOILETING: A LOT
DAILY ACTIVITIY SCORE: 17
DRESSING REGULAR LOWER BODY CLOTHING: A LOT
CLIMB 3 TO 5 STEPS WITH RAILING: TOTAL
DRESSING REGULAR LOWER BODY CLOTHING: A LITTLE
MOVING TO AND FROM BED TO CHAIR: A LOT
TOILETING: A LITTLE
STANDING UP FROM CHAIR USING ARMS: A LITTLE
SUGGESTED CMS G CODE MODIFIER MOBILITY: CK
SUGGESTED CMS G CODE MODIFIER MOBILITY: CJ
DRESSING REGULAR UPPER BODY CLOTHING: A LITTLE
DAILY ACTIVITIY SCORE: 18
MOBILITY SCORE: 20
SUGGESTED CMS G CODE MODIFIER DAILY ACTIVITY: CK
MOBILITY SCORE: 16
SUGGESTED CMS G CODE MODIFIER DAILY ACTIVITY: CK
DRESSING REGULAR UPPER BODY CLOTHING: A LITTLE
WALKING IN HOSPITAL ROOM: A LOT
STANDING UP FROM CHAIR USING ARMS: A LITTLE
HELP NEEDED FOR BATHING: A LOT
MOVING TO AND FROM BED TO CHAIR: A LITTLE

## 2024-11-22 ASSESSMENT — GAIT ASSESSMENTS
DISTANCE (FEET): 35
ASSISTIVE DEVICE: FRONT WHEEL WALKER
GAIT LEVEL OF ASSIST: STANDBY ASSIST
DEVIATION: BRADYKINETIC

## 2024-11-22 ASSESSMENT — ENCOUNTER SYMPTOMS
EYE PAIN: 0
ABDOMINAL PAIN: 0
FALLS: 0
NAUSEA: 0
WHEEZING: 1
PALPITATIONS: 0
COUGH: 1
CHILLS: 0
SENSORY CHANGE: 0
BLURRED VISION: 0
SPUTUM PRODUCTION: 1
DIZZINESS: 0
HEADACHES: 0
INSOMNIA: 0
VOMITING: 0
LOSS OF CONSCIOUSNESS: 0
BACK PAIN: 0
SHORTNESS OF BREATH: 1
FOCAL WEAKNESS: 0
FEVER: 0

## 2024-11-22 ASSESSMENT — PAIN DESCRIPTION - PAIN TYPE
TYPE: ACUTE PAIN
TYPE: ACUTE PAIN

## 2024-11-22 ASSESSMENT — LIFESTYLE VARIABLES: SUBSTANCE_ABUSE: 0

## 2024-11-22 ASSESSMENT — ACTIVITIES OF DAILY LIVING (ADL): TOILETING: INDEPENDENT

## 2024-11-22 NOTE — ASSESSMENT & PLAN NOTE
I discussed advance care planning with the patient for at least 18 minutes, including diagnosis, prognosis, plan of care, risks and benefits of any therapies that could be offered, as well as alternatives including palliation and hospice, as appropriate. DNR/I ok

## 2024-11-22 NOTE — THERAPY
Occupational Therapy   Initial Evaluation     Patient Name: Berny Renee  Age:  79 y.o., Sex:  female  Medical Record #: [id number]  Today's Date: 11/22/2024     Precautions  Precautions: Fall Risk  Comments: PNA    Assessment  Patient is 79 y.o. female admitted for dyspnea found to have acute on chronic respiratory failure, SVT, and PNA. PMHX of severe COPD on 6-8L O2 at baseline, lung CA, and HTN; recently admitted 11/13-11/16 for GIB likely diverticular bleed and hemorrhoids. Fatigues quickly w/minimal activity. Pt reports has several friends who are able to assist PRN; one comes every day. Reports she will now be getting ns care at home to help with bathing. Currently limited by decreased functional mobility, activity tolerance, cognition, strength, balance, and SOB which are currently affecting pt's ability to complete ADLs/txfs/IADLs at baseline. Will continue to follow.     Plan    Occupational Therapy Initial Treatment Plan   Treatment Interventions: Self Care / Activities of Daily Living, Adaptive Equipment, Neuro Re-Education / Balance, Therapeutic Exercises, Therapeutic Activity, Family / Caregiver Training  Treatment Frequency: 3 Times per Week  Duration: Until Therapy Goals Met    DC Equipment Recommendations: Unable to determine at this time, Tub Transfer Bench, Reacher  Discharge Recommendations: Recommend home health for continued occupational therapy services (w/increased assist from friends as pt adamantly refuses to go to post acute care facility)      Objective     11/22/24 1213   Prior Living Situation   Prior Services Housekeeping / Homemaker Services   Housing / Facility 1 Story Apartment / Condo   Steps Into Home 0   Bathroom Set up Bathtub / Shower Combination;Shower Chair;Grab Bars   Equipment Owned 4-Wheel Walker;Scooter;Tub / Shower Seat;Grab Bar(s) In Tub / Shower;Grab Bar(s) By Toilet;Oxygen   Lives with - Patient's Self Care Capacity Alone and Able to Care For Self   Comments Pt  reports has several friends who are able to assist PRN; one comes every day. Reports she will now be getting ns care at home to help with bathing.   Prior Level of ADL Function   Self Feeding Independent   Grooming / Hygiene Independent   Bathing Independent  (difficulty getting in/out of tub)   Dressing Independent   Toileting Independent   Prior Level of IADL Function   Medication Management Independent   Laundry Dependent   Kitchen Mobility Requires Assist   Finances Independent   Home Management Dependent   Shopping Dependent   Prior Level Of Mobility Independent With Device in Home   Driving / Transportation Driving Independent   Precautions   Precautions Fall Risk   Comments PNA   Vitals   O2 (LPM) 6   O2 Delivery Device Silicone Nasal Cannula   Vitals Comments Per chart, pt on 6-8L O2 at baseline. SOB after short activity, requesting nebulizer   Pain 0 - 10 Group   Therapist Pain Assessment Post Activity Pain Same as Prior to Activity;During Activity;Nurse Notified  (no c/o pain)   Cognition    Cognition / Consciousness WDL   Level of Consciousness Alert   Comments very pleasant and cooperative; adamantly refuses post acute care   Passive ROM Upper Body   Passive ROM Upper Body WDL   Active ROM Upper Body   Active ROM Upper Body  WDL   Strength Upper Body   Upper Body Strength  X   Gross Strength Generalized Weakness, Equal Bilaterally.    Comments reports feels much weaker than baseline   Balance Assessment   Sitting Balance (Static) Good   Sitting Balance (Dynamic) Fair +   Standing Balance (Static) Fair   Standing Balance (Dynamic) Fair   Weight Shift Sitting Good   Weight Shift Standing Fair   Comments w/FWW   Bed Mobility    Supine to Sit Supervised   Sit to Supine Supervised   Scooting Supervised   Comments HOB highly elevated; sleeps in chair   ADL Assessment   Eating Supervision   Grooming Standing;Contact Guard Assist   Lower Body Dressing Contact Guard Assist   Toileting Contact Guard Assist    Comments Educated on BR DME (tub txf bench), -pacing/improving activity tolerance, and importance of continued OOB activity including sitting up in chair for meals and getting to BR for all toileting/grooming.   Functional Mobility   Sit to Stand Standby Assist   Bed, Chair, Wheelchair Transfer Standby Assist   Toilet Transfers Standby Assist  (heavy use of GB (toilet lower than home))   Transfer Method Stand Step   Mobility w/FWW; bed>toilet>sink>BTB. increased WOB requesting nebulizer after short activity   Edema / Skin Assessment   Edema / Skin  Not Assessed   Activity Tolerance   Comments limited by fatigue and SOB   Patient / Family Goals   Patient / Family Goal #1 to go home   Short Term Goals   Short Term Goal # 1 LB dressing with SPV   Short Term Goal # 2 standing full g/h routine with SPV   Short Term Goal # 3 functional mobility to BR w/SPV and no c/o SOB in prep for standing IADLs.   Education Group   Education Provided Role of Occupational Therapist   Role of Occupational Therapist Patient Response Patient;Acceptance;Explanation;Verbal Demonstration;Reinforcement Needed   Occupational Therapy Initial Treatment Plan    Treatment Interventions Self Care / Activities of Daily Living;Adaptive Equipment;Neuro Re-Education / Balance;Therapeutic Exercises;Therapeutic Activity;Family / Caregiver Training   Treatment Frequency 3 Times per Week   Duration Until Therapy Goals Met   Problem List   Problem List Decreased Active Daily Living Skills;Decreased Homemaking Skills;Decreased Upper Extremity Strength Right;Decreased Upper Extremity Strength Left;Decreased Activity Tolerance;Decreased Functional Mobility;Impaired Postural Control / Balance

## 2024-11-22 NOTE — ED NOTES
Med Rec completed per pt     Allergies reviewed: y    Antibiotics in the past 30 days: y  Augmentin 875-160 mg started the 11/11/2024 did not  finish course   Last dose 11/15/2024    Anticoagulant in past 14 days: n    Pharmacy [facility] utilizes: [facility]   [phone]    Per pt she she started Prednisone 20mg on 11/10/2024 and stopped taking on 11/13/2024 she didn't like the amount she was having to take.     Per pt she started the Medrol Dosepak on 11/16/2024  Last dose 11/20/2024    Pt states she never started taking her Furosemide 20mg

## 2024-11-22 NOTE — ASSESSMENT & PLAN NOTE
On baseline oxygen level.  Incentive spirometry, RT consult, continuous pulse ox.  Being treated for pneumonia with antibiotics, given severe COPD also short course of prednisone.

## 2024-11-22 NOTE — ASSESSMENT & PLAN NOTE
Increased dyspnea, leukocytosis, weakness, productive cough.  Incentive spirometry, DuoNebs, RT consult, continuous pulse ox  Check procalcitonin.  RSV COVID influenza swab negative, check expanded respiratory panel.  Continue antibiotics

## 2024-11-22 NOTE — CARE PLAN
The patient is Stable - Low risk of patient condition declining or worsening    Shift Goals  Clinical Goals: up to bathromm  Patient Goals: neb treatments  Family Goals: tess      Patient is not progressing towards the following goals:      Problem: Fall Risk  Goal: Patient will remain free from falls  Description: Target End Date:  Prior to discharge or change in level of care    Document interventions on the Samara Aquino Fall Risk Assessment    1.  Assess for fall risk factors  2.  Implement fall precautions  Outcome: Not Progressing

## 2024-11-22 NOTE — H&P
Hospital Medicine History & Physical Note    Date of Service  11/21/2024    Primary Care Physician  Everett Diego M.D.    Consultants  None    Code Status  Prior    Chief Complaint  Chief Complaint   Patient presents with    Shortness of Breath     Since this morning    Weakness     Can't even walk very short distances without getting out of breath    Cough     Productive, on abx and steroids for PNA       History of Presenting Illness  Berny Renee is a 79 y.o. female asthma with COPD on chronic home O2 supplemental therapy 6-8 L at baseline, lung cancer, hypertension, hyperlipidemia, chronic pain, depression, who presented 11/21/2024 with dyspnea, productive cough.    Patient recently admitted from 11/13 to 11/16/2024, presented for worsening dyspnea with bright red blood per rectum with initial hemoglobin of 5.7, underwent EGD on 1115 GI showed possible Nicolas's esophagus, colonoscopy showed likely diverticular bleed and hemorrhoids for which Anusol was recommended, given instructions to avoid ibuprofen and was to follow-up with Dr. Merline Root's clinic in the next few weeks.    Today she presents with complaints of dyspnea worse with exertion, productive cough, fatigue and malaise.  Denies any fevers or chills headache or vision changes chest pain vomiting abdominal pain dysuria diarrhea.  No further GI bleeding.    In the ED she is afebrile pulse from  respiratory rate in the 30s systolic blood pressure in the 130s to 140s pulse ox 94 to 97% on 6 L nasal cannula.   Leukocytosis up to 28.1 neutrophil predominant, hemoglobin 12.4, platelets 478, chloride 90 bicarb 35 glucose 734 baseline renal function and liver function troponin T 10 proBNP 76 influenza RSV and COVID swab negative chest x-ray shows increased moderate bilateral interstitial edema underlying factors possible, stable enlargement of the cardiac meds diagonal silhouette, EKG sinus rhythm PACs.    I discussed the plan of care with patient,  bedside RN, charge RN, , and pharmacy.    Review of Systems  Review of Systems   Constitutional:  Positive for malaise/fatigue.   Respiratory:  Positive for cough and shortness of breath.        Past Medical History   has a past medical history of Acute on chronic respiratory failure with hypoxia ([facility]) (11/14/2020), Arthritis, Asthma with COPD ([facility]), BMI 31.0-31.9,adult (02/04/2022), Cataract immature, Chronic pain, Chronic respiratory failure with hypoxia ([facility]) (07/13/2017), Colon polyps, COPD (chronic obstructive pulmonary disease) ([facility]) (2002), Cough, DDD (degenerative disc disease), cervical, DDD (degenerative disc disease), thoracic, Dependence on continuous supplemental oxygen (08/13/2015), Diverticulitis of colon, Dyslipidemia, EMPHYSEMA, H/O opioid abuse ([facility]) (07/15/2021), Hypertension, Obesity (BMI 30-39.9) (10/24/2016), Opioid type dependence, continuous ([facility]) (02/04/2022), REGINE (obstructive sleep apnea), OSTEOPOROSIS, Painful breathing, Shortness of breath, Spinal stenosis, lumbar region, with neurogenic claudication, Sputum production, URINARY INCONTINENCE, Varicose veins of left leg with edema (10/11/2024), Vitamin d deficiency, and Wheezing.    Surgical History   has a past surgical history that includes tonsillectomy; other orthopedic surgery (2004); other; other; lumbar laminectomy diskectomy (6/22/2010); pr upper gi endoscopy,biopsy (N/A, 11/15/2024); and colonoscopy with polyp (N/A, 11/15/2024).     Family History  family history includes Cancer in her father and sister; Other in her sister.     Social History   reports that she quit smoking about 25 years ago. Her smoking use included cigarettes. She started smoking about 55 years ago. She has a 60 pack-year smoking history. She has never used smokeless tobacco. She reports that she does not currently use alcohol after a past usage of about 4.2 oz of alcohol per week. She reports that she does not currently use drugs after having used  the following drugs: Marijuana and Oral.    Allergies  Allergies   Allergen Reactions    Iodine      Convulsion  I.V.  iodine    Tape Rash and Itching       Medications  Prior to Admission Medications   Prescriptions Last Dose Informant Patient Reported? Taking?   Albuterol Sulfate 108 (90 Base) MCG/ACT AEROSOL POWDER, BREATH ACTIVATED 11/21/2024 Morning Patient's Home Pharmacy No Yes   Sig: Inhale 2 Puffs 4 times a day as needed (shortness of breath). Indications: SOB   Calcium Carbonate (CALCIUM 500 PO) 11/20/2024 Evening  Yes Yes   Sig: Take 1 Tablet by mouth every day. Indications: supplement   DULoxetine (CYMBALTA) 60 MG Cap DR Particles delayed-release capsule 11/20/2024 Evening Patient's Home Pharmacy No Yes   Sig: Take 1 capsule by mouth once daily   Patient taking differently: Take 60 mg by mouth every day. Indications: pain   Dextromethorphan-guaiFENesin (MUCINEX DM)  MG TABLET SR 12 HR   Yes No   Sig: Take 1 Tablet by mouth every 12 hours as needed (cough). Indications: Cough   Diclofenac Sodium 1 % Cream 11/21/2024 Morning  Yes Yes   Sig: Apply 1 Application topically 2 times a day as needed (mild, moderate pain). Indications: mild, moderate pain   Home Care Oxygen  Patient's Home Pharmacy Yes No   Sig: Inhale 6 L/min continuous. Oxygen dose 6 L/min  Respiratory route via: Nasal Cannula   Oxygen supplier:[employer]      Indications: Shortness of breath   Sennosides-Docusate Sodium (SENNA PLUS) 8.6-50 MG Cap   Yes No   Sig: Take 2 Tablets by mouth 2 times a day as needed (for constipation- last choice). Indications: constipation   Vitamin D, Cholecalciferol, 25 MCG (1000 UT) Cap  Patient's Home Pharmacy No No   Sig: Take 1 Capsule by mouth every day for 90 days.   acyclovir (ZOVIRAX) 200 MG Cap 11/14/2024 Patient's Home Pharmacy No No   Sig: Take 1 capsule(at first sign of outbreak) by mouth three times a day 10 days   Patient taking differently: Take 200 mg by mouth 3 times a day.    amoxicillin-clavulanate (AUGMENTIN) 875-125 MG Tab 11/14/2024 Patient No Yes   Sig: Take 1 tab every 12 hours for 5 days   Patient taking differently: Take 1 Tablet by mouth every 12 hours.   benzonatate (TESSALON) 200 MG capsule  Patient's Home Pharmacy No No   Sig: take 1 tab every 8 hrs as needed for cough   digoxin (LANOXIN) 125 MCG Tab 11/20/2024 Evening Patient's Home Pharmacy No Yes   Sig: Take 1 Tablet by mouth every day. Indications: Atrial Fibrillation   diphenhydrAMINE-APAP, sleep, (TYLENOL PM EXTRA STRENGTH)  MG Tab 11/20/2024 Bedtime  Yes Yes   Sig: Take 2 Tablets by mouth at bedtime as needed (insomnia). Indications: insomnia   fexofenadine (HM FEXOFENADINE HCL) 180 MG tablet 11/20/2024 Bedtime  Yes Yes   Sig: Take 180 mg by mouth every day. Indications: allergies   fluticasone-umeclidinium-vilanterol (TRELEGY ELLIPTA) 200-62.5-25 mcg/act inhaler 11/21/2024 Morning Patient's Home Pharmacy No Yes   Sig: Inhale 1 Puff every day.   furosemide (LASIX) 20 MG Tab Not Taking Patient's Home Pharmacy No No   Sig: Take 1 tablet as needed for water retention orally once a day  30 days   Patient not taking: Reported on 11/21/2024   hydrocortisone (ANUSOL-HC) 25 MG Suppos Not Taking  No No   Sig: Insert 1 Suppository into the rectum every 12 hours for 7 days.   Patient not taking: Reported on 11/21/2024   ipratropium-albuterol (DUONEB) 0.5-2.5 (3) MG/3ML nebulizer solution 11/21/2024 Noon Patient's Home Pharmacy No Yes   Sig: Take 3 mL by nebulization every four hours as needed for Shortness of Breath (Wheezing).   losartan (COZAAR) 50 MG Tab 11/21/2024 Morning Patient's Home Pharmacy No Yes   Sig: Take 1 Tablet by mouth 2 times a day. Indications: High Blood Pressure   methylPREDNISolone (MEDROL DOSEPAK) 4 MG Tablet Therapy Pack 11/20/2024 Evening  No Yes   Sig: Follow schedule on package instructions.   montelukast (SINGULAIR) 10 MG Tab 11/20/2024 Evening Patient's Home Pharmacy No Yes   Sig: Take 1  tablet by mouth once daily   omeprazole (PRILOSEC) 20 MG delayed-release capsule 11/20/2024 Evening  No Yes   Sig: Take 1 capsule by mouth every day   potassium chloride (MICRO-K) 10 MEQ capsule 11/21/2024 Morning  Yes Yes   Sig: Take 10 mEq by mouth 2 times a day. Indications: Low Amount of Potassium in the Blood   predniSONE (DELTASONE) 20 MG Tab 11/21/2024 Patient No Yes   Sig: take 2 tablets orally four times a day for 5 days.   simethicone (MYLICON) 125 MG chewable tablet   Yes No   Sig: Chew 125 mg every 6 hours as needed for Flatulence. Indications: Gas   spironolactone (ALDACTONE) 25 MG Tab  Patient's Home Pharmacy No No   Sig: Take 1 tablet by mouth daily   tizanidine (ZANAFLEX) 4 MG Tab   Yes No   Sig: Take 4 mg by mouth 3 times a day as needed (muscle spasms). Indications: Musculoskeletal Pain   traMADol (ULTRAM) 50 MG Tab  Patient No No   Sig: Take 1 tablet as needed for M15.9/M50.30/M51.36 start date 09/23/2024 Orally twice each day 30 days   Patient not taking: Reported on 11/21/2024   triamcinolone (NASACORT) 55 MCG/ACT nasal inhaler   Yes No   Sig: Administer 1 Spray into affected nostril(S) every day. Indications: Hayfever      Facility-Administered Medications: None       Physical Exam  Temp:  [36.5 °C (97.7 °F)] 36.5 °C (97.7 °F)  Pulse:  [] 84  Resp:  [35-37] 37  BP: (136-143)/(65-79) 143/65  SpO2:  [94 %-97 %] 94 %  Blood Pressure : (!) 143/65   Temperature: 36.5 °C (97.7 °F)   Pulse: 84   Respiration: (!) 37   Pulse Oximetry: 94 %       Physical Exam  Vitals and nursing note reviewed.   Constitutional:       General: She is not in acute distress.     Appearance: She is ill-appearing. She is not toxic-appearing.   HENT:      Head: Normocephalic and atraumatic.      Nose: Nose normal. No rhinorrhea.      Mouth/Throat:      Mouth: Mucous membranes are dry.      Pharynx: Oropharynx is clear.   Eyes:      General: No scleral icterus.     Extraocular Movements: Extraocular movements intact.       Conjunctiva/sclera: Conjunctivae normal.   Cardiovascular:      Rate and Rhythm: Normal rate and regular rhythm.      Pulses: Normal pulses.   Pulmonary:      Effort: No respiratory distress.      Breath sounds: Rhonchi present. No wheezing or rales.   Abdominal:      General: There is no distension.      Palpations: Abdomen is soft.      Tenderness: There is no abdominal tenderness. There is no guarding or rebound.   Musculoskeletal:         General: Normal range of motion.      Cervical back: Normal range of motion and neck supple. No rigidity.   Skin:     General: Skin is warm and dry.      Capillary Refill: Capillary refill takes less than 2 seconds.   Neurological:      General: No focal deficit present.      Mental Status: She is alert. Mental status is at baseline.      Cranial Nerves: No cranial nerve deficit.      Sensory: No sensory deficit.      Motor: No weakness.   Psychiatric:         Mood and Affect: Mood normal.         Behavior: Behavior normal.         Thought Content: Thought content normal.         Judgment: Judgment normal.         Laboratory:  Recent Labs     11/21/24  1508   WBC 28.1*   RBC 4.33   HEMOGLOBIN 12.4   HEMATOCRIT 39.8   MCV 91.9   MCH 28.6   MCHC 31.2*   RDW 52.3*   PLATELETCT 478*   MPV 9.1     Recent Labs     11/21/24  1508   SODIUM 134*   POTASSIUM 4.1   CHLORIDE 90*   CO2 35*   GLUCOSE 134*   BUN 17   CREATININE 0.42*   CALCIUM 9.8     Recent Labs     11/21/24  1508   ALTSGPT 13   ASTSGOT 19   ALKPHOSPHAT 52   TBILIRUBIN 0.2   GLUCOSE 134*         Recent Labs     11/21/24  1508   NTPROBNP 76         Recent Labs     11/21/24  1508   TROPONINT 10       Imaging:  DX-CHEST-PORTABLE (1 VIEW)   Final Result      1.  Increased moderate bilateral interstitial edema. Underlying infection is possible.   2.  Stable enlargement of the cardiomediastinal silhouette.          X-Ray:  I have personally reviewed the images and compared with prior images.  EKG:  I have personally reviewed the  images and compared with prior images.    Assessment/Plan:  Justification for Admission Status  I anticipate this patient will require at least two midnights for appropriate medical management, necessitating inpatient admission because pneumonia      * Pneumonia due to infectious organism- (present on admission)  Assessment & Plan  Increased dyspnea, leukocytosis, weakness, productive cough.  Incentive spirometry, DuoNebs, RT consult, continuous pulse ox  Check procalcitonin.  RSV COVID influenza swab negative, check expanded respiratory panel.    Primary hypertension- (present on admission)  Assessment & Plan  Continue home losartan, Lasix, spironolactone    Primary cancer of left upper lobe of lung (HCC)- (present on admission)  Assessment & Plan  Status post SBRT in 2021 by Dr. Grimm, follows with Dr. Deshpande with pulmonary    Recurrent major depressive disorder, in partial remission ([facility])- (present on admission)  Assessment & Plan  Continue Cymbalta    SVT (supraventricular tachycardia) ([facility])- (present on admission)  Assessment & Plan  History of, follows with cardiology, continue digoxin.    Stage 4 very severe COPD by GOLD classification ([facility])- (present on admission)  Assessment & Plan  Maintain sat > 88% for v/q matching  68 L nasal cannula at baseline, currently on that  Steroids: Being admitted with pneumonia, increased dyspnea with productive cough, will give short course of prednisone 40 mg p.o. daily for 5 days given severe COPD status  Nebs/rx: As needed      Chronic respiratory failure with hypoxia ([facility])- (present on admission)  Assessment & Plan  On baseline oxygen level.  Incentive spirometry, RT consult, continuous pulse ox.  Being treated for pneumonia with antibiotics, given severe COPD also short course of prednisone.      Chronic pain syndrome- (present on admission)  Assessment & Plan  Analgesia ordered, adjust regimen as indicated.    Advance care planning  Assessment & Plan  I discussed  advance care planning with the patient for at least 18 minutes, including diagnosis, prognosis, plan of care, risks and benefits of any therapies that could be offered, as well as alternatives including palliation and hospice, as appropriate. DNR/I ok          VTE prophylaxis: SCDs/TEDs  Total time spent on admission 77 minutes.    This included my review with ER physician, review of ED events, patient's history, outside records, recent records, face to face interview, physical examination; my review of lab results (CBC, chemistry panel), imaging review (CXR) and ECG. Also includes counseling patient on admission, treatment plan, and documentation of encounter.  In addition, speaking with specialist Dr. Rodriguez

## 2024-11-22 NOTE — ASSESSMENT & PLAN NOTE
Maintain sat > 88% for v/q matching  68 L nasal cannula at baseline, currently on that  Steroids: Being admitted with pneumonia, increased dyspnea with productive cough, will give short course of prednisone 40 mg p.o. daily for 5 days given severe COPD status  Nebs/rx: As needed

## 2024-11-22 NOTE — CARE PLAN
The patient is Stable - Low risk of patient condition declining or worsening    Shift Goals  Clinical Goals: Client respiratory function will be maintained or improved by end of shift.  Patient Goals: Food, rest  Family Goals: tess    Patient A & O x4, uses call light appropriately. Blood cultures for sepsis protocol not collected before initial ABX dose in ER, collected at 0300 11/22. Patient on 6 L NC, which is her baseline. Patient complies with all fall interventions, strip alarm active.  Bed in low and locked position, call light in reach, rounded on hourly.         Progress made toward(s) clinical / shift goals:    Problem: Knowledge Deficit - Standard  Goal: Patient and family/care givers will demonstrate understanding of plan of care, disease process/condition, diagnostic tests and medications  Outcome: Progressing     Problem: Fall Risk  Goal: Patient will remain free from falls  Outcome: Progressing       Patient is not progressing towards the following goals:

## 2024-11-22 NOTE — PROGRESS NOTES
[facility] Medicine Daily Progress Note    Date of Service  11/22/2024    Chief Complaint  Berny Renee is a 79 y.o. female admitted 11/21/2024 with dyspnea.    Hospital Course    Berny Renee is a 79 y.o. female asthma with COPD on chronic home O2 supplemental therapy 6-8 L at baseline, lung cancer, hypertension, hyperlipidemia, chronic pain, depression, who presented 11/21/2024 with dyspnea, productive cough.     Patient recently admitted from 11/13 to 11/16/2024, presented for worsening dyspnea with bright red blood per rectum with initial hemoglobin of 5.7, underwent EGD on 1115 GI showed possible Nicolas's esophagus, colonoscopy showed likely diverticular bleed and hemorrhoids for which Anusol was recommended, given instructions to avoid ibuprofen and was to follow-up with Dr. Merline Root's clinic in the next few weeks.     Today she presents with complaints of dyspnea worse with exertion, productive cough, fatigue and malaise.  Denies any fevers or chills headache or vision changes chest pain vomiting abdominal pain dysuria diarrhea.  No further GI bleeding.     In the ED she is afebrile pulse from  respiratory rate in the 30s systolic blood pressure in the 130s to 140s pulse ox 94 to 97% on 6 L nasal cannula.   Leukocytosis up to 28.1 neutrophil predominant, hemoglobin 12.4, platelets 478, chloride 90 bicarb 35 glucose 734 baseline renal function and liver function troponin T 10 proBNP 76 influenza RSV and COVID swab negative chest x-ray shows increased moderate bilateral interstitial edema underlying factors possible, stable enlargement of the cardiac meds diagonal silhouette, EKG sinus rhythm PACs.    Interval Problem Update  No acute events overnight.  On 6L oxygen, on baseline oxygen 6L at home.  Patient with cough, sputum production, chest congestion.  Lungs with coarse rhonchi.  Continue steroids, breathing treatments, mucinex.  Continue antibiotics.  Respiratory viral panel pending.  Anticipate  discharge to home when medically cleared.      I have discussed this patient's plan of care and discharge plan at IDT rounds today with Case Management, Nursing, Nursing leadership, and other members of the IDT team.    Consultants/Specialty  none    Code Status  DNAR/DNI    Disposition  The patient is not medically cleared for discharge to home or a post-acute facility.  Anticipate discharge to: home with close outpatient follow-up    I have placed the appropriate orders for post-discharge needs.    Review of Systems  Review of Systems   Constitutional:  Negative for chills and fever.   Eyes:  Negative for blurred vision and pain.   Respiratory:  Positive for cough, sputum production, shortness of breath and wheezing.    Cardiovascular:  Negative for chest pain, palpitations and leg swelling.   Gastrointestinal:  Negative for abdominal pain, nausea and vomiting.   Genitourinary:  Negative for dysuria and urgency.   Musculoskeletal:  Negative for back pain and falls.   Skin:  Negative for itching and rash.   Neurological:  Negative for dizziness, sensory change, focal weakness, loss of consciousness and headaches.   Psychiatric/Behavioral:  Negative for substance abuse. The patient does not have insomnia.         Physical Exam  Temp:  [35.9 °C (96.6 °F)-36.5 °C (97.7 °F)] 36.2 °C (97.2 °F)  Pulse:  [] 74  Resp:  [18-39] 18  BP: (136-162)/(62-79) 144/62  SpO2:  [92 %-97 %] 95 %    Physical Exam  Vitals reviewed.   Constitutional:       General: She is not in acute distress.     Appearance: She is not diaphoretic.   HENT:      Head: Normocephalic and atraumatic.      Right Ear: External ear normal.      Left Ear: External ear normal.      Nose: Nose normal. No congestion.      Mouth/Throat:      Pharynx: No oropharyngeal exudate or posterior oropharyngeal erythema.   Eyes:      Extraocular Movements: Extraocular movements intact.      Pupils: Pupils are equal, round, and reactive to light.   Cardiovascular:       Rate and Rhythm: Normal rate and regular rhythm.   Pulmonary:      Effort: Pulmonary effort is normal. No respiratory distress.      Breath sounds: Rhonchi present. No wheezing or rales.   Abdominal:      General: Bowel sounds are normal. There is no distension.      Palpations: Abdomen is soft.      Tenderness: There is no abdominal tenderness. There is no guarding.   Musculoskeletal:         General: No swelling. Normal range of motion.      Cervical back: Normal range of motion and neck supple.      Right lower leg: No edema.      Left lower leg: No edema.   Skin:     General: Skin is warm and dry.   Neurological:      General: No focal deficit present.      Mental Status: She is alert and oriented to person, place, and time.      Cranial Nerves: No cranial nerve deficit.      Sensory: No sensory deficit.      Motor: No weakness.   Psychiatric:         Mood and Affect: Mood normal.         Behavior: Behavior normal.         Fluids    Intake/Output Summary (Last 24 hours) at 11/22/2024 1055  Last data filed at 11/22/2024 0900  Gross per 24 hour   Intake 240 ml   Output --   Net 240 ml        Laboratory  Recent Labs     11/21/24  1508 11/22/24  0002   WBC 28.1* 24.1*   RBC 4.33 4.17*   HEMOGLOBIN 12.4 12.0   HEMATOCRIT 39.8 37.6   MCV 91.9 90.2   MCH 28.6 28.8   MCHC 31.2* 31.9*   RDW 52.3* 51.3*   PLATELETCT 478* 437   MPV 9.1 8.8*     Recent Labs     11/21/24  1508 11/22/24  0002   SODIUM 134* 134*   POTASSIUM 4.1 4.2   CHLORIDE 90* 91*   CO2 35* 33   GLUCOSE 134* 164*   BUN 17 11   CREATININE 0.42* 0.37*   CALCIUM 9.8 9.2                   Imaging  DX-CHEST-PORTABLE (1 VIEW)   Final Result      1.  Increased moderate bilateral interstitial edema. Underlying infection is possible.   2.  Stable enlargement of the cardiomediastinal silhouette.           Assessment/Plan  * Pneumonia due to infectious organism- (present on admission)  Assessment & Plan  Increased dyspnea, leukocytosis, weakness, productive  cough.  Incentive spirometry, DuoNebs, RT consult, continuous pulse ox  Check procalcitonin.  RSV COVID influenza swab negative, check expanded respiratory panel.  Continue antibiotics    Advance care planning  Assessment & Plan  I discussed advance care planning with the patient for at least 18 minutes, including diagnosis, prognosis, plan of care, risks and benefits of any therapies that could be offered, as well as alternatives including palliation and hospice, as appropriate. DNR/I ok      Primary hypertension- (present on admission)  Assessment & Plan  Continue home losartan, Lasix, spironolactone    Primary cancer of left upper lobe of lung (HCC)- (present on admission)  Assessment & Plan  Status post SBRT in 2021 by Dr. Grimm, follows with Dr. Deshpande with pulmonary    Recurrent major depressive disorder, in partial remission ([facility])- (present on admission)  Assessment & Plan  Continue Cymbalta    SVT (supraventricular tachycardia) ([facility])- (present on admission)  Assessment & Plan  History of, follows with cardiology, continue digoxin.    Stage 4 very severe COPD by GOLD classification ([facility])- (present on admission)  Assessment & Plan  Maintain sat > 88% for v/q matching  68 L nasal cannula at baseline, currently on that  Steroids: Being admitted with pneumonia, increased dyspnea with productive cough, will give short course of prednisone 40 mg p.o. daily for 5 days given severe COPD status  Nebs/rx: As needed      Chronic respiratory failure with hypoxia ([facility])- (present on admission)  Assessment & Plan  On baseline oxygen level.  Incentive spirometry, RT consult, continuous pulse ox.  Being treated for pneumonia with antibiotics, given severe COPD also short course of prednisone.      Chronic pain syndrome- (present on admission)  Assessment & Plan  Analgesia ordered, adjust regimen as indicated.         VTE prophylaxis: VTE Selection    I have performed a physical exam and reviewed and updated ROS and Plan  today (11/22/2024). In review of yesterday's note (11/21/2024), there are no changes except as documented above.

## 2024-11-22 NOTE — PROGRESS NOTES
4 Eyes Skin Assessment Completed by HUNTER Stein and HUNTER Hedrick.    Head WDL  Ears Redness and Blanching top of ears  Nose Discoloration dark, flaky spot where glasses make contact.  Mouth WDL  Neck WDL  Breast/Chest WDL  Shoulder Blades WDL  Spine scars from previous spinal surg, nor erythema present  (R) Arm/Elbow/Hand Bruising, Abrasion, and Scab minor scratches, patient states they are from cat  (L) Arm/Elbow/Hand Bruising, Scab, Discoloration, and Scar minor scratches, diffuse scars and bruising from previous IV sites.   Abdomen Redness, Non-Blanching, Rash, and Scar irritation under pannus  Groin Redness, Excoriation, and Rash, apparent moisture damage at folds of groin  Scrotum/Coccyx/Buttocks Redness, Blanching, Discoloration, and Scab, scabs on right buttocks, gen bruising ans discoloration  (R) Leg Scar and Bruising  (L) Leg Scar and Bruising  (R) Heel/Foot/Toe Redness, Blanching, Boggy, and Bruising  (L) Heel/Foot/Toe Redness, Blanching, Boggy, and Bruising                                        Devices In Places Blood Pressure Cuff and Nasal Cannula      Interventions In Place NC W/Ear Foams, InterDry, Heel Mepilex, Pillows, and Barrier Cream    Possible Skin Injury Yes    Pictures Uploaded Into Epic Yes  Wound Consult Placed No  RN Wound Prevention Protocol Ordered Yes

## 2024-11-22 NOTE — THERAPY
"Physical Therapy   Initial Evaluation     Patient Name: Berny Renee  Age:  79 y.o., Sex:  female  Medical Record #: [id number]  Today's Date: 11/22/2024     Precautions  Precautions: Fall Risk    Assessment  Patient is 79 y.o. female admitted with dyspnea and productive cough; community acquired PNA. PMHx of asthma with COPD on chronic home O2 supplemental therapy 6-8 L at baseline, lung cancer, hypertension, hyperlipidemia, chronic pain, depression. Pt performed bed mobility with SPV and SBA for standing, transfers, ambulation with FWW in room. Limited distance 2/2 fatigue, impaired muscular endurance, and some c/o dizziness that resolved with rest. Recommend home with HH PT. Pt would benefit from continued acute IP PT services to address said deficits.     Plan    Physical Therapy Initial Treatment Plan   Treatment Plan : Equipment, Family / Caregiver Training, Gait Training, Neuro Re-Education / Balance, Self Care / Home Evaluation, Therapeutic Activities, Therapeutic Exercise, Manual Therapy  Treatment Frequency: 4 Times per Week  Duration: Until Therapy Goals Met    DC Equipment Recommendations: None (has 4WW)  Discharge Recommendations: Recommend home health for continued physical therapy services (for home activity tolerance/endurance)       Subjective    Pt adamant about not discharging to post-acute placement     Objective     11/22/24 1307   Vitals   O2 (LPM) 6   O2 Delivery Device Silicone Nasal Cannula (baseline O2)   Pain 0 - 10 Group   Therapist Pain Assessment Post Activity Pain Same as Prior to Activity;Nurse Notified;0   Prior Living Situation   Housing / Facility 1 Story Apartment / Condo   Steps Into Home 0   Steps In Home 0   Equipment Owned 4-Wheel Walker;Single Point Cane;Scooter   Lives with - Patient's Self Care Capacity Alone and Able to Care For Self   Comments Reports good support from friends, \"there is someone there every day\"   Prior Level of Functional Mobility   Bed Mobility " "Independent   Transfer Status Independent   Ambulation Independent   Ambulation Distance   (household)   Assistive Devices Used 4-Wheel Walker   Stairs Independent   Cognition    Cognition / Consciousness WDL   Level of Consciousness Alert   Comments pleasant and cooperative. Adamant about refusing placement   Active ROM Lower Body    Active ROM Lower Body  WDL   Strength Lower Body   Lower Body Strength  X   Comments functional for mobility, however, lacks muscular endurance for long distance ambulation   Other Treatments   Other Treatments Provided Encouraged IS use, pt declining stating they don't work and that she likes the \"flutter\" better. Discussed with RT who provided to pt, RT also present to give pt her requested nebulizer treatment   Balance Assessment   Sitting Balance (Static) Fair +   Sitting Balance (Dynamic) Fair +   Standing Balance (Static) Fair   Standing Balance (Dynamic) Fair   Weight Shift Sitting Fair   Weight Shift Standing Fair   Comments w/ FWW   Bed Mobility    Supine to Sit Supervised   Sit to Supine Supervised   Scooting Supervised   Rolling Supervised   Gait Analysis   Gait Level Of Assist Standby Assist   Assistive Device Front Wheel Walker   Distance (Feet) 35  (limited by endurance, c/o some lightheadedness)   # of Times Distance was Traveled 1   Deviation Bradykinetic   Comments fatigued with short distance ambulation   Functional Mobility   Sit to Stand Standby Assist   Bed, Chair, Wheelchair Transfer Standby Assist   Transfer Method Stand Step   Mobility w/ FWW in room   6 Clicks Assessment - How much HELP from from another person do you currently need... (If the patient hasn't done an activity recently, how much help from another person do you think he/she would need if he/she tried?)   Turning from your back to your side while in a flat bed without using bedrails? 4   Moving from lying on your back to sitting on the side of a flat bed without using bedrails? 4   Moving to and " from a bed to a chair (including a wheelchair)? 3   Standing up from a chair using your arms (e.g., wheelchair, or bedside chair)? 3   Walking in hospital room? 3   Climbing 3-5 steps with a railing? 3   6 clicks Mobility Score 20   Activity Tolerance   Comments limited 2/2 SOB, dizziness, fatigue   Patient / Family Goals    Patient / Family Goal #1 to go home   Short Term Goals    Short Term Goal # 1 Pt will perform stand step transfers with FWW and SPV in 6 visits to get in/out of chair   Short Term Goal # 2 Pt will ambulate 150ft with FWW and SPV in 6 visits to access home environment   Education Group   Education Provided Role of Physical Therapist   Role of Physical Therapist Patient Response Patient;Acceptance;Explanation;Verbal Demonstration   Additional Comments Pt receptive to self management and compensatory strategies with mobility, importance of continued and progressive mobility in order to safely return home   Physical Therapy Initial Treatment Plan    Treatment Plan  Equipment;Family / Caregiver Training;Gait Training;Neuro Re-Education / Balance;Self Care / Home Evaluation;Therapeutic Activities;Therapeutic Exercise;Manual Therapy   Treatment Frequency 4 Times per Week   Duration Until Therapy Goals Met   Problem List    Problems Impaired Transfers;Impaired Ambulation;Functional Strength Deficit;Impaired Balance;Decreased Activity Tolerance   Anticipated Discharge Equipment and Recommendations   DC Equipment Recommendations None  (has 4WW)   Discharge Recommendations Recommend home health for continued physical therapy services  (for home activity tolerance/endurance)   Interdisciplinary Plan of Care Collaboration   IDT Collaboration with  Nursing;Occupational Therapist   Patient Position at End of Therapy In Bed;Bed Alarm On;Call Light within Reach;Phone within Reach;Tray Table within Reach   Collaboration Comments RN updated   Session Information   Date / Session Number  11/22- 1 (1/4, 11/28)

## 2024-11-22 NOTE — DISCHARGE PLANNING
"HTH/SCP TCN chart review completed. The most current review of medical record, knowledge of pt's PLOF and social support, LACE+ score of 72, no 6 clicks scores documented.    Pt seen at bedside. Introduced TCN program. Provided education regarding post acute levels of care. Education provided regarding case management policy for blanket SNF referrals. Discussed HTH/SCP plan benefits. Pt verbalizes understanding.     Patient reports she did receive suction DME after last ED visit and utilizes [employer] for O2 and suction at home.  Patient confirms chart review that she owns FWW, utilizes 6L O2 at baseline, and has portable tanks.  Patient states that she has ambulated in hospital, but very short distances and is limited by weakness/SOB.  Patient educated that PT/OT has been consulted.  Patient reports agreeable for HH resumption, choice obtained, however would refuse all post acute placement.  TCN reiterated [facility]'s blanket referral policy and patient repeated she would refuse all post acute placement.  Patient states she has friends/neighbors she can call for help, and she will be fine to go home with home health.  TCN asked if friends/neighbors would be able to assist with iADLs/ADLs if necessary, and patient states she does not think she will need assistance.  Per kardex patient is ambulating to bathroom with FWW and no assist.      Choice proactively obtained for HH and faxed to [facility]. Current discharge considerations are noted to be home with home health and friend/neighbor assist. TCN will continue to follow and collaborate with discharge planning team as additional post acute needs arise. Thank you.     Completed today:  Choice obtained: HH ([facility] resumption)  Pt aware of [facility]'s blanket referral policy  SCP with Non[facility] PCP.     *Update*  PT recommending home health  OT recommends home health, \"w/increased assist from friends as pt adamantly refuses to go to post acute care facility.\"   "

## 2024-11-23 LAB
ANION GAP SERPL CALC-SCNC: 11 MMOL/L (ref 7–16)
BUN SERPL-MCNC: 11 MG/DL (ref 8–22)
CALCIUM SERPL-MCNC: 9 MG/DL (ref 8.5–10.5)
CHLORIDE SERPL-SCNC: 92 MMOL/L (ref 96–112)
CO2 SERPL-SCNC: 32 MMOL/L (ref 20–33)
CREAT SERPL-MCNC: 0.38 MG/DL (ref 0.5–1.4)
ERYTHROCYTE [DISTWIDTH] IN BLOOD BY AUTOMATED COUNT: 52.1 FL (ref 35.9–50)
GFR SERPLBLD CREATININE-BSD FMLA CKD-EPI: 102 ML/MIN/1.73 M 2
GLUCOSE SERPL-MCNC: 203 MG/DL (ref 65–99)
HCT VFR BLD AUTO: 35.8 % (ref 37–47)
HGB BLD-MCNC: 11.5 G/DL (ref 12–16)
MCH RBC QN AUTO: 29.6 PG (ref 27–33)
MCHC RBC AUTO-ENTMCNC: 32.1 G/DL (ref 32.2–35.5)
MCV RBC AUTO: 92 FL (ref 81.4–97.8)
PLATELET # BLD AUTO: 440 K/UL (ref 164–446)
PMV BLD AUTO: 9.1 FL (ref 9–12.9)
POTASSIUM SERPL-SCNC: 4.1 MMOL/L (ref 3.6–5.5)
RBC # BLD AUTO: 3.89 M/UL (ref 4.2–5.4)
SODIUM SERPL-SCNC: 135 MMOL/L (ref 135–145)
WBC # BLD AUTO: 22.3 K/UL (ref 4.8–10.8)

## 2024-11-23 PROCEDURE — 94669 MECHANICAL CHEST WALL OSCILL: CPT

## 2024-11-23 PROCEDURE — 99232 SBSQ HOSP IP/OBS MODERATE 35: CPT | Performed by: STUDENT IN AN ORGANIZED HEALTH CARE EDUCATION/TRAINING PROGRAM

## 2024-11-23 PROCEDURE — 700111 HCHG RX REV CODE 636 W/ 250 OVERRIDE (IP): Mod: JZ | Performed by: STUDENT IN AN ORGANIZED HEALTH CARE EDUCATION/TRAINING PROGRAM

## 2024-11-23 PROCEDURE — 36415 COLL VENOUS BLD VENIPUNCTURE: CPT

## 2024-11-23 PROCEDURE — A9270 NON-COVERED ITEM OR SERVICE: HCPCS | Performed by: STUDENT IN AN ORGANIZED HEALTH CARE EDUCATION/TRAINING PROGRAM

## 2024-11-23 PROCEDURE — 700102 HCHG RX REV CODE 250 W/ 637 OVERRIDE(OP): Performed by: STUDENT IN AN ORGANIZED HEALTH CARE EDUCATION/TRAINING PROGRAM

## 2024-11-23 PROCEDURE — 700105 HCHG RX REV CODE 258: Performed by: STUDENT IN AN ORGANIZED HEALTH CARE EDUCATION/TRAINING PROGRAM

## 2024-11-23 PROCEDURE — 94640 AIRWAY INHALATION TREATMENT: CPT

## 2024-11-23 PROCEDURE — 700101 HCHG RX REV CODE 250: Performed by: STUDENT IN AN ORGANIZED HEALTH CARE EDUCATION/TRAINING PROGRAM

## 2024-11-23 PROCEDURE — 80048 BASIC METABOLIC PNL TOTAL CA: CPT

## 2024-11-23 PROCEDURE — 85027 COMPLETE CBC AUTOMATED: CPT

## 2024-11-23 PROCEDURE — 770006 HCHG ROOM/CARE - MED/SURG/GYN SEMI*

## 2024-11-23 RX ORDER — ACYCLOVIR 200 MG/1
200 CAPSULE ORAL 3 TIMES DAILY
Status: DISCONTINUED | OUTPATIENT
Start: 2024-11-23 | End: 2024-11-24 | Stop reason: HOSPADM

## 2024-11-23 RX ADMIN — AMOXICILLIN AND CLAVULANATE POTASSIUM 1 TABLET: 875; 125 TABLET, FILM COATED ORAL at 13:34

## 2024-11-23 RX ADMIN — OMEPRAZOLE 20 MG: 20 CAPSULE, DELAYED RELEASE ORAL at 17:57

## 2024-11-23 RX ADMIN — ACYCLOVIR 200 MG: 200 CAPSULE ORAL at 20:08

## 2024-11-23 RX ADMIN — AMPICILLIN AND SULBACTAM 3 G: 1; 2 INJECTION, POWDER, FOR SOLUTION INTRAMUSCULAR; INTRAVENOUS at 06:24

## 2024-11-23 RX ADMIN — IPRATROPIUM BROMIDE AND ALBUTEROL SULFATE 3 ML: 2.5; .5 SOLUTION RESPIRATORY (INHALATION) at 14:51

## 2024-11-23 RX ADMIN — IPRATROPIUM BROMIDE AND ALBUTEROL SULFATE 3 ML: 2.5; .5 SOLUTION RESPIRATORY (INHALATION) at 20:20

## 2024-11-23 RX ADMIN — LOSARTAN POTASSIUM 50 MG: 50 TABLET, FILM COATED ORAL at 06:20

## 2024-11-23 RX ADMIN — DULOXETINE HYDROCHLORIDE 60 MG: 30 CAPSULE, DELAYED RELEASE ORAL at 17:56

## 2024-11-23 RX ADMIN — TRAMADOL HYDROCHLORIDE 50 MG: 50 TABLET ORAL at 20:08

## 2024-11-23 RX ADMIN — Medication 500000 UNITS: at 20:08

## 2024-11-23 RX ADMIN — ACYCLOVIR 200 MG: 200 CAPSULE ORAL at 13:34

## 2024-11-23 RX ADMIN — DIGOXIN 125 MCG: 0.12 TABLET ORAL at 17:56

## 2024-11-23 RX ADMIN — PREDNISONE 40 MG: 20 TABLET ORAL at 17:57

## 2024-11-23 RX ADMIN — FLUTICASONE FUROATE, UMECLIDINIUM BROMIDE AND VILANTEROL TRIFENATATE 1 PUFF: 200; 62.5; 25 POWDER RESPIRATORY (INHALATION) at 09:00

## 2024-11-23 RX ADMIN — GUAIFENESIN SYRUP AND DEXTROMETHORPHAN 5 ML: 100; 10 SYRUP ORAL at 06:20

## 2024-11-23 RX ADMIN — GUAIFENESIN SYRUP AND DEXTROMETHORPHAN 5 ML: 100; 10 SYRUP ORAL at 01:34

## 2024-11-23 RX ADMIN — GUAIFENESIN SYRUP AND DEXTROMETHORPHAN 5 ML: 100; 10 SYRUP ORAL at 17:57

## 2024-11-23 RX ADMIN — MONTELUKAST SODIUM 10 MG: 10 TABLET, FILM COATED ORAL at 17:57

## 2024-11-23 RX ADMIN — Medication 500000 UNITS: at 13:34

## 2024-11-23 RX ADMIN — SENNOSIDES AND DOCUSATE SODIUM 2 TABLET: 50; 8.6 TABLET ORAL at 17:56

## 2024-11-23 RX ADMIN — GUAIFENESIN SYRUP AND DEXTROMETHORPHAN 5 ML: 100; 10 SYRUP ORAL at 13:34

## 2024-11-23 RX ADMIN — DOXYCYCLINE 100 MG: 100 TABLET, FILM COATED ORAL at 17:56

## 2024-11-23 RX ADMIN — Medication 500000 UNITS: at 09:41

## 2024-11-23 RX ADMIN — LOSARTAN POTASSIUM 50 MG: 50 TABLET, FILM COATED ORAL at 17:57

## 2024-11-23 RX ADMIN — Medication 500000 UNITS: at 17:57

## 2024-11-23 RX ADMIN — DOXYCYCLINE 100 MG: 100 TABLET, FILM COATED ORAL at 06:20

## 2024-11-23 ASSESSMENT — PAIN DESCRIPTION - PAIN TYPE
TYPE: ACUTE PAIN

## 2024-11-23 ASSESSMENT — ENCOUNTER SYMPTOMS
VOMITING: 0
NAUSEA: 0
DIZZINESS: 0
BLURRED VISION: 0
COUGH: 1
SHORTNESS OF BREATH: 1
HEADACHES: 0
PALPITATIONS: 0
FEVER: 0
FOCAL WEAKNESS: 0
LOSS OF CONSCIOUSNESS: 0
FALLS: 0
CHILLS: 0
EYE PAIN: 0
SPUTUM PRODUCTION: 1
INSOMNIA: 0
SENSORY CHANGE: 0
BACK PAIN: 0
WHEEZING: 1
ABDOMINAL PAIN: 0

## 2024-11-23 ASSESSMENT — LIFESTYLE VARIABLES: SUBSTANCE_ABUSE: 0

## 2024-11-23 NOTE — CARE PLAN
Problem: Bronchoconstriction  Goal: Improve in air movement and diminished wheezing  Description: Target End Date:  2 to 3 days    1.  Implement inhaled treatments  2.  Evaluate and manage medication effects  Outcome: Not Met   svn

## 2024-11-23 NOTE — CARE PLAN
Problem: Bronchoconstriction  Goal: Improve in air movement and diminished wheezing  Description: Target End Date:  2 to 3 days    1.  Implement inhaled treatments  2.  Evaluate and manage medication effects  11/23/2024 1401 by Clotilde Fraire, RRT  Outcome: Not Met  11/23/2024 1354 by Clotilde Fraire, RRT  Outcome: Not Met     Problem: Bronchopulmonary Hygiene:  Goal: Increase mobilization of retained secretions  11/23/2024 1401 by Clotilde Fraire, RRT  Outcome: Not Met  11/23/2024 1354 by Clotilde Fraire, RRT  Outcome: Not Met  Svn cpt

## 2024-11-23 NOTE — CARE PLAN
The patient is Stable - Low risk of patient condition declining or worsening    Shift Goals  Clinical Goals: maintain O2 sat >90% throughout shift  Patient Goals: improved breathing  Family Goals: tess    Progress made toward(s) clinical / shift goals:  Pt alert and oriented,  makes needs known. Medications administered per MAR. Educated on indication for nystatin suspension, verbalized understanding but refused due to mouth pain. Denies pain otherwise. Free of falls, remains on baseline O2.    Patient is not progressing towards the following goals:

## 2024-11-23 NOTE — RESPIRATORY CARE
Patient requesting Q4 SVN treatments as this is her home therapy when she is sick. She is familiar with the Flutter valve and uses this independently. Flutter will be ordered QID per protocol

## 2024-11-23 NOTE — RESPIRATORY CARE
"COPD EDUCATION by COPD CLINICAL EDUCATOR  11/22/2024 at 4:38 PM by Niyah Zazueta, RRT     Patient interviewed by COPD education team. Patient refused COPD program at this time. An Action Plan was updated in the EMR to reflect current Respiratory Medication use.                   Unfortunately Ms. Renee has been readmitted for Pnuemonia. She again states that she does not need any COPDeducation. She has requested that her nebulizer treatments be given every 4 hours,      COPD Screen  COPD Risk Screening  Do you have a history of COPD?: Yes  Do you have a Pulmonologist?: Yes    COPD Assessment  COPD Clinical Specialists ONLY  COPD Education Initiated: Yes--Short Intervention (pt 30 day readmit, now for PNA, declined education, requesting nebs be Q4, flutter at bedside.)  Is this a COPD exacerbation patient?: No  DME Company: [employer] Equipment Type: home O2 at 6 lpm, nebulizer  Physician Follow Up Appointment:  (tbd)  Physician Name: Everett Diego M.D.  Pulmonologist Name: [facility]  Referrals Initiated: Yes  Pulmonary Rehab: No  Smoking Cessation: No  Hospice: No  Home Health Care: No (already has)  Mobile Urgent Care Services:  (already has)  [facility]iatric [facility] Group: No  Private In-Home Care Agency: No  (OP) Pulmonary Function Testing: Yes (7/21 FEV1 0.89 43%, FEV1/FVC 45, DLCO 37%)  Interdisciplinary Rounds: Attendance at Rounds (30 Min)    PFT Results    No results found for: \"PFT\"    Meds to Beds        MY COPD ACTION PLAN     It is recommended that patients and physicians /healthcare providers complete this action plan together. This plan should be discussed at each physician visit and updated as needed.    The green, yellow and red zones show groups of symptoms of COPD. This list of symptoms is not comprehensive, and you may experience other symptoms. In the \"Actions\" column, your healthcare provider has recommended actions for you to take based on your symptoms.    Patient Name: Berny Johnson " "Víctor   YOB: 1945   Last Updated on: 11/22/2024  4:37 PM   Green Zone:  I am doing well today Actions     Usual activitiy and exercise level   Take daily medications     Usual amounts of cough and phlegm/mucus   Use oxygen as prescribed     Sleep well at night   Continue regular exercise/diet plan     Appetite is good   At all times avoid cigarette smoke, inhaled irritants     Daily Medications (these medications are taken every day):   Fluticasone and Umeclidinium and Vilanterol (Trelogy)         Additional Information:  Use as directed.  Please rinse mouth after using     Yellow Zone:  I am having a bad day or a COPD flare Actions     More breathless than usual   Continue daily medications     I have less energy for my daily activities   Use quick relief inhaler as ordered     Increased or thicker phlegm/mucus   Use oxygen as prescribed     Using quick relief inhaler/nebulizer more often   Get plenty of rest     Swelling of ankles more than usual   Use pursed lip breathing     More coughing than usual   At all times avoid cigarette smoke, inhaled irritants     I feel like I have a \"chest cold\"     Poor sleep and my symptoms woke me up     My appetite is not good     My medicine is not helping      Call provider immediately if symptoms don’t improve     Continue daily medications, add rescue medications:   Albuterol/Ipratropium (Combivent, Duoneb)  Albuterol 3mL via nebulizer  2 Puffs Every 4 hours PRN  Every 4 hours PRN       Medications to be used during a flare up, (as Discussed with Provider):           Additional Information:  Use your nebulizer first when short of breath.    Use a spacer with your rescue inhaler when nebulizer is not available.       Red Zone:  I need urgent medical care Actions     Severe shortness of breath even at rest   Call 911 or seek medical care immediately     Not able to do any activity because of breathing      Fever or shaking chills      Feeling confused or very " drowsy       Chest pains      Coughing up blood

## 2024-11-23 NOTE — CARE PLAN
Problem: Bronchoconstriction  Goal: Improve in air movement and diminished wheezing  Description: Target End Date:  2 to 3 days    1.  Implement inhaled treatments  2.  Evaluate and manage medication effects  Outcome: Progressing     Problem: Bronchopulmonary Hygiene:  Goal: Increase mobilization of retained secretions  Outcome: Progressing     Duoneb qid  Flutter qid

## 2024-11-23 NOTE — DISCHARGE PLANNING
Met with pt at bedside to complete assessment.   Verified information listed on facesheet. Pt reports she lives alone in a ground level apartment. Pt is independent with her self care. She uses a walker at baseline and also owns a scooter. Pt uses 6L of O2 and is on service with [employer]. Pt states she has good support from her friends. Her sister lives in [place]. Pt's PCP is Dr. Menon's through [facility]. Pt states friends help with transportation. Friend will provide transport home and will bring O2 tank for dc.   Pt on service with [facility] and would like to resume. SCP TCN obtained choice for HH.     Care Transition Team Assessment    Information Source  Information Given By: Patient  Who is responsible for making decisions for patient? : Patient    Readmission Evaluation  Is this a readmission?: No    Elopement Risk  Legal Hold: No  Ambulatory or Self Mobile in Wheelchair: Yes  Disoriented: No  Psychiatric Symptoms: None  History of Wandering: No  Elopement this Admit: No  Vocalizing Wanting to Leave: No  Displays Behaviors, Body Language Wanting to Leave: No-Not at Risk for Elopement  Elopement Risk: Not at Risk for Elopement    Interdisciplinary Discharge Planning  Lives with - Patient's Self Care Capacity: Alone and Able to Care For Self  Housing / Facility: 1 Story Apartment / Condo  Prior Services: Housekeeping / Homemaker Services    Discharge Preparedness  What is your plan after discharge?: Home health care  What are your discharge supports?: Other (comment)  Prior Functional Level: Independent with Activities of Daily Living, Independent with Medication Management, Uses Walker    Functional Assesment  Prior Functional Level: Independent with Activities of Daily Living, Independent with Medication Management, Uses Walker    Finances  Financial Barriers to Discharge: No  Prescription Coverage: Yes    Vision / Hearing Impairment  Right Eye Vision: Impaired, Wears Glasses  Left Eye Vision:  Impaired, Wears Glasses  Hearing Impairment: Both Ears, Hearing Device Not Available, Other (Comments) (pt describes as minor hearing loss)                   Psychological Assessment  History of Substance Abuse: None  History of Psychiatric Problems: No  Non-compliant with Treatment: No  Newly Diagnosed Illness: Yes    Discharge Risks or Barriers  Discharge risks or barriers?: No    Anticipated Discharge Information  Discharge Disposition: D/T to home under A care in anticipation of covered skilled care (06)

## 2024-11-23 NOTE — CARE PLAN
Problem: Bronchoconstriction  Goal: Improve in air movement and diminished wheezing  Description: Target End Date:  2 to 3 days    1.  Implement inhaled treatments  2.  Evaluate and manage medication effects  Outcome: Not Met     Problem: Bronchopulmonary Hygiene:  Goal: Increase mobilization of retained secretions  Outcome: Not Met  Svn cpt

## 2024-11-23 NOTE — PROGRESS NOTES
Hospital Medicine Daily Progress Note    Date of Service  11/23/2024    Chief Complaint  Berny Renee is a 79 y.o. female admitted 11/21/2024 with dyspnea.    Hospital Course    Berny Renee is a 79 y.o. female asthma with COPD on chronic home O2 supplemental therapy 6-8 L at baseline, lung cancer, hypertension, hyperlipidemia, chronic pain, depression, who presented 11/21/2024 with dyspnea, productive cough.     Patient recently admitted from 11/13 to 11/16/2024, presented for worsening dyspnea with bright red blood per rectum with initial hemoglobin of 5.7, underwent EGD on 1115 GI showed possible Nicolas's esophagus, colonoscopy showed likely diverticular bleed and hemorrhoids for which Anusol was recommended, given instructions to avoid ibuprofen and was to follow-up with Dr. Merline Root's clinic in the next few weeks.     Today she presents with complaints of dyspnea worse with exertion, productive cough, fatigue and malaise.  Denies any fevers or chills headache or vision changes chest pain vomiting abdominal pain dysuria diarrhea.  No further GI bleeding.     In the ED she is afebrile pulse from  respiratory rate in the 30s systolic blood pressure in the 130s to 140s pulse ox 94 to 97% on 6 L nasal cannula.   Leukocytosis up to 28.1 neutrophil predominant, hemoglobin 12.4, platelets 478, chloride 90 bicarb 35 glucose 734 baseline renal function and liver function troponin T 10 proBNP 76 influenza RSV and COVID swab negative chest x-ray shows increased moderate bilateral interstitial edema underlying factors possible, stable enlargement of the cardiac meds diagonal silhouette, EKG sinus rhythm PACs.    Interval Problem Update  No acute events overnight.  On 6L oxygen, on baseline oxygen 6L at home.  Patient reports improved chest congestion and symptoms.  Continue steroids, breathing treatments, mucinex.  Continue antibiotics.  Respiratory viral panel still pending.  Anticipate discharge to home  tomorrow if remains stable with improving symptoms.  Home health ordered to resume.      I have discussed this patient's plan of care and discharge plan at IDT rounds today with Case Management, Nursing, Nursing leadership, and other members of the IDT team.    Consultants/Specialty  none    Code Status  DNAR/DNI    Disposition  The patient is not medically cleared for discharge to home or a post-acute facility.  Anticipate discharge to: home with organized home healthcare and close outpatient follow-up    I have placed the appropriate orders for post-discharge needs.    Review of Systems  Review of Systems   Constitutional:  Negative for chills and fever.   Eyes:  Negative for blurred vision and pain.   Respiratory:  Positive for cough, sputum production, shortness of breath and wheezing.    Cardiovascular:  Negative for chest pain, palpitations and leg swelling.   Gastrointestinal:  Negative for abdominal pain, nausea and vomiting.   Genitourinary:  Negative for dysuria and urgency.   Musculoskeletal:  Negative for back pain and falls.   Skin:  Negative for itching and rash.   Neurological:  Negative for dizziness, sensory change, focal weakness, loss of consciousness and headaches.   Psychiatric/Behavioral:  Negative for substance abuse. The patient does not have insomnia.         Physical Exam  Temp:  [36.2 °C (97.2 °F)-36.8 °C (98.3 °F)] 36.2 °C (97.2 °F)  Pulse:  [75-93] 82  Resp:  [16-18] 18  BP: (139-152)/(57-64) 139/58  SpO2:  [92 %-95 %] 92 %    Physical Exam  Vitals reviewed.   Constitutional:       General: She is not in acute distress.     Appearance: She is not diaphoretic.   HENT:      Head: Normocephalic and atraumatic.      Right Ear: External ear normal.      Left Ear: External ear normal.      Nose: Nose normal. No congestion.      Mouth/Throat:      Pharynx: No oropharyngeal exudate or posterior oropharyngeal erythema.   Eyes:      Extraocular Movements: Extraocular movements intact.      Pupils:  Pupils are equal, round, and reactive to light.   Cardiovascular:      Rate and Rhythm: Normal rate and regular rhythm.   Pulmonary:      Effort: Pulmonary effort is normal. No respiratory distress.      Breath sounds: Rhonchi present. No wheezing or rales.   Abdominal:      General: Bowel sounds are normal. There is no distension.      Palpations: Abdomen is soft.      Tenderness: There is no abdominal tenderness. There is no guarding.   Musculoskeletal:         General: No swelling. Normal range of motion.      Cervical back: Normal range of motion and neck supple.      Right lower leg: No edema.      Left lower leg: No edema.   Skin:     General: Skin is warm and dry.   Neurological:      General: No focal deficit present.      Mental Status: She is alert and oriented to person, place, and time.      Cranial Nerves: No cranial nerve deficit.      Sensory: No sensory deficit.      Motor: No weakness.   Psychiatric:         Mood and Affect: Mood normal.         Behavior: Behavior normal.         Fluids    Intake/Output Summary (Last 24 hours) at 11/23/2024 1322  Last data filed at 11/23/2024 0900  Gross per 24 hour   Intake 480 ml   Output --   Net 480 ml        Laboratory  Recent Labs     11/21/24  1508 11/22/24  0002 11/23/24  0225   WBC 28.1* 24.1* 22.3*   RBC 4.33 4.17* 3.89*   HEMOGLOBIN 12.4 12.0 11.5*   HEMATOCRIT 39.8 37.6 35.8*   MCV 91.9 90.2 92.0   MCH 28.6 28.8 29.6   MCHC 31.2* 31.9* 32.1*   RDW 52.3* 51.3* 52.1*   PLATELETCT 478* 437 440   MPV 9.1 8.8* 9.1     Recent Labs     11/21/24  1508 11/22/24  0002 11/23/24  0225   SODIUM 134* 134* 135   POTASSIUM 4.1 4.2 4.1   CHLORIDE 90* 91* 92*   CO2 35* 33 32   GLUCOSE 134* 164* 203*   BUN 17 11 11   CREATININE 0.42* 0.37* 0.38*   CALCIUM 9.8 9.2 9.0                   Imaging  DX-CHEST-PORTABLE (1 VIEW)   Final Result      1.  Increased moderate bilateral interstitial edema. Underlying infection is possible.   2.  Stable enlargement of the  cardiomediastinal silhouette.           Assessment/Plan  * Pneumonia due to infectious organism- (present on admission)  Assessment & Plan  Increased dyspnea, leukocytosis, weakness, productive cough.  Incentive spirometry, DuoNebs, RT consult, continuous pulse ox  Check procalcitonin.  RSV COVID influenza swab negative, check expanded respiratory panel.  Continue antibiotics    Advance care planning  Assessment & Plan  I discussed advance care planning with the patient for at least 18 minutes, including diagnosis, prognosis, plan of care, risks and benefits of any therapies that could be offered, as well as alternatives including palliation and hospice, as appropriate. DNR/I ok      Primary hypertension- (present on admission)  Assessment & Plan  Continue home losartan, Lasix, spironolactone    Primary cancer of left upper lobe of lung (HCC)- (present on admission)  Assessment & Plan  Status post SBRT in 2021 by Dr. Grimm, follows with Dr. Deshpande with pulmonary    Recurrent major depressive disorder, in partial remission ([facility])- (present on admission)  Assessment & Plan  Continue Cymbalta    SVT (supraventricular tachycardia) ([facility])- (present on admission)  Assessment & Plan  History of, follows with cardiology, continue digoxin.    Stage 4 very severe COPD by GOLD classification ([facility])- (present on admission)  Assessment & Plan  Maintain sat > 88% for v/q matching  68 L nasal cannula at baseline, currently on that  Steroids: Being admitted with pneumonia, increased dyspnea with productive cough, will give short course of prednisone 40 mg p.o. daily for 5 days given severe COPD status  Nebs/rx: As needed      Chronic respiratory failure with hypoxia ([facility])- (present on admission)  Assessment & Plan  On baseline oxygen level.  Incentive spirometry, RT consult, continuous pulse ox.  Being treated for pneumonia with antibiotics, given severe COPD also short course of prednisone.      Chronic pain syndrome- (present on  admission)  Assessment & Plan  Analgesia ordered, adjust regimen as indicated.         VTE prophylaxis: VTE Selection    I have performed a physical exam and reviewed and updated ROS and Plan today (11/23/2024). In review of yesterday's note (11/22/2024), there are no changes except as documented above.

## 2024-11-24 ENCOUNTER — PHARMACY VISIT (OUTPATIENT)
Dept: PHARMACY | Facility: MEDICAL CENTER | Age: 79
End: 2024-11-24
Payer: COMMERCIAL

## 2024-11-24 VITALS
DIASTOLIC BLOOD PRESSURE: 61 MMHG | BODY MASS INDEX: 28.83 KG/M2 | OXYGEN SATURATION: 93 % | WEIGHT: 168.87 LBS | SYSTOLIC BLOOD PRESSURE: 155 MMHG | RESPIRATION RATE: 20 BRPM | HEART RATE: 87 BPM | HEIGHT: 64 IN | TEMPERATURE: 97.5 F

## 2024-11-24 PROCEDURE — A9270 NON-COVERED ITEM OR SERVICE: HCPCS | Performed by: STUDENT IN AN ORGANIZED HEALTH CARE EDUCATION/TRAINING PROGRAM

## 2024-11-24 PROCEDURE — 700102 HCHG RX REV CODE 250 W/ 637 OVERRIDE(OP): Performed by: STUDENT IN AN ORGANIZED HEALTH CARE EDUCATION/TRAINING PROGRAM

## 2024-11-24 PROCEDURE — 94669 MECHANICAL CHEST WALL OSCILL: CPT

## 2024-11-24 PROCEDURE — RXMED WILLOW AMBULATORY MEDICATION CHARGE: Performed by: STUDENT IN AN ORGANIZED HEALTH CARE EDUCATION/TRAINING PROGRAM

## 2024-11-24 PROCEDURE — 99239 HOSP IP/OBS DSCHRG MGMT >30: CPT | Performed by: STUDENT IN AN ORGANIZED HEALTH CARE EDUCATION/TRAINING PROGRAM

## 2024-11-24 PROCEDURE — 94640 AIRWAY INHALATION TREATMENT: CPT

## 2024-11-24 PROCEDURE — 700101 HCHG RX REV CODE 250: Performed by: STUDENT IN AN ORGANIZED HEALTH CARE EDUCATION/TRAINING PROGRAM

## 2024-11-24 PROCEDURE — 94760 N-INVAS EAR/PLS OXIMETRY 1: CPT

## 2024-11-24 RX ORDER — DIGOXIN 125 MCG
125 TABLET ORAL DAILY
Qty: 100 TABLET | Refills: 3 | Status: SHIPPED | OUTPATIENT
Start: 2024-11-24

## 2024-11-24 RX ORDER — ALBUTEROL SULFATE 5 MG/ML
2.5 SOLUTION RESPIRATORY (INHALATION)
Status: DISCONTINUED | OUTPATIENT
Start: 2024-11-24 | End: 2024-11-24 | Stop reason: HOSPADM

## 2024-11-24 RX ORDER — TRAMADOL HYDROCHLORIDE 50 MG/1
50 TABLET ORAL EVERY 8 HOURS PRN
Qty: 15 TABLET | Refills: 0 | Status: SHIPPED | OUTPATIENT
Start: 2024-11-24 | End: 2024-12-22

## 2024-11-24 RX ORDER — DOXYCYCLINE 100 MG/1
100 TABLET ORAL EVERY 12 HOURS
Qty: 4 TABLET | Refills: 0 | Status: ACTIVE | OUTPATIENT
Start: 2024-11-24 | End: 2024-11-26

## 2024-11-24 RX ORDER — IPRATROPIUM BROMIDE AND ALBUTEROL SULFATE 2.5; .5 MG/3ML; MG/3ML
3 SOLUTION RESPIRATORY (INHALATION)
Status: DISCONTINUED | OUTPATIENT
Start: 2024-11-24 | End: 2024-11-24 | Stop reason: HOSPADM

## 2024-11-24 RX ORDER — SPIRONOLACTONE 25 MG/1
TABLET ORAL
Qty: 90 TABLET | Refills: 1 | Status: SHIPPED | OUTPATIENT
Start: 2024-11-24

## 2024-11-24 RX ORDER — LOSARTAN POTASSIUM 50 MG/1
50 TABLET ORAL 2 TIMES DAILY
Qty: 200 TABLET | Refills: 2 | Status: SHIPPED | OUTPATIENT
Start: 2024-11-24

## 2024-11-24 RX ORDER — PREDNISONE 20 MG/1
40 TABLET ORAL DAILY
Qty: 4 TABLET | Refills: 0 | Status: SHIPPED | OUTPATIENT
Start: 2024-11-24 | End: 2024-11-26

## 2024-11-24 RX ORDER — ACYCLOVIR 200 MG/1
200 CAPSULE ORAL 3 TIMES DAILY
Qty: 30 CAPSULE | Refills: 0 | Status: ACTIVE | OUTPATIENT
Start: 2024-11-24 | End: 2024-12-18 | Stop reason: SDUPTHER

## 2024-11-24 RX ADMIN — IPRATROPIUM BROMIDE AND ALBUTEROL SULFATE 3 ML: 2.5; .5 SOLUTION RESPIRATORY (INHALATION) at 03:00

## 2024-11-24 RX ADMIN — IPRATROPIUM BROMIDE AND ALBUTEROL SULFATE 3 ML: 2.5; .5 SOLUTION RESPIRATORY (INHALATION) at 07:54

## 2024-11-24 RX ADMIN — LOSARTAN POTASSIUM 50 MG: 50 TABLET, FILM COATED ORAL at 04:59

## 2024-11-24 RX ADMIN — FLUTICASONE FUROATE, UMECLIDINIUM BROMIDE AND VILANTEROL TRIFENATATE 1 PUFF: 200; 62.5; 25 POWDER RESPIRATORY (INHALATION) at 08:03

## 2024-11-24 RX ADMIN — IPRATROPIUM BROMIDE AND ALBUTEROL SULFATE 3 ML: 2.5; .5 SOLUTION RESPIRATORY (INHALATION) at 11:14

## 2024-11-24 RX ADMIN — DOXYCYCLINE 100 MG: 100 TABLET, FILM COATED ORAL at 04:59

## 2024-11-24 RX ADMIN — GUAIFENESIN SYRUP AND DEXTROMETHORPHAN 5 ML: 100; 10 SYRUP ORAL at 05:06

## 2024-11-24 RX ADMIN — Medication 500000 UNITS: at 10:13

## 2024-11-24 RX ADMIN — GUAIFENESIN SYRUP AND DEXTROMETHORPHAN 5 ML: 100; 10 SYRUP ORAL at 00:48

## 2024-11-24 RX ADMIN — ACYCLOVIR 200 MG: 200 CAPSULE ORAL at 04:59

## 2024-11-24 RX ADMIN — AMOXICILLIN AND CLAVULANATE POTASSIUM 1 TABLET: 875; 125 TABLET, FILM COATED ORAL at 00:48

## 2024-11-24 ASSESSMENT — PAIN DESCRIPTION - PAIN TYPE: TYPE: ACUTE PAIN

## 2024-11-24 NOTE — CARE PLAN
The patient is Stable - Low risk of patient condition declining or worsening    Shift Goals  Clinical Goals: maintain O2 sat >90% throughout shift, monitor for SOB  Patient Goals: improve breathing, comfort  Family Goals: LORA    Progress made toward(s) clinical / shift goals:  Pt alert and oriented, makes needs known. Medications administered per MAR including PRN for pain. Able to rest quietly afterward. Incentive spirometer offered but refused by patient. Education provided. O2 maintained above 90% on baseline 6L O2. Free of falls this shift. Dyspnea observed after ambulating to bathroom but improved soon after rest. Other needs met at this time.     Patient is not progressing towards the following goals:

## 2024-11-24 NOTE — DISCHARGE INSTRUCTIONS
Diet    Resume your normal diet as tolerated.  A diet low in cholesterol, fat, and sodium is recommended for good health.     Activity    Resume Your Normal Activity    You may resume your normal activity as tolerated.  Rest as needed.      Community-Acquired Pneumonia, Adult  Pneumonia is an infection of the lungs. It causes irritation and swelling in the airways of the lungs. Mucus and fluid may also build up inside the airways. This may cause coughing and trouble breathing.  One type of pneumonia can happen while you are in a hospital. A different type can happen when you are not in a hospital (community-acquired pneumonia).  What are the causes?    This condition is caused by germs (viruses, bacteria, or fungi). Some types of germs can spread from person to person. Pneumonia is not thought to spread from person to person.  What increases the risk?  You have a long-term (chronic) disease, such as:  Disease of the lungs. This may be chronic obstructive pulmonary disease (COPD) or asthma.  Heart failure.  Cystic fibrosis.  Diabetes.  Kidney disease.  Sickle cell disease.  HIV.  You have other health problems, such as:  Your body's defense system (immune system) is weak.  A condition that may cause you to breathe in fluids from your mouth and nose.  You had your spleen taken out.  You do not take good care of your teeth and mouth (poor dental hygiene).  You use or have used tobacco products.  You go where the germs that cause this illness are common.  You are older than 65 years of age.  What are the signs or symptoms?  A cough.  A fever.  Sweating or chills.  Chest pain, often when you breathe deeply or cough.  Breathing problems, such as:  Fast breathing.  Trouble breathing.  Shortness of breath.  Feeling tired (fatigued).  Muscle aches.  How is this treated?  Treatment for this condition depends on many things, such as:  The cause of your illness.  Your medicines.  Your other health problems.  Most adults can be  treated at home. Sometimes, treatment must happen in a hospital.  Treatment may include medicines to kill germs.  Medicines may depend on which germ caused your illness.  Very bad pneumonia is rare. If you get it, you may:  Have a machine to help you breathe.  Have fluid taken away from around your lungs.  Follow these instructions at home:  Medicines  Take over-the-counter and prescription medicines only as told by your doctor.  Take cough medicine only if you are losing sleep. Cough medicine can keep your body from taking mucus away from your lungs.  If you were prescribed antibiotics, take them as told by your doctor. Do not stop taking them even if you start to feel better.  Lifestyle         Do not smoke or use any products that contain nicotine or tobacco. If you need help quitting, ask your doctor.  Do not drink alcohol.  Eat a healthy diet. This includes a lot of vegetables, fruits, whole grains, low-fat dairy products, and low-fat (lean) protein.  General instructions    Rest a lot. Sleep for at least 8 hours each night.  Sleep with your head and neck raised. Put a few pillows under your head or sleep in a reclining chair.  Return to your normal activities as told by your doctor. Ask your doctor what activities are safe for you.  Drink enough fluid to keep your pee (urine) pale yellow.  If your throat is sore, gargle with a mixture of salt and water 3-4 times a day or as needed. To make salt water, completely dissolve ½-1 tsp (3-6 g) of salt in 1 cup (237 mL) of warm water.  Keep all follow-up visits.  How is this prevented?  Getting the pneumonia shot (vaccine). These shots have different types and schedules. Ask your doctor what works best for you. Think about getting this shot if:  You are older than 65 years of age.  You are 19-65 years of age and:  You are being treated for cancer.  You have long-term lung disease.  You have other problems that affect your body's defense system. Ask your doctor if you  have one of these.  Getting your flu shot every year. Ask your doctor which type of shot is best for you.  Going to the dentist as often as told.  Washing your hands often with soap and water for at least 20 seconds. If you cannot use soap and water, use hand .  Contact a doctor if:  You have a fever.  You lose sleep because your cough medicine does not help.  Get help right away if:  You are short of breath and this gets worse.  You have more chest pain.  Your sickness gets worse. This is very serious if:  You are an older adult.  Your body's defense system is weak.  You cough up blood.  These symptoms may be an emergency. Get help right away. Call 911.  Do not wait to see if the symptoms will go away.  Do not drive yourself to the hospital.  Summary  Pneumonia is an infection of the lungs.  Community-acquired pneumonia affects people who have not been in the hospital. Certain germs can cause this infection.  This condition may be treated with medicines that kill germs.  For very bad pneumonia, you may need a hospital stay and treatment to help with breathing.  This information is not intended to replace advice given to you by your health care provider. Make sure you discuss any questions you have with your health care provider.  Document Revised: 02/15/2023 Document Reviewed: 02/15/2023  [email or web address] Patient Education © 2023 [email or web address]

## 2024-11-24 NOTE — PROGRESS NOTES
Discharge home per MD order.  Discharge instructions reviewed.  All questions answered.  Escorted to care with oxygen by care aid.

## 2024-11-24 NOTE — CARE PLAN
The patient is Stable - Low risk of patient condition declining or worsening    Shift Goals  Clinical Goals: maintain O2 > 90% entire shift  Patient Goals: Improve breathing  Family Goals: LORA    Progress made toward(s) clinical / shift goals:    Problem: Fall Risk  Goal: Patient will remain free from falls  Outcome: Progressing. Patient demonstrates an understanding of bbeing a fall risk and is very good about calling the staff any time she needs to ambulate.      Problem: Respiratory  Goal: Patient will achieve/maintain optimum respiratory ventilation and gas exchange  Outcome: Progressing. Patient is progressing towards remaining 90% O2 status and being able to breath comfortably while ambulating. Patient has COPD and currently has pneumonia which is making her respiratory status less than normal.        Patient is not progressing towards the following goals:

## 2024-11-24 NOTE — PROGRESS NOTES
Patient A&Ox4, 6LNC in use, baseline at home 6L NC.  Patient has tanks and concentrator at home. Tolerating diet, standby assist.

## 2024-11-24 NOTE — DISCHARGE SUMMARY
Discharge Summary    CHIEF COMPLAINT ON ADMISSION  Chief Complaint   Patient presents with    Shortness of Breath     Since this morning    Weakness     Can't even walk very short distances without getting out of breath    Cough     Productive, on abx and steroids for PNA       Reason for Admission  ems     Admission Date  11/21/2024    CODE STATUS  DNAR/DNI    HPI & HOSPITAL COURSE    Berny Renee is a 79 y.o. female asthma with COPD on chronic home O2 supplemental therapy 6 L at baseline, lung cancer s/p treatment, hypertension, hyperlipidemia, chronic pain, depression, who presented 11/21/2024 with dyspnea, productive cough.     Patient recently admitted from 11/13 to 11/16/2024, presented for worsening dyspnea with bright red blood per rectum with initial hemoglobin of 5.7, underwent EGD on 1115 GI showed possible Nicolas's esophagus, colonoscopy showed likely diverticular bleed and hemorrhoids for which Anusol was recommended, given instructions to avoid ibuprofen and was to follow-up with Dr. Merline Root's clinic in the next few weeks.    Patient presented for worsening dyspnea with productive cough.  She was admitted for COPD exacerbation and community acquired pneumonia treatment. She was treated with nebulizer treatments, steroids, and antibiotics with improvement in her symptoms. She is on baseline 6L oxygen at time of discharge. She is discharged with prednisone, antibiotics to complete 5 day course. Advised that she follow up with her pulmonologist for ongoing management of her COPD, as well as PCP follow up post hospitalization.    Therefore, she is discharged in fair and stable condition to home with close outpatient follow-up.    The patient met 2-midnight criteria for an inpatient stay at the time of discharge.    Discharge Date  11/24/2024    FOLLOW UP ITEMS POST DISCHARGE  Take medications as prescribed.  Follow up with PCP and pulmonology.    DISCHARGE DIAGNOSES  Principal Problem:    Pneumonia  due to infectious organism (POA: Yes)  Active Problems:    Chronic pain syndrome (POA: Yes)    Chronic respiratory failure with hypoxia (HCC) (POA: Yes)    Stage 4 very severe COPD by GOLD classification (HCC) (POA: Yes)    SVT (supraventricular tachycardia) (HCC) (POA: Yes)    Recurrent major depressive disorder, in partial remission (HCC) (POA: Yes)    Primary cancer of left upper lobe of lung (HCC) (POA: Yes)    Primary hypertension (POA: Yes)    Advance care planning (POA: Unknown)  Resolved Problems:    * No resolved hospital problems. *      FOLLOW UP  Future Appointments   Date Time Provider Department Center   7/1/2025 10:00 AM [address]   7/4/2025 10:00 AM Israel Grimm M.D. RADT None     Everett Diego M.D.  [address] [place] [id number]  [phone]    Follow up        MEDICATIONS ON DISCHARGE     Medication List        START taking these medications        Instructions   doxycycline monohydrate 100 MG tablet  Commonly known as: Adoxa   Take 1 Tablet by mouth every 12 hours for 2 days.  Dose: 100 mg            CHANGE how you take these medications        Instructions   amoxicillin-clavulanate 875-125 MG Tabs  What changed:   how much to take  how to take this  when to take this  Commonly known as: Augmentin   Take 1 Tablet by mouth every 12 hours for 2 days.  Dose: 1 Tablet     predniSONE 20 MG Tabs  What changed:   how much to take  how to take this  when to take this  additional instructions  Commonly known as: Deltasone   Take 2 Tablets by mouth every day for 2 days.  Dose: 40 mg     spironolactone 25 MG Tabs  What changed: how to take this  Commonly known as: Aldactone   Take 1 tablet by mouth daily     traMADol 50 MG Tabs  What changed:   how much to take  how to take this  when to take this  reasons to take this  Commonly known as: Ultram   Take 1 Tablet by mouth every 8 hours as needed for Severe Pain for up to 5 days.  Dose: 50 mg            CONTINUE taking these  medications        Instructions   acyclovir 200 MG Caps  Commonly known as: Zovirax   Take 1 Capsule by mouth 3 times a day.  Dose: 200 mg     CALCIUM 500 PO   Take 1 Tablet by mouth every day. Indications: supplement  Dose: 1 Tablet     D 1000 25 MCG (1000 UT) Caps  Generic drug: Cholecalciferol   Take 1 Capsule by mouth every day for 90 days.  Dose: 1 Capsule     Diclofenac Sodium 1 % Crea   Apply 1 Application topically 2 times a day as needed (mild, moderate pain). Indications: mild, moderate pain  Dose: 1 Application     digoxin 125 MCG Tabs  Commonly known as: Lanoxin   Take 1 Tablet by mouth every day. Indications: Atrial Fibrillation  Dose: 125 mcg     DULoxetine 60 MG Cpep delayed-release capsule  Commonly known as: Cymbalta   Take 1 capsule by mouth once daily     HM Fexofenadine HCl 180 MG tablet  Generic drug: fexofenadine   Take 180 mg by mouth every day. Indications: allergies  Dose: 180 mg     Home Care Oxygen   Inhale 6 L/min continuous. Oxygen dose 6 L/min  Respiratory route via: Nasal Cannula   Oxygen supplier:[employer]      Indications: Shortness of breath  Dose: 6 L/min     ipratropium-albuterol 0.5-2.5 (3) MG/3ML nebulizer solution  Commonly known as: Duoraliab   Doctor's comments: 3 month supply x 1 year  Take 3 mL by nebulization every four hours as needed for Shortness of Breath (Wheezing).  Dose: 3 mL     losartan 50 MG Tabs  Commonly known as: Cozaar   Take 1 Tablet by mouth 2 times a day. Indications: High Blood Pressure  Dose: 50 mg     montelukast 10 MG Tabs  Commonly known as: Singulair   Take 1 tablet by mouth once daily  Dose: 10 mg     omeprazole 20 MG delayed-release capsule  Commonly known as: PriLOSEC   Take 1 capsule by mouth every day  Dose: 20 mg     potassium chloride 10 MEQ capsule  Commonly known as: Micro-K   Take 10 mEq by mouth 2 times a day. Indications: Low Amount of Potassium in the Blood  Dose: 10 mEq     ProAir RespiClick 108 (90 Base) MCG/ACT Aepb  Generic drug:  Albuterol Sulfate   Inhale 2 Puffs 4 times a day as needed (shortness of breath). Indications: SOB  Dose: 2 Puff     tizanidine 4 MG Tabs  Commonly known as: Zanaflex   Take 4 mg by mouth 3 times a day as needed (muscle spasms). Indications: Musculoskeletal Pain  Dose: 4 mg     Trelegy Ellipta 200-62.5-25 MCG/ACT inhaler  Generic drug: fluticasone-umeclidinium-vilanterol   Doctor's comments: 3 month supply x 1 year  Inhale 1 Puff every day.  Dose: 1 Puff     triamcinolone 55 MCG/ACT nasal inhaler  Commonly known as: Nasacort   Administer 1 [place] into affected nostril(S) every day. Indications: Hayfever  Dose: 1 Spray     Tylenol PM Extra Strength 500-25 MG Tabs  Generic drug: diphenhydrAMINE-APAP (sleep)   Take 2 Tablets by mouth at bedtime as needed (insomnia). Indications: insomnia  Dose: 2 Tablet            STOP taking these medications      benzonatate 200 MG capsule  Commonly known as: Tessalon     furosemide 20 MG Tabs  Commonly known as: Lasix     hydrocortisone 25 MG Supp  Commonly known as: Anusol-HC     methylPREDNISolone 4 MG Tbpk  Commonly known as: Medrol Dosepak              Allergies  Allergies   Allergen Reactions    Iodine      Convulsion  I.V.  iodine    Tape Rash and Itching       DIET  Orders Placed This Encounter   Procedures    Diet Order Diet: Regular     Standing Status:   Standing     Number of Occurrences:   1     Order Specific Question:   Diet:     Answer:   Regular [1]       ACTIVITY  As tolerated.  Weight bearing as tolerated    CONSULTATIONS  none    PROCEDURES  none    LABORATORY  Lab Results   Component Value Date    SODIUM 135 11/23/2024    POTASSIUM 4.1 11/23/2024    CHLORIDE 92 (L) 11/23/2024    CO2 32 11/23/2024    GLUCOSE 203 (H) 11/23/2024    BUN 11 11/23/2024    CREATININE 0.38 (L) 11/23/2024    CREATININE 0.62 06/09/2011        Lab Results   Component Value Date    WBC 22.3 (H) 11/23/2024    WBC 10.9 (H) 06/09/2011    HEMOGLOBIN 11.5 (L) 11/23/2024    HEMATOCRIT 35.8 (L)  11/23/2024    PLATELETCT 440 11/23/2024        Total time of the discharge process exceeds 35 minutes.

## 2024-11-24 NOTE — DISCHARGE PLANNING
"HTH/SCP TCN chart review completed. Collaborated with MARIPOSA Magdaleno. Current discharge considerations are for Home with HH when medically cleared.  Per chart review, [facility] has accepted.   Patient on 6 L/min O2 [employer] at baseline.  Per SW note by Minoo METCALF On 11/22, \"friend can provide transportation home at discharge and bring O2 tank for D/C\".  Patient has a 4WW.  TCN will continue to follow and collaborate with discharge planning team as additional post acute needs arise. Thank you.    Completed:  PT/OT recommend HH on 11/22.   Choice obtained: HH ([facility] resumption)  Pt aware of [facility]'s blanket referral policy  SCP with Non[facility] PCP.      "

## 2024-11-24 NOTE — CARE PLAN
The patient is Stable - Low risk of patient condition declining or worsening    Shift Goals  Clinical Goals: Patients oxygen saturation will remain greater than 88% on 6L NC throughout shift  Patient Goals: improve breathing, comfort  Family Goals: LORA    Progress made toward(s) clinical / shift goals:  Maintaining O2 sat greater than 88% on 6L NC.  Patient reports having tanks and 10L concentrator at home      Problem: Fall Risk  Goal: Patient will remain free from falls  Description: Target End Date:  Prior to discharge or change in level of care    Document interventions on the [facility] Fall Risk Assessment    1.  Assess for fall risk factors  2.  Implement fall precautions  Outcome: Progressing     Problem: Respiratory  Goal: Patient will achieve/maintain optimum respiratory ventilation and gas exchange  Description: Target End Date:  Prior to discharge or change in level of care    Document on Assessment flowsheet    1.  Assess and monitor rate, rhythm, depth and effort of respiration  2.  Breath sounds assessed qshift and/or as needed  3.  Assess O2 saturation, administer/titrate oxygen as ordered  4.  Position patient for maximum ventilatory efficiency  5.  Turn, cough, and deep breath with splinting to improve effectiveness  6.  Collaborate with RT to administer medication/treatments per order  7.  Encourage use of incentive spirometer and encourage patient to cough after use and utilize splinting techniques if applicable  8.  Airway suctioning  9.  Monitor sputum production for changes in color, consistency and frequency  10. Perform frequent oral hygiene  11. Alternate physical activity with rest periods  Outcome: Progressing       Patient is not progressing towards the following goals:

## 2024-11-26 ENCOUNTER — HOME CARE VISIT (OUTPATIENT)
Dept: HOME HEALTH SERVICES | Facility: HOME HEALTHCARE | Age: 79
End: 2024-11-26
Payer: MEDICARE

## 2024-11-26 ENCOUNTER — DOCUMENTATION (OUTPATIENT)
Dept: CARDIOLOGY | Facility: MEDICAL CENTER | Age: 79
End: 2024-11-26
Payer: MEDICARE

## 2024-11-26 VITALS
DIASTOLIC BLOOD PRESSURE: 62 MMHG | SYSTOLIC BLOOD PRESSURE: 120 MMHG | TEMPERATURE: 97.6 F | HEART RATE: 92 BPM | OXYGEN SATURATION: 92 % | RESPIRATION RATE: 18 BRPM

## 2024-11-26 PROCEDURE — G0493 RN CARE EA 15 MIN HH/HOSPICE: HCPCS

## 2024-11-26 SDOH — ECONOMIC STABILITY: HOUSING INSECURITY: EVIDENCE OF SMOKING MATERIAL: 0

## 2024-11-26 ASSESSMENT — ENCOUNTER SYMPTOMS
DYSPNEA ACTIVITY LEVEL: AFTER AMBULATING LESS THAN 10 FT
SPUTUM CONSISTENCY: THICK
COUGH CHARACTERISTICS: PRODUCTIVE
BOWEL PATTERN NORMAL: 1
LAST BOWEL MOVEMENT: 67170
PAIN LOCATION - PAIN DURATION: YEARS
STOOL FREQUENCY: DAILY
SPUTUM COLOR: CLEAR
PAIN LOCATION - PAIN SEVERITY: 3/10
PAIN LOCATION: BACK
SUBJECTIVE PAIN PROGRESSION: GRADUALLY IMPROVING
NAUSEA: DENIES
COUGH: 1
LOWEST PAIN SEVERITY IN PAST 24 HOURS: 0/10
PAIN: 1
PAIN SEVERITY GOAL: 0/10
PAIN LOCATION - RELIEVING FACTORS: REST
SPUTUM PRODUCTION: 1
PAIN LOCATION - EXACERBATING FACTORS: ACTIVITY
VOMITING: DENIES
SHORTNESS OF BREATH: 1
MENTAL STATUS CHANGE: 0
HIGHEST PAIN SEVERITY IN PAST 24 HOURS: 4/10
PAIN LOCATION - PAIN FREQUENCY: INFREQUENT

## 2024-11-26 ASSESSMENT — ACTIVITIES OF DAILY LIVING (ADL): OASIS_M1830: 02

## 2024-11-26 NOTE — PROGRESS NOTES
Medication chart review for [facility] services    Received referral from [facility].   Medications reviewed  compared with discharge summary if available.  Discharge summary date, if applicable:   11/24    Current medication list per [facility]     Medication list one, patient is currently taking    Current Outpatient Medications:     traMADol, 50 mg, Oral, Q8HRS PRN    acyclovir, 200 mg, Oral, TID    digoxin, 125 mcg, Oral, DAILY    predniSONE, 40 mg, Oral, DAILY    amoxicillin-clavulanate, 1 Tablet, Oral, Q12HRS    doxycycline monohydrate, 100 mg, Oral, Q12HRS    spironolactone, Take 1 tablet by mouth daily    losartan, 50 mg, Oral, BID    Diclofenac Sodium, 1 Application, Topical, BID PRN    tizanidine, 4 mg, Oral, TID PRN    Tylenol PM Extra Strength, 2 Tablet, Oral, QHS PRN    fexofenadine, 180 mg, Oral, DAILY    potassium chloride, 10 mEq, Oral, BID    Calcium Carbonate (CALCIUM 500 PO), 1 Tablet, Oral, DAILY    triamcinolone, 1 Spray, Nasal, DAILY    omeprazole, 20 mg, Oral, QDAY    Trelegy Ellipta, 1 Puff, Inhalation, DAILY    montelukast, 10 mg, Oral, DAILY    DULoxetine, Take 1 capsule by mouth once daily (Patient taking differently: 60 mg, Oral, DAILY, Indications: pain)    Vitamin D (Cholecalciferol), 1 Capsule, Oral, DAILY    Home Care Oxygen, 6 L/min, Inhalation, Continuous    ipratropium-albuterol, 3 mL, Nebulization, Q4HRS PRN    Albuterol Sulfate, 2 Puff, Inhalation, 4X/DAY PRN      Medication list two, drugs that the patient has been prescribed or recommended to take by their healthcare provider on discharge summary    MEDICATIONS ON DISCHARGE      Medication List          START taking these medications         Instructions   doxycycline monohydrate 100 MG tablet  Commonly known as: Adoxa    Take 1 Tablet by mouth every 12 hours for 2 days.  Dose: 100 mg                CHANGE how you take these medications         Instructions   amoxicillin-clavulanate 875-125 MG Tabs  What changed:    how much to take  how to take this  when to take this  Commonly known as: Augmentin    Take 1 Tablet by mouth every 12 hours for 2 days.  Dose: 1 Tablet      predniSONE 20 MG Tabs  What changed:   how much to take  how to take this  when to take this  additional instructions  Commonly known as: Deltasone    Take 2 Tablets by mouth every day for 2 days.  Dose: 40 mg      spironolactone 25 MG Tabs  What changed: how to take this  Commonly known as: Aldactone    Take 1 tablet by mouth daily      traMADol 50 MG Tabs  What changed:   how much to take  how to take this  when to take this  reasons to take this  Commonly known as: Ultram    Take 1 Tablet by mouth every 8 hours as needed for Severe Pain for up to 5 days.  Dose: 50 mg                CONTINUE taking these medications         Instructions   acyclovir 200 MG Caps  Commonly known as: Zovirax    Take 1 Capsule by mouth 3 times a day.  Dose: 200 mg      CALCIUM 500 PO    Take 1 Tablet by mouth every day. Indications: supplement  Dose: 1 Tablet      D 1000 25 MCG (1000 UT) Caps  Generic drug: Cholecalciferol    Take 1 Capsule by mouth every day for 90 days.  Dose: 1 Capsule      Diclofenac Sodium 1 % Crea    Apply 1 Application topically 2 times a day as needed (mild, moderate pain). Indications: mild, moderate pain  Dose: 1 Application      digoxin 125 MCG Tabs  Commonly known as: Lanoxin    Take 1 Tablet by mouth every day. Indications: Atrial Fibrillation  Dose: 125 mcg      DULoxetine 60 MG Cpep delayed-release capsule  Commonly known as: Cymbalta    Take 1 capsule by mouth once daily      HM Fexofenadine HCl 180 MG tablet  Generic drug: fexofenadine    Take 180 mg by mouth every day. Indications: allergies  Dose: 180 mg      Home Care Oxygen    Inhale 6 L/min continuous. Oxygen dose 6 L/min  Respiratory route via: Nasal Cannula   Oxygen supplier:[employer]       Indications: Shortness of breath  Dose: 6 L/min      ipratropium-albuterol 0.5-2.5 (3) MG/3ML  nebulizer solution  Commonly known as: Duoneb    Doctor's comments: 3 month supply x 1 year  Take 3 mL by nebulization every four hours as needed for Shortness of Breath (Wheezing).  Dose: 3 mL      losartan 50 MG Tabs  Commonly known as: Cozaar    Take 1 Tablet by mouth 2 times a day. Indications: High Blood Pressure  Dose: 50 mg      montelukast 10 MG Tabs  Commonly known as: Singulair    Take 1 tablet by mouth once daily  Dose: 10 mg      omeprazole 20 MG delayed-release capsule  Commonly known as: PriLOSEC    Take 1 capsule by mouth every day  Dose: 20 mg      potassium chloride 10 MEQ capsule  Commonly known as: Micro-K    Take 10 mEq by mouth 2 times a day. Indications: Low Amount of Potassium in the Blood  Dose: 10 mEq      ProAir RespiClick 108 (90 Base) MCG/ACT Aepb  Generic drug: Albuterol Sulfate    Inhale 2 Puffs 4 times a day as needed (shortness of breath). Indications: SOB  Dose: 2 Puff      tizanidine 4 MG Tabs  Commonly known as: Zanaflex    Take 4 mg by mouth 3 times a day as needed (muscle spasms). Indications: Musculoskeletal Pain  Dose: 4 mg      Trelegy Ellipta 200-62.5-25 MCG/ACT inhaler  Generic drug: fluticasone-umeclidinium-vilanterol    Doctor's comments: 3 month supply x 1 year  Inhale 1 Puff every day.  Dose: 1 Puff      triamcinolone 55 MCG/ACT nasal inhaler  Commonly known as: Nasacort    Administer 1 [place] into affected nostril(S) every day. Indications: Hayfever  Dose: 1 Spray      Tylenol PM Extra Strength 500-25 MG Tabs  Generic drug: diphenhydrAMINE-APAP (sleep)    Take 2 Tablets by mouth at bedtime as needed (insomnia). Indications: insomnia  Dose: 2 Tablet                STOP taking these medications       benzonatate 200 MG capsule  Commonly known as: Tessalon      furosemide 20 MG Tabs  Commonly known as: Lasix      hydrocortisone 25 MG Supp  Commonly known as: Anusol-HC      methylPREDNISolone 4 MG Tbpk  Commonly known as: Medrol Dosepak           Allergies   Allergen  Reactions    Iodine      Convulsion  I.V.  iodine    Tape Rash and Itching       Labs     Lab Results   Component Value Date/Time    SODIUM 135 11/23/2024 02:25 AM    POTASSIUM 4.1 11/23/2024 02:25 AM    CHLORIDE 92 (L) 11/23/2024 02:25 AM    CO2 32 11/23/2024 02:25 AM    GLUCOSE 203 (H) 11/23/2024 02:25 AM    BUN 11 11/23/2024 02:25 AM    CREATININE 0.38 (L) 11/23/2024 02:25 AM    CREATININE 0.62 06/09/2011 12:00 AM    BUNCREATRAT 20 07/07/2014 02:50 PM    BUNCREATRAT 23 06/09/2011 12:00 AM     Lab Results   Component Value Date/Time    ALKPHOSPHAT 51 11/22/2024 12:02 AM    ASTSGOT 14 11/22/2024 12:02 AM    ALTSGPT 15 11/22/2024 12:02 AM    TBILIRUBIN 0.4 11/22/2024 12:02 AM    INR 1.08 11/13/2024 10:22 AM    ALBUMIN 3.4 11/22/2024 12:02 AM        Assessment for clinically significant drug interactions, drug omissions/additions, duplicative therapies.            CC   Everett Diego M.D.  [address] [place] [id number]  Fax: [phone]    [facility]  Phone [phone] fax [phone]    This note was created using voice recognition software (Dragon). The accuracy of the dictation is limited by the abilities of the software. I have reviewed the note prior to signing, however some errors in grammar and context are still possible. If you have any questions related to this note please do not hesitate to contact our office.

## 2024-11-27 ENCOUNTER — PHARMACY VISIT (OUTPATIENT)
Dept: PHARMACY | Facility: MEDICAL CENTER | Age: 79
End: 2024-11-27
Payer: COMMERCIAL

## 2024-11-27 PROCEDURE — RXMED WILLOW AMBULATORY MEDICATION CHARGE: Performed by: STUDENT IN AN ORGANIZED HEALTH CARE EDUCATION/TRAINING PROGRAM

## 2024-11-29 ENCOUNTER — HOME CARE VISIT (OUTPATIENT)
Dept: HOME HEALTH SERVICES | Facility: HOME HEALTHCARE | Age: 79
End: 2024-11-29
Payer: MEDICARE

## 2024-11-29 VITALS
DIASTOLIC BLOOD PRESSURE: 70 MMHG | SYSTOLIC BLOOD PRESSURE: 140 MMHG | TEMPERATURE: 98.4 F | RESPIRATION RATE: 20 BRPM | BODY MASS INDEX: 28.94 KG/M2 | HEART RATE: 94 BPM | WEIGHT: 168.6 LBS | OXYGEN SATURATION: 95 %

## 2024-11-29 PROCEDURE — G0299 HHS/HOSPICE OF RN EA 15 MIN: HCPCS

## 2024-11-29 ASSESSMENT — ENCOUNTER SYMPTOMS
COUGH CHARACTERISTICS: WEAK
NAUSEA: DENIES
VOMITING: DENIES
LAST BOWEL MOVEMENT: 67171
COUGH: 1
COUGH CHARACTERISTICS: PRODUCTIVE
DENIES PAIN: 1
STOOL FREQUENCY: LESS THAN DAILY
DYSPNEA ACTIVITY LEVEL: WHILE SPEAKING
BOWEL PATTERN NORMAL: 1
DYSPNEA ACTIVITY LEVEL: AT REST
SHORTNESS OF BREATH: 1

## 2024-11-29 ASSESSMENT — FIBROSIS 4 INDEX: FIB4 SCORE: 0.65

## 2024-12-02 ENCOUNTER — HOME CARE VISIT (OUTPATIENT)
Dept: HOME HEALTH SERVICES | Facility: HOME HEALTHCARE | Age: 79
End: 2024-12-02
Payer: MEDICARE

## 2024-12-02 PROCEDURE — G0299 HHS/HOSPICE OF RN EA 15 MIN: HCPCS

## 2024-12-02 ASSESSMENT — FIBROSIS 4 INDEX: FIB4 SCORE: 0.65

## 2024-12-03 ENCOUNTER — HOME CARE VISIT (OUTPATIENT)
Dept: HOME HEALTH SERVICES | Facility: HOME HEALTHCARE | Age: 79
End: 2024-12-03
Payer: MEDICARE

## 2024-12-03 ENCOUNTER — OFFICE VISIT (OUTPATIENT)
Dept: SLEEP MEDICINE | Facility: MEDICAL CENTER | Age: 79
End: 2024-12-03
Attending: NURSE PRACTITIONER
Payer: MEDICARE

## 2024-12-03 VITALS
HEIGHT: 64 IN | SYSTOLIC BLOOD PRESSURE: 128 MMHG | DIASTOLIC BLOOD PRESSURE: 72 MMHG | WEIGHT: 163 LBS | BODY MASS INDEX: 27.83 KG/M2 | OXYGEN SATURATION: 92 % | HEART RATE: 90 BPM | RESPIRATION RATE: 18 BRPM

## 2024-12-03 VITALS
DIASTOLIC BLOOD PRESSURE: 80 MMHG | HEART RATE: 90 BPM | TEMPERATURE: 99 F | WEIGHT: 163 LBS | OXYGEN SATURATION: 92 % | BODY MASS INDEX: 27.98 KG/M2 | SYSTOLIC BLOOD PRESSURE: 122 MMHG

## 2024-12-03 DIAGNOSIS — J44.9 STAGE 4 VERY SEVERE COPD BY GOLD CLASSIFICATION (HCC): Chronic | ICD-10-CM

## 2024-12-03 DIAGNOSIS — Z87.891 FORMER SMOKER: ICD-10-CM

## 2024-12-03 DIAGNOSIS — J96.11 CHRONIC RESPIRATORY FAILURE WITH HYPOXIA (HCC): Chronic | ICD-10-CM

## 2024-12-03 DIAGNOSIS — Z09 HOSPITAL DISCHARGE FOLLOW-UP: ICD-10-CM

## 2024-12-03 PROBLEM — J18.9 PNEUMONIA DUE TO INFECTIOUS ORGANISM: Status: RESOLVED | Noted: 2024-11-21 | Resolved: 2024-12-03

## 2024-12-03 PROBLEM — E87.6 HYPOKALEMIA: Status: RESOLVED | Noted: 2024-11-15 | Resolved: 2024-12-03

## 2024-12-03 PROBLEM — E66.811 OBESITY (BMI 30.0-34.9): Status: RESOLVED | Noted: 2023-02-07 | Resolved: 2024-12-03

## 2024-12-03 PROBLEM — Z66 DNR (DO NOT RESUSCITATE): Chronic | Status: ACTIVE | Noted: 2020-11-17

## 2024-12-03 PROBLEM — D62 ACUTE BLOOD LOSS ANEMIA: Status: RESOLVED | Noted: 2024-11-13 | Resolved: 2024-12-03

## 2024-12-03 PROBLEM — C34.12 PRIMARY CANCER OF LEFT UPPER LOBE OF LUNG (HCC): Chronic | Status: ACTIVE | Noted: 2021-10-01

## 2024-12-03 PROBLEM — J44.1 ACUTE EXACERBATION OF CHRONIC OBSTRUCTIVE PULMONARY DISEASE (COPD) (HCC): Status: RESOLVED | Noted: 2024-02-05 | Resolved: 2024-12-03

## 2024-12-03 PROBLEM — Z79.899 ENCOUNTER FOR LONG-TERM CURRENT USE OF HIGH RISK MEDICATION: Status: RESOLVED | Noted: 2024-05-31 | Resolved: 2024-12-03

## 2024-12-03 PROBLEM — J43.1 PANLOBULAR EMPHYSEMA (HCC): Status: RESOLVED | Noted: 2022-02-04 | Resolved: 2024-12-03

## 2024-12-03 PROCEDURE — 3074F SYST BP LT 130 MM HG: CPT | Performed by: NURSE PRACTITIONER

## 2024-12-03 PROCEDURE — 99213 OFFICE O/P EST LOW 20 MIN: CPT | Performed by: NURSE PRACTITIONER

## 2024-12-03 PROCEDURE — 99215 OFFICE O/P EST HI 40 MIN: CPT | Performed by: NURSE PRACTITIONER

## 2024-12-03 PROCEDURE — 3078F DIAST BP <80 MM HG: CPT | Performed by: NURSE PRACTITIONER

## 2024-12-03 PROCEDURE — 1158F ADVNC CARE PLAN TLK DOCD: CPT | Performed by: NURSE PRACTITIONER

## 2024-12-03 RX ORDER — GUAIFENESIN 600 MG/1
TABLET, EXTENDED RELEASE ORAL
Qty: 120 TABLET | Refills: 1 | Status: SHIPPED | OUTPATIENT
Start: 2024-12-03

## 2024-12-03 RX ORDER — PREDNISONE 5 MG/1
TABLET ORAL
Qty: 90 TABLET | Refills: 1 | Status: SHIPPED | OUTPATIENT
Start: 2024-12-03

## 2024-12-03 RX ORDER — MONTELUKAST SODIUM 10 MG/1
10 TABLET ORAL DAILY
Qty: 90 TABLET | Refills: 3 | Status: SHIPPED | OUTPATIENT
Start: 2024-12-03

## 2024-12-03 RX ORDER — PREDNISONE 10 MG/1
TABLET ORAL
Qty: 40 TABLET | Refills: 0 | Status: SHIPPED | OUTPATIENT
Start: 2024-12-03

## 2024-12-03 SDOH — ECONOMIC STABILITY: HOUSING INSECURITY: EVIDENCE OF SMOKING MATERIAL: 0

## 2024-12-03 ASSESSMENT — ENCOUNTER SYMPTOMS
HYPERTENSION: 1
VOMITING: DENIES
PAIN LOCATION - RELIEVING FACTORS: REST.
HIGHEST PAIN SEVERITY IN PAST 24 HOURS: 3/10
DRY SKIN: 1
LIMITED RANGE OF MOTION: 1
MUSCLE WEAKNESS: 1
SUBJECTIVE PAIN PROGRESSION: UNCHANGED
PAIN: 1
PAIN LOCATION - PAIN SEVERITY: 3/10
PAIN LOCATION - PAIN QUALITY: ACHE
PAIN LOCATION - PAIN FREQUENCY: INTERMITTENT
STOOL FREQUENCY: DAILY
SEVERE DYSPNEA: 1
BOWEL PATTERN NORMAL: 1
CHEST TIGHTNESS: 1
LOWEST PAIN SEVERITY IN PAST 24 HOURS: 1/10
PAIN LOCATION - PAIN DURATION: DAILY
ARTHRALGIAS: 1
PAIN SEVERITY GOAL: 0/10
FATIGUES EASILY: 1
LAST BOWEL MOVEMENT: 67176
PAIN LOCATION: BACK/LEGS
NAUSEA: DENIES

## 2024-12-03 ASSESSMENT — ACTIVITIES OF DAILY LIVING (ADL)
CURRENT_FUNCTION: STAND BY ASSIST
AMBULATION ASSISTANCE: STAND BY ASSIST

## 2024-12-03 ASSESSMENT — FIBROSIS 4 INDEX: FIB4 SCORE: 0.65

## 2024-12-03 NOTE — CASE COMMUNICATION
Quality Review Completed for Transfer [facility]ASIS by ELISA Rodriguez, PT on 12/3/2024:     Edits completed by ELISA Rodriguez, PT:  1.  - checked yes on c, for depression.  2.  - changed to 11/21/2024.  3.  - unchecked D. Paper-based.

## 2024-12-03 NOTE — CASE COMMUNICATION
Quality Review for [facility]AS[facility] by ELISA Rodriguez, PT  on  December 3, 2024     Edits completed by ELISA Rodriguez, PT:  1.  and  diagnosis coding updated per chart review.  2.  - changed to 11/20/2024 per [facility] order.  3.  - changed to 4 per narrative, has friends that assist and occasional house cleaning assistance.  4.  - changed to 3, per narrative.  5. , ,  - changed to 2 per narrative, pt requires superv ision.  6.  - changed to NA - no injectable meds  7. Functional Limitations - checked Dyspnea with Minimal Exertion per narrative.  8. Updated F2F information.  9.  - changed to 11/24/2024 per collaboration convention.

## 2024-12-03 NOTE — PATIENT INSTRUCTIONS
Stop trelegy try breztri 2 puffs twice daily - if you like this let me know and will send RX to pharmacy to check cost    Start prednisone taper now and complete then use prednisone 5mg daily.  Use mucinex 600mg twice daily for cough/phlegm.     Continue oxygen 3-6LPM 24/7.    Complete Chest xray in January.    Follow up with [employer] to get new nebulizer and oxygen tank carrier for mobile scooter.

## 2024-12-03 NOTE — PROGRESS NOTES
Chief Complaint   Patient presents with    Follow-Up     Chronic respiratory failure with hypoxia.   10/11/2024- DR. HUTCHINS  6LPM 24/7       HPI:  Berny Renee is a 79 y.o. year old female here today for follow-up on severe COPD with chronic respiratory failure.  History of lung cancer status post radiation and continue to be followed by radiation oncology.  Office visit 10/11/2024 with Dr. Deshpande    MMRC Grade: 3-4  Exacerbations this year: 2  Hosp stay 2/7/24 RSV/AECOPD  Hosp stay 11/2024 pna/AECOPD    Currently prescribed Trelegy 200 mcgs 1 puff once daily, singulair qhs, DuoNebs/albuterol inhaler as needed.  Currently utilizing oxygen 3-6lpm dependent on activity.    Interval Events:  12/3/24: Presents today for hospital follow-up discharge 11/24/2024 for COPD exacerbation with community-acquired pneumonia and discharged home on antibiotics including doxycycline, Augmentin and prednisone taper.  CXR 11/21/24 increased moderate bilateral interstitial edema - repeat in 6 weeks is recommended.  Patient has impaired mobility uses scooter but using walker today - she needs O2 tank carrier for scooter.  She needs a new nebulizer - using 4x's per day.  Pulmonary rehab in 2021.  She notes continued to feel poorly but no fevers, chills, wheezing or rattling in the chest.  She is having regular cough with productive white/yellow phlegm and chest tightness with any type of activity even standing.  She lives alone but does have neighbors and friends that help her.  She has no family near her.  She has continued to lose weight with 10 pound weight loss since her October visit.    PULM HX:  Former smoker, quit 1999 with 60-pack-year history.  PFT 7/8/2021 indicated FEV1 0.89 L or 43%, ratio 45, TLC 95% DLCO 7% predicted.  CT chest 7/1/2024:  1.  Stable posttreatment changes and nodular scarring in the anterior left upper lobe.  2.  Stable right lower lobe and left upper lobe nodules.  3.  Increasing bilateral  reticulonodular opacities most likely infectious or inflammatory in etiology.  4.  There is underlying emphysema.  ECHO 11/13/24:  Normal biventricular systolic functions, normal estimated LVEF 60%  Mild mitral annular calcifications  Mild aortic valve sclerosis without stenosis  Normal IVC size  No pericardial effusion  Unable to estimate RVSP      ROS: As per HPI and otherwise negative if not stated.    Past Medical History:   Diagnosis Date    Acute on chronic respiratory failure with hypoxia ([facility]) 11/14/2020    Arthritis     spine    Asthma with COPD ([facility])     BMI 31.0-31.9,adult 02/04/2022    Cataract immature     Chronic pain     Chronic respiratory failure with hypoxia ([facility]) 07/13/2017    Colon polyps     COPD (chronic obstructive pulmonary disease) ([facility]) 2002    moderate to severe    Cough     DDD (degenerative disc disease), cervical     DDD (degenerative disc disease), thoracic     Dependence on continuous supplemental oxygen 08/13/2015    IMO load March 2020    Diverticulitis of colon     Dyslipidemia     EMPHYSEMA     H/O opioid abuse ([facility]) 07/15/2021    Hypertension     Obesity (BMI 30-39.9) 10/24/2016    Opioid type dependence, continuous ([facility]) 02/04/2022    REGINE (obstructive sleep apnea)     OSTEOPOROSIS     Painful breathing     Shortness of breath     Spinal stenosis, lumbar region, with neurogenic claudication     Sputum production     URINARY INCONTINENCE     Varicose veins of left leg with edema 10/11/2024    Vitamin d deficiency     Wheezing        Past Surgical History:   Procedure Laterality Date    KS UPPER GI ENDOSCOPY,BIOPSY N/A 11/15/2024    Procedure: GASTROSCOPY, WITH BIOPSY;  Surgeon: Mela Vargas M.D.;  Location: SURGERY SAME DAY [facility];  Service: Gastroenterology    COLONOSCOPY WITH POLYP N/A 11/15/2024    Procedure: COLONOSCOPY, WITH POLYPECTOMY;  Surgeon: Mela Vargas M.D.;  Location: SURGERY SAME DAY [facility];  Service: Gastroenterology    LUMBAR LAMINECTOMY DISKECTOMY   2010    Performed by GRACIE CANO at [facility]    OTHER ORTHOPEDIC SURGERY  2004    left ankle fx    OTHER      leg fracture    OTHER      t spine disc surg    TONSILLECTOMY         Family History   Problem Relation Age of Onset    Cancer Father         Lung    Other Sister         lung and leukemia cancer    Cancer Sister         Leukemia, Lung       Social History     Socioeconomic History    Marital status:      Spouse name: Not on file    Number of children: Not on file    Years of education: Not on file    Highest education level: Not on file   Occupational History    Not on file   Tobacco Use    Smoking status: Former     Current packs/day: 0.00     Average packs/day: 2.0 packs/day for 30.0 years (60.0 ttl pk-yrs)     Types: Cigarettes     Start date: 3/1/1969     Quit date: 3/1/1999     Years since quittin.7    Smokeless tobacco: Never    Tobacco comments:     3 pks a day for 35 yrs, continued abstinence   Vaping Use    Vaping status: Never Used   Substance and Sexual Activity    Alcohol use: Not Currently     Alcohol/week: 4.2 oz     Types: 7 Glasses of wine per week    Drug use: Not Currently     Types: Marijuana, Oral     Comment: Medical Marijuana     Sexual activity: Never   Other Topics Concern    Not on file   Social History Narrative    ** Merged History Encounter **          Social Drivers of Health     Financial Resource Strain: Low Risk  (2024)    Overall Financial Resource Strain (CARDIA)     Difficulty of Paying Living Expenses: Not very hard   Food Insecurity: No Food Insecurity (2024)    Hunger Vital Sign     Worried About Running Out of Food in the Last Year: Never true     Ran Out of Food in the Last Year: Never true   Transportation Needs: No Transportation Needs (2024)    PRAPARE - Transportation     Lack of Transportation (Medical): No     Lack of Transportation (Non-Medical): No   Physical Activity: Inactive (2024)    Exercise Vital  "Sign     Days of Exercise per Week: 0 days     Minutes of Exercise per Session: 0 min   Stress: No Stress Concern Present (11/27/2024)    Citizen of [place] of Occupational Health - Occupational Stress Questionnaire     Feeling of Stress : Not at all   Social Connections: Moderately Isolated (11/27/2024)    Social Connection and Isolation Panel [NHANES]     Frequency of Communication with Friends and Family: Three times a week     Frequency of Social Gatherings with Friends and Family: Twice a week     Attends Yazdanism Services: 1 to 4 times per year     Active Member of Clubs or Organizations: No     Attends Club or Organization Meetings: Never     Marital Status:    Intimate Partner Violence: Not At Risk (11/27/2024)    Humiliation, Afraid, Rape, and Kick questionnaire     Fear of Current or Ex-Partner: No     Emotionally Abused: No     Physically Abused: No     Sexually Abused: No   Housing Stability: Low Risk  (11/27/2024)    Housing Stability Vital Sign     Unable to Pay for Housing in the Last Year: No     Number of Times Moved in the Last Year: 1     Homeless in the Last Year: No       Allergies as of 12/03/2024 - Reviewed 12/03/2024   Allergen Reaction Noted    Iodine  08/09/2008    Tape Rash and Itching 06/22/2010        Vitals:  /72 (BP Location: Left arm, Patient Position: Sitting, BP Cuff Size: Adult)   Pulse 90   Resp 18   Ht 1.626 m (5' 4\")   Wt 73.9 kg (163 lb)   SpO2 92%     Current medications as of today   Current Outpatient Medications   Medication Sig Dispense Refill    meloxicam (MOBIC) 7.5 MG Tab Take 1 tablet by mouth once or twice a day 180 Tablet 1    traMADol (ULTRAM) 50 MG Tab Take 1 tablet by mouth twice each day 30 days 60 Tablet 2    lidocaine (LIDOCAINE PAIN RELIEF) 4 % Patch Use as directed per pharmacy and package insert 20 Patch 0    acyclovir (ZOVIRAX) 200 MG Cap Take 1 Capsule by mouth 3 times a day. 30 Capsule 0    digoxin (LANOXIN) 125 MCG Tab Take 1 Tablet by " mouth every day. Indications: Atrial Fibrillation 100 Tablet 3    spironolactone (ALDACTONE) 25 MG Tab Take 1 tablet by mouth daily 90 Tablet 1    losartan (COZAAR) 50 MG Tab Take 1 Tablet by mouth 2 times a day. Indications: High Blood Pressure 200 Tablet 2    Diclofenac Sodium 1 % Cream Apply 1 Application topically 2 times a day as needed (mild, moderate pain). Indications: mild, moderate pain      tizanidine (ZANAFLEX) 4 MG Tab Take 4 mg by mouth 3 times a day as needed (muscle spasms). Indications: Musculoskeletal Pain      diphenhydrAMINE-APAP, sleep, (TYLENOL PM EXTRA STRENGTH)  MG Tab Take 2 Tablets by mouth at bedtime as needed (insomnia). Indications: insomnia      fexofenadine (HM FEXOFENADINE HCL) 180 MG tablet Take 180 mg by mouth every day. Indications: allergies      potassium chloride (MICRO-K) 10 MEQ capsule Take 10 mEq by mouth 2 times a day. Indications: Low Amount of Potassium in the Blood      Calcium Carbonate (CALCIUM 500 PO) Take 1 Tablet by mouth every day. Indications: supplement      triamcinolone (NASACORT) 55 MCG/ACT nasal inhaler Administer 1 Spray into affected nostril(S) every day. Indications: Hayfever      omeprazole (PRILOSEC) 20 MG delayed-release capsule Take 1 capsule by mouth every day 90 Capsule 1    fluticasone-umeclidinium-vilanterol (TRELEGY ELLIPTA) 200-62.5-25 mcg/act inhaler Inhale 1 Puff every day. 90 Each 3    montelukast (SINGULAIR) 10 MG Tab Take 1 tablet by mouth once daily 90 Tablet 1    DULoxetine (CYMBALTA) 60 MG Cap DR Particles delayed-release capsule Take 1 capsule by mouth once daily (Patient taking differently: Take 60 mg by mouth every day. Indications: pain) 90 Capsule 1    Vitamin D, Cholecalciferol, 25 MCG (1000 UT) Cap Take 1 Capsule by mouth every day for 90 days. 90 Capsule 1    Home Care Oxygen Inhale 6 L/min continuous. Oxygen dose 6 L/min  Respiratory route via: Nasal Cannula   Oxygen supplier:[employer]      Indications: Shortness of breath       ipratropium-albuterol (DUONEB) 0.5-2.5 (3) MG/3ML nebulizer solution Take 3 mL by nebulization every four hours as needed for Shortness of Breath (Wheezing). 360 mL 3    Albuterol Sulfate 108 (90 Base) MCG/ACT AEROSOL POWDER, BREATH ACTIVATED Inhale 2 Puffs 4 times a day as needed (shortness of breath). Indications: SOB 3 Each 0     No current facility-administered medications for this visit.         Physical Exam:   Gen:           Alert and oriented, No apparent distress. Mood and affect appropriate, normal interaction with examiner.  Eyes:          PERRL, EOM intact, sclere white, conjunctive moist.  Ears:          Not examined.   Hearing:     Grossly intact.  Nose:          Normal, no lesions or deformities.  Dentition:    Not examined.   Oropharynx:   Not examined.   Mallampati Classification: Not examined.   Neck:        Supple, trachea midline, no masses.  Respiratory Effort: No intercostal retractions or use of accessory muscles.   Lung Auscultation:      Diminished t/o but clear  CV:            Regular rate and rhythm. No murmurs, rubs or gallops.  Abd:           Not examined.   Lymphadenopathy: Not examined.   Gait and Station: walker  Digits and Nails: No clubbing, cyanosis, petechiae, or nodes.   Cranial Nerves: II-XII grossly intact.  Skin:        No rashes, lesions or ulcers noted.               Ext:           No cyanosis or edema.      Assessment:  1. Hospital discharge follow-up        2. Stage 4 very severe COPD by GOLD classification ([facility])        3. Chronic respiratory failure with hypoxia ([facility])        4. BMI 27.0-27.9,adult        5. Former smoker                 Immunizations:    Flu:9/2024  Pneumovax 23:2016  Prevnar 13:not due  PCV 20: 10/2024  COVID-19: 9/2024    Plan:  Patient continues to feel poorly since hospital discharge.  She feels she is continue to have a COPD exacerbation.  She was fully treated with antibiotics for community-acquired pneumonia.   CXR in 6 weeks   RX abx/pred taper  now then continue pred 5mg daily till seen again   Start mucinex OTC for congestion  Patient does have severe COPD and most likely very severe at this point due to prior testing.  She does have underlying history of lung cancer status post radiation, SVT, PVCs, hypertension currently on digoxin.   DME nebulizer   Trial of Breztri; SAMPLES BREZTRI LOT#[id number](1), [id number](3) GIVEN - advised to stop treley during trial and to message on Qminder to check cost on pharmacy if she would like to continue this instead  Patient will continue to benefit from oxygen 24/7.  She does need a carrier for her mobile scooter for oxygen for mobility DME other - O2 tank carrier  Encourage healthy diet and gentle walking with chair exercises for conditioning.  Follow-up in    Please note that this dictation was created using voice recognition software. I have made every reasonable attempt to correct obvious errors, but it is possible there are errors of grammar and possibly content that I did not discover before finalizing the note.

## 2024-12-04 ENCOUNTER — HOME CARE VISIT (OUTPATIENT)
Dept: HOME HEALTH SERVICES | Facility: HOME HEALTHCARE | Age: 79
End: 2024-12-04
Payer: MEDICARE

## 2024-12-04 PROCEDURE — RXMED WILLOW AMBULATORY MEDICATION CHARGE: Performed by: NURSE PRACTITIONER

## 2024-12-04 PROCEDURE — RXMED WILLOW AMBULATORY MEDICATION CHARGE: Performed by: FAMILY MEDICINE

## 2024-12-04 NOTE — Clinical Note
I agree with these changes  ----- Message -----  From: Grisel Burnham R.N.  Sent: 12/4/2024   9:32 AM PST  To: Funmilayo Dyson R.N.      Quality Review Completed for [facility] OASIS by VIMAL Burnham RN on 12/4/2024:     Edits completed by VIMAL Burnham RN:     1.  and  diagnosis coding updated per chart review  2.  is NA, no LSOC ordered and  is 11/24 date of referral  3.  is 11/24 date of DC  4. Supervision level of assist per narrative for safety, changed , 1810, 1820, 1845, 1850 to 2,  is 3  5. Per previous documented ability at SOC, changed [id number] A and B to needed help  6.  H opioid is taking Ultram with indications per MAR  7.  changed to 3 per guidelines when ambulation is supervised  8. Checked incontinence, hearing, endurance, adequate emergency plan to 485 forms  9.   F is CG assist per caregiver narrative note  10.  added #2 for weight loss reported by pulmonology for DC follow up visit   Pt returned call-LVM   Attempted to reach pt-LVM to call clinic

## 2024-12-04 NOTE — CASE COMMUNICATION
I agree with changes.  Kate Bush RN  ----- Message -----  From: Maura Rodriguez, PT  Sent: 12/3/2024   1:40 PM PST  To: Kate Bush R.N.      Quality Review for [facility] OASIS by ELISA Rodriguez PT  on  December 3, 2024     Edits completed by ELISA Rodriguez, PT:  1.  and  diagnosis coding updated per chart review.  2.  - changed to 11/20/2024 per LSOC order.  3.  - changed to 4 per narrative, has friends that assist and occa sional house cleaning assistance.  4.  - changed to 3, per narrative.  5. , ,  - changed to 2 per narrative, pt requires supervision.  6.  - changed to NA - no injectable meds  7. Functional Limitations - checked Dyspnea with Minimal Exertion per narrative.  8. Updated F2F information.  9.  - changed to 11/24/2024 per collaboration convention.

## 2024-12-04 NOTE — CASE COMMUNICATION
Quality Review Completed for [facility] OASIS by VIMAL Burnham RN on 12/4/2024:     Edits completed by VIMAL Burnham RN:     1.  and  diagnosis coding updated per chart review  2.  is NA, no LSOC ordered and  is 11/24 date of referral  3.  is 11/24 date of DC  4. Supervision level of assist per narrative for safety, changed , 1810, 1820, 1845, 1850 to 2,  is 3  5. Per previous documented ability at SOC, changed GG0 100 A and B to needed help  6.  H opioid is taking Ultram with indications per MAR  7.  changed to 3 per guidelines when ambulation is supervised  8. Checked incontinence, hearing, endurance, adequate emergency plan to 485 forms  9.   F is CG assist per caregiver narrative note  10.  added #2 for weight loss reported by pulmonology for DC follow up visit

## 2024-12-05 ENCOUNTER — HOME CARE VISIT (OUTPATIENT)
Dept: HOME HEALTH SERVICES | Facility: HOME HEALTHCARE | Age: 79
End: 2024-12-05
Payer: MEDICARE

## 2024-12-05 ENCOUNTER — PHARMACY VISIT (OUTPATIENT)
Dept: PHARMACY | Facility: MEDICAL CENTER | Age: 79
End: 2024-12-05
Payer: COMMERCIAL

## 2024-12-05 VITALS
HEART RATE: 78 BPM | WEIGHT: 167 LBS | SYSTOLIC BLOOD PRESSURE: 140 MMHG | OXYGEN SATURATION: 95 % | DIASTOLIC BLOOD PRESSURE: 68 MMHG | BODY MASS INDEX: 28.67 KG/M2 | RESPIRATION RATE: 20 BRPM | TEMPERATURE: 98.5 F

## 2024-12-05 PROCEDURE — G0495 RN CARE TRAIN/EDU IN HH: HCPCS

## 2024-12-05 SDOH — ECONOMIC STABILITY: HOUSING INSECURITY: EVIDENCE OF SMOKING MATERIAL: 0

## 2024-12-05 ASSESSMENT — ENCOUNTER SYMPTOMS
PAIN LOCATION: KNEE
BOWEL PATTERN NORMAL: 1
PAIN LOCATION - PAIN SEVERITY: 3/10
LIMITED RANGE OF MOTION: 1
SEVERE DYSPNEA: 1
MUSCLE WEAKNESS: 1
HYPERTENSION: 1
PAIN SEVERITY GOAL: 0/10
LAST BOWEL MOVEMENT: 67178
STOOL FREQUENCY: DAILY
PAIN: 1
NAUSEA: DENIES
PAIN LOCATION - PAIN QUALITY: ACHE
SUBJECTIVE PAIN PROGRESSION: UNCHANGED
PAIN LOCATION - PAIN DURATION: DAILY
FATIGUES EASILY: 1
DEPRESSED MOOD: 1
ARTHRALGIAS: 1
HIGHEST PAIN SEVERITY IN PAST 24 HOURS: 5/10
VOMITING: DENIES
PAIN LOCATION - PAIN FREQUENCY: INTERMITTENT
LOWEST PAIN SEVERITY IN PAST 24 HOURS: 0/10
PAIN LOCATION - EXACERBATING FACTORS: AMBULATION

## 2024-12-05 ASSESSMENT — ACTIVITIES OF DAILY LIVING (ADL)
CURRENT_FUNCTION: STAND BY ASSIST
AMBULATION ASSISTANCE: STAND BY ASSIST

## 2024-12-05 ASSESSMENT — FIBROSIS 4 INDEX: FIB4 SCORE: 0.65

## 2024-12-09 ENCOUNTER — OFFICE VISIT (OUTPATIENT)
Dept: CARDIOLOGY | Facility: MEDICAL CENTER | Age: 79
End: 2024-12-09
Payer: MEDICARE

## 2024-12-09 ENCOUNTER — HOME CARE VISIT (OUTPATIENT)
Dept: HOME HEALTH SERVICES | Facility: HOME HEALTHCARE | Age: 79
End: 2024-12-09
Payer: MEDICARE

## 2024-12-09 VITALS
DIASTOLIC BLOOD PRESSURE: 56 MMHG | SYSTOLIC BLOOD PRESSURE: 134 MMHG | HEIGHT: 64 IN | HEART RATE: 99 BPM | WEIGHT: 161 LBS | BODY MASS INDEX: 27.49 KG/M2 | OXYGEN SATURATION: 93 %

## 2024-12-09 DIAGNOSIS — E78.5 DYSLIPIDEMIA: ICD-10-CM

## 2024-12-09 DIAGNOSIS — I10 ESSENTIAL HYPERTENSION: ICD-10-CM

## 2024-12-09 DIAGNOSIS — I49.3 PVC'S (PREMATURE VENTRICULAR CONTRACTIONS): ICD-10-CM

## 2024-12-09 DIAGNOSIS — I49.1 APC (ATRIAL PREMATURE CONTRACTIONS): ICD-10-CM

## 2024-12-09 DIAGNOSIS — I47.10 SVT (SUPRAVENTRICULAR TACHYCARDIA) (HCC): ICD-10-CM

## 2024-12-09 DIAGNOSIS — R00.2 PALPITATIONS: ICD-10-CM

## 2024-12-09 DIAGNOSIS — I70.0 ATHEROSCLEROSIS OF AORTA (HCC): ICD-10-CM

## 2024-12-09 DIAGNOSIS — J96.11 CHRONIC RESPIRATORY FAILURE WITH HYPOXIA (HCC): Chronic | ICD-10-CM

## 2024-12-09 PROCEDURE — 3078F DIAST BP <80 MM HG: CPT

## 2024-12-09 PROCEDURE — 3075F SYST BP GE 130 - 139MM HG: CPT

## 2024-12-09 PROCEDURE — 99214 OFFICE O/P EST MOD 30 MIN: CPT

## 2024-12-09 PROCEDURE — 99213 OFFICE O/P EST LOW 20 MIN: CPT

## 2024-12-09 PROCEDURE — 1158F ADVNC CARE PLAN TLK DOCD: CPT

## 2024-12-09 ASSESSMENT — ENCOUNTER SYMPTOMS
LIGHT-HEADEDNESS: 0
PND: 1
PALPITATIONS: 0
HEADACHES: 0
SYNCOPE: 0
ORTHOPNEA: 1
NEAR-SYNCOPE: 0
SHORTNESS OF BREATH: 1
DIZZINESS: 0
DYSPNEA ON EXERTION: 1

## 2024-12-09 ASSESSMENT — FIBROSIS 4 INDEX: FIB4 SCORE: 0.65

## 2024-12-09 NOTE — PROGRESS NOTES
Chief Complaint   Patient presents with    Supraventricular Tachycardia (SVT)    Hypertension    Dyslipidemia          Subjective:   Berny Renee is a 79 y.o. female who presents today for follow-up.     Patient of Dr. Ross.  Current medical problems include palpitations, SVT, PVCs, hypertension, lung cancer ([facility], diagnosed by PET and chest CT scans (with radiation therapy) 2021, COPD chronic hypoxic respiratory failure on continuous O2. Their last clinic visit was 8/8/2024 with Dr. Ross.    Today's visit:  Patient had been recently hospitalized due to pneumonia. Since being in the hospital she has continued to have progressing exertional dyspnea and feeling of her heart racing with ambulation. She has orthopnea which is longstanding and says she wakes up at night due to coughing. She continues to take her medications as prescribed. She most recently followed up with pulmonology as well for her severe COPD and medications were adjusted. She is on continuous oxygen 6-8 L with ambulation. She denies chest pain, edema, lightheadedness/dizziness or syncope. She is currently having home health come to her house.     Cardiovascular Risk Factors:  1. Smoking status: Former smoker  2. Type II Diabetes Mellitus: No Prediabetes  Lab Results   Component Value Date/Time    HBA1C 6.2 (H) 11/15/2020 04:51 AM    HBA1C 6.1 (H) 07/13/2017 03:05 PM     3. Hypertension: Yes  4. Dyslipidemia: Yes   Cholesterol,Tot   Date Value Ref Range Status   11/28/2023 181 100 - 199 mg/dL Final     LDL   Date Value Ref Range Status   11/28/2023 97 <100 mg/dL Final     HDL   Date Value Ref Range Status   11/28/2023 63 >=40 mg/dL Final     Triglycerides   Date Value Ref Range Status   11/28/2023 104 0 - 149 mg/dL Final     Past Medical History:   Diagnosis Date    Acute on chronic respiratory failure with hypoxia (HCC) 11/14/2020    Arthritis     spine    Asthma with COPD (HCC)     BMI 31.0-31.9,adult 02/04/2022    Cataract immature      Chronic pain     Chronic respiratory failure with hypoxia ([facility]) 2017    Colon polyps     COPD (chronic obstructive pulmonary disease) ([facility])     moderate to severe    Cough     DDD (degenerative disc disease), cervical     DDD (degenerative disc disease), thoracic     Dependence on continuous supplemental oxygen 2015    IMO load 2020    Diverticulitis of colon     Dyslipidemia     EMPHYSEMA     H/O opioid abuse ([facility]) 07/15/2021    Hypertension     Obesity (BMI 30-39.9) 10/24/2016    Opioid type dependence, continuous ([facility]) 2022    REGINE (obstructive sleep apnea)     OSTEOPOROSIS     Painful breathing     Shortness of breath     Spinal stenosis, lumbar region, with neurogenic claudication     Sputum production     URINARY INCONTINENCE     Varicose veins of left leg with edema 10/11/2024    Vitamin d deficiency     Wheezing          Family History   Problem Relation Age of Onset    Cancer Father         Lung    Other Sister         lung and leukemia cancer    Cancer Sister         Leukemia, Lung         Social History     Tobacco Use    Smoking status: Former     Current packs/day: 0.00     Average packs/day: 2.0 packs/day for 30.0 years (60.0 ttl pk-yrs)     Types: Cigarettes     Start date: 3/1/1969     Quit date: 3/1/1999     Years since quittin.7    Smokeless tobacco: Never    Tobacco comments:     3 pks a day for 35 yrs, continued abstinence   Vaping Use    Vaping status: Never Used   Substance Use Topics    Alcohol use: Not Currently     Alcohol/week: 4.2 oz     Types: 7 Glasses of wine per week    Drug use: Not Currently     Types: Marijuana, Oral     Comment: Medical Marijuana          Allergies   Allergen Reactions    Iodine      Convulsion  I.V.  iodine    Tape Rash and Itching         Current Outpatient Medications   Medication Sig    montelukast (SINGULAIR) 10 MG Tab Take 1 tablet by mouth once daily    Albuterol Sulfate 108 (90 Base) MCG/ACT AEROSOL POWDER, BREATH  ACTIVATED Inhale 2 puffs by mouth 4 times a day as needed (shortness of breath).    predniSONE (DELTASONE) 10 MG Tab Take 4 tablets by mouth daily x 4 days, then take 3 tabs daily x 4 days, then take 2 tabs daily x 4 days, then 1 tab daily x 4 days with food, then discontinue.    predniSONE (DELTASONE) 5 MG Tab Take by mouth as directed per MD written instructions.    guaiFENesin ER (MUCINEX) 600 MG TABLET SR 12 HR Take 1-2 tablets by mouth every 12 hours as needed for congestion.    meloxicam (MOBIC) 7.5 MG Tab Take 1 tablet by mouth once or twice a day    traMADol (ULTRAM) 50 MG Tab Take 1 tablet by mouth twice each day 30 days    acyclovir (ZOVIRAX) 200 MG Cap Take 1 Capsule by mouth 3 times a day.    digoxin (LANOXIN) 125 MCG Tab Take 1 Tablet by mouth every day. Indications: Atrial Fibrillation    spironolactone (ALDACTONE) 25 MG Tab Take 1 tablet by mouth daily    losartan (COZAAR) 50 MG Tab Take 1 Tablet by mouth 2 times a day. Indications: High Blood Pressure    Diclofenac Sodium 1 % Cream Apply 1 Application topically 2 times a day as needed (mild, moderate pain). Indications: mild, moderate pain    tizanidine (ZANAFLEX) 4 MG Tab Take 4 mg by mouth 3 times a day as needed (muscle spasms). Indications: Musculoskeletal Pain    diphenhydrAMINE-APAP, sleep, (TYLENOL PM EXTRA STRENGTH)  MG Tab Take 2 Tablets by mouth at bedtime as needed (insomnia). Indications: insomnia    fexofenadine (HM FEXOFENADINE HCL) 180 MG tablet Take 180 mg by mouth every day. Indications: allergies    potassium chloride (MICRO-K) 10 MEQ capsule Take 10 mEq by mouth 2 times a day. Indications: Low Amount of Potassium in the Blood    Calcium Carbonate (CALCIUM 500 PO) Take 1 Tablet by mouth every day. Indications: supplement    triamcinolone (NASACORT) 55 MCG/ACT nasal inhaler Administer 1 Spray into affected nostril(S) every day. Indications: Hayfever    omeprazole (PRILOSEC) 20 MG delayed-release capsule Take 1 capsule by mouth  "every day    DULoxetine (CYMBALTA) 60 MG Cap DR Particles delayed-release capsule Take 1 capsule by mouth once daily (Patient taking differently: Take 60 mg by mouth every day. Indications: pain)    Vitamin D, Cholecalciferol, 25 MCG (1000 UT) Cap Take 1 Capsule by mouth every day for 90 days.    Home Care Oxygen Inhale 6 L/min continuous. Oxygen dose 6 L/min ( up to 8L/min as needed for increased sob)  Respiratory route via: Nasal Cannula   Oxygen supplier:[employer]      Indications: Shortness of breath    ipratropium-albuterol (DUONEB) 0.5-2.5 (3) MG/3ML nebulizer solution Take 3 mL by nebulization every four hours as needed for Shortness of Breath (Wheezing).         Review of Systems   Constitutional: Positive for malaise/fatigue.   Cardiovascular:  Positive for dyspnea on exertion, orthopnea and paroxysmal nocturnal dyspnea. Negative for chest pain, leg swelling, near-syncope, palpitations and syncope.   Respiratory:  Positive for shortness of breath.    Neurological:  Negative for dizziness, headaches and light-headedness.           Objective:   /56 (BP Location: Left arm, Patient Position: Sitting, BP Cuff Size: Adult)   Pulse 99   Ht 1.626 m (5' 4\")   Wt 73 kg (161 lb)   SpO2 93%  Body mass index is 27.64 kg/m².         Physical Exam  Vitals reviewed.   Constitutional:       General: She is not in acute distress.     Appearance: Normal appearance.   HENT:      Head: Normocephalic and atraumatic.   Cardiovascular:      Rate and Rhythm: Regular rhythm. Tachycardia present.      Pulses: Normal pulses.      Heart sounds: No murmur heard.  Pulmonary:      Effort: No respiratory distress.      Breath sounds: Normal breath sounds.      Comments: Increased effort with ambulation  Musculoskeletal:      Right lower leg: No edema.      Left lower leg: No edema.   Neurological:      Mental Status: She is alert and oriented to person, place, and time.      Gait: Gait normal.   Psychiatric:         Behavior: " Behavior normal.             Lab Results   Component Value Date/Time    SODIUM 135 11/23/2024 02:25 AM    POTASSIUM 4.1 11/23/2024 02:25 AM    CHLORIDE 92 (L) 11/23/2024 02:25 AM    CO2 32 11/23/2024 02:25 AM    GLUCOSE 203 (H) 11/23/2024 02:25 AM    BUN 11 11/23/2024 02:25 AM    CREATININE 0.38 (L) 11/23/2024 02:25 AM    CREATININE 0.62 06/09/2011 12:00 AM    BUNCREATRAT 20 07/07/2014 02:50 PM    BUNCREATRAT 23 06/09/2011 12:00 AM      Lab Results   Component Value Date/Time    WBC 22.3 (H) 11/23/2024 02:25 AM    WBC 10.9 (H) 06/09/2011 12:00 AM    RBC 3.89 (L) 11/23/2024 02:25 AM    RBC 4.69 06/09/2011 12:00 AM    HEMOGLOBIN 11.5 (L) 11/23/2024 02:25 AM    HEMATOCRIT 35.8 (L) 11/23/2024 02:25 AM    MCV 92.0 11/23/2024 02:25 AM    MCV 92 06/09/2011 12:00 AM    MCH 29.6 11/23/2024 02:25 AM    MCH 30.7 06/09/2011 12:00 AM    MCHC 32.1 (L) 11/23/2024 02:25 AM    MPV 9.1 11/23/2024 02:25 AM    NEUTSPOLYS 95.20 (H) 11/22/2024 12:02 AM    LYMPHOCYTES 1.50 (L) 11/22/2024 12:02 AM    MONOCYTES 2.40 11/22/2024 12:02 AM    EOSINOPHILS 0.00 11/22/2024 12:02 AM    BASOPHILS 0.20 11/22/2024 12:02 AM    ANISOCYTOSIS 1+ 11/21/2024 03:08 PM      Lab Results   Component Value Date/Time    CHOLSTRLTOT 181 11/28/2023 12:05 PM    LDL 97 11/28/2023 12:05 PM    HDL 63 11/28/2023 12:05 PM    TRIGLYCERIDE 104 11/28/2023 12:05 PM       Lab Results   Component Value Date/Time    TROPONINT 10 11/21/2024 1508     Lab Results   Component Value Date/Time    NTPROBNP 76 11/21/2024 1508     CARDIAC RHYTHM MONITOR: [place]: 3 days, ending 8/11/2024.   1.  Predominant rhythm is sinus rhythm; average heart rate 66 bpm, range  bpm.   2.  Occasional premature atrial complexes (PACs), 2.8% occurrence.   3.  Rare premature ventricular complexes (PVCs), < 1% occurrence.   4.  2 episodes of supraventricular tachycardia (SVT); fastest 4 beats at 122 bpm, longest 7 beats at 100 bpm, asymptomatic.   5,  No atrial fibrillation, significant pauses,  heart block or sustained arrhythmias.   6. 1 symptomatic and/or patient triggered events reported associated with normal sinus rhythm and normal heart rate at 75 bpm     Assessment:   1. SVT (supraventricular tachycardia) (HCC)    2. Palpitations    3. Essential hypertension  - Comp Metabolic Panel; Future  - Lipid Profile; Future  - CBC WITHOUT DIFFERENTIAL; Future    4. PVC's (premature ventricular contractions)    5. APC (atrial premature contractions)    6. Atherosclerosis of aorta (HCC)    7. Dyslipidemia    8. Chronic respiratory failure with hypoxia ([facility])        Medical Decision Making:  Today's Assessment / Plan:   Palpitations  SVT  PVC  PAC  -Patient concern with increased heart rate and feeling of palpations with ambulation. Patient recently hospitalized with pneumonia and increased continuous oxygen demand. I personally ambulated patient around office monitor oxygen and pulse ox. Patient was on 6 L of oxygen and was able to ambulate with heart rate max 115 and oxygen sustained in the 90s until end of walk did drop to 89. She sat and recovered quickly. Educated patient that increased heart rate with exertion especially with increased oxygen demand is a normal compensatory response. Due to her lung disease and heart rate reaching 115 discussed not wanting to start any new medications for her heart rate at this time. Patient encouraged to continue to follow up frequently or as recommended with pulmonary to continue adjusting medications and oxygen.   -Continue digoxin 125 mcg daily  -Reviewed recent cardiac event monitor with the patient and recent echocardiogram that was done while in the hospital.     Hypertension  - Good control  - continue losartan 50 mg twice a day  -Continue spironolactone 25 mg daily  - goal < 130/80  -Continue to monitor at home and keep a log    Hyperlipidemia  Atherosclerosis of aorta  -Most recent LDL 97  -At goal and not on current statin therapy  -Goal of less than 100  -Check  lipid panel in 3 months      Return in about 5 weeks (around 1/13/2025).  Sooner if problems.    ELAN Vera.    Yes

## 2024-12-11 ENCOUNTER — HOME CARE VISIT (OUTPATIENT)
Dept: HOME HEALTH SERVICES | Facility: HOME HEALTHCARE | Age: 79
End: 2024-12-11
Payer: MEDICARE

## 2024-12-12 ENCOUNTER — HOME CARE VISIT (OUTPATIENT)
Dept: HOME HEALTH SERVICES | Facility: HOME HEALTHCARE | Age: 79
End: 2024-12-12
Payer: MEDICARE

## 2024-12-12 VITALS
RESPIRATION RATE: 20 BRPM | WEIGHT: 164 LBS | SYSTOLIC BLOOD PRESSURE: 142 MMHG | BODY MASS INDEX: 28.15 KG/M2 | TEMPERATURE: 98.5 F | OXYGEN SATURATION: 95 % | HEART RATE: 74 BPM | DIASTOLIC BLOOD PRESSURE: 64 MMHG

## 2024-12-12 PROCEDURE — G0495 RN CARE TRAIN/EDU IN HH: HCPCS

## 2024-12-12 SDOH — ECONOMIC STABILITY: HOUSING INSECURITY: EVIDENCE OF SMOKING MATERIAL: 0

## 2024-12-12 ASSESSMENT — ENCOUNTER SYMPTOMS
LAST BOWEL MOVEMENT: 67186
PAIN SEVERITY GOAL: 0/10
PAIN LOCATION: KNEE
DRY SKIN: 1
STOOL FREQUENCY: DAILY
LOWEST PAIN SEVERITY IN PAST 24 HOURS: 0/10
FATIGUES EASILY: 1
PAIN LOCATION - PAIN DURATION: DAILY
MUSCLE WEAKNESS: 1
SUBJECTIVE PAIN PROGRESSION: UNCHANGED
COUGH: 1
VOMITING: DENIES
COUGH CHARACTERISTICS: PRODUCTIVE
PAIN LOCATION - EXACERBATING FACTORS: INACTIVITY
PAIN LOCATION - PAIN FREQUENCY: INTERMITTENT
PAIN LOCATION - PAIN QUALITY: ACHE
COUGH CHARACTERISTICS: MOIST
HIGHEST PAIN SEVERITY IN PAST 24 HOURS: 5/10
PAIN LOCATION - RELIEVING FACTORS: ORAL MEDICATION
NAUSEA: DENIES
BOWEL PATTERN NORMAL: 1
ARTHRALGIAS: 1
PAIN LOCATION - PAIN SEVERITY: 5/10
PAIN: 1
SEVERE DYSPNEA: 1

## 2024-12-12 ASSESSMENT — ACTIVITIES OF DAILY LIVING (ADL)
AMBULATION ASSISTANCE: STAND BY ASSIST
CURRENT_FUNCTION: STAND BY ASSIST

## 2024-12-12 ASSESSMENT — FIBROSIS 4 INDEX: FIB4 SCORE: 0.65

## 2024-12-16 ENCOUNTER — HOME CARE VISIT (OUTPATIENT)
Dept: HOME HEALTH SERVICES | Facility: HOME HEALTHCARE | Age: 79
End: 2024-12-16
Payer: MEDICARE

## 2024-12-16 VITALS
DIASTOLIC BLOOD PRESSURE: 50 MMHG | RESPIRATION RATE: 20 BRPM | HEART RATE: 80 BPM | OXYGEN SATURATION: 95 % | SYSTOLIC BLOOD PRESSURE: 142 MMHG | TEMPERATURE: 98.9 F

## 2024-12-16 PROCEDURE — G0299 HHS/HOSPICE OF RN EA 15 MIN: HCPCS

## 2024-12-16 SDOH — ECONOMIC STABILITY: HOUSING INSECURITY: EVIDENCE OF SMOKING MATERIAL: 0

## 2024-12-16 ASSESSMENT — ACTIVITIES OF DAILY LIVING (ADL)
CURRENT_FUNCTION: STAND BY ASSIST
AMBULATION ASSISTANCE: STAND BY ASSIST

## 2024-12-16 ASSESSMENT — ENCOUNTER SYMPTOMS
LAST BOWEL MOVEMENT: 67189
COUGH CHARACTERISTICS: STRONG
DENIES PAIN: 1
DYSPNEA ON EXERTION: 1
CONSTIPATION: 1
STOOL FREQUENCY: DAILY
COUGH: 1
COUGH CHARACTERISTICS: PRODUCTIVE
LIMITED RANGE OF MOTION: 1

## 2024-12-17 PROCEDURE — RXMED WILLOW AMBULATORY MEDICATION CHARGE: Performed by: FAMILY MEDICINE

## 2024-12-17 NOTE — CASE COMMUNICATION
Vitals wdl. Crackles noted in lower lobes bilaterally- clear upper lobes. No SOB or swelling noted or reported in last 24h. Strong and productive cough noted. Education given on I.S. use and to go to ED for worsening signs/symptoms.

## 2024-12-18 PROCEDURE — RXMED WILLOW AMBULATORY MEDICATION CHARGE: Performed by: PHYSICIAN ASSISTANT

## 2024-12-19 ENCOUNTER — HOME CARE VISIT (OUTPATIENT)
Dept: HOME HEALTH SERVICES | Facility: HOME HEALTHCARE | Age: 79
End: 2024-12-19
Payer: MEDICARE

## 2024-12-19 VITALS
SYSTOLIC BLOOD PRESSURE: 136 MMHG | TEMPERATURE: 98.8 F | BODY MASS INDEX: 27.98 KG/M2 | OXYGEN SATURATION: 97 % | HEART RATE: 75 BPM | WEIGHT: 163 LBS | DIASTOLIC BLOOD PRESSURE: 64 MMHG | RESPIRATION RATE: 20 BRPM

## 2024-12-19 PROCEDURE — G0493 RN CARE EA 15 MIN HH/HOSPICE: HCPCS

## 2024-12-19 SDOH — ECONOMIC STABILITY: HOUSING INSECURITY: EVIDENCE OF SMOKING MATERIAL: 0

## 2024-12-19 ASSESSMENT — ENCOUNTER SYMPTOMS
LAST BOWEL MOVEMENT: 67190
VOMITING: DENIES
LIMITED RANGE OF MOTION: 1
DENIES PAIN: 1
NAUSEA: DENIES
MUSCLE WEAKNESS: 1
ARTHRALGIAS: 1
HYPERTENSION: 1
BOWEL PATTERN NORMAL: 1
SEVERE DYSPNEA: 1
FATIGUES EASILY: 1
STOOL FREQUENCY: LESS THAN DAILY

## 2024-12-19 ASSESSMENT — FIBROSIS 4 INDEX: FIB4 SCORE: 0.65

## 2024-12-19 ASSESSMENT — ACTIVITIES OF DAILY LIVING (ADL)
CURRENT_FUNCTION: STAND BY ASSIST
AMBULATION ASSISTANCE: STAND BY ASSIST

## 2024-12-20 ENCOUNTER — PHARMACY VISIT (OUTPATIENT)
Dept: PHARMACY | Facility: MEDICAL CENTER | Age: 79
End: 2024-12-20
Payer: COMMERCIAL

## 2024-12-20 DIAGNOSIS — R60.9 EDEMA, UNSPECIFIED TYPE: ICD-10-CM

## 2024-12-20 RX ORDER — POTASSIUM CHLORIDE 750 MG/1
10 CAPSULE, EXTENDED RELEASE ORAL 2 TIMES DAILY
Qty: 180 CAPSULE | Refills: 1 | Status: CANCELLED | OUTPATIENT
Start: 2024-12-17

## 2024-12-20 RX ORDER — POTASSIUM CHLORIDE 750 MG/1
10 CAPSULE, EXTENDED RELEASE ORAL 2 TIMES DAILY
Qty: 180 CAPSULE | Refills: 3 | Status: SHIPPED | OUTPATIENT
Start: 2024-12-20 | End: 2025-07-04

## 2024-12-20 NOTE — TELEPHONE ENCOUNTER
HL: Not previously prescribed by us, please refill appropriately or do you want to defer to PCP? Thanks!

## 2024-12-20 NOTE — TELEPHONE ENCOUNTER
Is the patient due for a refill? No not on patient's med list.    Was the patient seen the past year? Yes    Date of last office visit: 12/9/24    Does the patient have an upcoming appointment?  Yes   If yes, When? 1/16/25    Provider to refill:HL    Does the patient have [facility] and need 100-day supply? (This applies to ALL medications) Yes, quantity updated to 100 days

## 2024-12-22 PROCEDURE — RXMED WILLOW AMBULATORY MEDICATION CHARGE: Performed by: NURSE PRACTITIONER

## 2024-12-23 ENCOUNTER — PHARMACY VISIT (OUTPATIENT)
Dept: PHARMACY | Facility: MEDICAL CENTER | Age: 79
End: 2024-12-23
Payer: COMMERCIAL

## 2024-12-23 ENCOUNTER — HOME CARE VISIT (OUTPATIENT)
Dept: HOME HEALTH SERVICES | Facility: HOME HEALTHCARE | Age: 79
End: 2024-12-23
Payer: MEDICARE

## 2024-12-23 SDOH — ECONOMIC STABILITY: HOUSING INSECURITY: EVIDENCE OF SMOKING MATERIAL: 0

## 2024-12-23 ASSESSMENT — ENCOUNTER SYMPTOMS
BOWEL PATTERN NORMAL: 1
HYPERTENSION: 1
DEBILITATING PAIN: 1
NAUSEA: DENIES
CHANGE IN APPETITE: UNCHANGED
VOMITING: DENIES
DENIES PAIN: 1
APPETITE LEVEL: FAIR
FATIGUES EASILY: 1
DEPRESSED MOOD: 1
STOOL FREQUENCY: DAILY

## 2024-12-23 ASSESSMENT — ACTIVITIES OF DAILY LIVING (ADL): HOME_HEALTH_OASIS: 01

## 2024-12-23 NOTE — DISCHARGE SUMMARY
DISCHARGE NARRATIVE    CURRENT LEVEL OF FUNCTION:   Ambulation and Mobility: Independent  Patient Equipment: walker, wheelchair and motorized chair for ambulation:   Transferring: Independent  Bathing: Min Assist  Dressing: Supervised  Special equipment used: motorized w/c    MEDICATION MANAGEMENT:  Able to take independently  Medication issues:   In the last 24 hours did the patient wear oxygen: yes.  In the last 24 hours, when is the patient dyspneic or noticeably   Short of Breath : at rest: day or night including preceding night prior to DC/with or without oxygen .    DISCHARGE/TRANSITION PLAN  The discharge plan for home health is to discharge back to the community when individualized goals have been met.  Individualized goals during this episode of Home Health care were related to: Respiratory Assessment and Education   .    Patient has met goals related to: Respiratory Assessment and Education      Goals not met and why All goals met    Patient is ready for discharge on 12/23/24 with the following services in place: paid caregivers

## 2024-12-24 ENCOUNTER — HOME CARE VISIT (OUTPATIENT)
Dept: HOME HEALTH SERVICES | Facility: HOME HEALTHCARE | Age: 79
End: 2024-12-24
Payer: MEDICARE

## 2024-12-24 ASSESSMENT — ACTIVITIES OF DAILY LIVING (ADL): OASIS_M1830: 02

## 2024-12-24 NOTE — CASE COMMUNICATION
Quality Review for 12.23.24 DC OASIS performed on by KENYETTA Helton RN on 12.24.2024:    Edits completed by KENYETTA Helton RN:  1. Changed  to 8,  to 8 as this was an office dc  2. Changed  to 0 per pain assessment  3. Changed  to 1, per guidance option #2 should only be chosen if the pt will be on service with other HHA. Answered  and .   4. Changed  to 2 per neuro assessment and DME, pt can have intermittent s upervision. Changed  to 1 per [id number] E response of 6  5. Changed  D to na per POC

## 2024-12-24 NOTE — Clinical Note
I agree with the following edits.  Mariam Mckinney RN 12/24/24 @ 0826  ----- Message -----  From: Shena Helton R.N.  Sent: 12/24/2024   5:57 AM PST  To: Mariam Mckinney R.N.      Quality Review for 12.23.24 DC OASIS performed on by KENYETTA Helton RN on 12.24.2024:    Edits completed by KENYETTA Helton RN:  1. Changed  to 8,  to 8 as this was an office dc  2. Changed  to 0 per pain assessment  3. Changed  to 1, per guidance option #2 should only be chosen if the pt will be on service with other A. Answered  and .   4. Changed  to 2 per neuro assessment and DME, pt can have intermittent supervision. Changed  to 1 per [id number] E response of 6  5. Changed  D to na per POC

## 2024-12-30 PROCEDURE — RXMED WILLOW AMBULATORY MEDICATION CHARGE: Performed by: FAMILY MEDICINE

## 2024-12-31 ENCOUNTER — PHARMACY VISIT (OUTPATIENT)
Dept: PHARMACY | Facility: MEDICAL CENTER | Age: 79
End: 2024-12-31
Payer: COMMERCIAL

## 2025-01-02 PROCEDURE — RXMED WILLOW AMBULATORY MEDICATION CHARGE: Performed by: FAMILY MEDICINE

## 2025-01-06 ENCOUNTER — PHARMACY VISIT (OUTPATIENT)
Dept: PHARMACY | Facility: MEDICAL CENTER | Age: 80
End: 2025-01-06
Payer: COMMERCIAL

## 2025-01-07 ENCOUNTER — PHARMACY VISIT (OUTPATIENT)
Dept: PHARMACY | Facility: MEDICAL CENTER | Age: 80
End: 2025-01-07
Payer: COMMERCIAL

## 2025-01-07 PROCEDURE — RXMED WILLOW AMBULATORY MEDICATION CHARGE: Performed by: FAMILY MEDICINE

## 2025-01-07 RX ORDER — DULOXETIN HYDROCHLORIDE 60 MG/1
CAPSULE, DELAYED RELEASE ORAL
Qty: 90 CAPSULE | Refills: 1 | Status: CANCELLED | OUTPATIENT
Start: 2025-01-07

## 2025-01-10 ENCOUNTER — TELEPHONE (OUTPATIENT)
Dept: CARDIOLOGY | Facility: MEDICAL CENTER | Age: 80
End: 2025-01-10
Payer: MEDICARE

## 2025-01-16 ENCOUNTER — HOSPITAL ENCOUNTER (INPATIENT)
Facility: MEDICAL CENTER | Age: 80
LOS: 3 days | DRG: 193 | End: 2025-01-19
Attending: STUDENT IN AN ORGANIZED HEALTH CARE EDUCATION/TRAINING PROGRAM | Admitting: INTERNAL MEDICINE
Payer: MEDICARE

## 2025-01-16 ENCOUNTER — APPOINTMENT (OUTPATIENT)
Dept: CARDIOLOGY | Facility: MEDICAL CENTER | Age: 80
End: 2025-01-16
Payer: MEDICARE

## 2025-01-16 ENCOUNTER — APPOINTMENT (OUTPATIENT)
Dept: RADIOLOGY | Facility: MEDICAL CENTER | Age: 80
DRG: 193 | End: 2025-01-16
Attending: STUDENT IN AN ORGANIZED HEALTH CARE EDUCATION/TRAINING PROGRAM
Payer: MEDICARE

## 2025-01-16 ENCOUNTER — APPOINTMENT (OUTPATIENT)
Dept: RADIOLOGY | Facility: MEDICAL CENTER | Age: 80
DRG: 193 | End: 2025-01-16
Payer: MEDICARE

## 2025-01-16 DIAGNOSIS — R91.8 BILATERAL PULMONARY INFILTRATES ON CHEST X-RAY: ICD-10-CM

## 2025-01-16 DIAGNOSIS — J44.9 STAGE 4 VERY SEVERE COPD BY GOLD CLASSIFICATION (HCC): Chronic | ICD-10-CM

## 2025-01-16 DIAGNOSIS — J96.01 ACUTE HYPOXEMIC RESPIRATORY FAILURE (HCC): ICD-10-CM

## 2025-01-16 DIAGNOSIS — J96.21 ACUTE ON CHRONIC RESPIRATORY FAILURE WITH HYPOXIA (HCC): ICD-10-CM

## 2025-01-16 DIAGNOSIS — I10 PRIMARY HYPERTENSION: ICD-10-CM

## 2025-01-16 DIAGNOSIS — J10.1 INFLUENZA A: ICD-10-CM

## 2025-01-16 PROBLEM — J96.90 RESPIRATORY FAILURE (HCC): Status: ACTIVE | Noted: 2025-01-16

## 2025-01-16 PROBLEM — D72.829 LEUKOCYTOSIS: Status: ACTIVE | Noted: 2025-01-16

## 2025-01-16 LAB
ALBUMIN SERPL BCP-MCNC: 3.6 G/DL (ref 3.2–4.9)
ALBUMIN/GLOB SERPL: 1 G/DL
ALP SERPL-CCNC: 46 U/L (ref 30–99)
ALT SERPL-CCNC: 14 U/L (ref 2–50)
ANION GAP SERPL CALC-SCNC: 10 MMOL/L (ref 7–16)
AST SERPL-CCNC: 16 U/L (ref 12–45)
BASOPHILS # BLD AUTO: 0.5 % (ref 0–1.8)
BASOPHILS # BLD: 0.06 K/UL (ref 0–0.12)
BILIRUB SERPL-MCNC: <0.2 MG/DL (ref 0.1–1.5)
BUN SERPL-MCNC: 9 MG/DL (ref 8–22)
CALCIUM ALBUM COR SERPL-MCNC: 9.1 MG/DL (ref 8.5–10.5)
CALCIUM SERPL-MCNC: 8.8 MG/DL (ref 8.5–10.5)
CHLORIDE SERPL-SCNC: 99 MMOL/L (ref 96–112)
CO2 SERPL-SCNC: 31 MMOL/L (ref 20–33)
CREAT SERPL-MCNC: 0.27 MG/DL (ref 0.5–1.4)
EKG IMPRESSION: NORMAL
EOSINOPHIL # BLD AUTO: 0.73 K/UL (ref 0–0.51)
EOSINOPHIL NFR BLD: 5.9 % (ref 0–6.9)
ERYTHROCYTE [DISTWIDTH] IN BLOOD BY AUTOMATED COUNT: 46.2 FL (ref 35.9–50)
FLUAV RNA SPEC QL NAA+PROBE: POSITIVE
FLUBV RNA SPEC QL NAA+PROBE: NEGATIVE
GFR SERPLBLD CREATININE-BSD FMLA CKD-EPI: 110 ML/MIN/1.73 M 2
GLOBULIN SER CALC-MCNC: 3.5 G/DL (ref 1.9–3.5)
GLUCOSE SERPL-MCNC: 134 MG/DL (ref 65–99)
HCT VFR BLD AUTO: 36.5 % (ref 37–47)
HGB BLD-MCNC: 11.6 G/DL (ref 12–16)
IMM GRANULOCYTES # BLD AUTO: 0.08 K/UL (ref 0–0.11)
IMM GRANULOCYTES NFR BLD AUTO: 0.6 % (ref 0–0.9)
LYMPHOCYTES # BLD AUTO: 1.33 K/UL (ref 1–4.8)
LYMPHOCYTES NFR BLD: 10.8 % (ref 22–41)
MCH RBC QN AUTO: 29.8 PG (ref 27–33)
MCHC RBC AUTO-ENTMCNC: 31.8 G/DL (ref 32.2–35.5)
MCV RBC AUTO: 93.8 FL (ref 81.4–97.8)
MONOCYTES # BLD AUTO: 1.23 K/UL (ref 0–0.85)
MONOCYTES NFR BLD AUTO: 10 % (ref 0–13.4)
NEUTROPHILS # BLD AUTO: 8.93 K/UL (ref 1.82–7.42)
NEUTROPHILS NFR BLD: 72.2 % (ref 44–72)
NRBC # BLD AUTO: 0 K/UL
NRBC BLD-RTO: 0 /100 WBC (ref 0–0.2)
NT-PROBNP SERPL IA-MCNC: 111 PG/ML (ref 0–125)
PLATELET # BLD AUTO: 440 K/UL (ref 164–446)
PMV BLD AUTO: 9.3 FL (ref 9–12.9)
POTASSIUM SERPL-SCNC: 3.7 MMOL/L (ref 3.6–5.5)
PROCALCITONIN SERPL-MCNC: <0.05 NG/ML
PROT SERPL-MCNC: 7.1 G/DL (ref 6–8.2)
RBC # BLD AUTO: 3.89 M/UL (ref 4.2–5.4)
RSV RNA SPEC QL NAA+PROBE: NEGATIVE
SARS-COV-2 RNA RESP QL NAA+PROBE: NOTDETECTED
SODIUM SERPL-SCNC: 140 MMOL/L (ref 135–145)
TROPONIN T SERPL-MCNC: 8 NG/L (ref 6–19)
WBC # BLD AUTO: 12.4 K/UL (ref 4.8–10.8)

## 2025-01-16 PROCEDURE — 700101 HCHG RX REV CODE 250: Mod: UD | Performed by: STUDENT IN AN ORGANIZED HEALTH CARE EDUCATION/TRAINING PROGRAM

## 2025-01-16 PROCEDURE — 770020 HCHG ROOM/CARE - TELE (206)

## 2025-01-16 PROCEDURE — 84145 PROCALCITONIN (PCT): CPT

## 2025-01-16 PROCEDURE — 700102 HCHG RX REV CODE 250 W/ 637 OVERRIDE(OP): Performed by: STUDENT IN AN ORGANIZED HEALTH CARE EDUCATION/TRAINING PROGRAM

## 2025-01-16 PROCEDURE — 700102 HCHG RX REV CODE 250 W/ 637 OVERRIDE(OP): Performed by: INTERNAL MEDICINE

## 2025-01-16 PROCEDURE — 93005 ELECTROCARDIOGRAM TRACING: CPT | Mod: TC | Performed by: STUDENT IN AN ORGANIZED HEALTH CARE EDUCATION/TRAINING PROGRAM

## 2025-01-16 PROCEDURE — A9270 NON-COVERED ITEM OR SERVICE: HCPCS | Performed by: STUDENT IN AN ORGANIZED HEALTH CARE EDUCATION/TRAINING PROGRAM

## 2025-01-16 PROCEDURE — A9270 NON-COVERED ITEM OR SERVICE: HCPCS | Performed by: INTERNAL MEDICINE

## 2025-01-16 PROCEDURE — 700101 HCHG RX REV CODE 250: Performed by: INTERNAL MEDICINE

## 2025-01-16 PROCEDURE — 99223 1ST HOSP IP/OBS HIGH 75: CPT | Performed by: INTERNAL MEDICINE

## 2025-01-16 PROCEDURE — 96374 THER/PROPH/DIAG INJ IV PUSH: CPT

## 2025-01-16 PROCEDURE — 700111 HCHG RX REV CODE 636 W/ 250 OVERRIDE (IP): Mod: JZ,UD,TB | Performed by: STUDENT IN AN ORGANIZED HEALTH CARE EDUCATION/TRAINING PROGRAM

## 2025-01-16 PROCEDURE — 80053 COMPREHEN METABOLIC PANEL: CPT

## 2025-01-16 PROCEDURE — 94640 AIRWAY INHALATION TREATMENT: CPT

## 2025-01-16 PROCEDURE — 83880 ASSAY OF NATRIURETIC PEPTIDE: CPT

## 2025-01-16 PROCEDURE — 85025 COMPLETE CBC W/AUTO DIFF WBC: CPT

## 2025-01-16 PROCEDURE — 71045 X-RAY EXAM CHEST 1 VIEW: CPT

## 2025-01-16 PROCEDURE — 84484 ASSAY OF TROPONIN QUANT: CPT

## 2025-01-16 PROCEDURE — 99285 EMERGENCY DEPT VISIT HI MDM: CPT

## 2025-01-16 PROCEDURE — 0241U HCHG SARS-COV-2 COVID-19 NFCT DS RESP RNA 4 TRGT ED POC: CPT

## 2025-01-16 PROCEDURE — 700111 HCHG RX REV CODE 636 W/ 250 OVERRIDE (IP): Mod: JZ | Performed by: INTERNAL MEDICINE

## 2025-01-16 PROCEDURE — 36415 COLL VENOUS BLD VENIPUNCTURE: CPT

## 2025-01-16 RX ORDER — BENZONATATE 100 MG/1
100 CAPSULE ORAL 3 TIMES DAILY PRN
Status: DISCONTINUED | OUTPATIENT
Start: 2025-01-16 | End: 2025-01-19 | Stop reason: HOSPADM

## 2025-01-16 RX ORDER — DIGOXIN 125 MCG
125 TABLET ORAL EVERY EVENING
Status: DISCONTINUED | OUTPATIENT
Start: 2025-01-16 | End: 2025-01-19 | Stop reason: HOSPADM

## 2025-01-16 RX ORDER — FEXOFENADINE HCL 60 MG/1
180 TABLET, FILM COATED ORAL EVERY EVENING
Status: DISCONTINUED | OUTPATIENT
Start: 2025-01-16 | End: 2025-01-19 | Stop reason: HOSPADM

## 2025-01-16 RX ORDER — ALBUTEROL SULFATE 5 MG/ML
2.5 SOLUTION RESPIRATORY (INHALATION)
Status: DISCONTINUED | OUTPATIENT
Start: 2025-01-16 | End: 2025-01-17

## 2025-01-16 RX ORDER — GUAIFENESIN 600 MG/1
600 TABLET, EXTENDED RELEASE ORAL EVERY 12 HOURS
Status: DISCONTINUED | OUTPATIENT
Start: 2025-01-16 | End: 2025-01-19 | Stop reason: HOSPADM

## 2025-01-16 RX ORDER — OXYCODONE HYDROCHLORIDE 5 MG/1
5 TABLET ORAL
Status: DISCONTINUED | OUTPATIENT
Start: 2025-01-16 | End: 2025-01-19 | Stop reason: HOSPADM

## 2025-01-16 RX ORDER — ACETAMINOPHEN 500 MG
500-1000 TABLET ORAL EVERY 6 HOURS PRN
COMMUNITY

## 2025-01-16 RX ORDER — METHYLPREDNISOLONE SODIUM SUCCINATE 125 MG/2ML
125 INJECTION, POWDER, LYOPHILIZED, FOR SOLUTION INTRAMUSCULAR; INTRAVENOUS ONCE
Status: COMPLETED | OUTPATIENT
Start: 2025-01-16 | End: 2025-01-16

## 2025-01-16 RX ORDER — SPIRONOLACTONE 25 MG/1
25 TABLET ORAL
Status: DISCONTINUED | OUTPATIENT
Start: 2025-01-16 | End: 2025-01-18

## 2025-01-16 RX ORDER — GUAIFENESIN/DEXTROMETHORPHAN 100-10MG/5
10 SYRUP ORAL EVERY 6 HOURS PRN
Status: DISCONTINUED | OUTPATIENT
Start: 2025-01-16 | End: 2025-01-19 | Stop reason: HOSPADM

## 2025-01-16 RX ORDER — TRAMADOL HYDROCHLORIDE 50 MG/1
50 TABLET ORAL EVERY 4 HOURS PRN
Status: DISCONTINUED | OUTPATIENT
Start: 2025-01-16 | End: 2025-01-19 | Stop reason: HOSPADM

## 2025-01-16 RX ORDER — POLYETHYLENE GLYCOL 3350 17 G/17G
1 POWDER, FOR SOLUTION ORAL
Status: DISCONTINUED | OUTPATIENT
Start: 2025-01-16 | End: 2025-01-19 | Stop reason: HOSPADM

## 2025-01-16 RX ORDER — ALBUTEROL SULFATE 5 MG/ML
10 SOLUTION RESPIRATORY (INHALATION) CONTINUOUS
Status: DISCONTINUED | OUTPATIENT
Start: 2025-01-16 | End: 2025-01-16

## 2025-01-16 RX ORDER — TIOTROPIUM BROMIDE 18 UG/1
18 CAPSULE ORAL; RESPIRATORY (INHALATION) DAILY
Status: ON HOLD | COMMUNITY
End: 2025-01-19

## 2025-01-16 RX ORDER — FLUTICASONE PROPIONATE AND SALMETEROL 500; 50 UG/1; UG/1
1 POWDER RESPIRATORY (INHALATION) 2 TIMES DAILY
Status: ON HOLD | COMMUNITY
End: 2025-01-19

## 2025-01-16 RX ORDER — POTASSIUM CHLORIDE 1500 MG/1
10 TABLET, EXTENDED RELEASE ORAL 2 TIMES DAILY
Status: DISCONTINUED | OUTPATIENT
Start: 2025-01-16 | End: 2025-01-19 | Stop reason: HOSPADM

## 2025-01-16 RX ORDER — MELOXICAM 7.5 MG/1
7.5 TABLET ORAL EVERY EVENING
Status: DISCONTINUED | OUTPATIENT
Start: 2025-01-16 | End: 2025-01-19 | Stop reason: HOSPADM

## 2025-01-16 RX ORDER — HYDRALAZINE HYDROCHLORIDE 20 MG/ML
10 INJECTION INTRAMUSCULAR; INTRAVENOUS EVERY 4 HOURS PRN
Status: DISCONTINUED | OUTPATIENT
Start: 2025-01-16 | End: 2025-01-19 | Stop reason: HOSPADM

## 2025-01-16 RX ORDER — MORPHINE SULFATE 4 MG/ML
4 INJECTION INTRAVENOUS
Status: DISCONTINUED | OUTPATIENT
Start: 2025-01-16 | End: 2025-01-19 | Stop reason: HOSPADM

## 2025-01-16 RX ORDER — DULOXETIN HYDROCHLORIDE 60 MG/1
60 CAPSULE, DELAYED RELEASE ORAL EVERY EVENING
Status: DISCONTINUED | OUTPATIENT
Start: 2025-01-16 | End: 2025-01-19 | Stop reason: HOSPADM

## 2025-01-16 RX ORDER — IPRATROPIUM BROMIDE AND ALBUTEROL SULFATE 2.5; .5 MG/3ML; MG/3ML
3 SOLUTION RESPIRATORY (INHALATION)
Status: DISCONTINUED | OUTPATIENT
Start: 2025-01-16 | End: 2025-01-16

## 2025-01-16 RX ORDER — MONTELUKAST SODIUM 10 MG/1
10 TABLET ORAL EVERY EVENING
Status: DISCONTINUED | OUTPATIENT
Start: 2025-01-16 | End: 2025-01-19 | Stop reason: HOSPADM

## 2025-01-16 RX ORDER — AMOXICILLIN 250 MG
2 CAPSULE ORAL EVERY EVENING
Status: DISCONTINUED | OUTPATIENT
Start: 2025-01-16 | End: 2025-01-19 | Stop reason: HOSPADM

## 2025-01-16 RX ORDER — ENOXAPARIN SODIUM 100 MG/ML
40 INJECTION SUBCUTANEOUS DAILY
Status: DISCONTINUED | OUTPATIENT
Start: 2025-01-16 | End: 2025-01-19 | Stop reason: HOSPADM

## 2025-01-16 RX ORDER — ACETAMINOPHEN 325 MG/1
650 TABLET ORAL EVERY 6 HOURS PRN
Status: DISCONTINUED | OUTPATIENT
Start: 2025-01-16 | End: 2025-01-19 | Stop reason: HOSPADM

## 2025-01-16 RX ORDER — OXYCODONE HYDROCHLORIDE 10 MG/1
10 TABLET ORAL
Status: DISCONTINUED | OUTPATIENT
Start: 2025-01-16 | End: 2025-01-19 | Stop reason: HOSPADM

## 2025-01-16 RX ORDER — PREDNISONE 20 MG/1
40 TABLET ORAL DAILY
Status: DISCONTINUED | OUTPATIENT
Start: 2025-01-17 | End: 2025-01-17

## 2025-01-16 RX ORDER — IPRATROPIUM BROMIDE AND ALBUTEROL SULFATE 2.5; .5 MG/3ML; MG/3ML
3 SOLUTION RESPIRATORY (INHALATION)
Status: DISCONTINUED | OUTPATIENT
Start: 2025-01-16 | End: 2025-01-17

## 2025-01-16 RX ORDER — OSELTAMIVIR PHOSPHATE 75 MG/1
75 CAPSULE ORAL 2 TIMES DAILY
Status: DISCONTINUED | OUTPATIENT
Start: 2025-01-16 | End: 2025-01-19 | Stop reason: HOSPADM

## 2025-01-16 RX ORDER — METHYLPREDNISOLONE SODIUM SUCCINATE 125 MG/2ML
125 INJECTION, POWDER, LYOPHILIZED, FOR SOLUTION INTRAMUSCULAR; INTRAVENOUS ONCE
Status: DISCONTINUED | OUTPATIENT
Start: 2025-01-16 | End: 2025-01-16

## 2025-01-16 RX ORDER — DOCUSATE SODIUM 100 MG/1
100 CAPSULE, LIQUID FILLED ORAL
COMMUNITY

## 2025-01-16 RX ORDER — ONDANSETRON 2 MG/ML
4 INJECTION INTRAMUSCULAR; INTRAVENOUS EVERY 4 HOURS PRN
Status: DISCONTINUED | OUTPATIENT
Start: 2025-01-16 | End: 2025-01-19 | Stop reason: HOSPADM

## 2025-01-16 RX ORDER — LOSARTAN POTASSIUM 50 MG/1
50 TABLET ORAL 2 TIMES DAILY
Status: DISCONTINUED | OUTPATIENT
Start: 2025-01-16 | End: 2025-01-19 | Stop reason: HOSPADM

## 2025-01-16 RX ORDER — ONDANSETRON 4 MG/1
4 TABLET, ORALLY DISINTEGRATING ORAL EVERY 4 HOURS PRN
Status: DISCONTINUED | OUTPATIENT
Start: 2025-01-16 | End: 2025-01-19 | Stop reason: HOSPADM

## 2025-01-16 RX ORDER — ALBUTEROL SULFATE 5 MG/ML
2.5 SOLUTION RESPIRATORY (INHALATION)
Status: DISCONTINUED | OUTPATIENT
Start: 2025-01-16 | End: 2025-01-16

## 2025-01-16 RX ADMIN — FLUTICASONE FUROATE, UMECLIDINIUM BROMIDE AND VILANTEROL TRIFENATATE 1 PUFF: 100; 62.5; 25 POWDER RESPIRATORY (INHALATION) at 21:39

## 2025-01-16 RX ADMIN — DULOXETINE HYDROCHLORIDE 60 MG: 60 CAPSULE, DELAYED RELEASE ORAL at 18:12

## 2025-01-16 RX ADMIN — GUAIFENESIN 600 MG: 600 TABLET, EXTENDED RELEASE ORAL at 18:42

## 2025-01-16 RX ADMIN — ALBUTEROL SULFATE 10 MG: 2.5 SOLUTION RESPIRATORY (INHALATION) at 05:36

## 2025-01-16 RX ADMIN — ENOXAPARIN SODIUM 40 MG: 100 INJECTION SUBCUTANEOUS at 18:14

## 2025-01-16 RX ADMIN — LOSARTAN POTASSIUM 50 MG: 50 TABLET, FILM COATED ORAL at 08:13

## 2025-01-16 RX ADMIN — IPRATROPIUM BROMIDE AND ALBUTEROL SULFATE 3 ML: .5; 2.5 SOLUTION RESPIRATORY (INHALATION) at 14:14

## 2025-01-16 RX ADMIN — SPIRONOLACTONE 25 MG: 25 TABLET ORAL at 08:13

## 2025-01-16 RX ADMIN — POTASSIUM CHLORIDE 10 MEQ: 1500 TABLET, EXTENDED RELEASE ORAL at 08:11

## 2025-01-16 RX ADMIN — LOSARTAN POTASSIUM 50 MG: 50 TABLET, FILM COATED ORAL at 18:11

## 2025-01-16 RX ADMIN — BENZONATATE 100 MG: 100 CAPSULE ORAL at 18:42

## 2025-01-16 RX ADMIN — DIGOXIN 125 MCG: 0.12 TABLET ORAL at 18:12

## 2025-01-16 RX ADMIN — FEXOFENADINE HCL 180 MG: 60 TABLET, FILM COATED ORAL at 18:12

## 2025-01-16 RX ADMIN — OSELTAMIVIR PHOSPHATE 75 MG: 75 CAPSULE ORAL at 08:12

## 2025-01-16 RX ADMIN — OMEPRAZOLE 20 MG: 20 CAPSULE, DELAYED RELEASE ORAL at 08:12

## 2025-01-16 RX ADMIN — GUAIFENESIN 600 MG: 600 TABLET, EXTENDED RELEASE ORAL at 08:12

## 2025-01-16 RX ADMIN — ACETAMINOPHEN 650 MG: 325 TABLET ORAL at 19:39

## 2025-01-16 RX ADMIN — MONTELUKAST 10 MG: 10 TABLET, FILM COATED ORAL at 18:11

## 2025-01-16 RX ADMIN — GUAIFENESIN SYRUP AND DEXTROMETHORPHAN 10 ML: 100; 10 SYRUP ORAL at 18:13

## 2025-01-16 RX ADMIN — POTASSIUM CHLORIDE 10 MEQ: 1500 TABLET, EXTENDED RELEASE ORAL at 18:10

## 2025-01-16 RX ADMIN — MELOXICAM 7.5 MG: 7.5 TABLET ORAL at 18:10

## 2025-01-16 RX ADMIN — METHYLPREDNISOLONE SODIUM SUCCINATE 125 MG: 125 INJECTION, POWDER, FOR SOLUTION INTRAMUSCULAR; INTRAVENOUS at 05:40

## 2025-01-16 RX ADMIN — OSELTAMIVIR PHOSPHATE 75 MG: 75 CAPSULE ORAL at 18:10

## 2025-01-16 RX ADMIN — IPRATROPIUM BROMIDE AND ALBUTEROL SULFATE 3 ML: .5; 2.5 SOLUTION RESPIRATORY (INHALATION) at 19:55

## 2025-01-16 ASSESSMENT — ENCOUNTER SYMPTOMS
TREMORS: 0
COUGH: 1
NAUSEA: 0
SPUTUM PRODUCTION: 0
FEVER: 0
BRUISES/BLEEDS EASILY: 0
EYES NEGATIVE: 1
POLYDIPSIA: 0
MUSCULOSKELETAL NEGATIVE: 1
HEADACHES: 0
DOUBLE VISION: 0
HALLUCINATIONS: 0
SHORTNESS OF BREATH: 1
CHILLS: 0
PHOTOPHOBIA: 0
FOCAL WEAKNESS: 0
GASTROINTESTINAL NEGATIVE: 1
ORTHOPNEA: 0
PALPITATIONS: 0
VOMITING: 0
WEIGHT LOSS: 0
BACK PAIN: 0
NERVOUS/ANXIOUS: 0
NECK PAIN: 0
PSYCHIATRIC NEGATIVE: 1
SPEECH CHANGE: 0
HEARTBURN: 0
CONSTITUTIONAL NEGATIVE: 1
HEMOPTYSIS: 0
FLANK PAIN: 0
NEUROLOGICAL NEGATIVE: 1
BLURRED VISION: 0

## 2025-01-16 ASSESSMENT — LIFESTYLE VARIABLES: SUBSTANCE_ABUSE: 0

## 2025-01-16 ASSESSMENT — COGNITIVE AND FUNCTIONAL STATUS - GENERAL
SUGGESTED CMS G CODE MODIFIER DAILY ACTIVITY: CH
WALKING IN HOSPITAL ROOM: A LITTLE
MOBILITY SCORE: 19
CLIMB 3 TO 5 STEPS WITH RAILING: A LOT
MOVING TO AND FROM BED TO CHAIR: A LITTLE
STANDING UP FROM CHAIR USING ARMS: A LITTLE
DAILY ACTIVITIY SCORE: 24
SUGGESTED CMS G CODE MODIFIER MOBILITY: CK

## 2025-01-16 ASSESSMENT — PAIN DESCRIPTION - PAIN TYPE
TYPE: ACUTE PAIN
TYPE: ACUTE PAIN

## 2025-01-16 ASSESSMENT — FIBROSIS 4 INDEX: FIB4 SCORE: 0.65

## 2025-01-16 NOTE — ED TRIAGE NOTES
"Chief Complaint   Patient presents with    Shortness of Breath     BIB EMS from home, reports shortness of breath upon exertion since November worse today. Patient states whenever she goes to the bathroom or move around she will have short of breath with chest tightness. Hx COPD baseline 8LPM at home. Denies fever and no contact with sick family. Dounebx2 at home by patient, albuterol neb and douneb x1 by EMS. Sats 96% at 6LPM NC upon arrival     BP (!) 192/76   Pulse (!) 102   Resp (!) 24   Ht 1.626 m (5' 4\")   Wt 73.9 kg (163 lb)   LMP 06/07/2001   SpO2 95%   BMI 27.98 kg/m²       Pt is speaking in full sentences, follows commands and responds appropriately to questions.     Respirations are labored.      "

## 2025-01-16 NOTE — ASSESSMENT & PLAN NOTE
Uncontrolled, 180/69.  Continue losartan, spironolactone with holding parameters  I have increased renal lactone to 50 mg daily, I have added amlodipine 5 mg daily  Continue with Lasix 20 mg daily  IV hydralazine as needed

## 2025-01-16 NOTE — ED NOTES
Assumed care of pt, received bedside report from HUNTER Schaeffer    Cardiac and spO2 monitor on, Fall precautions in place, call light within reach.

## 2025-01-16 NOTE — ASSESSMENT & PLAN NOTE
WBC 12.4 in the setting of chronic steroid use.  Chest x-ray showed infiltrates or edema  procalcitonin is negative hold on antibacterial coverage

## 2025-01-16 NOTE — ED NOTES
Bedside report received from off going RN/tech: Enmanuel RN , assumed care of patient.  POC discussed with patient. Call light within reach, all needs addressed at this time.       Fall risk interventions in place: Move the patient closer to the nurse's station, Patient's personal possessions are with in their safe reach, Place fall risk sign on patient's door, and Keep floor surfaces clean and dry (all applicable per [facility] Fall risk assessment)   Continuous monitoring: Cardiac Leads, Pulse Ox, or Blood Pressure  IVF/IV medications: Not Applicable   Oxygen: How many liters 8L and Traced the line to wall oxygen  Bedside sitter: Not Applicable   Isolation: Isolation precautions for Flu  (Isolation order)

## 2025-01-16 NOTE — DISCHARGE PLANNING
HTH/SCP TCN chart review completed. Pt currently in ED though is now inpatient status and being admitted to [facility]. The most current review of medical record, knowledge of pt's PLOF and social support, LACE+ score of 72, no 6 clicks available.     Pt seen at bedside. Introduced TCN program. Provided education regarding post acute levels of care. Education provided regarding case management policy for blanket SNF referrals. Discussed HTH/SCP plan benefits. Pt verbalizes understanding.     Pt reports that she is anticipating that she will be functionally capable of dc to home once medically cleared. Note that pt reports primary limitation with ambulation currently is SOB/WOB, otherwise pt reports no functional concerns with ambulation. She reports that she has a 4WW, SPC and scooter at home if needed. She reports that she would decline post acute placement at this time (2' to prior experience with post acute facilities). She would be agreeable to  services if indicate and reports she has utilized [facility] in past. She reports she has supplemental 02 (concentrator and 1 portable) via [employer]. She reports she would have assist for transport to home and ability to bring portable 02 for dc. Would note that per review and pt report she utilizes supplemental 02 at 8L at baseline as well.    No additional provider requests at this time. Given aforementioned, choice proactively obtained for HH and DME (02) and faxed to [facility] to utilize at time of dc if indicated.      Current discharge considerations are anticipated for dc to home with possible outpatient follow up v HH services/additional supplemental 02 if indicated by providers. TCN will continue to follow and collaborate with discharge planning team as additional post acute needs arise. Thank you.     Completed today:  Choice obtained: ANTHONY (1) Dari 2) Ewelina COLLIER; DME (02 via [employer] as pt on service PTA; see above)  Pt aware of [facility]'s blanket referral policy  SCP  with non [facility] PCP

## 2025-01-16 NOTE — H&P
Hospital Medicine History & Physical Note    Date of Service  1/16/2025    Primary Care Physician  Everett Diego M.D.        Code Status  DNAR/DNI    Chief Complaint  Chief Complaint   Patient presents with    Shortness of Breath     BIB EMS from home, reports shortness of breath upon exertion since November worse today. Patient states whenever she goes to the bathroom or move around she will have short of breath with chest tightness. Hx COPD baseline 8LPM at home. Denies fever and no contact with sick family. Dounebx2 at home by patient, albuterol neb and douneb x1 by EMS. Sats 96% at 6LPM NC upon arrival       History of Presenting Illness  Berny Renee is a 79 y.o. female with past medical history of severe COPD, on 6-8 l oxygen at baseline, combined chronic CHF, lung cancer in remission, chronic pain syndrome, spinal stenosis, sleep apnea, hypertension, who presented 1/16/2025 with complaints of chronic dyspnea on exertion, worsening in the last 2 months and especially since yesterday, chest tightness, chronic cough, desaturation on 8 L to 70% on minimal exertion.  In ER she is tachycardic, tachypneic.  Heart rate 109, respiratory 24.  CBC: WBC 12.4.  Eosinophils noted.  Chest x-ray: Pulmonary edema and infiltrates which are stable since the prior exam.  Influenza A PCR came back positive.  Treatment in ED included nebulizer with albuterol, Solu-Medrol 125 mg IV once, Tamiflu  I discussed the plan of care with patient, bedside RN, and ERP .    Review of Systems  Review of Systems   Constitutional:  Negative for chills, fever and weight loss.   HENT:  Negative for ear pain, hearing loss and tinnitus.    Eyes:  Negative for blurred vision, double vision and photophobia.   Respiratory:  Positive for cough and shortness of breath. Negative for hemoptysis and sputum production.    Cardiovascular:  Negative for chest pain, palpitations and orthopnea.   Gastrointestinal:  Negative for heartburn, nausea and  vomiting.   Genitourinary:  Negative for dysuria, flank pain, frequency and hematuria.   Musculoskeletal:  Positive for joint pain. Negative for back pain and neck pain.   Skin:  Negative for itching and rash.   Neurological:  Negative for tremors, speech change, focal weakness and headaches.   Endo/Heme/Allergies:  Negative for environmental allergies and polydipsia. Does not bruise/bleed easily.   Psychiatric/Behavioral:  Negative for hallucinations and substance abuse. The patient is not nervous/anxious.        Past Medical History   has a past medical history of Acute on chronic respiratory failure with hypoxia ([facility]) (11/14/2020), Arthritis, Asthma with COPD ([facility]), BMI 31.0-31.9,adult (02/04/2022), Cataract immature, Chronic pain, Chronic respiratory failure with hypoxia ([facility]) (07/13/2017), Colon polyps, COPD (chronic obstructive pulmonary disease) ([facility]) (2002), Cough, DDD (degenerative disc disease), cervical, DDD (degenerative disc disease), thoracic, Dependence on continuous supplemental oxygen (08/13/2015), Diverticulitis of colon, Dyslipidemia, EMPHYSEMA, H/O opioid abuse ([facility]) (07/15/2021), Hypertension, Obesity (BMI 30-39.9) (10/24/2016), Opioid type dependence, continuous ([facility]) (02/04/2022), REGINE (obstructive sleep apnea), OSTEOPOROSIS, Painful breathing, Shortness of breath, Spinal stenosis, lumbar region, with neurogenic claudication, Sputum production, URINARY INCONTINENCE, Varicose veins of left leg with edema (10/11/2024), Vitamin d deficiency, and Wheezing.    Surgical History   has a past surgical history that includes tonsillectomy; other orthopedic surgery (2004); other; other; lumbar laminectomy diskectomy (6/22/2010); pr upper gi endoscopy,biopsy (N/A, 11/15/2024); and colonoscopy with polyp (N/A, 11/15/2024).     Family History  family history includes Cancer in her father and sister; Other in her sister.   Family history reviewed with patient. There is no family history that is pertinent to  the chief complaint.     Social History   reports that she quit smoking about 25 years ago. Her smoking use included cigarettes. She started smoking about 55 years ago. She has a 60 pack-year smoking history. She has never used smokeless tobacco. She reports that she does not currently use alcohol after a past usage of about 4.2 oz of alcohol per week. She reports that she does not currently use drugs after having used the following drugs: Marijuana and Oral.    Allergies  Allergies   Allergen Reactions    Iodine      Convulsion  I.V.  iodine    Tape Rash and Itching       Medications  Prior to Admission Medications   Prescriptions Last Dose Informant Patient Reported? Taking?   Albuterol Sulfate 108 (90 Base) MCG/ACT AEROSOL POWDER, BREATH ACTIVATED   No No   Sig: Inhale 2 puffs by mouth 4 times a day as needed (shortness of breath).   Calcium Carbonate (CALCIUM 500 PO)  Patient Yes No   Sig: Take 1 Tablet by mouth every day. Indications: supplement   DULoxetine (CYMBALTA) 60 MG Cap DR Particles delayed-release capsule  Patient No No   Sig: Take 1 capsule by mouth once daily   Patient taking differently: Take 60 mg by mouth every day. Indications: pain   Diclofenac Sodium 1 % Cream  Patient Yes No   Sig: Apply 1 Application topically 2 times a day as needed (mild, moderate pain). Indications: mild, moderate pain   Home Care Oxygen  Patient Yes No   Sig: Inhale 6 L/min continuous. Oxygen dose 6 L/min ( up to 8L/min as needed for increased sob)  Respiratory route via: Nasal Cannula   Oxygen supplier:[employer]      Indications: Shortness of breath   acyclovir (ZOVIRAX) 200 MG Cap   No No   Sig: Take 1 capsule by mouth (at first sign of outbreak) three times a day 10 days   azithromycin (ZITHROMAX) 250 MG Tab   No No   Sig: Take 1 Tablet by mouth once daily   digoxin (LANOXIN) 125 MCG Tab   No No   Sig: Take 1 Tablet by mouth every day. Indications: Atrial Fibrillation   diphenhydrAMINE-APAP, sleep, (TYLENOL PM EXTRA  STRENGTH)  MG Tab  Patient Yes No   Sig: Take 2 Tablets by mouth at bedtime as needed (insomnia). Indications: insomnia   fexofenadine (HM FEXOFENADINE HCL) 180 MG tablet  Patient Yes No   Sig: Take 180 mg by mouth every day. Indications: allergies   guaiFENesin ER (MUCINEX) 600 MG TABLET SR 12 HR   No No   Sig: Take 1-2 tablets by mouth every 12 hours as needed for congestion.   ipratropium-albuterol (DUONEB) 0.5-2.5 (3) MG/3ML nebulizer solution  Patient No No   Sig: Take 3 mL by nebulization every four hours as needed for Shortness of Breath (Wheezing).   losartan (COZAAR) 50 MG Tab   No No   Sig: Take 1 Tablet by mouth 2 times a day. Indications: High Blood Pressure   meloxicam (MOBIC) 7.5 MG Tab   No No   Sig: Take 1 tablet by mouth once or twice a day   montelukast (SINGULAIR) 10 MG Tab   No No   Sig: Take 1 tablet by mouth once daily   omeprazole (PRILOSEC) 20 MG delayed-release capsule  Patient No No   Sig: Take 1 capsule by mouth every day   potassium chloride (MICRO-K) 10 MEQ capsule  Patient Yes No   Sig: Take 10 mEq by mouth 2 times a day. Indications: Low Amount of Potassium in the Blood   potassium chloride (MICRO-K) 10 MEQ capsule   No No   Sig: Take 1 capsule by mouth 2 times a day with soft food.   potassium chloride (MICRO-K) 10 MEQ capsule   No No   Sig: Take 1 Capsule by mouth 2 (two) times daily   predniSONE (DELTASONE) 10 MG Tab   No No   Sig: Take 4 tablets by mouth daily x 4 days, then take 3 tabs daily x 4 days, then take 2 tabs daily x 4 days, then 1 tab daily x 4 days with food, then discontinue.   predniSONE (DELTASONE) 5 MG Tab   No No   Sig: Take by mouth as directed per MD written instructions.   spironolactone (ALDACTONE) 25 MG Tab   No No   Sig: Take 1 tablet by mouth daily   tizanidine (ZANAFLEX) 4 MG Tab  Patient Yes No   Sig: Take 4 mg by mouth 3 times a day as needed (muscle spasms). Indications: Musculoskeletal Pain   traMADol (ULTRAM) 50 MG Tab   No No   Sig: Take 1  tablet by mouth twice each day 30 days   triamcinolone (NASACORT) 55 MCG/ACT nasal inhaler  Patient Yes No   Sig: Administer 1 Spray into affected nostril(S) every day. Indications: Hayfever      Facility-Administered Medications: None       Physical Exam  Temp:  [36.8 °C (98.2 °F)] 36.8 °C (98.2 °F)  Pulse:  [] 109  Resp:  [18-24] 22  BP: (123-192)/() 180/69  SpO2:  [91 %-97 %] 97 %  Blood Pressure : (!) 180/69   Temperature: 36.8 °C (98.2 °F)   Pulse: (!) 109   Respiration: (!) 22   Pulse Oximetry: 97 %       Physical Exam  Vitals and nursing note reviewed.   Constitutional:       General: She is not in acute distress.     Appearance: Normal appearance.   HENT:      Head: Normocephalic and atraumatic.      Nose: Nose normal.      Mouth/Throat:      Mouth: Mucous membranes are moist.   Eyes:      Extraocular Movements: Extraocular movements intact.      Pupils: Pupils are equal, round, and reactive to light.   Cardiovascular:      Rate and Rhythm: Regular rhythm. Tachycardia present.   Pulmonary:      Effort: Tachypnea and accessory muscle usage present.      Breath sounds: Decreased breath sounds present.   Abdominal:      General: Abdomen is flat. There is no distension.      Tenderness: There is no abdominal tenderness.   Musculoskeletal:         General: No swelling or deformity. Normal range of motion.      Cervical back: Normal range of motion and neck supple.   Skin:     General: Skin is warm and dry.   Neurological:      General: No focal deficit present.      Mental Status: She is alert and oriented to person, place, and time.   Psychiatric:         Mood and Affect: Mood normal.         Behavior: Behavior normal.         Laboratory:  Recent Labs     01/16/25  0508   WBC 12.4*   RBC 3.89*   HEMOGLOBIN 11.6*   HEMATOCRIT 36.5*   MCV 93.8   MCH 29.8   MCHC 31.8*   RDW 46.2   PLATELETCT 440   MPV 9.3     Recent Labs     01/16/25  0508   SODIUM 140   POTASSIUM 3.7   CHLORIDE 99   CO2 31   GLUCOSE  134*   BUN 9   CREATININE 0.27*   CALCIUM 8.8     Recent Labs     01/16/25  0508   ALTSGPT 14   ASTSGOT 16   ALKPHOSPHAT 46   TBILIRUBIN <0.2   GLUCOSE 134*         Recent Labs     01/16/25  0508   NTPROBNP 111         Recent Labs     01/16/25  0508   TROPONINT 8       Imaging:  DX-CHEST-PORTABLE (1 VIEW)   Final Result         1.  Pulmonary edema and/or infiltrates are identified, which are stable since the prior exam.   2.  Atherosclerosis          X-Ray:  I have personally reviewed the images and compared with prior images.    Assessment/Plan:  Justification for Admission Status  I anticipate this patient will require at least two midnights for appropriate medical management, necessitating inpatient admission because COPD exacerbation    Patient will need a Telemetry bed on MEDICAL service .  The need is secondary to acute on chronic respiratory failure with hypoxia.    * Acute on chronic respiratory failure with hypoxia- (present on admission)  Assessment & Plan  Secondary to influenza, COPD exacerbation  RT protocol, supplemental oxygen as needed  Requires 8 L nasal cannula at rest which is her baseline.    Influenza A  Assessment & Plan  Course Tamiflu for 5 days  Droplet precautions  Tylenol as needed for fever    Acute exacerbation of chronic obstructive pulmonary disease (COPD) (HCC)- (present on admission)  Assessment & Plan  Secondary to influenza A.  Treatment per protocol with DuoNeb and steroids  RT protocol  Supplemental oxygen as needed      Leukocytosis  Assessment & Plan  WBC 12.4 in the setting of chronic steroid use.  Chest x-ray showed infiltrates or edema  Will check procalcitonin to consider antibacterial coverage    Primary hypertension- (present on admission)  Assessment & Plan  Uncontrolled, 180/69.  Continue losartan, spironolactone with holding parameters  IV hydralazine as needed    Chronic pain syndrome- (present on admission)  Assessment & Plan  Continue tizanidine, meloxicam,  Cymbalta, Tylenol, oxycodone, tramadol as needed        VTE prophylaxis: enoxaparin ppx

## 2025-01-16 NOTE — CONSULTS
Pulmonary Consultation    Date of consult: 1/16/2025    Referring Physician  Cheri Hammonds M.D.    Reason for Consultation  COPD exacerbation    History of Presenting Illness  Berny Renee is a very pleasant 79 y.o. female who presented 1/16/2025 with increasing dyspnea.She has past medical history of severe COPD (known to me in the clinic), on 6-8 L oxygen at baseline, PFTs with FEV1 of 0.89 L (45% ), combined chronic CHF,  history of cancer of left upper lobe of lung, status post radiation, former tobacco smoker, quit last 1999.     Last seen in 12/2024, trialed Breztri which was ineffective. Switched back to Advair, Spiriva and Singulair, chronic azithromycin, prednisone 5mg daily.    PMH: chronic pain syndrome, spinal stenosis, sleep apnea, hypertension    Presented 1/16/2025 with complaints of chronic dyspnea on exertion, worsening in the last 2 months. This is accompanied by chronic cough,noted desaturation on 8 L to 70% on minimal exertion.      Last hospitalization November 2024, when she was treated for COPD exacerbation and pneumonia,    UTD on vaccinations except for COVID booster.  She has had COVID last 2020.     In the emergency department, she was noted to be tachycardic and tachypneic. Heart rate 109, respiratory 24.CBC: WBC 12.4.  Eosinophils 5.90, absolute baseline refill 0.70, procalcitonin less than 0.05. Chest x-ray: Pulmonary edema and infiltrates which are stable since the prior exam.Influenza A PCR came back positive.Treatment in ED included nebulizer with albuterol, Solu-Medrol 125 mg IV once, Tamiflu    Pulmonology is consulted for COPD exacerbation    Code Status  DNAR/DNI    Review of Systems  Review of Systems   Constitutional: Negative.    HENT: Negative.     Eyes: Negative.    Respiratory:          See HPI   Gastrointestinal: Negative.    Genitourinary: Negative.    Musculoskeletal: Negative.    Skin: Negative.    Neurological: Negative.    Endo/Heme/Allergies: Negative.     Psychiatric/Behavioral: Negative.         Past Medical History   has a past medical history of Acute on chronic respiratory failure with hypoxia ([facility]) (11/14/2020), Arthritis, Asthma with COPD ([facility]), BMI 31.0-31.9,adult (02/04/2022), Cataract immature, Chronic pain, Chronic respiratory failure with hypoxia ([facility]) (07/13/2017), Colon polyps, COPD (chronic obstructive pulmonary disease) ([facility]) (2002), Cough, DDD (degenerative disc disease), cervical, DDD (degenerative disc disease), thoracic, Dependence on continuous supplemental oxygen (08/13/2015), Diverticulitis of colon, Dyslipidemia, EMPHYSEMA, H/O opioid abuse ([facility]) (07/15/2021), Hypertension, Obesity (BMI 30-39.9) (10/24/2016), Opioid type dependence, continuous ([facility]) (02/04/2022), REGINE (obstructive sleep apnea), OSTEOPOROSIS, Painful breathing, Shortness of breath, Spinal stenosis, lumbar region, with neurogenic claudication, Sputum production, URINARY INCONTINENCE, Varicose veins of left leg with edema (10/11/2024), Vitamin d deficiency, and Wheezing.    She has no past medical history of Breast cancer ([facility]).    Surgical History   has a past surgical history that includes tonsillectomy; other orthopedic surgery (2004); other; other; lumbar laminectomy diskectomy (6/22/2010); pr upper gi endoscopy,biopsy (N/A, 11/15/2024); and colonoscopy with polyp (N/A, 11/15/2024).    Family History  family history includes Cancer in her father and sister; Other in her sister.    Social History   reports that she quit smoking about 25 years ago. Her smoking use included cigarettes. She started smoking about 55 years ago. She has a 60 pack-year smoking history. She has never used smokeless tobacco. She reports that she does not currently use alcohol after a past usage of about 4.2 oz of alcohol per week. She reports that she does not currently use drugs after having used the following drugs: Marijuana and Oral.    Medications  Home Medications       Reviewed by Melvina Wilder,  PhT (Pharmacy Tech) on 01/16/25 at 0745  Med List Status: Complete     Medication Last Dose Status   acetaminophen (TYLENOL) 500 MG Tab 1/8/2025 Active   acyclovir (ZOVIRAX) 200 MG Cap 1/15/2025 Active   Albuterol Sulfate 108 (90 Base) MCG/ACT AEROSOL POWDER, BREATH ACTIVATED 1/15/2025 Active   azithromycin (ZITHROMAX) 250 MG Tab 1/15/2025 Active   Calcium Carbonate (CALCIUM 500 PO) 1/15/2025 Active   diclofenac sodium (VOLTAREN) 1 % Gel 12/26/2024 Active   digoxin (LANOXIN) 125 MCG Tab 1/15/2025 Active   diphenhydrAMINE-APAP, sleep, (TYLENOL PM EXTRA STRENGTH)  MG Tab 1/13/2025 Active   docusate sodium (COLACE) 100 MG Cap 1/15/2025 Active   DULoxetine (CYMBALTA) 60 MG Cap DR Particles delayed-release capsule 1/15/2025 Active   fexofenadine (HM FEXOFENADINE HCL) 180 MG tablet 1/15/2025 Active   fluticasone-salmeterol (ADVAIR) 500-50 MCG/ACT AEROSOL POWDER, BREATH ACTIVATED 1/15/2025 Active   guaiFENesin ER (MUCINEX) 600 MG TABLET SR 12 HR 1/8/2025 Active   ipratropium-albuterol (DUONEB) 0.5-2.5 (3) MG/3ML nebulizer solution 1/16/2025 Active   losartan (COZAAR) 50 MG Tab 1/15/2025 Active   meloxicam (MOBIC) 7.5 MG Tab 1/15/2025 Active   montelukast (SINGULAIR) 10 MG Tab 1/15/2025 Active   omeprazole (PRILOSEC) 20 MG delayed-release capsule 1/15/2025 Active   potassium chloride (MICRO-K) 10 MEQ capsule New Rx Active   potassium chloride (MICRO-K) 10 MEQ capsule 1/15/2025 Active   predniSONE (DELTASONE) 5 MG Tab 1/15/2025 Active   spironolactone (ALDACTONE) 25 MG Tab 1/15/2025 Active   tiotropium (SPIRIVA) 18 MCG Cap 1/15/2025 Active   tizanidine (ZANAFLEX) 4 MG Tab Unknown Active   traMADol (ULTRAM) 50 MG Tab 1/8/2025 Active   triamcinolone (NASACORT) 55 MCG/ACT nasal inhaler 1/15/2025 Active                  Audit from Redirected Encounters    **Home medications have not yet been reviewed for this encounter**       Current Facility-Administered Medications   Medication Dose Route Frequency Provider Last Rate  Last Admin    enoxaparin (Lovenox) inj 40 mg  40 mg Subcutaneous DAILY AT 1800 Doug Powers M.D.        senna-docusate (Pericolace Or Senokot S) 8.6-50 MG per tablet 2 Tablet  2 Tablet Oral Q EVENING Doug Powers M.D.        And    polyethylene glycol/lytes (Miralax) Packet 1 Packet  1 Packet Oral QDAY PRN Doug Powers M.D.        acetaminophen (Tylenol) tablet 650 mg  650 mg Oral Q6HRS PRN Doug Powers M.D.        hydrALAZINE (Apresoline) injection 10 mg  10 mg Intravenous Q4HRS PRN Doug Powers M.D.        ondansetron (Zofran) syringe/vial injection 4 mg  4 mg Intravenous Q4HRS PRN Doug Powers M.D.        ondansetron (Zofran ODT) dispertab 4 mg  4 mg Oral Q4HRS PRN Doug Powers M.D.        Pharmacy Consult Request ...Pain Management Review 1 Each  1 Each Other PHARMACY TO DOSE Doug Powers M.D.        oxyCODONE immediate-release (Roxicodone) tablet 5 mg  5 mg Oral Q3HRS PRN Doug Powers M.D.        Or    oxyCODONE immediate release (Roxicodone) tablet 10 mg  10 mg Oral Q3HRS PRN Doug Powers M.D.        Or    morphine 4 MG/ML injection 4 mg  4 mg Intravenous Q3HRS PRN Doug Powers M.D.        guaiFENesin dextromethorphan (Robitussin DM) 100-10 MG/5ML syrup 10 mL  10 mL Oral Q6HRS PRN Doug Powers M.D.        Respiratory Therapy Consult   Nebulization Continuous RT Doug Powers M.D.        ipratropium-albuterol (DUONEB) nebulizer solution  3 mL Nebulization Q4HRS (RT) Doug Powers M.D.        albuterol (Proventil) 2.5mg/0.5ml nebulizer solution 2.5 mg  2.5 mg Nebulization Q2HRS PRN (RT) Doug Powers M.D.        [START ON 1/17/2025] predniSONE (Deltasone) tablet 40 mg  40 mg Oral DAILY Doug Powers M.D.        oseltamivir (Tamiflu) capsule 75 mg  75 mg Oral BID Doug Powers M.D.        digoxin (Lanoxin) tablet 125 mcg  125 mcg Oral Q EVENING Doug Powers M.D.        DULoxetine (Cymbalta) capsule 60 mg  60 mg Oral Q EVENING  Doug Powers M.D.        fexofenadine (Allegra) tablet 180 mg  180 mg Oral Q EVENING Doug Powers M.D.        guaiFENesin ER (Mucinex) tablet 600 mg  600 mg Oral Q12HRS Doug Powers M.D.        losartan (Cozaar) tablet 50 mg  50 mg Oral BID Doug Powers M.D.        meloxicam (Mobic) tablet 7.5 mg  7.5 mg Oral Q EVENING Doug Powers M.D.        montelukast (Singulair) tablet 10 mg  10 mg Oral Q EVENING Doug Powers M.D.        omeprazole (PriLOSEC) capsule 20 mg  20 mg Oral DAILY Doug Powers M.D.        potassium chloride SA (Kdur) tablet 10 mEq  10 mEq Oral BID Doug Powers M.D.        spironolactone (Aldactone) tablet 25 mg  25 mg Oral Q DAY Doug Powers M.D.        tizanidine (Zanaflex) tablet 4 mg  4 mg Oral TID PRN Doug Powers M.D.        traMADol (Ultram) 50 MG tablet 50 mg  50 mg Oral Q4HRS PRN Doug Powers M.D.         Current Outpatient Medications   Medication Sig Dispense Refill    fluticasone-salmeterol (ADVAIR) 500-50 MCG/ACT AEROSOL POWDER, BREATH ACTIVATED Inhale 1 Puff 2 times a day.      acetaminophen (TYLENOL) 500 MG Tab Take 500-1,000 mg by mouth every 6 hours as needed. Indications: Pain      tiotropium (SPIRIVA) 18 MCG Cap Place 18 mcg into inhaler and inhale every day.      docusate sodium (COLACE) 100 MG Cap Take 100 mg by mouth every day.      potassium chloride (MICRO-K) 10 MEQ capsule Take 1 Capsule by mouth 2 (two) times daily 180 Capsule 1    azithromycin (ZITHROMAX) 250 MG Tab Take 1 Tablet by mouth once daily 90 Tablet 1    acyclovir (ZOVIRAX) 200 MG Cap Take 1 capsule by mouth (at first sign of outbreak) three times a day 10 days (Patient taking differently: Take 200 mg by mouth every day. Indications: Shingles) 30 Capsule 0    montelukast (SINGULAIR) 10 MG Tab Take 1 tablet by mouth once daily 90 Tablet 3    Albuterol Sulfate 108 (90 Base) MCG/ACT AEROSOL POWDER, BREATH ACTIVATED Inhale 2 puffs by mouth 4 times a day as needed  (shortness of breath). 3 Each 3    predniSONE (DELTASONE) 5 MG Tab Take by mouth as directed per MD written instructions. (Patient taking differently: Take 5 mg by mouth every day. Take by mouth as directed per MD written instructions.  Indications: Worsening of Chronic Obstructive Lung Disease) 90 Tablet 1    meloxicam (MOBIC) 7.5 MG Tab Take 1 tablet by mouth once or twice a day (Patient taking differently: Take 7.5 mg by mouth 2 times a day. Indications: Joint Damage causing Pain and Loss of Function) 180 Tablet 1    digoxin (LANOXIN) 125 MCG Tab Take 1 Tablet by mouth every day. Indications: Atrial Fibrillation 100 Tablet 3    spironolactone (ALDACTONE) 25 MG Tab Take 1 tablet by mouth daily 90 Tablet 1    losartan (COZAAR) 50 MG Tab Take 1 Tablet by mouth 2 times a day. Indications: High Blood Pressure 200 Tablet 2    fexofenadine (HM FEXOFENADINE HCL) 180 MG tablet Take 180 mg by mouth every day. Indications: allergies      Calcium Carbonate (CALCIUM 500 PO) Take 1 Tablet by mouth every day. Indications: supplement      triamcinolone (NASACORT) 55 MCG/ACT nasal inhaler Administer 1 Spray into affected nostril(S) every day. Indications: Hayfever      omeprazole (PRILOSEC) 20 MG delayed-release capsule Take 1 capsule by mouth every day 90 Capsule 1    DULoxetine (CYMBALTA) 60 MG Cap DR Particles delayed-release capsule Take 1 capsule by mouth once daily (Patient taking differently: Take 60 mg by mouth every day. Indications: pain) 90 Capsule 1    ipratropium-albuterol (DUONEB) 0.5-2.5 (3) MG/3ML nebulizer solution Take 3 mL by nebulization every four hours as needed for Shortness of Breath (Wheezing). 360 mL 3    potassium chloride (MICRO-K) 10 MEQ capsule Take 1 capsule by mouth 2 times a day with soft food. 180 Capsule 3    guaiFENesin ER (MUCINEX) 600 MG TABLET SR 12 HR Take 1-2 tablets by mouth every 12 hours as needed for congestion. 120 Tablet 1    traMADol (ULTRAM) 50 MG Tab Take 1 tablet by mouth twice  each day 30 days (Patient taking differently: Take 50 mg by mouth 2 times a day as needed. Indications: Pain) 60 Tablet 2    diclofenac sodium (VOLTAREN) 1 % Gel Apply 2 g topically 2 times a day as needed (mild, moderate pain). Indications: mild, moderate pain      tizanidine (ZANAFLEX) 4 MG Tab Take 4 mg by mouth 3 times a day as needed (muscle spasms). Indications: Musculoskeletal Pain      diphenhydrAMINE-APAP, sleep, (TYLENOL PM EXTRA STRENGTH)  MG Tab Take 2 Tablets by mouth at bedtime as needed (insomnia). Indications: insomnia         Allergies  Allergies   Allergen Reactions    Iodine      Convulsion  I.V.  iodine    Tape Rash and Itching       Vital Signs last 24 hours  Temp:  [36.8 °C (98.2 °F)] 36.8 °C (98.2 °F)  Pulse:  [] 109  Resp:  [18-24] 22  BP: (123-192)/() 180/69  SpO2:  [91 %-97 %] 97 %    Physical Exam  Physical Exam  Vitals and nursing note reviewed.   Constitutional:       General: She is not in acute distress.     Appearance: Normal appearance. She is well-developed. She is ill-appearing. She is not diaphoretic.      Comments: Very pleasant   Eyes:      General: No scleral icterus.        Right eye: No discharge.         Left eye: No discharge.      Conjunctiva/sclera: Conjunctivae normal.      Pupils: Pupils are equal, round, and reactive to light.   Neck:      Thyroid: No thyromegaly.      Vascular: No JVD.   Cardiovascular:      Rate and Rhythm: Normal rate and regular rhythm.      Heart sounds: Normal heart sounds. No murmur heard.     No gallop.   Pulmonary:      Effort: Respiratory distress present.      Breath sounds: Wheezing present. No rales.      Comments: Noted expiratory wheezing on bilateral lungs  Abdominal:      General: There is no distension.      Palpations: Abdomen is soft.      Tenderness: There is no abdominal tenderness. There is no guarding.   Musculoskeletal:         General: No tenderness.   Lymphadenopathy:      Cervical: No cervical adenopathy.    Skin:     General: Skin is warm.      Capillary Refill: Capillary refill takes less than 2 seconds.      Findings: No erythema or rash.   Neurological:      Mental Status: She is alert and oriented to person, place, and time.      Cranial Nerves: No cranial nerve deficit.      Sensory: No sensory deficit.      Motor: No abnormal muscle tone.   Psychiatric:         Behavior: Behavior normal.       Fluids  No intake or output data in the 24 hours ending 01/16/25 0803    Laboratory  Recent Results (from the past 48 hours)   CBC with Differential    Collection Time: 01/16/25  5:08 AM   Result Value Ref Range    WBC 12.4 (H) 4.8 - 10.8 K/uL    RBC 3.89 (L) 4.20 - 5.40 M/uL    Hemoglobin 11.6 (L) 12.0 - 16.0 g/dL    Hematocrit 36.5 (L) 37.0 - 47.0 %    MCV 93.8 81.4 - 97.8 fL    MCH 29.8 27.0 - 33.0 pg    MCHC 31.8 (L) 32.2 - 35.5 g/dL    RDW 46.2 35.9 - 50.0 fL    Platelet Count 440 164 - 446 K/uL    MPV 9.3 9.0 - 12.9 fL    Neutrophils-Polys 72.20 (H) 44.00 - 72.00 %    Lymphocytes 10.80 (L) 22.00 - 41.00 %    Monocytes 10.00 0.00 - 13.40 %    Eosinophils 5.90 0.00 - 6.90 %    Basophils 0.50 0.00 - 1.80 %    Immature Granulocytes 0.60 0.00 - 0.90 %    Nucleated RBC 0.00 0.00 - 0.20 /100 WBC    Neutrophils (Absolute) 8.93 (H) 1.82 - 7.42 K/uL    Lymphs (Absolute) 1.33 1.00 - 4.80 K/uL    Monos (Absolute) 1.23 (H) 0.00 - 0.85 K/uL    Eos (Absolute) 0.73 (H) 0.00 - 0.51 K/uL    Baso (Absolute) 0.06 0.00 - 0.12 K/uL    Immature Granulocytes (abs) 0.08 0.00 - 0.11 K/uL    NRBC (Absolute) 0.00 K/uL   Comp Metabolic Panel    Collection Time: 01/16/25  5:08 AM   Result Value Ref Range    Sodium 140 135 - 145 mmol/L    Potassium 3.7 3.6 - 5.5 mmol/L    Chloride 99 96 - 112 mmol/L    Co2 31 20 - 33 mmol/L    Anion Gap 10.0 7.0 - 16.0    Glucose 134 (H) 65 - 99 mg/dL    Bun 9 8 - 22 mg/dL    Creatinine 0.27 (L) 0.50 - 1.40 mg/dL    Calcium 8.8 8.5 - 10.5 mg/dL    Correct Calcium 9.1 8.5 - 10.5 mg/dL    AST(SGOT) 16 12 - 45 U/L     ALT(SGPT) 14 2 - 50 U/L    Alkaline Phosphatase 46 30 - 99 U/L    Total Bilirubin <0.2 0.1 - 1.5 mg/dL    Albumin 3.6 3.2 - 4.9 g/dL    Total Protein 7.1 6.0 - 8.2 g/dL    Globulin 3.5 1.9 - 3.5 g/dL    A-G Ratio 1.0 g/dL   proBrain Natriuretic Peptide, NT    Collection Time: 25  5:08 AM   Result Value Ref Range    NT-proBNP 111 0 - 125 pg/mL   Troponin    Collection Time: 25  5:08 AM   Result Value Ref Range    Troponin T 8 6 - 19 ng/L   ESTIMATED GFR    Collection Time: 25  5:08 AM   Result Value Ref Range    GFR (CKD-EPI) 110 >60 mL/min/1.73 m 2   PROCALCITONIN    Collection Time: 25  5:08 AM   Result Value Ref Range    Procalcitonin <0.05 <0.25 ng/mL   EKG    Collection Time: 25  5:29 AM   Result Value Ref Range    Report       [facility] Emergency Dept.    Test Date:  2025  Pt Name:    FAZAL STUBBS                    Department: ER  MRN:        [id number]                      Room:       [facility]  Gender:     Female                       Technician: [id number]  :        [id number]                   Requested By:ER TRIAGE PROTOCOL  Order #:    [id number]                    Cynthia MD: Chan Damian MD    Measurements  Intervals                                Axis  Rate:       76                           P:          0  LA:         0                            QRS:        34  QRSD:       111                          T:          34  QT:         362  QTc:        408    Interpretive Statements  Normal sinus rhythm  QTc within normal limits  No ST elevation  Electronically Signed On 2025 05:29:26 PST by Chan Damian MD     POC CoV-2, FLU A/B, RSV by PCR    Collection Time: 25  5:31 AM   Result Value Ref Range    POC Influenza A RNA, PCR POSITIVE (A) Negative    POC Influenza B RNA, PCR Negative Negative    POC RSV, by PCR Negative Negative    POC SARS-CoV-2, PCR NotDetected NotDetected     Imaging  DX-CHEST-PORTABLE (1 VIEW)   Final Result          1.  Pulmonary edema and/or infiltrates are identified, which are stable since the prior exam.   2.  Atherosclerosis        Assessment    # COPD Exacerbation secondary to Influenza A  # Acute on Chronic Hypoxic Respiratory Failure  # Very severe COPD  # History of lung cancer, left upper lobe    Plan  -- continue LABA/ICS/LAMA, singulair  -- start duonebs q4H while awake  -- continue steroids; ok in influenza with concominant COPD exacerbation --> https://pmc.ncbi.nlm.nih.gov/articles/DXV1221998/  -- tamiflu  -- Titrate supplemental O2 to maintain saturations 88-92%  -- will review home inhalers with patient prior to discharge    Discussed patient condition and risk of morbidity and/or mortality with Hospitalist, RN and Patient.      This note was generated using voice recognition software which has a chance of producing errors of grammar and content.  I have made every reasonable attempt to find and correct any errors, but it should be expected that some may not be found prior to finalization of this note.  __________  Ihsan Kumar MD  Pulmonary and Critical Care Medicine  [facility]

## 2025-01-16 NOTE — ASSESSMENT & PLAN NOTE
Secondary to influenza A.  Treatment per protocol with DuoNeb and steroids  RT protocol  Supplemental oxygen as needed

## 2025-01-16 NOTE — ED PROVIDER NOTES
ED Provider Note    CHIEF COMPLAINT  Chief Complaint   Patient presents with    Shortness of Breath     BIB EMS from home, reports shortness of breath upon exertion since November worse today. Patient states whenever she goes to the bathroom or move around she will have short of breath with chest tightness. Hx COPD baseline 8LPM at home. Denies fever and no contact with sick family. Dounebx2 at home by patient, albuterol neb and douneb x1 by EMS. Sats 96% at 6LPM NC upon arrival       EXTERNAL RECORDS REVIEWED  Outpatient Notes that on 1/2/2025 for pan lobar emphysema    HPI/ROS  LIMITATION TO HISTORY   Select: : None  OUTSIDE HISTORIAN(S):  EMS transported the patient for shortness of breath    Berny Renee is a 79 y.o. female who presents with dyspnea on exertion.  Patient reports that she desats to 70% on her 8 L normal nasal cannula when she ambulates.  This is not normal for her to desaturate so much when she ambulates.  Patient Dors is cough.  Patient denies fevers chills body aches or sweats.  Patient reports that she was changed several weeks ago in November on her daily medication and since that time has had worsening COPD.  Patient reports that her symptoms are worsening today.  Patient denies chest pain.    PAST MEDICAL HISTORY   has a past medical history of Acute on chronic respiratory failure with hypoxia ([facility]) (11/14/2020), Arthritis, Asthma with COPD ([facility]), BMI 31.0-31.9,adult (02/04/2022), Cataract immature, Chronic pain, Chronic respiratory failure with hypoxia ([facility]) (07/13/2017), Colon polyps, COPD (chronic obstructive pulmonary disease) ([facility]) (2002), Cough, DDD (degenerative disc disease), cervical, DDD (degenerative disc disease), thoracic, Dependence on continuous supplemental oxygen (08/13/2015), Diverticulitis of colon, Dyslipidemia, EMPHYSEMA, H/O opioid abuse ([facility]) (07/15/2021), Hypertension, Obesity (BMI 30-39.9) (10/24/2016), Opioid type dependence, continuous ([facility]) (02/04/2022), REGINE  (obstructive sleep apnea), OSTEOPOROSIS, Painful breathing, Shortness of breath, Spinal stenosis, lumbar region, with neurogenic claudication, Sputum production, URINARY INCONTINENCE, Varicose veins of left leg with edema (10/11/2024), Vitamin d deficiency, and Wheezing.    SURGICAL HISTORY   has a past surgical history that includes tonsillectomy; other orthopedic surgery (); other; other; lumbar laminectomy diskectomy (2010); upper gi endoscopy,biopsy (N/A, 11/15/2024); and colonoscopy with polyp (N/A, 11/15/2024).    FAMILY HISTORY  Family History   Problem Relation Age of Onset    Cancer Father         Lung    Other Sister         lung and leukemia cancer    Cancer Sister         Leukemia, Lung       SOCIAL HISTORY  Social History     Tobacco Use    Smoking status: Former     Current packs/day: 0.00     Average packs/day: 2.0 packs/day for 30.0 years (60.0 ttl pk-yrs)     Types: Cigarettes     Start date: 3/1/1969     Quit date: 3/1/1999     Years since quittin.8    Smokeless tobacco: Never    Tobacco comments:     3 pks a day for 35 yrs, continued abstinence   Vaping Use    Vaping status: Never Used   Substance and Sexual Activity    Alcohol use: Not Currently     Alcohol/week: 4.2 oz     Types: 7 Glasses of wine per week    Drug use: Not Currently     Types: Marijuana, Oral     Comment: Medical Marijuana     Sexual activity: Never       CURRENT MEDICATIONS  Home Medications       Reviewed by Stalin Griffin R.N. (Registered Nurse) on 25 at 0449  Med List Status: Not Addressed     Medication Last Dose Status   acyclovir (ZOVIRAX) 200 MG Cap  Active   Albuterol Sulfate 108 (90 Base) MCG/ACT AEROSOL POWDER, BREATH ACTIVATED  Active   azithromycin (ZITHROMAX) 250 MG Tab  Active   Calcium Carbonate (CALCIUM 500 PO)  Active   Diclofenac Sodium 1 % Cream  Active   digoxin (LANOXIN) 125 MCG Tab  Active   diphenhydrAMINE-APAP, sleep, (TYLENOL PM EXTRA STRENGTH)  MG Tab  Active  "  DULoxetine (CYMBALTA) 60 MG Cap DR Particles delayed-release capsule  Active   fexofenadine (HM FEXOFENADINE HCL) 180 MG tablet  Active   guaiFENesin ER (MUCINEX) 600 MG TABLET SR 12 HR  Active   Home Care Oxygen  Active   ipratropium-albuterol (DUONEB) 0.5-2.5 (3) MG/3ML nebulizer solution  Active   losartan (COZAAR) 50 MG Tab  Active   meloxicam (MOBIC) 7.5 MG Tab  Active   montelukast (SINGULAIR) 10 MG Tab  Active   omeprazole (PRILOSEC) 20 MG delayed-release capsule  Active   potassium chloride (MICRO-K) 10 MEQ capsule  Active   potassium chloride (MICRO-K) 10 MEQ capsule  Active   potassium chloride (MICRO-K) 10 MEQ capsule  Active   predniSONE (DELTASONE) 10 MG Tab  Active   predniSONE (DELTASONE) 5 MG Tab  Active   spironolactone (ALDACTONE) 25 MG Tab  Active   tizanidine (ZANAFLEX) 4 MG Tab  Active   traMADol (ULTRAM) 50 MG Tab  Active   triamcinolone (NASACORT) 55 MCG/ACT nasal inhaler  Active                  Audit from Redirected Encounters    **Home medications have not yet been reviewed for this encounter**         ALLERGIES  Allergies   Allergen Reactions    Iodine      Convulsion  I.V.  iodine    Tape Rash and Itching       PHYSICAL EXAM  VITAL SIGNS: BP (!) 147/115   Pulse (!) 106   Temp 36.8 °C (98.2 °F) (Temporal)   Resp (!) 22   Ht 1.626 m (5' 4\")   Wt 73.9 kg (163 lb)   LMP 06/07/2001   SpO2 97%   BMI 27.98 kg/m²    Vitals and nursing note reviewed.   Constitutional:       Comments: Patient is lying in bed supine, pleasant, conversant, speaking in complete sentences, increased work of breathing  HENT:      Head: Normocephalic and atraumatic.   Eyes:      Extraocular Movements: Extraocular movements intact.      Conjunctiva/sclera: Conjunctivae normal.      Pupils: Pupils are equal, round, and reactive to light.   Cardiovascular:      Pulses: Normal pulses.      Comments:   Pulmonary:   Increased work of breathing  Accessory muscle use  Musculoskeletal:         General: No lower " extremity swelling. Normal range of motion.      Cervical back: Normal range of motion. No rigidity.   Skin:     General: Skin is warm and dry.      Capillary Refill: Capillary refill takes less than 2 seconds.   Neurological:      Mental Status: Alert.       EKG/LABS  No ischemia  I have independently interpreted this EKG    RADIOLOGY/PROCEDURES   I have independently interpreted the diagnostic imaging associated with this visit and am waiting the final reading from the radiologist.   My preliminary interpretation is as follows: Diffuse bilateral lower lobe infiltrates    Radiologist interpretation:  DX-CHEST-PORTABLE (1 VIEW)   Final Result         1.  Pulmonary edema and/or infiltrates are identified, which are stable since the prior exam.   2.  Atherosclerosis          COURSE & MEDICAL DECISION MAKING    ASSESSMENT, COURSE AND PLAN  Care Narrative: CBC to evaluate for acute anemia and leukocytosis.  CMP to evaluate for acute electrolyte abnormality, acute kidney injury, acute liver failure or dysfunction.  Chest x-ray demonstrates diffuse infiltrates, possibly edema versus infectious in etiology.  Respiratory viral panel pending.  Troponin to evaluate for ACS, BNP to evaluate for heart failure.    Electronically signed by: Chan Damian M.D., 1/16/2025 5:30 AM    Patient has leukocytosis, bilateral pulmonary infiltrates consistent with viral pneumonia.  Patient received an hour-long albuterol nebulizer and still has increased work of breathing. Patient will be treated with Tamiflu.  Patient has already been given steroids.  Patient remains short of breath.  Disposition to the hospital medicine floor.    This dictation has been created using voice recognition software. I am continuously working with the software to minimize the number of voice recognition errors and I have made every attempt to manually correct the errors within my dictation. However errors  related to this voice recognition software may  still exist and should be interpreted within the appropriate context.     Electronically signed by: Chan Damian M.D., 1/16/2025 6:51 AM      FINAL DIAGNOSIS  1. Acute hypoxemic respiratory failure (HCC)    2. Influenza A    3. Bilateral pulmonary infiltrates on chest x-ray         Electronically signed by: Chan Damian M.D., 1/16/2025 5:27 AM

## 2025-01-16 NOTE — ED NOTES
Med rec completed per pt   Allergies reviewed     Pt takes Azithromycin 250 mg daily   Pt takes Prednisone 5 mg daily     Patient is not taking anticoagulants

## 2025-01-16 NOTE — ED NOTES
Patient hesitant to walk and having shortness of breath with minimal movements    Defer ambulation as per ERP

## 2025-01-16 NOTE — ASSESSMENT & PLAN NOTE
Secondary to influenza, COPD exacerbation  RT protocol, supplemental oxygen as needed  Requires 8 L nasal cannula at rest which is her baseline.  She is currently saturating well on her baseline at rest however continues to have significant desaturations down , although is somewhat improved from yesterday she still is getting to the low 80s and having a prolonged recovery phase.    Continue aggressive RT, I-S, mobilization  Wean O2 as able

## 2025-01-17 ENCOUNTER — TELEPHONE (OUTPATIENT)
Dept: CARDIOLOGY | Facility: MEDICAL CENTER | Age: 80
End: 2025-01-17
Payer: MEDICARE

## 2025-01-17 DIAGNOSIS — J44.9 STAGE 4 VERY SEVERE COPD BY GOLD CLASSIFICATION (HCC): ICD-10-CM

## 2025-01-17 LAB
ALBUMIN SERPL BCP-MCNC: 3.3 G/DL (ref 3.2–4.9)
ALBUMIN/GLOB SERPL: 1 G/DL
ALP SERPL-CCNC: 48 U/L (ref 30–99)
ALT SERPL-CCNC: 11 U/L (ref 2–50)
ANION GAP SERPL CALC-SCNC: 8 MMOL/L (ref 7–16)
AST SERPL-CCNC: 18 U/L (ref 12–45)
BASOPHILS # BLD AUTO: 0.2 % (ref 0–1.8)
BASOPHILS # BLD: 0.02 K/UL (ref 0–0.12)
BILIRUB SERPL-MCNC: <0.2 MG/DL (ref 0.1–1.5)
BUN SERPL-MCNC: 16 MG/DL (ref 8–22)
CALCIUM ALBUM COR SERPL-MCNC: 10 MG/DL (ref 8.5–10.5)
CALCIUM SERPL-MCNC: 9.4 MG/DL (ref 8.5–10.5)
CHLORIDE SERPL-SCNC: 98 MMOL/L (ref 96–112)
CO2 SERPL-SCNC: 32 MMOL/L (ref 20–33)
CREAT SERPL-MCNC: 0.49 MG/DL (ref 0.5–1.4)
EOSINOPHIL # BLD AUTO: 0.01 K/UL (ref 0–0.51)
EOSINOPHIL NFR BLD: 0.1 % (ref 0–6.9)
ERYTHROCYTE [DISTWIDTH] IN BLOOD BY AUTOMATED COUNT: 46.4 FL (ref 35.9–50)
GFR SERPLBLD CREATININE-BSD FMLA CKD-EPI: 96 ML/MIN/1.73 M 2
GLOBULIN SER CALC-MCNC: 3.2 G/DL (ref 1.9–3.5)
GLUCOSE SERPL-MCNC: 169 MG/DL (ref 65–99)
HCT VFR BLD AUTO: 33.6 % (ref 37–47)
HGB BLD-MCNC: 10.8 G/DL (ref 12–16)
IMM GRANULOCYTES # BLD AUTO: 0.08 K/UL (ref 0–0.11)
IMM GRANULOCYTES NFR BLD AUTO: 0.7 % (ref 0–0.9)
LYMPHOCYTES # BLD AUTO: 0.61 K/UL (ref 1–4.8)
LYMPHOCYTES NFR BLD: 5.1 % (ref 22–41)
MCH RBC QN AUTO: 29.9 PG (ref 27–33)
MCHC RBC AUTO-ENTMCNC: 32.1 G/DL (ref 32.2–35.5)
MCV RBC AUTO: 93.1 FL (ref 81.4–97.8)
MONOCYTES # BLD AUTO: 1.26 K/UL (ref 0–0.85)
MONOCYTES NFR BLD AUTO: 10.6 % (ref 0–13.4)
NEUTROPHILS # BLD AUTO: 9.92 K/UL (ref 1.82–7.42)
NEUTROPHILS NFR BLD: 83.3 % (ref 44–72)
NRBC # BLD AUTO: 0 K/UL
NRBC BLD-RTO: 0 /100 WBC (ref 0–0.2)
PLATELET # BLD AUTO: 416 K/UL (ref 164–446)
PMV BLD AUTO: 9.2 FL (ref 9–12.9)
POTASSIUM SERPL-SCNC: 3.9 MMOL/L (ref 3.6–5.5)
PROT SERPL-MCNC: 6.5 G/DL (ref 6–8.2)
RBC # BLD AUTO: 3.61 M/UL (ref 4.2–5.4)
SODIUM SERPL-SCNC: 138 MMOL/L (ref 135–145)
WBC # BLD AUTO: 11.9 K/UL (ref 4.8–10.8)

## 2025-01-17 PROCEDURE — A9270 NON-COVERED ITEM OR SERVICE: HCPCS

## 2025-01-17 PROCEDURE — A9270 NON-COVERED ITEM OR SERVICE: HCPCS | Performed by: STUDENT IN AN ORGANIZED HEALTH CARE EDUCATION/TRAINING PROGRAM

## 2025-01-17 PROCEDURE — 700102 HCHG RX REV CODE 250 W/ 637 OVERRIDE(OP): Performed by: STUDENT IN AN ORGANIZED HEALTH CARE EDUCATION/TRAINING PROGRAM

## 2025-01-17 PROCEDURE — 85025 COMPLETE CBC W/AUTO DIFF WBC: CPT

## 2025-01-17 PROCEDURE — 80053 COMPREHEN METABOLIC PANEL: CPT

## 2025-01-17 PROCEDURE — 99232 SBSQ HOSP IP/OBS MODERATE 35: CPT | Mod: GC | Performed by: INTERNAL MEDICINE

## 2025-01-17 PROCEDURE — 700111 HCHG RX REV CODE 636 W/ 250 OVERRIDE (IP): Mod: JZ | Performed by: INTERNAL MEDICINE

## 2025-01-17 PROCEDURE — 700102 HCHG RX REV CODE 250 W/ 637 OVERRIDE(OP): Performed by: INTERNAL MEDICINE

## 2025-01-17 PROCEDURE — 99233 SBSQ HOSP IP/OBS HIGH 50: CPT | Performed by: STUDENT IN AN ORGANIZED HEALTH CARE EDUCATION/TRAINING PROGRAM

## 2025-01-17 PROCEDURE — 97162 PT EVAL MOD COMPLEX 30 MIN: CPT

## 2025-01-17 PROCEDURE — 700102 HCHG RX REV CODE 250 W/ 637 OVERRIDE(OP)

## 2025-01-17 PROCEDURE — 700101 HCHG RX REV CODE 250: Performed by: INTERNAL MEDICINE

## 2025-01-17 PROCEDURE — 770020 HCHG ROOM/CARE - TELE (206)

## 2025-01-17 PROCEDURE — 36415 COLL VENOUS BLD VENIPUNCTURE: CPT

## 2025-01-17 PROCEDURE — 94640 AIRWAY INHALATION TREATMENT: CPT

## 2025-01-17 PROCEDURE — 97166 OT EVAL MOD COMPLEX 45 MIN: CPT

## 2025-01-17 PROCEDURE — A9270 NON-COVERED ITEM OR SERVICE: HCPCS | Performed by: INTERNAL MEDICINE

## 2025-01-17 PROCEDURE — 94669 MECHANICAL CHEST WALL OSCILL: CPT

## 2025-01-17 PROCEDURE — 94664 DEMO&/EVAL PT USE INHALER: CPT

## 2025-01-17 RX ORDER — IPRATROPIUM BROMIDE AND ALBUTEROL SULFATE 2.5; .5 MG/3ML; MG/3ML
3 SOLUTION RESPIRATORY (INHALATION)
Status: DISCONTINUED | OUTPATIENT
Start: 2025-01-17 | End: 2025-01-19

## 2025-01-17 RX ORDER — INHALER,ASSIST DEVICE,MED MASK
1 SPACER (EA) MISCELLANEOUS ONCE
Status: SHIPPED | OUTPATIENT
Start: 2025-01-17 | End: 2025-01-20

## 2025-01-17 RX ORDER — FUROSEMIDE 20 MG/1
20 TABLET ORAL
Status: DISCONTINUED | OUTPATIENT
Start: 2025-01-17 | End: 2025-01-19 | Stop reason: HOSPADM

## 2025-01-17 RX ORDER — PREDNISONE 20 MG/1
20 TABLET ORAL DAILY
Status: DISCONTINUED | OUTPATIENT
Start: 2025-01-23 | End: 2025-01-19 | Stop reason: HOSPADM

## 2025-01-17 RX ORDER — PREDNISONE 5 MG/1
5 TABLET ORAL DAILY
Status: DISCONTINUED | OUTPATIENT
Start: 2025-02-02 | End: 2025-01-19 | Stop reason: HOSPADM

## 2025-01-17 RX ORDER — PREDNISONE 10 MG/1
10 TABLET ORAL DAILY
Status: DISCONTINUED | OUTPATIENT
Start: 2025-01-28 | End: 2025-01-19 | Stop reason: HOSPADM

## 2025-01-17 RX ORDER — DIPHENHYDRAMINE HCL 25 MG
25 TABLET ORAL ONCE
Status: COMPLETED | OUTPATIENT
Start: 2025-01-17 | End: 2025-01-17

## 2025-01-17 RX ORDER — ALBUTEROL SULFATE 5 MG/ML
2.5 SOLUTION RESPIRATORY (INHALATION)
Status: DISCONTINUED | OUTPATIENT
Start: 2025-01-17 | End: 2025-01-19 | Stop reason: HOSPADM

## 2025-01-17 RX ORDER — DIPHENHYDRAMINE HCL 25 MG
25 TABLET ORAL NIGHTLY PRN
Status: DISCONTINUED | OUTPATIENT
Start: 2025-01-17 | End: 2025-01-19 | Stop reason: HOSPADM

## 2025-01-17 RX ADMIN — MONTELUKAST 10 MG: 10 TABLET, FILM COATED ORAL at 17:37

## 2025-01-17 RX ADMIN — IPRATROPIUM BROMIDE AND ALBUTEROL SULFATE 3 ML: .5; 2.5 SOLUTION RESPIRATORY (INHALATION) at 19:29

## 2025-01-17 RX ADMIN — ENOXAPARIN SODIUM 40 MG: 100 INJECTION SUBCUTANEOUS at 17:35

## 2025-01-17 RX ADMIN — OSELTAMIVIR PHOSPHATE 75 MG: 75 CAPSULE ORAL at 06:12

## 2025-01-17 RX ADMIN — GUAIFENESIN 600 MG: 600 TABLET, EXTENDED RELEASE ORAL at 17:37

## 2025-01-17 RX ADMIN — MELOXICAM 7.5 MG: 7.5 TABLET ORAL at 17:36

## 2025-01-17 RX ADMIN — DIPHENHYDRAMINE HYDROCHLORIDE 25 MG: 25 TABLET ORAL at 02:00

## 2025-01-17 RX ADMIN — DULOXETINE HYDROCHLORIDE 60 MG: 60 CAPSULE, DELAYED RELEASE ORAL at 17:36

## 2025-01-17 RX ADMIN — IPRATROPIUM BROMIDE AND ALBUTEROL SULFATE 3 ML: .5; 2.5 SOLUTION RESPIRATORY (INHALATION) at 14:01

## 2025-01-17 RX ADMIN — DIPHENHYDRAMINE HYDROCHLORIDE 25 MG: 25 TABLET ORAL at 23:58

## 2025-01-17 RX ADMIN — GUAIFENESIN SYRUP AND DEXTROMETHORPHAN 10 ML: 100; 10 SYRUP ORAL at 14:49

## 2025-01-17 RX ADMIN — DIGOXIN 125 MCG: 0.12 TABLET ORAL at 17:36

## 2025-01-17 RX ADMIN — FEXOFENADINE HCL 180 MG: 60 TABLET, FILM COATED ORAL at 17:37

## 2025-01-17 RX ADMIN — SENNOSIDES AND DOCUSATE SODIUM 2 TABLET: 50; 8.6 TABLET ORAL at 17:36

## 2025-01-17 RX ADMIN — POTASSIUM CHLORIDE 10 MEQ: 1500 TABLET, EXTENDED RELEASE ORAL at 17:37

## 2025-01-17 RX ADMIN — FLUTICASONE FUROATE, UMECLIDINIUM BROMIDE AND VILANTEROL TRIFENATATE 1 PUFF: 100; 62.5; 25 POWDER RESPIRATORY (INHALATION) at 07:02

## 2025-01-17 RX ADMIN — PREDNISONE 40 MG: 20 TABLET ORAL at 06:12

## 2025-01-17 RX ADMIN — BENZONATATE 100 MG: 100 CAPSULE ORAL at 06:17

## 2025-01-17 RX ADMIN — GUAIFENESIN 600 MG: 600 TABLET, EXTENDED RELEASE ORAL at 06:12

## 2025-01-17 RX ADMIN — BENZONATATE 100 MG: 100 CAPSULE ORAL at 14:49

## 2025-01-17 RX ADMIN — OSELTAMIVIR PHOSPHATE 75 MG: 75 CAPSULE ORAL at 17:35

## 2025-01-17 RX ADMIN — IPRATROPIUM BROMIDE AND ALBUTEROL SULFATE 3 ML: .5; 2.5 SOLUTION RESPIRATORY (INHALATION) at 09:11

## 2025-01-17 RX ADMIN — OMEPRAZOLE 20 MG: 20 CAPSULE, DELAYED RELEASE ORAL at 06:13

## 2025-01-17 RX ADMIN — LOSARTAN POTASSIUM 50 MG: 50 TABLET, FILM COATED ORAL at 17:35

## 2025-01-17 RX ADMIN — IPRATROPIUM BROMIDE AND ALBUTEROL SULFATE 3 ML: .5; 2.5 SOLUTION RESPIRATORY (INHALATION) at 06:58

## 2025-01-17 RX ADMIN — FUROSEMIDE 20 MG: 20 TABLET ORAL at 17:36

## 2025-01-17 RX ADMIN — POTASSIUM CHLORIDE 10 MEQ: 1500 TABLET, EXTENDED RELEASE ORAL at 06:13

## 2025-01-17 RX ADMIN — GUAIFENESIN SYRUP AND DEXTROMETHORPHAN 10 ML: 100; 10 SYRUP ORAL at 02:01

## 2025-01-17 RX ADMIN — SPIRONOLACTONE 25 MG: 25 TABLET ORAL at 06:12

## 2025-01-17 RX ADMIN — LOSARTAN POTASSIUM 50 MG: 50 TABLET, FILM COATED ORAL at 06:12

## 2025-01-17 ASSESSMENT — ENCOUNTER SYMPTOMS
SORE THROAT: 0
DIZZINESS: 0
NEUROLOGICAL NEGATIVE: 1
ABDOMINAL PAIN: 0
SHORTNESS OF BREATH: 1
FEVER: 0
COUGH: 1
GASTROINTESTINAL NEGATIVE: 1
NAUSEA: 0
CONSTITUTIONAL NEGATIVE: 1
EYES NEGATIVE: 1
VOMITING: 0
CHILLS: 0
MUSCULOSKELETAL NEGATIVE: 1
PSYCHIATRIC NEGATIVE: 1

## 2025-01-17 ASSESSMENT — COGNITIVE AND FUNCTIONAL STATUS - GENERAL
WALKING IN HOSPITAL ROOM: A LITTLE
STANDING UP FROM CHAIR USING ARMS: A LITTLE
CLIMB 3 TO 5 STEPS WITH RAILING: A LITTLE
MOBILITY SCORE: 21
DAILY ACTIVITIY SCORE: 21
SUGGESTED CMS G CODE MODIFIER DAILY ACTIVITY: CJ
DRESSING REGULAR LOWER BODY CLOTHING: A LITTLE
SUGGESTED CMS G CODE MODIFIER MOBILITY: CJ
TOILETING: A LITTLE
HELP NEEDED FOR BATHING: A LITTLE

## 2025-01-17 ASSESSMENT — LIFESTYLE VARIABLES
TOTAL SCORE: 0
CONSUMPTION TOTAL: NEGATIVE
TOTAL SCORE: 0
TOTAL SCORE: 0
HAVE YOU EVER FELT YOU SHOULD CUT DOWN ON YOUR DRINKING: NO
HAVE PEOPLE ANNOYED YOU BY CRITICIZING YOUR DRINKING: NO
AVERAGE NUMBER OF DAYS PER WEEK YOU HAVE A DRINK CONTAINING ALCOHOL: 0
ON A TYPICAL DAY WHEN YOU DRINK ALCOHOL HOW MANY DRINKS DO YOU HAVE: 0
HOW MANY TIMES IN THE PAST YEAR HAVE YOU HAD 5 OR MORE DRINKS IN A DAY: 0
ALCOHOL_USE: NO
EVER HAD A DRINK FIRST THING IN THE MORNING TO STEADY YOUR NERVES TO GET RID OF A HANGOVER: NO
EVER FELT BAD OR GUILTY ABOUT YOUR DRINKING: NO
DOES PATIENT WANT TO STOP DRINKING: NO

## 2025-01-17 ASSESSMENT — SOCIAL DETERMINANTS OF HEALTH (SDOH)

## 2025-01-17 ASSESSMENT — ACTIVITIES OF DAILY LIVING (ADL): TOILETING: INDEPENDENT

## 2025-01-17 ASSESSMENT — PAIN DESCRIPTION - PAIN TYPE: TYPE: ACUTE PAIN

## 2025-01-17 ASSESSMENT — FIBROSIS 4 INDEX: FIB4 SCORE: 1.03

## 2025-01-17 ASSESSMENT — GAIT ASSESSMENTS
ASSISTIVE DEVICE: FRONT WHEEL WALKER
DISTANCE (FEET): 30
GAIT LEVEL OF ASSIST: SUPERVISED

## 2025-01-17 NOTE — CARE PLAN
The patient is Stable - Low risk of patient condition declining or worsening    Shift Goals  Clinical Goals: collab with RT, breathing tx, steroids, trend BP, PT/OT  Patient Goals: sleep  Family Goals: not present    Progress made toward(s) clinical / shift goals:    Problem: Knowledge Deficit - COPD  Goal: Patient/significant other demonstrates understanding of disease process, utilization of the Action Plan, medications and discharge instruction  Outcome: Progressing     Problem: Impaired Gas Exchange  Goal: Patient will demonstrate improved ventilation and adequate oxygenation and participate in treatment regimen within the level of ability/situation.  Outcome: Progressing     Problem: Self Care  Goal: Patient will have the ability to perform ADLs independently or with assistance (bathe, groom, dress, toilet and feed)  Outcome: Progressing     Problem: Fall Risk  Goal: Patient will remain free from falls  Outcome: Progressing       Patient is not progressing towards the following goals:

## 2025-01-17 NOTE — RESPIRATORY CARE
"  COPD EDUCATION by COPD CLINICAL EDUCATOR  (Phone: [phone])  1/17/2025 at 10:25 AM by Rabia Villatoro RRT     Patient seen by the education team to complete Block 1 of a 2-part series. Reference material about the program was given to patient along with our contact information.  Part #1 includes: What is COPD (how the lungs work), common treatments for COPD, the difference between \"rescue\" medications and \"daily\" medications, bronchial hygiene with an explanation of COPD Action Plan. We reviewed the appropriate medication techniques -including a demonstration. We discussed the correct way to care for and clean respiratory equipment and when applicable, Advance Care Planning was discussed.  Question and answer session followed.     Spoke into detail about patients respiratory medications. Patient was taking Breztri but did not feel that it was helping and then was switched to Advair and Spiriva. Patient taking Trelegy here and feels that is fine but she is not tasting the medication. Per pulm he would like to re-start Breztri with a spacer. Patient given spacer instruction in detail and reminded to rinse mouth. Patient understands to stop other inhalers aside from her rescue medication when re-starting Breztri.     Patient has been to pulmonary rehab twice but does not feel that she wants to go back. Unfortunately, it is too far for her and her insurance does not accept that pulmonary rehab that is closer to her. Patient encouraged to resume home exercises and the gym when she no longer exacerbated.      COPD Screen  COPD Risk Screening  Do you have a history of COPD?: Yes  Do you have a Pulmonologist?: Yes    COPD Assessment  COPD Clinical Specialists ONLY  COPD Education Initiated: Yes--Full Intervention (COPD exac, follows pulm. Confused about respiratory medication failed Breztri but not using spacer. José wants her to re-try Breztri. given spacer and flutter instruction. Consider dupixent but pt overwhelmed " "with medicaiton right now. 8L oxygen Cont.)  COPD Education Session 1: Yes  Is this a COPD exacerbation patient?: Yes  DME Company: [employer]  DME Equipment Type: 8L oxygen, concentrator and uses oxymizer  Physician Name: Everett Diego M.D.  Pulmonary Follow Up Appointment: 04/16/25  Appt Time: 1130  Pulmonologist Name: [facility]  Palliative Care: Yes  Advance Directive: Yes  Is POLST on file?: Yes  $ Demo/Eval of SVN's, MDI's and Aerosols: Yes  (OP) Pulmonary Function Testing: Yes (FEV1/FVC 45%)  Interdisciplinary Rounds: Attendance at Rounds (30 Min)    PFT Results    No results found for: \"PFT\"    Meds to Beds  [facility] provides bedside medication delivery for all eligible patients at discharge and you have been automatically enrolled in the Meds to Beds Program. Would you like to opt out of this program for any reason?: No - Stay Opted In     MY COPD ACTION PLAN     It is recommended that patients and physicians /healthcare providers complete this action plan together. This plan should be discussed at each physician visit and updated as needed.    The green, yellow and red zones show groups of symptoms of COPD. This list of symptoms is not comprehensive, and you may experience other symptoms. In the \"Actions\" column, your healthcare provider has recommended actions for you to take based on your symptoms.    Patient Name: Berny Renee   YOB: 1945   Last Updated on: 1/17/2025 10:24 AM   Green Zone:  I am doing well today Actions     Usual activitiy and exercise level   Take daily medications     Usual amounts of cough and phlegm/mucus   Use oxygen as prescribed     Sleep well at night   Continue regular exercise/diet plan     Appetite is good   At all times avoid cigarette smoke, inhaled irritants     Daily Medications (these medications are taken every day):   Fluticasone Furoate and Vilanterol trifenatate (Breo) 2 Puffs Twice daily     Additional Information:  Use as directed.  Please rinse " "mouth after using and use spacer.    This replaces symbicort and spiriva.     Yellow Zone:  I am having a bad day or a COPD flare Actions     More breathless than usual   Continue daily medications     I have less energy for my daily activities   Use quick relief inhaler as ordered     Increased or thicker phlegm/mucus   Use oxygen as prescribed     Using quick relief inhaler/nebulizer more often   Get plenty of rest     Swelling of ankles more than usual   Use pursed lip breathing     More coughing than usual   At all times avoid cigarette smoke, inhaled irritants     I feel like I have a \"chest cold\"     Poor sleep and my symptoms woke me up     My appetite is not good     My medicine is not helping      Call provider immediately if symptoms don’t improve     Continue daily medications, add rescue medications:   Albuterol/Ipratropium (Combivent, Duoneb)  Albuterol 3mL via nebulizer  2 Puffs Every 4 hours PRN  Every 4 hours PRN       Medications to be used during a flare up, (as Discussed with Provider):           Additional Information:  Use your nebulizer first when short of breath and rinse after each use.     Use a spacer with your rescue inhaler when nebulizer is not available.         Red Zone:  I need urgent medical care Actions     Severe shortness of breath even at rest   Call 911 or seek medical care immediately     Not able to do any activity because of breathing      Fever or shaking chills      Feeling confused or very drowsy       Chest pains      Coughing up blood                  "

## 2025-01-17 NOTE — PROGRESS NOTES
Monitor Summary  Rhythm: SR  Rate: 68-94  Ectopy: PAC/PVC  Measurements: .17/.10/.38  ---12 hr Chart Review---

## 2025-01-17 NOTE — PROGRESS NOTES
Bedside report received, assumed care of patient at change of shift. Chart, labs, and orders reviewed. Pt resting in bed, breathing even and unlabored on 6L, denies pain and in no acute distress. A&O x4. Tele monitoring in place. Fall precautions including bed alarm in place and education provided. Call light within reach, bed locked and in lowest position, denies other needs at this time.

## 2025-01-17 NOTE — HOSPITAL COURSE
The patient is a 79-year-old female with a past medical history significant for COPD (on 8 L at baseline), HFpEF (EF 60% 11/24 ), lung cancer in remission, REGINE, HTN, chronic dyspnea on exertion admitted on 1/1620 25 for acute on chronic hypoxemic respiratory failure secondary to influenza A and COPD exacerbation.

## 2025-01-17 NOTE — CARE PLAN
The patient is Stable - Low risk of patient condition declining or worsening    Shift Goals  Clinical Goals: pulmonary hygiene, safety  Patient Goals: rest, go home  Family Goals: not present      Problem: Knowledge Deficit - COPD  Goal: Patient/significant other demonstrates understanding of disease process, utilization of the Action Plan, medications and discharge instruction  Outcome: Progressing     Problem: Risk for Infection - COPD  Goal: Patient will remain free from signs and symptoms of infection  Outcome: Progressing     Problem: Ineffective Airway Clearance  Goal: Patient will maintain patent airway with clear/clearing breath sounds  Outcome: Progressing

## 2025-01-17 NOTE — PROGRESS NOTES
Pulmonary Progress Note    Date of admission  1/16/2025    Chief Complaint  Berny Renee is a very pleasant 79 y.o. female who presented 1/16/2025 with increasing dyspnea.She has past medical history of severe COPD (known to me in the clinic), on 6-8 L oxygen at baseline, PFTs with FEV1 of 0.89 L (45% ), combined chronic CHF,  history of cancer of left upper lobe of lung, status post radiation, former tobacco smoker, quit last 1999.      Last seen in 12/2024, trialed Breztri which was ineffective. Switched back to Advair, Spiriva and Singulair, chronic azithromycin, prednisone 5mg daily.     PMH: chronic pain syndrome, spinal stenosis, sleep apnea, hypertension     Presented 1/16/2025 with complaints of chronic dyspnea on exertion, worsening in the last 2 months. This is accompanied by chronic cough,noted desaturation on 8 L to 70% on minimal exertion.       Last hospitalization November 2024, when she was treated for COPD exacerbation and pneumonia,     UTD on vaccinations except for COVID booster.  She has had COVID last 2020.      In the emergency department, she was noted to be tachycardic and tachypneic. Heart rate 109, respiratory 24.CBC: WBC 12.4.  Eosinophils 5.90, absolute baseline refill 0.70, procalcitonin less than 0.05. Chest x-ray: Pulmonary edema and infiltrates which are stable since the prior exam.Influenza A PCR came back positive.Treatment in ED included nebulizer with albuterol, Solu-Medrol 125 mg IV once, Tamiflu     Pulmonology consulted for COPD exacerbation    Interval Problem Update  Reviewed last 24 hour events:  Patient notes improvement in symptoms.  Started on Trelegy.  Patient was instructed on appropriate use of inhaler.  Discussed with patient that she would likely benefit with Breztri.  She will follow-up with pulmonology next week.     Review of Systems  Review of Systems   Constitutional: Negative.    HENT: Negative.     Eyes: Negative.    Respiratory:          See HPI    Gastrointestinal: Negative.    Genitourinary: Negative.    Musculoskeletal: Negative.    Skin: Negative.    Neurological: Negative.    Endo/Heme/Allergies: Negative.    Psychiatric/Behavioral: Negative.          Vital Signs for last 24 hours   Temp:  [36.2 °C (97.1 °F)-36.8 °C (98.2 °F)] 36.5 °C (97.7 °F)  Pulse:  [] 65  Resp:  [15-29] 16  BP: (101-188)/(55-80) 160/59  SpO2:  [93 %-97 %] 96 %    Physical Exam   Physical Exam  Vitals and nursing note reviewed.   Constitutional:       General: She is not in acute distress.     Appearance: Normal appearance. She is well-developed. She is not ill-appearing or diaphoretic.      Comments: Very pleasant   Eyes:      General: No scleral icterus.        Right eye: No discharge.         Left eye: No discharge.      Conjunctiva/sclera: Conjunctivae normal.      Pupils: Pupils are equal, round, and reactive to light.   Neck:      Thyroid: No thyromegaly.      Vascular: No JVD.   Cardiovascular:      Rate and Rhythm: Normal rate and regular rhythm.      Heart sounds: Normal heart sounds. No murmur heard.     No gallop.   Pulmonary:      Effort: Respiratory distress present.      Breath sounds: No wheezing or rales.   Abdominal:      General: There is no distension.      Palpations: Abdomen is soft.      Tenderness: There is no abdominal tenderness. There is no guarding.   Musculoskeletal:         General: No tenderness.   Lymphadenopathy:      Cervical: No cervical adenopathy.   Skin:     General: Skin is warm.      Capillary Refill: Capillary refill takes less than 2 seconds.      Findings: No erythema or rash.   Neurological:      Mental Status: She is alert and oriented to person, place, and time.      Cranial Nerves: No cranial nerve deficit.      Sensory: No sensory deficit.      Motor: No abnormal muscle tone.   Psychiatric:         Behavior: Behavior normal.           Medications  Current Facility-Administered Medications   Medication Dose Route Frequency Provider  Last Rate Last Admin    enoxaparin (Lovenox) inj 40 mg  40 mg Subcutaneous DAILY AT 1800 Doug Powers M.D.   40 mg at 01/16/25 1814    senna-docusate (Pericolace Or Senokot S) 8.6-50 MG per tablet 2 Tablet  2 Tablet Oral Q EVENING Doug Powers M.D.        And    polyethylene glycol/lytes (Miralax) Packet 1 Packet  1 Packet Oral QDAY PRN Doug Powers M.D.        acetaminophen (Tylenol) tablet 650 mg  650 mg Oral Q6HRS PRN Doug Powers M.D.   650 mg at 01/16/25 1939    hydrALAZINE (Apresoline) injection 10 mg  10 mg Intravenous Q4HRS PRN Doug Powers M.D.        ondansetron (Zofran) syringe/vial injection 4 mg  4 mg Intravenous Q4HRS PRN Doug Powers M.D.        ondansetron (Zofran ODT) dispertab 4 mg  4 mg Oral Q4HRS PRN Doug Powers M.D.        Pharmacy Consult Request ...Pain Management Review 1 Each  1 Each Other PHARMACY TO DOSE Doug Powers M.D.        oxyCODONE immediate-release (Roxicodone) tablet 5 mg  5 mg Oral Q3HRS PRN Doug Powers M.D.        Or    oxyCODONE immediate release (Roxicodone) tablet 10 mg  10 mg Oral Q3HRS PRN Doug Powers M.D.        Or    morphine 4 MG/ML injection 4 mg  4 mg Intravenous Q3HRS PRN Doug Powers M.D.        guaiFENesin dextromethorphan (Robitussin DM) 100-10 MG/5ML syrup 10 mL  10 mL Oral Q6HRS PRN Doug Powers M.D.   10 mL at 01/17/25 0201    Respiratory Therapy Consult   Nebulization Continuous RT Doug Powers M.D.        predniSONE (Deltasone) tablet 40 mg  40 mg Oral DAILY Doug Powers M.D.   40 mg at 01/17/25 0612    oseltamivir (Tamiflu) capsule 75 mg  75 mg Oral BID Doug Powers M.D.   75 mg at 01/17/25 0612    digoxin (Lanoxin) tablet 125 mcg  125 mcg Oral Q EVENING Doug Powers M.D.   125 mcg at 01/16/25 1812    DULoxetine (Cymbalta) capsule 60 mg  60 mg Oral Q EVENING Doug Powers M.D.   60 mg at 01/16/25 1812    fexofenadine (Allegra) tablet 180 mg  180 mg Oral Q EVENING Doug  TARA Powers   180 mg at 01/16/25 1812    guaiFENesin ER (Mucinex) tablet 600 mg  600 mg Oral Q12HRS Doug Powers M.D.   600 mg at 01/17/25 0612    losartan (Cozaar) tablet 50 mg  50 mg Oral BID Doug Powers M.D.   50 mg at 01/17/25 0612    meloxicam (Mobic) tablet 7.5 mg  7.5 mg Oral Q EVENING Doug Powers M.D.   7.5 mg at 01/16/25 1810    montelukast (Singulair) tablet 10 mg  10 mg Oral Q EVENING Doug Powers M.D.   10 mg at 01/16/25 1811    omeprazole (PriLOSEC) capsule 20 mg  20 mg Oral DAILY Doug Powers M.D.   20 mg at 01/17/25 0613    potassium chloride SA (Kdur) tablet 10 mEq  10 mEq Oral BID Doug Powers M.D.   10 mEq at 01/17/25 0613    spironolactone (Aldactone) tablet 25 mg  25 mg Oral Q DAY Doug Powers M.D.   25 mg at 01/17/25 0612    tizanidine (Zanaflex) tablet 4 mg  4 mg Oral TID PRN Doug Powers M.D.        traMADol (Ultram) 50 MG tablet 50 mg  50 mg Oral Q4HRS PRN Doug Powers M.D.        albuterol (Proventil) 2.5mg/0.5ml nebulizer solution 2.5 mg  2.5 mg Nebulization Q2HRS PRN (RT) Cheri Hammonds M.D.        ipratropium-albuterol (DUONEB) nebulizer solution  3 mL Nebulization Q4HRS WHILE AWAKE (RT) Ihsan Kumar M.D.   3 mL at 01/17/25 0658    benzonatate (Tessalon) capsule 100 mg  100 mg Oral TID PRN Cheri Hammonds M.D.   100 mg at 01/17/25 0617    fluticasone-umeclidinium-vilanterol (Trelegy Ellipta) 100-62.5-25 mcg/act inhaler 1 Puff  1 Puff Inhalation QDAILY (RT) Cheri Hammonds M.D.   1 Puff at 01/17/25 0702     Fluids    Intake/Output Summary (Last 24 hours) at 1/17/2025 0833  Last data filed at 1/16/2025 2200  Gross per 24 hour   Intake 0 ml   Output 300 ml   Net -300 ml     Laboratory          Recent Labs     01/16/25  0508 01/17/25  0055   SODIUM 140 138   POTASSIUM 3.7 3.9   CHLORIDE 99 98   CO2 31 32   BUN 9 16   CREATININE 0.27* 0.49*   CALCIUM 8.8 9.4     Recent Labs     01/16/25  0508 01/17/25  0055   ALTSGPT 14 11   ASTSGOT  16 18   ALKPHOSPHAT 46 48   TBILIRUBIN <0.2 <0.2   GLUCOSE 134* 169*     Recent Labs     01/16/25  0508 01/17/25  0055   WBC 12.4* 11.9*   NEUTSPOLYS 72.20* 83.30*   LYMPHOCYTES 10.80* 5.10*   MONOCYTES 10.00 10.60   EOSINOPHILS 5.90 0.10   BASOPHILS 0.50 0.20   ASTSGOT 16 18   ALTSGPT 14 11   ALKPHOSPHAT 46 48   TBILIRUBIN <0.2 <0.2     Recent Labs     01/16/25  0508 01/17/25  0055   RBC 3.89* 3.61*   HEMOGLOBIN 11.6* 10.8*   HEMATOCRIT 36.5* 33.6*   PLATELETCT 440 416     Imaging  X-Ray:  I have personally reviewed the images and compared with prior images.    Assessment/Plan  # COPD Exacerbation secondary to Influenza A  # Acute on Chronic Hypoxic Respiratory Failure  # Very severe COPD  # History of lung cancer, left upper lobe     Plan  -continue LABA/ICS/LAMA, singulair.  Recommend Breztri on discharge, to be patient's maintenance COPD medication alongside daily azithromycin.  Continue 40 mg prednisone to complete total of 5-day course and slowly taper to patient's maintenance dose of prednisone 5 mg daily. Meds to beds to ensure compliance  -- Continue duonebs q4H while awake  -- continue steroids; ok in influenza with concominant COPD exacerbation --> https://pmc.ncbi.nlm.nih.gov/articles/WZD8318278/  -- tamiflu  -- Titrate supplemental O2 to maintain saturations 88-92%  -- Reviewed  home inhalers and appropriate inhaler technique    Thank you for this consult.  Pulmonology signing off.  Please reach out if any questions.    I have performed a physical exam and reviewed and updated ROS and Plan today (1/17/2025). In review of yesterday's note (1/16/2025), there are no changes except as documented above.     Discussed patient condition and risk of morbidity and/or mortality with Hospitalist and Patient    Anais Gutierrez MD  PGY 3 Internal Medicine

## 2025-01-17 NOTE — DISCHARGE PLANNING
"TCN following. HTH/SCP chart reviewed. No new TCN needs identified. Please see prior TCN note from 1/16 for most recent discharge planning considerations if indicated. Current discharge considerations are anticipated for dc to home with possible outpatient follow up v HH services/additional supplemental 02 if indicated by providers. Note as prior, \"She reports that she would decline post acute placement at this time (2' to prior experience with post acute facilities)\". If post acute indicated, pt may need further discussion with providers for dc considerations given aforementioned. TCN will continue to follow and collaborate with discharge planning team as additional post acute needs arise. Thank you.      Completed today:  Noted in PM, PT and OT recommending HH today, 1/17  Choice obtained: [facility] (1) [facility] 2) [facility]; [facility] (02 via [facility] as pt on service PTA; see initial TCN note from 1/17 for details if needed)  Pt aware of [facility]'s blanket referral policy  SCP with non [facility] PCP  "

## 2025-01-17 NOTE — PROGRESS NOTES
Assumed care of patient and received report from Jeniffer HARRISON. Assessment completed.Pt A&Ox 4. Respirations are even and unlabored on 8LNC. Pt reports headache. Medication per MAR. Pt does not want to answer questions to complete admit profile at this time. Pt wants to sleep. Call light and belongings are within reach. Tele monitor in place. POC updated. Pt educated on room and call light, pt verbalized understanding. Needs met.

## 2025-01-17 NOTE — PROGRESS NOTES
The patient is a 79-year-old female with a past medical history significant for COPD (on 8 L at baseline), HFpEF (EF 60% 11/24 ), cancer in remission, REGINE, HTN, chronic dyspnea on exertion admitted on 1/1620 25 for acute on chronic hypoxemic respiratory failure secondary to influenza A and COPD exacerbation.    Pertinent labs and imaging reviewed  Afebrile, hemodynamically stable, at baseline oxygen requirements  Voiding, tolerating oral intake well    Physical Exam  Constitutional:       Appearance: Normal appearance.   Pulmonary:      Effort: Tachypnea present. No respiratory distress.      Breath sounds: Decreased breath sounds present.   Musculoskeletal:      Right lower leg: No edema.      Left lower leg: No edema.   Skin:     General: Skin is warm and dry.   Neurological:      General: No focal deficit present.      Mental Status: She is alert and oriented to person, place, and time.   Psychiatric:         Behavior: Behavior normal.         #Acute on chronic hypoxemic respiratory failure  Secondary to influenza and likely COPD exacerbation  See associated problems    #Influenza A  Present on laboratory evaluation  On Tamiflu   Continue supportive care including Mucinex    #COPD exacerbation  In the setting of influenza A positivity  On chronic steroids s/p one-time dose of Solu-Medrol  Increasing steroid dosing to 40 mg daily for 5 doses  Start Trelegy Ellipta  Continue DuoNebElenoulair  RT protocol  This case was discussed with pulmonology today    #HFpEF   Ejection fraction 60% on echocardiogram completed November 2024  Does not appear to be fluid overloaded on physical examination  Chest x-ray revealed stable pulmonary infiltrates/pulmonary edema  Continue losartan and spironolactone    #History of palpitations  Per 12/9/2024 cardiology note, patient has a history of palpitations, SVT, PVC, PAC  Continue outpatient digoxin    #Hypertension  Home losartan and spironolactone    Cheri Hammonds M.D.

## 2025-01-17 NOTE — PROGRESS NOTES
4 Eyes Skin Assessment Completed by HUNTER Swift and DAYANNA Fair.    Head WDL  Ears WDL  Nose WDL  Mouth WDL  Neck WDL  Breast/Chest Redness and Blanching  Shoulder Blades WDL  Spine WDL  (R) Arm/Elbow/Hand WDL  (L) Arm/Elbow/Hand Bruising  Abdomen WDL  Groin WDL  Scrotum/Coccyx/Buttocks Redness and Blanching  (R) Leg WDL  (L) Leg WDL  (R) Heel/Foot/Toe Boggy  (L) Heel/Foot/Toe Boggy          Devices In Places Tele Box, Blood Pressure Cuff, and Pulse Ox      Interventions In Place NC W/Ear Foams, TAP System, and Pillows    Possible Skin Injury No    Pictures Uploaded Into Epic N/A  Wound Consult Placed N/A  RN Wound Prevention Protocol Ordered Yes

## 2025-01-17 NOTE — PROGRESS NOTES
Hospital Medicine Daily Progress Note    Date of Service  1/17/2025    Chief Complaint  Berny Renee is a 79 y.o. female admitted 1/16/2025 with shortness of breath     Hospital Course  The patient is a 79-year-old female with a past medical history significant for COPD (on 8 L at baseline), HFpEF (EF 60% 11/24 ), lung cancer in remission, REGINE, HTN, chronic dyspnea on exertion admitted on 1/1620 25 for acute on chronic hypoxemic respiratory failure secondary to influenza A and COPD exacerbation.       Interval Problem Update  Patient seen examined at bedside she overall feels okay still having significant dyspnea with exertion and oxygen desaturations down to 70s.  At rest she feels comfortable and is saturating well on 6 L.  -Continue aggressive RT protocol and inhaler therapy, pulm following  -Continue Tamiflu  -Will start low-dose p.o. Lasix 20 mg daily  -Continue I-S and mobilization wean O2 as able    I have discussed this patient's plan of care and discharge plan at IDT rounds today with Case Management, Nursing, Nursing leadership, and other members of the IDT team.    Consultants/Specialty  pulmonary    Code Status  DNAR/DNI    Disposition  The patient is not medically cleared for discharge to home or a post-acute facility.      I have placed the appropriate orders for post-discharge needs.    Review of Systems  Review of Systems   Constitutional:  Negative for chills, fever and malaise/fatigue.   HENT:  Negative for sore throat.    Respiratory:  Positive for cough and shortness of breath.    Cardiovascular:  Negative for chest pain and leg swelling.   Gastrointestinal:  Negative for abdominal pain, nausea and vomiting.   Neurological:  Negative for dizziness.        Physical Exam  Temp:  [36.2 °C (97.1 °F)-36.8 °C (98.2 °F)] 36.8 °C (98.2 °F)  Pulse:  [64-92] 79  Resp:  [16-22] 16  BP: (101-165)/(55-70) 156/70  SpO2:  [90 %-97 %] 90 %    Physical Exam  Vitals and nursing note reviewed.   Constitutional:        Appearance: She is obese. She is not toxic-appearing.   HENT:      Head: Normocephalic and atraumatic.      Mouth/Throat:      Mouth: Mucous membranes are moist.      Pharynx: Oropharynx is clear.   Eyes:      Conjunctiva/sclera: Conjunctivae normal.   Cardiovascular:      Rate and Rhythm: Normal rate.      Comments: Distant heart sounds  Pulmonary:      Effort: Respiratory distress present.      Breath sounds: No wheezing.      Comments: Increased work of breathing, diminished breath sounds throughout  Abdominal:      General: Bowel sounds are normal.      Palpations: Abdomen is soft.   Musculoskeletal:         General: Normal range of motion.      Cervical back: Neck supple.      Right lower leg: No edema.      Left lower leg: No edema.   Skin:     General: Skin is warm.   Neurological:      General: No focal deficit present.      Mental Status: She is alert and oriented to person, place, and time. Mental status is at baseline.   Psychiatric:         Mood and Affect: Mood normal.         Behavior: Behavior normal.         Thought Content: Thought content normal.         Judgment: Judgment normal.         Fluids    Intake/Output Summary (Last 24 hours) at 1/17/2025 1549  Last data filed at 1/17/2025 0911  Gross per 24 hour   Intake 300 ml   Output 300 ml   Net 0 ml        Laboratory  Recent Labs     01/16/25  0508 01/17/25  0055   WBC 12.4* 11.9*   RBC 3.89* 3.61*   HEMOGLOBIN 11.6* 10.8*   HEMATOCRIT 36.5* 33.6*   MCV 93.8 93.1   MCH 29.8 29.9   MCHC 31.8* 32.1*   RDW 46.2 46.4   PLATELETCT 440 416   MPV 9.3 9.2     Recent Labs     01/16/25  0508 01/17/25  0055   SODIUM 140 138   POTASSIUM 3.7 3.9   CHLORIDE 99 98   CO2 31 32   GLUCOSE 134* 169*   BUN 9 16   CREATININE 0.27* 0.49*   CALCIUM 8.8 9.4                   Imaging  DX-CHEST-PORTABLE (1 VIEW)   Final Result         1.  Pulmonary edema and/or infiltrates are identified, which are stable since the prior exam.   2.  Atherosclerosis            Assessment/Plan  * Acute on chronic respiratory failure with hypoxia- (present on admission)  Assessment & Plan  Secondary to influenza, COPD exacerbation  RT protocol, supplemental oxygen as needed  Requires 8 L nasal cannula at rest which is her baseline.  She is currently saturating well on her baseline at rest however continues to have significant desaturations down to 70% on her 8 L nasal cannula continue  Continue aggressive RT, I-S, mobilization  Wean O2 as able    Leukocytosis  Assessment & Plan  WBC 12.4 in the setting of chronic steroid use.  Chest x-ray showed infiltrates or edema  procalcitonin is negative hold on antibacterial coverage    Influenza A  Assessment & Plan  Course Tamiflu for 5 days  Droplet precautions  Tylenol as needed for fever    Acute exacerbation of chronic obstructive pulmonary disease (COPD) (HCC)- (present on admission)  Assessment & Plan  Secondary to influenza A.  Treatment per protocol with DuoNeb and steroids  RT protocol  Supplemental oxygen as needed      Primary hypertension- (present on admission)  Assessment & Plan  Uncontrolled, 180/69.  Continue losartan, spironolactone with holding parameters  IV hydralazine as needed  Added Lasix 20 mg daily today    Chronic pain syndrome- (present on admission)  Assessment & Plan  Continue tizanidine, meloxicam, Cymbalta, Tylenol, oxycodone, tramadol as needed         VTE prophylaxis:    enoxaparin ppx      I have performed a physical exam and reviewed and updated ROS and Plan today (1/17/2025). In review of yesterday's note (1/16/2025), there are no changes except as documented above.

## 2025-01-17 NOTE — THERAPY
Physical Therapy   Initial Evaluation     Patient Name: Benry Renee  Age:  79 y.o., Sex:  female  Medical Record #: [id number]  Today's Date: 1/17/2025     Precautions  Precautions: Fall Risk    Assessment  Patient is 79 y.o. female that presented to acute with complaints of acute on chronic respiratory failure due to influenza and COPD exacerbation. PMHx significant for COPD on 8L O2, HFpEF 60%, CA in remission, REGINE, HTN.    She presented to PT with decreased activity tolerance related to medical condition that are affecting her ability to perform functional mobility at [facility]. She mobilized as detailed below. Anticipate she will self progress as medical issues resolve. Recommend patient mobilize to chair 3x/day at meal times, to toilet, and in room/unit as able to progress strength and tolerance. Patient will not be actively followed for physical therapy services at this time, however may be seen if requested by physician for 1 more visit within 30 days to address any discharge or equipment needs.      Plan    Physical Therapy Initial Treatment Plan   Duration: Discharge Needs Only    DC Equipment Recommendations: None (has DME)  Discharge Recommendations: Recommend home health for continued physical therapy services       Subjective    RN cleared patient for therapy; patient received in recliner and agreeable to evaluation     Objective       01/17/25 1121   Vitals   O2 (LPM) 8   O2 Delivery Device Silicone Nasal Cannula   Pain 0 - 10 Group   Therapist Pain Assessment   (no pain complaint during session)   Prior Living Situation   Housing / Facility 1 Story Apartment / Condo   Steps Into Home 0   Steps In Home 0   Bathroom Set up Bathtub / Shower Combination   Equipment Owned 4-Wheel Walker;Scooter;Single Point Cane   Lives with - Patient's Self Care Capacity Alone and Able to Care For Self   Comments Patient reported she has ample social support from friends   Prior Level of Functional Mobility   Bed Mobility  Independent   Transfer Status Independent   Ambulation Independent   Ambulation Distance household   Assistive Devices Used 4-Wheel Walker   Stairs   (avoids)   Cognition    Cognition / Consciousness WDL   Level of Consciousness Alert   Comments pleasant, cooperative. adamant about returning home   Active ROM Upper Body   Comments patient moved BUE functionally during session   Active ROM Lower Body    Comments functional for mobility   Strength Lower Body   Comments functional for mobility   Coordination Upper Body   Coordination WDL   Coordination Lower Body    Coordination Lower Body  WDL   Balance Assessment   Sitting Balance (Static) Fair +   Sitting Balance (Dynamic) Fair +   Standing Balance (Static) Fair +   Standing Balance (Dynamic) Fair +   Weight Shift Sitting Good   Weight Shift Standing Good   Comments used FWW, no overt LOB   Bed Mobility    Supine to Sit   (NT, in recliner on entry)   Sit to Supine Supervised   Scooting Supervised   Comments flat bed, used rail. reported she sleeps in recliner   Gait Analysis   Gait Level Of Assist Supervised   Assistive Device Front Wheel Walker   Distance (Feet) 30  (lap in room)   # of Times Distance was Traveled 1   Deviation   (decreased jamarcus)   # of Stairs Climbed 0   Weight Bearing Status no restrictions   Vision Deficits Impacting Mobility NT   Functional Mobility   Sit to Stand Supervised   Bed, Chair, Wheelchair Transfer Supervised   Transfer Method Stand Step   6 Clicks Assessment - How much HELP from from another person do you currently need... (If the patient hasn't done an activity recently, how much help from another person do you think he/she would need if he/she tried?)   Turning from your back to your side while in a flat bed without using bedrails? 4   Moving from lying on your back to sitting on the side of a flat bed without using bedrails? 4   Moving to and from a bed to a chair (including a wheelchair)? 4   Standing up from a chair using  your arms (e.g., wheelchair, or bedside chair)? 3   Walking in hospital room? 3   Climbing 3-5 steps with a railing? 3   6 clicks Mobility Score 21   Education Group   Education Provided Role of Physical Therapist   Role of Physical Therapist Patient Response Patient;Acceptance;Explanation;Verbal Demonstration

## 2025-01-17 NOTE — THERAPY
Occupational Therapy   Initial Evaluation     Patient Name: Berny Renee  Age:  79 y.o., Sex:  female  Medical Record #: [id number]  Today's Date: 1/17/2025     Precautions  Precautions: Fall Risk    Assessment  Patient is a 79 y.o. female with a diagnosis of acute on chronic respiratory failure with hypoxia secondary to the flu and COPD exacerbation. Additional factors influencing patient status / progress: PMHx includes COPD (on 8 L at baseline), HFpEF (EF 60% 11/24 ), cancer in remission, REGINE, HTN, chronic dyspnea on exertion.      During OT eval, pt presented with impaired activity tolerance, endurance and mobility compared to her baseline but was able to perform all ADLs and related mobility with overall SPV using the FWW. Pt could don socks and underwear, manage clothing and hygiene for toileting and stand at the sink for g/h with SPV. Pt becomes SOB quickly with activity and needs rest breaks to pace herself. Encouraged pt to continue getting up regularly with nursing and sit in the chair for meals. No further acute OT needs. Patient will not be actively followed for occupational therapy services at this time, however may be seen if requested by physician for 1 more visit within 30 days to address any discharge or equipment needs. Recommend home health.     Plan    Occupational Therapy Initial Treatment Plan   Duration: Discharge Needs Only    DC Equipment Recommendations: Unable to determine at this time  Discharge Recommendations: Recommend home health for continued occupational therapy services     Subjective    Pt agreeable to OT eval.      Objective     01/17/25 1045   Initial Contact Note    Initial Contact Note Order Received and Verified, Evaluation Only - Patient Does Not Require Further Acute Occupational Therapy at this Time.  However, May Benefit from Post Acute Therapy for Higher Level Functional Deficits.   Prior Living Situation   Prior Services Housekeeping / Homemaker Services  (  "assists with house cleaning and laundry)   Housing / Facility 1 Story Apartment / Condo   Bathroom Set up Walk In Shower;Grab Bars;Shower Chair   Equipment Owned 4-Wheel Walker;Tub / Shower Seat;Grab Bar(s) In Tub / Shower;Hand Held Shower;Oxygen   Lives with - Patient's Self Care Capacity Alone and Able to Care For Self   Comments Pt reports she has good support from friends and someone is there \"every day\" and they provide transportation for appointments/errands. Has meal deliveries or friends bring meals.   Prior Level of ADL Function   Self Feeding Independent   Grooming / Hygiene Independent   Bathing Independent   Dressing Independent   Toileting Independent   Prior Level of IADL Function   Medication Management Independent   Laundry Requires Assist   Kitchen Mobility Independent   Finances Independent   Home Management Requires Assist   Shopping Requires Assist   Prior Level Of Mobility Independent With Device in Home   Driving / Transportation Relatives / Others Provide Transportation   Occupation (Pre-Hospital Vocational) Retired Due To Age   Precautions   Precautions Fall Risk   Pain   Intervention Declines   Cognition    Cognition / Consciousness WDL   Level of Consciousness Alert   Comments Pleasant and cooperative   Active ROM Upper Body   Active ROM Upper Body  WDL   Strength Upper Body   Upper Body Strength  WDL   Sensation Upper Body   Upper Extremity Sensation  Not Tested   Upper Body Muscle Tone   Upper Body Muscle Tone  WDL   Neurological Concerns   Neurological Concerns No   Coordination Upper Body   Coordination WDL   Balance Assessment   Sitting Balance (Static) Good   Sitting Balance (Dynamic) Good   Standing Balance (Static) Fair +   Standing Balance (Dynamic) Fair   Weight Shift Sitting Good   Weight Shift Standing Good   Comments FWW   Bed Mobility    Supine to Sit Supervised   Scooting Supervised   Comments HOB elevated   ADL Assessment   Grooming Supervision;Standing  (hand hygiene at " sink)   Lower Body Dressing Supervision  (don socks and underwear)   Toileting Supervision   How much help from another person does the patient currently need...   Putting on and taking off regular lower body clothing? 3   Bathing (including washing, rinsing, and drying)? 3   Toileting, which includes using a toilet, bedpan, or urinal? 3   Putting on and taking off regular upper body clothing? 4   Taking care of personal grooming such as brushing teeth? 4   Eating meals? 4   6 Clicks Daily Activity Score 21   Functional Mobility   Sit to Stand Supervised   Bed, Chair, Wheelchair Transfer Supervised   Toilet Transfers Supervised   Transfer Method Stand Step   Mobility in room for ADLs with FWW   Activity Tolerance   Sitting in Chair post   Sitting Edge of Bed 3 min   Standing 2-3 min total   Education Group   Role of Occupational Therapist Patient Response Patient;Acceptance;Explanation;Verbal Demonstration   Occupational Therapy Initial Treatment Plan    Duration Discharge Needs Only   Problem List   Problem List Decreased Homemaking Skills;Decreased Functional Mobility;Decreased Activity Tolerance   Anticipated Discharge Equipment and Recommendations   DC Equipment Recommendations Unable to determine at this time   Discharge Recommendations Recommend home health for continued occupational therapy services   Interdisciplinary Plan of Care Collaboration   IDT Collaboration with  Nursing;Physical Therapist;Respiratory Therapist   Patient Position at End of Therapy Seated;Chair Alarm On;Call Light within Reach;Tray Table within Reach;Phone within Reach   Collaboration Comments RN and PT updated, RT in room end of session   Session Information   Date / Session Number  1/17 d/c needs

## 2025-01-18 PROCEDURE — 94669 MECHANICAL CHEST WALL OSCILL: CPT

## 2025-01-18 PROCEDURE — A9270 NON-COVERED ITEM OR SERVICE: HCPCS | Performed by: STUDENT IN AN ORGANIZED HEALTH CARE EDUCATION/TRAINING PROGRAM

## 2025-01-18 PROCEDURE — 700111 HCHG RX REV CODE 636 W/ 250 OVERRIDE (IP): Mod: JZ | Performed by: INTERNAL MEDICINE

## 2025-01-18 PROCEDURE — 700102 HCHG RX REV CODE 250 W/ 637 OVERRIDE(OP): Performed by: INTERNAL MEDICINE

## 2025-01-18 PROCEDURE — 700111 HCHG RX REV CODE 636 W/ 250 OVERRIDE (IP): Performed by: INTERNAL MEDICINE

## 2025-01-18 PROCEDURE — 94640 AIRWAY INHALATION TREATMENT: CPT

## 2025-01-18 PROCEDURE — 94664 DEMO&/EVAL PT USE INHALER: CPT

## 2025-01-18 PROCEDURE — 99233 SBSQ HOSP IP/OBS HIGH 50: CPT | Performed by: STUDENT IN AN ORGANIZED HEALTH CARE EDUCATION/TRAINING PROGRAM

## 2025-01-18 PROCEDURE — 770020 HCHG ROOM/CARE - TELE (206)

## 2025-01-18 PROCEDURE — A9270 NON-COVERED ITEM OR SERVICE: HCPCS | Performed by: INTERNAL MEDICINE

## 2025-01-18 PROCEDURE — 700101 HCHG RX REV CODE 250: Performed by: STUDENT IN AN ORGANIZED HEALTH CARE EDUCATION/TRAINING PROGRAM

## 2025-01-18 PROCEDURE — 700102 HCHG RX REV CODE 250 W/ 637 OVERRIDE(OP): Performed by: STUDENT IN AN ORGANIZED HEALTH CARE EDUCATION/TRAINING PROGRAM

## 2025-01-18 RX ORDER — AMLODIPINE BESYLATE 5 MG/1
5 TABLET ORAL
Status: DISCONTINUED | OUTPATIENT
Start: 2025-01-18 | End: 2025-01-19 | Stop reason: HOSPADM

## 2025-01-18 RX ORDER — SPIRONOLACTONE 25 MG/1
50 TABLET ORAL
Status: DISCONTINUED | OUTPATIENT
Start: 2025-01-19 | End: 2025-01-19 | Stop reason: HOSPADM

## 2025-01-18 RX ADMIN — IPRATROPIUM BROMIDE AND ALBUTEROL SULFATE 3 ML: .5; 2.5 SOLUTION RESPIRATORY (INHALATION) at 09:10

## 2025-01-18 RX ADMIN — DIGOXIN 125 MCG: 0.12 TABLET ORAL at 17:16

## 2025-01-18 RX ADMIN — POTASSIUM CHLORIDE 10 MEQ: 1500 TABLET, EXTENDED RELEASE ORAL at 17:17

## 2025-01-18 RX ADMIN — MONTELUKAST 10 MG: 10 TABLET, FILM COATED ORAL at 17:17

## 2025-01-18 RX ADMIN — FUROSEMIDE 20 MG: 20 TABLET ORAL at 05:45

## 2025-01-18 RX ADMIN — POTASSIUM CHLORIDE 10 MEQ: 1500 TABLET, EXTENDED RELEASE ORAL at 05:44

## 2025-01-18 RX ADMIN — OSELTAMIVIR PHOSPHATE 75 MG: 75 CAPSULE ORAL at 18:01

## 2025-01-18 RX ADMIN — LOSARTAN POTASSIUM 50 MG: 50 TABLET, FILM COATED ORAL at 05:45

## 2025-01-18 RX ADMIN — IPRATROPIUM BROMIDE AND ALBUTEROL SULFATE 3 ML: .5; 2.5 SOLUTION RESPIRATORY (INHALATION) at 05:34

## 2025-01-18 RX ADMIN — SENNOSIDES AND DOCUSATE SODIUM 2 TABLET: 50; 8.6 TABLET ORAL at 17:17

## 2025-01-18 RX ADMIN — TRAMADOL HYDROCHLORIDE 50 MG: 50 TABLET, COATED ORAL at 05:48

## 2025-01-18 RX ADMIN — MELOXICAM 7.5 MG: 7.5 TABLET ORAL at 17:17

## 2025-01-18 RX ADMIN — AMLODIPINE BESYLATE 5 MG: 5 TABLET ORAL at 08:41

## 2025-01-18 RX ADMIN — OXYCODONE 5 MG: 5 TABLET ORAL at 19:41

## 2025-01-18 RX ADMIN — SPIRONOLACTONE 25 MG: 25 TABLET ORAL at 05:44

## 2025-01-18 RX ADMIN — OSELTAMIVIR PHOSPHATE 75 MG: 75 CAPSULE ORAL at 05:43

## 2025-01-18 RX ADMIN — LOSARTAN POTASSIUM 50 MG: 50 TABLET, FILM COATED ORAL at 17:17

## 2025-01-18 RX ADMIN — GUAIFENESIN 600 MG: 600 TABLET, EXTENDED RELEASE ORAL at 05:44

## 2025-01-18 RX ADMIN — FEXOFENADINE HCL 180 MG: 60 TABLET, FILM COATED ORAL at 17:17

## 2025-01-18 RX ADMIN — HYDRALAZINE HYDROCHLORIDE 10 MG: 20 INJECTION, SOLUTION INTRAMUSCULAR; INTRAVENOUS at 07:59

## 2025-01-18 RX ADMIN — ENOXAPARIN SODIUM 40 MG: 100 INJECTION SUBCUTANEOUS at 17:16

## 2025-01-18 RX ADMIN — GUAIFENESIN 600 MG: 600 TABLET, EXTENDED RELEASE ORAL at 17:17

## 2025-01-18 RX ADMIN — IPRATROPIUM BROMIDE AND ALBUTEROL SULFATE 3 ML: .5; 2.5 SOLUTION RESPIRATORY (INHALATION) at 19:47

## 2025-01-18 RX ADMIN — FLUTICASONE FUROATE, UMECLIDINIUM BROMIDE AND VILANTEROL TRIFENATATE 1 PUFF: 100; 62.5; 25 POWDER RESPIRATORY (INHALATION) at 08:37

## 2025-01-18 RX ADMIN — IPRATROPIUM BROMIDE AND ALBUTEROL SULFATE 3 ML: .5; 2.5 SOLUTION RESPIRATORY (INHALATION) at 14:59

## 2025-01-18 RX ADMIN — OMEPRAZOLE 20 MG: 20 CAPSULE, DELAYED RELEASE ORAL at 05:44

## 2025-01-18 RX ADMIN — PREDNISONE 30 MG: 20 TABLET ORAL at 05:44

## 2025-01-18 RX ADMIN — OXYCODONE 5 MG: 5 TABLET ORAL at 07:58

## 2025-01-18 RX ADMIN — DULOXETINE HYDROCHLORIDE 60 MG: 60 CAPSULE, DELAYED RELEASE ORAL at 17:16

## 2025-01-18 ASSESSMENT — ENCOUNTER SYMPTOMS
SHORTNESS OF BREATH: 1
VOMITING: 0
COUGH: 1
ABDOMINAL PAIN: 0
CHILLS: 0
DIZZINESS: 0
SORE THROAT: 0
FEVER: 0
NAUSEA: 0

## 2025-01-18 ASSESSMENT — PAIN DESCRIPTION - PAIN TYPE
TYPE: ACUTE PAIN

## 2025-01-18 ASSESSMENT — FIBROSIS 4 INDEX: FIB4 SCORE: 1.03

## 2025-01-18 NOTE — PROGRESS NOTES
Monitor Summary:     Rhythm: SB-SR  Rate: 57-78  Ectopy: (R) PVC  Measurements: 0.16/0.10/0.39              12 Hour Chart Check

## 2025-01-18 NOTE — PROGRESS NOTES
Bedside report received and patient care assumed. Pt is resting in bed, A&O4, with 5/10 abd pain, and is on 8 L NC. Tele box on. Pt /65. Pt medicated per MAR for pain and hypertension. All fall precautions are in place, belongings at bedside table.  Pt was updated on POC, no questions or concerns. Pt educated on use of call light for assistance.

## 2025-01-18 NOTE — CARE PLAN
The patient is Stable - Low risk of patient condition declining or worsening    Shift Goals  Clinical Goals: O2 management, rest, comfort  Patient Goals: rest, nutrition  Family Goals: not present    Progress made toward(s) clinical / shift goals:      Problem: Risk for Infection - COPD  Goal: Patient will remain free from signs and symptoms of infection  Outcome: Progressing     Problem: Nutrition - Advanced  Goal: Patient will display progressive weight gain toward goal have adequate food and fluid intake  Outcome: Progressing     Problem: Fall Risk  Goal: Patient will remain free from falls  Outcome: Progressing       Patient is not progressing towards the following goals:

## 2025-01-18 NOTE — PROGRESS NOTES
Hospital Medicine Daily Progress Note    Date of Service  1/18/2025    Chief Complaint  Berny Renee is a 79 y.o. female admitted 1/16/2025 with shortness of breath     Hospital Course  The patient is a 79-year-old female with a past medical history significant for COPD (on 8 L at baseline), HFpEF (EF 60% 11/24 ), lung cancer in remission, REGINE, HTN, chronic dyspnea on exertion admitted on 1/1620 25 for acute on chronic hypoxemic respiratory failure secondary to influenza A and COPD exacerbation.       Interval Problem Update  Patient seen examined at bedside she is not feeling great today, she is tired and has been incontinent of urine due to the lasix.   - she reports being up, still with desats with ambulation but she reports dropping to low 80s now and not low 70s likely before. Still with prolonged recovery phase.   -Continue aggressive RT protocol and inhaler therapy  -Continue Tamiflu  -cont.  low-dose p.o. Lasix 20 mg daily  -Continue I-S and mobilization wean O2 as able  - Her BP is elevated I have increased aldactone and started amlodipine     I have discussed this patient's plan of care and discharge plan at IDT rounds today with Case Management, Nursing, Nursing leadership, and other members of the IDT team.    Consultants/Specialty  pulmonary    Code Status  DNAR/DNI    Disposition  The patient is not medically cleared for discharge to home or a post-acute facility.      I have placed the appropriate orders for post-discharge needs.    Review of Systems  Review of Systems   Constitutional:  Negative for chills, fever and malaise/fatigue.   HENT:  Negative for sore throat.    Respiratory:  Positive for cough and shortness of breath.    Cardiovascular:  Negative for chest pain and leg swelling.   Gastrointestinal:  Negative for abdominal pain, nausea and vomiting.   Neurological:  Negative for dizziness.        Physical Exam  Temp:  [36.5 °C (97.7 °F)-37 °C (98.6 °F)] 36.6 °C (97.9 °F)  Pulse:  [71-92]  81  Resp:  [15-24] 20  BP: (140-186)/(56-83) 146/56  SpO2:  [90 %-95 %] 94 %    Physical Exam  Vitals and nursing note reviewed.   Constitutional:       Appearance: She is obese. She is not toxic-appearing.   HENT:      Head: Normocephalic and atraumatic.      Mouth/Throat:      Mouth: Mucous membranes are moist.      Pharynx: Oropharynx is clear.   Eyes:      Conjunctiva/sclera: Conjunctivae normal.   Cardiovascular:      Rate and Rhythm: Normal rate.      Comments: Distant heart sounds  Pulmonary:      Effort: Respiratory distress present.      Breath sounds: No wheezing.      Comments: Increased work of breathing, diminished breath sounds throughout  Abdominal:      General: Bowel sounds are normal.      Palpations: Abdomen is soft.   Musculoskeletal:         General: Normal range of motion.      Cervical back: Neck supple.      Right lower leg: No edema.      Left lower leg: No edema.   Skin:     General: Skin is warm.   Neurological:      General: No focal deficit present.      Mental Status: She is alert and oriented to person, place, and time. Mental status is at baseline.   Psychiatric:         Mood and Affect: Mood normal.         Behavior: Behavior normal.         Thought Content: Thought content normal.         Judgment: Judgment normal.         Fluids    Intake/Output Summary (Last 24 hours) at 1/18/2025 1456  Last data filed at 1/18/2025 1120  Gross per 24 hour   Intake 1140 ml   Output 1100 ml   Net 40 ml        Laboratory  Recent Labs     01/16/25  0508 01/17/25  0055   WBC 12.4* 11.9*   RBC 3.89* 3.61*   HEMOGLOBIN 11.6* 10.8*   HEMATOCRIT 36.5* 33.6*   MCV 93.8 93.1   MCH 29.8 29.9   MCHC 31.8* 32.1*   RDW 46.2 46.4   PLATELETCT 440 416   MPV 9.3 9.2     Recent Labs     01/16/25  0508 01/17/25  0055   SODIUM 140 138   POTASSIUM 3.7 3.9   CHLORIDE 99 98   CO2 31 32   GLUCOSE 134* 169*   BUN 9 16   CREATININE 0.27* 0.49*   CALCIUM 8.8 9.4                   Imaging  DX-CHEST-PORTABLE (1 VIEW)   Final  Result         1.  Pulmonary edema and/or infiltrates are identified, which are stable since the prior exam.   2.  Atherosclerosis           Assessment/Plan  * Acute on chronic respiratory failure with hypoxia- (present on admission)  Assessment & Plan  Secondary to influenza, COPD exacerbation  RT protocol, supplemental oxygen as needed  Requires 8 L nasal cannula at rest which is her baseline.  She is currently saturating well on her baseline at rest however continues to have significant desaturations down , although is somewhat improved from yesterday she still is getting to the low 80s and having a prolonged recovery phase.    Continue aggressive RT, I-S, mobilization  Wean O2 as able    Leukocytosis  Assessment & Plan  WBC 12.4 in the setting of chronic steroid use.  Chest x-ray showed infiltrates or edema  procalcitonin is negative hold on antibacterial coverage    Influenza A  Assessment & Plan  Course Tamiflu for 5 days  Droplet precautions  Tylenol as needed for fever    Acute exacerbation of chronic obstructive pulmonary disease (COPD) (HCC)- (present on admission)  Assessment & Plan  Secondary to influenza A.  Treatment per protocol with DuoNeb and steroids  RT protocol  Supplemental oxygen as needed      Primary hypertension- (present on admission)  Assessment & Plan  Uncontrolled, 180/69.  Continue losartan, spironolactone with holding parameters  I have increased renal lactone to 50 mg daily, I have added amlodipine 5 mg daily  Continue with Lasix 20 mg daily  IV hydralazine as needed    Chronic pain syndrome- (present on admission)  Assessment & Plan  Continue tizanidine, meloxicam, Cymbalta, Tylenol, oxycodone, tramadol as needed         VTE prophylaxis:    enoxaparin ppx      I have performed a physical exam and reviewed and updated ROS and Plan today (1/18/2025). In review of yesterday's note (1/17/2025), there are no changes except as documented above.

## 2025-01-18 NOTE — CARE PLAN
Problem: Bronchoconstriction  Goal: Improve in air movement and diminished wheezing  Description: Target End Date:  2 to 3 days    1.  Implement inhaled treatments  2.  Evaluate and manage medication effects  Outcome: Progressing     Q4-wa Duoneb  QID Flutter

## 2025-01-18 NOTE — PROGRESS NOTES
Monitor Summary  Rhythm: SR  Rate: 69-97  Ectopy: rare pvc's, freq pac's  Measurements: 0.17/0.10/0.36  ---12 hr Chart Review---

## 2025-01-18 NOTE — RESPIRATORY CARE
"  COPD EDUCATION by COPD CLINICAL EDUCATOR  (Phone: [phone])  1/18/2025 at 3:35 PM by Rabia Villatoro RRT    Patient seen by the education team to complete their final block of education.  This session discussed the signs and symptoms of an exacerbation (flare-up), triggers that can create flare-ups and reiteration of an \"Action Plan\" to reference daily. This will help to categorize their symptoms and utilize appropriate therapy.  Further education included breathing techniques to treat acute symptoms and home Oxygen safety.  Question and answer session followed. Patient requesting follow-up apt with PCP as well today. Called for scheduling. Patient understands inhaler changes upon discharge.     COPD Screen  COPD Risk Screening  Do you have a history of COPD?: Yes  Do you have a Pulmonologist?: Yes    COPD Assessment  COPD Clinical Specialists ONLY  COPD Education Initiated: Yes--Full Intervention (Block 2. Dr. Kumar switching pt to breztri after DC wants to see her in clinic in a week. Called 1/17 for apt and 1/18 for primary care apt. Patient given spacer instruction.)  COPD Education Session 1: Yes  COPD Education Session 2: Yes  Is this a COPD exacerbation patient?: Yes  DME Company: 8L, nebulizer  DME Equipment Type: [employer]  Physician Name: Everett Diego M.D.  Pulmonary Follow Up Appointment: 04/16/25  Appt Time: 1130  Pulmonologist Name: [facility]- Called to make apt sooner than April  Palliative Care: Yes  Advance Directive: Yes  Is POLST on file?: Yes  $ Demo/Eval of SVN's, MDI's and Aerosols: Yes  (OP) Pulmonary Function Testing: Yes  Interdisciplinary Rounds: Attendance at Rounds (30 Min)    PFT Results    No results found for: \"PFT\"    Meds to Beds  [facility] provides bedside medication delivery for all eligible patients at discharge and you have been automatically enrolled in the Meds to Beds Program. Would you like to opt out of this program for any reason?: No - Stay Opted In     MY COPD ACTION " "PLAN     It is recommended that patients and physicians /healthcare providers complete this action plan together. This plan should be discussed at each physician visit and updated as needed.    The green, yellow and red zones show groups of symptoms of COPD. This list of symptoms is not comprehensive, and you may experience other symptoms. In the \"Actions\" column, your healthcare provider has recommended actions for you to take based on your symptoms.    Patient Name: Berny Renee   YOB: 1945   Last Updated on: 1/18/2025  3:32 PM   Green Zone:  I am doing well today Actions     Usual activitiy and exercise level   Take daily medications     Usual amounts of cough and phlegm/mucus   Use oxygen as prescribed     Sleep well at night   Continue regular exercise/diet plan     Appetite is good   At all times avoid cigarette smoke, inhaled irritants     Daily Medications (these medications are taken every day):   Budesonide/Glycopyrrolate/Formoterol Fumarate (Breztri Aerosphere) 2 Puffs Twice daily     Additional Information:  Use as directed.  Please rinse mouth after using and use spacer.    This replaces symbicort and spiriva.     Yellow Zone:  I am having a bad day or a COPD flare Actions     More breathless than usual   Continue daily medications     I have less energy for my daily activities   Use quick relief inhaler as ordered     Increased or thicker phlegm/mucus   Use oxygen as prescribed     Using quick relief inhaler/nebulizer more often   Get plenty of rest     Swelling of ankles more than usual   Use pursed lip breathing     More coughing than usual   At all times avoid cigarette smoke, inhaled irritants     I feel like I have a \"chest cold\"     Poor sleep and my symptoms woke me up     My appetite is not good     My medicine is not helping      Call provider immediately if symptoms don’t improve     Continue daily medications, add rescue medications:   Albuterol/Ipratropium (Combivent, " Duoneb)  Albuterol 3mL via nebulizer  2 Puffs Every 4 hours PRN  Every 4 hours PRN       Medications to be used during a flare up, (as Discussed with Provider):           Additional Information:  Use your nebulizer first when short of breath and rinse after each use.     Use a spacer with your rescue inhaler when nebulizer is not available.         Red Zone:  I need urgent medical care Actions     Severe shortness of breath even at rest   Call 911 or seek medical care immediately     Not able to do any activity because of breathing      Fever or shaking chills      Feeling confused or very drowsy       Chest pains      Coughing up blood

## 2025-01-19 ENCOUNTER — PHARMACY VISIT (OUTPATIENT)
Dept: PHARMACY | Facility: MEDICAL CENTER | Age: 80
End: 2025-01-19
Payer: COMMERCIAL

## 2025-01-19 ENCOUNTER — HOME HEALTH ADMISSION (OUTPATIENT)
Dept: HOME HEALTH SERVICES | Facility: HOME HEALTHCARE | Age: 80
End: 2025-01-19
Payer: MEDICARE

## 2025-01-19 VITALS
TEMPERATURE: 97.9 F | BODY MASS INDEX: 27.59 KG/M2 | SYSTOLIC BLOOD PRESSURE: 154 MMHG | HEART RATE: 92 BPM | RESPIRATION RATE: 18 BRPM | DIASTOLIC BLOOD PRESSURE: 74 MMHG | HEIGHT: 64 IN | WEIGHT: 161.6 LBS | OXYGEN SATURATION: 96 %

## 2025-01-19 LAB
ANION GAP SERPL CALC-SCNC: 10 MMOL/L (ref 7–16)
BUN SERPL-MCNC: 13 MG/DL (ref 8–22)
CALCIUM SERPL-MCNC: 9.1 MG/DL (ref 8.5–10.5)
CHLORIDE SERPL-SCNC: 90 MMOL/L (ref 96–112)
CO2 SERPL-SCNC: 35 MMOL/L (ref 20–33)
CREAT SERPL-MCNC: 0.36 MG/DL (ref 0.5–1.4)
GFR SERPLBLD CREATININE-BSD FMLA CKD-EPI: 103 ML/MIN/1.73 M 2
GLUCOSE SERPL-MCNC: 123 MG/DL (ref 65–99)
POTASSIUM SERPL-SCNC: 3.3 MMOL/L (ref 3.6–5.5)
SODIUM SERPL-SCNC: 135 MMOL/L (ref 135–145)

## 2025-01-19 PROCEDURE — 94669 MECHANICAL CHEST WALL OSCILL: CPT

## 2025-01-19 PROCEDURE — 36415 COLL VENOUS BLD VENIPUNCTURE: CPT

## 2025-01-19 PROCEDURE — 94640 AIRWAY INHALATION TREATMENT: CPT

## 2025-01-19 PROCEDURE — 700101 HCHG RX REV CODE 250: Performed by: STUDENT IN AN ORGANIZED HEALTH CARE EDUCATION/TRAINING PROGRAM

## 2025-01-19 PROCEDURE — RXMED WILLOW AMBULATORY MEDICATION CHARGE: Performed by: STUDENT IN AN ORGANIZED HEALTH CARE EDUCATION/TRAINING PROGRAM

## 2025-01-19 PROCEDURE — 700111 HCHG RX REV CODE 636 W/ 250 OVERRIDE (IP): Performed by: INTERNAL MEDICINE

## 2025-01-19 PROCEDURE — 80048 BASIC METABOLIC PNL TOTAL CA: CPT

## 2025-01-19 PROCEDURE — 99239 HOSP IP/OBS DSCHRG MGMT >30: CPT | Performed by: STUDENT IN AN ORGANIZED HEALTH CARE EDUCATION/TRAINING PROGRAM

## 2025-01-19 PROCEDURE — 700102 HCHG RX REV CODE 250 W/ 637 OVERRIDE(OP): Performed by: STUDENT IN AN ORGANIZED HEALTH CARE EDUCATION/TRAINING PROGRAM

## 2025-01-19 PROCEDURE — A9270 NON-COVERED ITEM OR SERVICE: HCPCS | Performed by: STUDENT IN AN ORGANIZED HEALTH CARE EDUCATION/TRAINING PROGRAM

## 2025-01-19 PROCEDURE — 700102 HCHG RX REV CODE 250 W/ 637 OVERRIDE(OP): Performed by: INTERNAL MEDICINE

## 2025-01-19 PROCEDURE — A9270 NON-COVERED ITEM OR SERVICE: HCPCS | Performed by: INTERNAL MEDICINE

## 2025-01-19 RX ORDER — PREDNISONE 10 MG/1
TABLET ORAL
Qty: 30 TABLET | Refills: 0 | Status: ON HOLD | OUTPATIENT
Start: 2025-01-19 | End: 2025-01-26

## 2025-01-19 RX ORDER — BENZONATATE 100 MG/1
100 CAPSULE ORAL 3 TIMES DAILY PRN
Qty: 60 CAPSULE | Refills: 0 | Status: SHIPPED | OUTPATIENT
Start: 2025-01-19

## 2025-01-19 RX ORDER — IPRATROPIUM BROMIDE AND ALBUTEROL SULFATE 2.5; .5 MG/3ML; MG/3ML
3 SOLUTION RESPIRATORY (INHALATION) 2 TIMES DAILY
Status: DISCONTINUED | OUTPATIENT
Start: 2025-01-19 | End: 2025-01-19 | Stop reason: HOSPADM

## 2025-01-19 RX ORDER — AMLODIPINE BESYLATE 5 MG/1
5 TABLET ORAL DAILY
Qty: 100 TABLET | Refills: 3 | Status: SHIPPED | OUTPATIENT
Start: 2025-01-20 | End: 2026-02-24

## 2025-01-19 RX ORDER — ECHINACEA PURPUREA EXTRACT 125 MG
2 TABLET ORAL
Status: DISCONTINUED | OUTPATIENT
Start: 2025-01-19 | End: 2025-01-19 | Stop reason: HOSPADM

## 2025-01-19 RX ORDER — POTASSIUM CHLORIDE 1500 MG/1
20 TABLET, EXTENDED RELEASE ORAL ONCE
Status: COMPLETED | OUTPATIENT
Start: 2025-01-19 | End: 2025-01-19

## 2025-01-19 RX ORDER — SPIRONOLACTONE 50 MG/1
50 TABLET, FILM COATED ORAL DAILY
Qty: 30 TABLET | Refills: 3 | Status: SHIPPED | OUTPATIENT
Start: 2025-01-20

## 2025-01-19 RX ADMIN — GUAIFENESIN 600 MG: 600 TABLET, EXTENDED RELEASE ORAL at 06:01

## 2025-01-19 RX ADMIN — FLUTICASONE FUROATE, UMECLIDINIUM BROMIDE AND VILANTEROL TRIFENATATE 1 PUFF: 100; 62.5; 25 POWDER RESPIRATORY (INHALATION) at 06:43

## 2025-01-19 RX ADMIN — IPRATROPIUM BROMIDE AND ALBUTEROL SULFATE 3 ML: .5; 2.5 SOLUTION RESPIRATORY (INHALATION) at 10:03

## 2025-01-19 RX ADMIN — FUROSEMIDE 20 MG: 20 TABLET ORAL at 06:01

## 2025-01-19 RX ADMIN — SPIRONOLACTONE 50 MG: 25 TABLET ORAL at 06:01

## 2025-01-19 RX ADMIN — OMEPRAZOLE 20 MG: 20 CAPSULE, DELAYED RELEASE ORAL at 06:01

## 2025-01-19 RX ADMIN — PREDNISONE 30 MG: 20 TABLET ORAL at 06:01

## 2025-01-19 RX ADMIN — IPRATROPIUM BROMIDE AND ALBUTEROL SULFATE 3 ML: .5; 2.5 SOLUTION RESPIRATORY (INHALATION) at 06:41

## 2025-01-19 RX ADMIN — AMLODIPINE BESYLATE 5 MG: 5 TABLET ORAL at 06:01

## 2025-01-19 RX ADMIN — OSELTAMIVIR PHOSPHATE 75 MG: 75 CAPSULE ORAL at 06:02

## 2025-01-19 RX ADMIN — POTASSIUM CHLORIDE 20 MEQ: 1500 TABLET, EXTENDED RELEASE ORAL at 09:34

## 2025-01-19 RX ADMIN — POTASSIUM CHLORIDE 10 MEQ: 1500 TABLET, EXTENDED RELEASE ORAL at 06:01

## 2025-01-19 RX ADMIN — LOSARTAN POTASSIUM 50 MG: 50 TABLET, FILM COATED ORAL at 06:01

## 2025-01-19 ASSESSMENT — FIBROSIS 4 INDEX: FIB4 SCORE: 1.03

## 2025-01-19 NOTE — CARE PLAN
The patient is Stable - Low risk of patient condition declining or worsening    Shift Goals  Clinical Goals: O2 management, respiratory treatments, rest, comfort  Patient Goals: comfort, sleep, pain control  Family Goals: LORA    Progress made toward(s) clinical / shift goals:      Problem: Knowledge Deficit - COPD  Goal: Patient/significant other demonstrates understanding of disease process, utilization of the Action Plan, medications and discharge instruction  Outcome: Progressing     Problem: Pain - Standard  Goal: Alleviation of pain or a reduction in pain to the patient’s comfort goal  Outcome: Progressing     Problem: Fall Risk  Goal: Patient will remain free from falls  Outcome: Progressing       Patient is not progressing towards the following goals:

## 2025-01-19 NOTE — DISCHARGE PLANNING
Form placed in your been let me know if she needs to come in for a physical, she is due for one. Set up with [facility], has an oxygen concentrator at home.

## 2025-01-19 NOTE — PROGRESS NOTES
Monitor Summary  Rhythm: SR  Rate: 75-88  Ectopy: (R) PVC  Measurements: .16/.08/.33  ---12 hr Chart Review---

## 2025-01-19 NOTE — PROGRESS NOTES
Monitor Summary  Rhythm: SR  Rate: 69-89  Ectopy: occ to freq pvc's, occ pac's  Measurements: 0.15/0.10/0.37

## 2025-01-19 NOTE — PROGRESS NOTES
Discharge instructions given to patient at bedside, verbalizes understanding and states plans for follow-up with PCP. New and home medication review, post-discharge activity level and worsening of symptoms needing follow-up care discussed. Telemetry monitor/IV cathlon removed. All belongings accounted for, all questions answered at this time. Patient escorted down to family member's car via wheelchair.

## 2025-01-19 NOTE — DISCHARGE SUMMARY
Discharge Summary    CHIEF COMPLAINT ON ADMISSION  Chief Complaint   Patient presents with    Shortness of Breath     BIB EMS from home, reports shortness of breath upon exertion since November worse today. Patient states whenever she goes to the bathroom or move around she will have short of breath with chest tightness. Hx COPD baseline 8LPM at home. Denies fever and no contact with sick family. Dounebx2 at home by patient, albuterol neb and douneb x1 by EMS. Sats 96% at 6LPM NC upon arrival       Reason for Admission  ems     Admission Date  1/16/2025    CODE STATUS  DNAR/DNI    HPI & HOSPITAL COURSE    Berny Renee is a 79-year-old female with a past medical history significant for severe end stage COPD (on 8 L at baseline), HFpEF (EF 60% 11/24 ), lung cancer in remission, obstructive sleep apnea, hypertension chronic dyspnea on exertion admitted on 1/1620 25 for acute on chronic hypoxemic respiratory failure secondary to influenza A and COPD exacerbation.  Patient treated with Tamiflu and supportive care with aggressive respiratory protocol.  Her blood pressure was elevated during her admission and her home Aldactone was increased and she was started on amlodipine.  Her oxygen needs did improve and she was able to maintain O2 saturation with ambulation greater than 90% on her home 8 L that she was discharged home with close outpatient follow-up.       Therefore, she is discharged in fair and stable condition to home with organized home healthcare and close outpatient follow-up.    The patient met 2-midnight criteria for an inpatient stay at the time of discharge.    Discharge Date  1/19/2025    FOLLOW UP ITEMS POST DISCHARGE  Resolution of influenza, oxygen needs  Blood pressure monitoring with new adjustments of medication  Chronic medical conditions    DISCHARGE DIAGNOSES  Principal Problem:    Acute on chronic respiratory failure with hypoxia (POA: Yes)  Active Problems:    Chronic pain syndrome (POA: Yes)     Primary hypertension (POA: Yes)    Acute exacerbation of chronic obstructive pulmonary disease (COPD) (HCC) (POA: Yes)    Influenza A (POA: Unknown)    Leukocytosis (POA: Unknown)  Resolved Problems:    * No resolved hospital problems. *      FOLLOW UP  Future Appointments   Date Time Provider Department Center   1/21/2025 10:45 AM [facility] [address]   4/16/2025 11:30 AM Carmine Og D.O. [facility] None   7/1/2025 10:00 AM [address]   7/4/2025 10:00 AM Israel Grimm M.D. [facility] None     No follow-up provider specified.    MEDICATIONS ON DISCHARGE     Medication List        START taking these medications        Instructions   amLODIPine 5 MG Tabs  Start taking on: January 20, 2025  Commonly known as: Norvasc   Take 1 Tablet by mouth every day.  Dose: 5 mg     benzonatate 100 MG Caps  Commonly known as: Tessalon   Take 1 Capsule by mouth 3 times a day as needed for Cough (if Robitussin DM ineffective).  Dose: 100 mg     Budeson-Glycopyrrol-Formoterol 160-9-4.8 MCG/ACT Aero  Commonly known as: Breztri Aerosphere   Inhale 2 Inhalations 2 times a day.  Dose: 2 Inhalation            CHANGE how you take these medications        Instructions   potassium chloride 10 MEQ capsule  What changed: Another medication with the same name was removed. Continue taking this medication, and follow the directions you see here.  Commonly known as: Micro-K   Take 1 capsule by mouth 2 times a day with soft food.  Dose: 10 mEq     predniSONE 10 MG Tabs  Start taking on: January 19, 2025  What changed:   See the new instructions.  Another medication with the same name was removed. Continue taking this medication, and follow the directions you see here.  Commonly known as: Deltasone   Take 3 Tablets by mouth every day for 4 days, THEN 2 Tablets every day for 5 days, THEN 1 Tablet every day for 5 days, THEN 0.5 Tablets every day for 5 days.     spironolactone 50 MG Tabs  Start taking on: January 20,  2025  What changed:   medication strength  how much to take  when to take this  Commonly known as: Aldactone   Take 1 Tablet by mouth every day. Indications: Edema  Dose: 50 mg            CONTINUE taking these medications        Instructions   acetaminophen 500 MG Tabs  Commonly known as: Tylenol   Take 500-1,000 mg by mouth every 6 hours as needed. Indications: Pain  Dose: 500-1,000 mg     acyclovir 200 MG Caps  Commonly known as: Zovirax   Take 1 capsule by mouth (at first sign of outbreak) three times a day 10 days     azithromycin 250 MG Tabs  Commonly known as: Zithromax   Take 1 Tablet by mouth once daily  Dose: 250 mg     CALCIUM 500 PO   Take 1 Tablet by mouth every day. Indications: supplement  Dose: 1 Tablet     diclofenac sodium 1 % Gel  Commonly known as: Voltaren   Apply 2 g topically 2 times a day as needed (mild, moderate pain). Indications: mild, moderate pain  Dose: 2 g     digoxin 125 MCG Tabs  Commonly known as: Lanoxin   Take 1 Tablet by mouth every day. Indications: Atrial Fibrillation  Dose: 125 mcg     docusate sodium 100 MG Caps  Commonly known as: Colace   Take 100 mg by mouth every day.  Dose: 100 mg     DULoxetine 60 MG Cpep delayed-release capsule  Commonly known as: Cymbalta   Take 1 capsule by mouth once daily     guaiFENesin  MG Tb12  Commonly known as: Mucinex   Take 1-2 tablets by mouth every 12 hours as needed for congestion.     HM Fexofenadine HCl 180 MG tablet  Generic drug: fexofenadine   Take 180 mg by mouth every day. Indications: allergies  Dose: 180 mg     ipratropium-albuterol 0.5-2.5 (3) MG/3ML nebulizer solution  Commonly known as: William   Doctor's comments: 3 month supply x 1 year  Take 3 mL by nebulization every four hours as needed for Shortness of Breath (Wheezing).  Dose: 3 mL     losartan 50 MG Tabs  Commonly known as: Cozaar   Take 1 Tablet by mouth 2 times a day. Indications: High Blood Pressure  Dose: 50 mg     meloxicam 7.5 MG Tabs  Commonly known as:  Mobic   Take 1 tablet by mouth once or twice a day     montelukast 10 MG Tabs  Commonly known as: Singulair   Take 1 tablet by mouth once daily  Dose: 10 mg     omeprazole 20 MG delayed-release capsule  Commonly known as: PriLOSEC   Take 1 capsule by mouth every day  Dose: 20 mg     ProAir RespiClick 108 (90 Base) MCG/ACT Aepb  Generic drug: Albuterol Sulfate   Inhale 2 puffs by mouth 4 times a day as needed (shortness of breath).  Dose: 2 Puff     tizanidine 4 MG Tabs  Commonly known as: Zanaflex   Take 4 mg by mouth 3 times a day as needed (muscle spasms). Indications: Musculoskeletal Pain  Dose: 4 mg     traMADol 50 MG Tabs  Commonly known as: Ultram   Doctor's comments: as needed for M15.9/M50.30/M51.36 start date 12/02/2024  Take 1 tablet by mouth twice each day 30 days     triamcinolone 55 MCG/ACT nasal inhaler  Commonly known as: Nasacort   Administer 1 Tionesta into affected nostril(S) every day. Indications: Hayfever  Dose: 1 Spray     Tylenol PM Extra Strength 500-25 MG Tabs  Generic drug: diphenhydrAMINE-APAP (sleep)   Take 2 Tablets by mouth at bedtime as needed (insomnia). Indications: insomnia  Dose: 2 Tablet            STOP taking these medications      fluticasone-salmeterol 500-50 MCG/ACT Aepb  Commonly known as: Advair     tiotropium 18 MCG Caps  Commonly known as: Spiriva              Allergies  Allergies   Allergen Reactions    Iodine      Convulsion  I.V.  iodine    Tape Rash and Itching       DIET  Orders Placed This Encounter   Procedures    Diet Order Diet: Regular     Standing Status:   Standing     Number of Occurrences:   1     Order Specific Question:   Diet:     Answer:   Regular [1]       ACTIVITY  As tolerated.      CONSULTATIONS  Pulmonary    PROCEDURES  N/A    LABORATORY  Lab Results   Component Value Date    SODIUM 135 01/19/2025    POTASSIUM 3.3 (L) 01/19/2025    CHLORIDE 90 (L) 01/19/2025    CO2 35 (H) 01/19/2025    GLUCOSE 123 (H) 01/19/2025    BUN 13 01/19/2025    CREATININE 0.36  (L) 01/19/2025    CREATININE 0.62 06/09/2011        Lab Results   Component Value Date    WBC 11.9 (H) 01/17/2025    WBC 10.9 (H) 06/09/2011    HEMOGLOBIN 10.8 (L) 01/17/2025    HEMATOCRIT 33.6 (L) 01/17/2025    PLATELETCT 416 01/17/2025        Total time of the discharge process exceeds 35 minutes.

## 2025-01-19 NOTE — PROGRESS NOTES
Bedside report received by RN and patient care assumed. Tele Box in place, patient is A&O4, resting in bed, and on 8LNC. Patient states 0/10 pain. Fall precautions in place and patient educated on use of call light for assistance. Pt updated on POC with no questions or concerns. Hourly monitoring initiated.

## 2025-01-19 NOTE — CARE PLAN
The patient is Stable - Low risk of patient condition declining or worsening    Shift Goals  Clinical Goals: WOB, pain management  Patient Goals: Pain control, comfort  Family Goals: LORA    Progress made toward(s) clinical / shift goals:    Problem: Knowledge Deficit - COPD  Goal: Patient/significant other demonstrates understanding of disease process, utilization of the Action Plan, medications and discharge instruction  Outcome: Progressing     Problem: Risk for Infection - COPD  Goal: Patient will remain free from signs and symptoms of infection  Outcome: Progressing     Problem: Nutrition - Advanced  Goal: Patient will display progressive weight gain toward goal have adequate food and fluid intake  Outcome: Progressing     Problem: Impaired Gas Exchange  Goal: Patient will demonstrate improved ventilation and adequate oxygenation and participate in treatment regimen within the level of ability/situation.  Outcome: Progressing     Problem: Risk for Aspiration  Goal: Patient's risk for aspiration will be absent or decrease  Outcome: Progressing     Problem: Self Care  Goal: Patient will have the ability to perform ADLs independently or with assistance (bathe, groom, dress, toilet and feed)  Outcome: Progressing     Problem: Knowledge Deficit - Standard  Goal: Patient and family/care givers will demonstrate understanding of plan of care, disease process/condition, diagnostic tests and medications  Outcome: Progressing     Problem: Pain - Standard  Goal: Alleviation of pain or a reduction in pain to the patient’s comfort goal  Outcome: Progressing     Problem: Fall Risk  Goal: Patient will remain free from falls  Outcome: Progressing       Patient is not progressing towards the following goals:

## 2025-01-19 NOTE — DISCHARGE PLANNING
ATTN: Case Management  RE: Referral for Home Health    As of 1/19/2025, we have accepted the Home Health referral for the patient listed above.    [facility]  will contact the patient within 48 hours. If you have any questions or concerns regarding the patient's transition to Home Health, please do not hesitate to contact us at [phone].      We look forward to collaborating with you,  [facility] Team

## 2025-01-19 NOTE — CARE PLAN
Problem: Bronchoconstriction  Goal: Improve in air movement and diminished wheezing  Description: Target End Date:  2 to 3 days    1.  Implement inhaled treatments  2.  Evaluate and manage medication effects  Outcome: Progressing     QID Duo  QID Celia

## 2025-01-19 NOTE — DISCHARGE PLANNING
"HTH/SCP TCN chart review completed. Collaborated with MARIPOSA Arnold.  Per chart review, patient has a discharge order.  PT & OT recommend Home with HH on 1/17/25.  Current discharge considerations are for home with possible HH and close outpatient f/u when medically cleared.  Per TCN note on 1/16/25, \" she has supplemental 02 (concentrator and 1 portable) via [facility]. She reports she would have assist for transport to home and ability to bring portable 02 for dc\".      TCN will continue to follow and collaborate with discharge planning team as additional post acute needs arise. Thank you.    Completed:  Voalte message sent to Tanja Melton M.D. requesting HH order per therapy recommendations.    PT/OT recommend Home with HH on 1/17.   Choice obtained: ANTHONY (1) [facility] 2) [facility]; DME (02 via [facility] as pt on service PTA; see above)  Pt aware of [facility]'s blanket referral policy  SCP with non [facility] PCP.    Addendum:  1338- Per chart review, [facility] has accepted.      "

## 2025-01-20 ENCOUNTER — TELEPHONE (OUTPATIENT)
Dept: RESPIRATORY THERAPY | Facility: MEDICAL CENTER | Age: 80
End: 2025-01-20
Payer: MEDICARE

## 2025-01-22 ENCOUNTER — APPOINTMENT (OUTPATIENT)
Dept: RADIOLOGY | Facility: MEDICAL CENTER | Age: 80
DRG: 177 | End: 2025-01-22
Attending: EMERGENCY MEDICINE
Payer: MEDICARE

## 2025-01-22 ENCOUNTER — HOSPITAL ENCOUNTER (INPATIENT)
Facility: MEDICAL CENTER | Age: 80
LOS: 5 days | DRG: 177 | End: 2025-01-27
Attending: EMERGENCY MEDICINE | Admitting: HOSPITALIST
Payer: MEDICARE

## 2025-01-22 ENCOUNTER — HOME CARE VISIT (OUTPATIENT)
Dept: HOME HEALTH SERVICES | Facility: HOME HEALTHCARE | Age: 80
End: 2025-01-22

## 2025-01-22 DIAGNOSIS — R09.02 HYPOXIA: ICD-10-CM

## 2025-01-22 DIAGNOSIS — J44.1 ACUTE EXACERBATION OF CHRONIC OBSTRUCTIVE PULMONARY DISEASE (COPD) (HCC): ICD-10-CM

## 2025-01-22 DIAGNOSIS — J96.01 ACUTE HYPOXIC RESPIRATORY FAILURE (HCC): ICD-10-CM

## 2025-01-22 PROBLEM — I48.91 AF (ATRIAL FIBRILLATION) (HCC): Status: ACTIVE | Noted: 2019-10-07

## 2025-01-22 PROBLEM — M62.838 MUSCLE SPASMS OF BOTH LOWER EXTREMITIES: Status: ACTIVE | Noted: 2025-01-22

## 2025-01-22 PROBLEM — T78.40XA ALLERGIES: Status: ACTIVE | Noted: 2025-01-22

## 2025-01-22 PROBLEM — F39 MOOD DISORDER (HCC): Status: ACTIVE | Noted: 2025-01-22

## 2025-01-22 PROBLEM — R19.7 DIARRHEA: Status: ACTIVE | Noted: 2025-01-22

## 2025-01-22 PROBLEM — J96.21 ACUTE ON CHRONIC HYPOXIC RESPIRATORY FAILURE (HCC): Status: ACTIVE | Noted: 2025-01-22

## 2025-01-22 PROBLEM — K21.9 GERD (GASTROESOPHAGEAL REFLUX DISEASE): Status: ACTIVE | Noted: 2025-01-22

## 2025-01-22 LAB
ALBUMIN SERPL BCP-MCNC: 3.5 G/DL (ref 3.2–4.9)
ALBUMIN/GLOB SERPL: 0.9 G/DL
ALP SERPL-CCNC: 48 U/L (ref 30–99)
ALT SERPL-CCNC: 15 U/L (ref 2–50)
ANION GAP SERPL CALC-SCNC: 12 MMOL/L (ref 7–16)
AST SERPL-CCNC: 17 U/L (ref 12–45)
BASOPHILS # BLD AUTO: 0.3 % (ref 0–1.8)
BASOPHILS # BLD: 0.05 K/UL (ref 0–0.12)
BILIRUB SERPL-MCNC: 0.3 MG/DL (ref 0.1–1.5)
BUN SERPL-MCNC: 10 MG/DL (ref 8–22)
CALCIUM ALBUM COR SERPL-MCNC: 9.7 MG/DL (ref 8.5–10.5)
CALCIUM SERPL-MCNC: 9.3 MG/DL (ref 8.5–10.5)
CHLORIDE SERPL-SCNC: 89 MMOL/L (ref 96–112)
CO2 SERPL-SCNC: 31 MMOL/L (ref 20–33)
CREAT SERPL-MCNC: 0.34 MG/DL (ref 0.5–1.4)
EKG IMPRESSION: NORMAL
EOSINOPHIL # BLD AUTO: 0.18 K/UL (ref 0–0.51)
EOSINOPHIL NFR BLD: 0.9 % (ref 0–6.9)
ERYTHROCYTE [DISTWIDTH] IN BLOOD BY AUTOMATED COUNT: 45.6 FL (ref 35.9–50)
FLUAV RNA SPEC QL NAA+PROBE: POSITIVE
FLUBV RNA SPEC QL NAA+PROBE: NEGATIVE
GFR SERPLBLD CREATININE-BSD FMLA CKD-EPI: 104 ML/MIN/1.73 M 2
GLOBULIN SER CALC-MCNC: 3.7 G/DL (ref 1.9–3.5)
GLUCOSE SERPL-MCNC: 209 MG/DL (ref 65–99)
HCT VFR BLD AUTO: 38.1 % (ref 37–47)
HGB BLD-MCNC: 12.4 G/DL (ref 12–16)
IMM GRANULOCYTES # BLD AUTO: 0.15 K/UL (ref 0–0.11)
IMM GRANULOCYTES NFR BLD AUTO: 0.8 % (ref 0–0.9)
LYMPHOCYTES # BLD AUTO: 0.44 K/UL (ref 1–4.8)
LYMPHOCYTES NFR BLD: 2.3 % (ref 22–41)
MCH RBC QN AUTO: 30.1 PG (ref 27–33)
MCHC RBC AUTO-ENTMCNC: 32.5 G/DL (ref 32.2–35.5)
MCV RBC AUTO: 92.5 FL (ref 81.4–97.8)
MONOCYTES # BLD AUTO: 0.62 K/UL (ref 0–0.85)
MONOCYTES NFR BLD AUTO: 3.2 % (ref 0–13.4)
NEUTROPHILS # BLD AUTO: 17.81 K/UL (ref 1.82–7.42)
NEUTROPHILS NFR BLD: 92.5 % (ref 44–72)
NRBC # BLD AUTO: 0 K/UL
NRBC BLD-RTO: 0 /100 WBC (ref 0–0.2)
NT-PROBNP SERPL IA-MCNC: 94 PG/ML (ref 0–125)
PLATELET # BLD AUTO: 447 K/UL (ref 164–446)
PMV BLD AUTO: 9 FL (ref 9–12.9)
POTASSIUM SERPL-SCNC: 3.9 MMOL/L (ref 3.6–5.5)
PROCALCITONIN SERPL-MCNC: 0.05 NG/ML
PROT SERPL-MCNC: 7.2 G/DL (ref 6–8.2)
RBC # BLD AUTO: 4.12 M/UL (ref 4.2–5.4)
RSV RNA SPEC QL NAA+PROBE: NEGATIVE
SARS-COV-2 RNA RESP QL NAA+PROBE: NOTDETECTED
SODIUM SERPL-SCNC: 132 MMOL/L (ref 135–145)
TROPONIN T SERPL-MCNC: 9 NG/L (ref 6–19)
WBC # BLD AUTO: 19.3 K/UL (ref 4.8–10.8)

## 2025-01-22 PROCEDURE — 96374 THER/PROPH/DIAG INJ IV PUSH: CPT

## 2025-01-22 PROCEDURE — 0241U HCHG SARS-COV-2 COVID-19 NFCT DS RESP RNA 4 TRGT ED POC: CPT

## 2025-01-22 PROCEDURE — 700111 HCHG RX REV CODE 636 W/ 250 OVERRIDE (IP): Mod: JZ

## 2025-01-22 PROCEDURE — 71045 X-RAY EXAM CHEST 1 VIEW: CPT

## 2025-01-22 PROCEDURE — 83880 ASSAY OF NATRIURETIC PEPTIDE: CPT

## 2025-01-22 PROCEDURE — 84484 ASSAY OF TROPONIN QUANT: CPT

## 2025-01-22 PROCEDURE — 93005 ELECTROCARDIOGRAM TRACING: CPT | Mod: TC | Performed by: EMERGENCY MEDICINE

## 2025-01-22 PROCEDURE — 770020 HCHG ROOM/CARE - TELE (206)

## 2025-01-22 PROCEDURE — 700102 HCHG RX REV CODE 250 W/ 637 OVERRIDE(OP)

## 2025-01-22 PROCEDURE — 85025 COMPLETE CBC W/AUTO DIFF WBC: CPT

## 2025-01-22 PROCEDURE — 36415 COLL VENOUS BLD VENIPUNCTURE: CPT

## 2025-01-22 PROCEDURE — 700111 HCHG RX REV CODE 636 W/ 250 OVERRIDE (IP): Mod: JZ,TB | Performed by: EMERGENCY MEDICINE

## 2025-01-22 PROCEDURE — 80053 COMPREHEN METABOLIC PANEL: CPT

## 2025-01-22 PROCEDURE — 84145 PROCALCITONIN (PCT): CPT

## 2025-01-22 PROCEDURE — 99285 EMERGENCY DEPT VISIT HI MDM: CPT

## 2025-01-22 PROCEDURE — 99223 1ST HOSP IP/OBS HIGH 75: CPT | Performed by: HOSPITALIST

## 2025-01-22 PROCEDURE — 93005 ELECTROCARDIOGRAM TRACING: CPT | Mod: TC

## 2025-01-22 PROCEDURE — A9270 NON-COVERED ITEM OR SERVICE: HCPCS

## 2025-01-22 RX ORDER — METHYLPREDNISOLONE SODIUM SUCCINATE 40 MG/ML
40 INJECTION, POWDER, LYOPHILIZED, FOR SOLUTION INTRAMUSCULAR; INTRAVENOUS EVERY 12 HOURS
Status: DISCONTINUED | OUTPATIENT
Start: 2025-01-22 | End: 2025-01-22

## 2025-01-22 RX ORDER — IPRATROPIUM BROMIDE AND ALBUTEROL SULFATE 2.5; .5 MG/3ML; MG/3ML
3 SOLUTION RESPIRATORY (INHALATION)
Status: DISCONTINUED | OUTPATIENT
Start: 2025-01-22 | End: 2025-01-22

## 2025-01-22 RX ORDER — BENZONATATE 100 MG/1
100 CAPSULE ORAL 3 TIMES DAILY PRN
Status: DISCONTINUED | OUTPATIENT
Start: 2025-01-22 | End: 2025-01-27 | Stop reason: HOSPADM

## 2025-01-22 RX ORDER — LOSARTAN POTASSIUM 50 MG/1
50 TABLET ORAL 2 TIMES DAILY
Status: DISCONTINUED | OUTPATIENT
Start: 2025-01-22 | End: 2025-01-27 | Stop reason: HOSPADM

## 2025-01-22 RX ORDER — AMLODIPINE BESYLATE 5 MG/1
5 TABLET ORAL DAILY
Status: DISCONTINUED | OUTPATIENT
Start: 2025-01-22 | End: 2025-01-27 | Stop reason: HOSPADM

## 2025-01-22 RX ORDER — MONTELUKAST SODIUM 10 MG/1
10 TABLET ORAL DAILY
Status: DISCONTINUED | OUTPATIENT
Start: 2025-01-22 | End: 2025-01-27 | Stop reason: HOSPADM

## 2025-01-22 RX ORDER — TRAMADOL HYDROCHLORIDE 50 MG/1
50 TABLET ORAL 2 TIMES DAILY PRN
Status: DISCONTINUED | OUTPATIENT
Start: 2025-01-22 | End: 2025-01-27 | Stop reason: HOSPADM

## 2025-01-22 RX ORDER — TRIAMCINOLONE ACETONIDE 55 UG/1
1 SPRAY, METERED NASAL DAILY
Status: CANCELLED | OUTPATIENT
Start: 2025-01-22

## 2025-01-22 RX ORDER — DIGOXIN 125 MCG
125 TABLET ORAL DAILY
Status: DISCONTINUED | OUTPATIENT
Start: 2025-01-22 | End: 2025-01-27 | Stop reason: HOSPADM

## 2025-01-22 RX ORDER — MONTELUKAST SODIUM 10 MG/1
10 TABLET ORAL DAILY
Status: CANCELLED | OUTPATIENT
Start: 2025-01-22

## 2025-01-22 RX ORDER — DULOXETIN HYDROCHLORIDE 60 MG/1
60 CAPSULE, DELAYED RELEASE ORAL DAILY
Status: DISCONTINUED | OUTPATIENT
Start: 2025-01-22 | End: 2025-01-27 | Stop reason: HOSPADM

## 2025-01-22 RX ORDER — TRAMADOL HYDROCHLORIDE 50 MG/1
50 TABLET ORAL 2 TIMES DAILY PRN
Status: CANCELLED | OUTPATIENT
Start: 2025-01-22

## 2025-01-22 RX ORDER — ACETAMINOPHEN 325 MG/1
650 TABLET ORAL EVERY 6 HOURS PRN
Status: DISCONTINUED | OUTPATIENT
Start: 2025-01-22 | End: 2025-01-27 | Stop reason: HOSPADM

## 2025-01-22 RX ORDER — FEXOFENADINE HCL 60 MG/1
180 TABLET, FILM COATED ORAL DAILY
Status: CANCELLED | OUTPATIENT
Start: 2025-01-22

## 2025-01-22 RX ORDER — DULOXETIN HYDROCHLORIDE 60 MG/1
60 CAPSULE, DELAYED RELEASE ORAL DAILY
Status: CANCELLED | OUTPATIENT
Start: 2025-01-22

## 2025-01-22 RX ORDER — FEXOFENADINE HCL 60 MG/1
180 TABLET, FILM COATED ORAL EVERY EVENING
Status: DISCONTINUED | OUTPATIENT
Start: 2025-01-22 | End: 2025-01-27 | Stop reason: HOSPADM

## 2025-01-22 RX ORDER — POTASSIUM CHLORIDE 750 MG/1
10 CAPSULE, EXTENDED RELEASE ORAL 2 TIMES DAILY
Status: CANCELLED | OUTPATIENT
Start: 2025-01-22

## 2025-01-22 RX ORDER — GUAIFENESIN 600 MG/1
600 TABLET, EXTENDED RELEASE ORAL EVERY 12 HOURS
Status: CANCELLED | OUTPATIENT
Start: 2025-01-22

## 2025-01-22 RX ORDER — ENOXAPARIN SODIUM 100 MG/ML
40 INJECTION SUBCUTANEOUS DAILY
Status: DISCONTINUED | OUTPATIENT
Start: 2025-01-22 | End: 2025-01-27 | Stop reason: HOSPADM

## 2025-01-22 RX ORDER — METHYLPREDNISOLONE SODIUM SUCCINATE 40 MG/ML
40 INJECTION, POWDER, LYOPHILIZED, FOR SOLUTION INTRAMUSCULAR; INTRAVENOUS EVERY 12 HOURS
Status: DISCONTINUED | OUTPATIENT
Start: 2025-01-23 | End: 2025-01-24 | Stop reason: ALTCHOICE

## 2025-01-22 RX ORDER — MELOXICAM 7.5 MG/1
7.5 TABLET ORAL 2 TIMES DAILY
Status: DISCONTINUED | OUTPATIENT
Start: 2025-01-22 | End: 2025-01-22

## 2025-01-22 RX ORDER — AMLODIPINE BESYLATE 5 MG/1
5 TABLET ORAL DAILY
Status: CANCELLED | OUTPATIENT
Start: 2025-01-22

## 2025-01-22 RX ORDER — MELOXICAM 7.5 MG/1
7.5 TABLET ORAL 2 TIMES DAILY
Status: CANCELLED | OUTPATIENT
Start: 2025-01-22

## 2025-01-22 RX ORDER — METHYLPREDNISOLONE SODIUM SUCCINATE 40 MG/ML
40 INJECTION, POWDER, LYOPHILIZED, FOR SOLUTION INTRAMUSCULAR; INTRAVENOUS ONCE
Status: COMPLETED | OUTPATIENT
Start: 2025-01-22 | End: 2025-01-22

## 2025-01-22 RX ORDER — POLYETHYLENE GLYCOL 3350 17 G/17G
1 POWDER, FOR SOLUTION ORAL
Status: DISCONTINUED | OUTPATIENT
Start: 2025-01-22 | End: 2025-01-22

## 2025-01-22 RX ORDER — BENZONATATE 100 MG/1
100 CAPSULE ORAL 3 TIMES DAILY PRN
Status: CANCELLED | OUTPATIENT
Start: 2025-01-22

## 2025-01-22 RX ORDER — LOSARTAN POTASSIUM 50 MG/1
50 TABLET ORAL 2 TIMES DAILY
Status: CANCELLED | OUTPATIENT
Start: 2025-01-22

## 2025-01-22 RX ORDER — SPIRONOLACTONE 25 MG/1
50 TABLET ORAL DAILY
Status: DISCONTINUED | OUTPATIENT
Start: 2025-01-23 | End: 2025-01-27 | Stop reason: HOSPADM

## 2025-01-22 RX ORDER — GUAIFENESIN/DEXTROMETHORPHAN 100-10MG/5
10 SYRUP ORAL EVERY 6 HOURS PRN
Status: DISCONTINUED | OUTPATIENT
Start: 2025-01-22 | End: 2025-01-27 | Stop reason: HOSPADM

## 2025-01-22 RX ORDER — ACETAMINOPHEN 500 MG
500-1000 TABLET ORAL EVERY 6 HOURS PRN
Status: DISCONTINUED | OUTPATIENT
Start: 2025-01-22 | End: 2025-01-22

## 2025-01-22 RX ORDER — AMOXICILLIN 250 MG
2 CAPSULE ORAL EVERY EVENING
Status: DISCONTINUED | OUTPATIENT
Start: 2025-01-22 | End: 2025-01-22

## 2025-01-22 RX ORDER — LABETALOL HYDROCHLORIDE 5 MG/ML
10 INJECTION, SOLUTION INTRAVENOUS EVERY 4 HOURS PRN
Status: DISCONTINUED | OUTPATIENT
Start: 2025-01-22 | End: 2025-01-27 | Stop reason: HOSPADM

## 2025-01-22 RX ORDER — SPIRONOLACTONE 25 MG/1
50 TABLET ORAL DAILY
Status: CANCELLED | OUTPATIENT
Start: 2025-01-22

## 2025-01-22 RX ORDER — ALBUTEROL SULFATE 5 MG/ML
2.5 SOLUTION RESPIRATORY (INHALATION)
Status: DISCONTINUED | OUTPATIENT
Start: 2025-01-22 | End: 2025-01-24

## 2025-01-22 RX ORDER — ACYCLOVIR 200 MG/1
200 CAPSULE ORAL DAILY
Status: CANCELLED | OUTPATIENT
Start: 2025-01-22 | End: 2025-01-27

## 2025-01-22 RX ORDER — IPRATROPIUM BROMIDE AND ALBUTEROL SULFATE 2.5; .5 MG/3ML; MG/3ML
3 SOLUTION RESPIRATORY (INHALATION) EVERY 4 HOURS PRN
Status: CANCELLED | OUTPATIENT
Start: 2025-01-22

## 2025-01-22 RX ORDER — METHYLPREDNISOLONE SODIUM SUCCINATE 40 MG/ML
40 INJECTION, POWDER, LYOPHILIZED, FOR SOLUTION INTRAMUSCULAR; INTRAVENOUS EVERY 12 HOURS
Status: DISCONTINUED | OUTPATIENT
Start: 2025-01-23 | End: 2025-01-22

## 2025-01-22 RX ADMIN — LOSARTAN POTASSIUM 50 MG: 50 TABLET, FILM COATED ORAL at 17:34

## 2025-01-22 RX ADMIN — FEXOFENADINE HCL 180 MG: 60 TABLET, FILM COATED ORAL at 17:34

## 2025-01-22 RX ADMIN — DULOXETINE HYDROCHLORIDE 60 MG: 60 CAPSULE, DELAYED RELEASE ORAL at 17:34

## 2025-01-22 RX ADMIN — MONTELUKAST 10 MG: 10 TABLET, FILM COATED ORAL at 17:34

## 2025-01-22 RX ADMIN — METHYLPREDNISOLONE SODIUM SUCCINATE 40 MG: 40 INJECTION, POWDER, FOR SOLUTION INTRAMUSCULAR; INTRAVENOUS at 14:55

## 2025-01-22 RX ADMIN — AMLODIPINE BESYLATE 5 MG: 5 TABLET ORAL at 17:33

## 2025-01-22 RX ADMIN — ENOXAPARIN SODIUM 40 MG: 100 INJECTION SUBCUTANEOUS at 17:35

## 2025-01-22 RX ADMIN — DIGOXIN 125 MCG: 0.12 TABLET ORAL at 17:34

## 2025-01-22 ASSESSMENT — COGNITIVE AND FUNCTIONAL STATUS - GENERAL
WALKING IN HOSPITAL ROOM: A LITTLE
DAILY ACTIVITIY SCORE: 24
STANDING UP FROM CHAIR USING ARMS: A LITTLE
SUGGESTED CMS G CODE MODIFIER DAILY ACTIVITY: CH
MOVING FROM LYING ON BACK TO SITTING ON SIDE OF FLAT BED: A LITTLE
SUGGESTED CMS G CODE MODIFIER MOBILITY: CK
MOVING TO AND FROM BED TO CHAIR: A LITTLE
CLIMB 3 TO 5 STEPS WITH RAILING: A LITTLE
TURNING FROM BACK TO SIDE WHILE IN FLAT BAD: A LITTLE
MOBILITY SCORE: 18

## 2025-01-22 ASSESSMENT — LIFESTYLE VARIABLES
TOTAL SCORE: 0
EVER FELT BAD OR GUILTY ABOUT YOUR DRINKING: NO
TOTAL SCORE: 0
TOTAL SCORE: 0
HOW MANY TIMES IN THE PAST YEAR HAVE YOU HAD 5 OR MORE DRINKS IN A DAY: 0
ON A TYPICAL DAY WHEN YOU DRINK ALCOHOL HOW MANY DRINKS DO YOU HAVE: 0
AVERAGE NUMBER OF DAYS PER WEEK YOU HAVE A DRINK CONTAINING ALCOHOL: 0
DOES PATIENT WANT TO STOP DRINKING: NO
HAVE PEOPLE ANNOYED YOU BY CRITICIZING YOUR DRINKING: NO
ALCOHOL_USE: NO
HAVE YOU EVER FELT YOU SHOULD CUT DOWN ON YOUR DRINKING: NO
CONSUMPTION TOTAL: NEGATIVE
EVER HAD A DRINK FIRST THING IN THE MORNING TO STEADY YOUR NERVES TO GET RID OF A HANGOVER: NO

## 2025-01-22 ASSESSMENT — ENCOUNTER SYMPTOMS
HEARTBURN: 0
ABDOMINAL PAIN: 0
EYES NEGATIVE: 1
PALPITATIONS: 0
PSYCHIATRIC NEGATIVE: 1
TREMORS: 1
NAUSEA: 0
LOSS OF CONSCIOUSNESS: 0
WEIGHT LOSS: 0
SEIZURES: 0
PND: 0
BACK PAIN: 1
ORTHOPNEA: 0
WHEEZING: 1
COUGH: 1
DIZZINESS: 0
HEMOPTYSIS: 0
CHILLS: 0
FEVER: 0
DIARRHEA: 1
CLAUDICATION: 0
FOCAL WEAKNESS: 0
BLOOD IN STOOL: 0
SHORTNESS OF BREATH: 1
SPUTUM PRODUCTION: 1
VOMITING: 0
CONSTIPATION: 0

## 2025-01-22 ASSESSMENT — SOCIAL DETERMINANTS OF HEALTH (SDOH)

## 2025-01-22 ASSESSMENT — PAIN DESCRIPTION - PAIN TYPE
TYPE: ACUTE PAIN
TYPE: ACUTE PAIN

## 2025-01-22 ASSESSMENT — FIBROSIS 4 INDEX: FIB4 SCORE: 0.78

## 2025-01-22 NOTE — SENIOR ADMIT NOTE
[facility] Senior Admission Note      HPI  Berny Renee is a 79 y.o. female with medical history of recently admitted due to acute on chronic hypoxic respiratory failure secondary to influenza A and COPD exacerbation, COPD (on 8 L at baseline), HFpEF (EF 60% 11/24 ), lung cancer in remission, REGINE, HTN, chronic dyspnea on exertion who presented shortness of breath.    She was recently hospitalized due to shortness of breath.  Diagnosed for influenza A pneumonia and treated with Tamiflu and treated with DuoNeb nebulizer for COPD exacerbation.  After discharge, her shortness of breath got worse and decided to come to emergency room.    At ED, vital sign was remarkable of tachypnea 25, hypertension 192/81 , saturating 86% at 8L which is her baseline.  CBC presented leukocytosis of 19.3.  CMP presented hyponatremia 132.  Troponin and proBNP were unremarkable.  Respiratory panel was positive for Influenza A.  Chest x-ray presented bilateral interstitial infiltrates which is similar to prior chest x-ray.  Received 1 dose of Solu-Medrol at ED.  Patient is admitted with acute on chronic respiratory failure likely secondary to COPD exacerbation with recent influenza A infection.    Physical Exam  Constitutional:       General: She is not in acute distress.     Appearance: She is not ill-appearing.   HENT:      Head: Normocephalic and atraumatic.      Mouth/Throat:      Mouth: Mucous membranes are moist.      Pharynx: Oropharynx is clear. No oropharyngeal exudate.   Eyes:      Extraocular Movements: Extraocular movements intact.      Pupils: Pupils are equal, round, and reactive to light.   Cardiovascular:      Rate and Rhythm: Normal rate and regular rhythm.      Heart sounds: No murmur heard.     Comments: Distant heart sound  Pulmonary:      Breath sounds: Wheezing and rhonchi present.      Comments: Decreased lung sound with diffuse wheezing.  No accessory muscle use  Abdominal:      General: Abdomen is flat. There is no  distension.      Tenderness: There is no abdominal tenderness.   Musculoskeletal:         General: Normal range of motion.      Right lower leg: No edema.      Left lower leg: No edema.   Skin:     General: Skin is warm and dry.   Neurological:      General: No focal deficit present.      Mental Status: She is alert and oriented to person, place, and time.         Assessment and plan   Berny Renee is a 79 y.o. female with medical history of recently admitted due to acute on chronic hypoxic respiratory failure secondary to influenza A and COPD exacerbation, COPD (on 8 L at baseline), HFpEF (EF 60% 11/24 ), lung cancer in remission, REGINE, HTN, chronic dyspnea on exertion who is admitted with acute on chronic hypoxic respiratory failure secondary to likely COPD exacerbation related to recent influenza A infection.    #Acute on chronic hypoxic respiratory failure secondary to likely COPD exacerbation related to recent influenza A infection  #COPD exacerbation  #Recent influenza A infection  Chest x-ray presented bilateral interstitial infiltrates which is similar to the prior chest x-ray, no sign of new infiltrate.  Respiratory panel was positive for influenza A.  Received 1 dose of Solu-Medrol at ED.  Likely acute hypoxic respiratory or secondary to COPD exacerbation in the setting of recent influenza A infection  -Admit to telemetry floor  -COPD exacerbation order set including steroids  -RT protocol  -DuoNeb nebulizer  -Extensive viral panel    # Diarrhea  Reports chronic diarrhea  -C. difficile panel  -GI panel    # Leukocytosis  Likely secondary to prednisone but infection need to further evaluated  -CBC in the morning  -Procalcitonin pending    #Chronic conditions  See Dr. Potter's note for detail          Discussed with my attending physician, Frandy Esqueda M.D..  ...

## 2025-01-22 NOTE — ED PROVIDER NOTES
ER Provider Note    Scribed for Adelaida Jolley M.d. by Yudelka Blanco. 1/22/2025  1:43 PM    Primary Care Provider: Everett Diego M.D.    CHIEF COMPLAINT   Chief Complaint   Patient presents with    Shortness of Breath    Cough     EXTERNAL RECORDS REVIEWED  Outpatient Notes Home Health was at the patient's home today and stated that her sats dropped to 70% when she walked to bathroom on her 8 liters of oxygen. When she was here in the hospital last time on January 16th, the patient was seen by pulmonary. They were consulted for COPD exacerbation. They did state that the patient has very severe COPD. He recommended Duoneb q4 hours. He also thought that steroids were okay for use, despite the influenza.     HPI/ROS  LIMITATION TO HISTORY   Select: : None  OUTSIDE HISTORIAN(S):  None.    Berny Renee is a 79 y.o. female who presents to the ED via EMS from home for evaluation of shortness of breath and cough onset a 2 days ago. She describes that she has been admitted and discharged multiple times in the last few months for RSV, influenza A, pneumonia and COPD. The patient reports that she has history of COPD and is normally on 8 liters of oxygen at all times. She notes that she was on 6 liters of oxygen prior to her most recent admission (January 16th). The patient reports that she was discharged from here 3 days ago, as she was able to ambulate without saturating down in the hospital.  However, patient says as soon as she got home she started having shortness of breath again.  She also reported desaturations with walking only a few feet  At home over the last few days since her discharge.  She reports that her shortness of breath is exacerbated with ambulation. The patient states that she sats down to the 70s with just walking across the room.  The patient states that she feels the same as she did when she was first admitted for RSV. The patient states she also has chest pressure when her oxygen  saturations drop with ambulation, left ear pain, and diarrhea, but denies any fevers or vomiting. The patient states that she feels like she is almost going to pass out when she sats down. She also mentions that she has been coughing up yellow mucous.  She states she had mild swelling to the legs yesterday, but states that this has resolved for the most part. The patient mentions that during her last admission, they wanted to place her in a skilled nursing facility, but the patient refused, so she was discharged back home. The patient is now wanting skilled nursing placement, as she reports that she lives at home alone and is not able to care for herself with this persistent shortness of breath. Per nursing note, EMS reported that the patient was hypoxic at 86% on her 8 liters of oxygen. EMS also notes that the patient was tachypneic at 36 breaths per minute. The patient states that she used to be a smoker.     PAST MEDICAL HISTORY  Past Medical History:   Diagnosis Date    Acute on chronic respiratory failure with hypoxia ([facility]) 11/14/2020    Arthritis     spine    Asthma with COPD ([facility])     BMI 31.0-31.9,adult 02/04/2022    Cataract immature     Chronic pain     Chronic respiratory failure with hypoxia ([facility]) 07/13/2017    Colon polyps     COPD (chronic obstructive pulmonary disease) ([facility]) 2002    moderate to severe    Cough     DDD (degenerative disc disease), cervical     DDD (degenerative disc disease), thoracic     Dependence on continuous supplemental oxygen 08/13/2015    IMO load March 2020    Diverticulitis of colon     Dyslipidemia     EMPHYSEMA     H/O opioid abuse ([facility]) 07/15/2021    Hypertension     Obesity (BMI 30-39.9) 10/24/2016    Opioid type dependence, continuous ([facility]) 02/04/2022    REGINE (obstructive sleep apnea)     OSTEOPOROSIS     Painful breathing     Shortness of breath     Spinal stenosis, lumbar region, with neurogenic claudication     Sputum production     URINARY INCONTINENCE     Varicose  veins of left leg with edema 10/11/2024    Vitamin d deficiency     Wheezing        SURGICAL HISTORY  Past Surgical History:   Procedure Laterality Date    [place] UPPER GI ENDOSCOPY,BIOPSY N/A 11/15/2024    Procedure: GASTROSCOPY, WITH BIOPSY;  Surgeon: Mela Vargas M.D.;  Location: SURGERY SAME DAY [facility];  Service: Gastroenterology    COLONOSCOPY WITH POLYP N/A 11/15/2024    Procedure: COLONOSCOPY, WITH POLYPECTOMY;  Surgeon: Mela Vargas M.D.;  Location: SURGERY SAME DAY [facility];  Service: Gastroenterology    LUMBAR LAMINECTOMY DISKECTOMY  6/22/2010    Performed by GRACIE CANO at SURGERY [facility]    OTHER ORTHOPEDIC SURGERY  2004    left ankle fx    OTHER      leg fracture    OTHER      t spine disc surg    TONSILLECTOMY         FAMILY HISTORY  Family History   Problem Relation Age of Onset    Cancer Father         Lung    Other Sister         lung and leukemia cancer    Cancer Sister         Leukemia, Lung       SOCIAL HISTORY   reports that she quit smoking about 25 years ago. Her smoking use included cigarettes. She started smoking about 55 years ago. She has a 60 pack-year smoking history. She has never used smokeless tobacco. She reports that she does not currently use alcohol after a past usage of about 4.2 oz of alcohol per week. She reports that she does not currently use drugs after having used the following drugs: Marijuana and Oral.      CURRENT MEDICATIONS  Previous Medications    ACETAMINOPHEN (TYLENOL) 500 MG TAB    Take 500-1,000 mg by mouth every 6 hours as needed. Indications: Pain    ACYCLOVIR (ZOVIRAX) 200 MG CAP    Take 1 capsule by mouth (at first sign of outbreak) three times a day 10 days    ALBUTEROL SULFATE 108 (90 BASE) MCG/ACT AEROSOL POWDER, BREATH ACTIVATED    Inhale 2 puffs by mouth 4 times a day as needed (shortness of breath).    AMLODIPINE (NORVASC) 5 MG TAB    Take 1 Tablet by mouth every day.    AZITHROMYCIN (ZITHROMAX) 250 MG TAB    Take 1 Tablet by mouth once  daily    BENZONATATE (TESSALON) 100 MG CAP    Take 1 Capsule by mouth 3 times a day as needed for Cough (if Robitussin DM ineffective).    BUDESON-GLYCOPYRROL-FORMOTEROL (BREZTRI AEROSPHERE) 160-9-4.8 MCG/ACT AEROSOL    Inhale 2 Inhalations 2 times a day.    CALCIUM CARBONATE (CALCIUM 500 PO)    Take 1 Tablet by mouth every day. Indications: supplement    DICLOFENAC SODIUM (VOLTAREN) 1 % GEL    Apply 2 g topically 2 times a day as needed (mild, moderate pain). Indications: mild, moderate pain    DIGOXIN (LANOXIN) 125 MCG TAB    Take 1 Tablet by mouth every day. Indications: Atrial Fibrillation    DIPHENHYDRAMINE-APAP, SLEEP, (TYLENOL PM EXTRA STRENGTH)  MG TAB    Take 2 Tablets by mouth at bedtime as needed (insomnia). Indications: insomnia    DOCUSATE SODIUM (COLACE) 100 MG CAP    Take 100 mg by mouth every day.    DULOXETINE (CYMBALTA) 60 MG CAP DR PARTICLES DELAYED-RELEASE CAPSULE    Take 1 capsule by mouth once daily    FEXOFENADINE (HM FEXOFENADINE HCL) 180 MG TABLET    Take 180 mg by mouth every day. Indications: allergies    GUAIFENESIN ER (MUCINEX) 600 MG TABLET SR 12 HR    Take 1-2 tablets by mouth every 12 hours as needed for congestion.    HOME CARE OXYGEN    Inhale 3 L/min continuous.    IPRATROPIUM-ALBUTEROL (DUONEB) 0.5-2.5 (3) MG/3ML NEBULIZER SOLUTION    Take 3 mL by nebulization every four hours as needed for Shortness of Breath (Wheezing).    LOSARTAN (COZAAR) 50 MG TAB    Take 1 Tablet by mouth 2 times a day. Indications: High Blood Pressure    MELOXICAM (MOBIC) 7.5 MG TAB    Take 1 tablet by mouth once or twice a day    MONTELUKAST (SINGULAIR) 10 MG TAB    Take 1 tablet by mouth once daily    OMEPRAZOLE (PRILOSEC) 20 MG DELAYED-RELEASE CAPSULE    Take 1 capsule by mouth every day    POTASSIUM CHLORIDE (MICRO-K) 10 MEQ CAPSULE    Take 1 capsule by mouth 2 times a day with soft food.    PREDNISONE (DELTASONE) 10 MG TAB    Take 3 Tablets by mouth every day for 4 days, THEN 2 Tablets every day  "for 5 days, THEN 1 Tablet every day for 5 days, THEN 0.5 Tablets every day for 5 days.    SPIRONOLACTONE (ALDACTONE) 50 MG TAB    Take 1 Tablet by mouth every day. Indications: Edema    TIZANIDINE (ZANAFLEX) 4 MG TAB    Take 4 mg by mouth 3 times a day as needed (muscle spasms). Indications: Musculoskeletal Pain    TRAMADOL (ULTRAM) 50 MG TAB    Take 1 tablet by mouth twice each day 30 days    TRIAMCINOLONE (NASACORT) 55 MCG/ACT NASAL INHALER    Administer 1 Spray into affected nostril(S) every day. Indications: Hayfever       ALLERGIES  Iodine and Tape      PHYSICAL EXAM  BP (!) 162/117 Comment: Rn notified   Pulse 75   Temp 36.6 °C (97.8 °F) (Temporal)   Resp (!) 24   Ht 1.6 m (5' 3\")   Wt 73 kg (161 lb)   LMP 06/07/2001   SpO2 91%   BMI 28.52 kg/m²     Constitutional: Well developed, well nourished; Mild to moderate respiratory distress; Non-toxic appearance.   HENT: Normocephalic, atraumatic; Bilateral external ears normal; Oropharynx with slightly dry mucous membranes; No erythema or exudates in the posterior oropharynx.  TMs are clear.  Eyes: PERRL, EOMI, Conjunctiva normal. No discharge.   Neck:  Supple, nontender midline; No stridor; No nuchal rigidity.   Lymphatic: No cervical lymphadenopathy noted.   Cardiovascular: Regular rate and rhythm without murmurs, rubs, or gallop.   Thorax & Lungs: Faint expiratory wheezes to the left lung field. Decreased breath sounds throughout.  Abdomen: Soft, nontender, bowel sounds normal. No obvious masses; No pulsatile masses; no rebound, guarding, or peritoneal signs.   Skin: Good color; warm and dry without rash or petechia.  Back: Nontender, No CVA tenderness.   Extremities: Distal radial, dorsalis pedis, posterior tibial pulses are equal bilaterally; Trace pre-tibial edema bilaterally; Nontender calves or saphenous, No cyanosis, No clubbing.   Musculoskeletal: Good range of motion in all major joints. No tenderness to palpation or major deformities noted. "   Neurologic: Alert & oriented x 4, clear speech.       DIAGNOSTIC STUDIES    EKG/LABS  Results for orders placed or performed during the hospital encounter of 01/22/25   CBC with Differential    Collection Time: 01/22/25  1:19 PM   Result Value Ref Range    WBC 19.3 (H) 4.8 - 10.8 K/uL    RBC 4.12 (L) 4.20 - 5.40 M/uL    Hemoglobin 12.4 12.0 - 16.0 g/dL    Hematocrit 38.1 37.0 - 47.0 %    MCV 92.5 81.4 - 97.8 fL    MCH 30.1 27.0 - 33.0 pg    MCHC 32.5 32.2 - 35.5 g/dL    RDW 45.6 35.9 - 50.0 fL    Platelet Count 447 (H) 164 - 446 K/uL    MPV 9.0 9.0 - 12.9 fL    Neutrophils-Polys 92.50 (H) 44.00 - 72.00 %    Lymphocytes 2.30 (L) 22.00 - 41.00 %    Monocytes 3.20 0.00 - 13.40 %    Eosinophils 0.90 0.00 - 6.90 %    Basophils 0.30 0.00 - 1.80 %    Immature Granulocytes 0.80 0.00 - 0.90 %    Nucleated RBC 0.00 0.00 - 0.20 /100 WBC    Neutrophils (Absolute) 17.81 (H) 1.82 - 7.42 K/uL    Lymphs (Absolute) 0.44 (L) 1.00 - 4.80 K/uL    Monos (Absolute) 0.62 0.00 - 0.85 K/uL    Eos (Absolute) 0.18 0.00 - 0.51 K/uL    Baso (Absolute) 0.05 0.00 - 0.12 K/uL    Immature Granulocytes (abs) 0.15 (H) 0.00 - 0.11 K/uL    NRBC (Absolute) 0.00 K/uL   Comp Metabolic Panel    Collection Time: 01/22/25  1:19 PM   Result Value Ref Range    Sodium 132 (L) 135 - 145 mmol/L    Potassium 3.9 3.6 - 5.5 mmol/L    Chloride 89 (L) 96 - 112 mmol/L    Co2 31 20 - 33 mmol/L    Anion Gap 12.0 7.0 - 16.0    Glucose 209 (H) 65 - 99 mg/dL    Bun 10 8 - 22 mg/dL    Creatinine 0.34 (L) 0.50 - 1.40 mg/dL    Calcium 9.3 8.5 - 10.5 mg/dL    Correct Calcium 9.7 8.5 - 10.5 mg/dL    AST(SGOT) 17 12 - 45 U/L    ALT(SGPT) 15 2 - 50 U/L    Alkaline Phosphatase 48 30 - 99 U/L    Total Bilirubin 0.3 0.1 - 1.5 mg/dL    Albumin 3.5 3.2 - 4.9 g/dL    Total Protein 7.2 6.0 - 8.2 g/dL    Globulin 3.7 (H) 1.9 - 3.5 g/dL    A-G Ratio 0.9 g/dL   proBrain Natriuretic Peptide, NT    Collection Time: 01/22/25  1:19 PM   Result Value Ref Range    NT-proBNP 94 0 - 125 pg/mL    Troponin    Collection Time: 25  1:19 PM   Result Value Ref Range    Troponin T 9 6 - 19 ng/L   ESTIMATED GFR    Collection Time: 25  1:19 PM   Result Value Ref Range    GFR (CKD-EPI) 104 >60 mL/min/1.73 m 2   PROCALCITONIN    Collection Time: 25  1:19 PM   Result Value Ref Range    Procalcitonin 0.05 <0.25 ng/mL   POC CoV-2, FLU A/B, RSV by PCR    Collection Time: 25  2:14 PM   Result Value Ref Range    POC Influenza A RNA, PCR POSITIVE (A) Negative    POC Influenza B RNA, PCR Negative Negative    POC RSV, by PCR Negative Negative    POC SARS-CoV-2, PCR NotDetected NotDetected   EKG    Collection Time: 25  2:26 PM   Result Value Ref Range    Report       [facility] Emergency Dept.    Test Date:  2025  Pt Name:    FAZAL STUBBS                    Department: ER  MRN:        [id number]                      Room:       [facility]  Gender:     Female                       Technician: [id number]  :        1945                   Requested By:ER TRIAGE PROTOCOL  Order #:    [id number]                    Reading MD: Adelaida Jolley    Measurements  Intervals                                Axis  Rate:       84                           P:          -56  IN:         106                          QRS:        45  QRSD:       152                          T:          82  QT:         351  QTc:        415    Interpretive Statements  Ectopic atrial rhythm rate 84  Normal axis  Normal intervals  No ST elevation or depression  Atrial premature complex  Short IN interval  Nonspecific intraventricular conduction delay  Consider anterior infarct  Borderline ST depression, lateral leads  Compared to ECG 2025 05:11:54      Electronically Rolanda d On 2025 14:26:07 PST by Adelaida Jolley       *Note: Due to a large number of results and/or encounters for the requested time period, some results have not been displayed. A complete set of results can be found in Results Review.      I have  independently interpreted this EKG      RADIOLOGY/PROCEDURES   The attending emergency physician has independently interpreted the diagnostic imaging associated with this visit and am waiting the final reading from the radiologist.     My preliminary interpretation is a follows: ER MD is reviewed the patient's chest x-ray.  She has increased hazy infiltrates bilateral bases and increase interstitial markings throughout.    Radiologist interpretation:  DX-CHEST-PORTABLE (1 VIEW)   Final Result      Again seen bilateral interstitial infiltrates possibly representing chronic interstitial lung disease versus atypical infection or interstitial edema.          COURSE & MEDICAL DECISION MAKING     ASSESSMENT, COURSE AND PLAN  Care Narrative: Patient presents to the ER with worsening shortness of breath and hypoxia with minimal ambulation over the last couple days since being discharged from the hospital.  Patient says she feels the same as she did when she was in the hospital prior to discharge.  She said that right before discharge she was able to walk a few feet without desaturating, but she was very concerned that she still was not well and thought she was going to be discharged too early.  It sounds like skilled nursing was offered but the patient refused.  Patient went home and started having marked desaturations with minimal ambulation.  She would desat down to the low 70s.  Home health came out to the patient's home today and confirmed that she desaturated down to 70% with walking just down the begum short hallway to the bathroom.  Patient says that when she desaturates down to the 70s with ambulation she gets some chest tightness.  No chest pain at this time.  She has severe COPD.  She is currently on 8 L of oxygen saturating at 90 to 91%.  Patient said that she wants to go to a skilled nursing facility or rehab because she does not feel comfortable at home because she is so short of breath and gets scared when she  desaturates down to 70% with only minimal exertion.  X-ray reveals persistent bilateral interstitial infiltrates.  She is still positive for influenza A, which she had when she was here a few days ago.  White count is elevated at 19,000.  This may be due in part to steroids that she is currently on a prednisone taper.  I spoke with case management to try to figure out if patient could be sent directly to skilled or rehab where she needed to be readmitted.  Since the patient is requiring 8 L of oxygen and is only saturating 90 to 91% on the 8 L, case management said the patient needs to be readmitted to the hospital.  I spoke with [facility] internal medicine residents on-call and they will kindly evaluate the patient hospitalization.    1:43 PM - Patient seen and examined at bedside. This is a 79 year old woman with history of COPD on 8 liters of oxygen at baseline was brought into the emergency department for evaluation of shortness of breath and cough that started 2 days ago. She describes that she has been admitted and discharged multiple times in the last few months for RSV, influenza A, pneumonia and COPD. The patient states that she sats down to the 70s with just walking across the room. Per nursing note, EMS reported that the patient was hypoxic at 86% on her 8 liters of oxygen. EMS also notes that the patient was tachypneic at 36 breaths per minute. Discussed plan of care, including performing an EKG, lab work and imaging. Patient agrees to the plan of care. The patient will be medicated with Solu-medrol 40 mg. Ordered for an EKG, Troponins, proBNP, CMP, CBC w/ Diff., POCT CoV-2 Flu A/B and RSV PCR and DX-Chest  to evaluate her symptoms.    2:01 PM - Spoke with Case Management about the patient's condition. They state that the patient will need to be hospitalized due to the patient satting at 90% on 8 liters.     2:43 PM - Spoke with Dr. Esqueda ([facility]) about the patient's condition.  They will hospitalize the patient for further evaluation and care.       ADDITIONAL PROBLEM LIST  Problem #1: Marked desaturation down to the 70% range with minimal ambulation at home  Problem #2: Persistent shortness of breath    DISPOSITION AND DISCUSSIONS  I have discussed management of the patient with the following physicians and LINDA's:  Dr. Esqueda ([facility])     Discussion of management with other [facility] or appropriate source(s): Case Management states that because patient is on 8 L and saturating only 90% she needs to be admitted to the hospital again.      Escalation of care considered, and ultimately not performed: diagnostic imaging.    Barriers to care at this time, including but not limited to:  None known .     Decision tools and prescription drugs considered including, but not limited to: Antibiotics low suspicion for pneumonia.  At this time I do not think the patient needs antibiotics.  She has influenza A and bad COPD.  I think this is the cause of her worsening shortness of breath and dyspnea on exertion. .    DISPOSITION:  Patient will be hospitalized by ([facility]) in guarded condition.     FINAL DIAGNOSIS  1. Acute hypoxic respiratory failure (HCC) Acute   2. Acute exacerbation of chronic obstructive pulmonary disease (COPD) (HCC) Acute   3. Hypoxia Acute      Yudelka LI (Scribe), am scribing for, and in the presence of, Adelaida Jolley M.D..    Electronically signed by: Yudelka Blanco (Scribe), 1/22/2025    Adelaida LI M.D. personally performed the services described in this documentation, as scribed by Yudelka Blanco in my presence, and it is both accurate and complete.     This dictation has been created using voice recognition software. The accuracy of the dictation is limited by the abilities of the software. I expect there may be some errors of grammar and possibly content. I made every attempt to manually correct the  errors within my dictation. However, errors related to voice recognition software may still exist and should be interpreted within the appropriate context.      The note accurately reflects work and decisions made by me.  Adelaida Jolley M.D.  1/22/2025  11:00 PM

## 2025-01-22 NOTE — H&P
[facility] Internal Medicine History & Physical Note    Date of Service  1/22/2025    [facility] Team: R MAGDY [facility] Team   Attending: Frandy Esqueda M.d.  Senior Resident: Dr. Hastings  Intern:  Dr. Potter  Contact Number: [phone]    Primary Care Physician  Everett Diego M.D.    Consultants  palliative care      Code Status  DNAR/DNI-DNR/DNI    Chief Complaint  Chief Complaint   Patient presents with    Shortness of Breath    Cough       History of Presenting Illness (HPI):   Berny Renee is a 79 y.o. female comes to the emergency room on 1/22/2025 when prompted by the nurse who visited her at home noticed her to be desaturating into the 70s when she walked.  Patient had a recent admission to the hospital and was discharged on Sunday 3 days ago when she was treated for pneumonia, RSV and flu.  She wanted to go to, facility after that admission. She reported worsening shortness of breath, hypoxia and productive cough.  EMS reported patient was hypoxic at 86% on her 8 L baseline oxygen.  Patient was tachypneic at 36 breaths/min as per EMS.  Patient reports occasional diarrhea.  States that she has been discharged before she was ready and that she can take care of of her at her house and that she needs to go to a facility where she can get rehab.  Patient does not report any chest pain, abdominal pain nausea or vomiting.    In the ED:  -Vitals: 192/81, pulse 87, respirations 25/min, normothermia.  90% on 8 L oxygen nasal cannula.  -CBC white count 9.3 hemoglobin 12.4, neutrophil 92.5.  [White count on discharge on 17 January or 11.9.]  -CMP sodium 132 chloride 89, glucose 209 BUN/creatinine normal no transaminitis.  -BNP normal.  Troponin T normal.  eGFR 104.  -Chest x-ray:Again seen bilateral interstitial infiltrates possibly representing chronic interstitial lung disease versus atypical infection or interstitial edema.  -EKG grossly normal as per my interpretation.  -Positive influenza A.  -She got methylprednisolone  40 Mg IV in the ED.      Pulmonary consult on 16 January:  -Plan  -- continue LABA/ICS/LAMA, singulair  -- start duonebs q4H while awake  -- continue steroids; ok in influenza with concominant COPD exacerbation --> https://pmc.ncbi.nlm.nih.gov/articles/AWH6267697/  -- tamiflu  -- Titrate supplemental O2 to maintain saturations 88-92%  -- will review home inhalers with patient prior to discharge      I discussed the plan of care with body headache this time of is nothing to follow neurologically.  Just.    Review of Systems  Review of Systems   Constitutional:  Positive for malaise/fatigue. Negative for chills, fever and weight loss.   HENT: Negative.     Eyes: Negative.    Respiratory:  Positive for cough, sputum production, shortness of breath and wheezing. Negative for hemoptysis.    Cardiovascular:  Negative for chest pain, palpitations, orthopnea, claudication, leg swelling and PND.   Gastrointestinal:  Positive for diarrhea. Negative for abdominal pain, blood in stool, constipation, heartburn, nausea and vomiting.   Genitourinary: Negative.    Musculoskeletal:  Positive for back pain and joint pain.   Skin: Negative.    Neurological:  Positive for tremors. Negative for dizziness, focal weakness, seizures and loss of consciousness.   Endo/Heme/Allergies: Negative.    Psychiatric/Behavioral: Negative.         Past Medical History   has a past medical history of Acute on chronic respiratory failure with hypoxia ([facility]) (11/14/2020), Arthritis, Asthma with COPD ([facility]), BMI 31.0-31.9,adult (02/04/2022), Cataract immature, Chronic pain, Chronic respiratory failure with hypoxia ([facility]) (07/13/2017), Colon polyps, COPD (chronic obstructive pulmonary disease) ([facility]) (2002), Cough, DDD (degenerative disc disease), cervical, DDD (degenerative disc disease), thoracic, Dependence on continuous supplemental oxygen (08/13/2015), Diverticulitis of colon, Dyslipidemia, EMPHYSEMA, H/O opioid abuse ([facility]) (07/15/2021), Hypertension,  Obesity (BMI 30-39.9) (10/24/2016), Opioid type dependence, continuous ([facility]) (02/04/2022), REGINE (obstructive sleep apnea), OSTEOPOROSIS, Painful breathing, Shortness of breath, Spinal stenosis, lumbar region, with neurogenic claudication, Sputum production, URINARY INCONTINENCE, Varicose veins of left leg with edema (10/11/2024), Vitamin d deficiency, and Wheezing.    Surgical History   has a past surgical history that includes tonsillectomy; other orthopedic surgery (2004); other; other; lumbar laminectomy diskectomy (6/22/2010); pr upper gi endoscopy,biopsy (N/A, 11/15/2024); and colonoscopy with polyp (N/A, 11/15/2024).     Family History  family history includes Cancer in her father and sister; Other in her sister.   Family history reviewed with patient.     Social History  Tobacco: Quit smoking  Alcohol: Does not drink alcohol  Recreational drugs (illegal or prescription): Does not report use  Employment: N/AA  Living Situation: Lives alone with her cat      Allergies  Allergies   Allergen Reactions    Iodine      Convulsion  I.V.  iodine    Tape Rash and Itching       Medications  Prior to Admission Medications   Prescriptions Last Dose Informant Patient Reported? Taking?   Albuterol Sulfate 108 (90 Base) MCG/ACT AEROSOL POWDER, BREATH ACTIVATED  Patient No No   Sig: Inhale 2 puffs by mouth 4 times a day as needed (shortness of breath).   Budeson-Glycopyrrol-Formoterol (BREZTRI AEROSPHERE) 160-9-4.8 MCG/ACT Aerosol   No No   Sig: Inhale 2 Inhalations 2 times a day.   Calcium Carbonate (CALCIUM 500 PO)  Patient Yes No   Sig: Take 1 Tablet by mouth every day. Indications: supplement   DULoxetine (CYMBALTA) 60 MG Cap DR Particles delayed-release capsule  Patient No No   Sig: Take 1 capsule by mouth once daily   Patient taking differently: Take 60 mg by mouth every day. Indications: pain   Home Care Oxygen   Yes No   Sig: Inhale 3 L/min continuous.   acetaminophen (TYLENOL) 500 MG Tab  Patient Yes No   Sig: Take  500-1,000 mg by mouth every 6 hours as needed. Indications: Pain   acyclovir (ZOVIRAX) 200 MG Cap  Patient No No   Sig: Take 1 capsule by mouth (at first sign of outbreak) three times a day 10 days   Patient taking differently: Take 200 mg by mouth every day. Indications: Shingles   amLODIPine (NORVASC) 5 MG Tab   No No   Sig: Take 1 Tablet by mouth every day.   azithromycin (ZITHROMAX) 250 MG Tab  Patient No No   Sig: Take 1 Tablet by mouth once daily   benzonatate (TESSALON) 100 MG Cap   No No   Sig: Take 1 Capsule by mouth 3 times a day as needed for Cough (if Robitussin DM ineffective).   diclofenac sodium (VOLTAREN) 1 % Gel  Patient Yes No   Sig: Apply 2 g topically 2 times a day as needed (mild, moderate pain). Indications: mild, moderate pain   digoxin (LANOXIN) 125 MCG Tab  Patient No No   Sig: Take 1 Tablet by mouth every day. Indications: Atrial Fibrillation   diphenhydrAMINE-APAP, sleep, (TYLENOL PM EXTRA STRENGTH)  MG Tab  Patient Yes No   Sig: Take 2 Tablets by mouth at bedtime as needed (insomnia). Indications: insomnia   docusate sodium (COLACE) 100 MG Cap  Patient Yes No   Sig: Take 100 mg by mouth every day.   fexofenadine (HM FEXOFENADINE HCL) 180 MG tablet  Patient Yes No   Sig: Take 180 mg by mouth every day. Indications: allergies   guaiFENesin ER (MUCINEX) 600 MG TABLET SR 12 HR  Patient No No   Sig: Take 1-2 tablets by mouth every 12 hours as needed for congestion.   ipratropium-albuterol (DUONEB) 0.5-2.5 (3) MG/3ML nebulizer solution  Patient No No   Sig: Take 3 mL by nebulization every four hours as needed for Shortness of Breath (Wheezing).   losartan (COZAAR) 50 MG Tab  Patient No No   Sig: Take 1 Tablet by mouth 2 times a day. Indications: High Blood Pressure   meloxicam (MOBIC) 7.5 MG Tab  Patient No No   Sig: Take 1 tablet by mouth once or twice a day   Patient taking differently: Take 7.5 mg by mouth 2 times a day. Indications: Joint Damage causing Pain and Loss of Function    montelukast (SINGULAIR) 10 MG Tab  Patient No No   Sig: Take 1 tablet by mouth once daily   omeprazole (PRILOSEC) 20 MG delayed-release capsule  Patient No No   Sig: Take 1 capsule by mouth every day   potassium chloride (MICRO-K) 10 MEQ capsule  Patient No No   Sig: Take 1 capsule by mouth 2 times a day with soft food.   predniSONE (DELTASONE) 10 MG Tab   No No   Sig: Take 3 Tablets by mouth every day for 4 days, THEN 2 Tablets every day for 5 days, THEN 1 Tablet every day for 5 days, THEN 0.5 Tablets every day for 5 days.   spironolactone (ALDACTONE) 50 MG Tab   No No   Sig: Take 1 Tablet by mouth every day. Indications: Edema   tizanidine (ZANAFLEX) 4 MG Tab  Patient Yes No   Sig: Take 4 mg by mouth 3 times a day as needed (muscle spasms). Indications: Musculoskeletal Pain   traMADol (ULTRAM) 50 MG Tab  Patient No No   Sig: Take 1 tablet by mouth twice each day 30 days   Patient taking differently: Take 50 mg by mouth 2 times a day as needed. Indications: Pain   triamcinolone (NASACORT) 55 MCG/ACT nasal inhaler  Patient Yes No   Sig: Administer 1 Spray into affected nostril(S) every day. Indications: Hayfever      Facility-Administered Medications: None       Physical Exam  Temp:  [37.1 °C (98.7 °F)] 37.1 °C (98.7 °F)  Pulse:  [87] 87  Resp:  [25] 25  BP: (192)/(81) 192/81  SpO2:  [90 %] 90 %  Blood Pressure : (!) 192/81   Temperature: 37.1 °C (98.7 °F)   Pulse: 87   Respiration: (!) 25   Pulse Oximetry: 90 %       Physical Exam  Constitutional:       Appearance: Normal appearance. She is normal weight.   HENT:      Head: Normocephalic and atraumatic.      Nose: Nose normal. No congestion.      Mouth/Throat:      Mouth: Mucous membranes are moist.      Pharynx: Oropharynx is clear.   Eyes:      Extraocular Movements: Extraocular movements intact.      Conjunctiva/sclera: Conjunctivae normal.      Pupils: Pupils are equal, round, and reactive to light.   Cardiovascular:      Rate and Rhythm: Normal rate and  regular rhythm.      Pulses: Normal pulses.      Heart sounds: Normal heart sounds.   Pulmonary:      Effort: Pulmonary effort is normal. No respiratory distress.      Breath sounds: Wheezing present. No rhonchi.   Abdominal:      General: Abdomen is flat. Bowel sounds are normal. There is no distension.      Palpations: There is no mass.      Tenderness: There is no abdominal tenderness. There is no guarding or rebound.   Musculoskeletal:         General: No swelling or tenderness. Normal range of motion.      Right lower leg: No edema.      Left lower leg: No edema.   Skin:     General: Skin is warm.      Capillary Refill: Capillary refill takes less than 2 seconds.   Neurological:      General: No focal deficit present.      Mental Status: She is alert and oriented to person, place, and time. Mental status is at baseline.      Cranial Nerves: No cranial nerve deficit.      Sensory: No sensory deficit.      Motor: No weakness.      Coordination: Coordination normal.   Psychiatric:         Mood and Affect: Mood normal.         Behavior: Behavior normal.         Laboratory:  Recent Labs     01/22/25  1319   WBC 19.3*   RBC 4.12*   HEMOGLOBIN 12.4   HEMATOCRIT 38.1   MCV 92.5   MCH 30.1   MCHC 32.5   RDW 45.6   PLATELETCT 447*   MPV 9.0     Recent Labs     01/22/25  1319   SODIUM 132*   POTASSIUM 3.9   CHLORIDE 89*   CO2 31   GLUCOSE 209*   BUN 10   CREATININE 0.34*   CALCIUM 9.3     Recent Labs     01/22/25  1319   ALTSGPT 15   ASTSGOT 17   ALKPHOSPHAT 48   TBILIRUBIN 0.3   GLUCOSE 209*         Recent Labs     01/22/25  1319   NTPROBNP 94         Recent Labs     01/22/25  1319   TROPONINT 9       Imaging:  DX-CHEST-PORTABLE (1 VIEW)   Final Result      Again seen bilateral interstitial infiltrates possibly representing chronic interstitial lung disease versus atypical infection or interstitial edema.          X-Ray:  I have personally reviewed the images and compared with prior images.  No new infiltrates were found  compared to the old x-ray during her last admission.    Assessment/Plan:  Problem Representation: I anticipate this patient will require at least two midnights for appropriate medical management, necessitating inpatient admission because of acute on chronic respiratory failure secondary to unresolved influenza infection.      * Acute on chronic hypoxic respiratory failure (HCC)- (present on admission)  Assessment & Plan  Patient had a recent hospital admission for similar problem due to concerns of infection and was discharged on Tamiflu and steroids.  Chest x-ray did not show any new infiltrates.  She got Solu-Medrol in the ED at 3 PM and reports her respiratory status improved after that.  At this point we will hold on starting another Tamiflu course.  Patient is at a baseline of 6 L.  -Telemetry for 48 hours for acute hypoxic respiratory failure.  -Solu-Medrol 40 Mg every 12 hours starting from tomorrow.  -Ordered Pro-Azam, respiratory panel, sputum cultures to rule out any other underlying etiologies.  -Respiratory therapy protocol.-Continue home Breztri  -COPD exacerbation protocol.  -DuoNebs, albuterol inhaler ordered.  -Guaifenesin, benzonatate for cough.  -Palliative consult placed given multiple admissions in the last few months and worsening respiratory status..    Diarrhea  Assessment & Plan  Patient reports occasional diarrhea but does not give exact frequency.  -Ordered C. difficile.  -Ordered GI panel.    Muscle spasms of both lower extremities  Assessment & Plan  Continue home tizanidine.    GERD (gastroesophageal reflux disease)  Assessment & Plan  -Continue home omeprazole.    Mood disorder ([facility])  Assessment & Plan  Continue home duloxetine.    Allergies  Assessment & Plan  Continue home diphenhydramine, montelukast, fexofenadine.    Hypertension- (present on admission)  Assessment & Plan  Continue home amlodipine, spironolactone, losartan.    AF (atrial fibrillation) ([facility])- (present on  admission)  Assessment & Plan  Continue home digoxin    Pain, chronic- (present on admission)  Assessment & Plan  Pain multiple joints  -Continue home meloxicam, Diclo Gel and tramadol.        VTE prophylaxis: enoxaparin ppx

## 2025-01-22 NOTE — ED NOTES
Med Rec complete per patient   Allergies reviewed  Antibiotics in the past 30 days:yes  Anticoagulant in past 14 days:no  Pharmacy patient utilizes:[facility]    Pt states she uses 8 L/min continuous at home    Pt states this morning was her last dose of Prednisone 30 mg (3 tabs) and tomorrow would begin 20 mg (2 tabs) QD

## 2025-01-22 NOTE — ED TRIAGE NOTES
Chief Complaint   Patient presents with    Shortness of Breath    Cough     Pt BIB EMS from home for worsening SOB, hypoxia and productive cough. EMS reported pt was hypoxic at 86% on her 8L baseline oxygen. Pt says she has been admitted and d/c multiple times in the last few months for RSV, influenza A, PNA and COPD. Pt tachypneic for ems at 36 breaths per minute. Pt requesting to be d/c to a rehab due to her living alone and inability to care for self. PMH of afib and COPD.    Blood Pressure : (!) 192/81, Pulse: 87, Respiration: (!) 25, Temperature: 37.1 °C (98.7 °F), Pulse Oximetry: 90 %, O2 (LPM): 8, O2 Delivery Device: Silicone Nasal Cannula

## 2025-01-23 ENCOUNTER — APPOINTMENT (OUTPATIENT)
Dept: RADIOLOGY | Facility: MEDICAL CENTER | Age: 80
DRG: 177 | End: 2025-01-23
Payer: MEDICARE

## 2025-01-23 PROBLEM — R73.9 STEROID-INDUCED HYPERGLYCEMIA: Status: ACTIVE | Noted: 2025-01-23

## 2025-01-23 PROBLEM — Z85.118 PERSONAL HISTORY OF LUNG CANCER: Status: ACTIVE | Noted: 2025-01-23

## 2025-01-23 PROBLEM — T38.0X5A STEROID-INDUCED HYPERGLYCEMIA: Status: ACTIVE | Noted: 2025-01-23

## 2025-01-23 LAB
ALBUMIN SERPL BCP-MCNC: 3.3 G/DL (ref 3.2–4.9)
ALBUMIN/GLOB SERPL: 0.8 G/DL
ALP SERPL-CCNC: 79 U/L (ref 30–99)
ALT SERPL-CCNC: 12 U/L (ref 2–50)
ANION GAP SERPL CALC-SCNC: 9 MMOL/L (ref 7–16)
AST SERPL-CCNC: 16 U/L (ref 12–45)
BASOPHILS # BLD AUTO: 0.1 % (ref 0–1.8)
BASOPHILS # BLD: 0.02 K/UL (ref 0–0.12)
BILIRUB SERPL-MCNC: 0.2 MG/DL (ref 0.1–1.5)
BUN SERPL-MCNC: 14 MG/DL (ref 8–22)
CALCIUM ALBUM COR SERPL-MCNC: 10.2 MG/DL (ref 8.5–10.5)
CALCIUM SERPL-MCNC: 9.6 MG/DL (ref 8.5–10.5)
CHLORIDE SERPL-SCNC: 93 MMOL/L (ref 96–112)
CO2 SERPL-SCNC: 34 MMOL/L (ref 20–33)
CREAT SERPL-MCNC: 0.47 MG/DL (ref 0.5–1.4)
EOSINOPHIL # BLD AUTO: 0.02 K/UL (ref 0–0.51)
EOSINOPHIL NFR BLD: 0.1 % (ref 0–6.9)
ERYTHROCYTE [DISTWIDTH] IN BLOOD BY AUTOMATED COUNT: 44 FL (ref 35.9–50)
EST. AVERAGE GLUCOSE BLD GHB EST-MCNC: 140 MG/DL
GFR SERPLBLD CREATININE-BSD FMLA CKD-EPI: 96 ML/MIN/1.73 M 2
GLOBULIN SER CALC-MCNC: 3.9 G/DL (ref 1.9–3.5)
GLUCOSE SERPL-MCNC: 169 MG/DL (ref 65–99)
GRAM STN SPEC: NORMAL
HBA1C MFR BLD: 6.5 % (ref 4–5.6)
HCT VFR BLD AUTO: 37.6 % (ref 37–47)
HGB BLD-MCNC: 12.5 G/DL (ref 12–16)
IMM GRANULOCYTES # BLD AUTO: 0.16 K/UL (ref 0–0.11)
IMM GRANULOCYTES NFR BLD AUTO: 1.2 % (ref 0–0.9)
LYMPHOCYTES # BLD AUTO: 0.45 K/UL (ref 1–4.8)
LYMPHOCYTES NFR BLD: 3.3 % (ref 22–41)
MCH RBC QN AUTO: 30 PG (ref 27–33)
MCHC RBC AUTO-ENTMCNC: 33.2 G/DL (ref 32.2–35.5)
MCV RBC AUTO: 90.2 FL (ref 81.4–97.8)
MONOCYTES # BLD AUTO: 0.75 K/UL (ref 0–0.85)
MONOCYTES NFR BLD AUTO: 5.6 % (ref 0–13.4)
NEUTROPHILS # BLD AUTO: 12.1 K/UL (ref 1.82–7.42)
NEUTROPHILS NFR BLD: 89.7 % (ref 44–72)
NRBC # BLD AUTO: 0 K/UL
NRBC BLD-RTO: 0 /100 WBC (ref 0–0.2)
PLATELET # BLD AUTO: 482 K/UL (ref 164–446)
PMV BLD AUTO: 9.1 FL (ref 9–12.9)
POTASSIUM SERPL-SCNC: 4.1 MMOL/L (ref 3.6–5.5)
PROT SERPL-MCNC: 7.2 G/DL (ref 6–8.2)
RBC # BLD AUTO: 4.17 M/UL (ref 4.2–5.4)
SCCMEC + MECA PNL NOSE NAA+PROBE: NEGATIVE
SIGNIFICANT IND 70042: NORMAL
SITE SITE: NORMAL
SODIUM SERPL-SCNC: 136 MMOL/L (ref 135–145)
SOURCE SOURCE: NORMAL
WBC # BLD AUTO: 13.5 K/UL (ref 4.8–10.8)

## 2025-01-23 PROCEDURE — 94640 AIRWAY INHALATION TREATMENT: CPT

## 2025-01-23 PROCEDURE — 99233 SBSQ HOSP IP/OBS HIGH 50: CPT | Performed by: STUDENT IN AN ORGANIZED HEALTH CARE EDUCATION/TRAINING PROGRAM

## 2025-01-23 PROCEDURE — 700102 HCHG RX REV CODE 250 W/ 637 OVERRIDE(OP): Performed by: HOSPITALIST

## 2025-01-23 PROCEDURE — 94669 MECHANICAL CHEST WALL OSCILL: CPT

## 2025-01-23 PROCEDURE — 87077 CULTURE AEROBIC IDENTIFY: CPT

## 2025-01-23 PROCEDURE — 87205 SMEAR GRAM STAIN: CPT

## 2025-01-23 PROCEDURE — 83036 HEMOGLOBIN GLYCOSYLATED A1C: CPT

## 2025-01-23 PROCEDURE — A9270 NON-COVERED ITEM OR SERVICE: HCPCS | Performed by: HOSPITALIST

## 2025-01-23 PROCEDURE — 87641 MR-STAPH DNA AMP PROBE: CPT

## 2025-01-23 PROCEDURE — 700102 HCHG RX REV CODE 250 W/ 637 OVERRIDE(OP)

## 2025-01-23 PROCEDURE — 700111 HCHG RX REV CODE 636 W/ 250 OVERRIDE (IP): Mod: JZ,TB

## 2025-01-23 PROCEDURE — A9270 NON-COVERED ITEM OR SERVICE: HCPCS

## 2025-01-23 PROCEDURE — 0202U NFCT DS 22 TRGT SARS-COV-2: CPT

## 2025-01-23 PROCEDURE — 80053 COMPREHEN METABOLIC PANEL: CPT

## 2025-01-23 PROCEDURE — 700101 HCHG RX REV CODE 250: Performed by: HOSPITALIST

## 2025-01-23 PROCEDURE — 87070 CULTURE OTHR SPECIMN AEROBIC: CPT

## 2025-01-23 PROCEDURE — 71250 CT THORAX DX C-: CPT

## 2025-01-23 PROCEDURE — 36415 COLL VENOUS BLD VENIPUNCTURE: CPT

## 2025-01-23 PROCEDURE — 770020 HCHG ROOM/CARE - TELE (206)

## 2025-01-23 PROCEDURE — 87181 SC STD AGAR DILUTION PER AGT: CPT

## 2025-01-23 PROCEDURE — 99222 1ST HOSP IP/OBS MODERATE 55: CPT | Mod: GC | Performed by: INTERNAL MEDICINE

## 2025-01-23 PROCEDURE — 85025 COMPLETE CBC W/AUTO DIFF WBC: CPT

## 2025-01-23 RX ORDER — ALBUTEROL SULFATE 90 UG/1
2 INHALANT RESPIRATORY (INHALATION)
Status: DISCONTINUED | OUTPATIENT
Start: 2025-01-23 | End: 2025-01-23

## 2025-01-23 RX ORDER — IPRATROPIUM BROMIDE AND ALBUTEROL SULFATE 2.5; .5 MG/3ML; MG/3ML
3 SOLUTION RESPIRATORY (INHALATION)
Status: DISCONTINUED | OUTPATIENT
Start: 2025-01-23 | End: 2025-01-24

## 2025-01-23 RX ORDER — DEXTROSE MONOHYDRATE 25 G/50ML
25 INJECTION, SOLUTION INTRAVENOUS
Status: DISCONTINUED | OUTPATIENT
Start: 2025-01-23 | End: 2025-01-27 | Stop reason: HOSPADM

## 2025-01-23 RX ORDER — ALBUTEROL SULFATE 5 MG/ML
2.5 SOLUTION RESPIRATORY (INHALATION)
Status: DISCONTINUED | OUTPATIENT
Start: 2025-01-23 | End: 2025-01-23

## 2025-01-23 RX ORDER — DIPHENHYDRAMINE HCL 25 MG
25 TABLET ORAL ONCE
Status: COMPLETED | OUTPATIENT
Start: 2025-01-24 | End: 2025-01-23

## 2025-01-23 RX ORDER — ALBUTEROL SULFATE 90 UG/1
2 INHALANT RESPIRATORY (INHALATION) 2 TIMES DAILY
Status: DISCONTINUED | OUTPATIENT
Start: 2025-01-23 | End: 2025-01-23

## 2025-01-23 RX ORDER — ACYCLOVIR 200 MG/1
200 CAPSULE ORAL DAILY
Status: DISCONTINUED | OUTPATIENT
Start: 2025-01-23 | End: 2025-01-27 | Stop reason: HOSPADM

## 2025-01-23 RX ORDER — INSULIN LISPRO 100 [IU]/ML
1-6 INJECTION, SOLUTION INTRAVENOUS; SUBCUTANEOUS
Status: DISCONTINUED | OUTPATIENT
Start: 2025-01-24 | End: 2025-01-27 | Stop reason: HOSPADM

## 2025-01-23 RX ADMIN — METHYLPREDNISOLONE SODIUM SUCCINATE 40 MG: 40 INJECTION, POWDER, FOR SOLUTION INTRAMUSCULAR; INTRAVENOUS at 16:25

## 2025-01-23 RX ADMIN — ENOXAPARIN SODIUM 40 MG: 100 INJECTION SUBCUTANEOUS at 16:24

## 2025-01-23 RX ADMIN — LOSARTAN POTASSIUM 50 MG: 50 TABLET, FILM COATED ORAL at 16:25

## 2025-01-23 RX ADMIN — ALBUTEROL SULFATE 2 PUFF: 90 INHALANT RESPIRATORY (INHALATION) at 05:50

## 2025-01-23 RX ADMIN — DIPHENHYDRAMINE HYDROCHLORIDE 25 MG: 25 TABLET ORAL at 23:50

## 2025-01-23 RX ADMIN — FLUTICASONE FUROATE, UMECLIDINIUM BROMIDE AND VILANTEROL TRIFENATATE 1 PUFF: 200; 62.5; 25 POWDER RESPIRATORY (INHALATION) at 07:52

## 2025-01-23 RX ADMIN — IPRATROPIUM BROMIDE AND ALBUTEROL SULFATE 3 ML: .5; 2.5 SOLUTION RESPIRATORY (INHALATION) at 16:33

## 2025-01-23 RX ADMIN — DIGOXIN 125 MCG: 0.12 TABLET ORAL at 16:25

## 2025-01-23 RX ADMIN — METHYLPREDNISOLONE SODIUM SUCCINATE 40 MG: 40 INJECTION, POWDER, FOR SOLUTION INTRAMUSCULAR; INTRAVENOUS at 05:50

## 2025-01-23 RX ADMIN — BENZONATATE 100 MG: 100 CAPSULE ORAL at 22:01

## 2025-01-23 RX ADMIN — OMEPRAZOLE 20 MG: 20 CAPSULE, DELAYED RELEASE ORAL at 05:50

## 2025-01-23 RX ADMIN — Medication 5 MG: at 22:01

## 2025-01-23 RX ADMIN — IPRATROPIUM BROMIDE AND ALBUTEROL SULFATE 3 ML: .5; 2.5 SOLUTION RESPIRATORY (INHALATION) at 20:45

## 2025-01-23 RX ADMIN — IPRATROPIUM BROMIDE AND ALBUTEROL SULFATE 3 ML: .5; 2.5 SOLUTION RESPIRATORY (INHALATION) at 12:20

## 2025-01-23 RX ADMIN — AMLODIPINE BESYLATE 5 MG: 5 TABLET ORAL at 05:50

## 2025-01-23 RX ADMIN — LOSARTAN POTASSIUM 50 MG: 50 TABLET, FILM COATED ORAL at 05:51

## 2025-01-23 RX ADMIN — IPRATROPIUM BROMIDE AND ALBUTEROL SULFATE 3 ML: .5; 2.5 SOLUTION RESPIRATORY (INHALATION) at 23:17

## 2025-01-23 RX ADMIN — ACYCLOVIR 200 MG: 200 CAPSULE ORAL at 13:13

## 2025-01-23 RX ADMIN — IPRATROPIUM BROMIDE AND ALBUTEROL SULFATE 3 ML: .5; 2.5 SOLUTION RESPIRATORY (INHALATION) at 07:52

## 2025-01-23 RX ADMIN — DULOXETINE HYDROCHLORIDE 60 MG: 60 CAPSULE, DELAYED RELEASE ORAL at 05:50

## 2025-01-23 RX ADMIN — SPIRONOLACTONE 50 MG: 25 TABLET ORAL at 05:50

## 2025-01-23 RX ADMIN — FEXOFENADINE HCL 180 MG: 60 TABLET, FILM COATED ORAL at 16:25

## 2025-01-23 RX ADMIN — MONTELUKAST 10 MG: 10 TABLET, FILM COATED ORAL at 05:50

## 2025-01-23 ASSESSMENT — FIBROSIS 4 INDEX: FIB4 SCORE: 0.76

## 2025-01-23 ASSESSMENT — ENCOUNTER SYMPTOMS
CLAUDICATION: 0
ABDOMINAL PAIN: 0
CHILLS: 0
HEARTBURN: 0
WEIGHT LOSS: 0
CONSTIPATION: 0
DIARRHEA: 1
PALPITATIONS: 0
VOMITING: 0
EYES NEGATIVE: 1
PND: 0
SEIZURES: 0
LOSS OF CONSCIOUSNESS: 0
BLOOD IN STOOL: 0
NAUSEA: 0
SHORTNESS OF BREATH: 1
FOCAL WEAKNESS: 0
COUGH: 1
TREMORS: 1
HEMOPTYSIS: 0
PSYCHIATRIC NEGATIVE: 1
WHEEZING: 1
DIZZINESS: 0
FEVER: 0
SPUTUM PRODUCTION: 1
BACK PAIN: 1
ORTHOPNEA: 0

## 2025-01-23 ASSESSMENT — PAIN DESCRIPTION - PAIN TYPE: TYPE: ACUTE PAIN

## 2025-01-23 NOTE — ASSESSMENT & PLAN NOTE
Patient reports occasional diarrhea but does not give exact frequency.  -Ordered C. difficile.  -Ordered GI panel.

## 2025-01-23 NOTE — ASSESSMENT & PLAN NOTE
Continue home digoxin.   We have reviewed multiple EKGs during her hospital stay and the previous hospitalization.  We did not find any evidence of atrial fibrillation.  However patient tells us that she has a history of atrial fibrillation and she takes digoxin for that.  She also tells us that her previous cardiologist Dr. Kwan advised her that she does not need any blood thinner or anticoagulation.  She also tells me that she is currently following Dr. Merida at cardiology.  -Will schedule outpatient cardiology follow-up upon discharge.

## 2025-01-23 NOTE — CONSULTS
"PALLIATIVE CARE SOCIAL WORK CONSULT NOTE    Patient: Berny Renee  Age: 79  Gender: F  MRN: [id number]  Insurance: [employer]/Medicaid   Date Admitted: 1/21/25  Date of Service: 1/22/25  Medical History: Severe COPD; diarrhea; recent influenza    History of Present illness: Per H&P, \"Patient with history of severe COPD chronic heart failure with preserved ejection fraction history of left upper lobe lung cancer recent hospitalization for COPD exacerbation secondary to influenza A and volume overload for which she was diuresed and discharged home on 8 L nasal cannula.  She is presenting with worsening dyspnea with desaturation with minimal exertion.  She is complaining of productive cough of yellow sputum and diarrhea. Chest x-ray reviewed with some improvement in infiltrates.  BNP is normal and clinically she appears euvolemic.  Patient completed course of Tamiflu still testing positive for influenza A.\"    LCSW introduced self and explained role within palliative care.  Pt was agreeable to discuss her healthcare wishes/advance care planning. Clarified difference between palliative care and hospice. Patient expresses frustration with the medical team \"pushing hospice\" on her. Pt states, \"I know I'm dying, I know my lungs aren't good\", and reports \"God isn't ready for me yet\". Pt clarifies that she would be open to hospice in the future, but would like to see how she does after her flu symptoms resolve. She would like to continue with PT/OT and get stronger before returning home.     Understanding/Acceptance of illness: LCSW explored pt's understanding of COPD. Pt shares that she is aware pt is a progressive disease. She shares COPD has made it difficult to engage in fulfilling activities (going to [employer], taking care of her cat Ramon, going to visit friends). She understands that her COPD symptoms will still be there even after flu symptoms and other acute issues resolve. Despite this, pt reiterates \"It's my choice " "when I decide on hospice\" and would like to try to get stronger at SNF/rehab prior to returning home.     Mental health concerns impacting care: Pt seems to be struggling with reduced sense of physical independence and lack of options to treat advanced illness.     Family System/Social Situation: Pt's spouse passed away several years ago on hospice. They were on hospice together and then she revoked hospice after he passed. She now lives alone but has help from her friends.     Importance of Spirituality/Ama: Pt reports she is a Hindu and her  came into visit this morning. She states, \"My God will take me when he's ready\".     Code status: DNR/DNI; Pt confirmed this decision today.     Advance Directive/POLST: Yes, on file and up to date. DPOA is her friend, Jenifer Doss.     Mental Status/Orientation: A&Ox4    Specialists/Consults: Pulmonology    Follow up: none planned at this time, as GOC are clear. [facility].     Cheri Hodge, AARON  Outpatient Palliative Care     "

## 2025-01-23 NOTE — ASSESSMENT & PLAN NOTE
Patient had a recent hospital admission for similar problem due to concerns of infection and was discharged on Tamiflu and steroids.  Chest x-ray did not show any new infiltrates.  She got Solu-Medrol in the ED at 3 PM and reports her respiratory status improved after that.  At this point we will hold on starting another Tamiflu course.  Patient is at a baseline of 6 L.  Ordered CT chest today. SLP consult placed.   -Telemetry for 48 hours for acute hypoxic respiratory failure.  -Transition Solu-Medrol to prednisone p.o, continuing taper.  -Azithromycin and Zosyn 4 gms started due to pseudomonas on respiratory cultures.  [Transition to Levoflaxacin on DC]   -Ordered Pro-Azam, respiratory panel, sputum cultures to rule out any other underlying etiologies. Resulted negative.  -Respiratory therapy protocol.  -Continue home Breztri  -COPD exacerbation protocol.  -DuoNebs, albuterol inhaler ordered.  -encouraged incentive spirometry  -Guaifenesin, benzonatate for cough.  -Palliative consult placed given multiple admissions in the last few months and worsening respiratory status. Denied hospice.

## 2025-01-23 NOTE — CONSULTS
Critical Care/Pulmonary Consultation    Date of Service: 1/23/2025    Date of Admission:  1/22/2025 12:59 PM    Consulting Physician: Luis Faustin M.D.    Chief Complaint:  Shortness of Breath and Cough      History of Present Illness:     Berny Renee is a 79 y.o. female with past medical history of severe COPD, on 8 L oxygen at baseline, combined chronic CHF, lung cancer in remission, chronic pain syndrome, spinal stenosis, sleep apnea, hypertension, who presented last 1/20/2025 for shortness of breath.  She was also advised by her home health nurse to go to the emergency department since she noticed that she was desaturating to 70s upon ambulation.  Patient was recently admitted last 1/16/2025 and was treated for pneumonia, flu and COPD exacerbation.  Patient was previously on Advair, Spiriva, Singulair, chronic azithromycin and prednisone 5 mg daily.  She follows with outpatient pulmonology .During recent discharge, she was followed by pulmonology who recommended that she switch back to Breztri for better compliance.  She was also advised to be on prolonged taper dose of steroids.  patient states that she feels that she never fully recovered from her flu.  She has completed her course of Tamiflu.     At ED, patient noted to be tachypneic,hypertensive 192/81 , saturating 86% at 8L which is her baseline.  CBC presented leukocytosis of 19.3.  CMP presented hyponatremia 132.  Troponin and proBNP were unremarkable.  Respiratory panel was positive for Influenza A.  Chest x-ray presented bilateral interstitial infiltrates which is similar to prior chest x-ray.  Received 1 dose of Solu-Medrol at ED.  Patient was admitted with acute on chronic respiratory failure likely secondary to COPD exacerbation with recent influenza A infection.     Pulmonology consulted for further recommendations regarding COPD exacerbation.    Review of Systems   Constitutional:  Positive for malaise/fatigue. Negative for chills, fever and  weight loss.   HENT: Negative.     Eyes: Negative.    Respiratory:  Positive for cough, sputum production, shortness of breath and wheezing. Negative for hemoptysis.    Cardiovascular:  Negative for chest pain, palpitations, orthopnea, claudication, leg swelling and PND.   Gastrointestinal:  Positive for diarrhea. Negative for abdominal pain, blood in stool, constipation, heartburn, nausea and vomiting.   Genitourinary: Negative.    Musculoskeletal:  Positive for back pain. Negative for joint pain.   Skin: Negative.    Neurological:  Positive for tremors. Negative for dizziness, focal weakness, seizures and loss of consciousness.   Endo/Heme/Allergies: Negative.    Psychiatric/Behavioral: Negative.         Home Medications       Reviewed by Magno Herrera (Pharmacy Tech) on 01/22/25 at 1532  Med List Status: Complete     Medication Last Dose Status   acetaminophen (TYLENOL) 500 MG Tab 1/8/2025 Active   acyclovir (ZOVIRAX) 200 MG Cap 1/22/2025 Active   Albuterol Sulfate 108 (90 Base) MCG/ACT AEROSOL POWDER, BREATH ACTIVATED 1/22/2025 Active   amLODIPine (NORVASC) 5 MG Tab Unknown Active   azithromycin (ZITHROMAX) 250 MG Tab 1/22/2025 Active   benzonatate (TESSALON) 100 MG Cap 1/21/2025 Active   Budeson-Glycopyrrol-Formoterol (BREZTRI AEROSPHERE) 160-9-4.8 MCG/ACT Aerosol 1/22/2025 Active   Calcium Carbonate (CALCIUM 500 PO) 1/21/2025 Active   diclofenac sodium (VOLTAREN) 1 % Gel Unknown Active   digoxin (LANOXIN) 125 MCG Tab 1/21/2025 Active   diphenhydrAMINE-APAP, sleep, (TYLENOL PM EXTRA STRENGTH)  MG Tab 1/8/2025 Active   docusate sodium (COLACE) 100 MG Cap 1/15/2025 Active   DULoxetine (CYMBALTA) 60 MG Cap DR Particles delayed-release capsule 1/21/2025 Active   fexofenadine (HM FEXOFENADINE HCL) 180 MG tablet 1/21/2025 Active   guaiFENesin ER (MUCINEX) 600 MG TABLET SR 12 HR 1/21/2025 Active   Home Care Oxygen  Active   ipratropium-albuterol (DUONEB) 0.5-2.5 (3) MG/3ML nebulizer solution 1/22/2025  Active   losartan (COZAAR) 50 MG Tab 2025 Active   meloxicam (MOBIC) 7.5 MG Tab 2025 Active   montelukast (SINGULAIR) 10 MG Tab 2025 Active   omeprazole (PRILOSEC) 20 MG delayed-release capsule 2025 Active   potassium chloride (MICRO-K) 10 MEQ capsule 2025 Active   predniSONE (DELTASONE) 10 MG Tab 2025 Active   spironolactone (ALDACTONE) 50 MG Tab 2025 Active   tizanidine (ZANAFLEX) 4 MG Tab Unknown Active   traMADol (ULTRAM) 50 MG Tab 2025 Active   triamcinolone (NASACORT) 55 MCG/ACT nasal inhaler 1/15/2025 Active                  Audit from Redirected Encounters    **Home medications have not yet been reviewed for this encounter**         Social History     Tobacco Use    Smoking status: Former     Current packs/day: 0.00     Average packs/day: 2.0 packs/day for 30.0 years (60.0 ttl pk-yrs)     Types: Cigarettes     Start date: 3/1/1969     Quit date: 3/1/1999     Years since quittin.9    Smokeless tobacco: Never    Tobacco comments:     3 pks a day for 35 yrs, continued abstinence   Vaping Use    Vaping status: Never Used   Substance Use Topics    Alcohol use: Not Currently     Alcohol/week: 4.2 oz     Types: 7 Glasses of wine per week    Drug use: Not Currently     Types: Marijuana, Oral     Comment: Medical Marijuana         Past Medical History:   Diagnosis Date    Acute on chronic respiratory failure with hypoxia ([facility]) 2020    Arthritis     spine    Asthma with COPD ([facility])     BMI 31.0-31.9,adult 2022    Cataract immature     Chronic pain     Chronic respiratory failure with hypoxia ([facility]) 2017    Colon polyps     COPD (chronic obstructive pulmonary disease) ([facility])     moderate to severe    Cough     DDD (degenerative disc disease), cervical     DDD (degenerative disc disease), thoracic     Dependence on continuous supplemental oxygen 2015    IMO load 2020    Diverticulitis of colon     Dyslipidemia     EMPHYSEMA     H/O opioid abuse  "([facility]) 07/15/2021    Hypertension     Obesity (BMI 30-39.9) 10/24/2016    Opioid type dependence, continuous ([facility]) 02/04/2022    REGINE (obstructive sleep apnea)     OSTEOPOROSIS     Painful breathing     Shortness of breath     Spinal stenosis, lumbar region, with neurogenic claudication     Sputum production     URINARY INCONTINENCE     Varicose veins of left leg with edema 10/11/2024    Vitamin d deficiency     Wheezing        Past Surgical History:   Procedure Laterality Date    VT UPPER GI ENDOSCOPY,BIOPSY N/A 11/15/2024    Procedure: GASTROSCOPY, WITH BIOPSY;  Surgeon: Mela Vargas M.D.;  Location: SURGERY SAME DAY [facility];  Service: Gastroenterology    COLONOSCOPY WITH POLYP N/A 11/15/2024    Procedure: COLONOSCOPY, WITH POLYPECTOMY;  Surgeon: Mela Vargas M.D.;  Location: SURGERY SAME DAY [facility];  Service: Gastroenterology    LUMBAR LAMINECTOMY DISKECTOMY  6/22/2010    Performed by GRACIE CANO at SURGERY [facility]    OTHER ORTHOPEDIC SURGERY  2004    left ankle fx    OTHER      leg fracture    OTHER      t spine disc surg    TONSILLECTOMY         Allergies: Iodine and Tape    Family History   Problem Relation Age of Onset    Cancer Father         Lung    Other Sister         lung and leukemia cancer    Cancer Sister         Leukemia, Lung       Vitals:    01/22/25 1310 01/22/25 1405 01/22/25 1504 01/22/25 1542   Height:    1.6 m (5' 3\")   Weight:    73 kg (161 lb)   Weight % change since last entry.:    0 %   BP: (!) 192/81 (!) 161/58 (!) 163/90    Pulse: 87 86 78    BMI (Calculated):    28.52   Resp: (!) 25 (!) 28 (!) 25    Temp: 37.1 °C (98.7 °F)      TempSrc: Oral       01/22/25 1600 01/22/25 1711 01/22/25 2052 01/22/25 2349   Height:       Weight:       Weight % change since last entry.:       BP: (!) 172/70 (!) 162/117 (!) 154/64 (!) 160/67   Pulse: 77 75 82 75   BMI (Calculated):       Resp: (!) 23 (!) 24 20 18   Temp:  36.6 °C (97.8 °F) 36.1 °C (97 °F) 36.4 °C (97.5 °F)   TempSrc:  " Temporal Temporal Temporal    01/23/25 0404 01/23/25 0551 01/23/25 0752 01/23/25 0823   Height:       Weight: 76.4 kg (168 lb 6.9 oz)      Weight % change since last entry.: 4.62 %      BP: (!) 166/66 (!) 158/70  (!) 153/75   Pulse: 83  80 81   BMI (Calculated):       Resp: 20  20 20   Temp: 36.7 °C (98.1 °F)   35.9 °C (96.6 °F)   TempSrc: Temporal   Temporal       Physical Examination    Appearance: Normal appearance. She is normal weight.   HENT:      Head: Normocephalic and atraumatic.      Nose: Nose normal. No congestion.      Mouth/Throat:      Mouth: Mucous membranes are moist.      Pharynx: Oropharynx is clear.   Eyes:      Extraocular Movements: Extraocular movements intact.      Conjunctiva/sclera: Conjunctivae normal.      Pupils: Pupils are equal, round, and reactive to light.   Cardiovascular:      Rate and Rhythm: Normal rate and regular rhythm.      Pulses: Normal pulses.      Heart sounds: Normal heart sounds.   Pulmonary:      Effort: Pulmonary effort is normal. No respiratory distress.      Breath sounds: Wheezing present. No rhonchi.   Abdominal:      General: Abdomen is flat. Bowel sounds are normal. There is no distension.      Palpations: There is no mass.      Tenderness: There is no abdominal tenderness. There is no guarding or rebound.   Musculoskeletal:         General: No swelling or tenderness. Normal range of motion.      Right lower leg: No edema.      Left lower leg: No edema.   Skin:     General: Skin is warm.      Capillary Refill: Capillary refill takes less than 2 seconds.   Neurological:      General: No focal deficit present.      Mental Status: She is alert and oriented to person, place, and time. Mental status is at baseline.      Cranial Nerves: No cranial nerve deficit.      Sensory: No sensory deficit.      Motor: No weakness.      Coordination: Coordination normal.   Psychiatric:         Mood and Affect: Mood normal.         Behavior: Behavior normal.     Intake/Output  Summary (Last 24 hours) at 1/23/2025 1013  Last data filed at 1/23/2025 0800  Gross per 24 hour   Intake 440 ml   Output 0 ml   Net 440 ml       Recent Labs     01/22/25  1319 01/23/25  0123   WBC 19.3* 13.5*   NEUTSPOLYS 92.50* 89.70*   LYMPHOCYTES 2.30* 3.30*   MONOCYTES 3.20 5.60   EOSINOPHILS 0.90 0.10   BASOPHILS 0.30 0.10   ASTSGOT 17 16   ALTSGPT 15 12   ALKPHOSPHAT 48 79   TBILIRUBIN 0.3 0.2     Recent Labs     01/22/25  1319 01/23/25  0123   SODIUM 132* 136   POTASSIUM 3.9 4.1   CHLORIDE 89* 93*   CO2 31 34*   BUN 10 14   CREATININE 0.34* 0.47*   CALCIUM 9.3 9.6     Recent Labs     01/22/25  1319 01/23/25  0123   ALTSGPT 15 12   ASTSGOT 17 16   ALKPHOSPHAT 48 79   TBILIRUBIN 0.3 0.2   GLUCOSE 209* 169*         DX-CHEST-PORTABLE (1 VIEW)   Final Result      Again seen bilateral interstitial infiltrates possibly representing chronic interstitial lung disease versus atypical infection or interstitial edema.          Patient Active Problem List   Diagnosis    Mixed stress and urge urinary incontinence    Vitamin D deficiency disease    Dyslipidemia    Facet arthropathy, lumbar    Pain, chronic    Pre-diabetes    Pain management    At risk for falls    Chronic respiratory failure with hypoxia ([facility])    Stage 4 very severe COPD by GOLD classification (HCC)    AF (atrial fibrillation) ([facility])    PVCs (premature ventricular contractions)    COVID-19    DNR (do not resuscitate)    Peripheral vascular disease (HCC)    Recurrent major depressive disorder, in partial remission (HCC)    Amnesia    Primary cancer of left upper lobe of lung (HCC)    Osteopenia of lumbar spine    Sedative, hypnotic, 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HCC)    Calculus of gallbladder    Thyroid nodule    Diverticulosis    Hypertension    Oropharyngeal dysphagia    Acute exacerbation of chronic obstructive pulmonary disease (COPD) (HCC)    Atherosclerosis of aorta (HCC)    Financial difficulty    Varicose veins of left leg with edema    Lower GI bleed     Hemorrhoids    Anemia    Edema    Advance care planning    BMI 27.0-27.9,adult    Former smoker    Acute on chronic respiratory failure with hypoxia    Influenza A    Leukocytosis    Acute on chronic hypoxic respiratory failure (HCC)    Diarrhea    Allergies    Mood disorder (HCC)    GERD (gastroesophageal reflux disease)    Muscle spasms of both lower extremities       Assessment :    Acute on chronic hypoxic respiratory failure secondary to below   COPD exacerbation,from recent influenza infection  Patient likely still recovering from influenza.  Patient needs mobilization and assistance with her ADLs, maximize PT and OT. Procalcitonin 0.05, negative. Not on antibiotics.    Plan  Continue Solu-Medrol 40 mg every 12 hours, continue scheduled DuoNebs  Recommending chest CT to further assess infiltrates on right lower lung given concern for pneumonia as a complication from influenza.  Follow-up results of MRSA.  Follow respiratory cultures.  Recommend SLP referral if with concerns regarding aspiration   Low threshold to start antibiotics  4     Patient will benefit from pulmonary rehabilitation.  5.  Continue LABA /LAMA/ ICS, she is also on chronic azithromycin therapy and montelukast  6.    Pulmonology will continue to follow    Case discussed with Dr. Florentino.      Thank you for this consult.  Please do not hesitate to reach out through [employer] if with questions. Recommendations discussed with Dr. Hastings and Dr. Tariq Gutierrez MD  PGY 3 internal medicine

## 2025-01-23 NOTE — PROGRESS NOTES
[facility] Internal Medicine Daily Progress Note    Date of Service  1/23/2025    [facility] Team: [facility] Team   Attending: Luis Faustin M.d.  Senior Resident: Dr. Hastings  Intern:  Dr. Potter  Contact Number: [phone]    Chief Complaint  Berny Renee is a 79 y.o. female admitted 1/22/2025 with shortness of breath and COPD exacerbation.    Hospital Course  Berny Renee is a 79 y.o. female comes to the emergency room on 1/22/2025 when prompted by the nurse who visited her at home noticed her to be desaturating into the 70s when she walked.  Patient had a recent admission to the hospital and was discharged on Sunday 3 days ago when she was treated for pneumonia, RSV and flu.  She wanted to go to, facility after that admission. She reported worsening shortness of breath, hypoxia and productive cough.  EMS reported patient was hypoxic at 86% on her 8 L baseline oxygen.  Patient was tachypneic at 36 breaths/min as per EMS.  Patient reports occasional diarrhea.  States that she has been discharged before she was ready and that she can take care of of her at her house and that she needs to go to a facility where she can get rehab.  Patient does not report any chest pain, abdominal pain nausea or vomiting.     Interval Problem Update  Patient reports getting better.  Saturating clear 7 L oxygen.  Not at baseline but seems to be improving.  Requests to be placed at a facility.  -Ordered CT chest without contrast as per pulmonology.  -Resumed home acyclovir.  -Ordered PT and OT evaluations.      I have discussed this patient's plan of care and discharge plan at IDT rounds today with Case Management, Nursing, Nursing leadership, and other members of the IDT team.    Consultants/Specialty  pulmonary    Code Status  DNAR/DNI    Disposition  The patient is not medically cleared for discharge to home or a post-acute facility.      I have placed the appropriate orders for post-discharge needs.    Review of Systems  Review of Systems    Constitutional:  Positive for malaise/fatigue. Negative for chills, fever and weight loss.   HENT: Negative.     Eyes: Negative.    Respiratory:  Positive for cough, sputum production, shortness of breath and wheezing. Negative for hemoptysis.    Cardiovascular:  Negative for chest pain, palpitations, orthopnea, claudication, leg swelling and PND.   Gastrointestinal:  Positive for diarrhea. Negative for abdominal pain, blood in stool, constipation, heartburn, nausea and vomiting.   Genitourinary: Negative.    Musculoskeletal:  Positive for back pain and joint pain.   Skin: Negative.    Neurological:  Positive for tremors. Negative for dizziness, focal weakness, seizures and loss of consciousness.   Endo/Heme/Allergies: Negative.    Psychiatric/Behavioral: Negative.          Physical Exam  Temp:  [35.9 °C (96.6 °F)-36.7 °C (98.1 °F)] 36.6 °C (97.9 °F)  Pulse:  [75-86] 81  Resp:  [18-28] 18  BP: (153-172)/() 168/70  SpO2:  [88 %-94 %] 92 %    Physical Exam  Constitutional:       Appearance: Normal appearance. She is normal weight.   HENT:      Head: Normocephalic and atraumatic.      Nose: Nose normal. No congestion.      Mouth/Throat:      Mouth: Mucous membranes are moist.      Pharynx: Oropharynx is clear.   Eyes:      Extraocular Movements: Extraocular movements intact.      Conjunctiva/sclera: Conjunctivae normal.      Pupils: Pupils are equal, round, and reactive to light.   Cardiovascular:      Rate and Rhythm: Normal rate and regular rhythm.      Pulses: Normal pulses.      Heart sounds: Normal heart sounds.   Pulmonary:      Effort: Pulmonary effort is normal. No respiratory distress.      Breath sounds: Wheezing present. No rhonchi.   Abdominal:      General: Abdomen is flat. Bowel sounds are normal. There is no distension.      Palpations: There is no mass.      Tenderness: There is no abdominal tenderness. There is no guarding or rebound.   Musculoskeletal:         General: No swelling or  tenderness. Normal range of motion.      Right lower leg: No edema.      Left lower leg: No edema.   Skin:     General: Skin is warm.      Capillary Refill: Capillary refill takes less than 2 seconds.   Neurological:      General: No focal deficit present.      Mental Status: She is alert and oriented to person, place, and time. Mental status is at baseline.      Cranial Nerves: No cranial nerve deficit.      Sensory: No sensory deficit.      Motor: No weakness.      Coordination: Coordination normal.   Psychiatric:         Mood and Affect: Mood normal.         Behavior: Behavior normal.         Fluids    Intake/Output Summary (Last 24 hours) at 1/23/2025 1341  Last data filed at 1/23/2025 0800  Gross per 24 hour   Intake 440 ml   Output 0 ml   Net 440 ml       Laboratory  Recent Labs     01/22/25  1319 01/23/25  0123   WBC 19.3* 13.5*   RBC 4.12* 4.17*   HEMOGLOBIN 12.4 12.5   HEMATOCRIT 38.1 37.6   MCV 92.5 90.2   MCH 30.1 30.0   MCHC 32.5 33.2   RDW 45.6 44.0   PLATELETCT 447* 482*   MPV 9.0 9.1     Recent Labs     01/22/25  1319 01/23/25  0123   SODIUM 132* 136   POTASSIUM 3.9 4.1   CHLORIDE 89* 93*   CO2 31 34*   GLUCOSE 209* 169*   BUN 10 14   CREATININE 0.34* 0.47*   CALCIUM 9.3 9.6                   Imaging  DX-CHEST-PORTABLE (1 VIEW)   Final Result      Again seen bilateral interstitial infiltrates possibly representing chronic interstitial lung disease versus atypical infection or interstitial edema.      CT-CHEST (THORAX) W/O    (Results Pending)        Assessment/Plan  Problem Representation:    * Acute on chronic hypoxic respiratory failure (HCC)- (present on admission)  Assessment & Plan  Patient had a recent hospital admission for similar problem due to concerns of infection and was discharged on Tamiflu and steroids.  Chest x-ray did not show any new infiltrates.  She got Solu-Medrol in the ED at 3 PM and reports her respiratory status improved after that.  At this point we will hold on starting  another Tamiflu course.  Patient is at a baseline of 6 L.  Ordered CT chest today.  -Telemetry for 48 hours for acute hypoxic respiratory failure.  -Solu-Medrol 40 Mg every 12 hours starting from tomorrow.  -Ordered Pro-Azam, respiratory panel, sputum cultures to rule out any other underlying etiologies.  -Respiratory therapy protocol.-Continue home Breztri  -COPD exacerbation protocol.  -DuoNebs, albuterol inhaler ordered.  -Guaifenesin, benzonatate for cough.  -Palliative consult placed given multiple admissions in the last few months and worsening respiratory status..    Diarrhea  Assessment & Plan  Patient reports occasional diarrhea but does not give exact frequency.  -Ordered C. difficile.  -Ordered GI panel.    Muscle spasms of both lower extremities  Assessment & Plan  Continue home tizanidine.    GERD (gastroesophageal reflux disease)  Assessment & Plan  -Continue home omeprazole.    Mood disorder (HCC)  Assessment & Plan  Continue home duloxetine.    Allergies  Assessment & Plan  Continue home diphenhydramine, montelukast, fexofenadine.    Hypertension- (present on admission)  Assessment & Plan  Continue home amlodipine, spironolactone, losartan.    AF (atrial fibrillation) ([facility])- (present on admission)  Assessment & Plan  Continue home digoxin    Pain, chronic- (present on admission)  Assessment & Plan  Pain multiple joints  -Continue home meloxicam, Diclo Gel and tramadol.         VTE prophylaxis: enoxaparin ppx    I have performed a physical exam and reviewed and updated ROS and Plan today (1/23/2025). In review of yesterday's note (1/22/2025), there are no changes except as documented above.

## 2025-01-23 NOTE — ED NOTES
Report to HUNTER Gonzalez  ED tech at bedside to transport pt on tele monitor to [address]. Pt transported with all belongings.

## 2025-01-23 NOTE — RESPIRATORY CARE
"  COPD EDUCATION by COPD CLINICAL EDUCATOR  1/23/2025 at 8:28 AM by Rabia Villatoro, RRT     Patient seen for COPD readmission education. Patient completed our program upon last admission. Discussion included: determination of the factors that led to their current hospital admission, reiteration of the Action Plan and steps they can take to avoid an exacerbation, proper medication technique and importance of obtaining a doctors appointment when they start feeling ill. Question and answer session followed.     Pt has had five admissions with several respiratory illnesses. Patient feels that she is not been given enough time to recover between respiratory viruses.Due to patient getting so many viruses we talked about proper hand hygiene and wearing a mask. Pt feels she cannot breathe when she wears a mask. Patient states that she tells people to not come over that are sick and frustrated she keeps getting sick when she is isolating already.        Patient was switched to Breztri which she was sent home with and started. Patient using spacer at home and declined needing new one. Pt was made a 1 week appointment with pulmonary which she cancelled due to being re-admitted before then. Patient feels she is getting discharged to fast.     She expresses frustration with not being able to walk very far and struggles with ADLs. Patient declining going back to pulmonary rehab at this time but highly encouraged her to work with PT/OT. Patient is agreeable to this. Patient has plan of going to skilled nursing for two weeks to get herself \"back on her feet\". Patient declined need medication refills and follow-up appointments with pulmonary or primary. She feels she is able to make them when she gets out of skilled nursing.       COPD Screen  COPD Risk Screening  Do you have a history of COPD?: Yes  Do you have a Pulmonologist?: Yes    COPD Assessment  COPD Clinical Specialists ONLY  COPD Education Initiated: Yes--Program " "Readmission, Yes--30 Day Readmission (Very nice Pt has had 5 admissions for RSV, PMA, FLU X2. Pt cancelled pulm apt next week due to being re-admitted. Pt declined pamphlet and spacer. Pt wanting to go to SNF to get back on her feet for a few weeks. Pt encouraged to work with PT/OT.)  Is this a COPD exacerbation patient?: Yes  DME Company: [employer]  DME Equipment Type: 8L oxygen  Physician Name: Everett Diego M.D.  Pulmonary Follow Up Appointment: 04/16/25  Appt Time: 1130  Pulmonologist Name: Lenka  (FOREIGN) Pulmonary Function Testing: Yes  Interdisciplinary Rounds: Attendance at Rounds (30 Min)    PFT Results    No results found for: \"PFT\"    Meds to Beds  [facility] provides bedside medication delivery for all eligible patients at discharge and you have been automatically enrolled in the Meds to Beds Program. Would you like to opt out of this program for any reason?: No - Stay Opted In     MY COPD ACTION PLAN     It is recommended that patients and physicians/healthcare providers complete this action plan together. This plan should be discussed at each physician visit and updated as needed.    The green, yellow and red zones show groups of symptoms of COPD. This list of symptoms is not comprehensive, and you may experience other symptoms. In the \"Actions\" column, your healthcare provider has recommended actions for you to take based on your symptoms.    Patient Name: Berny Renee   YOB: 1945   Last Updated on: 1/18/2025  3:32 PM   Green Zone:  I am doing well today Actions     Usual activitiy and exercise level   Take daily medications     Usual amounts of cough and phlegm/mucus   Use oxygen as prescribed     Sleep well at night   Continue regular exercise/diet plan     Appetite is good   At all times avoid cigarette smoke, inhaled irritants     Daily Medications (these medications are taken every day):   Budesonide/Glycopyrrolate/Formoterol Fumarate (Breztri Aerosphere) 2 Puffs Twice daily " "    Additional Information:  Use as directed.  Please rinse mouth after using and use spacer.    This replaces Trelegy    Yellow Zone:  I am having a bad day or a COPD flare Actions     More breathless than usual   Continue daily medications     I have less energy for my daily activities   Use quick relief inhaler as ordered     Increased or thicker phlegm/mucus   Use oxygen as prescribed     Using quick relief inhaler/nebulizer more often   Get plenty of rest     Swelling of ankles more than usual   Use pursed lip breathing     More coughing than usual   At all times avoid cigarette smoke, inhaled irritants     I feel like I have a \"chest cold\"     Poor sleep and my symptoms woke me up     My appetite is not good     My medicine is not helping      Call provider immediately if symptoms don’t improve     Continue daily medications, add rescue medications:   Albuterol/Ipratropium (Combivent, Duoneb)  Albuterol 3mL via nebulizer  2 Puffs Three times daily  Three times daily       Medications to be used during a flare up, (as Discussed with Provider):           Additional Information:  Use your nebulizer first when short of breath and rinse after each use.     Use a spacer with your rescue inhaler when nebulizer is not available.         Red Zone:  I need urgent medical care Actions     Severe shortness of breath even at rest   Call 911 or seek medical care immediately     Not able to do any activity because of breathing      Fever or shaking chills      Feeling confused or very drowsy       Chest pains      Coughing up blood                  "

## 2025-01-23 NOTE — CARE PLAN
The patient is Stable - Low risk of patient condition declining or worsening    Shift Goals  Clinical Goals: Monitor respiratory status, inhaler as needed  Patient Goals: Breathing treatments, rest  Family Goals: LORA    Progress made toward(s) clinical / shift goals:    Problem: Nutrition - Advanced  Goal: Patient will display progressive weight gain toward goal have adequate food and fluid intake  Description: Target End Date:  Prior to discharge or change in level of care    1.  Daily weights  2.  Monitor dietary intake  3.  Promote systemic fluid hydration within cardiac tolerance  4.  Albumin and PreAlbumin per provider order  5.  Enteral and parenteral nutrition per provider order  6.  Calorie count  7.  Provide oral care  8.  Collaborate with Clinical Dietician  9.  Consider Speech Therapy consult for swallowing difficulties  10. Administer supplemental oxygen during meals as indicated  Outcome: Progressing  Note: Pt demonstrated adequate oral intake.     Problem: Risk for Aspiration  Goal: Patient's risk for aspiration will be absent or decrease  Description: Target End Date:  Prior to discharge or change in level of care    1.   Complete dysphagia screening on admission  2.   NPO until dysphagia screening complete or medically cleared  3.   Collaborate with Speech Therapy, Clinical Dietitian and interdisciplinary team  4.   Implement aspiration precautions  5.   Assist patient up to chair for meals  6.   Elevate head of bed 90 degrees if patient is unable to get out of bed  7.   Encourage small bites  8.   Ensure foods/liquids are of appropriate consistency  9.   Assess for any signs/symptoms of aspiration  10. Assess breath sounds and vital signs after oral intake  Outcome: Progressing  Note: Pt sitting up at 90 degrees while eating dinner.

## 2025-01-23 NOTE — DISCHARGE PLANNING
"HTH/SCP TCN chart review completed. Per review, noted patient is a readmission to [facility] with previous acute hospitalization  1/161/19/2025 in the setting of acute on chronic respiratory failure with hypoxia. Patient was noted to be followed by [facility]. Per review noted DME in the home stated to 4WW, SPC and scooter. Patient reported supplemental 02 (concentrator and 1 portable) via [employer].    Collaborated with MARIPOSA Arnold. prior to meeting with the pt. The most current review of medical record, knowledge of pt's PLOF and social support, LACE+ score of 72, 6 clicks scores of 24 ADL and 18 mobility were considered.      TCN spoke with patient via telephone encounter. Introduced TCN program. Patient advised she \"needs to go somewhere to get stronger before I go home\" with patient stating concern regarding her current oxygen needs and impact on her mobility.  Per review and pt reported supplement 02 at 8L at baseline.     Provided education regarding post acute levels of care including SNF. Education provided regarding case management policy for blanket SNF referrals. Patient stated she was agreeable to  protocol and noted she would choose from accepting SNF facilities as appropriate.     Voalte update sent to  to provide TCN collaboration and support in discharge planning as needed following TCN conversation with patient. Choice proactively discussed to post-acute placement/ SNF with patient noting she is amendable to post acute placement and would choose from accepting facilities per  protocol. In collaboration with , current discharge considerations are noted to be post acute placement/ SNF. TCN will continue to follow and collaborate with discharge planning team as additional post acute needs arise. Thank you.     Completed today:  PT/OT orders noted  Choice obtained: none; SNF referrals noted to be sent per  protocol. Patient verbalized agreement to post acute placement to SNF level of care and advised " she would choose from accepting facilities  Pt aware of [facility] blanket referral policy  SCP with [facility] PCP. Anticipate post acute placement at this time

## 2025-01-23 NOTE — PROGRESS NOTES
"Patient states \"I don't like to take the digoxin in the morning, I want to take it at night\". Digoxin moved to 1700.   "

## 2025-01-23 NOTE — DISCHARGE PLANNING
Complex Case Management    Order received for Complex Case Management. Patient's chart has been reviewed.     Complex case management following? No    No: Discharge planning recommendations are to have floor case management assess and initiate discharge planning based on needs. Of note, pulmonary rehab is outpatient. Consult will be cancelled. Please re-consult as needed.    NOTE: There may be cases that are NOT assigned to a CCM but will be followed for progression of care by leaders of the Complex Discharge Committee.

## 2025-01-23 NOTE — CARE PLAN
The patient is Stable - Low risk of patient condition declining or worsening    Shift Goals  Clinical Goals: monitor o2 demands  Patient Goals: breathing tx, rest, placement  Family Goals: tess    Progress made toward(s) clinical / shift goals:  Patient continues to verbalize needs and understanding of POC. RN to continue to purposeful rounding and notify MD of any changes PRN    Patient is not progressing towards the following goals:

## 2025-01-23 NOTE — DISCHARGE PLANNING
Case Management Discharge Planning    Admission Date: 1/22/2025  GMLOS: 3.5  ALOS: 1    6-Clicks ADL Score: 24  6-Clicks Mobility Score: 18      Anticipated Discharge Dispo: Discharge Disposition: Discharged to home/self care (01)    DME Needed: No    Action(s) Taken: Updated Provider/Nurse on Discharge Plan Patient discussed during IDT rounds with medical team.    Escalations Completed: None    Medically Clear: No    Next Steps: Case Management will continue to follow for discharge planning needs.    Barriers to Discharge: Medical clearance and Pending Placement    Is the patient up for discharge tomorrow: No    RN Case Manager met with patient at bedside and obtained the information used in this assessment. Patient verified accuracy of facesheet; patient lives in a single story home.  Prior to current hospitalization, patient was independent with ADLS/IADLS. Patient has a ride and is able to attend necessary MD appointments. Patient prefers to use [facility] pharmacy. Patient has no financial concerns. Patient has support from her sister. Denies any history of substance use and denies any diagnosis of mental illness.    Care Transition Team Assessment    Information Source: Patient  Orientation Level: Oriented X4  Information Given By: Patient  Who is responsible for making decisions for patient? : Patient    Readmission Evaluation  Is this a readmission?: Yes - unplanned readmission    Elopement Risk  Legal Hold: No  Ambulatory or Self Mobile in Wheelchair: Yes  Disoriented: No  Psychiatric Symptoms: None  History of Wandering: No  Elopement this Admit: No  Vocalizing Wanting to Leave: No  Displays Behaviors, Body Language Wanting to Leave: No-Not at Risk for Elopement  Elopement Risk: Not at Risk for Elopement    Interdisciplinary Discharge Planning  Does Admitting Nurse Feel This Could be a Complex Discharge?: No  Primary Care Physician: TIGRE BELLO M.D.  Lives with - Patient's Self Care Capacity: Alone and  Unable to Care For Self  Patient or legal guardian wants to designate a caregiver: No  Support Systems: Friends / Neighbors  Housing / Facility: 1 Story House  Do You Take your Prescribed Medications Regularly: Yes  Able to Return to Previous ADL's: Yes  Mobility Issues: Yes  Prior Services: None  Patient Prefers to be Discharged to:: Home  Assistance Needed: Yes    Discharge Preparedness  What is your plan after discharge?: Home with help  What are your discharge supports?:  (Friends)  Prior Functional Level: Ambulatory, Independent with Activities of Daily Living, Independent with Medication Management  Difficulity with ADLs: None  Difficulity with IADLs: None    Functional Assesment  Prior Functional Level: Ambulatory, Independent with Activities of Daily Living, Independent with Medication Management    Finances  Financial Barriers to Discharge: No  Prescription Coverage: Yes    Vision / Hearing Impairment  Vision Impairment : Yes  Right Eye Vision: Impaired, Wears Glasses  Left Eye Vision: Impaired, Wears Glasses  Hearing Impairment : No    Values / Beliefs / Concerns  Values / Beliefs Concerns : No    Advance Directive  Advance Directive?: POLST (Advance Directive)    Domestic Abuse  Have you ever been the victim of abuse or violence?: No  Possible Abuse/Neglect Reported to:: Not Applicable    Psychological Assessment  History of Substance Abuse: Prescription opioids  History of Psychiatric Problems: No    Discharge Risks or Barriers  Discharge risks or barriers?: No, Substance abuse    Anticipated Discharge Information  Discharge Disposition: Discharged to home/self care (01)

## 2025-01-23 NOTE — PROGRESS NOTES
.4 Eyes Skin Assessment Completed by HUNTER Gonzalez and HUNTER Yung.    Head WDL  Ears Redness and Blanching  Nose Redness and Blanching- Slow to sincere  Mouth WDL  Neck WDL  Breast/Chest WDL  Shoulder Blades WDL  Spine WDL  (R) Arm/Elbow/Hand Redness and Blanching  (L) Arm/Elbow/Hand Redness and Blanching  Abdomen WDL  Groin Redness and Blanching  Scrotum/Coccyx/Buttocks Redness and Blanching  (R) Leg WDL  (L) Leg WDL  (R) Heel/Foot/Toe Redness and Blanching  (L) Heel/Foot/Toe Redness and Blanching          Devices In Places Tele Box, Pulse Ox, and Nasal Cannula, Purewick      Interventions In Place Gray Ear Foams, Heel Mepilex, Pillows, Q2 Turns, Low Air Loss Mattress, and Barrier Cream    Possible Skin Injury No    Pictures Uploaded Into Epic Yes  Wound Consult Placed N/A  RN Wound Prevention Protocol Ordered Yes

## 2025-01-24 PROBLEM — E11.65 CONTROLLED TYPE 2 DIABETES MELLITUS WITH HYPERGLYCEMIA, WITHOUT LONG-TERM CURRENT USE OF INSULIN (HCC): Status: ACTIVE | Noted: 2025-01-24

## 2025-01-24 LAB
ALBUMIN SERPL BCP-MCNC: 3.4 G/DL (ref 3.2–4.9)
ALBUMIN/GLOB SERPL: 0.9 G/DL
ALP SERPL-CCNC: 47 U/L (ref 30–99)
ALT SERPL-CCNC: 13 U/L (ref 2–50)
ANION GAP SERPL CALC-SCNC: 11 MMOL/L (ref 7–16)
AST SERPL-CCNC: 20 U/L (ref 12–45)
B PARAP IS1001 DNA NPH QL NAA+NON-PROBE: NOT DETECTED
B PERT.PT PRMT NPH QL NAA+NON-PROBE: NOT DETECTED
BASOPHILS # BLD AUTO: 0.4 % (ref 0–1.8)
BASOPHILS # BLD: 0.06 K/UL (ref 0–0.12)
BILIRUB SERPL-MCNC: <0.2 MG/DL (ref 0.1–1.5)
BUN SERPL-MCNC: 17 MG/DL (ref 8–22)
C PNEUM DNA NPH QL NAA+NON-PROBE: NOT DETECTED
CALCIUM ALBUM COR SERPL-MCNC: 10 MG/DL (ref 8.5–10.5)
CALCIUM SERPL-MCNC: 9.5 MG/DL (ref 8.5–10.5)
CHLORIDE SERPL-SCNC: 90 MMOL/L (ref 96–112)
CO2 SERPL-SCNC: 34 MMOL/L (ref 20–33)
CREAT SERPL-MCNC: 0.45 MG/DL (ref 0.5–1.4)
EOSINOPHIL # BLD AUTO: 0 K/UL (ref 0–0.51)
EOSINOPHIL NFR BLD: 0 % (ref 0–6.9)
ERYTHROCYTE [DISTWIDTH] IN BLOOD BY AUTOMATED COUNT: 44.9 FL (ref 35.9–50)
FLUAV H3 RNA NPH QL NAA+NON-PROBE: DETECTED
FLUBV RNA NPH QL NAA+NON-PROBE: NOT DETECTED
GFR SERPLBLD CREATININE-BSD FMLA CKD-EPI: 98 ML/MIN/1.73 M 2
GLOBULIN SER CALC-MCNC: 3.7 G/DL (ref 1.9–3.5)
GLUCOSE BLD STRIP.AUTO-MCNC: 133 MG/DL (ref 65–99)
GLUCOSE BLD STRIP.AUTO-MCNC: 174 MG/DL (ref 65–99)
GLUCOSE BLD STRIP.AUTO-MCNC: 269 MG/DL (ref 65–99)
GLUCOSE SERPL-MCNC: 220 MG/DL (ref 65–99)
HADV DNA NPH QL NAA+NON-PROBE: NOT DETECTED
HCOV 229E RNA NPH QL NAA+NON-PROBE: NOT DETECTED
HCOV HKU1 RNA NPH QL NAA+NON-PROBE: NOT DETECTED
HCOV NL63 RNA NPH QL NAA+NON-PROBE: NOT DETECTED
HCOV OC43 RNA NPH QL NAA+NON-PROBE: NOT DETECTED
HCT VFR BLD AUTO: 38.2 % (ref 37–47)
HGB BLD-MCNC: 12.4 G/DL (ref 12–16)
HMPV RNA NPH QL NAA+NON-PROBE: NOT DETECTED
HPIV1 RNA NPH QL NAA+NON-PROBE: NOT DETECTED
HPIV2 RNA NPH QL NAA+NON-PROBE: NOT DETECTED
HPIV3 RNA NPH QL NAA+NON-PROBE: NOT DETECTED
HPIV4 RNA NPH QL NAA+NON-PROBE: NOT DETECTED
IMM GRANULOCYTES # BLD AUTO: 0.29 K/UL (ref 0–0.11)
IMM GRANULOCYTES NFR BLD AUTO: 1.7 % (ref 0–0.9)
LYMPHOCYTES # BLD AUTO: 0.47 K/UL (ref 1–4.8)
LYMPHOCYTES NFR BLD: 2.8 % (ref 22–41)
M PNEUMO DNA NPH QL NAA+NON-PROBE: NOT DETECTED
MCH RBC QN AUTO: 29.8 PG (ref 27–33)
MCHC RBC AUTO-ENTMCNC: 32.5 G/DL (ref 32.2–35.5)
MCV RBC AUTO: 91.8 FL (ref 81.4–97.8)
MONOCYTES # BLD AUTO: 0.94 K/UL (ref 0–0.85)
MONOCYTES NFR BLD AUTO: 5.6 % (ref 0–13.4)
NEUTROPHILS # BLD AUTO: 14.91 K/UL (ref 1.82–7.42)
NEUTROPHILS NFR BLD: 89.5 % (ref 44–72)
NRBC # BLD AUTO: 0 K/UL
NRBC BLD-RTO: 0 /100 WBC (ref 0–0.2)
PLATELET # BLD AUTO: 501 K/UL (ref 164–446)
PMV BLD AUTO: 9.2 FL (ref 9–12.9)
POTASSIUM SERPL-SCNC: 3.6 MMOL/L (ref 3.6–5.5)
PROT SERPL-MCNC: 7.1 G/DL (ref 6–8.2)
RBC # BLD AUTO: 4.16 M/UL (ref 4.2–5.4)
RSV RNA NPH QL NAA+NON-PROBE: NOT DETECTED
RV+EV RNA NPH QL NAA+NON-PROBE: NOT DETECTED
SARS-COV-2 RNA NPH QL NAA+NON-PROBE: NOTDETECTED
SODIUM SERPL-SCNC: 135 MMOL/L (ref 135–145)
WBC # BLD AUTO: 16.7 K/UL (ref 4.8–10.8)

## 2025-01-24 PROCEDURE — 700102 HCHG RX REV CODE 250 W/ 637 OVERRIDE(OP)

## 2025-01-24 PROCEDURE — 80053 COMPREHEN METABOLIC PANEL: CPT

## 2025-01-24 PROCEDURE — 94669 MECHANICAL CHEST WALL OSCILL: CPT

## 2025-01-24 PROCEDURE — 770020 HCHG ROOM/CARE - TELE (206)

## 2025-01-24 PROCEDURE — 36415 COLL VENOUS BLD VENIPUNCTURE: CPT

## 2025-01-24 PROCEDURE — 82962 GLUCOSE BLOOD TEST: CPT | Mod: 91

## 2025-01-24 PROCEDURE — 85025 COMPLETE CBC W/AUTO DIFF WBC: CPT

## 2025-01-24 PROCEDURE — 700101 HCHG RX REV CODE 250

## 2025-01-24 PROCEDURE — 97535 SELF CARE MNGMENT TRAINING: CPT

## 2025-01-24 PROCEDURE — 97530 THERAPEUTIC ACTIVITIES: CPT

## 2025-01-24 PROCEDURE — 99233 SBSQ HOSP IP/OBS HIGH 50: CPT | Performed by: STUDENT IN AN ORGANIZED HEALTH CARE EDUCATION/TRAINING PROGRAM

## 2025-01-24 PROCEDURE — 97162 PT EVAL MOD COMPLEX 30 MIN: CPT

## 2025-01-24 PROCEDURE — 97166 OT EVAL MOD COMPLEX 45 MIN: CPT

## 2025-01-24 PROCEDURE — 700105 HCHG RX REV CODE 258

## 2025-01-24 PROCEDURE — A9270 NON-COVERED ITEM OR SERVICE: HCPCS

## 2025-01-24 PROCEDURE — 94640 AIRWAY INHALATION TREATMENT: CPT

## 2025-01-24 PROCEDURE — 700111 HCHG RX REV CODE 636 W/ 250 OVERRIDE (IP): Mod: JZ,TB

## 2025-01-24 PROCEDURE — 700101 HCHG RX REV CODE 250: Performed by: HOSPITALIST

## 2025-01-24 PROCEDURE — 700102 HCHG RX REV CODE 250 W/ 637 OVERRIDE(OP): Performed by: STUDENT IN AN ORGANIZED HEALTH CARE EDUCATION/TRAINING PROGRAM

## 2025-01-24 RX ORDER — PREDNISONE 20 MG/1
40 TABLET ORAL DAILY
Status: DISCONTINUED | OUTPATIENT
Start: 2025-01-25 | End: 2025-01-24

## 2025-01-24 RX ORDER — ALBUTEROL SULFATE 5 MG/ML
2.5 SOLUTION RESPIRATORY (INHALATION)
Status: DISCONTINUED | OUTPATIENT
Start: 2025-01-24 | End: 2025-01-27 | Stop reason: HOSPADM

## 2025-01-24 RX ORDER — AZITHROMYCIN 250 MG/1
250 TABLET, FILM COATED ORAL DAILY
Status: DISCONTINUED | OUTPATIENT
Start: 2025-01-24 | End: 2025-01-27 | Stop reason: HOSPADM

## 2025-01-24 RX ORDER — AMOXICILLIN 250 MG
2 CAPSULE ORAL EVERY EVENING
Status: DISCONTINUED | OUTPATIENT
Start: 2025-01-24 | End: 2025-01-26

## 2025-01-24 RX ORDER — PREDNISONE 50 MG/1
50 TABLET ORAL 2 TIMES DAILY
Status: DISCONTINUED | OUTPATIENT
Start: 2025-01-24 | End: 2025-01-24

## 2025-01-24 RX ORDER — IPRATROPIUM BROMIDE AND ALBUTEROL SULFATE 2.5; .5 MG/3ML; MG/3ML
3 SOLUTION RESPIRATORY (INHALATION)
Status: DISCONTINUED | OUTPATIENT
Start: 2025-01-24 | End: 2025-01-27 | Stop reason: HOSPADM

## 2025-01-24 RX ORDER — POLYETHYLENE GLYCOL 3350 17 G/17G
1 POWDER, FOR SOLUTION ORAL ONCE
Status: ACTIVE | OUTPATIENT
Start: 2025-01-24 | End: 2025-01-25

## 2025-01-24 RX ORDER — POLYETHYLENE GLYCOL 3350 17 G/17G
1 POWDER, FOR SOLUTION ORAL
Status: DISCONTINUED | OUTPATIENT
Start: 2025-01-24 | End: 2025-01-26

## 2025-01-24 RX ORDER — POTASSIUM CHLORIDE 1500 MG/1
40 TABLET, EXTENDED RELEASE ORAL ONCE
Status: COMPLETED | OUTPATIENT
Start: 2025-01-24 | End: 2025-01-24

## 2025-01-24 RX ORDER — PREDNISONE 20 MG/1
20 TABLET ORAL DAILY
Status: DISCONTINUED | OUTPATIENT
Start: 2025-02-01 | End: 2025-01-24

## 2025-01-24 RX ORDER — PREDNISONE 20 MG/1
20 TABLET ORAL 2 TIMES DAILY
Status: DISCONTINUED | OUTPATIENT
Start: 2025-01-27 | End: 2025-01-24

## 2025-01-24 RX ADMIN — MONTELUKAST 10 MG: 10 TABLET, FILM COATED ORAL at 06:18

## 2025-01-24 RX ADMIN — IPRATROPIUM BROMIDE AND ALBUTEROL SULFATE 3 ML: .5; 2.5 SOLUTION RESPIRATORY (INHALATION) at 14:44

## 2025-01-24 RX ADMIN — GUAIFENESIN SYRUP AND DEXTROMETHORPHAN 10 ML: 100; 10 SYRUP ORAL at 20:53

## 2025-01-24 RX ADMIN — PIPERACILLIN AND TAZOBACTAM 4.5 G: 4; .5 INJECTION, POWDER, FOR SOLUTION INTRAVENOUS at 20:59

## 2025-01-24 RX ADMIN — BENZOCAINE AND MENTHOL 1 LOZENGE: 15; 3.6 LOZENGE ORAL at 15:22

## 2025-01-24 RX ADMIN — DIGOXIN 125 MCG: 0.12 TABLET ORAL at 17:29

## 2025-01-24 RX ADMIN — INSULIN LISPRO 3 UNITS: 100 INJECTION, SOLUTION INTRAVENOUS; SUBCUTANEOUS at 12:16

## 2025-01-24 RX ADMIN — IPRATROPIUM BROMIDE AND ALBUTEROL SULFATE 3 ML: .5; 2.5 SOLUTION RESPIRATORY (INHALATION) at 19:36

## 2025-01-24 RX ADMIN — OMEPRAZOLE 20 MG: 20 CAPSULE, DELAYED RELEASE ORAL at 06:18

## 2025-01-24 RX ADMIN — FLUTICASONE FUROATE, UMECLIDINIUM BROMIDE AND VILANTEROL TRIFENATATE 1 PUFF: 200; 62.5; 25 POWDER RESPIRATORY (INHALATION) at 06:21

## 2025-01-24 RX ADMIN — AMLODIPINE BESYLATE 5 MG: 5 TABLET ORAL at 06:18

## 2025-01-24 RX ADMIN — Medication 5 MG: at 20:53

## 2025-01-24 RX ADMIN — IPRATROPIUM BROMIDE AND ALBUTEROL SULFATE 3 ML: .5; 2.5 SOLUTION RESPIRATORY (INHALATION) at 02:35

## 2025-01-24 RX ADMIN — LOSARTAN POTASSIUM 50 MG: 50 TABLET, FILM COATED ORAL at 17:29

## 2025-01-24 RX ADMIN — DULOXETINE HYDROCHLORIDE 60 MG: 60 CAPSULE, DELAYED RELEASE ORAL at 06:18

## 2025-01-24 RX ADMIN — BENZONATATE 100 MG: 100 CAPSULE ORAL at 20:53

## 2025-01-24 RX ADMIN — PIPERACILLIN AND TAZOBACTAM 4.5 G: 4; .5 INJECTION, POWDER, FOR SOLUTION INTRAVENOUS at 17:33

## 2025-01-24 RX ADMIN — GUAIFENESIN SYRUP AND DEXTROMETHORPHAN 10 ML: 100; 10 SYRUP ORAL at 01:16

## 2025-01-24 RX ADMIN — AZITHROMYCIN DIHYDRATE 250 MG: 250 TABLET, FILM COATED ORAL at 17:29

## 2025-01-24 RX ADMIN — POTASSIUM CHLORIDE 40 MEQ: 1500 TABLET, EXTENDED RELEASE ORAL at 10:35

## 2025-01-24 RX ADMIN — ACYCLOVIR 200 MG: 200 CAPSULE ORAL at 06:18

## 2025-01-24 RX ADMIN — ENOXAPARIN SODIUM 40 MG: 100 INJECTION SUBCUTANEOUS at 17:29

## 2025-01-24 RX ADMIN — METHYLPREDNISOLONE SODIUM SUCCINATE 40 MG: 40 INJECTION, POWDER, FOR SOLUTION INTRAMUSCULAR; INTRAVENOUS at 06:19

## 2025-01-24 RX ADMIN — IPRATROPIUM BROMIDE AND ALBUTEROL SULFATE 3 ML: .5; 2.5 SOLUTION RESPIRATORY (INHALATION) at 10:42

## 2025-01-24 RX ADMIN — FEXOFENADINE HCL 180 MG: 60 TABLET, FILM COATED ORAL at 17:29

## 2025-01-24 RX ADMIN — SPIRONOLACTONE 50 MG: 25 TABLET ORAL at 06:18

## 2025-01-24 RX ADMIN — SENNOSIDES AND DOCUSATE SODIUM 2 TABLET: 50; 8.6 TABLET ORAL at 17:29

## 2025-01-24 RX ADMIN — LOSARTAN POTASSIUM 50 MG: 50 TABLET, FILM COATED ORAL at 06:19

## 2025-01-24 RX ADMIN — BENZONATATE 100 MG: 100 CAPSULE ORAL at 06:18

## 2025-01-24 RX ADMIN — IPRATROPIUM BROMIDE AND ALBUTEROL SULFATE 3 ML: .5; 2.5 SOLUTION RESPIRATORY (INHALATION) at 06:41

## 2025-01-24 ASSESSMENT — ENCOUNTER SYMPTOMS
WEIGHT LOSS: 0
VOMITING: 0
COUGH: 1
SEIZURES: 0
DIARRHEA: 1
HEARTBURN: 0
PND: 0
CONSTIPATION: 0
PALPITATIONS: 0
LOSS OF CONSCIOUSNESS: 0
WHEEZING: 1
TREMORS: 1
CLAUDICATION: 0
EYES NEGATIVE: 1
ORTHOPNEA: 0
BACK PAIN: 1
DIZZINESS: 0
NAUSEA: 0
PSYCHIATRIC NEGATIVE: 1
HEMOPTYSIS: 0
BLOOD IN STOOL: 0
CHILLS: 0
FEVER: 0
SPUTUM PRODUCTION: 1
FOCAL WEAKNESS: 0
SHORTNESS OF BREATH: 1
ABDOMINAL PAIN: 0

## 2025-01-24 ASSESSMENT — COGNITIVE AND FUNCTIONAL STATUS - GENERAL
SUGGESTED CMS G CODE MODIFIER MOBILITY: CJ
DRESSING REGULAR LOWER BODY CLOTHING: A LITTLE
SUGGESTED CMS G CODE MODIFIER DAILY ACTIVITY: CK
MOBILITY SCORE: 20
DRESSING REGULAR UPPER BODY CLOTHING: A LITTLE
TOILETING: A LITTLE
MOVING TO AND FROM BED TO CHAIR: A LITTLE
CLIMB 3 TO 5 STEPS WITH RAILING: A LITTLE
HELP NEEDED FOR BATHING: A LITTLE
WALKING IN HOSPITAL ROOM: A LITTLE
STANDING UP FROM CHAIR USING ARMS: A LITTLE
DAILY ACTIVITIY SCORE: 19
PERSONAL GROOMING: A LITTLE

## 2025-01-24 ASSESSMENT — FIBROSIS 4 INDEX: FIB4 SCORE: 0.87

## 2025-01-24 ASSESSMENT — PAIN DESCRIPTION - PAIN TYPE: TYPE: ACUTE PAIN

## 2025-01-24 ASSESSMENT — GAIT ASSESSMENTS
DEVIATION: OTHER (COMMENT)
DISTANCE (FEET): 30
ASSISTIVE DEVICE: FRONT WHEEL WALKER
GAIT LEVEL OF ASSIST: STANDBY ASSIST

## 2025-01-24 ASSESSMENT — ACTIVITIES OF DAILY LIVING (ADL): TOILETING: INDEPENDENT

## 2025-01-24 NOTE — PROGRESS NOTES
Pulmonary Progress note    Date of Service: 1/24/2025    Date of Admission:  1/22/2025 12:59 PM    Consulting Physician: Luis Fausitn M.D.    Chief Complaint:  Shortness of Breath and Cough      History of Present Illness:     Berny Renee is a 79 y.o. female with past medical history of severe COPD, on 8 L oxygen at baseline, combined chronic CHF, lung cancer in remission, chronic pain syndrome, spinal stenosis, sleep apnea, hypertension, who presented last 1/20/2025 for shortness of breath.  She was also advised by her home health nurse to go to the emergency department since she noticed that she was desaturating to 70s upon ambulation.  Patient was recently admitted last 1/16/2025 and was treated for pneumonia, flu and COPD exacerbation.  Patient was previously on Advair, Spiriva, Singulair, chronic azithromycin and prednisone 5 mg daily.  She follows with outpatient pulmonology .During recent discharge, she was followed by pulmonology who recommended that she switch back to Breztri for better compliance.  She was also advised to be on prolonged taper dose of steroids.  patient states that she feels that she never fully recovered from her flu.  She has completed her course of Tamiflu.      At ED, patient noted to be tachypneic,hypertensive 192/81 , saturating 86% at 8L which is her baseline.  CBC presented leukocytosis of 19.3.  CMP presented hyponatremia 132.  Troponin and proBNP were unremarkable.  Respiratory panel was positive for Influenza A.  Chest x-ray presented bilateral interstitial infiltrates which is similar to prior chest x-ray.  Received 1 dose of Solu-Medrol at ED.  Patient was admitted with acute on chronic respiratory failure likely secondary to COPD exacerbation with recent influenza A infection.     Pulmonology consulted for further recommendations regarding COPD exacerbation.       Interval 24-hour events:   Patient still with shortness of breath.  Start prednisone 60 mg daily and taper as  discussed.  Patient currently at 6 L nasal cannula with baseline oxygen of 8 L.  Continue azithromycin and steroids.  Continue LAMA LABA ICS, montelukast.  Continue diuresis to maintain euvolemia. PT /OT with recommendations for postacute placement prior to discharge home.   respiratory culture showing heavy growth of Pseudomonas    Review of Systems   Constitutional:  Positive for malaise/fatigue. Negative for chills, fever and weight loss.   HENT: Negative.     Eyes: Negative.    Respiratory:  Positive for cough, sputum production, shortness of breath and wheezing. Negative for hemoptysis.    Cardiovascular:  Negative for chest pain, palpitations, orthopnea, claudication, leg swelling and PND.   Gastrointestinal:  Positive for diarrhea. Negative for abdominal pain, blood in stool, constipation, heartburn, nausea and vomiting.   Genitourinary: Negative.    Musculoskeletal:  Positive for back pain. Negative for joint pain.   Skin: Negative.    Neurological:  Positive for tremors. Negative for dizziness, focal weakness, seizures and loss of consciousness.   Endo/Heme/Allergies: Negative.    Psychiatric/Behavioral: Negative.         Home Medications       Reviewed by Magno Herrera (Pharmacy Tech) on 01/22/25 at 1532  Med List Status: Complete     Medication Last Dose Status   acetaminophen (TYLENOL) 500 MG Tab 1/8/2025 Active   acyclovir (ZOVIRAX) 200 MG Cap 1/22/2025 Active   Albuterol Sulfate 108 (90 Base) MCG/ACT AEROSOL POWDER, BREATH ACTIVATED 1/22/2025 Active   amLODIPine (NORVASC) 5 MG Tab Unknown Active   azithromycin (ZITHROMAX) 250 MG Tab 1/22/2025 Active   benzonatate (TESSALON) 100 MG Cap 1/21/2025 Active   Budeson-Glycopyrrol-Formoterol (BREZTRI AEROSPHERE) 160-9-4.8 MCG/ACT Aerosol 1/22/2025 Active   Calcium Carbonate (CALCIUM 500 PO) 1/21/2025 Active   diclofenac sodium (VOLTAREN) 1 % Gel Unknown Active   digoxin (LANOXIN) 125 MCG Tab 1/21/2025 Active   diphenhydrAMINE-APAP, sleep, (TYLENOL PM  EXTRA STRENGTH)  MG Tab 2025 Active   docusate sodium (COLACE) 100 MG Cap 1/15/2025 Active   DULoxetine (CYMBALTA) 60 MG Cap DR Particles delayed-release capsule 2025 Active   fexofenadine (HM FEXOFENADINE HCL) 180 MG tablet 2025 Active   guaiFENesin ER (MUCINEX) 600 MG TABLET SR 12 HR 2025 Active   Home Care Oxygen  Active   ipratropium-albuterol (DUONEB) 0.5-2.5 (3) MG/3ML nebulizer solution 2025 Active   losartan (COZAAR) 50 MG Tab 2025 Active   meloxicam (MOBIC) 7.5 MG Tab 2025 Active   montelukast (SINGULAIR) 10 MG Tab 2025 Active   omeprazole (PRILOSEC) 20 MG delayed-release capsule 2025 Active   potassium chloride (MICRO-K) 10 MEQ capsule 2025 Active   predniSONE (DELTASONE) 10 MG Tab 2025 Active   spironolactone (ALDACTONE) 50 MG Tab 2025 Active   tizanidine (ZANAFLEX) 4 MG Tab Unknown Active   traMADol (ULTRAM) 50 MG Tab 2025 Active   triamcinolone (NASACORT) 55 MCG/ACT nasal inhaler 1/15/2025 Active                  Audit from Redirected Encounters    **Home medications have not yet been reviewed for this encounter**         Social History     Tobacco Use    Smoking status: Former     Current packs/day: 0.00     Average packs/day: 2.0 packs/day for 30.0 years (60.0 ttl pk-yrs)     Types: Cigarettes     Start date: 3/1/1969     Quit date: 3/1/1999     Years since quittin.9    Smokeless tobacco: Never    Tobacco comments:     3 pks a day for 35 yrs, continued abstinence   Vaping Use    Vaping status: Never Used   Substance Use Topics    Alcohol use: Not Currently     Alcohol/week: 4.2 oz     Types: 7 Glasses of wine per week    Drug use: Not Currently     Types: Marijuana, Oral     Comment: Medical Marijuana         Past Medical History:   Diagnosis Date    Acute on chronic respiratory failure with hypoxia ([facility]) 2020    Arthritis     spine    Asthma with COPD (HCC)     BMI 31.0-31.9,adult 2022    Cataract immature      "Chronic pain     Chronic respiratory failure with hypoxia ([facility]) 07/13/2017    Colon polyps     COPD (chronic obstructive pulmonary disease) ([facility]) 2002    moderate to severe    Cough     DDD (degenerative disc disease), cervical     DDD (degenerative disc disease), thoracic     Dependence on continuous supplemental oxygen 08/13/2015    IMO load March 2020    Diverticulitis of colon     Dyslipidemia     EMPHYSEMA     H/O opioid abuse ([facility]) 07/15/2021    Hypertension     Obesity (BMI 30-39.9) 10/24/2016    Opioid type dependence, continuous ([facility]) 02/04/2022    REGINE (obstructive sleep apnea)     OSTEOPOROSIS     Painful breathing     Shortness of breath     Spinal stenosis, lumbar region, with neurogenic claudication     Sputum production     URINARY INCONTINENCE     Varicose veins of left leg with edema 10/11/2024    Vitamin d deficiency     Wheezing        Past Surgical History:   Procedure Laterality Date    LA UPPER GI ENDOSCOPY,BIOPSY N/A 11/15/2024    Procedure: GASTROSCOPY, WITH BIOPSY;  Surgeon: Mela Vargas M.D.;  Location: SURGERY SAME DAY [facility];  Service: Gastroenterology    COLONOSCOPY WITH POLYP N/A 11/15/2024    Procedure: COLONOSCOPY, WITH POLYPECTOMY;  Surgeon: Mela Vargas M.D.;  Location: SURGERY SAME DAY [facility];  Service: Gastroenterology    LUMBAR LAMINECTOMY DISKECTOMY  6/22/2010    Performed by GRACIE CANO at SURGERY [facility]    OTHER ORTHOPEDIC SURGERY  2004    left ankle fx    OTHER      leg fracture    OTHER      t spine disc surg    TONSILLECTOMY         Allergies: Iodine and Tape    Family History   Problem Relation Age of Onset    Cancer Father         Lung    Other Sister         lung and leukemia cancer    Cancer Sister         Leukemia, Lung       Vitals:    01/22/25 1310 01/22/25 1405 01/22/25 1504 01/22/25 1542   Height:    1.6 m (5' 3\")   Weight:    73 kg (161 lb)   Weight % change since last entry.:    0 %   BP: (!) 192/81 (!) 161/58 (!) 163/90    Pulse: 87 86 78 "    BMI (Calculated):    28.52   Resp: (!) 25 (!) 28 (!) 25    Temp: 37.1 °C (98.7 °F)      TempSrc: Oral       01/22/25 1600 01/22/25 1711 01/22/25 2052 01/22/25 2349   Height:       Weight:       Weight % change since last entry.:       BP: (!) 172/70 (!) 162/117 (!) 154/64 (!) 160/67   Pulse: 77 75 82 75   BMI (Calculated):       Resp: (!) 23 (!) 24 20 18   Temp:  36.6 °C (97.8 °F) 36.1 °C (97 °F) 36.4 °C (97.5 °F)   TempSrc:  Temporal Temporal Temporal    01/23/25 0404 01/23/25 0551 01/23/25 0752 01/23/25 0823   Height:       Weight: 76.4 kg (168 lb 6.9 oz)      Weight % change since last entry.: 4.62 %      BP: (!) 166/66 (!) 158/70  (!) 153/75   Pulse: 83  80 81   BMI (Calculated):       Resp: 20  20 20   Temp: 36.7 °C (98.1 °F)   35.9 °C (96.6 °F)   TempSrc: Temporal   Temporal    01/23/25 1217 01/23/25 1220 01/23/25 1623 01/23/25 1637   Height:       Weight:       Weight % change since last entry.:       BP: (!) 168/70  (!) 151/68    Pulse: 84 81 81 86   BMI (Calculated):       Resp: 20 18 19 18   Temp: 36.6 °C (97.9 °F)  36.5 °C (97.7 °F)    TempSrc: Temporal  Temporal     01/23/25 1906 01/23/25 2347 01/24/25 0426 01/24/25 0641   Height:       Weight:   77.6 kg (171 lb 1.2 oz)    Weight % change since last entry.:   1.57 %    BP: 121/76 (!) 165/80 (!) 154/61    Pulse: 75 80 67 69   BMI (Calculated):       Resp: 18 18 18 16   Temp: 36.1 °C (97 °F) 36.3 °C (97.3 °F) 36.1 °C (97 °F)    TempSrc: Temporal Temporal Temporal     01/24/25 0741 01/24/25 0959 01/24/25 1042 01/24/25 1210   Height:       Weight:       Weight % change since last entry.:       BP: (!) 151/64 (!) 168/70  (!) 158/65   Pulse: 74 80 77 81   BMI (Calculated):       Resp:   18 18   Temp: 36.2 °C (97.2 °F)   36.6 °C (97.9 °F)   TempSrc: Temporal   Temporal    01/24/25 1445   Height:    Weight:    Weight % change since last entry.:    BP:    Pulse: 84   BMI (Calculated):    Resp: 18   Temp:    TempSrc:        Physical  Examination      Intake/Output Summary (Last 24 hours) at 1/24/2025 1501  Last data filed at 1/24/2025 1200  Gross per 24 hour   Intake 1474 ml   Output 0 ml   Net 1474 ml     Appearance: Normal appearance.   HENT:      Head: Normocephalic and atraumatic.      Nose: Nose normal. No congestion.      Mouth/Throat:      Mouth: Mucous membranes are moist.      Pharynx: Oropharynx is clear.   Eyes:      Extraocular Movements: Extraocular movements intact.      Conjunctiva/sclera: Conjunctivae normal.      Pupils: Pupils are equal, round, and reactive to light.   Cardiovascular:      Rate and Rhythm: Normal rate and regular rhythm.      Pulses: Normal pulses.      Heart sounds: Normal heart sounds.   Pulmonary:      Effort: Pulmonary effort is normal. No respiratory distress.      Breath sounds: Faint wheezing present. No rhonchi.   Abdominal:      General: Abdomen is flat. Bowel sounds are normal. There is no distension.      Palpations: There is no mass.      Tenderness: There is no abdominal tenderness. There is no guarding or rebound.   Musculoskeletal:         General: No swelling or tenderness. Normal range of motion.      Right lower leg: No edema.      Left lower leg: No edema.   Skin:     General: Skin is warm.      Capillary Refill: Capillary refill takes less than 2 seconds.   Neurological:      General: No focal deficit present.      Mental Status: She is alert and oriented to person, place, and time. Mental status is at baseline.      Cranial Nerves: No cranial nerve deficit.      Sensory: No sensory deficit.      Motor: No weakness.      Coordination: Coordination normal.   Psychiatric:         Mood and Affect: Mood normal.         Behavior: Behavior normal.     Recent Labs     01/22/25  1319 01/23/25  0123 01/24/25  0150   WBC 19.3* 13.5* 16.7*   NEUTSPOLYS 92.50* 89.70* 89.50*   LYMPHOCYTES 2.30* 3.30* 2.80*   MONOCYTES 3.20 5.60 5.60   EOSINOPHILS 0.90 0.10 0.00   BASOPHILS 0.30 0.10 0.40   ASTSGOT 17 16  20   ALTSGPT 15 12 13   ALKPHOSPHAT 48 79 47   TBILIRUBIN 0.3 0.2 <0.2     Recent Labs     01/22/25  1319 01/23/25  0123 01/24/25  0150   SODIUM 132* 136 135   POTASSIUM 3.9 4.1 3.6   CHLORIDE 89* 93* 90*   CO2 31 34* 34*   BUN 10 14 17   CREATININE 0.34* 0.47* 0.45*   CALCIUM 9.3 9.6 9.5     Recent Labs     01/22/25  1319 01/23/25  0123 01/24/25  0150   ALTSGPT 15 12 13   ASTSGOT 17 16 20   ALKPHOSPHAT 48 79 47   TBILIRUBIN 0.3 0.2 <0.2   GLUCOSE 209* 169* 220*         CT-CHEST (THORAX) W/O   Final Result      1.  Severe emphysema and associated parenchymal scarring as seen previously.   2.  Mild bilateral dependent atelectasis.   3.  Bronchiectasis and bronchial secretions again seen in the lung bases, likely chronic inflammatory process.  No lobar pneumonia or pneumothorax.   4.  Stable subpleural RIGHT lower lobe nodule.      Fleischner Society pulmonary nodule recommendations:   Low Risk: CT at 6-12 months, then consider CT at 18-24 months      High Risk: CT at 6-12 months, then CT at 18-24 months      Low Risk - Minimal or absent history of smoking and of other known risk factors.      High Risk - History of smoking or of other known risk factors.      Note: These recommendations do not apply to lung cancer screening, patients with immunosuppression, or patients with known primary cancer.      Fleischner Society 2017 Guidelines for Management of Incidentally Detected Pulmonary Nodules in Adults         DX-CHEST-PORTABLE (1 VIEW)   Final Result      Again seen bilateral interstitial infiltrates possibly representing chronic interstitial lung disease versus atypical infection or interstitial edema.          Patient Active Problem List   Diagnosis    Mixed stress and urge urinary incontinence    Vitamin D deficiency disease    Dyslipidemia    Facet arthropathy, lumbar    Pain, chronic    Pre-diabetes    Pain management    At risk for falls    Chronic respiratory failure with hypoxia (HCC)    Stage 4 very severe  COPD by GOLD classification ([facility])    AF (atrial fibrillation) ([facility])    PVCs (premature ventricular contractions)    COVID-19    DNR (do not resuscitate)    Peripheral vascular disease ([facility])    Recurrent major depressive disorder, in partial remission ([facility])    Amnesia    Primary cancer of left upper lobe of lung ([facility])    Osteopenia of lumbar spine    Sedative, hypnotic, 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[facility])    Calculus of gallbladder    Thyroid nodule    Diverticulosis    Hypertension    Oropharyngeal dysphagia    Acute exacerbation of chronic obstructive pulmonary disease (COPD) ([facility])    Atherosclerosis of aorta ([facility])    Financial difficulty    Varicose veins of left leg with edema    Lower GI bleed    Hemorrhoids    Anemia    Edema    Encounter for hospice care discussion    BMI 27.0-27.9,adult    Former smoker    Acute on chronic respiratory failure with hypoxia    Influenza A    Leukocytosis    Acute on chronic hypoxic respiratory failure ([facility])    Diarrhea    Allergies    Mood disorder ([facility])    GERD (gastroesophageal reflux disease)    Muscle spasms of both lower extremities    Steroid-induced hyperglycemia    Personal history of lung cancer       Assessment and Plan:  Acute on chronic hypoxic respiratory failure secondary to below   COPD exacerbation,from recent influenza infection  Pneumonia, respiratory culture showing heavy growth of Pseudomonas  Patient likely still recovering from influenza.  Patient needs mobilization and assistance with her ADLs, maximize PT and OT. Procalcitonin 0.05, negative.  MRSA negative. Chest CT scan done 1/23/2025 without any evidence of superimposed bacterial pneumonia     Plan  Recommend steroid taper, start with prednisone 60 mg daily until July 26. 50 Mg prednisone daily for January 27 and 28.  Subsequent taper as below:   40 Mg for January 28, and 29  30 Mg for January 30 and 31.  20 Mg for February 1 and 2.  10 Mg per February 3 and 4  2. Follow respiratory cultures: Moderate  growth of usual upper respiratory tan, heavy growth of Pseudomonas species, susceptibilities in progress.  Recommend starting Zosyn and transition to levofloxacin on discharge.  Patient needs total of 5 to 7 days of antibiotics.   3. Recommend SLP referral if with concerns regarding aspiration  4   Patient will benefit from pulmonary rehabilitation.  5.  Continue LABA /LAMA/ ICS, continue azithromycin therapy as previous recommended and montelukast  6.  Recommend aggressive bowel regimen as per patient tolerance  6.   Pulmonology will continue to follow      Case discussed with Dr. Peralta     Thank you for this consult.  Please do not hesitate to reach out through Voalte if with questions. Recommendations discussed with  Dr. Tariq Gutierrez MD  PGY 3 internal medicine

## 2025-01-24 NOTE — CARE PLAN
The patient is Stable - Low risk of patient condition declining or worsening    Shift Goals  Clinical Goals: monitor and manage oxygen demands and supplemental oxygen, comfort, promote rest  Patient Goals: be able to ambulate without increased oxygen demands  Family Goals: LORA    Progress made toward(s) clinical / shift goals:    Problem: Knowledge Deficit - Standard  Goal: Patient and family/care givers will demonstrate understanding of plan of care, disease process/condition, diagnostic tests and medications  Description: Target End Date:  1-3 days or as soon as patient condition allows    Document in Patient Education    1.  Patient and family/caregiver oriented to unit, equipment, visitation policy and means for communicating concern  2.  Complete/review Learning Assessment  3.  Assess knowledge level of disease process/condition, treatment plan, diagnostic tests and medications  4.  Explain disease process/condition, treatment plan, diagnostic tests and medications  Outcome: Progressing  Note: Patient updated on the plan of care, including prescribed medication regimen, continued monitoring of oxygen demands, and respiratory comfort goals.      Problem: Respiratory  Goal: Patient will achieve/maintain optimum respiratory ventilation and gas exchange  Description: Target End Date:  Prior to discharge or change in level of care    Document on Assessment flowsheet    1.  Assess and monitor rate, rhythm, depth and effort of respiration  2.  Breath sounds assessed qshift and/or as needed  3.  Assess O2 saturation, administer/titrate oxygen as ordered  4.  Position patient for maximum ventilatory efficiency  5.  Turn, cough, and deep breath with splinting to improve effectiveness  6.  Collaborate with RT to administer medication/treatments per order  7.  Encourage use of incentive spirometer and encourage patient to cough after use and utilize splinting techniques if applicable  8.  Airway suctioning  9.  Monitor  sputum production for changes in color, consistency and frequency  10. Perform frequent oral hygiene  11. Alternate physical activity with rest periods  Outcome: Progressing  Note: Patient remains on baseline supplemental oxygen of 7 L NC with oxygen saturation of 90% - 93% at rest. However, with slight exertion, patient having increased dyspnea and decreased oxygen saturation into the low 80's taking patient several minutes to recover and increase oxygen saturation post exertion (ie: bathroom use).   Patient also noted to have a very productive cough with thick yellowish/tan colored sputum. Suction bedside to aid in expelling sputum. Hot tea also provided to aid in thinning thick secretions and providing respiratory comfort.   Prn tessalon given along with evening medications to aid in cough management. And prescribed robitussin also given later upon patient having frequent coughing to further provide respiratory comfort.          Patient is not progressing towards the following goals:      Problem: Bowel Elimination  Goal: Establish and maintain regular bowel function  Description: Target End Date:  Prior to discharge or change in level of care    1.   Note date of last BM  2.   Educate about diet, fluid intake, medication and activity to promote bowel function  3.   Educate signs and symptoms of constipation and interventions to implement  4.   Pharmacologic bowel management per provider order  5.   Regular toileting schedule  6.   Upright position for toileting  7.   High fiber diet  8.   Encourage hydration  9.   Collaborate with Clinical Dietician  10. Care and maintenance of ostomy if applicable  Outcome: Not Progressing  Note: Patient last BM prior to arrival to hospital.   Patient requesting bowel regimen to aid in ability to have BM and promote comfort.   MD notified

## 2025-01-24 NOTE — DISCHARGE PLANNING
HTH/SCP TCN chart review completed. Per review, noted patient with 2 SNFs currently pending. Collaborated with MARIPOSA Benavides. Appreciate PT consult 1/24 with recommendations noted to post-acute placement. Voalte sent to  to provide TCN collaboration and support in discharge planning at this time. TCN will continue to follow and collaborate with discharge planning team as additional post acute needs arise. Thank you.    Completed:  PT recommendations noted to post acute placement 1/24 (also noted may benefit from considering [facility] and/or group home placement in the long term)  Choice obtained: SNF referrals sent per  protocol. During initial TCN visit, Patient verbalized agreement to post acute placement to SNF level of care and advised she would choose from accepting facilities  Pt aware of [facility]'s blanket referral policy  SCP with [facility] PCP. Anticipate post acute placement at this time     UPDATE 2:01PM Chart review completed. Voalte update received from  noting patient discharge plan is SNF noting no current accepting SNF facilities at this time. Barrier to potential dc to SNF noted to patient oxygen needs (per review noted oxygen needs are currently 6 liters) and c-diff results remain pending at this time. TCN will continue to follow.       UPDATE 2:07PM TCN placed telephone call to Berny. Discussed current discharge considerations noting [facility] and [facility] are pending at this time. Patient stated preferred SNF location is [facility], but also noted she would be willing to go to any accepting facility. Voalte update sent to . TCN will continue to follow.       UPDATE 5:14PM Appreciate  OT consult 1/24 with recommendations noted to post acute placement. Please see DPA notes noting CM is following for accepting SNF facility at this time. As current anticipated dc plan is SNF, authorization request for SNF sent to [facility] team for review.

## 2025-01-24 NOTE — DISCHARGE PLANNING
BINA spoke to Helder with [facility].  Said she does not have an iso bed available as of right now, would like a positive or negative C.Diff rule out, BM needs to pass and SNF can accommodate the O2 need of 6LPM-8LPM or higher depending on 10LPM concentrator availability.

## 2025-01-24 NOTE — CARE PLAN
Problem: Bronchoconstriction  Goal: Improve in air movement and diminished wheezing  Description: Target End Date:  2 to 3 days    1.  Implement inhaled treatments  2.  Evaluate and manage medication effects  Outcome: Progressing     Q4 Duoneb  QID Flutter

## 2025-01-24 NOTE — DISCHARGE PLANNING
DPA spoke to Kaia with [facility] to follow up on SNF referral. Said she would like to know more about d/c plans after SNF. Questioned due to notation of PT note.  LMSW CM aware.

## 2025-01-24 NOTE — PROGRESS NOTES
[facility] Internal Medicine Daily Progress Note    Date of Service  1/24/2025    R Team: R MAGDY Crane Team   Attending: Luis Faustin M.d.  Senior Resident: Dr. Hastings  Intern:  Dr. Potter  Contact Number: [phone]    Chief Complaint  Berny Renee is a 79 y.o. female admitted 1/22/2025 with shortness of breath and COPD exacerbation.    Hospital Course  Berny Renee is a 79 y.o. female comes to the emergency room on 1/22/2025 when prompted by the nurse who visited her at home noticed her to be desaturating into the 70s when she walked.  Patient had a recent admission to the hospital and was discharged on Sunday 3 days ago when she was treated for pneumonia, RSV and flu.  She wanted to go to, facility after that admission. She reported worsening shortness of breath, hypoxia and productive cough.  EMS reported patient was hypoxic at 86% on her 8 L baseline oxygen.  Patient was tachypneic at 36 breaths/min as per EMS.  Patient reports occasional diarrhea.  States that she has been discharged before she was ready and that she can take care of of her at her house and that she needs to go to a facility where she can get rehab.  Patient does not report any chest pain, abdominal pain nausea or vomiting.    Prednisone taper plan:  -Prednisone 60 Mg -until January 26.  -50 Mg for January 27 and 28..  -40 Mg for January 28, and 29  -30 Mg for January 30 and 31.  -20 Mg for February 1 and 2.  -10 Mg per February 3 and 4  -Stop prednisone.    Interval Problem Update  Patient reports getting better.  Saturating at 6 L oxygen.  Patient at baseline respiratory status in the morning but keeps requiring 7 to 8 L frequently..  Requests to be placed to find a facility.  CT scan did not show any new pneumonia.    Discussed transitioning Solu-Medrol to p.o. prednisone with the pulmonology team.        I have discussed this patient's plan of care and discharge plan at IDT rounds today with Case Management, Nursing, Nursing leadership,  and other members of the IDT team.    Consultants/Specialty  pulmonary    Code Status  DNAR/DNI    Disposition  The patient is not medically cleared for discharge to home or a post-acute facility.      I have placed the appropriate orders for post-discharge needs.    Review of Systems  Review of Systems   Constitutional:  Positive for malaise/fatigue. Negative for chills, fever and weight loss.   HENT: Negative.     Eyes: Negative.    Respiratory:  Positive for cough, sputum production, shortness of breath and wheezing. Negative for hemoptysis.    Cardiovascular:  Negative for chest pain, palpitations, orthopnea, claudication, leg swelling and PND.   Gastrointestinal:  Positive for diarrhea. Negative for abdominal pain, blood in stool, constipation, heartburn, nausea and vomiting.   Genitourinary: Negative.    Musculoskeletal:  Positive for back pain and joint pain.   Skin: Negative.    Neurological:  Positive for tremors. Negative for dizziness, focal weakness, seizures and loss of consciousness.   Endo/Heme/Allergies: Negative.    Psychiatric/Behavioral: Negative.          Physical Exam  Temp:  [36.1 °C (97 °F)-36.6 °C (97.9 °F)] 36.6 °C (97.9 °F)  Pulse:  [67-86] 84  Resp:  [16-19] 18  BP: (121-168)/(61-80) 158/65  SpO2:  [77 %-96 %] 96 %    Physical Exam  Constitutional:       Appearance: Normal appearance. She is normal weight.   HENT:      Head: Normocephalic and atraumatic.      Nose: Nose normal. No congestion.      Mouth/Throat:      Mouth: Mucous membranes are moist.      Pharynx: Oropharynx is clear.   Eyes:      Extraocular Movements: Extraocular movements intact.      Conjunctiva/sclera: Conjunctivae normal.      Pupils: Pupils are equal, round, and reactive to light.   Cardiovascular:      Rate and Rhythm: Normal rate and regular rhythm.      Pulses: Normal pulses.      Heart sounds: Normal heart sounds.   Pulmonary:      Effort: Pulmonary effort is normal. No respiratory distress.      Breath sounds:  Wheezing present. No rhonchi.   Abdominal:      General: Abdomen is flat. Bowel sounds are normal. There is no distension.      Palpations: There is no mass.      Tenderness: There is no abdominal tenderness. There is no guarding or rebound.   Musculoskeletal:         General: No swelling or tenderness. Normal range of motion.      Right lower leg: No edema.      Left lower leg: No edema.   Skin:     General: Skin is warm.      Capillary Refill: Capillary refill takes less than 2 seconds.   Neurological:      General: No focal deficit present.      Mental Status: She is alert and oriented to person, place, and time. Mental status is at baseline.      Cranial Nerves: No cranial nerve deficit.      Sensory: No sensory deficit.      Motor: No weakness.      Coordination: Coordination normal.   Psychiatric:         Mood and Affect: Mood normal.         Behavior: Behavior normal.         Fluids    Intake/Output Summary (Last 24 hours) at 1/24/2025 1459  Last data filed at 1/24/2025 1200  Gross per 24 hour   Intake 1474 ml   Output 0 ml   Net 1474 ml       Laboratory  Recent Labs     01/22/25  1319 01/23/25  0123 01/24/25  0150   WBC 19.3* 13.5* 16.7*   RBC 4.12* 4.17* 4.16*   HEMOGLOBIN 12.4 12.5 12.4   HEMATOCRIT 38.1 37.6 38.2   MCV 92.5 90.2 91.8   MCH 30.1 30.0 29.8   MCHC 32.5 33.2 32.5   RDW 45.6 44.0 44.9   PLATELETCT 447* 482* 501*   MPV 9.0 9.1 9.2     Recent Labs     01/22/25  1319 01/23/25  0123 01/24/25  0150   SODIUM 132* 136 135   POTASSIUM 3.9 4.1 3.6   CHLORIDE 89* 93* 90*   CO2 31 34* 34*   GLUCOSE 209* 169* 220*   BUN 10 14 17   CREATININE 0.34* 0.47* 0.45*   CALCIUM 9.3 9.6 9.5                   Imaging  CT-CHEST (THORAX) W/O   Final Result      1.  Severe emphysema and associated parenchymal scarring as seen previously.   2.  Mild bilateral dependent atelectasis.   3.  Bronchiectasis and bronchial secretions again seen in the lung bases, likely chronic inflammatory process.  No lobar pneumonia or  pneumothorax.   4.  Stable subpleural RIGHT lower lobe nodule.      Fleischner Society pulmonary nodule recommendations:   Low Risk: CT at 6-12 months, then consider CT at 18-24 months      High Risk: CT at 6-12 months, then CT at 18-24 months      Low Risk - Minimal or absent history of smoking and of other known risk factors.      High Risk - History of smoking or of other known risk factors.      Note: These recommendations do not apply to lung cancer screening, patients with immunosuppression, or patients with known primary cancer.      Fleischner Society 2017 Guidelines for Management of Incidentally Detected Pulmonary Nodules in Adults         DX-CHEST-PORTABLE (1 VIEW)   Final Result      Again seen bilateral interstitial infiltrates possibly representing chronic interstitial lung disease versus atypical infection or interstitial edema.           Assessment/Plan  Problem Representation:    * Acute on chronic hypoxic respiratory failure (HCC)- (present on admission)  Assessment & Plan  Patient had a recent hospital admission for similar problem due to concerns of infection and was discharged on Tamiflu and steroids.  Chest x-ray did not show any new infiltrates.  She got Solu-Medrol in the ED at 3 PM and reports her respiratory status improved after that.  At this point we will hold on starting another Tamiflu course.  Patient is at a baseline of 6 L.  Ordered CT chest today.  -Telemetry for 48 hours for acute hypoxic respiratory failure.  -Transition Solu-Medrol to prednisone p.o. today.  -Ordered Pro-Azam, respiratory panel, sputum cultures to rule out any other underlying etiologies.  -Respiratory therapy protocol.-Continue home Breztri  -COPD exacerbation protocol.  -DuoNebs, albuterol inhaler ordered.  -Guaifenesin, benzonatate for cough.  -Palliative consult placed given multiple admissions in the last few months and worsening respiratory status..    Diarrhea  Assessment & Plan  Patient reports occasional  diarrhea but does not give exact frequency.  -Ordered C. difficile.  -Ordered GI panel.    Muscle spasms of both lower extremities  Assessment & Plan  Continue home tizanidine.    GERD (gastroesophageal reflux disease)  Assessment & Plan  -Continue home omeprazole.    Mood disorder ([facility])  Assessment & Plan  Continue home duloxetine.    Allergies  Assessment & Plan  Continue home diphenhydramine, montelukast, fexofenadine.    Hypertension- (present on admission)  Assessment & Plan  Continue home amlodipine, spironolactone, losartan.    AF (atrial fibrillation) ([facility])- (present on admission)  Assessment & Plan  Continue home digoxin    Pain, chronic- (present on admission)  Assessment & Plan  Pain multiple joints  -Continue home meloxicam, Diclo Gel and tramadol.         VTE prophylaxis: enoxaparin ppx    I have performed a physical exam and reviewed and updated ROS and Plan today (1/24/2025). In review of yesterday's note (1/23/2025), there are no changes except as documented above.

## 2025-01-24 NOTE — THERAPY
Physical Therapy   Initial Evaluation     Patient Name: Berny Renee  Age:  79 y.o., Sex:  female  Medical Record #: [id number]  Today's Date: 1/24/2025     Precautions  Precautions: Fall Risk;Other (See Comments)  Comments: Special contact isolation for C diff rule out, SpO2 goal: 88-92%    Assessment  Patient is 79 y.o. female who presented on 1/22/2025 for shortness of breath and cough     Currently been managed for Acute on chronic hypoxic respiratory failure, COPD exacerbation, recent influenza infection, diarrhea     PMH: COPD on 8 L oxygen at baseline, CHF, lung cancer in remission, chronic pain syndrome, spinal stenosis, sleep apnea, hypertension, GERD, A fib, mood disorder  Recent admission 1/16/2025   Multiple hospital admissions From Nov 2024-Jan 2025    Patient seen for PT evaluation and treatment. Patient in bed, agreeable for the session. Able to demonstrate functional mobility tasks as detailed below. Currently appears to be below baseline level of functional mobility. Will continue to benefit from PT services to help improve overall functional mobility. Recommend post-acute placement at this time.     Patient may benefit from considering [facility] and/or group home placement in the long term.     Plan    Physical Therapy Initial Treatment Plan   Treatment Plan : Gait Training, Neuro Re-Education / Balance, Therapeutic Activities, Therapeutic Exercise  Treatment Frequency: 4 Times per Week  Duration: Until Therapy Goals Met    DC Equipment Recommendations: None (Owns 4WW, scooter)  Discharge Recommendations: Recommend post-acute placement for additional physical therapy services prior to discharge home (If patient returns home will benefit from  PT and additional caregiver resources)    Objective     01/24/25 0959   Time In/Time Out   Therapy Start Time 0918   Therapy End Time 0959   Total Therapy Time 41   Initial Contact Note    Initial Contact Note Order Received and Verified, Physical Therapy  Evaluation in Progress with Full Report to Follow.   Precautions   Precautions Fall Risk;Other (See Comments)   Comments Special contact isolation for C diff rule out, SpO2 goal: 88-92%   Vitals   Pulse 80   Patient BP Position Sitting  (In the chair, post activity)   Blood Pressure  (!) 168/70   Pulse Oximetry (!) 77 %  (Took about 30s to reach 88 and about 1 minute to increase above 90)   O2 (LPM) 8   O2 Delivery Device High Flow Nasal Cannula   Vitals Comments Patient is on 6L at rest, uses 8L during activity. Seated in the chair after few mins of rest following activity: /60, HR 86, SpO2 92. RN made aware of vitals. Patient placed on 6L at end of session.   Pain   Pain Scales 0 to 10 Scale    Intervention Declines   Pain 0 - 10 Group   Therapist Pain Assessment Prior to Activity;During Activity;Post Activity   Prior Living Situation   Prior Services Housekeeping / Homemaker Services;Home-Independent   Housing / Facility 1 Story Apartment / Condo   Steps Into Home 0  (Ramp entrance)   Steps In Home 0   Equipment Owned 4-Wheel Walker;Single Point Cane;Scooter;Ramp;Oxygen;Other (Comments)  (Pulse oximeter)   Lives with - Patient's Self Care Capacity Alone and Able to Care For Self   Comments Patient mentioned that she has friends that come in when able to assist her if needed.   Prior Level of Functional Mobility   Bed Mobility Independent  (Patient sleeps in a recliner at home)   Transfer Status Independent   Ambulation Independent   Ambulation Distance Household   Assistive Devices Used 4-Wheel Walker   Comments Patient uses scooter in the community as needed. Patient reported that since her initial RSV infection, she has been limited with her mobility due to early fatigue/ SOB with minimal exertion, limited activity tolerance.   History of Falls   History of Falls Yes  (Reports of no falls since Nov, prior to that H/O falls)   Cognition    Cognition / Consciousness X   Level of Consciousness Alert   Safety  Awareness Impaired   New Learning Impaired   Passive ROM Lower Body   Passive ROM Lower Body WDL   Active ROM Lower Body    Active ROM Lower Body  WDL   Strength Lower Body   Comments Grossly BLE WFL   Sensation Lower Body   Lower Extremity Sensation   X   Comments Reports of numbness/tingling in B/L feet especially in the mornings. H/O disc problem in C spine.   Lower Body Muscle Tone   Lower Body Muscle Tone  WDL   Other Treatments   Other Treatments Provided Patient mentioned that she has been in & out of the hospital for about 5 times now, each time she feels that she is DC sooner than she is ready. She returns home and is not able to breathe or take care of herself and has to return back to ER/hospital again. RN and  made aware of this concern. Patient educated about importance of activity pacing during functional tasks and/or mobility. Reinforced continued use of 4WW for energy conservation and reducing falls. Reinforced importance of daily & frequent mobility, OOB to chair for meals and ambulate with supervision from nursing staff. Discussed about DC plan-patient does not feel ready to DC home, is agreeable for placement for few days. Patient reports that she is concerned about being able to care for herself due to SOB with minimal exertion and generalized weakness. Assisted to the bathroom, performed hand hygiene, oral care and grooming by the sink per patient's request.   Balance Assessment   Sitting Balance (Static) Fair +   Sitting Balance (Dynamic) Fair   Standing Balance (Static) Fair   Standing Balance (Dynamic) Fair   Weight Shift Sitting Good   Weight Shift Standing Good   Comments Seated EOB; Standing with FWW   Bed Mobility    Supine to Sit Supervised   Scooting Supervised   Comments HOB raised, without use of rails, towards R side   Gait Analysis   Gait Level Of Assist Standby Assist   Assistive Device Front Wheel Walker   Distance (Feet) 30  (1 seated rest breaks)   Deviation Other  (Comment)  (Reduced step & stride length)   Comments Cues for pacing, brief standing rest break and/or sitting rest break as needed. Patient tends to let go off the AD in b/w the task, cues to continue using AD for safety.   Functional Mobility   Sit to Stand Standby Assist   Bed, Chair, Wheelchair Transfer Standby Assist   Toilet Transfers Standby Assist   Transfer Method Stand Step   Mobility Bed-EOB-sit-stand-ambulate to bathroom-toilet transfer-hand hygiene/oral care/grooming by sink-ambulate in the room-chair   Comments W FWW; Cues for hand placement, LE placement. Patient tends to finish all the task in standing despite feeling SOB, reinforced seated rest break, pacing in b/w functional tasks.   6 Clicks Assessment - How much HELP from from another person do you currently need... (If the patient hasn't done an activity recently, how much help from another person do you think he/she would need if he/she tried?)   Turning from your back to your side while in a flat bed without using bedrails? 4   Moving from lying on your back to sitting on the side of a flat bed without using bedrails? 4   Moving to and from a bed to a chair (including a wheelchair)? 3   Standing up from a chair using your arms (e.g., wheelchair, or bedside chair)? 3   Walking in hospital room? 3   Climbing 3-5 steps with a railing? 3   6 clicks Mobility Score 20   Activity Tolerance   Sitting in Chair Post session   Patient / Family Goals    Patient / Family Goal #1 To participate in rehab to be able to successfully return to prior level of functional mobility   Short Term Goals    Short Term Goal # 1 Patient will ambulate 100 feet with 4WW with supervision while demonstrating pacing and/or energy conservation strategies in 6 visits to safely ambulate household distance   Short Term Goal # 2 Patient will perform sit-stand with 4WW with supervision in 6 visits to progress with functional mobility   Short Term Goal # 3 Patient will perform chair  transfers with 4WW with supervision in 6 visits to safely get OOB to chair   Education Group   Education Provided Role of Physical Therapist   Role of Physical Therapist Patient Response Patient;Acceptance;Explanation;Verbal Demonstration   Physical Therapy Initial Treatment Plan    Treatment Plan  Gait Training;Neuro Re-Education / Balance;Therapeutic Activities;Therapeutic Exercise   Treatment Frequency 4 Times per Week   Duration Until Therapy Goals Met   Problem List    Problems Impaired Transfers;Impaired Ambulation;Impaired Balance;Decreased Activity Tolerance;Safety Awareness Deficits / Cognition   Anticipated Discharge Equipment and Recommendations   DC Equipment Recommendations None  (Owns 4WW, scooter)   Discharge Recommendations Recommend post-acute placement for additional physical therapy services prior to discharge home  (If patient returns home will benefit from  PT and additional caregiver resources)   Interdisciplinary Plan of Care Collaboration   IDT Collaboration with  Nursing;   Patient Position at End of Therapy Call Light within Reach;Tray Table within Reach;Seated;Chair Alarm On   Session Information   Date / Session Number  1/24-1(1/4, 1/30)

## 2025-01-24 NOTE — THERAPY
"Occupational Therapy   Initial Evaluation     Patient Name: Berny Renee  Age:  79 y.o., Sex:  female  Medical Record #: [id number]  Today's Date: 1/24/2025     Precautions  Precautions: Fall Risk  Comments: Special contact isolation for C diff rule out, SpO2 goal: 88-92%    Assessment  Patient is 79 y.o. female who presents to acute w/ SOB and cough. Pt found to be acute on chronic hypoxic respiratory failure, COPD exacerbation, and recent influenza infection. Pt reports since November she has been going down hill and has had multiple re-admissions.     Pt primarily limited by impaired activity tolerance during session w/ sp02 dropping to 85% when using the bathroom and pt visually symptomatic. Pt would greatly benefit from placement for intensive training to assist pt regain her high prior level of function. Will continue to follow while in house.     Plan    Occupational Therapy Initial Treatment Plan   Treatment Interventions: Self Care / Activities of Daily Living, Neuro Re-Education / Balance, Therapeutic Exercises, Therapeutic Activity, Family / Caregiver Training, Adaptive Equipment  Treatment Frequency: 3 Times per Week  Duration: Until Therapy Goals Met    DC Equipment Recommendations: None  Discharge Recommendations: Recommend post-acute placement for additional occupational therapy services prior to discharge home     Subjective    \"I really want to be able to go to [facility]\"     Objective      Initial Contact Note    Initial Contact Note Order Received and Verified, Occupational Therapy Evaluation in Progress with Full Report to Follow.   Prior Living Situation   Prior Services Housekeeping / Homemaker Services;Home-Independent   Housing / Facility 1 Story Apartment / Condo   Bathroom Set up Bathtub / Shower Combination;Walk In Shower;Tub Transfer Bench   Equipment Owned 4-Wheel Walker;Single Point Cane;Scooter;Oxygen   Lives with - Patient's Self Care Capacity Alone and Able to Care For Self   Comments " Pt reports she has friends who can assist bringing groceries in, has groceries delivered.   Prior Level of ADL Function   Self Feeding Independent   Grooming / Hygiene Independent   Bathing Independent   Dressing Independent  (does not wear socks at baseline)   Toileting Independent   Prior Level of IADL Function   Medication Management Independent   Laundry Independent   Kitchen Mobility Independent   Finances Independent   Home Management Independent   Shopping Independent   Prior Level Of Mobility Independent With Device in Home;Uses Wheel Chair for Community Mobility   Driving / Transportation Driving Independent   Precautions   Precautions Fall Risk   Comments Special contact isolation for C diff rule out, SpO2 goal: 88-92%   Vitals   O2 (LPM) 6   O2 Delivery Device Silicone Nasal Cannula   Vitals Comments sp02 dropped to 85% after mobilizing to bathroom, deferred standing grooming, pt sat for ~3-4 min to recover to greater than 90% using pursed lip breathing. Pt reports at home her bathroom is farther from her room.   Pain 0 - 10 Group   Therapist Pain Assessment Post Activity Pain Same as Prior to Activity;Nurse Notified  (no c/o pain during session)   Cognition    Cognition / Consciousness X   Level of Consciousness Alert   Safety Awareness Impaired   New Learning Impaired   Comments pleasent and cooperative   Active ROM Upper Body   Active ROM Upper Body  WDL   Strength Upper Body   Upper Body Strength  X   Gross Strength Generalized Weakness, Equal Bilaterally.    Coordination Upper Body   Coordination WDL   Balance Assessment   Sitting Balance (Static) Fair +   Sitting Balance (Dynamic) Fair   Standing Balance (Static) Fair   Standing Balance (Dynamic) Fair   Weight Shift Sitting Good   Weight Shift Standing Fair   Comments w/ FWW in standing   Bed Mobility    Sit to Supine Supervised   Scooting Supervised   ADL Assessment   Grooming Supervision;Seated  (deferred standing due to drop to 85% in sp02 after  using the bathroom)   Upper Body Dressing   (CGA - gown)   Lower Body Dressing Minimal Assist  (socks, does not wear at baseline)   Toileting Contact Guard Assist  (cues for paicing)   How much help from another person does the patient currently need...   Putting on and taking off regular lower body clothing? 3   Bathing (including washing, rinsing, and drying)? 3   Toileting, which includes using a toilet, bedpan, or urinal? 3   Putting on and taking off regular upper body clothing? 3   Taking care of personal grooming such as brushing teeth? 3   Eating meals? 4   6 Clicks Daily Activity Score 19   Functional Mobility   Sit to Stand Standby Assist   Bed, Chair, Wheelchair Transfer Contact Guard Assist   Toilet Transfers Contact Guard Assist   Mobility from chair>toilet>chair>bed w/ FWW   Activity Tolerance   Sitting in Chair ~1 hour pre   Sitting Edge of Bed 5 min   Standing 6 min total   Patient / Family Goals   Patient / Family Goal #1 To get strong enough to go to [facility]   Short Term Goals   Short Term Goal # 1 Pt will don/doff underpants w/ SPV   Short Term Goal # 2 Pt will tolerate 3 min of standing grooming w/ sp02 remaining >90%   Short Term Goal # 3 Pt will demo 10 min ADL session at SPV level w/ sp02 remaining >90% and self initiating energy conservation techniques   Education Group   Role of Occupational Therapist Patient Response Patient;Acceptance;Explanation;Demonstration;Verbal Demonstration;Action Demonstration   Occupational Therapy Initial Treatment Plan    Treatment Interventions Self Care / Activities of Daily Living;Neuro Re-Education / Balance;Therapeutic Exercises;Therapeutic Activity;Family / Caregiver Training;Adaptive Equipment   Treatment Frequency 3 Times per Week   Duration Until Therapy Goals Met   Problem List   Problem List Decreased Active Daily Living Skills;Decreased Homemaking Skills;Decreased Functional Mobility;Decreased Activity Tolerance;Impaired Postural Control / Balance    Anticipated Discharge Equipment and Recommendations   DC Equipment Recommendations None   Discharge Recommendations Recommend post-acute placement for additional occupational therapy services prior to discharge home   Interdisciplinary Plan of Care Collaboration   IDT Collaboration with  Nursing   Patient Position at End of Therapy In Bed;Bed Alarm On;Call Light within Reach;Phone within Reach;Tray Table within Reach   Collaboration Comments report given   Session Information   Date / Session Number  1/24, 1 (1/3, 1/30)

## 2025-01-24 NOTE — CARE PLAN
The patient is Stable - Low risk of patient condition declining or worsening    Shift Goals  Clinical Goals: monitor o2 demands, safety, placement  Patient Goals: placement  Family Goals: tess    Progress made toward(s) clinical / shift goals:  Patient continues to verbalize needs and understanding of POC. RN to continue purposeful rounding and notify MD of any changes PRN    Patient is not progressing towards the following goals:

## 2025-01-25 LAB
ALBUMIN SERPL BCP-MCNC: 3.4 G/DL (ref 3.2–4.9)
ALBUMIN/GLOB SERPL: 1 G/DL
ALP SERPL-CCNC: 42 U/L (ref 30–99)
ALT SERPL-CCNC: 16 U/L (ref 2–50)
ANION GAP SERPL CALC-SCNC: 13 MMOL/L (ref 7–16)
AST SERPL-CCNC: 22 U/L (ref 12–45)
BILIRUB SERPL-MCNC: 0.4 MG/DL (ref 0.1–1.5)
BUN SERPL-MCNC: 17 MG/DL (ref 8–22)
CALCIUM ALBUM COR SERPL-MCNC: 10 MG/DL (ref 8.5–10.5)
CALCIUM SERPL-MCNC: 9.5 MG/DL (ref 8.5–10.5)
CHLORIDE SERPL-SCNC: 86 MMOL/L (ref 96–112)
CO2 SERPL-SCNC: 31 MMOL/L (ref 20–33)
CREAT SERPL-MCNC: 0.68 MG/DL (ref 0.5–1.4)
ERYTHROCYTE [DISTWIDTH] IN BLOOD BY AUTOMATED COUNT: 47.8 FL (ref 35.9–50)
GFR SERPLBLD CREATININE-BSD FMLA CKD-EPI: 88 ML/MIN/1.73 M 2
GLOBULIN SER CALC-MCNC: 3.5 G/DL (ref 1.9–3.5)
GLUCOSE BLD STRIP.AUTO-MCNC: 157 MG/DL (ref 65–99)
GLUCOSE BLD STRIP.AUTO-MCNC: 213 MG/DL (ref 65–99)
GLUCOSE BLD STRIP.AUTO-MCNC: 234 MG/DL (ref 65–99)
GLUCOSE SERPL-MCNC: 223 MG/DL (ref 65–99)
HCT VFR BLD AUTO: 40.7 % (ref 37–47)
HGB BLD-MCNC: 13.2 G/DL (ref 12–16)
MCH RBC QN AUTO: 29.7 PG (ref 27–33)
MCHC RBC AUTO-ENTMCNC: 32.4 G/DL (ref 32.2–35.5)
MCV RBC AUTO: 91.5 FL (ref 81.4–97.8)
PLATELET # BLD AUTO: 468 K/UL (ref 164–446)
PMV BLD AUTO: 9.2 FL (ref 9–12.9)
POTASSIUM SERPL-SCNC: 3.8 MMOL/L (ref 3.6–5.5)
PROT SERPL-MCNC: 6.9 G/DL (ref 6–8.2)
RBC # BLD AUTO: 4.45 M/UL (ref 4.2–5.4)
SODIUM SERPL-SCNC: 130 MMOL/L (ref 135–145)
WBC # BLD AUTO: 25.5 K/UL (ref 4.8–10.8)

## 2025-01-25 PROCEDURE — 82962 GLUCOSE BLOOD TEST: CPT | Mod: 91

## 2025-01-25 PROCEDURE — 99233 SBSQ HOSP IP/OBS HIGH 50: CPT | Performed by: INTERNAL MEDICINE

## 2025-01-25 PROCEDURE — A9270 NON-COVERED ITEM OR SERVICE: HCPCS

## 2025-01-25 PROCEDURE — 700102 HCHG RX REV CODE 250 W/ 637 OVERRIDE(OP)

## 2025-01-25 PROCEDURE — 36415 COLL VENOUS BLD VENIPUNCTURE: CPT

## 2025-01-25 PROCEDURE — 700105 HCHG RX REV CODE 258

## 2025-01-25 PROCEDURE — 85027 COMPLETE CBC AUTOMATED: CPT

## 2025-01-25 PROCEDURE — 770001 HCHG ROOM/CARE - MED/SURG/GYN PRIV*

## 2025-01-25 PROCEDURE — 94640 AIRWAY INHALATION TREATMENT: CPT

## 2025-01-25 PROCEDURE — 99232 SBSQ HOSP IP/OBS MODERATE 35: CPT | Performed by: STUDENT IN AN ORGANIZED HEALTH CARE EDUCATION/TRAINING PROGRAM

## 2025-01-25 PROCEDURE — 700101 HCHG RX REV CODE 250

## 2025-01-25 PROCEDURE — 80053 COMPREHEN METABOLIC PANEL: CPT

## 2025-01-25 PROCEDURE — 700102 HCHG RX REV CODE 250 W/ 637 OVERRIDE(OP): Performed by: INTERNAL MEDICINE

## 2025-01-25 PROCEDURE — 700111 HCHG RX REV CODE 636 W/ 250 OVERRIDE (IP): Mod: JZ

## 2025-01-25 PROCEDURE — A9270 NON-COVERED ITEM OR SERVICE: HCPCS | Performed by: INTERNAL MEDICINE

## 2025-01-25 RX ORDER — ECHINACEA PURPUREA EXTRACT 125 MG
2 TABLET ORAL
Status: DISCONTINUED | OUTPATIENT
Start: 2025-01-25 | End: 2025-01-27 | Stop reason: HOSPADM

## 2025-01-25 RX ORDER — LACTULOSE 10 G/15ML
15 SOLUTION ORAL ONCE
Status: COMPLETED | OUTPATIENT
Start: 2025-01-25 | End: 2025-01-25

## 2025-01-25 RX ADMIN — MONTELUKAST 10 MG: 10 TABLET, FILM COATED ORAL at 06:35

## 2025-01-25 RX ADMIN — PIPERACILLIN AND TAZOBACTAM 4.5 G: 4; .5 INJECTION, POWDER, FOR SOLUTION INTRAVENOUS at 20:25

## 2025-01-25 RX ADMIN — SPIRONOLACTONE 50 MG: 25 TABLET ORAL at 06:34

## 2025-01-25 RX ADMIN — GUAIFENESIN SYRUP AND DEXTROMETHORPHAN 10 ML: 100; 10 SYRUP ORAL at 06:35

## 2025-01-25 RX ADMIN — INSULIN LISPRO 2 UNITS: 100 INJECTION, SOLUTION INTRAVENOUS; SUBCUTANEOUS at 12:24

## 2025-01-25 RX ADMIN — DIGOXIN 125 MCG: 0.12 TABLET ORAL at 17:18

## 2025-01-25 RX ADMIN — AMLODIPINE BESYLATE 5 MG: 5 TABLET ORAL at 06:35

## 2025-01-25 RX ADMIN — DULOXETINE HYDROCHLORIDE 60 MG: 60 CAPSULE, DELAYED RELEASE ORAL at 06:35

## 2025-01-25 RX ADMIN — INSULIN LISPRO 2 UNITS: 100 INJECTION, SOLUTION INTRAVENOUS; SUBCUTANEOUS at 17:19

## 2025-01-25 RX ADMIN — IPRATROPIUM BROMIDE AND ALBUTEROL SULFATE 3 ML: .5; 2.5 SOLUTION RESPIRATORY (INHALATION) at 07:23

## 2025-01-25 RX ADMIN — BENZOCAINE AND MENTHOL 1 LOZENGE: 15; 3.6 LOZENGE ORAL at 12:25

## 2025-01-25 RX ADMIN — PIPERACILLIN AND TAZOBACTAM 4.5 G: 4; .5 INJECTION, POWDER, FOR SOLUTION INTRAVENOUS at 06:30

## 2025-01-25 RX ADMIN — BENZOCAINE AND MENTHOL 1 LOZENGE: 15; 3.6 LOZENGE ORAL at 17:19

## 2025-01-25 RX ADMIN — AZITHROMYCIN DIHYDRATE 250 MG: 250 TABLET, FILM COATED ORAL at 06:35

## 2025-01-25 RX ADMIN — IPRATROPIUM BROMIDE AND ALBUTEROL SULFATE 3 ML: .5; 2.5 SOLUTION RESPIRATORY (INHALATION) at 18:55

## 2025-01-25 RX ADMIN — IPRATROPIUM BROMIDE AND ALBUTEROL SULFATE 3 ML: .5; 2.5 SOLUTION RESPIRATORY (INHALATION) at 10:22

## 2025-01-25 RX ADMIN — LOSARTAN POTASSIUM 50 MG: 50 TABLET, FILM COATED ORAL at 17:19

## 2025-01-25 RX ADMIN — ENOXAPARIN SODIUM 40 MG: 100 INJECTION SUBCUTANEOUS at 17:18

## 2025-01-25 RX ADMIN — OMEPRAZOLE 20 MG: 20 CAPSULE, DELAYED RELEASE ORAL at 06:35

## 2025-01-25 RX ADMIN — FEXOFENADINE HCL 180 MG: 60 TABLET, FILM COATED ORAL at 17:18

## 2025-01-25 RX ADMIN — Medication 2 SPRAY: at 06:31

## 2025-01-25 RX ADMIN — Medication 5 MG: at 20:20

## 2025-01-25 RX ADMIN — PIPERACILLIN AND TAZOBACTAM 4.5 G: 4; .5 INJECTION, POWDER, FOR SOLUTION INTRAVENOUS at 12:30

## 2025-01-25 RX ADMIN — TRAMADOL HYDROCHLORIDE 50 MG: 50 TABLET, COATED ORAL at 02:30

## 2025-01-25 RX ADMIN — SENNOSIDES AND DOCUSATE SODIUM 2 TABLET: 50; 8.6 TABLET ORAL at 17:18

## 2025-01-25 RX ADMIN — LOSARTAN POTASSIUM 50 MG: 50 TABLET, FILM COATED ORAL at 06:35

## 2025-01-25 RX ADMIN — PREDNISONE 60 MG: 50 TABLET ORAL at 06:34

## 2025-01-25 RX ADMIN — LACTULOSE 15 ML: 10 SOLUTION ORAL at 12:25

## 2025-01-25 RX ADMIN — BENZONATATE 100 MG: 100 CAPSULE ORAL at 06:35

## 2025-01-25 RX ADMIN — ACYCLOVIR 200 MG: 200 CAPSULE ORAL at 06:34

## 2025-01-25 RX ADMIN — FLUTICASONE FUROATE, UMECLIDINIUM BROMIDE AND VILANTEROL TRIFENATATE 1 PUFF: 200; 62.5; 25 POWDER RESPIRATORY (INHALATION) at 06:32

## 2025-01-25 ASSESSMENT — ENCOUNTER SYMPTOMS
FEVER: 0
BACK PAIN: 1
DIZZINESS: 0
PSYCHIATRIC NEGATIVE: 1
CHILLS: 0
CONSTIPATION: 0
CLAUDICATION: 0
SHORTNESS OF BREATH: 1
FOCAL WEAKNESS: 0
COUGH: 1
BLOOD IN STOOL: 0
TREMORS: 1
ABDOMINAL PAIN: 0
VOMITING: 0
WEAKNESS: 0
SPUTUM PRODUCTION: 1
ORTHOPNEA: 0
DIARRHEA: 1
EYES NEGATIVE: 1
WEIGHT LOSS: 0
NAUSEA: 0
PND: 0
WHEEZING: 1
SEIZURES: 0
LOSS OF CONSCIOUSNESS: 0
SENSORY CHANGE: 0
HEARTBURN: 0
PALPITATIONS: 0
HEMOPTYSIS: 0

## 2025-01-25 ASSESSMENT — PAIN DESCRIPTION - PAIN TYPE
TYPE: ACUTE PAIN

## 2025-01-25 ASSESSMENT — FIBROSIS 4 INDEX: FIB4 SCORE: 0.87

## 2025-01-25 NOTE — DISCHARGE PLANNING
Case Management Discharge Planning    Admission Date: 1/22/2025  GMLOS: 3.5  ALOS: 3    6-Clicks ADL Score: 19  6-Clicks Mobility Score: 20      Anticipated Discharge Dispo: Discharge Disposition: D/T to SNF with Medicare cert in anticipation of skilled care (03)    DME Needed: No    Action(s) Taken: Chart review completed and discussed pt in AM rounds. Pt is medically clear pending SNF. Currently no accepting facilities ([facility] and [facility] still pending). Pt is currently on 8L O2 (baseline 6L) and LOC is in manual review.    Addendum @1437  Discussed in IDT rounds and team clarifying c.diff testing/pending results for [facility] consideration. LSW made phone call to [facility] and left VM with callback request.    Addendum @1600  LSW spoke with Kaia at [facility] who confirmed they are able to accept pt tomorrow 1/26 at 1300. Kaia is okay to take pt while LOC is in manual review. Care team notified and transportation request sent to ride line. LSW met with pt at bedside to provide update. All questions answered and second IMM signed. Cobra started on S1 CM desk.    Addendum @1660  Notified Kaia at [facility] of confirmed transportation time for 1300 on 1/26.    Escalations Completed: None    Medically Clear: Yes    Next Steps: Care coordination will complete cobra pending DC summary    Barriers to Discharge: Pending Placement    Is the patient up for discharge tomorrow: Yes    Is transport arranged for discharge disposition: Yes

## 2025-01-25 NOTE — PROGRESS NOTES
[facility] Internal Medicine Daily Progress Note    Date of Service  1/25/2025    R Team: R IM [facility] Team   Attending: Susanne Justice M.d.  Senior Resident: Dr. Hastings  Intern:  Dr. [facility]  Contact Number: [phone]    Chief Complaint  Berny Renee is a 79 y.o. female admitted 1/22/2025 with shortness of breath and COPD exacerbation.    Hospital Course  Berny Renee is a 79 y.o. female comes to the emergency room on 1/22/2025 when prompted by the nurse who visited her at home noticed her to be desaturating into the 70s when she walked.  Patient had a recent admission to the hospital and was discharged on Sunday 3 days ago when she was treated for pneumonia, RSV and flu.  She wanted to go to, facility after that admission. She reported worsening shortness of breath, hypoxia and productive cough.  EMS reported patient was hypoxic at 86% on her 8 L baseline oxygen.  Patient was tachypneic at 36 breaths/min as per EMS.  Patient reports occasional diarrhea.  States that she has been discharged before she was ready and that she can take care of of her at her house and that she needs to go to a facility where she can get rehab.  Patient does not report any chest pain, abdominal pain nausea or vomiting.    Prednisone taper plan:  -Prednisone 60 Mg -until January 26.  -50 Mg for January 27 and 28..  -40 Mg for January 28, and 29  -30 Mg for January 30 and 31.  -20 Mg for February 1 and 2.  -10 Mg per February 3 and 4  -Stop prednisone.    Interval Problem Update  Patient reports getting better.  Saturating at 6 L oxygen.  Patient at baseline respiratory status in the morning but keeps requiring 7 to 8 L frequently..  Requests to be placed to find a facility.  Cultures grew positive for Pseudomonas.  Started Zosyn yesterday.  To finish a 7-day course.  This can be transitioned to levofloxacin on discharge.    Discussed transitioning Solu-Medrol to p.o. prednisone with the pulmonology team.    I have discussed this  patient's plan of care and discharge plan at IDT rounds today with Case Management, Nursing, Nursing leadership, and other members of the IDT team.    Consultants/Specialty  pulmonary    Code Status  DNAR/DNI    Disposition  The patient is not medically cleared for discharge to home or a post-acute facility.      I have placed the appropriate orders for post-discharge needs.    Review of Systems  Review of Systems   Constitutional:  Positive for malaise/fatigue. Negative for chills, fever and weight loss.   HENT: Negative.     Eyes: Negative.    Respiratory:  Positive for cough, sputum production, shortness of breath and wheezing. Negative for hemoptysis.    Cardiovascular:  Negative for chest pain, palpitations, orthopnea, claudication, leg swelling and PND.   Gastrointestinal:  Positive for diarrhea. Negative for abdominal pain, blood in stool, constipation, heartburn, nausea and vomiting.   Genitourinary: Negative.    Musculoskeletal:  Positive for back pain and joint pain.   Skin: Negative.    Neurological:  Positive for tremors. Negative for dizziness, focal weakness, seizures and loss of consciousness.   Endo/Heme/Allergies: Negative.    Psychiatric/Behavioral: Negative.          Physical Exam  Temp:  [36.5 °C (97.7 °F)-37.2 °C (99 °F)] 36.6 °C (97.9 °F)  Pulse:  [] 91  Resp:  [18-22] 18  BP: (152-161)/(63-74) 157/64  SpO2:  [89 %-96 %] 90 %    Physical Exam  Constitutional:       Appearance: Normal appearance. She is normal weight.   HENT:      Head: Normocephalic and atraumatic.      Nose: Nose normal. No congestion.      Mouth/Throat:      Mouth: Mucous membranes are moist.      Pharynx: Oropharynx is clear.   Eyes:      Extraocular Movements: Extraocular movements intact.      Conjunctiva/sclera: Conjunctivae normal.      Pupils: Pupils are equal, round, and reactive to light.   Cardiovascular:      Rate and Rhythm: Normal rate and regular rhythm.      Pulses: Normal pulses.      Heart sounds: Normal  heart sounds.   Pulmonary:      Effort: Pulmonary effort is normal. No respiratory distress.      Breath sounds: Wheezing present. No rhonchi.   Abdominal:      General: Abdomen is flat. Bowel sounds are normal. There is no distension.      Palpations: There is no mass.      Tenderness: There is no abdominal tenderness. There is no guarding or rebound.   Musculoskeletal:         General: No swelling or tenderness. Normal range of motion.      Right lower leg: No edema.      Left lower leg: No edema.   Skin:     General: Skin is warm.      Capillary Refill: Capillary refill takes less than 2 seconds.   Neurological:      General: No focal deficit present.      Mental Status: She is alert and oriented to person, place, and time. Mental status is at baseline.      Cranial Nerves: No cranial nerve deficit.      Sensory: No sensory deficit.      Motor: No weakness.      Coordination: Coordination normal.   Psychiatric:         Mood and Affect: Mood normal.         Behavior: Behavior normal.         Fluids    Intake/Output Summary (Last 24 hours) at 1/25/2025 1102  Last data filed at 1/25/2025 0800  Gross per 24 hour   Intake 1360.91 ml   Output 0 ml   Net 1360.91 ml       Laboratory  Recent Labs     01/22/25  1319 01/23/25  0123 01/24/25  0150   WBC 19.3* 13.5* 16.7*   RBC 4.12* 4.17* 4.16*   HEMOGLOBIN 12.4 12.5 12.4   HEMATOCRIT 38.1 37.6 38.2   MCV 92.5 90.2 91.8   MCH 30.1 30.0 29.8   MCHC 32.5 33.2 32.5   RDW 45.6 44.0 44.9   PLATELETCT 447* 482* 501*   MPV 9.0 9.1 9.2     Recent Labs     01/22/25  1319 01/23/25  0123 01/24/25  0150   SODIUM 132* 136 135   POTASSIUM 3.9 4.1 3.6   CHLORIDE 89* 93* 90*   CO2 31 34* 34*   GLUCOSE 209* 169* 220*   BUN 10 14 17   CREATININE 0.34* 0.47* 0.45*   CALCIUM 9.3 9.6 9.5                   Imaging  CT-CHEST (THORAX) W/O   Final Result      1.  Severe emphysema and associated parenchymal scarring as seen previously.   2.  Mild bilateral dependent atelectasis.   3.  Bronchiectasis  and bronchial secretions again seen in the lung bases, likely chronic inflammatory process.  No lobar pneumonia or pneumothorax.   4.  Stable subpleural RIGHT lower lobe nodule.      Fleischner Society pulmonary nodule recommendations:   Low Risk: CT at 6-12 months, then consider CT at 18-24 months      High Risk: CT at 6-12 months, then CT at 18-24 months      Low Risk - Minimal or absent history of smoking and of other known risk factors.      High Risk - History of smoking or of other known risk factors.      Note: These recommendations do not apply to lung cancer screening, patients with immunosuppression, or patients with known primary cancer.      Fleischner Society 2017 Guidelines for Management of Incidentally Detected Pulmonary Nodules in Adults         DX-CHEST-PORTABLE (1 VIEW)   Final Result      Again seen bilateral interstitial infiltrates possibly representing chronic interstitial lung disease versus atypical infection or interstitial edema.           Assessment/Plan  Problem Representation:    * Acute on chronic hypoxic respiratory failure (HCC)- (present on admission)  Assessment & Plan  Patient had a recent hospital admission for similar problem due to concerns of infection and was discharged on Tamiflu and steroids.  Chest x-ray did not show any new infiltrates.  She got Solu-Medrol in the ED at 3 PM and reports her respiratory status improved after that.  At this point we will hold on starting another Tamiflu course.  Patient is at a baseline of 6 L.  Ordered CT chest today. SLP consult placed.   -Telemetry for 48 hours for acute hypoxic respiratory failure.  -Transition Solu-Medrol to prednisone p.o. today.  -Azithromycin and Zosyn 4 gms started due to pseudomonas on respiratory cultures.  [Transition to Levoflaxacin on DC]   -Ordered Pro-Azam, respiratory panel, sputum cultures to rule out any other underlying etiologies.  -Respiratory therapy protocol.-Continue home Breztri  -COPD exacerbation  protocol.  -DuoNebs, albuterol inhaler ordered.  -Guaifenesin, benzonatate for cough.  -Palliative consult placed given multiple admissions in the last few months and worsening respiratory status..    Acute exacerbation of chronic obstructive pulmonary disease (COPD) ([facility])- (present on admission)  Assessment & Plan  As per presentation    Controlled type 2 diabetes mellitus with hyperglycemia, without long-term current use of insulin ([facility])- (present on admission)  Assessment & Plan  1/24/2025  Hemoglobin A1c 6.5.  Consider discharging with metformin.    Personal history of lung cancer- (present on admission)  Assessment & Plan  As per history    Steroid-induced hyperglycemia  Assessment & Plan  Continue lispro insulin sign scale  Hypoglycemia protocol.    Muscle spasms of both lower extremities  Assessment & Plan  Continue home tizanidine.    GERD (gastroesophageal reflux disease)  Assessment & Plan  -Continue home omeprazole.    Mood disorder ([facility])  Assessment & Plan  Continue home duloxetine.    Allergies  Assessment & Plan  Continue home diphenhydramine, montelukast, fexofenadine.    Influenza A- (present on admission)  Assessment & Plan  As per recent history    Former smoker- (present on admission)  Assessment & Plan  As per history.    Encounter for hospice care discussion- (present on admission)  Assessment & Plan  Ongoing discussions about hospice.  Patient wants full medical treatment.    Atherosclerosis of aorta ([facility])- (present on admission)  Assessment & Plan  As per history.    Hypertension- (present on admission)  Assessment & Plan  Continue home amlodipine, spironolactone, losartan.    Peripheral vascular disease ([facility])- (present on admission)  Assessment & Plan  As per history.    DNR (do not resuscitate)- (present on admission)  Assessment & Plan  As per patient's request    AF (atrial fibrillation) ([facility])- (present on admission)  Assessment & Plan  Continue home digoxin    Stage 4 very severe COPD by  GOLD classification (HCC)- (present on admission)  Assessment & Plan  As per history    Pain, chronic- (present on admission)  Assessment & Plan  Pain multiple joints  -Continue home meloxicam, Diclo Gel and tramadol.         VTE prophylaxis: enoxaparin ppx    I have performed a physical exam and reviewed and updated ROS and Plan today (1/25/2025). In review of yesterday's note (1/24/2025), there are no changes except as documented above.

## 2025-01-25 NOTE — CARE PLAN
The patient is Stable - Low risk of patient condition declining or worsening    Shift Goals  Clinical Goals: monitor o2 demands, iv abx, monitor temp  Patient Goals: placement  Family Goals: tess    Progress made toward(s) clinical / shift goals:  Patient continues to verbalize needs and understanding of POC. RN to continue purposeful rounding and notify MD any changes PRN    Patient is not progressing towards the following goals:

## 2025-01-25 NOTE — DISCHARGE PLANNING
"TCN following. HTH/SCP chart reviewed. No new TCN needs identified at this time. Please see prior TCN note and updates from 1/24 for most recent discharge planning considerations if indicated. Current discharge considerations are noted to be for dc to SNF once pt has accepting facility (currently all have declined with 2 facilities remaining pending). Note per review/SW note from today, pt is medically cleared for dc at this time as well. TCN will monitor for accepting facility and relay information to [facility] team for SCP auth once accepting facility in place. Note prior TCN on 1/24 \"as current anticipated dc plan is SNF, authorization request for SNF sent to [facility] team for review\" though appears final auth from SCP is likely pending obtaining accepting facility* at this time. TCN will continue to follow and collaborate with discharge planning team as additional post acute needs arise. Thank you.     Completed:  PT recommendations noted to post acute placement 1/24 (also noted may benefit from considering [facility] and/or group home placement in the long term)  OT on 1/24 \"Recommend post-acute placement for additional occupational therapy services prior to discharge home\"  Choice obtained: SNF referrals sent per  protocol. Note as prior, during initial TCN visit, Patient verbalized agreement to post acute placement to SNF level of care and advised she would choose from accepting facilities  Pt aware of [facility]'s blanket referral policy  SCP with [facility] PCP. Anticipate post acute placement at this time    *received VOALTE from  in PM, that [facility] will accept and has bed available for tomorrow 1/25 with planned transport at 1PM; TCN will update [facility] team for SCP auth for dc to [facility] tomorrow PM; also note per SW, [facility] \"okay to take pt while LOC is in manual review\"  "

## 2025-01-25 NOTE — ASSESSMENT & PLAN NOTE
2/2  COPD / bronchiectasis exacerbation in the setting of pseudomonas infection and flu   She is currently on 7-8 L oxygen , reporting improvement   Currently on prednisone, zosyn for pseudomonas, triple inhaler therapy , duonebs as needed , chronic azithromycin therapy     -Okay to switch to p.o. steroids prednisone 60 mg daily, with a slow taper of 10 mg every 2 to 3 days over 14 days total  -Recommend switching the antibiotics to Zosyn, can transition to Levaquin at discharge  -Continue triple inhaler therapy, DuoNebs  -continue  home azithromycin  -Will evaluate for Roflumilast once out of her acute exacerbation at her  clinic visit  - will need pulm rehab at discharge

## 2025-01-25 NOTE — CARE PLAN
Problem: Bronchoconstriction  Goal: Improve in air movement and diminished wheezing  Description: Target End Date:  2 to 3 days    1.  Implement inhaled treatments  2.  Evaluate and manage medication effects  Outcome: Progressing   Duoneb qid with flugene

## 2025-01-25 NOTE — DISCHARGE PLANNING
Case Management Discharge Planning    Admission Date: 1/22/2025  GMLOS: 3.5  ALOS: 2    6-Clicks ADL Score: 19  6-Clicks Mobility Score: 20      Anticipated Discharge Dispo: Discharge Disposition: D/T to SNF with Medicare cert in anticipation of skilled care (03)    DME Needed: No    Action(s) Taken: OTHER    Pt discussed in IDT rounds, pending therapy evals, anticipate discharge plan for post acute placement.  Pt pending consideration at [facility], awaiting C. Diff results, bowel movement, and isolation SNF room availability.  Under review with [facility] pending discharge plan/support confirmation.      PASSR completed and in treatment team sticky note.  LOC in Manual Review.    Per [facility] SCP liaison Jenny, pt's prefers to discharge to [facility].    Escalations Completed: None    Medically Clear: No    Next Steps: Follow up with [facility] for acceptance and isolation bed availability once pt is medically cleared.    Barriers to Discharge: Medical clearance Pending C. Diff results and BM.  Pending SNF acceptance.    Is the patient up for discharge tomorrow: No

## 2025-01-25 NOTE — PROGRESS NOTES
Pulmonary Progress Note    Date of admission  1/22/2025    Chief Complaint  79 y.o. female admitted 1/22/2025 with acute exacerbation of COPD, pseudomonas PNA , influenza     Hospital Course  79-year-old female with severe COPD/bronchiectasis on 8L of oxygen at baseline, CHF, lung CA as per records in remission s/p radiation in 2021, chronic pain, spinal stenosis, GERD, sleep apnea and HTN admitted here with SOB. She has had severe back to back viral illnesses over the past few months and is severely debilitated as a result. She knows her COPD is severe but has goals of getting back on her feet and being able to return home to care for herself.      1/24/25 : patient still reports having symptoms, unable to ambulate without desaturation, still feels that she is not back to her baseline.  She reports that her daily azithromycin has been stopped during this admission, and she is being discharged very quick    Interval Problem Update  She reports improvement in cough , but not back to her baseline yet . She is currently on 8 L nasal cannula which is her baseline, she is hoping to get stronger at rehab and be discharged to home .     Review of Systems  Review of Systems   Constitutional:  Positive for malaise/fatigue. Negative for chills and weight loss.   Respiratory:  Positive for cough and sputum production. Negative for hemoptysis.    Cardiovascular:  Negative for chest pain and claudication.   Gastrointestinal:  Negative for abdominal pain and vomiting.   Neurological:  Negative for sensory change, loss of consciousness and weakness.   All other systems reviewed and are negative.       Vital Signs for last 24 hours   Temp:  [36.5 °C (97.7 °F)-37.2 °C (99 °F)] 36.6 °C (97.9 °F)  Pulse:  [] 91  Resp:  [18-22] 18  BP: (152-161)/(63-74) 157/64  SpO2:  [89 %-96 %] 90 %      Physical Exam   Physical Exam  Vitals reviewed.   Constitutional:       General: She is not in acute distress.     Appearance: She is obese.  She is not toxic-appearing.   HENT:      Head: Normocephalic and atraumatic.   Eyes:      General: No scleral icterus.  Cardiovascular:      Rate and Rhythm: Normal rate and regular rhythm.      Heart sounds: No murmur heard.  Pulmonary:      Effort: Pulmonary effort is normal.      Breath sounds: Rales present.   Abdominal:      General: There is no distension.      Palpations: Abdomen is soft.      Tenderness: There is no abdominal tenderness.   Musculoskeletal:      Right lower leg: No edema.      Left lower leg: No edema.   Skin:     General: Skin is warm and dry.      Coloration: Skin is not jaundiced.   Neurological:      General: No focal deficit present.      Mental Status: She is oriented to person, place, and time.         Medications  Current Facility-Administered Medications   Medication Dose Route Frequency Provider Last Rate Last Admin    sodium chloride (Ocean) 0.65 % nasal spray 2 Spray  2 Spray Nasal Q2HRS PRN Rozina Mares M.D.   2 Noti at 01/25/25 0631    lactulose 20 GM/30ML solution 15 mL  15 mL Oral Once Susanne Justice M.D.        ipratropium-albuterol (DUONEB) nebulizer solution  3 mL Nebulization 4X/DAY (RT) Nirav Potter M.D.   3 mL at 01/25/25 1022    albuterol (Proventil) 2.5mg/0.5ml nebulizer solution 2.5 mg  2.5 mg Nebulization Q2HRS PRN (RT) Nirav Potter M.D.        benzocaine-menthol (Cepacol) lozenge 1 Lozenge  1 Lozenge Mouth/Throat Q2HRS PRN Nirav Potter M.D.   1 Lozenge at 01/24/25 1522    predniSONE (Deltasone) tablet 60 mg  60 mg Oral DAILY Nirav Potter M.D.   60 mg at 01/25/25 0634    azithromycin (Zithromax) tablet 250 mg  250 mg Oral DAILY Anais Gutierrez M.D.   250 mg at 01/25/25 0635    senna-docusate (Pericolace Or Senokot S) 8.6-50 MG per tablet 2 Tablet  2 Tablet Oral Q EVENING Nirav Potter M.D.   2 Tablet at 01/24/25 1729    And    polyethylene glycol/lytes (Miralax) Packet 1 Packet  1 Packet Oral QDAY PRN Nirav  TARA Potter        polyethylene glycol/lytes (Miralax) Packet 1 Packet  1 Packet Oral Once Nirav Potter M.D.        piperacillin-tazobactam (Zosyn) 4.5 g in  mL IVPB  4.5 g Intravenous Q8HRS Nirav Potter M.D.   Stopped at 01/25/25 1030    acyclovir (Zovirax) capsule 200 mg  200 mg Oral DAILY Nirav Potter M.D.   200 mg at 01/25/25 0634    insulin lispro (HumaLOG,AdmeLOG) subcutaneous injection  1-6 Units Subcutaneous TID WITH MEALS Luis Faustin M.D.   3 Units at 01/24/25 1216    dextrose 50% (D50W) injection 25 g  25 g Intravenous Q15 MIN PRN Luis Faustin M.D.        diclofenac sodium (Voltaren) 1 % gel 2 g  2 g Topical BID PRN Nirav Potter M.D.        digoxin (Lanoxin) tablet 125 mcg  125 mcg Oral DAILY Nirav Potter M.D.   125 mcg at 01/24/25 1729    enoxaparin (Lovenox) inj 40 mg  40 mg Subcutaneous DAILY AT 1800 Nirav Potter M.D.   40 mg at 01/24/25 1729    acetaminophen (Tylenol) tablet 650 mg  650 mg Oral Q6HRS PRN Nirav Potter M.D.        labetalol (Normodyne/Trandate) injection 10 mg  10 mg Intravenous Q4HRS PRN Nirav Potter M.D.        guaiFENesin dextromethorphan (Robitussin DM) 100-10 MG/5ML syrup 10 mL  10 mL Oral Q6HRS PRN Nirav Potter M.D.   10 mL at 01/25/25 0635    melatonin tablet 5 mg  5 mg Oral Nightly Nirav Potter M.D.   5 mg at 01/24/25 2053    Respiratory Therapy Consult   Nebulization Continuous RT Nirav Potter M.D.        amLODIPine (Norvasc) tablet 5 mg  5 mg Oral DAILY Nirav Potter M.D.   5 mg at 01/25/25 0635    benzonatate (Tessalon) capsule 100 mg  100 mg Oral TID PRN Nirav Potter M.D.   100 mg at 01/25/25 0635    DULoxetine (Cymbalta) capsule 60 mg  60 mg Oral DAILY Nirav Potter M.D.   60 mg at 01/25/25 0635    fexofenadine (Allegra) tablet 180 mg  180 mg Oral Q EVENING Nirav Potter M.D.   180 mg at 01/24/25 1729    losartan (Cozaar) tablet 50 mg  50 mg Oral BID  Nirav Potter M.D.   50 mg at 01/25/25 0635    montelukast (Singulair) tablet 10 mg  10 mg Oral DAILY Nirav Potter M.D.   10 mg at 01/25/25 0635    omeprazole (PriLOSEC) capsule 20 mg  20 mg Oral QDAY Nirav Potter M.D.   20 mg at 01/25/25 0635    spironolactone (Aldactone) tablet 50 mg  50 mg Oral DAILY Nirav Potter M.D.   50 mg at 01/25/25 0634    tizanidine (Zanaflex) tablet 4 mg  4 mg Oral TID PRN Nirav Potter M.D.        traMADol (Ultram) 50 MG tablet 50 mg  50 mg Oral BID PRN Nirav Potter M.D.   50 mg at 01/25/25 0230    fluticasone-umeclidinium-vilanterol (Trelegy Ellipta) 200-62.5-25 mcg/act inhaler 1 Puff  1 Puff Inhalation QDAILY (RT) Nirav Potter M.D.   1 Puff at 01/25/25 0632       Fluids    Intake/Output Summary (Last 24 hours) at 1/25/2025 1211  Last data filed at 1/25/2025 0800  Gross per 24 hour   Intake 560.91 ml   Output 0 ml   Net 560.91 ml       Laboratory          Recent Labs     01/22/25  1319 01/23/25  0123 01/24/25  0150   SODIUM 132* 136 135   POTASSIUM 3.9 4.1 3.6   CHLORIDE 89* 93* 90*   CO2 31 34* 34*   BUN 10 14 17   CREATININE 0.34* 0.47* 0.45*   CALCIUM 9.3 9.6 9.5     Recent Labs     01/22/25  1319 01/23/25  0123 01/24/25  0150   ALTSGPT 15 12 13   ASTSGOT 17 16 20   ALKPHOSPHAT 48 79 47   TBILIRUBIN 0.3 0.2 <0.2   GLUCOSE 209* 169* 220*     Recent Labs     01/22/25  1319 01/23/25  0123 01/24/25  0150 01/25/25  1103   WBC 19.3* 13.5* 16.7* 25.5*   NEUTSPOLYS 92.50* 89.70* 89.50*  --    LYMPHOCYTES 2.30* 3.30* 2.80*  --    MONOCYTES 3.20 5.60 5.60  --    EOSINOPHILS 0.90 0.10 0.00  --    BASOPHILS 0.30 0.10 0.40  --    ASTSGOT 17 16 20  --    ALTSGPT 15 12 13  --    ALKPHOSPHAT 48 79 47  --    TBILIRUBIN 0.3 0.2 <0.2  --      Recent Labs     01/23/25  0123 01/24/25  0150 01/25/25  1103   RBC 4.17* 4.16* 4.45   HEMOGLOBIN 12.5 12.4 13.2   HEMATOCRIT 37.6 38.2 40.7   PLATELETCT 482* 501* 468*       Imaging  CT:     Reviewed    Assessment/Plan  Acute on chronic respiratory failure with hypoxia (HCC)- (present on admission)  Assessment & Plan  2/2  COPD / bronchiectasis exacerbation in the setting of pseudomonas infection and flu   She is currently on 7-8 L oxygen , reporting improvement   Currently on prednisone, zosyn for pseudomonas, triple inhaler therapy , duonebs as needed , chronic azithromycin therapy     -Okay to switch to p.o. steroids prednisone 60 mg daily, with a slow taper of 10 mg every 2 to 3 days over 14 days total  -Recommend switching the antibiotics to Zosyn, can transition to Levaquin at discharge  -Continue triple inhaler therapy, DuoNebs  -continue  home azithromycin  -Will evaluate for Roflumilast once out of her acute exacerbation at her  clinic visit  - will need pulm rehab at discharge       Pulmonary will sign off please reach out via voalte for further recommendations       I have performed a physical exam and reviewed and updated ROS and Plan today (1/25/2025). In review of yesterday's note (1/24/2025), there are no changes except as documented above.     Discussed patient condition and risk of morbidity and/or mortality with Hospitalist and Patient  Clinical  care time = 30  minutes in directly providing and coordinating care and extensive data review.  No time overlap and excludes procedures.

## 2025-01-25 NOTE — CARE PLAN
The patient is Stable - Low risk of patient condition declining or worsening    Shift Goals  Clinical Goals: monitor and manage oxygen demand, IV abx, respiratory comfort  Patient Goals: safe discharge, respiratory comfort, sleep  Family Goals: LORA    Progress made toward(s) clinical / shift goals:    Problem: Knowledge Deficit - Standard  Goal: Patient and family/care givers will demonstrate understanding of plan of care, disease process/condition, diagnostic tests and medications  Description: Target End Date:  1-3 days or as soon as patient condition allows    Document in Patient Education    1.  Patient and family/caregiver oriented to unit, equipment, visitation policy and means for communicating concern  2.  Complete/review Learning Assessment  3.  Assess knowledge level of disease process/condition, treatment plan, diagnostic tests and medications  4.  Explain disease process/condition, treatment plan, diagnostic tests and medications  Outcome: Progressing  Note: Patient updated again this shift on plan of care, including prescribed medication regimen, as well as goals of care, including newly ordered IV antibiotics, monitoring and management of blood glucose, cough and respiratory monitoring and management, and also promotion of rest.      Problem: Respiratory  Goal: Patient will achieve/maintain optimum respiratory ventilation and gas exchange  Description: Target End Date:  Prior to discharge or change in level of care    Document on Assessment flowsheet    1.  Assess and monitor rate, rhythm, depth and effort of respiration  2.  Breath sounds assessed qshift and/or as needed  3.  Assess O2 saturation, administer/titrate oxygen as ordered  4.  Position patient for maximum ventilatory efficiency  5.  Turn, cough, and deep breath with splinting to improve effectiveness  6.  Collaborate with RT to administer medication/treatments per order  7.  Encourage use of incentive spirometer and encourage patient to  cough after use and utilize splinting techniques if applicable  8.  Airway suctioning  9.  Monitor sputum production for changes in color, consistency and frequency  10. Perform frequent oral hygiene  11. Alternate physical activity with rest periods  Outcome: Progressing  Note: Patient remains on 7 L NC with oxygen saturation of 94% at rest. Humidification added to patient supplemental oxygen due to increased complaints of dryness. MD notified and saline spray requested to promote patient comfort.   Furthermore, suction remains bedside for patient for thick secretion coughed up. Hot tea provided again this evening at bedtime to aid in respiratory comfort and decreasing thick secretions with good effect.   Cough decreased this shift compared to previous shift, managed with prescribed prn tessalon and robitussin.     Patient still having increased dyspnea upon ambulation and taking several minutes to recover post activity        Patient is not progressing towards the following goals:

## 2025-01-26 ENCOUNTER — APPOINTMENT (OUTPATIENT)
Dept: RADIOLOGY | Facility: MEDICAL CENTER | Age: 80
DRG: 177 | End: 2025-01-26
Payer: MEDICARE

## 2025-01-26 PROBLEM — J96.21 ACUTE ON CHRONIC HYPOXIC RESPIRATORY FAILURE (HCC): Status: RESOLVED | Noted: 2025-01-22 | Resolved: 2025-01-26

## 2025-01-26 PROBLEM — J44.1 ACUTE EXACERBATION OF CHRONIC OBSTRUCTIVE PULMONARY DISEASE (COPD) (HCC): Status: RESOLVED | Noted: 2024-02-05 | Resolved: 2025-01-26

## 2025-01-26 PROBLEM — Z71.89 ENCOUNTER FOR HOSPICE CARE DISCUSSION: Status: RESOLVED | Noted: 2024-11-22 | Resolved: 2025-01-26

## 2025-01-26 PROBLEM — J96.21 ACUTE ON CHRONIC RESPIRATORY FAILURE WITH HYPOXIA (HCC): Status: RESOLVED | Noted: 2024-11-13 | Resolved: 2025-01-26

## 2025-01-26 LAB
ALBUMIN SERPL BCP-MCNC: 3 G/DL (ref 3.2–4.9)
ALBUMIN/GLOB SERPL: 0.9 G/DL
ALP SERPL-CCNC: 58 U/L (ref 30–99)
ALT SERPL-CCNC: 12 U/L (ref 2–50)
ANION GAP SERPL CALC-SCNC: 11 MMOL/L (ref 7–16)
AST SERPL-CCNC: 34 U/L (ref 12–45)
BACTERIA SPEC RESP CULT: ABNORMAL
BACTERIA SPEC RESP CULT: ABNORMAL
BASOPHILS # BLD AUTO: 0.1 % (ref 0–1.8)
BASOPHILS # BLD: 0.02 K/UL (ref 0–0.12)
BILIRUB SERPL-MCNC: 0.2 MG/DL (ref 0.1–1.5)
BUN SERPL-MCNC: 17 MG/DL (ref 8–22)
CALCIUM ALBUM COR SERPL-MCNC: 10.1 MG/DL (ref 8.5–10.5)
CALCIUM SERPL-MCNC: 9.3 MG/DL (ref 8.5–10.5)
CHLORIDE SERPL-SCNC: 92 MMOL/L (ref 96–112)
CO2 SERPL-SCNC: 34 MMOL/L (ref 20–33)
CREAT SERPL-MCNC: 0.44 MG/DL (ref 0.5–1.4)
EOSINOPHIL # BLD AUTO: 0.02 K/UL (ref 0–0.51)
EOSINOPHIL NFR BLD: 0.1 % (ref 0–6.9)
ERYTHROCYTE [DISTWIDTH] IN BLOOD BY AUTOMATED COUNT: 46.7 FL (ref 35.9–50)
ETEST SENSITIVITY ETEST: NORMAL
GFR SERPLBLD CREATININE-BSD FMLA CKD-EPI: 98 ML/MIN/1.73 M 2
GLOBULIN SER CALC-MCNC: 3.5 G/DL (ref 1.9–3.5)
GLUCOSE BLD STRIP.AUTO-MCNC: 129 MG/DL (ref 65–99)
GLUCOSE BLD STRIP.AUTO-MCNC: 218 MG/DL (ref 65–99)
GLUCOSE BLD STRIP.AUTO-MCNC: 99 MG/DL (ref 65–99)
GLUCOSE SERPL-MCNC: 141 MG/DL (ref 65–99)
GRAM STN SPEC: ABNORMAL
HCT VFR BLD AUTO: 37.7 % (ref 37–47)
HGB BLD-MCNC: 12.3 G/DL (ref 12–16)
IMM GRANULOCYTES # BLD AUTO: 0.21 K/UL (ref 0–0.11)
IMM GRANULOCYTES NFR BLD AUTO: 1.2 % (ref 0–0.9)
LYMPHOCYTES # BLD AUTO: 1.14 K/UL (ref 1–4.8)
LYMPHOCYTES NFR BLD: 6.7 % (ref 22–41)
MCH RBC QN AUTO: 29.6 PG (ref 27–33)
MCHC RBC AUTO-ENTMCNC: 32.6 G/DL (ref 32.2–35.5)
MCV RBC AUTO: 90.6 FL (ref 81.4–97.8)
MONOCYTES # BLD AUTO: 1.56 K/UL (ref 0–0.85)
MONOCYTES NFR BLD AUTO: 9.2 % (ref 0–13.4)
NEUTROPHILS # BLD AUTO: 14.08 K/UL (ref 1.82–7.42)
NEUTROPHILS NFR BLD: 82.7 % (ref 44–72)
NRBC # BLD AUTO: 0 K/UL
NRBC BLD-RTO: 0 /100 WBC (ref 0–0.2)
PLATELET # BLD AUTO: 401 K/UL (ref 164–446)
PMV BLD AUTO: 9.1 FL (ref 9–12.9)
POTASSIUM SERPL-SCNC: 3.1 MMOL/L (ref 3.6–5.5)
PROT SERPL-MCNC: 6.5 G/DL (ref 6–8.2)
RBC # BLD AUTO: 4.16 M/UL (ref 4.2–5.4)
SIGNIFICANT IND 70042: ABNORMAL
SITE SITE: ABNORMAL
SODIUM SERPL-SCNC: 137 MMOL/L (ref 135–145)
SOURCE SOURCE: ABNORMAL
WBC # BLD AUTO: 17 K/UL (ref 4.8–10.8)

## 2025-01-26 PROCEDURE — 92611 MOTION FLUOROSCOPY/SWALLOW: CPT

## 2025-01-26 PROCEDURE — A9270 NON-COVERED ITEM OR SERVICE: HCPCS

## 2025-01-26 PROCEDURE — 80053 COMPREHEN METABOLIC PANEL: CPT

## 2025-01-26 PROCEDURE — 82962 GLUCOSE BLOOD TEST: CPT | Mod: 91

## 2025-01-26 PROCEDURE — 99232 SBSQ HOSP IP/OBS MODERATE 35: CPT | Performed by: INTERNAL MEDICINE

## 2025-01-26 PROCEDURE — 36415 COLL VENOUS BLD VENIPUNCTURE: CPT

## 2025-01-26 PROCEDURE — 700102 HCHG RX REV CODE 250 W/ 637 OVERRIDE(OP): Performed by: INTERNAL MEDICINE

## 2025-01-26 PROCEDURE — 700101 HCHG RX REV CODE 250

## 2025-01-26 PROCEDURE — 94640 AIRWAY INHALATION TREATMENT: CPT

## 2025-01-26 PROCEDURE — 770001 HCHG ROOM/CARE - MED/SURG/GYN PRIV*

## 2025-01-26 PROCEDURE — 74230 X-RAY XM SWLNG FUNCJ C+: CPT

## 2025-01-26 PROCEDURE — 85025 COMPLETE CBC W/AUTO DIFF WBC: CPT

## 2025-01-26 PROCEDURE — 700105 HCHG RX REV CODE 258

## 2025-01-26 PROCEDURE — 92610 EVALUATE SWALLOWING FUNCTION: CPT

## 2025-01-26 PROCEDURE — A9270 NON-COVERED ITEM OR SERVICE: HCPCS | Performed by: INTERNAL MEDICINE

## 2025-01-26 PROCEDURE — 700102 HCHG RX REV CODE 250 W/ 637 OVERRIDE(OP)

## 2025-01-26 PROCEDURE — 700111 HCHG RX REV CODE 636 W/ 250 OVERRIDE (IP): Mod: JZ

## 2025-01-26 RX ORDER — POLYETHYLENE GLYCOL 3350 17 G/17G
1 POWDER, FOR SOLUTION ORAL 2 TIMES DAILY
Status: DISCONTINUED | OUTPATIENT
Start: 2025-01-26 | End: 2025-01-27 | Stop reason: HOSPADM

## 2025-01-26 RX ORDER — ECHINACEA PURPUREA EXTRACT 125 MG
2 TABLET ORAL
Qty: 60 ML | Refills: 3 | Status: SHIPPED | OUTPATIENT
Start: 2025-01-26

## 2025-01-26 RX ORDER — LEVOFLOXACIN 500 MG/1
500 TABLET, FILM COATED ORAL DAILY
Qty: 5 TABLET | Refills: 0 | Status: ACTIVE | OUTPATIENT
Start: 2025-01-26 | End: 2025-02-01

## 2025-01-26 RX ORDER — GUAIFENESIN/DEXTROMETHORPHAN 100-10MG/5
10 SYRUP ORAL EVERY 6 HOURS PRN
Qty: 840 ML | Refills: 2 | Status: SHIPPED | OUTPATIENT
Start: 2025-01-26

## 2025-01-26 RX ORDER — PREDNISONE 20 MG/1
TABLET ORAL
Qty: 15 TABLET | Refills: 0 | Status: SHIPPED | OUTPATIENT
Start: 2025-01-27 | End: 2025-01-27

## 2025-01-26 RX ORDER — BISACODYL 10 MG
10 SUPPOSITORY, RECTAL RECTAL
Status: COMPLETED | OUTPATIENT
Start: 2025-01-26 | End: 2025-01-27

## 2025-01-26 RX ORDER — AMOXICILLIN 250 MG
2 CAPSULE ORAL EVERY EVENING
Status: DISCONTINUED | OUTPATIENT
Start: 2025-01-26 | End: 2025-01-27 | Stop reason: HOSPADM

## 2025-01-26 RX ADMIN — POLYETHYLENE GLYCOL 3350 1 PACKET: 17 POWDER, FOR SOLUTION ORAL at 17:58

## 2025-01-26 RX ADMIN — POLYETHYLENE GLYCOL 3350 1 PACKET: 17 POWDER, FOR SOLUTION ORAL at 08:58

## 2025-01-26 RX ADMIN — OMEPRAZOLE 20 MG: 20 CAPSULE, DELAYED RELEASE ORAL at 05:51

## 2025-01-26 RX ADMIN — FEXOFENADINE HCL 180 MG: 60 TABLET, FILM COATED ORAL at 18:00

## 2025-01-26 RX ADMIN — LOSARTAN POTASSIUM 50 MG: 50 TABLET, FILM COATED ORAL at 18:01

## 2025-01-26 RX ADMIN — SPIRONOLACTONE 50 MG: 25 TABLET ORAL at 05:51

## 2025-01-26 RX ADMIN — Medication 5 MG: at 20:52

## 2025-01-26 RX ADMIN — DULOXETINE HYDROCHLORIDE 60 MG: 60 CAPSULE, DELAYED RELEASE ORAL at 05:51

## 2025-01-26 RX ADMIN — PIPERACILLIN AND TAZOBACTAM 4.5 G: 4; .5 INJECTION, POWDER, FOR SOLUTION INTRAVENOUS at 13:41

## 2025-01-26 RX ADMIN — IPRATROPIUM BROMIDE AND ALBUTEROL SULFATE 3 ML: .5; 2.5 SOLUTION RESPIRATORY (INHALATION) at 09:24

## 2025-01-26 RX ADMIN — LOSARTAN POTASSIUM 50 MG: 50 TABLET, FILM COATED ORAL at 05:52

## 2025-01-26 RX ADMIN — AZITHROMYCIN DIHYDRATE 250 MG: 250 TABLET, FILM COATED ORAL at 05:51

## 2025-01-26 RX ADMIN — SENNOSIDES AND DOCUSATE SODIUM 2 TABLET: 50; 8.6 TABLET ORAL at 18:01

## 2025-01-26 RX ADMIN — DIGOXIN 125 MCG: 0.12 TABLET ORAL at 18:01

## 2025-01-26 RX ADMIN — TRAMADOL HYDROCHLORIDE 50 MG: 50 TABLET, COATED ORAL at 03:07

## 2025-01-26 RX ADMIN — PIPERACILLIN AND TAZOBACTAM 4.5 G: 4; .5 INJECTION, POWDER, FOR SOLUTION INTRAVENOUS at 20:56

## 2025-01-26 RX ADMIN — AMLODIPINE BESYLATE 5 MG: 5 TABLET ORAL at 05:51

## 2025-01-26 RX ADMIN — MONTELUKAST 10 MG: 10 TABLET, FILM COATED ORAL at 05:51

## 2025-01-26 RX ADMIN — PREDNISONE 60 MG: 50 TABLET ORAL at 05:51

## 2025-01-26 RX ADMIN — ENOXAPARIN SODIUM 40 MG: 100 INJECTION SUBCUTANEOUS at 18:00

## 2025-01-26 RX ADMIN — PIPERACILLIN AND TAZOBACTAM 4.5 G: 4; .5 INJECTION, POWDER, FOR SOLUTION INTRAVENOUS at 06:00

## 2025-01-26 RX ADMIN — ACYCLOVIR 200 MG: 200 CAPSULE ORAL at 11:55

## 2025-01-26 RX ADMIN — INSULIN LISPRO 2 UNITS: 100 INJECTION, SOLUTION INTRAVENOUS; SUBCUTANEOUS at 13:47

## 2025-01-26 ASSESSMENT — ENCOUNTER SYMPTOMS
ABDOMINAL PAIN: 0
LOSS OF CONSCIOUSNESS: 0
CHILLS: 0
CLAUDICATION: 0
TREMORS: 1
NAUSEA: 0
SPUTUM PRODUCTION: 1
SHORTNESS OF BREATH: 1
WHEEZING: 1
WEIGHT LOSS: 0
DIZZINESS: 0
PALPITATIONS: 0
ORTHOPNEA: 0
HEMOPTYSIS: 0
EYES NEGATIVE: 1
COUGH: 1
HEARTBURN: 0
BLOOD IN STOOL: 0
BACK PAIN: 1
PND: 0
VOMITING: 0
PSYCHIATRIC NEGATIVE: 1
CONSTIPATION: 0
DIARRHEA: 1
FEVER: 0
FOCAL WEAKNESS: 0
SEIZURES: 0

## 2025-01-26 ASSESSMENT — PAIN DESCRIPTION - PAIN TYPE
TYPE: ACUTE PAIN

## 2025-01-26 NOTE — PROGRESS NOTES
ISOLATION PRECAUTIONS EDUCATION    Educated PATIENT, FAMILY, S.O: patient on isolation for INFLUENZA.    Educated on reason for isolation, how the infection may be transmitted, and how to help prevent transmission to others. Educated precautions involves staff and visitors wearing PPE, following Standard Precautions and performing meticulous hand hygiene in order to prevent transmission of infection.     Droplet Precautions: Educated that Droplet Precautions involves staff and visitors wearing PPE to include a surgical mask when in the patient room.     In addition, educated that they may leave their room, but prior to exiting the patient room each time, the patient needs to have on a fresh patient gown, a surgical mask must be worn by the patient while out of the patient room, and perform hand hygiene immediately prior to exiting the room.     Patient transport and mobilization on unit  Educated that they may leave their room, but prior to exiting, the patient needs to have on a fresh patient gown, ensure the potentially infectious area is covered, performing appropriate hand hygiene immediately prior to exiting the room.

## 2025-01-26 NOTE — DISCHARGE PLANNING
Case Management Discharge Planning    Admission Date: 1/22/2025  GMLOS: 3.5  ALOS: 4    6-Clicks ADL Score: 19  6-Clicks Mobility Score: 20      Anticipated Discharge Dispo: Discharge Disposition: D/T to SNF with Medicare cert in anticipation of skilled care (03)    DME Needed: No    Action(s) Taken: RNCM called and LVM with Kaia to confirm [facility] can still accept the patient today at 1300.  RNCM awaiting call back.      0845  RNCM received a call back from Da at [facility].  Per Da, they are good to accept the patient at 1300 today.      1000  RNCM received a call from Da asking if patient is on isolation.  RNCM let [facility] know the patient is on droplet and special contact isolation.  Da said unfortunately, they do not have any isolation beds available today and transport will need to be canceled.  RNCM updated bedside RN, charge RN and MD.      1200  Patient was discussed in IDT rounds.  Per MD--cdiff test was cancelled since patient has not had a BM in several days.  Patient DOES NOT need special contact isolation, per MD.  Patient is on droplet iso for the flu--finished course of Tamiflu prior to this admissioon.  RNCM spoke to aD at [facility]--per Da, since patient finished Tamiflu, they should be able to accept tomorrow --but requested that case management follow up with admissions tomorrow.      Escalations Completed: None    Medically Clear: Yes    Next Steps: RN CM to continue to follow for DC planning      Barriers to Discharge: None

## 2025-01-26 NOTE — PROGRESS NOTES
[facility] Internal Medicine Daily Progress Note    Date of Service  1/26/2025    R Team: R IM [facility] Team   Attending: Susanne Justice M.d.  Senior Resident: Dr. Hastings  Intern:  Dr. Potter  Contact Number: [phone]    Chief Complaint  Berny Renee is a 79 y.o. female admitted 1/22/2025 with shortness of breath and COPD exacerbation.    Hospital Course  Berny Renee is a 79 y.o. female comes to the emergency room on 1/22/2025 when prompted by the nurse who visited her at home noticed her to be desaturating into the 70s when she walked.  Patient had a recent admission to the hospital and was discharged on Sunday 3 days ago when she was treated for pneumonia, RSV and flu.  She wanted to go to, facility after that admission. She reported worsening shortness of breath, hypoxia and productive cough.  EMS reported patient was hypoxic at 86% on her 8 L baseline oxygen.  Patient was tachypneic at 36 breaths/min as per EMS.  Patient reports occasional diarrhea.  States that she has been discharged before she was ready and that she can take care of of her at her house and that she needs to go to a facility where she can get rehab.  Patient does not report any chest pain, abdominal pain nausea or vomiting.    Prednisone taper plan:  -Prednisone 60 Mg -until January 26.  -50 Mg for January 27 and 28..  -40 Mg for January 28, and 29  -30 Mg for January 30 and 31.  -20 Mg for February 1 and 2.  -10 Mg per February 3 and 4  -Stop prednisone.    Interval Problem Update  Patient reports getting better.  Saturating at 6 L oxygen.  Patient at baseline respiratory status in the morning but keeps requiring 7 to 8 L frequently..  Requests to be placed to find a facility.  Cultures grew positive for Pseudomonas.  Started Zosyn yesterday.  To finish a 7-day course.  This can be transitioned to levofloxacin on discharge.    Discussed transitioning Solu-Medrol to p.o. prednisone with the pulmonology team.    I have discussed this  patient's plan of care and discharge plan at IDT rounds today with Case Management, Nursing, Nursing leadership, and other members of the IDT team.    Consultants/Specialty  pulmonary    Code Status  DNAR/DNI    Disposition  The patient is not medically cleared for discharge to home or a post-acute facility.      I have placed the appropriate orders for post-discharge needs.    Review of Systems  Review of Systems   Constitutional:  Positive for malaise/fatigue. Negative for chills, fever and weight loss.   HENT: Negative.     Eyes: Negative.    Respiratory:  Positive for cough, sputum production, shortness of breath and wheezing. Negative for hemoptysis.    Cardiovascular:  Negative for chest pain, palpitations, orthopnea, claudication, leg swelling and PND.   Gastrointestinal:  Positive for diarrhea. Negative for abdominal pain, blood in stool, constipation, heartburn, nausea and vomiting.   Genitourinary: Negative.    Musculoskeletal:  Positive for back pain and joint pain.   Skin: Negative.    Neurological:  Positive for tremors. Negative for dizziness, focal weakness, seizures and loss of consciousness.   Endo/Heme/Allergies: Negative.    Psychiatric/Behavioral: Negative.          Physical Exam  Temp:  [36.1 °C (97 °F)-36.4 °C (97.5 °F)] 36.1 °C (97 °F)  Pulse:  [61-91] 70  Resp:  [17-20] 17  BP: (124-147)/(55-62) 147/57  SpO2:  [90 %-94 %] 93 %    Physical Exam  Constitutional:       Appearance: Normal appearance. She is normal weight.   HENT:      Head: Normocephalic and atraumatic.      Nose: Nose normal. No congestion.      Mouth/Throat:      Mouth: Mucous membranes are moist.      Pharynx: Oropharynx is clear.   Eyes:      Extraocular Movements: Extraocular movements intact.      Conjunctiva/sclera: Conjunctivae normal.      Pupils: Pupils are equal, round, and reactive to light.   Cardiovascular:      Rate and Rhythm: Normal rate and regular rhythm.      Pulses: Normal pulses.      Heart sounds: Normal  heart sounds.   Pulmonary:      Effort: Pulmonary effort is normal. No respiratory distress.      Breath sounds: Wheezing present. No rhonchi.   Abdominal:      General: Abdomen is flat. Bowel sounds are normal. There is no distension.      Palpations: There is no mass.      Tenderness: There is no abdominal tenderness. There is no guarding or rebound.   Musculoskeletal:         General: No swelling or tenderness. Normal range of motion.      Right lower leg: No edema.      Left lower leg: No edema.   Skin:     General: Skin is warm.      Capillary Refill: Capillary refill takes less than 2 seconds.   Neurological:      General: No focal deficit present.      Mental Status: She is alert and oriented to person, place, and time. Mental status is at baseline.      Cranial Nerves: No cranial nerve deficit.      Sensory: No sensory deficit.      Motor: No weakness.      Coordination: Coordination normal.   Psychiatric:         Mood and Affect: Mood normal.         Behavior: Behavior normal.         Fluids    Intake/Output Summary (Last 24 hours) at 1/26/2025 0717  Last data filed at 1/25/2025 2000  Gross per 24 hour   Intake 920 ml   Output 300 ml   Net 620 ml       Laboratory  Recent Labs     01/24/25  0150 01/25/25  1103 01/26/25  0106   WBC 16.7* 25.5* 17.0*   RBC 4.16* 4.45 4.16*   HEMOGLOBIN 12.4 13.2 12.3   HEMATOCRIT 38.2 40.7 37.7   MCV 91.8 91.5 90.6   MCH 29.8 29.7 29.6   MCHC 32.5 32.4 32.6   RDW 44.9 47.8 46.7   PLATELETCT 501* 468* 401   MPV 9.2 9.2 9.1     Recent Labs     01/24/25  0150 01/25/25  1103 01/26/25  0106   SODIUM 135 130* 137   POTASSIUM 3.6 3.8 3.1*   CHLORIDE 90* 86* 92*   CO2 34* 31 34*   GLUCOSE 220* 223* 141*   BUN 17 17 17   CREATININE 0.45* 0.68 0.44*   CALCIUM 9.5 9.5 9.3                   Imaging  CT-CHEST (THORAX) W/O   Final Result      1.  Severe emphysema and associated parenchymal scarring as seen previously.   2.  Mild bilateral dependent atelectasis.   3.  Bronchiectasis and  bronchial secretions again seen in the lung bases, likely chronic inflammatory process.  No lobar pneumonia or pneumothorax.   4.  Stable subpleural RIGHT lower lobe nodule.      Fleischner Society pulmonary nodule recommendations:   Low Risk: CT at 6-12 months, then consider CT at 18-24 months      High Risk: CT at 6-12 months, then CT at 18-24 months      Low Risk - Minimal or absent history of smoking and of other known risk factors.      High Risk - History of smoking or of other known risk factors.      Note: These recommendations do not apply to lung cancer screening, patients with immunosuppression, or patients with known primary cancer.      Fleischner Society 2017 Guidelines for Management of Incidentally Detected Pulmonary Nodules in Adults         DX-CHEST-PORTABLE (1 VIEW)   Final Result      Again seen bilateral interstitial infiltrates possibly representing chronic interstitial lung disease versus atypical infection or interstitial edema.           Assessment/Plan  Problem Representation:    * Acute on chronic hypoxic respiratory failure (HCC)- (present on admission)  Assessment & Plan  Patient had a recent hospital admission for similar problem due to concerns of infection and was discharged on Tamiflu and steroids.  Chest x-ray did not show any new infiltrates.  She got Solu-Medrol in the ED at 3 PM and reports her respiratory status improved after that.  At this point we will hold on starting another Tamiflu course.  Patient is at a baseline of 6 L.  Ordered CT chest today. SLP consult placed.   -Telemetry for 48 hours for acute hypoxic respiratory failure.  -Transition Solu-Medrol to prednisone p.o. today.  -Azithromycin and Zosyn 4 gms started due to pseudomonas on respiratory cultures.  [Transition to Levoflaxacin on DC]   -Ordered Pro-Azam, respiratory panel, sputum cultures to rule out any other underlying etiologies.  -Respiratory therapy protocol.-Continue home Breztri  -COPD exacerbation  protocol.  -DuoNebs, albuterol inhaler ordered.  -Guaifenesin, benzonatate for cough.  -Palliative consult placed given multiple admissions in the last few months and worsening respiratory status..    Acute exacerbation of chronic obstructive pulmonary disease (COPD) ([facility])- (present on admission)  Assessment & Plan  As per presentation    Controlled type 2 diabetes mellitus with hyperglycemia, without long-term current use of insulin ([facility])- (present on admission)  Assessment & Plan  1/24/2025  Hemoglobin A1c 6.5.  Consider discharging with metformin.    Personal history of lung cancer- (present on admission)  Assessment & Plan  As per history    Steroid-induced hyperglycemia  Assessment & Plan  Continue lispro insulin sign scale  Hypoglycemia protocol.    Muscle spasms of both lower extremities  Assessment & Plan  Continue home tizanidine.    GERD (gastroesophageal reflux disease)  Assessment & Plan  -Continue home omeprazole.    Mood disorder ([facility])  Assessment & Plan  Continue home duloxetine.    Allergies  Assessment & Plan  Continue home diphenhydramine, montelukast, fexofenadine.    Influenza A- (present on admission)  Assessment & Plan  As per recent history    Former smoker- (present on admission)  Assessment & Plan  As per history.    Encounter for hospice care discussion- (present on admission)  Assessment & Plan  Ongoing discussions about hospice.  Patient wants full medical treatment.    Atherosclerosis of aorta ([facility])- (present on admission)  Assessment & Plan  As per history.    Hypertension- (present on admission)  Assessment & Plan  Continue home amlodipine, spironolactone, losartan.    Peripheral vascular disease ([facility])- (present on admission)  Assessment & Plan  As per history.    DNR (do not resuscitate)- (present on admission)  Assessment & Plan  As per patient's request    AF (atrial fibrillation) ([facility])- (present on admission)  Assessment & Plan  Continue home digoxin    Stage 4 very severe COPD by  GOLD classification (HCC)- (present on admission)  Assessment & Plan  As per history    Pain, chronic- (present on admission)  Assessment & Plan  Pain multiple joints  -Continue home meloxicam, Diclo Gel and tramadol.         VTE prophylaxis: enoxaparin ppx    I have performed a physical exam and reviewed and updated ROS and Plan today (1/26/2025). In review of yesterday's note (1/25/2025), there are no changes except as documented above.

## 2025-01-26 NOTE — THERAPY
Speech Language Pathology   Videofluoroscopic Swallow Study Evaluation     Patient Name: Berny Renee  AGE:  79 y.o., SEX:  female  Medical Record #: [id number]  Date of Service: 1/26/2025      History of Present Illness  Patient is a 79 y.o. female who presented on 1/22 with SOB and COPD exacerbation. Pt with recent admission to the hospital and was discharged on 1/19 when she was treated for pneumonia, RSV and flu.      PMHx: acute on chronic respiratory failure, COPD, DDD, HTN, lung ca (w/ radiation tx per pt)     CT-Chest 1/23/25:  1.  Severe emphysema and associated parenchymal scarring as seen previously.  2.  Mild bilateral dependent atelectasis.  3.  Bronchiectasis and bronchial secretions again seen in the lung bases, likely chronic inflammatory process.  No lobar pneumonia or pneumothorax.  4.  Stable subpleural RIGHT lower lobe nodule.     CXR 1/22/25:  There are bilateral interstitial infiltrates, similar to prior exam.  There is minimal bilateral pleural effusion.  The heart is normal in size.      Pertinent Information  Current Method of Nutrition: Oral diet (RG/TN)  Dentition: Fair, Natural dentition  Secretion Management: Adequate  Feeding Tube: None  Tracheostomy: No   Factor(s) Affecting Performance: None      Discussed the risks, benefits, and alternatives of the VFSS procedure. Patient/family acknowledged and agreed to proceed.      Assessment  Videofluoroscopic Swallow Study was conducted in the A-P, Lateral projection(s) to evaluate oropharyngeal swallow function. A radiology tech was present to assist with the procedure.      Positioning: Seated upright in fluoroscopy chair, Seated (AP view)  Anatomic View: Appearance of a possible small cervical osteophyte at the level of C6  Bolus Administration: Patient  PO Barium Contrast Trials: Varibar thin, Varibar pudding, Mixed consistency with Varibar powder, Regular solid coated with Varibar pudding      Consistency PAS Score Timing Residue  Comments   Thin Liquid 1 N/A Vallecular Residue: None (0%)  Pyriform Sinus Residue: None (0%)    Pudding 1 N/A Vallecular Residue: None (0%)  Pyriform Sinus Residue: None (0%)    Regular Solid 1 N/A Vallecular Residue: Trace (1%-5%)  Pyriform Sinus Residue: None (0%)    Mixed 1 N/A Vallecular Residue: Trace (1%-5%)  Pyriform Sinus Residue: None (0%)      Penetration-Aspiration Scale (PAS)  1     No contrast enters airway  2     Contrast enters the airway, remains above the vocal folds, and is ejected from the airway (not seen in the airway at the end of the swallow).  3     Contrast enters the airway, remains above the vocal folds, and is not ejected from the airway (is seen in the airway after the swallow).  4     Contrast enters the airway, contacts the vocal folds, and is ejected from the airway.  5     Contrast enters the airway, contacts the vocal folds, and is not ejected from the airway  6     Contrast enters the airway, crosses the plane of the vocal folds, and is ejected from the airway.  7     Contrast enters the airway, crosses the plane of the vocal folds, and is not ejected from the airway despite effort.  8     Contrast enters the airway, crosses the plane of the vocal folds, is not ejected from the airway and there is no response to aspiration.       Oral phase:  Mild piecemeal deglutition with trials of mixed and regular solids.   Otherwise, oral phase was unremarkable.       Pharyngeal phase:  Airway protection intact - no laryngeal penetration or aspiration appreciated.   Swallow efficiency intact - no significant pharyngeal residue noted.     Esophageal phase:  Lateral view: UES patent across all trials tested.  AP view with esophageal sweep: PO trials of pudding and barium pill with water administered. No significant stasis of bolus appreciated.     Compensatory Strategies: N/A      Severity Rating:   Severity Rating: JAMIE  JAMIE: Normal      Clinical Impressions  The pt presents with a functional  "oropharyngeal swallow. Swallow safety and swallow efficiency are preserved. Pt appears to be at low risk for aspiration PNA, and low for malnutrition/dehydration. Diet modification is not indicated. No further acute SLP services indicated to address dysphagia but please re-consult with change in status or new concerns for dysphagia or aspiration.       Recommendations  Diet Consistency: Continue Regular / Thin Liquid diet  Medication: Whole with liquid, As tolerated  Supervision: Distant supervision - check on patient 2-3 times per meal  Positioning: Fully upright and midline during oral intake  Strategies: None  Oral Care: BID  Additional Instrumentation: None  Consult Referral(s): none      SLP Treatment Plan  Treatment Plan: None Indicated  SLP Frequency: N/A - Evaluation Only  Estimated Duration: N/A - Evaluation Only      Anticipated Discharge Needs  Discharge Recommendations: Anticipate that the patient will have no further speech therapy needs after discharge from the hospital   Therapy Recommendations Upon DC: Not Indicated        Patient / Family Goals  Patient / Family Goal #1: \"I want to go home\"  Goal #1 Outcome: Progressing as expected  Short Term Goal # 1: Patient will participate in MBSS to further assess oropharyngeal and esophageal function to guide SLP POC.  Goal Outcome # 1: Goal met      DARINEL Barraza   "

## 2025-01-26 NOTE — DISCHARGE PLANNING
"[facility]/SCP TCN chart review completed. Collaborated with MARIPOSA Yee.  Current discharge considerations were for discharge to [facility] today at 1300 via GMT.  Previous TCN requested SCP authorization for [facility].  This TCN obtained SCP authorization and CM notified of authorization.  Patient is medically cleared for discharge however per CM, [facility] does not have an isolation bed today (on droplet precautions for influenza.  She does not need special contact isolation) so transportation was cancelled with plans for possible admit tomorrow.  Please see RN MARIPOSA note on 1/26/25 at 8:13 AM for details.      TCN will continue to follow and collaborate with discharge planning team as additional post acute needs arise. Thank you.    Completed:  PT recommendations noted to post acute placement 1/24 (also noted may benefit from considering [facility] and/or group home placement in the long term)  OT on 1/24 \"Recommend post-acute placement for additional occupational therapy services prior to discharge home\"  Choice obtained: SNF referrals sent per CM protocol. Note as prior, during initial TCN visit, Patient verbalized agreement to post acute placement to SNF level of care and advised she would choose from accepting facilities  Pt aware of [facility]'s blanket referral policy  SCP with [facility] PCP. Anticipate post acute placement at this time.    "

## 2025-01-26 NOTE — DISCHARGE PLANNING
DC Transport Scheduled    Transport Company Scheduled:  [facility]    Scheduled Date: 1/26/2025  Scheduled Time: 1300    Transport Type: Wheelchair  Destination: [facility]   Destination address: [address] [place] [address] [place] [id number]    Notified care team of scheduled transport via Voalte.     If there are any changes needed to the DC transportation scheduled, please contact [facility] at ext. [phone] between the hours of 2440-8873. If outside those hours, contact the ED Case Manager at ext. [phone].

## 2025-01-26 NOTE — CARE PLAN
The patient is Stable - Low risk of patient condition declining or worsening    Shift Goals  Clinical Goals: monitor respiratory status, titrate O2 per protocol, abx, antiviral meds, rt tx  Patient Goals: placement, rest, comfort  Family Goals: LORA    Progress made toward(s) clinical / shift goals:    Problem: Knowledge Deficit - Standard  Goal: Patient and family/care givers will demonstrate understanding of plan of care, disease process/condition, diagnostic tests and medications  Description: Target End Date:  1-3 days or as soon as patient condition allows    Document in Patient Education    1.  Patient and family/caregiver oriented to unit, equipment, visitation policy and means for communicating concern  2.  Complete/review Learning Assessment  3.  Assess knowledge level of disease process/condition, treatment plan, diagnostic tests and medications  4.  Explain disease process/condition, treatment plan, diagnostic tests and medications  Outcome: Progressing  Note: Discuss POC with patient  Address questions and concerns, escalate as appropriate  Check for pt understanding of POC       Problem: Knowledge Deficit - COPD  Goal: Patient/significant other demonstrates understanding of disease process, utilization of the Action Plan, medications and discharge instruction  Description: Target End Date:  1-3 days or as soon as patient condition allows    Document in Patient Education    1.  Discuss the importance of medical follow-up care, periodic chest x-rays, sputum cultures  2.  Review worsening signs and symptoms of COPD flare and exacerbation and its management  3.  Discuss respiratory medications, side effects, adverse reactions  4.  Demonstrate technique for using a metered-dose inhaler (MDI) and utilization of a spacer  5.  Review the COPD Action Plan  6.  Instruct and reinforce the rationale for breathing exercises, coughing effectively, and general conditioning exercises  7.  Stress importance of oral care  and dental hygiene  8.  Discuss the importance of avoiding people with active respiratory infections and need for routine influenza and pneumococcal vaccinations  9.  Discuss factors that may trigger condition and encourage patient/significant other to explore ways to control these factors in and around the home and work setting  10. Review the harmful effects of smoking and advise cessation of smoking  11. Provide information about activity limitations and alternating activities with rest periods to prevent fatigue  12. Instruct asthmatic patient of use of peak flow meter, as appropriate  13. Review oxygen requirements and dosage, safe use of oxygen, and refer to the supplier as indicated  14. Educate patient/family/caregiver on the use of Nasal Intermittent Positive Pressure Ventilation (NIPPV) and possible adverse reactions  Outcome: Progressing  Note: Discuss POC with patient  Address questions and concerns, escalate as appropriate  Check for pt understanding of POC       Problem: Risk for Infection - COPD  Goal: Patient will remain free from signs and symptoms of infection  Description: Target End Date:  Prior to discharge or change in level of care    1.  Infection prevention measures  2.  Instruct patient regarding signs and symptoms of infection  3.  Review the importance of breathing exercises, effective cough, frequent position changes, and adequate fluid intake  4.  Recommend rinsing mouth with water and spitting, not swallowing, or use of a spacer on the mouthpiece of inhaled corticosteroids  Outcome: Progressing  Flowsheets  Taken 1/26/2025 0905 by Danuta Kamara RCARLOS  Standard Infection Interventions:   Provided education on proper hand hygiene and infection prevention measures   Instructed patient/family on signs and symptoms of infection   Assessed for signs and symptoms of infection  Taken 1/26/2025 0427 by Donato Solorio RJasielNJasiel  COPD Infection Interventions:   Reviewed importance of breathing  exercises, effective cough, frequent position changes and adequate fluid intake   Recommended rinsing mouth with water and spitting, not swallowing  Note: Standard precautions in place  Hand hygiene performed per unit standard and as needed       Problem: Nutrition - Advanced  Goal: Patient will display progressive weight gain toward goal have adequate food and fluid intake  Description: Target End Date:  Prior to discharge or change in level of care    1.  Daily weights  2.  Monitor dietary intake  3.  Promote systemic fluid hydration within cardiac tolerance  4.  Albumin and PreAlbumin per provider order  5.  Enteral and parenteral nutrition per provider order  6.  Calorie count  7.  Provide oral care  8.  Collaborate with Clinical Dietician  9.  Consider Speech Therapy consult for swallowing difficulties  10. Administer supplemental oxygen during meals as indicated  Outcome: Progressing  Note: Encourage oral intake  Administer IV fluids as ordered  Assist with feeding as appropriate       Problem: Risk for Aspiration  Goal: Patient's risk for aspiration will be absent or decrease  Description: Target End Date:  Prior to discharge or change in level of care    1.   Complete dysphagia screening on admission  2.   NPO until dysphagia screening complete or medically cleared  3.   Collaborate with Speech Therapy, Clinical Dietitian and interdisciplinary team  4.   Implement aspiration precautions  5.   Assist patient up to chair for meals  6.   Elevate head of bed 90 degrees if patient is unable to get out of bed  7.   Encourage small bites  8.   Ensure foods/liquids are of appropriate consistency  9.   Assess for any signs/symptoms of aspiration  10. Assess breath sounds and vital signs after oral intake  Outcome: Progressing  Note: Aspiration precautions in place     Problem: Self Care  Goal: Patient will have the ability to perform ADLs independently or with assistance (bathe, groom, dress, toilet and  feed)  Description: Target End Date:  Prior to discharge or change in level of care    Document on ADL flowsheet    1.  Assess the capability and level of deficiency to perform ADLs  2.  Encourage family/care giver involvement  3.  Provide assistive devices  4.  Consider PT/OT evaluations  5.  Maintain support, give positive feedback, encourage self-care allowing extra time and verbal cuing as needed  6.  Avoid doing something for patients they can do themselves, but provide assistance as needed  7.  Assist in anticipating/planning individual needs  8.  Collaborate with Case Management and  to meet discharge needs  Outcome: Progressing  Note: Encourage support from family and/or caregivers  Provide assistive devices as needed  Collaborate with PT/OT  Encourage independent self care         Patient is not progressing towards the following goals:

## 2025-01-26 NOTE — DISCHARGE SUMMARY
[facility] Discharge Summary    Attending: Susanne Justice M.d.  Senior Resident: Dr. Darby  Intern:  Dr. Hanson  Contact Number: [phone]    CHIEF COMPLAINT ON ADMISSION  Chief Complaint   Patient presents with    Shortness of Breath    Cough       Reason for Admission  Acute on chronic hypoxic respirato*     Admission Date  1/22/2025    CODE STATUS  DNAR/DNI    HPI & HOSPITAL COURSE  This is a 79 y.o. female here with shortness of breath and increased oxygen requirement.  Berny Renee is a 79 y.o. female comes to the emergency room on 1/22/2025 when prompted by the nurse who visited her at home noticed she was desaturating into the 70s when she walked. Patient had a recent admission to the hospital and was discharged 3 days ago prior to admission when she was treated for pneumonia, RSV and flu. She reported worsening shortness of breath, hypoxia and productive cough. EMS reported patient was hypoxic at 86% on her 8 L baseline oxygen. Patient was tachypneic at 36 breaths/min as per EMS. Patient reports occasional diarrhea. States that she has been discharged before she was ready and that she can take care of of her at her house and that she needs to go to a facility where she can get rehab. Patient does not report any chest pain, abdominal pain nausea or vomiting. Patient again tested positive for Influenza A at the time of admission.    We started her on solumedrol. RT consult was placed. Pulmonology on board. Sputum cultures grew pan-sensitive pseudomonas.  We started her Zosyn to finish a 7-day course.  To be transition to levofloxacin on discharge.  Transitioned Solu-Medrol to p.o. prednisone with a taper dose as follows:    -Prednisone 60 Mg -until January 26.  -50 Mg for January 27 and 28.  -40 Mg for January 29, and 30  -30 Mg for January 31 and February 1.  -20 Mg for February 2 and 3.  -10 Mg for February 4 and 5.  -5 Mg for February 6 and 7.  -Stop prednisone.     She is  currently being discharged in a stable condition at 6 L of oxygen (near baseline).  She reports her respiratory status to be much better. She has yet to have a bowel movement by the day of discharge after trialing multiple medications, but a KUB was obtained that did not show signs of obstruction, just large stool burden that stops around the proximal descending colon.    Of note, patient is currently taking digoxin for atrial fibrillation.  We have reviewed multiple EKGs during her hospital stay and the previous hospitalization.  We did not find any evidence of atrial fibrillation.  She also tells us that her previous cardiologist Dr. Kwan advised her that she does not need any blood thinner or anticoagulation.  She also tells me that she is currently following Dr. Merida at cardiology.  -Will need outpatient cardiology follow-up upon discharge.    Therefore, she is discharged in good and stable condition to skilled nursing facility.    The patient met 2-midnight criteria for an inpatient stay at the time of discharge.    Discharge Date  1/27/2025    Physical Exam on Day of Discharge  Physical Exam  Vitals reviewed.   Constitutional:       General: She is not in acute distress.     Appearance: She is obese. She is not toxic-appearing.   HENT:      Head: Normocephalic and atraumatic.   Eyes:      General: No scleral icterus.  Cardiovascular:      Rate and Rhythm: Normal rate and regular rhythm.      Heart sounds: No murmur heard.  Pulmonary:      Effort: Pulmonary effort is normal.      Breath sounds: Rales present.   Abdominal:      General: There is no distension.      Palpations: Abdomen is soft.      Tenderness: There is no abdominal tenderness.   Musculoskeletal:      Right lower leg: No edema.      Left lower leg: No edema.   Skin:     General: Skin is warm and dry.      Coloration: Skin is not jaundiced.   Neurological:      General: No focal deficit present.      Mental Status: She is oriented to  person, place, and time.         FOLLOW UP ITEMS POST DISCHARGE  Please follow-up with primary care and outpatient pulmonology.  -Follow-up with cardiology outpatient to follow-up on atrial fibrillation history.    DISCHARGE DIAGNOSES  Principal Problem:    Acute on chronic hypoxic respiratory failure (HCC) (POA: Yes)  Active Problems:    Pain, chronic (POA: Yes)    Stage 4 very severe COPD by GOLD classification (HCC) (Chronic) (POA: Yes)    AF (atrial fibrillation) (HCC) (POA: Yes)    DNR (do not resuscitate) (Chronic) (POA: Yes)    Peripheral vascular disease (HCC) (POA: Yes)    Hypertension (POA: Yes)    Atherosclerosis of aorta (HCC) (POA: Yes)    Hypokalemia (POA: Yes)    Former smoker (POA: Yes)    Influenza A (POA: Yes)    Allergies (POA: Unknown)    Mood disorder (HCC) (POA: Unknown)    GERD (gastroesophageal reflux disease) (POA: Unknown)    Muscle spasms of both lower extremities (POA: Unknown)    Steroid-induced hyperglycemia (POA: Unknown)    Personal history of lung cancer (POA: Yes)    Controlled type 2 diabetes mellitus with hyperglycemia, without long-term current use of insulin (HCC) (POA: Yes)    Slow transit constipation (POA: Unknown)  Resolved Problems:    Acute exacerbation of chronic obstructive pulmonary disease (COPD) (HCC) (POA: Yes)    Acute on chronic respiratory failure with hypoxia (HCC) (POA: Yes)    Encounter for hospice care discussion (POA: Yes)      FOLLOW UP  Future Appointments   Date Time Provider Department Center   4/16/2025 11:30 AM Carmine gO D.O. [facility] None   7/1/2025 10:00 AM [address]   7/4/2025 10:00 AM Israel Grimm M.D. [facility] None     Everett Diego M.D.  [address] [facility]  [place] [address]  [phone]    Schedule an appointment as soon as possible for a visit      Nhung Peralta M.D.  [id number]  [place] [address]  [phone]    Follow up      TOREY Vera  [address]  [address]  [place]  [address]  [phone]    Follow up      [facility] [place] [facility]  [place] [id number] [phone]          MEDICATIONS ON DISCHARGE     Medication List        START taking these medications        Instructions   benzocaine-menthol 15-3.6 MG Lozg  Commonly known as: Cepacol   Dissolve 1 Lozenge in the mouth every 2 hours as needed (Throat pain).  Dose: 1 Lozenge     fluticasone-umeclidinium-vilanterol 200-62.5-25 mcg/act inhaler  Commonly known as: Trelegy Ellipta   Inhale 1 Puff by mouth every day.  Dose: 1 Puff     guaiFENesin dextromethorphan 100-10 MG/5ML Syrp syrup  Commonly known as: Robitussin DM   Take 10 mL by mouth every 6 hours as needed for Cough.  Dose: 10 mL     levoFLOXacin 500 MG tablet  Commonly known as: Levaquin   Take 1 Tablet by mouth every day for 5 days.  Dose: 500 mg     melatonin 5 mg Tabs   Take 1 Tablet by mouth every evening.  Dose: 5 mg     sodium chloride 0.65 % Soln  Commonly known as: Ocean   Administer 2 Sprays into affected nostril(S) every 2 hours as needed for Congestion.  Dose: 2 Spray            CHANGE how you take these medications        Instructions   predniSONE 5 MG Tabs  Start taking on: January 28, 2025  What changed:   medication strength  See the new instructions.  Commonly known as: Deltasone   Take 10 Tablets by mouth every day for 1 day, THEN 8 Tablets every day for 2 days, THEN 6 Tablets every day for 2 days, THEN 4 Tablets every day for 2 days, THEN 2 Tablets every day for 2 days, THEN 1 Tablet every day for 2 days.            CONTINUE taking these medications        Instructions   acetaminophen 500 MG Tabs  Commonly known as: Tylenol   Take 500-1,000 mg by mouth every 6 hours as needed. Indications: Pain  Dose: 500-1,000 mg     acyclovir 200 MG Caps  Commonly known as: Zovirax   Take 1 capsule by mouth (at first sign of outbreak) three times a day 10 days     amLODIPine 5 MG Tabs  Commonly known as: Norvasc   Take 1 Tablet by mouth  every day.  Dose: 5 mg     benzonatate 100 MG Caps  Commonly known as: Tessalon   Take 1 Capsule by mouth 3 times a day as needed for Cough (if Robitussin DM ineffective).  Dose: 100 mg     CALCIUM 500 PO   Take 1 Tablet by mouth every evening. Indications: supplement  Dose: 1 Tablet     diclofenac sodium 1 % Gel  Commonly known as: Voltaren   Apply 2 g topically 2 times a day as needed (mild, moderate pain). Indications: mild, moderate pain  Dose: 2 g     digoxin 125 MCG Tabs  Commonly known as: Lanoxin   Take 1 Tablet by mouth every day. Indications: Atrial Fibrillation  Dose: 125 mcg     docusate sodium 100 MG Caps  Commonly known as: Colace   Take 100 mg by mouth every 48 hours.  Dose: 100 mg     DULoxetine 60 MG Cpep delayed-release capsule  Commonly known as: Cymbalta   Take 1 capsule by mouth once daily     HM Fexofenadine HCl 180 MG tablet  Generic drug: fexofenadine   Take 180 mg by mouth every evening. Indications: allergies  Dose: 180 mg     Home Care Oxygen   Inhale 8 L/min continuous.  Dose: 8 L/min     ipratropium-albuterol 0.5-2.5 (3) MG/3ML nebulizer solution  Commonly known as: Duoneb   Doctor's comments: 3 month supply x 1 year  Take 3 mL by nebulization every four hours as needed for Shortness of Breath (Wheezing).  Dose: 3 mL     losartan 50 MG Tabs  Commonly known as: Cozaar   Take 1 Tablet by mouth 2 times a day. Indications: High Blood Pressure  Dose: 50 mg     meloxicam 7.5 MG Tabs  Commonly known as: Mobic   Take 1 tablet by mouth once or twice a day     montelukast 10 MG Tabs  Commonly known as: Singulair   Take 1 tablet by mouth once daily  Dose: 10 mg     omeprazole 20 MG delayed-release capsule  Commonly known as: PriLOSEC   Take 1 capsule by mouth every day  Dose: 20 mg     potassium chloride 10 MEQ capsule  Commonly known as: Micro-K   Take 1 capsule by mouth 2 times a day with soft food.  Dose: 10 mEq     ProAir RespiClick 108 (90 Base) MCG/ACT Aepb  Generic drug: Albuterol Sulfate    Inhale 2 puffs by mouth 4 times a day as needed (shortness of breath).  Dose: 2 Puff     spironolactone 50 MG Tabs  Commonly known as: Aldactone   Take 1 Tablet by mouth every day. Indications: Edema  Dose: 50 mg     tizanidine 4 MG Tabs  Commonly known as: Zanaflex   Take 4 mg by mouth 3 times a day as needed (muscle spasms). Indications: Musculoskeletal Pain  Dose: 4 mg     traMADol 50 MG Tabs  Commonly known as: Ultram   Doctor's comments: as needed for M15.9/M50.30/M51.36 start date 12/02/2024  Take 1 tablet by mouth twice each day 30 days     triamcinolone 55 MCG/ACT nasal inhaler  Commonly known as: Nasacort   Administer 1 Gladwin into affected nostril(S) every day. Indications: Hayfever  Dose: 1 Spray     Tylenol PM Extra Strength 500-25 MG Tabs  Generic drug: diphenhydrAMINE-APAP (sleep)   Take 2 Tablets by mouth at bedtime as needed (insomnia). Indications: insomnia  Dose: 2 Tablet            STOP taking these medications      azithromycin 250 MG Tabs  Commonly known as: Zithromax     Breztri Aerosphere 160-9-4.8 MCG/ACT Aero  Generic drug: Budeson-Glycopyrrol-Formoterol     guaiFENesin  MG Tb12  Commonly known as: Mucinex              Allergies  Allergies   Allergen Reactions    Iodine      Convulsion  I.V.  iodine    Tape Rash and Itching       DIET  Orders Placed This Encounter   Procedures    Diet Order Diet: Regular     Standing Status:   Standing     Number of Occurrences:   1     Order Specific Question:   Diet:     Answer:   Regular [1]    Discontinue Diet Tray     Standing Status:   Standing     Number of Occurrences:   1       ACTIVITY  As tolerated.  Weight bearing as tolerated    LINES, DRAINS, AND WOUNDS  This is an automated list. Peripheral IVs will be removed prior to discharge.  Peripheral IV 01/22/25 20 G Anterior;Left Forearm (Active)   Site Assessment Clean;Dry;Intact 01/25/25 2000   Dressing Type Transparent 01/25/25 2000   Line Status Flushed;Scrubbed the hub prior to  access;Infusing 01/25/25 2000   Dressing Status Intact;Clean;Dry 01/25/25 2000   Dressing Intervention N/A 01/24/25 2052   Date Primary Tubing Changed 01/25/25 01/25/25 2100   NEXT Primary Tubing Change  02/01/25 01/25/25 2100   Infiltration Grading ([facility]) 0 01/25/25 2100   Phlebitis Scale ([facility] Only) 0 01/24/25 2052          Peripheral IV 01/22/25 20 G Anterior;Left Forearm (Active)   Site Assessment Clean;Dry;Intact 01/25/25 2000   Dressing Type Transparent 01/25/25 2000   Line Status Flushed;Scrubbed the hub prior to access;Infusing 01/25/25 2000   Dressing Status Intact;Clean;Dry 01/25/25 2000   Dressing Intervention N/A 01/24/25 2052   Date Primary Tubing Changed 01/25/25 01/25/25 2100   NEXT Primary Tubing Change  02/01/25 01/25/25 2100   Infiltration Grading ([facility]) 0 01/25/25 2100   Phlebitis Scale ([facility] Only) 0 01/24/25 2052               MENTAL STATUS ON TRANSFER  Stable          CONSULTATIONS  None    PROCEDURES  None    LABORATORY  Lab Results   Component Value Date    SODIUM 131 (L) 01/27/2025    POTASSIUM 3.4 (L) 01/27/2025    CHLORIDE 87 (L) 01/27/2025    CO2 36 (H) 01/27/2025    GLUCOSE 146 (H) 01/27/2025    BUN 9 01/27/2025    CREATININE 0.40 (L) 01/27/2025    CREATININE 0.62 06/09/2011        Lab Results   Component Value Date    WBC 17.0 (H) 01/26/2025    WBC 10.9 (H) 06/09/2011    HEMOGLOBIN 12.3 01/26/2025    HEMATOCRIT 37.7 01/26/2025    PLATELETCT 401 01/26/2025        Total time of the discharge process exceeds 55 minutes.

## 2025-01-26 NOTE — PROGRESS NOTES
[facility] Internal Medicine Daily Progress Note    Date of Service  1/26/2025    [facility] Team: R IM [facility] Team   Attending: Susanne Justice M.d.  Senior Resident: Dr. Hastings  Intern:  Dr. Potter  Contact Number: [phone]    Chief Complaint  Berny Renee is a 79 y.o. female admitted 1/22/2025 with shortness of breath and COPD exacerbation.    Hospital Course  Berny Renee is a 79 y.o. female comes to the emergency room on 1/22/2025 when prompted by the nurse who visited her at home noticed her to be desaturating into the 70s when she walked.  Patient had a recent admission to the hospital and was discharged on Sunday 3 days ago when she was treated for pneumonia, RSV and flu.  She wanted to go to, facility after that admission. She reported worsening shortness of breath, hypoxia and productive cough.  EMS reported patient was hypoxic at 86% on her 8 L baseline oxygen.  Patient was tachypneic at 36 breaths/min as per EMS.  Patient reports occasional diarrhea.  States that she has been discharged before she was ready and that she can take care of of her at her house and that she needs to go to a facility where she can get rehab.  Patient does not report any chest pain, abdominal pain nausea or vomiting.    Prednisone taper plan:  -Prednisone 60 Mg -until January 26.  -50 Mg for January 27 and 28..  -40 Mg for January 28, and 29  -30 Mg for January 30 and 31.  -20 Mg for February 1 and 2.  -10 Mg per February 3 and 4  -Stop prednisone.    Interval Problem Update  Patient reports getting better.  Saturating at 6 L oxygen.  Patient at baseline respiratory status in the morning but keeps requiring 7 to 8 L frequently..  Requests to be placed to find a facility.  Cultures grew positive for Pseudomonas.  Started Zosyn yesterday.  To finish a 7-day course.  This can be transitioned to levofloxacin on discharge.     Patient reported diarrhea on admission however did not have any loose bowel movements after she got  admitted.  We administered her multiple laxatives but did not have any success.  Patient is constipated and C. difficile infection cannot be ruled out at this point.    Discharge attempted.  Case management updated that [facility] is not aware that she needs an isolation bed and will probably have to wait until tomorrow.  SLP saw her today recommended MBSS.  Will follow results.  You do not    Discussed transitioning Solu-Medrol to p.o. prednisone with the pulmonology team.    I have discussed this patient's plan of care and discharge plan at IDT rounds today with Case Management, Nursing, Nursing leadership, and other members of the IDT team.    Consultants/Specialty  pulmonary    Code Status  DNAR/DNI    Disposition  The patient is not medically cleared for discharge to home or a post-acute facility.      I have placed the appropriate orders for post-discharge needs.    Review of Systems  Review of Systems   Constitutional:  Positive for malaise/fatigue. Negative for chills, fever and weight loss.   HENT: Negative.     Eyes: Negative.    Respiratory:  Positive for cough, sputum production, shortness of breath and wheezing. Negative for hemoptysis.    Cardiovascular:  Negative for chest pain, palpitations, orthopnea, claudication, leg swelling and PND.   Gastrointestinal:  Positive for diarrhea. Negative for abdominal pain, blood in stool, constipation, heartburn, nausea and vomiting.   Genitourinary: Negative.    Musculoskeletal:  Positive for back pain and joint pain.   Skin: Negative.    Neurological:  Positive for tremors. Negative for dizziness, focal weakness, seizures and loss of consciousness.   Endo/Heme/Allergies: Negative.    Psychiatric/Behavioral: Negative.          Physical Exam  Temp:  [36.1 °C (97 °F)-36.4 °C (97.5 °F)] 36.1 °C (97 °F)  Pulse:  [61-91] 82  Resp:  [17-18] 18  BP: (124-147)/(55-62) 147/57  SpO2:  [90 %-94 %] 92 %    Physical Exam  Constitutional:       Appearance: Normal appearance. She  is normal weight.   HENT:      Head: Normocephalic and atraumatic.      Nose: Nose normal. No congestion.      Mouth/Throat:      Mouth: Mucous membranes are moist.      Pharynx: Oropharynx is clear.   Eyes:      Extraocular Movements: Extraocular movements intact.      Conjunctiva/sclera: Conjunctivae normal.      Pupils: Pupils are equal, round, and reactive to light.   Cardiovascular:      Rate and Rhythm: Normal rate and regular rhythm.      Pulses: Normal pulses.      Heart sounds: Normal heart sounds.   Pulmonary:      Effort: Pulmonary effort is normal. No respiratory distress.      Breath sounds: Wheezing present. No rhonchi.   Abdominal:      General: Abdomen is flat. Bowel sounds are normal. There is no distension.      Palpations: There is no mass.      Tenderness: There is no abdominal tenderness. There is no guarding or rebound.   Musculoskeletal:         General: No swelling or tenderness. Normal range of motion.      Right lower leg: No edema.      Left lower leg: No edema.   Skin:     General: Skin is warm.      Capillary Refill: Capillary refill takes less than 2 seconds.   Neurological:      General: No focal deficit present.      Mental Status: She is alert and oriented to person, place, and time. Mental status is at baseline.      Cranial Nerves: No cranial nerve deficit.      Sensory: No sensory deficit.      Motor: No weakness.      Coordination: Coordination normal.   Psychiatric:         Mood and Affect: Mood normal.         Behavior: Behavior normal.         Fluids    Intake/Output Summary (Last 24 hours) at 1/26/2025 1013  Last data filed at 1/26/2025 0800  Gross per 24 hour   Intake 1179.49 ml   Output 300 ml   Net 879.49 ml       Laboratory  Recent Labs     01/24/25  0150 01/25/25  1103 01/26/25  0106   WBC 16.7* 25.5* 17.0*   RBC 4.16* 4.45 4.16*   HEMOGLOBIN 12.4 13.2 12.3   HEMATOCRIT 38.2 40.7 37.7   MCV 91.8 91.5 90.6   MCH 29.8 29.7 29.6   MCHC 32.5 32.4 32.6   RDW 44.9 47.8 46.7    PLATELETCT 501* 468* 401   MPV 9.2 9.2 9.1     Recent Labs     01/24/25  0150 01/25/25  1103 01/26/25  0106   SODIUM 135 130* 137   POTASSIUM 3.6 3.8 3.1*   CHLORIDE 90* 86* 92*   CO2 34* 31 34*   GLUCOSE 220* 223* 141*   BUN 17 17 17   CREATININE 0.45* 0.68 0.44*   CALCIUM 9.5 9.5 9.3                   Imaging  CT-CHEST (THORAX) W/O   Final Result      1.  Severe emphysema and associated parenchymal scarring as seen previously.   2.  Mild bilateral dependent atelectasis.   3.  Bronchiectasis and bronchial secretions again seen in the lung bases, likely chronic inflammatory process.  No lobar pneumonia or pneumothorax.   4.  Stable subpleural RIGHT lower lobe nodule.      Fleischner Society pulmonary nodule recommendations:   Low Risk: CT at 6-12 months, then consider CT at 18-24 months      High Risk: CT at 6-12 months, then CT at 18-24 months      Low Risk - Minimal or absent history of smoking and of other known risk factors.      High Risk - History of smoking or of other known risk factors.      Note: These recommendations do not apply to lung cancer screening, patients with immunosuppression, or patients with known primary cancer.      Fleischner Society 2017 Guidelines for Management of Incidentally Detected Pulmonary Nodules in Adults         DX-CHEST-PORTABLE (1 VIEW)   Final Result      Again seen bilateral interstitial infiltrates possibly representing chronic interstitial lung disease versus atypical infection or interstitial edema.      DX-ESOPHAGUS - [id number]-DXNNY-SZ    (Results Pending)        Assessment/Plan  Problem Representation:    * Acute on chronic hypoxic respiratory failure (HCC)- (present on admission)  Assessment & Plan  Patient had a recent hospital admission for similar problem due to concerns of infection and was discharged on Tamiflu and steroids.  Chest x-ray did not show any new infiltrates.  She got Solu-Medrol in the ED at 3 PM and reports her respiratory status improved after that.   At this point we will hold on starting another Tamiflu course.  Patient is at a baseline of 6 L.  Ordered CT chest today. SLP consult placed.   -Telemetry for 48 hours for acute hypoxic respiratory failure.  -Transition Solu-Medrol to prednisone p.o. today.  -Azithromycin and Zosyn 4 gms started due to pseudomonas on respiratory cultures.  [Transition to Levoflaxacin on DC]   -Ordered Pro-Azam, respiratory panel, sputum cultures to rule out any other underlying etiologies.  -Respiratory therapy protocol.-Continue home Breztri  -COPD exacerbation protocol.  -DuoNebs, albuterol inhaler ordered.  -Guaifenesin, benzonatate for cough.  -Palliative consult placed given multiple admissions in the last few months and worsening respiratory status..    Controlled type 2 diabetes mellitus with hyperglycemia, without long-term current use of insulin (HCC)- (present on admission)  Assessment & Plan  1/24/2025  Hemoglobin A1c 6.5.  Consider discharging with metformin.    Personal history of lung cancer- (present on admission)  Assessment & Plan  As per history    Steroid-induced hyperglycemia  Assessment & Plan  Continue lispro insulin sign scale  Hypoglycemia protocol.    Muscle spasms of both lower extremities  Assessment & Plan  Continue home tizanidine.    GERD (gastroesophageal reflux disease)  Assessment & Plan  -Continue home omeprazole.    Mood disorder (HCC)  Assessment & Plan  Continue home duloxetine.    Allergies  Assessment & Plan  Continue home diphenhydramine, montelukast, fexofenadine.    Influenza A- (present on admission)  Assessment & Plan  As per recent history    Former smoker- (present on admission)  Assessment & Plan  As per history.    Atherosclerosis of aorta (HCC)- (present on admission)  Assessment & Plan  As per history.    Hypertension- (present on admission)  Assessment & Plan  Continue home amlodipine, spironolactone, losartan.    Peripheral vascular disease (HCC)- (present on admission)  Assessment  & Plan  As per history.    DNR (do not resuscitate)- (present on admission)  Assessment & Plan  As per patient's request    AF (atrial fibrillation) ([facility])- (present on admission)  Assessment & Plan  Continue home digoxin.   We have reviewed multiple EKGs during her hospital stay and the previous hospitalization.  We did not find any evidence of atrial fibrillation.  However patient tells us that she has a history of atrial fibrillation and she takes digoxin for that.  She also tells us that her previous cardiologist Dr. Kwan advised her that she does not need any blood thinner or anticoagulation.  She also tells me that she is currently following Dr. Merida at cardiology.  -Will schedule outpatient cardiology follow-up upon discharge.    Stage 4 very severe COPD by GOLD classification ([facility])- (present on admission)  Assessment & Plan  As per history    Pain, chronic- (present on admission)  Assessment & Plan  Pain multiple joints  -Continue home meloxicam, Diclo Gel and tramadol.         VTE prophylaxis: enoxaparin ppx    I have performed a physical exam and reviewed and updated ROS and Plan today (1/26/2025). In review of yesterday's note (1/25/2025), there are no changes except as documented above.

## 2025-01-26 NOTE — CARE PLAN
The patient is Stable - Low risk of patient condition declining or worsening    Shift Goals  Clinical Goals: Maintain O2 sats,IV Abx as ordered, DB&C  Patient Goals: placement  Family Goals: tess    Progress made toward(s) clinical / shift goals:    Problem: Risk for Infection - COPD  Goal: Patient will remain free from signs and symptoms of infection  Flowsheets (Taken 1/26/2025 0427)  Standard Infection Interventions:   Assessed for signs and symptoms of infection   Implemented standard precautions   Instructed patient/family on signs and symptoms of infection  COPD Infection Interventions:   Reviewed importance of breathing exercises, effective cough, frequent position changes and adequate fluid intake   Recommended rinsing mouth with water and spitting, not swallowing  Note: Pt monitored for S&S of infection, abx given as prescribed     Problem: Impaired Gas Exchange  Goal: Patient will demonstrate improved ventilation and adequate oxygenation and participate in treatment regimen within the level of ability/situation.  Outcome: Progressing  Flowsheets (Taken 1/26/2025 0427)  Impaired Gas Exchange:   Assesed rate/rhythm/depth of effort of respirations   Assessed oxygenation   Administered/titrated oxygen   Positioned patient for maximum ventilatory efficiency   Turn, cough, and deep breathe   Assessed vital signs, pulse oximetry   Assessed color and body temperature   Assessed breath sounds   Encouraged deep-slow or pursed-lip breathing exercises   Elevated head of bed   Monitored changes in the level of consciousness and mental status   Assisted patient assume a position to ease work of breathing   Provided a calm, quiet environment   Limited patient's activity during the acute phase   Asssited patient resume activity as tolerated   Evaluated sleep patterns and limited stimulants such as caffeine   Collaborated with RT to administer medications/treatments as needed  Note: Pt maintaining oxygen saturation on  baseline supplemental O2 level of 8L. She is positioned for optimum air exchange and has been educated on the importance of IS use and DB&C.      Problem: Bowel Elimination  Goal: Establish and maintain regular bowel function  1/26/2025 0427 by Donato Solorio R.N.  Outcome: Not Progressing  Note: Pt passing gas, reports that she has yet to have a BM. Education given on oral intake, hydration, ambulation and medication in relation to bowel movements    1/26/2025 0424 by Donato Solorio, R.N.  Outcome: Not Progressing       Patient is not progressing towards the following goals:      Problem: Bowel Elimination  Goal: Establish and maintain regular bowel function  Outcome: Not Progressing

## 2025-01-26 NOTE — THERAPY
"Speech Language Pathology   Clinical Swallow Evaluation     Patient Name: Berny Renee  AGE:  79 y.o., SEX:  female  Medical Record #: [id number]  Date of Service: 1/26/2025      History of Present Illness  Patient is a 79 y.o. female who presented on 1/22 with SOB and COPD exacerbation. Pt with recent admission to the hospital and was discharged on 1/19 when she was treated for pneumonia, RSV and flu.     PMHx: acute on chronic respiratory failure, COPD, DDD, HTN, lung ca (w/ radiation tx per pt)    CT-Chest 1/23/25:  1.  Severe emphysema and associated parenchymal scarring as seen previously.  2.  Mild bilateral dependent atelectasis.  3.  Bronchiectasis and bronchial secretions again seen in the lung bases, likely chronic inflammatory process.  No lobar pneumonia or pneumothorax.  4.  Stable subpleural RIGHT lower lobe nodule.    CXR 1/22/25:  There are bilateral interstitial infiltrates, similar to prior exam.  There is minimal bilateral pleural effusion.  The heart is normal in size.      General Information:  Vitals  O2 (LPM): 8  O2 Delivery Device: Silicone Nasal Cannula  Level of Consciousness: Alert, Awake  Orientation: Oriented x 4  Follows Directives: Yes      Prior Living Situation & Level of Function:  Communication: WFL  Swallowing: Impaired - reported difficulty with solids and liquids characterized by coughing and choking and stated \"flap closes up\" with associated globus sensation at the level of clavicle.       Oral Mechanism Evaluation:  Dentition: Fair, Natural dentition   Facial Symmetry: Equal  Facial Sensation: Equal     Labial Observations: WFL   Lingual Observations: Midline  Motor Speech: WFL          Laryngeal Function:  Secretion Management: Adequate  Voice Quality: Harsh  Cough: Perceptually WNL       Subjective  Patient received awake and participatory. Patient had cough prior to PO and reported chronic cough that began 2 months ago - sees pulm outpatient.      Assessment  Current " Method of Nutrition: Oral diet (RG/TN)  Positioning: Murray's (60-90 degrees)  Bolus Administration: Patient  O2 (LPM): 8 O2 Delivery Device: Silicone Nasal Cannula  Factor(s) Affecting Performance: None  Tracheostomy : No      Swallowing Trials:  Swallowing Trials  Ice: WFL  Thin Liquid (TN0): WFL    Comments: Adequate bolus acceptance and containment. Presumed timely AP transit and bolus formation evidenced by absence of oral residue. Mild increase in WOB as trials progressed (pt reported baseline). Patient had a delayed cough following trials of thin liquids - difficult to determine if r/t baseline cough vs airway invasion. Discussed multiple dysphagia risk factors with pt and MD in room - pt expecting to d/c today and became overwhelmed with additional outpatient diagnostic swallow test. MD reinforced importance of ruling out aspiration and pt agreeable to complete today if able.       Clinical Impressions  Patient is presenting with clinical signs of oropharyngeal and/or esophageal dysphagia suspect chronic given pt's reported dysphagia symptoms. Patient also presents with multiple dysphagia risk factors that warrant diagnostic swallow study including: ARF, hx of COPD, hx of lung ca w/ radiation (per pt), smoking hx, GERD, and age. Recommend continue current (regular/thin liquid) diet with an MBSS to further assess oropharyngeal and esophageal function.       Recommendations  Diet Consistency: Regular diet pending MBSS  Instrumentation: VFSS (MBSS), tentatively scheduled at 1100  Medication: Whole with liquid, As tolerated  Supervision: Distant supervision - check on patient 2-3 times per meal  Positioning: Fully upright and midline during oral intake  Risk Management : Small bites/sips, Slow rate of intake  Oral Care: Q6h  Consult Referral(s):  (pending MBSS)      SLP Treatment Plan  Treatment Plan: Dysphagia Treatment, Patient/Family/Caregiver Training  SLP Frequency: 3x Per Week  Estimated Duration: Until  "Therapy Goals Met      Anticipated Discharge Needs  Discharge Recommendations: Other (pending results of MBSS)   Therapy Recommendations Upon DC: Dysphagia Training, Patient / Family / Caregiver Education        Patient / Family Goals  Patient / Family Goal #1: \"I want to go home\"  Short Term Goals  Short Term Goal # 1: Patient will participate in MBSS to further assess oropharyngeal and esophageal function to guide SLP POC.      Treasure Rodríguez, SLP   "

## 2025-01-26 NOTE — HOSPITAL COURSE
Berny Renee is a 79 y.o. female comes to the emergency room on 1/22/2025 when prompted by the nurse who visited her at home noticed her to be desaturating into the 70s when she walked. Patient had a recent admission to the hospital and was discharged on Sunday 3 days ago when she was treated for pneumonia, RSV and flu. She wanted to go to, facility after that admission. She reported worsening shortness of breath, hypoxia and productive cough. EMS reported patient was hypoxic at 86% on her 8 L baseline oxygen. Patient was tachypneic at 36 breaths/min as per EMS. Patient reports occasional diarrhea. States that she has been discharged before she was ready and that she can take care of of her at her house and that she needs to go to a facility where she can get rehab. Patient does not report any chest pain, abdominal pain nausea or vomiting.     We started her on solumedrol. RT consult was placed. Pulmonology on board. Cultures grew positive for pseudomonas.  We started her Zosyn to finish a 7-day course.  To be transition to levofloxacin on discharge.  Transitioned Solu-Medrol to p.o. prednisone with a taper dose as follows:    -Prednisone 60 Mg -until January 26.  -50 Mg for January 27 and 28..  -40 Mg for January 28, and 29  -30 Mg for January 30 and 31.  -20 Mg for February 1 and 2.  -10 Mg per February 3 and 4  -Stop prednisone.

## 2025-01-27 ENCOUNTER — APPOINTMENT (OUTPATIENT)
Dept: RADIOLOGY | Facility: MEDICAL CENTER | Age: 80
DRG: 177 | End: 2025-01-27
Payer: MEDICARE

## 2025-01-27 VITALS
SYSTOLIC BLOOD PRESSURE: 146 MMHG | HEART RATE: 81 BPM | WEIGHT: 160.72 LBS | HEIGHT: 63 IN | OXYGEN SATURATION: 88 % | RESPIRATION RATE: 18 BRPM | BODY MASS INDEX: 28.48 KG/M2 | DIASTOLIC BLOOD PRESSURE: 58 MMHG | TEMPERATURE: 96.8 F

## 2025-01-27 PROBLEM — K59.01 SLOW TRANSIT CONSTIPATION: Status: ACTIVE | Noted: 2025-01-27

## 2025-01-27 LAB
ANION GAP SERPL CALC-SCNC: 8 MMOL/L (ref 7–16)
BUN SERPL-MCNC: 9 MG/DL (ref 8–22)
CALCIUM SERPL-MCNC: 9.5 MG/DL (ref 8.5–10.5)
CHLORIDE SERPL-SCNC: 87 MMOL/L (ref 96–112)
CO2 SERPL-SCNC: 36 MMOL/L (ref 20–33)
CREAT SERPL-MCNC: 0.4 MG/DL (ref 0.5–1.4)
GFR SERPLBLD CREATININE-BSD FMLA CKD-EPI: 100 ML/MIN/1.73 M 2
GLUCOSE BLD STRIP.AUTO-MCNC: 139 MG/DL (ref 65–99)
GLUCOSE BLD STRIP.AUTO-MCNC: 204 MG/DL (ref 65–99)
GLUCOSE SERPL-MCNC: 146 MG/DL (ref 65–99)
POTASSIUM SERPL-SCNC: 3.4 MMOL/L (ref 3.6–5.5)
SODIUM SERPL-SCNC: 131 MMOL/L (ref 135–145)

## 2025-01-27 PROCEDURE — 94640 AIRWAY INHALATION TREATMENT: CPT

## 2025-01-27 PROCEDURE — 74018 RADEX ABDOMEN 1 VIEW: CPT

## 2025-01-27 PROCEDURE — A9270 NON-COVERED ITEM OR SERVICE: HCPCS

## 2025-01-27 PROCEDURE — 700102 HCHG RX REV CODE 250 W/ 637 OVERRIDE(OP)

## 2025-01-27 PROCEDURE — 94669 MECHANICAL CHEST WALL OSCILL: CPT

## 2025-01-27 PROCEDURE — 80048 BASIC METABOLIC PNL TOTAL CA: CPT

## 2025-01-27 PROCEDURE — 700105 HCHG RX REV CODE 258

## 2025-01-27 PROCEDURE — 36415 COLL VENOUS BLD VENIPUNCTURE: CPT

## 2025-01-27 PROCEDURE — 99239 HOSP IP/OBS DSCHRG MGMT >30: CPT | Performed by: INTERNAL MEDICINE

## 2025-01-27 PROCEDURE — 700102 HCHG RX REV CODE 250 W/ 637 OVERRIDE(OP): Performed by: INTERNAL MEDICINE

## 2025-01-27 PROCEDURE — 700111 HCHG RX REV CODE 636 W/ 250 OVERRIDE (IP)

## 2025-01-27 PROCEDURE — 700101 HCHG RX REV CODE 250

## 2025-01-27 PROCEDURE — A9270 NON-COVERED ITEM OR SERVICE: HCPCS | Performed by: INTERNAL MEDICINE

## 2025-01-27 PROCEDURE — 700111 HCHG RX REV CODE 636 W/ 250 OVERRIDE (IP): Mod: JZ

## 2025-01-27 PROCEDURE — 82962 GLUCOSE BLOOD TEST: CPT

## 2025-01-27 PROCEDURE — 99232 SBSQ HOSP IP/OBS MODERATE 35: CPT | Performed by: INTERNAL MEDICINE

## 2025-01-27 RX ORDER — PREDNISONE 10 MG/1
10 TABLET ORAL DAILY
Status: DISCONTINUED | OUTPATIENT
Start: 2025-02-04 | End: 2025-01-27 | Stop reason: HOSPADM

## 2025-01-27 RX ORDER — POTASSIUM CHLORIDE 1500 MG/1
40 TABLET, EXTENDED RELEASE ORAL 2 TIMES DAILY
Status: DISCONTINUED | OUTPATIENT
Start: 2025-01-27 | End: 2025-01-27 | Stop reason: HOSPADM

## 2025-01-27 RX ORDER — PREDNISONE 50 MG/1
50 TABLET ORAL DAILY
Status: DISCONTINUED | OUTPATIENT
Start: 2025-01-27 | End: 2025-01-27 | Stop reason: HOSPADM

## 2025-01-27 RX ORDER — LACTULOSE 10 G/15ML
45 SOLUTION ORAL ONCE
Status: COMPLETED | OUTPATIENT
Start: 2025-01-27 | End: 2025-01-27

## 2025-01-27 RX ORDER — PREDNISONE 5 MG/1
TABLET ORAL
Status: SHIPPED
Start: 2025-01-28 | End: 2025-02-08

## 2025-01-27 RX ORDER — PREDNISONE 20 MG/1
40 TABLET ORAL DAILY
Status: DISCONTINUED | OUTPATIENT
Start: 2025-01-29 | End: 2025-01-27 | Stop reason: HOSPADM

## 2025-01-27 RX ORDER — PREDNISONE 20 MG/1
20 TABLET ORAL DAILY
Status: DISCONTINUED | OUTPATIENT
Start: 2025-02-02 | End: 2025-01-27 | Stop reason: HOSPADM

## 2025-01-27 RX ORDER — PREDNISONE 5 MG/1
5 TABLET ORAL DAILY
Status: DISCONTINUED | OUTPATIENT
Start: 2025-02-06 | End: 2025-01-27 | Stop reason: HOSPADM

## 2025-01-27 RX ADMIN — IPRATROPIUM BROMIDE AND ALBUTEROL SULFATE 3 ML: .5; 2.5 SOLUTION RESPIRATORY (INHALATION) at 15:29

## 2025-01-27 RX ADMIN — LOSARTAN POTASSIUM 50 MG: 50 TABLET, FILM COATED ORAL at 05:34

## 2025-01-27 RX ADMIN — OMEPRAZOLE 20 MG: 20 CAPSULE, DELAYED RELEASE ORAL at 05:34

## 2025-01-27 RX ADMIN — MONTELUKAST 10 MG: 10 TABLET, FILM COATED ORAL at 05:34

## 2025-01-27 RX ADMIN — PREDNISONE 50 MG: 50 TABLET ORAL at 15:30

## 2025-01-27 RX ADMIN — FEXOFENADINE HCL 180 MG: 60 TABLET, FILM COATED ORAL at 17:21

## 2025-01-27 RX ADMIN — DIGOXIN 125 MCG: 0.12 TABLET ORAL at 17:21

## 2025-01-27 RX ADMIN — FLUTICASONE FUROATE, UMECLIDINIUM BROMIDE AND VILANTEROL TRIFENATATE 1 PUFF: 200; 62.5; 25 POWDER RESPIRATORY (INHALATION) at 07:39

## 2025-01-27 RX ADMIN — POLYETHYLENE GLYCOL 3350 1 PACKET: 17 POWDER, FOR SOLUTION ORAL at 17:22

## 2025-01-27 RX ADMIN — POTASSIUM CHLORIDE 40 MEQ: 1500 TABLET, EXTENDED RELEASE ORAL at 17:21

## 2025-01-27 RX ADMIN — IPRATROPIUM BROMIDE AND ALBUTEROL SULFATE 3 ML: .5; 2.5 SOLUTION RESPIRATORY (INHALATION) at 11:35

## 2025-01-27 RX ADMIN — SPIRONOLACTONE 50 MG: 25 TABLET ORAL at 05:34

## 2025-01-27 RX ADMIN — INSULIN LISPRO 2 UNITS: 100 INJECTION, SOLUTION INTRAVENOUS; SUBCUTANEOUS at 13:37

## 2025-01-27 RX ADMIN — AMLODIPINE BESYLATE 5 MG: 5 TABLET ORAL at 05:34

## 2025-01-27 RX ADMIN — SENNOSIDES AND DOCUSATE SODIUM 2 TABLET: 50; 8.6 TABLET ORAL at 17:22

## 2025-01-27 RX ADMIN — DULOXETINE HYDROCHLORIDE 60 MG: 60 CAPSULE, DELAYED RELEASE ORAL at 05:34

## 2025-01-27 RX ADMIN — PIPERACILLIN AND TAZOBACTAM 4.5 G: 4; .5 INJECTION, POWDER, FOR SOLUTION INTRAVENOUS at 12:40

## 2025-01-27 RX ADMIN — LACTULOSE 45 ML: 10 SOLUTION ORAL at 17:22

## 2025-01-27 RX ADMIN — ACYCLOVIR 200 MG: 200 CAPSULE ORAL at 05:39

## 2025-01-27 RX ADMIN — POLYETHYLENE GLYCOL 3350 1 PACKET: 17 POWDER, FOR SOLUTION ORAL at 05:33

## 2025-01-27 RX ADMIN — BISACODYL 10 MG: 10 SUPPOSITORY RECTAL at 10:25

## 2025-01-27 RX ADMIN — LOSARTAN POTASSIUM 50 MG: 50 TABLET, FILM COATED ORAL at 17:22

## 2025-01-27 RX ADMIN — MAGNESIUM HYDROXIDE 30 ML: 1200 LIQUID ORAL at 14:00

## 2025-01-27 RX ADMIN — IPRATROPIUM BROMIDE AND ALBUTEROL SULFATE 3 ML: .5; 2.5 SOLUTION RESPIRATORY (INHALATION) at 08:11

## 2025-01-27 RX ADMIN — AZITHROMYCIN DIHYDRATE 250 MG: 250 TABLET, FILM COATED ORAL at 05:34

## 2025-01-27 ASSESSMENT — ENCOUNTER SYMPTOMS
COUGH: 1
WHEEZING: 1
WEIGHT LOSS: 0
DIZZINESS: 0
NAUSEA: 0
EYES NEGATIVE: 1
PSYCHIATRIC NEGATIVE: 1
CONSTIPATION: 1
CHILLS: 0
CLAUDICATION: 0
FEVER: 0
FOCAL WEAKNESS: 0
TREMORS: 1
ORTHOPNEA: 0
SPUTUM PRODUCTION: 1
ABDOMINAL PAIN: 0
SEIZURES: 0
BLOOD IN STOOL: 0
VOMITING: 0
SHORTNESS OF BREATH: 1
LOSS OF CONSCIOUSNESS: 0
PND: 0
HEMOPTYSIS: 0
HEARTBURN: 0
PALPITATIONS: 0

## 2025-01-27 ASSESSMENT — PAIN DESCRIPTION - PAIN TYPE: TYPE: ACUTE PAIN

## 2025-01-27 NOTE — ASSESSMENT & PLAN NOTE
States that she has not passed a bowel movement since last Tuesday despite laxatives and suppository this morning. Passing flatus. Denies abdominal pain or nausea/vomiting. Abdominal xray revealed heavy stool burden but not concerning for SBO.   - Milk of magnesia and fleet enema ordered.  - Follow for BM

## 2025-01-27 NOTE — DISCHARGE PLANNING
Case Management Discharge Planning    Admission Date: 1/22/2025  GMLOS: 4.6  ALOS: 5    6-Clicks ADL Score: 19  6-Clicks Mobility Score: 20      Anticipated Discharge Dispo: Discharge Disposition: D/T to SNF with Medicare cert in anticipation of skilled care (03)    DME Needed: No    Action(s) Taken: Updated Provider/Nurse on Discharge Plan Patient discussed during IDT rounds with medical team. Spoke with [facility], okay to take admission today at 1700.    Escalations Completed: Ride Line    Medically Clear: Yes    Next Steps: Case Management will continue to follow for discharge planning needs.    Barriers to Discharge: None    Is the patient up for discharge tomorrow: No

## 2025-01-27 NOTE — CARE PLAN
The patient is Stable - Low risk of patient condition declining or worsening    Shift Goals  Clinical Goals: Monitor O2 status, IV Abx as ordered, rest  Patient Goals: rest  Family Goals: LORA    Progress made toward(s) clinical / shift goals:    Problem: Bowel Elimination  Goal: Establish and maintain regular bowel function  Outcome: Not Progressing  Note: No BM since last documented, bowel regime initiated. Care team aware- encouraging fluid intake and ambulation as well at laxatives.     Problem: Fall Risk  Goal: Patient will remain free from falls  Outcome: Progressing  Note: Pt remains free from falls. Appropriate fall prevention measures in place. Pt calls for assistance when needed.     Problem: Impaired Gas Exchange  Goal: Patient will demonstrate improved ventilation and adequate oxygenation and participate in treatment regimen within the level of ability/situation.  Outcome: Progressing  Flowsheets (Taken 1/27/2025 0328)  Impaired Gas Exchange:   Assesed rate/rhythm/depth of effort of respirations   Assessed oxygenation   Administered/titrated oxygen   Positioned patient for maximum ventilatory efficiency   Turn, cough, and deep breathe   Assessed vital signs, pulse oximetry   Elevated head of bed   Provided a calm, quiet environment       Patient is not progressing towards the following goals:      Problem: Bowel Elimination  Goal: Establish and maintain regular bowel function  Outcome: Not Progressing  Note: No BM since last documented, bowel regime initiated. Care team aware- encouraging fluid intake and ambulation as well at laxatives.

## 2025-01-27 NOTE — DISCHARGE PLANNING
DC Transport Scheduled    Transport Company Scheduled:  [employer]  Spoke with [employer] to schedule transport.    Scheduled Date: 1/27/2025  Scheduled Time: 1700    Transport Type: [facility]  Destination: [facility]   Destination address: [address] [facility] [place] [address]    Notified care team of scheduled transport via [employer].     If there are any changes needed to the DC transportation scheduled, please contact [facility] Line at ext. [phone] between the hours of [phone]. If outside those hours, contact the ED Case Manager at ext. [phone].

## 2025-01-27 NOTE — PROGRESS NOTES
"Spoke with patient regarding MD request to administer suppository to induce BM. Pt refused suppository stating that she \"wants to rest first\".  "

## 2025-01-27 NOTE — PROGRESS NOTES
[facility] Internal Medicine Daily Progress Note    Date of Service  1/27/2025    [facility] Team: R MAGDY [facility] Team   Attending: Susanne Justice M.d.  Senior Resident: Dr. Darby  Intern:  Dr. Hanson  Contact Number: [phone]    Chief Complaint  Berny Renee is a 79 y.o. female admitted 1/22/2025 with shortness of breath and COPD exacerbation.     Hospital Course  Berny Renee is a 79 y.o. female comes to the emergency room on 1/22/2025 when prompted by the nurse who visited her at home noticed her to be desaturating into the 70s when she walked.  Patient had a recent admission to the hospital and was discharged on Sunday 3 days ago when she was treated for pneumonia, RSV and flu.  She wanted to go to, facility after that admission. She reported worsening shortness of breath, hypoxia and productive cough.  EMS reported patient was hypoxic at 86% on her 8 L baseline oxygen.  Patient was tachypneic at 36 breaths/min as per EMS.  Patient reports occasional diarrhea.  States that she has been discharged before she was ready and that she can take care of of her at her house and that she needs to go to a facility where she can get rehab.  Patient does not report any chest pain, abdominal pain nausea or vomiting.     Prednisone taper plan:  -Prednisone 60 Mg -until January 26.  -50 Mg for January 27 and 28..  -40 Mg for January 28, and 29  -30 Mg for January 30 and 31.  -20 Mg for February 1 and 2.  -10 Mg per February 3 and 4  -Stop prednisone.    Interval Problem Update  States that she is doing just as well this morning as she was yesterday. States that she has not passed a bowel movement since last Tuesday despite laxatives and suppository this morning. Passing flatus. Denies abdominal pain or nausea/vomiting. Abdominal xray revealed heavy stool burden but not concerning for SBO. Milk of magnesia and fleet enema ordered. Potassium improving but still below 4.0. Will continue supplementation. Continuing prednisone  taper.    I have discussed this patient's plan of care and discharge plan at IDT rounds today with Case Management, Nursing, Nursing leadership, and other members of the IDT team.    Consultants/Specialty  pulmonary    Code Status  DNAR/DNI    Disposition  Medically Cleared  I have placed the appropriate orders for post-discharge needs.    Review of Systems  Review of Systems   Constitutional:  Positive for malaise/fatigue. Negative for chills, fever and weight loss.   HENT: Negative.     Eyes: Negative.    Respiratory:  Positive for cough, sputum production, shortness of breath and wheezing. Negative for hemoptysis.    Cardiovascular:  Negative for chest pain, palpitations, orthopnea, claudication, leg swelling and PND.   Gastrointestinal:  Positive for constipation. Negative for abdominal pain, blood in stool, heartburn, nausea and vomiting.   Genitourinary: Negative.    Skin: Negative.    Neurological:  Positive for tremors. Negative for dizziness, focal weakness, seizures and loss of consciousness.   Endo/Heme/Allergies: Negative.    Psychiatric/Behavioral: Negative.        Physical Exam  Temp:  [36.2 °C (97.2 °F)-36.5 °C (97.7 °F)] 36.5 °C (97.7 °F)  Pulse:  [67-90] 83  Resp:  [16-18] 18  BP: (133-156)/(60-72) 156/61  SpO2:  [90 %-94 %] 92 %    Physical Exam  Constitutional:       Appearance: Normal appearance. She is normal weight.   HENT:      Head: Normocephalic and atraumatic.      Right Ear: External ear normal.      Left Ear: External ear normal.      Nose: Nose normal. No congestion.      Mouth/Throat:      Mouth: Mucous membranes are moist.      Pharynx: Oropharynx is clear.   Eyes:      Extraocular Movements: Extraocular movements intact.      Conjunctiva/sclera: Conjunctivae normal.      Pupils: Pupils are equal, round, and reactive to light.   Cardiovascular:      Rate and Rhythm: Normal rate and regular rhythm.      Pulses: Normal pulses.      Heart sounds: Normal heart sounds.   Pulmonary:       Effort: Pulmonary effort is normal. No respiratory distress.      Breath sounds: Wheezing present. No rhonchi.   Abdominal:      General: Abdomen is flat. Bowel sounds are normal. There is no distension.      Palpations: There is no mass.      Tenderness: There is no abdominal tenderness. There is no guarding or rebound.   Musculoskeletal:         General: No swelling or tenderness. Normal range of motion.      Right lower leg: No edema.      Left lower leg: No edema.   Skin:     General: Skin is warm.      Capillary Refill: Capillary refill takes less than 2 seconds.   Neurological:      General: No focal deficit present.      Mental Status: She is alert and oriented to person, place, and time. Mental status is at baseline.      Cranial Nerves: No cranial nerve deficit.      Sensory: No sensory deficit.      Motor: No weakness.      Coordination: Coordination normal.   Psychiatric:         Mood and Affect: Mood normal.         Behavior: Behavior normal.         Thought Content: Thought content normal.         Judgment: Judgment normal.       Fluids    Intake/Output Summary (Last 24 hours) at 1/27/2025 1438  Last data filed at 1/27/2025 1200  Gross per 24 hour   Intake 1295.4 ml   Output 0 ml   Net 1295.4 ml     Laboratory  Recent Labs     01/25/25  1103 01/26/25  0106   WBC 25.5* 17.0*   RBC 4.45 4.16*   HEMOGLOBIN 13.2 12.3   HEMATOCRIT 40.7 37.7   MCV 91.5 90.6   MCH 29.7 29.6   MCHC 32.4 32.6   RDW 47.8 46.7   PLATELETCT 468* 401   MPV 9.2 9.1     Recent Labs     01/25/25  1103 01/26/25  0106 01/27/25  0954   SODIUM 130* 137 131*   POTASSIUM 3.8 3.1* 3.4*   CHLORIDE 86* 92* 87*   CO2 31 34* 36*   GLUCOSE 223* 141* 146*   BUN 17 17 9   CREATININE 0.68 0.44* 0.40*   CALCIUM 9.5 9.3 9.5                   Imaging  DX-ESOPHAGUS - [id number]   Final Result      CT-CHEST (THORAX) W/O   Final Result      1.  Severe emphysema and associated parenchymal scarring as seen previously.   2.  Mild bilateral dependent  atelectasis.   3.  Bronchiectasis and bronchial secretions again seen in the lung bases, likely chronic inflammatory process.  No lobar pneumonia or pneumothorax.   4.  Stable subpleural RIGHT lower lobe nodule.      Fleischner Society pulmonary nodule recommendations:   Low Risk: CT at 6-12 months, then consider CT at 18-24 months      High Risk: CT at 6-12 months, then CT at 18-24 months      Low Risk - Minimal or absent history of smoking and of other known risk factors.      High Risk - History of smoking or of other known risk factors.      Note: These recommendations do not apply to lung cancer screening, patients with immunosuppression, or patients with known primary cancer.      Fleischner Society 2017 Guidelines for Management of Incidentally Detected Pulmonary Nodules in Adults         DX-CHEST-PORTABLE (1 VIEW)   Final Result      Again seen bilateral interstitial infiltrates possibly representing chronic interstitial lung disease versus atypical infection or interstitial edema.      [id number] VIEW    (Results Pending)      Assessment/Plan  Problem Representation:    * Acute on chronic hypoxic respiratory failure (HCC)- (present on admission)  Assessment & Plan  Patient had a recent hospital admission for similar problem due to concerns of infection and was discharged on Tamiflu and steroids.  Chest x-ray did not show any new infiltrates.  She got Solu-Medrol in the ED at 3 PM and reports her respiratory status improved after that.  At this point we will hold on starting another Tamiflu course.  Patient is at a baseline of 6 L.  Ordered CT chest today. SLP consult placed.   -Telemetry for 48 hours for acute hypoxic respiratory failure.  -Transition Solu-Medrol to prednisone p.o, continuing taper.  -Azithromycin and Zosyn 4 gms started due to pseudomonas on respiratory cultures.  [Transition to Levoflaxacin on DC]   -Ordered Pro-Azam, respiratory panel, sputum cultures to rule out any other underlying  etiologies. Resulted negative.  -Respiratory therapy protocol.  -Continue home Breztri  -COPD exacerbation protocol.  -DuoNebs, albuterol inhaler ordered.  -encouraged incentive spirometry  -Guaifenesin, benzonatate for cough.  -Palliative consult placed given multiple admissions in the last few months and worsening respiratory status. Denied hospice.    Slow transit constipation  Assessment & Plan  States that she has not passed a bowel movement since last Tuesday despite laxatives and suppository this morning. Passing flatus. Denies abdominal pain or nausea/vomiting. Abdominal xray revealed heavy stool burden but not concerning for SBO.   - Milk of magnesia and fleet enema ordered.  - Follow for BM    Personal history of lung cancer- (present on admission)  Assessment & Plan  As per history    Influenza A- (present on admission)  Assessment & Plan  As per recent history. Recently resulted positive.     Hypokalemia- (present on admission)  Assessment & Plan  K+ 3.4  - CTM, replete as needed.    Hypertension- (present on admission)  Assessment & Plan  Continue home amlodipine, spironolactone, losartan.    AF (atrial fibrillation) ([facility])- (present on admission)  Assessment & Plan  Continue home digoxin.   We have reviewed multiple EKGs during her hospital stay and the previous hospitalization.  We did not find any evidence of atrial fibrillation.  However patient tells us that she has a history of atrial fibrillation and she takes digoxin for that.  She also tells us that her previous cardiologist Dr. Kwan advised her that she does not need any blood thinner or anticoagulation.  She also tells me that she is currently following Dr. Merida at cardiology.  -Will schedule outpatient cardiology follow-up upon discharge.    Stage 4 very severe COPD by GOLD classification ([facility])- (present on admission)  Assessment & Plan  As per history    Pain, chronic- (present on admission)  Assessment & Plan  Pain multiple  joints  -Continue home meloxicam, Diclo Gel and tramadol.    Controlled type 2 diabetes mellitus with hyperglycemia, without long-term current use of insulin (HCC)- (present on admission)  Assessment & Plan  1/24/2025  Hemoglobin A1c 6.5.  Consider discharging with metformin.    Steroid-induced hyperglycemia  Assessment & Plan  Continue lispro insulin sign scale  Hypoglycemia protocol.    Muscle spasms of both lower extremities  Assessment & Plan  Continue home tizanidine.    GERD (gastroesophageal reflux disease)  Assessment & Plan  -Continue home omeprazole.    Mood disorder (HCC)  Assessment & Plan  Continue home duloxetine.    Allergies  Assessment & Plan  Continue home diphenhydramine, montelukast, fexofenadine.    Former smoker- (present on admission)  Assessment & Plan  As per history.    Atherosclerosis of aorta (HCC)- (present on admission)  Assessment & Plan  As per history.    Peripheral vascular disease (HCC)- (present on admission)  Assessment & Plan  As per history.    DNR (do not resuscitate)- (present on admission)  Assessment & Plan  As per patient's request       VTE prophylaxis: SCDs/TEDs and enoxaparin ppx    I have performed a physical exam and reviewed and updated ROS and Plan today (1/27/2025). In review of yesterday's note (1/26/2025), there are no changes except as documented above.

## 2025-01-27 NOTE — CARE PLAN
The patient is Stable - Low risk of patient condition declining or worsening    Shift Goals  Clinical Goals: monitor respiratory status, steroids, rt tx, promote BM, placement  Patient Goals: rest, nutrition, have BM  Family Goals: LORA    Progress made toward(s) clinical / shift goals:    Problem: Knowledge Deficit - Standard  Goal: Patient and family/care givers will demonstrate understanding of plan of care, disease process/condition, diagnostic tests and medications  Description: Target End Date:  1-3 days or as soon as patient condition allows    Document in Patient Education    1.  Patient and family/caregiver oriented to unit, equipment, visitation policy and means for communicating concern  2.  Complete/review Learning Assessment  3.  Assess knowledge level of disease process/condition, treatment plan, diagnostic tests and medications  4.  Explain disease process/condition, treatment plan, diagnostic tests and medications  Outcome: Progressing  Note: Discuss POC with patient  Address questions and concerns, escalate as appropriate  Check for pt understanding of POC       Problem: Knowledge Deficit - COPD  Goal: Patient/significant other demonstrates understanding of disease process, utilization of the Action Plan, medications and discharge instruction  Description: Target End Date:  1-3 days or as soon as patient condition allows    Document in Patient Education    1.  Discuss the importance of medical follow-up care, periodic chest x-rays, sputum cultures  2.  Review worsening signs and symptoms of COPD flare and exacerbation and its management  3.  Discuss respiratory medications, side effects, adverse reactions  4.  Demonstrate technique for using a metered-dose inhaler (MDI) and utilization of a spacer  5.  Review the COPD Action Plan  6.  Instruct and reinforce the rationale for breathing exercises, coughing effectively, and general conditioning exercises  7.  Stress importance of oral care and dental  hygiene  8.  Discuss the importance of avoiding people with active respiratory infections and need for routine influenza and pneumococcal vaccinations  9.  Discuss factors that may trigger condition and encourage patient/significant other to explore ways to control these factors in and around the home and work setting  10. Review the harmful effects of smoking and advise cessation of smoking  11. Provide information about activity limitations and alternating activities with rest periods to prevent fatigue  12. Instruct asthmatic patient of use of peak flow meter, as appropriate  13. Review oxygen requirements and dosage, safe use of oxygen, and refer to the supplier as indicated  14. Educate patient/family/caregiver on the use of Nasal Intermittent Positive Pressure Ventilation (NIPPV) and possible adverse reactions  Outcome: Progressing  Note: Discuss POC with patient  Address questions and concerns, escalate as appropriate  Check for pt understanding of POC       Problem: Risk for Infection - COPD  Goal: Patient will remain free from signs and symptoms of infection  Description: Target End Date:  Prior to discharge or change in level of care    1.  Infection prevention measures  2.  Instruct patient regarding signs and symptoms of infection  3.  Review the importance of breathing exercises, effective cough, frequent position changes, and adequate fluid intake  4.  Recommend rinsing mouth with water and spitting, not swallowing, or use of a spacer on the mouthpiece of inhaled corticosteroids  Outcome: Progressing  Flowsheets (Taken 1/27/2025 8037)  Standard Infection Interventions:   Assessed for signs and symptoms of infection   Instructed patient/family on signs and symptoms of infection   Provided education on proper hand hygiene and infection prevention measures  COPD Infection Interventions:   Reviewed importance of breathing exercises, effective cough, frequent position changes and adequate fluid intake    Recommended rinsing mouth with water and spitting, not swallowing   Recommended use of a spacer on the mouthpiece of inhaled corticosteroids     Problem: Nutrition - Advanced  Goal: Patient will display progressive weight gain toward goal have adequate food and fluid intake  Description: Target End Date:  Prior to discharge or change in level of care    1.  Daily weights  2.  Monitor dietary intake  3.  Promote systemic fluid hydration within cardiac tolerance  4.  Albumin and PreAlbumin per provider order  5.  Enteral and parenteral nutrition per provider order  6.  Calorie count  7.  Provide oral care  8.  Collaborate with Clinical Dietician  9.  Consider Speech Therapy consult for swallowing difficulties  10. Administer supplemental oxygen during meals as indicated  Outcome: Progressing  Note: Encourage oral intake  Administer IV fluids as ordered  Assist with feeding as appropriate       Problem: Self Care  Goal: Patient will have the ability to perform ADLs independently or with assistance (bathe, groom, dress, toilet and feed)  Description: Target End Date:  Prior to discharge or change in level of care    Document on ADL flowsheet    1.  Assess the capability and level of deficiency to perform ADLs  2.  Encourage family/care giver involvement  3.  Provide assistive devices  4.  Consider PT/OT evaluations  5.  Maintain support, give positive feedback, encourage self-care allowing extra time and verbal cuing as needed  6.  Avoid doing something for patients they can do themselves, but provide assistance as needed  7.  Assist in anticipating/planning individual needs  8.  Collaborate with Case Management and  to meet discharge needs  Outcome: Progressing  Note: Encourage support from family and/or caregivers  Provide assistive devices as needed  Collaborate with PT/OT  Encourage independent self care       Problem: Respiratory  Goal: Patient will achieve/maintain optimum respiratory ventilation  and gas exchange  Description: Target End Date:  Prior to discharge or change in level of care    Document on Assessment flowsheet    1.  Assess and monitor rate, rhythm, depth and effort of respiration  2.  Breath sounds assessed qshift and/or as needed  3.  Assess O2 saturation, administer/titrate oxygen as ordered  4.  Position patient for maximum ventilatory efficiency  5.  Turn, cough, and deep breath with splinting to improve effectiveness  6.  Collaborate with RT to administer medication/treatments per order  7.  Encourage use of incentive spirometer and encourage patient to cough after use and utilize splinting techniques if applicable  8.  Airway suctioning  9.  Monitor sputum production for changes in color, consistency and frequency  10. Perform frequent oral hygiene  11. Alternate physical activity with rest periods  Outcome: Progressing  Note: Assess respiratory status per unit standard and as appropriate  Monitor WOB  Encourage pulmonary hygeine  Administer medications as ordered  Consult with RT as needed         Patient is not progressing towards the following goals:

## 2025-01-27 NOTE — DISCHARGE INSTRUCTIONS
Chronic Obstructive Pulmonary Disease (COPD) is a long-term, progressive lung disease that makes it harder to breathe. It includes chronic bronchitis, emphysema, and refractory (non-reversible) asthma. With COPD, the airways in your lungs become inflamed and thicken, and the tissue where oxygen is exchanged is destroyed. The flow of air in and out of your lungs decreases. When that happens, less oxygen gets into your body tissues, and it becomes harder to get rid of the waste gas carbon dioxide. As the disease gets worse, shortness of breath makes it harder to remain active. There is no cure for COPD, but it is preventable and treatable.    COPD Patient Discharge Instructions    Diet  Follow a low fat, low cholesterol, low salt diet unless instructed otherwise by your Doctor. Read the labels on the back of food products and track your intake of fat, cholesterol and salt.  No smoking  Discontinuing smoking will have the biggest impact on preventing progression of disease.  To participate in [employer]s Quit Tobacco Program, call 003-426-7571 or visit [email or web address]  Oxygen  If your doctor has order that you wear oxygen at home, it is important to wear it as ordered.  Do not smoke, vape, or use e-cigarettes with oxygen on.  Store in an appropriate location: upright in its seaman or laid flat down, away from open flames and stoves.   Do not use oil-based creams and moisturizers (ie: petroleum products, oil-based lip moisturizers) or aerosol sprays (ie: hair sprays or deodorants) when using your oxygen equipment.  Be careful with tubing placement to prevent yourself and others from tripping.  Medications  Refer to your personalized Action Plan to manage your symptoms.  Warning signs of an exacerbation  Breathing fast and shallow, worsening shortness of breath (like you just finished exercising)  Chest tightness  Increases in sputum production  Changes in sputum color  Lower oxygen levels than baseline  When  to call your doctor  If the warning signs of an exacerbation do not improve  Refer to your personalized Action Plan   Pulmonary Rehab  Your doctor has ordered you a referral to Pulmonary Rehab. Call [phone] to schedule an appointment  Attend your follow-up appointment with your PCP and/or Pulmonologist  Remote Monitoring: At the direction of the remote monitoring on-call provider, you may increase your oxygen by 2 liters above your baseline.     See the educational handout provided by your COPD Navigator for more information. This also explains more about COPD, symptoms of an exacerbation, and some of the tests that you have undergone.

## 2025-01-27 NOTE — PROGRESS NOTES
"0029: hx constipation, pt reports that when she attempted to have a bowel movement her \"insides came out' and she \"pushed them back in\". She denies hx of rectal prolapse, denies ongoing abd pain. BS active x4, she I passing gas. Dr. Friedman notified - per MD if no abd pain they can update bowel regimen tomorrow     "

## 2025-01-28 NOTE — PROGRESS NOTES
Discharge instructions reviewed with patient and signed copy left at desk for  to scan into chart. Facesheet and COBRA packet sent with patient as well as all pt belongings. Report called to Sonja @[facility] at 1700. PIV removed. Evening medications given prior to discharge.    Pt off unit for discharge to [facility] via [facility] with T transport @1815. Pt on 6L Oxymizer canula. VSS.

## 2025-01-28 NOTE — PROGRESS NOTES
Called T for updated transport time.    Per [facility] new transport time is 1835.  Bedside RN made aware.

## 2025-01-28 NOTE — CARE PLAN
Problem: Bronchoconstriction  Goal: Improve in air movement and diminished wheezing  Description: Target End Date:  2 to 3 days    1.  Implement inhaled treatments  2.  Evaluate and manage medication effects  Outcome: Progressing    QID Duoneb

## 2025-01-28 NOTE — DISCHARGE PLANNING
BINA spoke to Mitra with [facility] to let her know that pt's KUB came out negative, no bowel obstruction. We will continue with transfer to [facility] today at 1700.

## 2025-01-28 NOTE — DISCHARGE PLANNING
"[facility]/SC[facility] TCN chart review completed. Collaborated with MARIPOSA Arnold.   Current discharge considerations are for discharge to [facility] today at 1700 via [facility].  SCP authorization obtained for [facility].  CM notified. TCN will continue to follow and collaborate with discharge planning team as additional post acute needs arise. Thank you.    Completed:  PT recommendations noted to post acute placement 1/24 (also noted may benefit from considering jail and/or group home placement in the long term)  OT on 1/24 \"Recommend post-acute placement for additional occupational therapy services prior to discharge home\"  Choice obtained: SNF referrals sent per CM protocol. Note as prior, during initial TCN visit, Patient verbalized agreement to post acute placement to SNF level of care and advised she would choose from accepting facilities  Pt aware of [facility]'s blanket referral policy  [facility] with [facility]. Anticipate post acute placement at this time.  "

## 2025-01-31 NOTE — DISCHARGE PLANNING
At 1430 rounds Dr Darby states KUB shows constipation, I asked if he would consider it a stool burden and he stated yes, Notified [facility], 1700 admit placed on hold. Asked ride line to place 1700 transport on hold.

## 2025-02-06 ENCOUNTER — PHARMACY VISIT (OUTPATIENT)
Dept: PHARMACY | Facility: MEDICAL CENTER | Age: 80
End: 2025-02-06
Payer: COMMERCIAL

## 2025-02-06 PROCEDURE — RXMED WILLOW AMBULATORY MEDICATION CHARGE: Performed by: FAMILY MEDICINE

## 2025-02-07 ENCOUNTER — HOME HEALTH ADMISSION (OUTPATIENT)
Dept: HOME HEALTH SERVICES | Facility: HOME HEALTHCARE | Age: 80
End: 2025-02-07
Payer: MEDICARE

## 2025-02-18 PROCEDURE — RXMED WILLOW AMBULATORY MEDICATION CHARGE: Performed by: INTERNAL MEDICINE

## 2025-02-19 ENCOUNTER — TELEPHONE (OUTPATIENT)
Dept: RESPIRATORY THERAPY | Facility: MEDICAL CENTER | Age: 80
End: 2025-02-19
Payer: MEDICARE

## 2025-02-19 NOTE — TELEPHONE ENCOUNTER
"COPD Program 1 month follow up phone call:     How is your breathing? \"I do the best I can.\"  Do you have any questions regarding your COPD? No  Are you taking your medications every day as prescribed? Yes  Do you have any questions regarding your medications? No  Have you used your Action Plan this month? Yes  Have you seen your PCP since discharging from the hospital? No, she did go to rehab.    "

## 2025-02-20 PROCEDURE — RXMED WILLOW AMBULATORY MEDICATION CHARGE: Performed by: FAMILY MEDICINE

## 2025-02-25 ENCOUNTER — PHARMACY VISIT (OUTPATIENT)
Dept: PHARMACY | Facility: MEDICAL CENTER | Age: 80
End: 2025-02-25
Payer: COMMERCIAL

## 2025-02-28 ENCOUNTER — APPOINTMENT (OUTPATIENT)
Dept: RADIOLOGY | Facility: MEDICAL CENTER | Age: 80
End: 2025-02-28
Attending: STUDENT IN AN ORGANIZED HEALTH CARE EDUCATION/TRAINING PROGRAM
Payer: MEDICARE

## 2025-02-28 ENCOUNTER — HOSPITAL ENCOUNTER (EMERGENCY)
Facility: MEDICAL CENTER | Age: 80
End: 2025-02-28
Attending: STUDENT IN AN ORGANIZED HEALTH CARE EDUCATION/TRAINING PROGRAM
Payer: MEDICARE

## 2025-02-28 VITALS
BODY MASS INDEX: 27.31 KG/M2 | WEIGHT: 160 LBS | TEMPERATURE: 98.2 F | HEIGHT: 64 IN | OXYGEN SATURATION: 94 % | HEART RATE: 77 BPM | DIASTOLIC BLOOD PRESSURE: 69 MMHG | RESPIRATION RATE: 16 BRPM | SYSTOLIC BLOOD PRESSURE: 162 MMHG

## 2025-02-28 DIAGNOSIS — S09.90XA CLOSED HEAD INJURY, INITIAL ENCOUNTER: ICD-10-CM
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
## 2025-02-28 DIAGNOSIS — S62.651A CLOSED NONDISPLACED FRACTURE OF MIDDLE PHALANX OF LEFT INDEX FINGER, INITIAL ENCOUNTER: ICD-10-CM

## 2025-02-28 DIAGNOSIS — S00.03XA HEMATOMA OF FRONTAL SCALP, INITIAL ENCOUNTER: ICD-10-CM

## 2025-02-28 LAB
ALBUMIN SERPL BCP-MCNC: 3.5 G/DL (ref 3.2–4.9)
ALBUMIN/GLOB SERPL: 0.9 G/DL
ALP SERPL-CCNC: 50 U/L (ref 30–99)
ALT SERPL-CCNC: 5 U/L (ref 2–50)
ANION GAP SERPL CALC-SCNC: 11 MMOL/L (ref 7–16)
APTT PPP: 28.1 SEC (ref 24.7–36)
AST SERPL-CCNC: 18 U/L (ref 12–45)
BASOPHILS # BLD AUTO: 0.6 % (ref 0–1.8)
BASOPHILS # BLD: 0.09 K/UL (ref 0–0.12)
BILIRUB SERPL-MCNC: 0.2 MG/DL (ref 0.1–1.5)
BUN SERPL-MCNC: 14 MG/DL (ref 8–22)
CALCIUM ALBUM COR SERPL-MCNC: 9.9 MG/DL (ref 8.5–10.5)
CALCIUM SERPL-MCNC: 9.5 MG/DL (ref 8.5–10.5)
CHLORIDE SERPL-SCNC: 95 MMOL/L (ref 96–112)
CO2 SERPL-SCNC: 31 MMOL/L (ref 20–33)
CREAT SERPL-MCNC: 0.42 MG/DL (ref 0.5–1.4)
EKG IMPRESSION: NORMAL
EOSINOPHIL # BLD AUTO: 0.61 K/UL (ref 0–0.51)
EOSINOPHIL NFR BLD: 4.1 % (ref 0–6.9)
ERYTHROCYTE [DISTWIDTH] IN BLOOD BY AUTOMATED COUNT: 43 FL (ref 35.9–50)
GFR SERPLBLD CREATININE-BSD FMLA CKD-EPI: 99 ML/MIN/1.73 M 2
GLOBULIN SER CALC-MCNC: 4 G/DL (ref 1.9–3.5)
GLUCOSE SERPL-MCNC: 166 MG/DL (ref 65–99)
HCT VFR BLD AUTO: 37.6 % (ref 37–47)
HGB BLD-MCNC: 12.3 G/DL (ref 12–16)
IMM GRANULOCYTES # BLD AUTO: 0.12 K/UL (ref 0–0.11)
IMM GRANULOCYTES NFR BLD AUTO: 0.8 % (ref 0–0.9)
INR PPP: 0.93 (ref 0.87–1.13)
LIPASE SERPL-CCNC: 26 U/L (ref 11–82)
LYMPHOCYTES # BLD AUTO: 1.69 K/UL (ref 1–4.8)
LYMPHOCYTES NFR BLD: 11.5 % (ref 22–41)
MCH RBC QN AUTO: 29.7 PG (ref 27–33)
MCHC RBC AUTO-ENTMCNC: 32.7 G/DL (ref 32.2–35.5)
MCV RBC AUTO: 90.8 FL (ref 81.4–97.8)
MONOCYTES # BLD AUTO: 1.26 K/UL (ref 0–0.85)
MONOCYTES NFR BLD AUTO: 8.6 % (ref 0–13.4)
NEUTROPHILS # BLD AUTO: 10.96 K/UL (ref 1.82–7.42)
NEUTROPHILS NFR BLD: 74.4 % (ref 44–72)
NRBC # BLD AUTO: 0 K/UL
NRBC BLD-RTO: 0 /100 WBC (ref 0–0.2)
PLATELET # BLD AUTO: 406 K/UL (ref 164–446)
PMV BLD AUTO: 9.2 FL (ref 9–12.9)
POTASSIUM SERPL-SCNC: 3.7 MMOL/L (ref 3.6–5.5)
PROT SERPL-MCNC: 7.5 G/DL (ref 6–8.2)
PROTHROMBIN TIME: 12.4 SEC (ref 12–14.6)
RBC # BLD AUTO: 4.14 M/UL (ref 4.2–5.4)
SODIUM SERPL-SCNC: 137 MMOL/L (ref 135–145)
WBC # BLD AUTO: 14.7 K/UL (ref 4.8–10.8)

## 2025-02-28 PROCEDURE — 72125 CT NECK SPINE W/O DYE: CPT

## 2025-02-28 PROCEDURE — 85730 THROMBOPLASTIN TIME PARTIAL: CPT

## 2025-02-28 PROCEDURE — 70486 CT MAXILLOFACIAL W/O DYE: CPT

## 2025-02-28 PROCEDURE — 96374 THER/PROPH/DIAG INJ IV PUSH: CPT

## 2025-02-28 PROCEDURE — 96376 TX/PRO/DX INJ SAME DRUG ADON: CPT

## 2025-02-28 PROCEDURE — 73140 X-RAY EXAM OF FINGER(S): CPT | Mod: LT

## 2025-02-28 PROCEDURE — 99285 EMERGENCY DEPT VISIT HI MDM: CPT

## 2025-02-28 PROCEDURE — 700111 HCHG RX REV CODE 636 W/ 250 OVERRIDE (IP): Mod: JZ,UD | Performed by: STUDENT IN AN ORGANIZED HEALTH CARE EDUCATION/TRAINING PROGRAM

## 2025-02-28 PROCEDURE — 96375 TX/PRO/DX INJ NEW DRUG ADDON: CPT

## 2025-02-28 PROCEDURE — 73562 X-RAY EXAM OF KNEE 3: CPT | Mod: RT

## 2025-02-28 PROCEDURE — 36415 COLL VENOUS BLD VENIPUNCTURE: CPT

## 2025-02-28 PROCEDURE — 85610 PROTHROMBIN TIME: CPT

## 2025-02-28 PROCEDURE — 83690 ASSAY OF LIPASE: CPT

## 2025-02-28 PROCEDURE — 80053 COMPREHEN METABOLIC PANEL: CPT

## 2025-02-28 PROCEDURE — 93005 ELECTROCARDIOGRAM TRACING: CPT | Mod: TC | Performed by: STUDENT IN AN ORGANIZED HEALTH CARE EDUCATION/TRAINING PROGRAM

## 2025-02-28 PROCEDURE — 85025 COMPLETE CBC W/AUTO DIFF WBC: CPT

## 2025-02-28 PROCEDURE — 70450 CT HEAD/BRAIN W/O DYE: CPT

## 2025-02-28 RX ORDER — MORPHINE SULFATE 4 MG/ML
4 INJECTION INTRAVENOUS ONCE
Status: COMPLETED | OUTPATIENT
Start: 2025-02-28 | End: 2025-02-28

## 2025-02-28 RX ORDER — ACETAMINOPHEN 325 MG/1
650 TABLET ORAL ONCE
Status: DISCONTINUED | OUTPATIENT
Start: 2025-02-28 | End: 2025-02-28 | Stop reason: HOSPADM

## 2025-02-28 RX ORDER — ONDANSETRON 2 MG/ML
4 INJECTION INTRAMUSCULAR; INTRAVENOUS ONCE
Status: COMPLETED | OUTPATIENT
Start: 2025-02-28 | End: 2025-02-28

## 2025-02-28 RX ADMIN — ONDANSETRON 4 MG: 2 INJECTION INTRAMUSCULAR; INTRAVENOUS at 01:12

## 2025-02-28 RX ADMIN — MORPHINE SULFATE 4 MG: 4 INJECTION, SOLUTION INTRAMUSCULAR; INTRAVENOUS at 03:17

## 2025-02-28 RX ADMIN — MORPHINE SULFATE 4 MG: 4 INJECTION, SOLUTION INTRAMUSCULAR; INTRAVENOUS at 01:14

## 2025-02-28 ASSESSMENT — FIBROSIS 4 INDEX: FIB4 SCORE: 1.93

## 2025-02-28 ASSESSMENT — PAIN DESCRIPTION - PAIN TYPE
TYPE: ACUTE PAIN

## 2025-02-28 NOTE — DISCHARGE INSTRUCTIONS
Please return to the Emergency Department if you have any worsening symptoms or any new concerning symptoms including but not limited to: fainting, dizziness, fever, significant back, neck or abdominal pain, bad headache, chest pain, shortness of breath or respiratory distress, repeated vomiting, unable to urinate or have a bowel movement, black, tarry or bright red stools, neurological deficits, such as a change in the way you walk, talk, see, smile, feel or move.    Please follow-up with your doctor on Monday as scheduled.  Also placed referral so that you can follow-up with the hand surgery team to ensure nothing it is improving.  Keep it debra taped as shown here

## 2025-02-28 NOTE — ED NOTES
Discharge instructions provided to pt, all questions answered, pt verbalized understanding. [facility] to transport pt home

## 2025-02-28 NOTE — ED TRIAGE NOTES
"Code TBI     Chief Complaint   Patient presents with    GLF    Head Injury    Knee Pain      Pt BIB [facility] #34 to the ED Charge Desk for a TBI. Pt is a 79 y.o. female coming in from home, pt was at home walking for her glasses when she had a fall, hit her head on the carpet. Pt has a large hematoma to the right side of head/forehead. - LOC -Thinners. Pt reporting 9/10 pain. Also c/o of R knee pain and L index finger pain. Pt is on 8L of oxygen NC baseline. Per EMS pt was ambulatory on scene with FWW. GCS 15.     Pt to CT with TNTL.     /66   Pulse 71   Resp 16   Ht 1.626 m (5' 4\")   Wt 72.6 kg (160 lb)   LMP 06/07/2001   SpO2 96%   BMI 27.46 kg/m²     "

## 2025-02-28 NOTE — DISCHARGE PLANNING
Medical Social Work     CAROLANN received a call from the RN requesting CAROLANN assistance with [facility] transport home. The pt is on 8LPM of O2. The patient lives at : [address] [place], [place]. CAROLANN faxed a PCS form to [facility] and set up transport for [phone]. RN aware of transport time.

## 2025-02-28 NOTE — ED PROVIDER NOTES
ED Provider Note    CHIEF COMPLAINT  Chief Complaint   Patient presents with    GLF    Head Injury    Knee Pain       EXTERNAL RECORDS REVIEWED  Patient admitted to the hospital in January for acute hypoxic respiratory failure.  She is on 8 L of baseline oxygen.  She was flu positive.    HPI/ROS  LIMITATION TO HISTORY   none  OUTSIDE HISTORIAN(S):  EMS, I was present at bedside to receive report    Berny Renee is a 79 y.o. female who presents after ground-level fall.  Patient was walking at home trying to find her glasses  when she accidentally tripped and fell forwards hitting her head on the carpet.  Patient has a headache she says ' think I broke my head'.  She has not taken anything for the pain.  9 out of 10 in severity.  She has no midline neck pain.  She has been able to ambulate.  No confusion after the fall.  She is not on aspirin or anticoagulation.    PAST MEDICAL HISTORY   has a past medical history of Acute on chronic respiratory failure with hypoxia ([facility]) (11/14/2020), Arthritis, Asthma with COPD ([facility]), BMI 31.0-31.9,adult (02/04/2022), Cataract immature, Chronic pain, Chronic respiratory failure with hypoxia ([facility]) (07/13/2017), Colon polyps, COPD (chronic obstructive pulmonary disease) ([facility]) (2002), Cough, DDD (degenerative disc disease), cervical, DDD (degenerative disc disease), thoracic, Dependence on continuous supplemental oxygen (08/13/2015), Diverticulitis of colon, Dyslipidemia, EMPHYSEMA, H/O opioid abuse ([facility]) (07/15/2021), Hypertension, Obesity (BMI 30-39.9) (10/24/2016), Opioid type dependence, continuous ([facility]) (02/04/2022), REGINE (obstructive sleep apnea), OSTEOPOROSIS, Painful breathing, Shortness of breath, Spinal stenosis, lumbar region, with neurogenic claudication, Sputum production, URINARY INCONTINENCE, Varicose veins of left leg with edema (10/11/2024), Vitamin d deficiency, and Wheezing.    SURGICAL HISTORY   has a past surgical history that includes tonsillectomy; other  "orthopedic surgery (); other; other; lumbar laminectomy diskectomy (2010); upper gi endoscopy,biopsy (N/A, 11/15/2024); and colonoscopy with polyp (N/A, 11/15/2024).    FAMILY HISTORY  Family History   Problem Relation Age of Onset    Cancer Father         Lung    Other Sister         lung and leukemia cancer    Cancer Sister         Leukemia, Lung       SOCIAL HISTORY  Social History     Tobacco Use    Smoking status: Former     Current packs/day: 0.00     Average packs/day: 2.0 packs/day for 30.0 years (60.0 ttl pk-yrs)     Types: Cigarettes     Start date: 3/1/1969     Quit date: 3/1/1999     Years since quittin.0    Smokeless tobacco: Never    Tobacco comments:     3 pks a day for 35 yrs, continued abstinence   Vaping Use    Vaping status: Never Used   Substance and Sexual Activity    Alcohol use: Not Currently     Alcohol/week: 4.2 oz     Types: 7 Glasses of wine per week    Drug use: Not Currently     Types: Marijuana, Oral     Comment: Medical Marijuana     Sexual activity: Never       CURRENT MEDICATIONS  Home Medications    **Home medications have not yet been reviewed for this encounter**         ALLERGIES  Allergies   Allergen Reactions    Iodine      Convulsion  I.V.  iodine    Tape Rash and Itching       PHYSICAL EXAM  VITAL SIGNS: BP (!) 162/69   Pulse 77   Temp 36.8 °C (98.2 °F) (Temporal)   Resp 16   Ht 1.626 m (5' 4\")   Wt 72.6 kg (160 lb)   LMP 2001   SpO2 94%   BMI 27.46 kg/m²    Constitutional: Awake and alert no toxic  HENT: She has a large frontal scalp hematoma.  No laceration.   Eyes: Normal inspection  Neck: Grossly normal range of motion.  Cardiovascular: Normal heart rate, Normal rhythm.    Thorax & Lungs: No respiratory distress, she is on 8 L oxygen at baseline  Abdomen: Soft, non-distended  Skin: No obvious rash.  Extremities: Warm, well perfused. No clubbing, cyanosis, edema   Neurologic: A&Ox 4.  No focal deficit  Psychiatric: Normal for " situation      EKG/LABS  Results for orders placed or performed during the hospital encounter of 02/28/25   CBC WITH DIFFERENTIAL    Collection Time: 02/28/25  1:17 AM   Result Value Ref Range    WBC 14.7 (H) 4.8 - 10.8 K/uL    RBC 4.14 (L) 4.20 - 5.40 M/uL    Hemoglobin 12.3 12.0 - 16.0 g/dL    Hematocrit 37.6 37.0 - 47.0 %    MCV 90.8 81.4 - 97.8 fL    MCH 29.7 27.0 - 33.0 pg    MCHC 32.7 32.2 - 35.5 g/dL    RDW 43.0 35.9 - 50.0 fL    Platelet Count 406 164 - 446 K/uL    MPV 9.2 9.0 - 12.9 fL    Neutrophils-Polys 74.40 (H) 44.00 - 72.00 %    Lymphocytes 11.50 (L) 22.00 - 41.00 %    Monocytes 8.60 0.00 - 13.40 %    Eosinophils 4.10 0.00 - 6.90 %    Basophils 0.60 0.00 - 1.80 %    Immature Granulocytes 0.80 0.00 - 0.90 %    Nucleated RBC 0.00 0.00 - 0.20 /100 WBC    Neutrophils (Absolute) 10.96 (H) 1.82 - 7.42 K/uL    Lymphs (Absolute) 1.69 1.00 - 4.80 K/uL    Monos (Absolute) 1.26 (H) 0.00 - 0.85 K/uL    Eos (Absolute) 0.61 (H) 0.00 - 0.51 K/uL    Baso (Absolute) 0.09 0.00 - 0.12 K/uL    Immature Granulocytes (abs) 0.12 (H) 0.00 - 0.11 K/uL    NRBC (Absolute) 0.00 K/uL   COMP METABOLIC PANEL    Collection Time: 02/28/25  1:17 AM   Result Value Ref Range    Sodium 137 135 - 145 mmol/L    Potassium 3.7 3.6 - 5.5 mmol/L    Chloride 95 (L) 96 - 112 mmol/L    Co2 31 20 - 33 mmol/L    Anion Gap 11.0 7.0 - 16.0    Glucose 166 (H) 65 - 99 mg/dL    Bun 14 8 - 22 mg/dL    Creatinine 0.42 (L) 0.50 - 1.40 mg/dL    Calcium 9.5 8.5 - 10.5 mg/dL    Correct Calcium 9.9 8.5 - 10.5 mg/dL    AST(SGOT) 18 12 - 45 U/L    ALT(SGPT) 5 2 - 50 U/L    Alkaline Phosphatase 50 30 - 99 U/L    Total Bilirubin 0.2 0.1 - 1.5 mg/dL    Albumin 3.5 3.2 - 4.9 g/dL    Total Protein 7.5 6.0 - 8.2 g/dL    Globulin 4.0 (H) 1.9 - 3.5 g/dL    A-G Ratio 0.9 g/dL   LIPASE    Collection Time: 02/28/25  1:17 AM   Result Value Ref Range    Lipase 26 11 - 82 U/L   APTT    Collection Time: 02/28/25  1:17 AM   Result Value Ref Range    APTT 28.1 24.7 - 36.0 sec    PROTHROMBIN TIME (INR)    Collection Time: 02/28/25  1:17 AM   Result Value Ref Range    PT 12.4 12.0 - 14.6 sec    INR 0.93 0.87 - 1.13   ESTIMATED GFR    Collection Time: 02/28/25  1:17 AM   Result Value Ref Range    GFR (CKD-EPI) 99 >60 mL/min/1.73 m 2     *Note: Due to a large number of results and/or encounters for the requested time period, some results have not been displayed. A complete set of results can be found in Results Review.       I have independently interpreted this EKG. normal rate, normal rhythm.  No ST wave elevations or depressions.  Grossly normal intervals.    RADIOLOGY/PROCEDURES   I have independently interpreted the diagnostic imaging associated with this visit and am waiting the final reading from the radiologist.   My preliminary interpretation is as follows: No large bleed    Radiologist interpretation:  DX-KNEE 3 VIEWS RIGHT   Final Result      No radiographic evidence of acute traumatic injury.      DX-FINGER(S) 2+ LEFT   Final Result      Cannot exclude a very subtle volar plate fracture at the base of the middle phalanx of the index finger.      CT-MAXILLOFACIAL W/O PLUS RECONS   Final Result         1. There is no definite facial bone fracture.   2. Mastoid disease was noted previously as well.      CT-CSPINE WITHOUT PLUS RECONS   Final Result      Multilevel degenerative disease without a definite fracture or malalignment.      CT-HEAD W/O   Final Result         1. Right frontal scalp hematoma.   2. No definite acute intracranial abnormality.   3. Bilateral mastoiditis.                   COURSE & MEDICAL DECISION MAKING    ASSESSMENT, COURSE AND PLAN  Care Narrative: This is a 79-year-old with history of chronic hypoxic respiratory failure who presents for evaluation after a ground-level fall.  This was a TBI activation per protocol and I evaluated the patient at the charge desk.  She is neurologically intact.  Fortunately CT head without evidence of intracranial bleed, skull  fracture, facial fracture or fracture of the cervical spine.  Patient has been able to ambulate.  Fall was mechanical and is not consistent with a syncopal episode.  Fortunately as well her labs are overall quite reassuring.  She does have a leukocytosis however this has improved from prior.  She has no other significant electrolyte derangements.  She has no chest pain no shortness of breath.  EKG without any evidence of ischemia or dysrhythmia.  Of note however on x-ray she does have a possible volar plate fracture at the base of the middle phalanx of the index finger.  I have debra taped it.  Placed a referral so that she can follow-up as an outpatient with hand surgery.   Overall, I feel reassured by her workup.  She does have a follow-up appointment with her PCP on Monday already scheduled.  Pain has been controlled with IV morphine.  Understands to return sooner for worsening pain, vomiting, confusion or other concern.    DISPOSITION AND DISCUSSIONS  I have discussed management of the patient with the following physicians and LINDA's:  none    Discussion of management with other QHP or appropriate source(s): none    Escalation of care considered, and ultimately not performed:acute inpatient care management, however at this time, the patient is most appropriate for outpatient management    Barriers to care at this time, including but not limited to:  none .     Decision tools and prescription drugs considered including, but not limited to:  none .    FINAL DIAGNOSIS  1. Closed nondisplaced fracture of middle phalanx of left index finger, initial encounter Acute   2. Closed head injury, initial encounter Acute   3. Fall from ground level Acute   4. Hematoma of frontal scalp, initial encounter Acute        Electronically signed by: Andie Alegre M.D., 2/28/2025 12:49 AM

## 2025-03-04 PROCEDURE — RXMED WILLOW AMBULATORY MEDICATION CHARGE: Performed by: INTERNAL MEDICINE

## 2025-03-05 ENCOUNTER — PHARMACY VISIT (OUTPATIENT)
Dept: PHARMACY | Facility: MEDICAL CENTER | Age: 80
End: 2025-03-05
Payer: COMMERCIAL

## 2025-03-05 PROCEDURE — RXMED WILLOW AMBULATORY MEDICATION CHARGE: Performed by: FAMILY MEDICINE

## 2025-03-06 ENCOUNTER — TELEPHONE (OUTPATIENT)
Dept: CARDIOLOGY | Facility: MEDICAL CENTER | Age: 80
End: 2025-03-06
Payer: MEDICARE

## 2025-03-06 NOTE — TELEPHONE ENCOUNTER
Rx for Losartan was partially filled on 10/28/24 - 100-day dose dispensed.  Patient had multiple hospital admissions and a recent ER visit on 1/28/25.    [employer] notification also sent.

## 2025-03-25 ENCOUNTER — PHARMACY VISIT (OUTPATIENT)
Dept: PHARMACY | Facility: MEDICAL CENTER | Age: 80
End: 2025-03-25
Payer: COMMERCIAL

## 2025-03-25 PROCEDURE — RXMED WILLOW AMBULATORY MEDICATION CHARGE: Performed by: FAMILY MEDICINE

## 2025-03-25 RX ORDER — SPIRONOLACTONE 25 MG/1
25 TABLET ORAL DAILY
Qty: 90 TABLET | Refills: 3 | OUTPATIENT
Start: 2025-03-25

## 2025-03-25 RX ORDER — PREDNISONE 5 MG/1
5 TABLET ORAL DAILY
Qty: 90 TABLET | Refills: 3 | OUTPATIENT
Start: 2025-03-25

## 2025-03-25 RX ORDER — TRAMADOL HYDROCHLORIDE 50 MG/1
TABLET ORAL
Qty: 60 TABLET | Refills: 3 | OUTPATIENT
Start: 2025-03-25

## 2025-04-08 ENCOUNTER — APPOINTMENT (OUTPATIENT)
Dept: RADIOLOGY | Facility: MEDICAL CENTER | Age: 80
DRG: 190 | End: 2025-04-08
Attending: EMERGENCY MEDICINE
Payer: MEDICARE

## 2025-04-08 ENCOUNTER — HOSPITAL ENCOUNTER (INPATIENT)
Facility: MEDICAL CENTER | Age: 80
LOS: 8 days | DRG: 190 | End: 2025-04-16
Attending: EMERGENCY MEDICINE | Admitting: INTERNAL MEDICINE
Payer: MEDICARE

## 2025-04-08 DIAGNOSIS — G89.29 OTHER CHRONIC PAIN: ICD-10-CM

## 2025-04-08 DIAGNOSIS — Z85.118 PERSONAL HISTORY OF LUNG CANCER: ICD-10-CM

## 2025-04-08 DIAGNOSIS — Z59.9 FINANCIAL DIFFICULTY: ICD-10-CM

## 2025-04-08 DIAGNOSIS — K59.01 SLOW TRANSIT CONSTIPATION: ICD-10-CM

## 2025-04-08 DIAGNOSIS — J15.9 COMMUNITY ACQUIRED BACTERIAL PNEUMONIA: ICD-10-CM

## 2025-04-08 DIAGNOSIS — I49.3 PVCS (PREMATURE VENTRICULAR CONTRACTIONS): ICD-10-CM

## 2025-04-08 DIAGNOSIS — I83.892 VARICOSE VEINS OF LEFT LEG WITH EDEMA: ICD-10-CM

## 2025-04-08 DIAGNOSIS — J10.1 INFLUENZA A: ICD-10-CM

## 2025-04-08 DIAGNOSIS — D72.829 LEUKOCYTOSIS, UNSPECIFIED TYPE: ICD-10-CM

## 2025-04-08 DIAGNOSIS — R73.9 STEROID-INDUCED HYPERGLYCEMIA: ICD-10-CM

## 2025-04-08 DIAGNOSIS — C34.12 PRIMARY CANCER OF LEFT UPPER LOBE OF LUNG (HCC): Chronic | ICD-10-CM

## 2025-04-08 DIAGNOSIS — Z66 DNR (DO NOT RESUSCITATE): Chronic | ICD-10-CM

## 2025-04-08 DIAGNOSIS — M62.838 MUSCLE SPASMS OF BOTH LOWER EXTREMITIES: ICD-10-CM

## 2025-04-08 DIAGNOSIS — T40.2X5A OPIOID-INDUCED CONSTIPATION: ICD-10-CM

## 2025-04-08 DIAGNOSIS — Z87.891 FORMER SMOKER: ICD-10-CM

## 2025-04-08 DIAGNOSIS — J96.11 CHRONIC RESPIRATORY FAILURE WITH HYPOXIA (HCC): Chronic | ICD-10-CM

## 2025-04-08 DIAGNOSIS — I73.9 PERIPHERAL VASCULAR DISEASE (HCC): ICD-10-CM

## 2025-04-08 DIAGNOSIS — E78.5 DYSLIPIDEMIA: ICD-10-CM

## 2025-04-08 DIAGNOSIS — M85.88 OSTEOPENIA OF LUMBAR SPINE: ICD-10-CM

## 2025-04-08 DIAGNOSIS — E87.6 HYPOKALEMIA: ICD-10-CM

## 2025-04-08 DIAGNOSIS — Z91.81 AT RISK FOR FALLS: ICD-10-CM

## 2025-04-08 DIAGNOSIS — K21.9 GASTROESOPHAGEAL REFLUX DISEASE, UNSPECIFIED WHETHER ESOPHAGITIS PRESENT: ICD-10-CM

## 2025-04-08 DIAGNOSIS — T78.40XD ALLERGY, SUBSEQUENT ENCOUNTER: ICD-10-CM

## 2025-04-08 DIAGNOSIS — K92.2 LOWER GI BLEED: ICD-10-CM

## 2025-04-08 DIAGNOSIS — I70.0 ATHEROSCLEROSIS OF AORTA (HCC): ICD-10-CM

## 2025-04-08 DIAGNOSIS — K57.90 DIVERTICULOSIS: ICD-10-CM

## 2025-04-08 DIAGNOSIS — I10 HYPERTENSION, UNSPECIFIED TYPE: ICD-10-CM

## 2025-04-08 DIAGNOSIS — U07.1 COVID-19: ICD-10-CM

## 2025-04-08 DIAGNOSIS — K59.03 OPIOID-INDUCED CONSTIPATION: ICD-10-CM

## 2025-04-08 DIAGNOSIS — E04.1 THYROID NODULE: ICD-10-CM

## 2025-04-08 DIAGNOSIS — T38.0X5A STEROID-INDUCED HYPERGLYCEMIA: ICD-10-CM

## 2025-04-08 DIAGNOSIS — M47.816 FACET ARTHROPATHY, LUMBAR: ICD-10-CM

## 2025-04-08 DIAGNOSIS — E11.65 TYPE 2 DIABETES MELLITUS WITH HYPERGLYCEMIA, WITHOUT LONG-TERM CURRENT USE OF INSULIN (HCC): ICD-10-CM

## 2025-04-08 DIAGNOSIS — R41.3 AMNESIA: ICD-10-CM

## 2025-04-08 DIAGNOSIS — E55.9 VITAMIN D DEFICIENCY: ICD-10-CM

## 2025-04-08 DIAGNOSIS — R60.9 EDEMA, UNSPECIFIED TYPE: ICD-10-CM

## 2025-04-08 DIAGNOSIS — F13.20 SEDATIVE, HYPNOTIC, OR ANXIOLYTIC DEPENDENCE (HCC): ICD-10-CM

## 2025-04-08 DIAGNOSIS — J44.9 STAGE 4 VERY SEVERE COPD BY GOLD CLASSIFICATION (HCC): Chronic | ICD-10-CM

## 2025-04-08 DIAGNOSIS — F33.41 RECURRENT MAJOR DEPRESSIVE DISORDER, IN PARTIAL REMISSION (HCC): ICD-10-CM

## 2025-04-08 DIAGNOSIS — D50.0 IRON DEFICIENCY ANEMIA DUE TO CHRONIC BLOOD LOSS: ICD-10-CM

## 2025-04-08 DIAGNOSIS — R13.12 OROPHARYNGEAL DYSPHAGIA: ICD-10-CM

## 2025-04-08 DIAGNOSIS — R73.03 PRE-DIABETES: ICD-10-CM

## 2025-04-08 DIAGNOSIS — J44.1 ACUTE EXACERBATION OF CHRONIC OBSTRUCTIVE PULMONARY DISEASE (COPD) (HCC): ICD-10-CM

## 2025-04-08 DIAGNOSIS — K80.20 CALCULUS OF GALLBLADDER WITHOUT CHOLECYSTITIS WITHOUT OBSTRUCTION: ICD-10-CM

## 2025-04-08 DIAGNOSIS — J96.21 ACUTE ON CHRONIC RESPIRATORY FAILURE WITH HYPOXIA (HCC): ICD-10-CM

## 2025-04-08 DIAGNOSIS — R09.02 HYPOXIA: ICD-10-CM

## 2025-04-08 DIAGNOSIS — K64.9 HEMORRHOIDS, UNSPECIFIED HEMORRHOID TYPE: ICD-10-CM

## 2025-04-08 DIAGNOSIS — I48.91 ATRIAL FIBRILLATION, UNSPECIFIED TYPE (HCC): ICD-10-CM

## 2025-04-08 DIAGNOSIS — R52 PAIN MANAGEMENT: ICD-10-CM

## 2025-04-08 DIAGNOSIS — N39.46 MIXED STRESS AND URGE URINARY INCONTINENCE: ICD-10-CM

## 2025-04-08 DIAGNOSIS — F39 MOOD DISORDER (HCC): ICD-10-CM

## 2025-04-08 LAB
ALBUMIN SERPL BCP-MCNC: 3.5 G/DL (ref 3.2–4.9)
ALBUMIN/GLOB SERPL: 0.9 G/DL
ALP SERPL-CCNC: 47 U/L (ref 30–99)
ALT SERPL-CCNC: 9 U/L (ref 2–50)
ANION GAP SERPL CALC-SCNC: 12 MMOL/L (ref 7–16)
AST SERPL-CCNC: 19 U/L (ref 12–45)
BASOPHILS # BLD AUTO: 0.5 % (ref 0–1.8)
BASOPHILS # BLD: 0.07 K/UL (ref 0–0.12)
BILIRUB SERPL-MCNC: 0.4 MG/DL (ref 0.1–1.5)
BUN SERPL-MCNC: 13 MG/DL (ref 8–22)
CALCIUM ALBUM COR SERPL-MCNC: 9.9 MG/DL (ref 8.5–10.5)
CALCIUM SERPL-MCNC: 9.5 MG/DL (ref 8.5–10.5)
CHLORIDE SERPL-SCNC: 92 MMOL/L (ref 96–112)
CO2 SERPL-SCNC: 30 MMOL/L (ref 20–33)
CREAT SERPL-MCNC: 0.43 MG/DL (ref 0.5–1.4)
EKG IMPRESSION: NORMAL
EOSINOPHIL # BLD AUTO: 0.35 K/UL (ref 0–0.51)
EOSINOPHIL NFR BLD: 2.3 % (ref 0–6.9)
ERYTHROCYTE [DISTWIDTH] IN BLOOD BY AUTOMATED COUNT: 44.8 FL (ref 35.9–50)
FLUAV RNA SPEC QL NAA+PROBE: NEGATIVE
FLUBV RNA SPEC QL NAA+PROBE: NEGATIVE
GFR SERPLBLD CREATININE-BSD FMLA CKD-EPI: 98 ML/MIN/1.73 M 2
GLOBULIN SER CALC-MCNC: 4.1 G/DL (ref 1.9–3.5)
GLUCOSE BLD STRIP.AUTO-MCNC: 216 MG/DL (ref 65–99)
GLUCOSE SERPL-MCNC: 180 MG/DL (ref 65–99)
HCT VFR BLD AUTO: 38.3 % (ref 37–47)
HGB BLD-MCNC: 12.4 G/DL (ref 12–16)
IMM GRANULOCYTES # BLD AUTO: 0.06 K/UL (ref 0–0.11)
IMM GRANULOCYTES NFR BLD AUTO: 0.4 % (ref 0–0.9)
LYMPHOCYTES # BLD AUTO: 1.26 K/UL (ref 1–4.8)
LYMPHOCYTES NFR BLD: 8.2 % (ref 22–41)
MCH RBC QN AUTO: 29.2 PG (ref 27–33)
MCHC RBC AUTO-ENTMCNC: 32.4 G/DL (ref 32.2–35.5)
MCV RBC AUTO: 90.1 FL (ref 81.4–97.8)
MONOCYTES # BLD AUTO: 1.06 K/UL (ref 0–0.85)
MONOCYTES NFR BLD AUTO: 6.9 % (ref 0–13.4)
NEUTROPHILS # BLD AUTO: 12.59 K/UL (ref 1.82–7.42)
NEUTROPHILS NFR BLD: 81.7 % (ref 44–72)
NRBC # BLD AUTO: 0 K/UL
NRBC BLD-RTO: 0 /100 WBC (ref 0–0.2)
NT-PROBNP SERPL IA-MCNC: 53 PG/ML (ref 0–125)
PLATELET # BLD AUTO: 417 K/UL (ref 164–446)
PMV BLD AUTO: 9.1 FL (ref 9–12.9)
POTASSIUM SERPL-SCNC: 3.7 MMOL/L (ref 3.6–5.5)
PROCALCITONIN SERPL-MCNC: <0.05 NG/ML
PROT SERPL-MCNC: 7.6 G/DL (ref 6–8.2)
RBC # BLD AUTO: 4.25 M/UL (ref 4.2–5.4)
RSV RNA SPEC QL NAA+PROBE: NEGATIVE
SARS-COV-2 RNA RESP QL NAA+PROBE: NOTDETECTED
SODIUM SERPL-SCNC: 134 MMOL/L (ref 135–145)
TROPONIN T SERPL-MCNC: 9 NG/L (ref 6–19)
WBC # BLD AUTO: 15.4 K/UL (ref 4.8–10.8)

## 2025-04-08 PROCEDURE — 84145 PROCALCITONIN (PCT): CPT

## 2025-04-08 PROCEDURE — 96375 TX/PRO/DX INJ NEW DRUG ADDON: CPT

## 2025-04-08 PROCEDURE — 700111 HCHG RX REV CODE 636 W/ 250 OVERRIDE (IP): Mod: UD | Performed by: EMERGENCY MEDICINE

## 2025-04-08 PROCEDURE — 700111 HCHG RX REV CODE 636 W/ 250 OVERRIDE (IP): Mod: JZ,TB | Performed by: INTERNAL MEDICINE

## 2025-04-08 PROCEDURE — 0241U HCHG SARS-COV-2 COVID-19 NFCT DS RESP RNA 4 TRGT ED POC: CPT

## 2025-04-08 PROCEDURE — 80053 COMPREHEN METABOLIC PANEL: CPT

## 2025-04-08 PROCEDURE — 36415 COLL VENOUS BLD VENIPUNCTURE: CPT

## 2025-04-08 PROCEDURE — 94640 AIRWAY INHALATION TREATMENT: CPT

## 2025-04-08 PROCEDURE — 93005 ELECTROCARDIOGRAM TRACING: CPT | Mod: TC | Performed by: EMERGENCY MEDICINE

## 2025-04-08 PROCEDURE — 96374 THER/PROPH/DIAG INJ IV PUSH: CPT

## 2025-04-08 PROCEDURE — 82962 GLUCOSE BLOOD TEST: CPT | Mod: 91

## 2025-04-08 PROCEDURE — 700101 HCHG RX REV CODE 250: Mod: UD | Performed by: EMERGENCY MEDICINE

## 2025-04-08 PROCEDURE — 85025 COMPLETE CBC W/AUTO DIFF WBC: CPT

## 2025-04-08 PROCEDURE — 71045 X-RAY EXAM CHEST 1 VIEW: CPT

## 2025-04-08 PROCEDURE — 83880 ASSAY OF NATRIURETIC PEPTIDE: CPT

## 2025-04-08 PROCEDURE — 99223 1ST HOSP IP/OBS HIGH 75: CPT | Performed by: INTERNAL MEDICINE

## 2025-04-08 PROCEDURE — 99285 EMERGENCY DEPT VISIT HI MDM: CPT

## 2025-04-08 PROCEDURE — 700102 HCHG RX REV CODE 250 W/ 637 OVERRIDE(OP): Performed by: INTERNAL MEDICINE

## 2025-04-08 PROCEDURE — 84484 ASSAY OF TROPONIN QUANT: CPT

## 2025-04-08 PROCEDURE — 700101 HCHG RX REV CODE 250: Performed by: INTERNAL MEDICINE

## 2025-04-08 PROCEDURE — A9270 NON-COVERED ITEM OR SERVICE: HCPCS | Performed by: INTERNAL MEDICINE

## 2025-04-08 PROCEDURE — 96372 THER/PROPH/DIAG INJ SC/IM: CPT

## 2025-04-08 PROCEDURE — 770006 HCHG ROOM/CARE - MED/SURG/GYN SEMI*

## 2025-04-08 PROCEDURE — 93005 ELECTROCARDIOGRAM TRACING: CPT | Mod: TC

## 2025-04-08 RX ORDER — OMEPRAZOLE 20 MG/1
20 CAPSULE, DELAYED RELEASE ORAL DAILY
Status: DISCONTINUED | OUTPATIENT
Start: 2025-04-09 | End: 2025-04-16 | Stop reason: HOSPADM

## 2025-04-08 RX ORDER — AZITHROMYCIN 250 MG/1
250 TABLET, FILM COATED ORAL DAILY
Status: ON HOLD | COMMUNITY
End: 2025-04-16 | Stop reason: SDUPTHER

## 2025-04-08 RX ORDER — ALBUTEROL SULFATE 5 MG/ML
2.5 SOLUTION RESPIRATORY (INHALATION)
Status: DISCONTINUED | OUTPATIENT
Start: 2025-04-08 | End: 2025-04-10

## 2025-04-08 RX ORDER — GUAIFENESIN/DEXTROMETHORPHAN 100-10MG/5
10 SYRUP ORAL EVERY 6 HOURS PRN
Status: DISCONTINUED | OUTPATIENT
Start: 2025-04-08 | End: 2025-04-16 | Stop reason: HOSPADM

## 2025-04-08 RX ORDER — ONDANSETRON 4 MG/1
4 TABLET, ORALLY DISINTEGRATING ORAL EVERY 4 HOURS PRN
Status: DISCONTINUED | OUTPATIENT
Start: 2025-04-08 | End: 2025-04-16 | Stop reason: HOSPADM

## 2025-04-08 RX ORDER — DULOXETIN HYDROCHLORIDE 30 MG/1
60 CAPSULE, DELAYED RELEASE ORAL EVERY EVENING
Status: DISCONTINUED | OUTPATIENT
Start: 2025-04-08 | End: 2025-04-16 | Stop reason: HOSPADM

## 2025-04-08 RX ORDER — DEXTROSE MONOHYDRATE 25 G/50ML
25 INJECTION, SOLUTION INTRAVENOUS
Status: DISCONTINUED | OUTPATIENT
Start: 2025-04-08 | End: 2025-04-16 | Stop reason: HOSPADM

## 2025-04-08 RX ORDER — INSULIN LISPRO 100 [IU]/ML
1-6 INJECTION, SOLUTION INTRAVENOUS; SUBCUTANEOUS
Status: DISCONTINUED | OUTPATIENT
Start: 2025-04-08 | End: 2025-04-16 | Stop reason: HOSPADM

## 2025-04-08 RX ORDER — TRAMADOL HYDROCHLORIDE 50 MG/1
50 TABLET ORAL EVERY 6 HOURS PRN
Status: DISCONTINUED | OUTPATIENT
Start: 2025-04-08 | End: 2025-04-16 | Stop reason: HOSPADM

## 2025-04-08 RX ORDER — HYDRALAZINE HYDROCHLORIDE 20 MG/ML
10 INJECTION INTRAMUSCULAR; INTRAVENOUS EVERY 4 HOURS PRN
Status: DISCONTINUED | OUTPATIENT
Start: 2025-04-08 | End: 2025-04-16 | Stop reason: HOSPADM

## 2025-04-08 RX ORDER — LOSARTAN POTASSIUM 50 MG/1
50 TABLET ORAL 2 TIMES DAILY
Status: DISCONTINUED | OUTPATIENT
Start: 2025-04-08 | End: 2025-04-16 | Stop reason: HOSPADM

## 2025-04-08 RX ORDER — ACETAMINOPHEN 325 MG/1
650 TABLET ORAL EVERY 6 HOURS PRN
Status: DISCONTINUED | OUTPATIENT
Start: 2025-04-08 | End: 2025-04-16 | Stop reason: HOSPADM

## 2025-04-08 RX ORDER — IPRATROPIUM BROMIDE AND ALBUTEROL SULFATE 2.5; .5 MG/3ML; MG/3ML
3 SOLUTION RESPIRATORY (INHALATION) ONCE
Status: COMPLETED | OUTPATIENT
Start: 2025-04-08 | End: 2025-04-08

## 2025-04-08 RX ORDER — AMLODIPINE BESYLATE 5 MG/1
5 TABLET ORAL DAILY
Status: DISCONTINUED | OUTPATIENT
Start: 2025-04-09 | End: 2025-04-16 | Stop reason: HOSPADM

## 2025-04-08 RX ORDER — METHYLPREDNISOLONE SODIUM SUCCINATE 40 MG/ML
40 INJECTION, POWDER, LYOPHILIZED, FOR SOLUTION INTRAMUSCULAR; INTRAVENOUS DAILY
Status: DISCONTINUED | OUTPATIENT
Start: 2025-04-08 | End: 2025-04-09

## 2025-04-08 RX ORDER — DEXAMETHASONE SODIUM PHOSPHATE 4 MG/ML
4 INJECTION, SOLUTION INTRA-ARTICULAR; INTRALESIONAL; INTRAMUSCULAR; INTRAVENOUS; SOFT TISSUE ONCE
Status: COMPLETED | OUTPATIENT
Start: 2025-04-08 | End: 2025-04-08

## 2025-04-08 RX ORDER — MONTELUKAST SODIUM 10 MG/1
10 TABLET ORAL DAILY
Status: DISCONTINUED | OUTPATIENT
Start: 2025-04-09 | End: 2025-04-16 | Stop reason: HOSPADM

## 2025-04-08 RX ORDER — IPRATROPIUM BROMIDE AND ALBUTEROL SULFATE 2.5; .5 MG/3ML; MG/3ML
3 SOLUTION RESPIRATORY (INHALATION)
Status: DISCONTINUED | OUTPATIENT
Start: 2025-04-08 | End: 2025-04-09

## 2025-04-08 RX ORDER — ONDANSETRON 2 MG/ML
4 INJECTION INTRAMUSCULAR; INTRAVENOUS EVERY 4 HOURS PRN
Status: DISCONTINUED | OUTPATIENT
Start: 2025-04-08 | End: 2025-04-16 | Stop reason: HOSPADM

## 2025-04-08 RX ORDER — DIGOXIN 125 MCG
125 TABLET ORAL EVERY EVENING
Status: DISCONTINUED | OUTPATIENT
Start: 2025-04-08 | End: 2025-04-16 | Stop reason: HOSPADM

## 2025-04-08 RX ADMIN — DULOXETINE 60 MG: 30 CAPSULE, DELAYED RELEASE ORAL at 17:50

## 2025-04-08 RX ADMIN — DIGOXIN 125 MCG: 0.25 TABLET ORAL at 17:49

## 2025-04-08 RX ADMIN — METHYLPREDNISOLONE SODIUM SUCCINATE 40 MG: 40 INJECTION, POWDER, FOR SOLUTION INTRAMUSCULAR; INTRAVENOUS at 17:16

## 2025-04-08 RX ADMIN — INSULIN LISPRO 2 UNITS: 100 INJECTION, SOLUTION INTRAVENOUS; SUBCUTANEOUS at 17:48

## 2025-04-08 RX ADMIN — LOSARTAN POTASSIUM 50 MG: 50 TABLET, FILM COATED ORAL at 17:50

## 2025-04-08 RX ADMIN — IPRATROPIUM BROMIDE AND ALBUTEROL SULFATE 3 ML: .5; 2.5 SOLUTION RESPIRATORY (INHALATION) at 13:57

## 2025-04-08 RX ADMIN — INSULIN LISPRO 2 UNITS: 100 INJECTION, SOLUTION INTRAVENOUS; SUBCUTANEOUS at 22:43

## 2025-04-08 RX ADMIN — IPRATROPIUM BROMIDE AND ALBUTEROL SULFATE 3 ML: .5; 2.5 SOLUTION RESPIRATORY (INHALATION) at 19:44

## 2025-04-08 RX ADMIN — RIVAROXABAN 10 MG: 10 TABLET, FILM COATED ORAL at 17:50

## 2025-04-08 RX ADMIN — DEXAMETHASONE SODIUM PHOSPHATE 4 MG: 4 INJECTION INTRA-ARTICULAR; INTRALESIONAL; INTRAMUSCULAR; INTRAVENOUS; SOFT TISSUE at 13:49

## 2025-04-08 SDOH — ECONOMIC STABILITY - INCOME SECURITY: PROBLEM RELATED TO HOUSING AND ECONOMIC CIRCUMSTANCES, UNSPECIFIED: Z59.9

## 2025-04-08 ASSESSMENT — COGNITIVE AND FUNCTIONAL STATUS - GENERAL
WALKING IN HOSPITAL ROOM: A LITTLE
MOBILITY SCORE: 21
DAILY ACTIVITIY SCORE: 24
CLIMB 3 TO 5 STEPS WITH RAILING: A LOT
SUGGESTED CMS G CODE MODIFIER MOBILITY: CJ
SUGGESTED CMS G CODE MODIFIER DAILY ACTIVITY: CH

## 2025-04-08 ASSESSMENT — ENCOUNTER SYMPTOMS
DEPRESSION: 0
SPUTUM PRODUCTION: 1
COUGH: 1
ABDOMINAL PAIN: 0
TINGLING: 0
CHILLS: 0
FEVER: 0
WEAKNESS: 0
PALPITATIONS: 0
MYALGIAS: 0
VOMITING: 0
NAUSEA: 0
STRIDOR: 0
SHORTNESS OF BREATH: 1
FALLS: 0
HEADACHES: 0
LOSS OF CONSCIOUSNESS: 0
DIARRHEA: 0
DIZZINESS: 0
CONSTIPATION: 0

## 2025-04-08 ASSESSMENT — LIFESTYLE VARIABLES
HAVE YOU EVER FELT YOU SHOULD CUT DOWN ON YOUR DRINKING: NO
EVER HAD A DRINK FIRST THING IN THE MORNING TO STEADY YOUR NERVES TO GET RID OF A HANGOVER: NO
TOTAL SCORE: 0
HAVE PEOPLE ANNOYED YOU BY CRITICIZING YOUR DRINKING: NO
HOW MANY TIMES IN THE PAST YEAR HAVE YOU HAD 5 OR MORE DRINKS IN A DAY: 0
TOTAL SCORE: 0
EVER FELT BAD OR GUILTY ABOUT YOUR DRINKING: NO
TOTAL SCORE: 0
CONSUMPTION TOTAL: NEGATIVE
ON A TYPICAL DAY WHEN YOU DRINK ALCOHOL HOW MANY DRINKS DO YOU HAVE: 0
AVERAGE NUMBER OF DAYS PER WEEK YOU HAVE A DRINK CONTAINING ALCOHOL: 0
ALCOHOL_USE: NO

## 2025-04-08 ASSESSMENT — SOCIAL DETERMINANTS OF HEALTH (SDOH)
WITHIN THE LAST YEAR, HAVE TO BEEN RAPED OR FORCED TO HAVE ANY KIND OF SEXUAL ACTIVITY BY YOUR PARTNER OR EX-PARTNER?: NO
WITHIN THE LAST YEAR, HAVE YOU BEEN HUMILIATED OR EMOTIONALLY ABUSED IN OTHER WAYS BY YOUR PARTNER OR EX-PARTNER?: NO
WITHIN THE LAST YEAR, HAVE YOU BEEN KICKED, HIT, SLAPPED, OR OTHERWISE PHYSICALLY HURT BY YOUR PARTNER OR EX-PARTNER?: NO
IN THE PAST 12 MONTHS, HAS THE ELECTRIC, GAS, OIL, OR WATER COMPANY THREATENED TO SHUT OFF SERVICE IN YOUR HOME?: NO
WITHIN THE LAST YEAR, HAVE TO BEEN RAPED OR FORCED TO HAVE ANY KIND OF SEXUAL ACTIVITY BY YOUR PARTNER OR EX-PARTNER?: NO
WITHIN THE PAST 12 MONTHS, THE FOOD YOU BOUGHT JUST DIDN'T LAST AND YOU DIDN'T HAVE MONEY TO GET MORE: NEVER TRUE
WITHIN THE LAST YEAR, HAVE YOU BEEN HUMILIATED OR EMOTIONALLY ABUSED IN OTHER WAYS BY YOUR PARTNER OR EX-PARTNER?: NO
WITHIN THE LAST YEAR, HAVE YOU BEEN AFRAID OF YOUR PARTNER OR EX-PARTNER?: NO
WITHIN THE LAST YEAR, HAVE YOU BEEN KICKED, HIT, SLAPPED, OR OTHERWISE PHYSICALLY HURT BY YOUR PARTNER OR EX-PARTNER?: NO
WITHIN THE LAST YEAR, HAVE YOU BEEN AFRAID OF YOUR PARTNER OR EX-PARTNER?: NO
WITHIN THE PAST 12 MONTHS, YOU WORRIED THAT YOUR FOOD WOULD RUN OUT BEFORE YOU GOT THE MONEY TO BUY MORE: NEVER TRUE

## 2025-04-08 ASSESSMENT — PATIENT HEALTH QUESTIONNAIRE - PHQ9
9. THOUGHTS THAT YOU WOULD BE BETTER OFF DEAD, OR OF HURTING YOURSELF: NOT AT ALL
4. FEELING TIRED OR HAVING LITTLE ENERGY: NEARLY EVERY DAY
6. FEELING BAD ABOUT YOURSELF - OR THAT YOU ARE A FAILURE OR HAVE LET YOURSELF OR YOUR FAMILY DOWN: NOT AL ALL
8. MOVING OR SPEAKING SO SLOWLY THAT OTHER PEOPLE COULD HAVE NOTICED. OR THE OPPOSITE, BEING SO FIGETY OR RESTLESS THAT YOU HAVE BEEN MOVING AROUND A LOT MORE THAN USUAL: NOT AT ALL
2. FEELING DOWN, DEPRESSED, IRRITABLE, OR HOPELESS: NEARLY EVERY DAY
7. TROUBLE CONCENTRATING ON THINGS, SUCH AS READING THE NEWSPAPER OR WATCHING TELEVISION: NOT AT ALL
5. POOR APPETITE OR OVEREATING: NEARLY EVERY DAY
SUM OF ALL RESPONSES TO PHQ QUESTIONS 1-9: 15
1. LITTLE INTEREST OR PLEASURE IN DOING THINGS: NEARLY EVERY DAY
SUM OF ALL RESPONSES TO PHQ9 QUESTIONS 1 AND 2: 6
3. TROUBLE FALLING OR STAYING ASLEEP OR SLEEPING TOO MUCH: NEARLY EVERY DAY

## 2025-04-08 ASSESSMENT — FIBROSIS 4 INDEX: FIB4 SCORE: 1.57

## 2025-04-08 ASSESSMENT — PAIN DESCRIPTION - PAIN TYPE: TYPE: ACUTE PAIN

## 2025-04-08 NOTE — ED PROVIDER NOTES
ED Provider Note    CHIEF COMPLAINT  Chief Complaint   Patient presents with    Shortness of Breath     Denies CP.    Wheezing     Inspiratory and expiratory.       EXTERNAL RECORDS REVIEWED  Reviewed medication list baseline laboratory studies previous admission and discharge summaries  HPI/ROS  LIMITATION TO HISTORY   Select: : None  OUTSIDE HISTORIAN(S):  Patient's friend provided additional history    Berny Renee is a 79 y.o. female who presents for evaluation of cough wheezing shortness of breath.  The patient has a complex medical history as listed below.  This includes end-stage COPD/emphysema with high oxygen dependence.  She has had several days of increasing cough and wheezing.  EMS noted the patient was hypoxic at her baseline bumped up to around 10 L and administer DuoNeb treatment prior to arrival.  She denies chest pain or hemoptysis no leg swelling.  No high fever    PAST MEDICAL HISTORY   has a past medical history of Acute on chronic respiratory failure with hypoxia ([facility]) (11/14/2020), Arthritis, Asthma with COPD ([facility]), BMI 31.0-31.9,adult (02/04/2022), Cataract immature, Chronic pain, Chronic respiratory failure with hypoxia ([facility]) (07/13/2017), Colon polyps, COPD (chronic obstructive pulmonary disease) ([facility]) (2002), Cough, DDD (degenerative disc disease), cervical, DDD (degenerative disc disease), thoracic, Dependence on continuous supplemental oxygen (08/13/2015), Diverticulitis of colon, Dyslipidemia, EMPHYSEMA, H/O opioid abuse ([facility]) (07/15/2021), Hypertension, Obesity (BMI 30-39.9) (10/24/2016), Opioid type dependence, continuous ([facility]) (02/04/2022), REGINE (obstructive sleep apnea), OSTEOPOROSIS, Painful breathing, Shortness of breath, Spinal stenosis, lumbar region, with neurogenic claudication, Sputum production, URINARY INCONTINENCE, Varicose veins of left leg with edema (10/11/2024), Vitamin d deficiency, and Wheezing.    SURGICAL HISTORY   has a past surgical history that includes  tonsillectomy; other orthopedic surgery (); other; other; lumbar laminectomy diskectomy (2010); upper gi endoscopy,biopsy (N/A, 11/15/2024); and colonoscopy with polyp (N/A, 11/15/2024).    FAMILY HISTORY  Family History   Problem Relation Age of Onset    Cancer Father         Lung    Other Sister         lung and leukemia cancer    Cancer Sister         Leukemia, Lung       SOCIAL HISTORY  Social History     Tobacco Use    Smoking status: Former     Current packs/day: 0.00     Average packs/day: 2.0 packs/day for 30.0 years (60.0 ttl pk-yrs)     Types: Cigarettes     Start date: 3/1/1969     Quit date: 3/1/1999     Years since quittin.1    Smokeless tobacco: Never    Tobacco comments:     3 pks a day for 35 yrs, continued abstinence   Vaping Use    Vaping status: Never Used   Substance and Sexual Activity    Alcohol use: Not Currently     Alcohol/week: 4.2 oz     Types: 7 Glasses of wine per week    Drug use: Not Currently     Types: Marijuana, Oral     Comment: Medical Marijuana     Sexual activity: Never   Former smoker    CURRENT MEDICATIONS  Home Medications       Reviewed by Dianne Christensen R.N. (Registered Nurse) on 25 at 1249  Med List Status: Partial     Medication Last Dose Status   acetaminophen (TYLENOL) 500 MG Tab  Active   acyclovir (ZOVIRAX) 200 MG Cap  Active   acyclovir (ZOVIRAX) 200 MG Cap  Active   albuterol 108 (90 Base) MCG/ACT Aero Soln inhalation aerosol  Active   Albuterol Sulfate 108 (90 Base) MCG/ACT AEROSOL POWDER, BREATH ACTIVATED  Active   amLODIPine (NORVASC) 5 MG Tab  Active   amLODIPine (NORVASC) 5 MG Tab  Active   benzocaine-menthol (CEPACOL) 15-3.6 MG Lozenge  Active   benzonatate (TESSALON) 100 MG Cap  Active   Budeson-Glycopyrrol-Formoterol (BREZTRI AEROSPHERE) 160-9-4.8 MCG/ACT Aerosol  Active   Budeson-Glycopyrrol-Formoterol 160-9-4.8 MCG/ACT Aerosol  Active   Calcium Carbonate (CALCIUM 500 PO)  Active   diclofenac sodium (VOLTAREN) 1 % Gel  Active    diclofenac sodium (VOLTAREN) 1 % Gel  Active   digoxin (LANOXIN) 125 MCG Tab  Active   digoxin (LANOXIN) 125 MCG Tab  Active   diphenhydrAMINE-APAP, sleep, (TYLENOL PM EXTRA STRENGTH)  MG Tab  Active   docusate sodium (COLACE) 100 MG Cap  Active   DULoxetine (CYMBALTA) 60 MG Cap DR Particles delayed-release capsule  Active   DULoxetine (CYMBALTA) 60 MG Cap DR Particles delayed-release capsule  Active   fexofenadine (HM FEXOFENADINE HCL) 180 MG tablet  Active   fluticasone-umeclidinium-vilanterol (TRELEGY ELLIPTA) 200-62.5-25 mcg/act inhaler  Active   guaiFENesin dextromethorphan (ROBITUSSIN DM) 100-10 MG/5ML Syrup syrup  Active   Home Care Oxygen  Active   ipratropium-albuterol (DUONEB) 0.5-2.5 (3) MG/3ML nebulizer solution  Active   losartan (COZAAR) 50 MG Tab  Active   losartan (COZAAR) 50 MG Tab  Active   melatonin 5 mg Tab  Active   meloxicam (MOBIC) 7.5 MG Tab  Active   meloxicam (MOBIC) 7.5 MG Tab  Active   montelukast (SINGULAIR) 10 MG Tab  Active   montelukast (SINGULAIR) 10 MG Tab  Active   omeprazole (PRILOSEC) 20 MG delayed-release capsule  Active   potassium chloride (MICRO-K) 10 MEQ capsule  Active   potassium chloride (MICRO-K) 10 MEQ capsule  Active   predniSONE (DELTASONE) 20 MG Tab  Active   predniSONE (DELTASONE) 5 MG Tab  Active   sodium chloride (OCEAN) 0.65 % Solution  Active   spironolactone (ALDACTONE) 25 MG Tab  Active   spironolactone (ALDACTONE) 50 MG Tab  Active   spironolactone (ALDACTONE) 50 MG Tab  Active   tizanidine (ZANAFLEX) 4 MG Tab  Active   traMADol (ULTRAM) 50 MG Tab  Active   triamcinolone (NASACORT) 55 MCG/ACT nasal inhaler  Active                  Audit from Redirected Encounters    **Home medications have not yet been reviewed for this encounter**         ALLERGIES  Allergies   Allergen Reactions    Iodine      Convulsion  I.V.  iodine    Tape Rash and Itching       PHYSICAL EXAM  VITAL SIGNS: BP (!) 165/74   Pulse 87   Temp 36.9 °C (98.4 °F) (Temporal)   Resp (!)  "22   Ht 1.626 m (5' 4\")   Wt 68 kg (150 lb)   LMP 06/07/2001   SpO2 92%   BMI 25.75 kg/m²    Pulse ox interpretation: Initially hypoxic on baseline oxygen requirement  Constitutional: Alert and oriented x 3, moderate respiratory distress  HEENT: Atraumatic normocephalic, pupils are equal round reactive to light extraocular movements are intact. The nares is clear, external ears are normal, mouth shows moist mucous membranes normal dentition for age  Neck: Supple, no JVD no tracheal deviation  Cardiovascular: Regular rate and rhythm no murmur rub or gallop 2+ pulses peripherally x4  Thorax & Lungs: Increased work of breathing wheezing and tachypnea noted  GI: Soft nontender nondistended positive bowel sounds, no peritoneal signs  Skin: Warm dry no acute rash or lesion  Musculoskeletal: Moving all extremities with full range and 5 of 5 strength no acute  deformity no pedal edema  Neurologic: Cranial nerves III through XII are grossly intact no sensory deficit no cerebellar dysfunction   Psychiatric: Appropriate affect for situation at this time          EKG/LABS  Results for orders placed or performed during the hospital encounter of 04/08/25   CBC WITH DIFFERENTIAL    Collection Time: 04/08/25  1:25 PM   Result Value Ref Range    WBC 15.4 (H) 4.8 - 10.8 K/uL    RBC 4.25 4.20 - 5.40 M/uL    Hemoglobin 12.4 12.0 - 16.0 g/dL    Hematocrit 38.3 37.0 - 47.0 %    MCV 90.1 81.4 - 97.8 fL    MCH 29.2 27.0 - 33.0 pg    MCHC 32.4 32.2 - 35.5 g/dL    RDW 44.8 35.9 - 50.0 fL    Platelet Count 417 164 - 446 K/uL    MPV 9.1 9.0 - 12.9 fL    Neutrophils-Polys 81.70 (H) 44.00 - 72.00 %    Lymphocytes 8.20 (L) 22.00 - 41.00 %    Monocytes 6.90 0.00 - 13.40 %    Eosinophils 2.30 0.00 - 6.90 %    Basophils 0.50 0.00 - 1.80 %    Immature Granulocytes 0.40 0.00 - 0.90 %    Nucleated RBC 0.00 0.00 - 0.20 /100 WBC    Neutrophils (Absolute) 12.59 (H) 1.82 - 7.42 K/uL    Lymphs (Absolute) 1.26 1.00 - 4.80 K/uL    Monos (Absolute) 1.06 " (H) 0.00 - 0.85 K/uL    Eos (Absolute) 0.35 0.00 - 0.51 K/uL    Baso (Absolute) 0.07 0.00 - 0.12 K/uL    Immature Granulocytes (abs) 0.06 0.00 - 0.11 K/uL    NRBC (Absolute) 0.00 K/uL   Comp Metabolic Panel    Collection Time: 04/08/25  1:25 PM   Result Value Ref Range    Sodium 134 (L) 135 - 145 mmol/L    Potassium 3.7 3.6 - 5.5 mmol/L    Chloride 92 (L) 96 - 112 mmol/L    Co2 30 20 - 33 mmol/L    Anion Gap 12.0 7.0 - 16.0    Glucose 180 (H) 65 - 99 mg/dL    Bun 13 8 - 22 mg/dL    Creatinine 0.43 (L) 0.50 - 1.40 mg/dL    Calcium 9.5 8.5 - 10.5 mg/dL    Correct Calcium 9.9 8.5 - 10.5 mg/dL    AST(SGOT) 19 12 - 45 U/L    ALT(SGPT) 9 2 - 50 U/L    Alkaline Phosphatase 47 30 - 99 U/L    Total Bilirubin 0.4 0.1 - 1.5 mg/dL    Albumin 3.5 3.2 - 4.9 g/dL    Total Protein 7.6 6.0 - 8.2 g/dL    Globulin 4.1 (H) 1.9 - 3.5 g/dL    A-G Ratio 0.9 g/dL   TROPONIN    Collection Time: 04/08/25  1:25 PM   Result Value Ref Range    Troponin T 9 6 - 19 ng/L   proBrain Natriuretic Peptide, NT    Collection Time: 04/08/25  1:25 PM   Result Value Ref Range    NT-proBNP 53 0 - 125 pg/mL   PROCALCITONIN    Collection Time: 04/08/25  1:25 PM   Result Value Ref Range    Procalcitonin <0.05 <0.25 ng/mL   ESTIMATED GFR    Collection Time: 04/08/25  1:25 PM   Result Value Ref Range    GFR (CKD-EPI) 98 >60 mL/min/1.73 m 2   POC CoV-2, FLU A/B, RSV by PCR    Collection Time: 04/08/25  2:00 PM   Result Value Ref Range    POC Influenza A RNA, PCR Negative Negative    POC Influenza B RNA, PCR Negative Negative    POC RSV, by PCR Negative Negative    POC SARS-CoV-2, PCR NotDetected NotDetected     *Note: Due to a large number of results and/or encounters for the requested time period, some results have not been displayed. A complete set of results can be found in Results Review.      I have independently interpreted this EKG    RADIOLOGY/PROCEDURES   I have independently interpreted the diagnostic imaging associated with this visit and am waiting  the final reading from the radiologist.   My preliminary interpretation is as follows: Chronic interstitial changes possible pneumonia developing    Radiologist interpretation:  DX-CHEST-PORTABLE (1 VIEW)   Final Result      Chronic interstitial changes again noted. A possible small new ill-defined opacity in the right midlung could represent a small infiltrate. Recommend follow-up after treatment.          COURSE & MEDICAL DECISION MAKING    ASSESSMENT, COURSE AND PLAN  Care Narrative:     This is a very pleasant 79-year-old female who has a history of advanced COPD as well as oxygen dependence.  She presents here with increasing cough and shortness of breath.  She was immediately brought back to resuscitation room.  We administered IV steroids as well as a DuoNeb.  She already received some treatment in the field.  Here she has no markers to suggest sepsis.  She has mild leukocytosis but no bandemia procalcitonin is normal.  Metabolic panel BNP and troponin are normal.  I considered but did not feel that BiPAP was required.  She is DNR/DNI and given her very advanced age as well as severe end-stage COPD and DNI status intubation was not a consideration.  After treatment she did improve and her work of breathing subsided.  She will be admitted to the hospitalist service.  I considered but did not feel that antibiotics are clinically indicated as her procalcitonin is normal and there is no clinical evidence of bacterial infection          ADDITIONAL PROBLEMS MANAGED      DISPOSITION AND DISCUSSIONS  I have discussed management of the patient with the following physicians and LINDA's: Discussed plan of care with admitting hospitalist    Discussion of management with other QHP or appropriate source(s): Discussed treatment plan with respiratory therapist    Escalation of care considered, and ultimately not performed: Considered BiPAP    Barriers to care at this time, including but not limited to: None.     Decision tools  and prescription drugs considered including, but not limited to: None.    FINAL DIAGNOSIS  1. Hypoxia    2. Acute exacerbation of chronic obstructive pulmonary disease (COPD) (HCC)         Electronically signed by: Raudel Barbosa M.D., 4/8/2025 2:10 PM

## 2025-04-08 NOTE — ED TRIAGE NOTES
Chief Complaint   Patient presents with    Shortness of Breath     Denies CP.    Wheezing     Inspiratory and expiratory.     Patient BIB EMS form home with above complaints, rescue inhaler at home with no improvement.     PTA PIV placed, medication administered:  mL, Solumedrol 125 mg, Albuterol x1, Duo neb x2 - with moderate improvement.  FSBG 179.    Patient A&O, speaking in full sentences, respirations even and unlabored; reports hx end stage COPD - home O2 baseline 8L via NC.     [de-identified] : 60 y/o female with severe right worse than left hip osteoarthritis, symptoms refractory to conservative management. \par - She is indicated at this time for right total hip arthroplasty. \par - We discussed the details of the procedure, the expected recovery period, and the expected outcome. We discussed the likelihood of satisfaction after complete recovery, and the potential causes of dissatisfaction. The importance of active patient participation in the rehabilitation protocol was emphasized, along with its influence on short and long-term outcomes. We discussed the risks, benefits, and alternatives of surgery at length. Specific risks of total hip replacement were discussed in detail. We discussed the risk of surgical site complications including but not limited to: surgical site infection, wound healing complications, bone fracture, tendon or ligament injury, neurovascular injury, hemorrhage, postoperative stiffness or instability, persistent pain, limb length discrepancy, and need for reoperation. We discussed surgical blood loss and the possible need for blood transfusion. We discussed the risk of perioperative medical complications, including but not limited to catheter-associated urinary tract infection, venous thromboembolism and other cardiopulmonary complications. We discussed anesthetic options and the risk of anesthesia-related complications. We discussed the potential benefits of surgery including the potential to improve the current clinical condition through operative intervention. I emphasized that there are alternatives to surgical intervention including continued conservative management, though such a course could yield less than optimal results in this particular patient. A model was used to demonstrate the operation and to discuss bearing surfaces of the implants. We discussed implant fixation methods; my plan would be to use fully cementless fixation in this case. We discussed the various surgical approaches to the hip; I think that an anterior approach would be appropriate in this case. We discussed the durability of prosthetic hips and limitations related to wear, osteolysis and loosening. All questions were answered to the patient's satisfaction. The patient was given a copy of my preoperative packet with additional information about the procedure. I asked the patient to either call back or schedule a followup appointment for any additional questions or concerns regarding the procedure. \par - She is interested in pursuing surgical management but does not think she will be available to receive such an operation until the end of April 2023. She is interested in attempting to receive symptomatic relief in the meantime with a right hip CSI. I do think that this is reasonable. \par - We discussed the merits and relative risks of CSI to the hip. \par - I will refer her to IR to have this done as soon as possible. \par - She will f/u in 2 months at which time we can discuss surgical booking, provided 90 days from the injection.

## 2025-04-08 NOTE — ED NOTES
Med Rec completed per patient   Allergies reviewed    Patient takes Azithromycin 250 mg daily   Patient takes Prednisone 5 mg daily     Dispense history available in EPIC? Yes    Patient is not taking anticoagulants

## 2025-04-08 NOTE — ED NOTES
Patient updated on POC, awaiting admit room assignment, no questions.  Friend at bedside, call light within reach, educated to call for assist, verbalized understanding.

## 2025-04-08 NOTE — H&P
Hospital Medicine History & Physical Note    Date of Service  4/8/2025    Primary Care Physician  Everett Diego M.D.    Consultants  None    Specialist Names: None    Code Status  DNAR/DNI    Chief Complaint  Chief Complaint   Patient presents with    Shortness of Breath     Denies CP.    Wheezing     Inspiratory and expiratory.       History of Presenting Illness  Berny Renee is a 79 y.o. female who presented 4/8/2025 with shortness of breath.  Patient does have end-stage COPD with high oxygen requirements.  She has had several days of worsening shortness of breath, productive cough and wheezing.  Because of this, she called 911, EMS noted her to be hypoxic and increased her oxygen to 10 L and gave DuoNeb treatment.  Patient continues to be short of breath and requiring increased oxygen.  Patient was given IV steroids and additional breathing treatments.  Patient is DNR.  I did discuss the case including labs and imaging with the ER physician.    I discussed the plan of care with patient.    Review of Systems  Review of Systems   Constitutional:  Positive for malaise/fatigue. Negative for chills and fever.   HENT:  Negative for congestion.    Respiratory:  Positive for cough, sputum production and shortness of breath. Negative for stridor.    Cardiovascular:  Negative for chest pain, palpitations and leg swelling.   Gastrointestinal:  Negative for abdominal pain, constipation, diarrhea, nausea and vomiting.   Genitourinary:  Negative for dysuria and urgency.   Musculoskeletal:  Negative for falls and myalgias.   Neurological:  Negative for dizziness, tingling, loss of consciousness, weakness and headaches.   Psychiatric/Behavioral:  Negative for depression and suicidal ideas.    All other systems reviewed and are negative.      Past Medical History   has a past medical history of Acute on chronic respiratory failure with hypoxia (HCC) (11/14/2020), Arthritis, Asthma with COPD ([facility]), BMI 31.0-31.9,adult  (02/04/2022), Cataract immature, Chronic pain, Chronic respiratory failure with hypoxia ([facility]) (07/13/2017), Colon polyps, COPD (chronic obstructive pulmonary disease) ([facility]) (2002), Cough, DDD (degenerative disc disease), cervical, DDD (degenerative disc disease), thoracic, Dependence on continuous supplemental oxygen (08/13/2015), Diverticulitis of colon, Dyslipidemia, EMPHYSEMA, H/O opioid abuse ([facility]) (07/15/2021), Hypertension, Obesity (BMI 30-39.9) (10/24/2016), Opioid type dependence, continuous ([facility]) (02/04/2022), REGINE (obstructive sleep apnea), OSTEOPOROSIS, Painful breathing, Shortness of breath, Spinal stenosis, lumbar region, with neurogenic claudication, Sputum production, URINARY INCONTINENCE, Varicose veins of left leg with edema (10/11/2024), Vitamin d deficiency, and Wheezing.    Surgical History   has a past surgical history that includes tonsillectomy; other orthopedic surgery (2004); other; other; lumbar laminectomy diskectomy (6/22/2010); pr upper gi endoscopy,biopsy (N/A, 11/15/2024); and colonoscopy with polyp (N/A, 11/15/2024).     Family History  family history includes Cancer in her father and sister; Other in her sister.   Family history reviewed with patient. There is no family history that is pertinent to the chief complaint.     Social History   reports that she quit smoking about 26 years ago. Her smoking use included cigarettes. She started smoking about 56 years ago. She has a 60 pack-year smoking history. She has never used smokeless tobacco. She reports that she does not currently use alcohol after a past usage of about 4.2 oz of alcohol per week. She reports that she does not currently use drugs after having used the following drugs: Marijuana and Oral.    Allergies  Allergies   Allergen Reactions    Iodine      Convulsion  I.V.  iodine    Tape Rash and Itching       Medications  Prior to Admission Medications   Prescriptions Last Dose Informant Patient Reported? Taking?   Albuterol  Sulfate 108 (90 Base) MCG/ACT AEROSOL POWDER, BREATH ACTIVATED  Patient No No   Sig: Inhale 2 puffs by mouth 4 times a day as needed (shortness of breath).   Budeson-Glycopyrrol-Formoterol (BREZTRI AEROSPHERE) 160-9-4.8 MCG/ACT Aerosol   No No   Sig: Inhale 2 Inhalations by mouth 2 times a day.   Budeson-Glycopyrrol-Formoterol 160-9-4.8 MCG/ACT Aerosol   No No   Sig: Inhale 2 puffs by mouth twice daily   Calcium Carbonate (CALCIUM 500 PO)  Patient Yes No   Sig: Take 1 Tablet by mouth every evening. Indications: supplement   DULoxetine (CYMBALTA) 60 MG Cap DR Particles delayed-release capsule   No No   Sig: Take 1 capsule by mouth once daily   DULoxetine (CYMBALTA) 60 MG Cap DR Particles delayed-release capsule   No No   Sig: Take 1 capsule by mouth daily for   Home Care Oxygen  Patient Yes No   Sig: Inhale 8 L/min continuous.   acetaminophen (TYLENOL) 500 MG Tab  Patient Yes No   Sig: Take 500-1,000 mg by mouth every 6 hours as needed. Indications: Pain   acyclovir (ZOVIRAX) 200 MG Cap   No No   Sig: Take 1 capsule by mouth (at first sign of outbreak) three times a day 10 days   acyclovir (ZOVIRAX) 200 MG Cap   No No   Sig: Take 1 Capsule by mouth every day. Patient to take each day to prevent HSV outbreak.   albuterol 108 (90 Base) MCG/ACT Aero Soln inhalation aerosol   No No   Sig: Inhale 2 Puffs every 6 hours as needed for shortness of breath.   amLODIPine (NORVASC) 5 MG Tab  Patient No No   Sig: Take 1 Tablet by mouth every day.   amLODIPine (NORVASC) 5 MG Tab   No No   Sig: Take 1 tablet by mouth daily   benzocaine-menthol (CEPACOL) 15-3.6 MG Lozenge   No No   Sig: Dissolve 1 Lozenge in the mouth every 2 hours as needed (Throat pain).   benzonatate (TESSALON) 100 MG Cap   No No   Sig: Take 1 Capsule by mouth every 8 hours as needed for cough.   diclofenac sodium (VOLTAREN) 1 % Gel  Patient Yes No   Sig: Apply 2 g topically 2 times a day as needed (mild, moderate pain). Indications: mild, moderate pain    diclofenac sodium (VOLTAREN) 1 % Gel   No No   Sig: Apply to back, and joints every 12 hours as needed for pain   digoxin (LANOXIN) 125 MCG Tab  Patient No No   Sig: Take 1 Tablet by mouth every day. Indications: Atrial Fibrillation   digoxin (LANOXIN) 125 MCG Tab   No No   Sig: Take 1 tablet by mouth one time daily   diphenhydrAMINE-APAP, sleep, (TYLENOL PM EXTRA STRENGTH)  MG Tab  Patient Yes No   Sig: Take 2 Tablets by mouth at bedtime as needed (insomnia). Indications: insomnia   docusate sodium (COLACE) 100 MG Cap  Patient Yes No   Sig: Take 100 mg by mouth every 48 hours.   fexofenadine (HM FEXOFENADINE HCL) 180 MG tablet  Patient Yes No   Sig: Take 180 mg by mouth every evening. Indications: allergies   fluticasone-umeclidinium-vilanterol (TRELEGY ELLIPTA) 200-62.5-25 mcg/act inhaler   No No   Sig: Inhale 1 Puff by mouth every day.   guaiFENesin dextromethorphan (ROBITUSSIN DM) 100-10 MG/5ML Syrup syrup   No No   Sig: Take 10 mL by mouth every 6 hours as needed for Cough.   ipratropium-albuterol (DUONEB) 0.5-2.5 (3) MG/3ML nebulizer solution  Patient No No   Sig: Take 3 mL by nebulization every four hours as needed for Shortness of Breath (Wheezing).   losartan (COZAAR) 50 MG Tab  Patient No No   Sig: Take 1 Tablet by mouth 2 times a day. Indications: High Blood Pressure   losartan (COZAAR) 50 MG Tab   No No   Sig: Take 1 tablet by mouth twice daily   melatonin 5 mg Tab   No No   Sig: Take 1 Tablet by mouth every evening.   meloxicam (MOBIC) 7.5 MG Tab  Patient No No   Sig: Take 1 tablet by mouth once or twice a day   Patient taking differently: Take 7.5 mg by mouth 2 times a day. Indications: Joint Damage causing Pain and Loss of Function   meloxicam (MOBIC) 7.5 MG Tab   No No   Sig: Take 1 tablet by mouth daily for pain   montelukast (SINGULAIR) 10 MG Tab  Patient No No   Sig: Take 1 tablet by mouth once daily   montelukast (SINGULAIR) 10 MG Tab   No No   Sig: Take 1 tablet by mouth daily    omeprazole (PRILOSEC) 20 MG delayed-release capsule  Patient No No   Sig: Take 1 capsule by mouth every day   potassium chloride (MICRO-K) 10 MEQ capsule  Patient No No   Sig: Take 1 capsule by mouth 2 times a day with soft food.   potassium chloride (MICRO-K) 10 MEQ capsule   No No   Sig: Take 1 tablet by mouth twice daily with food   predniSONE (DELTASONE) 20 MG Tab   No No   Sig: Take 1 tablet by mouth in the morning   predniSONE (DELTASONE) 5 MG Tab   No No   Sig: Take 1 Tablet by mouth every day.   sodium chloride (OCEAN) 0.65 % Solution   No No   Sig: Administer 2 Sprays into affected nostril(S) every 2 hours as needed for Congestion.   spironolactone (ALDACTONE) 25 MG Tab   No No   Sig: Take 1 Tablet by mouth every day.   spironolactone (ALDACTONE) 50 MG Tab  Patient No No   Sig: Take 1 Tablet by mouth every day. Indications: Edema   spironolactone (ALDACTONE) 50 MG Tab   No No   Sig: Take 1 tablet by mouth daily   tizanidine (ZANAFLEX) 4 MG Tab  Patient Yes No   Sig: Take 4 mg by mouth 3 times a day as needed (muscle spasms). Indications: Musculoskeletal Pain   traMADol (ULTRAM) 50 MG Tab   No No   Sig: Take 1 Tablet by mouth 2 (two) times daily as needed for pain Start date 03/25/2025 refill must last 30 days Indications: disorder characterized by stiff, tender & painful muscles, neuropathic pain   triamcinolone (NASACORT) 55 MCG/ACT nasal inhaler  Patient Yes No   Sig: Administer 1 Spray into affected nostril(S) every day. Indications: Hayfever      Facility-Administered Medications: None       Physical Exam  Temp:  [36.9 °C (98.4 °F)] 36.9 °C (98.4 °F)  Pulse:  [81-88] 82  Resp:  [20-22] 22  BP: (132-165)/(65-74) 155/67  SpO2:  [91 %-97 %] 94 %  Blood Pressure : (!) 155/67   Temperature: 36.9 °C (98.4 °F)   Pulse: 82   Respiration: (!) 22   Pulse Oximetry: 94 %       Physical Exam  Vitals and nursing note reviewed.   Constitutional:       General: She is not in acute distress.     Appearance: She is  well-developed. She is ill-appearing. She is not diaphoretic.   HENT:      Head: Normocephalic and atraumatic.      Right Ear: External ear normal.      Left Ear: External ear normal.      Nose: Nose normal. No congestion or rhinorrhea.      Mouth/Throat:      Mouth: Mucous membranes are moist.      Pharynx: No oropharyngeal exudate.   Eyes:      General:         Right eye: No discharge.         Left eye: No discharge.   Neck:      Trachea: No tracheal deviation.   Cardiovascular:      Rate and Rhythm: Normal rate and regular rhythm.      Heart sounds: No murmur heard.     No friction rub. No gallop.   Pulmonary:      Effort: Pulmonary effort is normal. No respiratory distress.      Breath sounds: No stridor. Decreased breath sounds and wheezing present.   Chest:      Chest wall: No tenderness.   Abdominal:      General: Bowel sounds are normal. There is no distension.      Palpations: Abdomen is soft.      Tenderness: There is no abdominal tenderness.   Musculoskeletal:         General: No tenderness. Normal range of motion.      Cervical back: Neck supple.      Right lower leg: No edema.      Left lower leg: No edema.   Lymphadenopathy:      Cervical: No cervical adenopathy.   Skin:     General: Skin is warm and dry.      Findings: No erythema or rash.   Neurological:      Mental Status: She is alert and oriented to person, place, and time.      Cranial Nerves: No cranial nerve deficit.   Psychiatric:         Mood and Affect: Mood normal.         Behavior: Behavior normal.         Thought Content: Thought content normal.         Judgment: Judgment normal.         Laboratory:  Recent Labs     04/08/25  1325   WBC 15.4*   RBC 4.25   HEMOGLOBIN 12.4   HEMATOCRIT 38.3   MCV 90.1   MCH 29.2   MCHC 32.4   RDW 44.8   PLATELETCT 417   MPV 9.1     Recent Labs     04/08/25  1325   SODIUM 134*   POTASSIUM 3.7   CHLORIDE 92*   CO2 30   GLUCOSE 180*   BUN 13   CREATININE 0.43*   CALCIUM 9.5     Recent Labs     04/08/25  1325    ALTSGPT 9   ASTSGOT 19   ALKPHOSPHAT 47   TBILIRUBIN 0.4   GLUCOSE 180*         Recent Labs     04/08/25  1325   NTPROBNP 53         Recent Labs     04/08/25  1325   TROPONINT 9       Imaging:  DX-CHEST-PORTABLE (1 VIEW)   Final Result      Chronic interstitial changes again noted. A possible small new ill-defined opacity in the right midlung could represent a small infiltrate. Recommend follow-up after treatment.          X-Ray:  I have personally reviewed the images and compared with prior images.    Assessment/Plan:  Justification for Admission Status  I anticipate this patient will require at least two midnights for appropriate medical management, necessitating inpatient admission because end-stage COPD with acute on chronic respiratory failure with hypoxia    Patient will need a Med/Surg bed on MEDICAL service .  The need is secondary to end-stage COPD, acute on chronic respiratory failure with hypoxia.    * Stage 4 very severe COPD by GOLD classification ([facility])- (present on admission)  Assessment & Plan  With acute worsening  Due to the severity of her symptoms, will continue with IV Solu-Medrol  Unfortunately, does not take much for her to have an acute worsening, she was recently hospitalized with similar  No antibiotics at this time  Obtain procalcitonin  Patient remains DNR  Start respiratory care per protocol  Continuous pulse ox monitoring    Acute on chronic respiratory failure with hypoxia ([facility])- (present on admission)  Assessment & Plan  Patient does have end-stage COPD  No sign of bacterial infection, no antibiotics at this time  EMS noted significant hypoxia on her home oxygen, this was increased to 10, weaned back down to her baseline as able  Patient remains DNR    Type 2 diabetes mellitus with hyperglycemia, without long-term current use of insulin ([facility])- (present on admission)  Assessment & Plan  Start insulin sliding scale  Adjust as needed    GERD (gastroesophageal reflux disease)- (present  on admission)  Assessment & Plan  Continue home PPI    Leukocytosis- (present on admission)  Assessment & Plan  Significant leukocytosis, likely reactive  No additional sign of bacterial infection  Repeat CBC in the morning    Hypertension- (present on admission)  Assessment & Plan  Continue home amlodipine and losartan  Start as needed hydralazine  Adjust as needed    AF (atrial fibrillation) (HCC)- (present on admission)  Assessment & Plan  Currently in sinus rhythm, continue home digoxin  Patient is not on full anticoagulation, will not start at this time        VTE prophylaxis: SCDs/TEDs and Xarelto 10 mg daily as prophylaxis

## 2025-04-09 LAB
GLUCOSE BLD STRIP.AUTO-MCNC: 137 MG/DL (ref 65–99)
GLUCOSE BLD STRIP.AUTO-MCNC: 148 MG/DL (ref 65–99)
GLUCOSE BLD STRIP.AUTO-MCNC: 175 MG/DL (ref 65–99)
GLUCOSE BLD STRIP.AUTO-MCNC: 208 MG/DL (ref 65–99)
GLUCOSE BLD STRIP.AUTO-MCNC: 243 MG/DL (ref 65–99)
GRAM STN SPEC: NORMAL
SIGNIFICANT IND 70042: NORMAL
SITE SITE: NORMAL
SOURCE SOURCE: NORMAL

## 2025-04-09 PROCEDURE — 87077 CULTURE AEROBIC IDENTIFY: CPT

## 2025-04-09 PROCEDURE — 99232 SBSQ HOSP IP/OBS MODERATE 35: CPT | Performed by: STUDENT IN AN ORGANIZED HEALTH CARE EDUCATION/TRAINING PROGRAM

## 2025-04-09 PROCEDURE — 700102 HCHG RX REV CODE 250 W/ 637 OVERRIDE(OP): Performed by: STUDENT IN AN ORGANIZED HEALTH CARE EDUCATION/TRAINING PROGRAM

## 2025-04-09 PROCEDURE — 700101 HCHG RX REV CODE 250: Performed by: INTERNAL MEDICINE

## 2025-04-09 PROCEDURE — 87070 CULTURE OTHR SPECIMN AEROBIC: CPT

## 2025-04-09 PROCEDURE — 770006 HCHG ROOM/CARE - MED/SURG/GYN SEMI*

## 2025-04-09 PROCEDURE — 700111 HCHG RX REV CODE 636 W/ 250 OVERRIDE (IP): Mod: JZ,TB | Performed by: INTERNAL MEDICINE

## 2025-04-09 PROCEDURE — 82962 GLUCOSE BLOOD TEST: CPT | Mod: 91

## 2025-04-09 PROCEDURE — 700101 HCHG RX REV CODE 250: Performed by: STUDENT IN AN ORGANIZED HEALTH CARE EDUCATION/TRAINING PROGRAM

## 2025-04-09 PROCEDURE — 700102 HCHG RX REV CODE 250 W/ 637 OVERRIDE(OP): Performed by: INTERNAL MEDICINE

## 2025-04-09 PROCEDURE — 700111 HCHG RX REV CODE 636 W/ 250 OVERRIDE (IP): Performed by: STUDENT IN AN ORGANIZED HEALTH CARE EDUCATION/TRAINING PROGRAM

## 2025-04-09 PROCEDURE — 99222 1ST HOSP IP/OBS MODERATE 55: CPT | Performed by: INTERNAL MEDICINE

## 2025-04-09 PROCEDURE — A9270 NON-COVERED ITEM OR SERVICE: HCPCS | Performed by: STUDENT IN AN ORGANIZED HEALTH CARE EDUCATION/TRAINING PROGRAM

## 2025-04-09 PROCEDURE — 87181 SC STD AGAR DILUTION PER AGT: CPT

## 2025-04-09 PROCEDURE — 94669 MECHANICAL CHEST WALL OSCILL: CPT

## 2025-04-09 PROCEDURE — 94640 AIRWAY INHALATION TREATMENT: CPT

## 2025-04-09 PROCEDURE — A9270 NON-COVERED ITEM OR SERVICE: HCPCS | Performed by: INTERNAL MEDICINE

## 2025-04-09 PROCEDURE — 87205 SMEAR GRAM STAIN: CPT

## 2025-04-09 RX ORDER — PREDNISONE 20 MG/1
40 TABLET ORAL DAILY
Status: DISCONTINUED | OUTPATIENT
Start: 2025-04-10 | End: 2025-04-09

## 2025-04-09 RX ORDER — ALBUTEROL SULFATE 5 MG/ML
2.5 SOLUTION RESPIRATORY (INHALATION)
Status: DISCONTINUED | OUTPATIENT
Start: 2025-04-09 | End: 2025-04-10

## 2025-04-09 RX ORDER — PREDNISONE 20 MG/1
40 TABLET ORAL DAILY
Status: DISCONTINUED | OUTPATIENT
Start: 2025-04-10 | End: 2025-04-10

## 2025-04-09 RX ORDER — IPRATROPIUM BROMIDE AND ALBUTEROL SULFATE 2.5; .5 MG/3ML; MG/3ML
3 SOLUTION RESPIRATORY (INHALATION)
Status: DISCONTINUED | OUTPATIENT
Start: 2025-04-09 | End: 2025-04-10

## 2025-04-09 RX ORDER — AZITHROMYCIN 250 MG/1
500 TABLET, FILM COATED ORAL DAILY
Status: DISCONTINUED | OUTPATIENT
Start: 2025-04-09 | End: 2025-04-09

## 2025-04-09 RX ORDER — AZITHROMYCIN 250 MG/1
500 TABLET, FILM COATED ORAL DAILY
Status: COMPLETED | OUTPATIENT
Start: 2025-04-10 | End: 2025-04-11

## 2025-04-09 RX ORDER — PREDNISONE 5 MG/1
5 TABLET ORAL DAILY
Status: DISCONTINUED | OUTPATIENT
Start: 2025-04-13 | End: 2025-04-10

## 2025-04-09 RX ORDER — AZITHROMYCIN 250 MG/1
250 TABLET, FILM COATED ORAL DAILY
Status: DISCONTINUED | OUTPATIENT
Start: 2025-04-12 | End: 2025-04-16 | Stop reason: HOSPADM

## 2025-04-09 RX ORDER — LORAZEPAM 2 MG/ML
0.5 INJECTION INTRAMUSCULAR ONCE
Status: COMPLETED | OUTPATIENT
Start: 2025-04-09 | End: 2025-04-09

## 2025-04-09 RX ADMIN — IPRATROPIUM BROMIDE AND ALBUTEROL SULFATE 3 ML: .5; 2.5 SOLUTION RESPIRATORY (INHALATION) at 14:26

## 2025-04-09 RX ADMIN — MONTELUKAST 10 MG: 10 TABLET, FILM COATED ORAL at 05:58

## 2025-04-09 RX ADMIN — METHYLPREDNISOLONE SODIUM SUCCINATE 40 MG: 40 INJECTION, POWDER, FOR SOLUTION INTRAMUSCULAR; INTRAVENOUS at 06:01

## 2025-04-09 RX ADMIN — LOSARTAN POTASSIUM 50 MG: 50 TABLET, FILM COATED ORAL at 16:24

## 2025-04-09 RX ADMIN — RIVAROXABAN 10 MG: 10 TABLET, FILM COATED ORAL at 16:23

## 2025-04-09 RX ADMIN — AMLODIPINE BESYLATE 5 MG: 5 TABLET ORAL at 05:57

## 2025-04-09 RX ADMIN — INSULIN LISPRO 1 UNITS: 100 INJECTION, SOLUTION INTRAVENOUS; SUBCUTANEOUS at 12:29

## 2025-04-09 RX ADMIN — LORAZEPAM 0.5 MG: 2 INJECTION INTRAMUSCULAR; INTRAVENOUS at 12:24

## 2025-04-09 RX ADMIN — ACETAMINOPHEN 650 MG: 325 TABLET ORAL at 07:50

## 2025-04-09 RX ADMIN — IPRATROPIUM BROMIDE AND ALBUTEROL SULFATE 3 ML: .5; 2.5 SOLUTION RESPIRATORY (INHALATION) at 06:37

## 2025-04-09 RX ADMIN — LOSARTAN POTASSIUM 50 MG: 50 TABLET, FILM COATED ORAL at 05:57

## 2025-04-09 RX ADMIN — DIGOXIN 125 MCG: 0.25 TABLET ORAL at 16:23

## 2025-04-09 RX ADMIN — FLUTICASONE FUROATE, UMECLIDINIUM BROMIDE AND VILANTEROL TRIFENATATE 1 PUFF: 200; 62.5; 25 POWDER RESPIRATORY (INHALATION) at 06:05

## 2025-04-09 RX ADMIN — AZITHROMYCIN DIHYDRATE 500 MG: 250 TABLET ORAL at 07:56

## 2025-04-09 RX ADMIN — INSULIN LISPRO 2 UNITS: 100 INJECTION, SOLUTION INTRAVENOUS; SUBCUTANEOUS at 17:47

## 2025-04-09 RX ADMIN — DULOXETINE 60 MG: 30 CAPSULE, DELAYED RELEASE ORAL at 16:23

## 2025-04-09 RX ADMIN — OMEPRAZOLE 20 MG: 20 CAPSULE, DELAYED RELEASE ORAL at 05:57

## 2025-04-09 RX ADMIN — IPRATROPIUM BROMIDE AND ALBUTEROL SULFATE 3 ML: .5; 2.5 SOLUTION RESPIRATORY (INHALATION) at 22:40

## 2025-04-09 RX ADMIN — IPRATROPIUM BROMIDE AND ALBUTEROL SULFATE 3 ML: .5; 2.5 SOLUTION RESPIRATORY (INHALATION) at 11:01

## 2025-04-09 ASSESSMENT — ENCOUNTER SYMPTOMS
TREMORS: 1
COUGH: 1
DIZZINESS: 0
HEADACHES: 0
SPUTUM PRODUCTION: 1
SHORTNESS OF BREATH: 1

## 2025-04-09 ASSESSMENT — PAIN DESCRIPTION - PAIN TYPE: TYPE: ACUTE PAIN

## 2025-04-09 NOTE — DISCHARGE PLANNING
Care Transition Team Assessment    Primary emergency Contact is pt's sister Ankita at [phone]     LMSW met with pt at bedside to complete discharge assessment and chart review was completed to obtain the information used in this assessment. Pt is A/Ox4 and agreeable to assessment. Pt verified information on face sheet.      - Prior to admission, pt lived alone at [address] [place] [address], [address].   - Per pt, family is good support along with friends, but the patient is unable to care for herself any longer.  - Pt stated to be dependent with ADL/IADLs with care giving Monday-Thursday by Friends and Family.   - Per pt, she has a Rolater walker, Cane, Electric Scooter, rails around her bathroom, a bed- supplied by [employer], and she is on 8L NC at home with [employer].   - Pt is retired and insured through [employer] and Medicaid FFS    - Preferred pharmacy is the [facility] at [address] [place] [address]  -PCP is Everett Diego M.D.   -Patient does not report any history of PARKER or MH    LMSW discussed DC planning with the patient at bedside. Patient reported that she is no longer comfortable living alone as she is not able to care for herself and believes that she gets more sick every time she goes home because she does not receive the proper care. Patient reports that she is unable to get into her bathtub to shower as it is not a walk in shower. Patient reports that she makes $1880 a month. Patient reported that she would like [facility] to be considered for a Long Term Care placement. Patient reported that she wants friend Rhoda to be a part of the discharge planning process and to tour the facilities to give her opinion. LMSW placed SW protocol and sent a blanket SNF referral. Patient is being transferred to [facility] due to Oxygen demands and concern for deconditioning. Patient did get a piece of lettuce in her throat during this assessment and had a difficult time getting  it out. Patient reported that she does have some difficulty eating certain items. Bedside RN aware and present during this episode.     Information Source  Orientation Level: Oriented X4  Information Given By: Patient  Who is responsible for making decisions for patient? : Patient    Readmission Evaluation  Is this a readmission?: No    Elopement Risk  Legal Hold: No  Ambulatory or Self Mobile in Wheelchair: Yes  Disoriented: No  Psychiatric Symptoms: None  History of Wandering: No  Elopement this Admit: No  Vocalizing Wanting to Leave: No  Displays Behaviors, Body Language Wanting to Leave: No-Not at Risk for Elopement  Elopement Risk: Not at Risk for Elopement    Interdisciplinary Discharge Planning  Lives with - Patient's Self Care Capacity: Alone and Unable to Care For Self  Patient or legal guardian wants to designate a caregiver: Yes  Caregiver name: Rhoda Ibrahim  Caregiver contact info: [phone]  ([facility]) Authorization for Release of Health Information has been completed: Yes  Support Systems: Family Member(s)  Housing / Facility: [address]    Discharge Preparedness  What is your plan after discharge?: Skilled nursing facility  What are your discharge supports?: Sibling, Other (comment) (Friends)  Prior Functional Level: Needs Assist with Activities of Daily Living, Needs Assist with Medication Management, Ambulatory, Uses Wheelchair, Uses Walker, Uses Cane  Difficulity with ADLs: Bathing, Walking, Toileting, Dressing  Difficulity with IADLs: Cooking, Driving, Keeping track of finances, Laundry, Managing medication, Shopping, Using the telephone or computer    Functional Assesment  Prior Functional Level: Needs Assist with Activities of Daily Living, Needs Assist with Medication Management, Ambulatory, Uses Wheelchair, Uses Walker, Uses Cane    Finances  Financial Barriers to Discharge: No  Prescription Coverage: Yes    Vision / Hearing Impairment  Vision Impairment : Yes  Right Eye Vision: Impaired,  Wears Glasses  Left Eye Vision: Impaired, Wears Glasses  Hearing Impairment : Yes  Hearing Impairment: Both Ears, Patient Declines to Wear Hearing Device(s)  Does Pt Need Special Equipment for the Hearing Impaired?: No    Advance Directive  Advance Directive?: DPOA for Health Care    Domestic Abuse  Have you ever been the victim of abuse or violence?: No  Possible Abuse/Neglect Reported to:: Not Applicable    Psychological Assessment  History of Substance Abuse: None  History of Psychiatric Problems: No  Non-compliant with Treatment: No  Newly Diagnosed Illness: No    Discharge Risks or Barriers  Discharge risks or barriers?: Complex medical needs  Patient risk factors: Cognitive / sensory / physical deficit, Complex medical needs, Vulnerable adult    Anticipated Discharge Information  Discharge Disposition: D/T to SNF with Medicare cert in anticipation of skilled care (03)

## 2025-04-09 NOTE — ASSESSMENT & PLAN NOTE
DNR/DNI  Continue oral prednisone  Continue azithromycin  Continue Trelegy Ellipta  Continue montelukast   Continue DuoNebs and Xopenex  Provide supplemental oxygen  Wean as able to baseline (8L)  Continuous pulse ox monitoring  Appreciate RT interventions

## 2025-04-09 NOTE — RESPIRATORY CARE
"COPD EDUCATION by COPD CLINICAL EDUCATOR  4/9/2025 at 12:48 PM by Niyah Zazueta, RRT     Patient interviewed by COPD education team. Patient refused COPD program at this time. An Action Plan was updated in the EMR to reflect current Respiratory Medication use.                 Patient has had multiple admits for COPD and wears 8 lpm at home. She is very knowledgeable about her disease process and most ADLS tax her so much that her SPO2 will drop down to the high 70\"s. She believes that she is no longer able to live by herself and we spoke at length about long term Skilled nursing care. MD and case management aware.     COPD Screen  COPD Risk Screening  Do you have a history of COPD?: Yes  Do you have a Pulmonologist?: Yes    COPD Assessment  COPD Clinical Specialists ONLY  COPD Education Initiated: Yes--Short Intervention (pt with endstage COPD on home O2 at 8, not ready for Hospice- knows meds well, pt realizing she can no longe take care of herself, MD, CM aware. suggested she start day with nebulizer f/b flutter then Breztri-)  Is this a COPD exacerbation patient?: Yes  DME Company: [employer]  DME Equipment Type: home O2 at 8 lpm. nebulizer  Physician Follow Up Appointment: 04/16/25  Appt Time: 1130  Physician Name: Everett Diego M.D.  Pulmonary Follow Up Appointment:  (scheduling called)  Pulmonologist Name: Dr. Deshpande  Palliative Care: Yes  Durable Power of : No  Advance Directive: Yes  Discussion with others: Yes  Is POLST on file?: Yes  Referrals Initiated: Yes  Pulmonary Rehab: No  Smoking Cessation: No  Hospice: Yes (pt wants to know more about Hospice but not ready to die)  Home Health Care: No  Mobile Urgent Care Services: No  Geriatric Specialty Group: No  Private In-Home Care Agency: No  (OP) Pulmonary Function Testing: Yes (07/2021 FEV1 0.89 43, FVC 1.97 73%, FEV1/FVC 45, RV/%, DLCO 37)  Interdisciplinary Rounds: Attendance at Rounds (30 Min)    PFT Results    7/2021 FEV1 0.89 43, " "FVC 1.97 73%, FEV1/FVC 45, RV/%, DLCO 37    Meds to Beds  [facility] provides bedside medication delivery for all eligible patients at discharge and you have been automatically enrolled in the Meds to Beds Program. Would you like to opt out of this program for any reason?: No - Stay Opted In     MY COPD ACTION PLAN     It is recommended that patients and physicians/healthcare providers complete this action plan together. This plan should be discussed at each physician visit and updated as needed.    The green, yellow and red zones show groups of symptoms of COPD. This list of symptoms is not comprehensive, and you may experience other symptoms. In the \"Actions\" column, your healthcare provider has recommended actions for you to take based on your symptoms.    Patient Name: Berny Renee   YOB: 1945   Last Updated on: 4/9/2025 12:48 PM   Green Zone:  I am doing well today Actions     Usual activitiy and exercise level   Take daily medications     Usual amounts of cough and phlegm/mucus   Use oxygen as prescribed     Sleep well at night   Continue regular exercise/diet plan     Appetite is good   At all times avoid cigarette smoke, inhaled irritants     Daily Medications (these medications are taken every day):   Budesonide/Glycopyrrolate/Formoterol Fumarate (Breztri Aerosphere) 2 Puffs Twice daily     Additional Information:  Use as directed.  Please rinse mouth after using and use spacer.    This replaces Trelegy    Yellow Zone:  I am having a bad day or a COPD flare Actions     More breathless than usual   Continue daily medications     I have less energy for my daily activities   Use quick relief inhaler as ordered     Increased or thicker phlegm/mucus   Use oxygen as prescribed     Using quick relief inhaler/nebulizer more often   Get plenty of rest     Swelling of ankles more than usual   Use pursed lip breathing     More coughing than usual   At all times avoid cigarette smoke, inhaled " "irritants     I feel like I have a \"chest cold\"     Poor sleep and my symptoms woke me up     My appetite is not good     My medicine is not helping      Call provider immediately if symptoms don’t improve     Continue daily medications, add rescue medications:   Albuterol/Ipratropium (Combivent, Duoneb)  Albuterol 3mL via nebulizer  2 Puffs Three times daily  Three times daily       Medications to be used during a flare up, (as Discussed with Provider):           Additional Information:  Use your nebulizer first when short of breath and rinse after each use.     Use a spacer with your rescue inhaler when nebulizer is not available.         Red Zone:  I need urgent medical care Actions     Severe shortness of breath even at rest   Call 911 or seek medical care immediately     Not able to do any activity because of breathing      Fever or shaking chills      Feeling confused or very drowsy       Chest pains      Coughing up blood                  "

## 2025-04-09 NOTE — PROGRESS NOTES
4 Eyes Skin Assessment Completed by HUNTER Salvador and HUNTER Black.    Head WDL  Ears WDL  Nose WDL  Mouth WDL  Neck WDL  Breast/Chest WDL  Shoulder Blades WDL  Spine WDL  (R) Arm/Elbow/Hand Blanching  (L) Arm/Elbow/Hand Blanching  Abdomen WDL  Groin WDL  Scrotum/Coccyx/Buttocks Blanching  (R) Leg WDL  (L) Leg WDL  (R) Heel/Foot/Toe WDL  (L) Heel/Foot/Toe WDL          Devices In Places Nasal Cannula      Interventions In Place Gray Ear Foams and Pillows    Possible Skin Injury No    Pictures Uploaded Into Epic N/A  Wound Consult Placed N/A  RN Wound Prevention Protocol Ordered No
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
## 2025-04-09 NOTE — CARE PLAN
The patient is Watcher - Medium risk of patient condition declining or worsening    Shift Goals  Clinical Goals: maintain o2 saturation >90%  Patient Goals: rest  Family Goals: tess    Progress made toward(s) clinical / shift goals:    Problem: Knowledge Deficit - Standard  Goal: Patient and family/care givers will demonstrate understanding of plan of care, disease process/condition, diagnostic tests and medications  Outcome: Progressing     Problem: Knowledge Deficit - COPD  Goal: Patient/significant other demonstrates understanding of disease process, utilization of the Action Plan, medications and discharge instruction  Outcome: Progressing     Problem: Ineffective Airway Clearance  Goal: Patient will maintain patent airway with clear/clearing breath sounds  Outcome: Progressing     Problem: Impaired Gas Exchange  Goal: Patient will demonstrate improved ventilation and adequate oxygenation and participate in treatment regimen within the level of ability/situation.  Outcome: Progressing     Problem: Fall Risk  Goal: Patient will remain free from falls  Outcome: Progressing     Pt aox4, scheduled meds given, no complaints of pain or discomfort, on continuous o2 at 8L/min with no sob or difficulty in breathing noted, blood sugar level monitoring done, bed placed in low and locked position, call light within reach, needs met.     Patient is not progressing towards the following goals:

## 2025-04-09 NOTE — PROGRESS NOTES
Alert x4, on 8L NC- attempted to wean off NC to a mask but refused despite education, attempted to wean oxygen requirements down with COPD and sating 92-94% but refused despite education. Notified RT for input and education but again refused interventions. Notified charge RN and on call wilver Méndez- no new orders    Spoke with day shift RT, he will retrieve special oxygen prong tubing that can have up to 10L

## 2025-04-09 NOTE — DISCHARGE PLANNING
"HTH/SCP TCN chart review completed. The most current review of medical record, knowledge of pt's PLOF and social support, LACE+ score of 75, 6 clicks scores of 24 ADL's and 21 mobility were considered.  Per chart review, patient BIB EMS from home with SOB and wheezing.  Hx of ES COPD stage 4, acute on chronic respiratory failure with hypoxia, Type II DM, GERD, leukocytosis, HTN and A-fib. He is on 8 L/min O2 at baseline with [employer] (per chart review).  Per Kardex, patient ambulating 50 feet X 2 with FWW.  Per chart review, noted DME in the home includes 4WW, SPC and scooter.  Patient previously reported supplemental 02 (concentrator and 1 portable) via [facility] per TCN note on 1/23/25.  He recently went to [facility] for rehab on 1/27/25.  Per chart review of PT note on 1/24/25, \"Patient may benefit from considering detention and/or group home placement in the long term\".  Referred to PAN Team.      In collaboration with CM, current discharge considerations are anticipated to be for home with HH vs. Home with close outpatient f/u's when medically cleared.      TCN will continue to follow and collaborate with discharge planning team as additional post acute needs arise. Thank you.     Completed today:  Choice obtained: None.   See above.   SCP with Non-[facility] PCP.           "

## 2025-04-09 NOTE — ED NOTES
Patient resting on gurney, respirations even and unlabored, on 8L O2 - baseline via NC.  Updated on POC, awaiting admit room assignment.  Uses call light appropriately.

## 2025-04-09 NOTE — CARE PLAN
The patient is Stable - Low risk of patient condition declining or worsening    Shift Goals  Clinical Goals: pt O2 saturation >90% through out my shift  Patient Goals: rest  Family Goals: tess      Problem: Knowledge Deficit - Standard  Goal: Patient and family/care givers will demonstrate understanding of plan of care, disease process/condition, diagnostic tests and medications  Description: Target End Date:  1-3 days or as soon as patient condition allowsDocument in Patient Education1.  Patient and family/caregiver oriented to unit, equipment, visitation policy and means for communicating concern2.  Complete/review Learning Assessment3.  Assess knowledge level of disease process/condition, treatment plan, diagnostic tests and medications4.  Explain disease process/condition, treatment plan, diagnostic tests and medications  Outcome: Progressing     Problem: Knowledge Deficit - COPD  Goal: Patient/significant other demonstrates understanding of disease process, utilization of the Action Plan, medications and discharge instruction  Description: Target End Date:  1-3 days or as soon as patient condition allowsDocument in Patient Education1.  Discuss the importance of medical follow-up care, periodic chest x-rays, sputum cultures2.  Review worsening signs and symptoms of COPD flare and exacerbation and its management3.  Discuss respiratory medications, side effects, adverse reactions4.  Demonstrate technique for using a metered-dose inhaler (MDI) and utilization of a spacer5.  Review the COPD Action Plan6.  Instruct and reinforce the rationale for breathing exercises, coughing effectively, and general conditioning exercises7.  Stress importance of oral care and dental hygiene8.  Discuss the importance of avoiding people with active respiratory infections and need for routine influenza and pneumococcal vaccinations9.  Discuss factors that may trigger condition and encourage patient/significant other to explore ways to  control these factors in and around the home and work [id number]. Review the harmful effects of smoking and advise cessation of [id number]. Provide information about activity limitations and alternating activities with rest periods to prevent fycrltk88. Instruct asthmatic patient of use of peak flow meter, as [id number]. Review oxygen requirements and dosage, safe use of oxygen, and refer to the supplier as [id number]. Educate patient/family/caregiver on the use of Nasal Intermittent Positive Pressure Ventilation (NIPPV) and possible adverse reactions  Outcome: Progressing     Problem: Risk for Infection - COPD  Goal: Patient will remain free from signs and symptoms of infection  Description: Target End Date:  Prior to discharge or change in level of care1.  Infection prevention measures2.  Instruct patient regarding signs and symptoms of infection3.  Review the importance of breathing exercises, effective cough, frequent position changes, and adequate fluid intake4.  Recommend rinsing mouth with water and spitting, not swallowing, or use of a spacer on the mouthpiece of inhaled corticosteroids  Outcome: Progressing     Problem: Nutrition - Advanced  Goal: Patient will display progressive weight gain toward goal have adequate food and fluid intake  Description: Target End Date:  Prior to discharge or change in level of care1.  Daily weights2.  Monitor dietary intake3.  Promote systemic fluid hydration within cardiac tolerance4.  Albumin and PreAlbumin per provider order5.  Enteral and parenteral nutrition per provider order6.  Calorie count7.  Provide oral care8.  Collaborate with Clinical Dietician9.  Consider Speech Therapy consult for swallowing [id number]. Administer supplemental oxygen during meals as indicated  Outcome: Progressing     Problem: Ineffective Airway Clearance  Goal: Patient will maintain patent airway with clear/clearing breath sounds  Description: Target End Date:  Prior to discharge or  change in level of care1.   Position head midline with flexion appropriate for age and/or condition2.   Assist patient to assume a position of comfort3.   Assess and monitor breath sounds4.   Encourage abdominal or pursed-lip breathing exercises5.   Assist with measures to improve effectiveness of cough effort6.   Demonstrate effective coughing and deep-breathing techniques7.   Assist patient to turn every 2 hours8.   Encourage patient to ambulate as tolerated9.   Keep environmental pollution to a dxhebje90. Airway suctioning as ghnqkj36. Collaborate with RT to administer medications/treatments per order12. Promote systemic fluid hydration within cardiac ntconvbhw61. Provide warm or tepid liquids  Outcome: Progressing     Problem: Impaired Gas Exchange  Goal: Patient will demonstrate improved ventilation and adequate oxygenation and participate in treatment regimen within the level of ability/situation.  Description: Target End Date:  Prior to discharge or change in level of care1.   Assess/monitor rate/rhythm/depth of effort of respirations2.   Assess oxygenation as ordered3.   Administer/titrate oxygen as ordered4.   Position patient for maximum ventilatory efficiency5.   Turn, cough, and deep breathe6.   Vital signs, pulse oximetry7.   Assess color and body temperature8.   Assess and monitor breath sounds9.   Encourage deep-slow or pursed-lip breathing [id number]. Monitor changes in the level of consciousness and mental ohmuay65. Encourage expectoration of sputum; airway loxfuxzyxj38. Elevate the head of the bed and position patient for maximum ventilatory excvsxkfgb44. Provide a calm, quiet hacppjbhkxh26. Limit patient's activity during the acute phase and have patient resume activity gradually and increase as individually thxypcmcp19. Evaluate sleep patterns and limit stimulants such as cpnrddhu15. Collaborate with RT to administer medications/treatments as ordered  Outcome: Progressing     Problem: Risk for  Aspiration  Goal: Patient's risk for aspiration will be absent or decrease  Description: Target End Date:  Prior to discharge or change in level of care1.   Complete dysphagia screening on admission2.   NPO until dysphagia screening complete or medically cleared3.   Collaborate with Speech Therapy, Clinical Dietitian and interdisciplinary team4.   Implement aspiration precautions5.   Assist patient up to chair for meals6.   Elevate head of bed 90 degrees if patient is unable to get out of bed7.   Encourage small bites8.   Ensure foods/liquids are of appropriate consistency9.   Assess for any signs/symptoms of rwjafieocl37. Assess breath sounds and vital signs after oral intake  Outcome: Progressing     Problem: Self Care  Goal: Patient will have the ability to perform ADLs independently or with assistance (bathe, groom, dress, toilet and feed)  Description: Target End Date:  Prior to discharge or change in level of careDocument on ADL flowsheet1.  Assess the capability and level of deficiency to perform ADLs2.  Encourage family/care giver involvement3.  Provide assistive devices4.  Consider PT/OT evaluations5.  Maintain support, give positive feedback, encourage self-care allowing extra time and verbal cuing as needed6.  Avoid doing something for patients they can do themselves, but provide assistance as needed7.  Assist in anticipating/planning individual needs8.  Collaborate with Case Management and  to meet discharge needs  Outcome: Progressing     Problem: Depression  Goal: Patient and family/caregiver will verbalize accurate information about at least two of the possible causes of depression, three-four of the signs and symptoms of depression  Description: Target End Date:  1 to 3 days1.  Assess the patient's and family/caregiver's knowledge regarding depression and its causes.2.  Explain to the patient and family/caregiver regarding the major symptoms of depression.3.  Inform the patient and  family/caregiver that depression can be treated through medications and psychotherapy.4.  Allow the patient to express feelings and perceptions5.  Express hope to the patient with realistic comments about the patient's strengths and resources.5.  Give positive feedback after a tasks are achieved.6.  Encourage identification of positive aspects of self.7.  Educate the patient about crisis intervention services such as suicide hotlines and other resources.  Outcome: Progressing     Problem: Fall Risk  Goal: Patient will remain free from falls  Description: Target End Date:  Prior to discharge or change in level of careDocument interventions on the P[place]ual Miles Fall Risk Assessment1.  Assess for fall risk factors2.  Implement fall precautions  Outcome: Progressing

## 2025-04-09 NOTE — ASSESSMENT & PLAN NOTE
Currently in sinus rhythm, continue home digoxin  Patient is not on full anticoagulation, will not start at this time  Per chart review, patient's outpatient cardiologist recommended against anticoagulation

## 2025-04-09 NOTE — ASSESSMENT & PLAN NOTE
Significant leukocytosis, likely reactive  S/p IV Solu-Medrol administration, on oral prednisone now  Possible community-acquired pneumonia, sputum positive for Pseudomonas  Repeat chest x-ray revealed superimposed areas of infection or edema difficult to exclude

## 2025-04-10 ENCOUNTER — APPOINTMENT (OUTPATIENT)
Dept: RADIOLOGY | Facility: MEDICAL CENTER | Age: 80
DRG: 190 | End: 2025-04-10
Attending: STUDENT IN AN ORGANIZED HEALTH CARE EDUCATION/TRAINING PROGRAM
Payer: MEDICARE

## 2025-04-10 ENCOUNTER — HOSPICE ADMISSION (OUTPATIENT)
Dept: HOSPICE | Facility: HOSPICE | Age: 80
End: 2025-04-10
Payer: MEDICARE

## 2025-04-10 LAB
ANION GAP SERPL CALC-SCNC: 8 MMOL/L (ref 7–16)
B PARAP IS1001 DNA NPH QL NAA+NON-PROBE: NOT DETECTED
B PERT.PT PRMT NPH QL NAA+NON-PROBE: NOT DETECTED
BUN SERPL-MCNC: 25 MG/DL (ref 8–22)
C PNEUM DNA NPH QL NAA+NON-PROBE: NOT DETECTED
CALCIUM SERPL-MCNC: 9.2 MG/DL (ref 8.5–10.5)
CHLORIDE SERPL-SCNC: 94 MMOL/L (ref 96–112)
CO2 SERPL-SCNC: 31 MMOL/L (ref 20–33)
CREAT SERPL-MCNC: 0.51 MG/DL (ref 0.5–1.4)
ERYTHROCYTE [DISTWIDTH] IN BLOOD BY AUTOMATED COUNT: 43.7 FL (ref 35.9–50)
FLUAV RNA NPH QL NAA+NON-PROBE: NOT DETECTED
FLUBV RNA NPH QL NAA+NON-PROBE: NOT DETECTED
GFR SERPLBLD CREATININE-BSD FMLA CKD-EPI: 94 ML/MIN/1.73 M 2
GLUCOSE BLD STRIP.AUTO-MCNC: 107 MG/DL (ref 65–99)
GLUCOSE BLD STRIP.AUTO-MCNC: 155 MG/DL (ref 65–99)
GLUCOSE BLD STRIP.AUTO-MCNC: 177 MG/DL (ref 65–99)
GLUCOSE BLD STRIP.AUTO-MCNC: 181 MG/DL (ref 65–99)
GLUCOSE SERPL-MCNC: 151 MG/DL (ref 65–99)
HADV DNA NPH QL NAA+NON-PROBE: NOT DETECTED
HCOV 229E RNA NPH QL NAA+NON-PROBE: NOT DETECTED
HCOV HKU1 RNA NPH QL NAA+NON-PROBE: NOT DETECTED
HCOV NL63 RNA NPH QL NAA+NON-PROBE: NOT DETECTED
HCOV OC43 RNA NPH QL NAA+NON-PROBE: NOT DETECTED
HCT VFR BLD AUTO: 35.9 % (ref 37–47)
HGB BLD-MCNC: 11.4 G/DL (ref 12–16)
HMPV RNA NPH QL NAA+NON-PROBE: NOT DETECTED
HPIV1 RNA NPH QL NAA+NON-PROBE: NOT DETECTED
HPIV2 RNA NPH QL NAA+NON-PROBE: NOT DETECTED
HPIV3 RNA NPH QL NAA+NON-PROBE: NOT DETECTED
HPIV4 RNA NPH QL NAA+NON-PROBE: NOT DETECTED
M PNEUMO DNA NPH QL NAA+NON-PROBE: NOT DETECTED
MCH RBC QN AUTO: 28.6 PG (ref 27–33)
MCHC RBC AUTO-ENTMCNC: 31.8 G/DL (ref 32.2–35.5)
MCV RBC AUTO: 90 FL (ref 81.4–97.8)
PLATELET # BLD AUTO: 485 K/UL (ref 164–446)
PMV BLD AUTO: 9.3 FL (ref 9–12.9)
POTASSIUM SERPL-SCNC: 3.9 MMOL/L (ref 3.6–5.5)
RBC # BLD AUTO: 3.99 M/UL (ref 4.2–5.4)
RSV RNA NPH QL NAA+NON-PROBE: NOT DETECTED
RV+EV RNA NPH QL NAA+NON-PROBE: NOT DETECTED
SARS-COV-2 RNA NPH QL NAA+NON-PROBE: NOTDETECTED
SODIUM SERPL-SCNC: 133 MMOL/L (ref 135–145)
WBC # BLD AUTO: 26.6 K/UL (ref 4.8–10.8)

## 2025-04-10 PROCEDURE — 700101 HCHG RX REV CODE 250: Performed by: INTERNAL MEDICINE

## 2025-04-10 PROCEDURE — A9270 NON-COVERED ITEM OR SERVICE: HCPCS | Performed by: STUDENT IN AN ORGANIZED HEALTH CARE EDUCATION/TRAINING PROGRAM

## 2025-04-10 PROCEDURE — 99232 SBSQ HOSP IP/OBS MODERATE 35: CPT | Performed by: STUDENT IN AN ORGANIZED HEALTH CARE EDUCATION/TRAINING PROGRAM

## 2025-04-10 PROCEDURE — 92610 EVALUATE SWALLOWING FUNCTION: CPT

## 2025-04-10 PROCEDURE — 71045 X-RAY EXAM CHEST 1 VIEW: CPT

## 2025-04-10 PROCEDURE — 0202U NFCT DS 22 TRGT SARS-COV-2: CPT

## 2025-04-10 PROCEDURE — 86480 TB TEST CELL IMMUN MEASURE: CPT

## 2025-04-10 PROCEDURE — 700105 HCHG RX REV CODE 258: Performed by: STUDENT IN AN ORGANIZED HEALTH CARE EDUCATION/TRAINING PROGRAM

## 2025-04-10 PROCEDURE — 700102 HCHG RX REV CODE 250 W/ 637 OVERRIDE(OP): Performed by: STUDENT IN AN ORGANIZED HEALTH CARE EDUCATION/TRAINING PROGRAM

## 2025-04-10 PROCEDURE — 700102 HCHG RX REV CODE 250 W/ 637 OVERRIDE(OP): Performed by: INTERNAL MEDICINE

## 2025-04-10 PROCEDURE — 700111 HCHG RX REV CODE 636 W/ 250 OVERRIDE (IP): Mod: JZ | Performed by: STUDENT IN AN ORGANIZED HEALTH CARE EDUCATION/TRAINING PROGRAM

## 2025-04-10 PROCEDURE — A9270 NON-COVERED ITEM OR SERVICE: HCPCS | Performed by: INTERNAL MEDICINE

## 2025-04-10 PROCEDURE — 85027 COMPLETE CBC AUTOMATED: CPT

## 2025-04-10 PROCEDURE — 770006 HCHG ROOM/CARE - MED/SURG/GYN SEMI*

## 2025-04-10 PROCEDURE — 700111 HCHG RX REV CODE 636 W/ 250 OVERRIDE (IP): Performed by: INTERNAL MEDICINE

## 2025-04-10 PROCEDURE — 82962 GLUCOSE BLOOD TEST: CPT | Mod: 91

## 2025-04-10 PROCEDURE — 99233 SBSQ HOSP IP/OBS HIGH 50: CPT | Performed by: INTERNAL MEDICINE

## 2025-04-10 PROCEDURE — 94640 AIRWAY INHALATION TREATMENT: CPT

## 2025-04-10 PROCEDURE — 36415 COLL VENOUS BLD VENIPUNCTURE: CPT

## 2025-04-10 PROCEDURE — 80048 BASIC METABOLIC PNL TOTAL CA: CPT

## 2025-04-10 RX ORDER — FUROSEMIDE 10 MG/ML
20 INJECTION INTRAMUSCULAR; INTRAVENOUS ONCE
Status: COMPLETED | OUTPATIENT
Start: 2025-04-10 | End: 2025-04-10

## 2025-04-10 RX ORDER — BUDESONIDE 0.5 MG/2ML
0.5 INHALANT ORAL
Status: DISCONTINUED | OUTPATIENT
Start: 2025-04-10 | End: 2025-04-10

## 2025-04-10 RX ORDER — PREDNISONE 20 MG/1
40 TABLET ORAL DAILY
Status: COMPLETED | OUTPATIENT
Start: 2025-04-11 | End: 2025-04-12

## 2025-04-10 RX ORDER — MORPHINE SULFATE 15 MG/1
15 TABLET ORAL EVERY 12 HOURS
Refills: 0 | Status: DISCONTINUED | OUTPATIENT
Start: 2025-04-10 | End: 2025-04-13

## 2025-04-10 RX ORDER — HYDROXYZINE HYDROCHLORIDE 50 MG/1
25 TABLET, FILM COATED ORAL 3 TIMES DAILY PRN
Status: DISCONTINUED | OUTPATIENT
Start: 2025-04-10 | End: 2025-04-13

## 2025-04-10 RX ORDER — ECHINACEA PURPUREA EXTRACT 125 MG
2 TABLET ORAL
Status: DISCONTINUED | OUTPATIENT
Start: 2025-04-10 | End: 2025-04-16 | Stop reason: HOSPADM

## 2025-04-10 RX ORDER — SPIRONOLACTONE 25 MG/1
25 TABLET ORAL
Status: DISCONTINUED | OUTPATIENT
Start: 2025-04-10 | End: 2025-04-16 | Stop reason: HOSPADM

## 2025-04-10 RX ORDER — PREDNISONE 10 MG/1
10 TABLET ORAL DAILY
Status: DISCONTINUED | OUTPATIENT
Start: 2025-04-13 | End: 2025-04-13

## 2025-04-10 RX ORDER — LORAZEPAM 2 MG/ML
0.5 INJECTION INTRAMUSCULAR ONCE
Status: DISCONTINUED | OUTPATIENT
Start: 2025-04-10 | End: 2025-04-10

## 2025-04-10 RX ORDER — BENZONATATE 100 MG/1
100 CAPSULE ORAL 3 TIMES DAILY PRN
Status: DISCONTINUED | OUTPATIENT
Start: 2025-04-10 | End: 2025-04-16 | Stop reason: HOSPADM

## 2025-04-10 RX ORDER — BUDESONIDE 0.5 MG/2ML
0.5 INHALANT ORAL
Status: DISCONTINUED | OUTPATIENT
Start: 2025-04-10 | End: 2025-04-16 | Stop reason: HOSPADM

## 2025-04-10 RX ORDER — POLYETHYLENE GLYCOL 3350 17 G/17G
1 POWDER, FOR SOLUTION ORAL DAILY
Status: DISCONTINUED | OUTPATIENT
Start: 2025-04-10 | End: 2025-04-16 | Stop reason: HOSPADM

## 2025-04-10 RX ORDER — LEVALBUTEROL INHALATION SOLUTION 1.25 MG/3ML
1.25 SOLUTION RESPIRATORY (INHALATION)
Status: DISCONTINUED | OUTPATIENT
Start: 2025-04-10 | End: 2025-04-16 | Stop reason: HOSPADM

## 2025-04-10 RX ORDER — LEVALBUTEROL INHALATION SOLUTION 1.25 MG/3ML
1.25 SOLUTION RESPIRATORY (INHALATION)
Status: DISCONTINUED | OUTPATIENT
Start: 2025-04-10 | End: 2025-04-10

## 2025-04-10 RX ORDER — AMOXICILLIN 250 MG
2 CAPSULE ORAL EVERY EVENING
Status: DISCONTINUED | OUTPATIENT
Start: 2025-04-10 | End: 2025-04-16 | Stop reason: HOSPADM

## 2025-04-10 RX ADMIN — DULOXETINE 60 MG: 30 CAPSULE, DELAYED RELEASE ORAL at 18:09

## 2025-04-10 RX ADMIN — PREDNISONE 40 MG: 20 TABLET ORAL at 05:31

## 2025-04-10 RX ADMIN — INSULIN LISPRO 1 UNITS: 100 INJECTION, SOLUTION INTRAVENOUS; SUBCUTANEOUS at 12:49

## 2025-04-10 RX ADMIN — MORPHINE SULFATE 15 MG: 15 TABLET ORAL at 18:09

## 2025-04-10 RX ADMIN — GUAIFENESIN SYRUP AND DEXTROMETHORPHAN 10 ML: 100; 10 SYRUP ORAL at 16:27

## 2025-04-10 RX ADMIN — FUROSEMIDE 20 MG: 10 INJECTION, SOLUTION INTRAVENOUS at 16:02

## 2025-04-10 RX ADMIN — MONTELUKAST 10 MG: 10 TABLET, FILM COATED ORAL at 05:31

## 2025-04-10 RX ADMIN — CEFEPIME 2 G: 2 INJECTION, POWDER, FOR SOLUTION INTRAVENOUS at 22:53

## 2025-04-10 RX ADMIN — SPIRONOLACTONE 25 MG: 25 TABLET ORAL at 08:30

## 2025-04-10 RX ADMIN — RIVAROXABAN 10 MG: 10 TABLET, FILM COATED ORAL at 18:09

## 2025-04-10 RX ADMIN — INSULIN LISPRO 1 UNITS: 100 INJECTION, SOLUTION INTRAVENOUS; SUBCUTANEOUS at 08:26

## 2025-04-10 RX ADMIN — ALBUTEROL SULFATE 2.5 MG: 2.5 SOLUTION RESPIRATORY (INHALATION) at 03:50

## 2025-04-10 RX ADMIN — LOSARTAN POTASSIUM 50 MG: 50 TABLET, FILM COATED ORAL at 18:09

## 2025-04-10 RX ADMIN — DIGOXIN 125 MCG: 0.25 TABLET ORAL at 18:09

## 2025-04-10 RX ADMIN — LOSARTAN POTASSIUM 50 MG: 50 TABLET, FILM COATED ORAL at 05:32

## 2025-04-10 RX ADMIN — OMEPRAZOLE 20 MG: 20 CAPSULE, DELAYED RELEASE ORAL at 05:30

## 2025-04-10 RX ADMIN — TRAMADOL HYDROCHLORIDE 50 MG: 50 TABLET, COATED ORAL at 03:53

## 2025-04-10 RX ADMIN — AZITHROMYCIN DIHYDRATE 500 MG: 250 TABLET ORAL at 05:30

## 2025-04-10 RX ADMIN — AMLODIPINE BESYLATE 5 MG: 5 TABLET ORAL at 05:32

## 2025-04-10 RX ADMIN — BUDESONIDE 0.5 MG: 0.5 SUSPENSION RESPIRATORY (INHALATION) at 19:28

## 2025-04-10 RX ADMIN — FLUTICASONE FUROATE, UMECLIDINIUM BROMIDE AND VILANTEROL TRIFENATATE 1 PUFF: 200; 62.5; 25 POWDER RESPIRATORY (INHALATION) at 05:28

## 2025-04-10 RX ADMIN — INSULIN LISPRO 1 UNITS: 100 INJECTION, SOLUTION INTRAVENOUS; SUBCUTANEOUS at 18:03

## 2025-04-10 ASSESSMENT — ENCOUNTER SYMPTOMS
SPUTUM PRODUCTION: 1
SHORTNESS OF BREATH: 1
COUGH: 1

## 2025-04-10 ASSESSMENT — PAIN DESCRIPTION - PAIN TYPE
TYPE: ACUTE PAIN

## 2025-04-10 NOTE — DISCHARGE PLANNING
Case Management Discharge Planning    Admission Date: 4/8/2025  GMLOS: 3.4  ALOS: 2    6-Clicks ADL Score: 24  6-Clicks Mobility Score: 21      Anticipated Discharge Dispo: Discharge Disposition: D/T to SNF with Medicare cert in anticipation of skilled care (03)    DME Needed: No    Action(s) Taken: LMSW was updated by Norma from Post Acute Navigator team. Patient is electing to choose Hospice. Patient is electing [facility]. Patient requesting for friend Rhoda to be present during Hospice discussion with [facility] tomorrow morning. LMSW requested [facility] for potential  placement. Patient's Medicaid is the Medicaid FFS waiver. LMSW spoke with Aging and Disability services at [phone] (main). Arin is the current  and Arin is on leave. LMSW transferred to Kira Subramanian's  who is the supervisor covering Arin. LMSW left  for Kira requesting a call back regarding the patient's waiver.       Escalations Completed: None    Medically Clear: No    Next Steps: Case Management to follow up with Aging and Disability regarding the patient's options. Case Management to coordinate group home placement. Case Management to explore options with friend Arin regarding placement and if Arin would be able to bring patient's DME from home to .    Barriers to Discharge: Medical clearance and Pending Placement    Is the patient up for discharge tomorrow: No

## 2025-04-10 NOTE — PROGRESS NOTES
Pulmonary Progress Note    Date of admission  4/8/2025    History of Presenting Illness  79 y.o. female who presented 4/8/2025 with a history of COPD, HLD, HTN, REGINE, on chronic supplemental oxygen at 8 L by nasal cannula. She follows in the pulmonary clinic. She is managed on Breztri and Singulair. She has a 60-pack-year smoking history, quit in 1999. She has a history of SBRT in 2021 for an unbiopsied presumed primary lung cancer. This has been under surveillance for years and has been stable.      She reports her living situation is unacceptable and she is no longer able to live there alone.  She is on 8 liters and has been very sick since the fall.  She has end stage cOPD.  We discussed hospice but she is afraid they will take away her pulmonary meds.  If they would allow her to keep them she would go on hospice.  She reports she is very shaky due to the albuterol and prednisone.      Hospital Course  4/10/25 -patient considering hospice today.  She wanted to speak with me regarding any further options.  I told her that Roflumilast would be an option, however, I do not think it would benefit her in the short-term.  I also told her that we could try some oral morphine for air hunger as this is what I would do if she were a patient in my clinic.  She is agreeable to trial some oral morphine.  We are also going to try some nebulized budesonide and a dose of Lasix.    Vital Signs for last 24 hours   Temp:  [36.6 °C (97.8 °F)-37.1 °C (98.7 °F)] 37.1 °C (98.7 °F)  Pulse:  [78-95] 79  Resp:  [18-22] 20  BP: (132-170)/(56-66) 157/66  SpO2:  [91 %-94 %] 91 %    Hemodynamic parameters for last 24 hours       Respiratory Information for the last 24 hours     Physical Exam  Vitals and nursing note reviewed.   Constitutional:       General: She is not in acute distress.     Appearance: Normal appearance. She is ill-appearing.   HENT:      Head: Normocephalic.   Eyes:      Conjunctiva/sclera: Conjunctivae normal.    Cardiovascular:      Rate and Rhythm: Normal rate and regular rhythm.   Pulmonary:      Effort: Tachypnea present. No respiratory distress.      Breath sounds: Decreased air movement present.   Skin:     General: Skin is warm and dry.   Neurological:      General: No focal deficit present.      Mental Status: She is alert. Mental status is at baseline.   Psychiatric:         Mood and Affect: Mood normal.         Behavior: Behavior normal.         Medications  Current Facility-Administered Medications   Medication Dose Route Frequency Provider Last Rate Last Admin    spironolactone (Aldactone) tablet 25 mg  25 mg Oral Q DAY Cheri Hammonds M.D.   25 mg at 04/10/25 0830    senna-docusate (Pericolace Or Senokot S) 8.6-50 MG per tablet 2 Tablet  2 Tablet Oral Q EVENING Cheri Hammonds M.D.        And    polyethylene glycol/lytes (Miralax) Packet 1 Packet  1 Packet Oral DAILY Cheri Hammonds M.D.        sodium chloride (Ocean) 0.65 % nasal spray 2 Spray  2 Spray Nasal Q2HRS PRN Cheri Hammonds M.D.        benzonatate (Tessalon) capsule 100 mg  100 mg Oral TID PRN Cheri Hammonds M.D.        hydrOXYzine HCl (Atarax) tablet 25 mg  25 mg Oral TID PRN Cheri Hammonds M.D.        LORazepam (Ativan) injection 0.5 mg  0.5 mg Intravenous Once Cheri Hammonds M.D.        levalbuterol (Xopenex) 1.25 MG/3ML nebulizer solution 1.25 mg  1.25 mg Nebulization Q4H PRN (RT) Cheri Hammonds M.D.        predniSONE (Deltasone) tablet 40 mg  40 mg Oral DAILY Cheri Hammonds M.D.   40 mg at 04/10/25 0531    Followed by    [START ON 4/13/2025] predniSONE (Deltasone) tablet 5 mg  5 mg Oral DAILY Cheri Hammonds M.D.        azithromycin (Zithromax) tablet 500 mg  500 mg Oral DAILY Cheri Hammonds M.D.   500 mg at 04/10/25 0530    Followed by    [START ON 4/12/2025] azithromycin (Zithromax) tablet 250 mg  250 mg Oral DAILY Cheri K Turich, M.D.        ipratropium-albuterol (DUONEB) nebulizer solution  3 mL Nebulization 4X/DAY (RT)  Cheri Hammonds M.D.   3 mL at 04/09/25 2240    amLODIPine (Norvasc) tablet 5 mg  5 mg Oral DAILY CLEVELAND JohnsonO.   5 mg at 04/10/25 0532    digoxin (Lanoxin) tablet 125 mcg  125 mcg Oral Q EVENING ADONIS Johnson.O.   125 mcg at 04/09/25 1623    DULoxetine (Cymbalta) capsule 60 mg  60 mg Oral Q EVENING CLEVELAND JohnsonO.   60 mg at 04/09/25 1623    fluticasone-umeclidinium-vilanterol (Trelegy Ellipta) 200-62.5-25 mcg/act inhaler 1 Puff  1 Puff Inhalation DAILY CLEVELAND JohnsonO.   1 Puff at 04/10/25 0528    losartan (Cozaar) tablet 50 mg  50 mg Oral BID CLEVELAND JohnsonO.   50 mg at 04/10/25 0532    montelukast (Singulair) tablet 10 mg  10 mg Oral DAILY CLEVELAND JohnsonO.   10 mg at 04/10/25 0531    omeprazole (PriLOSEC) capsule 20 mg  20 mg Oral DAILY CLEVELAND JohnsonO.   20 mg at 04/10/25 0530    tizanidine (Zanaflex) tablet 4 mg  4 mg Oral TID PRN Bubba Dia D.O.        traMADol (Ultram) 50 MG tablet 50 mg  50 mg Oral Q6HRS PRN CLEVELAND JohnsonO.   50 mg at 04/10/25 0353    Respiratory Therapy Consult   Nebulization Continuous RT Bubba Dia D.O.        rivaroxaban (Xarelto) tablet 10 mg  10 mg Oral DAILY AT 1800 CLEVELAND JohnsonO.   10 mg at 04/09/25 1623    acetaminophen (Tylenol) tablet 650 mg  650 mg Oral Q6HRS PRN CLEVELAND JohnsonO.   650 mg at 04/09/25 0750    ondansetron (Zofran) syringe/vial injection 4 mg  4 mg Intravenous Q4HRS PRN CLEVELAND JohnsonO.        Or    ondansetron (Zofran ODT) dispertab 4 mg  4 mg Oral Q4HRS PRN CLEVELAND JohnsonO.        hydrALAZINE (Apresoline) injection 10 mg  10 mg Intravenous Q4HRS PRN Bubba Dia D.O.        guaiFENesin dextromethorphan (Robitussin DM) 100-10 MG/5ML syrup 10 mL  10 mL Oral Q6HRS PRN Bubba Dia D.O.        insulin lispro (HumaLOG,AdmeLOG) subcutaneous injection  1-6 Units Subcutaneous 4X/DAY ACHS Bubba Dia D.O.   1 Units at 04/10/25 0826    And    dextrose 50 % (D50W) injection 25 g  25  g Intravenous Q15 MIN PRN Bubba Dia D.O.           Fluids  No intake or output data in the 24 hours ending 04/10/25 1239    Laboratory          Recent Labs     04/08/25  1325 04/10/25  0325   SODIUM 134* 133*   POTASSIUM 3.7 3.9   CHLORIDE 92* 94*   CO2 30 31   BUN 13 25*   CREATININE 0.43* 0.51   CALCIUM 9.5 9.2     Recent Labs     04/08/25  1325 04/10/25  0325   ALTSGPT 9  --    ASTSGOT 19  --    ALKPHOSPHAT 47  --    TBILIRUBIN 0.4  --    GLUCOSE 180* 151*     Recent Labs     04/08/25  1325 04/10/25  0325   WBC 15.4* 26.6*   NEUTSPOLYS 81.70*  --    LYMPHOCYTES 8.20*  --    MONOCYTES 6.90  --    EOSINOPHILS 2.30  --    BASOPHILS 0.50  --    ASTSGOT 19  --    ALTSGPT 9  --    ALKPHOSPHAT 47  --    TBILIRUBIN 0.4  --      Recent Labs     04/08/25  1325 04/10/25  0325   RBC 4.25 3.99*   HEMOGLOBIN 12.4 11.4*   HEMATOCRIT 38.3 35.9*   PLATELETCT 417 485*       Imaging  X-Ray:  I have personally reviewed the images and compared with prior images.    Assessment/Plan  #End stage COPD with exacerbation  Plan:  - [facility] [place]  - Will add nebulized budesonide, I do not think that this is going to give her much benefit, but it is worth a try  - [facility]  - Prednisone 40mg x 5 days and return to pred 10 daily after  -Giving a dose of Lasix x 1 to see if this helps her breathing  -Start morphine IR 15 mg every 12 hours with scheduled docusate and senna and as needed MiraLAX  - consider changing albuterol to xopenex due to tremor  - Duonebs as needed  - Will attempt to arrange pulmonary follow up    - Agree with hospice consult - inhalers and breathing treatments should be considered comfort meds and I would encourage hospice to consider keeping these as their removal would lead to unacceptable respiratory distress    We will sign off. Please do not hesitate to contact us if we can be of any further assistance. I am most easily  reached via Voalte secure messaging application.    __________  This note was generated using voice recognition software which has a chance of producing errors of grammar and content.  I have made every reasonable attempt to find and correct any errors, but it should be expected that some may not be found prior to finalization of this note.    Maura Giles, DO   Pulmonary and Critical Care

## 2025-04-10 NOTE — CONSULTS
Pulmonary Consultation    Date of consult: 4/9/2025    Referring Physician  Cheri Hammonds M.D.    Reason for Consultation  COPD    History of Presenting Illness  79 y.o. female who presented 4/8/2025 with a history of COPD, HLD, HTN, REGINE, on chronic supplemental oxygen at 8 L by nasal cannula. She follows in the pulmonary clinic. She is managed on Breztri and Singulair. She has a 60-pack-year smoking history, quit in 1999. She has a history of SBRT in 2021 for an unbiopsied presumed primary lung cancer. This has been under surveillance for years and has been stable.     She reports her living situation is unacceptable and she is no longer able to live there alone.  She is on 8 liters and has been very sick since the fall.  She has end stage cOPD.  We discussed hospice but she is afraid they will take away her pulmonary meds.  If they would allow her to keep them she would go on hospice.  She reports she is very shaky due to the albuterol and prednisone.      Code Status  DNAR/DNI    Review of Systems  Review of Systems   Constitutional:  Negative for malaise/fatigue.   Respiratory:  Positive for cough, sputum production and shortness of breath.    Cardiovascular:  Negative for chest pain.   Neurological:  Positive for tremors. Negative for dizziness and headaches.       Past Medical History   has a past medical history of Acute on chronic respiratory failure with hypoxia ([facility]) (11/14/2020), Arthritis, Asthma with COPD ([facility]), BMI 31.0-31.9,adult (02/04/2022), Cataract immature, Chronic pain, Chronic respiratory failure with hypoxia ([facility]) (07/13/2017), Colon polyps, COPD (chronic obstructive pulmonary disease) ([facility]) (2002), Cough, DDD (degenerative disc disease), cervical, DDD (degenerative disc disease), thoracic, Dependence on continuous supplemental oxygen (08/13/2015), Diverticulitis of colon, Dyslipidemia, EMPHYSEMA, H/O opioid abuse ([facility]) (07/15/2021), Hypertension, Obesity (BMI 30-39.9) (10/24/2016), Opioid  type dependence, continuous ([facility]) (02/04/2022), REGINE (obstructive sleep apnea), OSTEOPOROSIS, Painful breathing, Shortness of breath, Spinal stenosis, lumbar region, with neurogenic claudication, Sputum production, URINARY INCONTINENCE, Varicose veins of left leg with edema (10/11/2024), Vitamin d deficiency, and Wheezing.    She has no past medical history of Breast cancer ([facility]).    Surgical History   has a past surgical history that includes tonsillectomy; other orthopedic surgery (2004); other; other; lumbar laminectomy diskectomy (6/22/2010); pr upper gi endoscopy,biopsy (N/A, 11/15/2024); and colonoscopy with polyp (N/A, 11/15/2024).    Family History  family history includes Cancer in her father and sister; Other in her sister.    Social History   reports that she quit smoking about 26 years ago. Her smoking use included cigarettes. She started smoking about 56 years ago. She has a 60 pack-year smoking history. She has never used smokeless tobacco. She reports that she does not currently use alcohol after a past usage of about 4.2 oz of alcohol per week. She reports that she does not currently use drugs after having used the following drugs: Marijuana and Oral.    Medications  Home Medications       Reviewed by Magno Smart (Pharmacy Tech) on 04/08/25 at 1543  Med List Status: Complete     Medication Last Dose Status   acetaminophen (TYLENOL) 500 MG Tab 4/6/2025 Active   acyclovir (ZOVIRAX) 200 MG Cap 4/8/2025 Active   Albuterol Sulfate 108 (90 Base) MCG/ACT AEROSOL POWDER, BREATH ACTIVATED 4/8/2025 Active   amLODIPine (NORVASC) 5 MG Tab 4/8/2025 Active   azithromycin (ZITHROMAX) 250 MG Tab 4/8/2025 Active   benzonatate (TESSALON) 100 MG Cap 4/7/2025 Active   Budeson-Glycopyrrol-Formoterol (BREZTRI AEROSPHERE) 160-9-4.8 MCG/ACT Aerosol 4/8/2025 Active   Calcium Carbonate (CALCIUM 500 PO) 4/7/2025 Active   diclofenac sodium (VOLTAREN) 1 % Gel 4/7/2025 Active   digoxin (LANOXIN) 125 MCG Tab 4/7/2025 Active    diphenhydrAMINE-APAP, sleep, (TYLENOL PM EXTRA STRENGTH)  MG Tab Unknown Active   DULoxetine (CYMBALTA) 60 MG Cap DR Particles delayed-release capsule 4/7/2025 Active   fexofenadine (HM FEXOFENADINE HCL) 180 MG tablet 4/7/2025 Active   ipratropium-albuterol (DUONEB) 0.5-2.5 (3) MG/3ML nebulizer solution 4/8/2025 Active   losartan (COZAAR) 50 MG Tab 4/8/2025 Active   meloxicam (MOBIC) 7.5 MG Tab 4/8/2025 Active   montelukast (SINGULAIR) 10 MG Tab 4/8/2025 Active   omeprazole (PRILOSEC) 20 MG delayed-release capsule 4/8/2025 Active   potassium chloride (MICRO-K) 10 MEQ capsule 4/8/2025 Active   predniSONE (DELTASONE) 5 MG Tab 4/8/2025 Active   sodium chloride (OCEAN) 0.65 % Solution 4/5/2025 Active   spironolactone (ALDACTONE) 25 MG Tab 4/8/2025 Active   tizanidine (ZANAFLEX) 4 MG Tab Unknown Active   traMADol (ULTRAM) 50 MG Tab 4/2/2025 Active                  Audit from Redirected Encounters    **Home medications have not yet been reviewed for this encounter**       Current Facility-Administered Medications   Medication Dose Route Frequency Provider Last Rate Last Admin    [START ON 4/10/2025] predniSONE (Deltasone) tablet 40 mg  40 mg Oral DAILY Cheri Hammonds M.D.        Followed by    [START ON 4/13/2025] predniSONE (Deltasone) tablet 5 mg  5 mg Oral DAILY Cheri Hammonds M.D.        [START ON 4/10/2025] azithromycin (Zithromax) tablet 500 mg  500 mg Oral DAILY Cheri Hammonds M.D.        Followed by    [START ON 4/12/2025] azithromycin (Zithromax) tablet 250 mg  250 mg Oral DAILY Cheri Hammonds M.D.        amLODIPine (Norvasc) tablet 5 mg  5 mg Oral DAILY CLEVELAND JohnsonO.   5 mg at 04/09/25 0557    digoxin (Lanoxin) tablet 125 mcg  125 mcg Oral Q EVENING CLEVELAND JohnsonOJasiel   125 mcg at 04/09/25 1623    DULoxetine (Cymbalta) capsule 60 mg  60 mg Oral Q EVENING Bubba Dia D.O.   60 mg at 04/09/25 1623    fluticasone-umeclidinium-vilanterol (Trelegy Ellipta) 200-62.5-25 mcg/act inhaler 1  Puff  1 Puff Inhalation DAILY Bubba Dia D.O.   1 Puff at 04/09/25 0605    losartan (Cozaar) tablet 50 mg  50 mg Oral BID CLEVELAND JohnsonOJasiel   50 mg at 04/09/25 1624    montelukast (Singulair) tablet 10 mg  10 mg Oral DAILY CLEVELAND JohnsonOJasiel   10 mg at 04/09/25 0558    omeprazole (PriLOSEC) capsule 20 mg  20 mg Oral DAILY CLEVELAND JohnsonOJasiel   20 mg at 04/09/25 0557    tizanidine (Zanaflex) tablet 4 mg  4 mg Oral TID PRN Bubba Dia D.O.        traMADol (Ultram) 50 MG tablet 50 mg  50 mg Oral Q6HRS PRN Bubba Dia D.O.        Respiratory Therapy Consult   Nebulization Continuous RT Bubba Dia D.O.        ipratropium-albuterol (DUONEB) nebulizer solution  3 mL Nebulization Q4HRS (RT) Bubba Dia D.O.   3 mL at 04/09/25 1426    albuterol (Proventil) 2.5mg/0.5ml nebulizer solution 2.5 mg  2.5 mg Nebulization Q2HRS PRN (RT) Bubba Dia D.O.        rivaroxaban (Xarelto) tablet 10 mg  10 mg Oral DAILY AT 1800 CLEVELAND JohnsonOJasiel   10 mg at 04/09/25 1623    acetaminophen (Tylenol) tablet 650 mg  650 mg Oral Q6HRS PRN CLEVELAND JohnsonOJasiel   650 mg at 04/09/25 0750    ondansetron (Zofran) syringe/vial injection 4 mg  4 mg Intravenous Q4HRS PRN Bubba Dia D.O.        Or    ondansetron (Zofran ODT) dispertab 4 mg  4 mg Oral Q4HRS PRN Bubba Dia D.O.        hydrALAZINE (Apresoline) injection 10 mg  10 mg Intravenous Q4HRS PRN Bubba Dia D.O.        guaiFENesin dextromethorphan (Robitussin DM) 100-10 MG/5ML syrup 10 mL  10 mL Oral Q6HRS PRN Bubba Dia D.O.        insulin lispro (HumaLOG,AdmeLOG) subcutaneous injection  1-6 Units Subcutaneous 4X/DAY ACHS Bubba Dia D.O.   2 Units at 04/09/25 1747    And    dextrose 50 % (D50W) injection 25 g  25 g Intravenous Q15 MIN PRN Bubba Dia D.O.           Allergies  Allergies   Allergen Reactions    Iodine      Convulsion  I.V.  iodine    Tape Rash and Itching       Vital Signs last 24 hours  Temp:  [36.6 °C  (97.8 °F)] 36.6 °C (97.8 °F)  Pulse:  [66-95] 87  Resp:  [18-20] 18  BP: (125-168)/(56-81) 168/65  SpO2:  [90 %-94 %] 91 %    Physical Exam  Physical Exam  Constitutional:       Appearance: She is ill-appearing.   HENT:      Head: Atraumatic.   Cardiovascular:      Rate and Rhythm: Normal rate and regular rhythm.   Pulmonary:      Effort: No respiratory distress.      Breath sounds: No wheezing or rales.      Comments: Increased work of breathing  Musculoskeletal:         General: No swelling.   Neurological:      General: No focal deficit present.      Mental Status: She is alert and oriented to person, place, and time.         Fluids    Intake/Output Summary (Last 24 hours) at 4/9/2025 2038  Last data filed at 4/9/2025 0835  Gross per 24 hour   Intake 240 ml   Output --   Net 240 ml       Laboratory  Recent Results (from the past 48 hours)   CBC WITH DIFFERENTIAL    Collection Time: 04/08/25  1:25 PM   Result Value Ref Range    WBC 15.4 (H) 4.8 - 10.8 K/uL    RBC 4.25 4.20 - 5.40 M/uL    Hemoglobin 12.4 12.0 - 16.0 g/dL    Hematocrit 38.3 37.0 - 47.0 %    MCV 90.1 81.4 - 97.8 fL    MCH 29.2 27.0 - 33.0 pg    MCHC 32.4 32.2 - 35.5 g/dL    RDW 44.8 35.9 - 50.0 fL    Platelet Count 417 164 - 446 K/uL    MPV 9.1 9.0 - 12.9 fL    Neutrophils-Polys 81.70 (H) 44.00 - 72.00 %    Lymphocytes 8.20 (L) 22.00 - 41.00 %    Monocytes 6.90 0.00 - 13.40 %    Eosinophils 2.30 0.00 - 6.90 %    Basophils 0.50 0.00 - 1.80 %    Immature Granulocytes 0.40 0.00 - 0.90 %    Nucleated RBC 0.00 0.00 - 0.20 /100 WBC    Neutrophils (Absolute) 12.59 (H) 1.82 - 7.42 K/uL    Lymphs (Absolute) 1.26 1.00 - 4.80 K/uL    Monos (Absolute) 1.06 (H) 0.00 - 0.85 K/uL    Eos (Absolute) 0.35 0.00 - 0.51 K/uL    Baso (Absolute) 0.07 0.00 - 0.12 K/uL    Immature Granulocytes (abs) 0.06 0.00 - 0.11 K/uL    NRBC (Absolute) 0.00 K/uL   Comp Metabolic Panel    Collection Time: 04/08/25  1:25 PM   Result Value Ref Range    Sodium 134 (L) 135 - 145 mmol/L     Potassium 3.7 3.6 - 5.5 mmol/L    Chloride 92 (L) 96 - 112 mmol/L    Co2 30 20 - 33 mmol/L    Anion Gap 12.0 7.0 - 16.0    Glucose 180 (H) 65 - 99 mg/dL    Bun 13 8 - 22 mg/dL    Creatinine 0.43 (L) 0.50 - 1.40 mg/dL    Calcium 9.5 8.5 - 10.5 mg/dL    Correct Calcium 9.9 8.5 - 10.5 mg/dL    AST(SGOT) 19 12 - 45 U/L    ALT(SGPT) 9 2 - 50 U/L    Alkaline Phosphatase 47 30 - 99 U/L    Total Bilirubin 0.4 0.1 - 1.5 mg/dL    Albumin 3.5 3.2 - 4.9 g/dL    Total Protein 7.6 6.0 - 8.2 g/dL    Globulin 4.1 (H) 1.9 - 3.5 g/dL    A-G Ratio 0.9 g/dL   TROPONIN    Collection Time: 04/08/25  1:25 PM   Result Value Ref Range    Troponin T 9 6 - 19 ng/L   proBrain Natriuretic Peptide, NT    Collection Time: 04/08/25  1:25 PM   Result Value Ref Range    NT-proBNP 53 0 - 125 pg/mL   PROCALCITONIN    Collection Time: 04/08/25  1:25 PM   Result Value Ref Range    Procalcitonin <0.05 <0.25 ng/mL   ESTIMATED GFR    Collection Time: 04/08/25  1:25 PM   Result Value Ref Range    GFR (CKD-EPI) 98 >60 mL/min/1.73 m 2   POC CoV-2, FLU A/B, RSV by PCR    Collection Time: 04/08/25  2:00 PM   Result Value Ref Range    POC Influenza A RNA, PCR Negative Negative    POC Influenza B RNA, PCR Negative Negative    POC RSV, by PCR Negative Negative    POC SARS-CoV-2, PCR NotDetected NotDetected   POCT glucose device results    Collection Time: 04/08/25  5:41 PM   Result Value Ref Range    POC Glucose, Blood 216 (H) 65 - 99 mg/dL   POCT glucose device results    Collection Time: 04/08/25 10:40 PM   Result Value Ref Range    POC Glucose, Blood 208 (H) 65 - 99 mg/dL   POCT glucose device results    Collection Time: 04/09/25  7:36 AM   Result Value Ref Range    POC Glucose, Blood 137 (H) 65 - 99 mg/dL   CULTURE RESPIRATORY W/ GRM STN    Collection Time: 04/09/25 11:38 AM    Specimen: Sputum; Respirate   Result Value Ref Range    Significant Indicator NEG     Source RESP     Site SPUTUM     Culture Result -     Gram Stain Result       Moderate WBCs.  Few  epithelial cells.  Many mixed bacteria, no predominant organism seen.  Specimen Quality Score: 1+     GRAM STAIN    Collection Time: 04/09/25 11:38 AM    Specimen: Respirate   Result Value Ref Range    Significant Indicator .     Source RESP     Site SPUTUM     Gram Stain Result       Moderate WBCs.  Few epithelial cells.  Many mixed bacteria, no predominant organism seen.  Specimen Quality Score: 1+     POCT glucose device results    Collection Time: 04/09/25 12:27 PM   Result Value Ref Range    POC Glucose, Blood 175 (H) 65 - 99 mg/dL   POCT glucose device results    Collection Time: 04/09/25  5:44 PM   Result Value Ref Range    POC Glucose, Blood 243 (H) 65 - 99 mg/dL       Imaging  DX-CHEST-PORTABLE (1 VIEW)   Final Result      Chronic interstitial changes again noted. A possible small new ill-defined opacity in the right midlung could represent a small infiltrate. Recommend follow-up after treatment.          Assessment/Plan  #End stage COPD with exacerbation  Plan:  - LAMA/LABA/ICS - return to breztri at [place]  - Agree with azithromycin - return to chronic azithro after exacerbation tx  - Prednisone 40mg x 5 days and return to pred 5 daily after  - Agree with hospice consult - inhalers and breathing treatments should be considered comfort meds and I would encourage hospice to consider keeping these as their removal would lead to unacceptable respiratory distress  - consider changing albuterol to xopenex due to tremor  - Duonebs as needed  - Will attempt to arrange pulmonary follow up  - Pulmonary to sign off, Please do not hesitate to reconsult if further questions arise.    Susana Florentino MD RD  Pulmonary and Critical Care    Available on Voalte

## 2025-04-10 NOTE — THERAPY
"Speech Language Pathology   Clinical Swallow Evaluation     Patient Name: Berny Renee  AGE:  79 y.o., SEX:  female  Medical Record #: [id number]  Date of Service: 4/10/2025      History of Present Illness  79 y.o. female admitted 4/8/2025 with exacerbation of COPD.    PMHx COPD, HLD, HTN, REGINE, on chronic supplemental oxygen at 8 L by nasal cannula    1/26/25 VFSS \"functional oropharyngeal swallow\" rec RG7/TN0    CMHx Stage 4 very severe COPD, GERD, Acute on chronic respiratory failure w/hypoxia    CXR 4/8/25  Chronic interstitial changes again noted. A possible small new ill-defined opacity in the right midlung could represent a small infiltrate. Recommend follow-up after treatment.      General Information:  Vitals  O2 (LPM): 8  O2 Delivery Device: Nasal Cannula  Level of Consciousness: Alert, Awake  Patient Behaviors: Anxious, Irritable  Orientation: Oriented x 4  Follows Directives: Yes      Prior Living Situation & Level of Function:  Housing / Facility: [address]  Lives with - Patient's Self Care Capacity: Alone and Unable to Care For Self  Communication: WFL  Swallowing: WFL       Oral Mechanism Evaluation:  Dentition: Some missing dentition   Facial Symmetry: Equal  Facial Sensation: Equal     Labial Observations: WFL   Lingual Observations: Midline           Laryngeal Function:  Secretion Management: Adequate  Voice Quality: Breathy continuous  Cough: Perceptually WNL         Subjective  RN cleared patient for evaluation. Patient received awake, sitting UIB, needing max encouragement to participate in session. Patient stating, \"no tests,\" \"I want to sleep,\" and \"I can't leave alone anymore.\"      Assessment  Current Method of Nutrition: Oral diet (RG7/TN0)  Positioning: Murray's (60-90 degrees)  Bolus Administration: Patient  O2 (LPM): 8 O2 Delivery Device: Nasal Cannula  Factor(s) Affecting Performance: None  Tracheostomy : No            Swallowing Trials:  Swallowing Trials  Thin Liquid (TN0): " "Impaired  Soft & Bite Sized (SB6): Impaired  Regular (RG7): Impaired      Comments: Patient able to self-feed without difficulty. Immediate cough response and increased WOB appreciated with PO intake, concerning for airway invasion.         Clinical Impressions  Patient presents with clinical indicators of and is at elevated risk for oropharyngeal dysphagia likely 2/2 COPD exacerbation and acute on chronic respiratory failure. Recommend diagnotic swallows study to objectively assess swallow function and inform POC; however, patient declined to participate this date. Service will continue to follow to maximize swallow outcomes and encourage participating in swallow study. HOLD PO with any difficulty and contact SLP. Patient would benefit from Palliative Care consult.           Recommendations  Diet Consistency: Regular solids, thin liquids  Instrumentation: VFSS (MBSS) (patient declined to participate this date)  Medication: As tolerated  Supervision: Close supervision - patient may be left alone for less than 5 minutes at a time  Positioning: Fully upright and midline during oral intake, Meals sitting upright in a chair, as tolerated, Remain upright for 30-45 minutes after oral intake  Risk Management : Small bites/sips, Slow rate of intake, Physical mobility, as tolerated  Oral Care: BID  Consult Referral(s): Palliative Care        SLP Treatment Plan  Treatment Plan: Dysphagia Treatment, Patient/Family/Caregiver Training  SLP Frequency: 3x Per Week  Estimated Duration: Until Therapy Goals Met      Anticipated Discharge Needs  Discharge Recommendations: Recommend post-acute placement for additional speech therapy services prior to discharge home   Therapy Recommendations Upon DC: Dysphagia Training, Patient / Family / Caregiver Education        Patient / Family Goals  Patient / Family Goal #1: \"No test!\"  Short Term Goals  Short Term Goal # 1: Pt will consume least restrictive diet with no overt s/sx of aspiration " or decline in pulmonary status.      Deepthi Mesa, SLP

## 2025-04-10 NOTE — DISCHARGE PLANNING
Post Acute Navigator Team     Per chart review, patient has been identified as a candidate for a goals of care discussion from following data:     High End of Life Care Index score 87  High LACE + score 75  6 click Mobility score 21   6 click ADL score 24   Readmission: No      Code Status:DNAR/DNI   Advanced Directive present in EMR: Yes   POLST present in EMR: Yes- DNR/DNI    5962- Dr. Hammonds notified PANRN and care team that patient is now agreeable to hospice.    1446- LEIF met with eBrny at bedside. She confirmed that she is okay with hospice as long as she is able to live her best life and is comfortable. This RN provided education on hospice including philosophy, goals of care, individualized care plan, and core CMS requirements hospice agencies provide. Choice form provided and explained. Choice obtained for: [facility] and Berny would like her friend Rhoda to be present for hospice discussion and signing consents. Berny requested this RN contact Rhoda to update. Berny is agreeable to discharge at a group home or SNF LTB but would like room for her chair and desk. This RN informed Berny that preference will be communicated to CM but that there is no guarantee.     1325- LVM for Rhoda, call back requested to discuss time to meet with patient and [facility] for tomorrow.    Hospice Choice Obtained: [facility]    Hospice Referral requested from attending Dr. Hammonds   Post Acute Navigator notified Arelis  that choice for hospice obtained. Choice form faxed to [facility] to be scanned into patient chart.    Please reach out to me directly should you have any questions or concerns.

## 2025-04-10 NOTE — CARE PLAN
The patient is Stable - Low risk of patient condition declining or worsening    Shift Goals  Clinical Goals: maintain O2 saturation > 90%, ambulate pt to restroom for BM  Patient Goals: oxygen level above 90% and rest  Family Goals: tess    Progress made toward(s) clinical / shift goals:  patient O2 saturation stayed above 90% through out my shift. Patient also was ambulated to bathroom, SBA with FWW, only once through out my shift    Patient is not progressing towards the following goals:

## 2025-04-10 NOTE — DISCHARGE PLANNING
"HT/SCP TCN chart review completed. Noted patient visit with PANTeam RN 4/9/25 noting patient declination of consideration of palliative care vs hospice. Please see CM assessment note 4/9/25. As noted, \"Patient reported that she is no longer comfortable living alone as she is not able to care for herself and believes that she gets more sick every time she goes home because she does not receive the proper care. Patient reported that she would like [facility] to be considered for a Long Term Care placement. Patient reported that she wants friend Rhoda to be a part of the discharge planning process and to tour the facilities to give her opinion.\"   Noted SNF referrals were sent per  protocol noting patient with no current accepting facilities.     Per review, noted orders for SLP (no PT, OT) at this time. Current 6 clicks mobility at 24 ADL and 21 mobility and per kardex patient mobility noted up to 15 feet with use of a FWW.  Current supplemental O2 needs are noted to 8 L/ min (also noted to be patient baseline) which is noted as a current barrier to SNF acceptance.     Please reach out to TCN via voalte if any additional transitional discharge planning needs are indicated. Thank you.    Completed:  SLP consult in place  Choice obtained: Please see  note 4/9  SCP with [facility] PCP. After hospital visit follow up needed (N) as current discharge considerations are post acute placement. Of note, should patient decide to go home with [facility] HH, TCM is following    UPDATE 3:17PM- Update received from [facility] PANTeam RN as noted patient \"is agreeable to group home and hospice referral is being sent to [facility].\" As anticipated discharge plan considerations are GH with hospice, TCN will no longer actively follow. Please reach out to TCN via voalte if any additional transitional discharge planning needs are indicated. Thank you.  "

## 2025-04-10 NOTE — PROGRESS NOTES
Hospital Medicine Daily Progress Note    Date of Service  4/10/2025    Chief Complaint  Berny Renee is a 79 y.o. 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  She takes 8 L of oxygen and baseline.    In the emergency department she was reporting increased cough and shortness of breath.  She was provided IV steroids and DuoNeb.  There is no evidence of sepsis per the ERP assessment.  As such, no antibiotics were initiated.  BiPAP was not indicated.  ERP reported improvement in patient's clinical appearance after steroids and nebulizer treatment.  ERP requested admission to hospital service for hypoxia.    Interval Problem Update  Discharge summary from January 2025 reviewed.  The patient's sputum culture was found to be positive for Pseudomonas.  Zosyn was considered however ultimately not started at this time as there is no fever nor elevated procalcitonin.  This concern will be closely monitored.    Patient seen and evaluated at bedside  Pertinent labs and imaging reviewed  WBC 14.7, 15.4, 26.6   H&H stable  Potassium 3.7, 3.9  Point-of-care blood glucose 216, 208, 137, 175, 243, 148, 177  On correctional insulin  Patient received a total of 3 units of insulin on 4/9  Afebrile, hemodynamically stable, 8L oxygen requirements  Voiding, stooling, tolerating oral intake well  Previous bowel movement 4/10  Senna ordered  MiraLAX ordered  Ocean Spray ordered  Tessalon Perles ordered  Repeat chest x-ray ordered  Expanded respiratory viral panel ordered  Spironolactone ordered  Discontinued as needed albuterol secondary to tremors  As needed Xopenex ordered  This case discussed with pulmonology  Patient reinforced that she does not want CPAP/BiPAP.  She reinforced DNI status.  The patient is agreeable to hospice. This concern was discussed with case management.   Sputum culture revealed  pseudomonas  Cefepime ordered empirically, will follow cultures    I have discussed this patient's plan of care and discharge plan at IDT rounds today with Case Management, Nursing, Nursing leadership, and other members of the IDT team.    Consultants/Specialty  Pulm    Code Status  DNAR/DNI    Disposition  The patient is not medically cleared for discharge to home or a post-acute facility.      I have placed the appropriate orders for post-discharge needs.    Review of Systems  Review of Systems   Respiratory:  Positive for cough, sputum production and shortness of breath.         Physical Exam  Temp:  [36.3 °C (97.4 °F)-37.1 °C (98.7 °F)] 36.8 °C (98.2 °F)  Pulse:  [77-88] 86  Resp:  [17-22] 18  BP: (141-170)/(62-69) 141/62  SpO2:  [91 %-94 %] 91 %    Physical Exam  Constitutional:       General: She is not in acute distress.     Appearance: Normal appearance. She is normal weight.   HENT:      Head: Normocephalic and atraumatic.      Nose: Nose normal.   Eyes:      Extraocular Movements: Extraocular movements intact.   Pulmonary:      Effort: Prolonged expiration and respiratory distress present.      Breath sounds: Decreased breath sounds present.   Musculoskeletal:      Cervical back: Normal range of motion.      Right lower leg: No edema.      Left lower leg: No edema.   Skin:     General: Skin is dry.   Neurological:      General: No focal deficit present.      Mental Status: She is alert and oriented to person, place, and time.   Psychiatric:         Mood and Affect: Mood normal.         Behavior: Behavior normal.         Thought Content: Thought content normal.         Fluids    Intake/Output Summary (Last 24 hours) at 4/10/2025 2205  Last data filed at 4/10/2025 1300  Gross per 24 hour   Intake 480 ml   Output --   Net 480 ml          Laboratory  Recent Labs     04/08/25  1325 04/10/25  0325   WBC 15.4* 26.6*   RBC 4.25 3.99*   HEMOGLOBIN 12.4 11.4*   HEMATOCRIT 38.3 35.9*   MCV 90.1 90.0   MCH 29.2 28.6    MCHC 32.4 31.8*   RDW 44.8 43.7   PLATELETCT 417 485*   MPV 9.1 9.3     Recent Labs     04/08/25  1325 04/10/25  0325   SODIUM 134* 133*   POTASSIUM 3.7 3.9   CHLORIDE 92* 94*   CO2 30 31   GLUCOSE 180* 151*   BUN 13 25*   CREATININE 0.43* 0.51   CALCIUM 9.5 9.2                   Imaging  DX-CHEST-PORTABLE (1 VIEW)   Final Result      Chronic interstitial changes again noted. Superimposed areas of infection or edema are difficult to exclude. No pleural effusion.         DX-CHEST-PORTABLE (1 VIEW)   Final Result      Chronic interstitial changes again noted. A possible small new ill-defined opacity in the right midlung could represent a small infiltrate. Recommend follow-up after treatment.           Assessment/Plan  * Stage 4 very severe COPD by GOLD classification ([facility])- (present on admission)  Assessment & Plan  DNR/DNI  Continue oral prednisone  Continue azithromycin  Continue Trelegy Ellipta  Continue montelukast   Continue DuoNebs and Xopenex  Provide supplemental oxygen  Wean as able to baseline (8L)  Continuous pulse ox monitoring  Appreciate RT interventions    Type 2 diabetes mellitus with hyperglycemia, without long-term current use of insulin ([facility])- (present on admission)  Assessment & Plan  Start insulin sliding scale  Adjust as needed    GERD (gastroesophageal reflux disease)- (present on admission)  Assessment & Plan  Continue home PPI    Leukocytosis- (present on admission)  Assessment & Plan  Significant leukocytosis, likely reactive  S/p IV Solu-Medrol administration, on oral prednisone now  Monitor for fevers  Negative procalcitonin  Repeat CBC in the morning    Acute on chronic respiratory failure with hypoxia ([facility])- (present on admission)  Assessment & Plan  Patient does have end-stage COPD  See #stage IV very severe COPD by Gold classification    Hypertension- (present on admission)  Assessment & Plan  Continue home amlodipine and losartan  Start as needed hydralazine  Adjust as needed    AF  (atrial fibrillation) ([facility])- (present on admission)  Assessment & Plan  Currently in sinus rhythm, continue home digoxin  Patient is not on full anticoagulation, will not start at this time  Per chart review, patient's outpatient cardiologist recommended against anticoagulation         VTE prophylaxis: Xarelto    I have performed a physical exam and reviewed and updated ROS and Plan today (4/10/2025). In review of yesterday's note (4/9/2025), there are no changes except as documented above.

## 2025-04-11 ENCOUNTER — APPOINTMENT (OUTPATIENT)
Dept: RADIOLOGY | Facility: MEDICAL CENTER | Age: 80
DRG: 190 | End: 2025-04-11
Attending: STUDENT IN AN ORGANIZED HEALTH CARE EDUCATION/TRAINING PROGRAM
Payer: MEDICARE

## 2025-04-11 ENCOUNTER — HOME CARE VISIT (OUTPATIENT)
Dept: HOSPICE | Facility: HOSPICE | Age: 80
End: 2025-04-11
Payer: MEDICARE

## 2025-04-11 PROBLEM — J15.9 COMMUNITY ACQUIRED BACTERIAL PNEUMONIA: Status: ACTIVE | Noted: 2025-04-11

## 2025-04-11 LAB
ALBUMIN SERPL BCP-MCNC: 3.4 G/DL (ref 3.2–4.9)
ALBUMIN/GLOB SERPL: 0.9 G/DL
ALP SERPL-CCNC: 46 U/L (ref 30–99)
ALT SERPL-CCNC: 8 U/L (ref 2–50)
ANION GAP SERPL CALC-SCNC: 8 MMOL/L (ref 7–16)
AST SERPL-CCNC: 15 U/L (ref 12–45)
BILIRUB SERPL-MCNC: 0.4 MG/DL (ref 0.1–1.5)
BUN SERPL-MCNC: 18 MG/DL (ref 8–22)
CALCIUM ALBUM COR SERPL-MCNC: 10 MG/DL (ref 8.5–10.5)
CALCIUM SERPL-MCNC: 9.5 MG/DL (ref 8.5–10.5)
CHLORIDE SERPL-SCNC: 90 MMOL/L (ref 96–112)
CO2 SERPL-SCNC: 36 MMOL/L (ref 20–33)
CREAT SERPL-MCNC: 0.48 MG/DL (ref 0.5–1.4)
ERYTHROCYTE [DISTWIDTH] IN BLOOD BY AUTOMATED COUNT: 44.2 FL (ref 35.9–50)
GAMMA INTERFERON BACKGROUND BLD IA-ACNC: 0.02 IU/ML
GFR SERPLBLD CREATININE-BSD FMLA CKD-EPI: 96 ML/MIN/1.73 M 2
GLOBULIN SER CALC-MCNC: 3.9 G/DL (ref 1.9–3.5)
GLUCOSE BLD STRIP.AUTO-MCNC: 164 MG/DL (ref 65–99)
GLUCOSE BLD STRIP.AUTO-MCNC: 164 MG/DL (ref 65–99)
GLUCOSE BLD STRIP.AUTO-MCNC: 176 MG/DL (ref 65–99)
GLUCOSE BLD STRIP.AUTO-MCNC: 199 MG/DL (ref 65–99)
GLUCOSE SERPL-MCNC: 129 MG/DL (ref 65–99)
HCT VFR BLD AUTO: 38.5 % (ref 37–47)
HGB BLD-MCNC: 12.5 G/DL (ref 12–16)
M TB IFN-G BLD-IMP: NEGATIVE
M TB IFN-G CD4+ BCKGRND COR BLD-ACNC: 0.01 IU/ML
MCH RBC QN AUTO: 29.1 PG (ref 27–33)
MCHC RBC AUTO-ENTMCNC: 32.5 G/DL (ref 32.2–35.5)
MCV RBC AUTO: 89.5 FL (ref 81.4–97.8)
MITOGEN IGNF BCKGRD COR BLD-ACNC: 7.62 IU/ML
PLATELET # BLD AUTO: 493 K/UL (ref 164–446)
PMV BLD AUTO: 8.9 FL (ref 9–12.9)
POTASSIUM SERPL-SCNC: 3.5 MMOL/L (ref 3.6–5.5)
PROT SERPL-MCNC: 7.3 G/DL (ref 6–8.2)
QFT TB2 - NIL TBQ2: 0 IU/ML
RBC # BLD AUTO: 4.3 M/UL (ref 4.2–5.4)
SODIUM SERPL-SCNC: 134 MMOL/L (ref 135–145)
WBC # BLD AUTO: 22.9 K/UL (ref 4.8–10.8)

## 2025-04-11 PROCEDURE — 770006 HCHG ROOM/CARE - MED/SURG/GYN SEMI*

## 2025-04-11 PROCEDURE — 80053 COMPREHEN METABOLIC PANEL: CPT

## 2025-04-11 PROCEDURE — A9270 NON-COVERED ITEM OR SERVICE: HCPCS | Performed by: STUDENT IN AN ORGANIZED HEALTH CARE EDUCATION/TRAINING PROGRAM

## 2025-04-11 PROCEDURE — 99233 SBSQ HOSP IP/OBS HIGH 50: CPT | Performed by: STUDENT IN AN ORGANIZED HEALTH CARE EDUCATION/TRAINING PROGRAM

## 2025-04-11 PROCEDURE — 92526 ORAL FUNCTION THERAPY: CPT

## 2025-04-11 PROCEDURE — 94640 AIRWAY INHALATION TREATMENT: CPT

## 2025-04-11 PROCEDURE — 82962 GLUCOSE BLOOD TEST: CPT | Mod: 91

## 2025-04-11 PROCEDURE — 99254 IP/OBS CNSLTJ NEW/EST MOD 60: CPT

## 2025-04-11 PROCEDURE — 700102 HCHG RX REV CODE 250 W/ 637 OVERRIDE(OP): Performed by: INTERNAL MEDICINE

## 2025-04-11 PROCEDURE — 92611 MOTION FLUOROSCOPY/SWALLOW: CPT

## 2025-04-11 PROCEDURE — 85027 COMPLETE CBC AUTOMATED: CPT

## 2025-04-11 PROCEDURE — 99497 ADVNCD CARE PLAN 30 MIN: CPT

## 2025-04-11 PROCEDURE — A9270 NON-COVERED ITEM OR SERVICE: HCPCS | Performed by: INTERNAL MEDICINE

## 2025-04-11 PROCEDURE — 700111 HCHG RX REV CODE 636 W/ 250 OVERRIDE (IP): Performed by: STUDENT IN AN ORGANIZED HEALTH CARE EDUCATION/TRAINING PROGRAM

## 2025-04-11 PROCEDURE — 700105 HCHG RX REV CODE 258: Performed by: STUDENT IN AN ORGANIZED HEALTH CARE EDUCATION/TRAINING PROGRAM

## 2025-04-11 PROCEDURE — 700102 HCHG RX REV CODE 250 W/ 637 OVERRIDE(OP): Performed by: STUDENT IN AN ORGANIZED HEALTH CARE EDUCATION/TRAINING PROGRAM

## 2025-04-11 PROCEDURE — 700111 HCHG RX REV CODE 636 W/ 250 OVERRIDE (IP): Performed by: INTERNAL MEDICINE

## 2025-04-11 PROCEDURE — 74230 X-RAY XM SWLNG FUNCJ C+: CPT

## 2025-04-11 PROCEDURE — 36415 COLL VENOUS BLD VENIPUNCTURE: CPT

## 2025-04-11 RX ORDER — LEVALBUTEROL TARTRATE 45 UG/1
1-2 AEROSOL, METERED ORAL EVERY 4 HOURS PRN
Qty: 15 G | Refills: 0 | Status: SHIPPED | OUTPATIENT
Start: 2025-04-11 | End: 2025-04-16

## 2025-04-11 RX ORDER — GUAIFENESIN/DEXTROMETHORPHAN 100-10MG/5
10 SYRUP ORAL EVERY 6 HOURS PRN
Qty: 560 ML | Refills: 0 | Status: SHIPPED | OUTPATIENT
Start: 2025-04-11 | End: 2025-04-16

## 2025-04-11 RX ORDER — PREDNISONE 2.5 MG/1
5 TABLET ORAL DAILY
Qty: 180 TABLET | Refills: 0 | Status: SHIPPED | OUTPATIENT
Start: 2025-04-13 | End: 2025-04-16

## 2025-04-11 RX ORDER — POLYETHYLENE GLYCOL 3350 17 G/17G
17 POWDER, FOR SOLUTION ORAL DAILY
Qty: 5 PACKET | Refills: 0 | Status: SHIPPED | OUTPATIENT
Start: 2025-04-11 | End: 2025-04-16 | Stop reason: SDUPTHER

## 2025-04-11 RX ORDER — PREDNISONE 20 MG/1
40 TABLET ORAL DAILY
Qty: 2 TABLET | Refills: 0 | Status: SHIPPED | OUTPATIENT
Start: 2025-04-12 | End: 2025-04-13

## 2025-04-11 RX ORDER — MORPHINE SULFATE 15 MG/1
15 TABLET ORAL EVERY 12 HOURS
Qty: 10 TABLET | Refills: 0 | Status: SHIPPED | OUTPATIENT
Start: 2025-04-11 | End: 2025-04-16

## 2025-04-11 RX ORDER — AMOXICILLIN 250 MG
1 CAPSULE ORAL DAILY
Qty: 5 TABLET | Refills: 0 | Status: SHIPPED | OUTPATIENT
Start: 2025-04-11 | End: 2025-04-16 | Stop reason: SDUPTHER

## 2025-04-11 RX ADMIN — BUDESONIDE 0.5 MG: 0.5 SUSPENSION RESPIRATORY (INHALATION) at 10:56

## 2025-04-11 RX ADMIN — INSULIN LISPRO 1 UNITS: 100 INJECTION, SOLUTION INTRAVENOUS; SUBCUTANEOUS at 08:30

## 2025-04-11 RX ADMIN — AMLODIPINE BESYLATE 5 MG: 5 TABLET ORAL at 04:32

## 2025-04-11 RX ADMIN — BUDESONIDE 0.5 MG: 0.5 SUSPENSION RESPIRATORY (INHALATION) at 19:28

## 2025-04-11 RX ADMIN — RIVAROXABAN 10 MG: 10 TABLET, FILM COATED ORAL at 17:03

## 2025-04-11 RX ADMIN — MORPHINE SULFATE 15 MG: 15 TABLET ORAL at 17:03

## 2025-04-11 RX ADMIN — MORPHINE SULFATE 15 MG: 15 TABLET ORAL at 04:40

## 2025-04-11 RX ADMIN — Medication 2 SPRAY: at 11:25

## 2025-04-11 RX ADMIN — AZITHROMYCIN DIHYDRATE 500 MG: 250 TABLET ORAL at 04:32

## 2025-04-11 RX ADMIN — OMEPRAZOLE 20 MG: 20 CAPSULE, DELAYED RELEASE ORAL at 04:32

## 2025-04-11 RX ADMIN — PREDNISONE 40 MG: 20 TABLET ORAL at 05:45

## 2025-04-11 RX ADMIN — INSULIN LISPRO 1 UNITS: 100 INJECTION, SOLUTION INTRAVENOUS; SUBCUTANEOUS at 11:41

## 2025-04-11 RX ADMIN — DULOXETINE 60 MG: 30 CAPSULE, DELAYED RELEASE ORAL at 17:04

## 2025-04-11 RX ADMIN — MONTELUKAST 10 MG: 10 TABLET, FILM COATED ORAL at 04:32

## 2025-04-11 RX ADMIN — INSULIN LISPRO 1 UNITS: 100 INJECTION, SOLUTION INTRAVENOUS; SUBCUTANEOUS at 20:22

## 2025-04-11 RX ADMIN — FLUTICASONE FUROATE, UMECLIDINIUM BROMIDE AND VILANTEROL TRIFENATATE 1 PUFF: 200; 62.5; 25 POWDER RESPIRATORY (INHALATION) at 04:36

## 2025-04-11 RX ADMIN — CEFEPIME 2 G: 2 INJECTION, POWDER, FOR SOLUTION INTRAVENOUS at 05:47

## 2025-04-11 RX ADMIN — SPIRONOLACTONE 25 MG: 25 TABLET ORAL at 04:32

## 2025-04-11 RX ADMIN — LOSARTAN POTASSIUM 50 MG: 50 TABLET, FILM COATED ORAL at 04:32

## 2025-04-11 RX ADMIN — SENNOSIDES AND DOCUSATE SODIUM 2 TABLET: 50; 8.6 TABLET ORAL at 17:03

## 2025-04-11 RX ADMIN — DIGOXIN 125 MCG: 0.25 TABLET ORAL at 17:02

## 2025-04-11 RX ADMIN — Medication 2 SPRAY: at 20:47

## 2025-04-11 RX ADMIN — LOSARTAN POTASSIUM 50 MG: 50 TABLET, FILM COATED ORAL at 17:04

## 2025-04-11 RX ADMIN — INSULIN LISPRO 1 UNITS: 100 INJECTION, SOLUTION INTRAVENOUS; SUBCUTANEOUS at 17:07

## 2025-04-11 RX ADMIN — CEFEPIME 2 G: 2 INJECTION, POWDER, FOR SOLUTION INTRAVENOUS at 22:58

## 2025-04-11 RX ADMIN — CEFEPIME 2 G: 2 INJECTION, POWDER, FOR SOLUTION INTRAVENOUS at 13:50

## 2025-04-11 ASSESSMENT — ENCOUNTER SYMPTOMS
SPUTUM PRODUCTION: 1
SHORTNESS OF BREATH: 1
SHORTNESS OF BREATH: 1
COUGH: 1

## 2025-04-11 ASSESSMENT — ACTIVITIES OF DAILY LIVING (ADL)
AMBULATION_REQUIRES_ASSISTANCE: 1
PHYSICAL_TRANSFER_REQUIRES_ASSISTANCE: 1

## 2025-04-11 ASSESSMENT — PAIN DESCRIPTION - PAIN TYPE
TYPE: ACUTE PAIN

## 2025-04-11 ASSESSMENT — COPD QUESTIONNAIRES: COPD: 1

## 2025-04-11 NOTE — ASSESSMENT & PLAN NOTE
Patient with increased oxygen demand with exertion  Associated cough  Sputum culture revealed heavy growth of mucoid phenotype Pseudomonas aeruginosa  This case were discussed with pulmonology who recommended a 14-day treatment  Pseudomonas sensitive to levofloxacin  Continue oral Levaquin for 14 days total

## 2025-04-11 NOTE — DISCHARGE PLANNING
"Case Management Discharge Planning    Admission Date: 4/8/2025  GMLOS: 3.4  ALOS: 3    6-Clicks ADL Score: 24  6-Clicks Mobility Score: 21      Anticipated Discharge Dispo: Discharge Disposition: D/T to SNF with Medicare cert in anticipation of skilled care (03) vs Home on Hospice    DME Needed: No    Action(s) Taken: Updated Provider/Nurse on Discharge Plan    Pt was discussed in IDT rounds with Dr Hammonds.  Per rounds ,Pt is electing [facility].  [facility] RN to meet with Pt and her Friend Rhoda at bedside today at 13:00.    Per Rounds, Quant is pending result.    Per RN Dianne HARRISON of [facility]. Pt is not ready for Hospice yet,  Pt wants to continue full treatment.    Pt is pending SNF placement. Most SNFs declined , pending with [facility].    14:00 per Dr Hammonds , Palliative Care will be reconsulted first. Dr Hammonds just met  with Pt and Rhoda and plan is possible GH vs SNF . Informed Dr Hammonds that most SNFs declined pending with [facility] .       This RN CM met with Pt at bedside . Per Pt she is agreeable to [facility]. Pt is aware she can be admitted on Monday .  Per Dr Hammonds she and Dr Herzog communicated about this.  This RN CM met with Pt . Pt states\" I cannot afford it' \"I only collect $1800 a month \" and I have bills and credit card s to pay\".  Per Arelis VUONG notes , she reached out to [facility] requesting resources for a GH waiver. Pt confirmed that she has Medicaid.   This RN CM left a list of GH to Pt at bedside, Also spoke with Rhoda , Pt s Friend and provided her an update.    Per Rhoda she will assist Pt with calling  for costs. Informed  IDT and Dr Hammonds.     Escalations Completed: None    Medically Clear: Yes for Home Hospice     Next Steps:   CM to continue to assist Pt with discharge as needed    Barriers to Discharge:   Pending transition to Hospice  Pending GH waiver     Is the patient up for discharge tomorrow: No        "

## 2025-04-11 NOTE — CARE PLAN
The patient is Stable - Low risk of patient condition declining or worsening    Shift Goals  Clinical Goals: pt will complete IV abx  Patient Goals: to breathe better  Family Goals: tess    Progress made toward(s) clinical / shift goals:  pt completed dose of IV abx    Patient is not progressing towards the following goals:

## 2025-04-11 NOTE — DISCHARGE PLANNING
"Post Acute Navigator Team    Per chart review, patient has been identified as a candidate for a goals of care discussion from following data:    High End of Life Care Index score 87  High LACE + score 75  6 click Mobility score 21   6 click ADL score 24   Readmission: No     Code Status:DNAR/DNI   Advanced Directive present in EMR: Yes- friend Rhoda is her POA   POLST present in EMR: Yes-DNR/DNI     1336- LEIF notified by [facility] via Voalte that patient has declined hospice.  LEIF discussed patient case with Sherley DAVIS prior to meeting with patient.    1411- LEIF and Sherley Roa Palliative DNP met with Berny at bedside. This RN introduced Sherley BEAN to Berny. When Berny was asked why she was now declining hospice services, Berny stated \"because they will not let me have my inhalers.\" This RN explained that inhalers are covered under hospice and that should not be a problem. Sherley BEAN and this RN stepped out of patient room to clarify inhaler coverage. Sherley BEAN spoke with Dr. Herzog about inhalers coverage under hospice. Once of patient's inhalers will need to switch to two different inhalers to be covered and as long as patient is okay with that, inhalers are covered. Sherley BEAN and this RN also spoke with Keely HARRISON [facility] Supervisor regarding patient case.   1435- Sherley BEAN and this RN met again with Berny at bedside to discuss inhaler coverage. This RN provided education again on hospice including philosophy, goals of care, and individualized care plan. This RN also asked patient if she would be open to dc to GH with HH and palliative care if she did not want hospice. Berny then said \"I want hospice and I do not want to come back to the hospital.\"  Sherley BEAN and this RN confirmed once more with Berny if she wanted hospice or if she is not ready for hospice. Berny then said \"I am a grown adult and have told you this already. Don't ask me again.\" Berny asked further questions about GH placement " and hospice. All questions answered. This RN will contact Rhoda to arrange a time once more to meet with [facility] and patient. This RN will also plan to be at bedside to assist with further questions or concerns. Berny's SpO2 w/ a clear reading ranged from 83%-86% while at bedside. Berny did appear to be more forgetful but understood [facility] established.    Dr. Hammonds and care team updated, [facility] updated    1519-This RN LVM for Rhoda, request for call back.    1547-This RN spoke with Rhoda regarding patient case. Rhoda understands patient will benefit from hospice but is overwhelmed with trying to find a GH. This RN did inform Rhoda, PONOAH, she may need to sign consents for Berny if she becomes further confused. Rhoda reports she is able to be at bedside on 4/14/25 @1300. Rhoda also requested this RN be at bedside to help answer any further questions. This RN will send another GH list with larose ranges to Rhoda via email at [email or web address].    1559- Care team updated and [facility] that Rhoda will be at bedside on 4/14/25 @ 1300    1610- GH list emailed to Rhoda to assist with finding a GH for patient.

## 2025-04-11 NOTE — PROGRESS NOTES
Hospital Medicine Daily Progress Note    Date of Service  4/11/2025    Chief Complaint  Berny Renee is a 79 y.o. 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  She takes 8 L of oxygen and baseline.    In the emergency department she was reporting increased cough and shortness of breath.  She was provided IV steroids and DuoNeb.  There is no evidence of sepsis per the ERP assessment.  As such, no antibiotics were initiated.  BiPAP was not indicated.  ERP reported improvement in patient's clinical appearance after steroids and nebulizer treatment.  ERP requested admission to hospital service for hypoxia.    Interval Problem Update  Patient seen and evaluated at bedside  Pertinent labs and imaging reviewed  WBC 14.7, 15.4, 26.6, 22.9  Potassium 3.7, 3.9, 3.5 replaced  Respiratory viral panel negative  Point-of-care blood glucose 216, 208, 137, 175, 243, 148, 177, 181, 155, 107  On correctional insulin  Patient received a total of 3 units of insulin on 4/10  Point-of-care blood glucose remained under 181 4/10  Afebrile, hemodynamically stable, 8L oxygen requirements  Voiding, stooling, tolerating oral intake well  Previous bowel movement 4/10  Sputum culture revealed pseudomonas this concern was discussed with pulmonology who recommended 14 day treatment    Time was taken to discuss patient's discharge plan with the patient, palliative care, and her close friend.  The patient was very clear in her desire to pursue hospice care on 4/10.  She is requesting that she continues her oxygen and inhalers for COPD which I think is appropriate as it would promote comfort.  This concern was discussed with her pulmonologist who agreed that continuing inhalers and oxygen for COPD would be promoting comfort.  The patient has been declined from most skilled nursing facilities with the exception of [facility] that the patients that she has had a bad experience that and she will not return to.  The patient is open to group homes as she will have the autonomy to do which she wants to including attending [employer] services and spending time with her friends.  This patient is appropriate for hospice as she has an end-stage, severe form of COPD with no cure available.  She does have high oxygen needs in order to maintain her basic function.    I have discussed this patient's plan of care and discharge plan at IDT rounds today with Case Management, Nursing, Nursing leadership, and other members of the IDT team.    Consultants/Specialty  Pulm    Code Status  DNAR/DNI    Disposition  The patient is not medically cleared for discharge to home or a post-acute facility.      I have placed the appropriate orders for post-discharge needs.    Review of Systems  Review of Systems   Respiratory:  Positive for cough, sputum production and shortness of breath.         Physical Exam  Temp:  [36.2 °C (97.1 °F)-37.2 °C (99 °F)] 36.2 °C (97.1 °F)  Pulse:  [77-90] 87  Resp:  [17-20] 20  BP: (107-166)/(62-73) 107/73  SpO2:  [90 %-94 %] 91 %    Physical Exam  Constitutional:       General: She is not in acute distress.     Appearance: Normal appearance. She is normal weight.   HENT:      Head: Normocephalic and atraumatic.      Nose: Nose normal.   Eyes:      Extraocular Movements: Extraocular movements intact.   Pulmonary:      Effort: No respiratory distress.      Comments: Nasal cannula in place  Musculoskeletal:      Cervical back: Normal range of motion.      Right lower leg: No edema.      Left lower leg: No edema.   Skin:     General: Skin is dry.   Neurological:      General: No focal deficit present.      Mental Status: She is alert and oriented to person, place, and time.   Psychiatric:         Mood and Affect: Mood normal.         Behavior: Behavior normal.         Thought Content: Thought content normal.          Fluids    Intake/Output Summary (Last 24 hours) at 4/11/2025 1433  Last data filed at 4/11/2025 0947  Gross per 24 hour   Intake 800 ml   Output 600 ml   Net 200 ml          Laboratory  Recent Labs     04/10/25  0325 04/11/25  0228   WBC 26.6* 22.9*   RBC 3.99* 4.30   HEMOGLOBIN 11.4* 12.5   HEMATOCRIT 35.9* 38.5   MCV 90.0 89.5   MCH 28.6 29.1   MCHC 31.8* 32.5   RDW 43.7 44.2   PLATELETCT 485* 493*   MPV 9.3 8.9*     Recent Labs     04/10/25  0325 04/11/25  0228   SODIUM 133* 134*   POTASSIUM 3.9 3.5*   CHLORIDE 94* 90*   CO2 31 36*   GLUCOSE 151* 129*   BUN 25* 18   CREATININE 0.51 0.48*   CALCIUM 9.2 9.5                   Imaging  DX-ESOPHAGUS - [id number]   Final Result      DX-CHEST-PORTABLE (1 VIEW)   Final Result      Chronic interstitial changes again noted. Superimposed areas of infection or edema are difficult to exclude. No pleural effusion.         DX-CHEST-PORTABLE (1 VIEW)   Final Result      Chronic interstitial changes again noted. A possible small new ill-defined opacity in the right midlung could represent a small infiltrate. Recommend follow-up after treatment.           Assessment/Plan  * Stage 4 very severe COPD by GOLD classification (HCC)- (present on admission)  Assessment & Plan  DNR/DNI  Continue oral prednisone  Continue azithromycin  Continue Trelegy Ellipta  Continue montelukast   Continue DuoNebs and Xopenex  Provide supplemental oxygen  Wean as able to baseline (8L)  Continuous pulse ox monitoring  Appreciate RT interventions    Community acquired bacterial pneumonia  Assessment & Plan  Patient with increased oxygen demand with exertion  Associated cough  Sputum culture revealed heavy growth of mucoid phenotype Pseudomonas aeruginosa  Patient was placed on empiric cefepime  This case were discussed with pulmonology who recommended a 14-day treatment  Awaiting susceptibilities for oral antibiotic choice    Type 2 diabetes mellitus with hyperglycemia, without long-term  current use of insulin (HCC)- (present on admission)  Assessment & Plan  Start insulin sliding scale  Adjust as needed    GERD (gastroesophageal reflux disease)- (present on admission)  Assessment & Plan  Continue home PPI    Leukocytosis- (present on admission)  Assessment & Plan  Significant leukocytosis, likely reactive  S/p IV Solu-Medrol administration, on oral prednisone now  Possible community-acquired pneumonia, sputum positive for Pseudomonas  Repeat chest x-ray revealed superimposed areas of infection or edema difficult to exclude    Acute on chronic respiratory failure with hypoxia (HCC)- (present on admission)  Assessment & Plan  Patient does have end-stage COPD  See #stage IV very severe COPD by Gold classification    Hypertension- (present on admission)  Assessment & Plan  Continue home amlodipine and losartan  Start as needed hydralazine  Adjust as needed    AF (atrial fibrillation) ([facility])- (present on admission)  Assessment & Plan  Currently in sinus rhythm, continue home digoxin  Patient is not on full anticoagulation, will not start at this time  Per chart review, patient's outpatient cardiologist recommended against anticoagulation         VTE prophylaxis: Xarelto    I have performed a physical exam and reviewed and updated ROS and Plan today (4/11/2025). In review of yesterday's note (4/10/2025), there are no changes except as documented above.      I provided a total of 62 minutes addressing the patient's medical and discharge needs while in the acute care setting. This patient's care required the review of medical records and diagnostic studies, direct face-to-face time with the patient and/or family, documentation, and communication with my interdisciplinary team of RNs, pharmacists, and .

## 2025-04-11 NOTE — THERAPY
"   Speech Language Pathology   Videofluoroscopic Swallow Study Evaluation     Patient Name: Berny Renee  AGE:  79 y.o., SEX:  female  Medical Record #: [id number]  Date of Service: 4/11/2025      History of Present Illness  79 y.o. female admitted 4/8/2025 with exacerbation of COPD.     PMHx COPD, HLD, HTN, REGINE, on chronic supplemental oxygen at 8 L by nasal cannula     1/26/25 VFSS \"functional oropharyngeal swallow\" rec RG7/TN0     CMHx Stage 4 very severe COPD, GERD, Acute on chronic respiratory failure w/hypoxia     CXR 4/8/25  Chronic interstitial changes again noted. A possible small new ill-defined opacity in the right midlung could represent a small infiltrate. Recommend follow-up after treatment.      Pertinent Information  Current Method of Nutrition: Oral diet (RG7/TN0)  Dentition: Some missing dentition  Secretion Management: Excess secretions  Feeding Tube: None  Tracheostomy: No          Factor(s) Affecting Performance: None      Discussed the risks, benefits, and alternatives of the VFSS procedure. Patient/family acknowledged and agreed to proceed.      Assessment  Videofluoroscopic Swallow Study was conducted in the Lateral projection(s) to evaluate oropharyngeal swallow function. A radiology tech was present to assist with the procedure.      Positioning: Seated upright in fluoroscopy chair  Anatomic View: WFL  Bolus Administration: Patient  PO Barium Contrast Trials: Varibar thin, Varibar nectar (mildly thick), Liquidised mixed with Varibar powder, Varibar pudding, Soft & Bite sized coated with Varibar powder, Regular solid coated with Varibar pudding, Mixed consistency with Varibar powder      Consistency PAS Score Timing Residue Comments   Thin Liquid 1 N/A Vallecular Residue: None (0%)  Pyriform Sinus Residue: None (0%) Tsp, cup, straw   Mildly Thick 1 N/A Vallecular Residue: None (0%)  Pyriform Sinus Residue: None (0%) Cup, straw   Liquidised 1 N/A Vallecular Residue: None (0%)  Pyriform Sinus " Residue: None (0%)    Pudding 1 N/A Vallecular Residue: None (0%)  Pyriform Sinus Residue: None (0%)    Soft & Bite Sized 1 N/A Vallecular Residue: None (0%)  Pyriform Sinus Residue: None (0%)    Regular Solid 1 N/A Vallecular Residue: None (0%)  Pyriform Sinus Residue: None (0%)    Mixed 2 Pre Swallow Vallecular Residue: None (0%)  Pyriform Sinus Residue: None (0%) Flash of peach x1     Penetration-Aspiration Scale (PAS)  1     No contrast enters airway  2     Contrast enters the airway, remains above the vocal folds, and is ejected from the airway (not seen in the airway at the end of the swallow).  3     Contrast enters the airway, remains above the vocal folds, and is not ejected from the airway (is seen in the airway after the swallow).  4     Contrast enters the airway, contacts the vocal folds, and is ejected from the airway.  5     Contrast enters the airway, contacts the vocal folds, and is not ejected from the airway  6     Contrast enters the airway, crosses the plane of the vocal folds, and is ejected from the airway.  7     Contrast enters the airway, crosses the plane of the vocal folds, and is not ejected from the airway despite effort.  8     Contrast enters the airway, crosses the plane of the vocal folds, is not ejected from the airway and there is no response to aspiration.       Oral phase: Adequate oral bolus acceptance/containment. Prolonged but functional mastication of dry solids. Piecemeal deglutition visualized across all trials (solids > thins).        Pharyngeal phase:   Laryngeal vestibule closure mistiming resulted in flash penetration before the swallow of piecemeal bolus from mixed trials. No other penetration visualized.  No aspiration visualized.         Compensatory Strategies:  N/a      Severity Rating:   Severity Rating: JAMIE     JAMIE: Functional      Clinical Impressions  Patient presents with a functional swallow; however, remains at elevated risk for aspiration 2/2 COPD  "negatively impacting swallow-breathing pattern. Recommend regular solids, thin liquids w/strict adherence to swallow precautions. Service will no longer actively follow, re-consult with any change in status.          Recommendations  Diet Consistency: Regular solids, thin liquids  Medication: As tolerated  Supervision: Distant supervision - check on patient 2-3 times per meal  Positioning: Fully upright and midline during oral intake, Meals sitting upright in a chair, as tolerated  Strategies: Small bites/sips, Alternate bites and sips, Slow rate of intake  Oral Care: Pre- and post-meals  Additional Instrumentation: None  Consult Referral(s):  (Palliative Care)      SLP Treatment Plan  Treatment Plan: Dysphagia Treatment, Patient/Family/Caregiver Training  SLP Frequency: 3x Per Week  Estimated Duration: Until Therapy Goals Met      Anticipated Discharge Needs  Discharge Recommendations: Recommend post-acute placement for additional speech therapy services prior to discharge home   Therapy Recommendations Upon DC: Patient / Family / Caregiver Education        Patient / Family Goals  Patient / Family Goal #1: \"No test!\"  Goal #1 Outcome: Goal met  Short Term Goal # 1: Pt will consume least restrictive diet with no overt s/sx of aspiration or decline in pulmonary status.  Goal Outcome # 1: Goal met, new goal added  Short Term Goal # 1 B : Pt will participate in dx swallow study to objectively assess swallow function and inform POC  Goal Outcome  # 1 B: Goal met      DARINEL Galeana   "

## 2025-04-11 NOTE — CARE PLAN
The patient is Stable - Low risk of patient condition declining or worsening    Shift Goals  Clinical Goals: pt will complete IV abx  Patient Goals: to breathe better  Family Goals: tess    Progress made toward(s) clinical / shift goals:      Problem: Knowledge Deficit - Standard  Goal: Patient and family/care givers will demonstrate understanding of plan of care, disease process/condition, diagnostic tests and medications  Description: Target End Date:  1-3 days or as soon as patient condition allowsDocument in Patient Education1.  Patient and family/caregiver oriented to unit, equipment, visitation policy and means for communicating concern2.  Complete/review Learning Assessment3.  Assess knowledge level of disease process/condition, treatment plan, diagnostic tests and medications4.  Explain disease process/condition, treatment plan, diagnostic tests and medications  Outcome: Progressing     Problem: Fall Risk  Goal: Patient will remain free from falls  Description: Target End Date:  Prior to discharge or change in level of careDocument interventions on the [place] Fall Risk Assessment1.  Assess for fall risk factors2.  Implement fall precautions  Outcome: Progressing

## 2025-04-11 NOTE — THERAPY
Speech Language Pathology   Daily Treatment     Patient Name: Berny Renee  AGE:  79 y.o., SEX:  female  Medical Record #: [id number]  Date of Service: 4/11/2025      Precautions:  Precautions: Fall Risk, Swallow Precautions         Subjective  Patient cleared by RN for session. Patient received asleep, easily roused to verbal cues. Patient endorsed limited PO intake 2/2 cough.        Assessment  Patient seen this date for dysphagia management with focus on assessing appropriateness to continue oral diet vs diagnostic swallow study. PO of thins assessed. Immediate cough response with increased WOB noted.      Clinical Impressions  Patient presents with clinical indicators of and is at elevated risk for oropharyngeal dysphagia likely 2/2 COPD exacerbation and acute on chronic respiratory failure. Recommend diagnotic swallows study to objectively assess swallow function and inform POC, patient agreeable to participate. MBSS tentatively scheduled for 1130 this date. RN updated.        Recommendations  Treatment Completed: Dysphagia Treatment       Dysphagia Treatment  Diet Consistency: Regular solids, thin liquids  Instrumentation: VFSS (MBSS)  Medication: As tolerated  Supervision: Close supervision - patient may be left alone for less than 5 minutes at a time  Positioning: Fully upright and midline during oral intake, Meals sitting upright in a chair, as tolerated  Risk Management : Small bites/sips, Alternate bites and sips, Slow rate of intake, Physical mobility, as tolerated  Oral Care: BID                     SLP Treatment Plan  Treatment Plan: Dysphagia Treatment, Patient/Family/Caregiver Training  SLP Frequency: 3x Per Week  Estimated Duration: Until Therapy Goals Met      Anticipated Discharge Needs  Discharge Recommendations: Recommend post-acute placement for additional speech therapy services prior to discharge home  Therapy Recommendations Upon DC: Dysphagia Training, Patient / Family / Caregiver  "Education      Patient / Family Goals  Patient / Family Goal #1: \"No test!\"  Goal #1 Outcome: Progressing as expected  Short Term Goals  Short Term Goal # 1: Pt will consume least restrictive diet with no overt s/sx of aspiration or decline in pulmonary status.  Goal Outcome # 1: Goal met, new goal added  Short Term Goal # 1 B : Pt will participate in dx swallow study to objectively assess swallow function and inform POC      DARINEL Galeana  "

## 2025-04-11 NOTE — CONSULTS
MRN: [id number]  Date of palliative consult: 2025  Reason for consult: Goals of care/advance care planning  Referring provider: Dr. Hammonds  Location of consult: [facility]  Additional consulting services: Pulmonary    HPI:   Berny Renee is a 79 y.o. female with medical history significant for end-stage COPD (on 8L via NC at baseline), chronic respiratory failure, type two diabetes, GERD, HTN, atrial fibrillation admitted  with shortness of breath, COPD exacerbation, and community-acquired pneumonia.  Hospice referral was placed and patient had conversation with hospice nurse today at which time she seemingly refused hospice.  Palliative care consulted during this hospitalization for goals of care/advance care planning conversation.    Additional Pertinent Medical History: None    ROS:    Review of Systems   Constitutional:  Positive for malaise/fatigue.   Respiratory:  Positive for cough, sputum production and shortness of breath.        PE:   Recent vital signs  BMI: Body mass index is 25.75 kg/m².    Temp (24hrs), Av.6 °C (97.9 °F), Min:36.2 °C (97.1 °F), Max:37.2 °C (99 °F)  Temperature: 36.2 °C (97.1 °F)  Pulse  Av.8  Min: 66  Max: 95   Blood Pressure : 107/73       Physical Exam  Vitals and nursing note reviewed.   Constitutional:       General: She is not in acute distress.     Appearance: She is ill-appearing.      Interventions: Nasal cannula in place.   Pulmonary:      Effort: Tachypnea and accessory muscle usage present.   Skin:     General: Skin is warm.      Coloration: Skin is pale.   Neurological:      Mental Status: She is alert and oriented to person, place, and time.      Comments: Aware of context for hospitalization, though questionable insight and understanding of disease   Psychiatric:         Attention and Perception: Attention normal.         Mood and Affect: Mood is anxious.         Cognition and Memory: Memory normal.      Comments: Tangential, repetitive conversations          ASSESSMENT/PLAN WITH SHARED DECISION MAKING:   PHYSICAL ASPECTS OF CARE  Palliative Performance Scale: 40-50%    # Acute on chronic respiratory failure  # End-stage COPD  # Community-acquired pneumonia  # Atrial fibrillation  # Hypertension  # Type 2 diabetes  # GERD    SOCIAL ASPECTS OF CARE  Not addressed during this visit    SPIRITUAL ASPECTS OF CARE   Not addressed during this visit    GOALS OF CARE/SERIOUS ILLNESS CONVERSATION  Introduced myself to Dariel, also at bedside was Norma postacute Navigator RN. Discussed role of palliative care and reason for consult. Berny agreeable to discuss goals of care.  Norma, who has a rapport with the patient, discussed her current prognosis and discharge plans, including hospice, letting her know that there is some confusion regarding her choice for comfort focused treatment and whether she would prefer to receive full treatment.  The patient states she is worried about her medication not being covered on hospice.  I discussed this at length with the patient, sharing that [facility] hospice covers most  medications, though if there is an inhaler that is not covered because of cost, we can easily substitute it for potentially multiple other inhalers that will help the patient be comfortable as well.  She is agreeable to this.  We further explained the philosophy of hospice, explaining that she would discharge a group home that the  is working on finding, and she would then spend what ever days/weeks/months/years she has to live in comfort; explaining this is quality versus quantity and her COPD is terminal.  The patient expresses her understanding says hospice care is what she wants.    Healthcare wishes and goals identified.  Patient aware that hospice will speak with her again on Monday, as they are unable to admit her this weekend.  She says as long as Silvia is there she is agreeable to this.  Recommend considering having Silvia sign for patient, as  capacity is in question at this time.  Silvia is the patient's power of .  Palliative care signing off.  Please reconsult for any further needs.    Updated Dr. Hammonds and hospice RN. Norma placed phone call to patient's friend Silvia with no answer at this time.    Code Status: DNR/DNI    ACP Documents: POLST and advance directive    17 minutes spent discussing advance care planning, this time excludes any other billed services.    Interval diagnostic studies and medical documentation entries pertinent to this case were reviewed independently by me. This patient has at least one acute or chronic illness or injury that poses a threat to life or bodily function. This patient suffers from a high risk of morbidity from additional invasive diagnostic testing or intensive treatment. Discussion of recommendations and coordination of care undertaken with primary provider/treatment team.      Sherley Roa DNP, [facility]  Inpatient Palliative Care Provider  [phone]

## 2025-04-11 NOTE — CARE PLAN
The patient is Watcher - Medium risk of patient condition declining or worsening    Shift Goals  Clinical Goals: pt will maintain o2 saturation >88% throughout shift  Patient Goals: breathe  Family Goals: tess    Progress made toward(s) clinical / shift goals:    Problem: Knowledge Deficit - Standard  Goal: Patient and family/care givers will demonstrate understanding of plan of care, disease process/condition, diagnostic tests and medications  Outcome: Progressing     Problem: Knowledge Deficit - COPD  Goal: Patient/significant other demonstrates understanding of disease process, utilization of the Action Plan, medications and discharge instruction  Outcome: Progressing     Problem: Impaired Gas Exchange  Goal: Patient will demonstrate improved ventilation and adequate oxygenation and participate in treatment regimen within the level of ability/situation.  Outcome: Progressing       Patient is not progressing towards the following goals:

## 2025-04-12 LAB
ALBUMIN SERPL BCP-MCNC: 3.1 G/DL (ref 3.2–4.9)
ALBUMIN/GLOB SERPL: 0.8 G/DL
ALP SERPL-CCNC: 51 U/L (ref 30–99)
ALT SERPL-CCNC: 8 U/L (ref 2–50)
ANION GAP SERPL CALC-SCNC: 9 MMOL/L (ref 7–16)
AST SERPL-CCNC: 16 U/L (ref 12–45)
BACTERIA SPEC RESP CULT: ABNORMAL
BACTERIA SPEC RESP CULT: ABNORMAL
BILIRUB SERPL-MCNC: 0.3 MG/DL (ref 0.1–1.5)
BUN SERPL-MCNC: 21 MG/DL (ref 8–22)
CALCIUM ALBUM COR SERPL-MCNC: 10.2 MG/DL (ref 8.5–10.5)
CALCIUM SERPL-MCNC: 9.5 MG/DL (ref 8.5–10.5)
CHLORIDE SERPL-SCNC: 94 MMOL/L (ref 96–112)
CO2 SERPL-SCNC: 33 MMOL/L (ref 20–33)
CREAT SERPL-MCNC: 0.4 MG/DL (ref 0.5–1.4)
ERYTHROCYTE [DISTWIDTH] IN BLOOD BY AUTOMATED COUNT: 42.5 FL (ref 35.9–50)
ETEST SENSITIVITY ETEST: NORMAL
GFR SERPLBLD CREATININE-BSD FMLA CKD-EPI: 100 ML/MIN/1.73 M 2
GLOBULIN SER CALC-MCNC: 4 G/DL (ref 1.9–3.5)
GLUCOSE BLD STRIP.AUTO-MCNC: 152 MG/DL (ref 65–99)
GLUCOSE BLD STRIP.AUTO-MCNC: 159 MG/DL (ref 65–99)
GLUCOSE BLD STRIP.AUTO-MCNC: 188 MG/DL (ref 65–99)
GLUCOSE SERPL-MCNC: 171 MG/DL (ref 65–99)
GRAM STN SPEC: ABNORMAL
HCT VFR BLD AUTO: 37.7 % (ref 37–47)
HGB BLD-MCNC: 12.5 G/DL (ref 12–16)
MCH RBC QN AUTO: 29.3 PG (ref 27–33)
MCHC RBC AUTO-ENTMCNC: 33.2 G/DL (ref 32.2–35.5)
MCV RBC AUTO: 88.3 FL (ref 81.4–97.8)
PLATELET # BLD AUTO: 484 K/UL (ref 164–446)
PMV BLD AUTO: 9.1 FL (ref 9–12.9)
POTASSIUM SERPL-SCNC: 3.7 MMOL/L (ref 3.6–5.5)
PROT SERPL-MCNC: 7.1 G/DL (ref 6–8.2)
RBC # BLD AUTO: 4.27 M/UL (ref 4.2–5.4)
SIGNIFICANT IND 70042: ABNORMAL
SITE SITE: ABNORMAL
SODIUM SERPL-SCNC: 136 MMOL/L (ref 135–145)
SOURCE SOURCE: ABNORMAL
WBC # BLD AUTO: 22.3 K/UL (ref 4.8–10.8)

## 2025-04-12 PROCEDURE — 700111 HCHG RX REV CODE 636 W/ 250 OVERRIDE (IP): Performed by: STUDENT IN AN ORGANIZED HEALTH CARE EDUCATION/TRAINING PROGRAM

## 2025-04-12 PROCEDURE — 700111 HCHG RX REV CODE 636 W/ 250 OVERRIDE (IP): Performed by: INTERNAL MEDICINE

## 2025-04-12 PROCEDURE — 36415 COLL VENOUS BLD VENIPUNCTURE: CPT

## 2025-04-12 PROCEDURE — 85027 COMPLETE CBC AUTOMATED: CPT

## 2025-04-12 PROCEDURE — 770006 HCHG ROOM/CARE - MED/SURG/GYN SEMI*

## 2025-04-12 PROCEDURE — 80053 COMPREHEN METABOLIC PANEL: CPT

## 2025-04-12 PROCEDURE — 700102 HCHG RX REV CODE 250 W/ 637 OVERRIDE(OP): Performed by: INTERNAL MEDICINE

## 2025-04-12 PROCEDURE — 99232 SBSQ HOSP IP/OBS MODERATE 35: CPT | Performed by: STUDENT IN AN ORGANIZED HEALTH CARE EDUCATION/TRAINING PROGRAM

## 2025-04-12 PROCEDURE — A9270 NON-COVERED ITEM OR SERVICE: HCPCS | Performed by: STUDENT IN AN ORGANIZED HEALTH CARE EDUCATION/TRAINING PROGRAM

## 2025-04-12 PROCEDURE — 700102 HCHG RX REV CODE 250 W/ 637 OVERRIDE(OP): Performed by: STUDENT IN AN ORGANIZED HEALTH CARE EDUCATION/TRAINING PROGRAM

## 2025-04-12 PROCEDURE — 82962 GLUCOSE BLOOD TEST: CPT | Mod: 91

## 2025-04-12 PROCEDURE — 700101 HCHG RX REV CODE 250: Performed by: STUDENT IN AN ORGANIZED HEALTH CARE EDUCATION/TRAINING PROGRAM

## 2025-04-12 PROCEDURE — 94640 AIRWAY INHALATION TREATMENT: CPT

## 2025-04-12 PROCEDURE — A9270 NON-COVERED ITEM OR SERVICE: HCPCS | Performed by: INTERNAL MEDICINE

## 2025-04-12 PROCEDURE — 700105 HCHG RX REV CODE 258: Performed by: STUDENT IN AN ORGANIZED HEALTH CARE EDUCATION/TRAINING PROGRAM

## 2025-04-12 RX ORDER — BISACODYL 5 MG
5 TABLET, DELAYED RELEASE (ENTERIC COATED) ORAL
Status: DISCONTINUED | OUTPATIENT
Start: 2025-04-12 | End: 2025-04-16 | Stop reason: HOSPADM

## 2025-04-12 RX ORDER — LEVOFLOXACIN 750 MG/1
750 TABLET, FILM COATED ORAL DAILY
Status: DISCONTINUED | OUTPATIENT
Start: 2025-04-12 | End: 2025-04-13

## 2025-04-12 RX ORDER — FUROSEMIDE 10 MG/ML
40 INJECTION INTRAMUSCULAR; INTRAVENOUS DAILY
Status: DISCONTINUED | OUTPATIENT
Start: 2025-04-12 | End: 2025-04-16 | Stop reason: HOSPADM

## 2025-04-12 RX ADMIN — LEVOFLOXACIN 750 MG: 750 TABLET, FILM COATED ORAL at 14:32

## 2025-04-12 RX ADMIN — MORPHINE SULFATE 15 MG: 15 TABLET ORAL at 05:18

## 2025-04-12 RX ADMIN — DULOXETINE 60 MG: 30 CAPSULE, DELAYED RELEASE ORAL at 17:09

## 2025-04-12 RX ADMIN — Medication 2 SPRAY: at 07:50

## 2025-04-12 RX ADMIN — MONTELUKAST 10 MG: 10 TABLET, FILM COATED ORAL at 05:18

## 2025-04-12 RX ADMIN — BUDESONIDE 0.5 MG: 0.5 SUSPENSION RESPIRATORY (INHALATION) at 20:29

## 2025-04-12 RX ADMIN — TRAMADOL HYDROCHLORIDE 50 MG: 50 TABLET, COATED ORAL at 03:44

## 2025-04-12 RX ADMIN — SENNOSIDES AND DOCUSATE SODIUM 2 TABLET: 50; 8.6 TABLET ORAL at 17:09

## 2025-04-12 RX ADMIN — FLUTICASONE FUROATE, UMECLIDINIUM BROMIDE AND VILANTEROL TRIFENATATE 1 PUFF: 200; 62.5; 25 POWDER RESPIRATORY (INHALATION) at 05:26

## 2025-04-12 RX ADMIN — OMEPRAZOLE 20 MG: 20 CAPSULE, DELAYED RELEASE ORAL at 05:19

## 2025-04-12 RX ADMIN — INSULIN LISPRO 1 UNITS: 100 INJECTION, SOLUTION INTRAVENOUS; SUBCUTANEOUS at 17:14

## 2025-04-12 RX ADMIN — LEVALBUTEROL HYDROCHLORIDE 1.25 MG: 1.25 SOLUTION RESPIRATORY (INHALATION) at 20:20

## 2025-04-12 RX ADMIN — INSULIN LISPRO 1 UNITS: 100 INJECTION, SOLUTION INTRAVENOUS; SUBCUTANEOUS at 07:48

## 2025-04-12 RX ADMIN — LOSARTAN POTASSIUM 50 MG: 50 TABLET, FILM COATED ORAL at 05:18

## 2025-04-12 RX ADMIN — AMLODIPINE BESYLATE 5 MG: 5 TABLET ORAL at 05:19

## 2025-04-12 RX ADMIN — FUROSEMIDE 40 MG: 10 INJECTION, SOLUTION INTRAVENOUS at 18:09

## 2025-04-12 RX ADMIN — SPIRONOLACTONE 25 MG: 25 TABLET ORAL at 05:18

## 2025-04-12 RX ADMIN — DIGOXIN 125 MCG: 0.25 TABLET ORAL at 17:10

## 2025-04-12 RX ADMIN — TIZANIDINE 4 MG: 4 TABLET ORAL at 09:52

## 2025-04-12 RX ADMIN — RIVAROXABAN 10 MG: 10 TABLET, FILM COATED ORAL at 17:10

## 2025-04-12 RX ADMIN — INSULIN LISPRO 1 UNITS: 100 INJECTION, SOLUTION INTRAVENOUS; SUBCUTANEOUS at 11:46

## 2025-04-12 RX ADMIN — POLYETHYLENE GLYCOL 3350 1 PACKET: 17 POWDER, FOR SOLUTION ORAL at 05:29

## 2025-04-12 RX ADMIN — Medication 2 SPRAY: at 22:06

## 2025-04-12 RX ADMIN — AZITHROMYCIN DIHYDRATE 250 MG: 250 TABLET ORAL at 05:17

## 2025-04-12 RX ADMIN — PREDNISONE 40 MG: 20 TABLET ORAL at 05:18

## 2025-04-12 RX ADMIN — LOSARTAN POTASSIUM 50 MG: 50 TABLET, FILM COATED ORAL at 17:10

## 2025-04-12 RX ADMIN — BUDESONIDE 0.5 MG: 0.5 SUSPENSION RESPIRATORY (INHALATION) at 10:10

## 2025-04-12 RX ADMIN — MORPHINE SULFATE 15 MG: 15 TABLET ORAL at 17:09

## 2025-04-12 RX ADMIN — CEFEPIME 2 G: 2 INJECTION, POWDER, FOR SOLUTION INTRAVENOUS at 05:26

## 2025-04-12 ASSESSMENT — PAIN DESCRIPTION - PAIN TYPE
TYPE: ACUTE PAIN

## 2025-04-12 ASSESSMENT — ENCOUNTER SYMPTOMS
SPUTUM PRODUCTION: 1
COUGH: 1
SHORTNESS OF BREATH: 1

## 2025-04-12 NOTE — CARE PLAN
The patient is Stable - Low risk of patient condition declining or worsening    Shift Goals  Clinical Goals: maintain o2 >88%, free from injury or falls, ABX, monitor glucose  Patient Goals: feel better, rest, comfort  Family Goals: tess    Progress made toward(s) clinical / shift goals:      Problem: Knowledge Deficit - Standard  Goal: Patient and family/care givers will demonstrate understanding of plan of care, disease process/condition, diagnostic tests and medications  Description: Target End Date:  1-3 days or as soon as patient condition allowsDocument in Patient Education1.  Patient and family/caregiver oriented to unit, equipment, visitation policy and means for communicating concern2.  Complete/review Learning Assessment3.  Assess knowledge level of disease process/condition, treatment plan, diagnostic tests and medications4.  Explain disease process/condition, treatment plan, diagnostic tests and medications  Outcome: Progressing     Problem: Knowledge Deficit - COPD  Goal: Patient/significant other demonstrates understanding of disease process, utilization of the Action Plan, medications and discharge instruction  Description: Target End Date:  1-3 days or as soon as patient condition allowsDocument in Patient Education1.  Discuss the importance of medical follow-up care, periodic chest x-rays, sputum cultures2.  Review worsening signs and symptoms of COPD flare and exacerbation and its management3.  Discuss respiratory medications, side effects, adverse reactions4.  Demonstrate technique for using a metered-dose inhaler (MDI) and utilization of a spacer5.  Review the COPD Action Plan6.  Instruct and reinforce the rationale for breathing exercises, coughing effectively, and general conditioning exercises7.  Stress importance of oral care and dental hygiene8.  Discuss the importance of avoiding people with active respiratory infections and need for routine influenza and pneumococcal vaccinations9.  Discuss  factors that may trigger condition and encourage patient/significant other to explore ways to control these factors in and around the home and work [id number]. Review the harmful effects of smoking and advise cessation of [id number]. Provide information about activity limitations and alternating activities with rest periods to prevent [id number]. Instruct asthmatic patient of use of peak flow meter, as [id number]. Review oxygen requirements and dosage, safe use of oxygen, and refer to the supplier as [id number]. Educate patient/family/caregiver on the use of Nasal Intermittent Positive Pressure Ventilation (NIPPV) and possible adverse reactions  Outcome: Progressing     Problem: Ineffective Airway Clearance  Goal: Patient will maintain patent airway with clear/clearing breath sounds  Description: Target End Date:  Prior to discharge or change in level of care1.   Position head midline with flexion appropriate for age and/or condition2.   Assist patient to assume a position of comfort3.   Assess and monitor breath sounds4.   Encourage abdominal or pursed-lip breathing exercises5.   Assist with measures to improve effectiveness of cough effort6.   Demonstrate effective coughing and deep-breathing techniques7.   Assist patient to turn every 2 hours8.   Encourage patient to ambulate as tolerated9.   Keep environmental pollution to a [id number]. Airway suctioning as [id number]. Collaborate with RT to administer medications/treatments per order12. Promote systemic fluid hydration within cardiac [id number]. Provide warm or tepid liquids  Outcome: Progressing

## 2025-04-12 NOTE — CARE PLAN
The patient is Stable - Low risk of patient condition declining or worsening    Shift Goals  Clinical Goals: pt work of breathing will be WNL  Patient Goals: feel better  Family Goals: none present    Progress made toward(s) clinical / shift goals:    Problem: Fall Risk  Goal: Patient will remain free from falls  Outcome: Progressing     Problem: Impaired Gas Exchange  Goal: Patient will demonstrate improved ventilation and adequate oxygenation and participate in treatment regimen within the level of ability/situation.  Outcome: Progressing     Problem: Risk for Infection - COPD  Goal: Patient will remain free from signs and symptoms of infection  Outcome: Progressing       Patient is not progressing towards the following goals:

## 2025-04-13 ENCOUNTER — APPOINTMENT (OUTPATIENT)
Dept: RADIOLOGY | Facility: MEDICAL CENTER | Age: 80
DRG: 190 | End: 2025-04-13
Payer: MEDICARE

## 2025-04-13 LAB
ALBUMIN SERPL BCP-MCNC: 3.1 G/DL (ref 3.2–4.9)
ALBUMIN/GLOB SERPL: 0.8 G/DL
ALP SERPL-CCNC: 65 U/L (ref 30–99)
ALT SERPL-CCNC: 8 U/L (ref 2–50)
ANION GAP SERPL CALC-SCNC: 9 MMOL/L (ref 7–16)
AST SERPL-CCNC: 22 U/L (ref 12–45)
BASE EXCESS BLDA CALC-SCNC: 13 MMOL/L (ref -4–3)
BILIRUB SERPL-MCNC: 0.3 MG/DL (ref 0.1–1.5)
BODY TEMPERATURE: 37.3 CENTIGRADE
BUN SERPL-MCNC: 18 MG/DL (ref 8–22)
CALCIUM ALBUM COR SERPL-MCNC: 10.2 MG/DL (ref 8.5–10.5)
CALCIUM SERPL-MCNC: 9.5 MG/DL (ref 8.5–10.5)
CHLORIDE SERPL-SCNC: 88 MMOL/L (ref 96–112)
CO2 SERPL-SCNC: 37 MMOL/L (ref 20–33)
CREAT SERPL-MCNC: 0.4 MG/DL (ref 0.5–1.4)
ERYTHROCYTE [DISTWIDTH] IN BLOOD BY AUTOMATED COUNT: 43.3 FL (ref 35.9–50)
GFR SERPLBLD CREATININE-BSD FMLA CKD-EPI: 100 ML/MIN/1.73 M 2
GLOBULIN SER CALC-MCNC: 3.9 G/DL (ref 1.9–3.5)
GLUCOSE BLD STRIP.AUTO-MCNC: 137 MG/DL (ref 65–99)
GLUCOSE BLD STRIP.AUTO-MCNC: 204 MG/DL (ref 65–99)
GLUCOSE BLD STRIP.AUTO-MCNC: 237 MG/DL (ref 65–99)
GLUCOSE BLD STRIP.AUTO-MCNC: 241 MG/DL (ref 65–99)
GLUCOSE BLD STRIP.AUTO-MCNC: 250 MG/DL (ref 65–99)
GLUCOSE SERPL-MCNC: 127 MG/DL (ref 65–99)
HCO3 BLDA-SCNC: 40 MMOL/L (ref 21–28)
HCT VFR BLD AUTO: 36.6 % (ref 37–47)
HGB BLD-MCNC: 11.8 G/DL (ref 12–16)
INHALED O2 FLOW RATE: 15 L/MIN (ref 2–10)
MAGNESIUM SERPL-MCNC: 1.9 MG/DL (ref 1.5–2.5)
MCH RBC QN AUTO: 29 PG (ref 27–33)
MCHC RBC AUTO-ENTMCNC: 32.2 G/DL (ref 32.2–35.5)
MCV RBC AUTO: 89.9 FL (ref 81.4–97.8)
NT-PROBNP SERPL IA-MCNC: 156 PG/ML (ref 0–125)
PCO2 BLDA: 66.2 MMHG (ref 32–48)
PCO2 TEMP ADJ BLDA: 67.1 MMHG (ref 32–48)
PH BLDA: 7.4 [PH] (ref 7.35–7.45)
PH TEMP ADJ BLDA: 7.4 [PH] (ref 7.35–7.45)
PLATELET # BLD AUTO: 490 K/UL (ref 164–446)
PMV BLD AUTO: 9.4 FL (ref 9–12.9)
PO2 BLDA: 67.5 MMHG (ref 83–108)
PO2 TEMP ADJ BLDA: 68.9 MMHG (ref 83–108)
POTASSIUM SERPL-SCNC: 3 MMOL/L (ref 3.6–5.5)
POTASSIUM SERPL-SCNC: 3.5 MMOL/L (ref 3.6–5.5)
PROCALCITONIN SERPL-MCNC: <0.05 NG/ML
PROT SERPL-MCNC: 7 G/DL (ref 6–8.2)
RBC # BLD AUTO: 4.07 M/UL (ref 4.2–5.4)
SAO2 % BLDA: 91.5 % (ref 93–99)
SODIUM SERPL-SCNC: 134 MMOL/L (ref 135–145)
WBC # BLD AUTO: 19.9 K/UL (ref 4.8–10.8)

## 2025-04-13 PROCEDURE — 36415 COLL VENOUS BLD VENIPUNCTURE: CPT

## 2025-04-13 PROCEDURE — 84132 ASSAY OF SERUM POTASSIUM: CPT

## 2025-04-13 PROCEDURE — 700102 HCHG RX REV CODE 250 W/ 637 OVERRIDE(OP)

## 2025-04-13 PROCEDURE — 83735 ASSAY OF MAGNESIUM: CPT

## 2025-04-13 PROCEDURE — 82962 GLUCOSE BLOOD TEST: CPT | Mod: 91

## 2025-04-13 PROCEDURE — 700102 HCHG RX REV CODE 250 W/ 637 OVERRIDE(OP): Performed by: INTERNAL MEDICINE

## 2025-04-13 PROCEDURE — 700101 HCHG RX REV CODE 250: Performed by: STUDENT IN AN ORGANIZED HEALTH CARE EDUCATION/TRAINING PROGRAM

## 2025-04-13 PROCEDURE — 83880 ASSAY OF NATRIURETIC PEPTIDE: CPT

## 2025-04-13 PROCEDURE — A9270 NON-COVERED ITEM OR SERVICE: HCPCS | Performed by: STUDENT IN AN ORGANIZED HEALTH CARE EDUCATION/TRAINING PROGRAM

## 2025-04-13 PROCEDURE — 71045 X-RAY EXAM CHEST 1 VIEW: CPT

## 2025-04-13 PROCEDURE — 770006 HCHG ROOM/CARE - MED/SURG/GYN SEMI*

## 2025-04-13 PROCEDURE — 80053 COMPREHEN METABOLIC PANEL: CPT

## 2025-04-13 PROCEDURE — 84145 PROCALCITONIN (PCT): CPT

## 2025-04-13 PROCEDURE — 99232 SBSQ HOSP IP/OBS MODERATE 35: CPT | Performed by: STUDENT IN AN ORGANIZED HEALTH CARE EDUCATION/TRAINING PROGRAM

## 2025-04-13 PROCEDURE — 94669 MECHANICAL CHEST WALL OSCILL: CPT

## 2025-04-13 PROCEDURE — 700102 HCHG RX REV CODE 250 W/ 637 OVERRIDE(OP): Performed by: STUDENT IN AN ORGANIZED HEALTH CARE EDUCATION/TRAINING PROGRAM

## 2025-04-13 PROCEDURE — 85027 COMPLETE CBC AUTOMATED: CPT

## 2025-04-13 PROCEDURE — 302093 ICE PACK LG: Performed by: GENERAL PRACTICE

## 2025-04-13 PROCEDURE — 82803 BLOOD GASES ANY COMBINATION: CPT

## 2025-04-13 PROCEDURE — 700111 HCHG RX REV CODE 636 W/ 250 OVERRIDE (IP): Mod: JZ,TB

## 2025-04-13 PROCEDURE — A9270 NON-COVERED ITEM OR SERVICE: HCPCS | Performed by: INTERNAL MEDICINE

## 2025-04-13 PROCEDURE — A9270 NON-COVERED ITEM OR SERVICE: HCPCS

## 2025-04-13 PROCEDURE — 94640 AIRWAY INHALATION TREATMENT: CPT

## 2025-04-13 PROCEDURE — 700111 HCHG RX REV CODE 636 W/ 250 OVERRIDE (IP): Performed by: STUDENT IN AN ORGANIZED HEALTH CARE EDUCATION/TRAINING PROGRAM

## 2025-04-13 RX ORDER — METHYLPREDNISOLONE SODIUM SUCCINATE 125 MG/2ML
62.5 INJECTION, POWDER, LYOPHILIZED, FOR SOLUTION INTRAMUSCULAR; INTRAVENOUS EVERY 6 HOURS
Status: DISCONTINUED | OUTPATIENT
Start: 2025-04-13 | End: 2025-04-16 | Stop reason: HOSPADM

## 2025-04-13 RX ORDER — HYDROXYZINE HYDROCHLORIDE 50 MG/1
25 TABLET, FILM COATED ORAL 3 TIMES DAILY PRN
Status: DISCONTINUED | OUTPATIENT
Start: 2025-04-13 | End: 2025-04-16 | Stop reason: HOSPADM

## 2025-04-13 RX ORDER — MAGNESIUM SULFATE HEPTAHYDRATE 40 MG/ML
2 INJECTION, SOLUTION INTRAVENOUS ONCE
Status: COMPLETED | OUTPATIENT
Start: 2025-04-13 | End: 2025-04-13

## 2025-04-13 RX ORDER — MORPHINE SULFATE 4 MG/ML
2 INJECTION INTRAVENOUS
Status: DISCONTINUED | OUTPATIENT
Start: 2025-04-13 | End: 2025-04-16 | Stop reason: HOSPADM

## 2025-04-13 RX ORDER — MORPHINE SULFATE 15 MG/1
30 TABLET ORAL EVERY 12 HOURS
Refills: 0 | Status: DISCONTINUED | OUTPATIENT
Start: 2025-04-13 | End: 2025-04-16 | Stop reason: HOSPADM

## 2025-04-13 RX ORDER — MORPHINE SULFATE 4 MG/ML
2 INJECTION INTRAVENOUS ONCE
Status: COMPLETED | OUTPATIENT
Start: 2025-04-13 | End: 2025-04-13

## 2025-04-13 RX ORDER — POTASSIUM CHLORIDE 1500 MG/1
40 TABLET, EXTENDED RELEASE ORAL ONCE
Status: COMPLETED | OUTPATIENT
Start: 2025-04-13 | End: 2025-04-13

## 2025-04-13 RX ORDER — DEXAMETHASONE 6 MG/1
6 TABLET ORAL EVERY 6 HOURS
Status: DISCONTINUED | OUTPATIENT
Start: 2025-04-13 | End: 2025-04-13

## 2025-04-13 RX ORDER — LEVOFLOXACIN 750 MG/1
750 TABLET, FILM COATED ORAL DAILY
Status: DISCONTINUED | OUTPATIENT
Start: 2025-04-14 | End: 2025-04-16 | Stop reason: HOSPADM

## 2025-04-13 RX ORDER — LEVOFLOXACIN 5 MG/ML
750 INJECTION, SOLUTION INTRAVENOUS EVERY 24 HOURS
Status: DISCONTINUED | OUTPATIENT
Start: 2025-04-13 | End: 2025-04-13

## 2025-04-13 RX ADMIN — MAGNESIUM SULFATE HEPTAHYDRATE 2 G: 2 INJECTION, SOLUTION INTRAVENOUS at 13:53

## 2025-04-13 RX ADMIN — INSULIN LISPRO 2 UNITS: 100 INJECTION, SOLUTION INTRAVENOUS; SUBCUTANEOUS at 21:49

## 2025-04-13 RX ADMIN — MORPHINE SULFATE 15 MG: 15 TABLET ORAL at 04:08

## 2025-04-13 RX ADMIN — DIGOXIN 125 MCG: 0.25 TABLET ORAL at 18:12

## 2025-04-13 RX ADMIN — BUDESONIDE 0.5 MG: 0.5 SUSPENSION RESPIRATORY (INHALATION) at 19:57

## 2025-04-13 RX ADMIN — BUDESONIDE 0.5 MG: 0.5 SUSPENSION RESPIRATORY (INHALATION) at 08:32

## 2025-04-13 RX ADMIN — MORPHINE SULFATE 2 MG: 4 INJECTION, SOLUTION INTRAMUSCULAR; INTRAVENOUS at 05:04

## 2025-04-13 RX ADMIN — AMLODIPINE BESYLATE 5 MG: 5 TABLET ORAL at 04:11

## 2025-04-13 RX ADMIN — METHYLPREDNISOLONE SODIUM SUCCINATE 62.5 MG: 125 INJECTION, POWDER, FOR SOLUTION INTRAMUSCULAR; INTRAVENOUS at 18:11

## 2025-04-13 RX ADMIN — RIVAROXABAN 10 MG: 10 TABLET, FILM COATED ORAL at 18:12

## 2025-04-13 RX ADMIN — HYDROXYZINE HYDROCHLORIDE 25 MG: 50 TABLET, FILM COATED ORAL at 13:55

## 2025-04-13 RX ADMIN — LOSARTAN POTASSIUM 50 MG: 50 TABLET, FILM COATED ORAL at 18:11

## 2025-04-13 RX ADMIN — INSULIN LISPRO 2 UNITS: 100 INJECTION, SOLUTION INTRAVENOUS; SUBCUTANEOUS at 11:52

## 2025-04-13 RX ADMIN — LEVALBUTEROL HYDROCHLORIDE 1.25 MG: 1.25 SOLUTION RESPIRATORY (INHALATION) at 01:53

## 2025-04-13 RX ADMIN — METHYLPREDNISOLONE SODIUM SUCCINATE 62.5 MG: 125 INJECTION, POWDER, FOR SOLUTION INTRAMUSCULAR; INTRAVENOUS at 11:43

## 2025-04-13 RX ADMIN — LEVALBUTEROL HYDROCHLORIDE 1.25 MG: 1.25 SOLUTION RESPIRATORY (INHALATION) at 14:02

## 2025-04-13 RX ADMIN — INSULIN LISPRO 2 UNITS: 100 INJECTION, SOLUTION INTRAVENOUS; SUBCUTANEOUS at 08:11

## 2025-04-13 RX ADMIN — SENNOSIDES AND DOCUSATE SODIUM 2 TABLET: 50; 8.6 TABLET ORAL at 18:12

## 2025-04-13 RX ADMIN — MAGNESIUM SULFATE HEPTAHYDRATE 2 G: 2 INJECTION, SOLUTION INTRAVENOUS at 05:07

## 2025-04-13 RX ADMIN — LEVOFLOXACIN 750 MG: 5 INJECTION, SOLUTION INTRAVENOUS at 07:29

## 2025-04-13 RX ADMIN — HYDROXYZINE HYDROCHLORIDE 25 MG: 50 TABLET, FILM COATED ORAL at 00:57

## 2025-04-13 RX ADMIN — AZITHROMYCIN DIHYDRATE 250 MG: 250 TABLET ORAL at 04:12

## 2025-04-13 RX ADMIN — METHYLPREDNISOLONE SODIUM SUCCINATE 62.5 MG: 125 INJECTION, POWDER, FOR SOLUTION INTRAMUSCULAR; INTRAVENOUS at 06:26

## 2025-04-13 RX ADMIN — OMEPRAZOLE 20 MG: 20 CAPSULE, DELAYED RELEASE ORAL at 04:08

## 2025-04-13 RX ADMIN — POTASSIUM CHLORIDE 40 MEQ: 1500 TABLET, EXTENDED RELEASE ORAL at 13:52

## 2025-04-13 RX ADMIN — MONTELUKAST 10 MG: 10 TABLET, FILM COATED ORAL at 04:11

## 2025-04-13 RX ADMIN — LOSARTAN POTASSIUM 50 MG: 50 TABLET, FILM COATED ORAL at 04:12

## 2025-04-13 RX ADMIN — SPIRONOLACTONE 25 MG: 25 TABLET ORAL at 04:06

## 2025-04-13 RX ADMIN — FUROSEMIDE 40 MG: 10 INJECTION, SOLUTION INTRAVENOUS at 04:12

## 2025-04-13 RX ADMIN — MORPHINE SULFATE 30 MG: 15 TABLET ORAL at 18:12

## 2025-04-13 RX ADMIN — DULOXETINE 60 MG: 30 CAPSULE, DELAYED RELEASE ORAL at 18:13

## 2025-04-13 RX ADMIN — INSULIN LISPRO 2 UNITS: 100 INJECTION, SOLUTION INTRAVENOUS; SUBCUTANEOUS at 18:19

## 2025-04-13 ASSESSMENT — PAIN DESCRIPTION - PAIN TYPE
TYPE: ACUTE PAIN;CHRONIC PAIN
TYPE: ACUTE PAIN

## 2025-04-13 ASSESSMENT — ENCOUNTER SYMPTOMS
COUGH: 1
SHORTNESS OF BREATH: 1
SPUTUM PRODUCTION: 1

## 2025-04-13 NOTE — PROGRESS NOTES
Pt baseline 8L oxygen. O2 sats fluctuating from 79-mid80s. RT has been giving breathing treatments throughout the night but pt's O2 sats remain fluctuating. Pt placed on 15L via non-rebreather by RT (O2 between 93-95%) and Hospitalist Dev LAMB updated on pt status. Hospitalist on unit to assess pt. STAT chest xray and ABGs ordered. Lasix IV given.     0445: per RT pt now on 15L via oxymask. Pt resting quietly in bed. Call light within reach, personal belongings available, bed in lowest position, treaded socks on, and bed alarm on. Hourly rounding in place.

## 2025-04-13 NOTE — CARE PLAN
The patient is Stable - Low risk of patient condition declining or worsening    Shift Goals  Clinical Goals: maintain o2 >90%, monitor glucose, ABX, free from injury or falls, compliance with oxymask  Patient Goals: walk, get better, find a place  Family Goals: tess    Progress made toward(s) clinical / shift goals:      Problem: Knowledge Deficit - Standard  Goal: Patient and family/care givers will demonstrate understanding of plan of care, disease process/condition, diagnostic tests and medications  Description: Target End Date:  1-3 days or as soon as patient condition allowsDocument in Patient Education1.  Patient and family/caregiver oriented to unit, equipment, visitation policy and means for communicating concern2.  Complete/review Learning Assessment3.  Assess knowledge level of disease process/condition, treatment plan, diagnostic tests and medications4.  Explain disease process/condition, treatment plan, diagnostic tests and medications  Outcome: Progressing     Problem: Self Care  Goal: Patient will have the ability to perform ADLs independently or with assistance (bathe, groom, dress, toilet and feed)  Description: Target End Date:  Prior to discharge or change in level of careDocument on ADL flowsheet1.  Assess the capability and level of deficiency to perform ADLs2.  Encourage family/care giver involvement3.  Provide assistive devices4.  Consider PT/OT evaluations5.  Maintain support, give positive feedback, encourage self-care allowing extra time and verbal cuing as needed6.  Avoid doing something for patients they can do themselves, but provide assistance as needed7.  Assist in anticipating/planning individual needs8.  Collaborate with Case Management and  to meet discharge needs  Outcome: Progressing     Problem: Depression  Goal: Patient and family/caregiver will verbalize accurate information about at least two of the possible causes of depression, three-four of the signs and  symptoms of depression  Description: Target End Date:  1 to 3 days1.  Assess the patient's and family/caregiver's knowledge regarding depression and its causes.2.  Explain to the patient and family/caregiver regarding the major symptoms of depression.3.  Inform the patient and family/caregiver that depression can be treated through medications and psychotherapy.4.  Allow the patient to express feelings and perceptions5.  Express hope to the patient with realistic comments about the patient's strengths and resources.5.  Give positive feedback after a tasks are achieved.6.  Encourage identification of positive aspects of self.7.  Educate the patient about crisis intervention services such as suicide hotlines and other resources.  Outcome: Progressing     Problem: Fall Risk  Goal: Patient will remain free from falls  Description: Target End Date:  Prior to discharge or change in level of careDocument interventions on the [facility] Fall Risk Assessment1.  Assess for fall risk factors2.  Implement fall precautions  Outcome: Progressing

## 2025-04-13 NOTE — PROGRESS NOTES
[facility] Medicine Daily Progress Note    Date of Service  4/13/2025    Chief Complaint  Berny Renee is a 79 y.o. 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  She takes 8 L of oxygen at baseline.    In the emergency department she was reporting increased cough and shortness of breath.  She was provided IV steroids and DuoNeb.  There is no evidence of sepsis per the ERP assessment.  As such, no antibiotics were initiated.  ERP reported improvement in patient's clinical appearance after steroids and nebulizer treatment.  ERP requested admission to hospital service for hypoxia.    The patient has well-established striae of end-stage COPD for which outpatient doctor requirement is 8 L.  She was seen by pulmonology during this hospitalization who recommended ongoing administration of Trelegy Ellipta, budesonide, and as needed DuoNebs.  The patient has stated multiple times that she is a DNR/DNI and has declined BiPAP/CPAP/high flow nasal cannula.    While hospitalized, the patient was found to have a positive sputum culture for Pseudomonas aeruginosa.  This microbe was present on previous sputum cultures done during other hospitalizations.  As such, it was not clear whether she was colonized with this micro.  As such, this concern was discussed with pulmonology who recommended treatment for 14 total days.  The patient was initially placed on IV cefepime prior to being transitioned to Levaquin.  She did have an episode of hypoxemia with increased oxygen demand on 4/13 for which the overnight nocturnist transition the patient back to IV cefepime.    On 4/12, the patient reported that she felt like it was so her abdomen was filling up with fluid.  Of note, she says that she never gets fluid in her lower extremities.  When this happens, she does take a diuretic.  As such, diuretic was  ordered.  She will need daily evaluations to assess ongoing need to continue diuretic.    This patient's case were discussed with palliative care.  Palliative care has discussed the goals of care with the patient which includes remaining DNR/DNI.  Additionally, patient is requesting to discharge to a group home as she does not feel safe at her current residence.  She says that she is concerned for heat in the center as it is too hot in her apartment.  Additionally, she was concerned about a broken sewage system.  She thinks that her current apartment is making her sick.  The patient is requesting a hospice care upon discharge.    Interval Problem Update  Cross coverage nocturnist note reviewed  Patient seen and evaluated at bedside  Pertinent labs and imaging reviewed  BBC 22.9, 22.3, 19.9  Potassium 3.5, 3.7, 3.0  Chloride 90, 94, 88  CO2 36, 33, 37  Creatinine 0.48, 0.40, 0.40  proBNP 156  ABG reviewed and revealed pH 7.40, CO2 66.2, pO2 67.5  Procalcitonin negative chest x-ray completed overnight reveals no change since/10/2025  Possible mild right and left lower lung acute pneumonia more prominent this then 1/22/2025  Oral Levaquin discontinued by overnight nocturnist  Patient resumed on IV Levaquin by overnight nocturnist  Oral Levaquin resumed  Afebrile, hemodynamically stable, 10L oxygen requirements  Voiding, stooling, tolerating oral intake well  Previous bowel movement 4/10 scheduled morphine for air hunger increase to 30 mg twice daily  Closely monitor patient's physiologic status while administering narcotics    I have discussed this patient's plan of care and discharge plan at IDT rounds today with Case Management, Nursing, Nursing leadership, and other members of the IDT team.    Consultants/Specialty  Pulm    Code Status  DNAR/DNI    Disposition  The patient is not medically cleared for discharge to home or a post-acute facility.      I have placed the appropriate orders for post-discharge  needs.    Review of Systems  Review of Systems   Respiratory:  Positive for cough, sputum production and shortness of breath.         Physical Exam  Temp:  [36.2 °C (97.1 °F)-36.9 °C (98.5 °F)] 36.9 °C (98.4 °F)  Pulse:  [] 74  Resp:  [18-22] 18  BP: (128-154)/(58-68) 152/58  SpO2:  [89 %-94 %] 94 %    Physical Exam  Constitutional:       General: She is not in acute distress.     Appearance: Normal appearance. She is normal weight.   HENT:      Head: Normocephalic and atraumatic.      Nose: Nose normal.   Eyes:      Extraocular Movements: Extraocular movements intact.   Pulmonary:      Effort: No respiratory distress.      Comments: Nasal cannula in place  Musculoskeletal:      Cervical back: Normal range of motion.      Right lower leg: No edema.      Left lower leg: No edema.   Skin:     General: Skin is dry.   Neurological:      General: No focal deficit present.      Mental Status: She is alert and oriented to person, place, and time.   Psychiatric:         Mood and Affect: Mood normal.         Behavior: Behavior normal.         Thought Content: Thought content normal.         Fluids    Intake/Output Summary (Last 24 hours) at 4/13/2025 1612  Last data filed at 4/13/2025 0537  Gross per 24 hour   Intake 120 ml   Output 550 ml   Net -430 ml          Laboratory  Recent Labs     04/11/25 0228 04/12/25 0811 04/13/25  0246   WBC 22.9* 22.3* 19.9*   RBC 4.30 4.27 4.07*   HEMOGLOBIN 12.5 12.5 11.8*   HEMATOCRIT 38.5 37.7 36.6*   MCV 89.5 88.3 89.9   MCH 29.1 29.3 29.0   MCHC 32.5 33.2 32.2   RDW 44.2 42.5 43.3   PLATELETCT 493* 484* 490*   MPV 8.9* 9.1 9.4     Recent Labs     04/11/25 0228 04/12/25 0811 04/13/25  0246 04/13/25  0956   SODIUM 134* 136 134*  --    POTASSIUM 3.5* 3.7 3.0* 3.5*   CHLORIDE 90* 94* 88*  --    CO2 36* 33 37*  --    GLUCOSE 129* 171* 127*  --    BUN 18 21 18  --    CREATININE 0.48* 0.40* 0.40*  --    CALCIUM 9.5 9.5 9.5  --                    Imaging  DX-CHEST-PORTABLE (1 VIEW)    Final Result         1. No change since 4/10/2025.      2. Chronic interstitial infiltrates an emphysema.      3. Possible mild right mid and left lower lung acute pneumonia more prominent than 1/22/2025.                     DX-ESOPHAGUS - [id number]   Final Result      DX-CHEST-PORTABLE (1 VIEW)   Final Result      Chronic interstitial changes again noted. Superimposed areas of infection or edema are difficult to exclude. No pleural effusion.         DX-CHEST-PORTABLE (1 VIEW)   Final Result      Chronic interstitial changes again noted. A possible small new ill-defined opacity in the right midlung could represent a small infiltrate. Recommend follow-up after treatment.           Assessment/Plan  * Stage 4 very severe COPD by GOLD classification ([facility])- (present on admission)  Assessment & Plan  DNR/DNI  Continue oral prednisone  Continue azithromycin  Continue Trelegy Ellipta  Continue montelukast   Continue DuoNebs and Xopenex  Provide supplemental oxygen  Wean as able to baseline (8L)  Continuous pulse ox monitoring  Appreciate RT interventions    Community acquired bacterial pneumonia  Assessment & Plan  Patient with increased oxygen demand with exertion  Associated cough  Sputum culture revealed heavy growth of mucoid phenotype Pseudomonas aeruginosa  Patient was placed on empiric cefepime  This case were discussed with pulmonology who recommended a 14-day treatment  Pseudomonas sensitive to levofloxacin  Resume cefepime    Type 2 diabetes mellitus with hyperglycemia, without long-term current use of insulin ([facility])- (present on admission)  Assessment & Plan  Start insulin sliding scale  Adjust as needed    GERD (gastroesophageal reflux disease)- (present on admission)  Assessment & Plan  Continue home PPI    Leukocytosis- (present on admission)  Assessment & Plan  Significant leukocytosis, likely reactive  S/p IV Solu-Medrol administration, on oral prednisone now  Possible community-acquired pneumonia,  sputum positive for Pseudomonas  Repeat chest x-ray revealed superimposed areas of infection or edema difficult to exclude    Acute on chronic respiratory failure with hypoxia (HCC)- (present on admission)  Assessment & Plan  Patient does have end-stage COPD  See #stage IV very severe COPD by Gold classification    Hypertension- (present on admission)  Assessment & Plan  Continue home amlodipine and losartan  Start as needed hydralazine  Adjust as needed    AF (atrial fibrillation) (HCC)- (present on admission)  Assessment & Plan  Currently in sinus rhythm, continue home digoxin  Patient is not on full anticoagulation, will not start at this time  Per chart review, patient's outpatient cardiologist recommended against anticoagulation         VTE prophylaxis: Xarelto    I have performed a physical exam and reviewed and updated ROS and Plan today (4/13/2025). In review of yesterday's note (4/12/2025), there are no changes except as documented above.

## 2025-04-13 NOTE — CARE PLAN
Problem: Bronchoconstriction  Goal: Improve in air movement and diminished wheezing  Description: Target End Date:  2 to 3 days1.  Implement inhaled treatments2.  Evaluate and manage medication effects  Outcome: Progressing   RESPIRATORY UPDATE    Respiratory Modality: Pulmicort  Frequency: BID    Pt tolerating current treatments well with no adverse reactions, will continue to monitor

## 2025-04-13 NOTE — PROGRESS NOTES
"NOC HOSPITALIST CROSS COVER    Notified by RN regarding patient having an increased work of breathing and oxygen demand, up from 8 L nasal cannula (patient's baseline O2 at home) to 15 L NRB. Patient was seen and examined at bedside. She is demonstrating tachypnea, increased work of breathing, and accessory muscle use, but she is able to speak in complete sentences and does not appear to be in significant distress. Lung fields with diffuse wheezing throughout, but she refuses an extra duo-neb as it made her feel very shaky. She reports that she would not want to be transferred for HFNC as it is uncomfortable and \"blows in my face too much.\" Patient is currently satting 95% on 15 L NRB. Instructed bedside RN to titrate down to patient's baseline 8 L O2 once the patient's work of breathing improves.     Discussed that her CXR did show some worsening of her pneumonia and some probable fluid overload. Bedside RN to give am dose of lasix early. Additionally, will give the patient IV magnesium, 2 mg IV morphine for air hunger, and Solu-medrol. After discussing with pharmacy, re-escalated her Levaquin to IV. ABG pH 7.40, CO2 66.2, pO2 67.5, bicarb 40 - CO2 is likely around her baseline given her elevated bicarb levels and compensated.      Vitals:    04/13/25 0400   BP: 136/61   Pulse: 83   Resp: 20   Temp: 99.2   SpO2: 93-95% on 15 L NRB      Plan:  #Acute respiratory failure  -Titrate oxygen to maintain SpO2 88-92%   -Magnesium 2 g IV once  -Continue duo-nebs as needed and scheduled  -Escalate to IV steroids given significant wheezing throughout lung fields  -IV Levaquin  -Morphine 2 mg IV as needed for severe air hunger - hold for RR < 12, somnolence    -----------------------------------------------------------------------------------------------------------    Electronically signed by:  Kirsten Méndez, GENEVA, APRN, BERNICEP-BC  Hospitalist Services         "

## 2025-04-13 NOTE — HOSPITAL COURSE
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  She takes 8 L of oxygen and baseline.    In the emergency department she was reporting increased cough and shortness of breath.  She was provided IV steroids and DuoNeb.  There is no evidence of sepsis per the ERP assessment.  As such, no antibiotics were initiated.  ERP reported improvement in patient's clinical appearance after steroids and nebulizer treatment.  ERP requested admission to hospital service for hypoxia.    The patient has well-established striae of end-stage COPD for which outpatient doctor requirement is 8 L.  She was seen by pulmonology during this hospitalization who recommended ongoing administration of Trelegy Ellipta, budesonide, and as needed DuoNebs.  The patient has stated multiple times that she is a DNR/DNI and has declined BiPAP/CPAP/high flow nasal cannula.    While hospitalized, the patient was found to have a positive sputum culture for Pseudomonas aeruginosa.  This microbe was present on previous sputum cultures done during other hospitalizations.  As such, it was not clear whether she was colonized with this micro.  As such, this concern was discussed with pulmonology who recommended treatment for 14 total days.  The patient was initially placed on IV cefepime prior to being transitioned to Levaquin.  She did have an episode of hypoxemia with increased oxygen demand on 4/13 for which the overnight nocturnist transition the patient back to IV cefepime.    On 4/12, the patient reported that she felt like it was so her abdomen was filling up with fluid.  Of note, she says that she never gets fluid in her lower extremities.  When this happens, she does take a diuretic.  As such, diuretic was ordered.  She will need daily evaluations to assess ongoing need to continue diuretic.    This patient's case were discussed with palliative care.  Palliative care has discussed the goals of  care with the patient which includes remaining DNR/DNI.  Additionally, patient is requesting to discharge to a group home as she does not feel safe at her current residence.  She says that she is concerned for heat in the center as it is too hot in her apartment.  Additionally, she was concerned about a broken sewage system.  She thinks that her current apartment is making her sick.  The patient is requesting a hospice care upon discharge.

## 2025-04-13 NOTE — PROGRESS NOTES
Lab called with critical result of PCO2=66.2 and PCO2-TC = 67.1 at 0409. Critical lab result read back to Hospitalist CLEVELAND Méndez at bedside.

## 2025-04-13 NOTE — CARE PLAN
The patient is Watcher - Medium risk of patient condition declining or worsening    Shift Goals  Clinical Goals: maintain o2 >88%  Patient Goals: feel better, rest, comfort  Family Goals: tess    Progress made toward(s) clinical / shift goals:    Problem: Knowledge Deficit - Standard  Goal: Patient and family/care givers will demonstrate understanding of plan of care, disease process/condition, diagnostic tests and medications  Outcome: Progressing     Problem: Risk for Infection - COPD  Goal: Patient will remain free from signs and symptoms of infection  Outcome: Progressing     Problem: Knowledge Deficit - COPD  Goal: Patient/significant other demonstrates understanding of disease process, utilization of the Action Plan, medications and discharge instruction  Outcome: Progressing       Patient is not progressing towards the following goals: breathing treatments given by RT, Oxygen in creased to 5L via oxymask, STAT cxr/abg      Problem: Impaired Gas Exchange  Goal: Patient will demonstrate improved ventilation and adequate oxygenation and participate in treatment regimen within the level of ability/situation.  Outcome: Not Progressing

## 2025-04-14 ENCOUNTER — HOME CARE VISIT (OUTPATIENT)
Dept: HOSPICE | Facility: HOSPICE | Age: 80
End: 2025-04-14
Payer: MEDICARE

## 2025-04-14 LAB
ALBUMIN SERPL BCP-MCNC: 3.2 G/DL (ref 3.2–4.9)
ALBUMIN/GLOB SERPL: 0.8 G/DL
ALP SERPL-CCNC: 55 U/L (ref 30–99)
ALT SERPL-CCNC: <5 U/L (ref 2–50)
ANION GAP SERPL CALC-SCNC: 8 MMOL/L (ref 7–16)
AST SERPL-CCNC: 15 U/L (ref 12–45)
BILIRUB SERPL-MCNC: 0.2 MG/DL (ref 0.1–1.5)
BUN SERPL-MCNC: 18 MG/DL (ref 8–22)
CALCIUM ALBUM COR SERPL-MCNC: 10.3 MG/DL (ref 8.5–10.5)
CALCIUM SERPL-MCNC: 9.7 MG/DL (ref 8.5–10.5)
CHLORIDE SERPL-SCNC: 88 MMOL/L (ref 96–112)
CO2 SERPL-SCNC: 40 MMOL/L (ref 20–33)
CREAT SERPL-MCNC: 0.41 MG/DL (ref 0.5–1.4)
ERYTHROCYTE [DISTWIDTH] IN BLOOD BY AUTOMATED COUNT: 42.5 FL (ref 35.9–50)
GFR SERPLBLD CREATININE-BSD FMLA CKD-EPI: 100 ML/MIN/1.73 M 2
GLOBULIN SER CALC-MCNC: 4 G/DL (ref 1.9–3.5)
GLUCOSE BLD STRIP.AUTO-MCNC: 149 MG/DL (ref 65–99)
GLUCOSE BLD STRIP.AUTO-MCNC: 169 MG/DL (ref 65–99)
GLUCOSE BLD STRIP.AUTO-MCNC: 207 MG/DL (ref 65–99)
GLUCOSE BLD STRIP.AUTO-MCNC: 224 MG/DL (ref 65–99)
GLUCOSE SERPL-MCNC: 209 MG/DL (ref 65–99)
HCT VFR BLD AUTO: 37.7 % (ref 37–47)
HGB BLD-MCNC: 12.2 G/DL (ref 12–16)
MAGNESIUM SERPL-MCNC: 2.3 MG/DL (ref 1.5–2.5)
MCH RBC QN AUTO: 29 PG (ref 27–33)
MCHC RBC AUTO-ENTMCNC: 32.4 G/DL (ref 32.2–35.5)
MCV RBC AUTO: 89.5 FL (ref 81.4–97.8)
PHOSPHATE SERPL-MCNC: 3.4 MG/DL (ref 2.5–4.5)
PLATELET # BLD AUTO: 531 K/UL (ref 164–446)
PMV BLD AUTO: 9.1 FL (ref 9–12.9)
POTASSIUM SERPL-SCNC: 4.2 MMOL/L (ref 3.6–5.5)
PROT SERPL-MCNC: 7.2 G/DL (ref 6–8.2)
RBC # BLD AUTO: 4.21 M/UL (ref 4.2–5.4)
SODIUM SERPL-SCNC: 136 MMOL/L (ref 135–145)
WBC # BLD AUTO: 11.5 K/UL (ref 4.8–10.8)

## 2025-04-14 PROCEDURE — A9270 NON-COVERED ITEM OR SERVICE: HCPCS | Performed by: INTERNAL MEDICINE

## 2025-04-14 PROCEDURE — 700102 HCHG RX REV CODE 250 W/ 637 OVERRIDE(OP): Performed by: STUDENT IN AN ORGANIZED HEALTH CARE EDUCATION/TRAINING PROGRAM

## 2025-04-14 PROCEDURE — 99232 SBSQ HOSP IP/OBS MODERATE 35: CPT | Performed by: STUDENT IN AN ORGANIZED HEALTH CARE EDUCATION/TRAINING PROGRAM

## 2025-04-14 PROCEDURE — 700102 HCHG RX REV CODE 250 W/ 637 OVERRIDE(OP): Performed by: INTERNAL MEDICINE

## 2025-04-14 PROCEDURE — 83735 ASSAY OF MAGNESIUM: CPT

## 2025-04-14 PROCEDURE — 85027 COMPLETE CBC AUTOMATED: CPT

## 2025-04-14 PROCEDURE — 80053 COMPREHEN METABOLIC PANEL: CPT

## 2025-04-14 PROCEDURE — 82962 GLUCOSE BLOOD TEST: CPT

## 2025-04-14 PROCEDURE — 94640 AIRWAY INHALATION TREATMENT: CPT

## 2025-04-14 PROCEDURE — 700111 HCHG RX REV CODE 636 W/ 250 OVERRIDE (IP): Performed by: STUDENT IN AN ORGANIZED HEALTH CARE EDUCATION/TRAINING PROGRAM

## 2025-04-14 PROCEDURE — 84100 ASSAY OF PHOSPHORUS: CPT

## 2025-04-14 PROCEDURE — 36415 COLL VENOUS BLD VENIPUNCTURE: CPT

## 2025-04-14 PROCEDURE — 700102 HCHG RX REV CODE 250 W/ 637 OVERRIDE(OP)

## 2025-04-14 PROCEDURE — 700111 HCHG RX REV CODE 636 W/ 250 OVERRIDE (IP)

## 2025-04-14 PROCEDURE — A9270 NON-COVERED ITEM OR SERVICE: HCPCS

## 2025-04-14 PROCEDURE — 770006 HCHG ROOM/CARE - MED/SURG/GYN SEMI*

## 2025-04-14 PROCEDURE — A9270 NON-COVERED ITEM OR SERVICE: HCPCS | Performed by: STUDENT IN AN ORGANIZED HEALTH CARE EDUCATION/TRAINING PROGRAM

## 2025-04-14 RX ORDER — DIAZEPAM 10 MG/2ML
2.5 INJECTION, SOLUTION INTRAMUSCULAR; INTRAVENOUS EVERY 6 HOURS PRN
Status: DISCONTINUED | OUTPATIENT
Start: 2025-04-14 | End: 2025-04-16 | Stop reason: HOSPADM

## 2025-04-14 RX ORDER — DIAZEPAM 10 MG/2ML
5 INJECTION, SOLUTION INTRAMUSCULAR; INTRAVENOUS EVERY 6 HOURS PRN
Status: DISCONTINUED | OUTPATIENT
Start: 2025-04-14 | End: 2025-04-14

## 2025-04-14 RX ORDER — MORPHINE SULFATE 4 MG/ML
3 INJECTION INTRAVENOUS
Status: DISCONTINUED | OUTPATIENT
Start: 2025-04-14 | End: 2025-04-16 | Stop reason: HOSPADM

## 2025-04-14 RX ADMIN — LOSARTAN POTASSIUM 50 MG: 50 TABLET, FILM COATED ORAL at 17:57

## 2025-04-14 RX ADMIN — BUDESONIDE 0.5 MG: 0.5 SUSPENSION RESPIRATORY (INHALATION) at 07:29

## 2025-04-14 RX ADMIN — OMEPRAZOLE 20 MG: 20 CAPSULE, DELAYED RELEASE ORAL at 05:40

## 2025-04-14 RX ADMIN — DIGOXIN 125 MCG: 0.25 TABLET ORAL at 17:58

## 2025-04-14 RX ADMIN — METHYLPREDNISOLONE SODIUM SUCCINATE 62.5 MG: 125 INJECTION, POWDER, FOR SOLUTION INTRAMUSCULAR; INTRAVENOUS at 17:58

## 2025-04-14 RX ADMIN — METHYLPREDNISOLONE SODIUM SUCCINATE 62.5 MG: 125 INJECTION, POWDER, FOR SOLUTION INTRAMUSCULAR; INTRAVENOUS at 00:51

## 2025-04-14 RX ADMIN — POLYETHYLENE GLYCOL 3350 1 PACKET: 17 POWDER, FOR SOLUTION ORAL at 05:38

## 2025-04-14 RX ADMIN — MORPHINE SULFATE 2 MG: 4 INJECTION INTRAVENOUS at 13:32

## 2025-04-14 RX ADMIN — INSULIN LISPRO 2 UNITS: 100 INJECTION, SOLUTION INTRAVENOUS; SUBCUTANEOUS at 12:40

## 2025-04-14 RX ADMIN — AZITHROMYCIN DIHYDRATE 250 MG: 250 TABLET ORAL at 05:41

## 2025-04-14 RX ADMIN — FUROSEMIDE 40 MG: 10 INJECTION, SOLUTION INTRAVENOUS at 05:41

## 2025-04-14 RX ADMIN — METHYLPREDNISOLONE SODIUM SUCCINATE 62.5 MG: 125 INJECTION, POWDER, FOR SOLUTION INTRAMUSCULAR; INTRAVENOUS at 23:19

## 2025-04-14 RX ADMIN — MORPHINE SULFATE 30 MG: 15 TABLET ORAL at 05:40

## 2025-04-14 RX ADMIN — HYDROXYZINE HYDROCHLORIDE 25 MG: 50 TABLET, FILM COATED ORAL at 13:32

## 2025-04-14 RX ADMIN — INSULIN LISPRO 2 UNITS: 100 INJECTION, SOLUTION INTRAVENOUS; SUBCUTANEOUS at 18:08

## 2025-04-14 RX ADMIN — BUDESONIDE 0.5 MG: 0.5 SUSPENSION RESPIRATORY (INHALATION) at 19:13

## 2025-04-14 RX ADMIN — FLUTICASONE FUROATE, UMECLIDINIUM BROMIDE AND VILANTEROL TRIFENATATE 1 PUFF: 200; 62.5; 25 POWDER RESPIRATORY (INHALATION) at 06:19

## 2025-04-14 RX ADMIN — INSULIN LISPRO 1 UNITS: 100 INJECTION, SOLUTION INTRAVENOUS; SUBCUTANEOUS at 21:03

## 2025-04-14 RX ADMIN — METHYLPREDNISOLONE SODIUM SUCCINATE 62.5 MG: 125 INJECTION, POWDER, FOR SOLUTION INTRAMUSCULAR; INTRAVENOUS at 12:43

## 2025-04-14 RX ADMIN — LOSARTAN POTASSIUM 50 MG: 50 TABLET, FILM COATED ORAL at 05:41

## 2025-04-14 RX ADMIN — LEVOFLOXACIN 750 MG: 750 TABLET, FILM COATED ORAL at 05:41

## 2025-04-14 RX ADMIN — AMLODIPINE BESYLATE 5 MG: 5 TABLET ORAL at 05:40

## 2025-04-14 RX ADMIN — RIVAROXABAN 10 MG: 10 TABLET, FILM COATED ORAL at 17:57

## 2025-04-14 RX ADMIN — METHYLPREDNISOLONE SODIUM SUCCINATE 62.5 MG: 125 INJECTION, POWDER, FOR SOLUTION INTRAMUSCULAR; INTRAVENOUS at 05:42

## 2025-04-14 RX ADMIN — MONTELUKAST 10 MG: 10 TABLET, FILM COATED ORAL at 05:42

## 2025-04-14 RX ADMIN — MORPHINE SULFATE 30 MG: 15 TABLET ORAL at 17:57

## 2025-04-14 RX ADMIN — SPIRONOLACTONE 25 MG: 25 TABLET ORAL at 05:41

## 2025-04-14 RX ADMIN — DULOXETINE 60 MG: 30 CAPSULE, DELAYED RELEASE ORAL at 17:57

## 2025-04-14 RX ADMIN — SENNOSIDES AND DOCUSATE SODIUM 2 TABLET: 50; 8.6 TABLET ORAL at 17:57

## 2025-04-14 ASSESSMENT — PAIN DESCRIPTION - PAIN TYPE
TYPE: OTHER (COMMENT)
TYPE: ACUTE PAIN

## 2025-04-14 ASSESSMENT — ACTIVITIES OF DAILY LIVING (ADL)
DRESSING_REQUIRES_ASSISTANCE: 1
BATHING_REQUIRES_ASSISTANCE: 1
AMBULATION_REQUIRES_ASSISTANCE: 1
CONTINENCE_REQUIRES_ASSISTANCE: 1
PHYSICAL_TRANSFER_REQUIRES_ASSISTANCE: 1

## 2025-04-14 ASSESSMENT — ENCOUNTER SYMPTOMS
COUGH: 1
SHORTNESS OF BREATH: 1
SHORTNESS OF BREATH: 1
SPUTUM PRODUCTION: 1

## 2025-04-14 ASSESSMENT — COPD QUESTIONNAIRES: COPD: 1

## 2025-04-14 NOTE — DISCHARGE PLANNING
Case Management Discharge Planning    Admission Date: 4/8/2025  GMLOS: 3.4  ALOS: 6    6-Clicks ADL Score: 24  6-Clicks Mobility Score: 21      Anticipated Discharge Dispo: Discharge Disposition: D/T to SNF with Medicare cert in anticipation of skilled care (03)    DME Needed: No    Action(s) Taken: DARCIE spoke with Dory at [facility], [facility] is going to review the patient and insurance and get back to this [facility].     Addendum @ 1433: DARCIE received a VM from Aging and  Kira Arias. Patient is under the Home and Community Based Waiver. The waiver can be extended to GH placement and since the patient is already receiving benefits the coverage for GH would start on day one. Contact number is [phone].     Escalations Completed: None    Medically Clear: Yes    Next Steps: Patient is pending discharge plan. GH vs LTC at SNF.     Barriers to Discharge: Pending Placement    Is the patient up for discharge tomorrow: No

## 2025-04-14 NOTE — CARE PLAN
The patient is Watcher - Medium risk of patient condition declining or worsening    Shift Goals  Clinical Goals: maintain o2 >90%, compliance with oxymask  Patient Goals: walk, get better, find a place  Family Goals: tess    Progress made toward(s) clinical / shift goals:    Problem: Knowledge Deficit - Standard  Goal: Patient and family/care givers will demonstrate understanding of plan of care, disease process/condition, diagnostic tests and medications  Outcome: Progressing     Problem: Knowledge Deficit - COPD  Goal: Patient/significant other demonstrates understanding of disease process, utilization of the Action Plan, medications and discharge instruction  Outcome: Progressing     Problem: Impaired Gas Exchange  Goal: Patient will demonstrate improved ventilation and adequate oxygenation and participate in treatment regimen within the level of ability/situation.  Outcome: Progressing     Problem: Self Care  Goal: Patient will have the ability to perform ADLs independently or with assistance (bathe, groom, dress, toilet and feed)  Outcome: Progressing       Patient is not progressing towards the following goals:

## 2025-04-14 NOTE — PROGRESS NOTES
Pt alert/oriented x4, no pain/discomfort reported. Tolerating 8L oxygen via NC, SATS 94%. Pt fatigued, resting quietly in bed. Call light within reach, personal belongings available, bed in lowest position, treaded socks on, and bed alarm on. Hourly rounding in place.

## 2025-04-14 NOTE — PROGRESS NOTES
So far \[facility] [place] [facility]\"" Medicine Daily Progress Note    Date of Service  4/14/2025    Chief Complaint  Berny Renee is a 79 y.o. 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  She takes 8 L of oxygen at baseline.    In the emergency department she was reporting increased cough and shortness of breath.  She was provided IV steroids and DuoNeb.  There is no evidence of sepsis per the ERP assessment.  As such, no antibiotics were initiated.  ERP reported improvement in patient's clinical appearance after steroids and nebulizer treatment.  ERP requested admission to hospital service for hypoxia.    The patient has well-established striae of end-stage COPD for which outpatient doctor requirement is 8 L.  She was seen by pulmonology during this hospitalization who recommended ongoing administration of Trelegy Ellipta, budesonide, and as needed DuoNebs.  The patient has stated multiple times that she is a DNR/DNI and has declined BiPAP/CPAP/high flow nasal cannula.    While hospitalized, the patient was found to have a positive sputum culture for Pseudomonas aeruginosa.  This microbe was present on previous sputum cultures done during other hospitalizations.  As such, it was not clear whether she was colonized with this micro.  As such, this concern was discussed with pulmonology who recommended treatment for 14 total days.  The patient was initially placed on IV cefepime prior to being transitioned to Levaquin.  She did have an episode of hypoxemia with increased oxygen demand on 4/13 for which the overnight nocturnist transition the patient back to IV cefepime.    On 4/12, the patient reported that she felt like it was so her abdomen was filling up with fluid.  Of note, she says that she never gets fluid in her lower extremities.  When this happens, she does take a diuretic.  As such, diuretic was  ordered.  She will need daily evaluations to assess ongoing need to continue diuretic.    This patient's case were discussed with palliative care.  Palliative care has discussed the goals of care with the patient which includes remaining DNR/DNI.  Additionally, patient is requesting to discharge to a group home as she does not feel safe at her current residence.  She says that she is concerned for heat in the center as it is too hot in her apartment.  Additionally, she was concerned about a broken sewage system.  She thinks that her current apartment is making her sick.  The patient is requesting a hospice care upon discharge.    Interval Problem Update  Patient seen and evaluated at bedside  Pertinent labs and imaging reviewed  WBC 22.9, 22.3, 19.9, 11.5  Potassium 3.5, 3.7, 3.0, 4.2  Chloride 90, 94, 88, 88  CO2 36, 33, 37, 40  Creatinine 0.48, 0.40, 0.40, 0.41  Continue oral Levaquin  Afebrile, hemodynamically stable, 8L oxygen requirements  Voiding, stooling, tolerating oral intake well  Previous bowel movement 4/11  Hospice meet with the patient 4/14 at 1 PM  Pending group home placement    I have discussed this patient's plan of care and discharge plan at IDT rounds today with Case Management, Nursing, Nursing leadership, and other members of the IDT team.    Consultants/Specialty  Pulm    Code Status  DNAR/DNI    Disposition  The patient is not medically cleared for discharge to home or a post-acute facility.      I have placed the appropriate orders for post-discharge needs.    Review of Systems  Review of Systems   Respiratory:  Positive for cough, sputum production and shortness of breath.         Physical Exam  Temp:  [36.3 °C (97.3 °F)-37.1 °C (98.7 °F)] 36.3 °C (97.3 °F)  Pulse:  [74-78] 77  Resp:  [17-18] 17  BP: (127-158)/(55-63) 150/63  SpO2:  [93 %-99 %] 93 %    Physical Exam  Constitutional:       General: She is not in acute distress.     Appearance: Normal appearance. She is normal weight.    HENT:      Head: Normocephalic and atraumatic.      Nose: Nose normal.   Eyes:      Extraocular Movements: Extraocular movements intact.   Pulmonary:      Effort: No respiratory distress.      Comments: Nasal cannula in place  Musculoskeletal:      Cervical back: Normal range of motion.      Right lower leg: No edema.      Left lower leg: No edema.   Skin:     General: Skin is dry.   Neurological:      General: No focal deficit present.      Mental Status: She is alert.   Psychiatric:         Mood and Affect: Mood normal.         Behavior: Behavior normal.         Thought Content: Thought content normal.         Fluids    Intake/Output Summary (Last 24 hours) at 4/14/2025 1706  Last data filed at 4/14/2025 1000  Gross per 24 hour   Intake 760 ml   Output 850 ml   Net -90 ml          Laboratory  Recent Labs     04/12/25  0811 04/13/25  0246 04/14/25  0142   WBC 22.3* 19.9* 11.5*   RBC 4.27 4.07* 4.21   HEMOGLOBIN 12.5 11.8* 12.2   HEMATOCRIT 37.7 36.6* 37.7   MCV 88.3 89.9 89.5   MCH 29.3 29.0 29.0   MCHC 33.2 32.2 32.4   RDW 42.5 43.3 42.5   PLATELETCT 484* 490* 531*   MPV 9.1 9.4 9.1     Recent Labs     04/12/25  0811 04/13/25  0246 04/13/25  0956 04/14/25  0142   SODIUM 136 134*  --  136   POTASSIUM 3.7 3.0* 3.5* 4.2   CHLORIDE 94* 88*  --  88*   CO2 33 37*  --  40*   GLUCOSE 171* 127*  --  209*   BUN 21 18  --  18   CREATININE 0.40* 0.40*  --  0.41*   CALCIUM 9.5 9.5  --  9.7                   Imaging  DX-CHEST-PORTABLE (1 VIEW)   Final Result         1. No change since 4/10/2025.      2. Chronic interstitial infiltrates an emphysema.      3. Possible mild right mid and left lower lung acute pneumonia more prominent than 1/22/2025.                     DX-ESOPHAGUS - [id number]   Final Result      DX-CHEST-PORTABLE (1 VIEW)   Final Result      Chronic interstitial changes again noted. Superimposed areas of infection or edema are difficult to exclude. No pleural effusion.         DX-CHEST-PORTABLE (1 VIEW)    Final Result      Chronic interstitial changes again noted. A possible small new ill-defined opacity in the right midlung could represent a small infiltrate. Recommend follow-up after treatment.           Assessment/Plan  * Stage 4 very severe COPD by GOLD classification ([facility])- (present on admission)  Assessment & Plan  DNR/DNI  Continue oral prednisone  Continue azithromycin  Continue Trelegy Ellipta  Continue montelukast   Continue DuoNebs and Xopenex  Provide supplemental oxygen  Wean as able to baseline (8L)  Continuous pulse ox monitoring  Appreciate RT interventions    Community acquired bacterial pneumonia  Assessment & Plan  Patient with increased oxygen demand with exertion  Associated cough  Sputum culture revealed heavy growth of mucoid phenotype Pseudomonas aeruginosa  This case were discussed with pulmonology who recommended a 14-day treatment  Pseudomonas sensitive to levofloxacin  Continue oral Levaquin for 14 days total    Type 2 diabetes mellitus with hyperglycemia, without long-term current use of insulin ([facility])- (present on admission)  Assessment & Plan  Start insulin sliding scale  Adjust as needed    GERD (gastroesophageal reflux disease)- (present on admission)  Assessment & Plan  Continue home PPI    Leukocytosis- (present on admission)  Assessment & Plan  Significant leukocytosis, likely reactive  S/p IV Solu-Medrol administration, on oral prednisone now  Possible community-acquired pneumonia, sputum positive for Pseudomonas  Repeat chest x-ray revealed superimposed areas of infection or edema difficult to exclude    Acute on chronic respiratory failure with hypoxia ([facility])- (present on admission)  Assessment & Plan  Patient does have end-stage COPD  See #stage IV very severe COPD by Gold classification    Hypertension- (present on admission)  Assessment & Plan  Continue home amlodipine and losartan  Start as needed hydralazine  Adjust as needed    AF (atrial fibrillation) ([facility])- (present on  admission)  Assessment & Plan  Currently in sinus rhythm, continue home digoxin  Patient is not on full anticoagulation, will not start at this time  Per chart review, patient's outpatient cardiologist recommended against anticoagulation         VTE prophylaxis: Xarelto    I have performed a physical exam and reviewed and updated ROS and Plan today (4/14/2025). In review of yesterday's note (4/13/2025), there are no changes except as documented above.

## 2025-04-14 NOTE — HOSPICE
Pt needs consents signed by CHI Vega. She will be in patients room around 2PM tomorrow. (Berny is confused and anxious.) The Quantiferon Gold is negative. No TIBURCIO needed as patient has a home and community based wavier via Aging and Disability. Patient to reside at a group home once one is selected.

## 2025-04-14 NOTE — DISCHARGE PLANNING
"Post Acute Navigator Team     Per chart review, patient has been identified as a candidate for a goals of care discussion from following data:     High End of Life Care Index score 87  High LACE + score 75  6 click Mobility score 21   6 click ADL score 24   Readmission: No      Code Status:DNAR/DNI   Advanced Directive present in EMR: Yes- friend Rhoda is her BIB   POLST present in EMR: Yes-DNR/DNI     [facility] Admission RN met with Berny, TALINOAH Duong, and Rhoda's spouse at bedside. Patient was very anxious and short of breath during bedside discussion. Berny reported that if she discharges with hospice she will loose all her money. This RN explained that hospice is covered 100% by Medicare part A and that patients have more support vs less when transitioning to hospice. Berny stated \"I can't loose all my money!\" Therapeutic communication and active listening used to calm patient. This RN and Darling RN stepped out of patient room. Rhoda's spouse also stepped out of room to discuss concerns of Berny's decision making. He feels that it is best for discharge plan to be discussed with Rhoda and then Rhoda can explain it simply to patient in order to reduce Berny's anxiety.     This RN and Darling RN were able to meet privately with Rhoda. Group home discussed and difference between group home and hospice. Rhoda informed that SSI needs to go to [facility] as it provides all of Berny's essential care but that she should be able to receive around $150.00 monthly. Rhoda voiced understanding of group home and group home waiver. Hospice education provided at length. Rhoda informed that due to patient's decline in capacity, she would likely need to sign patient consents and also assist with being patient's payee. Rhoda reports she is on patient's bank account and is the executor of Berny's will which includes her older car and electrical scooter. Rhoda is assisting with finding patient's cat a home and cleaning out her apartment at " this time.  Discharge plan to group home and hospice discussed with Rhoda towards end of discussion. Rhoda will sign hospice consents once  has been selected. Darling HARRISON w/ [facility] will follow up with Rhoda tomorrow 4/15/25.     Arelis PEPPER updated

## 2025-04-15 ENCOUNTER — HOME CARE VISIT (OUTPATIENT)
Dept: HOSPICE | Facility: HOSPICE | Age: 80
End: 2025-04-15
Payer: MEDICARE

## 2025-04-15 LAB
ALBUMIN SERPL BCP-MCNC: 3.3 G/DL (ref 3.2–4.9)
ALBUMIN/GLOB SERPL: 0.8 G/DL
ALP SERPL-CCNC: 49 U/L (ref 30–99)
ALT SERPL-CCNC: 8 U/L (ref 2–50)
ANION GAP SERPL CALC-SCNC: 9 MMOL/L (ref 7–16)
AST SERPL-CCNC: 15 U/L (ref 12–45)
BILIRUB SERPL-MCNC: <0.2 MG/DL (ref 0.1–1.5)
BUN SERPL-MCNC: 29 MG/DL (ref 8–22)
CALCIUM ALBUM COR SERPL-MCNC: 10.4 MG/DL (ref 8.5–10.5)
CALCIUM SERPL-MCNC: 9.8 MG/DL (ref 8.5–10.5)
CHLORIDE SERPL-SCNC: 85 MMOL/L (ref 96–112)
CO2 SERPL-SCNC: 40 MMOL/L (ref 20–33)
CREAT SERPL-MCNC: 0.44 MG/DL (ref 0.5–1.4)
ERYTHROCYTE [DISTWIDTH] IN BLOOD BY AUTOMATED COUNT: 41.4 FL (ref 35.9–50)
GFR SERPLBLD CREATININE-BSD FMLA CKD-EPI: 98 ML/MIN/1.73 M 2
GLOBULIN SER CALC-MCNC: 3.9 G/DL (ref 1.9–3.5)
GLUCOSE BLD STRIP.AUTO-MCNC: 173 MG/DL (ref 65–99)
GLUCOSE BLD STRIP.AUTO-MCNC: 186 MG/DL (ref 65–99)
GLUCOSE BLD STRIP.AUTO-MCNC: 271 MG/DL (ref 65–99)
GLUCOSE BLD STRIP.AUTO-MCNC: 288 MG/DL (ref 65–99)
GLUCOSE SERPL-MCNC: 195 MG/DL (ref 65–99)
HCT VFR BLD AUTO: 37.7 % (ref 37–47)
HGB BLD-MCNC: 12.4 G/DL (ref 12–16)
MCH RBC QN AUTO: 29 PG (ref 27–33)
MCHC RBC AUTO-ENTMCNC: 32.9 G/DL (ref 32.2–35.5)
MCV RBC AUTO: 88.3 FL (ref 81.4–97.8)
PLATELET # BLD AUTO: 551 K/UL (ref 164–446)
PMV BLD AUTO: 8.9 FL (ref 9–12.9)
POTASSIUM SERPL-SCNC: 4 MMOL/L (ref 3.6–5.5)
PROT SERPL-MCNC: 7.2 G/DL (ref 6–8.2)
RBC # BLD AUTO: 4.27 M/UL (ref 4.2–5.4)
SODIUM SERPL-SCNC: 134 MMOL/L (ref 135–145)
WBC # BLD AUTO: 17.6 K/UL (ref 4.8–10.8)

## 2025-04-15 PROCEDURE — 85027 COMPLETE CBC AUTOMATED: CPT

## 2025-04-15 PROCEDURE — 80053 COMPREHEN METABOLIC PANEL: CPT

## 2025-04-15 PROCEDURE — 770006 HCHG ROOM/CARE - MED/SURG/GYN SEMI*

## 2025-04-15 PROCEDURE — A9270 NON-COVERED ITEM OR SERVICE: HCPCS

## 2025-04-15 PROCEDURE — A9270 NON-COVERED ITEM OR SERVICE: HCPCS | Performed by: STUDENT IN AN ORGANIZED HEALTH CARE EDUCATION/TRAINING PROGRAM

## 2025-04-15 PROCEDURE — 97602 WOUND(S) CARE NON-SELECTIVE: CPT

## 2025-04-15 PROCEDURE — 700111 HCHG RX REV CODE 636 W/ 250 OVERRIDE (IP): Mod: JZ | Performed by: STUDENT IN AN ORGANIZED HEALTH CARE EDUCATION/TRAINING PROGRAM

## 2025-04-15 PROCEDURE — 99231 SBSQ HOSP IP/OBS SF/LOW 25: CPT | Performed by: FAMILY MEDICINE

## 2025-04-15 PROCEDURE — 700102 HCHG RX REV CODE 250 W/ 637 OVERRIDE(OP): Performed by: INTERNAL MEDICINE

## 2025-04-15 PROCEDURE — A9270 NON-COVERED ITEM OR SERVICE: HCPCS | Performed by: FAMILY MEDICINE

## 2025-04-15 PROCEDURE — A9270 NON-COVERED ITEM OR SERVICE: HCPCS | Performed by: INTERNAL MEDICINE

## 2025-04-15 PROCEDURE — 700102 HCHG RX REV CODE 250 W/ 637 OVERRIDE(OP): Performed by: STUDENT IN AN ORGANIZED HEALTH CARE EDUCATION/TRAINING PROGRAM

## 2025-04-15 PROCEDURE — 700111 HCHG RX REV CODE 636 W/ 250 OVERRIDE (IP): Mod: JZ,TB

## 2025-04-15 PROCEDURE — 700102 HCHG RX REV CODE 250 W/ 637 OVERRIDE(OP)

## 2025-04-15 PROCEDURE — 36415 COLL VENOUS BLD VENIPUNCTURE: CPT

## 2025-04-15 PROCEDURE — 82962 GLUCOSE BLOOD TEST: CPT

## 2025-04-15 PROCEDURE — 700102 HCHG RX REV CODE 250 W/ 637 OVERRIDE(OP): Performed by: FAMILY MEDICINE

## 2025-04-15 RX ADMIN — SPIRONOLACTONE 25 MG: 25 TABLET ORAL at 04:24

## 2025-04-15 RX ADMIN — INSULIN LISPRO 3 UNITS: 100 INJECTION, SOLUTION INTRAVENOUS; SUBCUTANEOUS at 17:04

## 2025-04-15 RX ADMIN — HYDROXYZINE HYDROCHLORIDE 25 MG: 50 TABLET, FILM COATED ORAL at 16:56

## 2025-04-15 RX ADMIN — SENNOSIDES AND DOCUSATE SODIUM 2 TABLET: 50; 8.6 TABLET ORAL at 16:56

## 2025-04-15 RX ADMIN — AMLODIPINE BESYLATE 5 MG: 5 TABLET ORAL at 04:24

## 2025-04-15 RX ADMIN — METHYLPREDNISOLONE SODIUM SUCCINATE 62.5 MG: 125 INJECTION, POWDER, FOR SOLUTION INTRAMUSCULAR; INTRAVENOUS at 04:23

## 2025-04-15 RX ADMIN — RIVAROXABAN 10 MG: 10 TABLET, FILM COATED ORAL at 16:56

## 2025-04-15 RX ADMIN — DULOXETINE 60 MG: 30 CAPSULE, DELAYED RELEASE ORAL at 17:46

## 2025-04-15 RX ADMIN — MORPHINE SULFATE 30 MG: 15 TABLET ORAL at 16:56

## 2025-04-15 RX ADMIN — MORPHINE SULFATE 30 MG: 15 TABLET ORAL at 04:23

## 2025-04-15 RX ADMIN — MONTELUKAST 10 MG: 10 TABLET, FILM COATED ORAL at 04:23

## 2025-04-15 RX ADMIN — INSULIN LISPRO 3 UNITS: 100 INJECTION, SOLUTION INTRAVENOUS; SUBCUTANEOUS at 21:15

## 2025-04-15 RX ADMIN — LEVOFLOXACIN 750 MG: 750 TABLET, FILM COATED ORAL at 04:24

## 2025-04-15 RX ADMIN — INSULIN LISPRO 1 UNITS: 100 INJECTION, SOLUTION INTRAVENOUS; SUBCUTANEOUS at 12:26

## 2025-04-15 RX ADMIN — METHYLPREDNISOLONE SODIUM SUCCINATE 62.5 MG: 125 INJECTION, POWDER, FOR SOLUTION INTRAMUSCULAR; INTRAVENOUS at 16:59

## 2025-04-15 RX ADMIN — METHYLPREDNISOLONE SODIUM SUCCINATE 62.5 MG: 125 INJECTION, POWDER, FOR SOLUTION INTRAMUSCULAR; INTRAVENOUS at 12:01

## 2025-04-15 RX ADMIN — LOSARTAN POTASSIUM 50 MG: 50 TABLET, FILM COATED ORAL at 16:58

## 2025-04-15 RX ADMIN — TRAMADOL HYDROCHLORIDE 50 MG: 50 TABLET, COATED ORAL at 02:02

## 2025-04-15 RX ADMIN — FUROSEMIDE 40 MG: 10 INJECTION, SOLUTION INTRAVENOUS at 04:23

## 2025-04-15 RX ADMIN — MAGNESIUM HYDROXIDE 30 ML: 2400 SUSPENSION ORAL at 17:46

## 2025-04-15 RX ADMIN — INSULIN LISPRO 1 UNITS: 100 INJECTION, SOLUTION INTRAVENOUS; SUBCUTANEOUS at 08:19

## 2025-04-15 RX ADMIN — FLUTICASONE FUROATE, UMECLIDINIUM BROMIDE AND VILANTEROL TRIFENATATE 1 PUFF: 200; 62.5; 25 POWDER RESPIRATORY (INHALATION) at 04:27

## 2025-04-15 RX ADMIN — OMEPRAZOLE 20 MG: 20 CAPSULE, DELAYED RELEASE ORAL at 04:23

## 2025-04-15 RX ADMIN — AZITHROMYCIN DIHYDRATE 250 MG: 250 TABLET ORAL at 04:23

## 2025-04-15 RX ADMIN — LOSARTAN POTASSIUM 50 MG: 50 TABLET, FILM COATED ORAL at 04:23

## 2025-04-15 RX ADMIN — POLYETHYLENE GLYCOL 3350 1 PACKET: 17 POWDER, FOR SOLUTION ORAL at 04:24

## 2025-04-15 RX ADMIN — DIGOXIN 125 MCG: 0.25 TABLET ORAL at 16:58

## 2025-04-15 ASSESSMENT — ENCOUNTER SYMPTOMS
SPUTUM PRODUCTION: 1
CHANGE IN LEVEL OF CONSCIOUSNESS: 1
COUGH: 1
CONSTIPATION: 1
SHORTNESS OF BREATH: 1

## 2025-04-15 ASSESSMENT — PAIN DESCRIPTION - PAIN TYPE
TYPE: ACUTE PAIN

## 2025-04-15 ASSESSMENT — ACTIVITIES OF DAILY LIVING (ADL)
BATHING_REQUIRES_ASSISTANCE: 1
EATING_REQUIRES_ASSISTANCE: 1
CONTINENCE_REQUIRES_ASSISTANCE: 1
AMBULATION_REQUIRES_ASSISTANCE: 1
PHYSICAL_TRANSFER_REQUIRES_ASSISTANCE: 1
DRESSING_REQUIRES_ASSISTANCE: 1

## 2025-04-15 NOTE — WOUND TEAM
[facility]  Inpatient Services  Initial Wound and Skin Care Evaluation    Admission Date: 2025     Last order of IP CONSULT TO WOUND CARE was found on 2025 from Hospital Encounter on 2025     HPI, PMH, SH: Reviewed    Past Surgical History:   Procedure Laterality Date    LA UPPER GI ENDOSCOPY,BIOPSY N/A 11/15/2024    Procedure: GASTROSCOPY, WITH BIOPSY;  Surgeon: Mela Vargas M.D.;  Location: SURGERY SAME DAY [facility];  Service: Gastroenterology    COLONOSCOPY WITH POLYP N/A 11/15/2024    Procedure: COLONOSCOPY, WITH POLYPECTOMY;  Surgeon: Mela Vargas M.D.;  Location: SURGERY SAME DAY [facility];  Service: Gastroenterology    LUMBAR LAMINECTOMY DISKECTOMY  2010    Performed by GRACIE CANO at SURGERY [facility]    OTHER ORTHOPEDIC SURGERY      left ankle fx    OTHER      leg fracture    OTHER      t spine disc surg    TONSILLECTOMY       Social History     Tobacco Use    Smoking status: Former     Current packs/day: 0.00     Average packs/day: 2.0 packs/day for 30.0 years (60.0 ttl pk-yrs)     Types: Cigarettes     Start date: 3/1/1969     Quit date: 3/1/1999     Years since quittin.1    Smokeless tobacco: Never    Tobacco comments:     3 pks a day for 35 yrs, continued abstinence   Substance Use Topics    Alcohol use: Not Currently     Alcohol/week: 4.2 oz     Types: 7 Glasses of wine per week     Chief Complaint   Patient presents with    Shortness of Breath     Denies CP.    Wheezing     Inspiratory and expiratory.     Diagnosis: COPD exacerbation (HCC) [J44.1]    Unit where seen by Wound Team: S611/01     WOUND CONSULT RELATED TO:  Sacrococcygeal area    WOUND TEAM PLAN OF CARE - Frequency of Follow-up:   Nursing to follow dressing orders written for wound care. Contact wound team if area fails to progress, deteriorates or with any questions/concerns if something comes up before next scheduled follow up (See below as to whether wound is following and  frequency of wound follow up)   Not following, consult as needed  - any area    WOUND HISTORY:   Pt is a 78yr old female with history of stage 4 COPD on high O2 levels at baseline. Pt has history of A. Fib, DM, PVD, and Lung CA who presented with COPD exacerbation. Pt states she lives in an unsafe apartment with cockroaches and is unable to return to her home. Pt has been admitted since 4/8/25, pending SNF placement. Wound team was consulted regarding pts sacrococcygeal area.        WOUND ASSESSMENT/LDA                         Vascular:    CARO:   No results found.    Lab Values:    Lab Results   Component Value Date/Time    WBC 17.6 (H) 04/15/2025 05:25 AM    WBC 10.9 (H) 06/09/2011 12:00 AM    RBC 4.27 04/15/2025 05:25 AM    RBC 4.69 06/09/2011 12:00 AM    HEMOGLOBIN 12.4 04/15/2025 05:25 AM    HEMATOCRIT 37.7 04/15/2025 05:25 AM    CREACTPROT 0.47 05/12/2021 12:16 PM    SEDRATEWES 9 05/12/2021 12:16 PM    HBA1C 6.5 (H) 01/23/2025 01:23 AM         Culture Results show:  No results found for this or any previous visit (from the past 720 hours).    Pain Level/Medicated:  None, Tolerated without pain medication       INTERVENTIONS BY WOUND TEAM:  Chart and images reviewed. Discussed with bedside RN. All areas of concern (based on picture review, LDA review and discussion with bedside RN) have been thoroughly assessed. Documentation of areas based on significant findings. This RN in to assess patient. Performed standard wound care which includes appropriate positioning, dressing removal and non-selective debridement. Pictures and measurements obtained weekly if/when required.  Area Assessed:  Posterior Head  Area manually palpated, no wounds appreciated, no pain noted     Area Assessed:  Bilateral Ears  Area intact, gray foam in use (pt using home O2 tubing due to high flow needs)     Area Assessed: Back  Area intact,     Area Assessed:  Sacrococcygeal area  Area intact, some blanching redness noted, Offloading  dressing changed    Area Assessed:  Bilateral I.T.s  Area intact,     Area Assessed:  BLE & Knees  Area intact,     Area Assessed:  Bilateral ankles  Area intact,     Area Assessed:  Bilateral heels  Area intact, Heel offloading dressings applied      Advanced Wound Care Discharge Planning  Number of Clinicians necessary to complete wound care: 1  Is patient requiring IV pain medications for dressing changes:  No   Length of time for dressing change 30 min. (This does not include chart review, pre-medication time, set up, clean up or time spent charting.)    Interdisciplinary consultation: Patient, Bedside RN, Tracy CELIS (Wound RN).  Pressure injury and staging reviewed with N/A.    EVALUATION / RATIONALE FOR TREATMENT:     Date:  04/15/25  Wound Status:  Initial evaluation    Pt with largely intact skin, is able to turn self for assessment. Pt is at higher risk for breakdown as she has had an extended hospitalization and is obese. Offloading measures in use.          Goals: Steady decrease in wound area and depth weekly.    NURSING PLAN OF CARE ORDERS:  RN Prevention Protocol    NUTRITION RECOMMENDATIONS   Wound Team Recommendations:  N/A    DIET ORDERS (From admission to next 24h)       Start     Ordered    04/08/25 1536  Diet Order Diet: Consistent CHO (Diabetic)  ALL MEALS        Question:  Diet:  Answer:  Consistent CHO (Diabetic)    04/08/25 1536                    PREVENTATIVE INTERVENTIONS:    Q shift Taj - performed per nursing policy  Q shift pressure point assessments - performed per nursing policy    Surface/Positioning  Standard/trauma mattress - Currently in Place    Offloading/Redistribution  Sacral offloading dressing (Silicone dressing) - Changed  Heel offloading dressing (Silicone dressing) - Applied this Visit      Respiratory  Silicone O2 tubing - Currently in Place  Gray Foam Ear protectors - Currently in Place    Anticipated discharge plans:  Skilled Nursing        Vac Discharge Needs:  Vac  Discharge plan is purely a recommendation from wound team and not a requirement for discharge unless otherwise stated by physician.  Not Applicable Pt not on a wound vac

## 2025-04-15 NOTE — PROGRESS NOTES
Hospice Discussion Handoff Report  Patient name and MRN: Berny Renee [id number]  Referral order received? (y/n) yes  Decision-maker requests hospice? (y/n) Yes  Hospice diagnosis? ENd stage COPD  Approving provider? Marquita Strong APRN  Name of decision-maker: Rhoda Daily  Decision-maker's relationship to patient (NOK, DPOA or self)? DPOA, friend  Consents signed? (y/n) Yes  Anticipated hospice admission date (and time, if known): 4/16/25  Anticipated level of care (routine/GIP): routine  HOSPITAL REFERRALS:  Anticipated discharge destination address: [address] [facility]  If discharging to a facility, what is the name of the person at the facility that you spoke to today for verification of the patient's planned admission to the facility? N/a, Rhoda is looking at [facility], likely to dc there.   Anticipated transport via family or medical transport? n/a  What DME was ordered? (include liter flow for oxygen, if applicable) no, unknown place of dc  Scheduled DME delivery date and time: n/a  Medications reviewed with provider/ordered? (y/n) no    Spoke to with Rhoda, CHI and friend signed consents and is touring [place] today.

## 2025-04-15 NOTE — DISCHARGE PLANNING
Case Management Discharge Planning    Admission Date: 4/8/2025  GMLOS: 3.4  ALOS: 7    6-Clicks ADL Score: 24  6-Clicks Mobility Score: 21      Anticipated Discharge Dispo: Discharge Disposition: D/T to SNF with Medicare cert in anticipation of skilled care (03)    DME Needed: No    Action(s) Taken: DARCIE spoke with Dory at [facility]. Patient is under review and Dory will get back to this LMSW once an answer is provided.     Addendum @ 1312: LMSW left a VM for Rhoda requesting a call back regarding placement.     Addendum @ 1430: MELODYSW met with Rhoda at bedside to discuss DC options. Rhoda to go to [facility] and have an answer and sign consents tomorrow.     Escalations Completed: None    Medically Clear: Yes    Next Steps: Patient is pending Hospice placement at this time.     Barriers to Discharge: Pending Placement.     Is the patient up for discharge tomorrow: No

## 2025-04-15 NOTE — PROGRESS NOTES
So far [facility] Medicine Daily Progress Note    Date of Service  4/15/2025    Chief Complaint  Berny Renee is a 79 y.o. 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  She takes 8 L of oxygen at baseline.    In the emergency department she was reporting increased cough and shortness of breath.  She was provided IV steroids and DuoNeb.  There is no evidence of sepsis per the ERP assessment.  As such, no antibiotics were initiated.  ERP reported improvement in patient's clinical appearance after steroids and nebulizer treatment.  ERP requested admission to hospital service for hypoxia.    The patient has well-established striae of end-stage COPD for which outpatient doctor requirement is 8 L.  She was seen by pulmonology during this hospitalization who recommended ongoing administration of Trelegy Ellipta, budesonide, and as needed DuoNebs.  The patient has stated multiple times that she is a DNR/DNI and has declined BiPAP/CPAP/high flow nasal cannula.    While hospitalized, the patient was found to have a positive sputum culture for Pseudomonas aeruginosa.  This microbe was present on previous sputum cultures done during other hospitalizations.  As such, it was not clear whether she was colonized with this micro.  As such, this concern was discussed with pulmonology who recommended treatment for 14 total days.  The patient was initially placed on IV cefepime prior to being transitioned to Levaquin.  She did have an episode of hypoxemia with increased oxygen demand on 4/13 for which the overnight nocturnist transition the patient back to IV cefepime.    On 4/12, the patient reported that she felt like it was so her abdomen was filling up with fluid.  Of note, she says that she never gets fluid in her lower extremities.  When this happens, she does take a diuretic.  As such, diuretic was  ordered.  She will need daily evaluations to assess ongoing need to continue diuretic.    This patient's case were discussed with palliative care.  Palliative care has discussed the goals of care with the patient which includes remaining DNR/DNI.  Additionally, patient is requesting to discharge to a group home as she does not feel safe at her current residence.  She says that she is concerned for heat in the center as it is too hot in her apartment.  Additionally, she was concerned about a broken sewage system.  She thinks that her current apartment is making her sick.  The patient is requesting a hospice care upon discharge.    Interval Problem Update  Patient seen and evaluated at bedside  Pertinent labs and imaging reviewed  WBC 22.9, 22.3, 19.9, 11.5  Potassium 3.5, 3.7, 3.0, 4.2  Chloride 90, 94, 88, 88  CO2 36, 33, 37, 40  Creatinine 0.48, 0.40, 0.40, 0.41  Continue oral Levaquin  Afebrile, hemodynamically stable, 8L oxygen requirements  Voiding, stooling, tolerating oral intake well  Previous bowel movement 4/11  Hospice meet with the patient 4/14 at 1 PM  Pending group home placement    I have discussed this patient's plan of care and discharge plan at IDT rounds today with Case Management, Nursing, Nursing leadership, and other members of the IDT team.    Consultants/Specialty  Pulm    Code Status  DNAR/DNI    Disposition  The patient is medically cleared for discharge to home or a post-acute facility.  Anticipate discharge to: skilled nursing facility    I have placed the appropriate orders for post-discharge needs.    Review of Systems  Review of Systems   Respiratory:  Positive for cough, sputum production and shortness of breath.    Gastrointestinal:  Positive for constipation.        Physical Exam  Temp:  [36.1 °C (97 °F)-36.6 °C (97.8 °F)] 36.4 °C (97.5 °F)  Pulse:  [73-79] 75  Resp:  [18-20] 18  BP: (131-167)/(59-69) 167/67  SpO2:  [93 %-98 %] 94 %    Physical Exam  Constitutional:       General:  She is not in acute distress.     Appearance: Normal appearance. She is normal weight.   HENT:      Head: Normocephalic and atraumatic.   Eyes:      Extraocular Movements: Extraocular movements intact.   Pulmonary:      Effort: Pulmonary effort is normal. No respiratory distress.      Breath sounds: Normal breath sounds. No wheezing or rhonchi.      Comments: Nasal cannula in place  Abdominal:      Tenderness: There is abdominal tenderness (Minimal).   Musculoskeletal:      Cervical back: Normal range of motion.      Right lower leg: No edema.      Left lower leg: No edema.   Skin:     General: Skin is warm and dry.   Neurological:      General: No focal deficit present.      Mental Status: She is alert.   Psychiatric:         Mood and Affect: Mood normal.         Behavior: Behavior normal.         Fluids    Intake/Output Summary (Last 24 hours) at 4/15/2025 1539  Last data filed at 4/15/2025 1400  Gross per 24 hour   Intake 700 ml   Output 800 ml   Net -100 ml          Laboratory  Recent Labs     04/13/25  0246 04/14/25  0142 04/15/25  0525   WBC 19.9* 11.5* 17.6*   RBC 4.07* 4.21 4.27   HEMOGLOBIN 11.8* 12.2 12.4   HEMATOCRIT 36.6* 37.7 37.7   MCV 89.9 89.5 88.3   MCH 29.0 29.0 29.0   MCHC 32.2 32.4 32.9   RDW 43.3 42.5 41.4   PLATELETCT 490* 531* 551*   MPV 9.4 9.1 8.9*     Recent Labs     04/13/25  0246 04/13/25  0956 04/14/25  0142 04/15/25  0525   SODIUM 134*  --  136 134*   POTASSIUM 3.0* 3.5* 4.2 4.0   CHLORIDE 88*  --  88* 85*   CO2 37*  --  40* 40*   GLUCOSE 127*  --  209* 195*   BUN 18  --  18 29*   CREATININE 0.40*  --  0.41* 0.44*   CALCIUM 9.5  --  9.7 9.8                   Imaging  DX-CHEST-PORTABLE (1 VIEW)   Final Result         1. No change since 4/10/2025.      2. Chronic interstitial infiltrates an emphysema.      3. Possible mild right mid and left lower lung acute pneumonia more prominent than 1/22/2025.                     DX-ESOPHAGUS - MAPW-OXANF-DB   Final Result      DX-CHEST-PORTABLE (1  VIEW)   Final Result      Chronic interstitial changes again noted. Superimposed areas of infection or edema are difficult to exclude. No pleural effusion.         DX-CHEST-PORTABLE (1 VIEW)   Final Result      Chronic interstitial changes again noted. A possible small new ill-defined opacity in the right midlung could represent a small infiltrate. Recommend follow-up after treatment.           Assessment/Plan  * Stage 4 very severe COPD by GOLD classification ([facility])- (present on admission)  Assessment & Plan  DNR/DNI  Continue oral prednisone  Continue azithromycin  Continue Trelegy Ellipta  Continue montelukast   Continue DuoNebs and Xopenex  Provide supplemental oxygen  Wean as able to baseline (8L)  Continuous pulse ox monitoring  Appreciate RT interventions    Community acquired bacterial pneumonia  Assessment & Plan  Patient with increased oxygen demand with exertion  Associated cough  Sputum culture revealed heavy growth of mucoid phenotype Pseudomonas aeruginosa  This case were discussed with pulmonology who recommended a 14-day treatment  Pseudomonas sensitive to levofloxacin  Continue oral Levaquin for 14 days total    Type 2 diabetes mellitus with hyperglycemia, without long-term current use of insulin ([facility])- (present on admission)  Assessment & Plan  Start insulin sliding scale  Adjust as needed    GERD (gastroesophageal reflux disease)- (present on admission)  Assessment & Plan  Continue home PPI    Leukocytosis- (present on admission)  Assessment & Plan  Significant leukocytosis, likely reactive  S/p IV Solu-Medrol administration, on oral prednisone now  Possible community-acquired pneumonia, sputum positive for Pseudomonas  Repeat chest x-ray revealed superimposed areas of infection or edema difficult to exclude    Acute on chronic respiratory failure with hypoxia ([facility])- (present on admission)  Assessment & Plan  Patient does have end-stage COPD  See #stage IV very severe COPD by Gold  classification    Hypertension- (present on admission)  Assessment & Plan  Continue home amlodipine and losartan  Start as needed hydralazine  Adjust as needed    AF (atrial fibrillation) (HCC)- (present on admission)  Assessment & Plan  Currently in sinus rhythm, continue home digoxin  Patient is not on full anticoagulation, will not start at this time  Per chart review, patient's outpatient cardiologist recommended against anticoagulation    Case management has referrals out to multiple skilled nursing facilities and daughter is looking into choosing 1    VTE prophylaxis: Xarelto    I have performed a physical exam and reviewed and updated ROS and Plan today (4/15/2025). In review of yesterday's note (4/14/2025), there are no changes except as documented above.

## 2025-04-15 NOTE — PROGRESS NOTES
Patient A&Ox4, denies pain, 8L NC in use, educated on importance of shifting off sacrum while in bed, sacral mepilex placed, refusing HUMERA and Q2 turns.  Frame alarm in use.  Refusing strip alarm.  Charge notified.

## 2025-04-15 NOTE — WOUND TEAM
Assisted Phuc TURNER (Wound RN), with wound care, non-selective debridement performed using wound cleanser/NS and gauze. Please see Phuc TURNER (Wound RN) wound note for further wound care details.

## 2025-04-15 NOTE — CARE PLAN
The patient is watcher.     Shift Goals  Clinical Goals: maintain SPO2 >88% throughout the shift  Patient Goals: eat,rest  Family Goals: tess          Problem: Impaired Gas Exchange  Goal: Patient will demonstrate improved ventilation and adequate oxygenation and participate in treatment regimen within the level of ability/situation.  Outcome: Progressing     Problem: Self Care  Goal: Patient will have the ability to perform ADLs independently or with assistance (bathe, groom, dress, toilet and feed)  Outcome: Progressing     Problem: Skin Integrity  Goal: Skin integrity is maintained or improved  Outcome: Progressing     Progress made toward(s) clinical / shift goals:  pt is oriented. On oxygen 8 lpm. SPO2 maintained >88-93%. Pt has a yanker bedside, oral suctions self as needed. Medicated for pain w/effect. IS used several times while awake. Sacrum red, slow to sincere. Mepilex in place. Offloaded with pillows. Pt encouraged to lay on the side more to offload sacrum. Call light within reach.     Patient is not progressing towards the following goals:

## 2025-04-15 NOTE — PROGRESS NOTES
Pt considered a high fall risk.  Pt refuses strip alarm but ok with bed frame alarm.  Pt educated on fall risk and the purpose of the bed alarm. Pt verbalizes an understanding of the education and risks of not having the bed alarm on. Pt is and is A&Ox 4. Charge nurse notified. Reinforced to use call light for any needs.

## 2025-04-15 NOTE — DISCHARGE PLANNING
@7588  Agency/Facility Name: [facility]  Outcome: BINA received a voice message from Dory stating the SNF referral has been accepted.    @9311  Agency/Facility Name: [facility]  Spoke To: Dory  Outcome: DPA called stating that the friend is touring facilities today.  Once CM has heard from friend, we will contact [facility].

## 2025-04-15 NOTE — CARE PLAN
Problem: Bronchoconstriction  Goal: Improve in air movement and diminished wheezing  Description: Target End Date:  2 to 3 days1.  Implement inhaled treatments2.  Evaluate and manage medication effects  Outcome: Not Met   SVN

## 2025-04-15 NOTE — PROGRESS NOTES
Pt is alert and oriented, high risk of fall. Refuses strip alarm, Education provided. Verbalizes understanding.   Frame alarm on. Bed locked and in lowest position. Call light within reach.

## 2025-04-16 ENCOUNTER — PATIENT OUTREACH (OUTPATIENT)
Dept: SCHEDULING | Facility: IMAGING CENTER | Age: 80
End: 2025-04-16
Payer: MEDICARE

## 2025-04-16 ENCOUNTER — PHARMACY VISIT (OUTPATIENT)
Dept: PHARMACY | Facility: MEDICAL CENTER | Age: 80
End: 2025-04-16
Payer: COMMERCIAL

## 2025-04-16 ENCOUNTER — HOME CARE VISIT (OUTPATIENT)
Dept: HOSPICE | Facility: HOSPICE | Age: 80
End: 2025-04-16
Payer: MEDICARE

## 2025-04-16 ENCOUNTER — APPOINTMENT (OUTPATIENT)
Dept: SLEEP MEDICINE | Facility: MEDICAL CENTER | Age: 80
DRG: 190 | End: 2025-04-16
Attending: EMERGENCY MEDICINE
Payer: MEDICARE

## 2025-04-16 VITALS
TEMPERATURE: 96.7 F | OXYGEN SATURATION: 91 % | SYSTOLIC BLOOD PRESSURE: 154 MMHG | HEART RATE: 87 BPM | BODY MASS INDEX: 25.61 KG/M2 | DIASTOLIC BLOOD PRESSURE: 70 MMHG | WEIGHT: 150 LBS | HEIGHT: 64 IN | RESPIRATION RATE: 24 BRPM

## 2025-04-16 VITALS — HEART RATE: 80 BPM | RESPIRATION RATE: 24 BRPM

## 2025-04-16 DIAGNOSIS — J44.1 ACUTE EXACERBATION OF CHRONIC OBSTRUCTIVE PULMONARY DISEASE (COPD) (HCC): ICD-10-CM

## 2025-04-16 DIAGNOSIS — Z51.5 HOSPICE CARE: ICD-10-CM

## 2025-04-16 DIAGNOSIS — I10 PRIMARY HYPERTENSION: ICD-10-CM

## 2025-04-16 DIAGNOSIS — R09.02 HYPOXIA: ICD-10-CM

## 2025-04-16 DIAGNOSIS — Z85.118 PERSONAL HISTORY OF LUNG CANCER: ICD-10-CM

## 2025-04-16 DIAGNOSIS — K59.03 OPIOID-INDUCED CONSTIPATION: ICD-10-CM

## 2025-04-16 DIAGNOSIS — J44.9 STAGE 4 VERY SEVERE COPD BY GOLD CLASSIFICATION (HCC): Chronic | ICD-10-CM

## 2025-04-16 DIAGNOSIS — E11.65 TYPE 2 DIABETES MELLITUS WITH HYPERGLYCEMIA, WITHOUT LONG-TERM CURRENT USE OF INSULIN (HCC): ICD-10-CM

## 2025-04-16 DIAGNOSIS — J44.9 END STAGE COPD (HCC): ICD-10-CM

## 2025-04-16 DIAGNOSIS — T40.2X5A OPIOID-INDUCED CONSTIPATION: ICD-10-CM

## 2025-04-16 DIAGNOSIS — J43.1 PANLOBULAR EMPHYSEMA (HCC): ICD-10-CM

## 2025-04-16 PROBLEM — D72.829 LEUKOCYTOSIS: Status: RESOLVED | Noted: 2025-01-16 | Resolved: 2025-04-16

## 2025-04-16 PROBLEM — J96.21 ACUTE ON CHRONIC RESPIRATORY FAILURE WITH HYPOXIA (HCC): Status: RESOLVED | Noted: 2024-11-13 | Resolved: 2025-04-16

## 2025-04-16 LAB
GLUCOSE BLD STRIP.AUTO-MCNC: 246 MG/DL (ref 65–99)
GLUCOSE BLD STRIP.AUTO-MCNC: 320 MG/DL (ref 65–99)

## 2025-04-16 PROCEDURE — 700111 HCHG RX REV CODE 636 W/ 250 OVERRIDE (IP): Performed by: STUDENT IN AN ORGANIZED HEALTH CARE EDUCATION/TRAINING PROGRAM

## 2025-04-16 PROCEDURE — A9270 NON-COVERED ITEM OR SERVICE: HCPCS | Performed by: INTERNAL MEDICINE

## 2025-04-16 PROCEDURE — 700102 HCHG RX REV CODE 250 W/ 637 OVERRIDE(OP): Performed by: FAMILY MEDICINE

## 2025-04-16 PROCEDURE — RXMED WILLOW AMBULATORY MEDICATION CHARGE: Performed by: REHABILITATION PRACTITIONER

## 2025-04-16 PROCEDURE — S9126 HOSPICE CARE, IN THE HOME, P: HCPCS

## 2025-04-16 PROCEDURE — 82962 GLUCOSE BLOOD TEST: CPT

## 2025-04-16 PROCEDURE — 700102 HCHG RX REV CODE 250 W/ 637 OVERRIDE(OP): Performed by: STUDENT IN AN ORGANIZED HEALTH CARE EDUCATION/TRAINING PROGRAM

## 2025-04-16 PROCEDURE — 700102 HCHG RX REV CODE 250 W/ 637 OVERRIDE(OP): Performed by: INTERNAL MEDICINE

## 2025-04-16 PROCEDURE — A9270 NON-COVERED ITEM OR SERVICE: HCPCS | Performed by: STUDENT IN AN ORGANIZED HEALTH CARE EDUCATION/TRAINING PROGRAM

## 2025-04-16 PROCEDURE — 700111 HCHG RX REV CODE 636 W/ 250 OVERRIDE (IP): Mod: JZ,TB

## 2025-04-16 PROCEDURE — A9270 NON-COVERED ITEM OR SERVICE: HCPCS | Performed by: FAMILY MEDICINE

## 2025-04-16 PROCEDURE — 665035 HSPC ROOM AND BOARD PER DIEM

## 2025-04-16 PROCEDURE — 99239 HOSP IP/OBS DSCHRG MGMT >30: CPT | Performed by: FAMILY MEDICINE

## 2025-04-16 RX ORDER — BENZONATATE 100 MG/1
100 CAPSULE ORAL 3 TIMES DAILY PRN
Qty: 60 CAPSULE | Refills: 11 | Status: SHIPPED | OUTPATIENT
Start: 2025-04-16 | End: 2025-04-16

## 2025-04-16 RX ORDER — ACYCLOVIR 200 MG/1
200 CAPSULE ORAL DAILY
Qty: 30 CAPSULE | Refills: 11 | Status: SHIPPED | OUTPATIENT
Start: 2025-04-16 | End: 2026-04-16

## 2025-04-16 RX ORDER — FUROSEMIDE 40 MG/1
40 TABLET ORAL EVERY MORNING
Qty: 30 TABLET | Refills: 11 | Status: SHIPPED | OUTPATIENT
Start: 2025-04-16 | End: 2026-04-16

## 2025-04-16 RX ORDER — AMLODIPINE BESYLATE 5 MG/1
5 TABLET ORAL DAILY
Qty: 30 TABLET | Refills: 11 | Status: SHIPPED | OUTPATIENT
Start: 2025-04-16 | End: 2026-04-16

## 2025-04-16 RX ORDER — MONTELUKAST SODIUM 10 MG/1
10 TABLET ORAL DAILY
Qty: 30 TABLET | Refills: 11 | Status: SHIPPED | OUTPATIENT
Start: 2025-04-16 | End: 2026-04-16

## 2025-04-16 RX ORDER — ACETAMINOPHEN 650 MG/1
650 SUPPOSITORY RECTAL EVERY 6 HOURS PRN
Qty: 5 SUPPOSITORY | Refills: 10 | Status: SHIPPED | OUTPATIENT
Start: 2025-04-16 | End: 2026-04-16

## 2025-04-16 RX ORDER — TRAZODONE HYDROCHLORIDE 50 MG/1
50 TABLET ORAL NIGHTLY
Qty: 30 TABLET | Refills: 11 | Status: SHIPPED | OUTPATIENT
Start: 2025-04-16 | End: 2026-04-16

## 2025-04-16 RX ORDER — LOSARTAN POTASSIUM 50 MG/1
50 TABLET ORAL 2 TIMES DAILY
Qty: 60 TABLET | Refills: 11 | Status: SHIPPED | OUTPATIENT
Start: 2025-04-16 | End: 2026-04-16

## 2025-04-16 RX ORDER — MORPHINE SULFATE 30 MG/1
30 TABLET, FILM COATED, EXTENDED RELEASE ORAL EVERY 12 HOURS
Qty: 120 TABLET | Refills: 0 | Status: SHIPPED | OUTPATIENT
Start: 2025-04-16 | End: 2026-04-16

## 2025-04-16 RX ORDER — AZITHROMYCIN 250 MG/1
250 TABLET, FILM COATED ORAL DAILY
Qty: 30 TABLET | Refills: 11 | Status: SHIPPED | OUTPATIENT
Start: 2025-04-16 | End: 2026-04-16

## 2025-04-16 RX ORDER — NYSTATIN 100000 [USP'U]/ML
500000 SUSPENSION ORAL 4 TIMES DAILY
Qty: 200 ML | Refills: 1 | Status: SHIPPED | OUTPATIENT
Start: 2025-04-16 | End: 2025-04-26

## 2025-04-16 RX ORDER — IPRATROPIUM BROMIDE AND ALBUTEROL SULFATE 2.5; .5 MG/3ML; MG/3ML
3 SOLUTION RESPIRATORY (INHALATION) EVERY 4 HOURS PRN
Qty: 90 ML | Refills: 11 | Status: SHIPPED | OUTPATIENT
Start: 2025-04-16 | End: 2026-04-16

## 2025-04-16 RX ORDER — OMEPRAZOLE 20 MG/1
20 CAPSULE, DELAYED RELEASE ORAL
Qty: 30 CAPSULE | Refills: 11 | Status: SHIPPED | OUTPATIENT
Start: 2025-04-16 | End: 2026-04-16

## 2025-04-16 RX ORDER — PREDNISONE 20 MG/1
20 TABLET ORAL EVERY MORNING
Qty: 30 TABLET | Refills: 11 | Status: SHIPPED | OUTPATIENT
Start: 2025-04-16 | End: 2026-04-16

## 2025-04-16 RX ORDER — ONDANSETRON 4 MG/1
4 TABLET, ORALLY DISINTEGRATING ORAL EVERY 6 HOURS PRN
Qty: 15 TABLET | Refills: 10 | Status: SHIPPED | OUTPATIENT
Start: 2025-04-16 | End: 2026-04-16

## 2025-04-16 RX ORDER — LEVOFLOXACIN 750 MG/1
750 TABLET, FILM COATED ORAL DAILY
Qty: 10 TABLET | Refills: 0 | Status: SHIPPED | OUTPATIENT
Start: 2025-04-16 | End: 2025-04-26

## 2025-04-16 RX ORDER — LORAZEPAM 2 MG/ML
1 CONCENTRATE ORAL
Qty: 360 ML | Refills: 0 | Status: SHIPPED | OUTPATIENT
Start: 2025-04-16 | End: 2025-04-17 | Stop reason: SDUPTHER

## 2025-04-16 RX ORDER — AMLODIPINE BESYLATE 5 MG/1
5 TABLET ORAL DAILY
Qty: 30 TABLET | Refills: 11 | Status: SHIPPED | OUTPATIENT
Start: 2025-04-16 | End: 2025-04-16

## 2025-04-16 RX ORDER — BISACODYL 10 MG
10 SUPPOSITORY, RECTAL RECTAL PRN
Qty: 5 SUPPOSITORY | Refills: 10 | Status: SHIPPED | OUTPATIENT
Start: 2025-04-16 | End: 2026-04-16

## 2025-04-16 RX ORDER — AZITHROMYCIN 250 MG/1
250 TABLET, FILM COATED ORAL DAILY
Qty: 90 TABLET | Refills: 1 | Status: SHIPPED | OUTPATIENT
Start: 2025-04-17 | End: 2025-04-16

## 2025-04-16 RX ORDER — LEVOFLOXACIN 750 MG/1
750 TABLET, FILM COATED ORAL DAILY
Qty: 6 TABLET | Refills: 0 | Status: SHIPPED | OUTPATIENT
Start: 2025-04-17 | End: 2025-04-16

## 2025-04-16 RX ORDER — MORPHINE SULFATE 100 MG/5ML
10 SOLUTION ORAL
Qty: 240 ML | Refills: 0 | Status: SHIPPED | OUTPATIENT
Start: 2025-04-16 | End: 2026-04-16

## 2025-04-16 RX ORDER — SPIRONOLACTONE 25 MG/1
25 TABLET ORAL DAILY
Qty: 30 TABLET | Refills: 11 | Status: SHIPPED | OUTPATIENT
Start: 2025-04-16 | End: 2026-04-16

## 2025-04-16 RX ORDER — AZITHROMYCIN 250 MG/1
250 TABLET, FILM COATED ORAL DAILY
Qty: 30 TABLET | Refills: 11 | Status: SHIPPED | OUTPATIENT
Start: 2025-04-16 | End: 2025-04-16

## 2025-04-16 RX ORDER — DULOXETIN HYDROCHLORIDE 60 MG/1
60 CAPSULE, DELAYED RELEASE ORAL EVERY EVENING
Qty: 30 CAPSULE | Refills: 11 | Status: SHIPPED | OUTPATIENT
Start: 2025-04-16 | End: 2026-04-16

## 2025-04-16 RX ORDER — AMOXICILLIN 250 MG
1 CAPSULE ORAL NIGHTLY
Qty: 30 TABLET | Refills: 11 | Status: SHIPPED | OUTPATIENT
Start: 2025-04-16 | End: 2026-04-16

## 2025-04-16 RX ORDER — IPRATROPIUM BROMIDE AND ALBUTEROL SULFATE 2.5; .5 MG/3ML; MG/3ML
3 SOLUTION RESPIRATORY (INHALATION) 4 TIMES DAILY
Qty: 180 ML | Refills: 22 | Status: SHIPPED | OUTPATIENT
Start: 2025-04-16 | End: 2026-04-16

## 2025-04-16 RX ORDER — FEXOFENADINE HCL 180 MG/1
180 TABLET ORAL EVERY EVENING
Qty: 30 TABLET | Refills: 11 | Status: SHIPPED | OUTPATIENT
Start: 2025-04-16 | End: 2026-04-16

## 2025-04-16 RX ORDER — POLYETHYLENE GLYCOL 3350 17 G/17G
17 POWDER, FOR SOLUTION ORAL DAILY
Qty: 360 PACKET | Refills: 0 | Status: SHIPPED | OUTPATIENT
Start: 2025-04-16 | End: 2026-04-16

## 2025-04-16 RX ORDER — SENNA AND DOCUSATE SODIUM 50; 8.6 MG/1; MG/1
2 TABLET, FILM COATED ORAL 2 TIMES DAILY PRN
Qty: 28 TABLET | Refills: 10 | Status: SHIPPED | OUTPATIENT
Start: 2025-04-16 | End: 2026-04-16

## 2025-04-16 RX ORDER — GUAIFENESIN/DEXTROMETHORPHAN 100-10MG/5
10 SYRUP ORAL EVERY 4 HOURS PRN
Qty: 237 ML | Refills: 11 | Status: SHIPPED | OUTPATIENT
Start: 2025-04-16 | End: 2026-04-16

## 2025-04-16 RX ORDER — ACETAMINOPHEN 500 MG
1000 TABLET ORAL EVERY 6 HOURS PRN
Qty: 30 TABLET | Refills: 10 | Status: SHIPPED | OUTPATIENT
Start: 2025-04-16 | End: 2026-04-16

## 2025-04-16 RX ORDER — ECHINACEA PURPUREA EXTRACT 125 MG
2 TABLET ORAL
Qty: 44 ML | Refills: 3 | Status: SHIPPED | OUTPATIENT
Start: 2025-04-16 | End: 2026-04-16

## 2025-04-16 RX ORDER — FLUCONAZOLE 200 MG/1
200 TABLET ORAL DAILY
Qty: 14 TABLET | Refills: 0 | Status: SHIPPED | OUTPATIENT
Start: 2025-04-16 | End: 2025-04-30

## 2025-04-16 RX ORDER — BENZONATATE 100 MG/1
100 CAPSULE ORAL EVERY 8 HOURS PRN
Qty: 50 CAPSULE | Refills: 11 | Status: SHIPPED | OUTPATIENT
Start: 2025-04-16 | End: 2026-04-16

## 2025-04-16 RX ADMIN — LOSARTAN POTASSIUM 50 MG: 50 TABLET, FILM COATED ORAL at 05:01

## 2025-04-16 RX ADMIN — AMLODIPINE BESYLATE 5 MG: 5 TABLET ORAL at 05:01

## 2025-04-16 RX ADMIN — MORPHINE SULFATE 30 MG: 15 TABLET ORAL at 05:01

## 2025-04-16 RX ADMIN — BUDESONIDE 0.5 MG: 0.5 SUSPENSION RESPIRATORY (INHALATION) at 10:03

## 2025-04-16 RX ADMIN — METHYLPREDNISOLONE SODIUM SUCCINATE 62.5 MG: 125 INJECTION, POWDER, FOR SOLUTION INTRAMUSCULAR; INTRAVENOUS at 05:09

## 2025-04-16 RX ADMIN — FLUTICASONE FUROATE, UMECLIDINIUM BROMIDE AND VILANTEROL TRIFENATATE 1 PUFF: 200; 62.5; 25 POWDER RESPIRATORY (INHALATION) at 05:14

## 2025-04-16 RX ADMIN — AZITHROMYCIN DIHYDRATE 250 MG: 250 TABLET ORAL at 05:01

## 2025-04-16 RX ADMIN — SPIRONOLACTONE 25 MG: 25 TABLET ORAL at 05:01

## 2025-04-16 RX ADMIN — FUROSEMIDE 40 MG: 10 INJECTION, SOLUTION INTRAVENOUS at 05:08

## 2025-04-16 RX ADMIN — MAGNESIUM HYDROXIDE 30 ML: 2400 SUSPENSION ORAL at 05:03

## 2025-04-16 RX ADMIN — POLYETHYLENE GLYCOL 3350 1 PACKET: 17 POWDER, FOR SOLUTION ORAL at 05:14

## 2025-04-16 RX ADMIN — INSULIN LISPRO 4 UNITS: 100 INJECTION, SOLUTION INTRAVENOUS; SUBCUTANEOUS at 08:16

## 2025-04-16 RX ADMIN — OMEPRAZOLE 20 MG: 20 CAPSULE, DELAYED RELEASE ORAL at 05:01

## 2025-04-16 RX ADMIN — INSULIN LISPRO 2 UNITS: 100 INJECTION, SOLUTION INTRAVENOUS; SUBCUTANEOUS at 12:58

## 2025-04-16 RX ADMIN — MONTELUKAST 10 MG: 10 TABLET, FILM COATED ORAL at 05:01

## 2025-04-16 RX ADMIN — LEVOFLOXACIN 750 MG: 750 TABLET, FILM COATED ORAL at 05:01

## 2025-04-16 RX ADMIN — METHYLPREDNISOLONE SODIUM SUCCINATE 62.5 MG: 125 INJECTION, POWDER, FOR SOLUTION INTRAMUSCULAR; INTRAVENOUS at 00:46

## 2025-04-16 SDOH — ECONOMIC STABILITY: HOUSING INSECURITY: EVIDENCE OF SMOKING MATERIAL: 0

## 2025-04-16 SDOH — ECONOMIC STABILITY: GENERAL

## 2025-04-16 ASSESSMENT — ENCOUNTER SYMPTOMS
COUGH: 1
PAIN LOCATION - EXACERBATING FACTORS: WEATHER CHANGES
CONSTIPATION: 1
DRY SKIN: 1
FATIGUE: 1
LOWEST PAIN SEVERITY IN PAST 24 HOURS: 2/10
HIGHEST PAIN SEVERITY IN PAST 24 HOURS: 0/10
COUGH CHARACTERISTICS: PRODUCTIVE
FATIGUES EASILY: 1
PAIN SEVERITY GOAL: 2/10
FORGETFULNESS: 1
PAIN LOCATION - PAIN QUALITY: ACHING
DEPRESSED MOOD: 1
CHANGE IN LEVEL OF CONSCIOUSNESS: 1
PAIN: 1
SLEEP QUALITY: FAIR
SUBJECTIVE PAIN PROGRESSION: WAXING AND WANING
PAIN LOCATION - PAIN SEVERITY: 10/10
DYSPNEA ACTIVITY LEVEL: AFTER AMBULATING LESS THAN 10 FT
PAIN LOCATION - RELIEVING FACTORS: MEDS
PAIN LOCATION - PAIN FREQUENCY: INFREQUENT
DYSPNEA ON EXERTION: 1
SHORTNESS OF BREATH: 1
STOOL FREQUENCY: DAILY
DESCRIPTION OF MEMORY LOSS: SHORT TERM
LAST BOWEL MOVEMENT: 67311
CONTUSION: 1

## 2025-04-16 ASSESSMENT — ACTIVITIES OF DAILY LIVING (ADL)
BATHING_REQUIRES_ASSISTANCE: 1
PHYSICAL_TRANSFER_REQUIRES_ASSISTANCE: 1
CONTINENCE_REQUIRES_ASSISTANCE: 1
AMBULATION_REQUIRES_ASSISTANCE: 1
BATHING_REQUIRES_ASSISTANCE: 1
DRESSING_REQUIRES_ASSISTANCE: 1
PHYSICAL_TRANSFER_REQUIRES_ASSISTANCE: 1
CURRENT_FUNCTION: STAND BY ASSIST
AMBULATION ASSISTANCE: STAND BY ASSIST
AMBULATION_REQUIRES_ASSISTANCE: 1
CONTINENCE_REQUIRES_ASSISTANCE: 1
DRESSING_REQUIRES_ASSISTANCE: 1
MONEY MANAGEMENT (EXPENSES/BILLS): TOTALLY DEPENDENT

## 2025-04-16 ASSESSMENT — SOCIAL DETERMINANTS OF HEALTH (SDOH)
EXPRESSED_FINANCIAL_NEED: 1
ACTIVE STRESSOR - HEALTH CHANGES: 1

## 2025-04-16 ASSESSMENT — PAIN DESCRIPTION - PAIN TYPE
TYPE: ACUTE PAIN
TYPE: ACUTE PAIN;CHRONIC PAIN

## 2025-04-16 ASSESSMENT — COPD QUESTIONNAIRES
COPD: 1
COPD: 1

## 2025-04-16 ASSESSMENT — PATIENT HEALTH QUESTIONNAIRE - PHQ9
SUM OF ALL RESPONSES TO PHQ QUESTIONS 1-9: 9
CLINICAL INTERPRETATION OF PHQ2 SCORE: 1
5. POOR APPETITE OR OVEREATING: 1 - SEVERAL DAYS

## 2025-04-16 NOTE — CARE PLAN
Pt AO x 4.  Pt denies pain during initial assessment. Bed frame on. Call light and belongings within reach.  Bed locked and in lowest position.  Hourly rounding.  Needs currently met.           The patient is Stable - Low risk of patient condition declining or worsening    Shift Goals  Clinical Goals: maintain SPO2 >88% throughout the shift  Patient Goals: eat,rest  Family Goals: tess    Progress made toward(s) clinical / shift goals:      Problem: Knowledge Deficit - Standard  Goal: Patient and family/care givers will demonstrate understanding of plan of care, disease process/condition, diagnostic tests and medications  Outcome: Progressing     Problem: Knowledge Deficit - COPD  Goal: Patient/significant other demonstrates understanding of disease process, utilization of the Action Plan, medications and discharge instruction  Outcome: Progressing     Problem: Risk for Infection - COPD  Goal: Patient will remain free from signs and symptoms of infection  Outcome: Progressing     Problem: Self Care  Goal: Patient will have the ability to perform ADLs independently or with assistance (bathe, groom, dress, toilet and feed)  Outcome: Progressing     Problem: Fall Risk  Goal: Patient will remain free from falls  Outcome: Progressing     Problem: Pain - Standard  Goal: Alleviation of pain or a reduction in pain to the patient’s comfort goal  Outcome: Progressing       Patient is not progressing towards the following goals:      Problem: Skin Integrity  Goal: Skin integrity is maintained or improved  Outcome: Not Progressing

## 2025-04-16 NOTE — DISCHARGE PLANNING
GMT transport cancelled.     [facility] is new transport:     DC Transport Scheduled    Transport Company Scheduled:  [employer]  Spoke with Lilo at [facility] to schedule transport.  [facility] Trip #: [id number]    Scheduled Date: 4/16/2025  Scheduled Time: 1330    Transport Type: [facility]  Destination: [facility]   Destination address: [address] [place] [address]    Notified care team of scheduled transport via Voalte.     If there are any changes needed to the DC transportation scheduled, please contact [facility] Ride Line at ext. [phone] between the hours of 9374-1364. If outside those hours, contact the ED Case Manager at ext. [phone].

## 2025-04-16 NOTE — DIETARY
"Nutrition Services: Initial Assessment     Day 8 of admit. Berny Renee is 79 y.o., female with admitting DX of COPD exacerbation (HCC) [J44.1].    Consult Received for: LOS    Pt admitted COPD exacerbation, pneumonia pmh T2DM, GERD, HTN, afib per EMR/provider note. Unable to contact pt at time of assessment.        Nutrition Assessment:      Height: 162.6 cm (5' 4\")  Weight: 68 kg (150 lb)  Weight taken via: Other Healthcare Provider  BMI Calculated: 25.75  BMI Classification: WNL       Weight Readings from Last 5 encounters:   Wt Readings from Last 5 Encounters:   04/08/25 68 kg (150 lb)   02/28/25 72.6 kg (160 lb)   01/25/25 72.9 kg (160 lb 11.5 oz)   01/19/25 73.3 kg (161 lb 9.6 oz)   12/19/24 73.9 kg (163 lb)         Objective:   Pertinent Labs: Na 134, A1C 6.5% (2/2025)  Pertinent Meds: azithromycin, furosemide, insulin, magnesium hydroxide, methylprednisolone, morphine, omeprazole, bowel regimen, spironolactone  Skin/Wounds:  Reviewed wound assessment, no nutrition related wounds noted  Food Allergies: NKFA  Last BM:  Patient stated, Not observed  04/11/25       Current Diet Order/Intake:   Consistent CHO diet    PO Intake:  >75% x 8 meals  50-75% x 1 meal    Nutrition Focused Physical Exam (NFPE)  NFPE did not occur    Nutrition Diagnosis:      Increased nutrient needs related to increase demand as evidence by pneumonia, COPD    Altered nutrition related lab related to diabetes as evidence by A1C 6.5%    Nutrition Interventions:      Recommend to resume current diet (consistent carbohydrate)  Patient aware of active plan of care as appropriate.       Nutrition Monitoring and Evaluation:      Monitor nutrition POC, goal for >75% intake from meals and supplements.  Additional fluids per MD/DO  Monitor vital signs pertinent to nutrition.    RD following and will provide updated recommendations as indicated.      PRIYANKA Roy/AND CRITERIA FOR " MALNUTRITION

## 2025-04-16 NOTE — PROGRESS NOTES
Patient is discharging to [facility], confirmed by Rhoda. Medications reviewed with Marquita DAVIS and confirmed with pharmacy. O2 concentrator ordered and address updated with [facility]. Transport requested for 1300.

## 2025-04-16 NOTE — CARE PLAN
The patient is Stable - Low risk of patient condition declining or worsening    Shift Goals  Clinical Goals: Pt will maintain o2 sat at 88-92 throughout shift.  Patient Goals: Eat, sleep, watch tv  Family Goals: none present    Progress made toward(s) clinical / shift goals:  Alert and oriented x4. Emotional support  provided, with call light and personal belongings in reach. Ambulated. SpO2 monitored and maintained  above 88%.   Problem: Impaired Gas Exchange  Goal: Patient will demonstrate improved ventilation and adequate oxygenation and participate in treatment regimen within the level of ability/situation.  Outcome: Progressing     Problem: Knowledge Deficit - Standard  Goal: Patient and family/care givers will demonstrate understanding of plan of care, disease process/condition, diagnostic tests and medications  Outcome: Progressing       Patient is not progressing towards the following goals:

## 2025-04-16 NOTE — DISCHARGE PLANNING
Case Management Discharge Planning    Admission Date: 4/8/2025  GMLOS: 3.4  ALOS: 8    6-Clicks ADL Score: 24  6-Clicks Mobility Score: 21      Anticipated Discharge Dispo: Discharge Disposition: D/T to SNF with Medicare cert in anticipation of skilled care (03)    DME Needed: No    Action(s) Taken: [facility] was updated by IDT that Rhoda did agree with [facility] placement for Hospice. Discharge time requested is 1300. LMSW requested time through [employer] via GMT.     Addendum @ 1044: IMM completed with the patient at bedside. IMM scanned to [facility]. Choice signed and scanned into [facility] for [facility]. COBRA given to bedside RN.     Addendum @ 8578: LMSW confirmed with [facility] a discharge for the patient at 1330. [facility] to call back for report shortly.     Addendum @ 7959: LMSW left  for Arin Parnell with [facility] at [phone] notifying her of patient's transfer.     Escalations Completed: None    Medically Clear: Yes    Next Steps: Patient to discharge to [facility] today.     Barriers to Discharge: None    Is the patient up for discharge tomorrow: No

## 2025-04-16 NOTE — DISCHARGE PLANNING
DC Transport Scheduled    Transport Company Scheduled:  [facility]    Scheduled Date: 4/16/2025  Scheduled Time: 1500    Transport Type: [facility]  Destination: [facility]   [address] [place] [address]    Notified care team of scheduled transport via Voalte.     If there are any changes needed to the DC transportation scheduled, please contact [facility] at ext. [phone] between the hours of 3635-0063. If outside those hours, contact the ED Case Manager at ext. [phone].

## 2025-04-16 NOTE — DISCHARGE SUMMARY
Discharge Summary    CHIEF COMPLAINT ON ADMISSION  Chief Complaint   Patient presents with    Shortness of Breath     Denies CP.    Wheezing     Inspiratory and expiratory.       Reason for Admission  COPD exacerbation (HCC)     Admission Date  4/8/2025    CODE STATUS  DNAR/DNI    HPI & HOSPITAL COURSE  This is a 79 y.o. 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  She takes 8 L of oxygen and baseline.    In the emergency department she was reporting increased cough and shortness of breath.  She was provided IV steroids and DuoNeb.  There is no evidence of sepsis per the ERP assessment.  As such, no antibiotics were initiated.  ERP reported improvement in patient's clinical appearance after steroids and nebulizer treatment.  ERP requested admission to hospital service for hypoxia.    The patient has well-established striae of end-stage COPD for which outpatient doctor requirement is 8 L.  She was seen by pulmonology during this hospitalization who recommended ongoing administration of Trelegy Ellipta, budesonide, and as needed DuoNebs.  The patient has stated multiple times that she is a DNR/DNI and has declined BiPAP/CPAP/high flow nasal cannula.    While hospitalized, the patient was found to have a positive sputum culture for Pseudomonas aeruginosa.  This microbe was present on previous sputum cultures done during other hospitalizations.  As such, it was not clear whether she was colonized with this micro.  As such, this concern was discussed with pulmonology who recommended treatment for 14 total days.  The patient was initially placed on IV cefepime prior to being transitioned to Levaquin.  She did have an episode of hypoxemia with increased oxygen demand on 4/13 for which the overnight nocturnist transition the patient back to IV cefepime.    On 4/12, the patient reported that she felt like it was  so her abdomen was filling up with fluid.  Of note, she says that she never gets fluid in her lower extremities.  When this happens, she does take a diuretic.  As such, diuretic was ordered.  She will need daily evaluations to assess ongoing need to continue diuretic.    This patient's case were discussed with palliative care.  Palliative care has discussed the goals of care with the patient which includes remaining DNR/DNI.  Additionally, patient is requesting to discharge to a group home as she does not feel safe at her current residence.  She says that she is concerned for heat in the center as it is too hot in her apartment.  Additionally, she was concerned about a broken sewage system.  She thinks that her current apartment is making her sick.  The patient is requesting a hospice care upon discharge.      Patient has been slow to recover on the floor.  Patient is doing well.  Patient was seen and examined today she is afebrile her vitals are stable patient is stable for transfer to skilled nursing facility at this time.    Therefore, she is discharged in fair and stable condition to skilled nursing facility.    The patient met 2-midnight criteria for an inpatient stay at the time of discharge.    Discharge Date  4/16/2025    FOLLOW UP ITEMS POST DISCHARGE  PCP    DISCHARGE DIAGNOSES  Principal Problem:    Stage 4 very severe COPD by GOLD classification (HCC) (Chronic) (POA: Yes)  Active Problems:    AF (atrial fibrillation) (HCC) (POA: Yes)    Hypertension (POA: Yes)    GERD (gastroesophageal reflux disease) (POA: Yes)    Type 2 diabetes mellitus with hyperglycemia, without long-term current use of insulin (HCC) (POA: Yes)    Community acquired bacterial pneumonia (POA: Unknown)  Resolved Problems:    Acute on chronic respiratory failure with hypoxia (HCC) (POA: Yes)    Leukocytosis (POA: Yes)      FOLLOW UP  Future Appointments   Date Time Provider Department Center   4/16/2025 10:30 AM Hiral Wise R.N.  [facility] None   4/16/2025  2:00 PM Darling Dennis R.N. [facility] None   7/1/2025 10:00 AM [address]   7/3/2025 12:50 PM TOREY Bridges [facility] None   7/4/2025 10:00 AM Israel Grimm M.D. [facility] None     [facility]  [address]  [facility] [address]  [phone]        [facility]  [address]  [facility] [address]  [phone]          MEDICATIONS ON DISCHARGE     Medication List        START taking these medications        Instructions   guaiFENesin dextromethorphan 100-10 MG/5ML Syrp syrup  Commonly known as: Robitussin DM   Take 10 mL by mouth every 6 hours as needed for Cough for up to 14 days.  Dose: 10 mL     levalbuterol 45 MCG/ACT inhaler  Commonly known as: Xopenex HFA   Inhale 1-2 Puffs every four hours as needed for Shortness of Breath for up to 90 days.  Dose: 1-2 Puff     morphine 15 MG tablet  Commonly known as: MS IR   Take 1 Tablet by mouth every 12 hours for 5 days.  Dose: 15 mg     polyethylene glycol/lytes Pack  Commonly known as: Miralax   Mix 1 Packet per package instructions and drink by mouth every day for 5 days.  Dose: 17 g     senna-docusate 8.6-50 MG Tabs  Commonly known as: Pericolace Or Senokot S   Take 1 Tablet by mouth every day for 5 days.  Dose: 1 Tablet            CHANGE how you take these medications        Instructions   meloxicam 7.5 MG Tabs  What changed:   how much to take  when to take this  Commonly known as: Mobic   Take 1 tablet by mouth once or twice a day     * prednisONE 2.5 MG Tabs  What changed: medication strength  Commonly known as: Deltasone   Take 2 Tablets by mouth every day for 90 days.  Dose: 5 mg           * This list has 1 medication(s) that are the same as other medications prescribed for you. Read the directions carefully, and ask your doctor or other care provider to review them with you.                CONTINUE taking these medications        Instructions   acetaminophen 500 MG  Tabs  Commonly known as: Tylenol   Take 500-1,000 mg by mouth every 6 hours as needed. Indications: Pain  Dose: 500-1,000 mg     acyclovir 200 MG Caps  Commonly known as: Zovirax   Take 1 Capsule by mouth every day. Patient to take each day to prevent HSV outbreak.  Dose: 200 mg     amLODIPine 5 MG Tabs  Commonly known as: Norvasc   Take 1 Tablet by mouth every day.  Dose: 5 mg     azithromycin 250 MG Tabs  Commonly known as: Zithromax   Take 250 mg by mouth every day.  Dose: 250 mg     benzonatate 100 MG Caps  Commonly known as: Tessalon   Take 1 Capsule by mouth every 8 hours as needed for cough.  Dose: 100 mg     Breztri Aerosphere 160-9-4.8 MCG/ACT Aero  Generic drug: Budeson-Glycopyrrol-Formoterol   Inhale 2 Inhalations by mouth 2 times a day.     CALCIUM 500 PO   Take 1 Tablet by mouth every evening. Indications: supplement  Dose: 1 Tablet     diclofenac sodium 1 % Gel  Commonly known as: Voltaren   Apply to back, and joints every 12 hours as needed for pain     digoxin 125 MCG Tabs  Commonly known as: Lanoxin   Take 1 Tablet by mouth every day. Indications: Atrial Fibrillation  Dose: 125 mcg     DULoxetine 60 MG Cpep delayed-release capsule  Commonly known as: Cymbalta   Take 1 capsule by mouth once daily  Dose: 60 mg     HM Fexofenadine HCl 180 MG tablet  Generic drug: fexofenadine   Take 180 mg by mouth every evening. Indications: allergies  Dose: 180 mg     ipratropium-albuterol 0.5-2.5 (3) MG/3ML nebulizer solution  Commonly known as: William   Doctor's comments: 3 month supply x 1 year  Take 3 mL by nebulization every four hours as needed for Shortness of Breath (Wheezing).  Dose: 3 mL     losartan 50 MG Tabs  Commonly known as: Cozaar   Take 1 Tablet by mouth 2 times a day. Indications: High Blood Pressure  Dose: 50 mg     montelukast 10 MG Tabs  Commonly known as: Singulair   Take 1 tablet by mouth once daily  Dose: 10 mg     omeprazole 20 MG delayed-release capsule  Commonly known as: PriLOSEC   Take 1  capsule by mouth every day  Dose: 20 mg     sodium chloride 0.65 % Soln  Commonly known as: Ocean   Administer 2 Sprays into affected nostril(S) every 2 hours as needed for Congestion.  Dose: 2 Spray     spironolactone 25 MG Tabs  Commonly known as: Aldactone   Take 1 Tablet by mouth every day.  Dose: 25 mg     tizanidine 4 MG Tabs  Commonly known as: Zanaflex   Take 4 mg by mouth 3 times a day as needed (muscle spasms). Indications: Musculoskeletal Pain  Dose: 4 mg     traMADol 50 MG Tabs  Commonly known as: Ultram   Take 1 Tablet by mouth 2 (two) times daily as needed for pain Start date 03/25/2025 refill must last 30 days Indications: disorder characterized by stiff, tender & painful muscles, neuropathic pain     Tylenol PM Extra Strength 500-25 MG Tabs  Generic drug: diphenhydrAMINE-APAP (sleep)   Take 2 Tablets by mouth at bedtime as needed (insomnia). Indications: insomnia  Dose: 2 Tablet            STOP taking these medications      potassium chloride 10 MEQ capsule  Commonly known as: Micro-K     ProAir RespiClick 108 (90 Base) MCG/ACT Aepb  Generic drug: Albuterol Sulfate            ASK your doctor about these medications        Instructions   * predniSONE 20 MG Tabs  Commonly known as: Deltasone  Ask about: Should I take this medication?   Take 2 Tablets by mouth every day for 1 day.  Dose: 40 mg           * This list has 1 medication(s) that are the same as other medications prescribed for you. Read the directions carefully, and ask your doctor or other care provider to review them with you.                  Allergies  Allergies   Allergen Reactions    Iodine      Convulsion  I.V.  iodine    Tape Rash and Itching       DIET  Orders Placed This Encounter   Procedures    Diet Order Diet: Consistent CHO (Diabetic)     Standing Status:   Standing     Number of Occurrences:   1     Diet::   Consistent CHO (Diabetic) [4]       ACTIVITY  As tolerated.  Weight bearing as tolerated    CONSULTATIONS  Pulmonary,  palliative    PROCEDURES  None.    LABORATORY  Lab Results   Component Value Date    SODIUM 134 (L) 04/15/2025    POTASSIUM 4.0 04/15/2025    CHLORIDE 85 (L) 04/15/2025    CO2 40 (H) 04/15/2025    GLUCOSE 195 (H) 04/15/2025    BUN 29 (H) 04/15/2025    CREATININE 0.44 (L) 04/15/2025    CREATININE 0.62 06/09/2011        Lab Results   Component Value Date    WBC 17.6 (H) 04/15/2025    WBC 10.9 (H) 06/09/2011    HEMOGLOBIN 12.4 04/15/2025    HEMATOCRIT 37.7 04/15/2025    PLATELETCT 551 (H) 04/15/2025        Total time of the discharge process exceeds 45 minutes.

## 2025-04-17 ENCOUNTER — HOME CARE VISIT (OUTPATIENT)
Dept: HOSPICE | Facility: HOSPICE | Age: 80
End: 2025-04-17
Payer: MEDICARE

## 2025-04-17 ENCOUNTER — PHARMACY VISIT (OUTPATIENT)
Dept: PHARMACY | Facility: MEDICAL CENTER | Age: 80
End: 2025-04-17
Payer: COMMERCIAL

## 2025-04-17 VITALS — RESPIRATION RATE: 18 BRPM

## 2025-04-17 DIAGNOSIS — J44.9 END STAGE COPD (HCC): ICD-10-CM

## 2025-04-17 DIAGNOSIS — Z51.5 HOSPICE CARE: ICD-10-CM

## 2025-04-17 PROCEDURE — RXMED WILLOW AMBULATORY MEDICATION CHARGE: Performed by: REHABILITATION PRACTITIONER

## 2025-04-17 PROCEDURE — S9126 HOSPICE CARE, IN THE HOME, P: HCPCS

## 2025-04-17 PROCEDURE — 665035 HSPC ROOM AND BOARD PER DIEM

## 2025-04-17 RX ORDER — LORAZEPAM 2 MG/ML
2 CONCENTRATE ORAL
Qty: 360 ML | Refills: 0 | Status: SHIPPED | OUTPATIENT
Start: 2025-04-17 | End: 2026-04-17

## 2025-04-17 RX ORDER — INSULIN LISPRO 100 [IU]/ML
1-6 INJECTION, SOLUTION INTRAVENOUS; SUBCUTANEOUS
Qty: 10 ML | Refills: 6 | Status: SHIPPED | OUTPATIENT
Start: 2025-04-17 | End: 2026-04-17

## 2025-04-17 RX ORDER — LORAZEPAM 1 MG/1
1 TABLET ORAL 4 TIMES DAILY
Qty: 360 TABLET | Refills: 0 | Status: SHIPPED | OUTPATIENT
Start: 2025-04-17 | End: 2026-04-17

## 2025-04-17 ASSESSMENT — ACTIVITIES OF DAILY LIVING (ADL)
AMBULATION_REQUIRES_ASSISTANCE: 1
DRESSING_REQUIRES_ASSISTANCE: 1
SHOPPING_REQUIRES_ASSISTANCE: 1
BATHING_REQUIRES_ASSISTANCE: 1
PHYSICAL_TRANSFER_REQUIRES_ASSISTANCE: 1
GROOMING_REQUIRES_ASSISTANCE: 1
TOILETING_REQUIRES_ASSISTANCE: 1
LAUNDRY_REQUIRES_ASSISTANCE: 1

## 2025-04-17 ASSESSMENT — ENCOUNTER SYMPTOMS
SLEEP QUALITY: FAIR
DENIES PAIN: 1

## 2025-04-17 ASSESSMENT — SOCIAL DETERMINANTS OF HEALTH (SDOH)
ACTIVE STRESSOR - HEALTH CHANGES: 1
ACTIVE STRESSOR - HEALTH CHANGES: 1
FINANCIAL_CONCERNS: 1
EXPRESSED_FINANCIAL_NEED: 1
ACTIVE STRESSOR - LOSS OF CONTROL: 1

## 2025-04-17 ASSESSMENT — PATIENT HEALTH QUESTIONNAIRE - PHQ9: CLINICAL INTERPRETATION OF PHQ2 SCORE: 0

## 2025-04-18 ENCOUNTER — HOME CARE VISIT (OUTPATIENT)
Dept: HOSPICE | Facility: HOSPICE | Age: 80
End: 2025-04-18
Payer: MEDICARE

## 2025-04-18 VITALS — HEART RATE: 84 BPM | RESPIRATION RATE: 16 BRPM

## 2025-04-18 PROCEDURE — 665035 HSPC ROOM AND BOARD PER DIEM

## 2025-04-18 PROCEDURE — S9126 HOSPICE CARE, IN THE HOME, P: HCPCS

## 2025-04-18 SDOH — ECONOMIC STABILITY: HOUSING INSECURITY: EVIDENCE OF SMOKING MATERIAL: 0

## 2025-04-18 ASSESSMENT — ENCOUNTER SYMPTOMS
MUSCLE WEAKNESS: 1
SHORTNESS OF BREATH: 1
DYSPNEA ACTIVITY LEVEL: AFTER AMBULATING LESS THAN 10 FT
DYSPNEA ON EXERTION: 1
FATIGUES EASILY: 1
DENIES PAIN: 1

## 2025-04-18 ASSESSMENT — ACTIVITIES OF DAILY LIVING (ADL): AMBULATION ASSISTANCE: ONE PERSON

## 2025-04-18 ASSESSMENT — SOCIAL DETERMINANTS OF HEALTH (SDOH): ACTIVE STRESSOR - LOSS OF CONTROL: 1

## 2025-04-19 PROCEDURE — 665035 HSPC ROOM AND BOARD PER DIEM

## 2025-04-19 PROCEDURE — S9126 HOSPICE CARE, IN THE HOME, P: HCPCS

## 2025-04-20 PROCEDURE — 665035 HSPC ROOM AND BOARD PER DIEM

## 2025-04-20 PROCEDURE — S9126 HOSPICE CARE, IN THE HOME, P: HCPCS

## 2025-04-21 ENCOUNTER — HOME CARE VISIT (OUTPATIENT)
Dept: HOSPICE | Facility: HOSPICE | Age: 80
End: 2025-04-21
Payer: MEDICARE

## 2025-04-21 PROCEDURE — S9126 HOSPICE CARE, IN THE HOME, P: HCPCS

## 2025-04-21 PROCEDURE — 665035 HSPC ROOM AND BOARD PER DIEM

## 2025-04-21 ASSESSMENT — ACTIVITIES OF DAILY LIVING (ADL)
LAUNDRY_REQUIRES_ASSISTANCE: 1
BATHING_REQUIRES_ASSISTANCE: 1
SHOPPING_REQUIRES_ASSISTANCE: 1

## 2025-04-21 ASSESSMENT — ENCOUNTER SYMPTOMS: SLEEP QUALITY: FAIR

## 2025-04-21 ASSESSMENT — PATIENT HEALTH QUESTIONNAIRE - PHQ9: CLINICAL INTERPRETATION OF PHQ2 SCORE: 0

## 2025-04-21 ASSESSMENT — SOCIAL DETERMINANTS OF HEALTH (SDOH)
ACTIVE STRESSOR - LOSS OF CONTROL: 1
ACTIVE STRESSOR - EXPRESSED EMOTIONAL NEED: 1

## 2025-04-22 PROCEDURE — 665035 HSPC ROOM AND BOARD PER DIEM

## 2025-04-22 PROCEDURE — S9126 HOSPICE CARE, IN THE HOME, P: HCPCS

## 2025-04-23 PROCEDURE — S9126 HOSPICE CARE, IN THE HOME, P: HCPCS

## 2025-04-23 PROCEDURE — 665035 HSPC ROOM AND BOARD PER DIEM

## 2025-04-24 ENCOUNTER — HOME CARE VISIT (OUTPATIENT)
Dept: HOSPICE | Facility: HOSPICE | Age: 80
End: 2025-04-24
Payer: MEDICARE

## 2025-04-24 PROCEDURE — S9126 HOSPICE CARE, IN THE HOME, P: HCPCS

## 2025-04-24 PROCEDURE — 665035 HSPC ROOM AND BOARD PER DIEM

## 2025-04-25 PROCEDURE — 665035 HSPC ROOM AND BOARD PER DIEM

## 2025-04-25 PROCEDURE — S9126 HOSPICE CARE, IN THE HOME, P: HCPCS

## 2025-04-26 PROCEDURE — S9126 HOSPICE CARE, IN THE HOME, P: HCPCS

## 2025-04-26 PROCEDURE — 665035 HSPC ROOM AND BOARD PER DIEM

## 2025-04-27 PROCEDURE — S9126 HOSPICE CARE, IN THE HOME, P: HCPCS

## 2025-04-27 PROCEDURE — 665035 HSPC ROOM AND BOARD PER DIEM

## 2025-04-28 PROCEDURE — 665035 HSPC ROOM AND BOARD PER DIEM

## 2025-04-28 PROCEDURE — S9126 HOSPICE CARE, IN THE HOME, P: HCPCS

## 2025-04-29 PROCEDURE — 665035 HSPC ROOM AND BOARD PER DIEM

## 2025-04-29 PROCEDURE — S9126 HOSPICE CARE, IN THE HOME, P: HCPCS

## 2025-04-30 PROCEDURE — 665035 HSPC ROOM AND BOARD PER DIEM

## 2025-04-30 PROCEDURE — S9126 HOSPICE CARE, IN THE HOME, P: HCPCS

## 2025-06-13 NOTE — Clinical Note
[facility]  [address]  [place] [address]    [id number]    Berny Renee  [address] [address]  [place] [address]    June 13, 2025    Member Name: Berny Renee   Member Number: [id number]   Reference Number: [id number]   Approved Services: MRI/CAT Scan   Approved Service Dates: 06/13/2025 - 10/11/2025   Requesting Provider: Israel Grimm   Requested Provider: [facility]     Dear Berny Renee:    The following medical service(s) requested by Israel Grimm have been approved:    Procedure Code Procedure Code Name Requested Quantity Approved Quantity Status   25360 (CPT®) CHG DIAGNOSTIC COMPUTED TOMOGRAPHY THORAX W/O CNTRST 1 1 Authorized       Approved Quantity means the number of visits approved for medication treatments and/or medical services.    The services should be provided by [facility] no later than 10/11/2025. Please contact the provider listed below with any questions.     Provider Information:  [facility]  [phone]    Your plan benefit may require a deductible, co-payment or coinsurance for these services. This authorization does not guarantee [facility] will pay the claim for services that you receive. Payment by [facility] for these services is subject to the terms of your Evidence of Coverage, your eligibility at the time of service, and confirmation of benefit coverage.    For any questions or additional information, please contact Customer Service:    [facility] Toll Free: [phone]  TTY users dial: [phone]   Call Center Hours:  Oct 1 - Mar 31, Mon - Fri 7 AM to 8 PM PST  Oct 1 - Mar 31, Sat - Sun 8 AM to 8 PM PST  Apr 1 - Sep 30, Mon - Fri 7 AM to 8 PM PST   Office Hours: Mon - Fri 8 AM to 5 PM PST   E-mail: [email or web address]   Website:  [email or web address]      This information is available for free in other languages. Please contact Customer Service at the phone  number above for more information. [facility] complies with applicable Federal civil rights laws and does not discriminate on the basis of race, color, national origin, age, disability or sex.    Sincerely,     Healthcare Utilization Management Department     Cc: [facility]   Israel Grimm    Multi-Language Insert  Multi- Services  English: We have free  services to answer any questions you may have about our health or drug plan.  To get an , just call us at [phone].  Someone who speaks English/Language can help you.  This is a free service.  Kyrgyz: Tenemos servicios de intérprete sin costo alguno  para responder cualquier pregunta que pueda tener sobre nuestro plan de didi o medicamentos. Para hablar con un intérprete, por favor llame al [phone]. Alguien que hable español le podrá ayudar. Demi es un servicio gratuito.  Chinese Mandarin: ?????????????????????????????? ???????????????? [phone]????????????????? ?????????  Chinese Cantonese: ?????????????????????????????? ????????????? [phone]???????????????????? ????????  Tagalog:  Noah alex serbisyo sa mcarthursasaling-faiza freeman hinggil sa nicci rodriguez o panggamot.  rBad townsend tagasalingchan hernández sa [phone]. Lashanda townsend Tagalog.  Igor haas.  Marshallese:  Nous proposons lauro services gratuits d'interprétation pour répondre à toutes luis antonio questions relatives à notre régime de santé ou d'assurance-médicaments. Pour accéder au service d'interprétation, il vous suffit de nous appeler au [phone]. Un interlocuteur parlant Français pourra vous aider. Ce service est gratuit.  Grenadian:  Zoe cardenas có d?ch v? thông d?ch mi?n phí ð? tr? l?i các câu h?i v? chýõng s?c kh?e và chýõng trìn thu?c men. N?u quí v? c?n thông d?ch viên mynor  g?i [phone] s? có nhân viên nói ti?ng Vi?t giúp ð? quí v?. Ðây là d?ch v? mi?n phí .  Spanish:  Unser kostenser Dolmetscherservice beantwortet Ihren Fragen zu unserem Gesundheits- und Arzneimittelplan. Unsere Dolmetscher erreichen Sie [phone]. Man wird Ihnen deidre auf [facility]. Dieser Service ist [facility].  Faroese:  ??? ?? ?? ?? ?? ??? ?? ??? ?? ???? ?? ?? ???? ???? ????. ?? ???? ????? ?? [phone] ??? ??? ????.  ???? ?? ???? ?? ?? ????. ? ???? ??? ?????.   Congolese: Åñëè ó âàñ âîçíèêíóò âîïðîñû îòíîñèòåëüíî ñòðàõîâîãî èëè ìåäèêàìåíòíîãî ïëàíà, âû ìîæåòå âîñïîëüçîâàòüñÿ íàøèìè áåñïëàòíûìè óñëóãàìè ïåðåâîä÷èêîâ. ×òîáû âîñïîëüçîâàòüñÿ óñëóãàìè ïåðåâîä÷èêà, ïîçâîíèòå íàì ïî òåëåôîíó [phone]. Âàì îêàæåò ïîìîùü ñîòðóäíèê, êîòîðûé ãîâîðèò ïî-póññêè. Äàííàÿ óñëóãà áåñïëàòíàÿ.  Japanese: ÅääÇ äÞÏã ÎÏãÇÊ ÇáãÊÑÌã ÇáÝæÑí ÇáãÌÇäíÉ ááÅÌÇÈÉ Úä Ãí ÃÓÆáÉ ÊÊÚáÞ ÈÇáÕÍÉ Ãæ ÌÏæá ÇáÃÏæíÉ áÏíäÇ. ááÍÕæá Úáì ãÊÑÌã ÝæÑí¡ áíÓ Úáíß Óæì ÇáÇÊÕÇá ÈäÇ Úáì [phone] . ÓíÞæã ÔÎÕ ãÇ íÊÍÏË ÇáÚÑÈíÉ ÈãÓÇÚÏÊß. åÐå ÎÏãÉ ãÌÇäíÉ.  Gerardo: ????? ????????? ?? ??? ?? ????? ?? ???? ??? ???? ???? ?? ?????? ?? ???? ???? ?? ??? ????? ??? ????? ???????? ?????? ?????? ???. ?? ???????? ??????? ???? ?? ???, ?? ???? [phone] ?? ??? ????. ??? ??????? ?? ?????? ????? ?? ???? ??? ?? ???? ??. ?? ?? ????? ???? ??.   Ukrainian:  È disponibile un servizio di interpretariato gratuito per rispondere a eventuali domande sul nostro piano sanitario e farmaceutico. Per un interprete, contattare il bennett [phone]. Un nostro incaricato ana parla Italianovi fornirà l'assistenza necessaria. È un servizio gratuito.  Portugués:  Dispomos de serviços de interpretação gratuitos para responder a qualquer questão que tenha acerca do nosso plano de saúde ou de medicação. Para obter um intérprete, contacte-nos através do número [phone]. Irá encontrar alguém que fale o idioma  Português para o ajudar. Demi serviço é gratuito.  Scottish  Creole:  Nou genyen sèvis entèprèt gratis gentry reponn tout kesyon ou ta genyen konsènan plan medikal oswa dwòg nou an.  Gentry jwenn yon entèprèt, jis rele nou nan [phone]. Yon moun ki pale Kreyòl kapab luis f w.  Sa a se yon sèvis ki gratis.  Polish:  Umo¿liwiamy bezp³atne skorzystanie z us³ug t³umacza ustnego, który pomo¿e w uzyskaniu odpowiedzi na temat planu zdrowotnego lub dawkowania leków. Faby skorzystaæ z pomocy t³umacza znaj¹cego cy bautista¿y zadzwoniæ pod numer [phone]. Ta us³uga jest bezp³atna.  Austrian: ????? ??????? ????????????????????? ??????????????????????????????????[phone] ???????????????? ? ????????????????? ?????

## 2025-06-16 ENCOUNTER — HOME HEALTH ADMISSION (OUTPATIENT)
Dept: HOME HEALTH SERVICES | Facility: HOME HEALTHCARE | Age: 80
End: 2025-06-16
Payer: MEDICARE

## 2025-06-18 NOTE — PROGRESS NOTES
Department of Anesthesiology  Preprocedure Note       Name:  Hilary Merida   Age:  54 y.o.  :  1971                                          MRN:  [id number]         Date:  2025      Surgeon: Surgeon(s):  Cristofer Galloway DO Mostafa, Hesham, MD    Procedure: Procedure(s):  Laparoscopic robotic assisted colostomy reversal  Cystoscopy Bilateral Ureteral Indocyanine Green instillation    Medications prior to admission:   Prior to Admission medications    Medication Sig Start Date End Date Taking? Authorizing Provider   erythromycin base (E-MYCIN) 500 MG tablet Take 2 tabs by mouth 19 hours, 18 hours, and 11 hours prior to surgery. 25  Yes Cristofer Galloway DO   neomycin (MYCIFRADIN) 500 MG tablet Take 2 tabs by mouth 19 hours, 18 hours, and 11 hours prior to surgery. 25  Yes Cristofer Galloway DO   polyethylene glycol (GOLYTELY) 236 g solution Mix as directed. At 4pm the day prior to your procedure, drink an 8oz glass every 10 minutes until gone. 25  Yes Cristofer Galloway DO   bisacodyl 5 MG EC tablet Take 2 tabs by mouth at noon the day prior to your procedure. 25  Yes Cristofer Galloway DO   pravastatin (PRAVACHOL) 20 MG tablet Take 1 tablet by mouth daily 23  Yes Vickey Patino APRN - CNP   acetaminophen (TYLENOL) 325 MG tablet Take 2 tablets by mouth every 6 hours as needed for Pain  Patient not taking: Reported on 2025    Provider, MD Timothy       Current medications:    Current Facility-Administered Medications   Medication Dose Route Frequency Provider Last Rate Last Admin   • lactated ringers infusion   IntraVENous Continuous Cristofer Galloway DO       • sodium chloride flush 0.9 % injection 5-40 mL  5-40 mL IntraVENous 2 times per day Cristofer Galloway DO       • sodium chloride flush 0.9 % injection 5-40 mL  5-40 mL IntraVENous PRN Cristofer Galloway DO       • 0.9 % sodium chloride infusion   IntraVENous PRN Cristofer Galloway DO       • ceFAZolin  Hospital Medicine Daily Progress Note    Date of Service  4/9/2025    Chief Complaint  Berny Renee is a 79 y.o. 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  She takes 8 L of oxygen and baseline.    In the emergency department she was reporting increased cough and shortness of breath.  She was provided IV steroids and DuoNeb.  There is no evidence of sepsis per the ERP assessment.  As such, no antibiotics were initiated.  BiPAP was not indicated.  ERP reported improvement in patient's clinical appearance after steroids and nebulizer treatment.  ERP requested admission to hospital service for hypoxia.    Interval Problem Update  Discharge summary from January 2025 reviewed.  The patient's sputum culture was found to be positive for Pseudomonas.  Zosyn was considered however ultimately not started at this time as there is no fever nor elevated procalcitonin.  This concern will be closely monitored.    Patient seen and evaluated at bedside  Pertinent labs and imaging reviewed  WBC 14.7, 15.4  H&H stable  Potassium 3.7, replaced  Point-of-care blood glucose 216, 208 on IV steroids  On correctional insulin  Respiratory viral panel negative  Afebrile, hemodynamically stable, 8L oxygen requirements  Voiding, stooling, tolerating oral intake well  Previous bowel movement 4/8  Sputum culture ordered  Azithromycin ordered  Procalcitonin negative  Will closely monitor point-of-care blood glucose again insulin usage to to titrate insulin dosing  Discontinue IV Solu-Medrol  Start oral prednisone for total of 5 days  Patient requesting skilled nursing versus assisted living placement at discharge    I have discussed this patient's plan of care and discharge plan at IDT rounds today with Case Management, Nursing, Nursing leadership, and other members of the IDT  team.    Consultants/Specialty  Pulm    Code Status  DNAR/DNI    Disposition  The patient is not medically cleared for discharge to home or a post-acute facility.      I have placed the appropriate orders for post-discharge needs.    Review of Systems  Review of Systems   Respiratory:  Positive for cough, sputum production and shortness of breath.         Physical Exam  Temp:  [36.6 °C (97.8 °F)] 36.6 °C (97.8 °F)  Pulse:  [66-95] 87  Resp:  [18-20] 18  BP: (125-168)/(56-81) 168/65  SpO2:  [90 %-94 %] 91 %    Physical Exam  Constitutional:       General: She is not in acute distress.     Appearance: Normal appearance. She is normal weight.   HENT:      Head: Normocephalic and atraumatic.      Nose: Nose normal.   Eyes:      Extraocular Movements: Extraocular movements intact.   Pulmonary:      Effort: Prolonged expiration and respiratory distress present.      Breath sounds: Decreased breath sounds present.   Musculoskeletal:      Cervical back: Normal range of motion.      Right lower leg: No edema.      Left lower leg: No edema.   Skin:     General: Skin is dry.   Neurological:      General: No focal deficit present.      Mental Status: She is alert and oriented to person, place, and time.   Psychiatric:         Mood and Affect: Mood normal.         Behavior: Behavior normal.         Thought Content: Thought content normal.         Fluids    Intake/Output Summary (Last 24 hours) at 4/9/2025 2034  Last data filed at 4/9/2025 0835  Gross per 24 hour   Intake 240 ml   Output --   Net 240 ml        Laboratory  Recent Labs     04/08/25  1325   WBC 15.4*   RBC 4.25   HEMOGLOBIN 12.4   HEMATOCRIT 38.3   MCV 90.1   MCH 29.2   MCHC 32.4   RDW 44.8   PLATELETCT 417   MPV 9.1     Recent Labs     04/08/25  1325   SODIUM 134*   POTASSIUM 3.7   CHLORIDE 92*   CO2 30   GLUCOSE 180*   BUN 13   CREATININE 0.43*   CALCIUM 9.5                   Imaging  DX-CHEST-PORTABLE (1 VIEW)   Final Result      Chronic interstitial changes  again noted. A possible small new ill-defined opacity in the right midlung could represent a small infiltrate. Recommend follow-up after treatment.           Assessment/Plan  * Stage 4 very severe COPD by GOLD classification ([facility])- (present on admission)  Assessment & Plan  DNR/DNI  Discontinue Solu-Medrol  Start oral prednisone  Start azithromycin  Continue Trelegy Ellipta  Continue montelukast   Continue DuoNebs  Provide supplemental oxygen  Wean as able to baseline  Continuous pulse ox monitoring  Appreciate RT interventions    Type 2 diabetes mellitus with hyperglycemia, without long-term current use of insulin ([facility])- (present on admission)  Assessment & Plan  Start insulin sliding scale  Adjust as needed    GERD (gastroesophageal reflux disease)- (present on admission)  Assessment & Plan  Continue home PPI    Leukocytosis- (present on admission)  Assessment & Plan  Significant leukocytosis, likely reactive  S/p IV Solu-Medrol administration, on oral prednisone now  Monitor for fevers  Negative procalcitonin  Repeat CBC in the morning    Acute on chronic respiratory failure with hypoxia ([facility])- (present on admission)  Assessment & Plan  Patient does have end-stage COPD  See #stage IV very severe COPD by Gold classification    Hypertension- (present on admission)  Assessment & Plan  Continue home amlodipine and losartan  Start as needed hydralazine  Adjust as needed    AF (atrial fibrillation) ([facility])- (present on admission)  Assessment & Plan  Currently in sinus rhythm, continue home digoxin  Patient is not on full anticoagulation, will not start at this time  Per chart review, patient's outpatient cardiologist recommended against anticoagulation         VTE prophylaxis: Xarelto    I have performed a physical exam and reviewed and updated ROS and Plan today (4/9/2025). In review of yesterday's note (4/8/2025), there are no changes except as documented above.

## 2025-07-01 ENCOUNTER — HOSPITAL ENCOUNTER (OUTPATIENT)
Dept: RADIOLOGY | Facility: MEDICAL CENTER | Age: 80
End: 2025-07-01
Attending: RADIOLOGY
Payer: MEDICARE

## 2025-07-01 DIAGNOSIS — C34.12 PRIMARY CANCER OF LEFT UPPER LOBE OF LUNG (HCC): ICD-10-CM

## 2025-07-01 PROCEDURE — 71250 CT THORAX DX C-: CPT

## 2025-07-02 ENCOUNTER — HOSPITAL ENCOUNTER (OUTPATIENT)
Dept: RADIATION ONCOLOGY | Facility: MEDICAL CENTER | Age: 80
End: 2025-07-02
Attending: RADIOLOGY
Payer: MEDICARE

## 2025-07-02 DIAGNOSIS — C34.12 PRIMARY CANCER OF LEFT UPPER LOBE OF LUNG (HCC): Primary | Chronic | ICD-10-CM

## 2025-07-02 NOTE — PROGRESS NOTES
Reviewed patient's CT chest which shows no evidence of disease recurrence expected postradiation changes to the left upper lobe recommend repeat CT chest in 1 year with follow-up at that time.

## 2025-07-03 ENCOUNTER — OFFICE VISIT (OUTPATIENT)
Dept: SLEEP MEDICINE | Facility: MEDICAL CENTER | Age: 80
End: 2025-07-03
Attending: NURSE PRACTITIONER
Payer: MEDICARE

## 2025-07-03 VITALS
HEART RATE: 63 BPM | SYSTOLIC BLOOD PRESSURE: 116 MMHG | WEIGHT: 156 LBS | BODY MASS INDEX: 26.63 KG/M2 | OXYGEN SATURATION: 90 % | DIASTOLIC BLOOD PRESSURE: 64 MMHG | HEIGHT: 64 IN

## 2025-07-03 DIAGNOSIS — C34.12 PRIMARY CANCER OF LEFT UPPER LOBE OF LUNG (HCC): Chronic | ICD-10-CM

## 2025-07-03 DIAGNOSIS — A49.8 PSEUDOMONAS AERUGINOSA INFECTION: ICD-10-CM

## 2025-07-03 DIAGNOSIS — J44.9 STAGE 4 VERY SEVERE COPD BY GOLD CLASSIFICATION (HCC): Primary | Chronic | ICD-10-CM

## 2025-07-03 DIAGNOSIS — Z87.891 FORMER SMOKER: ICD-10-CM

## 2025-07-03 DIAGNOSIS — J96.11 CHRONIC RESPIRATORY FAILURE WITH HYPOXIA (HCC): Chronic | ICD-10-CM

## 2025-07-03 PROBLEM — J10.1 INFLUENZA A: Status: RESOLVED | Noted: 2025-01-16 | Resolved: 2025-07-03

## 2025-07-03 PROBLEM — J15.9 COMMUNITY ACQUIRED BACTERIAL PNEUMONIA: Status: RESOLVED | Noted: 2025-04-11 | Resolved: 2025-07-03

## 2025-07-03 PROCEDURE — 1158F ADVNC CARE PLAN TLK DOCD: CPT | Performed by: NURSE PRACTITIONER

## 2025-07-03 PROCEDURE — 3074F SYST BP LT 130 MM HG: CPT | Performed by: NURSE PRACTITIONER

## 2025-07-03 PROCEDURE — 3078F DIAST BP <80 MM HG: CPT | Performed by: NURSE PRACTITIONER

## 2025-07-03 PROCEDURE — 99214 OFFICE O/P EST MOD 30 MIN: CPT | Performed by: NURSE PRACTITIONER

## 2025-07-03 PROCEDURE — 99212 OFFICE O/P EST SF 10 MIN: CPT | Performed by: NURSE PRACTITIONER

## 2025-07-03 RX ORDER — FLUTICASONE FUROATE, UMECLIDINIUM BROMIDE AND VILANTEROL TRIFENATATE 200; 62.5; 25 UG/1; UG/1; UG/1
1 POWDER RESPIRATORY (INHALATION) DAILY
Qty: 180 EACH | Refills: 3 | Status: CANCELLED | OUTPATIENT
Start: 2025-07-03

## 2025-07-03 ASSESSMENT — FIBROSIS 4 INDEX: FIB4 SCORE: 0.76

## 2025-07-03 NOTE — PROGRESS NOTES
"Chief Complaint   Patient presents with    Follow-Up     Dx/Last seen:  Chronic respiratory failure with hypoxia 12/3/24 Mela Lundberg     Trial on Medications: Breztri    O2 or/and CPAP: 8LPM 24/7       HPI:  Berny Renee is a 79 y.o. year old female here today for follow-up on severe COPD with chronic respiratory failure.  History of lung cancer status post radiation and continue to be followed by radiation oncology.   Last OV 12/3/24.     HOSP 1/16/2025 SOB/influenza AECOPD 8LPM o2  HOSP 1/22/25 SOB/cough wanted SNF  Sputum +pseudomonas  HOSP 4/8/25 SOB/wheezing - IV cefepime for pseudomonas - hospice? SNF  4/2025 DC from hospice per patient request  Reviewed with patient.    CT chest 7/1/25:  1.  Stable exam. No evidence of disease recurrence.  2.  Stable emphysema, scarring, bronchiectasis, and small endobronchial filling defects.  3.  Stable small lung nodules.  4.  Coronary artery calcifications.  Reviewed with patient.    MMRC rdGrdrrdarddrderd:rd rd3rd Exacerbations this year: 3?    Currently prescribed Trelegy 200 mcgs 1 puff once daily, singulair qhs, DuoNebs/albuterol inhaler as needed.  PCP prescribed prednisone 20mg daily for quality of life.  Currently utilizing oxygen 3-6lpm dependent on activity.     Interval Events:  7/3/25: She is now living in assisted living facility [facility] under medical director.  She was removed off bronchodilators and using DuoNebs nebulizer 4 times a day only.  She is also placed on azithromycin 250 mcg 1 tablet daily and prednisone 20 mg daily.  She notes she is unable to tolerate any type of activity and her oxygen levels \"tank\" when she gets up to go to the bathroom.  She has some ongoing cough with clear/white phlegm.  She feels she is able to clear it easily.  No chest pain or chest tightness.  She does note wheezing if having to ambulate.  Her heart rate also goes up substantially.  12/3/24: Presents today for hospital follow-up discharge 11/24/2024 for COPD exacerbation with " community-acquired pneumonia and discharged home on antibiotics including doxycycline, Augmentin and prednisone taper.  CXR 11/21/24 increased moderate bilateral interstitial edema - repeat in 6 weeks is recommended.  Patient has impaired mobility uses scooter but using walker today - she needs O2 tank carrier for scooter.  She needs a new nebulizer - using 4x's per day.  Pulmonary rehab in 2021.  She notes continued to feel poorly but no fevers, chills, wheezing or rattling in the chest.  She is having regular cough with productive white/yellow phlegm and chest tightness with any type of activity even standing.  She lives alone but does have neighbors and friends that help her.  She has no family near her.  She has continued to lose weight with 10 pound weight loss since her October visit.     PULM HX:  Former smoker, quit 1999 with 60-pack-year history.  PFT 7/8/2021 indicated FEV1 0.89 L or 43%, ratio 45, TLC 95% DLCO 7% predicted.  CT chest 7/1/2024:  1.  Stable posttreatment changes and nodular scarring in the anterior left upper lobe.  2.  Stable right lower lobe and left upper lobe nodules.  3.  Increasing bilateral reticulonodular opacities most likely infectious or inflammatory in etiology.  4.  There is underlying emphysema.  ECHO 11/13/24:  Normal biventricular systolic functions, normal estimated LVEF 60%  Mild mitral annular calcifications  Mild aortic valve sclerosis without stenosis  Normal IVC size  No pericardial effusion  Unable to estimate RVSP    ROS: As per HPI and otherwise negative if not stated.    Past Medical History[1]    Past Surgical History[2]    Family History   Problem Relation Age of Onset    Cancer Father         Lung    Other Sister         lung and leukemia cancer    Cancer Sister         Leukemia, Lung       Social History     Socioeconomic History    Marital status:      Spouse name: Not on file    Number of children: Not on file    Years of education: Not on file     Highest education level: Not on file   Occupational History    Not on file   Tobacco Use    Smoking status: Former     Current packs/day: 0.00     Average packs/day: 2.0 packs/day for 30.0 years (60.0 ttl pk-yrs)     Types: Cigarettes     Start date: 3/1/1969     Quit date: 3/1/1999     Years since quittin.3    Smokeless tobacco: Never    Tobacco comments:     3 pks a day for 35 yrs, continued abstinence   Vaping Use    Vaping status: Never Used   Substance and Sexual Activity    Alcohol use: Not Currently     Alcohol/week: 4.2 oz     Types: 7 Glasses of wine per week    Drug use: Not Currently     Types: Marijuana, Oral     Comment: Medical Marijuana     Sexual activity: Never   Other Topics Concern    Not on file   Social History Narrative    ** Merged History Encounter **          Social Drivers of Health     Financial Resource Strain: Low Risk  (2025)    Overall Financial Resource Strain (CARDIA)     Difficulty of Paying Living Expenses: Not very hard   Food Insecurity: No Food Insecurity (2025)    Hunger Vital Sign     Worried About Running Out of Food in the Last Year: Never true     Ran Out of Food in the Last Year: Never true   Transportation Needs: No Transportation Needs (2025)    PRAPARE - Transportation     Lack of Transportation (Medical): No     Lack of Transportation (Non-Medical): No   Physical Activity: Inactive (2025)    Exercise Vital Sign     Days of Exercise per Week: 0 days     Minutes of Exercise per Session: 0 min   Stress: No Stress Concern Present (2025)    [employer] - Occupational Stress Questionnaire     Feeling of Stress : Not at all   Social Connections: Moderately Isolated (2025)    Social Connection and Isolation Panel [NHANES]     Frequency of Communication with Friends and Family: Three times a week     Frequency of Social Gatherings with Friends and Family: Twice a week     Attends Restorationism Services: More than 4  "times per year     Active Member of Clubs or Organizations: No     Attends Club or Organization Meetings: Never     Marital Status:    Intimate Partner Violence: Not At Risk (6/12/2025)    Humiliation, Afraid, Rape, and Kick questionnaire     Fear of Current or Ex-Partner: No     Emotionally Abused: No     Physically Abused: No     Sexually Abused: No   Housing Stability: Low Risk  (6/12/2025)    Housing Stability Vital Sign     Unable to Pay for Housing in the Last Year: No     Number of Times Moved in the Last Year: 0     Homeless in the Last Year: No       Allergies as of 07/03/2025 - Reviewed 07/03/2025   Allergen Reaction Noted    Iodine  08/09/2008    Tape Rash and Itching 06/22/2010        Vitals:  /64   Pulse 63   Ht 1.626 m (5' 4\")   Wt 70.8 kg (156 lb)   SpO2 90%     Current medications as of today Current Medications[3]      Physical Exam:   Gen:           Alert and oriented, No apparent distress. Mood and affect appropriate, normal interaction with examiner.  Eyes:          PERRL, EOM intact, sclere white, conjunctive moist.  Ears:          Not examined.  Hearing:     Grossly intact.  Nose:          Normal, no lesions or deformities.  Dentition:    Not examined.  Oropharynx:   Not examined.  Mallampati Classification: Not examined.  Neck:        Supple, trachea midline, no masses.  Respiratory Effort: No intercostal retractions or use of accessory muscles.   Lung Auscultation:      Diminished t/o; no rales, rhonchi or wheezing. Cough on exam.  CV:            Regular rate and rhythm. No murmurs, rubs or gallops.  Abd:           Not examined.  Lymphadenopathy: Not examined.  Gait and Station: wheelchair  Digits and Nails: No clubbing, cyanosis, petechiae, or nodes.   Cranial Nerves: II-XII grossly intact.  Skin:        No rashes, lesions or ulcers noted.               Ext:           No cyanosis or edema.      Assessment:  1. Stage 4 very severe COPD by GOLD classification (HCC)        2. " Chronic respiratory failure with hypoxia (HCC)        3. Primary cancer of left upper lobe of lung (HCC)        4. Pseudomonas aeruginosa infection        5. BMI 26.0-26.9,adult        6. Former smoker                 Immunizations:    Flu:recommend fal 2025  Pneumovax 23:2016  Prevnar 13:not due  PCV 20: 2024  COVID-19: 2024    Plan:  Patient has end-stage COPD which is not optimized at this time.  Recommend patient either restart Breztri inhaler or triple therapy nebulized with budesonide, arformoterol and RevaFenesin.  Azithromycin could be used prophylactically but patient ultimately is susceptible to infection with ongoing prednisone 20 mg daily and weight gain.  Ideally prednisone should be discontinued and tapered off at this point.  Patient will continue to benefit from oxygen 24/7.  She is utilizing an Oxymizer pendant today.  Follow-up with oncology as scheduled for surveillance imaging.  Patient is most likely colonized with Pseudomonas.  No significant symptoms of flare at this time.  Will continue to monitor but recommend ciprofloxacin use in the future if he gets worse.  Encourage patient to continue with sputum clearance and if it becomes difficult we will make adjustments in therapy.  Encourage healthy diet and patient would benefit from PT/OT if available assisted living.  Follow-up in 4 months to review respiratory symptoms, sooner if needed.    Please note that this dictation was created using voice recognition software. I have made every reasonable attempt to correct obvious errors, but it is possible there are errors of grammar and possibly content that I did not discover before finalizing the note.           [1]   Past Medical History:  Diagnosis Date    Acute on chronic respiratory failure with hypoxia (HCC) 11/14/2020    Arthritis     spine    Asthma with COPD (HCC)     BMI 31.0-31.9,adult 02/04/2022    Cataract immature     Chronic pain     Chronic respiratory failure with hypoxia (HCC)  07/13/2017    Colon polyps     COPD (chronic obstructive pulmonary disease) ([facility]) 2002    moderate to severe    Cough     DDD (degenerative disc disease), cervical     DDD (degenerative disc disease), thoracic     Dependence on continuous supplemental oxygen 08/13/2015    IMO load March 2020    Diverticulitis of colon     Dyslipidemia     EMPHYSEMA     H/O opioid abuse ([facility]) 07/15/2021    Hypertension     Obesity (BMI 30-39.9) 10/24/2016    Opioid type dependence, continuous ([facility]) 02/04/2022    REGINE (obstructive sleep apnea)     OSTEOPOROSIS     Painful breathing     Shortness of breath     Spinal stenosis, lumbar region, with neurogenic claudication     Sputum production     URINARY INCONTINENCE     Varicose veins of left leg with edema 10/11/2024    Vitamin d deficiency     Wheezing    [2]   Past Surgical History:  Procedure Laterality Date    LA UPPER GI ENDOSCOPY,BIOPSY N/A 11/15/2024    Procedure: GASTROSCOPY, WITH BIOPSY;  Surgeon: Mela Vargas M.D.;  Location: SURGERY SAME DAY [facility];  Service: Gastroenterology    COLONOSCOPY WITH POLYP N/A 11/15/2024    Procedure: COLONOSCOPY, WITH POLYPECTOMY;  Surgeon: Mela Vargas M.D.;  Location: SURGERY SAME DAY [facility];  Service: Gastroenterology    LUMBAR LAMINECTOMY DISKECTOMY  6/22/2010    Performed by GRACIE CANO at SURGERY [facility]    OTHER ORTHOPEDIC SURGERY  2004    left ankle fx    OTHER      leg fracture    OTHER      t spine disc surg    TONSILLECTOMY     [3]   Current Outpatient Medications   Medication Sig Dispense Refill    LORazepam (ATIVAN) 1 MG Tab Take 1 Tablet by mouth 4 times a day. In addition, may have one tab every 2 hours as needed for anxiety  Indications: Feeling Anxious 360 Tablet 0    LORazepam (ATIVAN) 2 MG/ML Conc Take 1 mL by mouth every 2 hours as needed (anxiety or shortness of breath despite morphine. When unable to take tablets). Indications: Feeling Anxious 360 mL 0    insulin lispro 100 UNIT/ML INJ Inject 1-6  Units under the skin 4 Times a Day,Before Meals and at Bedtime. For glucose:  = 0 units, 151-200 = 1 unit, 201-250 = 2 units, 251-300 = 3 units, 301-350 = 4 units, 351-400 = 5 units, over 400 = 6 units over 400 x 2 = 6 units call hospice  Indications: High Blood Sugar 10 mL 6    glucose (TRUEPLUS GLUCOSE) 4 GM chewable tablet Take 1 Tablet by mouth as needed (blood sugar < 70). Administer 15 grams of carbohydrate for blood sugar < 70. Recheck blood sugar every 15 minutes and repeat dosing as needed. 20 Tablet 11    acetaminophen (TYLENOL) 500 MG Tab Take 2 Tablets by mouth every 6 hours as needed for Mild Pain or Fever. 30 Tablet 10    acetaminophen (TYLENOL) 650 MG Suppos Insert 1 Suppository into the rectum every 6 hours as needed for Mild Pain or Fever (If unable to swallow tablets). 5 Suppository 10    bisacodyl (DULCOLAX) 10 MG Suppos Insert 1 Suppository into the rectum as needed (No BM (bowel movement) in 3 days.). Indications: Constipation 5 Suppository 10    morphine (ROXANOL) 20 MG/ML Solution Take 0.5 mL by mouth every 1 hour as needed (moderate to severe pain or shortness of breath). 240 mL 0    ondansetron (ZOFRAN ODT) 4 MG TABLET DISPERSIBLE Take 1 Tablet by mouth every 6 hours as needed for Nausea/Vomiting (nausea and/or vomiting). 15 Tablet 10    sennosides-docusate sodium (SENOKOT-S) 8.6-50 MG tablet Take 2 Tablets by mouth 2 times a day as needed (constipation. Hold for loose stool.). 28 Tablet 10    ipratropium-albuterol (DUONEB) 0.5-2.5 (3) MG/3ML nebulizer solution Take 3 mL by nebulization 4 times a day. Indications: Chronic Obstructive Lung Disease 180 mL 22    azithromycin (ZITHROMAX) 250 MG Tab Take 1 Tablet by mouth every day. Indications: COPD 30 Tablet 11    DULoxetine (CYMBALTA) 60 MG Cap DR Particles delayed-release capsule Take 1 Capsule by mouth every evening. Indications: Major Depressive Disorder 30 Capsule 11    furosemide (LASIX) 40 MG Tab Take 1 Tablet by mouth every  morning. Indications: Edema 30 Tablet 11    guaiFENesin dextromethorphan (ROBITUSSIN DM) 100-10 MG/5ML Syrup syrup Take 10 mL by mouth every four hours as needed for Cough. Indications: Cough 237 mL 11    predniSONE (DELTASONE) 20 MG Tab Take 1 Tablet by mouth every morning. Indications: COPD 30 Tablet 11    morphine ER (MS CONTIN) 30 MG Tab CR tablet Take 1 Tablet by mouth every 12 hours. Indications: Chronic Pain 120 Tablet 0    senna-docusate (PERICOLACE OR SENOKOT S) 8.6-50 MG Tab Take 1 Tablet by mouth every evening. Indications: Constipation 30 Tablet 11    montelukast (SINGULAIR) 10 MG Tab Take 1 tablet by mouth once daily 30 Tablet 11    benzonatate (TESSALON) 100 MG Cap Take 1 Capsule by mouth every 8 hours as needed for Cough. 50 Capsule 11    polyethylene glycol/lytes (MIRALAX) Pack Take 1 Packet by mouth every day. Indications: Constipation 360 Packet 0    fexofenadine (HM FEXOFENADINE HCL) 180 MG tablet Take 1 Tablet by mouth every evening. Indications: Hayfever, allergies 30 Tablet 11    sodium chloride (OCEAN) 0.65 % Solution Administer 2 Sprays into affected nostril(S) every 2 hours as needed for Congestion. Indications: Congestion 44 mL 3    traZODone (DESYREL) 50 MG Tab Take 1 Tablet by mouth every evening. Indications: Trouble Sleeping 30 Tablet 11    omeprazole (PRILOSEC) 20 MG delayed-release capsule Take 1 capsule by mouth every day 30 Capsule 11    acyclovir (ZOVIRAX) 200 MG Cap Take 1 Capsule by mouth every day. Patient to take each day to prevent HSV outbreak.  Indications: Herpes Simplex Infection 30 Capsule 11    spironolactone (ALDACTONE) 25 MG Tab Take 1 Tablet by mouth every day. Indications: Edema 30 Tablet 11    ipratropium-albuterol (DUONEB) 0.5-2.5 (3) MG/3ML nebulizer solution Take 3 mL by nebulization every four hours as needed for Shortness of Breath (Wheezing). 90 mL 11    tizanidine (ZANAFLEX) 4 MG Tab Take 1 Tablet by mouth 3 times a day as needed (muscle spasms). Indications:  Musculoskeletal Pain 30 Tablet 11    amLODIPine (NORVASC) 5 MG Tab Take 1 Tablet by mouth every day. Indications: High Blood Pressure 30 Tablet 11    losartan (COZAAR) 50 MG Tab Take 1 Tablet by mouth 2 times a day. Indications: High Blood Pressure 60 Tablet 11    Home Care Oxygen Inhale 8-10 L/min continuous. Indications: COPD exacerbation, End Stage COPD       No current facility-administered medications for this visit.

## (undated) DEVICE — ELECTRODE 850 FOAM ADHESIVE - HYDROGEL RADIOTRNSPRNT (50/PK)

## (undated) DEVICE — CAPTIVATOR II-15MM ROUND STIFF (40/BX)

## (undated) DEVICE — TRAP POLYP E-TRAP (25EA/BX)

## (undated) DEVICE — BUTTON ENDOSCOPY DISPOSABLE

## (undated) DEVICE — WATER IRRIGATION STERILE 1000ML (12EA/CA)

## (undated) DEVICE — TOWEL STOP TIMEOUT SAFETY FLAG (40EA/CA)

## (undated) DEVICE — TUBING CLEARLINK DUO-VENT - C-FLO (48EA/CA)

## (undated) DEVICE — SODIUM CHL IRRIGATION 0.9% 1000ML (12EA/CA)

## (undated) DEVICE — LACTATED RINGERS INJ 1000 ML - (14EA/CA 60CA/PF)

## (undated) DEVICE — KIT  I.V. START (100EA/CA)

## (undated) DEVICE — CANISTER SUCTION RIGID RED 1500CC (40EA/CA)

## (undated) DEVICE — MASK PANORAMIC OXYGEN PRO2 (30EA/CA)

## (undated) DEVICE — KIT CUSTOM PROCEDURE SINGLE FOR ENDO (15/CA)

## (undated) DEVICE — BLOCK BITE MAXI DENTAL RETENTION RIM (100EA/BX)

## (undated) DEVICE — SENSOR OXIMETER ADULT SPO2 RD SET (20EA/BX)

## (undated) DEVICE — PORT AUXILLARY WATER (50EA/BX)

## (undated) DEVICE — NEPTUNE 4 PORT MANIFOLD - (20/PK)

## (undated) DEVICE — FORCEP RADIAL JAW 4 STANDARD CAPACITY W/NEEDLE 240CM (40EA/BX)

## (undated) DEVICE — CONTAINER, SPECIMEN, STERILE

## (undated) DEVICE — TUBE CONNECTING SUCTION - CLEAR PLASTIC STERILE 72 IN (50EA/CA)

## (undated) DEVICE — FILM CASSETTE ENDO